# Patient Record
Sex: MALE | Race: WHITE | Employment: OTHER | URBAN - METROPOLITAN AREA
[De-identification: names, ages, dates, MRNs, and addresses within clinical notes are randomized per-mention and may not be internally consistent; named-entity substitution may affect disease eponyms.]

---

## 2017-01-11 ENCOUNTER — TRANSFERRED RECORDS (OUTPATIENT)
Dept: CARDIOLOGY | Facility: CLINIC | Age: 72
End: 2017-01-11

## 2017-05-08 ENCOUNTER — TELEPHONE (OUTPATIENT)
Dept: CARDIOLOGY | Facility: CLINIC | Age: 72
End: 2017-05-08

## 2017-05-10 ENCOUNTER — TELEPHONE (OUTPATIENT)
Dept: CARDIOLOGY | Facility: CLINIC | Age: 72
End: 2017-05-10

## 2017-05-11 ENCOUNTER — HOSPITAL ENCOUNTER (OUTPATIENT)
Dept: CARDIOLOGY | Facility: CLINIC | Age: 72
Discharge: HOME | End: 2017-05-11
Attending: INTERNAL MEDICINE | Admitting: INTERNAL MEDICINE
Payer: MEDICARE

## 2017-05-11 VITALS — SYSTOLIC BLOOD PRESSURE: 140 MMHG | DIASTOLIC BLOOD PRESSURE: 80 MMHG

## 2017-05-11 DIAGNOSIS — I42.9 CARDIOMYOPATHY, UNSPECIFIED (H): ICD-10-CM

## 2017-05-11 DIAGNOSIS — I44.7 LBBB (LEFT BUNDLE BRANCH BLOCK): ICD-10-CM

## 2017-05-11 DIAGNOSIS — N28.9 RENAL INSUFFICIENCY: ICD-10-CM

## 2017-05-11 DIAGNOSIS — E11.9 DIABETES MELLITUS WITHOUT COMPLICATION (H): ICD-10-CM

## 2017-05-11 DIAGNOSIS — E78.5 HYPERLIPIDEMIA LDL GOAL <100: ICD-10-CM

## 2017-05-11 PROCEDURE — A9502 TC99M TETROFOSMIN: HCPCS | Performed by: INTERNAL MEDICINE

## 2017-05-11 PROCEDURE — 93018 CV STRESS TEST I&R ONLY: CPT | Performed by: INTERNAL MEDICINE

## 2017-05-11 PROCEDURE — 78452 HT MUSCLE IMAGE SPECT MULT: CPT | Mod: 26 | Performed by: INTERNAL MEDICINE

## 2017-05-11 PROCEDURE — 78452 HT MUSCLE IMAGE SPECT MULT: CPT

## 2017-05-11 PROCEDURE — 25000128 H RX IP 250 OP 636: Performed by: INTERNAL MEDICINE

## 2017-05-11 PROCEDURE — 93016 CV STRESS TEST SUPVJ ONLY: CPT | Performed by: INTERNAL MEDICINE

## 2017-05-11 PROCEDURE — 34300033 ZZH RX 343: Performed by: INTERNAL MEDICINE

## 2017-05-11 RX ORDER — REGADENOSON 0.08 MG/ML
0.4 INJECTION, SOLUTION INTRAVENOUS ONCE
Status: COMPLETED | OUTPATIENT
Start: 2017-05-11 | End: 2017-05-11

## 2017-05-11 RX ORDER — AMINOPHYLLINE 25 MG/ML
50-100 INJECTION, SOLUTION INTRAVENOUS
Status: DISCONTINUED | OUTPATIENT
Start: 2017-05-11 | End: 2017-05-12 | Stop reason: HOSPADM

## 2017-05-11 RX ORDER — ACYCLOVIR 200 MG/1
0-1 CAPSULE ORAL
Status: DISCONTINUED | OUTPATIENT
Start: 2017-05-11 | End: 2017-05-12 | Stop reason: HOSPADM

## 2017-05-11 RX ORDER — REGADENOSON 0.08 MG/ML
0.4 INJECTION, SOLUTION INTRAVENOUS ONCE
Status: DISCONTINUED | OUTPATIENT
Start: 2017-05-11 | End: 2017-05-12 | Stop reason: HOSPADM

## 2017-05-11 RX ORDER — ALBUTEROL SULFATE 90 UG/1
2 AEROSOL, METERED RESPIRATORY (INHALATION) EVERY 5 MIN PRN
Status: DISCONTINUED | OUTPATIENT
Start: 2017-05-11 | End: 2017-05-12 | Stop reason: HOSPADM

## 2017-05-11 RX ADMIN — TETROFOSMIN 28.5 MCI.: 0.23 INJECTION, POWDER, LYOPHILIZED, FOR SOLUTION INTRAVENOUS at 09:26

## 2017-05-11 RX ADMIN — TETROFOSMIN 9.7 MCI.: 0.23 INJECTION, POWDER, LYOPHILIZED, FOR SOLUTION INTRAVENOUS at 08:23

## 2017-05-11 RX ADMIN — REGADENOSON 0.4 MG: 0.08 INJECTION, SOLUTION INTRAVENOUS at 09:24

## 2017-05-15 ENCOUNTER — OFFICE VISIT (OUTPATIENT)
Dept: CARDIOLOGY | Facility: CLINIC | Age: 72
End: 2017-05-15
Attending: INTERNAL MEDICINE
Payer: COMMERCIAL

## 2017-05-15 VITALS
HEART RATE: 84 BPM | SYSTOLIC BLOOD PRESSURE: 134 MMHG | HEIGHT: 69 IN | WEIGHT: 263 LBS | DIASTOLIC BLOOD PRESSURE: 84 MMHG | BODY MASS INDEX: 38.95 KG/M2

## 2017-05-15 DIAGNOSIS — N28.9 RENAL INSUFFICIENCY: ICD-10-CM

## 2017-05-15 DIAGNOSIS — I44.7 LBBB (LEFT BUNDLE BRANCH BLOCK): ICD-10-CM

## 2017-05-15 DIAGNOSIS — I42.9 CARDIOMYOPATHY, UNSPECIFIED (H): ICD-10-CM

## 2017-05-15 DIAGNOSIS — E78.5 HYPERLIPIDEMIA LDL GOAL <100: ICD-10-CM

## 2017-05-15 DIAGNOSIS — E11.9 DIABETES MELLITUS WITHOUT COMPLICATION (H): ICD-10-CM

## 2017-05-15 PROCEDURE — 99214 OFFICE O/P EST MOD 30 MIN: CPT | Performed by: INTERNAL MEDICINE

## 2017-05-15 RX ORDER — CARVEDILOL 12.5 MG/1
12.5 TABLET ORAL 2 TIMES DAILY WITH MEALS
Qty: 60 TABLET | Refills: 11 | Status: SHIPPED | OUTPATIENT
Start: 2017-05-15 | End: 2019-08-07

## 2017-05-15 NOTE — MR AVS SNAPSHOT
"              After Visit Summary   5/15/2017    Burton Elizabeth    MRN: [id number]           Patient Information     Date Of Birth          1945        Visit Information        Provider Department      5/15/2017 7:45 AM Laci Neumann MD [facility] AT [place]        Today's Diagnoses     Diabetes mellitus without complication (H)        Hyperlipidemia LDL goal <100        Cardiomyopathy, unspecified (H)        LBBB (left bundle branch block)        Renal insufficiency           Follow-ups after your visit        Additional Services     Follow-Up with Cardiologist                 Future tests that were ordered for you today     Open Future Orders        Priority Expected Expires Ordered    Follow-Up with Cardiologist Routine 5/15/2018 9/27/2018 5/15/2017            Who to contact     If you have questions or need follow up information about today's clinic visit or your schedule please contact [facility] directly at [phone].  Normal or non-critical lab and imaging results will be communicated to you by MyChart, letter or phone within 4 business days after the clinic has received the results. If you do not hear from us within 7 days, please contact the clinic through "[employer]hart or phone. If you have a critical or abnormal lab result, we will notify you by phone as soon as possible.  Submit refill requests through "[employer] or call your pharmacy and they will forward the refill request to us. Please allow 3 business days for your refill to be completed.          Additional Information About Your Visit        MyChart Information     "[employer] lets you send messages to your doctor, view your test results, renew your prescriptions, schedule appointments and more. To sign up, go to [email or web address] . Click on \"Log in\" on the left side of the screen, which will take you to the Welcome page. Then click on \"Sign up Now\" on the right " "side of the page.     You will be asked to enter the access code listed below, as well as some personal information. Please follow the directions to create your username and password.     Your access code is: [email or web address]  Expires: 2017  8:26 AM     Your access code will  in 90 days. If you need help or a new code, please call your [place] clinic or [phone].        Care EveryWhere ID     This is your Care EveryWhere ID. This could be used by other organizations to access your [place] medical records  [id number]        Your Vitals Were     Pulse Height BMI (Body Mass Index)             84 1.753 m (5' 9\") 38.84 kg/m2          Blood Pressure from Last 3 Encounters:   05/15/17 134/84   17 140/80   16 170/90    Weight from Last 3 Encounters:   05/15/17 119.3 kg (263 lb)   16 114.8 kg (253 lb)   16 109.8 kg (242 lb)              We Performed the Following     Follow-Up with Cardiologist          Today's Medication Changes          These changes are accurate as of: 5/15/17  8:34 AM.  If you have any questions, ask your nurse or doctor.               Start taking these medicines.        Dose/Directions    carvedilol 12.5 MG tablet   Commonly known as:  COREG   Used for:  Cardiomyopathy, unspecified (H)   Started by:  Laci Neumann MD        Dose:  12.5 mg   Take 1 tablet (12.5 mg) by mouth 2 times daily (with meals)   Quantity:  60 tablet   Refills:  11         Stop taking these medicines if you haven't already. Please contact your care team if you have questions.     metoprolol 50 MG 24 hr tablet   Commonly known as:  TOPROL XL   Stopped by:  Laci Neumann MD                Where to get your medicines      These medications were sent to [id number] - [facility]  [facility]  [address] [facility] [address]     Phone:  [phone]     carvedilol 12.5 MG tablet                Primary Care Provider Office Phone # Fax #    Td M " MD Titus [phone]       [facility] [address]  [facility] [address]        Thank you!     Thank you for choosing [facility] [place]  for your care. Our goal is always to provide you with excellent care. Hearing back from our patients is one way we can continue to improve our services. Please take a few minutes to complete the written survey that you may receive in the mail after your visit with us. Thank you!             Your Updated Medication List - Protect others around you: Learn how to safely use, store and throw away your medicines at www.disposemymeds.org.          This list is accurate as of: 5/15/17  8:34 AM.  Always use your most recent med list.                   Brand Name Dispense Instructions for use    acetaminophen 650 MG 8 hour tablet     100 tablet    Take 650 mg by mouth every 6 hours       AMLODIPINE BESYLATE PO      Take 10 mg by mouth daily       ASPIRIN PO      Take 325 mg by mouth daily       carvedilol 12.5 MG tablet    COREG    60 tablet    Take 1 tablet (12.5 mg) by mouth 2 times daily (with meals)       GLIMEPIRIDE PO      Take 4 mg by mouth 2 times daily       hydrOXYzine 10 MG tablet    ATARAX    60 tablet    Take 1 tablet (10 mg) by mouth every 6 hours as needed for itching or other (spasm/pain)       insulin glargine 100 UNIT/ML injection    LANTUS     Inject 12-14 Units Subcutaneous every evening       multivitamin, therapeutic with minerals Tabs tablet      Take 1 tablet by mouth daily       order for DME     1 each    Equipment being ordered: Walker Wheels () and Walker () Treatment Diagnosis: Impaired gait       senna-docusate 8.6-50 MG per tablet    SENOKOT-S;PERICOLACE    60 tablet    Take 1-2 tablets by mouth 2 times daily Continue while taking narcotic pain medications. Hold for loose stools.       SIMVASTATIN PO      Take 80 mg by mouth At Bedtime

## 2017-05-15 NOTE — PROGRESS NOTES
HISTORY OF PRESENT ILLNESS:  Mr. 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  He is a diabetic, hypertensive, has presumed coronary artery disease based on nuclear stress test, which we have elected to treat medically.  His ejection fraction has improved from 44%-59% on the beta blocker.  He does have a left bundle branch block.  He had a small reversible defect inferiorly.  He has no chest pain, chest pressure, shortness of breath, heart racing or palpitations.  He does have some lower extremity edema.  His nuclear stress test from earlier this month showed again an ejection fraction of 59% with a small inferior and apical defect.  There is no TID.      Fasting lipid profile done by Dr. Qureshi's office in 10/2016 shows an LDL of 64 on simvastatin.  He is having some side effects with the Toprol with some disruption of sleep.  He is requesting a change in medications.      ASSESSMENT AND PLAN:  It is a pleasure seeing Mr. Elizabeth back in clinic doing well from a cardiac standpoint.  I am going to stop his Toprol in place of Coreg 12.5 b.i.d.  If he does not tolerate this, we could consider bisoprolol, and I am less favorable for atenolol.  His cholesterol is at goal.  We talked about his weight gain of 10 pounds over the past year and his need to work on that.  He is having some left knee difficulties and is unable to exercise over the past 6 weeks and plans to get this evaluated as well.  I will plan on seeing him back in 1 year's time or sooner if symptoms warrant.         AURELIA PADILLA MD [facility]             D: 05/15/2017 08:26   T: 05/15/2017 13:25   MT: todd      Name:     TAY ELIZABETH   MRN:      [id number]        Account:      [id number]   :      1945           Service Date: 05/15/2017      Document: [id number]

## 2017-05-15 NOTE — PROGRESS NOTES
HPI and Plan:   See dictation    No orders of the defined types were placed in this encounter.    No orders of the defined types were placed in this encounter.    Medications Discontinued During This Encounter   Medication Reason     oxyCODONE (ROXICODONE) 5 MG immediate release tablet Stopped by Patient         Encounter Diagnoses   Name Primary?     Diabetes mellitus without complication (H)      Hyperlipidemia LDL goal <100      Cardiomyopathy, unspecified (H)      LBBB (left bundle branch block)      Renal insufficiency        CURRENT MEDICATIONS:  Current Outpatient Prescriptions   Medication Sig Dispense Refill     metoprolol (TOPROL XL) 50 MG 24 hr tablet Take 1 tablet (50 mg) by mouth daily 90 tablet 3     order for DME Equipment being ordered: Walker Wheels () and Walker ()  Treatment Diagnosis: Impaired gait 1 each 0     acetaminophen 650 MG 8 hour tablet Take 650 mg by mouth every 6 hours 100 tablet 1     hydrOXYzine (ATARAX) 10 MG tablet Take 1 tablet (10 mg) by mouth every 6 hours as needed for itching or other (spasm/pain) 60 tablet 0     senna-docusate (SENOKOT-S;PERICOLACE) 8.6-50 MG per tablet Take 1-2 tablets by mouth 2 times daily Continue while taking narcotic pain medications. Hold for loose stools. 60 tablet 1     ASPIRIN PO Take 325 mg by mouth daily       multivitamin, therapeutic with minerals (THERA-VIT-M) TABS Take 1 tablet by mouth daily       insulin glargine (LANTUS) 100 UNIT/ML PEN Inject 12-14 Units Subcutaneous every evening       SIMVASTATIN PO Take 80 mg by mouth At Bedtime       AMLODIPINE BESYLATE PO Take 10 mg by mouth daily       GLIMEPIRIDE PO Take 4 mg by mouth 2 times daily         ALLERGIES     Allergies   Allergen Reactions     Penicillins Anaphylaxis       PAST MEDICAL HISTORY:  Past Medical History:   Diagnosis Date     Arthritis of knee~ s/p replacement 3/24/2016     Decreased cardiac ejection fraction~44% 3/24/2016     Diabetes mellitus, type 2 (H) 3/24/2016  "    HTN (hypertension) 3/24/2016     LBBB (left bundle branch block) 3/24/2016     Mixed hyperlipidemia 8/23/2016       PAST SURGICAL HISTORY:  Past Surgical History:   Procedure Laterality Date     ARTHROPLASTY KNEE Right 3/29/2016    Procedure: ARTHROPLASTY KNEE;  Surgeon: Heena Rosen MD;  Location: [facility] OR     EYE SURGERY      cataract removal     ORTHOPEDIC SURGERY      KNEE SCOPES MULTIPLE       FAMILY HISTORY:  Family History   Problem Relation Age of Onset     Coronary Artery Disease No family hx of        SOCIAL HISTORY:  Social History     Social History     Marital status: Single     Spouse name: N/A     Number of children: N/A     Years of education: N/A     Social History Main Topics     Smoking status: Never Smoker     Smokeless tobacco: None     Alcohol use 0.0 oz/week     0 Standard drinks or equivalent per week      Comment: rare      Drug use: No     Sexual activity: Not Asked     Other Topics Concern     None     Social History Narrative       Review of Systems:  Skin:  Negative     Eyes:  Negative    ENT:  Negative    Respiratory:  Negative    Cardiovascular:  Negative for;palpitations;chest pain;lightheadedness    Gastroenterology: Negative    Genitourinary:  Negative    Musculoskeletal:  Positive for    Neurologic:  Negative    Psychiatric:  Positive for depression  Heme/Lymph/Imm:  Negative    Endocrine:  Positive for diabetes    Physical Exam:  Vitals: /84  Pulse 84  Ht 1.753 m (5' 9\")  Wt 119.3 kg (263 lb)  BMI 38.84 kg/m2    Constitutional:  cooperative, alert and oriented, well developed, well nourished, in no acute distress obese      Skin:  warm and dry to the touch, no apparent skin lesions or masses noted        Head:  normocephalic, no masses or lesions        Eyes:  pupils equal and round, conjunctivae and lids unremarkable, sclera white, no xanthalasma, EOMS intact, no nystagmus        ENT:  no pallor or cyanosis, dentition good        Neck:  carotid pulses are full " and equal bilaterally, JVP normal, no carotid bruit, no thyromegaly        Chest:  normal breath sounds, clear to auscultation, normal A-P diameter, normal symmetry, normal respiratory excursion, no use of accessory muscles        Cardiac: regular rhythm, normal S1/S2, no S3 or S4, apical impulse not displaced, no murmurs, gallops or rubs                  Abdomen:  abdomen soft, non-tender, BS normoactive, no mass, no HSM, no bruits        Vascular: pulses full and equal, no bruits auscultated                                      Extremities and Back:  no deformities, clubbing, cyanosis, erythema observed        Neurological:  affect appropriate, oriented to time, person and place          Recent Lab Results:  LIPID RESULTS:  No results found for: CHOL, HDL, LDL, TRIG, CHOLHDLRATIO    LIVER ENZYME RESULTS:  No results found for: AST, ALT    CBC RESULTS:  Lab Results   Component Value Date    WBC 8.8 03/29/2016    RBC 5.13 03/29/2016    HGB 11.9 (L) 03/31/2016    HCT 43.6 03/29/2016    MCV 85 03/29/2016    MCH 28.7 03/29/2016    MCHC 33.7 03/29/2016    RDW 13.2 03/29/2016     03/29/2016       BMP RESULTS:  Lab Results   Component Value Date     04/01/2016    POTASSIUM 5.1 04/01/2016    CHLORIDE 109 04/01/2016    CO2 22 04/01/2016    ANIONGAP 5 04/01/2016     (H) 04/01/2016    BUN 36 (H) 04/01/2016    CR 1.92 (H) 04/01/2016    GFRESTIMATED 35 (L) 04/01/2016    GFRESTBLACK 42 (L) 04/01/2016    GABE 8.3 (L) 04/01/2016        A1C RESULTS:  Lab Results   Component Value Date    A1C 9.3 (H) 03/30/2016       INR RESULTS:  Lab Results   Component Value Date    INR 1.02 03/29/2016    INR 1.11 12/18/2006           CC  Laci Neumann MD   [facility]  [address]  [place] [id number]

## 2017-05-15 NOTE — LETTER
5/15/2017    Td Qureshi MD  [address] [facility] [address]  [facility] [address]    RE: Burton Elizabeth       Dear Colleague,    I had the pleasure of seeing Burton Elizabeth in the [facility].    Mr. 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  He is a diabetic, hypertensive, has presumed coronary artery disease based on nuclear stress test, which we have elected to treat medically.  His ejection fraction has improved from 44%-59% on the beta blocker.  He does have a left bundle branch block.  He had a small reversible defect inferiorly.  He has no chest pain, chest pressure, shortness of breath, heart racing or palpitations.  He does have some lower extremity edema.  His nuclear stress test from earlier this month showed again an ejection fraction of 59% with a small inferior and apical defect.  There is no TID.      Fasting lipid profile done by Dr. Qureshi's office in 10/2016 shows an LDL of 64 on simvastatin.  He is having some side effects with the Toprol with some disruption of sleep.  He is requesting a change in medications.     Outpatient Encounter Prescriptions as of 5/15/2017   Medication Sig Dispense Refill     carvedilol (COREG) 12.5 MG tablet Take 1 tablet (12.5 mg) by mouth 2 times daily (with meals) 60 tablet 11     order for DME Equipment being ordered: Walker Wheels () and Walker ()  Treatment Diagnosis: Impaired gait 1 each 0     acetaminophen 650 MG 8 hour tablet Take 650 mg by mouth every 6 hours 100 tablet 1     hydrOXYzine (ATARAX) 10 MG tablet Take 1 tablet (10 mg) by mouth every 6 hours as needed for itching or other (spasm/pain) 60 tablet 0     senna-docusate (SENOKOT-S;PERICOLACE) 8.6-50 MG per tablet Take 1-2 tablets by mouth 2 times daily Continue while taking narcotic pain medications. Hold for loose stools. 60 tablet 1     ASPIRIN PO Take 325 mg by mouth daily        multivitamin, therapeutic with minerals (THERA-VIT-M) TABS Take 1 tablet by mouth daily       insulin glargine (LANTUS) 100 UNIT/ML PEN Inject 12-14 Units Subcutaneous every evening       SIMVASTATIN PO Take 80 mg by mouth At Bedtime       AMLODIPINE BESYLATE PO Take 10 mg by mouth daily       GLIMEPIRIDE PO Take 4 mg by mouth 2 times daily       [DISCONTINUED] metoprolol (TOPROL XL) 50 MG 24 hr tablet Take 1 tablet (50 mg) by mouth daily 90 tablet 3     [DISCONTINUED] oxyCODONE (ROXICODONE) 5 MG immediate release tablet Take 1-2 tablets (5-10 mg) by mouth every 3 hours as needed for moderate to severe pain 50 tablet 0     No facility-administered encounter medications on file as of 5/15/2017.       ASSESSMENT AND PLAN:  It is a pleasure seeing Mr. Elizabeth back in clinic doing well from a cardiac standpoint.  I am going to stop his Toprol in place of Coreg 12.5 b.i.d.  If he does not tolerate this, we could consider bisoprolol, and I am less favorable for atenolol.  His cholesterol is at goal.  We talked about his weight gain of 10 pounds over the past year and his need to work on that.  He is having some left knee difficulties and is unable to exercise over the past 6 weeks and plans to get this evaluated as well.  I will plan on seeing him back in 1 year's time or sooner if symptoms warrant.     Again, thank you for allowing me to participate in the care of your patient.      Sincerely,    AURELIA PADILLA MD     [facility]

## 2018-06-05 ENCOUNTER — OFFICE VISIT (OUTPATIENT)
Dept: CARDIOLOGY | Facility: CLINIC | Age: 73
End: 2018-06-05
Attending: INTERNAL MEDICINE
Payer: COMMERCIAL

## 2018-06-05 VITALS
DIASTOLIC BLOOD PRESSURE: 90 MMHG | BODY MASS INDEX: 36.43 KG/M2 | WEIGHT: 246 LBS | SYSTOLIC BLOOD PRESSURE: 156 MMHG | HEART RATE: 84 BPM | HEIGHT: 69 IN

## 2018-06-05 DIAGNOSIS — E11.9 DIABETES MELLITUS WITHOUT COMPLICATION (H): ICD-10-CM

## 2018-06-05 DIAGNOSIS — I44.7 LBBB (LEFT BUNDLE BRANCH BLOCK): ICD-10-CM

## 2018-06-05 DIAGNOSIS — E78.5 HYPERLIPIDEMIA LDL GOAL <100: ICD-10-CM

## 2018-06-05 DIAGNOSIS — I42.0 DILATED CARDIOMYOPATHY (H): ICD-10-CM

## 2018-06-05 DIAGNOSIS — N28.9 RENAL INSUFFICIENCY: ICD-10-CM

## 2018-06-05 PROCEDURE — 99214 OFFICE O/P EST MOD 30 MIN: CPT | Performed by: INTERNAL MEDICINE

## 2018-06-05 NOTE — MR AVS SNAPSHOT
After Visit Summary   6/5/2018    Burton Elizabeth    MRN: [id number]           Patient Information     Date Of Birth          1945        Visit Information        Provider Department      6/5/2018 7:45 AM Laci Neumann MD [facility]        Today's Diagnoses     Diabetes mellitus without complication (H)        Hyperlipidemia LDL goal <100        Dilated cardiomyopathy (H)        LBBB (left bundle branch block)        Renal insufficiency           Follow-ups after your visit        Additional Services     Follow-Up with Cardiologist                 Future tests that were ordered for you today     Open Future Orders        Priority Expected Expires Ordered    Lipid Profile Routine 6/5/2019 6/5/2019 6/5/2018    ALT Routine 6/5/2019 6/5/2019 6/5/2018    Follow-Up with Cardiologist Routine 6/5/2019 6/6/2019 6/5/2018    Echocardiogram Routine 6/5/2019 6/6/2019 6/5/2018            Who to contact     If you have questions or need follow up information about today's clinic visit or your schedule please contact [facility] directly at [phone].  Normal or non-critical lab and imaging results will be communicated to you by MyChart, letter or phone within 4 business days after the clinic has received the results. If you do not hear from us within 7 days, please contact the clinic through MyChart or phone. If you have a critical or abnormal lab result, we will notify you by phone as soon as possible.  Submit refill requests through [employer] or call your pharmacy and they will forward the refill request to us. Please allow 3 business days for your refill to be completed.          Additional Information About Your Visit        Care EveryWhere ID     This is your Care EveryWhere ID. This could be used by other organizations to access your [place] medical records  [id number]        Your Vitals Were     Pulse Height BMI  "(Body Mass Index)             84 1.753 m (5' 9\") 36.33 kg/m2          Blood Pressure from Last 3 Encounters:   06/05/18 156/90   05/15/17 134/84   05/11/17 140/80    Weight from Last 3 Encounters:   06/05/18 111.6 kg (246 lb)   05/15/17 119.3 kg (263 lb)   08/24/16 114.8 kg (253 lb)              We Performed the Following     Follow-Up with Cardiologist        Primary Care Provider Office Phone # Fax #    Td Qureshi [phone] [address] [address]        Equal Access to Services     ALMA DELIA SALAZAR : Hadii miri Saha, wayogesh mendoza, qabradlyta kaalmada katherin, roverto hunt . So [facility] [phone].    ATENCIÓN: Si habla español, tiene a martin disposición servicios gratuitos de asistencia lingüística. Llame al [phone].    We comply with applicable federal civil rights laws and [place] laws. We do not discriminate on the basis of race, color, national origin, age, disability, sex, sexual orientation, or gender identity.            Thank you!     Thank you for choosing [facility]  for your care. Our goal is always to provide you with excellent care. Hearing back from our patients is one way we can continue to improve our services. Please take a few minutes to complete the written survey that you may receive in the mail after your visit with us. Thank you!             Your Updated Medication List - Protect others around you: Learn how to safely use, store and throw away your medicines at www.disposemymeds.org.          This list is accurate as of 6/5/18  8:50 AM.  Always use your most recent med list.                   Brand Name Dispense Instructions for use Diagnosis    acetaminophen 650 MG 8 hour tablet     100 tablet    Take 650 mg by mouth every 6 hours    Primary localized osteoarthritis of right knee       AMLODIPINE BESYLATE PO      Take 10 mg by mouth daily        ASPIRIN PO      " Take 325 mg by mouth daily        carvedilol 12.5 MG tablet    COREG    60 tablet    Take 1 tablet (12.5 mg) by mouth 2 times daily (with meals)    Cardiomyopathy, unspecified       GLIMEPIRIDE PO      Take 4 mg by mouth 2 times daily        hydrOXYzine 10 MG tablet    ATARAX    60 tablet    Take 1 tablet (10 mg) by mouth every 6 hours as needed for itching or other (spasm/pain)    Primary localized osteoarthritis of right knee       insulin glargine 100 UNIT/ML injection    LANTUS     Inject 12-14 Units Subcutaneous every evening        multivitamin, therapeutic with minerals Tabs tablet      Take 1 tablet by mouth daily        order for DME     1 each    Equipment being ordered: Walker Wheels () and Walker () Treatment Diagnosis: Impaired gait    Status post total right knee replacement       senna-docusate 8.6-50 MG per tablet    SENOKOT-S;PERICOLACE    60 tablet    Take 1-2 tablets by mouth 2 times daily Continue while taking narcotic pain medications. Hold for loose stools.    Primary localized osteoarthritis of right knee       SIMVASTATIN PO      Take 80 mg by mouth At Bedtime

## 2018-06-05 NOTE — PROGRESS NOTES
Service Date: 2018      HISTORY OF PRESENT ILLNESS:  Mr. Elizabeth is a delightful 73-year-old.  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  He is diabetic, hypertensive.  Has resumed coronary disease based on his nuclear stress test, but is asymptomatic and we have elected to treat medically.  His last ejection fraction in  was 59% with a small area of anterior ischemia.  He continues to be active, walking and traveling frequently.  He has been successful in losing approximately 18 pounds over the last year.  He has no chest pain, chest pressure, shortness of breath, heart racing, palpitations or edema.      He has been compliant with his medications.  States his blood pressure has been controlled.  He does have moderate to severe chronic kidney insufficiency with a GFR of 37.  I have no recent lipid profile.      ASSESSMENT AND PLAN:  It is a delight seeing Mr. Elizabeth back in clinic doing so well clinically.  He appears stable without symptoms of angina, heart failure, cardiac arrhythmias.  His weight is down.  He states his blood pressure has been controlled.  Next year, he is due for an echocardiogram.  He does have a systolic murmur consistent with probable mitral regurgitation.  We will evaluate it at that time as well as a lipid profile.         AURELIA PADILLA MD [facility]             D: 2018   T: 2018   MT: NEIDA      Name:     TAY ELIZABETH   MRN:      [id number]        Account:      [id number]   :      1945           Service Date: 2018      Document: [id number]

## 2018-06-05 NOTE — LETTER
6/5/2018    Td Qureshi MD  [address]  [facility] [address]    RE: Burton Elizabeth       Dear Colleague,    I had the pleasure of seeing Burton Elizabeth in the [facility].    HPI and Plan:   See dictation    Orders Placed This Encounter   Procedures     Follow-Up with Cardiologist     Echocardiogram     No orders of the defined types were placed in this encounter.    There are no discontinued medications.      Encounter Diagnoses   Name Primary?     Diabetes mellitus without complication (H)      Hyperlipidemia LDL goal <100      Dilated cardiomyopathy (H)      LBBB (left bundle branch block)      Renal insufficiency        CURRENT MEDICATIONS:  Current Outpatient Prescriptions   Medication Sig Dispense Refill     acetaminophen 650 MG 8 hour tablet Take 650 mg by mouth every 6 hours 100 tablet 1     AMLODIPINE BESYLATE PO Take 10 mg by mouth daily       ASPIRIN PO Take 325 mg by mouth daily       carvedilol (COREG) 12.5 MG tablet Take 1 tablet (12.5 mg) by mouth 2 times daily (with meals) 60 tablet 11     GLIMEPIRIDE PO Take 4 mg by mouth 2 times daily       hydrOXYzine (ATARAX) 10 MG tablet Take 1 tablet (10 mg) by mouth every 6 hours as needed for itching or other (spasm/pain) 60 tablet 0     insulin glargine (LANTUS) 100 UNIT/ML PEN Inject 12-14 Units Subcutaneous every evening       multivitamin, therapeutic with minerals (THERA-VIT-M) TABS Take 1 tablet by mouth daily       order for DME Equipment being ordered: Walker Wheels () and Walker ()  Treatment Diagnosis: Impaired gait 1 each 0     senna-docusate (SENOKOT-S;PERICOLACE) 8.6-50 MG per tablet Take 1-2 tablets by mouth 2 times daily Continue while taking narcotic pain medications. Hold for loose stools. 60 tablet 1     SIMVASTATIN PO Take 80 mg by mouth At Bedtime         ALLERGIES     Allergies   Allergen Reactions     Penicillins Anaphylaxis       PAST MEDICAL HISTORY:  Past Medical History:  "  Diagnosis Date     Arthritis of knee~ s/p replacement 3/24/2016     Decreased cardiac ejection fraction~44% 3/24/2016     Diabetes mellitus, type 2 (H) 3/24/2016     HTN (hypertension) 3/24/2016     LBBB (left bundle branch block) 3/24/2016     Mixed hyperlipidemia 8/23/2016       PAST SURGICAL HISTORY:  Past Surgical History:   Procedure Laterality Date     ARTHROPLASTY KNEE Right 3/29/2016    Procedure: ARTHROPLASTY KNEE;  Surgeon: Heena Rosen MD;  Location: [facility] OR     EYE SURGERY      cataract removal     ORTHOPEDIC SURGERY      KNEE SCOPES MULTIPLE       FAMILY HISTORY:  Family History   Problem Relation Age of Onset     Coronary Artery Disease No family hx of        SOCIAL HISTORY:  Social History     Social History     Marital status: Single     Spouse name: N/A     Number of children: N/A     Years of education: N/A     Social History Main Topics     Smoking status: Never Smoker     Smokeless tobacco: Never Used     Alcohol use 0.0 oz/week     0 Standard drinks or equivalent per week      Comment: rare      Drug use: No     Sexual activity: Not Asked     Other Topics Concern     None     Social History Narrative       Review of Systems:  Skin:  Negative     Eyes:  Negative    ENT:  Negative    Respiratory:  Negative    Cardiovascular:  Negative for;palpitations;chest pain;lightheadedness    Gastroenterology: Negative    Genitourinary:  Negative    Musculoskeletal:  Positive for    Neurologic:  Negative    Psychiatric:  Positive for depression  Heme/Lymph/Imm:  Negative    Endocrine:  Positive for diabetes    Physical Exam:  Vitals: /90  Pulse 84  Ht 1.753 m (5' 9\")  Wt 111.6 kg (246 lb)  BMI 36.33 kg/m2    Constitutional:  cooperative, alert and oriented, well developed, well nourished, in no acute distress obese      Skin:  warm and dry to the touch, no apparent skin lesions or masses noted          Head:  normocephalic, no masses or lesions        Eyes:  pupils equal and round, " conjunctivae and lids unremarkable, sclera white, no xanthalasma, EOMS intact, no nystagmus        Lymph:      ENT:  no pallor or cyanosis, dentition good        Neck:           Respiratory:  normal breath sounds, clear to auscultation, normal A-P diameter, normal symmetry, normal respiratory excursion, no use of accessory muscles         Cardiac  2-3/6 systolic murmur across the precordium                pulses full and equal, no bruits auscultated                                        GI:  abdomen soft, non-tender, BS normoactive, no mass, no HSM, no bruits        Extremities and Muscular Skeletal:  no deformities, clubbing, cyanosis, erythema observed              Neurological:           Psych:           Recent Lab Results:  LIPID RESULTS:  No results found for: CHOL, HDL, LDL, TRIG, CHOLHDLRATIO    LIVER ENZYME RESULTS:  No results found for: AST, ALT    CBC RESULTS:  Lab Results   Component Value Date    WBC 8.8 03/29/2016    RBC 5.13 03/29/2016    HGB 11.9 (L) 03/31/2016    HCT 43.6 03/29/2016    MCV 85 03/29/2016    MCH 28.7 03/29/2016    MCHC 33.7 03/29/2016    RDW 13.2 03/29/2016     03/29/2016       BMP RESULTS:  Lab Results   Component Value Date     04/01/2016    POTASSIUM 5.1 04/01/2016    CHLORIDE 109 04/01/2016    CO2 22 04/01/2016    ANIONGAP 5 04/01/2016     (H) 04/01/2016    BUN 36 (H) 04/01/2016    CR 1.92 (H) 04/01/2016    GFRESTIMATED 35 (L) 04/01/2016    GFRESTBLACK 42 (L) 04/01/2016    GABE 8.3 (L) 04/01/2016        A1C RESULTS:  Lab Results   Component Value Date    A1C 9.3 (H) 03/30/2016       INR RESULTS:  Lab Results   Component Value Date    INR 1.02 03/29/2016    INR 1.11 12/18/2006           CC  Laci Padilla MD  [address]  [place] [address]                  Thank you for allowing me to participate in the care of your patient.      Sincerely,     LACI PADILLA MD     [facility]    cc:   Laci Padilla,  MD  [address]  [place] [address]

## 2018-06-05 NOTE — LETTER
2018      Td Qureshi MD  [address] [facility] [address]  [facility] [address]      RE: Burton Elizabeth       Dear Colleague,    I had the pleasure of seeing Burton Elizabeth in the [facility].    Service Date: 2018      HISTORY OF PRESENT ILLNESS:  Mr. Elizabeth is a delightful 73-year-old.  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  He is diabetic, hypertensive.  Has resumed coronary disease based on his nuclear stress test, but is asymptomatic and we have elected to treat medically.  His last ejection fraction in  was 59% with a small area of anterior ischemia.  He continues to be active, walking and traveling frequently.  He has been successful in losing approximately 18 pounds over the last year.  He has no chest pain, chest pressure, shortness of breath, heart racing, palpitations or edema.      He has been compliant with his medications.  States his blood pressure has been controlled.  He does have moderate to severe chronic kidney insufficiency with a GFR of 37.  I have no recent lipid profile.      ASSESSMENT AND PLAN:  It is a delight seeing Mr. Elizabeth back in clinic doing so well clinically.  He appears stable without symptoms of angina, heart failure, cardiac arrhythmias.  His weight is down.  He states his blood pressure has been controlled.  Next year, he is due for an echocardiogram.  He does have a systolic murmur consistent with probable mitral regurgitation.  We will evaluate it at that time as well as a lipid profile.         AURELIA PADILLA MD [facility]         D: 2018   T: 2018   MT: NEIDA     Name:     BURTON ELIZABETH   MRN:      [id number]        Account:      [id number]   :      1945           Service Date: 2018      Document: [id number]      Outpatient Encounter Prescriptions as of 2018   Medication Sig Dispense Refill     acetaminophen 650 MG 8 hour tablet Take 650 mg  by mouth every 6 hours 100 tablet 1     AMLODIPINE BESYLATE PO Take 10 mg by mouth daily       ASPIRIN PO Take 325 mg by mouth daily       carvedilol (COREG) 12.5 MG tablet Take 1 tablet (12.5 mg) by mouth 2 times daily (with meals) 60 tablet 11     GLIMEPIRIDE PO Take 4 mg by mouth 2 times daily       hydrOXYzine (ATARAX) 10 MG tablet Take 1 tablet (10 mg) by mouth every 6 hours as needed for itching or other (spasm/pain) 60 tablet 0     insulin glargine (LANTUS) 100 UNIT/ML PEN Inject 12-14 Units Subcutaneous every evening       multivitamin, therapeutic with minerals (THERA-VIT-M) TABS Take 1 tablet by mouth daily       order for DME Equipment being ordered: Walker Wheels () and Walker ()  Treatment Diagnosis: Impaired gait 1 each 0     senna-docusate (SENOKOT-S;PERICOLACE) 8.6-50 MG per tablet Take 1-2 tablets by mouth 2 times daily Continue while taking narcotic pain medications. Hold for loose stools. 60 tablet 1     SIMVASTATIN PO Take 80 mg by mouth At Bedtime       No facility-administered encounter medications on file as of 6/5/2018.      Again, thank you for allowing me to participate in the care of your patient.      Sincerely,    AURELIA PADILLA MD   [facility]

## 2018-06-05 NOTE — PROGRESS NOTES
HPI and Plan:   See dictation    Orders Placed This Encounter   Procedures     Follow-Up with Cardiologist     Echocardiogram     No orders of the defined types were placed in this encounter.    There are no discontinued medications.      Encounter Diagnoses   Name Primary?     Diabetes mellitus without complication (H)      Hyperlipidemia LDL goal <100      Dilated cardiomyopathy (H)      LBBB (left bundle branch block)      Renal insufficiency        CURRENT MEDICATIONS:  Current Outpatient Prescriptions   Medication Sig Dispense Refill     acetaminophen 650 MG 8 hour tablet Take 650 mg by mouth every 6 hours 100 tablet 1     AMLODIPINE BESYLATE PO Take 10 mg by mouth daily       ASPIRIN PO Take 325 mg by mouth daily       carvedilol (COREG) 12.5 MG tablet Take 1 tablet (12.5 mg) by mouth 2 times daily (with meals) 60 tablet 11     GLIMEPIRIDE PO Take 4 mg by mouth 2 times daily       hydrOXYzine (ATARAX) 10 MG tablet Take 1 tablet (10 mg) by mouth every 6 hours as needed for itching or other (spasm/pain) 60 tablet 0     insulin glargine (LANTUS) 100 UNIT/ML PEN Inject 12-14 Units Subcutaneous every evening       multivitamin, therapeutic with minerals (THERA-VIT-M) TABS Take 1 tablet by mouth daily       order for DME Equipment being ordered: Walker Wheels () and Walker ()  Treatment Diagnosis: Impaired gait 1 each 0     senna-docusate (SENOKOT-S;PERICOLACE) 8.6-50 MG per tablet Take 1-2 tablets by mouth 2 times daily Continue while taking narcotic pain medications. Hold for loose stools. 60 tablet 1     SIMVASTATIN PO Take 80 mg by mouth At Bedtime         ALLERGIES     Allergies   Allergen Reactions     Penicillins Anaphylaxis       PAST MEDICAL HISTORY:  Past Medical History:   Diagnosis Date     Arthritis of knee~ s/p replacement 3/24/2016     Decreased cardiac ejection fraction~44% 3/24/2016     Diabetes mellitus, type 2 (H) 3/24/2016     HTN (hypertension) 3/24/2016     LBBB (left bundle branch  "block) 3/24/2016     Mixed hyperlipidemia 8/23/2016       PAST SURGICAL HISTORY:  Past Surgical History:   Procedure Laterality Date     ARTHROPLASTY KNEE Right 3/29/2016    Procedure: ARTHROPLASTY KNEE;  Surgeon: Heena Rosen MD;  Location: [facility] OR     EYE SURGERY      cataract removal     ORTHOPEDIC SURGERY      KNEE SCOPES MULTIPLE       FAMILY HISTORY:  Family History   Problem Relation Age of Onset     Coronary Artery Disease No family hx of        SOCIAL HISTORY:  Social History     Social History     Marital status: Single     Spouse name: N/A     Number of children: N/A     Years of education: N/A     Social History Main Topics     Smoking status: Never Smoker     Smokeless tobacco: Never Used     Alcohol use 0.0 oz/week     0 Standard drinks or equivalent per week      Comment: rare      Drug use: No     Sexual activity: Not Asked     Other Topics Concern     None     Social History Narrative       Review of Systems:  Skin:  Negative     Eyes:  Negative    ENT:  Negative    Respiratory:  Negative    Cardiovascular:  Negative for;palpitations;chest pain;lightheadedness    Gastroenterology: Negative    Genitourinary:  Negative    Musculoskeletal:  Positive for    Neurologic:  Negative    Psychiatric:  Positive for depression  Heme/Lymph/Imm:  Negative    Endocrine:  Positive for diabetes    Physical Exam:  Vitals: /90  Pulse 84  Ht 1.753 m (5' 9\")  Wt 111.6 kg (246 lb)  BMI 36.33 kg/m2    Constitutional:  cooperative, alert and oriented, well developed, well nourished, in no acute distress obese      Skin:  warm and dry to the touch, no apparent skin lesions or masses noted          Head:  normocephalic, no masses or lesions        Eyes:  pupils equal and round, conjunctivae and lids unremarkable, sclera white, no xanthalasma, EOMS intact, no nystagmus        Lymph:      ENT:  no pallor or cyanosis, dentition good        Neck:           Respiratory:  normal breath sounds, clear to auscultation, " normal A-P diameter, normal symmetry, normal respiratory excursion, no use of accessory muscles         Cardiac  2-3/6 systolic murmur across the precordium                pulses full and equal, no bruits auscultated                                        GI:  abdomen soft, non-tender, BS normoactive, no mass, no HSM, no bruits        Extremities and Muscular Skeletal:  no deformities, clubbing, cyanosis, erythema observed              Neurological:           Psych:           Recent Lab Results:  LIPID RESULTS:  No results found for: CHOL, HDL, LDL, TRIG, CHOLHDLRATIO    LIVER ENZYME RESULTS:  No results found for: AST, ALT    CBC RESULTS:  Lab Results   Component Value Date    WBC 8.8 03/29/2016    RBC 5.13 03/29/2016    HGB 11.9 (L) 03/31/2016    HCT 43.6 03/29/2016    MCV 85 03/29/2016    MCH 28.7 03/29/2016    MCHC 33.7 03/29/2016    RDW 13.2 03/29/2016     03/29/2016       BMP RESULTS:  Lab Results   Component Value Date     04/01/2016    POTASSIUM 5.1 04/01/2016    CHLORIDE 109 04/01/2016    CO2 22 04/01/2016    ANIONGAP 5 04/01/2016     (H) 04/01/2016    BUN 36 (H) 04/01/2016    CR 1.92 (H) 04/01/2016    GFRESTIMATED 35 (L) 04/01/2016    GFRESTBLACK 42 (L) 04/01/2016    GABE 8.3 (L) 04/01/2016        A1C RESULTS:  Lab Results   Component Value Date    A1C 9.3 (H) 03/30/2016       INR RESULTS:  Lab Results   Component Value Date    INR 1.02 03/29/2016    INR 1.11 12/18/2006           CC  Laci Neumann MD  [address]  [place] [address]

## 2018-12-05 ENCOUNTER — ALLIED HEALTH/NURSE VISIT (OUTPATIENT)
Dept: EDUCATION SERVICES | Facility: CLINIC | Age: 73
End: 2018-12-05
Payer: COMMERCIAL

## 2018-12-05 DIAGNOSIS — E11.9 DIABETES MELLITUS, TYPE 2 (H): Primary | ICD-10-CM

## 2018-12-05 PROCEDURE — G0108 DIAB MANAGE TRN  PER INDIV: HCPCS

## 2018-12-05 PROCEDURE — 99207 ZZC DROP WITH A PROCEDURE: CPT

## 2018-12-05 NOTE — PATIENT INSTRUCTIONS
Take your humalog prior to eating (5-10 min).          Bring blood glucose meter and logbook with you to all doctor and follow-up appointments.     [facility] for the [facility]:  For Your Diabetes Education and Nutrition Appointments Call:  [phone]   For Diabetes Education or Nutrition Related Questions:   Phone: [phone]  E-mail: [email or web address]  Fax: [phone]   If you need a medication refill please contact your pharmacy. Please allow 3 business days for your refills to be completed.    Instructions for emailing the Diabetes Educators    If you need to communicate a non-urgent message to a Diabetes Educator via email, please send to [email or web address].    Please follow the following email guidelines:    Subject line: Secure: your clinic name (example: Secure: [facility])  In the email please include: First name, middle initial, last name and date of birth.    We will be in touch with you within one (1) business day.

## 2018-12-05 NOTE — PROGRESS NOTES
"Lives in [place] and likes fresh fish and mexican food. Avoids white bread and no longer eats cheerios.     Reports 1:50 for his I:C    Will confirm with PCP about dose. Seeing him on 12/18.     Diabetes Self-Management Education & Support    Diabetes Education Self Management & Training    SUBJECTIVE/OBJECTIVE:  Presents for: Individual review  Accompanied by: Self  Focus of Visit: Healthy Eating  Diabetes type: Type 2  Diabetes management related comments/concerns: Numbers were good on the Jardiance. No longer taking Jardiance because of his kidney function per pt. Has been on meal time insulin for a while and reports he was told to take 1 unit per 50 grams of carb.   Transportation concerns: No  Other concerns:: None  Cultural Influences/Ethnic Background:  American    Diabetes Symptoms & Complications  Symptom course: Stable  Weight trend: down about 15# in the past 18 months.   Wt Readings from Last 5 Encounters:   06/05/18 111.6 kg (246 lb)   05/15/17 119.3 kg (263 lb)   08/24/16 114.8 kg (253 lb)   03/29/16 109.8 kg (242 lb)   03/25/16 112.5 kg (248 lb)        Patient Problem List and Family Medical History reviewed for relevant medical history, current medical status, and diabetes risk factors.    Vitals:  There were no vitals taken for this visit.  Estimated body mass index is 36.33 kg/(m^2) as calculated from the following:    Height as of 6/5/18: 1.753 m (5' 9\").    Weight as of 6/5/18: 111.6 kg (246 lb).   Last 3 BP:   BP Readings from Last 3 Encounters:   06/05/18 156/90   05/15/17 134/84   05/11/17 140/80       History   Smoking Status     Never Smoker   Smokeless Tobacco     Never Used       Labs:  Lab Results   Component Value Date    A1C 9.3 03/30/2016     Lab Results   Component Value Date     04/01/2016     No results found for: LDL  No results found for: HDL]  GFR Estimate   Date Value Ref Range Status   04/01/2016 35 (L) >60 mL/min/1.7m2 Final     Comment:     Non  GFR " Calc     GFR Estimate If Black   Date Value Ref Range Status   04/01/2016 42 (L) >60 mL/min/1.7m2 Final     Comment:      GFR Calc     Lab Results   Component Value Date    CR 1.92 04/01/2016     No results found for: MICROALBUMIN    Healthy Eating  Healthy Eating Assessed Today: Yes  Cultural/Jewish diet restrictions?: No  Patient on a regular basis: Eats 3 meals a day (eats out frequently as he travels for work a lot)  Meal planning: Avoiding sweets  Meals include: Breakfast, Lunch, Dinner, Snacks  Beverages: Water, Tea, Coffee        Being Active  Being Active Assessed Today: Yes  Exercise:: Currently not exercising    Monitoring  Monitoring Assessed Today: Yes  Did patient bring glucose meter to appointment? : No (in [place] with his luggage)  Low Glucose Range (mg/dL): 180-200  High Glucose Range (mg/dL): >200  Overall Range (mg/dL): >200 (210-225)    Taking Medications  Diabetes Medication(s)     Insulin Sig    insulin glargine (LANTUS) 100 UNIT/ML PEN Inject 12-14 Units Subcutaneous every evening    Sulfonylureas Sig    GLIMEPIRIDE PO Take 4 mg by mouth 2 times daily          Taking Medication Assessed Today: Yes  Current Treatments: Insulin Injections  Given by: Patient  Injection/Infusion sites: Abdomen  Problems taking diabetes medications regularly?: No  Diabetes medication side effects?: No  Treatment Compliance: All of the time    Problem Solving  Problem Solving Assessed Today: Yes  Hypoglycemia Frequency: Never      Reducing Risks  Reducing Risks Assessed Today: Yes  Diabetes Risks: Age over 45 years, Sedentary Lifestyle, Hyperlipidemia  CAD Risks: Diabetes Mellitus, Dyslipidemia, Hypertension, Male sex, Obesity, Sedentary lifestyle    Healthy Coping  Healthy Coping Assessed Today: Yes  Emotional response to diabetes: Ready to learn  Stage of change: PREPARATION (Decided to change - considering how)  Patient Activation Measure Survey Score:  No flowsheet data  found.    ASSESSMENT:  Overall, has higher blood sugars per report. Is not carb counting. Thinks that his I:C is 1:50 compared to referral from MD which is 1:15. Will call his clinic to check on this.       Patient's most recent A1c 9.7% (10/25/18)    INTERVENTION:   Diabetes knowledge and skills assessment:     Patient is knowledgeable in diabetes management concepts related to: Healthy Eating, Monitoring, Taking Medication, Problem Solving, Reducing Risks and Healthy Coping    Patient needs further education on the following diabetes management concepts: Healthy Eating, Being Active, Monitoring, Taking Medication and Problem Solving    Based on learning assessment above, most appropriate setting for further diabetes education would be: Individual setting.    Education provided today on:  AADE Self-Care Behaviors:  Diabetes Pathophysiology  Healthy Eating: carbohydrate counting, weight reduction, portion control and label reading. Focused on why carb counting is important in determining insulin dose with meals. Explained that by using a carb ratio it allows for more flexibility with eating. Focused on foods he commonly eats for carb information.   Being Active: relationship to blood glucose  Taking Medication: proper site selection and rotation for injections and when to take medications. He is often taking insulin after he eats and sometimes up to 45 min later. Educated on taking meal time dose before eating (about 10 min ideally).  He has a correction scale at home he reports so explained that he will then add his correction insulin (if BG is high) to his meal time dose for the total injection amount pre-meal.     Opportunities for ongoing education and support in diabetes-self management were discussed.    Pt verbalized understanding of concepts discussed and recommendations provided today.       Education Materials Provided:  Carbohydrate Counting    PLAN:  See Patient Instructions for co-developed,  patient-stated behavior change goals.  Called pt's clinic to check on what I:C his PCP would like for him to follow. Will update pt with a phone call when I hear back  (okay to leave a message).   Take humalog 5-10 min prior to eating meals.   Will work on carb counting.   Recommend to stop Glimepiride since he on both meal time and long acting insulin. Will fax clinic.   AVS printed and provided to patient today. See Follow-Up section for recommended follow-up.    Daly Tobin RD LD CDE    Time Spent: 63 minutes  Encounter Type: Individual

## 2018-12-05 NOTE — MR AVS SNAPSHOT
After Visit Summary   12/5/2018    Burton Elizabeth    MRN: [id number]           Patient Information     Date Of Birth          1945        Visit Information        Provider Department      12/5/2018 8:00 AM [facility] ED RESOURCE [facility]        Today's Diagnoses     Diabetes mellitus, type 2 (H)    -  1      Care Instructions    Take your humalog prior to eating (5-10 min).          Bring blood glucose meter and logbook with you to all doctor and follow-up appointments.     [facility] for the [facility]:  For Your Diabetes Education and Nutrition Appointments Call:  [phone]   For Diabetes Education or Nutrition Related Questions:   Phone: [phone]  E-mail: [email or web address]  Fax: [phone]   If you need a medication refill please contact your pharmacy. Please allow 3 business days for your refills to be completed.    Instructions for emailing the Diabetes Educators    If you need to communicate a non-urgent message to a Diabetes Educator via email, please send to [email or web address].    Please follow the following email guidelines:    Subject line: Secure: your clinic name (example: Secure: Mark)  In the email please include: First name, middle initial, last name and date of birth.    We will be in touch with you within one (1) business day.             Follow-ups after your visit        Who to contact     If you have questions or need follow up information about today's clinic visit or your schedule please contact [facility] directly at [phone].  Normal or non-critical lab and imaging results will be communicated to you by MyChart, letter or phone within 4 business days after the clinic has received the results. If you do not hear from us within 7 days, please contact the clinic through MyChart or phone. If you have a critical or abnormal lab result, we will  notify you by phone as soon as possible.  Submit refill requests through [employer] or call your pharmacy and they will forward the refill request to us. Please allow 3 business days for your refill to be completed.          Additional Information About Your Visit        Care EveryWhere ID     This is your Care EveryWhere ID. This could be used by other organizations to access your [facility] medical records  [id number]         Blood Pressure from Last 3 Encounters:   06/05/18 156/90   05/15/17 134/84   05/11/17 140/80    Weight from Last 3 Encounters:   06/05/18 111.6 kg (246 lb)   05/15/17 119.3 kg (263 lb)   08/24/16 114.8 kg (253 lb)              Today, you had the following     No orders found for display       Primary Care Provider Office Phone # Fax #    Td Qureshi [phone] [address] [facility] [address]  [facility] [address]        Equal Access to Services     ALMA DELIA [facility] : Hadii aad ku hadasho Soomaali, waaxda luqadaha, qaybta kaalmada adeegyada, waxay jhonnyin haychar hunt . So [facility] [phone].    ATENCIÓN: Si habla español, tiene a martin disposición servicios gratuitos de asistencia lingüística. Llame al [phone].    We comply with applicable federal civil rights laws and [place] laws. We do not discriminate on the basis of race, color, national origin, age, disability, sex, sexual orientation, or gender identity.            Thank you!     Thank you for choosing [facility]  for your care. Our goal is always to provide you with excellent care. Hearing back from our patients is one way we can continue to improve our services. Please take a few minutes to complete the written survey that you may receive in the mail after your visit with us. Thank you!             Your Updated Medication List - Protect others around you: Learn how to safely use, store and throw away your medicines at www.disposemymeds.org.          This list is accurate as of 12/5/18   8:56 AM.  Always use your most recent med list.                   Brand Name Dispense Instructions for use Diagnosis    acetaminophen 650 MG 8 hour tablet     100 tablet    Take 650 mg by mouth every 6 hours    Primary localized osteoarthritis of right knee       AMLODIPINE BESYLATE PO      Take 10 mg by mouth daily        ASPIRIN PO      Take 325 mg by mouth daily        carvedilol 12.5 MG tablet    COREG    60 tablet    Take 1 tablet (12.5 mg) by mouth 2 times daily (with meals)    Cardiomyopathy, unspecified (H)       GLIMEPIRIDE PO      Take 4 mg by mouth 2 times daily        hydrOXYzine 10 MG tablet    ATARAX    60 tablet    Take 1 tablet (10 mg) by mouth every 6 hours as needed for itching or other (spasm/pain)    Primary localized osteoarthritis of right knee       insulin glargine 100 UNIT/ML pen    LANTUS PEN     Inject 12-14 Units Subcutaneous every evening        multivitamin w/minerals tablet      Take 1 tablet by mouth daily        order for DME     1 each    Equipment being ordered: Walker Wheels () and Walker () Treatment Diagnosis: Impaired gait    Status post total right knee replacement       senna-docusate 8.6-50 MG tablet    SENOKOT-S/PERICOLACE    60 tablet    Take 1-2 tablets by mouth 2 times daily Continue while taking narcotic pain medications. Hold for loose stools.    Primary localized osteoarthritis of right knee       SIMVASTATIN PO      Take 80 mg by mouth At Bedtime

## 2018-12-07 ENCOUNTER — PATIENT OUTREACH (OUTPATIENT)
Dept: EDUCATION SERVICES | Facility: CLINIC | Age: 73
End: 2018-12-07

## 2018-12-07 NOTE — PROGRESS NOTES
Called pt back. No answer. Left vm clarifying dose to patient.   Encouraged him to call back if he has questions.   Daly Tobin, ORTEGA LD CDE

## 2019-05-10 LAB
ALBUMIN SERPL-MCNC: 4.4 G/DL (ref 3.5–4.8)
ALP SERPL-CCNC: 111 U/L (ref 39–117)
ALT SERPL-CCNC: 21 U/L (ref ?–44)
ANION GAP SERPL CALCULATED.3IONS-SCNC: ABNORMAL MMOL/L
AST SERPL-CCNC: 31 U/L (ref ?–40)
BILIRUB SERPL-MCNC: 0.3 MG/DL (ref 0–1.2)
BUN SERPL-MCNC: 38 MG/DL (ref 8–27)
CALCIUM SERPL-MCNC: 9.8 MG/DL (ref 8.6–10.2)
CHLORIDE SERPLBLD-SCNC: 106 MMOL/L (ref 96–106)
CHOLEST SERPL-MCNC: 120 MG/DL (ref ?–199)
CO2 SERPL-SCNC: 21 MMOL/L (ref 20–29)
CREAT SERPL-MCNC: 2.13 MG/DL (ref 0.76–1.27)
GFR SERPL CREATININE-BSD FRML MDRD: 30 ML/MIN/1.73M2 (ref 59–?)
GLUCOSE SERPL-MCNC: 118 MG/DL (ref 70–99)
HDLC SERPL-MCNC: 35 MG/DL (ref 39–?)
LDLC SERPL CALC-MCNC: 61 MG/DL
NONHDLC SERPL-MCNC: ABNORMAL MG/DL
POTASSIUM SERPL-SCNC: 5.6 MMOL/L (ref 3.5–5.2)
PROT SERPL-MCNC: 6.5 G/DL (ref 6–8.5)
SODIUM SERPL-SCNC: 141 MMOL/L (ref 134–144)
TRIGL SERPL-MCNC: 118 MG/DL (ref ?–149)

## 2019-08-01 ENCOUNTER — TRANSFERRED RECORDS (OUTPATIENT)
Dept: HEALTH INFORMATION MANAGEMENT | Facility: CLINIC | Age: 74
End: 2019-08-01

## 2019-08-01 ENCOUNTER — CARE COORDINATION (OUTPATIENT)
Dept: CARDIOLOGY | Facility: CLINIC | Age: 74
End: 2019-08-01

## 2019-08-01 DIAGNOSIS — I42.0 DILATED CARDIOMYOPATHY (H): ICD-10-CM

## 2019-08-01 DIAGNOSIS — I44.7 LBBB (LEFT BUNDLE BRANCH BLOCK): Primary | ICD-10-CM

## 2019-08-01 LAB
ALBUMIN SERPL-MCNC: 3.9 G/DL (ref 3.5–4.8)
ALP SERPL-CCNC: 110 U/L (ref 39–117)
ALT SERPL-CCNC: 18 U/L (ref ?–44)
ANION GAP SERPL CALCULATED.3IONS-SCNC: ABNORMAL MMOL/L
AST SERPL-CCNC: 22 U/L (ref 0–40)
BILIRUB SERPL-MCNC: 0.4 MG/DL (ref ?–1.2)
BUN SERPL-MCNC: 17 MG/DL (ref 10–24)
CALCIUM SERPL-MCNC: 9.6 MG/DL (ref 8.6–10.2)
CHLORIDE SERPLBLD-SCNC: 103 MMOL/L (ref 96–106)
CO2 SERPL-SCNC: 24 MMOL/L (ref 20–29)
CREAT SERPL-MCNC: 2.12 MG/DL (ref 0.76–1.27)
ERYTHROCYTE [DISTWIDTH] IN BLOOD BY AUTOMATED COUNT: ABNORMAL %
GFR SERPL CREATININE-BSD FRML MDRD: 30 ML/MIN/1.73M2 (ref 59–?)
GLUCOSE SERPL-MCNC: 159 MG/DL (ref 70–99)
HCT VFR BLD AUTO: 30.9 % (ref 36–51.8)
HEMOGLOBIN: 9.6 G/DL (ref 11.5–16)
MCH RBC QN AUTO: ABNORMAL PG
MCHC RBC AUTO-ENTMCNC: ABNORMAL G/DL
MCV RBC AUTO: ABNORMAL FL
PLATELET # BLD AUTO: 320 10^9/L (ref 130–140)
POTASSIUM SERPL-SCNC: 5.5 MMOL/L (ref 3.5–5.2)
PROT SERPL-MCNC: 6.4 G/DL (ref 6–8.5)
RBC # BLD AUTO: 3.88 10^12/L (ref 3.8–5.2)
SODIUM SERPL-SCNC: 137 MMOL/L (ref 134–144)
TSH SERPL-ACNC: 1.72 MCU/ML (ref 0.45–4.5)
WBC # BLD AUTO: 9.8 10^9/L (ref 3.5–10.8)

## 2019-08-01 NOTE — PROGRESS NOTES
Patient was shopping yesterday.  Had severe knee pain (needs a total knee).  Next thing he remembered was paramedics helping him off the floor.  Denies chest pain or SOB prior to episodes.  He was never taken to ER.  He saw his PCP today with no new findings.  Patient was wondering if he should do anything further.  I will review with Dr. Neumann when he is in office tomorrow.  Dr. Neumann has reviewed the above msg.  Due to sudden syncopal episode he would like patient to have coronary angiogram.  Will have patient see ARCHIE next week to do H&P prior to cath.

## 2019-08-07 ENCOUNTER — HOSPITAL ENCOUNTER (OUTPATIENT)
Dept: CARDIOLOGY | Facility: CLINIC | Age: 74
Discharge: HOME | End: 2019-08-07
Attending: NURSE PRACTITIONER | Admitting: NURSE PRACTITIONER
Payer: COMMERCIAL

## 2019-08-07 ENCOUNTER — DOCUMENTATION ONLY (OUTPATIENT)
Dept: CARDIOLOGY | Facility: CLINIC | Age: 74
End: 2019-08-07

## 2019-08-07 ENCOUNTER — OFFICE VISIT (OUTPATIENT)
Dept: CARDIOLOGY | Facility: CLINIC | Age: 74
End: 2019-08-07
Attending: INTERNAL MEDICINE
Payer: COMMERCIAL

## 2019-08-07 VITALS
BODY MASS INDEX: 39.86 KG/M2 | SYSTOLIC BLOOD PRESSURE: 137 MMHG | HEIGHT: 69 IN | HEART RATE: 80 BPM | WEIGHT: 269.1 LBS | DIASTOLIC BLOOD PRESSURE: 83 MMHG

## 2019-08-07 DIAGNOSIS — I25.119 CORONARY ARTERY DISEASE INVOLVING NATIVE CORONARY ARTERY OF NATIVE HEART WITH ANGINA PECTORIS (H): ICD-10-CM

## 2019-08-07 DIAGNOSIS — R06.02 SHORTNESS OF BREATH: ICD-10-CM

## 2019-08-07 DIAGNOSIS — I44.7 LBBB (LEFT BUNDLE BRANCH BLOCK): ICD-10-CM

## 2019-08-07 DIAGNOSIS — E78.5 HYPERLIPIDEMIA LDL GOAL <100: ICD-10-CM

## 2019-08-07 DIAGNOSIS — R55 SYNCOPE, UNSPECIFIED SYNCOPE TYPE: Primary | ICD-10-CM

## 2019-08-07 DIAGNOSIS — I10 ESSENTIAL HYPERTENSION: ICD-10-CM

## 2019-08-07 PROCEDURE — 40000264 ECHOCARDIOGRAM COMPLETE

## 2019-08-07 PROCEDURE — 99214 OFFICE O/P EST MOD 30 MIN: CPT | Mod: 25 | Performed by: NURSE PRACTITIONER

## 2019-08-07 PROCEDURE — 25500064 ZZH RX 255 OP 636: Performed by: NURSE PRACTITIONER

## 2019-08-07 PROCEDURE — 93306 TTE W/DOPPLER COMPLETE: CPT | Mod: 26 | Performed by: INTERNAL MEDICINE

## 2019-08-07 PROCEDURE — 93000 ELECTROCARDIOGRAM COMPLETE: CPT | Mod: 59 | Performed by: NURSE PRACTITIONER

## 2019-08-07 RX ORDER — AMITRIPTYLINE HYDROCHLORIDE 10 MG/1
10 TABLET ORAL AT BEDTIME
Status: ON HOLD | COMMUNITY
End: 2019-08-12

## 2019-08-07 RX ORDER — HYDROCHLOROTHIAZIDE 12.5 MG/1
TABLET ORAL
COMMUNITY
End: 2019-08-07

## 2019-08-07 RX ORDER — POTASSIUM CHLORIDE 1500 MG/1
20 TABLET, EXTENDED RELEASE ORAL
Status: CANCELLED | OUTPATIENT
Start: 2019-08-07

## 2019-08-07 RX ORDER — SODIUM CHLORIDE 9 MG/ML
INJECTION, SOLUTION INTRAVENOUS CONTINUOUS
Status: CANCELLED | OUTPATIENT
Start: 2019-08-07 | End: 2019-08-07

## 2019-08-07 RX ORDER — FUROSEMIDE 20 MG
20 TABLET ORAL DAILY
Qty: 30 TABLET | Refills: 3 | Status: ON HOLD | OUTPATIENT
Start: 2019-08-07 | End: 2019-09-27

## 2019-08-07 RX ORDER — ATORVASTATIN CALCIUM 80 MG/1
40 TABLET, FILM COATED ORAL DAILY
COMMUNITY
End: 2020-04-22

## 2019-08-07 RX ORDER — LOSARTAN POTASSIUM AND HYDROCHLOROTHIAZIDE 25; 100 MG/1; MG/1
1 TABLET ORAL DAILY
COMMUNITY
End: 2019-08-19 | Stop reason: ALTCHOICE

## 2019-08-07 RX ORDER — LIDOCAINE 40 MG/G
CREAM TOPICAL
Status: CANCELLED | OUTPATIENT
Start: 2019-08-07

## 2019-08-07 RX ORDER — AMLODIPINE BESYLATE 10 MG/1
10 TABLET ORAL DAILY
COMMUNITY
End: 2020-04-22

## 2019-08-07 RX ORDER — HYDROCODONE BITARTRATE AND ACETAMINOPHEN 5; 325 MG/1; MG/1
1 TABLET ORAL EVERY 6 HOURS PRN
Status: ON HOLD | COMMUNITY
End: 2019-09-27

## 2019-08-07 RX ADMIN — HUMAN ALBUMIN MICROSPHERES AND PERFLUTREN 4 ML: 10; .22 INJECTION, SOLUTION INTRAVENOUS at 14:45

## 2019-08-07 ASSESSMENT — MIFFLIN-ST. JEOR: SCORE: 1956.01

## 2019-08-07 NOTE — LETTER
8/7/2019    Td Qureshi MD  [address] [facility] [address]  [facility] [address]    RE: Burton Elizabeth       Dear Colleague,    I had the pleasure of seeing Burton Elizabeth in the [facility].    Cardiology Clinic Progress Note  Burton Elizabeth MRN# [id number]   YOB: 1945 Age: 74 year old     Reason For Visit:  H&P for angiogram   Primary Cardiologist:   Dr. Neumann          History of Presenting Illness:    Burton Elizabeth is a pleasant 74 year old patient who carries a past medical history significant for coronary artery disease treated medically, diabetes, hypertension, obesity, and chronic kidney disease.     He presents to the office today after having a sudden syncopal episode while shopping.  He states he was doing some grocery shopping an [employer] and felt sharp severe pain to his left knee. A few seconds later, he found himself on the floor with the  leaning over him.  He did not lose bowel or bladder. Paramedics were called and administered onsite triage with no transport to the ER. He reported no symptoms of chest pain, shortness of breath, lightheadedness, or dizziness. He attributed the episode to acute knee pain which [facility]  A few days later,  he followed up with his PCP who performed an EKG that demonstrated SR w/ known LBBB and no acute changes. Baseline labs were drawn that showed a creatinine 2.12 ( baseline 1.9), BUN 37,GFR 30, sodium 137, potassium 5.5, hemoglobin 9.6, platelets 320 . He carries a history of abnormal stress test (2016) demonstrating a small area of mild ischemia in the anterior/anteroseptal apex/mid ventricle and mild ischemia in the inferior wall. EF 59%. Based on his recent syncopal episode and known abnormal stress test,  he is recommended for an invasive ischemic evaluation.  He comes to the office today for H&P in preparation for angiogram.     He  presents today with complaints of exertional shortness of breath worsening over the last 6 weeks,  23 lb weight gain/ 3 months, orthopnea and severe left knee pain.  He has had no recurrent syncope, denies chest pain, PND, edema, heart racing, lightheadedness or palpitations.     Blood pressure is well controlled at 137/83, managed on amlodipine and hyzaar. Medication list was not initially available as patient [facility].   BMI 39.74    EKG today demonstrates NSR with LBBB ( known).     Activity is primarily sedentary  Admits to poor diet choices   Remains compliant with all medications.                   Assessment and Plan:     1. Syncope - Single episode. EKG today demonstrates NSR with known LBBB. Recommend event monitor for evaluation of arrhythmia, patient would like to hold off on the event monitor until after angiogram and echocardiogram are complete.     2. CAD - Hx of abnormal stress test (2016) demonstrating a small area of mild ischemia in the anterior/anteroseptal apex/mid ventricle and mild ischemia in the inferior wall. EF 59%. Based on worsening symptoms of shortness of breath, weight gain, and syncopal event, recommend coronary angiogram for ischemic evaluation. Risks and benefits of coronary angiogram discussed today including, bleeding, bruising, infection, allergic reaction, kidney damage (including need for dialysis), stroke, heart attack, vascular damage, emergency open heart surgery, up to and including death.  Patient indicates understanding and is agreeable to proceed.  In review of medications, he is noted to be off carvedilol. No documentation as to why this was stopped. Recommend restarting 12.5mg BID carvedilol. Patient carries a hx of elevated creatinine (baseline 1.9), creatinine 2.1 today, recommend 2 hr of pre hydration at 100cc/hr prior to angiogram. Hold metformin and lasix the day of procedure. NPO after midnight the evening prior to procedure. Of note,  patient is hoping to have orthopedic surgery ASAP. Should PCI be needed, may consider a bare-metal stent.     3. Shortness of breath - Worsening over the last 6 weeks, 23 lb weight gain over last 3 months, orthopneic.  Will get echocardiogram for structural evaluation. Start low dose lasix 20mg daily. Will need to monitor creatinine closely due to CKD.     4. Hypertension - well controlled on current medical therapy  5.  Hyperlipidemia - LDL at goal, continue on lipitor.   6.  Diabetes - On Insulin, metformin, and Glimepiride  7.  Left knee pain - [facility], needs [facility] TKR               Thank you for allowing me to participate in this delightful patient's care. Follow up post angiogram.       WALE Quiroga, CNP          Review of Systems:   Review of Systems:  Skin:  Negative     Eyes:  Negative    ENT:  Negative    Respiratory:  Positive for shortness of breath  Cardiovascular:    Positive for;dizziness;lightheadedness;syncope or near-syncope;edema  Gastroenterology: Negative    Genitourinary:  Negative    Musculoskeletal:  Positive for joint pain  Neurologic:  Negative    Psychiatric:  Positive for depression;sleep disturbances  Heme/Lymph/Imm:  Positive for allergies  Endocrine:  Positive for diabetes              Physical Exam:     GEN:  In general, this is a obese male in no acute distress.  HEENT:  Pupils equal, round. Sclerae nonicteric. Clear oropharynx. Mucous membranes moist.  NECK: Supple, no masses appreciated. Trachea midline. Hard to assess JVD   C/V:  Regular rate and rhythm, No murmur, rub or gallop. No S3 or RV heave.   RESP: Respirations are unlabored. No use of accessory muscles. Clear to auscultation bilaterally without wheezing, rales, or rhonchi.  GI: Abdomen soft, nontender, nondistended. No HSM appreciated.   EXTREM: Trace LE edema. No cyanosis or clubbing.  NEURO: Alert and oriented, cooperative.  No obvious focal deficits. Pain, numbness to left leg.   PSYCH:  Normal affect.  SKIN: Warm and dry. No rashes or petechiae appreciated.          Past Medical History:     Past Medical History:   Diagnosis Date     Arthritis of knee~ s/p replacement 3/24/2016     Decreased cardiac ejection fraction~44% 3/24/2016     Diabetes mellitus, type 2 (H) 3/24/2016     HTN (hypertension) 3/24/2016     LBBB (left bundle branch block) 3/24/2016     Mixed hyperlipidemia 8/23/2016              Past Surgical History:     Past Surgical History:   Procedure Laterality Date     ARTHROPLASTY KNEE Right 3/29/2016    Procedure: ARTHROPLASTY KNEE;  Surgeon: Heena Rosen MD;  Location: [facility] OR     EYE SURGERY      cataract removal     ORTHOPEDIC SURGERY      KNEE SCOPES MULTIPLE              Allergies:   Penicillins       Data:   All laboratory data reviewed:    Exam Date Accession # Results    5/10/19     Component Value Flag Ref Range Units Status Collected Lab   Cholesterol 120   199 mg/dL Final 05/10/2019 12:00 AM 1791   Triglycerides 118   149 mg/dL Final 05/10/2019 12:00 AM 1791   HDL Cholesterol 35  Abnormal   39 mg/dL Final 05/10/2019 12:00 AM 1791   LDL Cholesterol Calculated 61    mg/dL Final 05/10/2019 12:00 AM 1791         LAST BMP:  Lab Results   Component Value Date     Exam Date Accession # Results    8/1/19     Component Value Flag Ref Range Units Status Collected Lab   Sodium 137   134 - 144 mmol/L Final 08/01/2019 12:00 AM 1791   Potassium 5.5  Abnormal   3.5 - 5.2 mmol/L Final 08/01/2019 12:00 AM 1791   Chloride 103   96 - 106 mmol/L Final 08/01/2019 12:00 AM 1791   Carbon Dioxide 24   20 - 29 mmol/L Final 08/01/2019 12:00 AM 1791   Anion Gap    mmol/L  08/01/2019 12:00 AM 1791   Glucose 159  Abnormal   70 - 99 mg/dL Final 08/01/2019 12:00 AM 1791   Urea Nitrogen 17   10 - 24 mg/dL Final 08/01/2019 12:00 AM 1791   Creatinine 2.12  Abnormal   0.76 - 1.27 mg/dL Final 08/01/2019 12:00 AM 1791   Calcium 9.6   8.6 - 10.2 mg/dL Final 08/01/2019 12:00 AM 1791    Protein Total 6.4   6.0 - 8.5 g/dL Final 08/01/2019 12:00 AM 1791   Albumin 3.9   3.5 - 4.8 g/dL Final 08/01/2019 12:00 AM 1791   Bilirubin Total 0.4   1.2 mg/dL Final 08/01/2019 12:00 AM 1791   Alkaline Phosphatase 110   39 - 117 U/L Final 08/01/2019 12:00 AM 1791   AST 22   0 - 40 U/L Final 08/01/2019 12:00 AM 1791   ALT 18   44 U/L Final 08/01/2019 12:00 AM 1791   GFR Estimate 30  Abnormal   59 ml/min/1.73m2 Final 08/01/2019 12:00 AM 1791   GFR Estimate If Black 35  Abnormal   59 ml/min/1.73m2 Final 08/01/2019 12:00 AM 1791           LAST CBC:    Exam Date Accession # Results    8/1/19     Component Value Flag Ref Range Units Status Collected Lab   WBC 9.8   3.5 - 10.8 10^9/L Final 08/01/2019 12:00 AM 1791   RBC Count 3.88   3.8 - 5.2 10^12/L Final 08/01/2019 12:00 AM 1791   Hemoglobin 9.6  Abnormal   11.5 - 16.0 g/dL Final 08/01/2019 12:00 AM 1791   Hematocrit 30.9  Abnormal   36 - 51.8 % Final 08/01/2019 12:00 AM 1791   MCV    fl  08/01/2019 12:00 AM 1791   MCH    pg  08/01/2019 12:00 AM 1791   MCHC    g/dL  08/01/2019 12:00 AM 1791   RDW    %  08/01/2019 12:00 AM 1791   Platelet Count 320  Abnormal   130 - 140 10^9/L Final 08/01/2019 12:00 AM 1791       Thank you for allowing me to participate in the care of your patient.    Sincerely,     WALE Quiroga [facility]

## 2019-08-07 NOTE — PROGRESS NOTES
Cardiology Clinic Progress Note  Burton Elizabeth MRN# [id number]   YOB: 1945 Age: 74 year old     Reason For Visit:  H&P for angiogram   Primary Cardiologist:   Dr. Neumann          History of Presenting Illness:    Burton Elizabeth is a pleasant 74 year old patient who carries a past medical history significant for coronary artery disease treated medically, diabetes, hypertension, obesity, and chronic kidney disease.     He presents to the office today after having a sudden syncopal episode while shopping.  He states he was doing some grocery shopping an [employer] and felt sharp severe pain to his left knee. A few seconds later, he found himself on the floor with the  leaning over him.  He did not lose bowel or bladder. Paramedics were called and administered onsite triage with no transport to the ER. He reported no symptoms of chest pain, shortness of breath, lightheadedness, or dizziness. He attributed the episode to acute knee pain which [facility].  A few days later,  he followed up with his PCP who performed an EKG that demonstrated SR w/ known LBBB and no acute changes. Baseline labs were drawn that showed a creatinine 2.12 ( baseline 1.9), BUN 37,GFR 30, sodium 137, potassium 5.5, hemoglobin 9.6, platelets 320 . He carries a history of abnormal stress test (2016) demonstrating a small area of mild ischemia in the anterior/anteroseptal apex/mid ventricle and mild ischemia in the inferior wall. EF 59%. Based on his recent syncopal episode and known abnormal stress test,  he is recommended for an invasive ischemic evaluation.  He comes to the office today for H&P in preparation for angiogram.     He presents today with complaints of exertional shortness of breath worsening over the last 6 weeks,  23 lb weight gain/ 3 months, orthopnea and severe left knee pain.  He has had no recurrent syncope, denies chest pain, PND, edema,  heart racing, lightheadedness or palpitations.     Blood pressure is well controlled at 137/83, managed on amlodipine and hyzaar. Medication list was not initially available as patient [facility].   BMI 39.74    EKG today demonstrates NSR with LBBB ( known).     Activity is primarily sedentary  Admits to poor diet choices   Remains compliant with all medications.                   Assessment and Plan:     1. Syncope - Single episode. EKG today demonstrates NSR with known LBBB. Recommend event monitor for evaluation of arrhythmia, patient would like to hold off on the event monitor until after angiogram and echocardiogram are complete.     2. CAD - Hx of abnormal stress test (2016) demonstrating a small area of mild ischemia in the anterior/anteroseptal apex/mid ventricle and mild ischemia in the inferior wall. EF 59%. Based on worsening symptoms of shortness of breath, weight gain, and syncopal event, recommend coronary angiogram for ischemic evaluation. Risks and benefits of coronary angiogram discussed today including, bleeding, bruising, infection, allergic reaction, kidney damage (including need for dialysis), stroke, heart attack, vascular damage, emergency open heart surgery, up to and including death.  Patient indicates understanding and is agreeable to proceed.  In review of medications, he is noted to be off carvedilol. No documentation as to why this was stopped. Recommend restarting 12.5mg BID carvedilol. Patient carries a hx of elevated creatinine (baseline 1.9), creatinine 2.1 today, recommend 2 hr of pre hydration at 100cc/hr prior to angiogram. Hold metformin and lasix the day of procedure. NPO after midnight the evening prior to procedure. Of note, patient is hoping to have orthopedic surgery ASAP. Should PCI be needed, may consider a bare-metal stent.     3. Shortness of breath - Worsening over the last 6 weeks, 23 lb weight gain over last 3 months, orthopneic.  Will get  echocardiogram for structural evaluation. Start low dose lasix 20mg daily. Will need to monitor creatinine closely due to CKD.     4. Hypertension - well controlled on current medical therapy  5.  Hyperlipidemia - LDL at goal, continue on lipitor.   6.  Diabetes - On Insulin, metformin, and Glimepiride  7.  Left knee pain - [facility], needs [facility] TKR               Thank you for allowing me to participate in this delightful patient's care. Follow up post angiogram.       Francisca Arroyo, APRN, CNP          Review of Systems:   Review of Systems:  Skin:  Negative     Eyes:  Negative    ENT:  Negative    Respiratory:  Positive for shortness of breath  Cardiovascular:    Positive for;dizziness;lightheadedness;syncope or near-syncope;edema  Gastroenterology: Negative    Genitourinary:  Negative    Musculoskeletal:  Positive for joint pain  Neurologic:  Negative    Psychiatric:  Positive for depression;sleep disturbances  Heme/Lymph/Imm:  Positive for allergies  Endocrine:  Positive for diabetes              Physical Exam:     GEN:  In general, this is a obese male in no acute distress.  HEENT:  Pupils equal, round. Sclerae nonicteric. Clear oropharynx. Mucous membranes moist.  NECK: Supple, no masses appreciated. Trachea midline. Hard to assess JVD   C/V:  Regular rate and rhythm, No murmur, rub or gallop. No S3 or RV heave.   RESP: Respirations are unlabored. No use of accessory muscles. Clear to auscultation bilaterally without wheezing, rales, or rhonchi.  GI: Abdomen soft, nontender, nondistended. No HSM appreciated.   EXTREM: Trace LE edema. No cyanosis or clubbing.  NEURO: Alert and oriented, cooperative.  No obvious focal deficits. Pain, numbness to left leg.   PSYCH: Normal affect.  SKIN: Warm and dry. No rashes or petechiae appreciated.          Past Medical History:     Past Medical History:   Diagnosis Date     Arthritis of knee~ s/p replacement 3/24/2016     Decreased cardiac ejection  fraction~44% 3/24/2016     Diabetes mellitus, type 2 (H) 3/24/2016     HTN (hypertension) 3/24/2016     LBBB (left bundle branch block) 3/24/2016     Mixed hyperlipidemia 8/23/2016              Past Surgical History:     Past Surgical History:   Procedure Laterality Date     ARTHROPLASTY KNEE Right 3/29/2016    Procedure: ARTHROPLASTY KNEE;  Surgeon: Heena Rosen MD;  Location: [facility] OR     EYE SURGERY      cataract removal     ORTHOPEDIC SURGERY      KNEE SCOPES MULTIPLE              Allergies:   Penicillins       Data:   All laboratory data reviewed:    Exam Date Accession # Results    5/10/19     Component Value Flag Ref Range Units Status Collected Lab   Cholesterol 120   199 mg/dL Final 05/10/2019 12:00 AM 1791   Triglycerides 118   149 mg/dL Final 05/10/2019 12:00 AM 1791   HDL Cholesterol 35  Abnormal   39 mg/dL Final 05/10/2019 12:00 AM 1791   LDL Cholesterol Calculated 61    mg/dL Final 05/10/2019 12:00 AM 1791         LAST BMP:  Lab Results   Component Value Date     Exam Date Accession # Results    8/1/19     Component Value Flag Ref Range Units Status Collected Lab   Sodium 137   134 - 144 mmol/L Final 08/01/2019 12:00 AM 1791   Potassium 5.5  Abnormal   3.5 - 5.2 mmol/L Final 08/01/2019 12:00 AM 1791   Chloride 103   96 - 106 mmol/L Final 08/01/2019 12:00 AM 1791   Carbon Dioxide 24   20 - 29 mmol/L Final 08/01/2019 12:00 AM 1791   Anion Gap    mmol/L  08/01/2019 12:00 AM 1791   Glucose 159  Abnormal   70 - 99 mg/dL Final 08/01/2019 12:00 AM 1791   Urea Nitrogen 17   10 - 24 mg/dL Final 08/01/2019 12:00 AM 1791   Creatinine 2.12  Abnormal   0.76 - 1.27 mg/dL Final 08/01/2019 12:00 AM 1791   Calcium 9.6   8.6 - 10.2 mg/dL Final 08/01/2019 12:00 AM 1791   Protein Total 6.4   6.0 - 8.5 g/dL Final 08/01/2019 12:00 AM 1791   Albumin 3.9   3.5 - 4.8 g/dL Final 08/01/2019 12:00 AM 1791   Bilirubin Total 0.4   1.2 mg/dL Final 08/01/2019 12:00 AM 1791   Alkaline Phosphatase 110   39 -  117 U/L Final 08/01/2019 12:00 AM 1791   AST 22   0 - 40 U/L Final 08/01/2019 12:00 AM 1791   ALT 18   44 U/L Final 08/01/2019 12:00 AM 1791   GFR Estimate 30  Abnormal   59 ml/min/1.73m2 Final 08/01/2019 12:00 AM 1791   GFR Estimate If Black 35  Abnormal   59 ml/min/1.73m2 Final 08/01/2019 12:00 AM 1791           LAST CBC:    Exam Date Accession # Results    8/1/19     Component Value Flag Ref Range Units Status Collected Lab   WBC 9.8   3.5 - 10.8 10^9/L Final 08/01/2019 12:00 AM 1791   RBC Count 3.88   3.8 - 5.2 10^12/L Final 08/01/2019 12:00 AM 1791   Hemoglobin 9.6  Abnormal   11.5 - 16.0 g/dL Final 08/01/2019 12:00 AM 1791   Hematocrit 30.9  Abnormal   36 - 51.8 % Final 08/01/2019 12:00 AM 1791   MCV    fl  08/01/2019 12:00 AM 1791   MCH    pg  08/01/2019 12:00 AM 1791   MCHC    g/dL  08/01/2019 12:00 AM 1791   RDW    %  08/01/2019 12:00 AM 1791   Platelet Count 320  Abnormal   130 - 140 10^9/L Final 08/01/2019 12:00 AM 1791

## 2019-08-07 NOTE — PROGRESS NOTES
[facility], noticed patient is not taking 12.5mg BID Carvedilol.   Can we confirm why this was discontinued?  Patient should be on beta blocker.

## 2019-08-07 NOTE — PATIENT INSTRUCTIONS
Thanks for coming into [facility] clinic today.    Episode of syncope. Patient wishes to hold off on wearing monitor until post angiogram follow up.   Recommended for angiogram, risks vs benefits reviewed with verbal understanding.  Worsening shortness of breath  Start low dose lasix 20mg daily  Echocardiogram for structural evaluation.   Continue on all other medications   Follow up post procedure    Please call my nurse at [phone] with any questions or concerns.    Scheduling phone number: [phone]  Reminder: Please bring in all current medications, over the counter supplements and vitamin bottles to your next appointment.

## 2019-08-08 DIAGNOSIS — I51.9 LV DYSFUNCTION: Primary | ICD-10-CM

## 2019-08-08 RX ORDER — CARVEDILOL 12.5 MG/1
12.5 TABLET ORAL 2 TIMES DAILY WITH MEALS
Qty: 60 TABLET | Refills: 0 | Status: ON HOLD | OUTPATIENT
Start: 2019-08-08 | End: 2019-09-27

## 2019-08-08 NOTE — TELEPHONE ENCOUNTER
Notified patient with results of echocardiogram  Instructed patient to restart carvedilol 12.5mg BID, will send script to [facility].   Patient would like a script for 3 month supply (hard copy) when he follows up after angiogram.   He verbalizes timing when to report for angiogram on 8/12/19.   All questions answered with verbal understanding.

## 2019-08-12 ENCOUNTER — HOSPITAL ENCOUNTER (OUTPATIENT)
Facility: CLINIC | Age: 74
Discharge: HOME | End: 2019-08-12
Admitting: INTERNAL MEDICINE
Payer: COMMERCIAL

## 2019-08-12 ENCOUNTER — SURGERY (OUTPATIENT)
Age: 74
End: 2019-08-12
Payer: COMMERCIAL

## 2019-08-12 VITALS
TEMPERATURE: 98 F | HEIGHT: 69 IN | RESPIRATION RATE: 16 BRPM | WEIGHT: 269.1 LBS | BODY MASS INDEX: 39.86 KG/M2 | OXYGEN SATURATION: 96 % | HEART RATE: 66 BPM | DIASTOLIC BLOOD PRESSURE: 75 MMHG | SYSTOLIC BLOOD PRESSURE: 123 MMHG

## 2019-08-12 DIAGNOSIS — I44.7 LBBB (LEFT BUNDLE BRANCH BLOCK): ICD-10-CM

## 2019-08-12 PROBLEM — Z98.890 STATUS POST CORONARY ANGIOGRAM: Status: ACTIVE | Noted: 2019-08-12

## 2019-08-12 LAB
ANION GAP SERPL CALCULATED.3IONS-SCNC: 7 MMOL/L (ref 3–14)
BUN SERPL-MCNC: 37 MG/DL (ref 7–30)
CALCIUM SERPL-MCNC: 9.3 MG/DL (ref 8.5–10.1)
CHLORIDE SERPL-SCNC: 108 MMOL/L (ref 94–109)
CO2 SERPL-SCNC: 25 MMOL/L (ref 20–32)
CREAT SERPL-MCNC: 1.76 MG/DL (ref 0.66–1.25)
ERYTHROCYTE [DISTWIDTH] IN BLOOD BY AUTOMATED COUNT: 17.8 % (ref 10–15)
GFR SERPL CREATININE-BSD FRML MDRD: 37 ML/MIN/{1.73_M2}
GLUCOSE BLDC GLUCOMTR-MCNC: 105 MG/DL (ref 70–99)
GLUCOSE SERPL-MCNC: 114 MG/DL (ref 70–99)
HCT VFR BLD AUTO: 36.4 % (ref 40–53)
HGB BLD-MCNC: 10.9 G/DL (ref 13.3–17.7)
MCH RBC QN AUTO: 24.7 PG (ref 26.5–33)
MCHC RBC AUTO-ENTMCNC: 29.9 G/DL (ref 31.5–36.5)
MCV RBC AUTO: 82 FL (ref 78–100)
PLATELET # BLD AUTO: 271 10E9/L (ref 150–450)
POTASSIUM SERPL-SCNC: 4.6 MMOL/L (ref 3.4–5.3)
RBC # BLD AUTO: 4.42 10E12/L (ref 4.4–5.9)
SODIUM SERPL-SCNC: 140 MMOL/L (ref 133–144)
WBC # BLD AUTO: 9.9 10E9/L (ref 4–11)

## 2019-08-12 PROCEDURE — 93454 CORONARY ARTERY ANGIO S&I: CPT | Mod: 26 | Performed by: INTERNAL MEDICINE

## 2019-08-12 PROCEDURE — 25000132 ZZH RX MED GY IP 250 OP 250 PS 637: Performed by: INTERNAL MEDICINE

## 2019-08-12 PROCEDURE — 25000125 ZZHC RX 250: Performed by: INTERNAL MEDICINE

## 2019-08-12 PROCEDURE — 99153 MOD SED SAME PHYS/QHP EA: CPT | Performed by: INTERNAL MEDICINE

## 2019-08-12 PROCEDURE — C1769 GUIDE WIRE: HCPCS | Performed by: INTERNAL MEDICINE

## 2019-08-12 PROCEDURE — 99152 MOD SED SAME PHYS/QHP 5/>YRS: CPT | Performed by: INTERNAL MEDICINE

## 2019-08-12 PROCEDURE — 85027 COMPLETE CBC AUTOMATED: CPT | Performed by: INTERNAL MEDICINE

## 2019-08-12 PROCEDURE — 40000235 ZZH STATISTIC TELEMETRY

## 2019-08-12 PROCEDURE — 40000065 ZZH STATISTIC EKG NON-CHARGEABLE

## 2019-08-12 PROCEDURE — 27210794 ZZH OR GENERAL SUPPLY STERILE: Performed by: INTERNAL MEDICINE

## 2019-08-12 PROCEDURE — 40000852 ZZH STATISTIC HEART CATH LAB OR EP LAB

## 2019-08-12 PROCEDURE — 36415 COLL VENOUS BLD VENIPUNCTURE: CPT

## 2019-08-12 PROCEDURE — 93454 CORONARY ARTERY ANGIO S&I: CPT | Performed by: INTERNAL MEDICINE

## 2019-08-12 PROCEDURE — 80048 BASIC METABOLIC PNL TOTAL CA: CPT | Performed by: INTERNAL MEDICINE

## 2019-08-12 PROCEDURE — 25000128 H RX IP 250 OP 636: Performed by: INTERNAL MEDICINE

## 2019-08-12 PROCEDURE — 25800030 ZZH RX IP 258 OP 636: Performed by: INTERNAL MEDICINE

## 2019-08-12 PROCEDURE — C1894 INTRO/SHEATH, NON-LASER: HCPCS | Performed by: INTERNAL MEDICINE

## 2019-08-12 PROCEDURE — 93005 ELECTROCARDIOGRAM TRACING: CPT

## 2019-08-12 PROCEDURE — 93010 ELECTROCARDIOGRAM REPORT: CPT | Performed by: INTERNAL MEDICINE

## 2019-08-12 PROCEDURE — 36415 COLL VENOUS BLD VENIPUNCTURE: CPT | Performed by: INTERNAL MEDICINE

## 2019-08-12 PROCEDURE — 82962 GLUCOSE BLOOD TEST: CPT

## 2019-08-12 RX ORDER — ARGATROBAN 1 MG/ML
350 INJECTION, SOLUTION INTRAVENOUS
Status: DISCONTINUED | OUTPATIENT
Start: 2019-08-12 | End: 2019-08-12 | Stop reason: HOSPADM

## 2019-08-12 RX ORDER — HYDROCODONE BITARTRATE AND ACETAMINOPHEN 5; 325 MG/1; MG/1
1-2 TABLET ORAL EVERY 4 HOURS PRN
Status: DISCONTINUED | OUTPATIENT
Start: 2019-08-12 | End: 2019-08-12 | Stop reason: HOSPADM

## 2019-08-12 RX ORDER — LIDOCAINE 40 MG/G
CREAM TOPICAL
Status: DISCONTINUED | OUTPATIENT
Start: 2019-08-12 | End: 2019-08-12 | Stop reason: HOSPADM

## 2019-08-12 RX ORDER — EPTIFIBATIDE 2 MG/ML
2 INJECTION, SOLUTION INTRAVENOUS CONTINUOUS PRN
Status: DISCONTINUED | OUTPATIENT
Start: 2019-08-12 | End: 2019-08-12 | Stop reason: HOSPADM

## 2019-08-12 RX ORDER — FENTANYL CITRATE 50 UG/ML
25-50 INJECTION, SOLUTION INTRAMUSCULAR; INTRAVENOUS
Status: DISCONTINUED | OUTPATIENT
Start: 2019-08-12 | End: 2019-08-12 | Stop reason: HOSPADM

## 2019-08-12 RX ORDER — SODIUM CHLORIDE 9 MG/ML
INJECTION, SOLUTION INTRAVENOUS CONTINUOUS
Status: DISCONTINUED | OUTPATIENT
Start: 2019-08-12 | End: 2019-08-12 | Stop reason: HOSPADM

## 2019-08-12 RX ORDER — EPTIFIBATIDE 2 MG/ML
1 INJECTION, SOLUTION INTRAVENOUS CONTINUOUS PRN
Status: DISCONTINUED | OUTPATIENT
Start: 2019-08-12 | End: 2019-08-12 | Stop reason: HOSPADM

## 2019-08-12 RX ORDER — DOPAMINE HYDROCHLORIDE 160 MG/100ML
2-20 INJECTION, SOLUTION INTRAVENOUS CONTINUOUS PRN
Status: DISCONTINUED | OUTPATIENT
Start: 2019-08-12 | End: 2019-08-12 | Stop reason: HOSPADM

## 2019-08-12 RX ORDER — VERAPAMIL HYDROCHLORIDE 2.5 MG/ML
INJECTION, SOLUTION INTRAVENOUS
Status: DISCONTINUED | OUTPATIENT
Start: 2019-08-12 | End: 2019-08-12 | Stop reason: HOSPADM

## 2019-08-12 RX ORDER — SODIUM CHLORIDE 9 MG/ML
INJECTION, SOLUTION INTRAVENOUS CONTINUOUS
Status: ACTIVE | OUTPATIENT
Start: 2019-08-12 | End: 2019-08-12

## 2019-08-12 RX ORDER — ARGATROBAN 1 MG/ML
150 INJECTION, SOLUTION INTRAVENOUS
Status: DISCONTINUED | OUTPATIENT
Start: 2019-08-12 | End: 2019-08-12 | Stop reason: HOSPADM

## 2019-08-12 RX ORDER — NALOXONE HYDROCHLORIDE 0.4 MG/ML
.1-.4 INJECTION, SOLUTION INTRAMUSCULAR; INTRAVENOUS; SUBCUTANEOUS
Status: DISCONTINUED | OUTPATIENT
Start: 2019-08-12 | End: 2019-08-12 | Stop reason: HOSPADM

## 2019-08-12 RX ORDER — FENTANYL CITRATE 50 UG/ML
INJECTION, SOLUTION INTRAMUSCULAR; INTRAVENOUS
Status: DISCONTINUED | OUTPATIENT
Start: 2019-08-12 | End: 2019-08-12 | Stop reason: HOSPADM

## 2019-08-12 RX ORDER — EPTIFIBATIDE 2 MG/ML
180 INJECTION, SOLUTION INTRAVENOUS EVERY 10 MIN PRN
Status: DISCONTINUED | OUTPATIENT
Start: 2019-08-12 | End: 2019-08-12 | Stop reason: HOSPADM

## 2019-08-12 RX ORDER — NITROGLYCERIN 5 MG/ML
VIAL (ML) INTRAVENOUS
Status: DISCONTINUED | OUTPATIENT
Start: 2019-08-12 | End: 2019-08-12 | Stop reason: HOSPADM

## 2019-08-12 RX ORDER — DOBUTAMINE HYDROCHLORIDE 200 MG/100ML
2-20 INJECTION INTRAVENOUS CONTINUOUS PRN
Status: DISCONTINUED | OUTPATIENT
Start: 2019-08-12 | End: 2019-08-12 | Stop reason: HOSPADM

## 2019-08-12 RX ORDER — NOREPINEPHRINE BITARTRATE 0.06 MG/ML
.03-.4 INJECTION, SOLUTION INTRAVENOUS CONTINUOUS PRN
Status: DISCONTINUED | OUTPATIENT
Start: 2019-08-12 | End: 2019-08-12 | Stop reason: HOSPADM

## 2019-08-12 RX ORDER — ACETAMINOPHEN 325 MG/1
650 TABLET ORAL EVERY 4 HOURS PRN
Status: DISCONTINUED | OUTPATIENT
Start: 2019-08-12 | End: 2019-08-12 | Stop reason: HOSPADM

## 2019-08-12 RX ORDER — POTASSIUM CHLORIDE 1500 MG/1
20 TABLET, EXTENDED RELEASE ORAL
Status: DISCONTINUED | OUTPATIENT
Start: 2019-08-12 | End: 2019-08-12 | Stop reason: HOSPADM

## 2019-08-12 RX ORDER — NALOXONE HYDROCHLORIDE 0.4 MG/ML
.2-.4 INJECTION, SOLUTION INTRAMUSCULAR; INTRAVENOUS; SUBCUTANEOUS
Status: DISCONTINUED | OUTPATIENT
Start: 2019-08-12 | End: 2019-08-12 | Stop reason: HOSPADM

## 2019-08-12 RX ORDER — FLUMAZENIL 0.1 MG/ML
0.2 INJECTION, SOLUTION INTRAVENOUS
Status: DISCONTINUED | OUTPATIENT
Start: 2019-08-12 | End: 2019-08-12 | Stop reason: HOSPADM

## 2019-08-12 RX ORDER — HEPARIN SODIUM 1000 [USP'U]/ML
INJECTION, SOLUTION INTRAVENOUS; SUBCUTANEOUS
Status: DISCONTINUED | OUTPATIENT
Start: 2019-08-12 | End: 2019-08-12 | Stop reason: HOSPADM

## 2019-08-12 RX ORDER — NITROGLYCERIN 20 MG/100ML
.07-1.64 INJECTION INTRAVENOUS CONTINUOUS PRN
Status: DISCONTINUED | OUTPATIENT
Start: 2019-08-12 | End: 2019-08-12 | Stop reason: HOSPADM

## 2019-08-12 RX ORDER — IOPAMIDOL 755 MG/ML
INJECTION, SOLUTION INTRAVASCULAR
Status: DISCONTINUED | OUTPATIENT
Start: 2019-08-12 | End: 2019-08-12 | Stop reason: HOSPADM

## 2019-08-12 RX ORDER — ATROPINE SULFATE 0.1 MG/ML
0.5 INJECTION INTRAVENOUS EVERY 5 MIN PRN
Status: DISCONTINUED | OUTPATIENT
Start: 2019-08-12 | End: 2019-08-12 | Stop reason: HOSPADM

## 2019-08-12 RX ADMIN — SODIUM CHLORIDE: 9 INJECTION, SOLUTION INTRAVENOUS at 07:00

## 2019-08-12 RX ADMIN — VERAPAMIL HYDROCHLORIDE 2.5 MG: 2.5 INJECTION, SOLUTION INTRAVENOUS at 10:23

## 2019-08-12 RX ADMIN — NITROGLYCERIN 200 MCG: 5 INJECTION, SOLUTION INTRAVENOUS at 10:22

## 2019-08-12 RX ADMIN — HEPARIN SODIUM 5000 UNITS: 1000 INJECTION, SOLUTION INTRAVENOUS; SUBCUTANEOUS at 10:22

## 2019-08-12 RX ADMIN — MIDAZOLAM 1 MG: 1 INJECTION INTRAMUSCULAR; INTRAVENOUS at 10:14

## 2019-08-12 RX ADMIN — FENTANYL CITRATE 50 MCG: 50 INJECTION, SOLUTION INTRAMUSCULAR; INTRAVENOUS at 10:14

## 2019-08-12 RX ADMIN — IOPAMIDOL 30 ML: 755 INJECTION, SOLUTION INTRAVENOUS at 10:30

## 2019-08-12 RX ADMIN — ASPIRIN 325 MG ORAL TABLET 325 MG: 325 PILL ORAL at 08:46

## 2019-08-12 ASSESSMENT — MIFFLIN-ST. JEOR: SCORE: 1956.01

## 2019-08-12 NOTE — PROGRESS NOTES
[facility] Discharge Nursing Note    Education/questions answered: yes, has AVS  Patient DC location: home, with brother Jose overnight  Accompanied by: Jose  CS discharge time: 1330  Right radial site remains soft with bandaid clean and dry, with arm board on. Very pleasant, cooperative.

## 2019-08-12 NOTE — PROGRESS NOTES
[facility] Post-Procedure Note    Procedure: heart cath  CS arrival time: 1045  Accompanied by: cath lab RN  Concerns/abnormal assessment after procedure: none  Plan: monitor until discharge

## 2019-08-12 NOTE — PROGRESS NOTES
[facility] Admission Nursing Note    Reason for admission: heart cath  CS arrival time:0630  Accompanied by: brother here  Name/phone of DC : Bill/cell [phone]  Medications held: insulin, HCTZ  Consent signed: yes  Abnormal assessment/labs: none  If abnormal, provider notified: n/a  Education/questions answered: yes, plan of care explained  Plan: heart cath

## 2019-08-12 NOTE — PRE-PROCEDURE
GENERAL PRE-PROCEDURE:   Procedure:  Coronary angiogram  Date/Time:  8/12/2019 10:15 AM    Verbal consent obtained?: Yes    Written consent obtained?: Yes    Risks and benefits: Risks, benefits and alternatives were discussed    Consent given by:  Patient  Patient states understanding of procedure being performed: Yes    Patient's understanding of procedure matches consent: Yes    Procedure consent matches procedure scheduled: Yes    Expected level of sedation:  Moderate  Appropriately NPO:  Yes  ASA Class:  Class 2- mild systemic disease, no acute problems, no functional limitations  Mallampati  :  Grade 2- soft palate, base of uvula, tonsillar pillars, and portion of posterior pharyngeal wall visible  Lungs:  Lungs clear with good breath sounds bilaterally  Heart:  Normal heart sounds and rate  History & Physical reviewed:  History and physical reviewed and no updates needed  Statement of review:  I have reviewed the lab findings, diagnostic data, medications, and the plan for sedation

## 2019-08-12 NOTE — DISCHARGE INSTRUCTIONS
Cardiac Angiogram Discharge Instructions - Radial    After you go home:      Have an adult stay with you until tomorrow.    Drink extra fluids for 2 days.    You may resume your normal diet.    No smoking       For 24 hours - due to the sedation you received:    Relax and take it easy.    Do NOT make any important or legal decisions.    Do NOT drive or operate machines at home or at work.    Do NOT drink alcohol.    Care of Wrist Puncture Site:      For the first 24 hrs - check the puncture site every 1-2 hours while awake.    It is normal to have soreness at the puncture site and mild tingling in your hand for up to 3 days.    Remove the bandaid after 24 hours. If there is minor oozing, apply another bandaid and remove it after 12 hours.    You may shower tomorrow.  Do NOT take a bath, or use a hot tub or pool for at least 3 days. Do NOT scrub the site. Do not use lotion or powder near the puncture site.           Activity:        For 2 days:     do not use your hand or arm to support your weight (such as rising from a chair)     do not bend your wrist (such as lifting a garage door).    do not lift more than 5 pounds or exercise your arm (such as tennis, golf or bowling).    Do NOT do any heavy activity such as exercise, lifting, or straining.     Bleeding:      If you start bleeding from the site in your wrist, sit down and press firmly on/above the site for 10 minutes.     Once bleeding stops, keep arm still for 2 hours.     Call [facility] as soon as you can.       Call 911 right away if you have heavy bleeding or bleeding that does not stop.      Medicines:      If you are taking an antiplatelet medication such as Plavix, Brilinta or Effient, do not stop taking it until you talk to your cardiologist.        If you are on Metformin (Glucophage), do not restart it until you have blood tests (within 2 to 3 days after discharge).  After you have your blood drawn, you may restart the Metformin.     Take your  medications, including blood thinners, unless your provider tells you not to.  If you take Coumadin (Warfarin), have your INR checked by your provider in  3-5 days. Call your clinic to schedule this.    If you have stopped any medicines, check with your provider about when to restart them.    Follow Up Appointments:      Follow up with [facility] Nurse Practitioner at [facility] of patient preference in 7-10 days.    Call the clinic if:      You have a large or growing hard lump around the site.    The site is red, swollen, hot or tender.    Blood or fluid is draining from the site.    You have chills or a fever greater than 101 F (38 C).    Your arm feels numb, cool or changes color.    You have hives, a rash or unusual itching.    Any questions or concerns.          [facility] [place]:    [phone] [facility] (7 days a week)

## 2019-08-14 LAB — INTERPRETATION ECG - MUSE: NORMAL

## 2019-08-19 ENCOUNTER — OFFICE VISIT (OUTPATIENT)
Dept: CARDIOLOGY | Facility: CLINIC | Age: 74
End: 2019-08-19
Payer: COMMERCIAL

## 2019-08-19 VITALS
HEART RATE: 66 BPM | SYSTOLIC BLOOD PRESSURE: 120 MMHG | WEIGHT: 266 LBS | BODY MASS INDEX: 39.4 KG/M2 | HEIGHT: 69 IN | DIASTOLIC BLOOD PRESSURE: 70 MMHG

## 2019-08-19 DIAGNOSIS — I25.119 CORONARY ARTERY DISEASE INVOLVING NATIVE CORONARY ARTERY OF NATIVE HEART WITH ANGINA PECTORIS (H): Primary | ICD-10-CM

## 2019-08-19 DIAGNOSIS — I35.0 AORTIC VALVE STENOSIS, ETIOLOGY OF CARDIAC VALVE DISEASE UNSPECIFIED: ICD-10-CM

## 2019-08-19 DIAGNOSIS — I10 ESSENTIAL HYPERTENSION: ICD-10-CM

## 2019-08-19 DIAGNOSIS — E11.00 TYPE 2 DIABETES MELLITUS WITH HYPEROSMOLARITY WITHOUT COMA, WITHOUT LONG-TERM CURRENT USE OF INSULIN (H): ICD-10-CM

## 2019-08-19 DIAGNOSIS — E78.2 MIXED HYPERLIPIDEMIA: ICD-10-CM

## 2019-08-19 DIAGNOSIS — I44.7 LBBB (LEFT BUNDLE BRANCH BLOCK): ICD-10-CM

## 2019-08-19 DIAGNOSIS — Z98.890 STATUS POST CORONARY ANGIOGRAM: ICD-10-CM

## 2019-08-19 PROCEDURE — 99214 OFFICE O/P EST MOD 30 MIN: CPT | Performed by: NURSE PRACTITIONER

## 2019-08-19 RX ORDER — HYDROCHLOROTHIAZIDE 25 MG/1
25 TABLET ORAL DAILY
Status: ON HOLD | COMMUNITY
End: 2019-09-27

## 2019-08-19 RX ORDER — LOSARTAN POTASSIUM 100 MG/1
100 TABLET ORAL DAILY
Status: ON HOLD | COMMUNITY
End: 2019-12-25

## 2019-08-19 ASSESSMENT — MIFFLIN-ST. JEOR: SCORE: 1941.95

## 2019-08-19 NOTE — PATIENT INSTRUCTIONS
1. No medication changes  2. Low sodium diet  3. Follow up with Dr. Neumann in 6 months  4. Please call with any additional questions/concerns [phone]

## 2019-08-19 NOTE — PROGRESS NOTES
Cardiology Clinic Progress Note  Burton Elizabeth MRN# [id number]   YOB: 1945 Age: 74 year old   Primary Cardiologist: Dr. Neumann  Reason for visit: Post angiogram follow up             Assessment and Plan:   Burton Elizabeth is a very pleasant 74 year old male with a history of coronary artery disease, DM, hypertension, obesity and CKD. Patient here today for follow up post coronary angiogram.     1. Coronary artery disease - coronary angiogram 8/12/2019 noting single vessel occlusive coronary artery disease namely  of non-dominant small circumflex vessel, proximal circumflex 99% stenosis, ostial RPDA 55%, post atrio lesion 50%, and mid to distal LAD 50% stenosis. Stable, no signs/symptoms of angina.    - Continue aspirin, carvedilol and atorvastatin     - Noted no revascularization is needed prior to his planned knee surgery.    - Reviewed coronary angiogram results, all questions answered.    - Counseled patient on lifestyle modifications  2. Hypertension - controlled  3. Aortic stenosis - moderate, valve area 1.2cm2, mean AoV pressure gradient 31.4mmHg, peak AoV pressure gradient 53.1mmHg. No prior echocardiogram for review or comparison. Will continue to monitor  4. Obesity - BMI 39.28, counseled patient on weight loss strategies.   5. CKD - baseline 1.8-2.1, creatinine 1.76 8/12/2019  6. Chronic heart failure with preserved ejection fraction - echocardiogram shows slight decrease in LV function, EF 45-50%, grade 1 diastolic dysfunction. No prior echocardiogram for review or comparison, decreased EF noted as an old diagnosis on problem list from 2016, so likely not new. Patient notes significant improvement with dyspnea and lower extremity edema since initiation of furosemide. Patient appears compensated and close to euvolemic on exam.    - NYHA class II, stage C   - Continue furosemide 20mg daily    - Counseled on low sodium diet  7. Diabetes mellitus - no recent HgbA1c for review, noted to  be 9.3 3/2016  8. Chronic LBBB  9. Left knee pain - [facility], notes he is now planning to see his prior [facility] at [facility].       Changes today: none    Follow up plan:     Follow up with Dr. Neumann in 6 months (never scheduled annual follow up in June 2018)        History of Presenting Illness:    Burton Elizabeth is a very pleasant 74 year old male with a history of coronary artery disease, DM, hypertension, obesity and CKD.     Patient was recently seen by WALE Quiroga, on 8/7/19 after patient experienced a sudden syncopal episode while shopping. EMS was called to the scene and no transport to ER. Patient denies LOC but does note he just woke up on the floor. Patient had no accompanying symptoms of chest pain, shortness of breath, lightheadedness, or dizziness. He attributed the episode to acute knee pain which he is [facility]. Patient has a history of abnormal stress test from 2016 which demonstrated a small area of mild ischemia in the anterior/anteroseptal apex/mid ventricle and mild ischemia in the inferior wall. EF 59%. Patient was recommended to have an invasive ischemic evaluation given recent syncopal episode and history of abnormal stress test. Patient also noted to have complaints of worsening exertional dyspnea over past 6 weeks with 23# weight gain in 3 months low dose furosemide was started and echocardiogram ordered.     Coronary angiogram 8/12/2019 noting single vessel occlusive coronary artery disease namely  of non-dominant small circumflex vessel, proximal circumflex 99% stenosis, ostial RPDA 55%, post atrio lesion 50%, and mid to distal LAD 50% stenosis. Noted no revascularization is needed prior to his planned knee surgery.     Echocardiogram showed normal LV size, moderate concentric LVH, EF 45-50%, grade 1 diastolic dysfunction, mild MR and moderate aortic stenosis (valve area 1.2cm2). No prior  echocardiogram for review.     Patient is here today for follow up post coronary angiogram.     Patient reports feeling good. Patient feels breathing has significantly improved since starting furosemide, noting it has resolved. Denies shortness of breath at rest. Was noting exertional dyspnea with activity. Patient noting some slight edema in bilateral lower extremities, but also notes significant improvement since starting furosemide. Patient denies chest pain or chest tightness. Denies dizziness, lightheadedness or other presyncopal symptoms. Denies recurrent syncopal episode. Denies tachycardia or palpitations. Left knee pain is his biggest complaint, [facility]. [facility] is frustrated with the [facility] and not getting a call back, noting he has left multiple voice mails.     Right radial access no bruising, no pain, no drainage, no edema.     No labs today, labs from 8/12/19 show stable kidney function, creatinine 1.76, stable electrolytes, hemoglobin 10.9. Blood pressure 143/75, HR 66 and weight 266# in clinic today. BP rechecked at 120/70.     Appetite good. Eating most meals at home, eating 1 meal a week at home. Adding some salt to foods. Drinking at minimum 120 ounces of fluid daily. Activity limited by knee pain. Will occasionally drink, 1 glass of wine weekly. Denies alcohol use.         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   Social History     Socioeconomic History     Marital status: Single     Spouse name: Not on file     Number of children: Not on file     Years of education: Not on file     Highest education level: Not on file   Occupational History     Not on file   Social Needs     Financial resource strain: Not on file     Food insecurity:     Worry: Not on file     Inability: Not on file     Transportation needs:     Medical: Not on file     Non-medical: Not on file  "  Tobacco Use     Smoking status: Never Smoker     Smokeless tobacco: Never Used   Substance and Sexual Activity     Alcohol use: Yes     Alcohol/week: 0.0 oz     Comment: rare      Drug use: No     Sexual activity: Not on file   Lifestyle     Physical activity:     Days per week: Not on file     Minutes per session: Not on file     Stress: Not on file   Relationships     Social connections:     Talks on phone: Not on file     Gets together: Not on file     Attends [employer] service: Not on file     Active member of club or organization: Not on file     Attends meetings of clubs or organizations: Not on file     Relationship status: Not on file     Intimate partner violence:     Fear of current or ex partner: Not on file     Emotionally abused: Not on file     Physically abused: Not on file     Forced sexual activity: Not on file   Other Topics Concern     Parent/sibling w/ CABG, MI or angioplasty before 65F 55M? Not Asked   Social History Narrative     Not on file            Review of Systems:   Skin:  Negative     Eyes:  Negative    ENT:  Negative    Respiratory:  Negative    Cardiovascular:    Positive for;edema  Gastroenterology: Negative    Genitourinary:  not assessed    Musculoskeletal:  Positive for joint pain  Neurologic:  Negative    Psychiatric:  Positive for depression;sleep disturbances  Heme/Lymph/Imm:  Positive for allergies;weight loss  Endocrine:  Positive for diabetes         Physical Exam:   Vitals: /70   Pulse 66   Ht 1.753 m (5' 9\")   Wt 120.7 kg (266 lb)   BMI 39.28 kg/m     Wt Readings from Last 4 Encounters:   08/19/19 120.7 kg (266 lb)   08/12/19 122.1 kg (269 lb 1.6 oz)   08/07/19 122.1 kg (269 lb 1.6 oz)   06/05/18 111.6 kg (246 lb)     GEN: well nourished, in no acute distress.  HEENT:  Pupils equal, round. Sclerae nonicteric.   NECK: Supple, no masses appreciated. JVP appears normal on exam  C/V:  Regular rate and rhythm, 3/6 systolic murmur.   RESP: Respirations are unlabored. " Clear to auscultation bilaterally without wheezing, rales, or rhonchi.  GI: Abdomen soft, nontender.  EXTREM: +1 left lower extremity edema, trace to +1 right lower extremity edema. Right radial access nontender, no bruising, no drainage, no bruit.   NEURO: Alert and oriented, cooperative.  SKIN: Warm and dry.       Data:   ECHO 8/7/2019  Sinus rhythm was noted with a wide QRS.  The left ventricle is normal in size with moderate concentric left ventricular  hypertrophy (LVH).  Some proximal septal thickening is noted but echo findings are not consistent  with left ventricular outflow obstruction.  Left ventricular systolic function is mildly reduced as the visual ejection  fraction is estimated at 45-50%.  This decrease in the LVEF is due to mild to moderate anteroseptal hypokinesis.  Some septal motion is consistent with conduction abnormality.  Grade I or early diastolic dysfunction is noted and the diastolic Doppler  findings (E/E' ratio and/or other parameters) suggest left ventricular filling  pressures are increased.  There is only mild (1+) mitral regurgitation.  The aortic Sinus(es) of Valsalva are mildly dilated at 4.0 cm (The upper limit  of normal is 3.7cm for aortic root diameter.)  At least moderate valvular aortic stenosis is present with the AoV data noted  just below. The 2-D approximation supports significant AS.  With the findings noted above, the left atrium is severely dilated by volume  criteria at 75 ml/m2. Severe LA dilation is > 48 ml/m2.  The right atrium is moderately dilated also.     There is no comparison study available. The mean AoV pressure gradient is 31.4  mmHg. The peak AoV pressure gradient is 53.1 mmHg. The calculated aortic valve  are is 1.2 cm^2.    Cardiac catheterization 8/12/2019    Prox Cx lesion is 99% stenosed.    Ost RPDA lesion is 55% stenosed.    Post Atrio lesion is 50% stenosed.    Ramus lesion is 30% stenosed.    Mid LAD to Dist LAD lesion is 50% stenosed.        1.   Single-vessel occlusive coronary artery disease namely  of nondominant small circumflex vessel  2.  Remainder of coronaries with moderate nonocclusive disease      LIPID RESULTS:  Lab Results   Component Value Date    CHOL 120 05/10/2019    HDL 35 (A) 05/10/2019    LDL 61 05/10/2019    TRIG 118 05/10/2019     LIVER ENZYME RESULTS:  Lab Results   Component Value Date    AST 22 08/01/2019    ALT 18 08/01/2019     CBC RESULTS:  Lab Results   Component Value Date    WBC 9.9 08/12/2019    RBC 4.42 08/12/2019    HGB 10.9 (L) 08/12/2019    HCT 36.4 (L) 08/12/2019    MCV 82 08/12/2019    MCH 24.7 (L) 08/12/2019    MCHC 29.9 (L) 08/12/2019    RDW 17.8 (H) 08/12/2019     08/12/2019     BMP RESULTS:  Lab Results   Component Value Date     08/12/2019    POTASSIUM 4.6 08/12/2019    CHLORIDE 108 08/12/2019    CO2 25 08/12/2019    ANIONGAP 7 08/12/2019     (H) 08/12/2019    BUN 37 (H) 08/12/2019    CR 1.76 (H) 08/12/2019    GFRESTIMATED 37 (L) 08/12/2019    GFRESTBLACK 43 (L) 08/12/2019    GABE 9.3 08/12/2019      A1C RESULTS:  Lab Results   Component Value Date    A1C 9.3 (H) 03/30/2016     INR RESULTS:  Lab Results   Component Value Date    INR 1.02 03/29/2016    INR 1.11 12/18/2006            Medications     Current Outpatient Medications   Medication Sig Dispense Refill     amLODIPine (NORVASC) 10 MG tablet Take 10 mg by mouth daily       ASPIRIN PO Take 325 mg by mouth daily       atorvastatin (LIPITOR) 40 MG tablet Take 40 mg by mouth daily       carvedilol (COREG) 12.5 MG tablet Take 1 tablet (12.5 mg) by mouth 2 times daily (with meals) 60 tablet 0     furosemide (LASIX) 20 MG tablet Take 1 tablet (20 mg) by mouth daily (Patient taking differently: Take 20 mg by mouth daily Restarted 8/7/2019) 30 tablet 3     GLIMEPIRIDE PO Take 4 mg by mouth 2 times daily       hydrochlorothiazide (HYDRODIURIL) 25 MG tablet Take 25 mg by mouth daily       HYDROcodone-acetaminophen (NORCO) 5-325 MG tablet Take 1  tablet by mouth every 6 hours as needed for severe pain       insulin aspart (NOVOLOG FLEXPEN) 100 UNIT/ML pen As directed       insulin glargine (LANTUS) 100 UNIT/ML PEN Inject 12-14 Units Subcutaneous every evening       losartan (COZAAR) 100 MG tablet Take 100 mg by mouth daily       metFORMIN (GLUCOPHAGE) 500 MG tablet Take 500 mg by mouth 2 times daily       multivitamin, therapeutic with minerals (THERA-VIT-M) TABS Take 1 tablet by mouth daily       blood glucose (NO BRAND SPECIFIED) test strip Use to test blood sugar as directed       hydrOXYzine (ATARAX) 10 MG tablet Take 1 tablet (10 mg) by mouth every 6 hours as needed for itching or other (spasm/pain) (Patient not taking: Reported on 8/7/2019) 60 tablet 0     order for DME Equipment being ordered: Walker Wheels () and Walker ()  Treatment Diagnosis: Impaired gait 1 each 0          Past Medical History     Past Medical History:   Diagnosis Date     Arthritis of knee~ s/p replacement 3/24/2016     Decreased cardiac ejection fraction~44% 3/24/2016     Diabetes mellitus, type 2 (H) 3/24/2016     HTN (hypertension) 3/24/2016     LBBB (left bundle branch block) 3/24/2016     Mixed hyperlipidemia 8/23/2016     Past Surgical History:   Procedure Laterality Date     ARTHROPLASTY KNEE Right 3/29/2016    Procedure: ARTHROPLASTY KNEE;  Surgeon: Heena Rosen MD;  Location:  OR     CV LEFT HEART CATH N/A 8/12/2019    Procedure: Left Heart Cath;  Surgeon: Edgardo Beckham MD;  Location:  HEART CARDIAC CATH LAB     EYE SURGERY      cataract removal     ORTHOPEDIC SURGERY      KNEE SCOPES MULTIPLE     Family History   Problem Relation Age of Onset     Coronary Artery Disease No family hx of             Allergies   Penicillins        [facility]  Pager: [phone]

## 2019-08-19 NOTE — LETTER
8/19/2019    Td Qureshi MD  [address]  [facility] [address]    RE: Burton Elizabeth       Dear Colleague,    I had the pleasure of seeing Burton Elizabeth in the [facility].    Cardiology Clinic Progress Note  Burton Elizabeth MRN# [id number]   YOB: 1945 Age: 74 year old   Primary Cardiologist: Dr. Neumann  Reason for visit: Post angiogram follow up             Assessment and Plan:   Burton Elizabeth is a very pleasant 74 year old male with a history of coronary artery disease, DM, hypertension, obesity and CKD. Patient here today for follow up post coronary angiogram.     1. Coronary artery disease - coronary angiogram 8/12/2019 noting single vessel occlusive coronary artery disease namely  of non-dominant small circumflex vessel, proximal circumflex 99% stenosis, ostial RPDA 55%, post atrio lesion 50%, and mid to distal LAD 50% stenosis. Stable, no signs/symptoms of angina.    - Continue aspirin, carvedilol and atorvastatin     - Noted no revascularization is needed prior to his planned knee surgery.    - Reviewed coronary angiogram results, all questions answered.    - Counseled patient on lifestyle modifications  2. Hypertension - controlled  3. Aortic stenosis - moderate, valve area 1.2cm2, mean AoV pressure gradient 31.4mmHg, peak AoV pressure gradient 53.1mmHg. No prior echocardiogram for review or comparison. Will continue to monitor  4. Obesity - BMI 39.28, counseled patient on weight loss strategies.   5. CKD - baseline 1.8-2.1, creatinine 1.76 8/12/2019  6. Chronic heart failure with preserved ejection fraction - echocardiogram shows slight decrease in LV function, EF 45-50%, grade 1 diastolic dysfunction. No prior echocardiogram for review or comparison, decreased EF noted as an old diagnosis on problem list from 2016, so likely not new. Patient notes significant improvement with dyspnea and lower extremity edema since initiation of  furosemide. Patient appears compensated and close to euvolemic on exam.    - NYHA class II, stage C   - Continue furosemide 20mg daily    - Counseled on low sodium diet  7. Diabetes mellitus - no recent HgbA1c for review, noted to be 9.3 3/2016  8. Chronic LBBB  9. Left knee pain - [facility], notes he is now planning to see his prior orthopedics at [facility].       Changes today: none    Follow up plan:     Follow up with Dr. Neumann in 6 months (never scheduled annual follow up in June 2018)        History of Presenting Illness:    Burton Elizabeth is a very pleasant 74 year old male with a history of coronary artery disease, DM, hypertension, obesity and CKD.     Patient was recently seen by WALE Quiroga, on 8/7/19 after patient experienced a sudden syncopal episode while shopping. EMS was called to the scene and no transport to ER. Patient denies LOC but does note he just woke up on the floor. Patient had no accompanying symptoms of chest pain, shortness of breath, lightheadedness, or dizziness. He attributed the episode to acute knee pain which he is [facility]. Patient has a history of abnormal stress test from 2016 which demonstrated a small area of mild ischemia in the anterior/anteroseptal apex/mid ventricle and mild ischemia in the inferior wall. EF 59%. Patient was recommended to have an invasive ischemic evaluation given recent syncopal episode and history of abnormal stress test. Patient also noted to have complaints of worsening exertional dyspnea over past 6 weeks with 23# weight gain in 3 months low dose furosemide was started and echocardiogram ordered.     Coronary angiogram 8/12/2019 noting single vessel occlusive coronary artery disease namely  of non-dominant small circumflex vessel, proximal circumflex 99% stenosis, ostial RPDA 55%, post atrio lesion 50%, and mid to distal LAD 50% stenosis. Noted no revascularization  is needed prior to his planned knee surgery.     Echocardiogram showed normal LV size, moderate concentric LVH, EF 45-50%, grade 1 diastolic dysfunction, mild MR and moderate aortic stenosis (valve area 1.2cm2). No prior echocardiogram for review.     Patient is here today for follow up post coronary angiogram.     Patient reports feeling good. Patient feels breathing has significantly improved since starting furosemide, noting it has resolved. Denies shortness of breath at rest. Was noting exertional dyspnea with activity. Patient noting some slight edema in bilateral lower extremities, but also notes significant improvement since starting furosemide. Patient denies chest pain or chest tightness. Denies dizziness, lightheadedness or other presyncopal symptoms. Denies recurrent syncopal episode. Denies tachycardia or palpitations. Left knee pain is his biggest complaint, [facility]. [facility] is [facility] with [facility] and not getting a [facility] back, noting he has left multiple voice mails.     Right radial access no bruising, no pain, no drainage, no edema.     No labs today, labs from 8/12/19 show stable kidney function, creatinine 1.76, stable electrolytes, hemoglobin 10.9. Blood pressure 143/75, HR 66 and weight 266# in clinic today. BP rechecked at 120/70.     Appetite good. Eating most meals at home, eating 1 meal a week at home. Adding some salt to foods. Drinking at minimum 120 ounces of fluid daily. Activity limited by knee pain. Will occasionally drink, 1 glass of wine weekly. Denies alcohol use.         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   Social History     Socioeconomic History     Marital status: Single     Spouse name: Not on file     Number of children: Not on file     Years of education: Not on file     Highest education level: Not on file   Occupational History      "Not on file   Social Needs     Financial resource strain: Not on file     Food insecurity:     Worry: Not on file     Inability: Not on file     Transportation needs:     Medical: Not on file     Non-medical: Not on file   Tobacco Use     Smoking status: Never Smoker     Smokeless tobacco: Never Used   Substance and Sexual Activity     Alcohol use: Yes     Alcohol/week: 0.0 oz     Comment: rare      Drug use: No     Sexual activity: Not on file   Lifestyle     Physical activity:     Days per week: Not on file     Minutes per session: Not on file     Stress: Not on file   Relationships     Social connections:     Talks on phone: Not on file     Gets together: Not on file     Attends [employer] service: Not on file     Active member of club or organization: Not on file     Attends meetings of clubs or organizations: Not on file     Relationship status: Not on file     Intimate partner violence:     Fear of current or ex partner: Not on file     Emotionally abused: Not on file     Physically abused: Not on file     Forced sexual activity: Not on file   Other Topics Concern     Parent/sibling w/ CABG, MI or angioplasty before 65F 55M? Not Asked   Social History Narrative     Not on file            Review of Systems:   Skin:  Negative     Eyes:  Negative    ENT:  Negative    Respiratory:  Negative    Cardiovascular:    Positive for;edema  Gastroenterology: Negative    Genitourinary:  not assessed    Musculoskeletal:  Positive for joint pain  Neurologic:  Negative    Psychiatric:  Positive for depression;sleep disturbances  Heme/Lymph/Imm:  Positive for allergies;weight loss  Endocrine:  Positive for diabetes         Physical Exam:   Vitals: /70   Pulse 66   Ht 1.753 m (5' 9\")   Wt 120.7 kg (266 lb)   BMI 39.28 kg/m      Wt Readings from Last 4 Encounters:   08/19/19 120.7 kg (266 lb)   08/12/19 122.1 kg (269 lb 1.6 oz)   08/07/19 122.1 kg (269 lb 1.6 oz)   06/05/18 111.6 kg (246 lb)     GEN: well nourished, in " no acute distress.  HEENT:  Pupils equal, round. Sclerae nonicteric.   NECK: Supple, no masses appreciated. JVP appears normal on exam  C/V:  Regular rate and rhythm, 3/6 systolic murmur.   RESP: Respirations are unlabored. Clear to auscultation bilaterally without wheezing, rales, or rhonchi.  GI: Abdomen soft, nontender.  EXTREM: +1 left lower extremity edema, trace to +1 right lower extremity edema. Right radial access nontender, no bruising, no drainage, no bruit.   NEURO: Alert and oriented, cooperative.  SKIN: Warm and dry.       Data:   ECHO 8/7/2019  Sinus rhythm was noted with a wide QRS.  The left ventricle is normal in size with moderate concentric left ventricular  hypertrophy (LVH).  Some proximal septal thickening is noted but echo findings are not consistent  with left ventricular outflow obstruction.  Left ventricular systolic function is mildly reduced as the visual ejection  fraction is estimated at 45-50%.  This decrease in the LVEF is due to mild to moderate anteroseptal hypokinesis.  Some septal motion is consistent with conduction abnormality.  Grade I or early diastolic dysfunction is noted and the diastolic Doppler  findings (E/E' ratio and/or other parameters) suggest left ventricular filling  pressures are increased.  There is only mild (1+) mitral regurgitation.  The aortic Sinus(es) of Valsalva are mildly dilated at 4.0 cm (The upper limit  of normal is 3.7cm for aortic root diameter.)  At least moderate valvular aortic stenosis is present with the AoV data noted  just below. The 2-D approximation supports significant AS.  With the findings noted above, the left atrium is severely dilated by volume  criteria at 75 ml/m2. Severe LA dilation is > 48 ml/m2.  The right atrium is moderately dilated also.     There is no comparison study available. The mean AoV pressure gradient is 31.4  mmHg. The peak AoV pressure gradient is 53.1 mmHg. The calculated aortic valve  are is 1.2 cm^2.    Cardiac  catheterization 8/12/2019    Prox Cx lesion is 99% stenosed.    Ost RPDA lesion is 55% stenosed.    Post Atrio lesion is 50% stenosed.    Ramus lesion is 30% stenosed.    Mid LAD to Dist LAD lesion is 50% stenosed.        1.  Single-vessel occlusive coronary artery disease namely  of nondominant small circumflex vessel  2.  Remainder of coronaries with moderate nonocclusive disease      LIPID RESULTS:  Lab Results   Component Value Date    CHOL 120 05/10/2019    HDL 35 (A) 05/10/2019    LDL 61 05/10/2019    TRIG 118 05/10/2019     LIVER ENZYME RESULTS:  Lab Results   Component Value Date    AST 22 08/01/2019    ALT 18 08/01/2019     CBC RESULTS:  Lab Results   Component Value Date    WBC 9.9 08/12/2019    RBC 4.42 08/12/2019    HGB 10.9 (L) 08/12/2019    HCT 36.4 (L) 08/12/2019    MCV 82 08/12/2019    MCH 24.7 (L) 08/12/2019    MCHC 29.9 (L) 08/12/2019    RDW 17.8 (H) 08/12/2019     08/12/2019     BMP RESULTS:  Lab Results   Component Value Date     08/12/2019    POTASSIUM 4.6 08/12/2019    CHLORIDE 108 08/12/2019    CO2 25 08/12/2019    ANIONGAP 7 08/12/2019     (H) 08/12/2019    BUN 37 (H) 08/12/2019    CR 1.76 (H) 08/12/2019    GFRESTIMATED 37 (L) 08/12/2019    GFRESTBLACK 43 (L) 08/12/2019    GABE 9.3 08/12/2019      A1C RESULTS:  Lab Results   Component Value Date    A1C 9.3 (H) 03/30/2016     INR RESULTS:  Lab Results   Component Value Date    INR 1.02 03/29/2016    INR 1.11 12/18/2006            Medications     Current Outpatient Medications   Medication Sig Dispense Refill     amLODIPine (NORVASC) 10 MG tablet Take 10 mg by mouth daily       ASPIRIN PO Take 325 mg by mouth daily       atorvastatin (LIPITOR) 40 MG tablet Take 40 mg by mouth daily       carvedilol (COREG) 12.5 MG tablet Take 1 tablet (12.5 mg) by mouth 2 times daily (with meals) 60 tablet 0     furosemide (LASIX) 20 MG tablet Take 1 tablet (20 mg) by mouth daily (Patient taking differently: Take 20 mg by mouth daily  Restarted 8/7/2019) 30 tablet 3     GLIMEPIRIDE PO Take 4 mg by mouth 2 times daily       hydrochlorothiazide (HYDRODIURIL) 25 MG tablet Take 25 mg by mouth daily       HYDROcodone-acetaminophen (NORCO) 5-325 MG tablet Take 1 tablet by mouth every 6 hours as needed for severe pain       insulin aspart (NOVOLOG FLEXPEN) 100 UNIT/ML pen As directed       insulin glargine (LANTUS) 100 UNIT/ML PEN Inject 12-14 Units Subcutaneous every evening       losartan (COZAAR) 100 MG tablet Take 100 mg by mouth daily       metFORMIN (GLUCOPHAGE) 500 MG tablet Take 500 mg by mouth 2 times daily       multivitamin, therapeutic with minerals (THERA-VIT-M) TABS Take 1 tablet by mouth daily       blood glucose (NO BRAND SPECIFIED) test strip Use to test blood sugar as directed       hydrOXYzine (ATARAX) 10 MG tablet Take 1 tablet (10 mg) by mouth every 6 hours as needed for itching or other (spasm/pain) (Patient not taking: Reported on 8/7/2019) 60 tablet 0     order for DME Equipment being ordered: Walker Wheels () and Walker ()  Treatment Diagnosis: Impaired gait 1 each 0          Past Medical History     Past Medical History:   Diagnosis Date     Arthritis of knee~ s/p replacement 3/24/2016     Decreased cardiac ejection fraction~44% 3/24/2016     Diabetes mellitus, type 2 (H) 3/24/2016     HTN (hypertension) 3/24/2016     LBBB (left bundle branch block) 3/24/2016     Mixed hyperlipidemia 8/23/2016     Past Surgical History:   Procedure Laterality Date     ARTHROPLASTY KNEE Right 3/29/2016    Procedure: ARTHROPLASTY KNEE;  Surgeon: Heena Rosen MD;  Location:  OR     CV LEFT HEART CATH N/A 8/12/2019    Procedure: Left Heart Cath;  Surgeon: Edgardo Beckham MD;  Location:  HEART CARDIAC CATH LAB     EYE SURGERY      cataract removal     ORTHOPEDIC SURGERY      KNEE SCOPES MULTIPLE     Family History   Problem Relation Age of Onset     Coronary Artery Disease No family hx of             Allergies    WALE Augustin CNP  [facility] [[email or web address]] Heart Care  Pager: [phone]      Thank you for allowing me to participate in the care of your patient.    Sincerely,     WALE Klein CNP     [facility]

## 2019-09-26 RX ORDER — AMITRIPTYLINE HYDROCHLORIDE 10 MG/1
10-50 TABLET ORAL AT BEDTIME
Status: ON HOLD | COMMUNITY
End: 2019-09-27

## 2019-09-26 RX ORDER — PIMECROLIMUS 10 MG/G
CREAM TOPICAL 2 TIMES DAILY
Status: ON HOLD | COMMUNITY
End: 2019-09-27

## 2019-09-26 RX ORDER — TRIAMCINOLONE ACETONIDE 1 MG/G
OINTMENT TOPICAL 2 TIMES DAILY
Status: ON HOLD | COMMUNITY
End: 2019-09-27

## 2019-09-26 RX ORDER — LOSARTAN POTASSIUM AND HYDROCHLOROTHIAZIDE 25; 100 MG/1; MG/1
1 TABLET ORAL DAILY
Status: ON HOLD | COMMUNITY
End: 2019-09-27

## 2019-09-26 RX ORDER — TRIAMCINOLONE ACETONIDE 5 MG/G
OINTMENT TOPICAL 2 TIMES DAILY
Status: ON HOLD | COMMUNITY
End: 2019-09-27

## 2019-09-27 ENCOUNTER — ANESTHESIA (OUTPATIENT)
Dept: SURGERY | Facility: CLINIC | Age: 74
DRG: 467 | End: 2019-09-27
Payer: COMMERCIAL

## 2019-09-27 ENCOUNTER — HOSPITAL ENCOUNTER (INPATIENT)
Facility: CLINIC | Age: 74
LOS: 1 days | Discharge: HOME | DRG: 467 | End: 2019-09-28
Attending: ORTHOPAEDIC SURGERY | Admitting: ORTHOPAEDIC SURGERY
Payer: COMMERCIAL

## 2019-09-27 ENCOUNTER — ANESTHESIA EVENT (OUTPATIENT)
Dept: SURGERY | Facility: CLINIC | Age: 74
DRG: 467 | End: 2019-09-27
Payer: COMMERCIAL

## 2019-09-27 DIAGNOSIS — Z96.652 STATUS POST REVISION OF TOTAL REPLACEMENT OF LEFT KNEE: Primary | ICD-10-CM

## 2019-09-27 LAB
CREAT SERPL-MCNC: 1.8 MG/DL (ref 0.66–1.25)
GFR SERPL CREATININE-BSD FRML MDRD: 36 ML/MIN/{1.73_M2}
GLUCOSE BLDC GLUCOMTR-MCNC: 156 MG/DL (ref 70–99)
GLUCOSE BLDC GLUCOMTR-MCNC: 162 MG/DL (ref 70–99)
GLUCOSE BLDC GLUCOMTR-MCNC: 190 MG/DL (ref 70–99)
GLUCOSE SERPL-MCNC: 161 MG/DL (ref 70–99)
HBA1C MFR BLD: 6.5 % (ref 0–5.6)
POTASSIUM SERPL-SCNC: 4.6 MMOL/L (ref 3.4–5.3)

## 2019-09-27 PROCEDURE — 25000128 H RX IP 250 OP 636: Performed by: ANESTHESIOLOGY

## 2019-09-27 PROCEDURE — 25000128 H RX IP 250 OP 636: Performed by: NURSE ANESTHETIST, CERTIFIED REGISTERED

## 2019-09-27 PROCEDURE — 0SPD0JZ REMOVAL OF SYNTHETIC SUBSTITUTE FROM LEFT KNEE JOINT, OPEN APPROACH: ICD-10-PCS | Performed by: ORTHOPAEDIC SURGERY

## 2019-09-27 PROCEDURE — 37000008 ZZH ANESTHESIA TECHNICAL FEE, 1ST 30 MIN: Performed by: ORTHOPAEDIC SURGERY

## 2019-09-27 PROCEDURE — 25000132 ZZH RX MED GY IP 250 OP 250 PS 637: Performed by: PHYSICIAN ASSISTANT

## 2019-09-27 PROCEDURE — 36000067 ZZH SURGERY LEVEL 5 1ST 30 MIN: Performed by: ORTHOPAEDIC SURGERY

## 2019-09-27 PROCEDURE — 40000171 ZZH STATISTIC PRE-PROCEDURE ASSESSMENT III: Performed by: ORTHOPAEDIC SURGERY

## 2019-09-27 PROCEDURE — 99222 1ST HOSP IP/OBS MODERATE 55: CPT | Performed by: PHYSICIAN ASSISTANT

## 2019-09-27 PROCEDURE — 83036 HEMOGLOBIN GLYCOSYLATED A1C: CPT | Performed by: ANESTHESIOLOGY

## 2019-09-27 PROCEDURE — 84132 ASSAY OF SERUM POTASSIUM: CPT | Performed by: ANESTHESIOLOGY

## 2019-09-27 PROCEDURE — 93005 ELECTROCARDIOGRAM TRACING: CPT

## 2019-09-27 PROCEDURE — 82565 ASSAY OF CREATININE: CPT | Performed by: ANESTHESIOLOGY

## 2019-09-27 PROCEDURE — 25000131 ZZH RX MED GY IP 250 OP 636 PS 637: Performed by: PHYSICIAN ASSISTANT

## 2019-09-27 PROCEDURE — 25000125 ZZHC RX 250: Performed by: NURSE ANESTHETIST, CERTIFIED REGISTERED

## 2019-09-27 PROCEDURE — 25000128 H RX IP 250 OP 636: Performed by: ORTHOPAEDIC SURGERY

## 2019-09-27 PROCEDURE — 25800030 ZZH RX IP 258 OP 636: Performed by: ORTHOPAEDIC SURGERY

## 2019-09-27 PROCEDURE — 25800030 ZZH RX IP 258 OP 636: Performed by: ANESTHESIOLOGY

## 2019-09-27 PROCEDURE — 25800030 ZZH RX IP 258 OP 636: Performed by: PHYSICIAN ASSISTANT

## 2019-09-27 PROCEDURE — 25000125 ZZHC RX 250: Performed by: ANESTHESIOLOGY

## 2019-09-27 PROCEDURE — 00000146 ZZHCL STATISTIC GLUCOSE BY METER IP

## 2019-09-27 PROCEDURE — 25000125 ZZHC RX 250: Performed by: ORTHOPAEDIC SURGERY

## 2019-09-27 PROCEDURE — 0SRD0J9 REPLACEMENT OF LEFT KNEE JOINT WITH SYNTHETIC SUBSTITUTE, CEMENTED, OPEN APPROACH: ICD-10-PCS | Performed by: ORTHOPAEDIC SURGERY

## 2019-09-27 PROCEDURE — 12000000 ZZH R&B MED SURG/OB

## 2019-09-27 PROCEDURE — 71000013 ZZH RECOVERY PHASE 1 LEVEL 1 EA ADDTL HR: Performed by: ORTHOPAEDIC SURGERY

## 2019-09-27 PROCEDURE — 82947 ASSAY GLUCOSE BLOOD QUANT: CPT | Performed by: ANESTHESIOLOGY

## 2019-09-27 PROCEDURE — 27210794 ZZH OR GENERAL SUPPLY STERILE: Performed by: ORTHOPAEDIC SURGERY

## 2019-09-27 PROCEDURE — 25000125 ZZHC RX 250: Performed by: PHYSICIAN ASSISTANT

## 2019-09-27 PROCEDURE — 99207 ZZC CONSULT E&M CHANGED TO INITIAL LEVEL: CPT | Performed by: PHYSICIAN ASSISTANT

## 2019-09-27 PROCEDURE — 27110028 ZZH OR GENERAL SUPPLY NON-STERILE: Performed by: ORTHOPAEDIC SURGERY

## 2019-09-27 PROCEDURE — C1776 JOINT DEVICE (IMPLANTABLE): HCPCS | Performed by: ORTHOPAEDIC SURGERY

## 2019-09-27 PROCEDURE — 27810169 ZZH OR IMPLANT GENERAL: Performed by: ORTHOPAEDIC SURGERY

## 2019-09-27 PROCEDURE — 93010 ELECTROCARDIOGRAM REPORT: CPT | Performed by: INTERNAL MEDICINE

## 2019-09-27 PROCEDURE — 25000132 ZZH RX MED GY IP 250 OP 250 PS 637: Performed by: NURSE ANESTHETIST, CERTIFIED REGISTERED

## 2019-09-27 PROCEDURE — 36415 COLL VENOUS BLD VENIPUNCTURE: CPT | Performed by: ANESTHESIOLOGY

## 2019-09-27 PROCEDURE — 99207 ZZC MOONLIGHTING INDICATOR: CPT | Performed by: PHYSICIAN ASSISTANT

## 2019-09-27 PROCEDURE — 37000009 ZZH ANESTHESIA TECHNICAL FEE, EACH ADDTL 15 MIN: Performed by: ORTHOPAEDIC SURGERY

## 2019-09-27 PROCEDURE — 25000566 ZZH SEVOFLURANE, EA 15 MIN: Performed by: ORTHOPAEDIC SURGERY

## 2019-09-27 PROCEDURE — 25000128 H RX IP 250 OP 636: Performed by: PHYSICIAN ASSISTANT

## 2019-09-27 PROCEDURE — 0QUC07Z SUPPLEMENT LEFT LOWER FEMUR WITH AUTOLOGOUS TISSUE SUBSTITUTE, OPEN APPROACH: ICD-10-PCS | Performed by: ORTHOPAEDIC SURGERY

## 2019-09-27 PROCEDURE — 25800025 ZZH RX 258: Performed by: ORTHOPAEDIC SURGERY

## 2019-09-27 PROCEDURE — 36000069 ZZH SURGERY LEVEL 5 EA 15 ADDTL MIN: Performed by: ORTHOPAEDIC SURGERY

## 2019-09-27 PROCEDURE — 25000132 ZZH RX MED GY IP 250 OP 250 PS 637: Performed by: ANESTHESIOLOGY

## 2019-09-27 PROCEDURE — 71000012 ZZH RECOVERY PHASE 1 LEVEL 1 FIRST HR: Performed by: ORTHOPAEDIC SURGERY

## 2019-09-27 DEVICE — IMPLANTABLE DEVICE: Type: IMPLANTABLE DEVICE | Site: KNEE | Status: FUNCTIONAL

## 2019-09-27 DEVICE — BONE CEMENT SIMPLEX 1/2 DOSE 6188-1-001: Type: IMPLANTABLE DEVICE | Site: KNEE | Status: FUNCTIONAL

## 2019-09-27 DEVICE — BONE CEMENT SIMPLEX W/TOBRAMYCIN 6197-9-001: Type: IMPLANTABLE DEVICE | Site: KNEE | Status: FUNCTIONAL

## 2019-09-27 RX ORDER — DEXTROSE MONOHYDRATE 25 G/50ML
25-50 INJECTION, SOLUTION INTRAVENOUS
Status: DISCONTINUED | OUTPATIENT
Start: 2019-09-27 | End: 2019-09-28 | Stop reason: HOSPADM

## 2019-09-27 RX ORDER — ONDANSETRON 2 MG/ML
4 INJECTION INTRAMUSCULAR; INTRAVENOUS EVERY 6 HOURS PRN
Status: DISCONTINUED | OUTPATIENT
Start: 2019-09-27 | End: 2019-09-27

## 2019-09-27 RX ORDER — LABETALOL HYDROCHLORIDE 5 MG/ML
10 INJECTION, SOLUTION INTRAVENOUS
Status: DISCONTINUED | OUTPATIENT
Start: 2019-09-27 | End: 2019-09-27 | Stop reason: HOSPADM

## 2019-09-27 RX ORDER — GLIMEPIRIDE 4 MG/1
4 TABLET ORAL 2 TIMES DAILY
Status: DISCONTINUED | OUTPATIENT
Start: 2019-09-27 | End: 2019-09-27

## 2019-09-27 RX ORDER — ONDANSETRON 4 MG/1
4-8 TABLET, ORALLY DISINTEGRATING ORAL EVERY 8 HOURS PRN
Status: DISCONTINUED | OUTPATIENT
Start: 2019-09-27 | End: 2019-09-28 | Stop reason: HOSPADM

## 2019-09-27 RX ORDER — FENTANYL CITRATE 50 UG/ML
100 INJECTION, SOLUTION INTRAMUSCULAR; INTRAVENOUS ONCE
Status: COMPLETED | OUTPATIENT
Start: 2019-09-27 | End: 2019-09-27

## 2019-09-27 RX ORDER — LIDOCAINE HYDROCHLORIDE 20 MG/ML
INJECTION, SOLUTION INFILTRATION; PERINEURAL PRN
Status: DISCONTINUED | OUTPATIENT
Start: 2019-09-27 | End: 2019-09-27

## 2019-09-27 RX ORDER — DEXAMETHASONE SODIUM PHOSPHATE 4 MG/ML
INJECTION, SOLUTION INTRA-ARTICULAR; INTRALESIONAL; INTRAMUSCULAR; INTRAVENOUS; SOFT TISSUE PRN
Status: DISCONTINUED | OUTPATIENT
Start: 2019-09-27 | End: 2019-09-27

## 2019-09-27 RX ORDER — ACETAMINOPHEN 325 MG/1
975 TABLET ORAL EVERY 8 HOURS
Status: DISCONTINUED | OUTPATIENT
Start: 2019-09-27 | End: 2019-09-28 | Stop reason: HOSPADM

## 2019-09-27 RX ORDER — PROCHLORPERAZINE 25 MG
12.5 SUPPOSITORY, RECTAL RECTAL EVERY 12 HOURS PRN
Status: DISCONTINUED | OUTPATIENT
Start: 2019-09-27 | End: 2019-09-28 | Stop reason: HOSPADM

## 2019-09-27 RX ORDER — MAGNESIUM HYDROXIDE 1200 MG/15ML
LIQUID ORAL PRN
Status: DISCONTINUED | OUTPATIENT
Start: 2019-09-27 | End: 2019-09-27 | Stop reason: HOSPADM

## 2019-09-27 RX ORDER — LOSARTAN POTASSIUM 100 MG/1
100 TABLET ORAL DAILY
Status: DISCONTINUED | OUTPATIENT
Start: 2019-09-27 | End: 2019-09-27

## 2019-09-27 RX ORDER — FENTANYL CITRATE 50 UG/ML
25-50 INJECTION, SOLUTION INTRAMUSCULAR; INTRAVENOUS
Status: DISCONTINUED | OUTPATIENT
Start: 2019-09-27 | End: 2019-09-27 | Stop reason: HOSPADM

## 2019-09-27 RX ORDER — ONDANSETRON 4 MG/1
4 TABLET, ORALLY DISINTEGRATING ORAL EVERY 6 HOURS PRN
Status: DISCONTINUED | OUTPATIENT
Start: 2019-09-27 | End: 2019-09-27

## 2019-09-27 RX ORDER — HYDROMORPHONE HYDROCHLORIDE 1 MG/ML
0.2 INJECTION, SOLUTION INTRAMUSCULAR; INTRAVENOUS; SUBCUTANEOUS
Status: DISCONTINUED | OUTPATIENT
Start: 2019-09-27 | End: 2019-09-28 | Stop reason: HOSPADM

## 2019-09-27 RX ORDER — SODIUM CHLORIDE, SODIUM LACTATE, POTASSIUM CHLORIDE, CALCIUM CHLORIDE 600; 310; 30; 20 MG/100ML; MG/100ML; MG/100ML; MG/100ML
INJECTION, SOLUTION INTRAVENOUS CONTINUOUS
Status: DISCONTINUED | OUTPATIENT
Start: 2019-09-27 | End: 2019-09-28 | Stop reason: HOSPADM

## 2019-09-27 RX ORDER — NALOXONE HYDROCHLORIDE 0.4 MG/ML
.1-.4 INJECTION, SOLUTION INTRAMUSCULAR; INTRAVENOUS; SUBCUTANEOUS
Status: DISCONTINUED | OUTPATIENT
Start: 2019-09-27 | End: 2019-09-28 | Stop reason: HOSPADM

## 2019-09-27 RX ORDER — HYDRALAZINE HYDROCHLORIDE 20 MG/ML
2.5-5 INJECTION INTRAMUSCULAR; INTRAVENOUS EVERY 10 MIN PRN
Status: DISCONTINUED | OUTPATIENT
Start: 2019-09-27 | End: 2019-09-27 | Stop reason: HOSPADM

## 2019-09-27 RX ORDER — NEOSTIGMINE METHYLSULFATE 1 MG/ML
VIAL (ML) INJECTION PRN
Status: DISCONTINUED | OUTPATIENT
Start: 2019-09-27 | End: 2019-09-27

## 2019-09-27 RX ORDER — NALOXONE HYDROCHLORIDE 0.4 MG/ML
.1-.4 INJECTION, SOLUTION INTRAMUSCULAR; INTRAVENOUS; SUBCUTANEOUS
Status: DISCONTINUED | OUTPATIENT
Start: 2019-09-27 | End: 2019-09-27

## 2019-09-27 RX ORDER — ONDANSETRON 4 MG/1
4 TABLET, ORALLY DISINTEGRATING ORAL EVERY 30 MIN PRN
Status: DISCONTINUED | OUTPATIENT
Start: 2019-09-27 | End: 2019-09-27 | Stop reason: HOSPADM

## 2019-09-27 RX ORDER — ONDANSETRON 2 MG/ML
4-8 INJECTION INTRAMUSCULAR; INTRAVENOUS EVERY 8 HOURS PRN
Status: DISCONTINUED | OUTPATIENT
Start: 2019-09-27 | End: 2019-09-28 | Stop reason: HOSPADM

## 2019-09-27 RX ORDER — ONDANSETRON 2 MG/ML
4 INJECTION INTRAMUSCULAR; INTRAVENOUS EVERY 30 MIN PRN
Status: DISCONTINUED | OUTPATIENT
Start: 2019-09-27 | End: 2019-09-27 | Stop reason: HOSPADM

## 2019-09-27 RX ORDER — PROCHLORPERAZINE MALEATE 5 MG
10 TABLET ORAL EVERY 6 HOURS PRN
Status: DISCONTINUED | OUTPATIENT
Start: 2019-09-27 | End: 2019-09-28 | Stop reason: HOSPADM

## 2019-09-27 RX ORDER — AMLODIPINE BESYLATE 10 MG/1
10 TABLET ORAL DAILY
Status: DISCONTINUED | OUTPATIENT
Start: 2019-09-27 | End: 2019-09-28 | Stop reason: HOSPADM

## 2019-09-27 RX ORDER — AMOXICILLIN 250 MG
2 CAPSULE ORAL 2 TIMES DAILY
Status: DISCONTINUED | OUTPATIENT
Start: 2019-09-27 | End: 2019-09-28 | Stop reason: HOSPADM

## 2019-09-27 RX ORDER — HYDROXYZINE HYDROCHLORIDE 10 MG/1
10 TABLET, FILM COATED ORAL EVERY 6 HOURS PRN
Status: DISCONTINUED | OUTPATIENT
Start: 2019-09-27 | End: 2019-09-28 | Stop reason: HOSPADM

## 2019-09-27 RX ORDER — DEXAMETHASONE SODIUM PHOSPHATE 4 MG/ML
4 INJECTION, SOLUTION INTRA-ARTICULAR; INTRALESIONAL; INTRAMUSCULAR; INTRAVENOUS; SOFT TISSUE
Status: DISCONTINUED | OUTPATIENT
Start: 2019-09-27 | End: 2019-09-27 | Stop reason: HOSPADM

## 2019-09-27 RX ORDER — CARVEDILOL 25 MG/1
12.5 TABLET ORAL 2 TIMES DAILY WITH MEALS
Status: ON HOLD | COMMUNITY
End: 2020-07-05

## 2019-09-27 RX ORDER — HYDROMORPHONE HYDROCHLORIDE 1 MG/ML
.3-.5 INJECTION, SOLUTION INTRAMUSCULAR; INTRAVENOUS; SUBCUTANEOUS EVERY 5 MIN PRN
Status: DISCONTINUED | OUTPATIENT
Start: 2019-09-27 | End: 2019-09-27 | Stop reason: HOSPADM

## 2019-09-27 RX ORDER — ACETAMINOPHEN 325 MG/1
650 TABLET ORAL EVERY 4 HOURS PRN
Status: DISCONTINUED | OUTPATIENT
Start: 2019-09-30 | End: 2019-09-28 | Stop reason: HOSPADM

## 2019-09-27 RX ORDER — OXYCODONE HYDROCHLORIDE 5 MG/1
5 TABLET ORAL
Status: DISCONTINUED | OUTPATIENT
Start: 2019-09-27 | End: 2019-09-28

## 2019-09-27 RX ORDER — PROPOFOL 10 MG/ML
INJECTION, EMULSION INTRAVENOUS PRN
Status: DISCONTINUED | OUTPATIENT
Start: 2019-09-27 | End: 2019-09-27

## 2019-09-27 RX ORDER — ALBUTEROL SULFATE 90 UG/1
AEROSOL, METERED RESPIRATORY (INHALATION) PRN
Status: DISCONTINUED | OUTPATIENT
Start: 2019-09-27 | End: 2019-09-27

## 2019-09-27 RX ORDER — MELOXICAM 15 MG/1
15 TABLET ORAL DAILY
Status: ON HOLD | COMMUNITY
End: 2019-12-25

## 2019-09-27 RX ORDER — LIDOCAINE 40 MG/G
CREAM TOPICAL
Status: DISCONTINUED | OUTPATIENT
Start: 2019-09-27 | End: 2019-09-28 | Stop reason: HOSPADM

## 2019-09-27 RX ORDER — AMOXICILLIN 250 MG
1 CAPSULE ORAL 2 TIMES DAILY
Status: DISCONTINUED | OUTPATIENT
Start: 2019-09-27 | End: 2019-09-28 | Stop reason: HOSPADM

## 2019-09-27 RX ORDER — ATORVASTATIN CALCIUM 40 MG/1
40 TABLET, FILM COATED ORAL DAILY
Status: DISCONTINUED | OUTPATIENT
Start: 2019-09-27 | End: 2019-09-28 | Stop reason: HOSPADM

## 2019-09-27 RX ORDER — SODIUM CHLORIDE, SODIUM LACTATE, POTASSIUM CHLORIDE, CALCIUM CHLORIDE 600; 310; 30; 20 MG/100ML; MG/100ML; MG/100ML; MG/100ML
INJECTION, SOLUTION INTRAVENOUS CONTINUOUS
Status: DISCONTINUED | OUTPATIENT
Start: 2019-09-27 | End: 2019-09-27 | Stop reason: HOSPADM

## 2019-09-27 RX ORDER — FENTANYL CITRATE 50 UG/ML
50 INJECTION, SOLUTION INTRAMUSCULAR; INTRAVENOUS
Status: COMPLETED | OUTPATIENT
Start: 2019-09-27 | End: 2019-09-27

## 2019-09-27 RX ORDER — CARVEDILOL 12.5 MG/1
12.5 TABLET ORAL 2 TIMES DAILY WITH MEALS
Status: DISCONTINUED | OUTPATIENT
Start: 2019-09-27 | End: 2019-09-28 | Stop reason: HOSPADM

## 2019-09-27 RX ORDER — VECURONIUM BROMIDE 1 MG/ML
INJECTION, POWDER, LYOPHILIZED, FOR SOLUTION INTRAVENOUS PRN
Status: DISCONTINUED | OUTPATIENT
Start: 2019-09-27 | End: 2019-09-27

## 2019-09-27 RX ORDER — KETOROLAC TROMETHAMINE 30 MG/ML
15 INJECTION, SOLUTION INTRAMUSCULAR; INTRAVENOUS ONCE
Status: COMPLETED | OUTPATIENT
Start: 2019-09-27 | End: 2019-09-27

## 2019-09-27 RX ORDER — ONDANSETRON 2 MG/ML
INJECTION INTRAMUSCULAR; INTRAVENOUS PRN
Status: DISCONTINUED | OUTPATIENT
Start: 2019-09-27 | End: 2019-09-27

## 2019-09-27 RX ORDER — ACETAMINOPHEN 325 MG/1
975 TABLET ORAL ONCE
Status: COMPLETED | OUTPATIENT
Start: 2019-09-27 | End: 2019-09-27

## 2019-09-27 RX ORDER — GLYCOPYRROLATE 0.2 MG/ML
INJECTION, SOLUTION INTRAMUSCULAR; INTRAVENOUS PRN
Status: DISCONTINUED | OUTPATIENT
Start: 2019-09-27 | End: 2019-09-27

## 2019-09-27 RX ORDER — NICOTINE POLACRILEX 4 MG
15-30 LOZENGE BUCCAL
Status: DISCONTINUED | OUTPATIENT
Start: 2019-09-27 | End: 2019-09-28 | Stop reason: HOSPADM

## 2019-09-27 RX ADMIN — MIDAZOLAM HYDROCHLORIDE 1 MG: 1 INJECTION, SOLUTION INTRAMUSCULAR; INTRAVENOUS at 09:49

## 2019-09-27 RX ADMIN — LIDOCAINE HYDROCHLORIDE 100 MG: 20 INJECTION, SOLUTION INFILTRATION; PERINEURAL at 10:21

## 2019-09-27 RX ADMIN — SODIUM CHLORIDE, POTASSIUM CHLORIDE, SODIUM LACTATE AND CALCIUM CHLORIDE: 600; 310; 30; 20 INJECTION, SOLUTION INTRAVENOUS at 13:03

## 2019-09-27 RX ADMIN — HYDROMORPHONE HYDROCHLORIDE 0.2 MG: 1 INJECTION, SOLUTION INTRAMUSCULAR; INTRAVENOUS; SUBCUTANEOUS at 19:49

## 2019-09-27 RX ADMIN — HYDROMORPHONE HYDROCHLORIDE 0.5 MG: 1 INJECTION, SOLUTION INTRAMUSCULAR; INTRAVENOUS; SUBCUTANEOUS at 14:19

## 2019-09-27 RX ADMIN — TRANEXAMIC ACID 1 G: 100 INJECTION, SOLUTION INTRAVENOUS at 17:50

## 2019-09-27 RX ADMIN — HYDROMORPHONE HYDROCHLORIDE 0.5 MG: 1 INJECTION, SOLUTION INTRAMUSCULAR; INTRAVENOUS; SUBCUTANEOUS at 15:09

## 2019-09-27 RX ADMIN — ROCURONIUM BROMIDE 50 MG: 10 INJECTION INTRAVENOUS at 10:21

## 2019-09-27 RX ADMIN — FENTANYL CITRATE 50 MCG: 50 INJECTION, SOLUTION INTRAMUSCULAR; INTRAVENOUS at 13:06

## 2019-09-27 RX ADMIN — PROPOFOL 50 MG: 10 INJECTION, EMULSION INTRAVENOUS at 11:40

## 2019-09-27 RX ADMIN — INSULIN GLARGINE 12 UNITS: 100 INJECTION, SOLUTION SUBCUTANEOUS at 22:30

## 2019-09-27 RX ADMIN — ACETAMINOPHEN 975 MG: 325 TABLET, FILM COATED ORAL at 09:58

## 2019-09-27 RX ADMIN — ASPIRIN 325 MG: 325 TABLET, DELAYED RELEASE ORAL at 19:50

## 2019-09-27 RX ADMIN — FENTANYL CITRATE 50 MCG: 50 INJECTION INTRAMUSCULAR; INTRAVENOUS at 14:26

## 2019-09-27 RX ADMIN — FENTANYL CITRATE 100 MCG: 50 INJECTION, SOLUTION INTRAMUSCULAR; INTRAVENOUS at 10:21

## 2019-09-27 RX ADMIN — FENTANYL CITRATE 50 MCG: 50 INJECTION, SOLUTION INTRAMUSCULAR; INTRAVENOUS at 12:59

## 2019-09-27 RX ADMIN — CARVEDILOL 12.5 MG: 12.5 TABLET, FILM COATED ORAL at 19:50

## 2019-09-27 RX ADMIN — DEXAMETHASONE SODIUM PHOSPHATE 4 MG: 4 INJECTION, SOLUTION INTRA-ARTICULAR; INTRALESIONAL; INTRAMUSCULAR; INTRAVENOUS; SOFT TISSUE at 10:32

## 2019-09-27 RX ADMIN — GLYCOPYRROLATE 0.8 MG: 0.2 INJECTION, SOLUTION INTRAMUSCULAR; INTRAVENOUS at 13:09

## 2019-09-27 RX ADMIN — FENTANYL CITRATE 50 MCG: 50 INJECTION INTRAMUSCULAR; INTRAVENOUS at 09:50

## 2019-09-27 RX ADMIN — VECURONIUM BROMIDE 1 MG: 1 INJECTION, POWDER, LYOPHILIZED, FOR SOLUTION INTRAVENOUS at 11:44

## 2019-09-27 RX ADMIN — VANCOMYCIN HYDROCHLORIDE 2 G: 5 INJECTION, POWDER, LYOPHILIZED, FOR SOLUTION INTRAVENOUS at 10:00

## 2019-09-27 RX ADMIN — ALBUTEROL SULFATE 6 PUFF: 90 AEROSOL, METERED RESPIRATORY (INHALATION) at 11:52

## 2019-09-27 RX ADMIN — PROPOFOL 150 MG: 10 INJECTION, EMULSION INTRAVENOUS at 10:21

## 2019-09-27 RX ADMIN — ROPIVACAINE HYDROCHLORIDE 20 ML GIVEN: 5 INJECTION, SOLUTION EPIDURAL; INFILTRATION; PERINEURAL at 09:48

## 2019-09-27 RX ADMIN — ONDANSETRON 4 MG: 2 INJECTION INTRAMUSCULAR; INTRAVENOUS at 18:08

## 2019-09-27 RX ADMIN — LIDOCAINE HYDROCHLORIDE 0.3 ML: 10 INJECTION, SOLUTION EPIDURAL; INFILTRATION; INTRACAUDAL; PERINEURAL at 09:45

## 2019-09-27 RX ADMIN — AMLODIPINE BESYLATE 10 MG: 10 TABLET ORAL at 19:49

## 2019-09-27 RX ADMIN — OXYCODONE HYDROCHLORIDE 5 MG: 5 TABLET ORAL at 23:40

## 2019-09-27 RX ADMIN — VECURONIUM BROMIDE 2 MG: 1 INJECTION, POWDER, LYOPHILIZED, FOR SOLUTION INTRAVENOUS at 11:33

## 2019-09-27 RX ADMIN — HYDROMORPHONE HYDROCHLORIDE 0.5 MG: 1 INJECTION, SOLUTION INTRAMUSCULAR; INTRAVENOUS; SUBCUTANEOUS at 14:45

## 2019-09-27 RX ADMIN — SUGAMMADEX 200 MG: 100 INJECTION, SOLUTION INTRAVENOUS at 13:33

## 2019-09-27 RX ADMIN — NEOSTIGMINE METHYLSULFATE 5 MG: 1 INJECTION, SOLUTION INTRAVENOUS at 13:09

## 2019-09-27 RX ADMIN — FENTANYL CITRATE 50 MCG: 50 INJECTION INTRAMUSCULAR; INTRAVENOUS at 13:55

## 2019-09-27 RX ADMIN — KETOROLAC TROMETHAMINE 15 MG: 30 INJECTION, SOLUTION INTRAMUSCULAR at 14:31

## 2019-09-27 RX ADMIN — VANCOMYCIN HYDROCHLORIDE 1500 MG: 5 INJECTION, POWDER, LYOPHILIZED, FOR SOLUTION INTRAVENOUS at 21:51

## 2019-09-27 RX ADMIN — SODIUM CHLORIDE 1 G: 9 INJECTION, SOLUTION INTRAVENOUS at 10:29

## 2019-09-27 RX ADMIN — SODIUM CHLORIDE 1 G: 9 INJECTION, SOLUTION INTRAVENOUS at 13:19

## 2019-09-27 RX ADMIN — ONDANSETRON 4 MG: 2 INJECTION INTRAMUSCULAR; INTRAVENOUS at 13:02

## 2019-09-27 RX ADMIN — SODIUM CHLORIDE, POTASSIUM CHLORIDE, SODIUM LACTATE AND CALCIUM CHLORIDE: 600; 310; 30; 20 INJECTION, SOLUTION INTRAVENOUS at 09:48

## 2019-09-27 ASSESSMENT — ACTIVITIES OF DAILY LIVING (ADL)
DRESS: 0-->INDEPENDENT
SWALLOWING: 0-->SWALLOWS FOODS/LIQUIDS WITHOUT DIFFICULTY
AMBULATION: 0-->INDEPENDENT
ADLS_ACUITY_SCORE: 10
TOILETING: 0-->INDEPENDENT
RETIRED_COMMUNICATION: 0-->UNDERSTANDS/COMMUNICATES WITHOUT DIFFICULTY
BATHING: 0-->INDEPENDENT
TRANSFERRING: 0-->INDEPENDENT
FALL_HISTORY_WITHIN_LAST_SIX_MONTHS: YES
RETIRED_EATING: 0-->INDEPENDENT
ADLS_ACUITY_SCORE: 10
COGNITION: 0 - NO COGNITION ISSUES REPORTED

## 2019-09-27 ASSESSMENT — ENCOUNTER SYMPTOMS
SEIZURES: 0
DYSRHYTHMIAS: 0

## 2019-09-27 NOTE — ANESTHESIA CARE TRANSFER NOTE
Patient: Burton Elizabeth    Procedure(s):  REVISION LEFT TOTAL KNEE  ARTHROPLASTY    Diagnosis: PAINFUL HARDWARE LEFT KNEE  Diagnosis Additional Information: No value filed.    Anesthesia Type:   General, ETT, Peripheral Nerve Block     Note:  Airway :Face Mask  Patient transferred to:PACU  Handoff Report: Identifed the Patient, Identified the Reponsible Provider, Reviewed the pertinent medical history, Discussed the surgical course, Reviewed Intra-OP anesthesia mangement and issues during anesthesia, Set expectations for post-procedure period and Allowed opportunity for questions and acknowledgement of understanding      Vitals: (Last set prior to Anesthesia Care Transfer)    CRNA VITALS  9/27/2019 1310 - 9/27/2019 1346      9/27/2019             Resp Rate (set):  10                Electronically Signed By: WALE Jackson CRNA  September 27, 2019  1:46 PM

## 2019-09-27 NOTE — PROGRESS NOTES
Admission medication history interview status for the 9/27/2019  admission is complete. See EPIC admission navigator for prior to admission medications     Medication history source reliability:Moderate    Medication history interview source(s):Patient    Medication history resources (including written lists, pill bottles, clinic record):None    Primary pharmacy.[facility]    Additional medication history information not noted on PTA med list :    Med List in progress- need to verify unknown medication with pharmacy    [facility] - some discrepancies with pt interview that I was unable to clear with patient since he had already gone into surgery:  1. Added Meloxicam 15mg daily - last filled 9/1/19, but pt did not report (he did have 1 med that he could not remember - may have been this one?)  2. [facility], patient should be taking Atorvastatin 1/2 x 80mg = 40mg daily  3. Per [facility], patient should be taking Carvedilol 1/2 x 25mg = 12.5mg twice daily --- pt previously received 12.5mg tablets from , I was unable to confirm if he is actually splitting the tablets in half now.    Time spent in this activity: 35 minutes  Prior to Admission medications    Medication Sig Last Dose Taking? Auth Provider   amLODIPine (NORVASC) 10 MG tablet Take 10 mg by mouth daily 9/26/2019 at am Yes Reported, Patient   aspirin (ASA) 325 MG EC tablet Take 325 mg by mouth daily 9/17/2019 Yes Reported, Patient   atorvastatin (LIPITOR) 80 MG tablet Take 40 mg by mouth daily (takes 0.5 x 80mg tablet = 40mg dose) 9/26/2019 at am Yes Reported, Patient   carvedilol (COREG) 25 MG tablet Take 12.5 mg by mouth 2 times daily (with meals) (Takes 0.5 x 25mg tablet = 12.5mg dose) 9/26/2019 at pm Yes Reported, Patient   glimepiride (AMARYL) 4 MG tablet Take 4 mg by mouth 2 times daily  9/26/2019 at pm Yes Reported, Patient   insulin aspart (NOVOLOG FLEXPEN) 100 UNIT/ML pen Inject 3-17 Units Subcutaneous 3 times daily (with meals) As directed   9/26/2019 at 1400 Yes Reported, Patient   insulin glargine (LANTUS) 100 UNIT/ML PEN Inject 12-30 Units Subcutaneous every evening  24 units on 9/24/2019 Yes Reported, Patient   losartan (COZAAR) 100 MG tablet Take 100 mg by mouth daily 9/26/2019 at am Yes Reported, Patient   metFORMIN (GLUCOPHAGE) 500 MG tablet Take 500 mg by mouth 2 times daily 9/26/2019 at pm Yes Reported, Patient   multivitamin, therapeutic with minerals (THERA-VIT-M) TABS Take 1 tablet by mouth daily 9/26/2019 at am Yes Reported, Patient   blood glucose (NO BRAND SPECIFIED) test strip Use to test blood sugar as directed   Reported, Patient   meloxicam (MOBIC) 15 MG tablet Take 15 mg by mouth daily Unknown at Unknown time  Reported, Patient   order for DME Equipment being ordered: Walker Wheels () and Walker ()  Treatment Diagnosis: Impaired gait   Catarino Kearns PA-C

## 2019-09-27 NOTE — ANESTHESIA PROCEDURE NOTES
Peripheral nerve/Neuraxial procedure note : Adductor canal (targeting saphenous nerve)  Pre-Procedure  Performed by Pedro Morgan DO  Location: pre-op      Pre-Anesthestic Checklist: patient identified, IV checked, site marked, risks and benefits discussed, informed consent, monitors and equipment checked, pre-op evaluation, at physician/surgeon's request and post-op pain management    Timeout  Correct Patient: Yes   Correct Procedure: Yes   Correct Site: Yes   Correct Laterality: Yes   Correct Position: Yes   Site Marked: Yes   .   Procedure Documentation    .    Procedure: Adductor canal (targeting saphenous nerve), left.   Patient Position:supine Local skin infiltrated with 1 mL of 1% lidocaine.    Ultrasound used to identify targeted nerve, plexus, or vascular marker and placed a needle adjacent to it., Ultrasound was used to visualize the spread of the anesthetic in close proximity to the above stated nerve. A permanent image is entered into the patient's record.  Patient Prep/Sterile Barriers; chlorhexidine gluconate and isopropyl alcohol.  .  Needle: insulated   Needle Gauge: 21.  Needle Length (millimeters) 100  Insertion Method: Single Shot.        Assessment/Narrative  Paresthesias: No.  .  The placement was negative for: blood aspirated, painful injection and site bleeding.  Bolus given via needle..   Secured via.   Complications: none. Comments:  Bolus via needle, 20 ml of 0.5% ropivacaine with 1:400,000 epinephrine.  Patient tolerated well, was mildly sedated but communicative throughout the procedure.   The surgeon has given a verbal order transferring care of this patient to me for the performance of regional analgesia block for post op pain control. It is requested of me because I am uniquely trained and qualified to perform this block and the surgeon is neither trained nor qualified to perform this procedure.

## 2019-09-27 NOTE — ANESTHESIA PREPROCEDURE EVALUATION
Anesthesia Pre-Procedure Evaluation    Patient: Burton Elizabeth   MRN: [id number] : 1945          Preoperative Diagnosis: PAINFUL HARDWARE LEFT KNEE    Procedure(s):  REVISION LEFT TOTAL KNEE  ARTHROPLASTY (OUT: UNI OFFICE WILL CALL WITH BRAND IN: TOATL DEPUY REVISION SET)    Past Medical History:   Diagnosis Date     Arthritis of knee~ s/p replacement 3/24/2016     Decreased cardiac ejection fraction~44% 3/24/2016     Diabetes mellitus, type 2 (H) 3/24/2016     HTN (hypertension) 3/24/2016     LBBB (left bundle branch block) 3/24/2016     Mixed hyperlipidemia 2016     Past Surgical History:   Procedure Laterality Date     ARTHROPLASTY KNEE Right 3/29/2016    Procedure: ARTHROPLASTY KNEE;  Surgeon: Heena Rosen MD;  Location:  OR     CV LEFT HEART CATH N/A 2019    Procedure: Left Heart Cath;  Surgeon: Edgardo Beckham MD;  Location:  HEART CARDIAC CATH LAB     EYE SURGERY      cataract removal     ORTHOPEDIC SURGERY      KNEE SCOPES MULTIPLE       Anesthesia Evaluation     . Pt has had prior anesthetic. Type: General    No history of anesthetic complications          ROS/MED HX    ENT/Pulmonary:      (-) asthma, sleep apnea and recent URI   Neurologic:      (-) seizures and CVA   Cardiovascular: Comment: LBBB    (+) Dyslipidemia, hypertension----. : . CHF Last EF: 44% . . :. . Previous cardiac testing date:results:Stress Testdate: results:Tomographic Findings  Overall, the study quality is adequate . On the stress images, there  is a mild decrease in uptake in the inferior wall notably at the apex  towards mid ventricle predominantly. There is a decrease in uptake at  the anteroseptal apex and apical segment which is mild. On the rest  images, perfusion appears normal . Gated images demonstrated normal  wall motion and thickening with the exception of mild inferior  hypokinesis and hypokinesis at the anteroseptal mid ventricle towards  apex. The left ventricular ejection fraction  "was calculated to be 59%  post stress. TID was not seen with an index of 1.04.    date: results: date: results:         (-) CAD (no diagnosis, but likely), syncope, arrhythmias, irregular heartbeat/palpitations and valvular problems/murmurs   METS/Exercise Tolerance:     Hematologic:        (-) anemia and History of Transfusion   Musculoskeletal:   (+) arthritis,  -       GI/Hepatic:        (-) GERD and liver disease   Renal/Genitourinary:     (+) chronic renal disease, type: CRI,       Endo:     (+) type II DM Using insulin .   (-) thyroid disease   Psychiatric:         Infectious Disease:         Malignancy:         Other:                          Physical Exam  Normal systems: cardiovascular, pulmonary and dental    Airway   Mallampati: III  TM distance: >3 FB  Neck ROM: full    Dental     Cardiovascular       Pulmonary             Lab Results   Component Value Date    WBC 9.9 08/12/2019    HGB 10.9 (L) 08/12/2019    HCT 36.4 (L) 08/12/2019     08/12/2019     08/12/2019    POTASSIUM 4.6 08/12/2019    CHLORIDE 108 08/12/2019    CO2 25 08/12/2019    BUN 37 (H) 08/12/2019    CR 1.76 (H) 08/12/2019     (H) 08/12/2019    GABE 9.3 08/12/2019    ALBUMIN 3.9 08/01/2019    PROTTOTAL 6.4 08/01/2019    ALT 18 08/01/2019    AST 22 08/01/2019    ALKPHOS 110 08/01/2019    BILITOTAL 0.4 08/01/2019    PTT 28 03/29/2016    INR 1.02 03/29/2016    TSH 1.720 08/01/2019       Preop Vitals  BP Readings from Last 3 Encounters:   08/19/19 120/70   08/12/19 123/75   08/07/19 137/83    Pulse Readings from Last 3 Encounters:   08/19/19 66   08/12/19 66   08/07/19 80      Resp Readings from Last 3 Encounters:   08/12/19 16   04/01/16 16    SpO2 Readings from Last 3 Encounters:   08/12/19 96%   04/01/16 93%      Temp Readings from Last 1 Encounters:   08/12/19 36.7  C (98  F) (Oral)    Ht Readings from Last 1 Encounters:   08/19/19 1.753 m (5' 9\")      Wt Readings from Last 1 Encounters:   08/19/19 120.7 kg (266 lb)    " "Estimated body mass index is 39.28 kg/m  as calculated from the following:    Height as of 8/19/19: 1.753 m (5' 9\").    Weight as of 8/19/19: 120.7 kg (266 lb).       Anesthesia Plan      History & Physical Review  History and physical reviewed and following examination; no interval change.    ASA Status:  3 .    NPO Status:  > 8 hours    Plan for General, ETT and Peripheral Nerve Block with Intravenous and Propofol induction. Maintenance will be Balanced.    PONV prophylaxis:  Ondansetron (or other 5HT-3)  Additional equipment: Videolaryngoscope      Postoperative Care  Postoperative pain management:  IV analgesics and Multi-modal analgesia.      Consents  Anesthetic plan, risks, benefits and alternatives discussed with:  Patient..                 Pedro Morgan,   "
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
## 2019-09-27 NOTE — BRIEF OP NOTE
[facility]    Brief Operative Note    Pre-operative diagnosis: PAINFUL HARDWARE LEFT KNEE  Post-operative diagnosis * No post-op diagnosis entered *  Procedure: Procedure(s):  REVISION LEFT TOTAL KNEE  ARTHROPLASTY  Surgeon: Surgeon(s) and Role:     * Heena Rosen MD - Primary     * Jonathon Hylton PA-C - Assisting  Anesthesia: General   Estimated blood loss: Less than 10 ml  Drains: None  Specimens: * No specimens in log *  Findings:   None.  Complications: None.  Implants:    Implant Name Type Inv. Item Serial No.  Lot No. LRB No. Used   IMP PATELLA JJ ATTUNE DOME 41MM [id number] Total Joint Component/Insert IMP PATELLA JJ ATTUNE DOME 41MM [id number]  [employer] [id number] Left 1   IMP COMP FEM JJ ATTUNE POST STAB LT RUPERTO SZ6 [id number] Total Joint Component/Insert IMP COMP FEM JJ ATTUNE POST STAB LT RUPERTO SZ6 [id number]  [employer] [id number] Left 1   IMP FEM BASEPLATE JJ ATTUNE POST RP SZ 7 [id number] Total Joint Component/Insert IMP FEM BASEPLATE JJ ATTUNE POST RP SZ 7 [id number]  J [id number] Left 1   BONE CEMENT SIMPLEX 1/2 DOSE 6188-1-001 Cement, Bone BONE CEMENT SIMPLEX 1/2 DOSE 6188-1-001  [employer] [id number] Left 1   BONE CEMENT SIMPLEX 1/2 DOSE 6188-1-001 Cement, Bone BONE CEMENT SIMPLEX 1/2 DOSE 6188-1-001  [employer] [id number] Left 1   BONE CEMENT SIMPLEX W/TOBRAMYCIN 6197-9-001 Cement, Bone BONE CEMENT SIMPLEX W/TOBRAMYCIN 6197-9-001  [employer] [id number] Left 1   IMP INSERT JJ ATTUNE PS RP SZ6 16MM [id number] Total Joint Component/Insert IMP INSERT JJ ATTUNE PS RP SZ6 16MM [id number]  J [id number] Left 1   ATTUNE TIBIAL BASEPLATE ROTATING PLATFORM SIZE 7    [employer] [id number] Right 1   ATTUNE FEMORAL POSTERIOR STABILIZED SIZE 6    Depuy [id number] Right 1

## 2019-09-27 NOTE — PLAN OF CARE
PT: Eval scheduled for 4 pm, checked in with RN. Pt not appropriate to initiate PT evaluation, pt feeling very nauseated. Cancel eval this PM

## 2019-09-27 NOTE — OP NOTE
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      PROCEDURE:  Revision of total knee arthroplasty from unicompartmental knee replacement to total knee replacement using the Attune Knee System, posterior stabilized rotating platform size 6 femur, 7 tibia, 41 mm patellar button and 16 mm tibial polyethylene, and bone grafting of the distal femur using autogenous bone graft.      INDICATIONS FOR PROCEDURE:  The patient is a 74-year-old male who is 15 years out from left unicompartmental knee replacement that was done elsewhere.  He has gone on to develop loosening of the component with subsidence of the femoral component.  I saw him in consultation and discussed treatment options with him.  I explained to him the risks, benefits, potential complications of revision of total knee arthroplasty.  This discussion included, but was not specific to, infection, vascular and neurologic complications, DVT, septic and aseptic loosening, arthrofibrosis, fracture and the possible need for further revision surgery.  After this discussion, the patient wanted to proceed with surgery.      ANESTHESIA:  General.      SURGEON:  Heena Rosen DO      ASSISTANT:  Remi Hylton PA-C      DESCRIPTION OF PROCEDURE:  The patient was taken to the operating room and placed on the table in the supine position.  After adequate induction of a general anesthetic, a pneumatic tourniquet was applied to left thigh.  A bump was placed beneath the left hip.  The patient was given 3 grams of Ancef for infection prophylaxis given 1 hour prior to incision.  We then performed a sterile prep and drape of the left knee and left lower extremity.  After sterile prep and drape, the limb was exsanguinated and tourniquet was elevated to 300 mmHg.       I then made a midline incision using his old  previous incision and did a medial parapatellar arthrotomy in a quad-splitting approach.  I then identified the entry point for the intramedullary guide with the femur set at 5 degrees of valgus and made my distal femoral cut.  The femoral component was loose and was removed without difficulty.  I then used the guide to align the femoral component in approximately 3 degrees of external rotation.  This was measured against the transepicondylar axis.  We then went ahead and made our anterior and posterior cuts along with anterior and posterior chamfers.  We then directed our attention to the tibia and I used the extramedullary guide to establish a neutral cut of the tibia based off of the lateral side.  The tibia was then cut and we removed the tibial tray without difficulty.  The cement mantle remained intact.  We then removed the posterior osteophytes using a curved osteotome and a mallet.  We injected the posterior capsule.  We checked our flexion and extension gaps and found them to be equal.  We then finished preparation of the femur by making the box cut and finished preparation of the tibia.  We did bone graft the defect on the medial femoral condyle using bone graft from the cuts on the femur.  We then sized the patella, made our patellar cuts, finished preparation of the patella.  While the cement was being mixed, we began preparing the cement surfaces using pulsatile jet lavage with antibiotic saline.  Once the cement was of appropriate consistency, we cemented first the tibial component, followed by the femoral component.  Once these components were fully seated, excess cement was removed using the Celina elevator.  We then cemented the patella, again removing any excess cement using the Celina elevator.  While the cement was hardening, we soaked the knee in a dilute solution of Betadine for 3 minutes.  We then irrigated again using pulse jet lavage, used approximately 3 liters of antibiotic saline in the  pulsatile jet lavage.  Once the cement had hardened, we took the knee through a range of motion.  Patella tracked ideally with good soft tissue balance in flexion and extension.  We then removed the trial polyethylene, inspected the joint for loose bone cement, removed it when it was found.  We then irrigated again using pulse jet lavage and put in the actual rotating platform tibial polyethylene, reduced the knee, and took it through a range of motion.  Patella tracked ideally.        We then placed the knee in approximately 30 degrees of flexion and closed the arthrotomy using 0 Ethibond sutures.  This was oversewn using 0 Stratafix.  Deep subcutaneous was closed using 0 Vicryl, superficial subcutaneous was closed with 2-0 Vicryl, skin was closed with a running 3-0 Monocryl subcuticular stitch followed by Prineo dressing.  Sterile dressing was applied followed by knee immobilizer.  The patient tolerated the operative procedure.  There were no intraoperative complications.  The patient went to PAR in stable condition.         DILAN GARCIA MD             D: 2019   T: 2019   MT: BLAISE      Name:     TAY PAGE   MRN:      [id number]        Account:        [id number]   :      1945           Procedure Date: 2019      Document: [id number]

## 2019-09-28 ENCOUNTER — APPOINTMENT (OUTPATIENT)
Dept: PHYSICAL THERAPY | Facility: CLINIC | Age: 74
DRG: 467 | End: 2019-09-28
Attending: PHYSICIAN ASSISTANT
Payer: COMMERCIAL

## 2019-09-28 VITALS
TEMPERATURE: 97.5 F | SYSTOLIC BLOOD PRESSURE: 148 MMHG | OXYGEN SATURATION: 95 % | RESPIRATION RATE: 16 BRPM | HEART RATE: 65 BPM | DIASTOLIC BLOOD PRESSURE: 66 MMHG

## 2019-09-28 LAB
ABO + RH BLD: NORMAL
ABO + RH BLD: NORMAL
BLD GP AB SCN SERPL QL: NORMAL
BLOOD BANK CMNT PATIENT-IMP: NORMAL
CREAT SERPL-MCNC: 2.19 MG/DL (ref 0.66–1.25)
GFR SERPL CREATININE-BSD FRML MDRD: 29 ML/MIN/{1.73_M2}
GLUCOSE BLDC GLUCOMTR-MCNC: 132 MG/DL (ref 70–99)
GLUCOSE BLDC GLUCOMTR-MCNC: 137 MG/DL (ref 70–99)
HGB BLD-MCNC: 11.6 G/DL (ref 13.3–17.7)
SPECIMEN EXP DATE BLD: NORMAL

## 2019-09-28 PROCEDURE — 86900 BLOOD TYPING SEROLOGIC ABO: CPT | Performed by: ANESTHESIOLOGY

## 2019-09-28 PROCEDURE — 97530 THERAPEUTIC ACTIVITIES: CPT | Mod: GP

## 2019-09-28 PROCEDURE — 25000132 ZZH RX MED GY IP 250 OP 250 PS 637: Performed by: PHYSICIAN ASSISTANT

## 2019-09-28 PROCEDURE — 97116 GAIT TRAINING THERAPY: CPT | Mod: GP

## 2019-09-28 PROCEDURE — 82565 ASSAY OF CREATININE: CPT | Performed by: PHYSICIAN ASSISTANT

## 2019-09-28 PROCEDURE — 85018 HEMOGLOBIN: CPT | Performed by: PHYSICIAN ASSISTANT

## 2019-09-28 PROCEDURE — 00000146 ZZHCL STATISTIC GLUCOSE BY METER IP

## 2019-09-28 PROCEDURE — 36415 COLL VENOUS BLD VENIPUNCTURE: CPT | Performed by: ANESTHESIOLOGY

## 2019-09-28 PROCEDURE — 86901 BLOOD TYPING SEROLOGIC RH(D): CPT | Performed by: ANESTHESIOLOGY

## 2019-09-28 PROCEDURE — 40000894 ZZH STATISTIC OT IP EVAL DEFER: Performed by: OCCUPATIONAL THERAPIST

## 2019-09-28 PROCEDURE — 97161 PT EVAL LOW COMPLEX 20 MIN: CPT | Mod: GP

## 2019-09-28 PROCEDURE — 86850 RBC ANTIBODY SCREEN: CPT | Performed by: ANESTHESIOLOGY

## 2019-09-28 RX ORDER — TAMSULOSIN HYDROCHLORIDE 0.4 MG/1
0.4 CAPSULE ORAL ONCE
Status: DISCONTINUED | OUTPATIENT
Start: 2019-09-28 | End: 2019-09-28 | Stop reason: HOSPADM

## 2019-09-28 RX ORDER — HYDROCODONE BITARTRATE AND ACETAMINOPHEN 5; 325 MG/1; MG/1
1-2 TABLET ORAL EVERY 4 HOURS PRN
Qty: 30 TABLET | Refills: 0 | Status: SHIPPED | OUTPATIENT
Start: 2019-09-28 | End: 2019-09-28

## 2019-09-28 RX ORDER — OXYCODONE HYDROCHLORIDE 5 MG/1
5-10 TABLET ORAL EVERY 4 HOURS PRN
Qty: 30 TABLET | Refills: 0 | Status: SHIPPED | OUTPATIENT
Start: 2019-09-28 | End: 2019-12-20

## 2019-09-28 RX ORDER — OXYCODONE HYDROCHLORIDE 5 MG/1
5-10 TABLET ORAL EVERY 4 HOURS PRN
Status: DISCONTINUED | OUTPATIENT
Start: 2019-09-28 | End: 2019-09-28 | Stop reason: HOSPADM

## 2019-09-28 RX ORDER — ONDANSETRON 4 MG/1
4-8 TABLET, ORALLY DISINTEGRATING ORAL EVERY 8 HOURS PRN
Qty: 30 TABLET | Refills: 0 | Status: SHIPPED | OUTPATIENT
Start: 2019-09-28 | End: 2019-12-20

## 2019-09-28 RX ORDER — HYDROXYZINE HYDROCHLORIDE 10 MG/1
10 TABLET, FILM COATED ORAL EVERY 6 HOURS PRN
Qty: 30 TABLET | Refills: 0 | Status: SHIPPED | OUTPATIENT
Start: 2019-09-28 | End: 2019-12-20

## 2019-09-28 RX ORDER — HYDROCODONE BITARTRATE AND ACETAMINOPHEN 5; 325 MG/1; MG/1
1-2 TABLET ORAL EVERY 4 HOURS PRN
Status: DISCONTINUED | OUTPATIENT
Start: 2019-09-28 | End: 2019-09-28 | Stop reason: HOSPADM

## 2019-09-28 RX ORDER — AMOXICILLIN 250 MG
1 CAPSULE ORAL 2 TIMES DAILY
Qty: 30 TABLET | Refills: 0 | Status: SHIPPED | OUTPATIENT
Start: 2019-09-28 | End: 2019-12-20

## 2019-09-28 RX ORDER — ASPIRIN 325 MG
325 TABLET, DELAYED RELEASE (ENTERIC COATED) ORAL DAILY
Qty: 30 TABLET | Refills: 0 | Status: ON HOLD | OUTPATIENT
Start: 2019-09-28 | End: 2019-12-25

## 2019-09-28 RX ADMIN — ASPIRIN 325 MG: 325 TABLET, DELAYED RELEASE ORAL at 08:23

## 2019-09-28 RX ADMIN — HYDROCODONE BITARTRATE AND ACETAMINOPHEN 2 TABLET: 5; 325 TABLET ORAL at 08:22

## 2019-09-28 RX ADMIN — ATORVASTATIN CALCIUM 40 MG: 40 TABLET, FILM COATED ORAL at 08:23

## 2019-09-28 RX ADMIN — AMLODIPINE BESYLATE 10 MG: 10 TABLET ORAL at 08:23

## 2019-09-28 RX ADMIN — OXYCODONE HYDROCHLORIDE 5 MG: 5 TABLET ORAL at 04:32

## 2019-09-28 RX ADMIN — OXYCODONE HYDROCHLORIDE 5 MG: 5 TABLET ORAL at 12:47

## 2019-09-28 RX ADMIN — HYDROXYZINE HYDROCHLORIDE 10 MG: 10 TABLET ORAL at 09:45

## 2019-09-28 RX ADMIN — CARVEDILOL 12.5 MG: 12.5 TABLET, FILM COATED ORAL at 08:23

## 2019-09-28 RX ADMIN — ACETAMINOPHEN 975 MG: 325 TABLET, FILM COATED ORAL at 12:47

## 2019-09-28 RX ADMIN — ACETAMINOPHEN 975 MG: 325 TABLET, FILM COATED ORAL at 06:32

## 2019-09-28 ASSESSMENT — ACTIVITIES OF DAILY LIVING (ADL)
ADLS_ACUITY_SCORE: 10
ADLS_ACUITY_SCORE: 10
ADLS_ACUITY_SCORE: 11
ADLS_ACUITY_SCORE: 10
ADLS_ACUITY_SCORE: 12

## 2019-09-28 NOTE — PLAN OF CARE
Pt has been unhappy since start of shift, other wise A&O.  VSS, RA. Requires O2 when sleeping.  CMS intact.  Dressing changed.  Pain managed with oxy and atarax.  Up with 1 and walker.  Foely removed at 7:30, unable to void.  Bladder scaned 143 ml @ 14:15.  discharge home in evening if able to void.

## 2019-09-28 NOTE — PLAN OF CARE
Pt has been very drowsy since arriving from PACU. Is arousable but falls back to sleep right away. VSS except can be nelda and is on 3L O2 sating mid 90's. CMS intact. Was nauseated earlier, zofran IV given. IV dilaudid 0.2 mg x 1 given for pain management. IV fluid infusing. Zuniga is patent. Will cont to monitor.   Addendum: Pt is refusing to dangle or lay on his side.

## 2019-09-28 NOTE — PROVIDER NOTIFICATION
On call PASherita, was notified of patient not able to void. PA gave order for 1x dose of Flomax. Will continue to monitor.

## 2019-09-28 NOTE — DISCHARGE SUMMARY
Orthopedic Discharge Summary   Patient: Burton Elizabeth  Admission Date: 9/27/2019  Discharge Date: 9/28/19  Date of Service: 9/28/2019  Attending Provider: Heena Rosen MD  Admission Diagnosis: PAINFUL HARDWARE LEFT KNEE  S/P revision of total knee  Discharge Diagnosis: failed left unicompartmental knee replacmeent  Procedures Performed: conversion to left total knee replacement  Complications: None apparent   History of Present Illness: see operative report for full HPI  Past Medical History:   Past Medical History:   Diagnosis Date     Arthritis of knee~ s/p replacement 3/24/2016     Decreased cardiac ejection fraction~44% 3/24/2016     Diabetes mellitus, type 2 (H) 3/24/2016     HTN (hypertension) 3/24/2016     LBBB (left bundle branch block) 3/24/2016     Mixed hyperlipidemia 8/23/2016     Patient Active Problem List   Diagnosis     LBBB (left bundle branch block)     Decreased cardiac ejection fraction~44%     HTN (hypertension)     Diabetes mellitus, type 2 (H)     Arthritis of knee~ s/p replacement     Primary localized osteoarthritis of right knee     Advance care planning     Mixed hyperlipidemia     Coronary artery disease involving native coronary artery of native heart     Status post coronary angiogram     S/P revision of total knee     Past Surgical History:   Procedure Laterality Date     ARTHROPLASTY KNEE Right 3/29/2016    Procedure: ARTHROPLASTY KNEE;  Surgeon: Heena Rosen MD;  Location:  OR     CV LEFT HEART CATH N/A 8/12/2019    Procedure: Left Heart Cath;  Surgeon: Edgardo Beckham MD;  Location:  HEART CARDIAC CATH LAB     EYE SURGERY      cataract removal     ORTHOPEDIC SURGERY      KNEE SCOPES MULTIPLE     Social History     Socioeconomic History     Marital status: Single     Spouse name: Not on file     Number of children: Not on file     Years of education: Not on file     Highest education level: Not on file   Occupational History     Not on file   Social Needs      Financial resource strain: Not on file     Food insecurity:     Worry: Not on file     Inability: Not on file     Transportation needs:     Medical: Not on file     Non-medical: Not on file   Tobacco Use     Smoking status: Never Smoker     Smokeless tobacco: Never Used   Substance and Sexual Activity     Alcohol use: Yes     Alcohol/week: 0.0 standard drinks     Comment: rare      Drug use: No     Sexual activity: Not on file   Lifestyle     Physical activity:     Days per week: Not on file     Minutes per session: Not on file     Stress: Not on file   Relationships     Social connections:     Talks on phone: Not on file     Gets together: Not on file     Attends Restoration service: Not on file     Active member of club or organization: Not on file     Attends meetings of clubs or organizations: Not on file     Relationship status: Not on file     Intimate partner violence:     Fear of current or ex partner: Not on file     Emotionally abused: Not on file     Physically abused: Not on file     Forced sexual activity: Not on file   Other Topics Concern     Parent/sibling w/ CABG, MI or angioplasty before 65F 55M? Not Asked   Social History Narrative     Not on file     Medications on admission:   Medications Prior to Admission   Medication Sig Dispense Refill Last Dose     amLODIPine (NORVASC) 10 MG tablet Take 10 mg by mouth daily   9/26/2019 at am     atorvastatin (LIPITOR) 80 MG tablet Take 40 mg by mouth daily (takes 0.5 x 80mg tablet = 40mg dose)   9/26/2019 at am     carvedilol (COREG) 25 MG tablet Take 12.5 mg by mouth 2 times daily (with meals) (Takes 0.5 x 25mg tablet = 12.5mg dose)   9/26/2019 at pm     glimepiride (AMARYL) 4 MG tablet Take 4 mg by mouth 2 times daily    9/26/2019 at pm     insulin aspart (NOVOLOG FLEXPEN) 100 UNIT/ML pen Inject 3-17 Units Subcutaneous 3 times daily (with meals) As directed    9/26/2019 at 1400     insulin glargine (LANTUS) 100 UNIT/ML PEN Inject 12-30 Units  Subcutaneous every evening    24 units on 9/24/2019     losartan (COZAAR) 100 MG tablet Take 100 mg by mouth daily   9/26/2019 at am     metFORMIN (GLUCOPHAGE) 500 MG tablet Take 500 mg by mouth 2 times daily   9/26/2019 at pm     multivitamin, therapeutic with minerals (THERA-VIT-M) TABS Take 1 tablet by mouth daily   9/26/2019 at am     blood glucose (NO BRAND SPECIFIED) test strip Use to test blood sugar as directed   Taking     meloxicam (MOBIC) 15 MG tablet Take 15 mg by mouth daily   Unknown at Unknown time     order for DME Equipment being ordered: Walker Wheels () and Walker ()  Treatment Diagnosis: Impaired gait 1 each 0 Taking     [DISCONTINUED] aspirin (ASA) 325 MG EC tablet Take 325 mg by mouth daily   9/17/2019     Allergies:    Allergies   Allergen Reactions     Penicillins Anaphylaxis       Hospital Course: Patient was admitted to Orthopedics and brought to the OR on where he underwent the above named procedure. Postoperatively he did very well with no apparent complications, and he had an uneventful hospital stay. Vancomycin was given for 24 hours after surgery. He was given aspirin for DVT prophylaxis and his pain was well controlled with oral medications, then transitioned to oral pain medications during his hospital stay. He progressed well with physical therapy and discharge to home was recommended. His chronic medical problems were followed by the medicine team during his hospital stay and there were no significant changes to conditions or treatment plans. No new medical problems presented during his hospital stay.   Consultations Obtained During Hospitalization:  1. Internal medicine for management of comorbid conditions and home medication management.  2. Physical Therapy for gait training, mobilization, range of motion and strengthening exercises  3. Occupational Therapy for activities of daily living.  4. Social Work for disposition planning.  Active Problems:    S/P revision of  total knee    Discharge Disposition: patient improving  Discharge Diet: resume pre op diabetic diet  Discharge Medications:   Current Discharge Medication List      START taking these medications    Details   HYDROcodone-acetaminophen (NORCO) 5-325 MG tablet Take 1-2 tablets by mouth every 4 hours as needed for moderate to severe pain  Qty: 30 tablet, Refills: 0    Associated Diagnoses: Status post revision of total replacement of left knee      hydrOXYzine (ATARAX) 10 MG tablet Take 1 tablet (10 mg) by mouth every 6 hours as needed for itching  Qty: 30 tablet, Refills: 0    Associated Diagnoses: Status post revision of total replacement of left knee      ondansetron (ZOFRAN-ODT) 4 MG ODT tab Take 1-2 tablets (4-8 mg) by mouth every 8 hours as needed for nausea or vomiting  Qty: 30 tablet, Refills: 0    Associated Diagnoses: Status post revision of total replacement of left knee      oxyCODONE (ROXICODONE) 5 MG tablet Take 1-2 tablets (5-10 mg) by mouth every 4 hours as needed for moderate to severe pain  Qty: 30 tablet, Refills: 0    Associated Diagnoses: Status post revision of total replacement of left knee      senna-docusate (SENOKOT-S/PERICOLACE) 8.6-50 MG tablet Take 1 tablet by mouth 2 times daily  Qty: 30 tablet, Refills: 0    Associated Diagnoses: Status post revision of total replacement of left knee         CONTINUE these medications which have CHANGED    Details   aspirin (ASA) 325 MG EC tablet Take 1 tablet (325 mg) by mouth daily  Qty: 30 tablet, Refills: 0    Associated Diagnoses: Status post revision of total replacement of left knee         CONTINUE these medications which have NOT CHANGED    Details   amLODIPine (NORVASC) 10 MG tablet Take 10 mg by mouth daily      atorvastatin (LIPITOR) 80 MG tablet Take 40 mg by mouth daily (takes 0.5 x 80mg tablet = 40mg dose)      carvedilol (COREG) 25 MG tablet Take 12.5 mg by mouth 2 times daily (with meals) (Takes 0.5 x 25mg tablet = 12.5mg dose)       glimepiride (AMARYL) 4 MG tablet Take 4 mg by mouth 2 times daily       insulin aspart (NOVOLOG FLEXPEN) 100 UNIT/ML pen Inject 3-17 Units Subcutaneous 3 times daily (with meals) As directed       insulin glargine (LANTUS) 100 UNIT/ML PEN Inject 12-30 Units Subcutaneous every evening       losartan (COZAAR) 100 MG tablet Take 100 mg by mouth daily      metFORMIN (GLUCOPHAGE) 500 MG tablet Take 500 mg by mouth 2 times daily      multivitamin, therapeutic with minerals (THERA-VIT-M) TABS Take 1 tablet by mouth daily      blood glucose (NO BRAND SPECIFIED) test strip Use to test blood sugar as directed      meloxicam (MOBIC) 15 MG tablet Take 15 mg by mouth daily      order for DME Equipment being ordered: Walker Wheels () and Walker ()  Treatment Diagnosis: Impaired gait  Qty: 1 each, Refills: 0    Associated Diagnoses: Status post total right knee replacement           Code Status:   X-rays needed at the follow up visit:   Jonathon Hylton PA-C  9/28/2019 7:07 AM   LUIS FERNANDO  [place]  [phone]

## 2019-09-28 NOTE — PLAN OF CARE
Discharge Planner PT   Patient plan for discharge: Home  Current status: PT orders received, chart reviewed, eval completed and treatment initiated. Pt is a 74 year old year old male admitted 9/27/19 for L TKA. PMHx signficant for CAD, htn, DM. Pt previously living alone in a 1-story home with no stairs to enter, no stairs within the home. Prior level of mobility including independent with all mobility and ADLs. Patient owns a FWW and SEC, has been using SEC recently due to pain. Patient reports his son and nieces are available and willing to assist him on discharge.     Currently, pt rolling in bed Mare using bed rails and transitioning supine to sit requiring use of bed rails, additional time, and SBA. Requires modA for LE positioning with sit to supine due to knee discomfort after ambulation. Sit<>stand SBA and FWW. Amb 80 ft requiring CGA, FWW, and verbal cues for upright posture. Patient on 2L nc at rest with SpO2 >96%, SpO2 down to 83% on RA with rest, recovery to 90% with cues for PLB, and recovered to 95% within 1 min on 2L O2 via nc. Patient declining use of O2 during ambulation reporting that he is able to tell when his O2 is low. SpO2 82-89% during ambulation and increased to 96% on RA with seated rest. Verbally reviewed handout of LE TKA exercises, patient declining exercises this AM due to fatigue and pain but agreeable to PM session.    Barriers to return to prior living situation: increased assist for mobility, pain  Recommendations for discharge: Home with assist for transfers and ambulation  Rationale for recommendations: Patient is currently limited in mobility by pain and O2 desat. Patient will benefit from continued PT sessions during IP stay to progress independence and will need OP PT upon discharge. Recommend PM PT session this date, home with assist from family for ambulation initially, use of FWW for mobility, and OP PT upon discharge.     Addendum after PM session: Patient reports family is  able to assist with transfers and ambulation upon return home initial 1-2 days. No further IP PT needs identified. Recommend discharge home with family assist and OP PT.     Physical Therapy Discharge Summary    Reason for therapy discharge:    All goals and outcomes met, no further needs identified.    Progress towards therapy goal(s). See goals on Care Plan in [facility] electronic health record for goal details.  Goals met    Therapy recommendation(s):    Continued therapy is recommended.  Rationale/Recommendations:  OP PT for increased L LE strength, increased activity tolerance, return to baseline independence.           Entered by: Ifrah Olsen 09/28/2019 10:29 AM

## 2019-09-28 NOTE — PROVIDER NOTIFICATION
Clarified with Kate (PA) regarding pt's lantus 12U insulin that is scheduled at bedtime since pt hasn't eaten any solid food since arriving from PACU. BG at HS was 162. Kate said it is ok to give 12U lantus insulin.

## 2019-09-28 NOTE — PROGRESS NOTES
09/28/19 0943   Quick Adds   Type of Visit Initial PT Evaluation   Living Environment   Lives With alone   Living Arrangements house   Home Accessibility no concerns   Transportation Anticipated family or friend will provide   Living Environment Comment Patient reports son and nieces will assist daily upon discharge; patient lives alone in a rambler   Self-Care   Usual Activity Tolerance good   Current Activity Tolerance moderate   Regular Exercise No   Equipment Currently Used at Home cane, straight  (owns FWW)   Functional Level Prior   Ambulation 1-->assistive equipment   Transferring 0-->independent   Toileting 0-->independent   Bathing 0-->independent   Communication 0-->understands/communicates without difficulty   Fall history within last six months no   Which of the above functional risks had a recent onset or change? transferring;ambulation   General Information   Onset of Illness/Injury or Date of Surgery - Date 09/27/19   Referring Physician Jonathon Hylton PA-C   Patient/Family Goals Statement Home   Pertinent History of Current Problem (include personal factors and/or comorbidities that impact the POC) Current status: PT orders received, chart reviewed, eval completed and treatment initiated. Pt is a 74 year old year old male admitted 9/27/19 for L TKA. PMHx signficant for CAD, htn, DM. Pt previously living alone in a 1-story home with no stairs to enter, no stairs within the home. Prior level of mobility including independent with all mobility and ADLs. Patient owns a FWW and SEC, has been using SEC recently due to pain. Patient reports his son and nieces are available and willing to assist him on discharge.    Precautions/Limitations fall precautions   Weight-Bearing Status - LLE weight-bearing as tolerated   General Observations Patient on 2L O2 via nc, SpO2 96%   Cognitive Status Examination   Orientation orientation to person, place and time   Level of Consciousness alert   Follows  Commands and Answers Questions 100% of the time   Personal Safety and Judgment intact   Pain Assessment   Patient Currently in Pain No  (no pain at rest, increased R knee pain with mobility ~4/10)   Posture    Posture Comments increase thoracic kyphosis   Range of Motion (ROM)   ROM Comment B LE ROM WFL with exception of L knee flexion limited by edema and wrapping, recent TKA   Strength   Strength Comments R LE strength WNL; L LE strength impaired by pain from recent TKA   Bed Mobility   Bed Mobility Comments Supine to sit with additional time, SBA. Sit to supine with modA for LE positioning.    Transfer Skills   Transfer Comments Sit<>stand CGA-SBA and FWW   Gait   Gait Comments Amb with CGA and FWW, cues for upright posture   Balance   Balance Comments Standing balance good with UE support on FWW, seated balance unimpaired   Modality Interventions   Planned Modality Interventions Cryotherapy   Planned Modality Interventions Comments   (ice PRN for comfort)   General Therapy Interventions   Planned Therapy Interventions gait training;strengthening;transfer training   Intervention Comments TKA exercises   Clinical Impression   Criteria for Skilled Therapeutic Intervention yes, treatment indicated   PT Diagnosis impaired gait   Influenced by the following impairments pain, recent TKA   Functional limitations due to impairments increased level of assist required for mobility, decreased gait speed, impaired balance   Clinical Presentation Stable/Uncomplicated   Clinical Presentation Rationale clinical judgement   Clinical Decision Making (Complexity) Low complexity   Therapy Frequency 2x/day   Predicted Duration of Therapy Intervention (days/wks) 1-2 days   Anticipated Discharge Disposition Home with Assist   Risk & Benefits of therapy have been explained Yes   Patient, Family & other staff in agreement with plan of care Yes   Clinical Impression Comments Patient is pleasant, motivated, experienced with TKAs, and  "requesting home this PM   PT G-Codes   G-code Mobility: Walking and moving around   [facility] AM-PAC TM \"6 Clicks\"   2016, Trustees of [facility], under license to [employer].  All rights reserved.   6 Clicks Short Forms Basic Mobility Inpatient Short Form   [facility] AM-PAC  \"6 Clicks\" V.2 Basic Mobility Inpatient Short Form   1. Turning from your back to your side while in a flat bed without using bedrails? 4 - None   2. Moving from lying on your back to sitting on the side of a flat bed without using bedrails? 3 - A Little   3. Moving to and from a bed to a chair (including a wheelchair)? 3 - A Little   4. Standing up from a chair using your arms (e.g., wheelchair, or bedside chair)? 3 - A Little   5. To walk in hospital room? 3 - A Little   6. Climbing 3-5 steps with a railing? 3 - A Little   Basic Mobility Raw Score (Score out of 24.Lower scores equate to lower levels of function) 19   Total Evaluation Time   Total Evaluation Time (Minutes) 12     "

## 2019-09-28 NOTE — PROGRESS NOTES
Patient don't want to be seen by the IM/Hospitalist service.     Hospitalist service will sign off at this time.

## 2019-09-28 NOTE — PLAN OF CARE
Patient vital signs are at baseline: Yes  Patient able to ambulate as they were prior to admission or with assist devices provided by therapies during their stay:  Yes  Patient MUST void prior to discharge:  No,  Reason:  DTV, states he only voids 2 times a day  Patient able to tolerate oral intake:  Yes  Pain has adequate pain control using Oral analgesics:  Yes

## 2019-09-28 NOTE — PLAN OF CARE
A&O, lethargic. VSS on 3L o2. CMS intact. PRN oxy and scheduled tylenol for pain. Regular diet. Due to dangle, pt refused over night shift. IVF. Efrain in.

## 2019-09-28 NOTE — PLAN OF CARE
Discharge Planner OT   Patient plan for discharge: home with assist from family and OP PT  Current status: Orders received, eval attempted, pt declines OT services citing that he has had multiple TKA and feels he fully understands safety and adaptations at this time. Pt states he has all equipment needed, a walk in shower and continues to decline OT following ed.   Barriers to return to prior living situation: defer to PT  Recommendations for discharge: defer to PT/ surgeon plan   Rationale for recommendations: Pt declines OT services, will sign off, IP needs met with PT. Orders completed.        Entered by: Kennedi Butterfield 09/28/2019 11:19 AM

## 2019-09-29 NOTE — PLAN OF CARE
Pt was A & O x4. Lungs sound clear, bowel sounds active, cms intact except for edema in fausot LE. Up with 1 and walker. Dressing is CDI. Declined the ordered flomax. Voided 75cc and insisted of going home. Discharge instructions and meds were reviewed. Pt only took discharge oxycodone home. Atarax will be returned to dc'd pharmacy. Pt was discharged home.

## 2019-10-02 LAB — INTERPRETATION ECG - MUSE: NORMAL

## 2019-12-13 ENCOUNTER — TRANSFERRED RECORDS (OUTPATIENT)
Dept: HEALTH INFORMATION MANAGEMENT | Facility: CLINIC | Age: 74
End: 2019-12-13

## 2019-12-19 ENCOUNTER — TRANSFERRED RECORDS (OUTPATIENT)
Dept: HEALTH INFORMATION MANAGEMENT | Facility: CLINIC | Age: 74
End: 2019-12-19

## 2019-12-19 LAB
ALT SERPL-CCNC: 72 IU/L (ref 0–44)
AST SERPL-CCNC: 79 IU/L (ref 0–40)
CREAT SERPL-MCNC: 4.02 MG/DL (ref 0.76–1.27)
GFR SERPL CREATININE-BSD FRML MDRD: 14 ML/MIN/1.73
GLUCOSE SERPL-MCNC: 183 MG/DL (ref 65–99)
POTASSIUM SERPL-SCNC: 5.1 MMOL/L (ref 3.5–5.2)

## 2019-12-20 ENCOUNTER — APPOINTMENT (OUTPATIENT)
Dept: ULTRASOUND IMAGING | Facility: CLINIC | Age: 74
DRG: 683 | End: 2019-12-20
Attending: INTERNAL MEDICINE
Payer: COMMERCIAL

## 2019-12-20 ENCOUNTER — HOSPITAL ENCOUNTER (INPATIENT)
Facility: CLINIC | Age: 74
LOS: 5 days | Discharge: HOME | DRG: 683 | End: 2019-12-25
Attending: EMERGENCY MEDICINE | Admitting: INTERNAL MEDICINE
Payer: COMMERCIAL

## 2019-12-20 DIAGNOSIS — N18.9 ACUTE RENAL FAILURE SUPERIMPOSED ON CHRONIC KIDNEY DISEASE, UNSPECIFIED CKD STAGE, UNSPECIFIED ACUTE RENAL FAILURE TYPE: ICD-10-CM

## 2019-12-20 DIAGNOSIS — N17.9 ACUTE RENAL FAILURE SUPERIMPOSED ON CHRONIC KIDNEY DISEASE, UNSPECIFIED CKD STAGE, UNSPECIFIED ACUTE RENAL FAILURE TYPE: ICD-10-CM

## 2019-12-20 DIAGNOSIS — I10 ESSENTIAL HYPERTENSION: Primary | ICD-10-CM

## 2019-12-20 LAB
ANION GAP SERPL CALCULATED.3IONS-SCNC: 8 MMOL/L (ref 3–14)
BASOPHILS # BLD AUTO: 0 10E9/L (ref 0–0.2)
BASOPHILS NFR BLD AUTO: 0.6 %
BUN SERPL-MCNC: 83 MG/DL (ref 7–30)
CALCIUM SERPL-MCNC: 8.6 MG/DL (ref 8.5–10.1)
CHLORIDE SERPL-SCNC: 103 MMOL/L (ref 94–109)
CO2 SERPL-SCNC: 20 MMOL/L (ref 20–32)
CREAT SERPL-MCNC: 4.45 MG/DL (ref 0.66–1.25)
DIFFERENTIAL METHOD BLD: ABNORMAL
EOSINOPHIL # BLD AUTO: 0 10E9/L (ref 0–0.7)
EOSINOPHIL NFR BLD AUTO: 0.4 %
ERYTHROCYTE [DISTWIDTH] IN BLOOD BY AUTOMATED COUNT: 15.9 % (ref 10–15)
GFR SERPL CREATININE-BSD FRML MDRD: 12 ML/MIN/{1.73_M2}
GLUCOSE BLDC GLUCOMTR-MCNC: 144 MG/DL (ref 70–99)
GLUCOSE BLDC GLUCOMTR-MCNC: 159 MG/DL (ref 70–99)
GLUCOSE SERPL-MCNC: 205 MG/DL (ref 70–99)
HCT VFR BLD AUTO: 33.6 % (ref 40–53)
HGB BLD-MCNC: 11 G/DL (ref 13.3–17.7)
IMM GRANULOCYTES # BLD: 0 10E9/L (ref 0–0.4)
IMM GRANULOCYTES NFR BLD: 0.4 %
INR PPP: 2.41 (ref 0.86–1.14)
INTERPRETATION ECG - MUSE: NORMAL
LYMPHOCYTES # BLD AUTO: 1.4 10E9/L (ref 0.8–5.3)
LYMPHOCYTES NFR BLD AUTO: 30.8 %
MCH RBC QN AUTO: 26.8 PG (ref 26.5–33)
MCHC RBC AUTO-ENTMCNC: 32.7 G/DL (ref 31.5–36.5)
MCV RBC AUTO: 82 FL (ref 78–100)
MONOCYTES # BLD AUTO: 0.6 10E9/L (ref 0–1.3)
MONOCYTES NFR BLD AUTO: 13.2 %
NEUTROPHILS # BLD AUTO: 2.6 10E9/L (ref 1.6–8.3)
NEUTROPHILS NFR BLD AUTO: 54.6 %
NRBC # BLD AUTO: 0 10*3/UL
NRBC BLD AUTO-RTO: 0 /100
PLATELET # BLD AUTO: 144 10E9/L (ref 150–450)
POTASSIUM SERPL-SCNC: 4.8 MMOL/L (ref 3.4–5.3)
RBC # BLD AUTO: 4.1 10E12/L (ref 4.4–5.9)
SODIUM SERPL-SCNC: 131 MMOL/L (ref 133–144)
TROPONIN I SERPL-MCNC: <0.015 UG/L (ref 0–0.04)
TSH SERPL DL<=0.005 MIU/L-ACNC: 2.19 MU/L (ref 0.4–4)
WBC # BLD AUTO: 4.7 10E9/L (ref 4–11)

## 2019-12-20 PROCEDURE — 96360 HYDRATION IV INFUSION INIT: CPT

## 2019-12-20 PROCEDURE — 93005 ELECTROCARDIOGRAM TRACING: CPT

## 2019-12-20 PROCEDURE — 25800030 ZZH RX IP 258 OP 636: Performed by: EMERGENCY MEDICINE

## 2019-12-20 PROCEDURE — 12000000 ZZH R&B MED SURG/OB

## 2019-12-20 PROCEDURE — 85025 COMPLETE CBC W/AUTO DIFF WBC: CPT | Performed by: EMERGENCY MEDICINE

## 2019-12-20 PROCEDURE — 25800030 ZZH RX IP 258 OP 636: Performed by: INTERNAL MEDICINE

## 2019-12-20 PROCEDURE — 99285 EMERGENCY DEPT VISIT HI MDM: CPT | Mod: 25

## 2019-12-20 PROCEDURE — 80048 BASIC METABOLIC PNL TOTAL CA: CPT | Performed by: EMERGENCY MEDICINE

## 2019-12-20 PROCEDURE — 99223 1ST HOSP IP/OBS HIGH 75: CPT | Mod: AI | Performed by: INTERNAL MEDICINE

## 2019-12-20 PROCEDURE — 25000132 ZZH RX MED GY IP 250 OP 250 PS 637: Performed by: INTERNAL MEDICINE

## 2019-12-20 PROCEDURE — 76770 US EXAM ABDO BACK WALL COMP: CPT

## 2019-12-20 PROCEDURE — 85610 PROTHROMBIN TIME: CPT | Performed by: EMERGENCY MEDICINE

## 2019-12-20 PROCEDURE — 84443 ASSAY THYROID STIM HORMONE: CPT | Performed by: EMERGENCY MEDICINE

## 2019-12-20 PROCEDURE — 84484 ASSAY OF TROPONIN QUANT: CPT | Performed by: EMERGENCY MEDICINE

## 2019-12-20 PROCEDURE — 00000146 ZZHCL STATISTIC GLUCOSE BY METER IP

## 2019-12-20 RX ORDER — PROCHLORPERAZINE MALEATE 5 MG
5 TABLET ORAL EVERY 6 HOURS PRN
Status: DISCONTINUED | OUTPATIENT
Start: 2019-12-20 | End: 2019-12-25 | Stop reason: HOSPADM

## 2019-12-20 RX ORDER — AMOXICILLIN 250 MG
1 CAPSULE ORAL 2 TIMES DAILY PRN
Status: DISCONTINUED | OUTPATIENT
Start: 2019-12-20 | End: 2019-12-25 | Stop reason: HOSPADM

## 2019-12-20 RX ORDER — CARVEDILOL 6.25 MG/1
12.5 TABLET ORAL 2 TIMES DAILY WITH MEALS
Status: DISCONTINUED | OUTPATIENT
Start: 2019-12-20 | End: 2019-12-25 | Stop reason: HOSPADM

## 2019-12-20 RX ORDER — NICOTINE POLACRILEX 4 MG
15-30 LOZENGE BUCCAL
Status: DISCONTINUED | OUTPATIENT
Start: 2019-12-20 | End: 2019-12-25 | Stop reason: HOSPADM

## 2019-12-20 RX ORDER — PROCHLORPERAZINE 25 MG
12.5 SUPPOSITORY, RECTAL RECTAL EVERY 12 HOURS PRN
Status: DISCONTINUED | OUTPATIENT
Start: 2019-12-20 | End: 2019-12-25 | Stop reason: HOSPADM

## 2019-12-20 RX ORDER — OXYCODONE AND ACETAMINOPHEN 5; 325 MG/1; MG/1
1 TABLET ORAL EVERY 6 HOURS PRN
Status: DISCONTINUED | OUTPATIENT
Start: 2019-12-20 | End: 2019-12-25 | Stop reason: HOSPADM

## 2019-12-20 RX ORDER — ACETAMINOPHEN 325 MG/1
650 TABLET ORAL EVERY 4 HOURS PRN
Status: DISCONTINUED | OUTPATIENT
Start: 2019-12-20 | End: 2019-12-25 | Stop reason: HOSPADM

## 2019-12-20 RX ORDER — SODIUM CHLORIDE 9 MG/ML
INJECTION, SOLUTION INTRAVENOUS CONTINUOUS
Status: DISCONTINUED | OUTPATIENT
Start: 2019-12-20 | End: 2019-12-21

## 2019-12-20 RX ORDER — ONDANSETRON 4 MG/1
4 TABLET, ORALLY DISINTEGRATING ORAL EVERY 6 HOURS PRN
Status: DISCONTINUED | OUTPATIENT
Start: 2019-12-20 | End: 2019-12-25 | Stop reason: HOSPADM

## 2019-12-20 RX ORDER — LIDOCAINE 40 MG/G
CREAM TOPICAL
Status: DISCONTINUED | OUTPATIENT
Start: 2019-12-20 | End: 2019-12-25 | Stop reason: HOSPADM

## 2019-12-20 RX ORDER — TRAZODONE HYDROCHLORIDE 50 MG/1
50 TABLET, FILM COATED ORAL
Status: DISCONTINUED | OUTPATIENT
Start: 2019-12-20 | End: 2019-12-25 | Stop reason: HOSPADM

## 2019-12-20 RX ORDER — ONDANSETRON 2 MG/ML
4 INJECTION INTRAMUSCULAR; INTRAVENOUS EVERY 6 HOURS PRN
Status: DISCONTINUED | OUTPATIENT
Start: 2019-12-20 | End: 2019-12-25 | Stop reason: HOSPADM

## 2019-12-20 RX ORDER — DEXTROSE MONOHYDRATE 25 G/50ML
25-50 INJECTION, SOLUTION INTRAVENOUS
Status: DISCONTINUED | OUTPATIENT
Start: 2019-12-20 | End: 2019-12-25 | Stop reason: HOSPADM

## 2019-12-20 RX ORDER — POLYETHYLENE GLYCOL 3350 17 G/17G
17 POWDER, FOR SOLUTION ORAL DAILY PRN
Status: DISCONTINUED | OUTPATIENT
Start: 2019-12-20 | End: 2019-12-25 | Stop reason: HOSPADM

## 2019-12-20 RX ORDER — HEPARIN SODIUM 5000 [USP'U]/.5ML
5000 INJECTION, SOLUTION INTRAVENOUS; SUBCUTANEOUS EVERY 8 HOURS
Status: DISCONTINUED | OUTPATIENT
Start: 2019-12-20 | End: 2019-12-20

## 2019-12-20 RX ORDER — OXYCODONE AND ACETAMINOPHEN 5; 325 MG/1; MG/1
1 TABLET ORAL EVERY 6 HOURS PRN
COMMUNITY
End: 2020-04-22

## 2019-12-20 RX ORDER — ATORVASTATIN CALCIUM 40 MG/1
40 TABLET, FILM COATED ORAL DAILY
Status: DISCONTINUED | OUTPATIENT
Start: 2019-12-21 | End: 2019-12-25 | Stop reason: HOSPADM

## 2019-12-20 RX ORDER — AMOXICILLIN 250 MG
2 CAPSULE ORAL 2 TIMES DAILY PRN
Status: DISCONTINUED | OUTPATIENT
Start: 2019-12-20 | End: 2019-12-25 | Stop reason: HOSPADM

## 2019-12-20 RX ORDER — NALOXONE HYDROCHLORIDE 0.4 MG/ML
.1-.4 INJECTION, SOLUTION INTRAMUSCULAR; INTRAVENOUS; SUBCUTANEOUS
Status: DISCONTINUED | OUTPATIENT
Start: 2019-12-20 | End: 2019-12-25 | Stop reason: HOSPADM

## 2019-12-20 RX ADMIN — SODIUM CHLORIDE: 9 INJECTION, SOLUTION INTRAVENOUS at 17:55

## 2019-12-20 RX ADMIN — SODIUM CHLORIDE 1000 ML: 9 INJECTION, SOLUTION INTRAVENOUS at 14:33

## 2019-12-20 ASSESSMENT — ACTIVITIES OF DAILY LIVING (ADL): ADLS_ACUITY_SCORE: 14

## 2019-12-20 ASSESSMENT — ENCOUNTER SYMPTOMS
SHORTNESS OF BREATH: 1
FEVER: 0
ABDOMINAL PAIN: 0

## 2019-12-20 ASSESSMENT — MIFFLIN-ST. JEOR: SCORE: 1855.76

## 2019-12-20 NOTE — ED PROVIDER NOTES
History     Chief Complaint:  Abnormal Labs    HPI   Burton Elizabeth is a 74 year old male with a history of type II diabetes, diabetic kidney disease, heart disease, and congestive heart failure who presents with abnormal labs. Patient reports that he had labs drawn yesterday showing his creatinine to be elevated, he thinks to 3.2, but we do not have the records. He states that he was recently restarted on Lovenox 120 mg qd 8 days ago and taken off of coumadin for recurrent DVT s/p left knee surgery 11 weeks ago. He also notes some shortness of breath. No fevers, chest pain, or abdominal pain.     After obtaining records, creatinine yesterday was elevated to 4.02, and he was told by primary care to be seen in the ED for kidney failure. His creatinine is usually around 2.19.    Laboratory; 12/19/2019  CMP: Glucose 183(H), BUN 75(H), Creatinine 4.02(H), GFR estimate 14(L), (L), Chloride 94(L), Carbon dioxide 18(L), Alkphos 295(H), AST 79(H), ALT 72(H) o/w WNL     Allergies:  Penicillins      Medications:    Metformin  Glimepiride   Lasix  Amlodipine   Insulin  Lovenox   Amitriptyline   Hyzaar   Atorvastatin   Jantoven   Carvedilol     Past Medical History:    Acid reflux   Anemia   Arthritis of knee   Congestive heart failure   Coronary artery disease   Decreased cardiac ejection fraction  Deep vein thrombosis   Diabetic kidney disease   Hypertension  Hyperthyroidism   Left bundle branch block  Mixed hyperlipidemia   Type II diabetes mellitus     Past Surgical History:    Arthroplasty knee, right  Multiple left knee, left thumb, and left ankle surgeries   Left foot surgery   Left heart cath  Cataract removal   Appendectomy   Tear in abdomen muscle repaired with mesh     Family History:    Brother: osteoarthritis   Sister: osteoarthritis   Father: alcohol abuse, epilepsy   Mother: hypertension, GIST tumor     Social History:  Smoking Status: Never smoker   Smokeless Tobacco: Never used  Alcohol Use:  "Positive, rarely   Drug Use: Negative   PCP: Td Qureshi  Marital Status: Single      Review of Systems   Constitutional: Negative for fever.   Respiratory: Positive for shortness of breath.    Cardiovascular: Negative for chest pain.   Gastrointestinal: Negative for abdominal pain.   All other systems reviewed and are negative.    Physical Exam     Patient Vitals for the past 24 hrs:   BP Temp Temp src Pulse Resp SpO2 Height Weight   12/20/19 1605 103/53 97.5  F (36.4  C) Oral 66 16 95 % -- --   12/20/19 1530 113/69 -- -- -- -- 97 % -- --   12/20/19 1236 96/57 97.8  F (36.6  C) Oral 63 16 92 % 1.753 m (5' 9\") 112 kg (247 lb)     Physical Exam  General: Alert, appears well-developed and well-nourished. Cooperative.     In mild distress  HEENT:  Head:  Atraumatic  Ears:  External ears are normal  Mouth/Throat:  Oropharynx is without erythema or exudate and mucous membranes are moist.   Eyes:   Conjunctivae normal and EOM are normal. No scleral icterus.  CV:  Normal rate, regular rhythm, normal heart sounds and radial pulses are 2+ and symmetric.    Resp:  Breath sounds are clear bilaterally    Non-labored, no retractions or accessory muscle use  GI:  Obese. Abdomen is soft, no distension, no tenderness. No rebound or guarding.  No CVA tenderness bilaterally  MS:  Normal range of motion. Trace BLE edema.    Normal strength in all 4 extremities.     Back atraumatic.    No midline cervical, thoracic, or lumbar tenderness  Skin:  Warm and dry.  No rash or lesions noted.  Neuro: Alert. Normal strength.  GCS: 15  Psych:  Normal mood and affect.    Emergency Department Course     ECG:  ECG taken at 1335, ECG read at 1340  Sinus rhythm with 1st degree AV block   Left bundle branch block   Abnormal ECG  No significant change compared to EKG dated 9/27/2019.   Rate 63 bpm. SD interval 238 ms. QRS duration 194 ms. QT/QTc 522/534 ms. P-R-T axes 7 1 91.    Laboratory:  Laboratory findings were communicated with the patient " who voiced understanding of the findings.    CBC: WBC 4.7, HGB 11.0(L), (L)  BMP: (L), Glucose 205(H), BUN 83(H), Creatinine 4.45(H), GFR estimate 12(L) o/w WNL  TSH with free T4 reflex: 2.19  Troponin (Collected 1332): <0.015    UA with microscopic Ordered   Sodium random urine Ordered   Fractional excretion of sodium Ordered     Interventions:  1433 NS 1,000 mL IV     Emergency Department Course:    1237 Nursing notes and vitals reviewed.    1306 I performed an exam of the patient as documented above.     1332 IV was inserted and blood was drawn for laboratory testing, results above.    1335 EKG taken as noted above.     1425 Rechecked and updated.      1443 I spoke with Dr. Bullard of the hospitalist service from [facility] regarding patient's presentation, findings, and plan of care.    1530 Findings and plan explained to the patient who consents to admission. Discussed the patient with Dr. Bullard, who will admit the patient to a med/surg bed for further monitoring, evaluation, and treatment.    Impression & Plan      Medical Decision Making:  Patient is a 74-year-old male with a complex past medical history pertinent for diabetes, prior DVT status post knee replacement on chronic anticoagulation with Lovenox, and CKD who presents with concerns for worsening renal function.  Patient's lab work today is suggestive of worsening renal failure with a creatinine of 4.45.  While obtaining his records from his outside family medicine clinic it appears his creatinine typical value with his CKD is around 2.19.  Patient has no evidence of urinary retention.  He oddly has been taking 4 injections of Lovenox twice daily although it appears that this is because he has samples of 30 mg and so would be taking 120 mg twice daily.  Unclear if Lovenox would all be related to nephrotoxicity, on my review of the literature this does not seem like a common reaction to this medication.  Ultimately the patient  will be admitted for further evaluation and treatment of acute on chronic renal failure.  We are in the process of obtaining further records through his primary care clinic and reassuringly patient is otherwise asymptomatic here in the ED today.  Patient is afebrile with normal vital signs.  Spoke with Dr. Bullard of the hospitalist service who agreed to admission.    Diagnosis:    ICD-10-CM    1. Acute renal failure superimposed on chronic kidney disease, unspecified CKD stage, unspecified acute renal failure type (H) N17.9 INR    N18.9 INR     CANCELED: INR     Disposition:   The patient is admitted into the care of Dr. Bullard.    Scribe Disclosure:  Felicity GARCIA, am serving as a scribe at 1:14 PM on 12/20/2019 to document services personally performed by Xu Kat MD based on my observations and the provider's statements to me.       EMERGENCY DEPARTMENT       Xu Kat MD  12/20/19 6560

## 2019-12-20 NOTE — PHARMACY-ADMISSION MEDICATION HISTORY
Pharmacy Medication History  Admission medication history interview status for the 12/20/2019  admission is complete. See EPIC admission navigator for prior to admission medications     Medication history sources: Patient  Medication history source reliability: Good  Adherence assessment: Moderate    Significant changes made to the medication list:  1) Medications added: Lovenox    Additional medication history information:   1) Per patient report he was initiated on Lovenox 120 mg BID (12/12/19) for clot behind the knee. He took this dose until Sunday where he ran out of the prescription. On Tuesday (12/24/19) he was seen by his outside physician and started on Lovenox 30 mg BID. He reported the physician told him it was okay to take both injections in the morning so he has been doing 60 mg every day since 12/24/19.     2) Patient is adamant he is using insulin glargine and insulin aspart as-needed for hyperglycemia. He does not have a standard sliding scale adjustment he uses. He reports he rarely needs to use insulin with last dose sometime this week.     3) Patient instructed to hold aspirin 325 mg for 14 weeks before his knee replacement.     Medication reconciliation completed by provider prior to medication history? No    Time spent in this activity: 30 minutes      Prior to Admission medications    Medication Sig Last Dose Taking? Auth Provider   amLODIPine (NORVASC) 10 MG tablet Take 10 mg by mouth daily 12/20/2019 at AM Yes Reported, Patient   atorvastatin (LIPITOR) 80 MG tablet Take 40 mg by mouth daily (takes 0.5 x 80mg tablet = 40mg dose) 12/20/2019 at AM Yes Reported, Patient   carvedilol (COREG) 25 MG tablet Take 12.5 mg by mouth 2 times daily (with meals) (Takes 0.5 x 25mg tablet = 12.5mg dose) 12/20/2019 at x1 Yes Reported, Patient   enoxaparin ANTICOAGULANT (LOVENOX) 30 MG/0.3ML syringe Inject 30 mg Subcutaneous 2 times daily Patient consolidated dose to 60 mg daily 12/20/2019 at AM Yes Unknown,  Entered By History   glimepiride (AMARYL) 4 MG tablet Take 4 mg by mouth 2 times daily  12/20/2019 at x1 Yes Reported, Patient   insulin aspart (NOVOLOG FLEXPEN) 100 UNIT/ML pen Inject 3-17 Units Subcutaneous 3 times daily (with meals) As directed  Past Week at Unknown time Yes Reported, Patient   insulin glargine (LANTUS) 100 UNIT/ML PEN Inject 12-30 Units Subcutaneous every evening  Past Week at Unknown time Yes Reported, Patient   losartan (COZAAR) 100 MG tablet Take 100 mg by mouth daily 12/20/2019 at x1 Yes Reported, Patient   meloxicam (MOBIC) 15 MG tablet Take 15 mg by mouth daily PRN at PRN Yes Reported, Patient   metFORMIN (GLUCOPHAGE) 500 MG tablet Take 500 mg by mouth 2 times daily 12/20/2019 at x1 Yes Reported, Patient   multivitamin, therapeutic with minerals (THERA-VIT-M) TABS Take 1 tablet by mouth daily 12/20/2019 at AM Yes Reported, Patient   oxyCODONE-acetaminophen (PERCOCET) 5-325 MG tablet Take 1 tablet by mouth every 6 hours as needed for pain PRN at PRN Yes Unknown, Entered By History   aspirin (ASA) 325 MG EC tablet Take 1 tablet (325 mg) by mouth daily More than a month at Unknown time  Jonathon Hylton PA-C   blood glucose (NO BRAND SPECIFIED) test strip Use to test blood sugar as directed   Reported, Patient   order for DME Equipment being ordered: Walker Wheels () and Walker ()  Treatment Diagnosis: Impaired gait   Catarino Kearns PA-C Lesia Tchobaniouk, Pharm.D.

## 2019-12-20 NOTE — ED NOTES
"[facility]  ED Nurse Handoff Report    ED Chief complaint: Abnormal Labs      ED Diagnosis:   Final diagnoses:   Acute renal failure superimposed on chronic kidney disease, unspecified CKD stage, unspecified acute renal failure type (H)       Code Status: Full Code    Allergies:   Allergies   Allergen Reactions     Penicillins Anaphylaxis       Activity level - Baseline/Home:  Independent  Activity Level - Current:   Independent    Patient's Preferred language: english   Needed?: No    Isolation: No  Infection: Not Applicable  Bariatric?: No    Vital Signs:   Vitals:    12/20/19 1236   BP: 96/57   Pulse: 63   Resp: 16   Temp: 97.8  F (36.6  C)   TempSrc: Oral   SpO2: 92%   Weight: 112 kg (247 lb)   Height: 1.753 m (5' 9\")       Cardiac Rhythm: ,        Pain level:      Is this patient confused?: No   Does this patient have a guardian?  No         If yes, is there guardianship documents in the Epic \"Code/ACP\" activity?  N/A         Guardian Notified?  N/A  Robson - Suicide Severity Rating Scale Completed?  Yes  If yes, what color did the patient score?  White    Patient Report: Initial Complaint: abnormal creat  Focused Assessment: 74 year old male with a history of type II diabetes who presents with abnormal labs. Patient reports that he had labs drawn yesterday showing his creatinine to be elevated, he thinks to 3.2, but we do not have the records. He states that he was recently restarted on Lovenox 8 days ago for recurrent DVT s/p left knee surgery 11 weeks ago. Doesn't have any complaints at this time other than the abnormal lab.  A&O  Tests Performed: labs  Abnormal Results:   Results for orders placed or performed during the hospital encounter of 12/20/19   CBC with platelets differential     Status: Abnormal   Result Value Ref Range    WBC 4.7 4.0 - 11.0 10e9/L    RBC Count 4.10 (L) 4.4 - 5.9 10e12/L    Hemoglobin 11.0 (L) 13.3 - 17.7 g/dL    Hematocrit 33.6 (L) 40.0 - 53.0 %    " MCV 82 78 - 100 fl    MCH 26.8 26.5 - 33.0 pg    MCHC 32.7 31.5 - 36.5 g/dL    RDW 15.9 (H) 10.0 - 15.0 %    Platelet Count 144 (L) 150 - 450 10e9/L    Diff Method Automated Method     % Neutrophils 54.6 %    % Lymphocytes 30.8 %    % Monocytes 13.2 %    % Eosinophils 0.4 %    % Basophils 0.6 %    % Immature Granulocytes 0.4 %    Nucleated RBCs 0 0 /100    Absolute Neutrophil 2.6 1.6 - 8.3 10e9/L    Absolute Lymphocytes 1.4 0.8 - 5.3 10e9/L    Absolute Monocytes 0.6 0.0 - 1.3 10e9/L    Absolute Eosinophils 0.0 0.0 - 0.7 10e9/L    Absolute Basophils 0.0 0.0 - 0.2 10e9/L    Abs Immature Granulocytes 0.0 0 - 0.4 10e9/L    Absolute Nucleated RBC 0.0    Basic metabolic panel     Status: Abnormal   Result Value Ref Range    Sodium 131 (L) 133 - 144 mmol/L    Potassium 4.8 3.4 - 5.3 mmol/L    Chloride 103 94 - 109 mmol/L    Carbon Dioxide 20 20 - 32 mmol/L    Anion Gap 8 3 - 14 mmol/L    Glucose 205 (H) 70 - 99 mg/dL    Urea Nitrogen 83 (H) 7 - 30 mg/dL    Creatinine 4.45 (H) 0.66 - 1.25 mg/dL    GFR Estimate 12 (L) >60 mL/min/[1.73_m2]    GFR Estimate If Black 14 (L) >60 mL/min/[1.73_m2]    Calcium 8.6 8.5 - 10.1 mg/dL   TSH with free T4 reflex     Status: None   Result Value Ref Range    TSH 2.19 0.40 - 4.00 mU/L   Troponin I     Status: None   Result Value Ref Range    Troponin I ES <0.015 0.000 - 0.045 ug/L   EKG 12-lead, tracing only     Status: None   Result Value Ref Range    Interpretation ECG Click View Image link to view waveform and result        Treatments provided: bolus    Family Comments: self    OBS brochure/video discussed/provided to patient/family: N/A              Name of person given brochure if not patient: x              Relationship to patient: x    ED Medications:   Medications   0.9% sodium chloride BOLUS (1,000 mLs Intravenous New Bag 12/20/19 8011)       Drips infusing?:  No    For the majority of the shift this patient was Green.   Interventions performed were x.    Severe Sepsis OR Septic  Shock Diagnosis Present: No    To be done/followed up on inpatient unit:  needs a urine sample     ED NURSE PHONE NUMBER: [id number]

## 2019-12-20 NOTE — PROGRESS NOTES
RECEIVING UNIT ED HANDOFF REVIEW    ED Nurse Handoff Report was reviewed by: Isela Lo RN on December 20, 2019 at 3:38 PM

## 2019-12-20 NOTE — ED NOTES
Report received. Patient visualized. Vital signs stable. Patient given bear hugger and lights dimmed per request. Will continue to monitor.

## 2019-12-20 NOTE — H&P
Admitted:     12/20/2019      PRIMARY CARE PHYSICIAN:  Dr. Td Qureshi MD.      CHIEF COMPLAINT:  Abnormal labs with elevated creatinine to 4.       History is obtained from the patient, chart review and in discussion with the emergency room physician.      HISTORY OF PRESENT ILLNESS:  The patient is a 74-year-old male with past medical history of chronic systolic CHF, chronic kidney disease stage 3, with baseline creatinine of 1.92, hypertension, hyperlipidemia, diabetes who is presenting due to elevated creatinine noted to be 4 by his primary care provider.  The patient reports he feels dehydrated.  He reports he may be urinating less in the last 24-48 hours.  He denies any nausea, vomiting, lower abdominal discomfort, dysuria or flank pain.  He denies fever, chills or blood in his urine.  He reports he has been using Celebrex intermittently.  He otherwise denies taking ibuprofen or Advil on a regular basis.      Of note, the patient had a left total knee arthroplasty back in 09/2019.  Following his knee surgery, he noted left leg swelling and was diagnosed with DVT at that time.  He reports he was treated initially with Lovenox, then continued on warfarin.  About a week ago, the patient was seen by his primary care doctor due to recurrent left lower extremity swelling.  At that time, an ultrasound was repeated and it showed nonocclusive deep vein thrombosis in 1 of 2 gastrocnemius veins at the proximal left calf and previously noted popliteal vein thrombosis resolved in the interval.  It seems like his primary care doctor spoke with Dr. Jasso from [facility] and recommended stopping warfarin and starting the patient on Lovenox since dvt occurred while on warfarin.  Also, he is scheduled to see Dr. Dial next week in clinic for further anticoagulation management.  The patient reports he has been injecting Lovenox daily and he is advised to do 60 mg daily based on his creatinine clearance.  The patient otherwise  feels left leg swelling is currently improved.      In the emergency room, the patient was evaluated by Dr. Xu Kat.  His vitals shows blood pressure of 103/53.  He is afebrile and saturating on room air.   Lab notable for sodium of 131, creatinine of 4.45, BUN of 83.  TSH is 2.19.  Troponin less than 0.015.  CBC shows hemoglobin of 11, which appears to be his baseline, and platelets of 144.  INR is 2.41.  Given his elevated creatinine, admission was requested for further management.  UA has not yet been obtained in the emergency room.      PAST MEDICAL HISTORY:   1.  Type 2 diabetes.   2.  Ischemic cardiomyopathy with chronic systolic CHF with EF of 45%-50%.   3.  Hypertension.   4.  Dyslipidemia.   5.  Chronic kidney disease, stage 3, with creatinine baseline ranging from 1.8-2.        PAST SURGICAL HISTORY:   1.  Left total knee arthroplasty.   2.  History of right knee arthroplasty in 2016.   3.  Eye surgery.   4.  Appendectomy.      SOCIAL HISTORY:  The patient denies tobacco use.  He drinks alcohol very rarely.      FAMILY HISTORY:  Reviewed and noncontributory to the current presentation.      ALLERGIES:  PENICILLIN GIVES HIM ANAPHYLAXIS.       MEDICATIONS:   1.  Amlodipine 10 mg daily.   2.  Aspirin 325 mg daily.   3.  Atorvastatin 40 mg daily.   4.  Carvedilol 12.5 mg 2 times daily.   5.  Enoxaparin 30 mg 2 times daily.   6.  Glimepiride 4 mg 2 times daily.   7.  Aspart 3-17 units 3 times daily with meals.   8.  Lantus 12-30 units every evening.   9.  Losartan 100 mg daily.   10.  Meloxicam daily.   11.  Metformin 500 two times daily.   12.  Oxycodone as needed.      REVIEW OF SYSTEMS:  A 10-point review of systems was completed and is negative except as noted in the history of present illness.      VITAL SIGNS:  Temperature 97.5, blood pressure 103/53, pulse 66, respiratory rate 16, satting 95% on room air.      LABORATORY DATA:  As mentioned above.       IMAGING STUDIES:  No imaging at this time.       ASSESSMENT AND PLAN:  The patient is a 74-year-old male with past medical history of hypertension, dyslipidemia, heart failure with chronic congestive heart failure, chronic kidney disease who is presenting due to acute on chronic kidney insufficiency.   1.  Acute kidney injury:  He does have chronic kidney disease, stage 3 at baseline with creatinine ranging from 1.9-2.  He is currently presenting with creatinine of 4.4.  Etiology of his renal failure is not clear.  I see meloxicam in his daily medication and has reported using Celebrex. OPAL  could be prerenal in etiology and also medications including NSAIDs and ARB.  We will obtain a urinalysis and FENa labs.  Obtain renal ultrasound to rule out any obstruction.  We will maintain him on IV fluids at 75 mL an hour overnight given his reduced ejection fraction monitoring for fluid overload.  Strict intake and output.  Will hold off on losartan at this time.  I will consult Nephrology for further assistance for management of acute kidney injury.   2.  Left lower extremity deep venous thrombosis:  It seems like the patient was diagnosed with left lower extremity DVT postop back in end of September.  He was still taking warfarin about a week ago when he went and saw his primary care provider for left leg swelling.  It was noted that ultrasound revealed an occlusive DVT in one of the 2 gastrocnemius veins of the proximal left calf and previously noted popliteal vein thrombosis was resolved.  It was discussed with Dr. Jasso from [facility] by primary care physician and it was advised to discontinue warfarin and just put him on Lovenox.  The patient is currently just taking Lovenox at this time.  He is scheduled to see Dr. Dial at [facility] next week.  Given that he is coming in with acute kidney injury with elevated creatinine, will hold off Lovenox.  His INR currently is 2.4.  I will hold off starting heparin at this time.  He is already planned to see Hematologist next  week for further anticoagulation management, I will consult [facility] for further assistance with his anticoagulation.  We will also check INR in the morning.   3.  Hypertension:  Chronic systolic congestive heart failure with EF of 45%-50%.  His blood pressure is currently soft at this time.  I will put Coreg with holding parameters to hold for systolic blood pressure 110.  Hold losartan in light of acute kidney injury.  Hold off on amlodipine at this time due to low blood pressure   4.  Type 2 diabetes:  He was on Lantus, metformin prior to admission  He said he has not taken his insulin in the last 2-3 days.  His A1c was 6.5% on 2019.  At this time, we will just put him on sliding scale insulin and could always adjust his medication based on his blood sugar levels.  I will also hold off metformin due to his acute kidney injury.   5.  Hyperlipidemia:  Resume prior to admission statin.   6.  Deep venous thrombosis prophylaxis:  His INR is currently therapeutic.  He will need anticoagulation once INR level is below 1.  As above, Hematology is consulted.      CODE STATUS:  Full code, discussed with the patient.      DISPOSITION:  Anticipate 2-3 days hospital stay pending improvement in his renal function.         MALACHI PINA MD             D: 2019   T: 2019   MT:       Name:     TAY PAGE   MRN:      [id number]        Account:      [id number]   :      1945        Admitted:     2019                   Document: [id number]       cc: Td Qureshi MD

## 2019-12-21 LAB
ALBUMIN UR-MCNC: NEGATIVE MG/DL
ANION GAP SERPL CALCULATED.3IONS-SCNC: 7 MMOL/L (ref 3–14)
APPEARANCE UR: CLEAR
BASOPHILS # BLD AUTO: 0 10E9/L (ref 0–0.2)
BASOPHILS NFR BLD AUTO: 0.2 %
BILIRUB UR QL STRIP: NEGATIVE
BUN SERPL-MCNC: 78 MG/DL (ref 7–30)
CALCIUM SERPL-MCNC: 8.6 MG/DL (ref 8.5–10.1)
CHLORIDE SERPL-SCNC: 104 MMOL/L (ref 94–109)
CO2 SERPL-SCNC: 21 MMOL/L (ref 20–32)
COLOR UR AUTO: YELLOW
CREAT SERPL-MCNC: 4.06 MG/DL (ref 0.66–1.25)
CREAT UR-MCNC: 116 MG/DL
DIFFERENTIAL METHOD BLD: ABNORMAL
EOSINOPHIL # BLD AUTO: 0 10E9/L (ref 0–0.7)
EOSINOPHIL NFR BLD AUTO: 0 %
ERYTHROCYTE [DISTWIDTH] IN BLOOD BY AUTOMATED COUNT: 16.1 % (ref 10–15)
FRACT EXCRET NA UR+SERPL-RTO: 1.3 %
GFR SERPL CREATININE-BSD FRML MDRD: 14 ML/MIN/{1.73_M2}
GLUCOSE BLDC GLUCOMTR-MCNC: 114 MG/DL (ref 70–99)
GLUCOSE BLDC GLUCOMTR-MCNC: 147 MG/DL (ref 70–99)
GLUCOSE BLDC GLUCOMTR-MCNC: 186 MG/DL (ref 70–99)
GLUCOSE BLDC GLUCOMTR-MCNC: 226 MG/DL (ref 70–99)
GLUCOSE SERPL-MCNC: 103 MG/DL (ref 70–99)
GLUCOSE UR STRIP-MCNC: NEGATIVE MG/DL
HCT VFR BLD AUTO: 33.2 % (ref 40–53)
HGB BLD-MCNC: 10.9 G/DL (ref 13.3–17.7)
HGB UR QL STRIP: NEGATIVE
IMM GRANULOCYTES # BLD: 0 10E9/L (ref 0–0.4)
IMM GRANULOCYTES NFR BLD: 0.5 %
INR PPP: 1.94 (ref 0.86–1.14)
KETONES UR STRIP-MCNC: NEGATIVE MG/DL
LEUKOCYTE ESTERASE UR QL STRIP: NEGATIVE
LYMPHOCYTES # BLD AUTO: 1.4 10E9/L (ref 0.8–5.3)
LYMPHOCYTES NFR BLD AUTO: 32.6 %
MCH RBC QN AUTO: 27 PG (ref 26.5–33)
MCHC RBC AUTO-ENTMCNC: 32.8 G/DL (ref 31.5–36.5)
MCV RBC AUTO: 82 FL (ref 78–100)
MONOCYTES # BLD AUTO: 0.5 10E9/L (ref 0–1.3)
MONOCYTES NFR BLD AUTO: 10.6 %
MUCOUS THREADS #/AREA URNS LPF: PRESENT /LPF
NEUTROPHILS # BLD AUTO: 2.4 10E9/L (ref 1.6–8.3)
NEUTROPHILS NFR BLD AUTO: 56.1 %
NITRATE UR QL: NEGATIVE
NRBC # BLD AUTO: 0 10*3/UL
NRBC BLD AUTO-RTO: 0 /100
PH UR STRIP: 5 PH (ref 5–7)
PLATELET # BLD AUTO: 147 10E9/L (ref 150–450)
POTASSIUM SERPL-SCNC: 4.4 MMOL/L (ref 3.4–5.3)
RBC # BLD AUTO: 4.04 10E12/L (ref 4.4–5.9)
RBC #/AREA URNS AUTO: 1 /HPF (ref 0–2)
SODIUM SERPL-SCNC: 132 MMOL/L (ref 133–144)
SODIUM UR-SCNC: 48 MMOL/L
SODIUM UR-SCNC: 49 MMOL/L
SOURCE: ABNORMAL
SP GR UR STRIP: 1.01 (ref 1–1.03)
SQUAMOUS #/AREA URNS AUTO: <1 /HPF (ref 0–1)
UROBILINOGEN UR STRIP-MCNC: NORMAL MG/DL (ref 0–2)
WBC # BLD AUTO: 4.3 10E9/L (ref 4–11)
WBC #/AREA URNS AUTO: 0 /HPF (ref 0–5)

## 2019-12-21 PROCEDURE — 00000146 ZZHCL STATISTIC GLUCOSE BY METER IP

## 2019-12-21 PROCEDURE — 82570 ASSAY OF URINE CREATININE: CPT | Performed by: EMERGENCY MEDICINE

## 2019-12-21 PROCEDURE — 36415 COLL VENOUS BLD VENIPUNCTURE: CPT | Performed by: INTERNAL MEDICINE

## 2019-12-21 PROCEDURE — 84300 ASSAY OF URINE SODIUM: CPT | Performed by: EMERGENCY MEDICINE

## 2019-12-21 PROCEDURE — 12000000 ZZH R&B MED SURG/OB

## 2019-12-21 PROCEDURE — 25000132 ZZH RX MED GY IP 250 OP 250 PS 637: Performed by: STUDENT IN AN ORGANIZED HEALTH CARE EDUCATION/TRAINING PROGRAM

## 2019-12-21 PROCEDURE — 25000132 ZZH RX MED GY IP 250 OP 250 PS 637: Performed by: INTERNAL MEDICINE

## 2019-12-21 PROCEDURE — 81001 URINALYSIS AUTO W/SCOPE: CPT | Performed by: EMERGENCY MEDICINE

## 2019-12-21 PROCEDURE — 25000131 ZZH RX MED GY IP 250 OP 636 PS 637: Performed by: INTERNAL MEDICINE

## 2019-12-21 PROCEDURE — 25000128 H RX IP 250 OP 636: Performed by: INTERNAL MEDICINE

## 2019-12-21 PROCEDURE — 25800030 ZZH RX IP 258 OP 636: Performed by: INTERNAL MEDICINE

## 2019-12-21 PROCEDURE — 99232 SBSQ HOSP IP/OBS MODERATE 35: CPT | Performed by: INTERNAL MEDICINE

## 2019-12-21 PROCEDURE — 85025 COMPLETE CBC W/AUTO DIFF WBC: CPT | Performed by: INTERNAL MEDICINE

## 2019-12-21 PROCEDURE — 80048 BASIC METABOLIC PNL TOTAL CA: CPT | Performed by: INTERNAL MEDICINE

## 2019-12-21 PROCEDURE — 85610 PROTHROMBIN TIME: CPT | Performed by: INTERNAL MEDICINE

## 2019-12-21 RX ORDER — HEPARIN SODIUM 10000 [USP'U]/ML
25000 INJECTION, SOLUTION INTRAVENOUS; SUBCUTANEOUS EVERY 12 HOURS
Status: DISCONTINUED | OUTPATIENT
Start: 2019-12-21 | End: 2019-12-24

## 2019-12-21 RX ORDER — SODIUM CHLORIDE 9 MG/ML
INJECTION, SOLUTION INTRAVENOUS CONTINUOUS
Status: DISCONTINUED | OUTPATIENT
Start: 2019-12-21 | End: 2019-12-22

## 2019-12-21 RX ORDER — ASPIRIN 81 MG/1
81 TABLET ORAL DAILY
Status: DISCONTINUED | OUTPATIENT
Start: 2019-12-21 | End: 2019-12-25 | Stop reason: HOSPADM

## 2019-12-21 RX ORDER — HEPARIN SODIUM 10000 [USP'U]/ML
250 INJECTION, SOLUTION INTRAVENOUS; SUBCUTANEOUS EVERY 12 HOURS
Status: DISCONTINUED | OUTPATIENT
Start: 2019-12-21 | End: 2019-12-21

## 2019-12-21 RX ORDER — HEPARIN SODIUM 5000 [USP'U]/.5ML
5000 INJECTION, SOLUTION INTRAVENOUS; SUBCUTANEOUS EVERY 12 HOURS
Status: DISCONTINUED | OUTPATIENT
Start: 2019-12-21 | End: 2019-12-21

## 2019-12-21 RX ADMIN — HEPARIN SODIUM 25000 UNITS: 10000 INJECTION INTRAVENOUS; SUBCUTANEOUS at 21:11

## 2019-12-21 RX ADMIN — SODIUM CHLORIDE: 9 INJECTION, SOLUTION INTRAVENOUS at 18:40

## 2019-12-21 RX ADMIN — INSULIN ASPART 1 UNITS: 100 INJECTION, SOLUTION INTRAVENOUS; SUBCUTANEOUS at 13:01

## 2019-12-21 RX ADMIN — OXYCODONE HYDROCHLORIDE AND ACETAMINOPHEN 1 TABLET: 5; 325 TABLET ORAL at 18:21

## 2019-12-21 RX ADMIN — ATORVASTATIN CALCIUM 40 MG: 40 TABLET, FILM COATED ORAL at 09:12

## 2019-12-21 RX ADMIN — OXYCODONE HYDROCHLORIDE AND ACETAMINOPHEN 1 TABLET: 5; 325 TABLET ORAL at 06:24

## 2019-12-21 RX ADMIN — CARVEDILOL 12.5 MG: 6.25 TABLET, FILM COATED ORAL at 18:17

## 2019-12-21 RX ADMIN — INSULIN ASPART 1 UNITS: 100 INJECTION, SOLUTION INTRAVENOUS; SUBCUTANEOUS at 18:17

## 2019-12-21 RX ADMIN — CARVEDILOL 12.5 MG: 6.25 TABLET, FILM COATED ORAL at 09:12

## 2019-12-21 RX ADMIN — SODIUM CHLORIDE: 9 INJECTION, SOLUTION INTRAVENOUS at 14:38

## 2019-12-21 RX ADMIN — ASPIRIN 81 MG: 81 TABLET, COATED ORAL at 18:17

## 2019-12-21 RX ADMIN — SODIUM CHLORIDE: 9 INJECTION, SOLUTION INTRAVENOUS at 06:14

## 2019-12-21 RX ADMIN — TRAZODONE HYDROCHLORIDE 50 MG: 50 TABLET ORAL at 01:11

## 2019-12-21 RX ADMIN — OXYCODONE HYDROCHLORIDE AND ACETAMINOPHEN 1 TABLET: 5; 325 TABLET ORAL at 12:08

## 2019-12-21 ASSESSMENT — MIFFLIN-ST. JEOR: SCORE: 1899.76

## 2019-12-21 ASSESSMENT — ACTIVITIES OF DAILY LIVING (ADL)
ADLS_ACUITY_SCORE: 15
ADLS_ACUITY_SCORE: 14
ADLS_ACUITY_SCORE: 14
ADLS_ACUITY_SCORE: 15
ADLS_ACUITY_SCORE: 14
ADLS_ACUITY_SCORE: 14

## 2019-12-21 NOTE — PROVIDER NOTIFICATION
MD Notification    Notified Person: MD    Notified Person Name: Dr. Mixon    Notification Date/Time: December 21, 2019 0200    Notification Interaction: TORB    Purpose of Notification: No UOP since 1100 yesterday, crt 4.45; recommendations?    Orders Received: None.    Comments:

## 2019-12-21 NOTE — PLAN OF CARE
A&Ox4. VSS on RA. C/o LLE shooting pain, given Percocet. PIV infusing. UOP pratik, UA sent; results pending. SBA + cane when up. Plan: nephrology to see, monitor crt.

## 2019-12-21 NOTE — PROGRESS NOTES
[facility]    Hospitalist Progress Note      Assessment & Plan   Burton Elizabeth is a 74 year old male who with past medical history of hypertension, dyslipidemia, chronic congestive heart failure, chronic kidney disease who is presenting due to acute on chronic kidney insufficiency.      Acute kidney injury:   Baseline CKD- stage III creatinine ranging from 1.9-2.   Presented with creatinine of 4.4.  suspect multifactorial from pre-renal etiology, NSAIDs use and also ARB.  - UA is bland. Renal US without obstruction.    - hold losartan  - continue with IVF  - Cr 4.4->4.08 this morning  - appreciate nephrology assistance  - f/u BMP in AM  - monitor I/Os    Left lower extremity deep venous thrombosis   Patient was diagnosed with left lower extremity DVT postop back in end of September 2019 after knee surgery.  He was still taking warfarin about a week ago when he went and saw his primary care provider for left leg swelling. Ultrasound revealed an occlusive DVT in one of the 2 gastrocnemius veins of the proximal left calf and previously noted popliteal vein thrombosis was resolved.  It was discussed with Dr. Jasso from [facility] by primary care physician (per note) and it was advised to discontinue warfarin and just put him on Lovenox.  The patient is currently just taking Lovenox at this time. He was scheduled to f/u with Dr. Dial next week, but now admitted to the hospital.   - Lovenox not an option due to renal insufficiency  - [facility] consulted for anticoag management  -- seen by Dr. Cameron and recommendation as follows          -  Heparin  unit(s)/kg BID    - Then check Heparin level (Xa) 4 hours after the 2nd dose of subQ Heparin.  Goal is to keep Heparin level between 0.3-0.7   - No need for further Heparin level monitoring as outpatient   - Would propose 3 months of anticoagulation with subQ Heparin and repeat DVT U/S in 3 months    Hypertension:    Chronic systolic congestive heart  failure with EF of 45%-50%.  Moderate Aortic Stenosis  - no chest pain at this time. Follows by Cardiology, last seen by Brittanie Chiu NP in 8/2019 before orthopedic surgery  - continue with Coreg with hold parameter  - hold Losartan due to OPAL  - hold amlodipine since bp currently controlled.    Type 2 diabetes:    He was on Lantus, metformin prior to admission  He said he has not taken his insulin in the last 2-3 days.  His A1c was 6.5% on 09/27/2019.   - continue with sliding scale insulin for now  - discontinued metformin    Hyperlipidemia:  Resume prior to admission statin.        DVT Prophylaxis: Heparin SQ  Code Status: Full Code    Disposition: Expected discharge in 2 days pending improved renal function    Jenni Bullard MD  Text Page  (7am to 6pm)    Interval History   Urinate 500cc overnight. Seems like he went long time without urinating and early morning, had 500cc UOP. Bladder scan was ~60cc. Denies abdominal pain, flank pain, nausea or vomiting  Overnight, had sharp pain in just in his left toe, but not back or leg pain. Reports had bottle of Clone dropped on his toe 3 days ago. Currently no pain, no swelling. No warmth on the toe and moves able to passive/activlely move it without pain.     -Data reviewed today: I reviewed all new labs and imaging results over the last 24 hours. I personally reviewed the Renal US image(s) showing no hydronephrosis.    Physical Exam   Temp: 97.5  F (36.4  C) Temp src: Oral BP: 124/62 Pulse: 66 Heart Rate: 68 Resp: 18 SpO2: 92 % O2 Device: None (Room air)    Vitals:    12/20/19 1236 12/21/19 0611   Weight: 112 kg (247 lb) 116.4 kg (256 lb 11.2 oz)     Vital Signs with Ranges  Temp:  [97.2  F (36.2  C)-97.8  F (36.6  C)] 97.5  F (36.4  C)  Pulse:  [63-66] 66  Heart Rate:  [65-68] 68  Resp:  [16-18] 18  BP: ()/(53-69) 124/62  SpO2:  [92 %-97 %] 92 %  I/O last 3 completed shifts:  In: 694 [I.V.:694]  Out: 500 [Urine:500]    Constitutional: Alert, awake and no  apparent distress  Respiratory clear to ausculation bilaterally  Cardiovascular: regular rate and rhythm, + systolic murmur, 1+ LE edema bilaterally  GI: soft and non-tender  Skin/Integumen: warm and dry      Medications     sodium chloride 75 mL/hr at 12/21/19 0614       atorvastatin  40 mg Oral Daily     carvedilol  12.5 mg Oral BID w/meals     insulin aspart  1-7 Units Subcutaneous TID AC     insulin aspart  1-5 Units Subcutaneous At Bedtime     sodium chloride (PF)  3 mL Intracatheter Q8H       Data   Recent Labs   Lab 12/21/19  0801 12/20/19  1332   WBC 4.3 4.7   HGB 10.9* 11.0*   MCV 82 82   * 144*   INR 1.94* 2.41*   * 131*   POTASSIUM 4.4 4.8   CHLORIDE 104 103   CO2 21 20   BUN 78* 83*   CR 4.06* 4.45*   ANIONGAP 7 8   GABE 8.6 8.6   * 205*   TROPI  --  <0.015       Recent Results (from the past 24 hour(s))   US Renal Complete    Narrative    ULTRASOUND RENAL COMPLETE   12/20/2019 8:08 PM     HISTORY: Acute kidney injury.    COMPARISON: None.    FINDINGS:   Right Kidney: Measures 13.2 x 5.4 x 6.4 cm, cortical thickness of 1.5  cm. No hydronephrosis.    Left Kidney: Measures 12.6 x 6.4 cm, cortical thickness is 1.3 cm. No  hydronephrosis.    Bladder: Prostate indents the inferior bladder.      Impression    IMPRESSION: No hydronephrosis on either side. Renal measurements as  above. Prostate indents the inferior bladder.    EFRA SALAZAR MD

## 2019-12-21 NOTE — PROVIDER NOTIFICATION
MD Notification    Notified Person: MD    Notified Person Name: Jammie Bullard    Notification Date/Time: 12-21-19 1520    Notification Interaction: Spoke over phone    Purpose of Notification: Pt no voiding since 0600. This is not atypical for pt. States he voids twice a day, large amounts. Current bladder scan shows 467mL. States he does not have urge at this time but will probably have to void by 1700.     Orders Received: Ok to allow patient to void as he is able. Avoid catheterizing at this time    Comments:

## 2019-12-21 NOTE — PLAN OF CARE
A&O. VSS. Up SBA with cane and GB. Mod carb diet, BS checks with meals, no insulin coverage needed. Continent, using urinal at bedside, urine sample needed. No complaints of pain. Continue to monitor.

## 2019-12-21 NOTE — CONSULTS
Hematology Consultation      Burton Elizabeth MRN# [id number]   YOB: 1945 Age: 74 year old   Date of Admission: 12/20/2019     Reason for consult: Recurrent DVT           Assessment and Plan:   The new DVT developed while the patient was reportedly therapeutic on warfarin.  Therefore he was started on Lovenox by Dr. Jsaso.  His baseline Cr was 1.9-2, and it was up to 4.4 on admission.  Acute kidney injury is thought to be due to dehydration.    Talked to the patient about options for anticoagulation.  NOAC not safe in the setting of CKD.  Also unlikely to work for a clot that has developed while therapeutic on warfarin.    Was on Lovenox, which is not ideal for a patient with eGFR<30 at baseline.  Now with his eGFR even lower, I would favor not using Lovenox.    PLAN:  - After a long discussion with patient, we have decided to put him on subQ Heparin.  The dosing is 250 unit(s)/kg BID  - Would check Heparin level (Xa) 4 hours after the 2nd dose of subQ Heparin.  Goal is to keep Heparin level between 0.3-0.7  - No need for further Heparin level monitoring as outpatient  - Would propose 3 months of anticoagulation with subQ Heparin and repeat DVT U/S in 3 months  - Follow up with Dr. Jasso in the outpatient setting    Moshe Cameron M.D.  [facility]  [phone]             Chief Complaint:   Recurrent DVT         History of Present Illness:   This patient is a 74 year old male who follow Dr. Jasso.  He was diagnosed with post-op DVT in 9/2019 for which he was treated with warfarin.  INR has been therapeutic.  Unfortunately developed recurrent leg swelling a week ago and ultrasound showed resolution of the initial popliteal vein DVT but also a new occlusive DVT in one of the two gastrocnemius veins of the proximal left calf that was not there before.  He was instructed by Dr. Jasso to stop warfarin and start Lovenox.  He is currently hospitalized for acute on chronic kidney disease.  Baseline Cr 1.9-2, but Cr was up to 4.4 on admission.  Down to 4 today with IVF.              Past Medical History:     Past Medical History:   Diagnosis Date     Arthritis of knee~ s/p replacement 3/24/2016     Decreased cardiac ejection fraction~44% 3/24/2016     Diabetes mellitus, type 2 (H) 3/24/2016     HTN (hypertension) 3/24/2016     LBBB (left bundle branch block) 3/24/2016     Mixed hyperlipidemia 8/23/2016             Past Surgical History:     Past Surgical History:   Procedure Laterality Date     ARTHROPLASTY KNEE Right 3/29/2016    Procedure: ARTHROPLASTY KNEE;  Surgeon: Heena Rosen MD;  Location: [facility] OR     ARTHROPLASTY REVISION KNEE Left 9/27/2019    Procedure: REVISION LEFT TOTAL KNEE  ARTHROPLASTY;  Surgeon: Heena Rosen MD;  Location:  OR     CV LEFT HEART CATH N/A 8/12/2019    Procedure: Left Heart Cath;  Surgeon: Edgardo Beckham MD;  Location:  HEART CARDIAC CATH LAB     EYE SURGERY      cataract removal     ORTHOPEDIC SURGERY      KNEE SCOPES MULTIPLE               Social History:     Social History     Tobacco Use     Smoking status: Never Smoker     Smokeless tobacco: Never Used   Substance Use Topics     Alcohol use: Yes     Alcohol/week: 0.0 standard drinks     Comment: rare              Family History:     Family History   Problem Relation Age of Onset     Coronary Artery Disease No family hx of                 Medications:     Medications Prior to Admission   Medication Sig Dispense Refill Last Dose     amLODIPine (NORVASC) 10 MG tablet Take 10 mg by mouth daily   12/20/2019 at AM     atorvastatin (LIPITOR) 80 MG tablet Take 40 mg by mouth daily (takes 0.5 x 80mg tablet = 40mg dose)   12/20/2019 at AM     carvedilol (COREG) 25 MG tablet Take 12.5 mg by mouth 2 times daily (with meals) (Takes 0.5 x 25mg tablet = 12.5mg dose)   12/20/2019 at x1     enoxaparin ANTICOAGULANT (LOVENOX) 30 MG/0.3ML syringe Inject 30 mg Subcutaneous 2 times daily Patient consolidated dose  to 60 mg daily   12/20/2019 at AM     glimepiride (AMARYL) 4 MG tablet Take 4 mg by mouth 2 times daily    12/20/2019 at x1     insulin aspart (NOVOLOG FLEXPEN) 100 UNIT/ML pen Inject 3-17 Units Subcutaneous 3 times daily (with meals) As directed    Past Week at Unknown time     insulin glargine (LANTUS) 100 UNIT/ML PEN Inject 12-30 Units Subcutaneous every evening    Past Week at Unknown time     losartan (COZAAR) 100 MG tablet Take 100 mg by mouth daily   12/20/2019 at x1     meloxicam (MOBIC) 15 MG tablet Take 15 mg by mouth daily   PRN at PRN     metFORMIN (GLUCOPHAGE) 500 MG tablet Take 500 mg by mouth 2 times daily   12/20/2019 at x1     multivitamin, therapeutic with minerals (THERA-VIT-M) TABS Take 1 tablet by mouth daily   12/20/2019 at AM     oxyCODONE-acetaminophen (PERCOCET) 5-325 MG tablet Take 1 tablet by mouth every 6 hours as needed for pain   PRN at PRN     aspirin (ASA) 325 MG EC tablet Take 1 tablet (325 mg) by mouth daily 30 tablet 0 More than a month at Unknown time     blood glucose (NO BRAND SPECIFIED) test strip Use to test blood sugar as directed   Taking     order for DME Equipment being ordered: Walker Wheels () and Walker ()  Treatment Diagnosis: Impaired gait 1 each 0 Taking             Review of Systems:   The 10 point Review of Systems is negative other than noted in the HPI            Physical Exam:   Vitals were reviewed  Temp: 97.5  F (36.4  C) Temp src: Oral BP: 124/62 Pulse: 66 Heart Rate: 68 Resp: 18 SpO2: 92 % O2 Device: None (Room air)    General:  Awake alert and oriented  Skin:  No rash or bruising noted  Lungs:  Clear  Abd: S, NT, ND  Ext:  1+ edema bilaterally.  No erythema or tenderness       Data:     Lab Results   Component Value Date    WBC 4.3 12/21/2019    HGB 10.9 (L) 12/21/2019    HCT 33.2 (L) 12/21/2019     (L) 12/21/2019     (L) 12/21/2019    POTASSIUM 4.4 12/21/2019    CHLORIDE 104 12/21/2019    CO2 21 12/21/2019    BUN 78 (H) 12/21/2019     CR 4.06 (H) 12/21/2019     (H) 12/21/2019    TROPI <0.015 12/20/2019    AST 22 08/01/2019    ALT 18 08/01/2019    ALKPHOS 110 08/01/2019    BILITOTAL 0.4 08/01/2019    INR 1.94 (H) 12/21/2019

## 2019-12-21 NOTE — PROVIDER NOTIFICATION
MD Notification    Notified Person: MD    Notified Person Name: Dr. Bullard     Notification Date/Time: 12-21-19 1743    Notification Interaction: Spoke over phone    Purpose of Notification: Pt has ASA ordered but order says no NSAIDs    Orders Received: Ok to give low dose aspirin    Comments:

## 2019-12-21 NOTE — CONSULTS
Consult Date:  12/21/2019      RENAL CONSULTATION      REQUESTING PHYSICIAN:  Jenni Bullard MD      CONSULTANT:  Imer Ba MD      REASON FOR CONSULTATION:  Chronic kidney disease and acute kidney injury.      HISTORY OF PRESENT ILLNESS:  This is a 74-year-old admitted on 12/20/2019 with complaints of decreased urine output, dehydration.  He has a history of chronic kidney disease, stage III.  He denied urinary symptoms.  He had been using nonsteroidal anti-inflammatory drugs including Celebrex and Aleve.  He was taking metformin, losartan and meloxicam on admission.      He notes that prior to admission, he was not feeling well and was not drinking a lot of liquids.  He has a history of chronic kidney disease, stage III, but also hypertension, diabetes, congestive heart failure with ASCVD and DVT on Lovenox.      PAST SURGICAL HISTORY:  Includes total knee arthroplasty x 3, eye surgery and appendectomy.        His treatment in the hospital has included stopping offending medications  including metformin and losartan and nonsteroidal anti-inflammatory drugs and giving him IV fluids.      SOCIAL HISTORY:  He does not smoke.  He has an occasional drink.  He is a [employer] .  [facility]      FAMILY HISTORY:  No one has chronic kidney disease or end-stage renal disease.      REVIEW OF SYSTEMS:  He currently denies shortness of breath, chest pain or abdominal pain.  He has no nausea or vomiting.  He is taking p.o. relatively well at this time.  He is not having any urinary symptoms.      PHYSICAL EXAMINATION:   GENERAL:  He is alert.  He is obese.  He is lying comfortably in bed.  His weight today is 116.4, yesterday it was 112, which I assume is an error.   VITAL SIGNS:  His vital signs today show blood pressure 124/62, respiratory rate 18, he is afebrile.  Heart rate is 68.   SKIN:  Negative.  He is somewhat plethoric, but no rash.   HEENT:  Head shows no  trauma.  The eyes show pupils round and reactive to light.  The mouth shows good dentition.  Posterior pharynx is clear.   NECK:  Supple.   LUNGS:  Show clear breath sounds.   CARDIAC:  Shows a regular rhythm, normal S1, S2.  There is a 3/6 systolic murmur heard across the precordium.   ABDOMEN:  Obese, normal bowel sounds.   EXTREMITIES:  Upper extremities show normal nails, joints and pulses.  The lower extremities showed 2+ edema and diminished pulses.   NEUROLOGIC:  Normal mental status, symmetric cranial nerves, moves all 4 extremities well.      LABORATORY DATA:  Laboratories in 03/2016 the creatinine was 1.81, estimated GFR 37.  In 05/2019 the creatinine was 2.13.  In 09/2019 the creatinine was 1.80 and 2.19.  On admission, his creatinine was 4.45 and today it is 4.06.  His electrolytes show a sodium 132, potassium 4.4, bicarbonate 21.  His fractional excretion of sodium and urine sodium are indeterminate at 1.3 and 48.  His hemoglobin is low at 10.9.  He had an ultrasound done which showed normal size kidneys and no hydronephrosis, but an enlarged prostate.      IMPRESSION:   1.  Chronic kidney disease, stage 3, associated with hypertension, diabetes and obesity.  He has not seen a nephrologist in the past and he probably should continue to follow up with us after discharge.   2.  Acute kidney injury.  This is likely a combination of prerenal azotemia and adverse medication effects including nonsteroidal anti-inflammatory drug and ARB.  He has been on metformin and his bicarbonate is somewhat low, which can happen in the setting of metformin use during decline in kidney function and this has now been stopped.  We will continue giving him normal saline.  We will follow his laboratories closely.   3.  Hypertension.  His blood pressure is low normal.  Right now he is on some Coreg.  This is mostly for his congestive heart failure.   4.  Congestive heart failure.  He also has a cardiac murmur.  I will review his  previous cardiac echos.  I do not think he has been seen by Cardiology during this hospitalization.         ALISON BARRON MD             D: 2019   T: 2019   MT: YESSENIA      Name:     TAY PAGE   MRN:      [id number]        Account:       [id number]   :      1945           Consult Date:  2019      Document: [id number]

## 2019-12-22 LAB
ALBUMIN SERPL-MCNC: 2.8 G/DL (ref 3.4–5)
ALP SERPL-CCNC: 237 U/L (ref 40–150)
ALT SERPL W P-5'-P-CCNC: 67 U/L (ref 0–70)
ANION GAP SERPL CALCULATED.3IONS-SCNC: 5 MMOL/L (ref 3–14)
AST SERPL W P-5'-P-CCNC: 61 U/L (ref 0–45)
BILIRUB SERPL-MCNC: 0.4 MG/DL (ref 0.2–1.3)
BUN SERPL-MCNC: 75 MG/DL (ref 7–30)
CALCIUM SERPL-MCNC: 8.3 MG/DL (ref 8.5–10.1)
CHLORIDE SERPL-SCNC: 105 MMOL/L (ref 94–109)
CO2 SERPL-SCNC: 21 MMOL/L (ref 20–32)
CREAT SERPL-MCNC: 3.76 MG/DL (ref 0.66–1.25)
GFR SERPL CREATININE-BSD FRML MDRD: 15 ML/MIN/{1.73_M2}
GLUCOSE BLDC GLUCOMTR-MCNC: 118 MG/DL (ref 70–99)
GLUCOSE BLDC GLUCOMTR-MCNC: 127 MG/DL (ref 70–99)
GLUCOSE BLDC GLUCOMTR-MCNC: 137 MG/DL (ref 70–99)
GLUCOSE BLDC GLUCOMTR-MCNC: 141 MG/DL (ref 70–99)
GLUCOSE BLDC GLUCOMTR-MCNC: 182 MG/DL (ref 70–99)
GLUCOSE SERPL-MCNC: 195 MG/DL (ref 70–99)
LMWH PPP CHRO-ACNC: 0.49 IU/ML
LMWH PPP CHRO-ACNC: 0.54 IU/ML
MAGNESIUM SERPL-MCNC: 2.1 MG/DL (ref 1.6–2.3)
PHOSPHATE SERPL-MCNC: 4 MG/DL (ref 2.5–4.5)
POTASSIUM SERPL-SCNC: 5.3 MMOL/L (ref 3.4–5.3)
PROT SERPL-MCNC: 6.3 G/DL (ref 6.8–8.8)
SODIUM SERPL-SCNC: 131 MMOL/L (ref 133–144)

## 2019-12-22 PROCEDURE — 80053 COMPREHEN METABOLIC PANEL: CPT | Performed by: INTERNAL MEDICINE

## 2019-12-22 PROCEDURE — 85520 HEPARIN ASSAY: CPT | Performed by: INTERNAL MEDICINE

## 2019-12-22 PROCEDURE — 25000132 ZZH RX MED GY IP 250 OP 250 PS 637: Performed by: INTERNAL MEDICINE

## 2019-12-22 PROCEDURE — 12000000 ZZH R&B MED SURG/OB

## 2019-12-22 PROCEDURE — 36415 COLL VENOUS BLD VENIPUNCTURE: CPT | Performed by: INTERNAL MEDICINE

## 2019-12-22 PROCEDURE — 83735 ASSAY OF MAGNESIUM: CPT | Performed by: INTERNAL MEDICINE

## 2019-12-22 PROCEDURE — 25000128 H RX IP 250 OP 636: Performed by: INTERNAL MEDICINE

## 2019-12-22 PROCEDURE — 25800030 ZZH RX IP 258 OP 636: Performed by: INTERNAL MEDICINE

## 2019-12-22 PROCEDURE — 84100 ASSAY OF PHOSPHORUS: CPT | Performed by: INTERNAL MEDICINE

## 2019-12-22 PROCEDURE — 00000146 ZZHCL STATISTIC GLUCOSE BY METER IP

## 2019-12-22 PROCEDURE — 99232 SBSQ HOSP IP/OBS MODERATE 35: CPT | Performed by: INTERNAL MEDICINE

## 2019-12-22 RX ADMIN — OXYCODONE HYDROCHLORIDE AND ACETAMINOPHEN 1 TABLET: 5; 325 TABLET ORAL at 08:32

## 2019-12-22 RX ADMIN — OXYCODONE HYDROCHLORIDE AND ACETAMINOPHEN 1 TABLET: 5; 325 TABLET ORAL at 20:58

## 2019-12-22 RX ADMIN — SODIUM CHLORIDE: 9 INJECTION, SOLUTION INTRAVENOUS at 14:23

## 2019-12-22 RX ADMIN — OXYCODONE HYDROCHLORIDE AND ACETAMINOPHEN 1 TABLET: 5; 325 TABLET ORAL at 14:23

## 2019-12-22 RX ADMIN — ASPIRIN 81 MG: 81 TABLET, COATED ORAL at 08:32

## 2019-12-22 RX ADMIN — CARVEDILOL 12.5 MG: 6.25 TABLET, FILM COATED ORAL at 17:57

## 2019-12-22 RX ADMIN — HEPARIN SODIUM 25000 UNITS: 10000 INJECTION INTRAVENOUS; SUBCUTANEOUS at 08:41

## 2019-12-22 RX ADMIN — ATORVASTATIN CALCIUM 40 MG: 40 TABLET, FILM COATED ORAL at 08:32

## 2019-12-22 RX ADMIN — CARVEDILOL 12.5 MG: 6.25 TABLET, FILM COATED ORAL at 08:32

## 2019-12-22 RX ADMIN — HEPARIN SODIUM 25000 UNITS: 10000 INJECTION INTRAVENOUS; SUBCUTANEOUS at 20:49

## 2019-12-22 RX ADMIN — SODIUM CHLORIDE: 9 INJECTION, SOLUTION INTRAVENOUS at 04:34

## 2019-12-22 RX ADMIN — OXYCODONE HYDROCHLORIDE AND ACETAMINOPHEN 1 TABLET: 5; 325 TABLET ORAL at 00:24

## 2019-12-22 RX ADMIN — INSULIN ASPART 1 UNITS: 100 INJECTION, SOLUTION INTRAVENOUS; SUBCUTANEOUS at 17:24

## 2019-12-22 ASSESSMENT — ACTIVITIES OF DAILY LIVING (ADL)
ADLS_ACUITY_SCORE: 14

## 2019-12-22 ASSESSMENT — MIFFLIN-ST. JEOR: SCORE: 1931.52

## 2019-12-22 NOTE — PLAN OF CARE
A&O x4. VSS on RA. +2 edema in lower extremities. Lungs clear. BS+. Denies N/V. BGs 127. AUO. Percocet for pain. Up w/1. discharge plan based on AM labs. Pt up 7 Lbs from yesterday

## 2019-12-22 NOTE — PROGRESS NOTES
Oncology/Hematology Follow Up Note:    Assessment and Plan:  The new DVT developed while the patient was reportedly therapeutic on warfarin.  Therefore he was started on Lovenox by Dr. Jasso.  His baseline Cr was 1.9-2, and it was up to 4.4 on admission.  Acute kidney injury is thought to be due to dehydration.     Talked to the patient about options for anticoagulation.  NOAC not safe in the setting of CKD.  Also unlikely to work for a clot that has developed while therapeutic on warfarin.     Was on Lovenox, which is not ideal for a patient with eGFR<30 at baseline.  Now with his eGFR even lower, I would favor not using Lovenox.     PLAN:  - After a long discussion with patient, we have decided to put him on subQ Heparin.  The dosing is 250 unit(s)/kg BID  - Patient's weight is >100 kg.  Underdosing him slightly on purpose given the large dosage (56325 units BID ordered.  250u/kg is 65360 units)  - Will check Heparin level (Xa) 4 hours after the 2nd dose of subQ Heparin (1 PM today).  Goal is to keep Heparin level between 0.3-0.7  - No need for further Heparin level monitoring as outpatient  - Would propose 3 months of anticoagulation with subQ Heparin and repeat DVT U/S in 3 months  - Follow up with Dr. Jasso in the outpatient setting     Moshe Cameron M.D.  [facility]  [phone]      Subjective:    Cr continues to improve with IVF.  It's down to 3.7 today.  Received 1st dose of subQ Heparin last night at 9 PM.  Tolerating so far with no bleeding issues.    Scheduled Medications:  Reviewed active medications    Labs:  CBC RESULTS:   Recent Labs   Lab Test 12/21/19  0801 12/20/19  1332 09/28/19  0710 08/12/19  0704   WBC 4.3 4.7  --  9.9   HGB 10.9* 11.0* 11.6* 10.9*   HCT 33.2* 33.6*  --  36.4*   MCV 82 82  --  82   * 144*  --  271       CMP  Recent Labs   Lab 12/22/19  1009 12/21/19  0801 12/20/19  1332   * 132* 131*   POTASSIUM 5.3 4.4 4.8   CHLORIDE 105 104 103   CO2 21 21 20  "  ANIONGAP 5 7 8   * 103* 205*   BUN 75* 78* 83*   CR 3.76* 4.06* 4.45*   GFRESTIMATED 15* 14* 12*   GFRESTBLACK 17* 16* 14*   GABE 8.3* 8.6 8.6   MAG 2.1  --   --    PHOS 4.0  --   --    PROTTOTAL 6.3*  --   --    ALBUMIN 2.8*  --   --    BILITOTAL 0.4  --   --    ALKPHOS 237*  --   --    AST 61*  --   --    ALT 67  --   --        INR  Recent Labs   Lab 12/21/19  0801 12/20/19  1332   INR 1.94* 2.41*       Objective/Physical Exam:  Blood pressure 133/68, pulse 64, temperature 97  F (36.1  C), temperature source Oral, resp. rate 18, height 1.753 m (5' 9\"), weight 119.6 kg (263 lb 11.2 oz), SpO2 95 %.  General:  Awake alert and oriented  Skin:  No rash or bruising noted  Lungs:  Clear  Abd: S, NT, ND  Ext:  2+ edema bilaterally, symmetrical.  No erythema or tenderness    Moshe Cameron MD  [facility]  12/22/2019 11:13 AM        "

## 2019-12-22 NOTE — PLAN OF CARE
19:30 - 23: 30, A & O times four. Vitals stable, denied any pain at this time. He started on subcutaneous heparin this elis. BGM =226. He has sliding scale insulin per order. Assist of one with care. Continue to monitor.

## 2019-12-22 NOTE — PROGRESS NOTES
[facility]    Hospitalist Progress Note      Assessment & Plan   Burton Elizabeth is a 74 year old male who with past medical history of hypertension, dyslipidemia, chronic congestive heart failure, chronic kidney disease who is presenting due to acute on chronic kidney insufficiency.      Acute kidney injury  Baseline CKD- stage III creatinine ranging from 1.9-2   Presented with creatinine of 4.4.  suspect multifactorial from pre-renal etiology, NSAIDs use and also ARB.  - UA is bland. Renal US without obstruction.    - hold losartan  - continue with IVF, rate increased to 100cc/hr on 12/21 per nephrology  - Cr 4.4->4.08->3.7 this morning  - appreciate nephrology assistance  - f/u BMP in AM  - monitor I/Os    Left lower extremity deep venous thrombosis   Patient was diagnosed with left lower extremity DVT postop back in end of September 2019 after knee surgery.  He was still taking warfarin about a week ago when he went and saw his primary care provider for left leg swelling. Ultrasound revealed an occlusive DVT in one of the 2 gastrocnemius veins of the proximal left calf and previously noted popliteal vein thrombosis was resolved.  It was discussed with Dr. Jasso from [facility] by primary care physician (per note) and it was advised to discontinue warfarin and just put him on Lovenox.  The patient is currently just taking Lovenox at this time. He was scheduled to f/u with Dr. Dial next week, but now admitted to the hospital.   - Lovenox not an option due to renal insufficiency  - [facility] consulted for anticoag management  -- seen by Dr. Cameron and recommendation as follows          -  Heparin  unit(s)/kg BID    - Then check Heparin level (Xa) 4 hours after the 2nd dose of subQ Heparin.  Goal is to keep Heparin level between 0.3-0.7 (Xa level 0.49 at 1pm on 12/22)   - No need for further Heparin level monitoring as outpatient   - Would propose 3 months of anticoagulation with subQ Heparin and  repeat DVT U/S in 3 months    Hypertension  Chronic systolic congestive heart failure with EF of 45%-50%.  Moderate Aortic Stenosis  - no chest pain at this time. Follows by Cardiology, last seen by Brittanie Chiu NP in 8/2019 before orthopedic surgery  - continue with Coreg with hold parameter, BP currently controlled  - hold Losartan due to OPAL  - hold amlodipine since bp currently controlled.    Type 2 diabetes:    He was on Lantus, metformin prior to admission  He said he has not taken his insulin in the last 2-3 days.  His A1c was 6.5% on 09/27/2019.   - continue with sliding scale insulin for now  - discontinued metformin    Hyperlipidemia:  Resume prior to admission statin.        DVT Prophylaxis: Heparin subcutaneous as above.  Code Status: Full Code    Disposition: Expected discharge in 2 days pending improved renal function    Jenni Bullard MD  Text Page  (7am to 6pm)    Interval History   Cr down to 3.76. He is frustrated about sitting in hospital. Explained to him again treatment plan with IVF with gradually improving renal function.   He is afebrile, denies chest pain or shortness of breath    -Data reviewed today: I reviewed all new labs and imaging results over the last 24 hours. I personally reviewed no images or EKG's today.    Physical Exam   Temp: 97.8  F (36.6  C) Temp src: Oral BP: 122/61 Pulse: 63 Heart Rate: 64 Resp: 18 SpO2: 93 % O2 Device: None (Room air)    Vitals:    12/20/19 1236 12/21/19 0611 12/22/19 0551   Weight: 112 kg (247 lb) 116.4 kg (256 lb 11.2 oz) 119.6 kg (263 lb 11.2 oz)     Vital Signs with Ranges  Temp:  [96.9  F (36.1  C)-97.8  F (36.6  C)] 97.8  F (36.6  C)  Pulse:  [63-64] 63  Heart Rate:  [64-69] 64  Resp:  [16-18] 18  BP: (107-133)/(55-68) 122/61  SpO2:  [93 %-95 %] 93 %  I/O last 3 completed shifts:  In: 3209.67 [P.O.:240; I.V.:2969.67]  Out: 1730 [Urine:1730]    Constitutional: Alert, awake and no apparent distress  Respiratory clear to ausculation  bilaterally  Cardiovascular: regular rate and rhythm, + systolic murmur, 1+ LE edema bilaterally  GI: soft and non-tender  Skin/Integumen: warm and dry      Medications     sodium chloride 100 mL/hr at 12/22/19 1423       aspirin  81 mg Oral Daily     atorvastatin  40 mg Oral Daily     carvedilol  12.5 mg Oral BID w/meals     heparin ANTICOAGULANT  25,000 Units Subcutaneous Q12H     insulin aspart  1-7 Units Subcutaneous TID AC     insulin aspart  1-5 Units Subcutaneous At Bedtime     sodium chloride (PF)  3 mL Intracatheter Q8H       Data   Recent Labs   Lab 12/22/19  1009 12/21/19  0801 12/20/19  1332   WBC  --  4.3 4.7   HGB  --  10.9* 11.0*   MCV  --  82 82   PLT  --  147* 144*   INR  --  1.94* 2.41*   * 132* 131*   POTASSIUM 5.3 4.4 4.8   CHLORIDE 105 104 103   CO2 21 21 20   BUN 75* 78* 83*   CR 3.76* 4.06* 4.45*   ANIONGAP 5 7 8   GABE 8.3* 8.6 8.6   * 103* 205*   ALBUMIN 2.8*  --   --    PROTTOTAL 6.3*  --   --    BILITOTAL 0.4  --   --    ALKPHOS 237*  --   --    ALT 67  --   --    AST 61*  --   --    TROPI  --   --  <0.015       No results found for this or any previous visit (from the past 24 hour(s)).

## 2019-12-22 NOTE — PROGRESS NOTES
Assessment and Plan:   OPAL: Creatinine is improving.  He has a history of chronic kidney disease stage III.  He has been using nonsteroidal anti-inflammatory drugs but was also on metformin and losartan.    His chronic kidney disease stage III is associated with hypertension diabetes and obesity.  He has not seen a nephrologist in the past.  His acute kidney injury is likely a complication of prerenal azotemia and adverse medication effects.    He is on normal saline at 100 mL/h.    Cr: 4.45 > 4.06 > 3.76.  Today's potassium is 5.3 and bicarbonate 21, his sodium is 131.    Yesterday his fractional excretion of sodium was 1.3 and urine sodium 48 which are indeterminate.    His exam today shows modest fluid overload so I will stop his fluids.  Gave him a list of analgesics over-the-counter which he should not take including nonsteroidal anti-inflammatory drugs.  We will leave him off of his losartan and metformin for now.  In my opinion if his creatinine continues to decrease he can leave in the morning and he will need follow-up in our clinic in 2 to 4 weeks.  (Dr. Ba or Mariana Granados, [place], [facility], [phone])      Interval History:   Hypertension: Blood pressure well controlled.  He is on Coreg 12.5 mg twice a day.  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                 Review of Systems:   No shortness of breath or chest pain.  He is taking p.o. well.  He is not ambulating much.  He notes swelling in the lower extremities.          Medications:       aspirin  81 mg Oral Daily     atorvastatin  40 mg Oral Daily     carvedilol  12.5 mg Oral BID w/meals     heparin ANTICOAGULANT  25,000 Units Subcutaneous Q12H     insulin aspart  1-7 Units Subcutaneous TID AC     insulin aspart  1-5 Units Subcutaneous At Bedtime     sodium chloride (PF)  3 mL Intracatheter Q8H       sodium chloride 100 mL/hr at 12/22/19 0282     Current active  medications and PTA medications reviewed, see medication list for details.            Physical Exam:   Vitals were reviewed  Patient Vitals for the past 24 hrs:   BP Temp Temp src Pulse Heart Rate Resp SpO2 Weight   19 1524 122/61 97.8  F (36.6  C) Oral 63 -- 18 93 % --   19 1258 122/63 97  F (36.1  C) Oral -- 64 18 95 % --   19 0744 133/68 97  F (36.1  C) Oral -- 66 18 95 % --   19 0551 -- -- -- -- -- -- -- 119.6 kg (263 lb 11.2 oz)   19 0115 -- -- -- -- -- 18 -- --   19 0024 -- -- -- -- -- 18 -- --   19 2330 107/55 97.1  F (36.2  C) Oral -- 67 18 93 % --   19 2100 119/55 96.9  F (36.1  C) Oral 64 64 16 94 % --   19 1821 -- -- -- -- -- 18 -- --   19 1815 113/57 -- -- -- 69 -- -- --       Temp:  [96.9  F (36.1  C)-97.8  F (36.6  C)] 97.8  F (36.6  C)  Pulse:  [63-64] 63  Heart Rate:  [64-69] 64  Resp:  [16-18] 18  BP: (107-133)/(55-68) 122/61  SpO2:  [93 %-95 %] 93 %    Temperatures:  Current - Temp: 97.8  F (36.6  C); Max - Temp  Av.2  F (36.2  C)  Min: 96.9  F (36.1  C)  Max: 97.8  F (36.6  C)  Respiration range: Resp  Av.8  Min: 16  Max: 18  Pulse range: Pulse  Av.5  Min: 63  Max: 64  Blood pressure range: Systolic (24hrs), Av , Min:107 , Max:133   ; Diastolic (24hrs), Av, Min:55, Max:68    Pulse oximetry range: SpO2  Av %  Min: 93 %  Max: 95 %    I/O last 3 completed shifts:  In: 3209.67 [P.O.:240; I.V.:2969.67]  Out: 1730 [Urine:1730]      Intake/Output Summary (Last 24 hours) at 2019 1722  Last data filed at 2019 1423  Gross per 24 hour   Intake 3209.67 ml   Output 1150 ml   Net 2059.67 ml       Alert and responsive  Lungs show bibasilar rales  Cardiac exam mild JVD regular rhythm normal S1-S2 3/6 holosystolic murmur  Lower extremities 1-2+ edema       Wt Readings from Last 4 Encounters:   19 119.6 kg (263 lb 11.2 oz)   19 120.7 kg (266 lb)   19 122.1 kg (269 lb 1.6 oz)   19 122.1 kg  (269 lb 1.6 oz)          Data:          Lab Results   Component Value Date     12/22/2019     12/21/2019     12/20/2019    Lab Results   Component Value Date    CHLORIDE 105 12/22/2019    CHLORIDE 104 12/21/2019    CHLORIDE 103 12/20/2019    Lab Results   Component Value Date    BUN 75 12/22/2019    BUN 78 12/21/2019    BUN 83 12/20/2019      Lab Results   Component Value Date    POTASSIUM 5.3 12/22/2019    POTASSIUM 4.4 12/21/2019    POTASSIUM 4.8 12/20/2019    Lab Results   Component Value Date    CO2 21 12/22/2019    CO2 21 12/21/2019    CO2 20 12/20/2019    Lab Results   Component Value Date    CR 3.76 12/22/2019    CR 4.06 12/21/2019    CR 4.45 12/20/2019        Recent Labs   Lab Test 12/21/19  0801 12/20/19  1332 09/28/19  0710 08/12/19  0704   WBC 4.3 4.7  --  9.9   HGB 10.9* 11.0* 11.6* 10.9*   HCT 33.2* 33.6*  --  36.4*   MCV 82 82  --  82   * 144*  --  271     Recent Labs   Lab Test 12/22/19  1009 08/01/19 05/10/19   AST 61* 22 31   ALT 67 18 21   ALKPHOS 237* 110 111   BILITOTAL 0.4 0.4 0.3       Recent Labs   Lab Test 12/22/19  1009   MAG 2.1     Recent Labs   Lab Test 12/22/19  1009   PHOS 4.0     Recent Labs   Lab Test 12/22/19  1009 12/21/19  0801 12/20/19  1332   GABE 8.3* 8.6 8.6       Lab Results   Component Value Date    GABE 8.3 (L) 12/22/2019     Lab Results   Component Value Date    WBC 4.3 12/21/2019    HGB 10.9 (L) 12/21/2019    HCT 33.2 (L) 12/21/2019    MCV 82 12/21/2019     (L) 12/21/2019     Lab Results   Component Value Date     (L) 12/22/2019    POTASSIUM 5.3 12/22/2019    CHLORIDE 105 12/22/2019    CO2 21 12/22/2019     (H) 12/22/2019     Lab Results   Component Value Date    BUN 75 (H) 12/22/2019    CR 3.76 (H) 12/22/2019     Lab Results   Component Value Date    MAG 2.1 12/22/2019     Lab Results   Component Value Date    PHOS 4.0 12/22/2019       Creatinine   Date Value Ref Range Status   12/22/2019 3.76 (H) 0.66 - 1.25 mg/dL Final    12/21/2019 4.06 (H) 0.66 - 1.25 mg/dL Final   12/20/2019 4.45 (H) 0.66 - 1.25 mg/dL Final   09/28/2019 2.19 (H) 0.66 - 1.25 mg/dL Final   09/27/2019 1.80 (H) 0.66 - 1.25 mg/dL Final   08/12/2019 1.76 (H) 0.66 - 1.25 mg/dL Final       Attestation:  I have reviewed today's vital signs, notes, medications, labs and imaging.     Imer Ba MD

## 2019-12-22 NOTE — PLAN OF CARE
VSS. A&Ox4. Oxycodone x1 for LLE pain. 2+ BLE edema. Abrasion on left heel with mepilex, CDI. Encouraged frequent repositioning in bed. Up with SBA, GB and cane. Voids infrequently but adequate amounts, see MD notification. Plan to start heparin subcutaneous this elis. Plan pending AM labs. Continue to monitor.

## 2019-12-23 LAB
ALBUMIN SERPL-MCNC: 2.9 G/DL (ref 3.4–5)
ANION GAP SERPL CALCULATED.3IONS-SCNC: 2 MMOL/L (ref 3–14)
BUN SERPL-MCNC: 75 MG/DL (ref 7–30)
CALCIUM SERPL-MCNC: 8.9 MG/DL (ref 8.5–10.1)
CHLORIDE SERPL-SCNC: 111 MMOL/L (ref 94–109)
CO2 SERPL-SCNC: 24 MMOL/L (ref 20–32)
CREAT SERPL-MCNC: 3.4 MG/DL (ref 0.66–1.25)
GFR SERPL CREATININE-BSD FRML MDRD: 17 ML/MIN/{1.73_M2}
GLUCOSE BLDC GLUCOMTR-MCNC: 104 MG/DL (ref 70–99)
GLUCOSE BLDC GLUCOMTR-MCNC: 129 MG/DL (ref 70–99)
GLUCOSE BLDC GLUCOMTR-MCNC: 152 MG/DL (ref 70–99)
GLUCOSE BLDC GLUCOMTR-MCNC: 164 MG/DL (ref 70–99)
GLUCOSE BLDC GLUCOMTR-MCNC: 96 MG/DL (ref 70–99)
GLUCOSE SERPL-MCNC: 116 MG/DL (ref 70–99)
PHOSPHATE SERPL-MCNC: 4.2 MG/DL (ref 2.5–4.5)
PLATELET # BLD AUTO: 187 10E9/L (ref 150–450)
POTASSIUM SERPL-SCNC: 5.5 MMOL/L (ref 3.4–5.3)
POTASSIUM SERPL-SCNC: 5.7 MMOL/L (ref 3.4–5.3)
POTASSIUM SERPL-SCNC: 5.8 MMOL/L (ref 3.4–5.3)
SODIUM SERPL-SCNC: 137 MMOL/L (ref 133–144)

## 2019-12-23 PROCEDURE — 36415 COLL VENOUS BLD VENIPUNCTURE: CPT | Performed by: INTERNAL MEDICINE

## 2019-12-23 PROCEDURE — 25000128 H RX IP 250 OP 636: Performed by: INTERNAL MEDICINE

## 2019-12-23 PROCEDURE — 00000146 ZZHCL STATISTIC GLUCOSE BY METER IP

## 2019-12-23 PROCEDURE — 12000000 ZZH R&B MED SURG/OB

## 2019-12-23 PROCEDURE — 84132 ASSAY OF SERUM POTASSIUM: CPT | Performed by: INTERNAL MEDICINE

## 2019-12-23 PROCEDURE — 99232 SBSQ HOSP IP/OBS MODERATE 35: CPT | Performed by: HOSPITALIST

## 2019-12-23 PROCEDURE — 80069 RENAL FUNCTION PANEL: CPT | Performed by: INTERNAL MEDICINE

## 2019-12-23 PROCEDURE — 85049 AUTOMATED PLATELET COUNT: CPT | Performed by: INTERNAL MEDICINE

## 2019-12-23 PROCEDURE — 25000132 ZZH RX MED GY IP 250 OP 250 PS 637: Performed by: INTERNAL MEDICINE

## 2019-12-23 RX ADMIN — HEPARIN SODIUM 25000 UNITS: 10000 INJECTION INTRAVENOUS; SUBCUTANEOUS at 21:58

## 2019-12-23 RX ADMIN — OXYCODONE HYDROCHLORIDE AND ACETAMINOPHEN 1 TABLET: 5; 325 TABLET ORAL at 23:32

## 2019-12-23 RX ADMIN — CARVEDILOL 12.5 MG: 6.25 TABLET, FILM COATED ORAL at 17:49

## 2019-12-23 RX ADMIN — ASPIRIN 81 MG: 81 TABLET, COATED ORAL at 08:36

## 2019-12-23 RX ADMIN — INSULIN ASPART 1 UNITS: 100 INJECTION, SOLUTION INTRAVENOUS; SUBCUTANEOUS at 13:27

## 2019-12-23 RX ADMIN — OXYCODONE HYDROCHLORIDE AND ACETAMINOPHEN 1 TABLET: 5; 325 TABLET ORAL at 04:46

## 2019-12-23 RX ADMIN — ATORVASTATIN CALCIUM 40 MG: 40 TABLET, FILM COATED ORAL at 08:36

## 2019-12-23 RX ADMIN — HEPARIN SODIUM 25000 UNITS: 10000 INJECTION INTRAVENOUS; SUBCUTANEOUS at 08:36

## 2019-12-23 RX ADMIN — CARVEDILOL 12.5 MG: 6.25 TABLET, FILM COATED ORAL at 08:36

## 2019-12-23 ASSESSMENT — ACTIVITIES OF DAILY LIVING (ADL)
COGNITION: 0 - NO COGNITION ISSUES REPORTED
RETIRED_COMMUNICATION: 0-->UNDERSTANDS/COMMUNICATES WITHOUT DIFFICULTY
RETIRED_EATING: 0-->INDEPENDENT
BATHING: 0-->INDEPENDENT
DRESS: 0-->INDEPENDENT
ADLS_ACUITY_SCORE: 13
TOILETING: 0-->INDEPENDENT
ADLS_ACUITY_SCORE: 14
ADLS_ACUITY_SCORE: 14
SWALLOWING: 0-->SWALLOWS FOODS/LIQUIDS WITHOUT DIFFICULTY
FALL_HISTORY_WITHIN_LAST_SIX_MONTHS: NO
ADLS_ACUITY_SCORE: 13
ADLS_ACUITY_SCORE: 14
AMBULATION: 0-->INDEPENDENT
TRANSFERRING: 0-->INDEPENDENT
ADLS_ACUITY_SCORE: 14

## 2019-12-23 NOTE — PROGRESS NOTES
[facility]    Medicine Progress Note - Hospitalist Service       Date of Admission:  12/20/2019  Assessment & Plan   Burton Elizabeth is a 74 year old male who with past medical history of hypertension, dyslipidemia, chronic congestive heart failure, chronic kidney disease who is presenting due to acute on chronic kidney insufficiency.      Acute kidney injury  Baseline CKD- stage III creatinine ranging from 1.9-2   Presented with creatinine of 4.4. Suspect multifactorial from pre-renal etiology, NSAIDs use and also ARB.  - Nephrology was consulted and assisted with management, appreciate their assistance.  - UA was bland. Renal US without obstruction.   - Losartan on hold for in setting of OPAL.  - IV fluids discontinued by nephrology on 12/22/19.  - Cr 4.4->4.08->3.7->3.4 this morning.  - Recheck BMP in AM.    Hyperkalemia  - Possibly related to subcut heparin.  - Started on low potassium diet.   - Recheck this evening and in AM.     Left lower extremity deep venous thrombosis   Patient was diagnosed with left lower extremity DVT postop back in end of September 2019 after knee surgery.  He was still taking warfarin about a week ago when he went and saw his primary care provider for left leg swelling. Ultrasound revealed an occlusive DVT in one of the 2 gastrocnemius veins of the proximal left calf and previously noted popliteal vein thrombosis was resolved.  It was discussed with Dr. Jasso from [facility] by primary care physician (per note) and it was advised to discontinue warfarin and just put him on Lovenox.  The patient was currently just taking Lovenox at the time of admission. He was scheduled to f/u with Dr. Dial next week, but now admitted to the hospital.   - Lovenox not an option due to renal insufficiency.  - [facility] consulted for anticoag management.  - Started on Heparin  unit(s)/kg BID.  - Follow-up with Dr. Dial on 1/6/20 at 4 pm.  - See hem/onc notes for further  details.     Hypertension  Chronic systolic congestive heart failure with EF of 45%-50%  Moderate Aortic Stenosis  - No chest pain at this time. Follows by Cardiology, last seen by Brittanie Chiu NP in 8/2019 before orthopedic surgery.  - Continue with Coreg with hold parameter, BP currently controlled.  - Hold Losartan due to OPAL.  - Hold amlodipine since BP currently controlled, likely restarted upon discharge.     Type 2 diabetes  He was on Lantus, metformin prior to admission  He said he has not taken his insulin in the last 2-3 days.  His A1c was 6.5% on 09/27/2019.   - Continue with sliding scale insulin for now, requiring minimal insulin.  - Metformin discontinued in setting of OPAL.  - Will need close follow-up with PCP.     Hyperlipidemia  - Continue PTA atorvastatin.         Diet: Combination Diet 2 gm K Diet; 3422-5120 Calories: Moderate Consistent CHO (4-6 CHO units/meal)    DVT Prophylaxis: Heparin SQ  Zuniga Catheter: not present  Code Status: Full Code      Disposition Plan   Expected discharge: Tomorrow, recommended to prior living arrangement once renal function improved and potassium improved.  Entered: Imer Azul MD 12/23/2019, 1:27 PM       The patient's care was discussed with the Bedside Nurse, Patient and Nephrology Consultant.    Imer Azul MD  Hospitalist Service  [facility]    ______________________________________________________________________    Interval History   Burton Elizabeth was seen this afternoon. Feels OK. No new complaints or concerns. Frustrated that he is still in the hospital, making plans for discharge tomorrow. Denies fevers, chest pain, shortness of breath, nausea, abdominal pain.    Data reviewed today: I reviewed all medications, new labs and imaging results over the last 24 hours. I personally reviewed no images or EKG's today.    Physical Exam   Vital Signs: Temp: 97.1  F (36.2  C) Temp src: Oral BP: 136/73 Pulse: 70 Heart Rate: 67 Resp:  16 SpO2: 93 % O2 Device: None (Room air)    Weight: 263 lbs 11.2 oz  Constitutional: awake, alert, cooperative, no apparent distress  Respiratory: clear to auscultation bilaterally, no crackles or wheezing  Cardiovascular: regular rate and rhythm, normal S1 and S2, systolic murmur noted, 1+ bilateral edema  GI: normal bowel sounds, soft, non-distended, non-tender  Skin: warm, dry  Neurologic: awake, alert, answers questions appropriately    Data   Recent Labs   Lab 12/23/19  1133 12/23/19  0732 12/22/19  1009 12/21/19  0801 12/20/19  1332   WBC  --   --   --  4.3 4.7   HGB  --   --   --  10.9* 11.0*   MCV  --   --   --  82 82   PLT  --  187  --  147* 144*   INR  --   --   --  1.94* 2.41*   NA  --  137 131* 132* 131*   POTASSIUM 5.7* 5.8* 5.3 4.4 4.8   CHLORIDE  --  111* 105 104 103   CO2  --  24 21 21 20   BUN  --  75* 75* 78* 83*   CR  --  3.40* 3.76* 4.06* 4.45*   ANIONGAP  --  2* 5 7 8   GABE  --  8.9 8.3* 8.6 8.6   GLC  --  116* 195* 103* 205*   ALBUMIN  --  2.9* 2.8*  --   --    PROTTOTAL  --   --  6.3*  --   --    BILITOTAL  --   --  0.4  --   --    ALKPHOS  --   --  237*  --   --    ALT  --   --  67  --   --    AST  --   --  61*  --   --    TROPI  --   --   --   --  <0.015     Medications       aspirin  81 mg Oral Daily     atorvastatin  40 mg Oral Daily     carvedilol  12.5 mg Oral BID w/meals     heparin ANTICOAGULANT  25,000 Units Subcutaneous Q12H     insulin aspart  1-7 Units Subcutaneous TID AC     insulin aspart  1-5 Units Subcutaneous At Bedtime     sodium chloride (PF)  3 mL Intracatheter Q8H

## 2019-12-23 NOTE — PLAN OF CARE
Pt a/ox4. Pt c/o left foot pain-gave percocet x1-effective. LS clear. BS active. Voids via urinal. Up with SBA with IV pole. IV-SL. Mepilex to left heel. Possible discharge pending creatinine level in AM.

## 2019-12-23 NOTE — PLAN OF CARE
A&Ox4. Denies pain. Tolerating mod carb diet. BG checks. Left heel blister covered with mepilex- changed; CDI. Plan: Recheck K at 1900 and again in the morning. Possible discharge home tomorrow morning.

## 2019-12-23 NOTE — PROGRESS NOTES
Progress Note           Assessment and Plan:     - History of previous DVT after surgery for total knee arthroplasty (2016) and took 1 month of anticoagulation per records  - Left total knee arthroplasty 9/26/2019  - New DVT in left lower extremity 10/4- started LMWH and warfarin  - Repeat US 12/13  showed ongoing nonocclusive DVT in gastrocnemius vein, but resolution of prior popliteal DVT. Resumed LMWH.  - Saw Dr. Moctezuma in consultation 12/16/2019 who recommended baseline labs and creatinine was 3.8 mg/dL. LMWH nor DOAC therapy not recommended.  - Hospitalization recommended, but initially refused. He went to ED 12/20.  - After a long discussion with patient, he was started on subQ Heparin.  The dosing is 250 unit(s)/kg BID  - Patient's weight is >100 kg.  Underdosing him slightly on purpose given the large dosage (57709 units BID ordered.  250u/kg is 67473 units)  - Heparin level (Xa) 4 hours after the 2nd dose of subQ Heparin (0.49).  Goal is to keep Heparin level between 0.3-0.7  - No need for further Heparin level monitoring as outpatient  - Would propose 3 months of anticoagulation with subQ Heparin and repeat DVT U/S in 3 months  - Follow up with Dr. Dial 1/6 4 pm (already scheduled)    Thomas ECHEVARRIA, CNP  [facility]  [phone] (office), [phone] (cell)        Interval History:     Magdaleno wants to go home. We talked about risks of anticoagulation. We also discussed outpatient follow up.              Review of Systems:     The 5 point Review of Systems is negative other than noted in the HPI             Medications:   Scheduled Medications    aspirin  81 mg Oral Daily     atorvastatin  40 mg Oral Daily     carvedilol  12.5 mg Oral BID w/meals     heparin ANTICOAGULANT  25,000 Units Subcutaneous Q12H     insulin aspart  1-7 Units Subcutaneous TID AC     insulin aspart  1-5 Units Subcutaneous At Bedtime     sodium chloride (PF)  3 mL Intracatheter Q8H     PRN Medications  acetaminophen,  "glucose **OR** dextrose **OR** glucagon, lidocaine 4%, lidocaine (buffered or not buffered), melatonin, naloxone, ondansetron **OR** ondansetron, oxyCODONE-acetaminophen, polyethylene glycol, prochlorperazine **OR** prochlorperazine **OR** prochlorperazine, senna-docusate **OR** senna-docusate, sodium chloride (PF), traZODone               Physical Exam:   Vitals were reviewed  Blood pressure 136/73, pulse 70, temperature 97.1  F (36.2  C), temperature source Oral, resp. rate 16, height 1.753 m (5' 9\"), weight 119.6 kg (263 lb 11.2 oz), SpO2 93 %.  Wt Readings from Last 4 Encounters:   12/22/19 119.6 kg (263 lb 11.2 oz)   08/19/19 120.7 kg (266 lb)   08/12/19 122.1 kg (269 lb 1.6 oz)   08/07/19 122.1 kg (269 lb 1.6 oz)       I/O last 3 completed shifts:  In: 1838 [I.V.:1838]  Out: 2000 [Urine:2000]      Constitutional: Awake, alert, cooperative, no apparent distress     Lungs: Clear to auscultation bilaterally, no crackles or wheezing   Cardiovascular: Regular rate and rhythm, normal S1 and S2, and no murmur noted   Abdomen: Normal bowel sounds, soft, non-distended, non-tender   Skin: Generalized edema.   Other:               Data:   All laboratory data and imaging studies reviewed.    CMP  Recent Labs   Lab 12/23/19  1133 12/23/19  0732 12/22/19  1009 12/21/19  0801 12/20/19  1332   NA  --  137 131* 132* 131*   POTASSIUM 5.7* 5.8* 5.3 4.4 4.8   CHLORIDE  --  111* 105 104 103   CO2  --  24 21 21 20   ANIONGAP  --  2* 5 7 8   GLC  --  116* 195* 103* 205*   BUN  --  75* 75* 78* 83*   CR  --  3.40* 3.76* 4.06* 4.45*   GFRESTIMATED  --  17* 15* 14* 12*   GFRESTBLACK  --  20* 17* 16* 14*   GABE  --  8.9 8.3* 8.6 8.6   MAG  --   --  2.1  --   --    PHOS  --  4.2 4.0  --   --    PROTTOTAL  --   --  6.3*  --   --    ALBUMIN  --  2.9* 2.8*  --   --    BILITOTAL  --   --  0.4  --   --    ALKPHOS  --   --  237*  --   --    AST  --   --  61*  --   --    ALT  --   --  67  --   --      CBC  Recent Labs   Lab 12/23/19  0732 " 12/21/19  0801 12/20/19  1332   WBC  --  4.3 4.7   RBC  --  4.04* 4.10*   HGB  --  10.9* 11.0*   HCT  --  33.2* 33.6*   MCV  --  82 82   MCH  --  27.0 26.8   MCHC  --  32.8 32.7   RDW  --  16.1* 15.9*    147* 144*     INR  Recent Labs   Lab 12/21/19  0801 12/20/19  1332   INR 1.94* 2.41*

## 2019-12-23 NOTE — PROGRESS NOTES
K is high today and has been trending up x 2d despite improved renal fxn.  He rec'd some heparin, which can suppress kendrick and increase k.    1.  Repeat k.  2.  Change diet to low k for now.

## 2019-12-23 NOTE — PLAN OF CARE
1163-0812:   Pt a/ox4. VSS. C/o LLE pain, percocet given as needed with adequate relief. Up with assist x1 using cane. Blood glucose 104.

## 2019-12-23 NOTE — PLAN OF CARE
A&Ox4. VSS. 8/10 LLE shooting/aching pain. PRN Percocet given; effective. PIV infusing. UOP pratik. SBA +cane when up. Mepilex on LLE blister changed; CDI. Plan: monitor creat if improved possible discharge tomorrow.

## 2019-12-24 LAB
ANION GAP SERPL CALCULATED.3IONS-SCNC: 4 MMOL/L (ref 3–14)
ANION GAP SERPL CALCULATED.3IONS-SCNC: 5 MMOL/L (ref 3–14)
ANION GAP SERPL CALCULATED.3IONS-SCNC: 7 MMOL/L (ref 3–14)
BUN SERPL-MCNC: 55 MG/DL (ref 7–30)
BUN SERPL-MCNC: 58 MG/DL (ref 7–30)
BUN SERPL-MCNC: 63 MG/DL (ref 7–30)
CALCIUM SERPL-MCNC: 9.2 MG/DL (ref 8.5–10.1)
CALCIUM SERPL-MCNC: 9.3 MG/DL (ref 8.5–10.1)
CALCIUM SERPL-MCNC: 9.3 MG/DL (ref 8.5–10.1)
CHLORIDE SERPL-SCNC: 108 MMOL/L (ref 94–109)
CHLORIDE SERPL-SCNC: 108 MMOL/L (ref 94–109)
CHLORIDE SERPL-SCNC: 111 MMOL/L (ref 94–109)
CO2 SERPL-SCNC: 19 MMOL/L (ref 20–32)
CO2 SERPL-SCNC: 20 MMOL/L (ref 20–32)
CO2 SERPL-SCNC: 20 MMOL/L (ref 20–32)
CREAT SERPL-MCNC: 2.51 MG/DL (ref 0.66–1.25)
CREAT SERPL-MCNC: 2.63 MG/DL (ref 0.66–1.25)
CREAT SERPL-MCNC: 2.73 MG/DL (ref 0.66–1.25)
GFR SERPL CREATININE-BSD FRML MDRD: 22 ML/MIN/{1.73_M2}
GFR SERPL CREATININE-BSD FRML MDRD: 23 ML/MIN/{1.73_M2}
GFR SERPL CREATININE-BSD FRML MDRD: 24 ML/MIN/{1.73_M2}
GLUCOSE BLDC GLUCOMTR-MCNC: 118 MG/DL (ref 70–99)
GLUCOSE BLDC GLUCOMTR-MCNC: 168 MG/DL (ref 70–99)
GLUCOSE BLDC GLUCOMTR-MCNC: 191 MG/DL (ref 70–99)
GLUCOSE BLDC GLUCOMTR-MCNC: 214 MG/DL (ref 70–99)
GLUCOSE SERPL-MCNC: 128 MG/DL (ref 70–99)
GLUCOSE SERPL-MCNC: 134 MG/DL (ref 70–99)
GLUCOSE SERPL-MCNC: 209 MG/DL (ref 70–99)
HGB BLD-MCNC: 11.1 G/DL (ref 13.3–17.7)
LMWH PPP CHRO-ACNC: 1.05 IU/ML
POTASSIUM SERPL-SCNC: 5.2 MMOL/L (ref 3.4–5.3)
POTASSIUM SERPL-SCNC: 5.7 MMOL/L (ref 3.4–5.3)
POTASSIUM SERPL-SCNC: 6 MMOL/L (ref 3.4–5.3)
SODIUM SERPL-SCNC: 133 MMOL/L (ref 133–144)
SODIUM SERPL-SCNC: 134 MMOL/L (ref 133–144)
SODIUM SERPL-SCNC: 135 MMOL/L (ref 133–144)

## 2019-12-24 PROCEDURE — 00000146 ZZHCL STATISTIC GLUCOSE BY METER IP

## 2019-12-24 PROCEDURE — 12000000 ZZH R&B MED SURG/OB

## 2019-12-24 PROCEDURE — 36415 COLL VENOUS BLD VENIPUNCTURE: CPT | Performed by: INTERNAL MEDICINE

## 2019-12-24 PROCEDURE — 25000128 H RX IP 250 OP 636: Performed by: INTERNAL MEDICINE

## 2019-12-24 PROCEDURE — 25800030 ZZH RX IP 258 OP 636: Performed by: HOSPITALIST

## 2019-12-24 PROCEDURE — 80048 BASIC METABOLIC PNL TOTAL CA: CPT | Performed by: HOSPITALIST

## 2019-12-24 PROCEDURE — 25000132 ZZH RX MED GY IP 250 OP 250 PS 637: Performed by: INTERNAL MEDICINE

## 2019-12-24 PROCEDURE — 25000125 ZZHC RX 250: Performed by: INTERNAL MEDICINE

## 2019-12-24 PROCEDURE — 25800030 ZZH RX IP 258 OP 636: Performed by: INTERNAL MEDICINE

## 2019-12-24 PROCEDURE — 93010 ELECTROCARDIOGRAM REPORT: CPT | Performed by: INTERNAL MEDICINE

## 2019-12-24 PROCEDURE — 85520 HEPARIN ASSAY: CPT | Performed by: HOSPITALIST

## 2019-12-24 PROCEDURE — 25000128 H RX IP 250 OP 636: Performed by: HOSPITALIST

## 2019-12-24 PROCEDURE — 25000131 ZZH RX MED GY IP 250 OP 636 PS 637: Performed by: HOSPITALIST

## 2019-12-24 PROCEDURE — 36415 COLL VENOUS BLD VENIPUNCTURE: CPT | Performed by: HOSPITALIST

## 2019-12-24 PROCEDURE — 99232 SBSQ HOSP IP/OBS MODERATE 35: CPT | Performed by: HOSPITALIST

## 2019-12-24 PROCEDURE — 25000132 ZZH RX MED GY IP 250 OP 250 PS 637: Performed by: HOSPITALIST

## 2019-12-24 PROCEDURE — 93005 ELECTROCARDIOGRAM TRACING: CPT

## 2019-12-24 PROCEDURE — 80048 BASIC METABOLIC PNL TOTAL CA: CPT | Performed by: INTERNAL MEDICINE

## 2019-12-24 PROCEDURE — 25800025 ZZH RX 258: Performed by: INTERNAL MEDICINE

## 2019-12-24 PROCEDURE — 85018 HEMOGLOBIN: CPT | Performed by: HOSPITALIST

## 2019-12-24 RX ORDER — HEPARIN SODIUM 10000 [USP'U]/100ML
1100 INJECTION, SOLUTION INTRAVENOUS CONTINUOUS
Status: DISCONTINUED | OUTPATIENT
Start: 2019-12-24 | End: 2019-12-25 | Stop reason: ALTCHOICE

## 2019-12-24 RX ORDER — AMLODIPINE BESYLATE 5 MG/1
10 TABLET ORAL DAILY
Status: DISCONTINUED | OUTPATIENT
Start: 2019-12-24 | End: 2019-12-25 | Stop reason: HOSPADM

## 2019-12-24 RX ORDER — SODIUM POLYSTYRENE SULFONATE 15 G/60ML
30 SUSPENSION ORAL; RECTAL ONCE
Status: COMPLETED | OUTPATIENT
Start: 2019-12-24 | End: 2019-12-24

## 2019-12-24 RX ORDER — FUROSEMIDE 10 MG/ML
40 INJECTION INTRAMUSCULAR; INTRAVENOUS ONCE
Status: COMPLETED | OUTPATIENT
Start: 2019-12-24 | End: 2019-12-24

## 2019-12-24 RX ORDER — DEXTROSE MONOHYDRATE 25 G/50ML
50 INJECTION, SOLUTION INTRAVENOUS ONCE
Status: COMPLETED | OUTPATIENT
Start: 2019-12-24 | End: 2019-12-24

## 2019-12-24 RX ORDER — LOPERAMIDE HCL 2 MG
2 CAPSULE ORAL 4 TIMES DAILY PRN
Status: DISCONTINUED | OUTPATIENT
Start: 2019-12-24 | End: 2019-12-25 | Stop reason: HOSPADM

## 2019-12-24 RX ORDER — LOPERAMIDE HCL 1 MG/7.5ML
2 SUSPENSION ORAL 4 TIMES DAILY PRN
Status: DISCONTINUED | OUTPATIENT
Start: 2019-12-24 | End: 2019-12-24

## 2019-12-24 RX ADMIN — SODIUM BICARBONATE: 84 INJECTION, SOLUTION INTRAVENOUS at 12:28

## 2019-12-24 RX ADMIN — CARVEDILOL 12.5 MG: 6.25 TABLET, FILM COATED ORAL at 18:00

## 2019-12-24 RX ADMIN — HEPARIN SODIUM 1600 UNITS/HR: 10000 INJECTION, SOLUTION INTRAVENOUS at 11:17

## 2019-12-24 RX ADMIN — ASPIRIN 81 MG: 81 TABLET, COATED ORAL at 08:59

## 2019-12-24 RX ADMIN — OXYCODONE HYDROCHLORIDE AND ACETAMINOPHEN 1 TABLET: 5; 325 TABLET ORAL at 22:59

## 2019-12-24 RX ADMIN — ATORVASTATIN CALCIUM 40 MG: 40 TABLET, FILM COATED ORAL at 08:59

## 2019-12-24 RX ADMIN — INSULIN ASPART 2 UNITS: 100 INJECTION, SOLUTION INTRAVENOUS; SUBCUTANEOUS at 18:55

## 2019-12-24 RX ADMIN — SODIUM CHLORIDE 5 UNITS: 9 INJECTION, SOLUTION INTRAVENOUS at 10:56

## 2019-12-24 RX ADMIN — SODIUM POLYSTYRENE SULFONATE 30 G: 15 SUSPENSION ORAL; RECTAL at 11:20

## 2019-12-24 RX ADMIN — FUROSEMIDE 40 MG: 10 INJECTION, SOLUTION INTRAVENOUS at 10:53

## 2019-12-24 RX ADMIN — AMLODIPINE BESYLATE 10 MG: 5 TABLET ORAL at 11:20

## 2019-12-24 RX ADMIN — CARVEDILOL 12.5 MG: 6.25 TABLET, FILM COATED ORAL at 08:59

## 2019-12-24 RX ADMIN — CALCIUM GLUCONATE 1 G: 98 INJECTION, SOLUTION INTRAVENOUS at 11:00

## 2019-12-24 RX ADMIN — DEXTROSE MONOHYDRATE 50 ML: 500 INJECTION PARENTERAL at 10:50

## 2019-12-24 RX ADMIN — SODIUM BICARBONATE: 84 INJECTION, SOLUTION INTRAVENOUS at 14:53

## 2019-12-24 RX ADMIN — LOPERAMIDE HYDROCHLORIDE 2 MG: 2 CAPSULE ORAL at 18:00

## 2019-12-24 ASSESSMENT — ACTIVITIES OF DAILY LIVING (ADL)
ADLS_ACUITY_SCORE: 13

## 2019-12-24 NOTE — PROGRESS NOTES
Progress Note          Assessment and Plan:     DVT  - History of previous DVT after surgery for total knee arthroplasty (2016) and took 1 month of anticoagulation per records  - Left total knee arthroplasty 9/26/2019  - New DVT in left lower extremity 10/4- started LMWH and warfarin  - Repeat US 12/13  showed ongoing nonocclusive DVT in gastrocnemius vein, but resolution of prior popliteal DVT. Resumed LMWH.  - Saw Dr. Moctezuma in consultation 12/16/2019 who recommended baseline labs and creatinine was 3.8 mg/dL. LMWH nor DOAC therapy not recommended.  - Hospitalization recommended, but initially refused. He went to ED 12/20.  - After a long discussion with patient, he was started on subQ Heparin.  The dosing is 250 unit(s)/kg BID  - Patient's weight is >100 kg.  Underdosing him slightly on purpose given the large dosage (09664 units BID ordered.  250u/kg is 84179 units)  - Heparin level (Xa) 4 hours after the 2nd dose of subQ Heparin (0.49).  Goal is to keep Heparin level between 0.3-0.7  - Started bleeding overnight from subcutaneous injection site  - Hemoglobin stable, platelets have been stable  - CBC in AM  - Issues with hyperkalemia could be from heparin effect on aldosterone  - Dr. Moctezuma suggested changing to IV heparin earlier today to help with consistency of dosing and discontinuation of injections  - If renal function continue to improve, alternative anticoagulants are available  - Would propose 3 months of anticoagulation and repeat DVT U/S in 3 months  - Follow up with Dr. Dial 1/6 4 pm (already scheduled)    Hyperkalemia  - Suspected to be related to heparin  - Nephrology will treat hyperkalemia  - Continue low potassium diet  - Hopefully renal function improves allowing a change in anticoagulation    OPAL on CKD  - Nephrology consulted  - Renal function improving  - Making urine    Thomas Doshi APRN, CNP  [facility]  [phone] (office), [phone] (cell)        Interval History:  "    Reviewed anticoagulation plan.               Review of Systems:     The 5 point Review of Systems is negative other than noted in the HPI             Medications:   Scheduled Medications    amLODIPine  10 mg Oral Daily     aspirin  81 mg Oral Daily     atorvastatin  40 mg Oral Daily     calcium gluconate  1 g Intravenous Once     carvedilol  12.5 mg Oral BID w/meals     insulin aspart  1-7 Units Subcutaneous TID AC     insulin aspart  1-5 Units Subcutaneous At Bedtime     sodium chloride (PF)  3 mL Intracatheter Q8H     sodium polystyrene  30 g Oral Once     PRN Medications  acetaminophen, glucose **OR** dextrose **OR** glucagon, lidocaine 4%, lidocaine (buffered or not buffered), melatonin, naloxone, ondansetron **OR** ondansetron, oxyCODONE-acetaminophen, polyethylene glycol, prochlorperazine **OR** prochlorperazine **OR** prochlorperazine, senna-docusate **OR** senna-docusate, sodium chloride (PF), traZODone               Physical Exam:   Vitals were reviewed  Blood pressure (!) 155/81, pulse 70, temperature 97.3  F (36.3  C), temperature source Oral, resp. rate 18, height 1.753 m (5' 9\"), weight 119.6 kg (263 lb 11.2 oz), SpO2 93 %.  Wt Readings from Last 4 Encounters:   12/22/19 119.6 kg (263 lb 11.2 oz)   08/19/19 120.7 kg (266 lb)   08/12/19 122.1 kg (269 lb 1.6 oz)   08/07/19 122.1 kg (269 lb 1.6 oz)       I/O last 3 completed shifts:  In: 800 [P.O.:800]  Out: 2300 [Urine:2300]      Constitutional: Awake, alert, cooperative, no apparent distress     Lungs: Clear to auscultation bilaterally, no crackles or wheezing   Cardiovascular: Regular rate and rhythm, normal S1 and S2, and 2/6 murmur    Abdomen: Normal bowel sounds, soft, non-distended, non-tender   Skin: Generalized edema   Other: Bruising on abdomen is significant              Data:   All laboratory data and imaging studies reviewed.    CMP  Recent Labs   Lab 12/24/19  1021 12/24/19  0740 12/23/19 2001 12/23/19  1133 12/23/19  0732 12/22/19  1009 "    135  --   --  137 131*   POTASSIUM 5.7* 6.0* 5.5* 5.7* 5.8* 5.3   CHLORIDE 108 111*  --   --  111* 105   CO2 20 20  --   --  24 21   ANIONGAP 5 4  --   --  2* 5   * 134*  --   --  116* 195*   BUN 55* 63*  --   --  75* 75*   CR 2.51* 2.73*  --   --  3.40* 3.76*   GFRESTIMATED 24* 22*  --   --  17* 15*   GFRESTBLACK 28* 25*  --   --  20* 17*   GABE 9.2 9.3  --   --  8.9 8.3*   MAG  --   --   --   --   --  2.1   PHOS  --   --   --   --  4.2 4.0   PROTTOTAL  --   --   --   --   --  6.3*   ALBUMIN  --   --   --   --  2.9* 2.8*   BILITOTAL  --   --   --   --   --  0.4   ALKPHOS  --   --   --   --   --  237*   AST  --   --   --   --   --  61*   ALT  --   --   --   --   --  67     CBC  Recent Labs   Lab 12/24/19  0740 12/23/19  0732 12/21/19  0801 12/20/19  1332   WBC  --   --  4.3 4.7   RBC  --   --  4.04* 4.10*   HGB 11.1*  --  10.9* 11.0*   HCT  --   --  33.2* 33.6*   MCV  --   --  82 82   MCH  --   --  27.0 26.8   MCHC  --   --  32.8 32.7   RDW  --   --  16.1* 15.9*   PLT  --  187 147* 144*     INR  Recent Labs   Lab 12/21/19  0801 12/20/19  1332   INR 1.94* 2.41*

## 2019-12-24 NOTE — PLAN OF CARE
No acute changes overnight. A&O. VSS. Percocet given for L foot pain x1, effective. Frustrated about care, wants to go home. Last K+ 5.5, recheck again this AM. Good UOP overnight. Bleeding from hold heparin site injections, redressed overnight. Also experienced a nose bleed. Plan for possible discharge today pending K+ levels.

## 2019-12-24 NOTE — PROVIDER NOTIFICATION
MD Notification    Notified Person: MD    Notified Person Name: Dr. Azul    Notification Date/Time: 12/24/19 at 1645    Notification Interaction: paged MD    Purpose of Notification: pt reports 3 loose stools within the last hour. Since his K+ has normalized 5.2, he would like to take something for the diarrhea.     Orders Received: PRN imodium ordered.

## 2019-12-24 NOTE — PROVIDER NOTIFICATION
MD Notification    Notified Person: MD    Notified Person Name: Dr. Azul    Notification Date/Time: 12/24/19 at 0827    Notification Interaction: paged    Purpose of Notification: K+ 6.0    Orders Received: page nephrology    *paged Dr. Ba with nephrology.

## 2019-12-24 NOTE — PLAN OF CARE
2212-3819: A&Ox4. VSS. Denies pain. Tolerating mod carb diet. BG checks. Left heel blister covered with mepilex CDI.  Plan: Recheck K at 1900 and again in the morning. Possible discharge home tomorrow morning.

## 2019-12-24 NOTE — PROVIDER NOTIFICATION
MD Notification    Notified Person: MD    Notified Person Name: Thomas Doshi CNP with oncology     Notification Date/Time: 12/24/19 at 0900    Notification Interaction: paged     Purpose of Notification: pt's K+ is 6.0, is bleeding a lot of hep inj site. Currently holding hep inj dose. Please advice.     Orders Received: Hep inj orders discontinued. Pt placed on hep gtt at 16 ml/hr.

## 2019-12-24 NOTE — PROGRESS NOTES
" Renal Medicine Progress Note            Assessment/Plan:     74 y.o man with     # CKD III/IV    # Acute kidney injury: Improving. Excellent urine output.     # Hyperkalemia: This is likely due to high dose heparin.     # Recurrent DVT: On high dose subcutaneous heparin.     # Acidosis    # HTN and DM.     Plan:   # Oncology to address high dose heparin as this is the likely cause of hyperkalemia.   # Kayexalate 30 grams  # D5W + 125 amps of bicarb x 1 liter  # Lasix 40 mg IV x 1  # Insulin/Dextrose  # calcium gluconate  # 12 leaks ECG  # Recheck potassium level order for 1100    Plan discussed his team. I spent 30 minutes with him and his brother.           Interval History:      Patient is frustrated as he does not want to be in the hospital during the holiday. He has significant bleeding from the subcutaneous heparin. Potassium is higher today, and it is likely from high dose heparin. Kidney function is better. Excellent urine output.           Medications and Allergies:       aspirin  81 mg Oral Daily     atorvastatin  40 mg Oral Daily     carvedilol  12.5 mg Oral BID w/meals     heparin ANTICOAGULANT  25,000 Units Subcutaneous Q12H     insulin aspart  1-7 Units Subcutaneous TID AC     insulin aspart  1-5 Units Subcutaneous At Bedtime     sodium chloride (PF)  3 mL Intracatheter Q8H        Allergies   Allergen Reactions     Penicillins Anaphylaxis            Physical Exam:   Vitals were reviewed  Heart Rate: 68, Blood pressure (!) 155/81, pulse 70, temperature 97.3  F (36.3  C), temperature source Oral, resp. rate 18, height 1.753 m (5' 9\"), weight 119.6 kg (263 lb 11.2 oz), SpO2 93 %.    Wt Readings from Last 3 Encounters:   12/22/19 119.6 kg (263 lb 11.2 oz)   08/19/19 120.7 kg (266 lb)   08/12/19 122.1 kg (269 lb 1.6 oz)       Intake/Output Summary (Last 24 hours) at 12/24/2019 0859  Last data filed at 12/24/2019 0100  Gross per 24 hour   Intake --   Output 1700 ml   Net -1700 ml       GENERAL APPEARANCE: " NAD. Upset.   HEENT:  Eyes/ears/nose/neck grossly normal  RESP: lungs cta b c good efforts, no crackles, rhonchi or wheezes  CV: RRR, nl S1/S2, + murmur  ABDOMEN: obese, soft, NT  EXTREMITIES/SKIN: no rashes/lesions on observed skin; 2+ edema  Neuro: a/o x3. Grossly intact  Psych: upset.          Data:     CBC RESULTS:     Recent Labs   Lab 12/23/19  0732 12/21/19  0801 12/20/19  1332   WBC  --  4.3 4.7   RBC  --  4.04* 4.10*   HGB  --  10.9* 11.0*   HCT  --  33.2* 33.6*    147* 144*       Basic Metabolic Panel:  Recent Labs   Lab 12/24/19  0740 12/23/19 2001 12/23/19  1133 12/23/19  0732 12/22/19  1009 12/21/19  0801 12/20/19  1332     --   --  137 131* 132* 131*   POTASSIUM 6.0* 5.5* 5.7* 5.8* 5.3 4.4 4.8   CHLORIDE 111*  --   --  111* 105 104 103   CO2 20  --   --  24 21 21 20   BUN 63*  --   --  75* 75* 78* 83*   CR 2.73*  --   --  3.40* 3.76* 4.06* 4.45*   *  --   --  116* 195* 103* 205*   GABE 9.3  --   --  8.9 8.3* 8.6 8.6       INR  Recent Labs   Lab 12/21/19  0801 12/20/19  1332   INR 1.94* 2.41*      Attestation:   I have reviewed today's relevant vital signs, notes, medications, labs and imaging.    Driss Hogan MD  [facility] - Nephrology  Office phone :[phone]  Pager: [phone]

## 2019-12-24 NOTE — PROGRESS NOTES
Spiritual Health    SH visited Pt per request. Pt requested pastoral visit from  for [place] day.     SH filed request for pastoral visit from .     SH will remain available as needed.     Payal Reyes  Chaplain Resident

## 2019-12-24 NOTE — PROGRESS NOTES
[facility]    Medicine Progress Note - Hospitalist Service       Date of Admission:  12/20/2019  Assessment & Plan   Burton Elizabeth is a 74 year old male who with past medical history of hypertension, dyslipidemia, chronic congestive heart failure, chronic kidney disease who is presenting due to acute on chronic kidney insufficiency.      Acute kidney injury  Baseline CKD- stage III creatinine ranging from 1.9-2   Presented with creatinine of 4.4. Suspect multifactorial from pre-renal etiology, NSAIDs use and also ARB.  - Nephrology was consulted and assisted with management, appreciate their assistance.  - UA was bland. Renal US without obstruction.   - Losartan on hold for in setting of OPAL.  - Cr 4.4->4.08->3.7->3.4->2.73 this morning.  - Recheck BMP in AM.    Hyperkalemia  - Likely related to subcut heparin.  - Started on low potassium diet.   - Potassium up to 6.0 this morning.  - Management per nephrology, discussed with Dr. Hogan, appreciate his assistance.     Left lower extremity deep venous thrombosis   Patient was diagnosed with left lower extremity DVT postop back in end of September 2019 after knee surgery.  He was still taking warfarin about a week ago when he went and saw his primary care provider for left leg swelling. Ultrasound revealed an occlusive DVT in one of the 2 gastrocnemius veins of the proximal left calf and previously noted popliteal vein thrombosis was resolved.  It was discussed with Dr. Jasso from [facility] by primary care physician (per note) and it was advised to discontinue warfarin and just put him on Lovenox.  The patient was currently just taking Lovenox at the time of admission. He was scheduled to f/u with Dr. Dial next week, but now admitted to the hospital.   - Lovenox not an option currently due to renal insufficiency.  - [facility] consulted for anticoag management.  - Started on Heparin  unit(s)/kg BID, but having bleeding from injection sites, nose  bleed, and hyperkalemia.  - Discussed with hematology today, recommends transitioning to IV heparin for now with possible plan to transition back to lovenox when renal function improves.  - Appreciate hematology assistance.  - Has follow-up appointment with Dr. Dial on 1/6/20 at 4 pm.     Hypertension  Chronic systolic congestive heart failure with EF of 45%-50%  Moderate Aortic Stenosis  - No chest pain at this time. Follows by Cardiology, last seen by Brittanie Chiu NP in 8/2019 before orthopedic surgery.  - Continue with Coreg with hold parameter.  - Restarted PTA amlodipine as BP is trending up.  - Hold Losartan due to OPAL.     Type 2 diabetes  He was on Lantus, metformin prior to admission  He said he has not taken his insulin in the last 2-3 days.  His A1c was 6.5% on 09/27/2019.   - Continue with sliding scale insulin for now, requiring minimal insulin.  - Metformin discontinued in setting of OPAL.  - Will need close follow-up with PCP.     Hyperlipidemia  - Continue PTA atorvastatin.        Diet: Combination Diet 2 gm K Diet; 1809-0839 Calories: Moderate Consistent CHO (4-6 CHO units/meal)    DVT Prophylaxis: IV heparin  Zuniga Catheter: not present  Code Status: Full Code      Disposition Plan   Expected discharge: 1-2 days, recommended to prior living arrangement once improvement in renal function and potassium and development of plan for anticoagulation.  Entered: Imer Azul MD 12/24/2019, 10:07 AM       The patient's care was discussed with the Bedside Nurse, Patient, Patient's Family and Nephrology and Hematology Consultant.    Imer Azul MD  Hospitalist Service  [facility]    ______________________________________________________________________    Interval History   Burton Elizabeth has had bleeding from his heparin injection sites and a nosebleed overnight, required his sheets and gown to be changed multiple times due to the bleeding. Denies fevers, chest pain,  shortness of breath, nausea, and abdominal pain. Brother was in the room today.    Data reviewed today: I reviewed all medications, new labs and imaging results over the last 24 hours. I personally reviewed no images or EKG's today.    Physical Exam   Vital Signs: Temp: 97.3  F (36.3  C) Temp src: Oral BP: (!) 155/81   Heart Rate: 68 Resp: 18 SpO2: 93 % O2 Device: None (Room air)    Weight: 263 lbs 11.2 oz  Constitutional: awake, alert, cooperative, no apparent distress, and appears stated age  Respiratory: clear to auscultation bilaterally, no crackles or wheezing  Cardiovascular: regular rate and rhythm, normal S1 and S2, and no murmur noted  GI: normal bowel sounds, soft, obese, non-distended, non-tender, dressings in place on heparin injection sites which had been bleeding overnight  Skin: warm, dry  Neurologic: awake, alert, oriented to name, place and time    Data   Recent Labs   Lab 12/24/19  0740 12/23/19 2001 12/23/19  1133 12/23/19  0732 12/22/19  1009 12/21/19  0801 12/20/19  1332   WBC  --   --   --   --   --  4.3 4.7   HGB 11.1*  --   --   --   --  10.9* 11.0*   MCV  --   --   --   --   --  82 82   PLT  --   --   --  187  --  147* 144*   INR  --   --   --   --   --  1.94* 2.41*     --   --  137 131* 132* 131*   POTASSIUM 6.0* 5.5* 5.7* 5.8* 5.3 4.4 4.8   CHLORIDE 111*  --   --  111* 105 104 103   CO2 20  --   --  24 21 21 20   BUN 63*  --   --  75* 75* 78* 83*   CR 2.73*  --   --  3.40* 3.76* 4.06* 4.45*   ANIONGAP 4  --   --  2* 5 7 8   GABE 9.3  --   --  8.9 8.3* 8.6 8.6   *  --   --  116* 195* 103* 205*   ALBUMIN  --   --   --  2.9* 2.8*  --   --    PROTTOTAL  --   --   --   --  6.3*  --   --    BILITOTAL  --   --   --   --  0.4  --   --    ALKPHOS  --   --   --   --  237*  --   --    ALT  --   --   --   --  67  --   --    AST  --   --   --   --  61*  --   --    TROPI  --   --   --   --   --   --  <0.015     Medications     IV infusion builder WITH additives         aspirin  81 mg Oral  Daily     atorvastatin  40 mg Oral Daily     calcium gluconate  1 g Intravenous Once     carvedilol  12.5 mg Oral BID w/meals     dextrose  50 mL Intravenous Once     furosemide  40 mg Intravenous Once     insulin (Human Regular) (HumuLIN R/NovoLIN R) for intravenous use (SHORT ACTING)  5 Units Intravenous Once     insulin aspart  1-7 Units Subcutaneous TID AC     insulin aspart  1-5 Units Subcutaneous At Bedtime     sodium chloride (PF)  3 mL Intracatheter Q8H     sodium polystyrene  30 g Oral Once

## 2019-12-25 VITALS
BODY MASS INDEX: 39.06 KG/M2 | TEMPERATURE: 98.2 F | DIASTOLIC BLOOD PRESSURE: 80 MMHG | OXYGEN SATURATION: 96 % | HEIGHT: 69 IN | RESPIRATION RATE: 18 BRPM | SYSTOLIC BLOOD PRESSURE: 134 MMHG | WEIGHT: 263.7 LBS | HEART RATE: 70 BPM

## 2019-12-25 LAB
ANION GAP SERPL CALCULATED.3IONS-SCNC: 6 MMOL/L (ref 3–14)
BASOPHILS # BLD AUTO: 0 10E9/L (ref 0–0.2)
BASOPHILS NFR BLD AUTO: 0 %
BUN SERPL-MCNC: 44 MG/DL (ref 7–30)
CALCIUM SERPL-MCNC: 9.4 MG/DL (ref 8.5–10.1)
CHLORIDE SERPL-SCNC: 106 MMOL/L (ref 94–109)
CO2 SERPL-SCNC: 24 MMOL/L (ref 20–32)
CREAT SERPL-MCNC: 2.12 MG/DL (ref 0.66–1.25)
DIFFERENTIAL METHOD BLD: ABNORMAL
EOSINOPHIL # BLD AUTO: 0 10E9/L (ref 0–0.7)
EOSINOPHIL NFR BLD AUTO: 0 %
ERYTHROCYTE [DISTWIDTH] IN BLOOD BY AUTOMATED COUNT: 16.6 % (ref 10–15)
GFR SERPL CREATININE-BSD FRML MDRD: 30 ML/MIN/{1.73_M2}
GLUCOSE BLDC GLUCOMTR-MCNC: 145 MG/DL (ref 70–99)
GLUCOSE BLDC GLUCOMTR-MCNC: 217 MG/DL (ref 70–99)
GLUCOSE SERPL-MCNC: 151 MG/DL (ref 70–99)
HCT VFR BLD AUTO: 34.7 % (ref 40–53)
HGB BLD-MCNC: 11.2 G/DL (ref 13.3–17.7)
IMM GRANULOCYTES # BLD: 0.1 10E9/L (ref 0–0.4)
IMM GRANULOCYTES NFR BLD: 1 %
INR PPP: 1.41 (ref 0.86–1.14)
LMWH PPP CHRO-ACNC: 0.94 IU/ML
LMWH PPP CHRO-ACNC: 1.26 IU/ML
LYMPHOCYTES # BLD AUTO: 1.7 10E9/L (ref 0.8–5.3)
LYMPHOCYTES NFR BLD AUTO: 28 %
MCH RBC QN AUTO: 27.1 PG (ref 26.5–33)
MCHC RBC AUTO-ENTMCNC: 32.3 G/DL (ref 31.5–36.5)
MCV RBC AUTO: 84 FL (ref 78–100)
MONOCYTES # BLD AUTO: 0.7 10E9/L (ref 0–1.3)
MONOCYTES NFR BLD AUTO: 11.9 %
NEUTROPHILS # BLD AUTO: 3.5 10E9/L (ref 1.6–8.3)
NEUTROPHILS NFR BLD AUTO: 59.1 %
NRBC # BLD AUTO: 0 10*3/UL
NRBC BLD AUTO-RTO: 0 /100
PLATELET # BLD AUTO: 218 10E9/L (ref 150–450)
POTASSIUM SERPL-SCNC: 5 MMOL/L (ref 3.4–5.3)
RBC # BLD AUTO: 4.14 10E12/L (ref 4.4–5.9)
SODIUM SERPL-SCNC: 136 MMOL/L (ref 133–144)
WBC # BLD AUTO: 6 10E9/L (ref 4–11)

## 2019-12-25 PROCEDURE — 25000128 H RX IP 250 OP 636: Performed by: INTERNAL MEDICINE

## 2019-12-25 PROCEDURE — 99239 HOSP IP/OBS DSCHRG MGMT >30: CPT | Performed by: HOSPITALIST

## 2019-12-25 PROCEDURE — 85610 PROTHROMBIN TIME: CPT | Performed by: HOSPITALIST

## 2019-12-25 PROCEDURE — 36415 COLL VENOUS BLD VENIPUNCTURE: CPT | Performed by: HOSPITALIST

## 2019-12-25 PROCEDURE — 80048 BASIC METABOLIC PNL TOTAL CA: CPT | Performed by: HOSPITALIST

## 2019-12-25 PROCEDURE — 85520 HEPARIN ASSAY: CPT | Performed by: HOSPITALIST

## 2019-12-25 PROCEDURE — 85025 COMPLETE CBC W/AUTO DIFF WBC: CPT | Performed by: HOSPITALIST

## 2019-12-25 PROCEDURE — 25000132 ZZH RX MED GY IP 250 OP 250 PS 637: Performed by: HOSPITALIST

## 2019-12-25 PROCEDURE — 85520 HEPARIN ASSAY: CPT | Performed by: INTERNAL MEDICINE

## 2019-12-25 PROCEDURE — 00000146 ZZHCL STATISTIC GLUCOSE BY METER IP

## 2019-12-25 PROCEDURE — 25000132 ZZH RX MED GY IP 250 OP 250 PS 637: Performed by: INTERNAL MEDICINE

## 2019-12-25 PROCEDURE — 36415 COLL VENOUS BLD VENIPUNCTURE: CPT | Performed by: INTERNAL MEDICINE

## 2019-12-25 RX ORDER — WARFARIN SODIUM 5 MG/1
7.5 TABLET ORAL
COMMUNITY
Start: 2019-12-25 | End: 2020-04-22

## 2019-12-25 RX ORDER — MELOXICAM 15 MG/1
15 TABLET ORAL DAILY
COMMUNITY
Start: 2019-12-25 | End: 2020-04-22

## 2019-12-25 RX ORDER — LOSARTAN POTASSIUM 100 MG/1
100 TABLET ORAL DAILY
COMMUNITY
Start: 2019-12-25 | End: 2020-04-22

## 2019-12-25 RX ADMIN — INSULIN ASPART 2 UNITS: 100 INJECTION, SOLUTION INTRAVENOUS; SUBCUTANEOUS at 13:12

## 2019-12-25 RX ADMIN — ATORVASTATIN CALCIUM 40 MG: 40 TABLET, FILM COATED ORAL at 08:22

## 2019-12-25 RX ADMIN — AMLODIPINE BESYLATE 10 MG: 5 TABLET ORAL at 08:22

## 2019-12-25 RX ADMIN — HEPARIN SODIUM 1300 UNITS/HR: 10000 INJECTION, SOLUTION INTRAVENOUS at 06:22

## 2019-12-25 RX ADMIN — ASPIRIN 81 MG: 81 TABLET, COATED ORAL at 08:23

## 2019-12-25 RX ADMIN — INSULIN ASPART 1 UNITS: 100 INJECTION, SOLUTION INTRAVENOUS; SUBCUTANEOUS at 08:22

## 2019-12-25 RX ADMIN — CARVEDILOL 12.5 MG: 6.25 TABLET, FILM COATED ORAL at 08:22

## 2019-12-25 RX ADMIN — ENOXAPARIN SODIUM 120 MG: 120 INJECTION SUBCUTANEOUS at 11:13

## 2019-12-25 ASSESSMENT — ACTIVITIES OF DAILY LIVING (ADL)
ADLS_ACUITY_SCORE: 13

## 2019-12-25 NOTE — PHARMACY-ANTICOAGULATION SERVICE
Clinical Pharmacy - Warfarin Dosing Consult     Pharmacy has been consulted to manage this patient s warfarin therapy.  Indication: DVT/ PE Treatment  Therapy Goal: INR 2.5-3.5  Warfarin Prior to Admission: No(has taken in the past)  Significant drug interactions: asa, lipitor, meloxicam  Recent documented change in oral intake/nutrition: No  Dose Comments: has clotted previously on warfarin    INR   Date Value Ref Range Status   12/21/2019 1.94 (H) 0.86 - 1.14 Final   12/20/2019 2.41 (H) 0.86 - 1.14 Final       Recommend warfarin 7.5 mg today.  Pharmacy will monitor Burton Leosjuanjose daily and order warfarin doses to achieve specified goal.      Please contact pharmacy as soon as possible if the warfarin needs to be held for a procedure or if the warfarin goals change.

## 2019-12-25 NOTE — DISCHARGE SUMMARY
[facility]    Discharge Summary  Hospitalist    Date of Admission:  12/20/2019  Date of Discharge:  12/25/2019  Discharging Provider: Imer Azul MD  Date of Service (when I saw the patient): 12/25/19    Discharge Diagnoses   Acute kidney injury  CKD, stage III creatinine ranging from 1.9-2   Hyperkalemia  Left lower extremity deep venous thrombosis   Hypertension  Chronic systolic congestive heart failure with EF of 45%-50%  Moderate Aortic Stenosis  Type 2 diabetes  Hyperlipidemia    History of Present Illness   Burton Elizabeth is a 74 year old male who with past medical history of hypertension, dyslipidemia, chronic congestive heart failure, chronic kidney disease who is presenting due to acute on chronic kidney insufficiency.     Hospital Course   Burton Elizabeth was admitted on 12/20/2019.  The following problems were addressed during his hospitalization:    Acute kidney injury  CKD, stage III creatinine ranging from 1.9-2   Presented with creatinine of 4.4. Suspect multifactorial from pre-renal etiology, NSAIDs use and also ARB.  - Nephrology was consulted and assisted with management, appreciate their assistance.  - UA was bland. Renal US without obstruction.   - Losartan on hold for in setting of OPAL.  - Cr 4.4->4.08->3.7->3.4->2.73->2.12 on the morning of discharge.  - Continue to hold losartan, meloxicam, and metformin upon discharge for now pending improvement in renal function.  - Follow-up with PCP on Friday or Monday with BMP prior to the appointment.     Hyperkalemia  - Likely related to subcut heparin.  - Started on low potassium diet, continue low potassium diet upon discharge, he was given a handout on low potassium diet.  - Subcut heparin discontinued.  - Potassium peaked at 6.0, down to 5.0 on the morning of discharge.  - Recheck on Friday or Monday at follow-up appointment with PCP.    Left lower extremity deep venous thrombosis   Patient was diagnosed with left lower  extremity DVT postop back in end of September 2019 after knee surgery.  He was still taking warfarin about a week ago when he went and saw his primary care provider for left leg swelling. Ultrasound revealed an occlusive DVT in one of the 2 gastrocnemius veins of the proximal left calf and previously noted popliteal vein thrombosis was resolved.  It was discussed with Dr. Jasso from [facility] by primary care physician (per note) and it was advised to discontinue warfarin and just put him on Lovenox.  The patient was currently just taking Lovenox at the time of admission. He was scheduled to f/u with Dr. Dial next week, but now admitted to the hospital.   - Lovenox initially not an option due to acute renal insufficiency.  - [facility] consulted for anticoag management.  - Started on Heparin  unit(s)/kg BID, but had bleeding from injection sites, nose bleed, and hyperkalemia.  - Hematology saw Burton Elizabeth on the day of discharge. Renal function improved. Transitioned from IV heparin to subcut lovenox and oral warfarin.  - Discharge home on lovenox and warfarin.   - Lovenox 120 mg subcut once daily. He has lovenox at home from previous prescription prior to admission, states they are 30 mg syringes, which is consistent with PTA med list. He will use four syringes daily for now to use up the previous supply. Follow-up with hematology on 12/30/19, further lovenox per hematology at that time depending on his INR>   - Warfarin 7.5 mg daily in the evening. Previously was on 5 mg daily except 7.5 mg one day per week. INR was therapeutic and he developed distal DVT. Hematology recommending higher INR target now (3.0 - 3.5). Will continue warfarin 7.5 mg every evening for now and follow-up with hematology on 12/30/19. Further warfarin dosing per hematology at that time pending INR.  - Also has follow-up appointment with Dr. Dial on 1/6/20 at 4 pm.     Hypertension  Chronic systolic congestive heart failure with EF  of 45%-50%  Moderate Aortic Stenosis  - No chest pain at this time. Followed by Cardiology, last seen by Brittanie Chiu NP in 8/2019 before orthopedic surgery.  - Continue PTA Coreg and amlodipine.  - Hold Losartan for now in setting of OPAL.  - Follow-up with PCP and nephrology as noted below.     Type 2 diabetes  He was on Lantus, metformin, glimepiride, and NovoLog prior to admission.  He said he has not taken his insulin in the last 2-3 days.  His A1c was 6.5% on 09/27/2019.   - Continue with sliding scale insulin for now, requiring minimal insulin.  - Metformin discontinued for now in setting of OPAL.   - Restart PTA glimepiride upon discharge.  - Will hold Lantus and NovoLog at home initially and restart if his blood sugars trend up.  - He checks his blood sugars 3-4 times per day at home and feels comfortable managing his diabetes medications.  - Close follow-up with PCP.     Hyperlipidemia  - Continue PTA atorvastatin.     Imer Azul MD    Significant Results and Procedures   None.    Pending Results   None.    Code Status   Full Code       Primary Care Physician   Td Qureshi    Physical Exam   Temp: 98.2  F (36.8  C) Temp src: Oral BP: 134/80   Heart Rate: 78 Resp: 18 SpO2: 96 % O2 Device: None (Room air)    Vitals:    12/20/19 1236 12/21/19 0611 12/22/19 0551   Weight: 112 kg (247 lb) 116.4 kg (256 lb 11.2 oz) 119.6 kg (263 lb 11.2 oz)     Vital Signs with Ranges  Temp:  [97.1  F (36.2  C)-98.2  F (36.8  C)] 98.2  F (36.8  C)  Heart Rate:  [69-78] 78  Resp:  [16-18] 18  BP: (134-173)/(67-92) 134/80  SpO2:  [96 %-97 %] 96 %  I/O last 3 completed shifts:  In: 1180 [P.O.:1180]  Out: 2300 [Urine:2300]    Constitutional: awake, alert, cooperative, no apparent distress  Respiratory: clear to auscultation bilaterally, no crackles or wheezing  Cardiovascular: regular rate and rhythm, normal S1 and S2, no murmur noted  GI: normal bowel sounds, soft, non-distended, non-tender  Skin: warm, dry  Neurologic:  awake, alert, oriented to name, place and time    Discharge Disposition   Discharged to home  Condition at discharge: Stable    Consultations This Hospital Stay   PHARMACY TO DOSE HEPARIN  NEPHROLOGY IP CONSULT  HEMATOLOGY & ONCOLOGY IP CONSULT  PHARMACY TO DOSE HEPARIN  PHARMACY TO DOSE WARFARIN    Time Spent on this Encounter   I, Imer Azul MD, personally saw the patient today and spent greater than 30 minutes discharging this patient.    Discharge Orders      Follow-up and recommended labs and tests     You are scheduled to see Mariana Granados Nurse Practitioner on January 21st at 3:30 PM at [facility].  [employer] Address & phone number:  [place] [facility]  [address]  [place]  [address]  T:  [phone]    Follow up with Dr. Dial 1/6 4 pm     Reason for your hospital stay    You were in the hospital due to acute renal failure. Your kidneys and potassium have improved significantly, but you will need to follow-up with your primary care doctor and nephrology as directed for further management. You blood thinners were adjusted during your hospital stay. You will need to follow-up with the hematologist on Monday, 12/30/19, to recheck your INR and discuss further management of your blood thinners.     Follow-up and recommended labs and tests     Follow up with primary care provider, Td Qureshi, with a BMP prior to the appointment. If unable to schedule with Dr. Qureshi, you will need to see one of his colleagues. This appointment should be made for 12/27/19, and if unable on 12/30/19 at the latest.  Follow up with [facility] (hematology) on 12/30/19 with an INR prior to the appointment. Their office will contact you on 12/26/19 to schedule the appointment.  You are scheduled to see Nurse Caro Ramirez (Nephrology/Kidney) on January 21st at 3:30 PM at [facility].   [employer] Address & phone number:   [facility] [facility], [address]  [facility], [address], [place]  [address], T:  [phone]     Activity    Your activity upon discharge: activity as tolerated     Full Code     Diet    Follow this diet upon discharge: 2 gram potassium, 2 gram sodium Diet; Moderate Consistent CHO (4-6 CHO units/meal)     Discharge Medications   Current Discharge Medication List      START taking these medications    Details   enoxaparin ANTICOAGULANT (LOVENOX) 120 MG/0.8ML syringe Inject 0.8 mLs (120 mg) Subcutaneous every 24 hours      warfarin ANTICOAGULANT (COUMADIN) 5 MG tablet Take 1.5 tablets (7.5 mg) by mouth daily (with dinner)         CONTINUE these medications which have CHANGED    Details   aspirin (ASA) 81 MG EC tablet Take 1 tablet (81 mg) by mouth daily    Associated Diagnoses: Essential hypertension      losartan (COZAAR) 100 MG tablet Take 1 tablet (100 mg) by mouth daily ---HOLD UNTIL FOLLOW-UP WITH KIDNEY DOCTOR---      meloxicam (MOBIC) 15 MG tablet Take 1 tablet (15 mg) by mouth daily --- DO NOT TAKE UNTIL KIDNEY DOCTOR TELLS YOU IT IS OK TO RESTART ---      metFORMIN (GLUCOPHAGE) 500 MG tablet Take 1 tablet (500 mg) by mouth 2 times daily (with meals) ---HOLD UNTIL FOLLOW-UP WITH PRIMARY CARE PROVIDER---         CONTINUE these medications which have NOT CHANGED    Details   amLODIPine (NORVASC) 10 MG tablet Take 10 mg by mouth daily      atorvastatin (LIPITOR) 80 MG tablet Take 40 mg by mouth daily (takes 0.5 x 80mg tablet = 40mg dose)      carvedilol (COREG) 25 MG tablet Take 12.5 mg by mouth 2 times daily (with meals) (Takes 0.5 x 25mg tablet = 12.5mg dose)      glimepiride (AMARYL) 4 MG tablet Take 4 mg by mouth 2 times daily       insulin aspart (NOVOLOG FLEXPEN) 100 UNIT/ML pen Inject 3-17 Units Subcutaneous 3 times daily (with meals) As directed       insulin glargine (LANTUS) 100 UNIT/ML PEN Inject 12-30 Units Subcutaneous every evening       multivitamin, therapeutic with minerals (THERA-VIT-M) TABS Take 1 tablet by mouth daily       oxyCODONE-acetaminophen (PERCOCET) 5-325 MG tablet Take 1 tablet by mouth every 6 hours as needed for pain      blood glucose (NO BRAND SPECIFIED) test strip Use to test blood sugar as directed      order for DME Equipment being ordered: Walker Wheels () and Walker ()  Treatment Diagnosis: Impaired gait  Qty: 1 each, Refills: 0    Associated Diagnoses: Status post total right knee replacement         STOP taking these medications       enoxaparin ANTICOAGULANT (LOVENOX) 30 MG/0.3ML syringe Comments:   Reason for Stopping:             Allergies   Allergies   Allergen Reactions     Penicillins Anaphylaxis     Data   Most Recent 3 CBC's:  Recent Labs   Lab Test 12/25/19  0741 12/24/19  0740 12/23/19  0732 12/21/19  0801 12/20/19  1332   WBC 6.0  --   --  4.3 4.7   HGB 11.2* 11.1*  --  10.9* 11.0*   MCV 84  --   --  82 82     --  187 147* 144*      Most Recent 3 BMP's:  Recent Labs   Lab Test 12/25/19  0741 12/24/19  1427 12/24/19  1021    134 133   POTASSIUM 5.0 5.2 5.7*   CHLORIDE 106 108 108   CO2 24 19* 20   BUN 44* 58* 55*   CR 2.12* 2.63* 2.51*   ANIONGAP 6 7 5   GABE 9.4 9.3 9.2   * 209* 128*     Most Recent 2 LFT's:  Recent Labs   Lab Test 12/22/19  1009 08/01/19   AST 61* 22   ALT 67 18   ALKPHOS 237* 110   BILITOTAL 0.4 0.4     Most Recent INR's and Anticoagulation Dosing History:  Anticoagulation Dose History     Recent Dosing and Labs Latest Ref Rng & Units 12/16/2006 12/17/2006 12/18/2006 3/29/2016 12/20/2019 12/21/2019 12/25/2019    INR 0.86 - 1.14 1.00 1.08 1.11 1.02 2.41(H) 1.94(H) 1.41(H)        Most Recent 3 Troponin's:  Recent Labs   Lab Test 12/20/19  1332   TROPI <0.015     Most Recent Cholesterol Panel:  Recent Labs   Lab Test 05/10/19   CHOL 120   LDL 61   HDL 35*   TRIG 118     Most Recent 6 Bacteria Isolates From Any Culture (See EPIC Reports for Culture Details):No lab results found.  Most Recent TSH, T4 and A1c Labs:  Recent Labs   Lab Test 12/20/19  1332  09/27/19  0902   TSH 2.19  --    A1C  --  6.5*     Results for orders placed or performed during the hospital encounter of 12/20/19   US Renal Complete    Narrative    ULTRASOUND RENAL COMPLETE   12/20/2019 8:08 PM     HISTORY: Acute kidney injury.    COMPARISON: None.    FINDINGS:   Right Kidney: Measures 13.2 x 5.4 x 6.4 cm, cortical thickness of 1.5  cm. No hydronephrosis.    Left Kidney: Measures 12.6 x 6.4 cm, cortical thickness is 1.3 cm. No  hydronephrosis.    Bladder: Prostate indents the inferior bladder.      Impression    IMPRESSION: No hydronephrosis on either side. Renal measurements as  above. Prostate indents the inferior bladder.    EFRA SALAZAR MD

## 2019-12-25 NOTE — PROGRESS NOTES
Progress Note          Assessment and Plan:     Recurrent provoked DVT    -developed right posterior tibial and peroneal DVT 4/4/2016 following right knee replacement surgery    -developed nonocclusive left popliteal DVT 10/4/2019 following left knee surgery    -on warfarin since 10/4    -venous doppler 12/13/2019 showed nonocclusive DVT in 1 of 2 gastrocnemius veins of the proximal left calf with resolution of the prior left popliteal DVT    -INR 2.6 on 12/10/2019 per records    -now on heparin gtt with improving renal function (eGFR 30ml/min today)    -denies ongoing bleeding complications    -would not favor thrombophilia work up    -given clearly provoked DVT, would recommend 3 months of anticoagulation    -given that he developed distal DVT while on therapeutic doses of warfarin, transitioning him to lovenox would be ideal.  However he is injection averse and does not want to continue daily shots.  As such would suggest transitioning him to warfarin with plan for higher INR goal 3.0-3.5.    -explained to him that he needs bridging with parenteral anticoagulation for at least 5 days until INR remains within therapeutic range (INR 3.0-3.5) for at least 2 days.    -would suggest stopping heparin gtt and transitioning him to lovenox+warfarin today.  Lovenox will be renally dosed (1mg/kg s/c daily)    -will schedule outpatient follow up for INR check in [facility] clinic on 12/30/2019    Hyperkalemia  - likely related to heparin, contribution from renal dysfunction  - resolved    OPAL on CKD  -  Renal function continues to improve   -  Making adequate urine   -  appreciate nephrology input    Full code  Disposition: likely home today on lovenox+warfarin with plan for outpatient follow up in [facility] clinic on 12/30/2019.  Our schedulers will call him tomorrow to schedule this appointment.      Discussed this plan with Burton and his brother.  Answered all his questions.  He was comfortable with the  "plan.    Sage Moctezuma MD        Interval History:     Denied bleeding manifestations.  Apparently has had intermittent nose bleeds since childhood.                 Review of Systems:     The 5 point Review of Systems is negative other than noted in the HPI             Medications:   Scheduled Medications    amLODIPine  10 mg Oral Daily     aspirin  81 mg Oral Daily     atorvastatin  40 mg Oral Daily     carvedilol  12.5 mg Oral BID w/meals     insulin aspart  1-7 Units Subcutaneous TID AC     insulin aspart  1-5 Units Subcutaneous At Bedtime     sodium chloride (PF)  3 mL Intracatheter Q8H     PRN Medications  acetaminophen, glucose **OR** dextrose **OR** glucagon, lidocaine 4%, lidocaine (buffered or not buffered), loperamide, melatonin, naloxone, ondansetron **OR** ondansetron, oxyCODONE-acetaminophen, polyethylene glycol, prochlorperazine **OR** prochlorperazine **OR** prochlorperazine, senna-docusate **OR** senna-docusate, sodium chloride (PF), traZODone               Physical Exam:   Vitals were reviewed  Blood pressure (!) 173/92, pulse 70, temperature 98.2  F (36.8  C), temperature source Oral, resp. rate 18, height 1.753 m (5' 9\"), weight 119.6 kg (263 lb 11.2 oz), SpO2 96 %.  Wt Readings from Last 4 Encounters:   12/22/19 119.6 kg (263 lb 11.2 oz)   08/19/19 120.7 kg (266 lb)   08/12/19 122.1 kg (269 lb 1.6 oz)   08/07/19 122.1 kg (269 lb 1.6 oz)       I/O last 3 completed shifts:  In: 1180 [P.O.:1180]  Out: 2300 [Urine:2300]      Constitutional: Awake, alert, cooperative, no apparent distress     Lungs: Clear to auscultation bilaterally, no crackles or wheezing   Cardiovascular: Regular rate and rhythm, normal S1 and S2, and 2/6 murmur    Abdomen: Normal bowel sounds, soft, non-distended, non-tender   Skin: Bruises related to s/c heparin/lovenox shots   Other:               Data:   All laboratory data and imaging studies reviewed.    CMP  Recent Labs   Lab 12/25/19  0741 12/24/19  1427 12/24/19  1021 " 12/24/19  0740  12/23/19  0732 12/22/19  1009    134 133 135  --  137 131*   POTASSIUM 5.0 5.2 5.7* 6.0*   < > 5.8* 5.3   CHLORIDE 106 108 108 111*  --  111* 105   CO2 24 19* 20 20  --  24 21   ANIONGAP 6 7 5 4  --  2* 5   * 209* 128* 134*  --  116* 195*   BUN 44* 58* 55* 63*  --  75* 75*   CR 2.12* 2.63* 2.51* 2.73*  --  3.40* 3.76*   GFRESTIMATED 30* 23* 24* 22*  --  17* 15*   GFRESTBLACK 35* 27* 28* 25*  --  20* 17*   GABE 9.4 9.3 9.2 9.3  --  8.9 8.3*   MAG  --   --   --   --   --   --  2.1   PHOS  --   --   --   --   --  4.2 4.0   PROTTOTAL  --   --   --   --   --   --  6.3*   ALBUMIN  --   --   --   --   --  2.9* 2.8*   BILITOTAL  --   --   --   --   --   --  0.4   ALKPHOS  --   --   --   --   --   --  237*   AST  --   --   --   --   --   --  61*   ALT  --   --   --   --   --   --  67    < > = values in this interval not displayed.     CBC  Recent Labs   Lab 12/25/19  0741 12/24/19  0740 12/23/19  0732 12/21/19  0801 12/20/19  1332   WBC 6.0  --   --  4.3 4.7   RBC 4.14*  --   --  4.04* 4.10*   HGB 11.2* 11.1*  --  10.9* 11.0*   HCT 34.7*  --   --  33.2* 33.6*   MCV 84  --   --  82 82   MCH 27.1  --   --  27.0 26.8   MCHC 32.3  --   --  32.8 32.7   RDW 16.6*  --   --  16.1* 15.9*     --  187 147* 144*     INR  Recent Labs   Lab 12/21/19  0801 12/20/19  1332   INR 1.94* 2.41*

## 2019-12-25 NOTE — DISCHARGE SUMMARY
Patient discharged at 3:28 PM to home. IV was discontinued. VSS. Denies pain. Tolerating mod carb/low potassium diet. Voiding adequately in BR. BS active, passing flatus, BM this AM. Up independently in room with cane. Belongings returned to patient.  Discharge instructions and medications reviewed with patient.  Patient verbalized understanding and all questions were answered.  Prescriptions given to patient.  At time of discharge, patient condition was stable and left the unit on wheelchair escorted by BRANDY.

## 2019-12-25 NOTE — PLAN OF CARE
A & O x 4, VSS. C/o foot pain, gave prn percocet x 1. Tele: SR w/1st deg AVB and BBB. On hep gtt @ 13 mL/hr w/next hep 10a at 0945. Up ind in room w/cane to BR, good UOP. Had 1 loose stool, declined immodium.  Plan to transition anticoagulant today and possible discharge home.

## 2019-12-25 NOTE — PROGRESS NOTES
" Renal Medicine Progress Note            Assessment/Plan:     74 y.o man with      # CKD IIIB: Kidney function is close to baseline.      # Acute kidney injury: Improved nicely. Excellent urine output.      # Hyperkalemia likely from heparin. Improved.      # Recurrent DVT: Lovenox/coumadin per oncology     # Acidosis: Improved.      # HTN and DM.     # EF 45-50% with mod aortic stenosis.1-2+ edema RLE and 2-3+ edema LLE. Norvasc likely contributing to peripheral edema.      Plan:   #Avoid metformin when if eGFR is < 30 ml/min  # 2 grams Na and K restricted diet  # Continue to hold ACEi/ARB  # Check renal panel with PCP on Friday if unable then Monday  # See our NP/PA in 1-2 weeks for follow-up        Interval History:     He does not have any particular new complaints. No cardiopulmonary complaints. No N/V.   AVSS.           Medications and Allergies:       amLODIPine  10 mg Oral Daily     aspirin  81 mg Oral Daily     atorvastatin  40 mg Oral Daily     carvedilol  12.5 mg Oral BID w/meals     enoxaparin ANTICOAGULANT  1 mg/kg Subcutaneous Q24H     insulin aspart  1-7 Units Subcutaneous TID AC     insulin aspart  1-5 Units Subcutaneous At Bedtime     sodium chloride (PF)  3 mL Intracatheter Q8H        Allergies   Allergen Reactions     Penicillins Anaphylaxis            Physical Exam:   Vitals were reviewed  Heart Rate: 78, Blood pressure 134/80, pulse 70, temperature 98.2  F (36.8  C), temperature source Oral, resp. rate 18, height 1.753 m (5' 9\"), weight 119.6 kg (263 lb 11.2 oz), SpO2 96 %.    Wt Readings from Last 3 Encounters:   12/22/19 119.6 kg (263 lb 11.2 oz)   08/19/19 120.7 kg (266 lb)   08/12/19 122.1 kg (269 lb 1.6 oz)       Intake/Output Summary (Last 24 hours) at 12/25/2019 1021  Last data filed at 12/25/2019 0600  Gross per 24 hour   Intake 1180 ml   Output 1200 ml   Net -20 ml       GENERAL APPEARANCE: NAD.   HEENT:  Eyes/ears/nose/neck grossly normal  RESP: lungs cta b c good efforts, no crackles, " rhonchi or wheezes  CV: RRR, nl S1/S2, + murmur  ABDOMEN: obese, soft, NT  EXTREMITIES/SKIN: no rashes/lesions on observed skin; 1-2+ edema RLE, and 2-3+ edema LLE  Neuro: a/o x3. Grossly intact         Data:     CBC RESULTS:     Recent Labs   Lab 12/25/19  0741 12/24/19  0740 12/23/19  0732 12/21/19  0801 12/20/19  1332   WBC 6.0  --   --  4.3 4.7   RBC 4.14*  --   --  4.04* 4.10*   HGB 11.2* 11.1*  --  10.9* 11.0*   HCT 34.7*  --   --  33.2* 33.6*     --  187 147* 144*       Basic Metabolic Panel:  Recent Labs   Lab 12/25/19  0741 12/24/19  1427 12/24/19  1021 12/24/19  0740 12/23/19 2001 12/23/19  1133 12/23/19  0732 12/22/19  1009    134 133 135  --   --  137 131*   POTASSIUM 5.0 5.2 5.7* 6.0* 5.5* 5.7* 5.8* 5.3   CHLORIDE 106 108 108 111*  --   --  111* 105   CO2 24 19* 20 20  --   --  24 21   BUN 44* 58* 55* 63*  --   --  75* 75*   CR 2.12* 2.63* 2.51* 2.73*  --   --  3.40* 3.76*   * 209* 128* 134*  --   --  116* 195*   GABE 9.4 9.3 9.2 9.3  --   --  8.9 8.3*       INR  Recent Labs   Lab 12/21/19  0801 12/20/19  1332   INR 1.94* 2.41*      Attestation:   I have reviewed today's relevant vital signs, notes, medications, labs and imaging.    Driss Hogan MD  [facility] - Nephrology  Office phone :[phone]  Pager: [phone]

## 2019-12-25 NOTE — PLAN OF CARE
A&O x4.VSS. Denies pain. K+ 6.0, MDs notified. Tele: SR with 1st degree AV block. Hep injection discontinued, started on hep drip, pharmacy following to dose. Hep 10a  At 1740 was 1.05, pharmacy notified, orders to stop hep drip for 60 minute and restart with new rate. Hep drip stopped 1855, plan to restart 1955. Voiding adequately in BR. Pt reports frequent loose stool after taking hyperkalemia intervention meds, MD notified and ordered prn imodium, given x1 with some relief. PIV infusing IVF and hep gtt. Tolerating mod carb/low potassium diet. BG checks, sliding scale insulin given. Up independently in room. Plan to transition pt to alternative anticoagulant tomorrow and discharge home.

## 2019-12-28 LAB — INTERPRETATION ECG - MUSE: NORMAL

## 2020-04-21 ENCOUNTER — TRANSFERRED RECORDS (OUTPATIENT)
Dept: HEALTH INFORMATION MANAGEMENT | Facility: CLINIC | Age: 75
End: 2020-04-21

## 2020-04-22 ENCOUNTER — HOSPITAL ENCOUNTER (INPATIENT)
Facility: CLINIC | Age: 75
LOS: 2 days | Discharge: HOME | DRG: 291 | End: 2020-04-24
Attending: EMERGENCY MEDICINE | Admitting: INTERNAL MEDICINE
Payer: COMMERCIAL

## 2020-04-22 ENCOUNTER — APPOINTMENT (OUTPATIENT)
Dept: ULTRASOUND IMAGING | Facility: CLINIC | Age: 75
DRG: 291 | End: 2020-04-22
Attending: EMERGENCY MEDICINE
Payer: COMMERCIAL

## 2020-04-22 ENCOUNTER — APPOINTMENT (OUTPATIENT)
Dept: NUCLEAR MEDICINE | Facility: CLINIC | Age: 75
DRG: 291 | End: 2020-04-22
Attending: EMERGENCY MEDICINE
Payer: COMMERCIAL

## 2020-04-22 ENCOUNTER — TELEPHONE (OUTPATIENT)
Dept: CARDIOLOGY | Facility: CLINIC | Age: 75
End: 2020-04-22

## 2020-04-22 ENCOUNTER — APPOINTMENT (OUTPATIENT)
Dept: GENERAL RADIOLOGY | Facility: CLINIC | Age: 75
DRG: 291 | End: 2020-04-22
Attending: EMERGENCY MEDICINE
Payer: COMMERCIAL

## 2020-04-22 DIAGNOSIS — R07.9 CHEST PAIN, UNSPECIFIED TYPE: ICD-10-CM

## 2020-04-22 DIAGNOSIS — R79.89 ELEVATED TROPONIN: ICD-10-CM

## 2020-04-22 DIAGNOSIS — E11.00 TYPE 2 DIABETES MELLITUS WITH HYPEROSMOLARITY WITHOUT COMA, WITHOUT LONG-TERM CURRENT USE OF INSULIN (H): ICD-10-CM

## 2020-04-22 DIAGNOSIS — I50.9 ACUTE ON CHRONIC CONGESTIVE HEART FAILURE, UNSPECIFIED HEART FAILURE TYPE (H): ICD-10-CM

## 2020-04-22 DIAGNOSIS — R42 DIZZINESS: ICD-10-CM

## 2020-04-22 DIAGNOSIS — I50.42 CHRONIC COMBINED SYSTOLIC AND DIASTOLIC CONGESTIVE HEART FAILURE (H): Primary | ICD-10-CM

## 2020-04-22 LAB
ALBUMIN SERPL-MCNC: 3.3 G/DL (ref 3.4–5)
ALBUMIN UR-MCNC: 30 MG/DL
ALP SERPL-CCNC: 128 U/L (ref 40–150)
ALT SERPL W P-5'-P-CCNC: 61 U/L (ref 0–70)
ANION GAP SERPL CALCULATED.3IONS-SCNC: 4 MMOL/L (ref 3–14)
APPEARANCE UR: CLEAR
AST SERPL W P-5'-P-CCNC: 61 U/L (ref 0–45)
BASOPHILS # BLD AUTO: 0 10E9/L (ref 0–0.2)
BASOPHILS NFR BLD AUTO: 0.5 %
BILIRUB SERPL-MCNC: 0.3 MG/DL (ref 0.2–1.3)
BILIRUB UR QL STRIP: NEGATIVE
BUN SERPL-MCNC: 56 MG/DL (ref 7–30)
CALCIUM SERPL-MCNC: 8.8 MG/DL (ref 8.5–10.1)
CHLORIDE SERPL-SCNC: 107 MMOL/L (ref 94–109)
CO2 SERPL-SCNC: 27 MMOL/L (ref 20–32)
COLOR UR AUTO: YELLOW
CREAT SERPL-MCNC: 1.93 MG/DL (ref 0.66–1.25)
D DIMER PPP FEU-MCNC: 1.2 UG/ML FEU (ref 0–0.5)
DIFFERENTIAL METHOD BLD: ABNORMAL
EOSINOPHIL # BLD AUTO: 0.2 10E9/L (ref 0–0.7)
EOSINOPHIL NFR BLD AUTO: 3 %
ERYTHROCYTE [DISTWIDTH] IN BLOOD BY AUTOMATED COUNT: 15.8 % (ref 10–15)
GFR SERPL CREATININE-BSD FRML MDRD: 33 ML/MIN/{1.73_M2}
GLUCOSE SERPL-MCNC: 165 MG/DL (ref 70–99)
GLUCOSE UR STRIP-MCNC: NEGATIVE MG/DL
HBA1C MFR BLD: 7 % (ref 0–5.6)
HCT VFR BLD AUTO: 28.4 % (ref 40–53)
HGB BLD-MCNC: 9 G/DL (ref 13.3–17.7)
HGB UR QL STRIP: NEGATIVE
HYALINE CASTS #/AREA URNS LPF: 1 /LPF (ref 0–2)
IMM GRANULOCYTES # BLD: 0.1 10E9/L (ref 0–0.4)
IMM GRANULOCYTES NFR BLD: 0.8 %
INR PPP: 1.12 (ref 0.86–1.14)
INTERPRETATION ECG - MUSE: NORMAL
KETONES UR STRIP-MCNC: NEGATIVE MG/DL
LEUKOCYTE ESTERASE UR QL STRIP: NEGATIVE
LYMPHOCYTES # BLD AUTO: 1.6 10E9/L (ref 0.8–5.3)
LYMPHOCYTES NFR BLD AUTO: 19.9 %
MAGNESIUM SERPL-MCNC: 2 MG/DL (ref 1.6–2.3)
MCH RBC QN AUTO: 28.6 PG (ref 26.5–33)
MCHC RBC AUTO-ENTMCNC: 31.7 G/DL (ref 31.5–36.5)
MCV RBC AUTO: 90 FL (ref 78–100)
MONOCYTES # BLD AUTO: 0.8 10E9/L (ref 0–1.3)
MONOCYTES NFR BLD AUTO: 10 %
NEUTROPHILS # BLD AUTO: 5.1 10E9/L (ref 1.6–8.3)
NEUTROPHILS NFR BLD AUTO: 65.8 %
NITRATE UR QL: NEGATIVE
NRBC # BLD AUTO: 0 10*3/UL
NRBC BLD AUTO-RTO: 0 /100
NT-PROBNP SERPL-MCNC: 1233 PG/ML (ref 0–900)
PH UR STRIP: 5.5 PH (ref 5–7)
PLATELET # BLD AUTO: 224 10E9/L (ref 150–450)
POTASSIUM SERPL-SCNC: 4.6 MMOL/L (ref 3.4–5.3)
PROT SERPL-MCNC: 6.5 G/DL (ref 6.8–8.8)
RBC # BLD AUTO: 3.15 10E12/L (ref 4.4–5.9)
RBC #/AREA URNS AUTO: 0 /HPF (ref 0–2)
SODIUM SERPL-SCNC: 138 MMOL/L (ref 133–144)
SOURCE: ABNORMAL
SP GR UR STRIP: 1.01 (ref 1–1.03)
TROPONIN I SERPL-MCNC: 0.09 UG/L (ref 0–0.04)
TROPONIN I SERPL-MCNC: 0.1 UG/L (ref 0–0.04)
TSH SERPL DL<=0.005 MIU/L-ACNC: 1.08 MU/L (ref 0.4–4)
UROBILINOGEN UR STRIP-MCNC: NORMAL MG/DL (ref 0–2)
WBC # BLD AUTO: 7.8 10E9/L (ref 4–11)
WBC #/AREA URNS AUTO: 1 /HPF (ref 0–5)

## 2020-04-22 PROCEDURE — 71046 X-RAY EXAM CHEST 2 VIEWS: CPT

## 2020-04-22 PROCEDURE — 36415 COLL VENOUS BLD VENIPUNCTURE: CPT | Performed by: INTERNAL MEDICINE

## 2020-04-22 PROCEDURE — 21000001 ZZH R&B HEART CARE

## 2020-04-22 PROCEDURE — 83880 ASSAY OF NATRIURETIC PEPTIDE: CPT | Performed by: EMERGENCY MEDICINE

## 2020-04-22 PROCEDURE — 93970 EXTREMITY STUDY: CPT

## 2020-04-22 PROCEDURE — 93005 ELECTROCARDIOGRAM TRACING: CPT

## 2020-04-22 PROCEDURE — 80053 COMPREHEN METABOLIC PANEL: CPT | Performed by: EMERGENCY MEDICINE

## 2020-04-22 PROCEDURE — 84443 ASSAY THYROID STIM HORMONE: CPT | Performed by: INTERNAL MEDICINE

## 2020-04-22 PROCEDURE — 85610 PROTHROMBIN TIME: CPT | Performed by: EMERGENCY MEDICINE

## 2020-04-22 PROCEDURE — 99207 ZZC CDG-HISTORY COMP: MEETS EXP. PROBLEM FOCUSED-DOWN CODED LACK OF ROS: CPT | Performed by: INTERNAL MEDICINE

## 2020-04-22 PROCEDURE — 25000132 ZZH RX MED GY IP 250 OP 250 PS 637: Performed by: EMERGENCY MEDICINE

## 2020-04-22 PROCEDURE — A9540 TC99M MAA: HCPCS | Performed by: EMERGENCY MEDICINE

## 2020-04-22 PROCEDURE — 96374 THER/PROPH/DIAG INJ IV PUSH: CPT

## 2020-04-22 PROCEDURE — 25000132 ZZH RX MED GY IP 250 OP 250 PS 637: Performed by: INTERNAL MEDICINE

## 2020-04-22 PROCEDURE — 84484 ASSAY OF TROPONIN QUANT: CPT | Performed by: INTERNAL MEDICINE

## 2020-04-22 PROCEDURE — 85379 FIBRIN DEGRADATION QUANT: CPT | Performed by: EMERGENCY MEDICINE

## 2020-04-22 PROCEDURE — 78580 LUNG PERFUSION IMAGING: CPT

## 2020-04-22 PROCEDURE — 85025 COMPLETE CBC W/AUTO DIFF WBC: CPT | Performed by: EMERGENCY MEDICINE

## 2020-04-22 PROCEDURE — 34300033 ZZH RX 343: Performed by: EMERGENCY MEDICINE

## 2020-04-22 PROCEDURE — 00000146 ZZHCL STATISTIC GLUCOSE BY METER IP

## 2020-04-22 PROCEDURE — 99285 EMERGENCY DEPT VISIT HI MDM: CPT | Mod: 25

## 2020-04-22 PROCEDURE — 81001 URINALYSIS AUTO W/SCOPE: CPT | Performed by: EMERGENCY MEDICINE

## 2020-04-22 PROCEDURE — 99221 1ST HOSP IP/OBS SF/LOW 40: CPT | Mod: AI | Performed by: INTERNAL MEDICINE

## 2020-04-22 PROCEDURE — 25000128 H RX IP 250 OP 636: Performed by: EMERGENCY MEDICINE

## 2020-04-22 PROCEDURE — 84484 ASSAY OF TROPONIN QUANT: CPT | Performed by: EMERGENCY MEDICINE

## 2020-04-22 PROCEDURE — 83735 ASSAY OF MAGNESIUM: CPT | Performed by: EMERGENCY MEDICINE

## 2020-04-22 PROCEDURE — 25000131 ZZH RX MED GY IP 250 OP 636 PS 637: Performed by: INTERNAL MEDICINE

## 2020-04-22 PROCEDURE — 83036 HEMOGLOBIN GLYCOSYLATED A1C: CPT | Performed by: INTERNAL MEDICINE

## 2020-04-22 RX ORDER — LIDOCAINE 40 MG/G
CREAM TOPICAL
Status: DISCONTINUED | OUTPATIENT
Start: 2020-04-22 | End: 2020-04-24 | Stop reason: HOSPADM

## 2020-04-22 RX ORDER — ACETAMINOPHEN 650 MG/1
650 SUPPOSITORY RECTAL EVERY 4 HOURS PRN
Status: DISCONTINUED | OUTPATIENT
Start: 2020-04-22 | End: 2020-04-24 | Stop reason: HOSPADM

## 2020-04-22 RX ORDER — DEXTROSE MONOHYDRATE 25 G/50ML
25-50 INJECTION, SOLUTION INTRAVENOUS
Status: DISCONTINUED | OUTPATIENT
Start: 2020-04-22 | End: 2020-04-24 | Stop reason: HOSPADM

## 2020-04-22 RX ORDER — ACETAMINOPHEN 325 MG/1
650 TABLET ORAL EVERY 4 HOURS PRN
Status: DISCONTINUED | OUTPATIENT
Start: 2020-04-22 | End: 2020-04-24 | Stop reason: HOSPADM

## 2020-04-22 RX ORDER — ONDANSETRON 2 MG/ML
4 INJECTION INTRAMUSCULAR; INTRAVENOUS EVERY 6 HOURS PRN
Status: DISCONTINUED | OUTPATIENT
Start: 2020-04-22 | End: 2020-04-24 | Stop reason: HOSPADM

## 2020-04-22 RX ORDER — ALUMINA, MAGNESIA, AND SIMETHICONE 2400; 2400; 240 MG/30ML; MG/30ML; MG/30ML
30 SUSPENSION ORAL EVERY 4 HOURS PRN
Status: DISCONTINUED | OUTPATIENT
Start: 2020-04-22 | End: 2020-04-24 | Stop reason: HOSPADM

## 2020-04-22 RX ORDER — CARVEDILOL 12.5 MG/1
12.5 TABLET ORAL 2 TIMES DAILY WITH MEALS
Status: DISCONTINUED | OUTPATIENT
Start: 2020-04-22 | End: 2020-04-24 | Stop reason: HOSPADM

## 2020-04-22 RX ORDER — ASPIRIN 81 MG/1
324 TABLET, CHEWABLE ORAL ONCE
Status: DISCONTINUED | OUTPATIENT
Start: 2020-04-22 | End: 2020-04-22

## 2020-04-22 RX ORDER — NICOTINE POLACRILEX 4 MG
15-30 LOZENGE BUCCAL
Status: DISCONTINUED | OUTPATIENT
Start: 2020-04-22 | End: 2020-04-24 | Stop reason: HOSPADM

## 2020-04-22 RX ORDER — AMLODIPINE BESYLATE 5 MG/1
5 TABLET ORAL DAILY
Status: ON HOLD | COMMUNITY
End: 2020-07-05

## 2020-04-22 RX ORDER — AMLODIPINE BESYLATE 5 MG/1
5 TABLET ORAL DAILY
Status: DISCONTINUED | OUTPATIENT
Start: 2020-04-22 | End: 2020-04-24 | Stop reason: HOSPADM

## 2020-04-22 RX ORDER — NITROGLYCERIN 0.4 MG/1
0.4 TABLET SUBLINGUAL EVERY 5 MIN PRN
Status: DISCONTINUED | OUTPATIENT
Start: 2020-04-22 | End: 2020-04-24 | Stop reason: HOSPADM

## 2020-04-22 RX ORDER — ASPIRIN 325 MG
325 TABLET ORAL ONCE
Status: COMPLETED | OUTPATIENT
Start: 2020-04-22 | End: 2020-04-22

## 2020-04-22 RX ORDER — ONDANSETRON 4 MG/1
4 TABLET, ORALLY DISINTEGRATING ORAL EVERY 6 HOURS PRN
Status: DISCONTINUED | OUTPATIENT
Start: 2020-04-22 | End: 2020-04-24 | Stop reason: HOSPADM

## 2020-04-22 RX ORDER — FUROSEMIDE 10 MG/ML
40 INJECTION INTRAMUSCULAR; INTRAVENOUS ONCE
Status: COMPLETED | OUTPATIENT
Start: 2020-04-22 | End: 2020-04-22

## 2020-04-22 RX ADMIN — FUROSEMIDE 40 MG: 10 INJECTION, SOLUTION INTRAVENOUS at 13:44

## 2020-04-22 RX ADMIN — CARVEDILOL 12.5 MG: 12.5 TABLET, FILM COATED ORAL at 18:23

## 2020-04-22 RX ADMIN — INSULIN GLARGINE 12 UNITS: 100 INJECTION, SOLUTION SUBCUTANEOUS at 21:41

## 2020-04-22 RX ADMIN — ASPIRIN 325 MG ORAL TABLET 325 MG: 325 PILL ORAL at 15:19

## 2020-04-22 RX ADMIN — KIT FOR THE PREPARATION OF TECHNETIUM TC 99M ALBUMIN AGGREGATED 3.6 MILLICURIE: 2.5 INJECTION, POWDER, FOR SOLUTION INTRAVENOUS at 12:40

## 2020-04-22 ASSESSMENT — ENCOUNTER SYMPTOMS
NAUSEA: 0
FATIGUE: 1
LIGHT-HEADEDNESS: 0
COUGH: 0
ABDOMINAL PAIN: 0
SHORTNESS OF BREATH: 1
WEAKNESS: 1
FEVER: 0
DYSURIA: 0
BACK PAIN: 0
DIZZINESS: 1
VOMITING: 0
SORE THROAT: 0

## 2020-04-22 ASSESSMENT — ACTIVITIES OF DAILY LIVING (ADL): ADLS_ACUITY_SCORE: 13

## 2020-04-22 ASSESSMENT — MIFFLIN-ST. JEOR
SCORE: 1892.5
SCORE: 1864.37

## 2020-04-22 NOTE — TELEPHONE ENCOUNTER
"Pt arrived at the clinic door with worsening chest tightness over the last week. He states the pain comes on at rest and lasts about an hour or two and then will go away. He said \"I am just getting to the point that it's concerning me. And I wasn't sure if I could be seen in the clinic.\" He denies nausea, fever, cough. He did state that he has recently had vision changes. After speaking with patient, I encouraged him to proceed to the ER. He agreed. Pt wheeled to ER by RN staff at door. Sally Langley RN    "

## 2020-04-22 NOTE — H&P
Admitted:     04/22/2020      PRIMARY CARE PHYSICIAN:  Td Qureshi MD.      PRIMARY CARDIOLOGIST:  Laci Neumann MD.      CHIEF COMPLAINT:  Shortness of breath, fatigue, malaise and increased lower extremity edema.      HISTORY OF PRESENT ILLNESS:  Burton Elizabeth is a 75-year-old  gentleman who is very pleasant, who has a history of coronary artery disease, type 2 diabetes, chronic kidney disease with chronic left bundle branch block, hypertension, heart failure and ischemic cardiomyopathy, who presents with dizziness, malaise, lower extremity edema and a brief episode of chest pain.      The patient had an extensive cardiac workup after his orthopedic surgery last summer.  The patient's left heart catheterization from 08/2019 showed 99% stenosis of the circumflex, but this is a small vessel.  He had only moderate disease in other vascular territories and he was being medically managed.  The patient over the last several days has been complaining of fatigue, malaise, increased lower extremity edema and brief fleeting episodes of chest pain.  He has been afebrile.  No sore throat or cough.  No fevers, chills or sweats.  He has been feeling dizzy as well.  The patient denies any sort of abdominal pain or nausea, vomiting, diarrhea, constipation or urinary hesitancy, urgency or frequency.  No significant COVID exposure.  The patient came into [facility] for further assessment.      In the Emergency Department, the patient was seen by Dr. Vicente Reid.  The patient was notably afebrile, slightly hypertensive.  ECG showed a chronic left bundle branch block.  Electrolytes are unremarkable.  BUN of 56, creatinine is 1.93 with a calculated GFR of 33, which is actually below his baseline.  LFTs are also significant for an albumin of 3.3, otherwise fairly unremarkable.  ProBNP is 1200.  Troponin is above reference range at 0.085.  White count 7.8, hemoglobin 9, platelets of 224,000.  Glucose  165.  A 2-view chest x-ray showed low lung volumes, lungs are grossly clear, no pleural effusion or pneumothorax.  He had a VQ scan which showed low probability for PE.  Bilateral lower extremity ultrasound showed no DVT.  Urinalysis is also unremarkable.  The patient was given IV Lasix and is being admitted for likely acute decompensated diastolic congestive heart failure.      PAST MEDICAL HISTORY:   1.  ASCVD.   2.  Ischemic cardiomyopathy with EF of about 44%.   3.  Type 2 diabetes.   4.  Hypertension.   5.  Chronic left bundle branch block.   6.  Hyperlipidemia.   7.  History of DVT.     8.  Chronic kidney disease, stage III.   9.  DJD.      PAST SURGICAL HISTORY:   1.  Knee arthroplasty with revision.     2.  Left heart catheterization.     3.  Cataract removal.      FAMILY HISTORY:  Reviewed and negative.      SOCIAL HISTORY:  No tobacco, occasional alcohol.  Single.  He is full code.      ALLERGIES:  PENICILLIN.      CURRENT MEDICATIONS:   1.  Amlodipine.   2.  Aspirin.     3.  Coreg.     4.  Amaryl.   5.  Lantus.   6.  Metformin.      REVIEW OF SYSTEMS:  Ten points reviewed and as dictated in the history of present illness.      PHYSICAL EXAMINATION:   VITAL SIGNS:  Temperature 99.2, heart rate 87, respirations 18, blood pressure 153/83, sats 97% on room air.   GENERAL:  The patient is a heavyset, very pleasant 75-year-old  gentleman who is pleasant, cooperative, in no distress.   HEENT:  Pupils equal.  Sclerae are anicteric.  Mucous membranes are moist.  Head is atraumatic.  Sclerae are anicteric.   NECK:  JVP is mildly elevated.   PULMONARY:  He has some rhonchi bilaterally.   CARDIOVASCULAR:  S1, S2, regular rate and rhythm.   GASTROINTESTINAL:  Abdomen is obese, soft, nontender, normoactive bowel sounds.   MUSCULOSKELETAL:  Bilateral lower extremity pitting edema.   NEUROLOGIC:  He is able to move all 4 extremities.  Cranial nerves grossly intact.   DERMATOLOGIC:  Skin is warm, dry, well  perfused.   PSYCHIATRIC:  Mood and affect, stable.      LABORATORY DATA:  As dictated in the history of present illness.      ASSESSMENT:  Burton Elizabeth is 75 with known coronary artery disease, ischemic cardiomyopathy with presentation of acute decompensated diastolic congestive heart failure.      PLAN:   1.  Acute decompensated diastolic congestive heart failure.  The [facility]  The patient will undergo IV Lasix.  We will get an echocardiogram and Cardiology consultation.  The patient is not on Lasix or any kind of antidiuretic medications, which may be instituted in the form of torsemide given his chronic kidney disease.  His VQ scan is low probability for PE.  Troponin is mildly elevated.  We will do serial troponins.   2.  Chronic kidney disease, stage III.  We will monitor I's and O's and creatinine with diuresis.   3.  Atherosclerotic cardiovascular disease.  The patient has moderate coronary artery disease and a small left circumflex with 99% stenosis.  The patient will undergo serial troponins and will be seen by Cardiology as well.   4.  Hypertension.  We will continue the patient on Coreg, amlodipine and losartan.   5.  Diabetes.  We are going to give him his sliding scale insulin and hold his metformin.  We will hold his Amaryl as well.   6.  Deep venous thrombosis prophylaxis.  The patient will have compression boots.      CODE STATUS:  Full.         SOCORRO LOMBARDI MD             D: 2020   T: 2020   MT: ELSIE      Name:     BURTON ELIZABETH   MRN:      [id number]        Account:      [id number]   :      1945        Admitted:     2020                   Document: [id number]       cc: Td Neumann MD [facility]

## 2020-04-22 NOTE — PHARMACY-ADMISSION MEDICATION HISTORY
Pharmacy Medication History  Admission medication history interview status for the 4/22/2020  admission is complete. See EPIC admission navigator for prior to admission medications     Medication history sources: Patient  Medication history source reliability: Good  - [facility] but knew [facility] doses. He states that his insulin pens had not changed since December of this past year.     Adherence assessment: Good    Significant changes made to the medication list:  1) Patient no longer takes meloxicam, losartan or lipitor at home.       Medication reconciliation completed by provider prior to medication history? No    Time spent in this activity: 25 minutes      Prior to Admission medications    Medication Sig Last Dose Taking? Auth Provider   amLODIPine (NORVASC) 5 MG tablet Take 5 mg by mouth daily 4/21/2020 at Unknown time Yes Unknown, Entered By History   aspirin (ASA) 325 MG EC tablet Take 325 mg by mouth daily 4/21/2020 at Unknown time Yes Unknown, Entered By History   carvedilol (COREG) 25 MG tablet Take 12.5 mg by mouth 2 times daily (with meals) (Takes 0.5 x 25mg tablet = 12.5mg dose) 4/21/2020 at Unknown time Yes Reported, Patient   glimepiride (AMARYL) 4 MG tablet Take 4 mg by mouth 2 times daily  4/21/2020 at Unknown time Yes Reported, Patient   insulin aspart (NOVOLOG FLEXPEN) 100 UNIT/ML pen Inject 2-6 Units Subcutaneous 3 times daily (with meals) (Patient states he bases his dose off of carb counting and blood glucose but did not give exact regimen). 4/21/2020 at Unknown time Yes Reported, Patient   insulin glargine (LANTUS) 100 UNIT/ML PEN Inject 12-24 Units Subcutaneous every evening (Patient states he bases his dose off of blood glucose and how much Novolog he has used throughout the day but did not give exact regimen). 4/21/2020 at Unknown time Yes Reported, Patient   metFORMIN (GLUCOPHAGE) 1000 MG tablet Take 500 mg by mouth 2 times daily (with meals) 4/21/2020 at Unknown  time Yes Unknown, Entered By History   multivitamin, therapeutic with minerals (THERA-VIT-M) TABS Take 1 tablet by mouth daily 4/21/2020 at Unknown time Yes Reported, Patient   blood glucose (NO BRAND SPECIFIED) test strip Use to test blood sugar as directed   Reported, Patient   order for DME Equipment being ordered: Walker Wheels () and Walker ()  Treatment Diagnosis: Impaired gait   Catarino Kearns PA-C

## 2020-04-22 NOTE — PROGRESS NOTES
RECEIVING UNIT ED HANDOFF REVIEW    ED Nurse Handoff Report was reviewed by: Isabel Vanegas RN on April 22, 2020 at 4:24 PM

## 2020-04-22 NOTE — ED TRIAGE NOTES
Pt had episode of left side chest pain for approx. 1 min. Denies any other symptoms - pt had appointment with Dr. Neumann next week came to clinic this morning they referred him to ER for eval.

## 2020-04-22 NOTE — PLAN OF CARE
A&O x4. Pt denied pain. VSS, on RA. Up w/ SBA. Plan for cards consult tomorrow, NPO at midnight. Continue to monitor.

## 2020-04-22 NOTE — ED PROVIDER NOTES
History     Chief Complaint:  Dizziness      HPI   Burton Elizabeth is a 75 year old male with a history if type II DM, coronary artery disease, chronic congestive heart failure, Chronic Kidney Disease, LBBB, hypertension, and hyperlipidemia with an EF of 44% who presents with dizziness and malaise. His last echocardiogram was in 2019 as below. Importantly, patient used to be on Coumadin s/p knee replacement for previous DVT, but is no longer on this. Patient states that about 5 days ago he began feeling increasingly fatigued and weak. He began taking his temperatures, but remained afebrile, Tmax 97.8. Since then, he has also been experiencing periodic blurred vision, chest discomfort, and dizziness with any postural changes. The room would be mildly spinning at these times, but he did not feel as though he would pass out. Each episode of chest discomfort has lasted 30 seconds to one minute in total. Last night, these symptoms seemed to acutely worsen with increased dizziness and acute onset of mild shortness of breath. Then, this morning, he had a ~1 minute episode of chest pain with standing from a sitting position again. He called his cardiologist's office (Dr. Neumann), who recommended he come to the emergency department. No pleuritic pain. He denies any shortness of breath this morning. He reports his chronic leg swelling is stable and not increased. Burton denies sore throat, cough, worsening leg swelling, abdominal pain, nausea, vomiting, diarrhea, constipation, urinary changes including dysuria, and back pain. He has no known ill contacts or COVID exposure. Of note, patient is supposed to see his cardiologist next week.     Complete Echo Adult. 8/7/2019  Interpretation Summary     Sinus rhythm was noted with a wide QRS.  The left ventricle is normal in size with moderate concentric left ventricular  hypertrophy (LVH).  Some proximal septal thickening is noted but echo findings are not consistent  with left  ventricular outflow obstruction.  Left ventricular systolic function is mildly reduced as the visual ejection  fraction is estimated at 45-50%.  This decrease in the LVEF is due to mild to moderate anteroseptal hypokinesis.  Some septal motion is consistent with conduction abnormality.  Grade I or early diastolic dysfunction is noted and the diastolic Doppler  findings (E/E' ratio and/or other parameters) suggest left ventricular filling  pressures are increased.  There is only mild (1+) mitral regurgitation.  The aortic Sinus(es) of Valsalva are mildly dilated at 4.0 cm (The upper limit  of normal is 3.7cm for aortic root diameter.)  At least moderate valvular aortic stenosis is present with the AoV data noted  just below. The 2-D approximation supports significant AS.  With the findings noted above, the left atrium is severely dilated by volume  criteria at 75 ml/m2. Severe LA dilation is > 48 ml/m2.  The right atrium is moderately dilated also.    Allergies:  Penicillins      Medications:    Amlodipine   Aspirin 81mg   Atorvastatin   Carvedilol   Amaryl   Insulin aspart  Insulin glargine   Losartan   Meloxicam   Metformin     Past Medical History:    Arthritis of knee s/p replacement   Coronary artery disease    Decreased cardiac ejection fracture ~44%  Type II DM   Hypertension   LBBB  Hyperlipidemia    DVT  Chronic congestive heart failure   Chronic Kidney Disease     Past Surgical History:    Arthroplasty knee   Arthroplasty revision knee   Left heart cath   Cataract removal   Orthopedic surgery    Family History:    History reviewed. No pertinent family history.      Social History:  The patient was alone.  Smoking Status: Never  Smokeless Tobacco: Never  Alcohol Use: Yes    Marital Status:  Single      Review of Systems   Constitutional: Positive for fatigue. Negative for fever.   HENT: Negative for sore throat.    Eyes: Positive for visual disturbance.   Respiratory: Positive for shortness of breath.  "Negative for cough.    Cardiovascular: Positive for chest pain. Negative for leg swelling.   Gastrointestinal: Negative for abdominal pain, nausea and vomiting.   Genitourinary: Negative.  Negative for dysuria.   Musculoskeletal: Negative for back pain.   Neurological: Positive for dizziness and weakness. Negative for light-headedness.   All other systems reviewed and are negative.      Physical Exam     Patient Vitals for the past 24 hrs:   BP Temp Temp src Pulse Heart Rate Resp SpO2 Height Weight   04/22/20 0858 (!) 164/87 98.2  F (36.8  C) Oral 83 83 16 98 % 1.753 m (5' 9\") 113.4 kg (250 lb)       Physical Exam  General: Well appearing, nontoxic. Resting comfortably  Head:  Scalp, face, and head appear normal  Eyes:  Pupils are equal, round, and reactive to light    Conjunctivae non-injected and sclerae white  ENT:    The external nose is normal    Pinnae are normal    The oropharynx is normal, mucous membranes moist    Uvula is in the midline  Neck:  Normal range of motion    There is no rigidity noted    Trachea is in the midline  CV:  Regular rate and rhythm     Normal S1/S2, no S3/S4    2/6 systolic murmur loudest at the apex. Radial pulses 2+ bilaterally   Resp:  Lungs are clear and equal bilaterally    There is no tachypnea    No increased work of breathing    No rales, wheezing, or rhonchi  GI:  Abdomen is soft, morbidly obese, no rigidity or guarding    No distension, or mass    No tenderness or rebound tenderness   MS:  Normal muscular tone    Symmetric motor strength    2+ pitting edema below the knees L > R. Extremities are warm and well perfused.  Skin:  No rash or acute skin lesions noted  Neuro: Awake and alert    Speech is normal and fluent    Moves all extremities spontaneously  Psych:  Normal affect.  Appropriate interactions.      Emergency Department Course     ECG:  Indication: Chest pain   Completed at 0926.  Read at 0940.   Sinus rhythm with 1st degree AV block   LBBB   Abnormal ECG   Rate " 77 bpm. SD interval 214. QRS duration 176. QT/QTc 460/520. P-R-T axes -17 13 146.    Imaging:  Radiology findings were communicated with the patient and Admitting MD who voiced understanding of the findings.    NM Lung Scan Perfusion Particulate  Preliminary Result  IMPRESSION: Negative perfusion scan.  Report per radiology      XR Chest 2 Views  Final Result  IMPRESSION: Low lung volumes. Lungs are grossly clear. No pleural  effusion or pneumothorax.  MARY MARTINEZ MD     Lower Extremity Venous Duplex Bilateral  Pending    Laboratory:  Laboratory findings were communicated with the patient and Admitting MD who voiced understanding of the findings.    CBC: HGB 9.0 (L) o/w WNL. (WBC 7.8, )   CMP: Glucose 165 (H), BUN 56 (H), Creatinine 1.93 (H), GFR Estimate 33 (L), Albumin 3.3 (L), Protein Total 6.5 (L), AST 61 (H) o/w WNL      BNP: 1,233 (H)   Troponin (Collected 0917): 0.085 (H)     Magnesium: 2.0   INR: 1.12     D-Dimer: 1.2 (H)    UA: Pending     Interventions:  1344 Lasix 40mg IV    Emergency Department Course:  Past medical records, nursing notes, and vitals reviewed.    0935 I performed an exam of the patient as documented above.     EKG obtained in the ED, see results above.   IV was inserted and blood was drawn for laboratory testing, results above.  The patient provided a urine sample here in the emergency department. This was sent for laboratory testing, findings above.  The patient was sent for a CXR, Lung vent and perfusion (NM) scan while in the emergency department, results above.     1225 I rechecked the patient and discussed the results of his workup thus far.     Findings and plan explained to the Patient who consents to admission. Discussed the patient with Dr. Francois, who will admit the patient for further monitoring, evaluation, and treatment.    I personally reviewed the laboratory and imaging results with the Patient and answered all related questions prior to admission.      Impression & Plan     Medical Decision Making:  Burton Elizabeth is a 75 year old male with a complex past medical history including coronary artery disease, congestive heart failure, chronic kidney disease, DVTs who presents with dizziness, fatigue and intermittent episodes of short-lived chest pain.  On my evaluation he is resting comfortably and is hemodynamically stable.  A broad differential diagnosis is considered including pulmonary embolism, exacerbation of congestive heart failure, acute coronary syndrome, dysrhythmia, pleural effusions, pulmonary edema, pericarditis, myocarditis, infection is considered but felt to be less likely as the patient has not had any other infectious signs or symptoms.  No clinical signs or symptoms to suggest COVID disease work-up in the emergency department was significant for elevated BNP, troponin as well as elevated d-dimer.  Given his chronic kidney disease and renal function VQ scan was obtained and was negative for any perfusion defects to suggest pulmonary embolism.  Troponin is only minimally elevated and he is not having any current chest pain.  I would not heparinize at this time.  Renal function is at baseline.  The remainder of his electrolytes are otherwise unremarkable.  CBC with slightly chronic worsened anemia.  He denies any bleeding.  Chest x-ray was unremarkable.  No evidence for pneumonia, pleural effusions or other acute findings.  Given his increasing leg swelling fatigue patient likely has an exacerbation of his underlying CHF.  IV Lasix was given.  Given the elevated troponin elevated d-dimer and ongoing intermittent chest pain patient be admitted to the hospitalist.  Case was discussed with the hospitalist and the patient was accepted for admission.  He was admitted in stable condition.    Diagnosis:    ICD-10-CM    1. Acute on chronic congestive heart failure, unspecified heart failure type (H)  I50.9    2. Elevated troponin  R79.89    3.  Dizziness  R42    4. Chest pain, unspecified type  R07.9        Disposition:  Admitted to hospitalist service.    Scribe Disclosure:  I, Yolanda Hall, am serving as a scribe at 9:35 AM on 4/22/2020 to document services personally performed by Vicente Reid MD based on my observations and the provider's statements to me.   4/22/2020    EMERGENCY DEPARTMENT       Vicente Reid MD  04/22/20 9155

## 2020-04-22 NOTE — ED NOTES
"[facility] [place]  ED Nurse Handoff Report    ED Chief complaint: Chest Pain      ED Diagnosis:   Final diagnoses:   Acute on chronic congestive heart failure, unspecified heart failure type (H)   Elevated troponin   Dizziness       Code Status: Full Code    Allergies:   Allergies   Allergen Reactions     Penicillins Anaphylaxis       Patient Story: pt with two days of intermittent left sided chest pain pt state last 1-2 sec each time with sob with activiey pt has hx of cad and chf  Focused Assessment:  Lungs with rales bases.  Denies chest pain here     Treatments and/or interventions provided: lasix labs VQ scann as d dimer was elevated as well as troponin  Patient's response to treatments and/or interventions: yet to urinate    To be done/followed up on inpatient unit:  I and Q    Does this patient have any cognitive concerns?:     Activity level - Baseline/Home:  Independent  Activity Level - Current:   Stand with Assist    Patient's Preferred language: English   Needed?: No    Isolation: None  Infection: Not Applicable  Bariatric?: No    Vital Signs:   Vitals:    04/22/20 0858 04/22/20 1050   BP: (!) 164/87 131/84   Pulse: 83    Resp: 16 12   Temp: 98.2  F (36.8  C)    TempSrc: Oral    SpO2: 98% 97%   Weight: 113.4 kg (250 lb)    Height: 1.753 m (5' 9\")        Cardiac Rhythm:     Was the PSS-3 completed:   Yes  What interventions are required if any?               Family Comments: none here pt has been updating them per pt  OBS brochure/video discussed/provided to patient/family: N/A              Name of person given brochure if not patient:               Relationship to patient:     For the majority of the shift this patient's behavior was Green.   Behavioral interventions performed were .    ED NURSE PHONE NUMBER: [phone]       "

## 2020-04-23 ENCOUNTER — APPOINTMENT (OUTPATIENT)
Dept: CARDIOLOGY | Facility: CLINIC | Age: 75
DRG: 291 | End: 2020-04-23
Attending: INTERNAL MEDICINE
Payer: COMMERCIAL

## 2020-04-23 LAB
ANION GAP SERPL CALCULATED.3IONS-SCNC: 9 MMOL/L (ref 3–14)
BUN SERPL-MCNC: 49 MG/DL (ref 7–30)
CALCIUM SERPL-MCNC: 8.8 MG/DL (ref 8.5–10.1)
CHLORIDE SERPL-SCNC: 107 MMOL/L (ref 94–109)
CHOLEST SERPL-MCNC: 188 MG/DL
CO2 SERPL-SCNC: 20 MMOL/L (ref 20–32)
CREAT SERPL-MCNC: 1.95 MG/DL (ref 0.66–1.25)
GFR SERPL CREATININE-BSD FRML MDRD: 33 ML/MIN/{1.73_M2}
GLUCOSE BLDC GLUCOMTR-MCNC: 200 MG/DL (ref 70–99)
GLUCOSE BLDC GLUCOMTR-MCNC: 213 MG/DL (ref 70–99)
GLUCOSE BLDC GLUCOMTR-MCNC: 309 MG/DL (ref 70–99)
GLUCOSE SERPL-MCNC: 204 MG/DL (ref 70–99)
HDLC SERPL-MCNC: 31 MG/DL
LDLC SERPL CALC-MCNC: 102 MG/DL
NONHDLC SERPL-MCNC: 157 MG/DL
POTASSIUM SERPL-SCNC: 4.3 MMOL/L (ref 3.4–5.3)
SODIUM SERPL-SCNC: 136 MMOL/L (ref 133–144)
TRIGL SERPL-MCNC: 277 MG/DL
TROPONIN I SERPL-MCNC: 0.1 UG/L (ref 0–0.04)

## 2020-04-23 PROCEDURE — 99225 ZZC SUBSEQUENT OBSERVATION CARE,LEVEL II: CPT | Performed by: INTERNAL MEDICINE

## 2020-04-23 PROCEDURE — 25000128 H RX IP 250 OP 636: Performed by: NURSE PRACTITIONER

## 2020-04-23 PROCEDURE — 99207 ZZC CDG-CODE CATEGORY CHANGED: CPT | Performed by: INTERNAL MEDICINE

## 2020-04-23 PROCEDURE — 93306 TTE W/DOPPLER COMPLETE: CPT | Mod: 26 | Performed by: INTERNAL MEDICINE

## 2020-04-23 PROCEDURE — 99222 1ST HOSP IP/OBS MODERATE 55: CPT | Performed by: INTERNAL MEDICINE

## 2020-04-23 PROCEDURE — 40000264 ECHOCARDIOGRAM COMPLETE

## 2020-04-23 PROCEDURE — 25500064 ZZH RX 255 OP 636: Performed by: INTERNAL MEDICINE

## 2020-04-23 PROCEDURE — 25000131 ZZH RX MED GY IP 250 OP 636 PS 637: Performed by: INTERNAL MEDICINE

## 2020-04-23 PROCEDURE — 80048 BASIC METABOLIC PNL TOTAL CA: CPT | Performed by: INTERNAL MEDICINE

## 2020-04-23 PROCEDURE — G0378 HOSPITAL OBSERVATION PER HR: HCPCS

## 2020-04-23 PROCEDURE — 21000001 ZZH R&B HEART CARE

## 2020-04-23 PROCEDURE — 80061 LIPID PANEL: CPT | Performed by: INTERNAL MEDICINE

## 2020-04-23 PROCEDURE — 25000132 ZZH RX MED GY IP 250 OP 250 PS 637: Performed by: NURSE PRACTITIONER

## 2020-04-23 PROCEDURE — 25000132 ZZH RX MED GY IP 250 OP 250 PS 637: Performed by: INTERNAL MEDICINE

## 2020-04-23 PROCEDURE — 36415 COLL VENOUS BLD VENIPUNCTURE: CPT | Performed by: INTERNAL MEDICINE

## 2020-04-23 PROCEDURE — 00000146 ZZHCL STATISTIC GLUCOSE BY METER IP

## 2020-04-23 PROCEDURE — 84484 ASSAY OF TROPONIN QUANT: CPT | Performed by: INTERNAL MEDICINE

## 2020-04-23 RX ORDER — FUROSEMIDE 10 MG/ML
40 INJECTION INTRAMUSCULAR; INTRAVENOUS 2 TIMES DAILY
Status: DISCONTINUED | OUTPATIENT
Start: 2020-04-23 | End: 2020-04-24 | Stop reason: HOSPADM

## 2020-04-23 RX ORDER — ATORVASTATIN CALCIUM 40 MG/1
40 TABLET, FILM COATED ORAL EVERY EVENING
Status: DISCONTINUED | OUTPATIENT
Start: 2020-04-23 | End: 2020-04-24 | Stop reason: HOSPADM

## 2020-04-23 RX ADMIN — INSULIN GLARGINE 12 UNITS: 100 INJECTION, SOLUTION SUBCUTANEOUS at 20:37

## 2020-04-23 RX ADMIN — ASPIRIN 325 MG: 325 TABLET, COATED ORAL at 08:44

## 2020-04-23 RX ADMIN — AMLODIPINE BESYLATE 5 MG: 5 TABLET ORAL at 08:44

## 2020-04-23 RX ADMIN — CARVEDILOL 12.5 MG: 12.5 TABLET, FILM COATED ORAL at 18:20

## 2020-04-23 RX ADMIN — ATORVASTATIN CALCIUM 40 MG: 40 TABLET, FILM COATED ORAL at 20:37

## 2020-04-23 RX ADMIN — INSULIN ASPART 2 UNITS: 100 INJECTION, SOLUTION INTRAVENOUS; SUBCUTANEOUS at 18:21

## 2020-04-23 RX ADMIN — INSULIN ASPART 2 UNITS: 100 INJECTION, SOLUTION INTRAVENOUS; SUBCUTANEOUS at 08:44

## 2020-04-23 RX ADMIN — CARVEDILOL 12.5 MG: 12.5 TABLET, FILM COATED ORAL at 08:44

## 2020-04-23 RX ADMIN — FUROSEMIDE 40 MG: 10 INJECTION, SOLUTION INTRAMUSCULAR; INTRAVENOUS at 20:37

## 2020-04-23 RX ADMIN — HUMAN ALBUMIN MICROSPHERES AND PERFLUTREN 9 ML: 10; .22 INJECTION, SOLUTION INTRAVENOUS at 14:45

## 2020-04-23 SDOH — HEALTH STABILITY: MENTAL HEALTH: HOW MANY STANDARD DRINKS CONTAINING ALCOHOL DO YOU HAVE ON A TYPICAL DAY?: 1 OR 2

## 2020-04-23 SDOH — HEALTH STABILITY: MENTAL HEALTH: HOW OFTEN DO YOU HAVE A DRINK CONTAINING ALCOHOL?: 2-3 TIMES A WEEK

## 2020-04-23 SDOH — HEALTH STABILITY: MENTAL HEALTH: HOW MANY DRINKS CONTAINING ALCOHOL DO YOU HAVE ON A TYPICAL DAY WHEN YOU ARE DRINKING?: 1 OR 2

## 2020-04-23 ASSESSMENT — ACTIVITIES OF DAILY LIVING (ADL)
ADLS_ACUITY_SCORE: 13

## 2020-04-23 ASSESSMENT — MIFFLIN-ST. JEOR: SCORE: 1889.77

## 2020-04-23 NOTE — UTILIZATION REVIEW
"  Admission Status; Secondary Review Determination         Under the authority of the Utilization Management Committee, the utilization review process indicated a secondary review on the above patient.  The review outcome is based on review of the medical records, discussions with staff, and applying clinical experience noted on the date of the review.          (x) Observation Status Appropriate - This patient does not meet hospital inpatient criteria and is placed in observation status. If this patient's primary payer is Medicare and was admitted as an inpatient, Condition Code 44 should be used and patient status changed to \"observation\".     RATIONALE FOR DETERMINATION   75-year-old  gentleman who has a history of coronary artery disease, type 2 diabetes, chronic kidney disease with chronic left bundle branch block, hypertension, heart failure and ischemic cardiomyopathy, who presents with dizziness, malaise, lower extremity edema and a brief episode of chest pain. There was concern for acute decompensated diastolic heart failure, IV Lasix once was ordered, no evidence of hypoxia oxygen saturation on admission was 98%, no evidence of acute myocardial infarction or other diagnoses requiring prolonged inpatient stay.  The severity of illness, intensity of service provided, expected LOS and risk for adverse outcome make the care appropriate for further observation; however, doesn't meet criteria for hospital inpatient admission. Dr. Pierre  notified of this determination.    This document was produced using voice recognition software.      The information on this document is developed by the utilization review team in order for the business office to ensure compliance.  This only denotes the appropriateness of proper admission status and does not reflect the quality of care rendered.         The definitions of Inpatient Status and Observation Status used in making the determination above are those " provided in the CMS Coverage Manual, Chapter 1 and Chapter 6, section 70.4.      Sincerely,     JADA NUNEZ MD    System Medical Director  Utilization Management  [facility].

## 2020-04-23 NOTE — PROVIDER NOTIFICATION
To Dr Francois     249. Burton Elizabeth. Troponin up to 0.101 from 0.085. Not on any anticoags at this time. Do you want heparin? No CP at this time, did have some on admit. Isabel ALFRED *[id number]      Reply: Trop increase probably due to CHF. No anticoagulation at this time.

## 2020-04-23 NOTE — PLAN OF CARE
VSS. Room air. Patient still dyspnic with activity. Up with SBA. Patient states he did not sleep well over night. Plan for today ECHO and cardiology consult.       Heart Failure Care Pathway  GOALS TO BE MET BEFORE DISCHARGE:    1. Decrease congestion and/or edema with diuretic therapy to achieve near      optimal volume status.            Dyspnea improved:  No, please explain: LOAIZA            Edema improved:     No, please explain: +2 pedal edema        Net I/O and Weights since admission:          03/24 0700 - 04/23 0659  In: 480 [P.O.:480]  Out: 875 [Urine:875]  Net: -395            Vitals:    04/22/20 0858 04/22/20 1700 04/23/20 0233   Weight: 113.4 kg (250 lb) 116.2 kg (256 lb 3.2 oz) 115.9 kg (255 lb 9.6 oz)       2.  O2 sats > 92% on RA or at prior home O2 therapy level.          Current oxygenation status:       SpO2: 94 %         O2 Device: None (Room air),            Able to wean O2 this shift to keep sats > 92%:  Yes       Does patient use Home O2? No    3.  Tolerates ambulation and mobility near baseline: No, please explain: LOAIZA        How many times did the patient ambulate with nursing staff this shift? 3    Please review the Heart Failure Care Pathway for additional HF goal outcomes.    Isabel Watkins RN RN  4/23/2020

## 2020-04-23 NOTE — PLAN OF CARE
A&O x4. Pt denied pain. VSS-'s, on RA. IND. Tele: SR BBB. BG corrected w/ sliding scale. Echo today. Negative orthostatics. Plan for IV diuresis. No CP. No new complaints. Continue to monitor.

## 2020-04-23 NOTE — CONSULTS
"[facility]    Cardiology Consultation     Primary Cardiologist: Dr. Jose F Neumann    Date of Admission: 4/22/2020  Service date: 04/23/2020    Summary:   Mr. Manrique \"Magdaleno\" ABBIE Elizabeth is a very pleasant 75 year old male with a past medical history of type 2 diabetes mellitus, coronary artery disease, chronic diastolic congestive heart failure, chronic kidney disease, LBBB, hypertension, and hyperlipidemia who was admitted on 4/22/2020 for dizziness, malaise, increasing lower extremity edema, and chest discomfort.    Assessment & Plan   1. Acute on chronic diastolic heart failure with mild left ventricular dysfunction  - EF 45-50% on most recent echo in 08/2019.  - NT pro BNP elevated at 1,233.   - Received 40 mg of IV lasix.  - PTA on carvedilol 12.5 mg BID and amlodipine 5 mg once daily. He has not been on a home diuretic regimen previously.  - Admit wt 256 lb. Net -400 mL out with a single dose of 40 mg IV lasix thus far.  2. Coronary artery disease   - Troponins mildly elevated at 0.085 initially with subsequent checks at 0.101, and 0.097.  - EKG on presentation showing NSR with LBBB and 1st degree AV block, no significant ischemic changes in comparison to prior EKG in 12/2019.  - Coronary angiogram in 08/2019 noting single vessel occlusive disease namely  of non-dominant small circumflex vessel, proximal circumflex 99% stenosis, ostial RPDA 55%, post atrio lesion 50%, and mid to distal LAD 50% stenosis.  - PTA on aspirin and carvedilol  - Reportedly has been free of chest pain since admission.   3. Hypertension  - Blood pressures mildly elevated since admission around 140-150 systolic.  4. Hyperlipidemia  - Previously on atorvastatin 40 mg once daily, but reportedly stopped this several months ago for unclear reasons. He denies any issues with intolerance to statins.  - Lipid panel today unsurprisingly does not look great with an LDL of 102 and triglycerides of 277.    5. Aortic stenosis  - " Moderate, valve area 1.2cm2, mean AoV pressure gradient 31.4mmHg, peak AoV pressure gradient 53.1mmHg on most recent echo in 08/2019.   6. Chronic LBBB  7. Type 2 diabetes mellitus  8. Obesity  9. History of recurrent provoked DVTs  - Developed right posterior tibial and peroneal DVT 4/2016 following right knee replacement surgery.  - Developed nonocclusive left popliteal DVT 10/2019 following left knee surgery.  - PTA on warfarin, but he reportedly has not taken this for about 2 weeks after running out of it.   - BLE US yeserday shows no evidence of acute left or right lower extremity DVT. VQ scan is low probability for PE.    10. DJD   - Status post bilateral knee replacements.  11. Chronic kidney disease, stage 3  - Creatinine of 1.93 upon admission.  - Baseline creatinine of around 1.8-2.1.    Plan:   1. Awaiting an echocardiogram today.  2. Continue with diuresis with IV lasix 40 mg BID.   3. Could consider adding Imdur 30 mg once daily for possible angina with known small vessel CAD, improved BP control, and to decrease LV filling pressures with suspected acute CHF given CKD. However, will await echo given at least moderate aortic stenosis.  4. Will need to continue to monitor renal function closely with diuresis, but could also consider adding ACEi, ARB, or spironolactone to his regimen as able.  5. Continue with daily weights, a low sodium diet, and strict I&Os.  6. Restart atorvastatin 40 mg once daily with a repeat fasting lipid panel in 6-8 weeks.    Thank you for the opportunity to participate in this pleasant patient's care.     Lino Cunningham, WALE, CNP   Text Page  (8am - 5pm, M-F)    Code Status    Full Code    Reason for Consult   Reason for consult: I was asked by Dr. Christian Francois to evaluate this patient for acute congestive heart failure.    Primary Care Physician   Dr. Td Qureshi    Chief Complaint   Lower extremity edema    History is obtained from the patient    History of Present Illness    Mr. Burton Elizabeth is a very nice 75 year old gentleman with a past medical history of type 2 diabetes mellitus, coronary artery disease, chronic diastolic heart failure, chronic kidney disease, LBBB, aortic stenosis, hypertension, and hyperlipidemia who was admitted on 4/22/2020 for dizziness, malaise, increasing lower extremity edema, and chest discomfort.     The patient has previously followed with Dr. Neumann for his cardiology care, and underwent an extensive cardiac work-up prior to his left knee surgery last summer. This included a coronary angiogram in August 2019 showing a 99% stenosis of a small left circumflex artery.  There was mild to moderate disease elsewhere with a 55% ostial RPDA stenosis, 50% post atrio lesion, 30% ramus lesion, and a 50% mid to distal LAD lesion. No revascularization was performed and continued medical management was recommended at that time. Most recent echocardiogram on 8/7/2019 shows mildly reduced left ventricular systolic function with an ejection fraction of 45-50%. Grade 1 diastolic function was noted as well as moderate aortic stenosis with a mean AoV pressure gradient of 31.4 mmHg and a calculated valve area of 1.2 cm .    Mr. Elizabeth states that over the past 2-3 months he has noticed worsening bilateral lower extremity edema. He has not noticed any significant dyspnea on exertion, shortness of breath, orthopnea, or PND. Over the past 2 weeks or so, he also started noticing a mild sensation of pressure over his left chest. He describes this as feeling as though somewhat he is lightly pressing on his chest. He states that this comes and goes without any noticeable pattern and typically lasts around 15 to 30 seconds at most. He feels that this has begin to occur more frequently around every 4-6 hours over the past couple of days.      He also reports feeling a sense of dizziness intermittently over the past couple of weeks, which he describes without prompting as  feeling as though the room is spinning around his head. He feels as though he is off balance when this occurs. He denies any palpitations, presyncope, syncope, or falls. He has actually checked his temperature at home fairly regularly and has not had any fevers, chills, night sweats, abdominal pain, nausea, vomiting, or diarrhea.    He is not on any diuretic medications at home and has not regularly tracked his weights. He does note that his diet has been quite different from his normal over the past couple of weeks due to limiting grocery trips and no eating out with the current pandemic. He does not feel that he is significantly increased his salt intake. Of note, he apparently ran out of his warfarin about 2 weeks ago and has not been taking this since. He also states that he was told by someone to stop taking his atorvastatin several months ago and has not been taking this since.     Work-up since presentation has included an NT proBNP which was elevated at 1,233. A D-Dimer was elevated at 1.2. A VQ scan was subsequently completed and is low probability for PE. Bilateral lower extremity ultrasounds were also completed and showed no acute right or left lower extremity DVT. Chest x-ray was unremarkable without evidence of significant pleural effusion or pneumothorax. An initial troponin was mildly elevated at 0.085 with subsequent checks at 0.01 and 0.097.  An EKG on presentation showed normal sinus rhythm with a left bundle branch block and first-degree AV block. This appeared completely unchanged from prior EKG in December 2019 without significant ST or T wave changes.  Electrolytes are unremarkable and creatinine was near baseline at 1.93 with a GFR of 33 and BUN of 56.    Past Medical History   I have reviewed this patient's medical history and updated it with pertinent information if needed.   Past Medical History:   Diagnosis Date     Arthritis of knee~ s/p replacement 3/24/2016     CAD (coronary artery  disease)      Decreased cardiac ejection fraction~44% 3/24/2016     Diabetes mellitus, type 2 (H) 3/24/2016     History of DVT (deep vein thrombosis)      HTN (hypertension) 3/24/2016     LBBB (left bundle branch block) 3/24/2016     Mixed hyperlipidemia 8/23/2016     Past Surgical History   Past surgical history reviewed with no previous surgeries identified.    Prior to Admission Medications   Prior to Admission Medications   Prescriptions Last Dose Informant Patient Reported? Taking?   amLODIPine (NORVASC) 5 MG tablet 4/21/2020 at Unknown time Self Yes Yes   Sig: Take 5 mg by mouth daily   aspirin (ASA) 325 MG EC tablet 4/21/2020 at Unknown time Self Yes Yes   Sig: Take 325 mg by mouth daily   blood glucose (NO BRAND SPECIFIED) test strip  Self Yes No   Sig: Use to test blood sugar as directed   carvedilol (COREG) 25 MG tablet 4/21/2020 at Unknown time Self Yes Yes   Sig: Take 12.5 mg by mouth 2 times daily (with meals) (Takes 0.5 x 25mg tablet = 12.5mg dose)   glimepiride (AMARYL) 4 MG tablet 4/21/2020 at Unknown time Self Yes Yes   Sig: Take 4 mg by mouth 2 times daily    insulin aspart (NOVOLOG FLEXPEN) 100 UNIT/ML pen 4/21/2020 at Unknown time Self Yes Yes   Sig: Inject 2-6 Units Subcutaneous 3 times daily (with meals) (Patient states he bases his dose off of carb counting and blood glucose but did not give exact regimen).   insulin glargine (LANTUS) 100 UNIT/ML PEN 4/21/2020 at Unknown time Self Yes Yes   Sig: Inject 12-24 Units Subcutaneous every evening (Patient states he bases his dose off of blood glucose and how much Novolog he has used throughout the day but did not give exact regimen).   metFORMIN (GLUCOPHAGE) 1000 MG tablet 4/21/2020 at Unknown time Self Yes Yes   Sig: Take 500 mg by mouth 2 times daily (with meals)   multivitamin, therapeutic with minerals (THERA-VIT-M) TABS 4/21/2020 at Unknown time Self Yes Yes   Sig: Take 1 tablet by mouth daily   order for DME  Self No No   Sig: Equipment being  ordered: Walker Wheels () and Walker ()  Treatment Diagnosis: Impaired gait      Facility-Administered Medications: None     Allergies   Allergies   Allergen Reactions     Penicillins Anaphylaxis     Social History    reports that he is never smoker. He drinks alcohol in moderation, typically no more than a couple of drinks per week. No other drug use.  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 He is typically fairly active and goes to a health club to exercise every other day using a recumbent bike for about 30-45 minutes. He did note that he has been more sedentary lately with his health club closed due to the current pandemic.    Family History   I have reviewed this patient's family history and updated it with pertinent information if needed.   Family History   Problem Relation Age of Onset     Coronary Artery Disease No family hx of      Review of Systems   The 10 point Review of Systems is negative other than noted in the HPI or here.     Physical Exam   Temp: 98.5  F (36.9  C) Temp src: Oral BP: (!) 153/83 Pulse: 73 Heart Rate: 74 Resp: 18 SpO2: 90 % O2 Device: None (Room air)    Vital Signs with Ranges  Temp:  [98.4  F (36.9  C)-98.5  F (36.9  C)] 98.5  F (36.9  C)  Pulse:  [73-87] 73  Heart Rate:  [74-86] 74  Resp:  [16-18] 18  BP: (147-157)/(82-89) 153/83  SpO2:  [90 %-97 %] 90 %  255 lbs 9.6 oz    Constitutional: Pleasant, obese gentleman, appears his stated age, and in no acute distress.  Eyes: Pupils equal, round. Sclerae anicteric.   HEENT: Normocephalic, atraumatic.   Neck: Supple. Carotid pulses full and equal. No carotid bruit. Difficult to assess JVP due to body habitus, but no JVD appreciated..  Respiratory: Breathing non-labored. Lungs diminished to auscultation bilaterally, but with no crackles, wheezes, or rhonchi  noted.  Cardiovascular: Regular rate and rhythm.  Grade 2/6 systolic ejection murmur most prominent at the LUSB. No rub or gallop.  GI: Soft, obese, non-distended, non-tender, bowel sounds present in all four quadrants.  Skin: Warm, dry. No apparent rashes or cyanosis.  Musculoskeletal/Extremities: Moves all extremities well and symmetrically.  2+ pitting lower extremity edema in the feet and ankles bilaterally to the knees.  Neurologic: No gross focal deficits. Alert, and oriented to person, place and time.  Psychiatric: Affect appropriate. Mentation normal.    Data   Results for orders placed or performed during the hospital encounter of 04/22/20 (from the past 24 hour(s))   NM Lung Scan Perfusion Particulate    Narrative    NUCLEAR MEDICINE LUNG SCAN PERFUSION PARTICULATE April 22, 2020 1:03  PM    HISTORY: Pulmonary embolism suspected, intermediate probability,  positive D-dimer.    COMPARISON: None.    TECHNIQUE: Tc-99m MAA injected intravenously for evaluation of  pulmonary perfusion.    DOSE: 3.6 mCi Tc99m MAA IV     FINDINGS: There are no wedge shaped perfusion defects to suggest  pulmonary emboli. No pattern to suggest chronic pulmonary emboli.      Impression    IMPRESSION: Negative perfusion scan.     MARC GRANADOS MD   UA reflex to Microscopic and Culture    Specimen: Midstream Urine   Result Value Ref Range    Color Urine Yellow     Appearance Urine Clear     Glucose Urine Negative NEG^Negative mg/dL    Bilirubin Urine Negative NEG^Negative    Ketones Urine Negative NEG^Negative mg/dL    Specific Gravity Urine 1.011 1.003 - 1.035    Blood Urine Negative NEG^Negative    pH Urine 5.5 5.0 - 7.0 pH    Protein Albumin Urine 30 (A) NEG^Negative mg/dL    Urobilinogen mg/dL Normal 0.0 - 2.0 mg/dL    Nitrite Urine Negative NEG^Negative    Leukocyte Esterase Urine Negative NEG^Negative    Source Midstream Urine     RBC Urine 0 0 - 2 /HPF    WBC Urine 1 0 - 5 /HPF    Hyaline Casts 1 0 - 2 /LPF   US Lower Extremity  Venous Duplex Bilateral    Narrative    US LOWER EXTREMITY VENOUS DUPLEX BILATERAL 4/22/2020 3:50 PM    CLINICAL HISTORY: Bilateral lower extremity swelling. History of deep  venous thrombosis.    TECHNIQUE: Venous Duplex ultrasound of bilateral lower extremities  with and without compression, augmentation and duplex. Color flow and  spectral Doppler with waveform analysis performed.    COMPARISON: None.    FINDINGS: Exam includes the common femoral, femoral, popliteal veins  as well as segmentally visualized deep calf veins and greater  saphenous vein.     RIGHT: No deep vein thrombosis. No superficial thrombophlebitis. No  popliteal cyst. Edema is noted in the subcutaneous tissues of the  right calf.    LEFT: No evidence of acute left lower extremity deep venous  thrombosis. Probable old calcified thrombus is noted in the left  popliteal vein. No superficial thrombophlebitis. No popliteal cyst.  Edema is noted in the subcutaneous tissues of the left calf.      Impression    IMPRESSION:  No evidence of acute right or left lower extremity deep venous  thrombosis. No evidence of superficial thrombophlebitis.    1.  Bilateral lower extremity edema.    Chronic, calcified thrombus left popliteal vein. This would be  consistent with patient's stated history of prior deep venous  thrombosis.    HOME HERNÁNDEZ MD   TSH with free T4 reflex   Result Value Ref Range    TSH 1.08 0.40 - 4.00 mU/L   Troponin I - Now then in 6 hours x 2   Result Value Ref Range    Troponin I ES 0.101 (H) 0.000 - 0.045 ug/L   Hemoglobin A1c   Result Value Ref Range    Hemoglobin A1C 7.0 (H) 0 - 5.6 %   Troponin I - Now then in 6 hours x 2   Result Value Ref Range    Troponin I ES 0.097 (H) 0.000 - 0.045 ug/L   Glucose by meter   Result Value Ref Range    Glucose 213 (H) 70 - 99 mg/dL   Lipid panel   Result Value Ref Range    Cholesterol 188 <200 mg/dL    Triglycerides 277 (H) <150 mg/dL    HDL Cholesterol 31 (L) >39 mg/dL    LDL Cholesterol  Calculated 102 (H) <100 mg/dL    Non HDL Cholesterol 157 (H) <130 mg/dL     This note was completed in part using Dragon voice recognition software. Although reviewed after completion, some word and grammatical errors may occur.

## 2020-04-23 NOTE — PROGRESS NOTES
[facility]    Medicine Progress Note - Hospitalist Service        Date of Admission:  4/22/2020  8:53 AM    Assessment & Plan:   Burton Elizabeth is 75 with known coronary artery disease, ischemic cardiomyopathy with presentation of acute decompensated diastolic congestive heart failure.      Acute exacerbation of diastolic congestive heart failure.    -Patient presents with 2 episodes of dizziness/near syncope, fatigue and worsening lower extremity edema along with brief episode of chest pain.  -Denies cough or dyspnea per se  -Was felt to be a acute exacerbation of heart failure at presentation  -Initially received 1 dose of IV Lasix  -Evaluated by cardiology today, continue IV diuresis, await echocardiogram.  -Monitor intake and output and weight closely.    History of coronary artery disease  Elevated troponin  Benign essential hypertension  -Presented with chest pain  -Troponin peaked at 0.1  -Last cardiac cath was in August 2019, was found to have a  of nondominant small circumflex vessel, was recommended continued medical management.  -Echocardiogram today  -Await cardiology recommendation  -Continue prior to admission aspirin and Coreg    Chronic kidney disease, stage III.    -Baseline creatinine appears to be between 2.1-2.7  -Presented with a creatinine of 1.93, likely at baseline, monitor closely while on IV diuretics.    Insulin-dependent diabetes mellitus type 2  -Hold prior to admission glimepiride and metformin.  -Continue prior to admission Lantus  -Medium intensity sliding scale.    Dizziness/near syncope  -Patient altogether has had 2 episodes of lightheadedness the first 1 was brief however the second 1 Tuesday morning lasted about 3 to 4 minutes  -Denies any focal neurological weakness or tingling or numbness  -Orthostatic vitals this morning was normal  -Await echo, continue to monitor on telemetry      Diet: Low Saturated Fat Na <2400 mg  2 Gram Sodium Diet     DVT  "Prophylaxis: Pneumatic Compression Devices   Zuniga Catheter: not present  Code Status: Full Code     Disposition Plan    Expected discharge: 1-2 days pending echocardiogram and diuretic response.   Entered: Sly Pierre MD 04/23/2020, 1:05 PM        The patient's care was discussed with the Bedside Nurse and Patient.    Sly Pierre MD  Hospitalist Service  [facility]    ______________________________________________________________________    Interval History   Denies significant dyspnea.  No chest pain today.  No lightheadedness or dizziness or focal tingling or numbness or weakness or headache today.    Data reviewed today: I reviewed all medications, new labs and imaging results over the last 24 hours. I personally reviewed no images or EKG's today.    Physical Exam   Vital signs:  Temp: 98.5  F (36.9  C) Temp src: Oral BP: (!) 153/83 Pulse: 73 Heart Rate: 74 Resp: 18 SpO2: 90 % O2 Device: None (Room air)   Height: 175.3 cm (5' 9\") Weight: 115.9 kg (255 lb 9.6 oz)  Estimated body mass index is 37.75 kg/m  as calculated from the following:    Height as of this encounter: 1.753 m (5' 9\").    Weight as of this encounter: 115.9 kg (255 lb 9.6 oz).      Wt Readings from Last 2 Encounters:   04/23/20 115.9 kg (255 lb 9.6 oz)   12/22/19 119.6 kg (263 lb 11.2 oz)       Gen: AAOX3, NAD, comfortable  HEENT: no pallor  Resp: CTA B/L, normal WOB  CVS: RRR, no murmur  Abd/GI: Soft, non-tender. BS- normoactive.    Skin: Warm, dry no rashes  MSK: No joint deformities, no pedal edema  Neuro- CN- intact. No focal deficits.  Spontaneously moving all 4 extremities      Data   Recent Labs   Lab 04/23/20  0549 04/23/20  0031 04/22/20  1812 04/22/20  0917   WBC  --   --   --  7.8   HGB  --   --   --  9.0*   MCV  --   --   --  90   PLT  --   --   --  224   INR  --   --   --  1.12     --   --  138   POTASSIUM 4.3  --   --  4.6   CHLORIDE 107  --   --  107   CO2 20  --   --  27   BUN 49*  --   --  56* "   CR 1.95*  --   --  1.93*   ANIONGAP 9  --   --  4   GABE 8.8  --   --  8.8   *  --   --  165*   ALBUMIN  --   --   --  3.3*   PROTTOTAL  --   --   --  6.5*   BILITOTAL  --   --   --  0.3   ALKPHOS  --   --   --  128   ALT  --   --   --  61   AST  --   --   --  61*   TROPI  --  0.097* 0.101* 0.085*       Recent Results (from the past 24 hour(s))   US Lower Extremity Venous Duplex Bilateral    Narrative    US LOWER EXTREMITY VENOUS DUPLEX BILATERAL 4/22/2020 3:50 PM    CLINICAL HISTORY: Bilateral lower extremity swelling. History of deep  venous thrombosis.    TECHNIQUE: Venous Duplex ultrasound of bilateral lower extremities  with and without compression, augmentation and duplex. Color flow and  spectral Doppler with waveform analysis performed.    COMPARISON: None.    FINDINGS: Exam includes the common femoral, femoral, popliteal veins  as well as segmentally visualized deep calf veins and greater  saphenous vein.     RIGHT: No deep vein thrombosis. No superficial thrombophlebitis. No  popliteal cyst. Edema is noted in the subcutaneous tissues of the  right calf.    LEFT: No evidence of acute left lower extremity deep venous  thrombosis. Probable old calcified thrombus is noted in the left  popliteal vein. No superficial thrombophlebitis. No popliteal cyst.  Edema is noted in the subcutaneous tissues of the left calf.      Impression    IMPRESSION:  No evidence of acute right or left lower extremity deep venous  thrombosis. No evidence of superficial thrombophlebitis.    1.  Bilateral lower extremity edema.    Chronic, calcified thrombus left popliteal vein. This would be  consistent with patient's stated history of prior deep venous  thrombosis.    HOME HERNÁNDEZ MD     Medications     - MEDICATION INSTRUCTIONS -         amLODIPine  5 mg Oral Daily     aspirin  325 mg Oral Daily     atorvastatin  40 mg Oral QPM     carvedilol  12.5 mg Oral BID w/meals     insulin aspart  1-7 Units Subcutaneous TID AC      insulin aspart  1-5 Units Subcutaneous At Bedtime     insulin glargine  12 Units Subcutaneous QPM     sodium chloride (PF)  3 mL Intracatheter Q8H

## 2020-04-24 VITALS
OXYGEN SATURATION: 96 % | WEIGHT: 254.6 LBS | RESPIRATION RATE: 18 BRPM | HEART RATE: 73 BPM | TEMPERATURE: 99 F | SYSTOLIC BLOOD PRESSURE: 138 MMHG | HEIGHT: 69 IN | DIASTOLIC BLOOD PRESSURE: 73 MMHG | BODY MASS INDEX: 37.71 KG/M2

## 2020-04-24 PROBLEM — Z86.718 HISTORY OF DEEP VENOUS THROMBOSIS: Status: ACTIVE | Noted: 2020-04-24

## 2020-04-24 PROBLEM — I50.21 ACUTE SYSTOLIC CONGESTIVE HEART FAILURE (H): Status: ACTIVE | Noted: 2020-04-24

## 2020-04-24 PROBLEM — N17.9 AKI (ACUTE KIDNEY INJURY) (H): Status: RESOLVED | Noted: 2019-12-20 | Resolved: 2020-04-24

## 2020-04-24 PROBLEM — I25.10 CORONARY ARTERY DISEASE INVOLVING NATIVE CORONARY ARTERY OF NATIVE HEART: Status: ACTIVE | Noted: 2019-08-07

## 2020-04-24 LAB
ANION GAP SERPL CALCULATED.3IONS-SCNC: 5 MMOL/L (ref 3–14)
BUN SERPL-MCNC: 51 MG/DL (ref 7–30)
CALCIUM SERPL-MCNC: 8.9 MG/DL (ref 8.5–10.1)
CHLORIDE SERPL-SCNC: 106 MMOL/L (ref 94–109)
CO2 SERPL-SCNC: 25 MMOL/L (ref 20–32)
CREAT SERPL-MCNC: 2.23 MG/DL (ref 0.66–1.25)
GFR SERPL CREATININE-BSD FRML MDRD: 28 ML/MIN/{1.73_M2}
GLUCOSE BLDC GLUCOMTR-MCNC: 167 MG/DL (ref 70–99)
GLUCOSE BLDC GLUCOMTR-MCNC: 192 MG/DL (ref 70–99)
GLUCOSE BLDC GLUCOMTR-MCNC: 215 MG/DL (ref 70–99)
GLUCOSE BLDC GLUCOMTR-MCNC: 218 MG/DL (ref 70–99)
GLUCOSE BLDC GLUCOMTR-MCNC: 228 MG/DL (ref 70–99)
GLUCOSE BLDC GLUCOMTR-MCNC: 234 MG/DL (ref 70–99)
GLUCOSE SERPL-MCNC: 216 MG/DL (ref 70–99)
POTASSIUM SERPL-SCNC: 4.4 MMOL/L (ref 3.4–5.3)
SODIUM SERPL-SCNC: 136 MMOL/L (ref 133–144)

## 2020-04-24 PROCEDURE — 25000128 H RX IP 250 OP 636: Performed by: NURSE PRACTITIONER

## 2020-04-24 PROCEDURE — 80048 BASIC METABOLIC PNL TOTAL CA: CPT | Performed by: INTERNAL MEDICINE

## 2020-04-24 PROCEDURE — 99217 ZZC OBSERVATION CARE DISCHARGE: CPT | Performed by: INTERNAL MEDICINE

## 2020-04-24 PROCEDURE — G0378 HOSPITAL OBSERVATION PER HR: HCPCS

## 2020-04-24 PROCEDURE — 36415 COLL VENOUS BLD VENIPUNCTURE: CPT | Performed by: INTERNAL MEDICINE

## 2020-04-24 PROCEDURE — 25000132 ZZH RX MED GY IP 250 OP 250 PS 637: Performed by: INTERNAL MEDICINE

## 2020-04-24 PROCEDURE — 99207 ZZC CDG-CODE CATEGORY CHANGED: CPT | Performed by: INTERNAL MEDICINE

## 2020-04-24 PROCEDURE — 00000146 ZZHCL STATISTIC GLUCOSE BY METER IP

## 2020-04-24 PROCEDURE — 99232 SBSQ HOSP IP/OBS MODERATE 35: CPT | Performed by: INTERNAL MEDICINE

## 2020-04-24 RX ORDER — TORSEMIDE 20 MG/1
20 TABLET ORAL DAILY
Status: DISCONTINUED | OUTPATIENT
Start: 2020-04-25 | End: 2020-04-24 | Stop reason: HOSPADM

## 2020-04-24 RX ORDER — TORSEMIDE 20 MG/1
20 TABLET ORAL DAILY
Qty: 30 TABLET | Refills: 3 | Status: SHIPPED | OUTPATIENT
Start: 2020-04-25 | End: 2020-04-24

## 2020-04-24 RX ORDER — TORSEMIDE 20 MG/1
20 TABLET ORAL DAILY
Qty: 30 TABLET | Refills: 3 | Status: SHIPPED | OUTPATIENT
Start: 2020-04-25 | End: 2020-04-30 | Stop reason: DRUGHIGH

## 2020-04-24 RX ORDER — ATORVASTATIN CALCIUM 40 MG/1
40 TABLET, FILM COATED ORAL EVERY EVENING
Qty: 30 TABLET | Refills: 3 | Status: ON HOLD | OUTPATIENT
Start: 2020-04-24 | End: 2021-01-01

## 2020-04-24 RX ADMIN — FUROSEMIDE 40 MG: 10 INJECTION, SOLUTION INTRAMUSCULAR; INTRAVENOUS at 08:40

## 2020-04-24 RX ADMIN — AMLODIPINE BESYLATE 5 MG: 5 TABLET ORAL at 08:38

## 2020-04-24 RX ADMIN — ASPIRIN 325 MG: 325 TABLET, COATED ORAL at 08:39

## 2020-04-24 RX ADMIN — INSULIN ASPART 2 UNITS: 100 INJECTION, SOLUTION INTRAVENOUS; SUBCUTANEOUS at 08:54

## 2020-04-24 RX ADMIN — INSULIN ASPART 2 UNITS: 100 INJECTION, SOLUTION INTRAVENOUS; SUBCUTANEOUS at 14:21

## 2020-04-24 RX ADMIN — CARVEDILOL 12.5 MG: 12.5 TABLET, FILM COATED ORAL at 08:39

## 2020-04-24 ASSESSMENT — MIFFLIN-ST. JEOR: SCORE: 1885.24

## 2020-04-24 NOTE — PROGRESS NOTES
"[facility]    Cardiology Progress Note    Primary Cardiologist: Dr. Jose F Neumann    Date of Admission: 4/22/2020  Service date: 04/24/2020    Summary:  Mr. Manrique \"Magdaleno\" ABBIE Elizabeth is a very pleasant 75 year old male with a past medical history of type 2 diabetes mellitus, coronary artery disease, chronic diastolic congestive heart failure, chronic kidney disease, LBBB, hypertension, and hyperlipidemia who was admitted on 4/22/2020 for dizziness, malaise, increasing lower extremity edema, and chest discomfort.    Assessment & Plan    1. Acute on chronic diastolic heart failure with mild left ventricular dysfunction  - EF 45-50% on echo yesterday without significant changes from the prior study in 08/2019.  - NT pro BNP elevated at 1,233.   - PTA on carvedilol 12.5 mg BID and amlodipine 5 mg once daily. He has not been on a home diuretic regimen previously.  - Diuresing with IV lasix 40 mg BID. Admit wt 256 lb. Wt down to 254 lb today. I&Os unreliable, reviewed importance of I&Os for monitoring fluid status.   2. Coronary artery disease   - Troponins mildly elevated at 0.085 initially with subsequent checks at 0.101, and 0.097. Suspect demand-related in the setting of #1.  - EKG on presentation showing NSR with LBBB and 1st degree AV block, no significant ischemic changes in comparison to prior EKG in 12/2019.  - Coronary angiogram in 08/2019 noting single vessel occlusive disease namely  of non-dominant small circumflex vessel, proximal circumflex 99% stenosis, ostial RPDA 55%, post atrio lesion 50%, and mid to distal LAD 50% stenosis.  - PTA on aspirin and carvedilol.  - Reportedly has been free of chest pain since admission.   3. Hypertension  - Blood pressures mildly elevated since admission around 140-150 systolic.  4. Hyperlipidemia  - Previously on atorvastatin 40 mg once daily, but reportedly stopped this several months ago for unclear reasons. He denies any issues with intolerance to " statins.  - Lipid panel this admission showing LDL of 102 and triglycerides of 277. Restarted on atorvastatin 40 mg daily.  5. Moderate aortic stenosis  - Valve area 1.2cm2, mean AoV pressure gradient 33.0 mmHg, peak AoV pressure gradient 53.3 mmHg on echo yesterday. Stable from previous in 08/2019.  6. Chronic LBBB  7. Type 2 diabetes mellitus  8. Obesity  9. History of recurrent provoked DVTs  - Developed right posterior tibial and peroneal DVT 4/2016 following right knee replacement surgery.  - Developed nonocclusive left popliteal DVT 10/2019 following left knee surgery.  - PTA on warfarin, but he reportedly has not taken this for about 2 weeks after running out of it.   - BLE US showing no evidence of acute left or right lower extremity DVT. VQ scan is low probability for PE.    10. DJD   - Status post bilateral knee replacements.  11. Chronic kidney disease, stage 3  - Baseline creatinine of around 1.8-2.1.  -  Creatinine of 1.93 upon admission. Trending up today to 2.23.     Plan:   1. Recommend continuing IV diuresis, but the patient is quite adamant that he would like to be discharged home today despite a long conversation regarding the rationale for this and continued monitoring of his electrolytes and renal function as an inpatient.   2. Will transition to oral diuretics per the patient's request with torsemide 20 mg once daily. He will need close follow up as scheduled with Dr. Neumann on 4/30/20 with a repeat BMP beforehand. I recommended he get a home scale and monitor his weights at home daily.    Interval History   Patient is quite upset today and adamant that he would like to be discharged as soon as possible. He is resting comfortably and denies any symptoms of chest pain, dizziness, lightheadedness, or shortness of breath.    Telemetry:   NSR with LBBB and 1st degree AV block    Thank you for the opportunity to participate in this pleasant patient's care.     Lino Cunningham, APRN, CNP   Text Page   (8am - 5pm, M-F)    Patient Active Problem List   Diagnosis     LBBB (left bundle branch block)     Decreased cardiac ejection fraction~44%     HTN (hypertension)     Diabetes mellitus, type 2 (H)     Arthritis of knee~ s/p replacement     Primary localized osteoarthritis of right knee     Advance care planning     Mixed hyperlipidemia     Coronary artery disease involving native coronary artery of native heart     Status post coronary angiogram     S/P revision of total knee     OPAL (acute kidney injury) (H)     CHF (congestive heart failure) (H)     Physical Exam   Temp: 98.5  F (36.9  C) Temp src: Oral BP: (!) 115/100   Heart Rate: 81 Resp: 18 SpO2: 96 % O2 Device: None (Room air)    Vitals:    04/22/20 1700 04/23/20 0233 04/24/20 0424   Weight: 116.2 kg (256 lb 3.2 oz) 115.9 kg (255 lb 9.6 oz) 115.5 kg (254 lb 9.6 oz)     Vital Signs with Ranges  Temp:  [98.3  F (36.8  C)-98.5  F (36.9  C)] 98.5  F (36.9  C)  Heart Rate:  [73-81] 81  Resp:  [16-18] 18  BP: (115-139)/() 115/100  SpO2:  [90 %-97 %] 96 %  No intake/output data recorded.    Constitutional: Appears his stated age, well nourished, and in no acute distress.  Eyes: Pupils equal, round. Sclerae anicteric.   HEENT: Normocephalic, atraumatic.   Neck: Supple. Unable to assess JVP due to body habitus.  Respiratory: Breathing non-labored. Lungs diminished with scant crackles in the bases.   Cardiovascular: Regular rate and rhythm. Grade 2/6 systolic ejection murmur most prominent at the LUSB. No rub or gallop.  Skin: Warm, dry.   Musculoskeletal/Extremities: Moves all extremities well and symmetrically. 2+ pitting lower extremity edema in the feet and ankles bilaterally to the knees.  Neurologic: No gross focal deficits. Alert and oriented to person, place and time.  Psychiatric: Affect appropriate. Mentation normal. He is quite upset this morning.    Medications     - MEDICATION INSTRUCTIONS -         amLODIPine  5 mg Oral Daily     aspirin  325 mg  Oral Daily     atorvastatin  40 mg Oral QPM     carvedilol  12.5 mg Oral BID w/meals     furosemide  40 mg Intravenous BID     insulin aspart  1-7 Units Subcutaneous TID AC     insulin aspart  1-5 Units Subcutaneous At Bedtime     insulin glargine  12 Units Subcutaneous QPM     sodium chloride (PF)  3 mL Intracatheter Q8H     Data   Results for orders placed or performed during the hospital encounter of 20 (from the past 24 hour(s))   Glucose by meter   Result Value Ref Range    Glucose 167 (H) 70 - 99 mg/dL   Echocardiogram Complete    Narrative    [id number]  [id number]  [id number]  [id number]^MARIA C^SOCORRO^IVÁN           [facility]  Echocardiography Laboratory  [address] [place] [id number]        Name: TAY PAGE  MRN: [id number]  : 1945  Study Date: 2020 01:58 PM  Age: 75 yrs  Gender: Male  Patient Location: [facility]  Reason For Study: CHF  Ordering Physician: SOCORRO LOMBARDI  Referring Physician: SOCORRO LOMBARDI  Performed By: LEWIS Mesa     BSA: 2.3 m2  Height: 69 in  Weight: 255 lb  HR: 72  BP: 153/83 mmHg  _____________________________________________________________________________  __        Procedure  Complete Portable Echo Adult. Optison (NDC #1063-3870) given intravenously.  _____________________________________________________________________________  __        Interpretation Summary     Moderate valvular aortic stenosis.  The visual ejection fraction is estimated at 45-50%.  Left ventricular systolic function is borderline reduced.  The study was technically difficult.  _____________________________________________________________________________  __        Left Ventricle  The left ventricle is mildly dilated. There is moderate concentric left  ventricular hypertrophy. Diastolic Doppler findings (E/E' ratio and/or other  parameters) suggest left ventricular filling pressures are increased. Grade I  or early diastolic dysfunction. The visual ejection  fraction is estimated at  45-50%. Left ventricular systolic function is borderline reduced. Septal  motion is consistent with conduction abnormality.     Right Ventricle  The right ventricle is normal in size and function. TAPSE normal (2.1cm).     Atria  Normal left atrial size. Right atrial size is normal. Lipomatous hypertrophy  of the interatrial septum is noted. There is no color Doppler evidence of an  atrial shunt.     Mitral Valve  There is trace mitral regurgitation.        Tricuspid Valve  The tricuspid valve is not well visualized. There is trace tricuspid  regurgitation. Right ventricular systolic pressure could not be approximated  due to inadequate tricuspid regurgitation.     Aortic Valve  The aortic valve is not well visualized. Thickened aortic valve leaflets. No  aortic regurgitation is present. The calculated aortic valve are is 1.2 cm^  2.  The peak AoV pressure gradient is 55.3 mmHg. The mean AoV pressure gradient is  33.0 mmHg. Moderate valvular aortic stenosis.     Pulmonic Valve  The pulmonic valve is not well visualized. Normal pulmonic valve velocity.     Vessels  The aortic root is normal size. Normal size ascending aorta. Non dilated IVC.     Pericardium  There is no pericardial effusion.        Rhythm  Sinus rhythm was noted.  _____________________________________________________________________________  __  MMode/2D Measurements & Calculations  IVSd: 1.6 cm     LVIDd: 6.0 cm  LVIDs: 4.8 cm  LVPWd: 1.4 cm  FS: 20.6 %  LV mass(C)d: 434.4 grams  LV mass(C)dI: 189.7 grams/m2  Ao root diam: 3.6 cm  LA dimension: 5.2 cm  asc Aorta Diam: 3.3 cm  LA/Ao: 1.5  LVOT diam: 2.3 cm  LVOT area: 4.3 cm2     EF(MOD-bp): 53.8 %  LA Volume (BP): 116.0 ml  RWT: 0.47        Doppler Measurements & Calculations  MV E max lola: 60.6 cm/sec  MV A max lola: 116.3 cm/sec  MV E/A: 0.52  MV dec time: 0.14 sec  Ao V2 max: 371.9 cm/sec  Ao max P.3 mmHg  Ao V2 mean: 273.6 cm/sec  Ao mean P.0 mmHg  Ao V2 VTI:  86.9 cm  SID(I,D): 1.2 cm2  SID(V,D): 1.3 cm2  LV V1 max P.5 mmHg  LV V1 max: 116.9 cm/sec  LV V1 VTI: 25.2 cm  SV(LVOT): 107.9 ml  SI(LVOT): 47.1 ml/m2     PA acc time: 0.11 sec  AV Lefty Ratio (DI): 0.31  SID Index (cm2/m2): 0.54  E/E' avg: 15.0  Lateral E/e': 12.1  Medial E/e': 18.0           _____________________________________________________________________________  __           Report approved by: Dominique Caballero 2020 03:10 PM      Glucose by meter   Result Value Ref Range    Glucose 200 (H) 70 - 99 mg/dL   Glucose by meter   Result Value Ref Range    Glucose 309 (H) 70 - 99 mg/dL   Basic metabolic panel   Result Value Ref Range    Sodium 136 133 - 144 mmol/L    Potassium 4.4 3.4 - 5.3 mmol/L    Chloride 106 94 - 109 mmol/L    Carbon Dioxide 25 20 - 32 mmol/L    Anion Gap 5 3 - 14 mmol/L    Glucose 216 (H) 70 - 99 mg/dL    Urea Nitrogen 51 (H) 7 - 30 mg/dL    Creatinine 2.23 (H) 0.66 - 1.25 mg/dL    GFR Estimate 28 (L) >60 mL/min/[1.73_m2]    GFR Estimate If Black 32 (L) >60 mL/min/[1.73_m2]    Calcium 8.9 8.5 - 10.1 mg/dL   Glucose by meter   Result Value Ref Range    Glucose 215 (H) 70 - 99 mg/dL     This note was completed in part using Dragon voice recognition software. Although reviewed after completion, some word and grammatical errors may occur.

## 2020-04-24 NOTE — PLAN OF CARE
A&O x 4. Patient denies pain. VSS, on RA. Up independently. Tele: SR w/ BBB. 2+ edema in lower extremities. Plan for continued diuresis. Continue to Monitor.

## 2020-04-24 NOTE — PROGRESS NOTES
A/O, VSS, O2sats 96% on RA, mild LOAIZA. Tele SR c BBB. 2+ edema BLE, trace BUE. IV lasix given x1. Decent output but patient often forgets to measure/record Pt adamant he wishes to discharge today. All discharge instructions, witting prescriptions and torsemide prescription, and personal belongings given to pt. All questions answered. Pt d/c to home via family.

## 2020-04-24 NOTE — DISCHARGE SUMMARY
[facility]    Discharge Summary  Hospitalist    Date of Admission:  4/22/2020  Date of Discharge:  4/24/2020  Discharging Provider: Catarino Mohr MD    Discharge Diagnoses   Principal Problem:    Acute systolic congestive heart failure (H)  Active Problems:    HTN (hypertension)    Diabetes mellitus, type 2 (H)    Coronary artery disease involving native coronary artery of native heart    CHF (congestive heart failure) (H)    History of DVT 12/13/19       History of Present Illness    75-year-old  gentleman with hx of coronary artery disease, type 2 diabetes, chronic kidney disease with chronic left bundle branch block, hypertension, heart failure and ischemic cardiomyopathy, who presents with dizziness, malaise, lower extremity edema and a brief episode of chest pain.      The patient had an extensive cardiac workup after his orthopedic surgery last summer.  The patient's left heart catheterization from 08/2019 showed 99% stenosis of the circumflex, but this is a small vessel.  He had only moderate disease in other vascular territories and he was being medically managed.  The patient over the last several days has been complaining of fatigue, malaise, increased lower extremity edema and brief fleeting episodes of chest pain.  He has been afebrile.  No sore throat or cough.  No fevers, chills or sweats.  He has been feeling dizzy as well.  The patient denies any sort of abdominal pain or nausea, vomiting, diarrhea, constipation or urinary hesitancy, urgency or frequency.  No significant COVID exposure.  The patient came into [facility] for further assessment.      In the Emergency Department, ECG showed a chronic left bundle branch block.  Electrolytes are unremarkable.  BUN of 56, creatinine is 1.93 with a calculated GFR of 33, which is actually below his baseline.  LFTs are also significant for an albumin of 3.3, otherwise fairly unremarkable.  ProBNP is 1200.   Troponin is above reference range at 0.085.  White count 7.8, hemoglobin 9, platelets of 224,000.  Glucose 165.  A 2-view chest x-ray showed low lung volumes, lungs are grossly clear, no pleural effusion or pneumothorax.  He had a VQ scan which showed low probability for PE.  Bilateral lower extremity ultrasound showed no DVT.  Urinalysis is also unremarkable.  The patient was given IV Lasix and is being admitted for likely acute decompensated diastolic congestive heart failure.     Hospital Course    Admitted to cardiac telemetry.  Serial trop showed:  Lab Results   Component Value Date    TROPI 0.097 (H) 04/23/2020    TROPI 0.101 (H) 04/22/2020    TROPI 0.085 (H) 04/22/2020    TROPI <0.015 12/20/2019     Cardiac echo showed:  Moderate valvular aortic stenosis.  The visual ejection fraction is estimated at 45-50%.  Left ventricular systolic function is borderline reduced.    Seen by Dr. Lomeli, Cardiology, given IV lasix, at which point his breathing was stable and he elected to try oral diuretic.  He will discharge on Torsemide 20 mg daily, he will get a scale and weight himself daily, and bring these readings with him when he sees Dr. Rowland as scheduled in 1 week, and will check BMP and review his weight at that time.      His creatinine is 2.23 at discharge, and his Hgb A1C is 7.0 so will discontinue his Metformin 1000 mg bid and monitor his blood sugars.  Also he will resume Lipitor 40 mg daily -- he was previous on it but stopped it 6 months ago, states he was told he doesn't need it.  He was given prescriptions and [facility]     Catarino Mohr MD, MD  Pager: [phone]  Cell Phone:  [phone]       Significant Results and Procedures   As above    Pending Results   These results will be followed up by Dr. Mohr  Unresulted Labs Ordered in the Past 30 Days of this Admission     No orders found from 3/23/2020 to 4/23/2020.          Code Status   Full Code        Primary Care Physician   Td Qureshi    Physical Exam   Temp: 99  F (37.2  C) Temp src: Oral BP: 138/73   Heart Rate: 84 Resp: 18 SpO2: 96 % O2 Device: None (Room air)    Vitals:    04/22/20 1700 04/23/20 0233 04/24/20 0424   Weight: 116.2 kg (256 lb 3.2 oz) 115.9 kg (255 lb 9.6 oz) 115.5 kg (254 lb 9.6 oz)     Vital Signs with Ranges  Temp:  [98.3  F (36.8  C)-99.4  F (37.4  C)] 99  F (37.2  C)  Heart Rate:  [73-84] 84  Resp:  [16-18] 18  BP: (115-138)/() 138/73  SpO2:  [96 %-97 %] 96 %  I/O last 3 completed shifts:  In: -   Out: 370 [Urine:370]    Exam on discharge:   Lungs clear  CV with reg S1S2, no murmur heard  Extrem with 2+ ankle edema bilaterally    Discharge Disposition   Discharged to home  Condition at discharge: Stable    Consultations This Hospital Stay   CARDIOLOGY IP CONSULT  SMOKING CESSATION PROGRAM IP CONSULT    Time Spent on this Encounter   I spent a total of 25 minutes discharging this patient.     Discharge Orders      Basic metabolic panel     Reason for your hospital stay    Heart failure with ankle swelling.     Follow-up and recommended labs and tests     Follow up with Dr. Neumann in cardiology clinic as scheduled in 1 week, check BMP then.  Check weight daily and bring info to clinic appointment.     Activity    Your activity upon discharge: activity as tolerated     Discharge Instructions    Call Dr. Cherry if any medical questions at Cell Phone [phone].     Monitor and record    weight every day     Full Code     Diet    Follow this diet upon discharge: Orders Placed This Encounter      3 gm sodium (no added salt)     Discharge Medications   Current Discharge Medication List      START taking these medications    Details   atorvastatin (LIPITOR) 40 MG tablet Take 1 tablet (40 mg) by mouth every evening  Qty: 30 tablet, Refills: 3    Associated Diagnoses: Type 2 diabetes mellitus with hyperosmolarity without coma, without long-term current use of insulin (H)       torsemide (DEMADEX) 20 MG tablet Take 1 tablet (20 mg) by mouth daily  Qty: 30 tablet, Refills: 3    Associated Diagnoses: Acute on chronic congestive heart failure, unspecified heart failure type (H)         CONTINUE these medications which have NOT CHANGED    Details   amLODIPine (NORVASC) 5 MG tablet Take 5 mg by mouth daily      aspirin (ASA) 325 MG EC tablet Take 325 mg by mouth daily      carvedilol (COREG) 25 MG tablet Take 12.5 mg by mouth 2 times daily (with meals) (Takes 0.5 x 25mg tablet = 12.5mg dose)      glimepiride (AMARYL) 4 MG tablet Take 4 mg by mouth 2 times daily       insulin aspart (NOVOLOG FLEXPEN) 100 UNIT/ML pen Inject 2-6 Units Subcutaneous 3 times daily (with meals) (Patient states he bases his dose off of carb counting and blood glucose but did not give exact regimen).      insulin glargine (LANTUS) 100 UNIT/ML PEN Inject 12-24 Units Subcutaneous every evening (Patient states he bases his dose off of blood glucose and how much Novolog he has used throughout the day but did not give exact regimen).      multivitamin, therapeutic with minerals (THERA-VIT-M) TABS Take 1 tablet by mouth daily      blood glucose (NO BRAND SPECIFIED) test strip Use to test blood sugar as directed      order for DME Equipment being ordered: Walker Wheels () and Walker ()  Treatment Diagnosis: Impaired gait  Qty: 1 each, Refills: 0    Associated Diagnoses: Status post total right knee replacement         STOP taking these medications       metFORMIN (GLUCOPHAGE) 1000 MG tablet Comments:   Reason for Stopping:             Allergies   Allergies   Allergen Reactions     Penicillins Anaphylaxis     Data   Most Recent 3 CBC's:  Recent Labs   Lab Test 04/22/20  0917 12/25/19  0741 12/24/19  0740 12/23/19  0732 12/21/19  0801   WBC 7.8 6.0  --   --  4.3   HGB 9.0* 11.2* 11.1*  --  10.9*   MCV 90 84  --   --  82    218  --  187 147*      Most Recent 3 BMP's:  Recent Labs   Lab Test 04/24/20  0625  04/23/20  0549 04/22/20  0917    136 138   POTASSIUM 4.4 4.3 4.6   CHLORIDE 106 107 107   CO2 25 20 27   BUN 51* 49* 56*   CR 2.23* 1.95* 1.93*   ANIONGAP 5 9 4   GABE 8.9 8.8 8.8   * 204* 165*     Most Recent 2 LFT's:  Recent Labs   Lab Test 04/22/20  0917 12/22/19  1009   AST 61* 61*   ALT 61 67   ALKPHOS 128 237*   BILITOTAL 0.3 0.4     Most Recent INR's and Anticoagulation Dosing History:  Anticoagulation Dose History     Recent Dosing and Labs Latest Ref Rng & Units 12/17/2006 12/18/2006 3/29/2016 12/20/2019 12/21/2019 12/25/2019 4/22/2020    INR 0.86 - 1.14 1.08 1.11 1.02 2.41(H) 1.94(H) 1.41(H) 1.12        Most Recent 3 Troponin's:  Recent Labs   Lab Test 04/23/20  0031 04/22/20  1812 04/22/20  0917   TROPI 0.097* 0.101* 0.085*     Most Recent Cholesterol Panel:  Recent Labs   Lab Test 04/23/20  0549   CHOL 188   *   HDL 31*   TRIG 277*     Most Recent 6 Bacteria Isolates From Any Culture (See EPIC Reports for Culture Details):No lab results found.  Most Recent TSH, T4 and A1c Labs:  Recent Labs   Lab Test 04/22/20 1812   TSH 1.08   A1C 7.0*

## 2020-04-28 ENCOUNTER — TELEPHONE (OUTPATIENT)
Dept: LAB | Facility: CLINIC | Age: 75
End: 2020-04-28

## 2020-04-28 ENCOUNTER — TELEPHONE (OUTPATIENT)
Dept: CARDIOLOGY | Facility: CLINIC | Age: 75
End: 2020-04-28

## 2020-04-28 ENCOUNTER — DOCUMENTATION ONLY (OUTPATIENT)
Dept: CARDIOLOGY | Facility: CLINIC | Age: 75
End: 2020-04-28

## 2020-04-28 NOTE — TELEPHONE ENCOUNTER
"Patient was evaluated by cardiology while inpatient for malaise, dizziness, increased BLE edema, chest pain, mildly elevated tropoinins without EKG changes, acute on chronic HF in presence of known moderated aortic stenosis. Echo shows EF 45% without changes from prior echo 8/2019. IV Lasix diuresis and transitioned to Torsemide po prior to discharge. Also restarted on Lipitor. Called patient to discuss any post hospital d/c questions, review discharge medication, and confirm f/u appts. Pt's responses were very abrupt..\"I said fine.\" RN confirmed with patient that he has an apt scheduled with Dr. Neumann on 4/30/20 at our [place] location, with labs to be drawn day prior. SABI Shoemaker RN.      "

## 2020-04-28 NOTE — TELEPHONE ENCOUNTER
Wellness Screening Tool    Symptom Screening:    Do you have one of the following new symptoms:      Fever or reported chills?No    A new cough (started within the past 14 days)?  No    Shortness of breath (started within the past 14 days)?No    Nausea, vomiting or diarrhea?No    Within the past 3 weeks, have you been exposed to someone with a known positive illness below?      COVID - 19 (known or suspected) no    Chicken pox?   no    Measles?no    Pertussis?  No          Patient notified of visitor restriction: Yes   Patient informed to wear a mask: YES     Patient's appointment status: Patient will be seen in clinic as scheduled on 04/29/20

## 2020-04-29 ENCOUNTER — TELEPHONE (OUTPATIENT)
Dept: CARDIOLOGY | Facility: CLINIC | Age: 75
End: 2020-04-29

## 2020-04-29 DIAGNOSIS — I50.42 CHRONIC COMBINED SYSTOLIC AND DIASTOLIC CONGESTIVE HEART FAILURE (H): ICD-10-CM

## 2020-04-29 LAB
ANION GAP SERPL CALCULATED.3IONS-SCNC: 11.7 MMOL/L (ref 6–17)
BUN SERPL-MCNC: 40 MG/DL (ref 7–30)
CALCIUM SERPL-MCNC: 9.4 MG/DL (ref 8.5–10.5)
CHLORIDE SERPL-SCNC: 104 MMOL/L (ref 98–107)
CO2 SERPL-SCNC: 26 MMOL/L (ref 23–29)
CREAT SERPL-MCNC: 2.1 MG/DL (ref 0.7–1.3)
GFR SERPL CREATININE-BSD FRML MDRD: 31 ML/MIN/{1.73_M2}
GLUCOSE SERPL-MCNC: 219 MG/DL (ref 70–105)
POTASSIUM SERPL-SCNC: 4.7 MMOL/L (ref 3.5–5.1)
SODIUM SERPL-SCNC: 137 MMOL/L (ref 136–145)

## 2020-04-29 PROCEDURE — 36415 COLL VENOUS BLD VENIPUNCTURE: CPT | Performed by: NURSE PRACTITIONER

## 2020-04-29 PROCEDURE — 80048 BASIC METABOLIC PNL TOTAL CA: CPT | Performed by: NURSE PRACTITIONER

## 2020-04-29 NOTE — TELEPHONE ENCOUNTER
Wellness Screening Tool    Symptom Screening:    Do you have one of the following new symptoms:      Fever or reported chills?   No    A new cough (started within the past 14 days)? r No    Shortness of breath (started within the past 14 days)?  No    Nausea, vomiting or diarrhea?  No    Within the past 3 weeks, have you been exposed to someone with a known positive illness below?      COVID - 19 (known or suspected)  no    Chicken pox?  no    Measles? r no    Pertussis?  No          Patient notified of visitor restriction: Yes  Patient informed to wear a mask:YES     Patient's appointment status: Patient will be seen in clinic as scheduled on 4/30/2020

## 2020-04-30 ENCOUNTER — OFFICE VISIT (OUTPATIENT)
Dept: CARDIOLOGY | Facility: CLINIC | Age: 75
End: 2020-04-30
Payer: COMMERCIAL

## 2020-04-30 VITALS
HEART RATE: 70 BPM | SYSTOLIC BLOOD PRESSURE: 136 MMHG | BODY MASS INDEX: 37.76 KG/M2 | DIASTOLIC BLOOD PRESSURE: 74 MMHG | WEIGHT: 255.7 LBS

## 2020-04-30 DIAGNOSIS — I50.9 ACUTE ON CHRONIC CONGESTIVE HEART FAILURE, UNSPECIFIED HEART FAILURE TYPE (H): Primary | ICD-10-CM

## 2020-04-30 DIAGNOSIS — I50.42 CHRONIC COMBINED SYSTOLIC AND DIASTOLIC CONGESTIVE HEART FAILURE (H): ICD-10-CM

## 2020-04-30 DIAGNOSIS — R79.89 ELEVATED TROPONIN: ICD-10-CM

## 2020-04-30 DIAGNOSIS — I44.7 LBBB (LEFT BUNDLE BRANCH BLOCK): ICD-10-CM

## 2020-04-30 DIAGNOSIS — I44.0 FIRST DEGREE ATRIOVENTRICULAR BLOCK: ICD-10-CM

## 2020-04-30 DIAGNOSIS — I25.119 CORONARY ARTERY DISEASE INVOLVING NATIVE CORONARY ARTERY OF NATIVE HEART WITH ANGINA PECTORIS (H): ICD-10-CM

## 2020-04-30 PROCEDURE — 99214 OFFICE O/P EST MOD 30 MIN: CPT | Performed by: INTERNAL MEDICINE

## 2020-04-30 RX ORDER — TORSEMIDE 20 MG/1
20 TABLET ORAL DAILY
Status: ON HOLD | COMMUNITY
End: 2020-07-15

## 2020-04-30 NOTE — LETTER
4/30/2020    Td Qureshi MD  [address]  [facility] [address]    RE: Burton Elizabeth       Dear Colleague,    I had the pleasure of seeing Burton Elizabeth in the [facility].    HPI and Plan:     Mr. Elizabeth is a pleasant 75-year-old recently admitted to the hospital for episodes of chest discomfort dizziness lightheadedness.  He got a fairly extensive work-up which we reviewed including lower extremity ultrasound showed only an old DVT.  He had a chest x-ray which was negative.  His laboratory work showed a small troponin bump which was felt secondary to fluid retention and diastolic heart failure.  An echocardiogram was done which showed stable moderate aortic valve stenosis with ejection fraction of 40 to 45%.    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  He describes infrequent bouts of chest discomfort.  He is lost approximately 8 pounds.  He has been compliant with his medications including diuretics and has had some fluid loss and improvement in his lower extremity edema.  He feels much better now.  He was frustrated that the last hospitalization with lack of communication.    This point time I do not feel the need to change any medical therapy.  His blood pressure heart rate and exam are stable.  We will continue him on diuretics.  I would recommend a follow-up in 4 months time with an EKG.  He will require yearly echocardiogram for his moderate aortic valve stenosis.  Stress testing coronary angiography has been done within the last year and I do not feel the need to repeat.  The circumflex artery is too small for  intervention.  Total 25 minutes time was spent.    Orders Placed This Encounter   Procedures     Follow-Up with Cardiologist     EKG 12-lead complete w/read - Clinics     Orders Placed This Encounter   Medications     torsemide (DEMADEX) 20 MG tablet     Sig: Take 20 mg by mouth daily      Medications Discontinued During  This Encounter   Medication Reason     torsemide (DEMADEX) 20 MG tablet Dose adjustment         Encounter Diagnoses   Name Primary?     Acute on chronic congestive heart failure, unspecified heart failure type (H) Yes     Elevated troponin      Chronic combined systolic and diastolic congestive heart failure (H)      LBBB (left bundle branch block)      Coronary artery disease involving native coronary artery of native heart with angina pectoris (H)      First degree atrioventricular block        CURRENT MEDICATIONS:  Current Outpatient Medications   Medication Sig Dispense Refill     amLODIPine (NORVASC) 5 MG tablet Take 5 mg by mouth daily       aspirin (ASA) 325 MG EC tablet Take 325 mg by mouth daily       atorvastatin (LIPITOR) 40 MG tablet Take 1 tablet (40 mg) by mouth every evening 30 tablet 3     blood glucose (NO BRAND SPECIFIED) test strip Use to test blood sugar as directed       carvedilol (COREG) 25 MG tablet Take 12.5 mg by mouth 2 times daily (with meals) (Takes 0.5 x 25mg tablet = 12.5mg dose)       glimepiride (AMARYL) 4 MG tablet Take 4 mg by mouth 2 times daily        insulin aspart (NOVOLOG FLEXPEN) 100 UNIT/ML pen Inject 2-6 Units Subcutaneous 3 times daily (with meals) (Patient states he bases his dose off of carb counting and blood glucose but did not give exact regimen).       insulin glargine (LANTUS) 100 UNIT/ML PEN Inject 12-24 Units Subcutaneous every evening (Patient states he bases his dose off of blood glucose and how much Novolog he has used throughout the day but did not give exact regimen).       multivitamin, therapeutic with minerals (THERA-VIT-M) TABS Take 1 tablet by mouth daily       order for DME Equipment being ordered: [employer] Wheels () and Walker ()  Treatment Diagnosis: Impaired gait 1 each 0     torsemide (DEMADEX) 20 MG tablet Take 20 mg by mouth daily          ALLERGIES     Allergies   Allergen Reactions     Penicillins Anaphylaxis       PAST MEDICAL  HISTORY:  Past Medical History:   Diagnosis Date     Arthritis of knee~ s/p replacement 3/24/2016     CAD (coronary artery disease)      Decreased cardiac ejection fraction~44% 3/24/2016     Diabetes mellitus, type 2 (H) 3/24/2016     History of DVT (deep vein thrombosis)      HTN (hypertension) 3/24/2016     LBBB (left bundle branch block) 3/24/2016     Mixed hyperlipidemia 8/23/2016       PAST SURGICAL HISTORY:  Past Surgical History:   Procedure Laterality Date     ARTHROPLASTY KNEE Right 3/29/2016    Procedure: ARTHROPLASTY KNEE;  Surgeon: Heena Rosen MD;  Location:  OR     ARTHROPLASTY REVISION KNEE Left 9/27/2019    Procedure: REVISION LEFT TOTAL KNEE  ARTHROPLASTY;  Surgeon: Heena Rosen MD;  Location:  OR     CV LEFT HEART CATH N/A 8/12/2019    Procedure: Left Heart Cath;  Surgeon: Edgardo Beckham MD;  Location:  HEART CARDIAC CATH LAB     EYE SURGERY      cataract removal     ORTHOPEDIC SURGERY      KNEE SCOPES MULTIPLE       FAMILY HISTORY:  Family History   Problem Relation Age of Onset     Coronary Artery Disease No family hx of        SOCIAL HISTORY:  Social History     Socioeconomic History     Marital status: Single     Spouse name: None     Number of children: None     Years of education: None     Highest education level: None   Occupational History     None   Social Needs     Financial resource strain: None     Food insecurity     Worry: None     Inability: None     Transportation needs     Medical: None     Non-medical: None   Tobacco Use     Smoking status: Never Smoker     Smokeless tobacco: Never Used   Substance and Sexual Activity     Alcohol use: Yes     Alcohol/week: 2.0 standard drinks     Types: 2 Standard drinks or equivalent per week     Frequency: 2-3 times a week     Drinks per session: 1 or 2     Comment: occasional, social     Drug use: No     Sexual activity: None   Lifestyle     Physical activity     Days per week: None     Minutes per session: None      Stress: None   Relationships     Social connections     Talks on phone: None     Gets together: None     Attends [employer] service: None     Active member of club or organization: None     Attends meetings of clubs or organizations: None     Relationship status: None     Intimate partner violence     Fear of current or ex partner: None     Emotionally abused: None     Physically abused: None     Forced sexual activity: None   Other Topics Concern     Parent/sibling w/ CABG, MI or angioplasty before 65F 55M? Not Asked   Social History Narrative     None       Review of Systems:  Skin:  Negative     Eyes:  Negative    ENT:  Negative    Respiratory:  Negative shortness of breath  Cardiovascular:  Negative for;palpitations;chest pain;lightheadedness Positive for;edema;dizziness  Gastroenterology: Negative    Genitourinary:  not assessed    Musculoskeletal:  Positive for joint pain  Neurologic:  Negative    Psychiatric:  Positive for depression;sleep disturbances  Heme/Lymph/Imm:  Positive for allergies;weight loss  Endocrine:  Positive for diabetes    Physical Exam:  Vitals: /74   Pulse 70   Wt 116 kg (255 lb 11.2 oz)   BMI 37.76 kg/m      Constitutional:  cooperative, alert and oriented, well developed, well nourished, in no acute distress obese      Skin:  warm and dry to the touch, no apparent skin lesions or masses noted          Head:  normocephalic, no masses or lesions        Eyes:  pupils equal and round, conjunctivae and lids unremarkable, sclera white, no xanthalasma, EOMS intact, no nystagmus        Lymph:      ENT:  no pallor or cyanosis, dentition good        Neck:           Respiratory:  normal breath sounds, clear to auscultation, normal A-P diameter, normal symmetry, normal respiratory excursion, no use of accessory muscles         Cardiac: regular rhythm, normal S1/S2, no S3 or S4, apical impulse not displaced, no murmurs, gallops or rubs                pulses full and equal, no bruits  auscultated                                        GI:  abdomen soft, non-tender, BS normoactive, no mass, no HSM, no bruits        Extremities and Muscular Skeletal:  no deformities, clubbing, cyanosis, erythema observed              Neurological:           Psych:         Recent Lab Results:  LIPID RESULTS:  Lab Results   Component Value Date    CHOL 188 04/23/2020    HDL 31 (L) 04/23/2020     (H) 04/23/2020    TRIG 277 (H) 04/23/2020       LIVER ENZYME RESULTS:  Lab Results   Component Value Date    AST 61 (H) 04/22/2020    ALT 61 04/22/2020       CBC RESULTS:  Lab Results   Component Value Date    WBC 7.8 04/22/2020    RBC 3.15 (L) 04/22/2020    HGB 9.0 (L) 04/22/2020    HCT 28.4 (L) 04/22/2020    MCV 90 04/22/2020    MCH 28.6 04/22/2020    MCHC 31.7 04/22/2020    RDW 15.8 (H) 04/22/2020     04/22/2020       BMP RESULTS:  Lab Results   Component Value Date     04/29/2020    POTASSIUM 4.7 04/29/2020    CHLORIDE 104 04/29/2020    CO2 26 04/29/2020    ANIONGAP 11.7 04/29/2020     (H) 04/29/2020    BUN 40 (H) 04/29/2020    CR 2.10 (H) 04/29/2020    GFRESTIMATED 31 (L) 04/29/2020    GFRESTBLACK 38 (L) 04/29/2020    GABE 9.4 04/29/2020        A1C RESULTS:  Lab Results   Component Value Date    A1C 7.0 (H) 04/22/2020       INR RESULTS:  Lab Results   Component Value Date    INR 1.12 04/22/2020    INR 1.41 (H) 12/25/2019     Thank you for allowing me to participate in the care of your patient.    Sincerely,     AURELIA PADILLA MD     [facility]    cc:   Td Qureshi MD  [id number]  [place] [id number]

## 2020-04-30 NOTE — PROGRESS NOTES
HPI and Plan:     Mr. Elizabeth is a pleasant 75-year-old recently admitted to the hospital for episodes of chest discomfort dizziness lightheadedness.  He got a fairly extensive work-up which we reviewed including lower extremity ultrasound showed only an old DVT.  He had a chest x-ray which was negative.  His laboratory work showed a small troponin bump which was felt secondary to fluid retention and diastolic heart failure.  An echocardiogram was done which showed stable moderate aortic valve stenosis with ejection fraction of 40 to 45%.    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  He describes infrequent bouts of chest discomfort.  He is lost approximately 8 pounds.  He has been compliant with his medications including diuretics and has had some fluid loss and improvement in his lower extremity edema.  He feels much better now.  He was frustrated that the last hospitalization with lack of communication.    This point time I do not feel the need to change any medical therapy.  His blood pressure heart rate and exam are stable.  We will continue him on diuretics.  I would recommend a follow-up in 4 months time with an EKG.  He will require yearly echocardiogram for his moderate aortic valve stenosis.  Stress testing coronary angiography has been done within the last year and I do not feel the need to repeat.  The circumflex artery is too small for  intervention.  Total 25 minutes time was spent.    Orders Placed This Encounter   Procedures     Follow-Up with Cardiologist     EKG 12-lead complete w/read - Clinics     Orders Placed This Encounter   Medications     torsemide (DEMADEX) 20 MG tablet     Sig: Take 20 mg by mouth daily      Medications Discontinued During This Encounter   Medication Reason     torsemide (DEMADEX) 20 MG tablet Dose adjustment         Encounter Diagnoses   Name Primary?     Acute on chronic congestive heart failure, unspecified heart failure type (H) Yes      Elevated troponin      Chronic combined systolic and diastolic congestive heart failure (H)      LBBB (left bundle branch block)      Coronary artery disease involving native coronary artery of native heart with angina pectoris (H)      First degree atrioventricular block        CURRENT MEDICATIONS:  Current Outpatient Medications   Medication Sig Dispense Refill     amLODIPine (NORVASC) 5 MG tablet Take 5 mg by mouth daily       aspirin (ASA) 325 MG EC tablet Take 325 mg by mouth daily       atorvastatin (LIPITOR) 40 MG tablet Take 1 tablet (40 mg) by mouth every evening 30 tablet 3     blood glucose (NO BRAND SPECIFIED) test strip Use to test blood sugar as directed       carvedilol (COREG) 25 MG tablet Take 12.5 mg by mouth 2 times daily (with meals) (Takes 0.5 x 25mg tablet = 12.5mg dose)       glimepiride (AMARYL) 4 MG tablet Take 4 mg by mouth 2 times daily        insulin aspart (NOVOLOG FLEXPEN) 100 UNIT/ML pen Inject 2-6 Units Subcutaneous 3 times daily (with meals) (Patient states he bases his dose off of carb counting and blood glucose but did not give exact regimen).       insulin glargine (LANTUS) 100 UNIT/ML PEN Inject 12-24 Units Subcutaneous every evening (Patient states he bases his dose off of blood glucose and how much Novolog he has used throughout the day but did not give exact regimen).       multivitamin, therapeutic with minerals (THERA-VIT-M) TABS Take 1 tablet by mouth daily       order for DME Equipment being ordered: [employer] () and Walker ()  Treatment Diagnosis: Impaired gait 1 each 0     torsemide (DEMADEX) 20 MG tablet Take 20 mg by mouth daily          ALLERGIES     Allergies   Allergen Reactions     Penicillins Anaphylaxis       PAST MEDICAL HISTORY:  Past Medical History:   Diagnosis Date     Arthritis of knee~ s/p replacement 3/24/2016     CAD (coronary artery disease)      Decreased cardiac ejection fraction~44% 3/24/2016     Diabetes mellitus, type 2 (H) 3/24/2016      History of DVT (deep vein thrombosis)      HTN (hypertension) 3/24/2016     LBBB (left bundle branch block) 3/24/2016     Mixed hyperlipidemia 8/23/2016       PAST SURGICAL HISTORY:  Past Surgical History:   Procedure Laterality Date     ARTHROPLASTY KNEE Right 3/29/2016    Procedure: ARTHROPLASTY KNEE;  Surgeon: Heena Rosen MD;  Location:  OR     ARTHROPLASTY REVISION KNEE Left 9/27/2019    Procedure: REVISION LEFT TOTAL KNEE  ARTHROPLASTY;  Surgeon: Heena Rosen MD;  Location:  OR     CV LEFT HEART CATH N/A 8/12/2019    Procedure: Left Heart Cath;  Surgeon: Edgardo Beckham MD;  Location:  HEART CARDIAC CATH LAB     EYE SURGERY      cataract removal     ORTHOPEDIC SURGERY      KNEE SCOPES MULTIPLE       FAMILY HISTORY:  Family History   Problem Relation Age of Onset     Coronary Artery Disease No family hx of        SOCIAL HISTORY:  Social History     Socioeconomic History     Marital status: Single     Spouse name: None     Number of children: None     Years of education: None     Highest education level: None   Occupational History     None   Social Needs     Financial resource strain: None     Food insecurity     Worry: None     Inability: None     Transportation needs     Medical: None     Non-medical: None   Tobacco Use     Smoking status: Never Smoker     Smokeless tobacco: Never Used   Substance and Sexual Activity     Alcohol use: Yes     Alcohol/week: 2.0 standard drinks     Types: 2 Standard drinks or equivalent per week     Frequency: 2-3 times a week     Drinks per session: 1 or 2     Comment: occasional, social     Drug use: No     Sexual activity: None   Lifestyle     Physical activity     Days per week: None     Minutes per session: None     Stress: None   Relationships     Social connections     Talks on phone: None     Gets together: None     Attends [employer] service: None     Active member of club or organization: None     Attends meetings of clubs or organizations:  None     Relationship status: None     Intimate partner violence     Fear of current or ex partner: None     Emotionally abused: None     Physically abused: None     Forced sexual activity: None   Other Topics Concern     Parent/sibling w/ CABG, MI or angioplasty before 65F 55M? Not Asked   Social History Narrative     None       Review of Systems:  Skin:  Negative     Eyes:  Negative    ENT:  Negative    Respiratory:  Negative shortness of breath  Cardiovascular:  Negative for;palpitations;chest pain;lightheadedness Positive for;edema;dizziness  Gastroenterology: Negative    Genitourinary:  not assessed    Musculoskeletal:  Positive for joint pain  Neurologic:  Negative    Psychiatric:  Positive for depression;sleep disturbances  Heme/Lymph/Imm:  Positive for allergies;weight loss  Endocrine:  Positive for diabetes    Physical Exam:  Vitals: /74   Pulse 70   Wt 116 kg (255 lb 11.2 oz)   BMI 37.76 kg/m      Constitutional:  cooperative, alert and oriented, well developed, well nourished, in no acute distress obese      Skin:  warm and dry to the touch, no apparent skin lesions or masses noted          Head:  normocephalic, no masses or lesions        Eyes:  pupils equal and round, conjunctivae and lids unremarkable, sclera white, no xanthalasma, EOMS intact, no nystagmus        Lymph:      ENT:  no pallor or cyanosis, dentition good        Neck:           Respiratory:  normal breath sounds, clear to auscultation, normal A-P diameter, normal symmetry, normal respiratory excursion, no use of accessory muscles         Cardiac: regular rhythm, normal S1/S2, no S3 or S4, apical impulse not displaced, no murmurs, gallops or rubs                pulses full and equal, no bruits auscultated                                        GI:  abdomen soft, non-tender, BS normoactive, no mass, no HSM, no bruits        Extremities and Muscular Skeletal:  no deformities, clubbing, cyanosis, erythema observed               Neurological:           Psych:         Recent Lab Results:  LIPID RESULTS:  Lab Results   Component Value Date    CHOL 188 04/23/2020    HDL 31 (L) 04/23/2020     (H) 04/23/2020    TRIG 277 (H) 04/23/2020       LIVER ENZYME RESULTS:  Lab Results   Component Value Date    AST 61 (H) 04/22/2020    ALT 61 04/22/2020       CBC RESULTS:  Lab Results   Component Value Date    WBC 7.8 04/22/2020    RBC 3.15 (L) 04/22/2020    HGB 9.0 (L) 04/22/2020    HCT 28.4 (L) 04/22/2020    MCV 90 04/22/2020    MCH 28.6 04/22/2020    MCHC 31.7 04/22/2020    RDW 15.8 (H) 04/22/2020     04/22/2020       BMP RESULTS:  Lab Results   Component Value Date     04/29/2020    POTASSIUM 4.7 04/29/2020    CHLORIDE 104 04/29/2020    CO2 26 04/29/2020    ANIONGAP 11.7 04/29/2020     (H) 04/29/2020    BUN 40 (H) 04/29/2020    CR 2.10 (H) 04/29/2020    GFRESTIMATED 31 (L) 04/29/2020    GFRESTBLACK 38 (L) 04/29/2020    GABE 9.4 04/29/2020        A1C RESULTS:  Lab Results   Component Value Date    A1C 7.0 (H) 04/22/2020       INR RESULTS:  Lab Results   Component Value Date    INR 1.12 04/22/2020    INR 1.41 (H) 12/25/2019           CC  Td Qureshi MD  [id number]  [place] [id number]

## 2020-04-30 NOTE — LETTER
4/30/2020    Td Qureshi MD  [address]  [facility] [address]    RE: Burton Elizabeth       Dear Colleague,    I had the pleasure of seeing Burton Elizabeth in the [facility].    HPI and Plan:     Mr. Elizabeth is a pleasant 75-year-old recently admitted to the hospital for episodes of chest discomfort dizziness lightheadedness.  He got a fairly extensive work-up which we reviewed including lower extremity ultrasound showed only an old DVT.  He had a chest x-ray which was negative.  His laboratory work showed a small troponin bump which was felt secondary to fluid retention and diastolic heart failure.  An echocardiogram was done which showed stable moderate aortic valve stenosis with ejection fraction of 40 to 45%.    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  He describes infrequent bouts of chest discomfort.  He is lost approximately 8 pounds.  He has been compliant with his medications including diuretics and has had some fluid loss and improvement in his lower extremity edema.  He feels much better now.  He was frustrated that the last hospitalization with lack of communication.    This point time I do not feel the need to change any medical therapy.  His blood pressure heart rate and exam are stable.  We will continue him on diuretics.  I would recommend a follow-up in 4 months time with an EKG.  He will require yearly echocardiogram for his moderate aortic valve stenosis.  Stress testing coronary angiography has been done within the last year and I do not feel the need to repeat.  The circumflex artery is too small for  intervention.  Total 25 minutes time was spent.    Orders Placed This Encounter   Procedures     Follow-Up with Cardiologist     EKG 12-lead complete w/read - Clinics     Orders Placed This Encounter   Medications     torsemide (DEMADEX) 20 MG tablet     Sig: Take 20 mg by mouth daily      Medications Discontinued During  This Encounter   Medication Reason     torsemide (DEMADEX) 20 MG tablet Dose adjustment         Encounter Diagnoses   Name Primary?     Acute on chronic congestive heart failure, unspecified heart failure type (H) Yes     Elevated troponin      Chronic combined systolic and diastolic congestive heart failure (H)      LBBB (left bundle branch block)      Coronary artery disease involving native coronary artery of native heart with angina pectoris (H)      First degree atrioventricular block        CURRENT MEDICATIONS:  Current Outpatient Medications   Medication Sig Dispense Refill     amLODIPine (NORVASC) 5 MG tablet Take 5 mg by mouth daily       aspirin (ASA) 325 MG EC tablet Take 325 mg by mouth daily       atorvastatin (LIPITOR) 40 MG tablet Take 1 tablet (40 mg) by mouth every evening 30 tablet 3     blood glucose (NO BRAND SPECIFIED) test strip Use to test blood sugar as directed       carvedilol (COREG) 25 MG tablet Take 12.5 mg by mouth 2 times daily (with meals) (Takes 0.5 x 25mg tablet = 12.5mg dose)       glimepiride (AMARYL) 4 MG tablet Take 4 mg by mouth 2 times daily        insulin aspart (NOVOLOG FLEXPEN) 100 UNIT/ML pen Inject 2-6 Units Subcutaneous 3 times daily (with meals) (Patient states he bases his dose off of carb counting and blood glucose but did not give exact regimen).       insulin glargine (LANTUS) 100 UNIT/ML PEN Inject 12-24 Units Subcutaneous every evening (Patient states he bases his dose off of blood glucose and how much Novolog he has used throughout the day but did not give exact regimen).       multivitamin, therapeutic with minerals (THERA-VIT-M) TABS Take 1 tablet by mouth daily       order for DME Equipment being ordered: Walker Wheels () and Walker ()  Treatment Diagnosis: Impaired gait 1 each 0     torsemide (DEMADEX) 20 MG tablet Take 20 mg by mouth daily          ALLERGIES     Allergies   Allergen Reactions     Penicillins Anaphylaxis       PAST MEDICAL  HISTORY:  Past Medical History:   Diagnosis Date     Arthritis of knee~ s/p replacement 3/24/2016     CAD (coronary artery disease)      Decreased cardiac ejection fraction~44% 3/24/2016     Diabetes mellitus, type 2 (H) 3/24/2016     History of DVT (deep vein thrombosis)      HTN (hypertension) 3/24/2016     LBBB (left bundle branch block) 3/24/2016     Mixed hyperlipidemia 8/23/2016       PAST SURGICAL HISTORY:  Past Surgical History:   Procedure Laterality Date     ARTHROPLASTY KNEE Right 3/29/2016    Procedure: ARTHROPLASTY KNEE;  Surgeon: Heena Rosen MD;  Location:  OR     ARTHROPLASTY REVISION KNEE Left 9/27/2019    Procedure: REVISION LEFT TOTAL KNEE  ARTHROPLASTY;  Surgeon: Heena Rosen MD;  Location:  OR     CV LEFT HEART CATH N/A 8/12/2019    Procedure: Left Heart Cath;  Surgeon: Edgardo Beckham MD;  Location:  HEART CARDIAC CATH LAB     EYE SURGERY      cataract removal     ORTHOPEDIC SURGERY      KNEE SCOPES MULTIPLE       FAMILY HISTORY:  Family History   Problem Relation Age of Onset     Coronary Artery Disease No family hx of        SOCIAL HISTORY:  Social History     Socioeconomic History     Marital status: Single     Spouse name: None     Number of children: None     Years of education: None     Highest education level: None   Occupational History     None   Social Needs     Financial resource strain: None     Food insecurity     Worry: None     Inability: None     Transportation needs     Medical: None     Non-medical: None   Tobacco Use     Smoking status: Never Smoker     Smokeless tobacco: Never Used   Substance and Sexual Activity     Alcohol use: Yes     Alcohol/week: 2.0 standard drinks     Types: 2 Standard drinks or equivalent per week     Frequency: 2-3 times a week     Drinks per session: 1 or 2     Comment: occasional, social     Drug use: No     Sexual activity: None   Lifestyle     Physical activity     Days per week: None     Minutes per session: None      Stress: None   Relationships     Social connections     Talks on phone: None     Gets together: None     Attends [employer] service: None     Active member of club or organization: None     Attends meetings of clubs or organizations: None     Relationship status: None     Intimate partner violence     Fear of current or ex partner: None     Emotionally abused: None     Physically abused: None     Forced sexual activity: None   Other Topics Concern     Parent/sibling w/ CABG, MI or angioplasty before 65F 55M? Not Asked   Social History Narrative     None       Review of Systems:  Skin:  Negative     Eyes:  Negative    ENT:  Negative    Respiratory:  Negative shortness of breath  Cardiovascular:  Negative for;palpitations;chest pain;lightheadedness Positive for;edema;dizziness  Gastroenterology: Negative    Genitourinary:  not assessed    Musculoskeletal:  Positive for joint pain  Neurologic:  Negative    Psychiatric:  Positive for depression;sleep disturbances  Heme/Lymph/Imm:  Positive for allergies;weight loss  Endocrine:  Positive for diabetes    Physical Exam:  Vitals: /74   Pulse 70   Wt 116 kg (255 lb 11.2 oz)   BMI 37.76 kg/m      Constitutional:  cooperative, alert and oriented, well developed, well nourished, in no acute distress obese      Skin:  warm and dry to the touch, no apparent skin lesions or masses noted          Head:  normocephalic, no masses or lesions        Eyes:  pupils equal and round, conjunctivae and lids unremarkable, sclera white, no xanthalasma, EOMS intact, no nystagmus        Lymph:      ENT:  no pallor or cyanosis, dentition good        Neck:           Respiratory:  normal breath sounds, clear to auscultation, normal A-P diameter, normal symmetry, normal respiratory excursion, no use of accessory muscles         Cardiac: regular rhythm, normal S1/S2, no S3 or S4, apical impulse not displaced, no murmurs, gallops or rubs                pulses full and equal, no bruits  auscultated                                        GI:  abdomen soft, non-tender, BS normoactive, no mass, no HSM, no bruits        Extremities and Muscular Skeletal:  no deformities, clubbing, cyanosis, erythema observed              Neurological:           Psych:         Recent Lab Results:  LIPID RESULTS:  Lab Results   Component Value Date    CHOL 188 04/23/2020    HDL 31 (L) 04/23/2020     (H) 04/23/2020    TRIG 277 (H) 04/23/2020       LIVER ENZYME RESULTS:  Lab Results   Component Value Date    AST 61 (H) 04/22/2020    ALT 61 04/22/2020       CBC RESULTS:  Lab Results   Component Value Date    WBC 7.8 04/22/2020    RBC 3.15 (L) 04/22/2020    HGB 9.0 (L) 04/22/2020    HCT 28.4 (L) 04/22/2020    MCV 90 04/22/2020    MCH 28.6 04/22/2020    MCHC 31.7 04/22/2020    RDW 15.8 (H) 04/22/2020     04/22/2020       BMP RESULTS:  Lab Results   Component Value Date     04/29/2020    POTASSIUM 4.7 04/29/2020    CHLORIDE 104 04/29/2020    CO2 26 04/29/2020    ANIONGAP 11.7 04/29/2020     (H) 04/29/2020    BUN 40 (H) 04/29/2020    CR 2.10 (H) 04/29/2020    GFRESTIMATED 31 (L) 04/29/2020    GFRESTBLACK 38 (L) 04/29/2020    GABE 9.4 04/29/2020        A1C RESULTS:  Lab Results   Component Value Date    A1C 7.0 (H) 04/22/2020       INR RESULTS:  Lab Results   Component Value Date    INR 1.12 04/22/2020    INR 1.41 (H) 12/25/2019           CC  Td Qureshi MD  [address]  [place] [address]                  Thank you for allowing me to participate in the care of your patient.      Sincerely,     AURELIA PADILLA MD     [facility]    cc:   Td Qureshi MD  [address]  [place] [address]

## 2020-06-30 ENCOUNTER — HOSPITAL ENCOUNTER (OUTPATIENT)
Facility: CLINIC | Age: 75
End: 2020-06-30
Attending: HOSPITALIST | Admitting: HOSPITALIST
Payer: COMMERCIAL

## 2020-06-30 ENCOUNTER — HOSPITAL ENCOUNTER (INPATIENT)
Facility: CLINIC | Age: 75
LOS: 5 days | Discharge: NURSING, REHAB OR LONG-TERM CARE | DRG: 064 | End: 2020-07-06
Attending: HOSPITALIST | Admitting: HOSPITALIST
Payer: COMMERCIAL

## 2020-06-30 ENCOUNTER — TRANSFERRED RECORDS (OUTPATIENT)
Dept: HEALTH INFORMATION MANAGEMENT | Facility: CLINIC | Age: 75
End: 2020-06-30

## 2020-06-30 ENCOUNTER — MEDICAL CORRESPONDENCE (OUTPATIENT)
Dept: HEALTH INFORMATION MANAGEMENT | Facility: CLINIC | Age: 75
End: 2020-06-30

## 2020-06-30 DIAGNOSIS — I63.30 CEREBROVASCULAR ACCIDENT (CVA) DUE TO THROMBOSIS OF CEREBRAL ARTERY (H): Primary | ICD-10-CM

## 2020-06-30 DIAGNOSIS — I25.119 CORONARY ARTERY DISEASE INVOLVING NATIVE CORONARY ARTERY OF NATIVE HEART WITH ANGINA PECTORIS (H): ICD-10-CM

## 2020-06-30 DIAGNOSIS — I50.21 ACUTE SYSTOLIC CONGESTIVE HEART FAILURE (H): ICD-10-CM

## 2020-06-30 DIAGNOSIS — E11.00 TYPE 2 DIABETES MELLITUS WITH HYPEROSMOLARITY WITHOUT COMA, WITHOUT LONG-TERM CURRENT USE OF INSULIN (H): ICD-10-CM

## 2020-06-30 DIAGNOSIS — I47.29 NON-SUSTAINED VENTRICULAR TACHYCARDIA (H): ICD-10-CM

## 2020-06-30 PROBLEM — R42 VERTIGO: Status: ACTIVE | Noted: 2020-06-30

## 2020-06-30 LAB
ALT SERPL-CCNC: 29 U/L
AST SERPL-CCNC: 50 IU/L (ref 15–46)
CREAT SERPL-MCNC: 2 MG/DL (ref 0.66–1.25)
GFR SERPL CREATININE-BSD FRML MDRD: 33 ML/MIN/1.73M2
GLUCOSE BLDC GLUCOMTR-MCNC: 303 MG/DL (ref 70–99)
GLUCOSE SERPL-MCNC: 247 MG/DL (ref 74–106)
INR PPP: 1.1 (ref 1.2–1.5)
POTASSIUM SERPL-SCNC: 4.4 MMOL/L (ref 3.5–5.1)

## 2020-06-30 PROCEDURE — U0003 INFECTIOUS AGENT DETECTION BY NUCLEIC ACID (DNA OR RNA); SEVERE ACUTE RESPIRATORY SYNDROME CORONAVIRUS 2 (SARS-COV-2) (CORONAVIRUS DISEASE [COVID-19]), AMPLIFIED PROBE TECHNIQUE, MAKING USE OF HIGH THROUGHPUT TECHNOLOGIES AS DESCRIBED BY CMS-2020-01-R: HCPCS | Performed by: PHYSICIAN ASSISTANT

## 2020-06-30 PROCEDURE — 00000146 ZZHCL STATISTIC GLUCOSE BY METER IP

## 2020-06-30 PROCEDURE — 96374 THER/PROPH/DIAG INJ IV PUSH: CPT

## 2020-06-30 PROCEDURE — 25000132 ZZH RX MED GY IP 250 OP 250 PS 637: Performed by: PHYSICIAN ASSISTANT

## 2020-06-30 PROCEDURE — G0378 HOSPITAL OBSERVATION PER HR: HCPCS

## 2020-06-30 PROCEDURE — 25000128 H RX IP 250 OP 636: Performed by: PHYSICIAN ASSISTANT

## 2020-06-30 PROCEDURE — 96372 THER/PROPH/DIAG INJ SC/IM: CPT

## 2020-06-30 PROCEDURE — 25000131 ZZH RX MED GY IP 250 OP 636 PS 637: Performed by: PHYSICIAN ASSISTANT

## 2020-06-30 PROCEDURE — 99220 ZZC INITIAL OBSERVATION CARE,LEVL III: CPT | Performed by: PHYSICIAN ASSISTANT

## 2020-06-30 RX ORDER — NICOTINE POLACRILEX 4 MG
15-30 LOZENGE BUCCAL
Status: DISCONTINUED | OUTPATIENT
Start: 2020-06-30 | End: 2020-07-06 | Stop reason: HOSPADM

## 2020-06-30 RX ORDER — ONDANSETRON 4 MG/1
4 TABLET, ORALLY DISINTEGRATING ORAL EVERY 6 HOURS PRN
Status: DISCONTINUED | OUTPATIENT
Start: 2020-06-30 | End: 2020-07-06 | Stop reason: HOSPADM

## 2020-06-30 RX ORDER — CARVEDILOL 12.5 MG/1
12.5 TABLET ORAL 2 TIMES DAILY WITH MEALS
Status: DISCONTINUED | OUTPATIENT
Start: 2020-06-30 | End: 2020-07-04

## 2020-06-30 RX ORDER — AMLODIPINE BESYLATE 5 MG/1
5 TABLET ORAL DAILY
Status: DISCONTINUED | OUTPATIENT
Start: 2020-07-01 | End: 2020-07-05

## 2020-06-30 RX ORDER — NALOXONE HYDROCHLORIDE 0.4 MG/ML
.1-.4 INJECTION, SOLUTION INTRAMUSCULAR; INTRAVENOUS; SUBCUTANEOUS
Status: DISCONTINUED | OUTPATIENT
Start: 2020-06-30 | End: 2020-07-06 | Stop reason: HOSPADM

## 2020-06-30 RX ORDER — ACETAMINOPHEN 325 MG/1
650 TABLET ORAL EVERY 4 HOURS PRN
Status: DISCONTINUED | OUTPATIENT
Start: 2020-06-30 | End: 2020-07-04

## 2020-06-30 RX ORDER — POLYETHYLENE GLYCOL 3350 17 G/17G
17 POWDER, FOR SOLUTION ORAL DAILY PRN
Status: DISCONTINUED | OUTPATIENT
Start: 2020-06-30 | End: 2020-07-06 | Stop reason: HOSPADM

## 2020-06-30 RX ORDER — FUROSEMIDE 10 MG/ML
40 INJECTION INTRAMUSCULAR; INTRAVENOUS 2 TIMES DAILY
Status: COMPLETED | OUTPATIENT
Start: 2020-06-30 | End: 2020-07-01

## 2020-06-30 RX ORDER — DEXTROSE MONOHYDRATE 25 G/50ML
25-50 INJECTION, SOLUTION INTRAVENOUS
Status: DISCONTINUED | OUTPATIENT
Start: 2020-06-30 | End: 2020-07-06 | Stop reason: HOSPADM

## 2020-06-30 RX ORDER — AMOXICILLIN 250 MG
2 CAPSULE ORAL 2 TIMES DAILY PRN
Status: DISCONTINUED | OUTPATIENT
Start: 2020-06-30 | End: 2020-07-06 | Stop reason: HOSPADM

## 2020-06-30 RX ORDER — ATORVASTATIN CALCIUM 40 MG/1
40 TABLET, FILM COATED ORAL EVERY EVENING
Status: DISCONTINUED | OUTPATIENT
Start: 2020-06-30 | End: 2020-07-06 | Stop reason: HOSPADM

## 2020-06-30 RX ORDER — AMOXICILLIN 250 MG
1 CAPSULE ORAL 2 TIMES DAILY PRN
Status: DISCONTINUED | OUTPATIENT
Start: 2020-06-30 | End: 2020-07-06 | Stop reason: HOSPADM

## 2020-06-30 RX ORDER — ONDANSETRON 2 MG/ML
4 INJECTION INTRAMUSCULAR; INTRAVENOUS EVERY 6 HOURS PRN
Status: DISCONTINUED | OUTPATIENT
Start: 2020-06-30 | End: 2020-07-06 | Stop reason: HOSPADM

## 2020-06-30 RX ORDER — LIDOCAINE 40 MG/G
CREAM TOPICAL
Status: DISCONTINUED | OUTPATIENT
Start: 2020-06-30 | End: 2020-07-02

## 2020-06-30 RX ORDER — GLIMEPIRIDE 4 MG/1
4 TABLET ORAL 2 TIMES DAILY WITH MEALS
Status: DISCONTINUED | OUTPATIENT
Start: 2020-06-30 | End: 2020-07-06 | Stop reason: HOSPADM

## 2020-06-30 RX ADMIN — GLIMEPIRIDE 4 MG: 4 TABLET ORAL at 22:37

## 2020-06-30 RX ADMIN — INSULIN GLARGINE 20 UNITS: 100 INJECTION, SOLUTION SUBCUTANEOUS at 22:36

## 2020-06-30 RX ADMIN — FUROSEMIDE 40 MG: 10 INJECTION, SOLUTION INTRAMUSCULAR; INTRAVENOUS at 19:51

## 2020-06-30 RX ADMIN — CARVEDILOL 12.5 MG: 12.5 TABLET, FILM COATED ORAL at 22:37

## 2020-06-30 RX ADMIN — ATORVASTATIN CALCIUM 40 MG: 40 TABLET, FILM COATED ORAL at 22:37

## 2020-06-30 ASSESSMENT — COLUMBIA-SUICIDE SEVERITY RATING SCALE - C-SSRS
2. HAVE YOU ACTUALLY HAD ANY THOUGHTS OF KILLING YOURSELF IN THE PAST MONTH?: NO
1. IN THE PAST MONTH, HAVE YOU WISHED YOU WERE DEAD OR WISHED YOU COULD GO TO SLEEP AND NOT WAKE UP?: NO

## 2020-06-30 ASSESSMENT — MIFFLIN-ST. JEOR: SCORE: 1897.94

## 2020-06-30 NOTE — LETTER
Key Recommendations:   Patient has a complex medical history. He was admitted 6/30 for vertigo, dizziness and nystagmus. 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  Patient has ongoing diplopia, which improved with wearing an eye patch on the left.    Prior to MRI, care coordinator met with patient for HF education and discharge planning.  We reviewed How to manage HF education. We discussed getting a life line device, patient offered resources. Discussed and encouraged daily weights. Patient has a scale and states he tries to follow a low salt diet. Patient reports having all DM [facility] in [facility] independently. He does not receive any home services. Patient manages his own medications and reports taking them as prescribed. He has support from his brother, Bill, and nieces.    Patient is followed by cardiology, Dr. Laci Neumann, [facility] [place]. States his PCP is Dr. Td Qureshi, with [facility].     A handoff  sent to his PCP Care Coordination for ongoing support with disease management with multiple provider involvement.      Patient discharged to [facility].     Jay Jay Multani RN      AVS/Discharge Summary is the source of truth; this is a helpful guide for improved communication of patient story

## 2020-06-30 NOTE — LETTER
Transition Communication Hand-off for Care Transitions to Next Level of Care Provider    Name: Burton Elizabeth  : 1945  MRN #: [id number]  Primary Care Provider: Td Qureshi  Primary Care MD Name: Dr. Td Qureshi  Primary Clinic: [address] [place] [address]  [facility] [address]  Primary Care Clinic Name: [facility]  Reason for Hospitalization:  dizziness, double vision & renal insufficiency  Vertigo  Acute respiratory failure with hypoxia (H)  Admit Date/Time: 2020  6:28 PM  Discharge Date: 2020  Payor Source: Payor: [facility] / Plan: [facility] MEDICARE ADVANTAGE / Product Type: Medicare /     Readmission Assessment Measure (JEAN-PIERRE) Risk Score/category: high risk moderate JEAN-PIERRE          Reason for Communication Hand-off Referral: Multiple providers/specialties     Discharge Needs Assessment:  Needs      Most Recent Value   Anticipated Changes Related to Illness  none   Equipment Currently Used at Home  cane, straight, walker, standard   Interventions provided  HF education   # of Referrals Placed by CTS  Communication hand-offs to next level of Care Providers          Follow-up plan:    Future Appointments   Date Time Provider Department Center   2020 10:00 AM [facility]   2020 11:00 AM Alison Jose OT [facility]   2020  3:45 PM [facility]   2020  9:45 AM Laci Neumann MD [facility]       Any outstanding tests or procedures:        Radiology & Cardiology Orders     Future Labs/Procedures Complete By Expires    Cardiac Event Monitor Adult Pediatric  2020 (Approximate) 2020        Referrals     Future Labs/Procedures    Occupational Therapy Adult Consult     Comments:    Evaluate and treat as clinically indicated.    Reason:  Acte stroke    Physical Therapy Adult Consult     Comments:    Evaluate and treat as clinically indicated.    Reason:  Acute stroke            Key Recommendations:  Key  Recommendations:   Patient has a complex medical history including CAD, HF, DM, HTN and recent stroke. He was admitted 6/30 for vertigo, dizziness and nystagmus. 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  Patient has ongoing diplopia, which improved with wearing an eye patch on the left.    Prior to MRI, care coordinator met with patient for HF education and discharge planning.  We reviewed How to manage HF education. We discussed getting a life line device, patient offered resources. Discussed and encouraged daily weights. Patient has a scale and states he tries to follow a low salt diet. Patient reports having all DM supplies at [facility]. Patient lives in [facility] independently. He does not receive any home services. Patient manages his own medications and reports taking them as prescribed. He has support from his brother, Bill, and nieces.    Patient is followed by cardiology, Dr. Laci Neumann, [facility] [place]. States his PCP is Dr. Td Qureshi, with [facility].     A handoff  sent to his PCP Care Coordination as patient can benefit from CC follow up for  support with disease management, overall health management and senior resources.      Patient discharged to [facility].       Jay Jay Multani RN    AVS/Discharge Summary is the source of truth; this is a helpful guide for improved communication of patient story

## 2020-06-30 NOTE — H&P
[facility] Outpatient / Observation Unit  History and Physical Exam     Burton Elizabeth MRN# [id number]   YOB: 1945 Age: 75 year old      Date of Admission:  6/30/2020    Primary care provider: Td Qureshi   Burton Elizabeth is a 75 year old male with a PMH significant for CAD, combined CHF, DM, HTN, HLP, CKD, chronic LBBB and hx of DVT now off anticoagulation, who is being directly admitted by request of Dr. Loya at [facility] for further work up and management of Vertigo, to include brain MRI.   Work up in the UR was fairly unremarkable. Cr stable at 2.0, electrolytes normal. Glucose elevated at 247. Troponin was undetectable and CBC was unremarkable. LE US was negative for DVT and CT of the head was negative for acute process. EKG SR with LBBB. Patient will be registered to Observation for further work-up and evaluation.     1. Dizziness - pt denies room spinning dizziness, unlikely vertigo. Notes feeling weak, unsteady, dizzy and blurred vision. Started Friday after eating a [employer] burrito, poor PO intake over the weekend due to N/V, did miss some doses of home meds. Seen by PCP via virtual visit, dx with vertigo, started on meclizine without improvement. Has has increase in LE edema and 12 lbs weigh gain over the weekend, denies SOB. Similar presentation in April at [facility], treated for decompensated diastolic CHF. Pt felt sx may be due to dehydration given poor PO intake over the weekend, felt mildly improved with small fluid bolus at UR but given weight gain and edema, will treat for volume overload with IV lasix 40 mg BID, daily weights and strict I&Os. If no significant improvement in AM, consider repeat echo. Has known moderate aortic stenosis, EF 45-50% 4/2020. If balance and vision not improved with diuresis, reconsider brain MRI in AM  2. CAD - denies chest pain or SOB. Had an extensive cardiac workup after his orthopedic surgery in the summer of  2019.  The patient's left heart catheterization from 08/2019 showed 99% stenosis of the circumflex, but this is a small vessel.  He had only moderate disease in other vascular territories and he was being medically managed. Troponin was undetectable at UR. Will monitor on tele, repeat EKG and troponin if he develops chest pain.   3. DM - managed with glimepiride, Lantus and mealtime insulin. Resume. Pt uses a sliding scale of sorts for Lantus, 16-25 units. Will start with 20 units. Resume glimepiride.   4. HTN - resume home amlodipine and Coreg, will also be diuresing per above.  5. CKD - Cr 2.0 which appears to be his baseline. Monitor while diuresing   6. HLP - resume statin.   7. Covid-19 test due to hospital admission and current pandemic         Plan     1. Registered to Observation  2. Telemetry monitoring  3. 40 mg IV lasix BID x2 doses then reassess  4. Daily weights, strict I&Os  5. Cont supportive cares, antiemetics PRN  6. Consider MRI of brain  7. Glucose checks QID and HS                Chief Complaint:   Dizziness          History of Present Illness:   Burton Elizabeth is a 75 year old male with a PMH significant for CAD, combined CHF, DM, HTN, HLP, CKD, chronic LBBB and hx of DVT now off anticoagulation, who is being directly admitted by request of Dr. Loya at [facility] for further work up and management of Vertigo, to include brain MRI.  The patient states his symptoms started on Friday after eating a [employer] burrito.  He states he developed nausea and vomiting after that and has had poor p.o. intake since that time.  He is also notes that he has missed some doses of his home meds over the weekend.  He notes he has become dizzy, weak, fatigued and has noted some blurred vision.  He denies fever, chills, chest pain, palpitations, shortness of breath, diarrhea or dysuria.  He has noted increased lower extremity edema as well as a 12 pound weight gain since Friday.  He states he had a  virtual visit with his primary care provider yesterday where he was diagnosed with vertigo and started on meclizine.  He states his symptoms have not improved on meclizine.  He presented to the emergency room today for further evaluation.  He underwent a ultrasound of his lower extremity that was negative for DVT and a CT scan of his head that was negative for an acute process.  Direct admission was requested for further work-up of vertigo to include a brain MRI.  The here the patient denies room spinning type dizziness, states that he is just unsteady on his feet and may be lightheaded.  He felt his symptoms might be due to dehydration given his poor p.o. intake over the weekend.  He states he received a small fluid bolus at the urgency room and did feel somewhat improved after that.  He notes blurred vision while sitting in the room, stating if he looks at the sign ahead he notes 2 lines.  He denies vision loss, facial droop, speech difficulties, or one-sided weakness.        Past Medical History:     Past Medical History:   Diagnosis Date     Arthritis of knee~ s/p replacement 3/24/2016     CAD (coronary artery disease)      Decreased cardiac ejection fraction~44% 3/24/2016     Diabetes mellitus, type 2 (H) 3/24/2016     History of DVT (deep vein thrombosis)      HTN (hypertension) 3/24/2016     LBBB (left bundle branch block) 3/24/2016     Mixed hyperlipidemia 8/23/2016               Past Surgical History:     Past Surgical History:   Procedure Laterality Date     ARTHROPLASTY KNEE Right 3/29/2016    Procedure: ARTHROPLASTY KNEE;  Surgeon: Heena Rosen MD;  Location:  OR     ARTHROPLASTY REVISION KNEE Left 9/27/2019    Procedure: REVISION LEFT TOTAL KNEE  ARTHROPLASTY;  Surgeon: Heena Rosen MD;  Location:  OR     CV LEFT HEART CATH N/A 8/12/2019    Procedure: Left Heart Cath;  Surgeon: Edgardo Beckham MD;  Location:  HEART CARDIAC CATH LAB     EYE SURGERY      cataract removal      ORTHOPEDIC SURGERY      KNEE SCOPES MULTIPLE               Social History:     Social History     Socioeconomic History     Marital status: Single     Spouse name: Not on file     Number of children: Not on file     Years of education: Not on file     Highest education level: Not on file   Occupational History     Not on file   Social Needs     Financial resource strain: Not on file     Food insecurity     Worry: Not on file     Inability: Not on file     Transportation needs     Medical: Not on file     Non-medical: Not on file   Tobacco Use     Smoking status: Never Smoker     Smokeless tobacco: Never Used   Substance and Sexual Activity     Alcohol use: Yes     Alcohol/week: 2.0 standard drinks     Types: 2 Standard drinks or equivalent per week     Frequency: 2-3 times a week     Drinks per session: 1 or 2     Comment: occasional, social     Drug use: No     Sexual activity: Not on file   Lifestyle     Physical activity     Days per week: Not on file     Minutes per session: Not on file     Stress: Not on file   Relationships     Social connections     Talks on phone: Not on file     Gets together: Not on file     Attends Jewish service: Not on file     Active member of club or organization: Not on file     Attends meetings of clubs or organizations: Not on file     Relationship status: Not on file     Intimate partner violence     Fear of current or ex partner: Not on file     Emotionally abused: Not on file     Physically abused: Not on file     Forced sexual activity: Not on file   Other Topics Concern     Parent/sibling w/ CABG, MI or angioplasty before 65F 55M? Not Asked   Social History Narrative     Not on file               Family History:     Family History   Problem Relation Age of Onset     Coronary Artery Disease No family hx of               Allergies:      Allergies   Allergen Reactions     Penicillins Anaphylaxis               Medications:     Prior to Admission medications    Medication Sig  Last Dose Taking? Auth Provider   amLODIPine (NORVASC) 5 MG tablet Take 5 mg by mouth daily   Unknown, Entered By History   aspirin (ASA) 325 MG EC tablet Take 325 mg by mouth daily   Unknown, Entered By History   atorvastatin (LIPITOR) 40 MG tablet Take 1 tablet (40 mg) by mouth every evening   Catarino Cherry MD   blood glucose (NO BRAND SPECIFIED) test strip Use to test blood sugar as directed   Reported, Patient   carvedilol (COREG) 25 MG tablet Take 12.5 mg by mouth 2 times daily (with meals) (Takes 0.5 x 25mg tablet = 12.5mg dose)   Reported, Patient   glimepiride (AMARYL) 4 MG tablet Take 4 mg by mouth 2 times daily    Reported, Patient   insulin aspart (NOVOLOG FLEXPEN) 100 UNIT/ML pen Inject 2-6 Units Subcutaneous 3 times daily (with meals) (Patient states he bases his dose off of carb counting and blood glucose but did not give exact regimen).   Reported, Patient   insulin glargine (LANTUS) 100 UNIT/ML PEN Inject 12-24 Units Subcutaneous every evening (Patient states he bases his dose off of blood glucose and how much Novolog he has used throughout the day but did not give exact regimen).   Reported, Patient   multivitamin, therapeutic with minerals (THERA-VIT-M) TABS Take 1 tablet by mouth daily   Reported, Patient   order for DME Equipment being ordered: Walker Wheels () and Walker ()  Treatment Diagnosis: Impaired gait   Catarino Kearns PA-C   torsemide (DEMADEX) 20 MG tablet Take 20 mg by mouth daily    Reported, Patient              Review of Systems:   A Comprehensive greater than 10 system review of systems was carried out.  Pertinent positives and negatives are noted above.  Otherwise negative for contributory information.     CV: NEGATIVE for chest pain, SOB, or palpitations  C: NEGATIVE for fever, chills  E/M: NEGATIVE for ear, mouth and throat problems  R: NEGATIVE for significant cough or SOB    NEURO ROS: negative except for dizziness and blurred vision            Physical Exam:   Blood pressure 136/74, pulse 75, temperature 97.7  F (36.5  C), temperature source Oral, resp. rate 18, SpO2 90 %.    GENERAL:  Comfortable.  PSYCH: pleasant, oriented, No acute distress.  HEENT:  Atraumatic, normocephalic. Normal conjunctiva, normal hearing, and oropharynx is normal.  NECK:  Supple, no neck vein distention  HEART:  Normal S1, S2 with no murmur, no pericardial rub, gallops or S3 or S4.  LUNGS:  Rhonchi noted on expiration bilaterally, otherwise clear to auscultation, normal Respiratory effort. No wheezing, rales or ronchi.  GI:  Soft, normal bowel sounds. Non-tender, non distended.   EXTREMITIES:  2+ bilateral LE edema up to the knees, +2 pulses bilateral and equal.  SKIN:  Dry to touch, No rash, wound or ulcerations.  NEUROLOGIC:  CN 2-12 intact, BL 5/5 symmetric upper and lower extremity strength, sensation is intact with no focal deficits.             Data:     EKG, labs and imaging reports from [facility] were personally reviewed.     Svetlana Murphy PA-C

## 2020-07-01 ENCOUNTER — APPOINTMENT (OUTPATIENT)
Dept: MRI IMAGING | Facility: CLINIC | Age: 75
DRG: 064 | End: 2020-07-01
Attending: PHYSICIAN ASSISTANT
Payer: COMMERCIAL

## 2020-07-01 ENCOUNTER — APPOINTMENT (OUTPATIENT)
Dept: GENERAL RADIOLOGY | Facility: CLINIC | Age: 75
DRG: 064 | End: 2020-07-01
Attending: PHYSICIAN ASSISTANT
Payer: COMMERCIAL

## 2020-07-01 PROBLEM — J96.01 ACUTE RESPIRATORY FAILURE WITH HYPOXIA (H): Status: ACTIVE | Noted: 2020-07-01

## 2020-07-01 LAB
ANION GAP SERPL CALCULATED.3IONS-SCNC: 4 MMOL/L (ref 3–14)
BASOPHILS # BLD AUTO: 0.1 10E9/L (ref 0–0.2)
BASOPHILS NFR BLD AUTO: 0.8 %
BUN SERPL-MCNC: 43 MG/DL (ref 7–30)
CALCIUM SERPL-MCNC: 8.7 MG/DL (ref 8.5–10.1)
CHLORIDE SERPL-SCNC: 104 MMOL/L (ref 94–109)
CO2 SERPL-SCNC: 30 MMOL/L (ref 20–32)
CREAT SERPL-MCNC: 2.11 MG/DL (ref 0.66–1.25)
DIFFERENTIAL METHOD BLD: ABNORMAL
EOSINOPHIL # BLD AUTO: 0.3 10E9/L (ref 0–0.7)
EOSINOPHIL NFR BLD AUTO: 3.3 %
ERYTHROCYTE [DISTWIDTH] IN BLOOD BY AUTOMATED COUNT: 16.7 % (ref 10–15)
GFR SERPL CREATININE-BSD FRML MDRD: 30 ML/MIN/{1.73_M2}
GLUCOSE BLDC GLUCOMTR-MCNC: 175 MG/DL (ref 70–99)
GLUCOSE BLDC GLUCOMTR-MCNC: 209 MG/DL (ref 70–99)
GLUCOSE BLDC GLUCOMTR-MCNC: 261 MG/DL (ref 70–99)
GLUCOSE BLDC GLUCOMTR-MCNC: 274 MG/DL (ref 70–99)
GLUCOSE SERPL-MCNC: 236 MG/DL (ref 70–99)
HCT VFR BLD AUTO: 43.5 % (ref 40–53)
HGB BLD-MCNC: 12.5 G/DL (ref 13.3–17.7)
IMM GRANULOCYTES # BLD: 0.1 10E9/L (ref 0–0.4)
IMM GRANULOCYTES NFR BLD: 0.6 %
LYMPHOCYTES # BLD AUTO: 1.5 10E9/L (ref 0.8–5.3)
LYMPHOCYTES NFR BLD AUTO: 18.4 %
MCH RBC QN AUTO: 26.4 PG (ref 26.5–33)
MCHC RBC AUTO-ENTMCNC: 28.7 G/DL (ref 31.5–36.5)
MCV RBC AUTO: 92 FL (ref 78–100)
MONOCYTES # BLD AUTO: 0.8 10E9/L (ref 0–1.3)
MONOCYTES NFR BLD AUTO: 10.6 %
NEUTROPHILS # BLD AUTO: 5.3 10E9/L (ref 1.6–8.3)
NEUTROPHILS NFR BLD AUTO: 66.3 %
NRBC # BLD AUTO: 0 10*3/UL
NRBC BLD AUTO-RTO: 0 /100
NT-PROBNP SERPL-MCNC: 856 PG/ML (ref 0–900)
PLATELET # BLD AUTO: 188 10E9/L (ref 150–450)
POTASSIUM SERPL-SCNC: 4.6 MMOL/L (ref 3.4–5.3)
RBC # BLD AUTO: 4.74 10E12/L (ref 4.4–5.9)
SARS-COV-2 RNA SPEC QL NAA+PROBE: NOT DETECTED
SODIUM SERPL-SCNC: 138 MMOL/L (ref 133–144)
SPECIMEN SOURCE: NORMAL
WBC # BLD AUTO: 8 10E9/L (ref 4–11)

## 2020-07-01 PROCEDURE — 96372 THER/PROPH/DIAG INJ SC/IM: CPT

## 2020-07-01 PROCEDURE — 12000011 ZZH R&B MS OVERFLOW

## 2020-07-01 PROCEDURE — 71046 X-RAY EXAM CHEST 2 VIEWS: CPT

## 2020-07-01 PROCEDURE — 25000131 ZZH RX MED GY IP 250 OP 636 PS 637: Performed by: PHYSICIAN ASSISTANT

## 2020-07-01 PROCEDURE — 96376 TX/PRO/DX INJ SAME DRUG ADON: CPT

## 2020-07-01 PROCEDURE — 25000132 ZZH RX MED GY IP 250 OP 250 PS 637: Performed by: PHYSICIAN ASSISTANT

## 2020-07-01 PROCEDURE — 83880 ASSAY OF NATRIURETIC PEPTIDE: CPT | Performed by: PHYSICIAN ASSISTANT

## 2020-07-01 PROCEDURE — 25000128 H RX IP 250 OP 636: Performed by: PHYSICIAN ASSISTANT

## 2020-07-01 PROCEDURE — G0378 HOSPITAL OBSERVATION PER HR: HCPCS

## 2020-07-01 PROCEDURE — 99233 SBSQ HOSP IP/OBS HIGH 50: CPT | Performed by: PHYSICIAN ASSISTANT

## 2020-07-01 PROCEDURE — 80048 BASIC METABOLIC PNL TOTAL CA: CPT | Performed by: PHYSICIAN ASSISTANT

## 2020-07-01 PROCEDURE — 36415 COLL VENOUS BLD VENIPUNCTURE: CPT | Performed by: PHYSICIAN ASSISTANT

## 2020-07-01 PROCEDURE — 85025 COMPLETE CBC W/AUTO DIFF WBC: CPT | Performed by: PHYSICIAN ASSISTANT

## 2020-07-01 PROCEDURE — 00000146 ZZHCL STATISTIC GLUCOSE BY METER IP

## 2020-07-01 PROCEDURE — 70551 MRI BRAIN STEM W/O DYE: CPT

## 2020-07-01 RX ADMIN — INSULIN ASPART 6 UNITS: 100 INJECTION, SOLUTION INTRAVENOUS; SUBCUTANEOUS at 12:51

## 2020-07-01 RX ADMIN — INSULIN ASPART 2 UNITS: 100 INJECTION, SOLUTION INTRAVENOUS; SUBCUTANEOUS at 18:35

## 2020-07-01 RX ADMIN — ATORVASTATIN CALCIUM 40 MG: 40 TABLET, FILM COATED ORAL at 20:16

## 2020-07-01 RX ADMIN — AMLODIPINE BESYLATE 5 MG: 5 TABLET ORAL at 08:25

## 2020-07-01 RX ADMIN — FUROSEMIDE 40 MG: 10 INJECTION, SOLUTION INTRAMUSCULAR; INTRAVENOUS at 06:34

## 2020-07-01 RX ADMIN — ASPIRIN 325 MG: 325 TABLET, COATED ORAL at 08:25

## 2020-07-01 RX ADMIN — CARVEDILOL 12.5 MG: 12.5 TABLET, FILM COATED ORAL at 08:25

## 2020-07-01 RX ADMIN — INSULIN GLARGINE 22 UNITS: 100 INJECTION, SOLUTION SUBCUTANEOUS at 20:17

## 2020-07-01 RX ADMIN — GLIMEPIRIDE 4 MG: 4 TABLET ORAL at 08:25

## 2020-07-01 RX ADMIN — INSULIN ASPART 3 UNITS: 100 INJECTION, SOLUTION INTRAVENOUS; SUBCUTANEOUS at 12:50

## 2020-07-01 RX ADMIN — CARVEDILOL 12.5 MG: 12.5 TABLET, FILM COATED ORAL at 20:16

## 2020-07-01 RX ADMIN — GLIMEPIRIDE 4 MG: 4 TABLET ORAL at 18:38

## 2020-07-01 ASSESSMENT — ACTIVITIES OF DAILY LIVING (ADL)
SWALLOWING: 0-->SWALLOWS FOODS/LIQUIDS WITHOUT DIFFICULTY
AMBULATION: 0-->INDEPENDENT
BATHING: 0-->INDEPENDENT
TOILETING: 0-->INDEPENDENT
FALL_HISTORY_WITHIN_LAST_SIX_MONTHS: NO
COGNITION: 0 - NO COGNITION ISSUES REPORTED
RETIRED_EATING: 0-->INDEPENDENT
TRANSFERRING: 0-->INDEPENDENT
RETIRED_COMMUNICATION: 0-->UNDERSTANDS/COMMUNICATES WITHOUT DIFFICULTY
DRESS: 0-->INDEPENDENT

## 2020-07-01 ASSESSMENT — MIFFLIN-ST. JEOR: SCORE: 1896.12

## 2020-07-01 NOTE — PLAN OF CARE
PRIMARY DIAGNOSIS: CONGESTIVE HEART FAILURE  OUTPATIENT/OBSERVATION GOALS TO BE MET BEFORE DISCHARGE:  Dyspnea improved and O2 sats >88% at RA or at prior home O2 therapy level: No        SpO2: 95 %, O2 Device: Nasal cannula  Vitals:    06/30/20 1854   Weight: 116.8 kg (257 lb 6.4 oz)        ECHO and other diagnostic testing complete (if applicable): Yes- maybe echo this AM.    Return to near baseline physical activity: Yes    Discharge Planner Nurse   Safe discharge environment identified: Yes  Barriers to discharge: Yes    Voided 600 ml concetrated urine, 2L NC placed for sats dipped into the 80s overnight.          Entered by: Sanju Bull 07/01/2020 4:33 AM     Please review provider order for any additional goals.   Nurse to notify provider when observation goals have been met and patient is ready for discharge.

## 2020-07-01 NOTE — PLAN OF CARE
"BP (!) 147/79 (BP Location: Right arm)   Pulse 76   Temp 99.1  F (37.3  C) (Oral)   Resp 18   Ht 1.753 m (5' 9\")   Wt 116.6 kg (257 lb)   SpO2 92%   BMI 37.95 kg/m      A&Ox4. Continues remote tele. Weaned down from 2L O2 to 1L O2 via nasal canula. Some dyspnea w/ exertion. Denies nausea. Denies pain. Patient states he is still having double vision and impaired depth perception, but this has slightly improved this AM since admission per patient. Patient states he is still feeling weak. Up w/ Ax1-2, gait belt and walker. BS active x4, tolerating regular diet, passing flatus. Blood glucose checks. Voiding in adequate amt's. Patient is on strict I&O's. Daily weights. BLE 2+ edema present. MRI and CT of chest in process. Neuro checks Q4H. SL. Will continue to monitor.  "

## 2020-07-01 NOTE — UTILIZATION REVIEW
Admission Status; Secondary Review Determination    Under the authority of the Utilization Management Committee, the utilization review process indicated a secondary review on the above patient. The review outcome is based on review of the medical records, discussions with staff, and applying clinical experience noted on the date of the review.    (x) Inpatient Status Appropriate - This patient's medical care is consistent with medical management for inpatient care and reasonable inpatient medical practice.    RATIONALE FOR DETERMINATION: 75-year-old male with history of coronary disease, combined heart failure, diabetes, hypertension who presents with acute severely symptomatic vertigo complicated by acute respiratory failure with hypoxemia secondary to heart failure.  Patient will need oxygen, careful diuresis and fluid management especially with recent nausea and vomiting related to neurological symptoms.  Severity of illness and expected duration care appropriate for inpatient care.     At the time of admission with the information available to the attending physician more than 2 nights Hospital complex care was anticipated, based on patient risk of adverse outcome if treated as outpatient and complex care required. Inpatient admission is appropriate based on the Medicare guidelines.    This document was produced using voice recognition software    The information on this document is developed by the utilization review team in order for the business office to ensure compliance. This only denotes the appropriateness of proper admission status and does not reflect the quality of care rendered.    The definitions of Inpatient Status and Observation Status used in making the determination above are those provided in the CMS Coverage Manual, Chapter 1 and Chapter 6, section 70.4.    Sincerely,    Jeff Cannon MD  Utilization Review  Physician Advisor  [facility].

## 2020-07-01 NOTE — PLAN OF CARE
PRIMARY DIAGNOSIS: CONGESTIVE HEART FAILURE  OUTPATIENT/OBSERVATION GOALS TO BE MET BEFORE DISCHARGE:  Dyspnea improved and O2 sats >88% at RA or at prior home O2 therapy level: No        SpO2: 91 %, O2 Device: Nasal cannula  Vitals:    06/30/20 1854   Weight: 116.8 kg (257 lb 6.4 oz)        ECHO and other diagnostic testing complete (if applicable): No    Return to near baseline physical activity: No    Discharge Planner Nurse   Safe discharge environment identified: Yes  Barriers to discharge: Yes       Entered by: Sanju Bull 07/01/2020 12:40 AM     Please review provider order for any additional goals.   Nurse to notify provider when observation goals have been met and patient is ready for discharge.

## 2020-07-01 NOTE — PLAN OF CARE
"PRIMARY DIAGNOSIS: CONGESTIVE HEART FAILURE  OUTPATIENT/OBSERVATION GOALS TO BE MET BEFORE DISCHARGE:  1. Dyspnea improved and O2 sats >88% at RA or at prior home O2 therapy level: Requiring 2L of O2        SpO2: 94 %, O2 Device: Nasal cannula  Vitals:    06/30/20 1854 07/01/20 0623   Weight: 116.8 kg (257 lb 6.4 oz) 116.6 kg (257 lb)        2. ECHO and other diagnostic testing complete (if applicable): Not ordered.     3. Return to near baseline physical activity: No    Discharge Planner Nurse   Safe discharge environment identified: Yes  Barriers to discharge: Yes       Entered by: Julia Neri 07/01/2020 8:00 AM    BP (!) 146/83 (BP Location: Right arm)   Pulse 75   Temp 98.1  F (36.7  C) (Oral)   Resp 20   Ht 1.753 m (5' 9\")   Wt 116.6 kg (257 lb)   SpO2 94%   BMI 37.95 kg/m      A&Ox4. Still requiring 2L of O2 via nasal canula. Dyspnea w/ exertion. Denies nausea. Denies pain. Patient states he is still having double vision, but it has slightly improved this AM. Patient states he is still feeling weak. Up w/ Ax2, gait belt and walker. BS active x4, tolerating regular diet, passing flatus. Blood glucose checks. Voiding in adequate amt's. Patient is on strict I&O's. Daily weights. BLE 2+ edema present. SL. Will continue to monitor.     Please review provider order for any additional goals.   Nurse to notify provider when observation goals have been met and patient is ready for discharge.  "

## 2020-07-01 NOTE — DISCHARGE INSTRUCTIONS
Your hospital follow up appointment has been schedule for you with Dr. Malagon at [facility] for 7/10/20 at 12:30 pm. Please bring your hospital discharge instructions, a photo ID, insurance information and any new medications with you to your appointment. Please call the clinic at [phone] if you need to reschedule.

## 2020-07-01 NOTE — CONSULTS
Care Transition Initial Assessment - RN        Met with: Patient.  DATA   Active Problems:    Vertigo    Acute respiratory failure with hypoxia (H)       Cognitive Status: awake, alert and oriented.  Primary Care Clinic Name: [facility]  Primary Care MD Name: Dr. Td Qureshi  Contact information and PCP information verified: Yes  Lives With: alone      Quality of Family Relationships: involved, supportive  Description of Support System: Supportive, Involved   Who is your support system?: Sibling(s)       Insurance concerns: No Insurance issues identified  ASSESSMENT  Patient currently receives the following services:  None. [facility]        Identified issues/concerns regarding health management: Patient admitted for vertigo complicated by acute resp failure and hypoxemia secondary to Heart Failure. Patient has x2 inpt in the last 6 months related to HF. Met with patient for HF education and discharge planning. Patient reports his symptoms, SOB, dizziness and blurred vision, started quickly. He does not have home O2. We reviewed How to manage HF education. We discussed getting a life line device, patient offered resources. Patient has a scale at home and reports able to weigh self daily. He tries to follow a low salt diet. Patient reports having all DM supplies at home. Patient lives in [place] independently. He does not receive any home services. Patient manages his own medications and reports taking them as prescribed. He has support from his brother, Bill, and nieces. Patient declines homecare RN or needing any additional support at this time.     Patient is followed by cardiology, Dr. Laci Neumann, [facility]. States his PCP is Dr. Td Qureshi, with [facility]. A handoff will be sent to his PCP Care Coordination for ongoing support with disease management.      PLAN  Financial costs for the patient include none  .  Patient given options and choices for discharge yes .  Patient/family is agreeable to the plan?  Yes: home  Patient anticipates discharging to home .        Patient anticipates needs for home equipment: No  Transportation/person available to transport on day of discharge  is family.   Plan/Disposition: Home   Appointments:   Offered assistance with appointment scheduling. Patient declined, stating he prefers to schedule his own.       Patient has an upcoming appointment scheduled with Cardiology, Dr. Nuemann, on August 20th.   Care  (CTS) will continue to follow as needed.    Jay Jay Multani RN BSN PHN CCM   Inpatient Care Coordination   [facility]   [phone]

## 2020-07-01 NOTE — PLAN OF CARE
"PRIMARY DIAGNOSIS: CONGESTIVE HEART FAILURE/VISION CHANGES    OUTPATIENT/OBSERVATION GOALS TO BE MET BEFORE DISCHARGE:  1. Dyspnea improved and O2 sats >88% at RA or at prior home O2 therapy level: Requiring 2L of O2        SpO2: 94 %, O2 Device: Nasal cannula       Vitals:     06/30/20 1854 07/01/20 0623   Weight: 116.8 kg (257 lb 6.4 oz) 116.6 kg (257 lb)         2. ECHO and other diagnostic testing complete (if applicable): Not ordered.      3. Return to near baseline physical activity: No     Discharge Planner Nurse   Safe discharge environment identified: Yes  Barriers to discharge: Yes       Entered by: Julia Neri 07/01/2020 12:00 PM     /84 (BP Location: Right arm)   Pulse 68   Temp 98.7  F (37.1  C) (Oral)   Resp 16   Ht 1.753 m (5' 9\")   Wt 116.6 kg (257 lb)   SpO2 95%   BMI 37.95 kg/m         A&Ox4. Weaned down from 2L O2 to 1L O2 via nasal canula. Some dyspnea w/ exertion. Denies nausea. Denies pain. Patient states he is still having double vision, but this has slightly improved this AM since admission. Patient states he is still feeling weak. Up w/ Ax2, gait belt and walker. BS active x4, tolerating regular diet, passing flatus. Blood glucose checks. Voiding in adequate amt's. Patient is on strict I&O's. Daily weights. BLE 2+ edema present. SL. Will continue to monitor.      Please review provider order for any additional goals.   Nurse to notify provider when observation goals have been met and patient is ready for discharge.  "

## 2020-07-01 NOTE — PROGRESS NOTES
ROOM # 227    Living Situation (if not independent, order SW consult): home alone  Facility name:  : [facility]: [phone]    Activity level at baseline: walker  Activity level on admit: assist x1/2 with walker and belt      Patient registered to observation; given Patient Bill of Rights; given the opportunity to ask questions about observation status and their plan of care.  Patient has been oriented to the observation room, bathroom and call light is in place.    Discussed discharge goals and expectations with patient/family.

## 2020-07-01 NOTE — PLAN OF CARE
PRIMARY DIAGNOSIS: CONGESTIVE HEART FAILURE  OUTPATIENT/OBSERVATION GOALS TO BE MET BEFORE DISCHARGE:  Dyspnea improved and O2 sats >88% at RA or at prior home O2 therapy level: Yes        SpO2: 90 %, O2 Device: None (Room air)  Vitals:    06/30/20 1854   Weight: 116.8 kg (257 lb 6.4 oz)        ECHO and other diagnostic testing complete (if applicable): No    Return to near baseline physical activity: No    Discharge Planner Nurse   Safe discharge environment identified: Yes  Barriers to discharge: Yes       Entered by: Debbie Delarosa 06/30/2020 9:43 PM     Please review provider order for any additional goals.   Nurse to notify provider when observation goals have been met and patient is ready for discharge.    Patient alert and oriented  Peripheral IV saline locked- placed in urgent care  VSS, on room air  Up assist x1/2 with walker and gait belt  BLE edema +2/+1  Lungs clear in upper lobes, fine crackles in lower lobes  Scars to legs  Tolerating regular diet  Received IV lasix, continuing daily weights and strict I/Os  Continue with plan of care

## 2020-07-01 NOTE — PHARMACY-ADMISSION MEDICATION HISTORY
Admission medication history interview status for this patient is complete. See [facility] admission navigator for allergy information, prior to admission medications and immunization status.     Medication history interview done via telephone during Covid-19 pandemic, indicate source(s): Patient  Medication history resources (including written lists, pill bottles, clinic record):None    Changes made to PTA medication list:  Added: none  Deleted: none  Changed: lantus     Actions taken by pharmacist (provider contacted, etc):None     Additional medication history information:     *Patient's Lipitor has been on hold for 5 months. He asked me to keep this on the list still. He hasn't taken the torsemide in the last week because he was dizzy and didn't want to fall while trying to get to the bathroom.     Patient doesn't appear to have a set regimen for managing his diabetes. I am unsure of what prandial coverage he has. He said maybe 1 unit for 50 grams of carbs, but states that for a slice of bread he would do 1 unit.     He also administers his long acting insulin depending on how his BG has been and what he thinks it will be.     Medication reconciliation/reorder completed by provider prior to medication history?  N   (Y/N)     For patients on insulin therapy: Y  (Y/N)  Sliding scale Novolog: No  Do you have a baseline novolog pre-meal dose:   Yes, unknown method  Do you eat three meals a day:  generally  How many times do you check your blood glucose per day:  Before each meal, sometimes at bedtime  How many episodes of hypoglycemia do you have per week: infrequent   Do you have a Continuous glucose monitor (CGM) : N        Prior to Admission medications    Medication Sig Last Dose Taking? Auth Provider   amLODIPine (NORVASC) 5 MG tablet Take 5 mg by mouth daily 6/30/2020 at Unknown time Yes Unknown, Entered By History   aspirin (ASA) 325 MG EC tablet Take 325 mg by mouth daily 6/30/2020 at Unknown time Yes Unknown,  Entered By History   carvedilol (COREG) 25 MG tablet Take 12.5 mg by mouth 2 times daily (with meals) (Takes 0.5 x 25mg tablet = 12.5mg dose) 6/30/2020 at Unknown time Yes Reported, Patient   glimepiride (AMARYL) 4 MG tablet Take 4 mg by mouth 2 times daily  6/30/2020 at Unknown time Yes Reported, Patient   insulin aspart (NOVOLOG FLEXPEN) 100 UNIT/ML pen Inject 2-6 Units Subcutaneous 3 times daily (with meals) (Patient states he bases his dose off of carb counting and blood glucose but did not give exact regimen). Past Week at Unknown time Yes Reported, Patient   multivitamin, therapeutic with minerals (THERA-VIT-M) TABS Take 1 tablet by mouth daily Past Week at Unknown time Yes Reported, Patient   torsemide (DEMADEX) 20 MG tablet Take 20 mg by mouth daily  Past Week at Unknown time Yes Reported, Patient   atorvastatin (LIPITOR) 40 MG tablet Take 1 tablet (40 mg) by mouth every evening More than a month at Unknown time  Catarino Cherry MD   blood glucose (NO BRAND SPECIFIED) test strip Use to test blood sugar as directed   Reported, Patient   insulin glargine (LANTUS) 100 UNIT/ML PEN Inject  16-25  Units Subcutaneous every evening (Patient states he bases his dose off of blood glucose and how much Novolog he has used throughout the day but did not give exact regimen). 6/26/2020 at Unknown time  Reported, Patient   order for DME Equipment being ordered: Walker Wheels () and Walker ()  Treatment Diagnosis: Impaired gait   Catarino Kearns PA-C

## 2020-07-01 NOTE — PROGRESS NOTES
[facility]    Hospitalist Progress Note  Name: Burton Elizabeth    MRN: [id number]  Provider:  Gayathri Valenzuela PA-C  Date of Service: 07/01/2020    Assessment & Plan   Summary of Stay: Burton Elizabeth is a 75 year old male with a PMH significant for CAD, combined CHF, DM, HTN, HLP, CKD, chronic LBBB and hx of DVT now off anticoagulation who was a direct admission for further workup of vertigo.     #?Acute decompensated systolic and diastolic heart failure  #Acute respiratory failure with hypoxia: weight gain of 12 pounds over the last 4 days with missing a couple doses of his Torsemide due to vomiting and poor PO intake. He notes increased LE edema. No associated shortness of breath. Previous echocardiogram on 4/22/20 showed EF of 45-50% with moderate aortic stenosis. Patient reports feeling dehydrated but appears hypervolemic on exam with LE edema and mild crackles in lung bases with new hypoxia today requiring 1-2 liters supplemental oxygen to maintain oxygen saturations in the mid 90s. Baseline weight around 245 pounds and 257 on admission with minimal change in weight today.  - PRN supplemental oxygen, wean as tolerated  - Add on pro BNP level  - Obtain chest x-ray to assess for volume overload or signs of pneumonia due to recent vomiting, possible aspiration  - Received IV Lasix 40 mg this morning, hold off on additional Lasix for now   - Daily weights  - Strict I&Os.   - Monitor on telemetry    #Dizziness  #Double vision: presented with unsteady gait, dizziness, and blurred/double vision since 6/26 with associated nausea and vomiting that has resolved since 6/29. Initially diagnosed with vertigo by PCP via virtual visit where he was prescribed meclizine and likely an antiemetics since he reports it being a medication that resolved his nausea. Still having unsteady gait, impaired depth perception, and double vision today. No prior h/o CVA. Takes  mg daily. Unlikely correlated to above.   -  Neuro checks  - Monitor on telemetry   - Obtain MRI of brain to assess for CVA    #CAD: no current chest pain or shortness of breath. H/o cardiac workup prior to knee replacement in 08/2019 where patient underwent left heart cath showing 99% stenosis of the proximal circumflex with only moderate disease of other vascular territories. He has been medically managed for this. Troponin undetectable at [facility] room prior to admission.  - Monitor on telemetry      #DM2: most recent HgbA1c of 7.0 in 04/2020.   - continue PTA Glimepiride   - meal time insulin with 1 unit per 15 grams carbohydrate  - received Lantus 20 units last night, uses sliding scale of 16-25 units at home, increase to 22 units this evening  - medium sliding scale insulin with meals     #HTN: stable, continue PTA Amlodipine and Carvedilol    #CKD: baseline creatinine around 2.0 with creatinine of 2.11  - continue to follow BMP    #HLP: continue PTA Atorvastatin     #Asymptomatic COVID: testing completed on 6/29 is in process     DVT Prophylaxis: Ambulate every shift  Code Status: Full Code  Disposition: Expected discharge unclear, expect an additional 2-3 days pending furhter workup and improvement in symptoms, discussed with UR and will admit to inpatient      Interval History   Patient reports feeling unsteady when attempting to get up.  He states that he is not really feeling dizzy today but rather as of his depth perception is off, which he noticed when he had a hard time grabbing his breakfast sandwich this morning to eat.  He reports resolution of his nausea and vomiting.  Denies any chest pain, shortness of breath, lightheadedness, fever, chills, or recent cough.  The patient states that he usually does check his weight on a daily basis at his baseline weight is around 245 pounds.  He does feel that he has had increased lower extremity swelling that is still present today.    -Data reviewed today: I reviewed all new labs and imaging reports over  the last 24 hours.    Physical Exam   Temp: 98.7  F (37.1  C) Temp src: Oral BP: 132/84 Pulse: 68   Resp: 16 SpO2: 95 % O2 Device: Nasal cannula Oxygen Delivery: 1 LPM  Vitals:    06/30/20 1854 07/01/20 0623   Weight: 116.8 kg (257 lb 6.4 oz) 116.6 kg (257 lb)     Vital Signs with Ranges  Temp:  [95.9  F (35.5  C)-98.7  F (37.1  C)] 98.7  F (37.1  C)  Pulse:  [68-75] 68  Resp:  [16-20] 16  BP: (132-154)/(74-85) 132/84  SpO2:  [90 %-96 %] 95 %  I/O last 3 completed shifts:  In: -   Out: 500 [Urine:500]    GEN:  Alert, oriented x 3, appears comfortable, NAD.  HEENT:  Normocephalic/atraumatic, no scleral icterus, no nasal discharge, EOMIs, mouth moist.  CV:  Regular rate and rhythm, no murmur or JVD.  S1 + S2 noted, no S3 or S4.  LUNGS:  Clear to auscultation bilaterally with mild crackles in bilateral lung bases.  Symmetric chest rise on inhalation noted.  ABD:  Active bowel sounds, soft, non-tender/non-distended.  No rebound/guarding/rigidity.  EXT:  2+ bilateral LE edema.  No cyanosis.  No acute joint synovitis noted.  SKIN:  Dry to touch, no exanthems noted in the visualized areas.  Neuro: CN II-XII grossly intact, dysmetria present bilaterally with finger to nose, sensation and strength grossly intact, able to perform heel to shin bilaterally     Medications       amLODIPine  5 mg Oral Daily     aspirin  325 mg Oral Daily     atorvastatin  40 mg Oral QPM     carvedilol  12.5 mg Oral BID w/meals     glimepiride  4 mg Oral BID w/meals     insulin aspart  1-7 Units Subcutaneous TID AC     insulin aspart  1-5 Units Subcutaneous At Bedtime     insulin glargine  22 Units Subcutaneous QPM     sodium chloride (PF)  3 mL Intracatheter Q8H     Data   Results for orders placed or performed during the hospital encounter of 06/30/20   Glucose by meter     Status: Abnormal   Result Value Ref Range    Glucose 303 (H) 70 - 99 mg/dL   Basic metabolic panel     Status: Abnormal   Result Value Ref Range    Sodium 138 133 - 144  mmol/L    Potassium 4.6 3.4 - 5.3 mmol/L    Chloride 104 94 - 109 mmol/L    Carbon Dioxide 30 20 - 32 mmol/L    Anion Gap 4 3 - 14 mmol/L    Glucose 236 (H) 70 - 99 mg/dL    Urea Nitrogen 43 (H) 7 - 30 mg/dL    Creatinine 2.11 (H) 0.66 - 1.25 mg/dL    GFR Estimate 30 (L) >60 mL/min/[1.73_m2]    GFR Estimate If Black 35 (L) >60 mL/min/[1.73_m2]    Calcium 8.7 8.5 - 10.1 mg/dL   CBC with platelets differential     Status: Abnormal   Result Value Ref Range    WBC 8.0 4.0 - 11.0 10e9/L    RBC Count 4.74 4.4 - 5.9 10e12/L    Hemoglobin 12.5 (L) 13.3 - 17.7 g/dL    Hematocrit 43.5 40.0 - 53.0 %    MCV 92 78 - 100 fl    MCH 26.4 (L) 26.5 - 33.0 pg    MCHC 28.7 (L) 31.5 - 36.5 g/dL    RDW 16.7 (H) 10.0 - 15.0 %    Platelet Count 188 150 - 450 10e9/L    Diff Method Automated Method     % Neutrophils 66.3 %    % Lymphocytes 18.4 %    % Monocytes 10.6 %    % Eosinophils 3.3 %    % Basophils 0.8 %    % Immature Granulocytes 0.6 %    Nucleated RBCs 0 0 /100    Absolute Neutrophil 5.3 1.6 - 8.3 10e9/L    Absolute Lymphocytes 1.5 0.8 - 5.3 10e9/L    Absolute Monocytes 0.8 0.0 - 1.3 10e9/L    Absolute Eosinophils 0.3 0.0 - 0.7 10e9/L    Absolute Basophils 0.1 0.0 - 0.2 10e9/L    Abs Immature Granulocytes 0.1 0 - 0.4 10e9/L    Absolute Nucleated RBC 0.0    Glucose by meter     Status: Abnormal   Result Value Ref Range    Glucose 274 (H) 70 - 99 mg/dL   Glucose by meter     Status: Abnormal   Result Value Ref Range    Glucose 261 (H) 70 - 99 mg/dL     Gayathri Valenzuela PA-C

## 2020-07-02 ENCOUNTER — APPOINTMENT (OUTPATIENT)
Dept: PHYSICAL THERAPY | Facility: CLINIC | Age: 75
DRG: 064 | End: 2020-07-02
Attending: HOSPITALIST
Payer: COMMERCIAL

## 2020-07-02 ENCOUNTER — APPOINTMENT (OUTPATIENT)
Dept: CARDIOLOGY | Facility: CLINIC | Age: 75
DRG: 064 | End: 2020-07-02
Attending: PHYSICIAN ASSISTANT
Payer: COMMERCIAL

## 2020-07-02 LAB
ANION GAP SERPL CALCULATED.3IONS-SCNC: 2 MMOL/L (ref 3–14)
BUN SERPL-MCNC: 47 MG/DL (ref 7–30)
CALCIUM SERPL-MCNC: 8.9 MG/DL (ref 8.5–10.1)
CHLORIDE SERPL-SCNC: 104 MMOL/L (ref 94–109)
CO2 SERPL-SCNC: 31 MMOL/L (ref 20–32)
CREAT SERPL-MCNC: 2.12 MG/DL (ref 0.66–1.25)
GFR SERPL CREATININE-BSD FRML MDRD: 30 ML/MIN/{1.73_M2}
GLUCOSE BLDC GLUCOMTR-MCNC: 157 MG/DL (ref 70–99)
GLUCOSE BLDC GLUCOMTR-MCNC: 166 MG/DL (ref 70–99)
GLUCOSE BLDC GLUCOMTR-MCNC: 170 MG/DL (ref 70–99)
GLUCOSE BLDC GLUCOMTR-MCNC: 190 MG/DL (ref 70–99)
GLUCOSE SERPL-MCNC: 144 MG/DL (ref 70–99)
POTASSIUM SERPL-SCNC: 4.6 MMOL/L (ref 3.4–5.3)
SODIUM SERPL-SCNC: 137 MMOL/L (ref 133–144)

## 2020-07-02 PROCEDURE — 25500064 ZZH RX 255 OP 636: Performed by: HOSPITALIST

## 2020-07-02 PROCEDURE — 00000146 ZZHCL STATISTIC GLUCOSE BY METER IP

## 2020-07-02 PROCEDURE — 93325 DOPPLER ECHO COLOR FLOW MAPG: CPT | Mod: 26 | Performed by: INTERNAL MEDICINE

## 2020-07-02 PROCEDURE — 25800025 ZZH RX 258: Performed by: HOSPITALIST

## 2020-07-02 PROCEDURE — 93308 TTE F-UP OR LMTD: CPT | Mod: 26 | Performed by: INTERNAL MEDICINE

## 2020-07-02 PROCEDURE — 36415 COLL VENOUS BLD VENIPUNCTURE: CPT | Performed by: PHYSICIAN ASSISTANT

## 2020-07-02 PROCEDURE — G0426 INPT/ED TELECONSULT50: HCPCS | Mod: G0 | Performed by: PHYSICIAN ASSISTANT

## 2020-07-02 PROCEDURE — 97161 PT EVAL LOW COMPLEX 20 MIN: CPT | Mod: GP

## 2020-07-02 PROCEDURE — 80048 BASIC METABOLIC PNL TOTAL CA: CPT | Performed by: PHYSICIAN ASSISTANT

## 2020-07-02 PROCEDURE — 93321 DOPPLER ECHO F-UP/LMTD STD: CPT | Mod: 26 | Performed by: INTERNAL MEDICINE

## 2020-07-02 PROCEDURE — 99233 SBSQ HOSP IP/OBS HIGH 50: CPT | Performed by: HOSPITALIST

## 2020-07-02 PROCEDURE — 12000011 ZZH R&B MS OVERFLOW

## 2020-07-02 PROCEDURE — 25000132 ZZH RX MED GY IP 250 OP 250 PS 637: Performed by: PHYSICIAN ASSISTANT

## 2020-07-02 PROCEDURE — 97116 GAIT TRAINING THERAPY: CPT | Mod: GP

## 2020-07-02 PROCEDURE — 25000131 ZZH RX MED GY IP 250 OP 636 PS 637: Performed by: PHYSICIAN ASSISTANT

## 2020-07-02 PROCEDURE — 25000128 H RX IP 250 OP 636: Performed by: HOSPITALIST

## 2020-07-02 PROCEDURE — 93321 DOPPLER ECHO F-UP/LMTD STD: CPT

## 2020-07-02 PROCEDURE — 97530 THERAPEUTIC ACTIVITIES: CPT | Mod: GP

## 2020-07-02 RX ORDER — ASPIRIN 81 MG/1
81 TABLET ORAL DAILY
Status: DISCONTINUED | OUTPATIENT
Start: 2020-07-03 | End: 2020-07-06 | Stop reason: HOSPADM

## 2020-07-02 RX ORDER — ACYCLOVIR 200 MG/1
30 CAPSULE ORAL ONCE
Status: COMPLETED | OUTPATIENT
Start: 2020-07-02 | End: 2020-07-02

## 2020-07-02 RX ORDER — CLOPIDOGREL BISULFATE 75 MG/1
300 TABLET ORAL ONCE
Status: COMPLETED | OUTPATIENT
Start: 2020-07-02 | End: 2020-07-02

## 2020-07-02 RX ORDER — CLOPIDOGREL BISULFATE 75 MG/1
75 TABLET ORAL DAILY
Status: DISCONTINUED | OUTPATIENT
Start: 2020-07-03 | End: 2020-07-06 | Stop reason: HOSPADM

## 2020-07-02 RX ADMIN — CARVEDILOL 12.5 MG: 12.5 TABLET, FILM COATED ORAL at 08:30

## 2020-07-02 RX ADMIN — GLIMEPIRIDE 4 MG: 4 TABLET ORAL at 08:30

## 2020-07-02 RX ADMIN — ASPIRIN 325 MG: 325 TABLET, COATED ORAL at 08:30

## 2020-07-02 RX ADMIN — INSULIN ASPART 2 UNITS: 100 INJECTION, SOLUTION INTRAVENOUS; SUBCUTANEOUS at 14:58

## 2020-07-02 RX ADMIN — ENOXAPARIN SODIUM 40 MG: 40 INJECTION SUBCUTANEOUS at 16:14

## 2020-07-02 RX ADMIN — AMLODIPINE BESYLATE 5 MG: 5 TABLET ORAL at 08:30

## 2020-07-02 RX ADMIN — CARVEDILOL 12.5 MG: 12.5 TABLET, FILM COATED ORAL at 18:08

## 2020-07-02 RX ADMIN — GLIMEPIRIDE 4 MG: 4 TABLET ORAL at 18:08

## 2020-07-02 RX ADMIN — INSULIN ASPART 2 UNITS: 100 INJECTION, SOLUTION INTRAVENOUS; SUBCUTANEOUS at 14:57

## 2020-07-02 RX ADMIN — INSULIN ASPART 1 UNITS: 100 INJECTION, SOLUTION INTRAVENOUS; SUBCUTANEOUS at 08:35

## 2020-07-02 RX ADMIN — INSULIN ASPART 1 UNITS: 100 INJECTION, SOLUTION INTRAVENOUS; SUBCUTANEOUS at 18:11

## 2020-07-02 RX ADMIN — CLOPIDOGREL BISULFATE 300 MG: 75 TABLET ORAL at 13:29

## 2020-07-02 RX ADMIN — SODIUM CHLORIDE 30 ML: 9 INJECTION, SOLUTION INTRAMUSCULAR; INTRAVENOUS; SUBCUTANEOUS at 09:54

## 2020-07-02 RX ADMIN — ATORVASTATIN CALCIUM 40 MG: 40 TABLET, FILM COATED ORAL at 20:08

## 2020-07-02 RX ADMIN — HUMAN ALBUMIN MICROSPHERES AND PERFLUTREN 2 ML: 10; .22 INJECTION, SOLUTION INTRAVENOUS at 09:53

## 2020-07-02 RX ADMIN — ACETAMINOPHEN 650 MG: 325 TABLET, FILM COATED ORAL at 16:25

## 2020-07-02 RX ADMIN — INSULIN GLARGINE 22 UNITS: 100 INJECTION, SOLUTION SUBCUTANEOUS at 20:15

## 2020-07-02 NOTE — CONSULTS
SW/RN CC following for discharge planning. See RN CC 7/1 for full assessment. PT evaluated pt today and recommended TCU. SW paged ARU admissions. Tj from ARU clinically reviewed and feel as though pt is appropriate for ARU. Tj spoke with pt via phone and he is agreeable to ARU. They anticipate a bed available this weekend.     Pt has BCBS Medicare Advantage which will require prior authorization. Prior authorization initiated by [facility] today. Will need SLP or OT eval in addition to PT in order to obtain auth. MING will continue to follow.     Amanda Ruano, AARON   Inpatient Care Coordination  [facility]   [phone]

## 2020-07-02 NOTE — PROGRESS NOTES
No acute events overnight.  Pt neuro checks are intact, A&O. Pt expressing double vision still but improved. No nausea, denied having pain. Good PO intake. Strict I&Os. Regular diet, SL. Neuro consult today.

## 2020-07-02 NOTE — PLAN OF CARE
PT: Orders received, evaluation completed and treatment initiated. 75 year old male admitted and found to have infarct of L brainstem stroke. Pt reports living alone in a house. There are 2 stairs (no rail) to enter; full flight (one rail) to the basement with laundry. IND with mobility at baseline, able to walk community distances. Pt works still for a [employer].     Discharge Planner PT   Patient plan for discharge: Not sure.   Current status: Alert, oriented, tangential with thoughts. Reports double vision and inability to read well. Sit>supine with SBA, HOB elevated. Sit>stand with CGA, 3 reps performed, repeated cues needed for safe hand placement/transition. Sit>supine with modA for LEs. Min A needed to position in bed. Patient ambulates 15' with FWW and CGA. Pt demonstrates mod UE support on FWW, decreased gait speed and foot clearance bilat. PT cues for improved posture and decreased UE support. Pt reports bracing due to balance feeling off. Fatigue with this distance. Pt able to perform static standing task with eyes open, no UE support and moderate postural sway, sway increases with eyes closed - Aleta needed for upright safety.     Based on his current status with functional transfers and balance pt will need assist with LB dressing, toileting, and standing self cares. Pt also currently demonstrating vision deficits impacting reading and coordination with using his phone. These are known areas where pt will need assessment and treatment from occupational therapy.   Barriers to return to prior living situation: Impaired balance, lives alone, stairs, fall risk   Recommendations for discharge: ARU  Rationale for recommendations: Pt significantly below baseline. Recommend intensive therapies at ARU. Pt would be able to tolerate 3 hours/day.       Entered by: Samuel Murdock 07/02/2020 2:39 PM

## 2020-07-02 NOTE — PROGRESS NOTES
[facility]    Medicine Progress Note - Hospitalist Service       Date of Admission:  6/30/2020  Assessment & Plan   Burton Elizabeth is a 75 year old male with a PMH significant for CAD (positive stress testing, medically managed), combined CHF (EF 50-55%), DM, HTN, HLP, CKD, chronic LBBB and hx of DVT now off anticoagulation who was a direct admission for further workup of vertigo.     Dizziness/diplopia due to acute CVA    - MRI yesterday showed recent ischemic infarct at the left lateral aspect of the brainstem  at the pontomedullary junction    - started on Plavix (300mg x a then 75 mg daily x 21 days), ASA 81 mg x 21 days, then 325mg    - ECHO with bubble did not show shunt    - will need to discharge wit hevent monitor    - MRA (brain/neck) is pending (MRI is backed-up)    - will need PT eval as patient is still unstable with ambulation: will likely need TCU vs ARU    - social work informed    CAD    - has a history or positive stress test and angio in 2018 which showed small vessel disease, no intervention    - cont ASA, PTA coreg    CHF     - currently euvolemic    - home torsemide held    - can resume on discharge    HTN    - area of infarct appears sub-acute/patient has had symptoms since 6/26: will cont PTA meds    - cont PTA coreg, amlodipine    HLP    - already on lipitor    CKD    - at his baseline    DM    - on PTA lantus, glimepiride    - BS stable    Son in room and updated, questions answered.       Diet: Regular Diet Adult    DVT Prophylaxis: Enoxaparin (Lovenox) SQ  Zuniga Catheter: not present  Code Status: Full Code           Disposition Plan   Expected discharge: Tomorrow, recommended to unclear, possibly TCU vs ARU once work-up complete/seen by PT.  Entered: Gibson Urias MD 07/02/2020, 12:28 PM       The patient's care was discussed with the Bedside Nurse, Patient, Patient's Family and Neurology.    Gibson Urias MD  Hospitalist Service  [facility]  [facility]    ______________________________________________________________________    Interval History   Patient in bed. Son in room. Still complaints of double vision. No chest pain, sob, abdo pain, n/v/d. Eating. Has been up to the bathroom, but feels pretty unsteady.    Data reviewed today: I reviewed all medications, new labs and imaging results over the last 24 hours. I personally reviewed the brain MRI image(s) showing recent CVA.    Physical Exam   Vital Signs: Temp: 99.6  F (37.6  C) Temp src: Oral BP: (!) 156/82 Pulse: 79   Resp: 20 SpO2: 91 % O2 Device: Nasal cannula Oxygen Delivery: 1 LPM  Weight: 257 lbs 0 oz  Constitutional: awake, alert, cooperative, no apparent distress, and appears stated age  Eyes: Lids and lashes normal, pupils equal, round and reactive to light, extra ocular muscles intact, sclera clear, conjunctiva normal  ENT: Normocephalic, without obvious abnormality, atraumatic, sinuses nontender on palpation, external ears without lesions, oral pharynx with moist mucous membranes, tonsils without erythema or exudates, gums normal and good dentition.  Respiratory: no wheeze. Crackles in base that partially clear with cough  Cardiovascular: Normal apical impulse, regular rate and rhythm, normal S1 and S2, no S3 or S4, and no murmur noted  GI: No scars, normal bowel sounds, soft, non-distended, non-tender, no masses palpated, no hepatosplenomegally  Skin: no bruising or bleeding    Data   Recent Labs   Lab 07/02/20  0610 07/01/20  0548   WBC  --  8.0   HGB  --  12.5*   MCV  --  92   PLT  --  188    138   POTASSIUM 4.6 4.6   CHLORIDE 104 104   CO2 31 30   BUN 47* 43*   CR 2.12* 2.11*   ANIONGAP 2* 4   GABE 8.9 8.7   * 236*     Recent Results (from the past 24 hour(s))   MR Brain w/o Contrast    Narrative    MRI OF THE BRAIN WITHOUT CONTRAST 7/1/2020 5:27 PM     COMPARISON: None.    HISTORY:  Focal neuro deficit, > 6 hrs, stroke suspected. Dizziness,  double vision, unsteady gait.      TECHNIQUE:   Brain: Axial diffusion-weighted with ADC map, T2-weighted with fat  saturation, T1-weighted and turboFLAIR and sagittal T1-weighted images  of the brain were obtained without intravenous contrast.     FINDINGS: There is a recent small ischemic infarct at the left lateral  aspect of the brainstem at the pontomedullary junction. No other  recent ischemic infarcts noted.    There is moderate diffuse cerebral volume loss. There are numerous  scattered focal and minimal patchy periventricular areas of abnormal  T2 signal hyperintensity in the cerebral white matter bilaterally that  are consistent with sequela of chronic small vessel ischemic disease.  The ventricles and basal cisterns are within normal limits in  configuration given the degree of cerebral volume loss.  There is no  midline shift.  There are no extra-axial fluid collections.  There is  no evidence for acute intracranial hemorrhage.    There is no sinusitis or mastoiditis.      Impression    IMPRESSION:    1. Recent ischemic infarct at the left lateral aspect of the brainstem  at the pontomedullary junction. No other recent infarcts.  2. Diffuse cerebral volume loss and cerebral white matter changes  consistent with chronic small vessel ischemic disease.    KAYDEN MCGEE MD   XR Chest 2 Views    Narrative    EXAM: XR CHEST 2 VW  LOCATION: [facility]  DATE/TIME: 7/1/2020 5:42 PM    INDICATION: Hypoxia, recent vomiting, possible heart failure exacerbation.  COMPARISON: 04/22/2020      Impression    IMPRESSION: Low lung volumes. Cardiomegaly. Pulmonary vascularity normal. Blunting of the left costophrenic angle may represent some fluid or thickened pleura. Minimal atelectasis in the bases. Upper lung fields are clear.   Echocardiogram Limited    Narrative    [id number]  [id number]  [id number]  [id number]^TIGRE^BISHOP           [facility]  Echocardiography Laboratory  [address] [place] [address]        Name:  TAY PAGE  MRN: [id number]  : 1945  Study Date: 2020 09:18 AM  Age: 75 yrs  Gender: Male  Patient Location: [facility]  Reason For Study: CVA  Ordering Physician: BISHOP LANDEROS  Referring Physician: Td Qureshi  Performed By: Pina Vidal RDCS     BSA: 2.3 m2  Height: 69 in  Weight: 257 lb  HR: 84  BP: 152/76 mmHg  _____________________________________________________________________________  __        Procedure  Limited Portable Bubble Echo Adult. Optison (NDC #4652-6354) given  intravenously.  _____________________________________________________________________________  __        Interpretation Summary     Left ventricular systolic function is low normal.  The visual ejection fraction is estimated at 50-55%.  Mid and distal anteroseptal hypokinesis.  Moderate valvular aortic stenosis.  The peak AoV pressure gradient is 56mmHg.  Detailed SID calculations wre not performed. AS unchanged compared to prior  study from . The study was technically limited.  _____________________________________________________________________________  __        Left Ventricle  The left ventricle is normal in size. A sigmoid septum is present. Left  ventricular systolic function is low normal. The visual ejection fraction is  estimated at 50-55%. Mid and distal anteroseptal hypokinesis.     Right Ventricle  The right ventricle is normal size. The right ventricular systolic function is  normal.     Atria  There is no color Doppler evidence of an atrial shunt. A contrast injection  (Bubble Study) was performed that was negative for flow across the interatrial  septum. A Valsalva maneuver was performed.     Mitral Valve  There is mild mitral annular calcification. The mitral valve leaflets are  mildly thickened. There is trace mitral regurgitation.        Aortic Valve  The aortic valve is trileaflet. Moderate valvular aortic stenosis. The peak  AoV pressure gradient is 56mmHg.     Pulmonic Valve  The pulmonic valve  is not well visualized.     Vessels  The aortic root is normal size.     Pericardium  There is no pericardial effusion.     Rhythm  Sinus rhythm was noted.     _____________________________________________________________________________  __        Doppler Measurements & Calculations  Ao V2 max: 375.6 cm/sec  Ao max P.0 mmHg              _____________________________________________________________________________  __           Report approved by: Dominique Paul 2020 10:47 AM

## 2020-07-02 NOTE — CONSULTS
"  [facility]    Stroke Consult Note    Reason for Consult:  \"Focal neuro deficit, > 6 hours. Stoke suspected\"     Chief Complaint: No chief complaint on file.       HPI  Burton Elizabeth is a 75 year old male with a past medical history of CAD, CHF, diabetes type 2, hypertension, hyperlipidemia, history of DVT (no longer anticoagulated) was a direct admit by the request of Dr. Loya at [facility] for further work-up and management of vertigo. Patient reports symptoms starting last Friday after eating. He reports developing nausea and vomiting. Patient states that since Friday he has become dizzy, weak, fatigued, and has noted some blurred vision.  Patient endorses increased lower extremity edema as well as 12 pounds weight gain since Friday. Patient reports having a virtual visit with his primary care provider where he was diagnosed with vertigo and started on meclizine. Patient states symptoms did not improve after taking the meclizine.  Patient was sent to ER for further evaluation. Ultrasound the lower extremities was negative for DVT. Today during our exam patient reported continued blurred vision. Patient states he feels unsteady and weak in his lower extremities stating he is unable to ambulate independently. Patient also reports change in depth perception, noting that when he attempted to eat his breakfast today he was a little short  trying to grab some food off of his plate. Patient denies any focal weakness, numbness, or tingling in any of his extemities.       Stroke Evaluation summarized:  MRI/Head CT: Recent ischemic infarct at the left lateral aspect of the brainstem  at the pontomedullary junction. No other recent infarcts.  Intracranial Vascular Imaging: MRA ordered, awaiting results   Cervical Carotid and Vertebral Artery Vascular Imaging: MRA ordered, awaiting results  Echocardiogram: EF 50-55%, negative bubble study   EKG/Telemetry: Sinus rhythm with chronic left " "LBBB  LDL: 102  A1c: 7.0  Troponin: <0.019       Impression  Ischemic Stroke due to small-vessel occlusion  vs less likely cardioembolic     Recommendations  - Load with Plavix 300 mg today, then DAPT with ASA 81 mg + Plavix 75 mg for 21 days, then  mg alone indefinitely   - LDL (102) with goal LDL between 40-70 ; pt reports restarting Lipitor 40 mg recently. Advised to continue taking medication daily. Recheck LDL in outpatient setting.   - A1c (7.0) within goal <7.0 , advised to continue DM management with PCP to keep A1c within goal range    - Goal BP <130/85 with tighter control associated with decreased overall CV risk, if tolerated  - Due to location of the infarct, unlikely due to cardioembolic etiology but cannot rule out for certain, so will discharge patient with 30 day cardiac event monitor to evaluate for atrial fibrillation.  - PLC   - Therapies as needed   - MRA ordered, will follow results.         Patient Follow-up    - in the next 1-2 week(s) with PCP  - in 6 weeks at the [facility]  [phone]          Thank you for this consult. will follow up on MRA results, Please contact us with any addtional questions.       Karyn Torres PA-C  Neurology   7/2/2020 at 1:05 PM  To page me or covering stroke neurology team member, click here: AMCOM  Choose \"On Call\" tab at top, then search dropdown box for \"Neurology Adult\" & press Enter, look for Neuro ICU/Stroke  _____________________________________________________    Past Medical History   Past Medical History:   Diagnosis Date     Arthritis of knee~ s/p replacement 3/24/2016     CAD (coronary artery disease)      Decreased cardiac ejection fraction~44% 3/24/2016     Diabetes mellitus, type 2 (H) 3/24/2016     History of DVT (deep vein thrombosis)      HTN (hypertension) 3/24/2016     LBBB (left bundle branch block) 3/24/2016     Mixed hyperlipidemia 8/23/2016     Past Surgical History   Past Surgical History:   Procedure " Laterality Date     ARTHROPLASTY KNEE Right 3/29/2016    Procedure: ARTHROPLASTY KNEE;  Surgeon: Heena Rosen MD;  Location: [facility] OR     ARTHROPLASTY REVISION KNEE Left 9/27/2019    Procedure: REVISION LEFT TOTAL KNEE  ARTHROPLASTY;  Surgeon: Heena Rosen MD;  Location: [facility] OR     CV LEFT HEART CATH N/A 8/12/2019    Procedure: Left Heart Cath;  Surgeon: Edgardo Beckham MD;  Location:  HEART CARDIAC CATH LAB     EYE SURGERY      cataract removal     ORTHOPEDIC SURGERY      KNEE SCOPES MULTIPLE     Medications   Home Meds  Prior to Admission medications    Medication Sig Start Date End Date Taking? Authorizing Provider   amLODIPine (NORVASC) 5 MG tablet Take 5 mg by mouth daily   Yes Unknown, Entered By History   aspirin (ASA) 325 MG EC tablet Take 325 mg by mouth daily   Yes Unknown, Entered By History   carvedilol (COREG) 25 MG tablet Take 12.5 mg by mouth 2 times daily (with meals) (Takes 0.5 x 25mg tablet = 12.5mg dose)   Yes Reported, Patient   glimepiride (AMARYL) 4 MG tablet Take 4 mg by mouth 2 times daily    Yes Reported, Patient   insulin aspart (NOVOLOG FLEXPEN) 100 UNIT/ML pen Inject 2-6 Units Subcutaneous 3 times daily (with meals) (Patient states he bases his dose off of carb counting and blood glucose but did not give exact regimen).   Yes Reported, Patient   multivitamin, therapeutic with minerals (THERA-VIT-M) TABS Take 1 tablet by mouth daily   Yes Reported, Patient   torsemide (DEMADEX) 20 MG tablet Take 20 mg by mouth daily    Yes Reported, Patient   atorvastatin (LIPITOR) 40 MG tablet Take 1 tablet (40 mg) by mouth every evening 4/24/20   Catarino Cherry MD   blood glucose (NO BRAND SPECIFIED) test strip Use to test blood sugar as directed    Reported, Patient   insulin glargine (LANTUS) 100 UNIT/ML PEN Inject 16-25 Units Subcutaneous every evening (Patient states he bases his dose off of blood glucose and how much Novolog he has used throughout the day but did not give  exact regimen).    Reported, Patient   order for DME Equipment being ordered: Walker Wheels () and Walker ()  Treatment Diagnosis: Impaired gait 4/1/16   Catarino Kearns PA-C       Scheduled Meds    amLODIPine  5 mg Oral Daily     aspirin  325 mg Oral Daily     atorvastatin  40 mg Oral QPM     carvedilol  12.5 mg Oral BID w/meals     glimepiride  4 mg Oral BID w/meals     insulin aspart  1-7 Units Subcutaneous TID AC     insulin aspart  1-5 Units Subcutaneous At Bedtime     insulin aspart   Subcutaneous QAM AC     insulin aspart   Subcutaneous Daily with lunch     insulin aspart   Subcutaneous Daily with supper     insulin glargine  22 Units Subcutaneous QPM     sodium chloride (PF)  3 mL Intracatheter Q8H       Infusion Meds      PRN Meds  acetaminophen, glucose **OR** dextrose **OR** glucagon, lidocaine 4%, lidocaine (buffered or not buffered), melatonin, naloxone, ondansetron **OR** ondansetron, polyethylene glycol, senna-docusate **OR** senna-docusate, sodium chloride (PF)    Allergies   Allergies   Allergen Reactions     Penicillins Anaphylaxis     Family History   Family History   Problem Relation Age of Onset     Coronary Artery Disease No family hx of      Social History   Social History     Tobacco Use     Smoking status: Never Smoker     Smokeless tobacco: Never Used   Substance Use Topics     Alcohol use: Yes     Alcohol/week: 2.0 standard drinks     Types: 2 Standard drinks or equivalent per week     Frequency: 2-3 times a week     Drinks per session: 1 or 2     Comment: occasional, social     Drug use: No       Review of Systems   The 10 point Review of Systems is negative other than noted in the HPI or here.        PHYSICAL EXAMINATION   Temp:  [98  F (36.7  C)-99.1  F (37.3  C)] 99.1  F (37.3  C)  Pulse:  [68-77] 72  Resp:  [16-18] 16  BP: (132-160)/(79-85) 160/85  SpO2:  [92 %-96 %] 92 %    Neurologic  Mental Status:  alert, oriented x 3, follows commands, speech clear and fluent,  naming and repetition normal  Cranial Nerves:  visual fields intact (tested by nurse), EOMI with normal smooth pursuit, facial movements symmetric, hearing not formally tested but intact to conversation, no dysarthria, tongue protrusion midline.   Motor:  no abnormal movements, able to move all limbs antigravity spontaneously with no signs of hemiparesis observed, no pronator drift  Reflexes:  unable to test (telestroke)  Sensory:  light touch sensation intact and symmetric throughout upper and lower extremities (assessed by nurse), no extinction on double simultaneous stimulation (assessed by nurse)  Coordination:  normal finger-to-nose bilaterally without dysmetria, rapid alternating movements symmetric  Station/Gait:  unable to test due to telestroke    Dysphagia Screen  Per Nursing    Stroke Scales    NIHSS  Interval     Interval Comments     1a. Level of Consciousness 0-->Alert, keenly responsive   1b. LOC Questions 0-->Answers both questions correctly   1c. LOC Commands 0-->Performs both tasks correctly   2.   Best Gaze 0-->Normal   3.   Visual 0-->No visual loss   4.   Facial Palsy 0-->Normal symmetrical movements   5a. Motor Arm, Left 0-->No drift, limb holds 90 (or 45) degrees for full 10 secs   5b. Motor Arm, Right 0-->No drift, limb holds 90 (or 45) degrees for full 10 secs   6a. Motor Leg, Left 0-->No drift, leg holds 30 degree position for full 5 secs   6b. Motor Leg, right 0-->No drift, leg holds 30 degree position for full 5 secs   7.   Limb Ataxia 0-->Absent   8.   Sensory 0-->Normal, no sensory loss   9.   Best Language 0-->No aphasia, normal   10. Dysarthria 0-->Normal   11. Extinction and Inattention  0-->No abnormality   Total 0 (07/02/20 1210)       Imaging  I personally reviewed all imaging; relevant findings per HPI.    Labs Data   CBC  Recent Labs   Lab 07/01/20  0548   WBC 8.0   RBC 4.74   HGB 12.5*   HCT 43.5        Basic Metabolic Panel   Recent Labs   Lab 07/02/20  0610  07/01/20  0548    138   POTASSIUM 4.6 4.6   CHLORIDE 104 104   CO2 31 30   BUN 47* 43*   CR 2.12* 2.11*   * 236*   GABE 8.9 8.7     Liver Panel  No results for input(s): PROTTOTAL, ALBUMIN, BILITOTAL, ALKPHOS, AST, ALT, BILIDIRECT in the last 168 hours.  INR    Recent Labs   Lab Test 04/22/20  0917 12/25/19  0741 12/21/19  0801   INR 1.12 1.41* 1.94*      Lipid Profile    Recent Labs   Lab Test 04/23/20  0549 05/10/19   CHOL 188 120   HDL 31* 35*   * 61   TRIG 277* 118     A1C    Recent Labs   Lab Test 04/22/20  1812 09/27/19  0902 03/30/16  0618   A1C 7.0* 6.5* 9.3*     Troponin I  No results for input(s): TROPI in the last 168 hours.       Stroke Code / Stroke Consult Data Data Telestroke Service Details  (for non-emergent stroke consult with tele)  Video start time 07/02/20   1200   Video end time 07/02/20   1257   Type of service telemedicine diagnostic assessment of acute neurological changes   Reason telemedicine is appropriate patient requires assessment with a specialist for diagnosis and treatment of neurological symptoms   Mode of transmission secure interactive audio and video communication per [employer]   Originating site (patient location) [facility]    Distant site (provider location) Provider remote site

## 2020-07-02 NOTE — PROGRESS NOTES
07/02/20 1400   Quick Adds   Type of Visit Initial PT Evaluation   Living Environment   Lives With alone   Living Arrangements house   Home Accessibility stairs to enter home;stairs within home   Transportation Anticipated car, drives self   Living Environment Comment Pt reports living alone in a house. There are 2 stairs (no rail) to enter; full flight (one rail) to the basement with laundry.    Self-Care   Usual Activity Tolerance good   Current Activity Tolerance moderate   Equipment Currently Used at Home cane, straight;walker, standard  (PUW)   Activity/Exercise/Self-Care Comment Pt reports having a cane or a walker at home.    Functional Level Prior   Ambulation 0-->independent   Transferring 0-->independent   Fall history within last six months yes   Number of times patient has fallen within last six months 1  (Just prior to admission. )   Prior Functional Level Comment Pt reports independence with mobility at baseline.    General Information   Onset of Illness/Injury or Date of Surgery - Date 06/30/20   Referring Physician Adonay MANN   Patient/Family Goals Statement return home; to get stronger   Pertinent History of Current Problem (include personal factors and/or comorbidities that impact the POC) 75 year old male admitted and found to have infarct of L brainstem stroke.    Precautions/Limitations fall precautions   Cognitive Status Examination   Orientation orientation to person, place and time   Level of Consciousness alert   Follows Commands and Answers Questions able to follow single-step instructions   Cognitive Comment Tangential with thoughts.    Pain Assessment   Patient Currently in Pain No   Range of Motion (ROM)   ROM Comment UE/LE AROM WFLs - no deficits noted with functional mobility   Strength   Strength Comments Bilat LE strength WFLs - pt globally weak.    Bed Mobility   Bed Mobility Comments Supine>sit with SBA   Transfer Skills   Transfer Comments Sit>stand with CGA   Gait   Gait  "Comments Ambulates with FWW and CGA   Balance   Balance Comments Pt able to perform static standing task with eyes open, no UE support and moderate postural sway, sway increases with eyes closed - Aleta needed for upright safety.    Coordination   Coordination other (see comments)   Coordination Comments Impaired due to vision deficits   General Therapy Interventions   Planned Therapy Interventions balance training;gait training;strengthening;transfer training;home program guidelines;progressive activity/exercise   Clinical Impression   Criteria for Skilled Therapeutic Intervention yes, treatment indicated   PT Diagnosis Impaired gait   Influenced by the following impairments Impaired balance, decreased activity tolerance, impaired vision, strength deficits   Functional limitations due to impairments Decreased IND with bed mobility, transfers, ambulation and stairs   Clinical Presentation Stable/Uncomplicated   Clinical Presentation Rationale Stable.    Clinical Decision Making (Complexity) Low complexity   Therapy Frequency Daily   Predicted Duration of Therapy Intervention (days/wks) 3 days   Anticipated Discharge Disposition Acute Rehabilitation Facility   Risk & Benefits of therapy have been explained Yes   Patient, Family & other staff in agreement with plan of care Yes   [facility] [employer] TM \"6 Clicks\"   2016, Trustees of [facility], under license to [employer].  All rights reserved.   6 Clicks Short Forms Basic Mobility Inpatient Short Form   [facility] AM-PAC  \"6 Clicks\" V.2 Basic Mobility Inpatient Short Form   1. Turning from your back to your side while in a flat bed without using bedrails? 4 - None   2. Moving from lying on your back to sitting on the side of a flat bed without using bedrails? 3 - A Little   3. Moving to and from a bed to a chair (including a wheelchair)? 3 - A Little   4. Standing up from a chair using your arms (e.g., wheelchair, or bedside chair)? 3 - A Little "   5. To walk in hospital room? 3 - A Little   6. Climbing 3-5 steps with a railing? 2 - A Lot   Basic Mobility Raw Score (Score out of 24.Lower scores equate to lower levels of function) 18   Total Evaluation Time   Total Evaluation Time (Minutes) 10

## 2020-07-02 NOTE — PROGRESS NOTES
Cross Cover:  MRI resulted showing recent small ischemic infarct, could be cause of his unsteady gait, vision changes, impaired depth perception.  Result discussed with patient who feels that he is having continued improvement in presenting symptoms. He is on daily ASA which will be continued and is already on telemetry. We will consult stroke neurology, continue neuro checks and telemetry.

## 2020-07-02 NOTE — PLAN OF CARE
"BP (!) 170/84 (BP Location: Right arm)   Pulse 83   Temp 100  F (37.8  C) (Oral)   Resp 19   Ht 1.753 m (5' 9\")   Wt 116.6 kg (257 lb)   SpO2 90%   BMI 37.95 kg/m      Neuro: MRI showed small infarct. Neuros intact. Continues to voice issues with double vision and imbalance.   Cardiac: Tele on.  SR HR 82.   Lungs: WNL. Encouraged to use IS.   GI: WNL   : WNL   Pain: Managed with PO tylenol.   IV: SL   Meds: Tolerating PO meds.   Labs/tests: Awaiting MRA. BS checks.   Diet: Mod carb.   Activity: Up Assist of 1.   Misc: SW following Telestroke following.     Plan: Awaiting ARU bed. Possible Sunday.      Will continue to monitor and provide cares.    "

## 2020-07-03 ENCOUNTER — APPOINTMENT (OUTPATIENT)
Dept: OCCUPATIONAL THERAPY | Facility: CLINIC | Age: 75
DRG: 064 | End: 2020-07-03
Attending: HOSPITALIST
Payer: COMMERCIAL

## 2020-07-03 ENCOUNTER — APPOINTMENT (OUTPATIENT)
Dept: MRI IMAGING | Facility: CLINIC | Age: 75
DRG: 064 | End: 2020-07-03
Attending: PHYSICIAN ASSISTANT
Payer: COMMERCIAL

## 2020-07-03 LAB
ANION GAP SERPL CALCULATED.3IONS-SCNC: 3 MMOL/L (ref 3–14)
BASOPHILS # BLD AUTO: 0 10E9/L (ref 0–0.2)
BASOPHILS NFR BLD AUTO: 0.4 %
BUN SERPL-MCNC: 47 MG/DL (ref 7–30)
CALCIUM SERPL-MCNC: 8.9 MG/DL (ref 8.5–10.1)
CHLORIDE SERPL-SCNC: 104 MMOL/L (ref 94–109)
CO2 SERPL-SCNC: 30 MMOL/L (ref 20–32)
CREAT SERPL-MCNC: 1.98 MG/DL (ref 0.66–1.25)
DIFFERENTIAL METHOD BLD: ABNORMAL
EOSINOPHIL # BLD AUTO: 0.2 10E9/L (ref 0–0.7)
EOSINOPHIL NFR BLD AUTO: 1.8 %
ERYTHROCYTE [DISTWIDTH] IN BLOOD BY AUTOMATED COUNT: 16.7 % (ref 10–15)
GFR SERPL CREATININE-BSD FRML MDRD: 32 ML/MIN/{1.73_M2}
GLUCOSE BLDC GLUCOMTR-MCNC: 118 MG/DL (ref 70–99)
GLUCOSE BLDC GLUCOMTR-MCNC: 170 MG/DL (ref 70–99)
GLUCOSE BLDC GLUCOMTR-MCNC: 211 MG/DL (ref 70–99)
GLUCOSE BLDC GLUCOMTR-MCNC: 229 MG/DL (ref 70–99)
GLUCOSE SERPL-MCNC: 140 MG/DL (ref 70–99)
HCT VFR BLD AUTO: 43.9 % (ref 40–53)
HGB BLD-MCNC: 12.8 G/DL (ref 13.3–17.7)
IMM GRANULOCYTES # BLD: 0 10E9/L (ref 0–0.4)
IMM GRANULOCYTES NFR BLD: 0.4 %
LYMPHOCYTES # BLD AUTO: 1.2 10E9/L (ref 0.8–5.3)
LYMPHOCYTES NFR BLD AUTO: 11.8 %
MAGNESIUM SERPL-MCNC: 2.1 MG/DL (ref 1.6–2.3)
MCH RBC QN AUTO: 26.4 PG (ref 26.5–33)
MCHC RBC AUTO-ENTMCNC: 29.2 G/DL (ref 31.5–36.5)
MCV RBC AUTO: 91 FL (ref 78–100)
MONOCYTES # BLD AUTO: 1.2 10E9/L (ref 0–1.3)
MONOCYTES NFR BLD AUTO: 12.3 %
NEUTROPHILS # BLD AUTO: 7.3 10E9/L (ref 1.6–8.3)
NEUTROPHILS NFR BLD AUTO: 73.3 %
NRBC # BLD AUTO: 0 10*3/UL
NRBC BLD AUTO-RTO: 0 /100
PLATELET # BLD AUTO: 168 10E9/L (ref 150–450)
PLATELET # BLD AUTO: 178 10E9/L (ref 150–450)
POTASSIUM SERPL-SCNC: 4.3 MMOL/L (ref 3.4–5.3)
RBC # BLD AUTO: 4.84 10E12/L (ref 4.4–5.9)
SODIUM SERPL-SCNC: 137 MMOL/L (ref 133–144)
WBC # BLD AUTO: 9.9 10E9/L (ref 4–11)

## 2020-07-03 PROCEDURE — 99222 1ST HOSP IP/OBS MODERATE 55: CPT | Performed by: INTERNAL MEDICINE

## 2020-07-03 PROCEDURE — 97166 OT EVAL MOD COMPLEX 45 MIN: CPT | Mod: GO

## 2020-07-03 PROCEDURE — 36415 COLL VENOUS BLD VENIPUNCTURE: CPT | Performed by: INTERNAL MEDICINE

## 2020-07-03 PROCEDURE — 12000011 ZZH R&B MS OVERFLOW

## 2020-07-03 PROCEDURE — 25000132 ZZH RX MED GY IP 250 OP 250 PS 637: Performed by: PHYSICIAN ASSISTANT

## 2020-07-03 PROCEDURE — 97535 SELF CARE MNGMENT TRAINING: CPT | Mod: GO

## 2020-07-03 PROCEDURE — 25000131 ZZH RX MED GY IP 250 OP 636 PS 637: Performed by: PHYSICIAN ASSISTANT

## 2020-07-03 PROCEDURE — 70547 MR ANGIOGRAPHY NECK W/O DYE: CPT

## 2020-07-03 PROCEDURE — 85049 AUTOMATED PLATELET COUNT: CPT | Performed by: INTERNAL MEDICINE

## 2020-07-03 PROCEDURE — 80048 BASIC METABOLIC PNL TOTAL CA: CPT | Performed by: INTERNAL MEDICINE

## 2020-07-03 PROCEDURE — 85025 COMPLETE CBC W/AUTO DIFF WBC: CPT | Performed by: INTERNAL MEDICINE

## 2020-07-03 PROCEDURE — G0408 INPT/TELE FOLLOW UP 35: HCPCS | Mod: G0 | Performed by: PHYSICIAN ASSISTANT

## 2020-07-03 PROCEDURE — 70544 MR ANGIOGRAPHY HEAD W/O DYE: CPT

## 2020-07-03 PROCEDURE — 00000146 ZZHCL STATISTIC GLUCOSE BY METER IP

## 2020-07-03 PROCEDURE — 25000128 H RX IP 250 OP 636: Performed by: INTERNAL MEDICINE

## 2020-07-03 PROCEDURE — 83735 ASSAY OF MAGNESIUM: CPT | Performed by: INTERNAL MEDICINE

## 2020-07-03 PROCEDURE — 99233 SBSQ HOSP IP/OBS HIGH 50: CPT | Performed by: INTERNAL MEDICINE

## 2020-07-03 RX ORDER — FUROSEMIDE 10 MG/ML
60 INJECTION INTRAMUSCULAR; INTRAVENOUS EVERY 8 HOURS
Status: COMPLETED | OUTPATIENT
Start: 2020-07-03 | End: 2020-07-04

## 2020-07-03 RX ORDER — HEPARIN SODIUM 5000 [USP'U]/.5ML
5000 INJECTION, SOLUTION INTRAVENOUS; SUBCUTANEOUS EVERY 12 HOURS
Status: DISCONTINUED | OUTPATIENT
Start: 2020-07-03 | End: 2020-07-06 | Stop reason: HOSPADM

## 2020-07-03 RX ADMIN — AMLODIPINE BESYLATE 5 MG: 5 TABLET ORAL at 08:22

## 2020-07-03 RX ADMIN — FUROSEMIDE 60 MG: 10 INJECTION, SOLUTION INTRAMUSCULAR; INTRAVENOUS at 16:33

## 2020-07-03 RX ADMIN — CARVEDILOL 12.5 MG: 12.5 TABLET, FILM COATED ORAL at 08:22

## 2020-07-03 RX ADMIN — INSULIN ASPART 2 UNITS: 100 INJECTION, SOLUTION INTRAVENOUS; SUBCUTANEOUS at 12:28

## 2020-07-03 RX ADMIN — GLIMEPIRIDE 4 MG: 4 TABLET ORAL at 08:22

## 2020-07-03 RX ADMIN — GLIMEPIRIDE 4 MG: 4 TABLET ORAL at 17:32

## 2020-07-03 RX ADMIN — HEPARIN SODIUM 5000 UNITS: 10000 INJECTION, SOLUTION INTRAVENOUS; SUBCUTANEOUS at 14:45

## 2020-07-03 RX ADMIN — ATORVASTATIN CALCIUM 40 MG: 40 TABLET, FILM COATED ORAL at 21:00

## 2020-07-03 RX ADMIN — CLOPIDOGREL BISULFATE 75 MG: 75 TABLET ORAL at 08:22

## 2020-07-03 RX ADMIN — INSULIN ASPART 1 UNITS: 100 INJECTION, SOLUTION INTRAVENOUS; SUBCUTANEOUS at 17:29

## 2020-07-03 RX ADMIN — CARVEDILOL 12.5 MG: 12.5 TABLET, FILM COATED ORAL at 17:31

## 2020-07-03 RX ADMIN — ASPIRIN 81 MG: 81 TABLET, COATED ORAL at 08:22

## 2020-07-03 RX ADMIN — INSULIN GLARGINE 22 UNITS: 100 INJECTION, SOLUTION SUBCUTANEOUS at 21:00

## 2020-07-03 RX ADMIN — FUROSEMIDE 60 MG: 10 INJECTION, SOLUTION INTRAMUSCULAR; INTRAVENOUS at 09:54

## 2020-07-03 ASSESSMENT — MIFFLIN-ST. JEOR: SCORE: 1914.27

## 2020-07-03 ASSESSMENT — ACTIVITIES OF DAILY LIVING (ADL): PREVIOUS_RESPONSIBILITIES: MEAL PREP;HOUSEKEEPING;LAUNDRY;SHOPPING;MEDICATION MANAGEMENT;FINANCES;DRIVING

## 2020-07-03 NOTE — INTERIM SUMMARY
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 Imaging revealed recent ischemic infarct at the left lateral aspect of the brainstem at the pontomedullary junction. The patient's stay has been complicated by tachycardia with HR up to 150 BPM, nonsustained V tach. Cardiology was consulted due to 50 beat run of WCT. Patient was asleep at the time. Cardiology rec monitoring of telemetry for another 24 hours and discharging him with a 30-day event monitor as originally planned (plan for [facility]). If he has recurrent episodes of wide-complex tachycardia, then electrophysiology consultation would be recommended. Also, increased Coreg dose to 15.75 mg p.o. twice daily both for hypertension and NSVT. The patient also noted to have acute respiratory failure with hypoxia, need for supplemental O2, and requiring diuresis with IV Lasix due to increased volumes and weight gain. Cardiology stated that patient did not appear significantly fluid overloaded so agreed with switching back to home dose of torsemide. Patient requires an intensive inpatient rehab program to address the following acute impairments: impaired activity tolerance, impaired balance, impaired coordination, generalized weakness, decreased depth perception and visual impairment, and impaired judgement and safety awareness.     Current Active Medical Management Needs/Risks  for Clinical Complications  The patient requires the high level of rehabilitation physician supervision that accompanies the provision of intensive rehabilitation therapy.  The patient needs the services of the rehabilitation physician to assess the patient medically and functionally and to modify the course of treatment as needed to maximize the patient's capacity to benefit from the rehabilitation process.  Atherosclerosis /CAD, Body Mass Index of 38, Congestive Heart Failure (combined) and Diabetes Mellitus Type II. The patient requires physician involvement at least 3 days per week to: manage HTN -- currently on Norvasc, Coreg (increased to 5.75 mg p.o. twice daily both for hypertension and NSVT); manage anticoagulation therapy -- currently on aspirin, Plavix, Lovenox, heparin; manage CHF and volume overload (moderate-severe edema in hands and feet bilaterally, pitting, and arms and legs edematous as well)-- currently on oral Lasix, needs I&O, daily weights, and cardiac monitor; manage DM II-- currently on sliding scale insulin and scheduled insulin; manage pain with adjustment of medications as needed to promote optimal participation in therapies-- PRN Tylenol.     Past Medical/Surgical History   Surgery in the past 100 days: No   Additional relevant past medical history: Arthritis of knee~ s/p replacement 3/24/2016, CAD (coronary artery disease), decreased cardiac ejection fraction~44% 3/24/2016, Diabetes mellitus, type 2, History of DVT, HTN, LBBB (left bundle branch block), Mixed hyperlipidemia    Level of Functioning prior to Admission:  Home Environment  Lives with: alone  Living arrangements: rambler style home with lower level   Home accessibility: stairs to enter home, stairs within home to basement  Stairs to enter home:    Stairs to negotiate within home:    Stair railings at home:    Living environment comments: Pt is right hand dominant.   Equipment currently used at home: cane, straight, walker,  standard  Activity/exercise/self-care comment: Pt reports having a cane or a walker at home but doesn't use at baseline.     Ambulation: 0-->independent  Transferrin-->independent  Toiletin-->independent  Bathin-->independent  Dressin-->independent   Eatin-->independent  Communication: 0-->understands/communicates without difficulty  Swallowin-->swallows foods/liquids without difficulty  Cognition: 0 - no cognition issues reported  Prior Functional Level Comment: Pt reports independence with mobility at baseline.   Additional Comments: Patient normally I with IADLs including laundry and driving.     Level of Function: GG Scale (Section GG Functional Ability and Goals; CMS's MATHUR Version 3.0 Manual effective 10.1.2019):  PT Current Function Goals for Rehab   Bed Rolling 4 Supervision or touching assitance 6 Independent   Supine to Sit 3 Partial/moderate assistance 6 Independent   Sit to Stand 4 Supervision or touching assitance 6 Independent   Transfer 4 Supervision or touching assitance 6 Independent   Ambulation 4 Supervision or touching assitance 6 Independent   Stairs Not completed 6 Independent     OT Current Function Goals for Rehab   Feeding 5 Setup or clean-up assistance 6 Independent   Grooming 3 Partial/moderate assistance 6 Independent   Bathing Not completed 6 Independent   Upper Body Dressing Not completed 6 Independent   Lower Body Dressing 3 Partial/moderate assistance 6 Independent   Toileting 3 Partial/moderate assistance 6 Independent   Toilet Transfer 3 Partial/moderate assistance 6 Independent   Tub/Shower Transfer Not completed 5 Setup or clean-up assistance   Cognition Not Assessed Independent     SLP Current Function Goals for Rehab   Swallow Not Impaired Not applicable   Communication Not Impaired Not applicable       Current Diet:  Regular diet, Thin liquids, Low sodium and Cardiac    Summary Statement:  Patient is participating in daily PT and OT and is making gains.  Currently, pt is able to demo LE dressing (don/doff shorts) with moderate assist, extra time, and >3 verbal cues for tehnique. Pt reported fatigue at the end of session, and reported frustation with decreased depth perception and visual impairment so will need ongoing visual rehab and education on use of eye patch/patching glasses. Patient ambulates 15' with FWW and CGA. Pt demonstrates mod UE support on FWW, decreased gait speed and foot clearance bilat. PT cues for improved posture and decreased UE support.     Expected Therapies and Services required during Inpatient Rehab admission  Intensity of Therapy: Patient requires intensive therapies not available in a lesser level of care. Patient is motivated, making gains, and can tolerate 3 hours of therapy a day.  Physical Therapy: 90 minutes per day, at least 5 days a week for 9 days  Occupational Therapy: 90 minutes per day, at least 5 days a week for 9 days    Rehabilitation Nursing Needs: Patient requires 24 hour Rehab Nursing to manage vitals, medication education, carryover of new rehab techniques, care coordination, skin integrity, blood sugar management, diabetes education, assess neurologic status, pain management, stroke education, provide safe environment for patient at falls risk, monitor nutritional intake and edema management.    Precautions/restrictions/special needs:   Precautions: fall precautions   Restrictions: None   Special Needs: None    Expected Level of Improvement: mod I with mobility, transfers, and ADLs; pt will need assist for IADLs.   Expected Length of time to achieve: 9 days    Anticipated Discharge Needs:  Anticipated Discharge Destination: Home  Anticipated Discharge Support: Family member and Friends  24/7 support available : Unknown  Identified caregiver(s):  Patient's brother Bill  Anticipated Discharge Needs: Home with homecare    Identified challenges/barriers:  Patient lives alone    Liaison signature:    Physician statement of  review and agreement:  I have reviewed and am in agreement of the need for IRF stay to address above functional and medical needs. In addition to above statements address, Patient requires intensive active and ongoing therapeutic intervention and multiple therapies; Patient requires medical supervision; Expected to actively participate in the intensive rehab program; Sufficiently stable to actively participate; Expectation for measurable improvement in functional capacity or adaption to impairments.    MD signature/date/time:

## 2020-07-03 NOTE — PLAN OF CARE
"[id number]    Inpatient Progress Note:    BP (!) 155/82 (BP Location: Left arm)   Pulse 77   Temp 98.2  F (36.8  C) (Oral)   Resp 20   Ht 1.753 m (5' 9\")   Wt 116.6 kg (257 lb)   SpO2 93%   BMI 37.95 kg/m         Orientation: AOx4  Neuro: double vision, all other neuros intact  Pain status: Denies pain  Activity: Assist of 1 w/ gaitbelt and walker  Peripheral edema: Bilateral ankles and feet  Resp: O2 sats 88% RA, 1L O2 per NC > sats 93%  Cardiac: on tele, had a 50 second run of V tach. Pt asymptomatic, VSS. X cover ordered stat electrolytes and cardio consult  GI: WNL  : WNL  LDA: Peripheral   Infusions: SL  Diet: Reg  Consults: OT, PT, SW, Cardio, Neuro  Discharge Plan: TBD    Will continue to monitor and provide cares.     Marybeth Velazquez RN        "

## 2020-07-03 NOTE — PROGRESS NOTES
07/03/20 0956   Quick Adds   Type of Visit Initial Occupational Therapy Evaluation   Living Environment   Lives With alone   Living Arrangements house  (rambler style home with lower level )   Home Accessibility stairs to enter home;stairs within home   Transportation Anticipated   (Pt typically drives )   Living Environment Comment Pt is right hand dominant.    Self-Care   Usual Activity Tolerance good   Current Activity Tolerance moderate   Equipment Currently Used at Home cane, straight;walker, standard   Functional Level   Ambulation 0-->independent   Transferring 0-->independent   Toileting 0-->independent   Bathing 0-->independent   Dressing 0-->independent   Fall history within last six months yes   Number of times patient has fallen within last six months 1   General Information   Onset of Illness/Injury or Date of Surgery - Date 06/28/20   Referring Physician Sara Hinson RN    Patient/Family Goals Statement Rehab    Additional Occupational Profile Info/Pertinent History of Current Problem Per chart: Burton Elizabeth is a 75 year old male with a PMH significant for CAD (positive stress testing, medically managed), combined CHF (EF 50-55%), DM, HTN, HLP, CKD, chronic LBBB and hx of DVT now off anticoagulation who was a direct admission for further workup of vertigo. Dizziness/diplopia due to acute CVA. See chart for details.    Cognitive Status Examination   Orientation orientation to person, place and time   Level of Consciousness alert   Visual Perception   Visual Perception   (Pt wears glasses for reading)   Visual Perception Comments Pt reported double vision. Pt wearing an eye patch which was recommended by MD. Pt reported plans to keep trialing the eye patch.    Range of Motion (ROM)   ROM Quick Adds Shoulder, Left;Shoulder, Right;Elbow/Forearm, Left;Elbow/Forearm, Right   Left Shoulder Flexion ROM   (Degrees) - Left Shoulder Flexion AROM 180   Left Shoulder ABduction ROM   (Degrees) - Left  Shoulder ABduction AROM, 180   Right Shoulder Flexion ROM   (Degrees) - Right Shoulder Flexion AROM 180   Right Shoulder ABduction ROM   (Degrees) - Right Shoulder ABduction AROM, 180   Strength   Manual Muscle Testing Quick Adds MMT: Shoulder;MMT: Elbow/Forewarm   MMT: Shoulder   Left Flexion (5/5) normal, left   Left ABduction (5/5) normal, left   Right Flexion (4/5) good, right   Right ABduction (4/5) good, right   Transfer Skills   Transfer Transfer Safety Analysis Bed/Chair;Transfer Skill: Stand to Sit;Transfer Safety Analysis Sit/Stand   Transfer Skill: Sit to Stand   Level of [place]: Sit/Stand minimum assist (75% patients effort)   Physical Assist/Nonphysical Assist: Sit/Stand set-up required;verbal cues;1 person assist   Toilet Transfer   Toilet Transfer Toilet Transfer Skill;Toilet Transfer Safety Analysis   Transfer Skill: Toilet Transfer   Level of [place]: Toilet minimum assist (75% patients effort)   Physical Assist/Nonphysical Assist: Toilet set-up required;verbal cues;1 person assist   Lower Body Dressing   Level of [place]: Dress Lower Body moderate assist (50% patients effort)   Physical Assist/Nonphysical Assist: Dress Lower Body set-up required;verbal cues;1 person assist   Instrumental Activities of Daily Living (IADL)   Previous Responsibilities meal prep;housekeeping;laundry;shopping;medication management;finances;driving   IADL Comments Hired assist for yardwork    General Therapy Interventions   Planned Therapy Interventions ADL retraining;IADL retraining;cognition;strengthening;transfer training;visual perception   Clinical Impression   Criteria for Skilled Therapeutic Interventions Met yes, treatment indicated   OT Diagnosis Decreased independence for I/ADLs   Influenced by the following impairments Decreased independence for I/ADLs   Assessment of Occupational Performance 1-3 Performance Deficits   Identified Performance Deficits Decreased independence for I/ADLs (dressing,  "bathing, toileting, home management tasks); vision  impacting I/ADLs   Clinical Decision Making (Complexity) Moderate complexity   Therapy Frequency Daily   Predicted Duration of Therapy Intervention (days/wks) 4 days   Anticipated Discharge Disposition Acute Rehabilitation Facility   Risks and Benefits of Treatment have been explained. Yes   Patient, Family & other staff in agreement with plan of care Yes   [facility] TM \"6 Clicks\"   2016, Trustees of [facility], under license to [employer].  All rights reserved.   6 Clicks Short Forms Daily Activity Inpatient Short Form   [facility]  \"6 Clicks\" Daily Activity Inpatient Short Form   1. Putting on and taking off regular lower body clothing? 2 - A Lot   2. Bathing (including washing, rinsing, drying)? 2 - A Lot   3. Toileting, which includes using toilet, bedpan or urinal? 2 - A Lot   4. Putting on and taking off regular upper body clothing? 3 - A Little   5. Taking care of personal grooming such as brushing teeth? 3 - A Little   6. Eating meals? 4 - None   Daily Activity Raw Score (Score out of 24.Lower scores equate to lower levels of function) 16   Total Evaluation Time   Total Evaluation Time (Minutes) 7     "

## 2020-07-03 NOTE — PROGRESS NOTES
[facility]    Stroke Progress Note    Interval Events  Burton Elizabeth is a 75 year old male with a past medical history of CAD, CHF, diabetes type 2, hypertension, hyperlipidemia, history of DVT (no longer anticoagulated) was a direct admit by the request of Dr. Loya at [facility] room for further work-up and management of vertigo. Patient reports symptoms starting last Friday after eating. He reports developing nausea and vomiting. Patient states that since Friday he has become dizzy, weak, fatigued, and has noted some blurred vision.  Patient endorses increased lower extremity edema as well as 12 pounds weight gain since Friday. Patient reports having a virtual visit with his primary care provider where he was diagnosed with vertigo and started on meclizine. Patient states symptoms did not improve after taking the meclizine.  Patient was sent to ER for further evaluation. Ultrasound the lower extremities was negative for DVT.     Overnight patient was reported to have a 50 second run of wide complex tachycardia. During this episode patient was asymptomatic during this episode with a stable BP. Patient reports some improvement with blurred vision, but states the symptoms have not dramatically improved. Patient is currently wearing a patch on his left eye. He states that the vision in his right eye is still a bit more hazy than in his left eye. Patient reports history of vision issues in his right eye.          Stroke Evaluation summarized:  MRI/Head CT: Recent ischemic infarct at the left lateral aspect of the brainstem  at the pontomedullary junction. No other recent infarcts.  Intracranial Vascular Imaging: Normal MRA of head   Cervical Carotid and Vertebral Artery Vascular Imaging: Normal MRA of neck  Echocardiogram: EF 50-55%, negative bubble study   EKG/Telemetry: Sinus rhythm with chronic left LBBB  LDL: 102  A1c: 7.0  Troponin: <0.019      Impression  Ischemic Stroke due to  "small-vessel occlusion vs less likely cardioembolic     Plan  - Loaded with Plavix 300 mg yesterday, begin DAPT with ASA 81 mg + Plavix 75 mg for 21 days, then  mg alone indefinitely   - LDL (102) with goal LDL between 40-70 ; Continue Lipitor 40 mg. Recheck LDL in outpatient setting.   - A1c (7.0) within goal <7.0. Continue DM management with PCP to keep A1c within goal range.  - Goal BP <130/85 with tighter control associated with decreased overall CV risk, if tolerated  - Discharge with 30 day cardiac event monitor for evaluation of atrial fibrillation   -  F/U in the next 1-2 week(s) with PCP  - F/U in 6 weeks at the [facility] telephone number is [phone]    - F/U in outpatient setting with Opthalmology for further visual evaluation of the right eye     Thank you for this consult. No further stroke evaluation is recommended, so we will sign off. Please contact us with any additional questions.    Karyn Torres PA-C   Neurology  To page me or covering stroke neurology team member, click here: AMCOM   Choose \"On Call\" tab at top, then search dropdown box for \"Neurology Adult\", select location, press Enter, then look for stroke/neuro ICU/telestroke.  ______________________________________________________    Medications   Home Meds  Prior to Admission medications    Medication Sig Start Date End Date Taking? Authorizing Provider   amLODIPine (NORVASC) 5 MG tablet Take 5 mg by mouth daily   Yes Unknown, Entered By History   aspirin (ASA) 325 MG EC tablet Take 325 mg by mouth daily   Yes Unknown, Entered By History   carvedilol (COREG) 25 MG tablet Take 12.5 mg by mouth 2 times daily (with meals) (Takes 0.5 x 25mg tablet = 12.5mg dose)   Yes Reported, Patient   glimepiride (AMARYL) 4 MG tablet Take 4 mg by mouth 2 times daily    Yes Reported, Patient   insulin aspart (NOVOLOG FLEXPEN) 100 UNIT/ML pen Inject 2-6 Units Subcutaneous 3 times daily (with meals) (Patient states he bases " his dose off of carb counting and blood glucose but did not give exact regimen).   Yes Reported, Patient   multivitamin, therapeutic with minerals (THERA-VIT-M) TABS Take 1 tablet by mouth daily   Yes Reported, Patient   torsemide (DEMADEX) 20 MG tablet Take 20 mg by mouth daily    Yes Reported, Patient   atorvastatin (LIPITOR) 40 MG tablet Take 1 tablet (40 mg) by mouth every evening 4/24/20   Catarino Cherry MD   blood glucose (NO BRAND SPECIFIED) test strip Use to test blood sugar as directed    Reported, Patient   insulin glargine (LANTUS) 100 UNIT/ML PEN Inject 16-25 Units Subcutaneous every evening (Patient states he bases his dose off of blood glucose and how much Novolog he has used throughout the day but did not give exact regimen).    Reported, Patient   order for DME Equipment being ordered: Walker Wheels () and Walker ()  Treatment Diagnosis: Impaired gait 4/1/16   Ctaarino Kearns PA-C       Scheduled Meds    amLODIPine  5 mg Oral Daily     aspirin  81 mg Oral Daily     atorvastatin  40 mg Oral QPM     carvedilol  12.5 mg Oral BID w/meals     clopidogrel  75 mg Oral Daily     enoxaparin ANTICOAGULANT  40 mg Subcutaneous Q24H     furosemide  60 mg Intravenous Q8H     glimepiride  4 mg Oral BID w/meals     insulin aspart  1-7 Units Subcutaneous TID AC     insulin aspart  1-5 Units Subcutaneous At Bedtime     insulin aspart   Subcutaneous QAM AC     insulin aspart   Subcutaneous Daily with lunch     insulin aspart   Subcutaneous Daily with supper     insulin glargine  22 Units Subcutaneous QPM       Infusion Meds      PRN Meds  acetaminophen, glucose **OR** dextrose **OR** glucagon, melatonin, naloxone, ondansetron **OR** ondansetron, polyethylene glycol, senna-docusate **OR** senna-docusate        Review of Systems   The 10 point Review of Systems is negative other than noted in the HPI or here.     PHYSICAL EXAMINATION  Temp:  [95.8  F (35.4  C)-100.1  F (37.8  C)] 98.4  F (36.9   C)  Pulse:  [72-83] 80  Resp:  [18-20] 20  BP: (136-172)/(72-90) 136/73  SpO2:  [88 %-94 %] 92 %       EENT: Slight Nystagmus was noted on physical exam, unable to fully appreciate due technical difficulties with tele-stroke cart. Nurse was able to better appreciate.   - The rest of the neuro exam was deferred       Imaging  I personally reviewed all imaging; relevant findings per HPI.     Lab Results Data   CBC  Recent Labs   Lab 07/03/20  0433 07/01/20  0548   WBC 9.9 8.0   RBC 4.84 4.74   HGB 12.8* 12.5*   HCT 43.9 43.5    188     Basic Metabolic Panel    Recent Labs   Lab 07/03/20  0433 07/02/20  0610 07/01/20  0548    137 138   POTASSIUM 4.3 4.6 4.6   CHLORIDE 104 104 104   CO2 30 31 30   BUN 47* 47* 43*   CR 1.98* 2.12* 2.11*   * 144* 236*   GABE 8.9 8.9 8.7     Liver Panel  No results for input(s): PROTTOTAL, ALBUMIN, BILITOTAL, ALKPHOS, AST, ALT, BILIDIRECT in the last 168 hours.  INR    Recent Labs   Lab Test 04/22/20  0917 12/25/19  0741 12/21/19  0801   INR 1.12 1.41* 1.94*      Lipid Profile    Recent Labs   Lab Test 04/23/20  0549 05/10/19   CHOL 188 120   HDL 31* 35*   * 61   TRIG 277* 118     A1C    Recent Labs   Lab Test 04/22/20  1812 09/27/19  0902 03/30/16  0618   A1C 7.0* 6.5* 9.3*     Troponin I  No results for input(s): TROPI in the last 168 hours.       Telestroke Service Details  (for non-emergent stroke consult with tele)  Video start time 07/02/20   1200   Video end time 07/02/20   1257   Type of service telemedicine diagnostic assessment of acute neurological changes   Reason telemedicine is appropriate patient requires assessment with a specialist for diagnosis and treatment of neurological symptoms   Mode of transmission secure interactive audio and video communication per Ron   Originating site (patient location) [facility]    Distant site (provider location) Provider Home Office   Telemedicine used today to minimize in-person interactions and  PPE use due to COVID-19.

## 2020-07-03 NOTE — PROGRESS NOTES
Cross Cover    Notified about pt having 50 second run of wide complex tachycardia. Tele was personally reviewed and discussed with tele-tech. Difficult to tell on tele if this was PSVT with aberrancy (pt has baseline LBBB) vs VT. The HR is around 150 in the beginning and then slows down to 110-120 range.     Pt was asymptomatic during this episode, stable BP, which argues against VT of this long duration. Pt has been in sinus rhythm otherwise. Has hx of CAD ( of LCx, mod non-occlusive disease, angio in 2019, did not require intervention), normal EF on recent ECHO.    -- Obtain STAT BMP, Mg  -- Monitor for any recurrence of symptoms, continue tele  -- Cardiology consultation  -- On Coreg     UPDATE:  K and Mg are normal

## 2020-07-03 NOTE — PROGRESS NOTES
"PRIMARY DIAGNOSIS: CVA   OUTPATIENT/OBSERVATION GOALS TO BE MET BEFORE DISCHARGE:  1. Orthostatic performed: No    2. Diagnostic testing complete & at baseline neurologic testing: Yes    3. Cleared by consultants (if involved): No    4. Interpretation of cardiac rhythm per telemetry tech: SR, 1st degree block and BBB. HR 82    5. Tolerating adequate PO diet and medications: Yes    6. Return to near baseline physical activity or neurologic status: No    Discharge Planner Nurse   Safe discharge environment identified: Acute rehab (referrals sent)  Barriers to discharge: Yes       Entered by: Brittanie Campos 07/03/2020 1:13 PM     Please review provider order for any additional goals.   Nurse to notify provider when observation goals have been met and patient is ready for discharge.    Patient is alert and oriented x4. BP slightly elevated. Patient was on 1 liter of O2 oxymask this morning. Tried to wean off, but O2 sats dropped down to 86%. Placed O2 back on and sats stayed around 88-89% O2 NC 1 liter. Patient was then bumped up to 2 liters O2 NC with sats 94%. Patient states he is not SOB, but expiratory wheezing heard throughout lobes. Updated Dr. Manrique and patient started on Lasix.  Patient denies any pain, urgency, and frequency when voiding. LBM today. SL. Patient still complaining of double vision and light sensitivity. When doing neuro checks nystagmus present in (R) eye when patient would look (R). Also noted that the right pupil was slightly higher than (L). Updated neurology on findings. Pitting edema +3 noted in feet and ankles and pitting edema +1-+2 throughout body.  BS this morning was 118 and not correction given, but 4 units given for carbs. Patient is a SBA with walker and gait belt. Plan: PT/OT evaluate, Wean O2 and start Lasix, Tele. Acute rehab when stable.     BP (!) 163/89 (BP Location: Right arm)   Pulse 74   Temp 97.1  F (36.2  C) (Oral)   Resp 16   Ht 1.753 m (5' 9\")   Wt 118.4 kg (261 lb)   " SpO2 93%   BMI 38.54 kg/m

## 2020-07-03 NOTE — CONSULTS
Cardiology Consultation      Burton Elizabeth MRN# [id number]   YOB: 1945 Age: 75 year old   Date of Admission: 6/30/2020     Reason for consult: Wide complex tachycardia           Assessment and Plan:     76 YO M with PMH significant for CAD ( of small Cx with moderate disease elsewhere on cor angio 08/2019, moderate aortic stenosis, mild cardiomyopathy, DM, HT, DVTs admitted to [facility] with recurrent dizziness/diplopia and found to have recent ischemic CVA. Cardiology consulted for 50 beat run of WCT this AM. Patient was asleep at the time.       1. Wide complex tachycardia, possibly NSVT  2. Recent ischemic CVA  3. CAD with mild cardiomyopathy as described above  4. Moderate aortic stenosis  5. DM  6. HTN    PLAN  -I reviewed his telemetry from this morning with Dr Sanchez from cardiology.   This certainly could represent nonsustained ventricular tachycardia.    - From a diagnostic and therapeutic standpoint, however, he was sleeping at the time and there is no prior history of palpitations or syncope.  In addition, he is on appropriate medical therapy for his coronary artery disease including beta-blocker therapy and has low normal left ventricular systolic function on his most recent echocardiogram.  Therefore defibrillator implantation would not be indicated in any case.  -At this point in time I would recommend monitoring of telemetry for another 24 hours and discharging him with a 30-day event monitor as originally planned.  If he has recurrent episodes of wide-complex tachycardia, then electrophysiology consultation would be recommended.             Chief Complaint:   No chief complaint on file.           History of Present Illness:   This patient is a 75 year old male who is followed by Dr. Neumann in our cardiology clinic.  He has a history of coronary artery disease and is status post coronary angiography in August 2019 as part of a preoperative evaluation given his underlying left bundle  "branch block and an abnormal perfusion study.  Coronary angiography at the time demonstrated single-vessel occlusive coronary artery disease, specifically a chronic total occlusion of a nondominant circumflex vessel.  He was noted to have moderate LAD and PDA disease.  Left ventricular systolic function has been mildly reduced to low normal on serial echocardiograms.  He also is known to have moderate aortic stenosis.    He was recently admitted to [facility] in April of this year with chest discomfort and mildly elevated troponins in association with dizziness and lightheadedness.  He was also felt to have some element of heart failure which improved with diuresis.  He saw Dr. Neumann subsequently on 2020 at which point he was doing well overall from a cardiovascular standpoint.    He was admitted to [facility] on 2020 with vertigo that was ultimately found to be due to an ischemic stroke.  Although this is thought to be due to small vessel occlusion, a cardioembolic etiology cannot be ruled out and therefore event monitoring was recommended.    Cardiology were consulted this morning due to a 50 beat run of a wide-complex tachycardia on telemetry at approximately 4 AM.  He was sleeping at the time.  No further or prior episodes of wide-complex tachycardia were noted on telemetry.  He denies any history of palpitations, syncope or presyncope.           Physical Exam:   Vitals were reviewed  Blood pressure 136/73, pulse 80, temperature 98.4  F (36.9  C), temperature source Oral, resp. rate 20, height 1.753 m (5' 9\"), weight 118.4 kg (261 lb), SpO2 92 %.  Temperatures:  Current - Temp: 98.4  F (36.9  C); Max - Temp  Av.6  F (37  C)  Min: 95.8  F (35.4  C)  Max: 100.1  F (37.8  C)  Respiration range: Resp  Av.5  Min: 18  Max: 20  Pulse range: Pulse  Av  Min: 72  Max: 83  Blood pressure range: Systolic (24hrs), Av , Min:136 , Max:172   ; Diastolic (24hrs), Av, Min:72, " Max:90    Pulse oximetry range: SpO2  Av.3 %  Min: 88 %  Max: 94 %    Intake/Output Summary (Last 24 hours) at 7/3/2020 1105  Last data filed at 7/3/2020 0822  Gross per 24 hour   Intake 240 ml   Output 1300 ml   Net -1060 ml     Constitutional:   awake, alert, cooperative, no apparent distress, and appears stated age     Eyes:   Lids and lashes normal, pupils equal, round and reactive to light, extra ocular muscles intact, sclera clear, conjunctiva normal     Neck:   supple, symmetrical, trachea midline, no JVD     Back:   symmetric     Lungs:   No increased work of breathing, good air exchange, clear to auscultation bilaterally, no crackles or wheezing       Cardiovascular:   Normal apical impulse, regular rate and rhythm, normal S1 and S2, no S3 or S4, 3/6 EMILY     Abdomen:   non-tender     Musculoskeletal:   motor strength is 5 out of 5 all extremities bilaterally     Neurologic:   Grossly nonfocal     Skin:   no bruising or bleeding     Additional findings:   Edema 1+                 Past Medical History:   I have reviewed this patient's past medical history  Past Medical History:   Diagnosis Date     Arthritis of knee~ s/p replacement 3/24/2016     CAD (coronary artery disease)      Decreased cardiac ejection fraction~44% 3/24/2016     Diabetes mellitus, type 2 (H) 3/24/2016     History of DVT (deep vein thrombosis)      HTN (hypertension) 3/24/2016     LBBB (left bundle branch block) 3/24/2016     Mixed hyperlipidemia 2016             Past Surgical History:   I have reviewed this patient's past surgical history  Past Surgical History:   Procedure Laterality Date     ARTHROPLASTY KNEE Right 3/29/2016    Procedure: ARTHROPLASTY KNEE;  Surgeon: Heena Rosen MD;  Location: [facility] OR     ARTHROPLASTY REVISION KNEE Left 2019    Procedure: REVISION LEFT TOTAL KNEE  ARTHROPLASTY;  Surgeon: Heena Rosen MD;  Location: [facility] OR     CV LEFT HEART CATH N/A 2019    Procedure: Left Heart Cath;   Surgeon: Edgardo Beckham MD;  Location:  HEART CARDIAC CATH LAB     EYE SURGERY      cataract removal     ORTHOPEDIC SURGERY      KNEE SCOPES MULTIPLE               Social History:   I have reviewed this patient's social history  Social History     Tobacco Use     Smoking status: Never Smoker     Smokeless tobacco: Never Used   Substance Use Topics     Alcohol use: Yes     Alcohol/week: 2.0 standard drinks     Types: 2 Standard drinks or equivalent per week     Frequency: 2-3 times a week     Drinks per session: 1 or 2     Comment: occasional, social             Family History:   I have reviewed this patient's family history  Family History   Problem Relation Age of Onset     Coronary Artery Disease No family hx of              Allergies:     Allergies   Allergen Reactions     Penicillins Anaphylaxis             Medications:   I have reviewed this patient's current medications  Medications Prior to Admission   Medication Sig Dispense Refill Last Dose     amLODIPine (NORVASC) 5 MG tablet Take 5 mg by mouth daily   6/30/2020 at Unknown time     aspirin (ASA) 325 MG EC tablet Take 325 mg by mouth daily   6/30/2020 at Unknown time     carvedilol (COREG) 25 MG tablet Take 12.5 mg by mouth 2 times daily (with meals) (Takes 0.5 x 25mg tablet = 12.5mg dose)   6/30/2020 at Unknown time     glimepiride (AMARYL) 4 MG tablet Take 4 mg by mouth 2 times daily    6/30/2020 at Unknown time     insulin aspart (NOVOLOG FLEXPEN) 100 UNIT/ML pen Inject 2-6 Units Subcutaneous 3 times daily (with meals) (Patient states he bases his dose off of carb counting and blood glucose but did not give exact regimen).   Past Week at Unknown time     multivitamin, therapeutic with minerals (THERA-VIT-M) TABS Take 1 tablet by mouth daily   Past Week at Unknown time     torsemide (DEMADEX) 20 MG tablet Take 20 mg by mouth daily    Past Week at Unknown time     atorvastatin (LIPITOR) 40 MG tablet Take 1 tablet (40 mg) by mouth every evening  30 tablet 3 More than a month at Unknown time     blood glucose (NO BRAND SPECIFIED) test strip Use to test blood sugar as directed        insulin glargine (LANTUS) 100 UNIT/ML PEN Inject 16-25 Units Subcutaneous every evening (Patient states he bases his dose off of blood glucose and how much Novolog he has used throughout the day but did not give exact regimen).   6/26/2020 at Unknown time     order for DME Equipment being ordered: Walker Wheels () and Walker ()  Treatment Diagnosis: Impaired gait 1 each 0      Current Facility-Administered Medications Ordered in Epic   Medication Dose Route Frequency Last Rate Last Dose     acetaminophen (TYLENOL) tablet 650 mg  650 mg Oral Q4H PRN   650 mg at 07/02/20 1625     amLODIPine (NORVASC) tablet 5 mg  5 mg Oral Daily   5 mg at 07/03/20 0822     aspirin EC tablet 81 mg  81 mg Oral Daily   81 mg at 07/03/20 0822     atorvastatin (LIPITOR) tablet 40 mg  40 mg Oral QPM   40 mg at 07/02/20 2008     carvedilol (COREG) tablet 12.5 mg  12.5 mg Oral BID w/meals   12.5 mg at 07/03/20 0822     clopidogrel (PLAVIX) tablet 75 mg  75 mg Oral Daily   75 mg at 07/03/20 0822     glucose gel 15-30 g  15-30 g Oral Q15 Min PRN        Or     dextrose 50 % injection 25-50 mL  25-50 mL Intravenous Q15 Min PRN        Or     glucagon injection 1 mg  1 mg Subcutaneous Q15 Min PRN         enoxaparin ANTICOAGULANT (LOVENOX) injection 40 mg  40 mg Subcutaneous Q24H   40 mg at 07/02/20 1614     furosemide (LASIX) injection 60 mg  60 mg Intravenous Q8H   60 mg at 07/03/20 0954     glimepiride (AMARYL) tablet 4 mg  4 mg Oral BID w/meals   4 mg at 07/03/20 0822     insulin aspart (NovoLOG) injection (RAPID ACTING)  1-7 Units Subcutaneous TID AC   1 Units at 07/02/20 1811     insulin aspart (NovoLOG) injection (RAPID ACTING)  1-5 Units Subcutaneous At Bedtime         insulin aspart (NovoLOG) injection (RAPID ACTING)   Subcutaneous QAM AC   4 Units at 07/03/20 0825     insulin aspart (NovoLOG)  injection (RAPID ACTING)   Subcutaneous Daily with lunch   2 Units at 07/02/20 1457     insulin aspart (NovoLOG) injection (RAPID ACTING)   Subcutaneous Daily with supper   1 Units at 07/02/20 1811     insulin glargine (LANTUS) injection 22 Units  22 Units Subcutaneous QPM   22 Units at 07/02/20 2015     melatonin tablet 1 mg  1 mg Oral At Bedtime PRN         naloxone (NARCAN) injection 0.1-0.4 mg  0.1-0.4 mg Intravenous Q2 Min PRN         ondansetron (ZOFRAN-ODT) ODT tab 4 mg  4 mg Oral Q6H PRN        Or     ondansetron (ZOFRAN) injection 4 mg  4 mg Intravenous Q6H PRN         polyethylene glycol (MIRALAX) Packet 17 g  17 g Oral Daily PRN         senna-docusate (SENOKOT-S/PERICOLACE) 8.6-50 MG per tablet 1 tablet  1 tablet Oral BID PRN        Or     senna-docusate (SENOKOT-S/PERICOLACE) 8.6-50 MG per tablet 2 tablet  2 tablet Oral BID PRN         No current [facility]-ordered outpatient medications on file.             Review of Systems:   The 10 point Review of Systems is negative other than noted in the HPI            Data:   All laboratory data reviewed  Results for orders placed or performed during the hospital encounter of 06/30/20 (from the past 24 hour(s))   Glucose by meter   Result Value Ref Range    Glucose 190 (H) 70 - 99 mg/dL   Social Work IP Consult    Narrative    Amanda Ruano BSW     7/2/2020  3:37 PM  SW/RN CC following for discharge planning. See RN CC 7/1 for full   assessment. PT evaluated pt today and recommended TCU. SW paged   ARU admissions. Tj from ARU clinically reviewed and feel as   though pt is appropriate for ARU. Tj spoke with pt via phone   and he is agreeable to ARU. They anticipate a bed available this   weekend.     Pt has BCBS Medicare Advantage which will require prior   authorization. Prior authorization initiated by [facility] today. Will   need SLP or OT eval in addition to PT in order to obtain auth. SW   will continue to follow.     AARON Mir   Inpatient Care  [facility]   [phone]     Glucose by meter   Result Value Ref Range    Glucose 166 (H) 70 - 99 mg/dL   Glucose by meter   Result Value Ref Range    Glucose 170 (H) 70 - 99 mg/dL   Glucose by meter   Result Value Ref Range    Glucose 157 (H) 70 - 99 mg/dL   Basic metabolic panel   Result Value Ref Range    Sodium 137 133 - 144 mmol/L    Potassium 4.3 3.4 - 5.3 mmol/L    Chloride 104 94 - 109 mmol/L    Carbon Dioxide 30 20 - 32 mmol/L    Anion Gap 3 3 - 14 mmol/L    Glucose 140 (H) 70 - 99 mg/dL    Urea Nitrogen 47 (H) 7 - 30 mg/dL    Creatinine 1.98 (H) 0.66 - 1.25 mg/dL    GFR Estimate 32 (L) >60 mL/min/[1.73_m2]    GFR Estimate If Black 37 (L) >60 mL/min/[1.73_m2]    Calcium 8.9 8.5 - 10.1 mg/dL   Magnesium   Result Value Ref Range    Magnesium 2.1 1.6 - 2.3 mg/dL   CBC with platelets differential   Result Value Ref Range    WBC 9.9 4.0 - 11.0 10e9/L    RBC Count 4.84 4.4 - 5.9 10e12/L    Hemoglobin 12.8 (L) 13.3 - 17.7 g/dL    Hematocrit 43.9 40.0 - 53.0 %    MCV 91 78 - 100 fl    MCH 26.4 (L) 26.5 - 33.0 pg    MCHC 29.2 (L) 31.5 - 36.5 g/dL    RDW 16.7 (H) 10.0 - 15.0 %    Platelet Count 178 150 - 450 10e9/L    Diff Method Automated Method     % Neutrophils 73.3 %    % Lymphocytes 11.8 %    % Monocytes 12.3 %    % Eosinophils 1.8 %    % Basophils 0.4 %    % Immature Granulocytes 0.4 %    Nucleated RBCs 0 0 /100    Absolute Neutrophil 7.3 1.6 - 8.3 10e9/L    Absolute Lymphocytes 1.2 0.8 - 5.3 10e9/L    Absolute Monocytes 1.2 0.0 - 1.3 10e9/L    Absolute Eosinophils 0.2 0.0 - 0.7 10e9/L    Absolute Basophils 0.0 0.0 - 0.2 10e9/L    Abs Immature Granulocytes 0.0 0 - 0.4 10e9/L    Absolute Nucleated RBC 0.0    Glucose by meter   Result Value Ref Range    Glucose 118 (H) 70 - 99 mg/dL   MRA Brain ([place]) wo Contrast    Narrative    MR ANGIOGRAM OF THE HEAD WITHOUT CONTRAST   7/3/2020 9:45 AM     COMPARISON: None    HISTORY: Stroke, follow up.    TECHNIQUE:  3D  time-of-flight MR angiogram of the head without  contrast. MIP reconstruction of all MR angiographic data was  performed.    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 The A2  segment of left anterior cerebral artery is supplied by the patent  anterior communicating artery. The bilateral distal internal carotid,  basilar, azygos anterior cerebral, bilateral middle cerebral and  bilateral posterior cerebral arteries are patent and unremarkable with  no evidence for cerebral artery stenosis or aneurysm.       Impression    IMPRESSION:  Normal MR angiogram of the head.      KAYDEN MCGEE MD   MRA Neck (Carotids) wo Contrast    Narrative    MRA NECK WITHOUT CONTRAST  7/3/2020 9:45 AM     COMPARISON: None.    HISTORY: Stroke, follow up.     TECHNIQUE: 2D time-of-flight MR angiogram of the neck without  contrast.  MIP reconstruction of all MR angiographic data was  performed. Estimates of carotid stenoses are made relative to the  distal internal carotid artery diameters except as noted.    FINDINGS:    Carotids: The common carotid arteries bilaterally are widely patent.  The cervical internal carotid arteries bilaterally are patent without  stenosis.    Vertebrals: The vertebral arteries bilaterally are patent without  stenosis and demonstrate antegrade flow.    Aortic arch: The arteries as they arise from the aortic arch are  normally arranged with no evidence for stenosis.      Impression    IMPRESSION:    Normal MR angiogram of the neck.     KAYDEN MCGEE MD

## 2020-07-03 NOTE — CONSULTS
07/03/20 1341 Vivien Garcia, RN     Stroke Education Note     The following information has been reviewed with the patient:     1. Warning signs of stroke     2. Calling 911 if having warning signs of stroke     3. All modifiable risk factors: hypertension, CAD, atrial fib, diabetes, hypercholesterolemia, smoking, substance abuse, diet, physical inactivity, obesity, sleep apnea.     4. Patient's risk factors for stroke which include: HTN, HLD,      5. Follow-up plan for after discharge     6. Discharge medications which include: ASA, HLD, HTN     In addition, the PLC Stroke Class Handout has been given to the patient and family.     Learner's response to risk factors / lifestyle modification education: Desire to change Ability to change Reasons to change Committment to change Activation. Phone class with pt and his brother. (RH having trouble with ipad, per RN)      Vivien Garcia RN, RN      No education note entered.     WDL

## 2020-07-03 NOTE — PROVIDER NOTIFICATION
4:10 AM  Provider notified: X cover  Reason: Pt had a 50 second run of V tach  Order: Stat bmp, mg ordered. Cardio consult placed by Dr. Snell

## 2020-07-03 NOTE — PROGRESS NOTES
[facility]    Hospitalist Progress Note    Assessment & Plan   Burton Elizabeth is a 75 year old male who was admitted on 6/30/2020.   Patient with multiple medical problems was admitted for vertigo, dizziness and nystagmus.  Cerebrovascular accident    --- MRI of the brain indicated recent ischemic infarct at the pontomedullary junction, patient has ongoing diplopia, he has improved with the eye patch on the left.  ---MRI of the anterior and posterior circulation of the brain obtained, no abnormality identified steroids here.  ---PT/OT to evaluate patient, plan for acute rehab transfer noted.  Social work involved.  --- Neurology consult placed, continue aspirin, will place on 81 mg aspirin with 75 mg of Plavix.  Continue statin.,  For now back on his PTA blood pressure medications.  Acute decompensated systolic and diastolic heart failure  Acute respiratory failure with hypoxia  ---Has 12 pounds of weight gain over last 4 days, his torsemide was on hold, his echocardiogram done on 4/22/2020 showed EF of 45 to 50% age with moderate aortic stenosis.  ---Wean oxygen down as possible, started on IV Lasix 40 mg 3 times daily for 3-4 doses, please reassess and continue with Lasix as needed.  ---Monitor daily weights, strict intake and output charting, continue on telemetry  Nonsustained V. tach  ---50 beats noted, monitor electrolytes mostly potassium and magnesium and replace as needed.  ---Appreciate cardiology input, plan is to monitor on telemetry, if no more V. tach events noted can discharge home on the event monitor, if any further V. tach noted will need electrophysiology consult prior to discharge.  CAD:   ---no current chest pain or shortness of breath. H/o cardiac workup prior to knee replacement in 08/2019 where patient underwent left heart cath showing 99% stenosis of the proximal circumflex with only moderate disease of other vascular territories. He has been medically managed for this.  Troponin undetectable at [facility] prior to admission.  DM2: most recent HgbA1c of 7.0 in 04/2020.   - continue PTA Glimepiride   - meal time insulin with 1 unit per 15 grams carbohydrate  -Continue 22 units of Lantus every evening  - medium sliding scale insulin with meals, blood sugars have been stable.     HTN: stable, continue PTA Amlodipine and Carvedilol     CKD: baseline creatinine around 2.0 with creatinine of 2.11  - continue to follow BMP, stable     HLP: continue PTA Atorvastatin      Asymptomatic COVID: Negative  DVT Prophylaxis: We will place on heparin  Code Status: Full Code     Disposition: Expected discharge possibly Sunday to acute rehab versus transfer to [facility] in case if he develops any further nonsustained V. tach episodes.    Gayla Manrique MD  Text Page   (7am to 6pm)    Interval History   Overnight events noted, he had an episode of nonsustained V. tach, he was seen by cardiology today morning, he denies any new symptoms, he continues to have significant dizziness/vertigo, he also has diplopia which is improved with eye patch.  He denies any nausea, vomiting or diarrhea.    -Data reviewed today: I reviewed all new labs and imaging results over the last 24 hours. I personally reviewed Labs and imaging as below.    Physical Exam   Temp: 98.4  F (36.9  C) Temp src: Oral BP: 136/73 Pulse: 80   Resp: 20 SpO2: 92 % O2 Device: Nasal cannula Oxygen Delivery: 2 LPM  Vitals:    06/30/20 1854 07/01/20 0623 07/03/20 0612   Weight: 116.8 kg (257 lb 6.4 oz) 116.6 kg (257 lb) 118.4 kg (261 lb)     Vital Signs with Ranges  Temp:  [95.8  F (35.4  C)-100.1  F (37.8  C)] 98.4  F (36.9  C)  Pulse:  [72-83] 80  Resp:  [18-20] 20  BP: (136-172)/(72-90) 136/73  SpO2:  [88 %-94 %] 92 %  I/O last 3 completed shifts:  In: 200 [P.O.:200]  Out: 1300 [Urine:1300]    Constitutional: Awake, alert, cooperative, no apparent distress, morbidly obese  Respiratory: Clear to auscultation bilaterally, no crackles or  wheezing  Cardiovascular: Regular rate and rhythm, normal S1 and S2, and no murmur noted  GI: Normal bowel sounds, soft, non-distended, non-tender  Skin/Integumen: No rashes, no cyanosis, 2+ pitting edema bilateral lower extremities   Neuro : moving all 4 extremities, is noted to have gait instability, and diplopia.    Medications       amLODIPine  5 mg Oral Daily     aspirin  81 mg Oral Daily     atorvastatin  40 mg Oral QPM     carvedilol  12.5 mg Oral BID w/meals     clopidogrel  75 mg Oral Daily     enoxaparin ANTICOAGULANT  40 mg Subcutaneous Q24H     furosemide  60 mg Intravenous Q8H     glimepiride  4 mg Oral BID w/meals     insulin aspart  1-7 Units Subcutaneous TID AC     insulin aspart  1-5 Units Subcutaneous At Bedtime     insulin aspart   Subcutaneous QAM AC     insulin aspart   Subcutaneous Daily with lunch     insulin aspart   Subcutaneous Daily with supper     insulin glargine  22 Units Subcutaneous QPM       Data   Recent Labs   Lab 07/03/20  0433 07/02/20  0610 07/01/20  0548   WBC 9.9  --  8.0   HGB 12.8*  --  12.5*   MCV 91  --  92     --  188    137 138   POTASSIUM 4.3 4.6 4.6   CHLORIDE 104 104 104   CO2 30 31 30   BUN 47* 47* 43*   CR 1.98* 2.12* 2.11*   ANIONGAP 3 2* 4   GABE 8.9 8.9 8.7   * 144* 236*     Recent Labs   Lab 07/03/20  0747 07/03/20  0433 07/02/20  2111 07/02/20  1703 07/02/20  1627 07/02/20  1354 07/02/20  0610  07/01/20  0548   GLC  --  140*  --   --   --   --  144*  --  236*   *  --  157* 170* 166* 190*  --    < >  --     < > = values in this interval not displayed.       Imaging:   Recent Results (from the past 24 hour(s))   MRA Brain ([place]) wo Contrast    Narrative    MR ANGIOGRAM OF THE HEAD WITHOUT CONTRAST   7/3/2020 9:45 AM     COMPARISON: None    HISTORY: Stroke, follow up.    TECHNIQUE:  3D time-of-flight MR angiogram of the head without  contrast. MIP reconstruction of all MR angiographic data was  performed.    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 The A2  segment of left anterior cerebral artery is supplied by the patent  anterior communicating artery. The bilateral distal internal carotid,  basilar, azygos anterior cerebral, bilateral middle cerebral and  bilateral posterior cerebral arteries are patent and unremarkable with  no evidence for cerebral artery stenosis or aneurysm.       Impression    IMPRESSION:  Normal MR angiogram of the head.      KAYDEN MCGEE MD   MRA Neck (Carotids) wo Contrast    Narrative    MRA NECK WITHOUT CONTRAST  7/3/2020 9:45 AM     COMPARISON: None.    HISTORY: Stroke, follow up.     TECHNIQUE: 2D time-of-flight MR angiogram of the neck without  contrast.  MIP reconstruction of all MR angiographic data was  performed. Estimates of carotid stenoses are made relative to the  distal internal carotid artery diameters except as noted.    FINDINGS:    Carotids: The common carotid arteries bilaterally are widely patent.  The cervical internal carotid arteries bilaterally are patent without  stenosis.    Vertebrals: The vertebral arteries bilaterally are patent without  stenosis and demonstrate antegrade flow.    Aortic arch: The arteries as they arise from the aortic arch are  normally arranged with no evidence for stenosis.      Impression    IMPRESSION:    Normal MR angiogram of the neck.     KAYDEN MCGEE MD

## 2020-07-03 NOTE — PROGRESS NOTES
D: SW continuing to follow for discharge planning.    I/A: Clinically accepted at ARU. Bed available Sunday. PT and OT have both evaluated pt.     MING spoke with [employer] rep for [employer] to provide updated clinical information to insurance authorization request, [phone], case #[id number].    P: ARU Sunday if cardiac workup resolved. Per pt, brother will transport. MING provided ARU directions for drop off/admission at ARU. SW will continue to follow.     1500: [facility] authorization approved. # [id number]  7/3-7/7. Faxed to [facility] (f) [phone].     Amanda Ruano, AARON   Inpatient Care Coordination  [facility]   [phone]

## 2020-07-03 NOTE — PLAN OF CARE
OT- Evaluation and treatment initiated. Pt lives independently in a rambler style home. Prior pt independent in all ADLs and most IADLs.   Discharge Planner OT   Patient plan for discharge: rehab   Current status: While seated/standing pt completed LE dressing with moderate assist, set up, and extra time. Pt unsteady during dressing task.  During toileting tasks, pt completed clothing management with moderate assist. Pt completed 1 hygiene task with minimum-moderate assist while standing at the sink, pt demonstrated difficulty dynamic standing balance.   Barriers to return to prior living situation: lives alone, current level of A for I/ADLs, vision impairment   Recommendations for discharge: ARU  Rationale for recommendations: Pt is below baseline in I/ADLs, and will benefit from skilled therapy to address safety and independence in I/ADLs. Anticipate pt will tolerate 3 hours of therapy, daily.        Entered by: Grant Carreon 07/03/2020 11:00 AM

## 2020-07-04 ENCOUNTER — APPOINTMENT (OUTPATIENT)
Dept: PHYSICAL THERAPY | Facility: CLINIC | Age: 75
DRG: 064 | End: 2020-07-04
Attending: HOSPITALIST
Payer: COMMERCIAL

## 2020-07-04 ENCOUNTER — APPOINTMENT (OUTPATIENT)
Dept: OCCUPATIONAL THERAPY | Facility: CLINIC | Age: 75
DRG: 064 | End: 2020-07-04
Attending: HOSPITALIST
Payer: COMMERCIAL

## 2020-07-04 LAB
ANION GAP SERPL CALCULATED.3IONS-SCNC: 3 MMOL/L (ref 3–14)
BUN SERPL-MCNC: 50 MG/DL (ref 7–30)
CALCIUM SERPL-MCNC: 9 MG/DL (ref 8.5–10.1)
CHLORIDE SERPL-SCNC: 101 MMOL/L (ref 94–109)
CO2 SERPL-SCNC: 31 MMOL/L (ref 20–32)
CREAT SERPL-MCNC: 2.04 MG/DL (ref 0.66–1.25)
GFR SERPL CREATININE-BSD FRML MDRD: 31 ML/MIN/{1.73_M2}
GLUCOSE BLDC GLUCOMTR-MCNC: 166 MG/DL (ref 70–99)
GLUCOSE BLDC GLUCOMTR-MCNC: 185 MG/DL (ref 70–99)
GLUCOSE BLDC GLUCOMTR-MCNC: 206 MG/DL (ref 70–99)
GLUCOSE BLDC GLUCOMTR-MCNC: 263 MG/DL (ref 70–99)
GLUCOSE BLDC GLUCOMTR-MCNC: 292 MG/DL (ref 70–99)
GLUCOSE SERPL-MCNC: 176 MG/DL (ref 70–99)
POTASSIUM SERPL-SCNC: 4.1 MMOL/L (ref 3.4–5.3)
SODIUM SERPL-SCNC: 135 MMOL/L (ref 133–144)

## 2020-07-04 PROCEDURE — 97535 SELF CARE MNGMENT TRAINING: CPT | Mod: GO

## 2020-07-04 PROCEDURE — 25000132 ZZH RX MED GY IP 250 OP 250 PS 637: Performed by: INTERNAL MEDICINE

## 2020-07-04 PROCEDURE — 12000011 ZZH R&B MS OVERFLOW

## 2020-07-04 PROCEDURE — 00000146 ZZHCL STATISTIC GLUCOSE BY METER IP

## 2020-07-04 PROCEDURE — 97530 THERAPEUTIC ACTIVITIES: CPT | Mod: GP | Performed by: PHYSICAL THERAPY ASSISTANT

## 2020-07-04 PROCEDURE — 25000131 ZZH RX MED GY IP 250 OP 636 PS 637: Performed by: PHYSICIAN ASSISTANT

## 2020-07-04 PROCEDURE — 99232 SBSQ HOSP IP/OBS MODERATE 35: CPT | Performed by: INTERNAL MEDICINE

## 2020-07-04 PROCEDURE — 80048 BASIC METABOLIC PNL TOTAL CA: CPT | Performed by: INTERNAL MEDICINE

## 2020-07-04 PROCEDURE — 36415 COLL VENOUS BLD VENIPUNCTURE: CPT | Performed by: INTERNAL MEDICINE

## 2020-07-04 PROCEDURE — 25000132 ZZH RX MED GY IP 250 OP 250 PS 637: Performed by: PHYSICIAN ASSISTANT

## 2020-07-04 PROCEDURE — 25000128 H RX IP 250 OP 636: Performed by: INTERNAL MEDICINE

## 2020-07-04 RX ORDER — TORSEMIDE 20 MG/1
20 TABLET ORAL DAILY
Status: DISCONTINUED | OUTPATIENT
Start: 2020-07-04 | End: 2020-07-06 | Stop reason: HOSPADM

## 2020-07-04 RX ORDER — ACETAMINOPHEN 325 MG/1
650 TABLET ORAL EVERY 4 HOURS PRN
Status: DISCONTINUED | OUTPATIENT
Start: 2020-07-04 | End: 2020-07-06 | Stop reason: HOSPADM

## 2020-07-04 RX ADMIN — CLOPIDOGREL BISULFATE 75 MG: 75 TABLET ORAL at 08:12

## 2020-07-04 RX ADMIN — ASPIRIN 81 MG: 81 TABLET, COATED ORAL at 08:12

## 2020-07-04 RX ADMIN — ACETAMINOPHEN 650 MG: 325 TABLET, FILM COATED ORAL at 12:26

## 2020-07-04 RX ADMIN — HEPARIN SODIUM 5000 UNITS: 10000 INJECTION, SOLUTION INTRAVENOUS; SUBCUTANEOUS at 00:53

## 2020-07-04 RX ADMIN — ACETAMINOPHEN 650 MG: 325 TABLET, FILM COATED ORAL at 08:12

## 2020-07-04 RX ADMIN — INSULIN ASPART 5 UNITS: 100 INJECTION, SOLUTION INTRAVENOUS; SUBCUTANEOUS at 13:29

## 2020-07-04 RX ADMIN — CARVEDILOL 15.62 MG: 12.5 TABLET, FILM COATED ORAL at 18:00

## 2020-07-04 RX ADMIN — FUROSEMIDE 60 MG: 10 INJECTION, SOLUTION INTRAMUSCULAR; INTRAVENOUS at 00:53

## 2020-07-04 RX ADMIN — CARVEDILOL 12.5 MG: 12.5 TABLET, FILM COATED ORAL at 08:12

## 2020-07-04 RX ADMIN — ATORVASTATIN CALCIUM 40 MG: 40 TABLET, FILM COATED ORAL at 20:44

## 2020-07-04 RX ADMIN — FUROSEMIDE 60 MG: 10 INJECTION, SOLUTION INTRAMUSCULAR; INTRAVENOUS at 08:40

## 2020-07-04 RX ADMIN — INSULIN ASPART 1 UNITS: 100 INJECTION, SOLUTION INTRAVENOUS; SUBCUTANEOUS at 08:38

## 2020-07-04 RX ADMIN — INSULIN GLARGINE 22 UNITS: 100 INJECTION, SOLUTION SUBCUTANEOUS at 20:44

## 2020-07-04 RX ADMIN — TORSEMIDE 20 MG: 20 TABLET ORAL at 13:34

## 2020-07-04 RX ADMIN — INSULIN ASPART 2 UNITS: 100 INJECTION, SOLUTION INTRAVENOUS; SUBCUTANEOUS at 13:30

## 2020-07-04 RX ADMIN — INSULIN ASPART 3 UNITS: 100 INJECTION, SOLUTION INTRAVENOUS; SUBCUTANEOUS at 18:26

## 2020-07-04 RX ADMIN — GLIMEPIRIDE 4 MG: 4 TABLET ORAL at 08:13

## 2020-07-04 RX ADMIN — AMLODIPINE BESYLATE 5 MG: 5 TABLET ORAL at 08:12

## 2020-07-04 RX ADMIN — HEPARIN SODIUM 5000 UNITS: 10000 INJECTION, SOLUTION INTRAVENOUS; SUBCUTANEOUS at 12:26

## 2020-07-04 RX ADMIN — GLIMEPIRIDE 4 MG: 4 TABLET ORAL at 18:00

## 2020-07-04 ASSESSMENT — MIFFLIN-ST. JEOR: SCORE: 1889.32

## 2020-07-04 NOTE — PROGRESS NOTES
[facility]    Hospitalist Progress Note    Assessment & Plan   Burton Elizabeth is a 75 year old male who was admitted on 6/30/2020.   Patient with multiple medical problems was admitted for vertigo, dizziness and nystagmus.  Cerebrovascular accident    --- MRI of the brain indicated recent ischemic infarct at the pontomedullary junction, patient has ongoing diplopia, he has improved with the eye patch on the left.  ---MRI of the anterior and posterior circulation of the brain obtained, no abnormality identified steroids here.  ---PT/OT to evaluate patient, plan for acute rehab transfer noted.  Social work involved.  --- Neurology consult placed, continue aspirin, will place on 81 mg aspirin with 75 mg of Plavix.  Continue statin.,  Coreg dose increased to 15.625 mg p.o. twice daily to help with the blood pressure    Acute decompensated systolic and diastolic heart failure  Acute respiratory failure with hypoxia  ---Has 12 pounds of weight gain over last 4 days, his torsemide was on hold, his echocardiogram done on 4/22/2020 showed EF of 45 to 50% age with moderate aortic stenosis.  ---Wean oxygen down as possible, started on IV Lasix 40 mg 3 times daily for 3-4 doses  Appears euvolemic today so will restart his torsemide 20 mg p.o. daily as per cardiology's recommendation  ---Nonsustained V. tach  ---50 beats noted, monitor electrolytes mostly potassium and magnesium and replace as needed.  ---Appreciate cardiology input, plan is to monitor on telemetry, if no more V. tach events noted can discharge home on the event monitor, if any further V. tach noted will need electrophysiology consult prior to discharge.  No further episodes of V. tach noted.  Needs 30-day event monitor on discharge    CAD:   ---no current chest pain or shortness of breath. H/o cardiac workup prior to knee replacement in 08/2019 where patient underwent left heart cath showing 99% stenosis of the proximal circumflex with only  moderate disease of other vascular territories. He has been medically managed for this. Troponin undetectable at [facility] room prior to admission  .  DM2: most recent HgbA1c of 7.0 in 04/2020.   - continue PTA Glimepiride   - meal time insulin with 1 unit per 15 grams carbohydrate  -Continue 22 units of Lantus every evening  - medium sliding scale insulin with meals, blood sugars have been stable.     HTN: stable, continue PTA Amlodipine and Carvedilol.  Increase carvedilol dose to 15.65 mg today     CKD: baseline creatinine around 2.0 with creatinine of 2.11  - continue to follow BMP, stable     HLP: continue PTA Atorvastatin      Asymptomatic COVID: Negative  DVT Prophylaxis: We will place on heparin  Code Status: Full Code     Disposition: Expected discharge possibly Sunday to acute rehab versus transfer to [facility] in case if he develops any further nonsustained V. tach episodes.    Christiane Adler MD      Interval History   Dizziness is improving.  Still feels unsteady on his feet.  No further episodes of V. tach noted.  Diuresed well with IV Lasix.     -Data reviewed today: I reviewed all new labs and imaging results over the last 24 hours. I personally reviewed Labs and imaging as below.    Physical Exam   Temp: 99.2  F (37.3  C) Temp src: Oral BP: (!) 169/91 Pulse: 72   Resp: 16 SpO2: 95 % O2 Device: None (Room air) Oxygen Delivery: 2 LPM  Vitals:    07/01/20 0623 07/03/20 0612 07/04/20 0720   Weight: 116.6 kg (257 lb) 118.4 kg (261 lb) (P) 115.9 kg (255 lb 8 oz)     Vital Signs with Ranges  Temp:  [97.1  F (36.2  C)-99.5  F (37.5  C)] 99.2  F (37.3  C)  Pulse:  [70-76] 72  Resp:  [16-18] 16  BP: (149-169)/(82-91) 169/91  SpO2:  [93 %-95 %] 95 %  I/O last 3 completed shifts:  In: 1900 [P.O.:1900]  Out: 2800 [Urine:2800]    Constitutional: Awake, alert, cooperative, no apparent distress, morbidly obese  Respiratory: Clear to auscultation bilaterally, no crackles or wheezing  Cardiovascular: Regular rate and  rhythm, normal S1 and S2, and no murmur noted  GI: Normal bowel sounds, soft, non-distended, non-tender  Skin/Integumen: No rashes, no cyanosis, 2+ pitting edema bilateral lower extremities   Neuro : moving all 4 extremities, is noted to have gait instability.    Medications       amLODIPine  5 mg Oral Daily     aspirin  81 mg Oral Daily     atorvastatin  40 mg Oral QPM     carvedilol  15.625 mg Oral BID w/meals     clopidogrel  75 mg Oral Daily     glimepiride  4 mg Oral BID w/meals     heparin ANTICOAGULANT  5,000 Units Subcutaneous Q12H     insulin aspart  1-7 Units Subcutaneous TID AC     insulin aspart  1-5 Units Subcutaneous At Bedtime     insulin aspart   Subcutaneous QAM AC     insulin aspart   Subcutaneous Daily with lunch     insulin aspart   Subcutaneous Daily with supper     insulin glargine  22 Units Subcutaneous QPM     torsemide  20 mg Oral Daily       Data   Recent Labs   Lab 07/04/20  0523 07/03/20  1243 07/03/20  0433 07/02/20  0610 07/01/20  0548   WBC  --   --  9.9  --  8.0   HGB  --   --  12.8*  --  12.5*   MCV  --   --  91  --  92   PLT  --  168 178  --  188     --  137 137 138   POTASSIUM 4.1  --  4.3 4.6 4.6   CHLORIDE 101  --  104 104 104   CO2 31  --  30 31 30   BUN 50*  --  47* 47* 43*   CR 2.04*  --  1.98* 2.12* 2.11*   ANIONGAP 3  --  3 2* 4   GABE 9.0  --  8.9 8.9 8.7   *  --  140* 144* 236*     Recent Labs   Lab 07/04/20  0751 07/04/20  0523 07/04/20  0136 07/03/20  2148 07/03/20  1648 07/03/20  1226  07/03/20  0433  07/02/20  0610  07/01/20  0548   GLC  --  176*  --   --   --   --   --  140*  --  144*  --  236*   *  --  185* 211* 170* 229*   < >  --    < >  --    < >  --     < > = values in this interval not displayed.       Imaging:   No results found for this or any previous visit (from the past 24 hour(s)).

## 2020-07-04 NOTE — PLAN OF CARE
"BP (!) 154/82 (BP Location: Right arm)   Pulse 70   Temp 98.6  F (37  C) (Oral)   Resp 16   Ht 1.753 m (5' 9\")   Wt 118.4 kg (261 lb)   SpO2 94%   BMI 38.54 kg/m      Admit Date: 06/30/2020    Admitting Diagnosis: Vertigo    Pertinent History: ARF, LBBB, HTN, DM2, CAD, CHF    Isolation Precautions: No active isolations    Activity: assistance, stand-by    Pertinent Labs: Creatinine 1.98 (baseline around 2.0)    Assessment: Alert and oriented x 4. Denies pain. Pitting edema to BUE and BLE. Double vision improving. Telemetry monitoring. Running SR with LBBB; HR in 70's.     LDA's: Peripheral    Diet: Regular Diet Adult - strict I&O    Living Situation: Home with wife    Consults: PT, OT, Social Work or Care Coordination, Cardiology and Neurology, and Telestroke    Discharge Disposition: Acute Rehab possibly Sunday 7/5        "

## 2020-07-04 NOTE — PLAN OF CARE
"Vitals:BP (!) 152/82   Pulse 73   Temp 99  F (37.2  C) (Oral)   Resp 16   Ht 1.753 m (5' 9\")   Wt (P) 115.9 kg (255 lb 8 oz)   SpO2 92%   BMI (P) 37.73 kg/m    Labs:Creat 2.04, K 4.1  Cardiac:Tele - SR with 1st degree AVB and BBB, HR 70s per tele tech, Torsemide resumed, Weight improved from 261 lb to 255.2 lb   Resp:WDL, weaned off O2, lungs diminshed  Neuro:Double vision improving per pt.  GI:WDL, LBM 7/4  :WDL  Skin:WDL   Activity:Up SBA with walker  Diet:Low fat, low sodium   Pain:Tylenol for left rib pain   Plan:Acute rehab hopefully tomorrow    "

## 2020-07-04 NOTE — PLAN OF CARE
"Discharge Planner PT   Patient plan for discharge: ARC  Current status: greater than 30 minutes spent with pt in encouragement and listing for mobility . Feels PT and OT are too close together and wants to be at \" his best\" when therapy comes. Education on need to move even when tired  as why to challenge his body and spur on recovery.  Barriers to return to prior living situation:Impaired balance, lives alone, stairs, fall risk   Recommendations for discharge: ARU per plan established by the PT.   Rationale for recommendations: Pt significantly below baseline. Recommend intensive therapies at ARU. Pt would be able to tolerate 3 hours/day.       Entered by: Meg Hinton 07/04/2020 11:42 AM       "

## 2020-07-04 NOTE — PLAN OF CARE
"Vitals:BP (!) 157/82 (BP Location: Right arm)   Pulse 75   Temp 99.5  F (37.5  C) (Oral)   Resp 18   Ht 1.753 m (5' 9\")   Wt 118.4 kg (261 lb)   SpO2 94%   BMI 38.54 kg/m    Labs:K 4.3, Mag 2.1, creat 1.98  Cardiac:Tele - SR with 1st degree AVB and BBB, HR 70s per tele tech, Generalized pitting edema, 60mg IV lasix at 1630  Resp:2L Oxygen Nasal Canula, dropped to 86% on RA this morning  Neuro:Double vision improving per pt.   GI:WDL  :Pt. Has not voided since lasix at 1630, IV checked, blood return noted  Skin:Legs Jesús  Activity:SBA with walker  Diet:Regular  Plan:Acute rehab on Sunday, lasix 60mg IV every 8 hours, neuro checks    "

## 2020-07-04 NOTE — PROGRESS NOTES
[facility] Cardiology Progress Note       Assessment and Plan:   1. Wide complex tachycardia, possibly NSVT, patient asleep during this episode with no symptoms.  Continue beta-blockers.  2. Recent ischemic CVA: Left brainstem infarct, still symptomatic.  3. CAD with mild cardiomyopathy as described above  4. Moderate aortic stenosis  5. DM  6. HTN: Suboptimal control.    We will slightly increase Coreg dose to 15.75 mg p.o. twice daily both for hypertension and NSVT.    Patient does not appear significantly fluid overloaded.  Agree with switching back to home dose of torsemide.            Interval History:                     Medications:     Current Facility-Administered Medications   Medication     acetaminophen (TYLENOL) tablet 650 mg     amLODIPine (NORVASC) tablet 5 mg     aspirin EC tablet 81 mg     atorvastatin (LIPITOR) tablet 40 mg     carvedilol (COREG) tablet 12.5 mg     clopidogrel (PLAVIX) tablet 75 mg     glucose gel 15-30 g    Or     dextrose 50 % injection 25-50 mL    Or     glucagon injection 1 mg     furosemide (LASIX) injection 60 mg     glimepiride (AMARYL) tablet 4 mg     heparin ANTICOAGULANT injection 5,000 Units     insulin aspart (NovoLOG) injection (RAPID ACTING)     insulin aspart (NovoLOG) injection (RAPID ACTING)     insulin aspart (NovoLOG) injection (RAPID ACTING)     insulin aspart (NovoLOG) injection (RAPID ACTING)     insulin aspart (NovoLOG) injection (RAPID ACTING)     insulin glargine (LANTUS) injection 22 Units     melatonin tablet 1 mg     naloxone (NARCAN) injection 0.1-0.4 mg     ondansetron (ZOFRAN-ODT) ODT tab 4 mg    Or     ondansetron (ZOFRAN) injection 4 mg     polyethylene glycol (MIRALAX) Packet 17 g     senna-docusate (SENOKOT-S/PERICOLACE) 8.6-50 MG per tablet 1 tablet    Or     senna-docusate (SENOKOT-S/PERICOLACE) 8.6-50 MG per tablet 2 tablet             Physical Exam:   Blood pressure (!) 169/91, pulse 72, temperature 99.2  F (37.3  C), temperature source  "Oral, resp. rate 16, height 1.753 m (5' 9\"), weight (P) 115.9 kg (255 lb 8 oz), SpO2 95 %.  Wt Readings from Last 4 Encounters:   20 (P) 115.9 kg (255 lb 8 oz)   20 116 kg (255 lb 11.2 oz)   20 115.5 kg (254 lb 9.6 oz)   19 119.6 kg (263 lb 11.2 oz)         Vital Sign Ranges  Temperature Temp  Av.3  F (36.8  C)  Min: 97.1  F (36.2  C)  Max: 99.5  F (37.5  C)   Blood pressure Systolic (24hrs), Av , Min:149 , Max:169        Diastolic (24hrs), Av, Min:82, Max:91      Pulse Pulse  Av.7  Min: 70  Max: 76   Respirations Resp  Av.8  Min: 16  Max: 18   Pulse oximetry SpO2  Av.8 %  Min: 93 %  Max: 95 %         Intake/Output Summary (Last 24 hours) at 2020 0823  Last data filed at 2020 0657  Gross per 24 hour   Intake 1660 ml   Output 2500 ml   Net -840 ml          Cardiovascular:   Normal apical impulse, regular rate and rhythm, normal S1 and S2, 2-3/6 systolic murmur noted   Chest clear.  No peripheral oedema         Data:     Labs:  Lab Results   Component Value Date     2020    Lab Results   Component Value Date    CHLORIDE 101 2020    Lab Results   Component Value Date    BUN 50 2020      Lab Results   Component Value Date    POTASSIUM 4.1 2020    Lab Results   Component Value Date    CO2 31 2020    Lab Results   Component Value Date    CR 2.04 2020        Lab Results   Component Value Date    WBC 9.9 2020    HGB 12.8 (L) 2020    HCT 43.9 2020    MCV 91 2020     2020     Lab Results   Component Value Date    TROPI 0.097 (H) 2020           Attestation:  I have reviewed today's vital signs, notes, medications, labs and imaging.         DR COLT MAYS MD 2020  8:23 AM     "

## 2020-07-04 NOTE — PLAN OF CARE
Discharge Planner OT   Patient plan for discharge: ARU  Current status: While seated/standing, Pt completed LE dressing with moderate assist, extra time, and >3 verbal cues for technique. Pt reported fatigue at the end of session.   Barriers to return to prior living situation: lives alone, current level of A for I/ADLs, vision impairment   Recommendations for discharge: ARU  Rationale for recommendations: Pt is below baseline in I/ADLs, and will benefit from skilled therapy to address safety and independence in I/ADLs. Anticipate pt will tolerate 3 hours of therapy, daily.        Entered by: Grant Carreon 07/04/2020 11:18 AM

## 2020-07-05 LAB
ANION GAP SERPL CALCULATED.3IONS-SCNC: 6 MMOL/L (ref 3–14)
BUN SERPL-MCNC: 56 MG/DL (ref 7–30)
CALCIUM SERPL-MCNC: 9.2 MG/DL (ref 8.5–10.1)
CHLORIDE SERPL-SCNC: 100 MMOL/L (ref 94–109)
CO2 SERPL-SCNC: 29 MMOL/L (ref 20–32)
CREAT SERPL-MCNC: 2.11 MG/DL (ref 0.66–1.25)
ERYTHROCYTE [DISTWIDTH] IN BLOOD BY AUTOMATED COUNT: 16.5 % (ref 10–15)
GFR SERPL CREATININE-BSD FRML MDRD: 30 ML/MIN/{1.73_M2}
GLUCOSE BLDC GLUCOMTR-MCNC: 162 MG/DL (ref 70–99)
GLUCOSE BLDC GLUCOMTR-MCNC: 171 MG/DL (ref 70–99)
GLUCOSE BLDC GLUCOMTR-MCNC: 185 MG/DL (ref 70–99)
GLUCOSE BLDC GLUCOMTR-MCNC: 192 MG/DL (ref 70–99)
GLUCOSE SERPL-MCNC: 184 MG/DL (ref 70–99)
HCT VFR BLD AUTO: 45.2 % (ref 40–53)
HGB BLD-MCNC: 13.3 G/DL (ref 13.3–17.7)
MCH RBC QN AUTO: 26.1 PG (ref 26.5–33)
MCHC RBC AUTO-ENTMCNC: 29.4 G/DL (ref 31.5–36.5)
MCV RBC AUTO: 89 FL (ref 78–100)
PLATELET # BLD AUTO: 201 10E9/L (ref 150–450)
POTASSIUM SERPL-SCNC: 3.9 MMOL/L (ref 3.4–5.3)
RBC # BLD AUTO: 5.09 10E12/L (ref 4.4–5.9)
SODIUM SERPL-SCNC: 135 MMOL/L (ref 133–144)
WBC # BLD AUTO: 9.9 10E9/L (ref 4–11)

## 2020-07-05 PROCEDURE — 99207 ZZC APP CREDIT; MD BILLING SHARED VISIT: CPT | Performed by: INTERNAL MEDICINE

## 2020-07-05 PROCEDURE — 25000132 ZZH RX MED GY IP 250 OP 250 PS 637: Performed by: PHYSICIAN ASSISTANT

## 2020-07-05 PROCEDURE — 85027 COMPLETE CBC AUTOMATED: CPT | Performed by: INTERNAL MEDICINE

## 2020-07-05 PROCEDURE — 25000131 ZZH RX MED GY IP 250 OP 636 PS 637: Performed by: PHYSICIAN ASSISTANT

## 2020-07-05 PROCEDURE — 25000132 ZZH RX MED GY IP 250 OP 250 PS 637: Performed by: INTERNAL MEDICINE

## 2020-07-05 PROCEDURE — 00000146 ZZHCL STATISTIC GLUCOSE BY METER IP

## 2020-07-05 PROCEDURE — 80048 BASIC METABOLIC PNL TOTAL CA: CPT | Performed by: INTERNAL MEDICINE

## 2020-07-05 PROCEDURE — 99239 HOSP IP/OBS DSCHRG MGMT >30: CPT | Performed by: HOSPITALIST

## 2020-07-05 PROCEDURE — 12000011 ZZH R&B MS OVERFLOW

## 2020-07-05 PROCEDURE — 25000128 H RX IP 250 OP 636: Performed by: INTERNAL MEDICINE

## 2020-07-05 PROCEDURE — 99232 SBSQ HOSP IP/OBS MODERATE 35: CPT | Performed by: INTERNAL MEDICINE

## 2020-07-05 PROCEDURE — 36415 COLL VENOUS BLD VENIPUNCTURE: CPT | Performed by: INTERNAL MEDICINE

## 2020-07-05 RX ORDER — CLOPIDOGREL BISULFATE 75 MG/1
75 TABLET ORAL DAILY
Qty: 21 TABLET | Refills: 0 | DISCHARGE
Start: 2020-07-06 | End: 2020-07-06

## 2020-07-05 RX ORDER — CARVEDILOL 3.12 MG/1
15.75 TABLET ORAL 2 TIMES DAILY WITH MEALS
Qty: 120 TABLET | Refills: 0 | DISCHARGE
Start: 2020-07-05 | End: 2020-07-06

## 2020-07-05 RX ORDER — AMLODIPINE BESYLATE 2.5 MG/1
7.5 TABLET ORAL DAILY
Qty: 30 TABLET | Refills: 0 | DISCHARGE
Start: 2020-07-06 | End: 2020-07-06

## 2020-07-05 RX ORDER — AMLODIPINE BESYLATE 2.5 MG/1
2.5 TABLET ORAL ONCE
Status: COMPLETED | OUTPATIENT
Start: 2020-07-05 | End: 2020-07-05

## 2020-07-05 RX ADMIN — INSULIN ASPART 1 UNITS: 100 INJECTION, SOLUTION INTRAVENOUS; SUBCUTANEOUS at 17:08

## 2020-07-05 RX ADMIN — CARVEDILOL 15.62 MG: 12.5 TABLET, FILM COATED ORAL at 18:00

## 2020-07-05 RX ADMIN — AMLODIPINE BESYLATE 5 MG: 5 TABLET ORAL at 07:53

## 2020-07-05 RX ADMIN — GLIMEPIRIDE 4 MG: 4 TABLET ORAL at 16:44

## 2020-07-05 RX ADMIN — CARVEDILOL 15.62 MG: 12.5 TABLET, FILM COATED ORAL at 07:53

## 2020-07-05 RX ADMIN — ATORVASTATIN CALCIUM 40 MG: 40 TABLET, FILM COATED ORAL at 20:38

## 2020-07-05 RX ADMIN — GLIMEPIRIDE 4 MG: 4 TABLET ORAL at 07:53

## 2020-07-05 RX ADMIN — HEPARIN SODIUM 5000 UNITS: 10000 INJECTION, SOLUTION INTRAVENOUS; SUBCUTANEOUS at 01:12

## 2020-07-05 RX ADMIN — ACETAMINOPHEN 650 MG: 325 TABLET, FILM COATED ORAL at 07:59

## 2020-07-05 RX ADMIN — AMLODIPINE BESYLATE 2.5 MG: 2.5 TABLET ORAL at 11:43

## 2020-07-05 RX ADMIN — CLOPIDOGREL BISULFATE 75 MG: 75 TABLET ORAL at 07:53

## 2020-07-05 RX ADMIN — INSULIN ASPART 1 UNITS: 100 INJECTION, SOLUTION INTRAVENOUS; SUBCUTANEOUS at 07:57

## 2020-07-05 RX ADMIN — INSULIN GLARGINE 22 UNITS: 100 INJECTION, SOLUTION SUBCUTANEOUS at 20:37

## 2020-07-05 RX ADMIN — ASPIRIN 81 MG: 81 TABLET, COATED ORAL at 07:54

## 2020-07-05 RX ADMIN — TORSEMIDE 20 MG: 20 TABLET ORAL at 14:24

## 2020-07-05 ASSESSMENT — MIFFLIN-ST. JEOR: SCORE: 1885.69

## 2020-07-05 NOTE — PLAN OF CARE
Discharge canceled by hospitalist due to need for cardiac event monitor per Cardiology note.  Patient needs cardiac event monitor placed prior to discharge to acute rehab facility.

## 2020-07-05 NOTE — DISCHARGE SUMMARY
[facility]  Discharge Summary        Burton Elizabeth MRN# [id number]   YOB: 1945 Age: 75 year old     Date of Admission:  6/30/2020  Date of Discharge:  7/6/2020  Admitting Physician:  Gibson Urias MD  Discharge Physician: Christiane Adler MD  Discharging Service: Hospitalist     Primary Provider: Td Qureshi  Primary Care Physician Phone Number: [phone]         Discharge Diagnoses/Problem Oriented Hospital Course (Providers):    Burton Elizabeth was admitted on 6/30/2020 by Gibson Urias MD and I would refer you to their history and physical.  The following problems were addressed during his hospitalization:  Assessment & Plan     Burton Elizabeth is a 75 year old male who was admitted on 6/30/2020.   Patient with multiple medical problems was admitted for vertigo, dizziness and nystagmus.  Cerebrovascular accident    --- MRI of the brain showed recent ischemic infarct at the left lateral aspect of the brainstem   at the pontomedullary junction,   patient has ongoing diplopia, he has improved with the eye patch on the left.  ---MRA of the anterior and posterior circulation of the brain obtained, no abnormality identified steroids here.MRA neck was negative   ---PT/OT to evaluate patient, plan for acute rehab transfer noted.  Social work involved.  --- Neurology consulted  Loaded  with Plavix 300 mg today, then DAPT with ASA 81 mg + Plavix 75 mg for 21 days, then  mg alone indefinitely   - LDL (102) with goal LDL between 40-70 ; pt reports restarting Lipitor 40 mg recently. Advised to continue taking medication daily. Recheck LDL in outpatient setting.   - A1c (7.0) within goal <7.0 , advised to continue DM management with PCP to keep A1c within goal range    - Goal BP <130/85 with tighter control associated with decreased overall CV risk, if tolerated  Coreg dose increased to 15.625 mg p.o. twice daily to help with the blood pressure  Increased Norvasc from 5  mg to 7.5 mg today for better blood pressure control  Echo with bubble study done EF of 50 to 55% with a negative bubble study, moderate aortic valve stenosis seen. Mid and distal anteroseptal hypokinesis.     Acute decompensated systolic and diastolic heart failure  Acute respiratory failure with hypoxia  ---Has 12 pounds of weight gain over last 4 days, his torsemide was on hold, his echocardiogram done on 4/22/2020 showed EF of 45 to 50% age with moderate aortic stenosis.  ---Wean oxygen down as possible, started on IV Lasix 40 mg 3 times daily for 3-4 doses   restarted his torsemide 20 mg p.o. daily yesterday as per cardiology's recommendation    ---Nonsustained V. tach  ---50 beats noted, monitor electrolytes mostly potassium and magnesium and replace as needed.  ---Appreciate cardiology input, plan is to monitor on telemetry, if no more V. tach events noted can discharge home on the event monitor, if any further V. tach noted will need electrophysiology consult prior to discharge.  No further episodes of V. tach noted.  Needs 30-day Event monitor  on discharge     CAD:   ---no current chest pain or shortness of breath. H/o cardiac workup prior to knee replacement in 08/2019 where patient underwent left heart cath showing 99% stenosis of the proximal circumflex with only moderate disease of other vascular territories. He has been medically managed for this. Troponin undetectable at [facility] room prior to admission  .  DM2: most recent HgbA1c of 7.0 in 04/2020.   - continue PTA Glimepiride   - meal time insulin with 1 unit per 15 grams carbohydrate  -Continue 25 units of Lantus every evening  - medium sliding scale insulin with meals, blood sugars have been stable.     HTN: .  Increased carvedilol dose to 15.65 mg today  Increase Norvasc from 5 mg to 7.5 mg p.o. daily       CKD: baseline creatinine around 2.0 with creatinine of 2.11  - continue to follow BMP, stable     HLP: continue PTA Atorvastatin       Asymptomatic COVID: Negative  DVT Prophylaxis: subcutaneous heparin   Code Status: Full Code      Disposition:  Discharge to acute rehab unit today    Christiane Adler MD   Page [phone]    Addendum: Patient was to discharge to ARU yesterday. He was ready to leave and it was realized his event monitor could not be placed on the weekend. The ARU was going to be unable to place this, so patient remained in the hospital overnight to have it placed today. His night was uneventful. Patient is frustrated with situation, but otherwise is fine without complaints. I did make some medication changes. I increased his amlodipine to 10mg daily (instead of taking 2.5mg x 3 for 7.5mg) and I changed his carvedilol to 12.5 mg BID (instead of taking 3.125mg x 5 tabs). Brother in room. Denies chest pain, sob, abdo pain.         Code Status:      Full Code         Important Results:      See below         Pending Results:        Unresulted Labs Ordered in the Past 30 Days of this Admission     No orders found from 5/31/2020 to 7/1/2020.               Discharge Instructions and Follow-Up:      Follow-up Appointments     Follow Up and recommended labs and tests      Follow-up with neurology in 4 to 6 weeks for acute stroke  Follow-up with cardiology in 4 weeks for nonsustained V. tach with Zio   patch results                  Discharge Disposition:      Discharged to acute rehab unit          Discharge Medications:        Current Discharge Medication List      START taking these medications    Details   clopidogrel (PLAVIX) 75 MG tablet Take 1 tablet (75 mg) by mouth daily  Qty: 21 tablet, Refills: 0    Comments: Take baby aspirin and Plavix for 21 days and then stop Plavix and increase aspirin dosage to regular aspirin 325 mg p.o. daily  Associated Diagnoses: Cerebrovascular accident (CVA) due to thrombosis of cerebral artery (H)         CONTINUE these medications which have CHANGED    Details   amLODIPine (NORVASC) 2.5 MG tablet  Take 3 tablets (7.5 mg) by mouth daily  Qty: 30 tablet, Refills: 0    Associated Diagnoses: Coronary artery disease involving native coronary artery of native heart with angina pectoris (H)      aspirin (ASA) 81 MG EC tablet Take 1 tablet (81 mg) by mouth daily  Qty: 21 tablet    Comments: Take baby aspirin and Plavix for 21 days then stop Plavix and increase his aspirin dosage to regular 325 mg p.o. daily  Associated Diagnoses: Cerebrovascular accident (CVA) due to thrombosis of cerebral artery (H)      carvedilol (COREG) 3.125 MG tablet Take 5 tablets (15.75 mg) by mouth 2 times daily (with meals)  Qty: 120 tablet, Refills: 0    Associated Diagnoses: Acute systolic congestive heart failure (H)      insulin glargine (LANTUS PEN) 100 UNIT/ML pen Inject 25 Units Subcutaneous every evening (Patient states he bases his dose off of blood glucose and how much Novolog he has used throughout the day but did not give exact regimen).    Comments: If Lantus is not covered by insurance, may substitute Basaglar at same dose and frequency.    Associated Diagnoses: Type 2 diabetes mellitus with hyperosmolarity without coma, without long-term current use of insulin (H)         CONTINUE these medications which have NOT CHANGED    Details   glimepiride (AMARYL) 4 MG tablet Take 4 mg by mouth 2 times daily       insulin aspart (NOVOLOG FLEXPEN) 100 UNIT/ML pen Inject 2-6 Units Subcutaneous 3 times daily (with meals) (Patient states he bases his dose off of carb counting and blood glucose but did not give exact regimen).      multivitamin, therapeutic with minerals (THERA-VIT-M) TABS Take 1 tablet by mouth daily      torsemide (DEMADEX) 20 MG tablet Take 20 mg by mouth daily       atorvastatin (LIPITOR) 40 MG tablet Take 1 tablet (40 mg) by mouth every evening  Qty: 30 tablet, Refills: 3    Associated Diagnoses: Type 2 diabetes mellitus with hyperosmolarity without coma, without long-term current use of insulin (H)      blood glucose  "(NO BRAND SPECIFIED) test strip Use to test blood sugar as directed      order for DME Equipment being ordered: Walker Wheels () and Walker ()  Treatment Diagnosis: Impaired gait  Qty: 1 each, Refills: 0    Associated Diagnoses: Status post total right knee replacement                  Allergies:         Allergies   Allergen Reactions     Penicillins Anaphylaxis            Consultations This Hospital Stay:      Consultation during this admission received from cardiology and neurology.  PT OT, speech pathology consultations requested         Condition and Physical Exam on Discharge:      Discharge condition: Stable   Discharge vitals: Blood pressure (!) 169/92, pulse 76, temperature 98.8  F (37.1  C), temperature source Oral, resp. rate 20, height 1.753 m (5' 9\"), weight 115.5 kg (254 lb 11.2 oz), SpO2 92 %.     Constitutional:  Alert awake, not in any acute distress   Lungs:  Clear to auscultation, no rales rhonchi or wheezing   Cardiovascular:  Normal rate rhythm, 2+ murmur heard on auscultation   Abdomen:  Soft, nontender, nondistended, bowel sounds positive   Skin:  Warm and dry   Other:            Discharge Orders for Skilled Facility (from Discharge Orders):        After Care Instructions     Activity - Up with nursing assistance      Advance Diet as Tolerated      Follow this diet upon discharge: Orders Placed This Encounter      Low Saturated Fat Na <2400 mg and moderate carb controlled diet         Fall precautions      General info for SNF      Length of Stay Estimate: Short Term Care: Estimated # of Days <30  Condition at Discharge: Stable  Level of care:skilled   Rehabilitation Potential: Good  Admission H&P remains valid and up-to-date: Yes  Recent Chemotherapy: N/A  Use Nursing Home Standing Orders: Yes             Future tests that were ordered for you     Bello Almazan Holter 24 hour Adult Pediatric               Rehab orders for Skilled Facility (from Discharge Orders):      Referrals     " Future Labs/Procedures    Occupational Therapy Adult Consult     Comments:    Evaluate and treat as clinically indicated.    Reason:  Acte stroke    Physical Therapy Adult Consult     Comments:    Evaluate and treat as clinically indicated.    Reason:  Acute stroke             Discharge Time:      Greater than 30 minutes.        Image Results From This Hospital Stay (For Non-EPIC Providers):        Results for orders placed or performed during the hospital encounter of 06/30/20   XR Chest 2 Views    Narrative    EXAM: XR CHEST 2 VW  LOCATION: [facility]  DATE/TIME: 7/1/2020 5:42 PM    INDICATION: Hypoxia, recent vomiting, possible heart failure exacerbation.  COMPARISON: 04/22/2020      Impression    IMPRESSION: Low lung volumes. Cardiomegaly. Pulmonary vascularity normal. Blunting of the left costophrenic angle may represent some fluid or thickened pleura. Minimal atelectasis in the bases. Upper lung fields are clear.   MR Brain w/o Contrast    Narrative    MRI OF THE BRAIN WITHOUT CONTRAST 7/1/2020 5:27 PM     COMPARISON: None.    HISTORY:  Focal neuro deficit, > 6 hrs, stroke suspected. Dizziness,  double vision, unsteady gait.     TECHNIQUE:   Brain: Axial diffusion-weighted with ADC map, T2-weighted with fat  saturation, T1-weighted and turboFLAIR and sagittal T1-weighted images  of the brain were obtained without intravenous contrast.     FINDINGS: There is a recent small ischemic infarct at the left lateral  aspect of the brainstem at the pontomedullary junction. No other  recent ischemic infarcts noted.    There is moderate diffuse cerebral volume loss. There are numerous  scattered focal and minimal patchy periventricular areas of abnormal  T2 signal hyperintensity in the cerebral white matter bilaterally that  are consistent with sequela of chronic small vessel ischemic disease.  The ventricles and basal cisterns are within normal limits in  configuration given the degree of cerebral volume  loss.  There is no  midline shift.  There are no extra-axial fluid collections.  There is  no evidence for acute intracranial hemorrhage.    There is no sinusitis or mastoiditis.      Impression    IMPRESSION:    1. Recent ischemic infarct at the left lateral aspect of the brainstem  at the pontomedullary junction. No other recent infarcts.  2. Diffuse cerebral volume loss and cerebral white matter changes  consistent with chronic small vessel ischemic disease.    KAYDEN MCGEE MD   MRA Brain ([facility]) wo Contrast    Narrative    MR ANGIOGRAM OF THE HEAD WITHOUT CONTRAST   7/3/2020 9:45 AM     COMPARISON: None    HISTORY: Stroke, follow up.    TECHNIQUE:  3D time-of-flight MR angiogram of the head without  contrast. MIP reconstruction of all MR angiographic data was  performed.    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 The A2  segment of left anterior cerebral artery is supplied by the patent  anterior communicating artery. The bilateral distal internal carotid,  basilar, azygos anterior cerebral, bilateral middle cerebral and  bilateral posterior cerebral arteries are patent and unremarkable with  no evidence for cerebral artery stenosis or aneurysm.       Impression    IMPRESSION:  Normal MR angiogram of the head.      KAYDEN MCGEE MD   MRA Neck (Carotids) wo Contrast    Narrative    MRA NECK WITHOUT CONTRAST  7/3/2020 9:45 AM     COMPARISON: None.    HISTORY: Stroke, follow up.     TECHNIQUE: 2D time-of-flight MR angiogram of the neck without  contrast.  MIP reconstruction of all MR angiographic data was  performed. Estimates of carotid stenoses are made relative to the  distal internal carotid artery diameters except as noted.    FINDINGS:    Carotids: The common carotid arteries bilaterally are widely patent.  The cervical internal carotid arteries bilaterally are patent without  stenosis.    Vertebrals: The  vertebral arteries bilaterally are patent without  stenosis and demonstrate antegrade flow.    Aortic arch: The arteries as they arise from the aortic arch are  normally arranged with no evidence for stenosis.      Impression    IMPRESSION:    Normal MR angiogram of the neck.     KAYDEN MCGEE MD   Echocardiogram Limited    Narrative    [id number]  [id number]  [id number]  [id number]^TIGRE^BISHOP           [facility]  Echocardiography Laboratory  [address] [place] [address]        Name: TAY PAGE  MRN: [id number]  : 1945  Study Date: 2020 09:18 AM  Age: 75 yrs  Gender: Male  Patient Location[facility]  Reason For Study: CVA  Ordering Physician: BISHOP LANDEROS  Referring Physician: Td Qureshi  Performed By: Pina Vidal RDCS     BSA: 2.3 m2  Height: 69 in  Weight: 257 lb  HR: 84  BP: 152/76 mmHg  _____________________________________________________________________________  __        Procedure  Limited Portable Bubble Echo Adult. Optison (NDC #7085-1444) given  intravenously.  _____________________________________________________________________________  __        Interpretation Summary     Left ventricular systolic function is low normal.  The visual ejection fraction is estimated at 50-55%.  Mid and distal anteroseptal hypokinesis.  Moderate valvular aortic stenosis.  The peak AoV pressure gradient is 56mmHg.  Detailed SID calculations wre not performed. AS unchanged compared to prior  study from . The study was technically limited.  _____________________________________________________________________________  __        Left Ventricle  The left ventricle is normal in size. A sigmoid septum is present. Left  ventricular systolic function is low normal. The visual ejection fraction is  estimated at 50-55%. Mid and distal anteroseptal hypokinesis.     Right Ventricle  The right ventricle is normal size. The right ventricular systolic function is  normal.     Atria  There  is no color Doppler evidence of an atrial shunt. A contrast injection  (Bubble Study) was performed that was negative for flow across the interatrial  septum. A Valsalva maneuver was performed.     Mitral Valve  There is mild mitral annular calcification. The mitral valve leaflets are  mildly thickened. There is trace mitral regurgitation.        Aortic Valve  The aortic valve is trileaflet. Moderate valvular aortic stenosis. The peak  AoV pressure gradient is 56mmHg.     Pulmonic Valve  The pulmonic valve is not well visualized.     Vessels  The aortic root is normal size.     Pericardium  There is no pericardial effusion.     Rhythm  Sinus rhythm was noted.     _____________________________________________________________________________  __        Doppler Measurements & Calculations  Ao V2 max: 375.6 cm/sec  Ao max P.0 mmHg              _____________________________________________________________________________  __           Report approved by: Dominique Paul 2020 10:47 AM                Most Recent Lab Results In EPIC (For Non-EPIC Providers):    Most Recent 3 CBC's:  Recent Labs   Lab Test 20  0555 20  1243 20  0433 20  0548   WBC 9.9  --  9.9 8.0   HGB 13.3  --  12.8* 12.5*   MCV 89  --  91 92    168 178 188      Most Recent 3 BMP's:  Recent Labs   Lab Test 20  0555 20  0523 20  0433    135 137   POTASSIUM 3.9 4.1 4.3   CHLORIDE 100 101 104   CO2 29 31 30   BUN 56* 50* 47*   CR 2.11* 2.04* 1.98*   ANIONGAP 6 3 3   GABE 9.2 9.0 8.9   * 176* 140*     Most Recent 3 Troponin's:  Recent Labs   Lab Test 20  0031 20  1812 20  0917   TROPI 0.097* 0.101* 0.085*     Most Recent 3 INR's:  Recent Labs   Lab Test 20  0917 19  0741 19  0801   INR 1.12 1.41* 1.94*     Most Recent 2 LFT's:  Recent Labs   Lab Test 20  0917 19  1009   AST 61* 61*   ALT 61 67   ALKPHOS 128 237*   BILITOTAL 0.3 0.4     Most  Recent Cholesterol Panel:  Recent Labs   Lab Test 04/23/20  0549   CHOL 188   *   HDL 31*   TRIG 277*     Most Recent 6 Bacteria Isolates From Any Culture (See EPIC Reports for Culture Details):No lab results found.  Most Recent TSH, T4 and HgbA1c:  Recent Labs   Lab Test 04/22/20  1812   TSH 1.08   A1C 7.0*

## 2020-07-05 NOTE — PLAN OF CARE
Patient very upset regarding change in discharge plan.  He is refusing to put his telemetry monitor back on at this time.

## 2020-07-05 NOTE — PLAN OF CARE
OT: Attempted to see pt for OT POC.  Pt in process of discharging but may be staying due to needed a cardiac monitor on at discharge.  Upset about this change and how it might affect discharge to ARU.  Will hold session at this time.

## 2020-07-05 NOTE — PLAN OF CARE
PT- not discharging to ARC today. On recheck refusing RN cares and meals. Will attempt as able per POC.

## 2020-07-05 NOTE — PLAN OF CARE
Care from [id number]    Temp: 99.5  F (37.5  C) Temp src: Oral BP: 133/72 Pulse: 73   Resp: 16 SpO2: 90 % O2 Device: None (Room air)      Patient is alert and oriented x 4. Vital signs stable. Low grade fever all day ranging between 99.2-99.7. Provider updated and ordered CBC for tomorrow 7/5. Tylenol not given at this time for low grade fever due to decreased kidney function and patient is not uncomfortable. Will continue to monitor temp. SBA with walker. Now on RA and O2 is at 90%. Lung sounds diminished bilaterally. No cough. Last BM today. Tolerating low fat/low sodium diet. Edema to BLE and BUE but is improving. On telemetry monitoring. Per tele tech, SR with 1st degree AV block and LBBB; HR in 70's. Plan is to discharge to acute rehab tomorrow 7/5.    Gayathri Rico RN on 7/4/2020 at 10:45 PM

## 2020-07-05 NOTE — PLAN OF CARE
PT- planned discharge per MD to [facility] this date. Per conversation with Rn ok to no see as leaving soon. Per chart review goals were not met. Recommend to Pt for discharge based on the above information.

## 2020-07-05 NOTE — PROGRESS NOTES
Spoke with [facility] who reported they will able to take patient. Writer asked if they will be able to do cardiac event monitor but they noted they only able to do Zio patch or Holter. ARU able to accept him anytime after 1300.   Updated charge nurse.

## 2020-07-05 NOTE — PLAN OF CARE
"Vitals:BP (!) 142/75   Pulse 74   Temp 99.5  F (37.5  C) (Oral)   Resp 16   Ht 1.753 m (5' 9\")   Wt 115.5 kg (254 lb 11.2 oz)   SpO2 90%   BMI 37.61 kg/m    Labs:Glucose 171  Cardiac:Refusing telemetry, torsemide resumed, pt. Continues to have edema, weight improved from 115.89 to 115.5  Resp:WDL 92-93% on RA, IS encouraged  Neuro:Double vision persists, patch for right eye, no new neurological symptoms  GI:WDL  :WDL  Skin:Scattered bruises  Activity: SBA with walker  Diet:WDL  Pain:WDL  Plan:Discharge to acute rehab tomorrow, not accepted today as event monitor cannot be placed at the rehab facility     "

## 2020-07-05 NOTE — PROGRESS NOTES
"[facility] Cardiology Progress Note       Assessment and Plan:   1. Wide complex tachycardia, possibly NSVT.  2. Recent ischemic CVA: Left brainstem infarct, still symptomatic with dizziness.  3. CAD with mild cardiomyopathy as described above  4. Moderate aortic stenosis  5. DM  6. HTN: Suboptimal control.  Further managed by hospitalist.    No further episodes of NSVT.  Will sign off.         Interval History:   Resting comfortably, no chest pain/shortness of breath/palpitations.                  Medications:     Current Facility-Administered Medications   Medication     acetaminophen (TYLENOL) tablet 650 mg     amLODIPine (NORVASC) tablet 2.5 mg     [START ON 7/6/2020] amLODIPine (NORVASC) tablet 7.5 mg     aspirin EC tablet 81 mg     atorvastatin (LIPITOR) tablet 40 mg     carvedilol (COREG) tablet 15.625 mg     clopidogrel (PLAVIX) tablet 75 mg     glucose gel 15-30 g    Or     dextrose 50 % injection 25-50 mL    Or     glucagon injection 1 mg     glimepiride (AMARYL) tablet 4 mg     heparin ANTICOAGULANT injection 5,000 Units     insulin aspart (NovoLOG) injection (RAPID ACTING)     insulin aspart (NovoLOG) injection (RAPID ACTING)     insulin aspart (NovoLOG) injection (RAPID ACTING)     insulin aspart (NovoLOG) injection (RAPID ACTING)     insulin aspart (NovoLOG) injection (RAPID ACTING)     insulin glargine (LANTUS) injection 22 Units     melatonin tablet 1 mg     naloxone (NARCAN) injection 0.1-0.4 mg     ondansetron (ZOFRAN-ODT) ODT tab 4 mg    Or     ondansetron (ZOFRAN) injection 4 mg     polyethylene glycol (MIRALAX) Packet 17 g     senna-docusate (SENOKOT-S/PERICOLACE) 8.6-50 MG per tablet 1 tablet    Or     senna-docusate (SENOKOT-S/PERICOLACE) 8.6-50 MG per tablet 2 tablet     torsemide (DEMADEX) tablet 20 mg             Physical Exam:   Blood pressure (!) 169/92, pulse 76, temperature 98.8  F (37.1  C), temperature source Oral, resp. rate 20, height 1.753 m (5' 9\"), weight 115.5 kg (254 lb " 11.2 oz), SpO2 92 %.  Wt Readings from Last 4 Encounters:   20 115.5 kg (254 lb 11.2 oz)   20 116 kg (255 lb 11.2 oz)   20 115.5 kg (254 lb 9.6 oz)   19 119.6 kg (263 lb 11.2 oz)         Vital Sign Ranges  Temperature Temp  Av.3  F (36.8  C)  Min: 97.1  F (36.2  C)  Max: 99.5  F (37.5  C)   Blood pressure Systolic (24hrs), Av , Min:149 , Max:169        Diastolic (24hrs), Av, Min:82, Max:91      Pulse Pulse  Av.7  Min: 70  Max: 76   Respirations Resp  Av.8  Min: 16  Max: 18   Pulse oximetry SpO2  Av.8 %  Min: 93 %  Max: 95 %         Intake/Output Summary (Last 24 hours) at 2020 0823  Last data filed at 2020 0657  Gross per 24 hour   Intake 1660 ml   Output 2500 ml   Net -840 ml          Cardiovascular:   Normal apical impulse, regular rate and rhythm, normal S1 and S2, 2-3/6 systolic murmur noted   Chest clear.  No peripheral oedema         Data:     Labs:  Lab Results   Component Value Date     2020    Lab Results   Component Value Date    CHLORIDE 101 2020    Lab Results   Component Value Date    BUN 50 2020      Lab Results   Component Value Date    POTASSIUM 4.1 2020    Lab Results   Component Value Date    CO2 31 2020    Lab Results   Component Value Date    CR 2.04 2020        Lab Results   Component Value Date    WBC 9.9 2020    HGB 13.3 2020    HCT 45.2 2020    MCV 89 2020     2020     Lab Results   Component Value Date    TROPI 0.097 (H) 2020           Attestation:  I have reviewed today's vital signs, notes, medications, labs and imaging.         DR COLT MAYS MD 2020  8:23 AM

## 2020-07-05 NOTE — PROGRESS NOTES
[facility]    Hospitalist Progress Note    Date of Service (when I saw the patient): 07/05/2020    Assessment & Plan   Burton Elizabeth is a 75 year old male who was admitted on 6/30/2020.  Burton Elizabeth was admitted on 6/30/2020 by Gibson Urias MD and I would refer you to their history and physical.  The following problems were addressed during his hospitalization:  Assessment & Plan     Burton Elizabeth is a 75 year old male who was admitted on 6/30/2020.   Patient with multiple medical problems was admitted for vertigo, dizziness and nystagmus.  Cerebrovascular accident    --- MRI of the brain showed recent ischemic infarct at the left lateral aspect of the brainstem   at the pontomedullary junction,   patient has ongoing diplopia, he has improved with the eye patch on the left.  ---MRA of the anterior and posterior circulation of the brain obtained, no abnormality identified .MRA neck was negative   ---PT/OT to evaluate patient, plan for acute rehab transfer noted.  Social work involved.  --- Neurology consulted  Loaded  with Plavix 300 mg today, then DAPT with ASA 81 mg + Plavix 75 mg for 21 days, then  mg alone indefinitely   - LDL (102) with goal LDL between 40-70 ; pt reports restarting Lipitor 40 mg recently. Advised to continue taking medication daily. Recheck LDL in outpatient setting.   - A1c (7.0) within goal <7.0 , advised to continue DM management with PCP to keep A1c within goal range    - Goal BP <130/85 with tighter control associated with decreased overall CV risk, if tolerated  Coreg dose increased to 15.625 mg p.o. twice daily to help with the blood pressure  Increased Norvasc from 5 mg to 7.5 mg today for better blood pressure control  Echo with bubble study done EF of 50 to 55% with a negative bubble study, moderate aortic valve stenosis seen. Mid and distal anteroseptal hypokinesis.     Acute decompensated systolic and diastolic heart failure  Acute respiratory  failure with hypoxia  ---Has 12 pounds of weight gain over last 4 days, his torsemide was on hold, his echocardiogram done on 4/22/2020 showed EF of 45 to 50% age with moderate aortic stenosis.  ---Wean oxygen down as possible, started on IV Lasix 40 mg 3 times daily for 3-4 doses   restarted his torsemide 20 mg p.o. daily yesterday as per cardiology's recommendation     ---Nonsustained V. tach  ---50 beats noted, monitor electrolytes mostly potassium and magnesium and replace as needed.  ---Appreciate cardiology input, plan is to monitor on telemetry, if no more V. tach events noted can discharge home on the event monitor, if any further V. tach noted will need electrophysiology consult prior to discharge.  No further episodes of V. tach noted.  Needs 30-day Event monitor  on discharge     CAD:   ---no current chest pain or shortness of breath. H/o cardiac workup prior to knee replacement in 08/2019 where patient underwent left heart cath showing 99% stenosis of the proximal circumflex with only moderate disease of other vascular territories. He has been medically managed for this. Troponin undetectable at [facility] room prior to admission  .  DM2: most recent HgbA1c of 7.0 in 04/2020.   - continue PTA Glimepiride   - meal time insulin with 1 unit per 15 grams carbohydrate  -Continue 25 units of Lantus every evening  - medium sliding scale insulin with meals, blood sugars have been stable.     HTN: .  Increased carvedilol dose to 15.65 mg today  Increase Norvasc from 5 mg to 7.5 mg p.o. daily        CKD: baseline creatinine around 2.0 with creatinine of 2.11  - continue to follow BMP, stable     HLP: continue PTA Atorvastatin      Asymptomatic COVID: Negative  DVT Prophylaxis: subcutaneous heparin   Code Status: Full Code      Disposition:  Discharge to acute rehab unit after event monitor is hooked up         Christiane Adler MD   Page [phone]             Interval History   Still c/o double vision and unsteady on  his feet while walking.  No dizziness today.  No chest pain or shortness of breath.  No further episodes of V. Tach.  Unable to go to acute rehab today as patient needs to be hooked up with the main monitor prior to discharge    -Data reviewed today: I reviewed all new labs and imaging results over the last 24 hours. I personally reviewed no images or EKG's today.    Physical Exam   Temp: 98.8  F (37.1  C) Temp src: Oral BP: 136/68 Pulse: 76   Resp: 20 SpO2: 92 % O2 Device: None (Room air)    Vitals:    07/03/20 0612 07/04/20 0720 07/05/20 0707   Weight: 118.4 kg (261 lb) (P) 115.9 kg (255 lb 8 oz) 115.5 kg (254 lb 11.2 oz)     Vital Signs with Ranges  Temp:  [98.8  F (37.1  C)-99.7  F (37.6  C)] 98.8  F (37.1  C)  Pulse:  [73-76] 76  Resp:  [16-20] 20  BP: (133-169)/(68-92) 136/68  SpO2:  [90 %-92 %] 92 %  I/O last 3 completed shifts:  In: 720 [P.O.:720]  Out: 1450 [Urine:1450]    Constitutional: Awake, alert, cooperative, no apparent distress  Respiratory: Clear to auscultation bilaterally, no crackles or wheezing  Cardiovascular: Regular rate and rhythm, normal S1 and S2, and 2+ murmur noted  GI: Normal bowel sounds, soft, non-distended, non-tender, obese   Skin/Integumen: No rashes, no cyanosis, 1+ edema  Other:     Medications       [START ON 7/6/2020] amLODIPine  7.5 mg Oral Daily     aspirin  81 mg Oral Daily     atorvastatin  40 mg Oral QPM     carvedilol  15.625 mg Oral BID w/meals     clopidogrel  75 mg Oral Daily     glimepiride  4 mg Oral BID w/meals     heparin ANTICOAGULANT  5,000 Units Subcutaneous Q12H     insulin aspart  1-7 Units Subcutaneous TID AC     insulin aspart  1-5 Units Subcutaneous At Bedtime     insulin aspart   Subcutaneous QAM AC     insulin aspart   Subcutaneous Daily with lunch     insulin aspart   Subcutaneous Daily with supper     insulin glargine  22 Units Subcutaneous QPM     torsemide  20 mg Oral Daily       Data   Recent Labs   Lab 07/05/20  0555 07/04/20  0523 07/03/20  1246  07/03/20  0433  07/01/20  0548   WBC 9.9  --   --  9.9  --  8.0   HGB 13.3  --   --  12.8*  --  12.5*   MCV 89  --   --  91  --  92     --  168 178  --  188    135  --  137   < > 138   POTASSIUM 3.9 4.1  --  4.3   < > 4.6   CHLORIDE 100 101  --  104   < > 104   CO2 29 31  --  30   < > 30   BUN 56* 50*  --  47*   < > 43*   CR 2.11* 2.04*  --  1.98*   < > 2.11*   ANIONGAP 6 3  --  3   < > 4   GABE 9.2 9.0  --  8.9   < > 8.7   * 176*  --  140*   < > 236*    < > = values in this interval not displayed.       No results found for this or any previous visit (from the past 24 hour(s)).

## 2020-07-05 NOTE — PLAN OF CARE
"[id number]    Inpatient Progress Note:    BP (!) 149/80 (BP Location: Left arm)   Pulse 73   Temp 98.9  F (37.2  C) (Oral)   Resp 20   Ht 1.753 m (5' 9\")   Wt (P) 115.9 kg (255 lb 8 oz)   SpO2 92%   BMI (P) 37.73 kg/m         Orientation: WNL  Neuro: No changes, patient neuros WNL except for vision.   Pain status: Denies, states he has some side rib pain when transferring.   Activity: SBA ww  Peripheral edema: Patient has moderate-severe edema in hands and feet bilaterally, pitting,  arms and legs edematous as well.   Resp: diminished LS  Cardiac: SR 70s per tele tech  GI: WNL  :  WNL  LDA: PIV SL  Pertinent Labs: creat 2.11, GFR low  Consults: SW and PT  Discharge Plan: Rehab in this AM.     Will continue to monitor and provide cares.     Qi Lake RN        "

## 2020-07-05 NOTE — PROGRESS NOTES
Pt continues to refuse to put on telemetry monitor. Pt reports it's because of the cardiac monitor that he was not able to discharge today. Pt. Understands cardiac monitoring is to watch for V-tach rhythm.

## 2020-07-06 ENCOUNTER — APPOINTMENT (OUTPATIENT)
Dept: CARDIOLOGY | Facility: CLINIC | Age: 75
DRG: 064 | End: 2020-07-06
Attending: HOSPITALIST
Payer: COMMERCIAL

## 2020-07-06 ENCOUNTER — HOSPITAL ENCOUNTER (INPATIENT)
Facility: CLINIC | Age: 75
LOS: 10 days | Discharge: HOME | DRG: 057 | End: 2020-07-16
Attending: PHYSICAL MEDICINE & REHABILITATION | Admitting: PHYSICAL MEDICINE & REHABILITATION
Payer: COMMERCIAL

## 2020-07-06 VITALS
OXYGEN SATURATION: 92 % | TEMPERATURE: 99 F | DIASTOLIC BLOOD PRESSURE: 79 MMHG | HEIGHT: 69 IN | BODY MASS INDEX: 37.92 KG/M2 | HEART RATE: 72 BPM | SYSTOLIC BLOOD PRESSURE: 159 MMHG | WEIGHT: 256 LBS | RESPIRATION RATE: 18 BRPM

## 2020-07-06 DIAGNOSIS — I63.30 CEREBROVASCULAR ACCIDENT (CVA) DUE TO THROMBOSIS OF CEREBRAL ARTERY (H): ICD-10-CM

## 2020-07-06 DIAGNOSIS — I50.42 CHRONIC COMBINED SYSTOLIC AND DIASTOLIC CONGESTIVE HEART FAILURE (H): ICD-10-CM

## 2020-07-06 DIAGNOSIS — M17.10 ARTHRITIS OF KNEE: ICD-10-CM

## 2020-07-06 DIAGNOSIS — I63.9 ACUTE ISCHEMIC STROKE (H): Primary | ICD-10-CM

## 2020-07-06 LAB
GLUCOSE BLDC GLUCOMTR-MCNC: 307 MG/DL (ref 70–99)
GLUCOSE BLDC GLUCOMTR-MCNC: 322 MG/DL (ref 70–99)

## 2020-07-06 PROCEDURE — 25000132 ZZH RX MED GY IP 250 OP 250 PS 637: Performed by: INTERNAL MEDICINE

## 2020-07-06 PROCEDURE — 25000132 ZZH RX MED GY IP 250 OP 250 PS 637: Performed by: PHYSICIAN ASSISTANT

## 2020-07-06 PROCEDURE — 25000128 H RX IP 250 OP 636: Performed by: INTERNAL MEDICINE

## 2020-07-06 PROCEDURE — 12800006 ZZH R&B REHAB

## 2020-07-06 PROCEDURE — 00000146 ZZHCL STATISTIC GLUCOSE BY METER IP

## 2020-07-06 PROCEDURE — 93270 REMOTE 30 DAY ECG REV/REPORT: CPT

## 2020-07-06 PROCEDURE — 97167 OT EVAL HIGH COMPLEX 60 MIN: CPT | Mod: GO | Performed by: OCCUPATIONAL THERAPIST

## 2020-07-06 PROCEDURE — 25000131 ZZH RX MED GY IP 250 OP 636 PS 637: Performed by: PHYSICIAN ASSISTANT

## 2020-07-06 PROCEDURE — 85049 AUTOMATED PLATELET COUNT: CPT | Performed by: INTERNAL MEDICINE

## 2020-07-06 PROCEDURE — 97535 SELF CARE MNGMENT TRAINING: CPT | Mod: GO | Performed by: OCCUPATIONAL THERAPIST

## 2020-07-06 RX ORDER — AMLODIPINE BESYLATE 10 MG/1
10 TABLET ORAL DAILY
Status: DISCONTINUED | OUTPATIENT
Start: 2020-07-07 | End: 2020-07-06

## 2020-07-06 RX ORDER — DEXTROSE MONOHYDRATE 25 G/50ML
25-50 INJECTION, SOLUTION INTRAVENOUS
Status: DISCONTINUED | OUTPATIENT
Start: 2020-07-06 | End: 2020-07-07

## 2020-07-06 RX ORDER — CLOPIDOGREL BISULFATE 75 MG/1
75 TABLET ORAL DAILY
Qty: 18 TABLET | Refills: 0 | Status: ON HOLD | DISCHARGE
Start: 2020-07-06 | End: 2020-07-15

## 2020-07-06 RX ORDER — NICOTINE POLACRILEX 4 MG
15-30 LOZENGE BUCCAL
Status: DISCONTINUED | OUTPATIENT
Start: 2020-07-06 | End: 2020-07-07

## 2020-07-06 RX ORDER — ACETAMINOPHEN 325 MG/1
650 TABLET ORAL EVERY 6 HOURS PRN
Status: DISCONTINUED | OUTPATIENT
Start: 2020-07-06 | End: 2020-07-16 | Stop reason: HOSPADM

## 2020-07-06 RX ORDER — AMLODIPINE BESYLATE 5 MG/1
5 TABLET ORAL DAILY
Status: DISCONTINUED | OUTPATIENT
Start: 2020-07-07 | End: 2020-07-16 | Stop reason: HOSPADM

## 2020-07-06 RX ORDER — CARVEDILOL 25 MG/1
25 TABLET ORAL 2 TIMES DAILY WITH MEALS
Status: DISCONTINUED | OUTPATIENT
Start: 2020-07-06 | End: 2020-07-16 | Stop reason: HOSPADM

## 2020-07-06 RX ORDER — MULTIPLE VITAMINS W/ MINERALS TAB 9MG-400MCG
1 TAB ORAL DAILY
Status: DISCONTINUED | OUTPATIENT
Start: 2020-07-07 | End: 2020-07-16 | Stop reason: HOSPADM

## 2020-07-06 RX ORDER — POLYETHYLENE GLYCOL 3350 17 G/17G
17 POWDER, FOR SOLUTION ORAL DAILY PRN
Status: DISCONTINUED | OUTPATIENT
Start: 2020-07-06 | End: 2020-07-16 | Stop reason: HOSPADM

## 2020-07-06 RX ORDER — CARVEDILOL 12.5 MG/1
12.5 TABLET ORAL 2 TIMES DAILY WITH MEALS
Status: ON HOLD | DISCHARGE
Start: 2020-07-06 | End: 2020-07-15

## 2020-07-06 RX ORDER — ATORVASTATIN CALCIUM 40 MG/1
40 TABLET, FILM COATED ORAL EVERY EVENING
Status: DISCONTINUED | OUTPATIENT
Start: 2020-07-06 | End: 2020-07-16 | Stop reason: HOSPADM

## 2020-07-06 RX ORDER — TORSEMIDE 20 MG/1
20 TABLET ORAL DAILY
Status: DISCONTINUED | OUTPATIENT
Start: 2020-07-06 | End: 2020-07-16 | Stop reason: HOSPADM

## 2020-07-06 RX ORDER — AMLODIPINE BESYLATE 10 MG/1
10 TABLET ORAL DAILY
Status: ON HOLD | DISCHARGE
Start: 2020-07-06 | End: 2021-01-01

## 2020-07-06 RX ORDER — GLIMEPIRIDE 4 MG/1
4 TABLET ORAL 2 TIMES DAILY
Status: DISCONTINUED | OUTPATIENT
Start: 2020-07-06 | End: 2020-07-15

## 2020-07-06 RX ORDER — DEXTROSE MONOHYDRATE 25 G/50ML
25-50 INJECTION, SOLUTION INTRAVENOUS
Status: DISCONTINUED | OUTPATIENT
Start: 2020-07-06 | End: 2020-07-16 | Stop reason: HOSPADM

## 2020-07-06 RX ORDER — ASPIRIN 325 MG
325 TABLET, DELAYED RELEASE (ENTERIC COATED) ORAL DAILY
Status: ON HOLD | DISCHARGE
Start: 2020-07-25 | End: 2020-07-15

## 2020-07-06 RX ORDER — CARVEDILOL 12.5 MG/1
12.5 TABLET ORAL 2 TIMES DAILY WITH MEALS
Status: DISCONTINUED | OUTPATIENT
Start: 2020-07-06 | End: 2020-07-06

## 2020-07-06 RX ORDER — NICOTINE POLACRILEX 4 MG
15-30 LOZENGE BUCCAL
Status: DISCONTINUED | OUTPATIENT
Start: 2020-07-06 | End: 2020-07-16 | Stop reason: HOSPADM

## 2020-07-06 RX ORDER — CLOPIDOGREL BISULFATE 75 MG/1
75 TABLET ORAL DAILY
Status: DISCONTINUED | OUTPATIENT
Start: 2020-07-07 | End: 2020-07-16 | Stop reason: HOSPADM

## 2020-07-06 RX ORDER — ASPIRIN 81 MG/1
81 TABLET ORAL DAILY
Status: DISCONTINUED | OUTPATIENT
Start: 2020-07-07 | End: 2020-07-16 | Stop reason: HOSPADM

## 2020-07-06 RX ADMIN — CLOPIDOGREL BISULFATE 75 MG: 75 TABLET ORAL at 09:04

## 2020-07-06 RX ADMIN — AMLODIPINE BESYLATE 7.5 MG: 5 TABLET ORAL at 09:02

## 2020-07-06 RX ADMIN — GLIMEPIRIDE 4 MG: 4 TABLET ORAL at 17:58

## 2020-07-06 RX ADMIN — INSULIN GLARGINE 25 UNITS: 100 INJECTION, SOLUTION SUBCUTANEOUS at 21:14

## 2020-07-06 RX ADMIN — ACETAMINOPHEN 650 MG: 325 TABLET, FILM COATED ORAL at 21:13

## 2020-07-06 RX ADMIN — TORSEMIDE 20 MG: 20 TABLET ORAL at 14:59

## 2020-07-06 RX ADMIN — INSULIN ASPART 4 UNITS: 100 INJECTION, SOLUTION INTRAVENOUS; SUBCUTANEOUS at 18:10

## 2020-07-06 RX ADMIN — CARVEDILOL 15.62 MG: 12.5 TABLET, FILM COATED ORAL at 09:04

## 2020-07-06 RX ADMIN — ATORVASTATIN CALCIUM 40 MG: 40 TABLET, FILM COATED ORAL at 21:14

## 2020-07-06 RX ADMIN — ASPIRIN 81 MG: 81 TABLET, COATED ORAL at 09:04

## 2020-07-06 RX ADMIN — CARVEDILOL 25 MG: 25 TABLET, FILM COATED ORAL at 18:01

## 2020-07-06 RX ADMIN — GLIMEPIRIDE 4 MG: 4 TABLET ORAL at 09:04

## 2020-07-06 ASSESSMENT — ACTIVITIES OF DAILY LIVING (ADL)
RETIRED_COMMUNICATION: 0-->UNDERSTANDS/COMMUNICATES WITHOUT DIFFICULTY
DRESS: 1-->ASSISTIVE EQUIPMENT
AMBULATION: 2-->ASSISTIVE PERSON
TRANSFERRING: 2-->ASSISTIVE PERSON
BATHING: 1-->ASSISTIVE EQUIPMENT
PREVIOUS_RESPONSIBILITIES: MEAL PREP;HOUSEKEEPING;LAUNDRY;SHOPPING;MEDICATION MANAGEMENT;FINANCES;DRIVING;WORK
SWALLOWING: 0-->SWALLOWS FOODS/LIQUIDS WITHOUT DIFFICULTY
TOILETING: 2-->ASSISTIVE PERSON
RETIRED_EATING: 0-->INDEPENDENT
COGNITION: 0 - NO COGNITION ISSUES REPORTED
FALL_HISTORY_WITHIN_LAST_SIX_MONTHS: YES
NUMBER_OF_TIMES_PATIENT_HAS_FALLEN_WITHIN_LAST_SIX_MONTHS: 1

## 2020-07-06 ASSESSMENT — MIFFLIN-ST. JEOR
SCORE: 1915.62
SCORE: 1891.59

## 2020-07-06 NOTE — H&P
"    [facility]   Acute Rehabilitation Unit  Admission History and Physical    CHIEF COMPLAINT   \" I had a stroke, have double vision and bad balance\"    HISTORY OF PRESENT ILLNESS  Mr Elizabeth is a 74 y/o man with a PMH including CAD, CHF, CKD, DMII, HTN, HLD, DVT (not on anticoagulation) who presented for vertigo and nystagmus, ultimately 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      Symptoms developed 6/26 with nausea/vomiting and subsequently dizziness, weakness, fatigued, and vision difficulties. He saw his PCP via virtual visit, was diagnosed with vertigo and startde on Meclizine. He subsequently presented to the ED on 6/30/2020. MRI revealed recent pontomedullary junction ischemic infarct. MRI of the anterior and posterior circulation without abnormality noted. Neurology was consulted. Plavix was added to aspirin and his Coreg and amlodipine was increased. He was already on a statin.      His course was complicated by acute decompensated heart failure. He was diuresed with IV Lasix and his O2 was weaned. He was then restarted on his home torsemide. He was also noted to have 50 beats of wide complex tachycardia.  He was seen by cardiology and medications titrated, no further events, a cardiac event monitor was placed prior to discharge.      During the acute hospitalization, patient was seen and evaluated by PT, OT, and PM&R consult service. All specialties collectively recommended that the patient would benefit from ongoing therapies in the acute inpatient rehabilitation setting.       In review of the therapy notes patient noted to have impaired balance, vision, activity tolerance. He is currently requiring mod assist for lower body dressing, with fatigue. He is ambulating 15 feet with cga. Sit to stand with cga, sit to supine with mod assist.  Goals for mod I with basic mobility.     On admission to rehab reports that he has double vision and impaired " "balance which are new since stroke, has had \"episodes of vision changes in past but this one is longest lasting\". Denies n/v/d, reports feeling unsteady but denies dizziness and vertigo. Denies sob, chest pain, headaches, fevers, and n/v. Reports sleeping ok, appetite is ok but feels quite restricted with low sodium, low fat, low cho, will try to liberalize and monitor weights, electrolytes, edema, bg.       PAST MEDICAL HISTORY   Reviewed and updated in Epic.  Past Medical History:   Diagnosis Date     Arthritis of knee~ s/p replacement 3/24/2016     CAD (coronary artery disease)      Decreased cardiac ejection fraction~44% 3/24/2016     Diabetes mellitus, type 2 (H) 3/24/2016     History of DVT (deep vein thrombosis)      HTN (hypertension) 3/24/2016     LBBB (left bundle branch block) 3/24/2016     Mixed hyperlipidemia 8/23/2016       SURGICAL HISTORY  Reviewed and updated in Epic.  Past Surgical History:   Procedure Laterality Date     ARTHROPLASTY KNEE Right 3/29/2016    Procedure: ARTHROPLASTY KNEE;  Surgeon: Heena Rosen MD;  Location:  OR     ARTHROPLASTY REVISION KNEE Left 9/27/2019    Procedure: REVISION LEFT TOTAL KNEE  ARTHROPLASTY;  Surgeon: Heena Rosen MD;  Location:  OR     CV LEFT HEART CATH N/A 8/12/2019    Procedure: Left Heart Cath;  Surgeon: Edgardo Beckham MD;  Location:  HEART CARDIAC CATH LAB     EYE SURGERY      cataract removal     ORTHOPEDIC SURGERY      KNEE SCOPES MULTIPLE     SOCIAL HISTORY  Reviewed and updated in Epic.  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  Tobacco use: none  Alcohol use: very rare  Illicit drug use: none  Social History     Socioeconomic History     Marital status: Single     Spouse name: Not on file     Number of children: Not on file     Years of education: Not on file     Highest education " level: Not on file   Occupational History     Not on file   Social Needs     Financial resource strain: Not on file     Food insecurity     Worry: Not on file     Inability: Not on file     Transportation needs     Medical: Not on file     Non-medical: Not on file   Tobacco Use     Smoking status: Never Smoker     Smokeless tobacco: Never Used   Substance and Sexual Activity     Alcohol use: Yes     Alcohol/week: 2.0 standard drinks     Types: 2 Standard drinks or equivalent per week     Frequency: 2-3 times a week     Drinks per session: 1 or 2     Comment: occasional, social     Drug use: No     Sexual activity: Not on file   Lifestyle     Physical activity     Days per week: Not on file     Minutes per session: Not on file     Stress: Not on file   Relationships     Social connections     Talks on phone: Not on file     Gets together: Not on file     Attends Faith service: Not on file     Active member of club or organization: Not on file     Attends meetings of clubs or organizations: Not on file     Relationship status: Not on file     Intimate partner violence     Fear of current or ex partner: Not on file     Emotionally abused: Not on file     Physically abused: Not on file     Forced sexual activity: Not on file   Other Topics Concern     Parent/sibling w/ CABG, MI or angioplasty before 65F 55M? Not Asked   Social History Narrative     Not on file       FAMILY HISTORY  Reviewed and updated in Epic.  Family History   Problem Relation Age of Onset     Coronary Artery Disease No family hx of        PRIOR FUNCTIONAL HISTORY   Pt was independent with all ADLs/IADLs, transfers, mobility and gait.      MEDICATIONS  Scheduled meds  Medications Prior to Admission   Medication Sig Dispense Refill Last Dose     amLODIPine (NORVASC) 10 MG tablet Take 1 tablet (10 mg) by mouth daily   7/6/2020 at 0902     aspirin (ASA) 81 MG EC tablet Take 1 tablet (81 mg) by mouth daily for 18 days 18 tablet  7/6/2020 at 0904      "atorvastatin (LIPITOR) 40 MG tablet Take 1 tablet (40 mg) by mouth every evening 30 tablet 3 7/5/2020 at 2038     carvedilol (COREG) 12.5 MG tablet Take 1 tablet (12.5 mg) by mouth 2 times daily (with meals)   7/6/2020 at 0904     clopidogrel (PLAVIX) 75 MG tablet Take 1 tablet (75 mg) by mouth daily for 18 days 18 tablet 0 7/6/2020 at 0904     glimepiride (AMARYL) 4 MG tablet Take 4 mg by mouth 2 times daily    7/6/2020 at 0904     insulin glargine (LANTUS PEN) 100 UNIT/ML pen Inject 25 Units Subcutaneous every evening (Patient states he bases his dose off of blood glucose and how much Novolog he has used throughout the day but did not give exact regimen).   7/5/2020 at 2037     multivitamin, therapeutic with minerals (THERA-VIT-M) TABS Take 1 tablet by mouth daily    at Unknown time     [START ON 7/25/2020] aspirin (ASA) 325 MG EC tablet Take 1 tablet (325 mg) by mouth daily   Unknown at Unknown time     blood glucose (NO BRAND SPECIFIED) test strip Use to test blood sugar as directed        insulin aspart (NOVOLOG FLEXPEN) 100 UNIT/ML pen Inject 2-6 Units Subcutaneous 3 times daily (with meals) (Patient states he bases his dose off of carb counting and blood glucose but did not give exact regimen).   7/5/2020 at 1708     order for DME Equipment being ordered: Walker Wheels () and Walker ()  Treatment Diagnosis: Impaired gait 1 each 0      torsemide (DEMADEX) 20 MG tablet Take 20 mg by mouth daily    7/5/2020 at 1424       ALLERGIES     Allergies   Allergen Reactions     Penicillins Anaphylaxis         REVIEW OF SYSTEMS  A 10 point ROS was performed and negative unless otherwise noted in HPI.     PHYSICAL EXAM  VITAL SIGNS:  /52 (BP Location: Right arm)   Pulse 70   Temp 96.4  F (35.8  C) (Oral)   Resp 16   SpO2 96%   BMI:  Estimated body mass index is 37.8 kg/m  as calculated from the following:    Height as of 6/30/20: 1.753 m (5' 9\").    Weight as of an earlier encounter on 7/6/20: 116.1 kg " (256 lb).     General: awake alert nad  HEENT: mmm, eomi r  Pulmonary: non labored clear on room air  Cardiovascular: rrr  Abdominal: soft obese non tender  Extremities: warm, significant edema L>R in all 4 extremities no tenderness in calves   MSK/neuro:   Mental Status:  alert and oriented x3    Cranial Nerves: grossly normal (horizontal nystagmus) reported double vision   Sensory: Normal to light touch in bilateral upper and lower extremities    Strength: 4/5 in all muscle groups of the upper and lower extremities    Tone per modified Jean Scale: none noted   Abnormal movements: None   Coordination: No dysmetria on finger to nose b/l    Speech: clear coherent   Cognition: linear logical   Gait: not tested  Skin: left upper extremity (forearm ecchymosis)      LABS  Last Comprehensive Metabolic Panel:  Sodium   Date Value Ref Range Status   07/05/2020 135 133 - 144 mmol/L Final     Potassium   Date Value Ref Range Status   07/05/2020 3.9 3.4 - 5.3 mmol/L Final     Chloride   Date Value Ref Range Status   07/05/2020 100 94 - 109 mmol/L Final     Carbon Dioxide   Date Value Ref Range Status   07/05/2020 29 20 - 32 mmol/L Final     Anion Gap   Date Value Ref Range Status   07/05/2020 6 3 - 14 mmol/L Final     Glucose   Date Value Ref Range Status   07/05/2020 184 (H) 70 - 99 mg/dL Final     Urea Nitrogen   Date Value Ref Range Status   07/05/2020 56 (H) 7 - 30 mg/dL Final     Creatinine   Date Value Ref Range Status   07/05/2020 2.11 (H) 0.66 - 1.25 mg/dL Final     GFR Estimate   Date Value Ref Range Status   07/05/2020 30 (L) >60 mL/min/[1.73_m2] Final     Comment:     Non  GFR Calc  Starting 12/18/2018, serum creatinine based estimated GFR (eGFR) will be   calculated using the Chronic Kidney Disease Epidemiology Collaboration   (CKD-EPI) equation.       Calcium   Date Value Ref Range Status   07/05/2020 9.2 8.5 - 10.1 mg/dL Final     Lab Results   Component Value Date    WBC 9.9 07/05/2020      Lab Results   Component Value Date    RBC 5.09 07/05/2020     Lab Results   Component Value Date    HGB 13.3 07/05/2020     Lab Results   Component Value Date    HCT 45.2 07/05/2020     Lab Results   Component Value Date    MCV 89 07/05/2020     Lab Results   Component Value Date    MCH 26.1 07/05/2020     Lab Results   Component Value Date    MCHC 29.4 07/05/2020     Lab Results   Component Value Date    RDW 16.5 07/05/2020     Lab Results   Component Value Date     07/05/2020       IMPRESSION/PLAN:  Mr Elizabeth is a 74 y/o man with a PMH including CAD, CHF, CKD, DMII, HTN, HLD, DVT (not on anticoagulation) who presented for vertigo and nystagmus, ultimately 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 Hospital stay complicated by acute on chronic  heart failure, hypoxic respiratory failure and non sustained V-Tach. He was admitted to acute rehab unit 7/6 for ongoing rehabilitation and medical management.       Admission to acute inpatient rehab ischemic stroke left lateral brainstem  Impairment group code: 01.4      1. PT, OT 90 minutes of each on a daily basis, in addition to rehab nursing and close management of physiatrist.      2. Impairment of ADL's: noted to have impaired strength, balance, vision, and activity tolerance will benefit from ongoing OT with goal for MOD I with basic adls.    3. Impairment of mobility:   noted to have impaired strength, balance, vision, and activity tolerance will benefit from ongoing PT with goal for MOD I with basic mobility.       4. Medical Conditions  Cerebrovascular accident    --- MRI of the brain showed recent ischemic infarct at the left lateral aspect of the brainstem  at the pontomedullary junction. Seen and evaluated by neurology, stroke felt most likely d/t small vessel disease.   .Echo with bubble study done EF of 50 to 55% with a negative bubble study, moderate aortic valve stenosis seen. Mid and distal anteroseptal hypokinesis. Hgb A1C 7.0%.  . MRA brain/neck Neg  --- continue PT/OT   -eye patch    -DAPT with ASA 81 mg + Plavix 75 mg for 21 days, then  mg alone indefinitely   - LDL (102) with goal LDL between 40-70 ; pt reports restarting Lipitor 40 mg recently.    - DM as below  - Goal BP <130/85- management as below.   -continue stroke education.   -has 30 day cardiac event monitor in place  -f/u neurology, neuro opthalmology.     Acute decompensated systolic and diastolic heart failure  Acute respiratory failure with hypoxia  ---Has 12 pounds of weight gain over last 4 days, his torsemide was on hold, his echocardiogram done on 4/22/2020 showed EF of 45 to 50% age with moderate aortic stenosis.  ---Wean oxygen down as possible, started on IV Lasix 40 mg 3 times daily for 3-4 doses   restarted his torsemide 20 mg p.o. daily yesterday as per cardiology's recommendation  -consult lymphedema     Nonsustained V. tach  ---50 beats noted, monitor electrolytes mostly potassium and magnesium and replace as needed.  ---Appreciate cardiology input, plan is to monitor on telemetry, if no more V. tach events noted can discharge home on the event monitor, if any further V. tach noted will need electrophysiology consult prior to discharge.  No further episodes of V. tach noted.  Needs 30-day Event monitor  on discharge     .  DM2: most recent HgbA1c of 7.0 in 04/2020.   - continue PTA Glimepiride   - meal time insulin with 1 unit per 15 grams carbohydrate  -Continue 25 units of Lantus every evening  - medium sliding scale insulin with meals, blood sugars have been stable.     HTN: norvasc 5 mg daily, coreg 25 mg bid, torsemide 20 daily - doses titrated given bp and significant edema.   -monitor and titrate as indicated.        CKD: baseline creatinine around 2.0 with creatinine of 2.11 7/5.  - continue to follow BMP     CAD  HLP: continue PTA Atorvastatin     5. Adjustment to disability:  Monitor mood  6. FEN: low na-   7. Bowel: continent  8. Bladder:  continent  9. DVT Prophylaxis: ambulation- has been declining heparin  10. GI Prophylaxis: diet  11. Code: full confirmed on admission  12. Disposition: home  13. ELOS:  ~7 days.  14. Rehab prognosis:  good  15. Follow up Appointments on Discharge: pcp, cardiology, neurology.       Seen and discussed with Dr. Tatum, PM&R staff physician     Henny Le PA-C  Rehab Service  Pager [phone]

## 2020-07-06 NOTE — PROGRESS NOTES
"Pt refused to have his VS and Blood sugar checked, already had his breakfast.Pt said his is \"leaving in half hour\"RN  notified  "

## 2020-07-06 NOTE — PLAN OF CARE
Physical Therapy Discharge Summary     Reason for therapy discharge:    Discharged to acute rehab unit.     Progress towards therapy goal(s). See goals on Care Plan in [facility] electronic health record for goal details.  Goals partially met.  Barriers to achieving goals:  pt needing A with all mobility     Therapy recommendation(s):    Continued therapy is recommended.  Rationale/Recommendations:  Pt is below baseline with functional mobility and strength and would benefit from continued PT to progress skills.

## 2020-07-06 NOTE — PROGRESS NOTES
Discharge Planner   Discharge Plans in progress: ARU today  Barriers to discharge plan: None anticipated.   Follow up plan: Brother to transport after event monitor placed. Spoke with Henny from ARU to confirm admission today. Ins auth obtained. SW will remain available as needed.        Entered by: Amanda Ruano 07/06/2020 9:19 AM       AARON Mir   Inpatient Care Coordination  [facility]   [phone]

## 2020-07-06 NOTE — PLAN OF CARE
"BP (!) 159/79   Pulse 72   Temp 99  F (37.2  C) (Oral)   Resp 18   Ht 1.753 m (5' 9\")   Wt 116.1 kg (256 lb)   SpO2 92%   BMI 37.80 kg/m      Neuro: Ex. Still has blurry/double vision.   Cardiac: Refusing tele. BP and HR WNL. Needs Cardiac Event Monitor Placed.   Lungs: WNL   GI: WNL   : WNL   Pain: WNL   IV: No IV.   Meds: Tolerating PO meds.   Labs/tests: MRI--Acute Stroke   Diet: Mod Carb   Activity: Up Assist of 1 with gait belt and walker     Plan: Discharge to ARU today.     Will continue to monitor and provide cares.     "

## 2020-07-06 NOTE — PROGRESS NOTES
OT eval initiated and tx initiated. Will complete evaluation tomorrow with [facility] and  strengthening assessments.    Pt requires min A with W/C>EOB SPT with FWW. Pt requires assistance with BLE's with EOB>supine. Pt requires min A with STS with FWW.  Provided bedside commode for toileting. Recommending Ax1 with EOB<>BSC SPT with FWW.    Pt presenting with nystagmus with visual tracking. Pt presenting with limited attention span and requires frequent redirection.

## 2020-07-06 NOTE — PHARMACY - DISCHARGE MEDICATION RECONCILIATION
Discharge medication review for this patient is complete.   Patient was not counseled or given any education materials as discharged to LTC, TCU facility, Memory Care Facility, etc.  See EPIC for allergy information, prior to admission medications and immunization status.   Pharmacist assisted with medication reconciliation of discharge medications with PTA medications.    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 If we are uncertain of the size of tablets/capsules you have at home, strength may be listed as something that might have changed.      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 Make sure you understand how and when to take each.  Used for:  Cerebrovascular accident (CVA) due to thrombosis of cerebral artery (H)      Dose:  325 mg  Start taking on:  July 25, 2020  Take 1 tablet (325 mg) by mouth  daily  Quantity:     Refills:  0     carvedilol 12.5 MG tablet  Commonly known as:  COREG  This may have changed:      medication strength    additional instructions  Used for:  Acute systolic congestive heart failure (H)      Dose:  12.5 mg  Take 1 tablet (12.5 mg) by mouth 2 times daily (with meals)  Refills:  0     insulin glargine 100 UNIT/ML pen  Commonly known as:  LANTUS PEN  This may have changed:  how much to take      Dose:  25 Units  Inject 25 Units Subcutaneous every evening (Patient states he bases his dose off of blood glucose and how much Novolog he has used throughout the day but did not give exact regimen).  Refills:  0         * This list has 2 medication(s) that are the same as other medications prescribed for you. Read the directions carefully, and ask your doctor or other care provider to review them with you.            CONTINUE these medicines which have NOT CHANGED      Dose / Directions   atorvastatin 40 MG tablet  Commonly known as:  LIPITOR      Dose:  40 mg  Take 1 tablet (40 mg) by mouth every evening  Quantity:  30 tablet  Refills:  3     blood glucose test strip  Commonly known as:  NO BRAND SPECIFIED      Use to test blood sugar as directed  Refills:  0     glimepiride 4 MG tablet  Commonly known as:  AMARYL      Dose:  4 mg  Take 4 mg by mouth 2 times daily  Refills:  0     multivitamin w/minerals tablet      Dose:  1 tablet  Take 1 tablet by mouth daily  Refills:  0     NovoLOG FLEXPEN 100 UNIT/ML pen  Generic drug:  insulin aspart      Dose:  2-6 Units  Inject 2-6 Units Subcutaneous 3 times daily (with meals) (Patient states he bases his dose off of carb counting and blood glucose but did not give exact regimen).  Refills:  0     order for DME  Used for:  Status post total right knee replacement      Equipment being ordered: Walker Wheels () and Walker ()  Treatment Diagnosis: Impaired gait  Quantity:  1 each  Refills:  0     torsemide 20 MG tablet  Commonly known as:   DEMADEX      Dose:  20 mg  Take 20 mg by mouth daily  Refills:  0

## 2020-07-06 NOTE — H&P
Post Admission Physician Evaluation:    I have compared Burton Elizabeth's condition on admission to acute rehabilitation to that outlined in the preadmission screen. History and physical exam performed by ASAD Rodriguez, and myself.  No significant differences are identified and the patient remains appropriate for an inpatient rehabilitation facility level of care to manage medical issues and address functional impairments due to L lateral brainstem ischemic CVA.    Comorbid medical conditions being managed: Fluid overload, acute decompensated CHF, NSVT, HTN, CAD, CKD, HLD, DM II    Prior functional level:   Independent with ambulation, mobility, all ADLs, and all IADLs    Present function:   PT:  Alert, oriented, tangential with thoughts. Reports double vision and inability to read well. Sit>supine with SBA, HOB elevated. Sit>stand with CGA, 3 reps performed, repeated cues needed for safe hand placement/transition. Sit>supine with modA for LEs. Min A needed to position in bed. Patient ambulates 15' with FWW and CGA. Pt demonstrates mod UE support on FWW, decreased gait speed and foot clearance bilat. PT cues for improved posture and decreased UE support. Pt reports bracing due to balance feeling off. Fatigue with this distance. Pt able to perform static standing task with eyes open, no UE support and moderate postural sway, sway increases with eyes closed - Aleta needed for upright safety.     OT:  While seated/standing, Pt completed LE dressing with moderate assist, extra time, and >3 verbal cues for technique. Pt reported fatigue at the end of session.    Anticipated rehabilitation course:   Anticipate will discharge home at a modified independent level for ambulation, mobility, and ADLs.     Will benefit from intensive rehabilitation includin minutes each of PT and OT  Rehabilitation nursing  Close management by physiatry    Prognosis: Good    Estimated length of stay: 9 days    Michael Tatum  MD  Department of Rehabilitation Medicine  Pager: [phone]

## 2020-07-06 NOTE — PLAN OF CARE
Patient's After Visit Summary was reviewed with patient and/or brother.   Patient verbalized understanding of After Visit Summary, recommended follow up and was given an opportunity to ask questions.   Discharge medications sent home with patient/family: No   Discharged with brother          OBSERVATION patient END time: 10:11 AM

## 2020-07-06 NOTE — PLAN OF CARE
Patient admitted to the ARU from [facility] at about 1130. Alert and oriented x 3. No paresis noted, has double vision per hand off. Ao1 with walker and gait belt to transfer from w/c to bed. Skin check and room orientation completed. Patient came with a heart monitor in his pocket with 2 leads attach to his right upper and left lower chest completed. Patient orders lunch with no issues. Will continue POC

## 2020-07-06 NOTE — PLAN OF CARE
"[id number]    Inpatient Progress Note:    BP (!) 151/86 (BP Location: Right arm)   Pulse 72   Temp 99  F (37.2  C) (Oral)   Resp 18   Ht 1.753 m (5' 9\")   Wt 116.1 kg (256 lb)   SpO2 92%   BMI 37.80 kg/m         Orientation: WNL  Neuro: WNL, ex. Double vision- not new  Pain status: Denies  Activity: SBAx1  Peripheral edema: Moderate edema in upper and lower extremities bilaterally.   Resp: WNL  Cardiac: WNL, refusing tele, refused heparin   GI: WNL  :  WNL  Pertinent Labs: BGMs stable  Diet: Low sat. Fat, low sodium  Consults: PT, OT, SW.  Cardiopulm to see today to place heart event monitor.  Discharge Plan: discharge to acute rehab facility after monitor placed.    Will continue to monitor and provide cares.     Qi Lake RN        "

## 2020-07-06 NOTE — CONSULTS
20 1400   Living Arrangements   Lives With alone   Living Arrangements house   Able to Return to Prior Arrangements yes   Living Arrangement Comments Rambler style home with lower level/basement    Home Safety   Patient Feels Safe Living in Home? yes   Discharge Planning   Patient/Family Anticipates Transition to home;home with help/services   Disposition Comments [facility] anticipated    Concerns to be Addressed home safety;discharge planning   Concerns Comments Lives alone    Plan   Plan Home    Patient/Family in Agreement with Plan yes   Plan Comments [facility] anticipated    Discharge Needs Assessment   Transportation Anticipated family or friend will provide       Social Work: Initial Assessment with Discharge Plan    Patient Name: Burton Elizabeth  : 1945  Age: 75 year old  MRN: [id number]  Completed assessment with: Chart review and pt assessment over the phone   Admitted to ARU: TODAY 2020    Presenting Information   Date of SW assessment: 2020  Health Care Directive: Copy in Chart  Primary Health Care Agent: Patient/self   Secondary Health Care Agent: Randy Glenn, Relationship: brother, Phone(s): [phone]  Living Situation: Alone in a rambler-style home with lower level. WILLY and STI to access the basement.   Previous Functional Status: Patient normally I with IADLs including laundry and driving. Managed own medications and finances. Enjoys golfing, recently joined a gym and has a bike in his basement.   DME available: Cane and walker at home, was not using PTA.   Patient and family understanding of hospitalization: Appropriate   Cultural/Language/Spiritual Considerations: 74 y/o male, single, English-speaking, huey unknown and not discussed.       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     Provider Information   Primary Care Physician:dT Qureshi   [address], [place] [address]  : None reported     Mental Health/Chemical Dependency:   Diagnosis: None reported   Alcohol/Tobacco/Narcotis: None reported   Support/Services in Place: None reported   Services Needed/Recommended: Supportive services available by consult   Sexuality/Intimacy: Not discussed     Support System  Marital Status: Single   Family support: Brother and nieces   Other support available: Friends/extended family     Community Resources  Current in home services: None reported   Previous services: None reported. Went to OP a few years ago after TKA.     Financial/Employment/Education  Employment Status: Retired   Income Source: Social security income   Education: Not discussed   Financial Concerns:  None reported   Insurance: [facility] Medicare Advantage       Discharge Plan   Patient and family discharge goal: Home   Provided Education on discharge plan: YES  Patient agreeable to discharge plan:  YES  Provided education and attained signature for Medicare IM and IRF Patient Rights and Privacy Information provided to patient : YES- IRF given in welcome packet and IMM discussed over the phone   Provided patient with [facility] Resources: N/A   Barriers to discharge: Pt lives alone, no 24/7 supervision available     Discharge Recommendations   Disposition: See above   Transportation Needs: Family assist   Name of Transportation Company and Phone: N/A     Additional comments   ELOS 9 days. Home with [facility] and assistance/support from brother Jose. Pt lives alone.     Please invite to Care Conference:  Check with pt, not anticipated     JOSHUA Cam, [facility] Acute Rehab Unit   Phone: [phone]  I   Pager: [phone]

## 2020-07-06 NOTE — PLAN OF CARE
"OT - pt declining activity, stating \"I'm getting this heart thing on and then I'm leaving.  Nothing else\".        Occupational Therapy Discharge Summary    Reason for therapy discharge:    Discharged to acute rehabilitation facility.    Progress towards therapy goal(s). See goals on Care Plan in [facility] electronic health record for goal details.  Goals not met.  Barriers to achieving goals:   discharge from facility.    Therapy recommendation(s):    Continued therapy is recommended.  Rationale/Recommendations:  maximize safety and independence with ADLs.      "

## 2020-07-07 ENCOUNTER — APPOINTMENT (OUTPATIENT)
Dept: OCCUPATIONAL THERAPY | Facility: CLINIC | Age: 75
End: 2020-07-07
Attending: PHYSICIAN ASSISTANT
Payer: COMMERCIAL

## 2020-07-07 ENCOUNTER — APPOINTMENT (OUTPATIENT)
Dept: PHYSICAL THERAPY | Facility: CLINIC | Age: 75
End: 2020-07-07
Payer: COMMERCIAL

## 2020-07-07 ENCOUNTER — APPOINTMENT (OUTPATIENT)
Dept: OCCUPATIONAL THERAPY | Facility: CLINIC | Age: 75
End: 2020-07-07
Payer: COMMERCIAL

## 2020-07-07 LAB
ANION GAP SERPL CALCULATED.3IONS-SCNC: 8 MMOL/L (ref 3–14)
BASOPHILS # BLD AUTO: 0 10E9/L (ref 0–0.2)
BASOPHILS NFR BLD AUTO: 0.3 %
BUN SERPL-MCNC: 66 MG/DL (ref 7–30)
CALCIUM SERPL-MCNC: 9.1 MG/DL (ref 8.5–10.1)
CHLORIDE SERPL-SCNC: 99 MMOL/L (ref 94–109)
CO2 SERPL-SCNC: 29 MMOL/L (ref 20–32)
CREAT SERPL-MCNC: 2.24 MG/DL (ref 0.66–1.25)
DIFFERENTIAL METHOD BLD: ABNORMAL
EOSINOPHIL # BLD AUTO: 0.3 10E9/L (ref 0–0.7)
EOSINOPHIL NFR BLD AUTO: 2.8 %
ERYTHROCYTE [DISTWIDTH] IN BLOOD BY AUTOMATED COUNT: 16.5 % (ref 10–15)
GFR SERPL CREATININE-BSD FRML MDRD: 28 ML/MIN/{1.73_M2}
GLUCOSE BLDC GLUCOMTR-MCNC: 180 MG/DL (ref 70–99)
GLUCOSE BLDC GLUCOMTR-MCNC: 211 MG/DL (ref 70–99)
GLUCOSE BLDC GLUCOMTR-MCNC: 215 MG/DL (ref 70–99)
GLUCOSE BLDC GLUCOMTR-MCNC: 239 MG/DL (ref 70–99)
GLUCOSE SERPL-MCNC: 195 MG/DL (ref 70–99)
HCT VFR BLD AUTO: 42.8 % (ref 40–53)
HGB BLD-MCNC: 13 G/DL (ref 13.3–17.7)
IMM GRANULOCYTES # BLD: 0 10E9/L (ref 0–0.4)
IMM GRANULOCYTES NFR BLD: 0.4 %
LYMPHOCYTES # BLD AUTO: 1.4 10E9/L (ref 0.8–5.3)
LYMPHOCYTES NFR BLD AUTO: 14.8 %
MCH RBC QN AUTO: 26.6 PG (ref 26.5–33)
MCHC RBC AUTO-ENTMCNC: 30.4 G/DL (ref 31.5–36.5)
MCV RBC AUTO: 88 FL (ref 78–100)
MONOCYTES # BLD AUTO: 1.2 10E9/L (ref 0–1.3)
MONOCYTES NFR BLD AUTO: 12.7 %
NEUTROPHILS # BLD AUTO: 6.5 10E9/L (ref 1.6–8.3)
NEUTROPHILS NFR BLD AUTO: 69 %
NRBC # BLD AUTO: 0 10*3/UL
NRBC BLD AUTO-RTO: 0 /100
PLATELET # BLD AUTO: 207 10E9/L (ref 150–450)
POTASSIUM SERPL-SCNC: 4.3 MMOL/L (ref 3.4–5.3)
RBC # BLD AUTO: 4.89 10E12/L (ref 4.4–5.9)
SODIUM SERPL-SCNC: 136 MMOL/L (ref 133–144)
WBC # BLD AUTO: 9.4 10E9/L (ref 4–11)

## 2020-07-07 PROCEDURE — 97116 GAIT TRAINING THERAPY: CPT | Mod: GP

## 2020-07-07 PROCEDURE — 85025 COMPLETE CBC W/AUTO DIFF WBC: CPT | Performed by: PHYSICIAN ASSISTANT

## 2020-07-07 PROCEDURE — 97112 NEUROMUSCULAR REEDUCATION: CPT | Mod: GP

## 2020-07-07 PROCEDURE — 12800006 ZZH R&B REHAB

## 2020-07-07 PROCEDURE — 36415 COLL VENOUS BLD VENIPUNCTURE: CPT | Performed by: PHYSICIAN ASSISTANT

## 2020-07-07 PROCEDURE — 97165 OT EVAL LOW COMPLEX 30 MIN: CPT | Mod: GO

## 2020-07-07 PROCEDURE — 00000146 ZZHCL STATISTIC GLUCOSE BY METER IP

## 2020-07-07 PROCEDURE — 97530 THERAPEUTIC ACTIVITIES: CPT | Mod: GP

## 2020-07-07 PROCEDURE — 97140 MANUAL THERAPY 1/> REGIONS: CPT | Mod: GP

## 2020-07-07 PROCEDURE — 97163 PT EVAL HIGH COMPLEX 45 MIN: CPT | Mod: GP

## 2020-07-07 PROCEDURE — 97535 SELF CARE MNGMENT TRAINING: CPT | Mod: GO

## 2020-07-07 PROCEDURE — 80048 BASIC METABOLIC PNL TOTAL CA: CPT | Performed by: PHYSICIAN ASSISTANT

## 2020-07-07 PROCEDURE — 97530 THERAPEUTIC ACTIVITIES: CPT | Mod: GO

## 2020-07-07 PROCEDURE — 25000132 ZZH RX MED GY IP 250 OP 250 PS 637: Performed by: PHYSICIAN ASSISTANT

## 2020-07-07 RX ADMIN — GLIMEPIRIDE 4 MG: 4 TABLET ORAL at 17:59

## 2020-07-07 RX ADMIN — INSULIN ASPART 2 UNITS: 100 INJECTION, SOLUTION INTRAVENOUS; SUBCUTANEOUS at 12:28

## 2020-07-07 RX ADMIN — MULTIPLE VITAMINS W/ MINERALS TAB 1 TABLET: TAB at 08:24

## 2020-07-07 RX ADMIN — CARVEDILOL 25 MG: 25 TABLET, FILM COATED ORAL at 08:24

## 2020-07-07 RX ADMIN — CLOPIDOGREL BISULFATE 75 MG: 75 TABLET, FILM COATED ORAL at 08:24

## 2020-07-07 RX ADMIN — GLIMEPIRIDE 4 MG: 4 TABLET ORAL at 08:24

## 2020-07-07 RX ADMIN — INSULIN ASPART 2 UNITS: 100 INJECTION, SOLUTION INTRAVENOUS; SUBCUTANEOUS at 18:02

## 2020-07-07 RX ADMIN — AMLODIPINE BESYLATE 5 MG: 5 TABLET ORAL at 08:24

## 2020-07-07 RX ADMIN — ASPIRIN 81 MG: 81 TABLET ORAL at 08:24

## 2020-07-07 RX ADMIN — ACETAMINOPHEN 650 MG: 325 TABLET, FILM COATED ORAL at 18:22

## 2020-07-07 RX ADMIN — INSULIN ASPART 2 UNITS: 100 INJECTION, SOLUTION INTRAVENOUS; SUBCUTANEOUS at 08:26

## 2020-07-07 RX ADMIN — CARVEDILOL 25 MG: 25 TABLET, FILM COATED ORAL at 17:59

## 2020-07-07 RX ADMIN — ATORVASTATIN CALCIUM 40 MG: 40 TABLET, FILM COATED ORAL at 20:37

## 2020-07-07 RX ADMIN — TORSEMIDE 20 MG: 20 TABLET ORAL at 13:48

## 2020-07-07 ASSESSMENT — MIFFLIN-ST. JEOR: SCORE: 1910.63

## 2020-07-07 NOTE — PLAN OF CARE
FOCUS/GOAL  Bowel management, Bladder management, Pain management and Mobility    ASSESSMENT, INTERVENTIONS AND CONTINUING PLAN FOR GOAL:   Alert and oriented X 4, calling appropriately to verbalize needs. VSS except for BP. Evening time BP med given, rechecked BP, it's slightly elevated. Complained of right rib cage pain, PRN Tylenol given with relief. Continent of bladder using the toilet. LBM 07/05 per patient report. . BG high 322 before dinner and 307 at HS. Sliding scale and carb coverage insulin given per order. Patient on carb count. Had good appetite ate 100% of his dinner. 3 gram NA diet, thin liquids, pills whole. Refused bed bath, oral care saying that he is tired. Heart monitor on. Pneumatic compressing device on. Bilateral LE edema. Edema on both hands more on the left hand. Left foot toes appear purplish in color when done with OT assessment. MD came in to assess patient and is aware. Alarms on for safety purposes. Wheel chair based, assist of 1 with gait belt and walker for transfer.  Call light within reach. No other health concerns at this time. Continue to monitor.    Overnight:     Patient requested for Pneumatic compressing device to be turned off at 0200. . Daily weight. Report no pain overnight.

## 2020-07-07 NOTE — PLAN OF CARE
Magdaleno will need 12-14 days in order to return home with intermittent check-in support and HH PT.    Mobility Status: amb in room with FWW CGA.    Primary Barriers: central vestibular signs with diplopia, balance deficits, assist for transfers and gait, WILLY    DME Needs: goals to be mod I with FWW at discharge for stability during amb.

## 2020-07-07 NOTE — PROGRESS NOTES
"  [facility]   Acute Rehabilitation Unit  Daily progress note    interval history  Burton Elizabeth was seen and examined at bedside. Reports he can not sleep, too bright, too many interruptions so he is very tired but reports will not tolerated sleeping med makes him groggy and will wake with any noise or opening of door and any light- reports black out shades at home.  He denies n/v/d, sob, headaches, notes stable double vision and unsteadiness. Describes nose bleed this am reports due to heparin he received at hospital and refuses to take \"any injectable anticoagulation\".  Reports poor appetite and dislike for hospital food is agreeable to take supplements, met with dietician and started on boost. Reports baseline weight at 245 pounds, with ongoing edema, poor hydration due to mobility/ dependence on others to help to bathroom.  Notes left foot redness \"my shoes were too small\" no fever, non tender, slightly warm to touch will chasity and monitor for now.       Functionally, undergoing therapy evals today.        medications    amLODIPine  5 mg Oral Daily     [START ON 7/25/2020] aspirin  325 mg Oral Daily     aspirin  81 mg Oral Daily     atorvastatin  40 mg Oral QPM     carvedilol  25 mg Oral BID w/meals     clopidogrel  75 mg Oral Daily     glimepiride  4 mg Oral BID     insulin aspart  1-7 Units Subcutaneous TID AC     insulin aspart  1-5 Units Subcutaneous At Bedtime     insulin aspart   Subcutaneous TID w/meals     insulin glargine  30 Units Subcutaneous QPM     multivitamin w/minerals  1 tablet Oral Daily     torsemide  20 mg Oral Daily        acetaminophen, glucose **OR** dextrose **OR** glucagon, polyethylene glycol     physical exam  /80 (BP Location: Right arm)   Pulse 73   Temp 98.3  F (36.8  C) (Oral)   Resp 16   Ht 1.798 m (5' 10.8\")   Wt 115.2 kg (253 lb 14.4 oz)   SpO2 95%   BMI 35.61 kg/m    Gen: alert fatigued  HEENT: mucus membranes dry  Cardio: " rrr  Pulm: non labored clear diminshed  Abd: soft distended non tender  Ext: significant pitting edema, left foot red slightly warm non tender  Neuro/MSK: alert follows commands up with walker    labs  Lab Results   Component Value Date    WBC 9.4 07/07/2020     Lab Results   Component Value Date    RBC 4.89 07/07/2020     Lab Results   Component Value Date    HGB 13.0 07/07/2020     Lab Results   Component Value Date    HCT 42.8 07/07/2020     Lab Results   Component Value Date    MCV 88 07/07/2020     Lab Results   Component Value Date    MCH 26.6 07/07/2020     Lab Results   Component Value Date    MCHC 30.4 07/07/2020     Lab Results   Component Value Date    RDW 16.5 07/07/2020     Lab Results   Component Value Date     07/07/2020     Last Comprehensive Metabolic Panel:  Sodium   Date Value Ref Range Status   07/07/2020 136 133 - 144 mmol/L Final     Potassium   Date Value Ref Range Status   07/07/2020 4.3 3.4 - 5.3 mmol/L Final     Chloride   Date Value Ref Range Status   07/07/2020 99 94 - 109 mmol/L Final     Carbon Dioxide   Date Value Ref Range Status   07/07/2020 29 20 - 32 mmol/L Final     Anion Gap   Date Value Ref Range Status   07/07/2020 8 3 - 14 mmol/L Final     Glucose   Date Value Ref Range Status   07/07/2020 195 (H) 70 - 99 mg/dL Final     Urea Nitrogen   Date Value Ref Range Status   07/07/2020 66 (H) 7 - 30 mg/dL Final     Creatinine   Date Value Ref Range Status   07/07/2020 2.24 (H) 0.66 - 1.25 mg/dL Final     GFR Estimate   Date Value Ref Range Status   07/07/2020 28 (L) >60 mL/min/[1.73_m2] Final     Comment:     Non  GFR Calc  Starting 12/18/2018, serum creatinine based estimated GFR (eGFR) will be   calculated using the Chronic Kidney Disease Epidemiology Collaboration   (CKD-EPI) equation.       Calcium   Date Value Ref Range Status   07/07/2020 9.1 8.5 - 10.1 mg/dL Final         Rehabilitation - continue comprehensive acute inpatient rehabilitation program with  multidisciplinary approach including therapies, rehab nursing, and physiatry following. See interval history for updates.      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 Hospital stay complicated by acute on chronic  heart failure, hypoxic respiratory failure and non sustained V-Tach. He was admitted to acute rehab unit 7/6 for ongoing rehabilitation and medical management.     Cerebrovascular accident    --- MRI of the brain showed recent ischemic infarct at the left lateral aspect of the brainstem  at the pontomedullary junction. Seen and evaluated by neurology, stroke felt most likely d/t small vessel disease.   .Echo with bubble study done EF of 50 to 55% with a negative bubble study, moderate aortic valve stenosis seen. Mid and distal anteroseptal hypokinesis. Hgb A1C 7.0%. . MRA brain/neck Neg  --- continue PT/OT   -eye patch    -DAPT with ASA 81 mg + Plavix 75 mg for 21 days, then  mg alone indefinitely   - LDL (102) with goal LDL between 40-70 ; pt reports restarting Lipitor 40 mg recently.    - DM as below  - Goal BP <130/85- management as below.   -continue stroke education.   -has 30 day cardiac event monitor in place  -f/u neurology, neuro opthalmology.      Acute decompensated systolic and diastolic heart failure  Acute respiratory failure with hypoxia   his torsemide was on hold, his echocardiogram done on 4/22/2020 showed EF of 45 to 50% age with moderate aortic stenosis. recievedIV Lasix 40 mg 3 times daily for 3 doses   restarted his torsemide 20 mg p.o. daily yesterday as per cardiology's recommendation  -consult lymphedema  -continue torsemide 20 mg daily  -daily weights  -oxygen prn     Nonsustained V. tach  ---50 beats noted, monitor electrolytes mostly potassium and magnesium and replace  as needed.  ---Appreciate cardiology input, plan is to monitor on telemetry, if no more V. tach events noted can discharge home on the event monitor, if any further V. tach noted will need electrophysiology consult prior to discharge.  No further episodes of V. tach noted.  Needs 30-day Event monitor  on discharge     .  DM2: most recent HgbA1c of 7.0 in 04/2020.   - continue PTA Glimepiride   - meal time insulin with 1 unit per 15 grams carbohydrate  -increase lantus to 30 units daily  - medium sliding scale insulin with meals     HTN: norvasc 5 mg daily, coreg 25 mg bid, torsemide 20 daily - doses titrated given bp and significant edema.   -monitor and titrate as indicated.        CKD: baseline creatinine around 2.0 with creatinine of 2.11 7/5.-->2.24 7/7  - continue to follow BMP     CAD  HLP: continue PTA Atorvastatin        Left foot redness- in context of ill fitting foot wear and edema, red with edema some warmth non tender, no fevers,   -chasity area of redness  -if continues to swell/becomes tender/etc start tx for cellulitis.         1. Adjustment to disability:  Frustrated- consider psych consult  2. FEN: low salt, will try supplementts  3. Bowel: continent-  4. Bladder: ontinue to check pvrs.  5. DVT Prophylaxis: refuses subcutaneous heparin-   6. GI Prophylaxis: diet  7. Code: full  8. Disposition: home   9. ELOS:10-14 days.   10. Follow up Appointments on Discharge: pcp, neurology, cardiology.         discussed with Dr. Tatum  PM&R Staff Physician    Henny Le PA-C  Physical Medicine & Rehabilitation   Pager: [phone]

## 2020-07-07 NOTE — PHARMACY-MEDICATION REGIMEN REVIEW
Pharmacy Medication Regimen Review  Burton Elizabeth is a 75 year old male who is currently in the Acute Rehab Unit.    Assessment: Upon review of the medications and patient chart the following irregularities were found:  Significant Drug Interactions: ASA + Clopidogrel -- Increased risk of bleeding.   Other Recommendations: Hyperglycemia -- BG has ranged from 150-300. Lantus increased from 22 units to 25 units on 7/6.    Plan:   1. Continue to monitor for s/s of bleeding while on ASA + Clopidogrel.   2. Continue to adjust insulin regimen based on BG levels.     Attending provider will be sent this note for review.  If there are any emergent issues noted above, pharmacist will contact provider directly by phone.      Pharmacy will periodically review the resident's medication regimen for any PRN medications not administered in > 72 hours and discontinue them. The pharmacist will discuss gradual dose reductions of psychopharmacologic medications with interdisciplinary team on a regular basis.    Please contact pharmacy if the above does not answer specific medication questions/concerns.    Background:  A pharmacist has reviewed all medications and pertinent medical history today.  Medications were reviewed for appropriate use and any irregularities found are listed with recommendations.      Current Facility-Administered Medications:      acetaminophen (TYLENOL) tablet 650 mg, 650 mg, Oral, Q6H PRN, Henny Le PA, 650 mg at 07/06/20 2113     amLODIPine (NORVASC) tablet 5 mg, 5 mg, Oral, Daily, Henny Le PA, 5 mg at 07/07/20 0824     [START ON 7/25/2020] aspirin (ASA) EC tablet 325 mg, 325 mg, Oral, Daily, Henny Le PA     aspirin EC tablet 81 mg, 81 mg, Oral, Daily, Henny Le PA, 81 mg at 07/07/20 0824     atorvastatin (LIPITOR) tablet 40 mg, 40 mg, Oral, QPM, Henny Le PA, 40 mg at 07/06/20 2114     carvedilol (COREG) tablet 25 mg, 25 mg, Oral, BID w/meals, Mimi  ASAD Mccrary, 25 mg at 07/07/20 0824     clopidogrel (PLAVIX) tablet 75 mg, 75 mg, Oral, Daily, Henny Le PA, 75 mg at 07/07/20 0824     glucose gel 15-30 g, 15-30 g, Oral, Q15 Min PRN **OR** dextrose 50 % injection 25-50 mL, 25-50 mL, Intravenous, Q15 Min PRN **OR** glucagon injection 1 mg, 1 mg, Subcutaneous, Q15 Min PRN, Obdulia Tatum MD     glucose gel 15-30 g, 15-30 g, Oral, Q15 Min PRN **OR** dextrose 50 % injection 25-50 mL, 25-50 mL, Intravenous, Q15 Min PRN **OR** glucagon injection 1 mg, 1 mg, Subcutaneous, Q15 Min PRN, Henny Le PA     glimepiride (AMARYL) tablet 4 mg, 4 mg, Oral, BID, Henny Le PA, 4 mg at 07/07/20 0824     insulin aspart (NovoLOG) injection (RAPID ACTING), 1-7 Units, Subcutaneous, TID AC, Henny Le PA, 2 Units at 07/07/20 0826     insulin aspart (NovoLOG) injection (RAPID ACTING), 1-5 Units, Subcutaneous, At Bedtime, Henny Le PA, 3 Units at 07/06/20 2114     insulin aspart (NovoLOG) injection (RAPID ACTING), , Subcutaneous, TID w/meals, Henny Le PA, 2 Units at 07/07/20 0825     insulin glargine (LANTUS PEN) injection 25 Units, 25 Units, Subcutaneous, QPM, Henny Le PA, 25 Units at 07/06/20 2114     multivitamin w/minerals (THERA-VIT-M) tablet 1 tablet, 1 tablet, Oral, Daily, Henny Le PA, 1 tablet at 07/07/20 0824     polyethylene glycol (MIRALAX) Packet 17 g, 17 g, Oral, Daily PRN, Henny Le PA     torsemide (DEMADEX) tablet 20 mg, 20 mg, Oral, Daily, Henny Le PA, 20 mg at 07/06/20 1453  No current outpatient prescriptions on file.   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

## 2020-07-07 NOTE — PLAN OF CARE
RN: External heart monitor in place, no adverse signs or sx. Denies pain. Wearing eye patch over right and left eye alternating every hour to help alleviate double vision.   New order for increased glargine to 30 units from 25 units with evening dose.

## 2020-07-07 NOTE — PLAN OF CARE
Discharge Planner OT   Patient plan for discharge: home  Current status: OT/edema assessment completed.  Pt CGA with FWW to toilet, min A to manage clothing over hips in standing.  Unsteady at first but improves through sessions.  Pt with chronic edema at baseline, reports never been well managed since his TKA.  Pt with firm, 4+ pitting in L LE from ankle to knee crease and firm 3+ pitting in R LE from ankle to knee crease.  Firm, non-pitting edema in B feet, L foot red and mildly warm to touch.  Pt also with soft, non-pitting edema in B hands, more pronounced on L.  Skin dry and flaking but intact, lotion provided.  Pt open to edema management during ARU stay.  GCB ordered, will apply later today or tomorrow as schedule allows pending MD/PA assessment of L foot.   Barriers to return to prior living situation: medical, balance, vision/nystagmus, lives alone, deconditioning, edema  Recommendations for discharge: ELOS 10-14 days with goal to discharge home with OP therapies as needed         Entered by: Alison Jose 07/07/2020 11:53 AM

## 2020-07-07 NOTE — PROGRESS NOTES
07/06/20 1517   Quick Adds   Type of Visit Initial Occupational Therapy Evaluation   Living Environment   Lives With alone   Living Arrangements house   Home Accessibility stairs to enter home;stairs within home   Number of Stairs, Main Entrance 2   Stair Railings, Main Entrance railing on right side (ascending)   Number of Stairs, Within Home, Primary other (see comments)  (12)   Stair Railings, Within Home, Primary railing on right side (ascending)   Transportation Anticipated family or friend will provide   Living Environment Comment OT: Pt lives alone in rambler style home. Pt has 2 stairs to enter the home through the garage. Pt has tub/shower combo. Pt has standard height toilet in guest bath. Pt has raised toilet seat in master bath. Pt has queen sized bed and pt gets out on the left side. Pt's laundry is located in the basement.    Self-Care   Usual Activity Tolerance good   Current Activity Tolerance fair   Regular Exercise Yes   Activity/Exercise Type biking;strength training   Exercise Amount/Frequency 3-5 times/wk   Equipment Currently Used at Home cane, straight;crutches;dressing device;walker, rolling  (shoe horn)   Activity/Exercise/Self-Care Comment OT: Pt recently joined [employer], has a bike in the basement.    Functional Level   Ambulation 0-->independent   Transferring 0-->independent   Toileting 0-->independent   Bathing 0-->independent   Dressing 0-->independent   Eating 0-->independent   Communication 0-->understands/communicates without difficulty   Swallowing 0-->swallows foods/liquids without difficulty   Cognition 0 - no cognition issues reported   Fall history within last six months yes   Number of times patient has fallen within last six months 1   Which of the above functional risks had a recent onset or change? ambulation;transferring;toileting;bathing;dressing;cognition;fall history   Prior Functional Level Comment OT: Pt was IND with ADLs/IADLs and functional mobility.   "  General Information   Onset of Illness/Injury or Date of Surgery - Date 06/30/20   Referring Physician Dr. 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 Symptoms developed 6/26 with nausea/vomiting and subsequently dizziness, weakness, fatigued, and vision difficulties. He saw his PCP via virtual visit, was diagnosed with vertigo and startde on Meclizine. He subsequently presented to the ED on 6/30/2020. MRI revealed recent pontomedullary junction ischemic infarct.\"   Precautions/Limitations fall precautions   Heart Disease Risk Factors Diabetes;High blood pressure;Lack of physical activity;Overweight;Stress;Medical history;Gender;Age   Edema Examination/Assessment   Skin Condition Pitting;Dryness;Temperature;Hemosiderin deposits;Lipodermatosclerosis   Skin Condition Comments Pt with redness over dorsal L foot and toes.  Skin dry, some bruising on dorsal L foot.  Bruise over L wrist   Scar Yes  (bilateral knees from TKA)   Ulcerations No   Stemmer Sign Negative   Skin Integrity Dry, intact   Pitting Assessment 4+ pitting to L LE ankle to calf.  3+ pitting ankle to knee in R LE.  Firm, non-pitting over B feet.  Soft, non-pitting in L hand.     Edema Assessment Comments Pt reports chronic B LE edema since TKA.  Pt has tried xiomara stockings and not found them to be helpful.  Pt with new CHF this hospitalization, wearing cardiac monitor.     Cognitive Status Examination   Orientation orientation to person, place and time   Level of Consciousness alert   Follows Commands (Cognition) WNL   Memory intact   Attention Distractible during evaluation;Sustained attention impaired   Cognitive " Comment Pt needing redirection but following commands and conversation   Visual Perception   Visual Perception Comments OT: nystagmus noted with visual tracking. peripheral vision intact.  Double vision, improving.  Pt has eye patch   Sensory Examination   Sensory Quick Adds No deficits were identified   Sensory Comments OT: light touch intact   Pain Assessment   Patient Currently in Pain No   Integumentary/Edema   Integumentary/Edema Comments OT: pitting edema in BLE's with left greater than right. RLE: 3+ and LLE: 4+. 1+ edema in right hand and 2+ edema in left hand   Posture   Posture forward head position   Range of Motion (ROM)   ROM Comment OT: BUE WFL all planes   Strength   Strength Comments OT: 5/5 with sh. flex/ext,sh. abd/add, elb. flex/ext, wrist flex/ext and 4+/5 with  with RUE and LUE   Hand Strength   Left hand  (pounds) 50 pounds   Right hand  (pounds) 52.5 pounds   Coordination   Left hand, nine hole peg test (seconds) 44.87   Right hand, nine hole peg test (seconds) 31.06   Functional Limitations Decreased speed   Coordination Comments Pt thinks that L hand has always been less coordinated.  Will monitor.  Somewhat limited by double vision   ARC Assessment Only   Acute Rehab Functional Assessment See IP Rehab Daily Documentation Flowsheet for Functional Mobility/ADL Assessment   Instrumental Activities of Daily Living (IADL)   Previous Responsibilities meal prep;housekeeping;laundry;shopping;medication management;finances;driving;work   IADL Comments OT: Pt has services for lawncare.    Activities of Daily Living Analysis   Impairments Contributing to Impaired Activities of Daily Living balance impaired;cognition impaired;coordination impaired;fear and anxiety;motor control impaired;postural control impaired;ROM decreased;strength decreased   General Therapy Interventions   Planned Therapy Interventions ADL retraining;IADL retraining;balance training;bed mobility training;cognition;fine  motor coordination training;groups;motor coordination training;neuromuscular re-education;strengthening;transfer training;visual perception;home program guidelines;progressive activity/exercise;risk factor education;other (see comments)   Clinical Impression   Criteria for Skilled Therapeutic Interventions Met yes, treatment indicated   OT Diagnosis OT: decreased safety and IND with ADLs and IADLs   Influenced by the following impairments OT: impaired balance, impaired standing tolerance, impaired vision   Assessment of Occupational Performance 5 or more Performance Deficits   Identified Performance Deficits OT: Performance deficits include: dressing, toileting, G/H tasks, bathing, and all IADLs   Clinical Decision Making (Complexity) High complexity   Therapy Frequency Daily   Anticipated Equipment Needs at Discharge bathing equipment;dressing equipment;toileting equipment   Anticipated Discharge Disposition Home;Home with Assist;Home with Home Therapy;Home with Outpatient Therapy   Risks and Benefits of Treatment have been explained. Yes   Patient, Family & other staff in agreement with plan of care Yes   Clinical Impression Comments OT: Pt would benefit from skilled OT services d/t recent decline in safety and IND with ADLs/IADLs and functional mobility.    Total Evaluation Time   Total Evaluation Time (Minutes) 30

## 2020-07-07 NOTE — PROGRESS NOTES
"   07/07/20 1554   Values Beliefs and Spiritual Care   C: Community: In support of your spiritual health, is there someone we may contact for you? (identify all that apply)   (shs/7-7)   Visit Information   Visit Made By Staff    Type of Visit Initial;Staff consultation/triage   Visited Patient  (Tele visit)   Interventions   Plan of Care Review   With patient/family/proxy;With interdisciplinary team  (Left message for nursing director re how to help Magdaleno feel cared for on unit.)   Basic Spiritual Interventions    introduction/orientation to Spiritual Health Services;Assessment of spiritual needs/resources;Reflective conversation;Life Review   Advanced Assessments/Interventions   Presenting Concerns/Issues Spiritual/Christianity/emotional support;Grief and adjustment issues;Self-concept disturbance;Challenged coping;Stress/self-care;Other  (Magdaleno concerned about being informed about his care.)   SPIRITUAL HEALTH SERVICES: Tele-Encounter  Patient Location (Hospital, [facility], Unit): [facility], , 5R  Spoke with (patient, family relationship): Patient      Referral Source: Hospital  request.        DATA:  Magdaleno said that he'd told \"them\" that he was Pentecostal and he wanted to see a . (He was not listed as Pentecostal, but I corrected that before our visit, as it was in the notes from [facility].) Initially he said that he was dismayed to be called by a woman, but he generously shared his narrative.  Magdaleno said that he was aggravated by how little information about his medical condition and the plan for him had been shared with him, initially at [facility], and then at [facility]. (Although he did see a  at [facility].) \"I was a [employer]  for four years, and I'm not a doctor, but I know what's going on, and no one is telling me anything.\" Magdaleno agreed with my assessment that it's irritating to have so little control over what's happening in his life right now.    Magdaleno attends [employer] in " [place].  He said that the  there knows what's happening with him.  Magdaleno named that he used to get up early in [place] to go mass, while his God daughter and his friend, who both worked in the entertainment industry in [place], went to bed when  he was getting up. Magdaleno came out to [place] to take care of his mother when she was ill (in [place]). 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    He said that his family is very close and he named that he was upset that his brother couldn't come to see him at the hospital.    Magdaleno said that he is more spiritual than Roman Catholic. \"People can do what they want, just don't tell me about it.\"  When asked how his spirituality supports him right now, he said that he was really tired.       PLAN:  Let nursing director know that Magdaleno would appreciate getting specific medical information updates regularly.  Will call Magdaleno on Friday on ARU.    Chaplain JOSE SANTA    ______________________________    Type of service:  Telephone Visit     has received verbal consent for a TelephoneVisit from the patient? Yes    Distance Provider Location: designated [place] office or home office (secure setting)    Mode of Communication: telephone (via [employer] phone or [employer] tele-call-number ([phone]))      "

## 2020-07-07 NOTE — PROGRESS NOTES
07/07/20 1000   Quick Adds   Type of Visit Initial PT Evaluation   Living Environment   Lives With alone   Living Arrangements house   Home Accessibility stairs to enter home   Number of Stairs, Main Entrance 2   Stair Railings, Main Entrance railing on right side (ascending)   Number of Stairs, Within Home, Primary other (see comments)  (12 stairs)   Stair Railings, Within Home, Primary railing on right side (ascending)   Transportation Anticipated family or friend will provide   Living Environment Comment PT: 2 WILLY. Tub shower, standard height toilet. Raised toilet in master bath. Bed - barbour bed, exits on L. Laundry in basement.   Self-Care   Usual Activity Tolerance good   Current Activity Tolerance fair   Regular Exercise Yes   Activity/Exercise Type biking;strength training   Exercise Amount/Frequency 3-5 times/wk   Equipment Currently Used at Home cane, straight;crutches;dressing device;walker, rolling   Activity/Exercise/Self-Care Comment PT: bike in basement, recently joined [employer].   Functional Level Prior   Ambulation 0-->independent   Transferring 0-->independent   Toileting 0-->independent   Bathing 0-->independent   Communication 0-->understands/communicates without difficulty   Swallowing 0-->swallows foods/liquids without difficulty   Cognition 0 - no cognition issues reported   Fall history within last six months yes   Number of times patient has fallen within last six months 1   Which of the above functional risks had a recent onset or change? ambulation;transferring;toileting;bathing;dressing;fall history   Prior Functional Level Comment PT: independent with mobility PTA.    General Information   Onset of Illness/Injury or Date of Surgery - Date 06/26/20  (date of CVA)   Referring Physician Rc   Patient/Family Goals Statement To return home safely ASAP   Pertinent History of Current Problem (include personal factors and/or comorbidities that impact the POC) CMH: L pontomedullary CVA  with vertigo and nystagmus, CHD, CHF, HTN, HLD, DVT, CKD. PMH: TKA 3/24/16   Heart Disease Risk Factors High blood pressure;Dislipidemia;Medical history;Gender;Age   General Info Comments PT: vestibular - pt reporting diplopia, managed fairly well with eye patch.   Cognitive Status Examination   Orientation orientation to person, place and time   Level of Consciousness alert   Follows Commands and Answers Questions 100% of the time   Personal Safety and Judgment intact   Memory intact   Pain Assessment   Patient Currently in Pain No   Integumentary/Edema   Integumentary/Edema other (describe)   Integumentary/Edema Comments PT: B knee scars from TKAs   Range of Motion (ROM)   ROM Comment PT: grossly, pt appears to have less ROM in BLE d/t inflexibility and BLE edema   Strength   Manual Muscle Testing Quick Adds MMT: Shoulder;MMT: Elbow/Forearm;MMT: Wrist;MMT: Hand (General);MMT: Hip;MMT: Knee;MMT: Ankle   MMT: Shoulder   Shoulder Flexion - Left Side (5/5) normal,left   Shoulder ABduction - Left Side (5/5) normal,left   Shoulder Flexion - Right Side (5/5) normal,right   Shoulder ABduction - Right Side (5/5) normal,right   MMT: Elbow/Forearm, Rehab Eval   Elbow Flexion - Left Side (5/5) normal,left   Elbow Extension - Left Side (5/5) normal,left   Elbow Flexion - Right Side (5/5) normal,right   Elbow Extension - Right Side (5/5) normal,right   MMT: Hand   Hand Muscle Testing Results : 4+/5 B   MMT: Hip, Rehab Eval   Hip Flexion - Left Side (4/5) good, left   Hip ABduction - Left Side (5/5) normal,left   Hip ADduction - Left Side (5/5) normal,left   Hip Flexion - Right Side (4/5) good, right   Hip ABduction - Right Side (5/5) normal,right   Hip ADduction - Right Side (5/5) normal,right   MMT: Knee, Rehab Eval   Knee Flexion - Left Side (4/5) good, left   Knee Extension - Left Side (5/5) normal,left   Knee Flexion - Right Side (4/5) good, right   Knee Extension - Right Side (5/5) normal,right   MMT: Ankle, Rehab Eval    Ankle Dorsiflexion - Left Side 5/5) normal,left   Ankle Plantarflexion - Left Side 5/5) normal,left   Ankle Dorsiflexion - Right Side 5/5) normal,left   Ankle Plantarflexion - Right Side 5/5) normal,left   ARC Assessment Only   Acute Rehab Functional Assessment See IP Rehab Daily Documentation Flowsheet for Functional Mobility/ADL Assessment   Balance   Balance other (describe)   Sitting Balance: Static fair balance  (seated EOB, BUE on bed, no significant sway)   Sitting Balance: Dynamic fair balance  (fair pelvic/trunk dissociation)   Sit-to-Stand Balance fair balance  (requires BUE support to stand)   Standing Balance: Static poor balance  (requires BUE support)   Standing Balance: Dynamic poor balance  (requires BUE support to maintain balance during gait)   Systems Impairment Contributing to Balance Disturbance visual;vestibular;neuromuscular   Identified Impairments Contributing to Balance Disturbance impaired coordination;impaired motor control;impaired postural control;decreased ROM;decreased strength   Balance Quick Add Sitting balance: Static;Sitting balance: dynamic;Sit to stand balance;Standing balance: static;Standing balance: dynamic;Systems impairment contributing to balance disturbance;Identified impairments contributing to balance disturbance   Sensory Examination   Sensory Perception other (describe)   Sensory Perception Quick Adds Light touch;Proprioception   Sensation Light Touch   LUE w/i normal limits   LLE w/i normal limits   RUE w/i normal limits   RLE w/i normal limits   Head/Neck w/i normal limits   Trunk w/i normal limits   Proprioception    LUE w/i normal limits   LLE w/i normal limits   RUE w/i normal limits   RLE w/i normal limits   Coordination   Coordination other (see comments)   Muscle Tone   Muscle Tone no deficits were identified   Modality Interventions   Planned Modality Interventions Microcurrent Electrical Stimulation   Planned Modality Interventions Comments PT: FES vs NMES  for coordination   General Therapy Interventions   Planned Therapy Interventions balance training;bed mobility training;gait training;manual therapy;neuromuscular re-education;strengthening;stretching;transfer training;risk factor education;home program guidelines;progressive activity/exercise   Clinical Impression   Criteria for Skilled Therapeutic Intervention yes, treatment indicated   PT Diagnosis Mild L hemiparesis, coordination, balance, and postural control deficits 2/2 CVA   Influenced by the following impairments Strength, endurance, balance, coordination, vertigo from vestibular deficits    Functional limitations due to impairments Bed mob, transfers, gait, stairs   Clinical Presentation Evolving/Changing   Clinical Presentation Rationale PT: new CVA with vertigo, CAD/CHF, CKD, B TKAs, fairly good home setup with WILLY then main level living, motivated, pt lives alone.   Clinical Decision Making (Complexity) High complexity   Therapy Frequency Daily  (90 min daily)   Predicted Duration of Therapy Intervention (days/wks) 12-14 days   Anticipated Equipment Needs at Discharge front wheeled walker   Anticipated Discharge Disposition Home with Assist;Home with Home Therapy   Risk & Benefits of therapy have been explained Yes   Patient, Family & other staff in agreement with plan of care Yes   Clinical Impression Comments PT: see care plan note   Total Evaluation Time   Total Evaluation Time (Minutes) 25

## 2020-07-07 NOTE — PROGRESS NOTES
"CLINICAL NUTRITION SERVICES - ASSESSMENT NOTE     Nutrition Prescription    RECOMMENDATIONS FOR MDs/PROVIDERS TO ORDER:  FYI, pt reports a usual \"dry\" body weight of around 245 lbs prior to stroke     Malnutrition Status:    Unable to determine due to no NFPA    Recommendations already ordered by Registered Dietitian (RD):  Vanilla or chocolate Boost Glucose Control TID at meals per pt request     Future/Additional Recommendations:  Monitor meal and supplement intakes  Monitor weight trends      REASON FOR ASSESSMENT  Burton lEizabeth is a/an 75 year old male assessed by the dietitian for Provider Order - Protein, calorie intake- poor appetite discuss supplements    NUTRITION/MEDICAL HISTORY  Per chart review: Pt admits to ARU for rehabilitation in the setting of ischemic infarct. Past medical history noted for CAD, HF, CKD, DMII, HTN, HLD, DVT.    Patient visit done by phone today in light of reduced in-person exposure during COVID outbreak. Pt reports good appetite for meals, but reports due to preferences and therapy schedule, hasn't taken much of meals and would like to have supplement offered at meals times, reviewed options/recommendations, pt agreeing to Boost Glucose control. Pt admits to not drinking much fluids at present due to inconsistency with medication and needing assistance to the bathroom, RD gave encouragement to take fluids between meals as tolerating noting kidney lab values with pt being on diuretic. Pt reports a usual dry body weight prior to stroke of around 245 lbs with pt having noted continued edema.     CURRENT NUTRITION ORDERS  Diet: 3 g Sodium  Intake/Tolerance: 100% per flow sheets, good appetite per nursing notes     LABS  Results for BURTON ELIZABETH (MRN [id number]) as of 7/7/2020 14:32   7/3/2020 04:33 7/4/2020 05:23 7/5/2020 05:55 7/7/2020 05:35   Sodium 137 135 135 136   Potassium 4.3 4.1 3.9 4.3   Urea Nitrogen 47 (H) 50 (H) 56 (H) 66 (H)   Creatinine 1.98 (H) 2.04 (H) 2.11 " "(H) 2.24 (H)   GFR Estimate 32 (L) 31 (L) 30 (L) 28 (L)   Calcium 8.9 9.0 9.2 9.1   Magnesium 2.1        MEDICATIONS  Amaryl, Medium insulin resistance correction scale dosing QID, Aspart insulin to gm CHO dosing TID with all meals, Lantus, Lipitor, Demadex, MVI    ANTHROPOMETRICS  Height: 179.8 cm (5' 10.8\")  Most Recent Weight: 115.2 kg (253 lb 14.4 oz)    IBW: 78.2 kg  BMI: Obesity Grade II BMI 35-39.9  Weight History:   Wt Readings from Last 10 Encounters:   07/07/20 115.2 kg (253 lb 14.4 oz)   07/06/20 116.1 kg (256 lb)   04/30/20 116 kg (255 lb 11.2 oz)   04/24/20 115.5 kg (254 lb 9.6 oz)   12/22/19 119.6 kg (263 lb 11.2 oz)   08/19/19 120.7 kg (266 lb)   08/12/19 122.1 kg (269 lb 1.6 oz)   08/07/19 122.1 kg (269 lb 1.6 oz)     Weight assessment: Weight appears fairly stable from almost 3 months ago, non-significant 3.8% weight loss in >6 months, non-significant 5.9% weight loss in almost 1 year.     Dosing Weight: 87.3 kg - adjusted BW     ASSESSED NUTRITION NEEDS  Estimated Energy Needs: 2621-3306 kcals/day (20 - 25 kcals/kg)  Justification: Obese  Estimated Protein Needs: 70 grams protein/day (0.8 grams of pro/kg)  Justification: Maintenance   Estimated Fluid Needs: 2180 ml/day or less (25 mL/kg or less)   Justification: Maintenance vs CKD and HF     MALNUTRITION  % Intake: Decreased intake does not meet criteria  % Weight Loss: Weight loss does not meet criteria  Subcutaneous Fat Loss: Unable to assess  Muscle Loss: Unable to assess  Fluid Accumulation/Edema: Mild per flow sheets   Malnutrition Diagnosis: Unable to determine due to no NFPA     NUTRITION DIAGNOSIS  Predicted inadequate nutrient intake related to pt food preferences    INTERVENTIONS  Implementation  Nutrition Education: RD reviewed importance of adequate nutrition/fluid intakes   Medical food supplement therapy     Goals  Patient to consume % of nutritionally adequate meal trays TID, or the equivalent with supplements/snacks.   "   Monitoring/Evaluation  Progress toward goals will be monitored and evaluated per protocol.    Earline Garza RD, LD  [facility] RD Pager: [phone]

## 2020-07-08 ENCOUNTER — APPOINTMENT (OUTPATIENT)
Dept: PHYSICAL THERAPY | Facility: CLINIC | Age: 75
End: 2020-07-08
Payer: COMMERCIAL

## 2020-07-08 ENCOUNTER — APPOINTMENT (OUTPATIENT)
Dept: OCCUPATIONAL THERAPY | Facility: CLINIC | Age: 75
End: 2020-07-08
Payer: COMMERCIAL

## 2020-07-08 LAB
GLUCOSE BLDC GLUCOMTR-MCNC: 178 MG/DL (ref 70–99)
GLUCOSE BLDC GLUCOMTR-MCNC: 235 MG/DL (ref 70–99)
GLUCOSE BLDC GLUCOMTR-MCNC: 273 MG/DL (ref 70–99)

## 2020-07-08 PROCEDURE — 25000132 ZZH RX MED GY IP 250 OP 250 PS 637: Performed by: PHYSICIAN ASSISTANT

## 2020-07-08 PROCEDURE — 97112 NEUROMUSCULAR REEDUCATION: CPT | Mod: GP

## 2020-07-08 PROCEDURE — 97110 THERAPEUTIC EXERCISES: CPT | Mod: GO

## 2020-07-08 PROCEDURE — 12800006 ZZH R&B REHAB

## 2020-07-08 PROCEDURE — 97110 THERAPEUTIC EXERCISES: CPT | Mod: GP

## 2020-07-08 PROCEDURE — 97530 THERAPEUTIC ACTIVITIES: CPT | Mod: GO

## 2020-07-08 PROCEDURE — 00000146 ZZHCL STATISTIC GLUCOSE BY METER IP

## 2020-07-08 PROCEDURE — 97535 SELF CARE MNGMENT TRAINING: CPT | Mod: GO

## 2020-07-08 RX ADMIN — CLOPIDOGREL BISULFATE 75 MG: 75 TABLET, FILM COATED ORAL at 08:45

## 2020-07-08 RX ADMIN — GLIMEPIRIDE 4 MG: 4 TABLET ORAL at 19:04

## 2020-07-08 RX ADMIN — CARVEDILOL 25 MG: 25 TABLET, FILM COATED ORAL at 17:11

## 2020-07-08 RX ADMIN — GLIMEPIRIDE 4 MG: 4 TABLET ORAL at 08:45

## 2020-07-08 RX ADMIN — ASPIRIN 81 MG: 81 TABLET ORAL at 08:45

## 2020-07-08 RX ADMIN — ATORVASTATIN CALCIUM 40 MG: 40 TABLET, FILM COATED ORAL at 21:28

## 2020-07-08 RX ADMIN — INSULIN ASPART 1 UNITS: 100 INJECTION, SOLUTION INTRAVENOUS; SUBCUTANEOUS at 13:43

## 2020-07-08 RX ADMIN — AMLODIPINE BESYLATE 5 MG: 5 TABLET ORAL at 08:45

## 2020-07-08 RX ADMIN — TORSEMIDE 20 MG: 20 TABLET ORAL at 15:01

## 2020-07-08 RX ADMIN — MULTIPLE VITAMINS W/ MINERALS TAB 1 TABLET: TAB at 08:45

## 2020-07-08 RX ADMIN — INSULIN ASPART 1 UNITS: 100 INJECTION, SOLUTION INTRAVENOUS; SUBCUTANEOUS at 19:05

## 2020-07-08 RX ADMIN — ACETAMINOPHEN 650 MG: 325 TABLET, FILM COATED ORAL at 06:24

## 2020-07-08 RX ADMIN — CARVEDILOL 25 MG: 25 TABLET, FILM COATED ORAL at 08:45

## 2020-07-08 ASSESSMENT — MIFFLIN-ST. JEOR: SCORE: 1921.07

## 2020-07-08 NOTE — PLAN OF CARE
FOCUS/GOAL  Bowel management, Bladder management, Skin integrity and Cognition/Memory/Judgment/Problem solving    ASSESSMENT, INTERVENTIONS AND CONTINUING PLAN FOR GOAL:  Pt is alert and oriented x4, denies fever, chills, CP, SOB, N/V, abdominal pain, or new weakness/numbness/tingling, continent of bowel and bladder, transferring with assist of 1 and ww, lymph wraps on bilateral LE.  sleeping well throughout the night, no tubes, lines or drains, no further care concerns at this time continue with POC.

## 2020-07-08 NOTE — PLAN OF CARE
Lymphedema - noting increased erythema in distal LLE and mid shank. 4+ pitting edema in BLE. Discussed treatment with OT, plan to use BLE lymph wraps to help with LLE healing and manage BLE lymphedema.    Plan - cleansed BLE with Microklenz antimicrobial spray and a towel, then donned BLE TG soft stockings for skin protection, followed by 8 cm wide Dora wrap on foot and 10 cm wide Dora wrap on calf. Wrapped in figure-8 pattern for both.    Discussed reasons pt could remove BLE lymph wraps such as severe itchiness, pain, or other reasons for intolerance. He verbalized understanding and said he would notify RN staff if needed.    RN staff, please keep lymph wraps on over the course of tonight and don't use PCD's. Therapy will reassess skin tomorrow and re-wrap if needed. Hoping to have pt wear BLE lymph wraps 23 hrs/day if tolerated.

## 2020-07-08 NOTE — PROGRESS NOTES
"  [facility]   Acute Rehabilitation Unit  Daily progress note    interval history  Burton Elizabeth was seen sitting up edge of bed got a few hours of sleep last night which is an improvement per his report.  Denies n/v/d, sob, headaches, visual changes stable/slightly better. All extremities with edema, though left foot bruised and red suspect inflammation/injury given lack of tenderness limited warmth will not treat for cellulitis marked and will monitor. Denies new complaints or concerns today.  Anticipating ~ 2 week los.       medications    amLODIPine  5 mg Oral Daily     [START ON 7/25/2020] aspirin  325 mg Oral Daily     aspirin  81 mg Oral Daily     atorvastatin  40 mg Oral QPM     carvedilol  25 mg Oral BID w/meals     clopidogrel  75 mg Oral Daily     glimepiride  4 mg Oral BID     insulin aspart  1-7 Units Subcutaneous TID AC     insulin aspart  1-5 Units Subcutaneous At Bedtime     insulin aspart   Subcutaneous TID w/meals     insulin glargine  30 Units Subcutaneous QPM     multivitamin w/minerals  1 tablet Oral Daily     torsemide  20 mg Oral Daily        acetaminophen, glucose **OR** dextrose **OR** glucagon, polyethylene glycol     physical exam  /70 (BP Location: Left arm)   Pulse 76   Temp 96.9  F (36.1  C) (Axillary)   Resp 16   Ht 1.798 m (5' 10.8\")   Wt 116.2 kg (256 lb 3.2 oz)   SpO2 95%   BMI 35.93 kg/m    Gen: alert fatigued  Cardio: rrr  Pulm: non labored clear diminshed  Abd: soft distended non tender  Ext: significant pitting edema in all extremities LE >UE, left foot red/purple with ecchymosis at toes  slightly warm non tender        Neuro/MSK: alert follows commands up with walker    labs  Lab Results   Component Value Date    WBC 9.4 07/07/2020     Lab Results   Component Value Date    RBC 4.89 07/07/2020     Lab Results   Component Value Date    HGB 13.0 07/07/2020     Lab Results   Component Value Date    HCT 42.8 07/07/2020     Lab " Results   Component Value Date    MCV 88 07/07/2020     Lab Results   Component Value Date    MCH 26.6 07/07/2020     Lab Results   Component Value Date    MCHC 30.4 07/07/2020     Lab Results   Component Value Date    RDW 16.5 07/07/2020     Lab Results   Component Value Date     07/07/2020     Last Comprehensive Metabolic Panel:  Sodium   Date Value Ref Range Status   07/07/2020 136 133 - 144 mmol/L Final     Potassium   Date Value Ref Range Status   07/07/2020 4.3 3.4 - 5.3 mmol/L Final     Chloride   Date Value Ref Range Status   07/07/2020 99 94 - 109 mmol/L Final     Carbon Dioxide   Date Value Ref Range Status   07/07/2020 29 20 - 32 mmol/L Final     Anion Gap   Date Value Ref Range Status   07/07/2020 8 3 - 14 mmol/L Final     Glucose   Date Value Ref Range Status   07/07/2020 195 (H) 70 - 99 mg/dL Final     Urea Nitrogen   Date Value Ref Range Status   07/07/2020 66 (H) 7 - 30 mg/dL Final     Creatinine   Date Value Ref Range Status   07/07/2020 2.24 (H) 0.66 - 1.25 mg/dL Final     GFR Estimate   Date Value Ref Range Status   07/07/2020 28 (L) >60 mL/min/[1.73_m2] Final     Comment:     Non  GFR Calc  Starting 12/18/2018, serum creatinine based estimated GFR (eGFR) will be   calculated using the Chronic Kidney Disease Epidemiology Collaboration   (CKD-EPI) equation.       Calcium   Date Value Ref Range Status   07/07/2020 9.1 8.5 - 10.1 mg/dL Final         Rehabilitation - continue comprehensive acute inpatient rehabilitation program with multidisciplinary approach including therapies, rehab nursing, and physiatry following. See interval history for updates.      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 Hospital stay complicated by acute on chronic  heart failure, hypoxic respiratory  failure and non sustained V-Tach. He was admitted to acute rehab unit 7/6 for ongoing rehabilitation and medical management.     Cerebrovascular accident    --- MRI of the brain showed recent ischemic infarct at the left lateral aspect of the brainstem  at the pontomedullary junction. Seen and evaluated by neurology, stroke felt most likely d/t small vessel disease.   .Echo with bubble study done EF of 50 to 55% with a negative bubble study, moderate aortic valve stenosis seen. Mid and distal anteroseptal hypokinesis. Hgb A1C 7.0%. . MRA brain/neck Neg  --- continue PT/OT   -eye patch    -DAPT with ASA 81 mg + Plavix 75 mg for 21 days, then  mg alone indefinitely   - LDL (102) with goal LDL between 40-70 ; pt reports restarting Lipitor 40 mg recently.    - DM as below  - Goal BP <130/85- management as below.   -continue stroke education.   -has 30 day cardiac event monitor in place  -f/u neurology, neuro opthalmology.      Acute decompensated systolic and diastolic heart failure  Acute respiratory failure with hypoxia   his torsemide was on hold, his echocardiogram done on 4/22/2020 showed EF of 45 to 50% age with moderate aortic stenosis. recievedIV Lasix 40 mg 3 times daily for 3 doses   restarted his torsemide 20 mg p.o. daily yesterday as per cardiology's recommendation  -consult lymphedema  -continue torsemide 20 mg daily  -daily weights  -oxygen prn     Nonsustained V. tach  ---50 beats noted, monitor electrolytes mostly potassium and magnesium and replace as needed.  ---Appreciate cardiology input, plan is to monitor on telemetry, if no more V. tach events noted can discharge home on the event monitor, if any further V. tach noted will need electrophysiology consult prior to discharge.  No further episodes of V. tach noted.  Needs 30-day Event monitor  on discharge     .  DM2: most recent HgbA1c of 7.0 in 04/2020.   - continue PTA Glimepiride   - meal time insulin with 1 unit per 15 grams  carbohydrate  - lantus increased 7/7- due to ongoing hypgerglycemia ( to 30 units daily) monitor  - medium sliding scale insulin with meals  -not consistently allowing bg checks.      HTN: norvasc 5 mg daily, coreg 25 mg bid, torsemide 20 daily - doses titrated given bp and significant edema.   -monitor and titrate as indicated.        CKD: baseline creatinine around 2.0 with creatinine of 2.11 7/5.-->2.24 7/7  - continue to follow BMP     CAD  HLP: continue PTA Atorvastatin        Left foot redness- in context of ill fitting foot wear with ecchymosis at toes and edema, red with some warmth non tender, no fevers,   -marked area of redness  -if continues to swell/becomes tender/etc start tx for cellulitis.for now limit compression        1. Adjustment to disability:  Frustrated- consider psych consult  2. FEN: low salt, will try supplementts  3. Bowel: continent-  4. Bladder: continue to check pvrs. (2 under 200 ml)  5. DVT Prophylaxis: refuses subcutaneous heparin-   6. GI Prophylaxis: diet  7. Code: full  8. Disposition: home   9. ELOS:~14 days.   10. Follow up Appointments on Discharge: pcp, neurology, cardiology.         discussed with Dr. Tatum  PM&R Staff Physician    Henny Le PA-C  Physical Medicine & Rehabilitation   Pager: [phone]

## 2020-07-08 NOTE — PLAN OF CARE
Pt VSS today. Denied pain. Pt is alert, but forgetful-example, does not call for BG checks. Missed BG check this AM d/t early meal and staff unaware that tray in room. BG at lunchtime 235. Pt is now going to have meal delivered to nursing station to ensure BG checks done. Fair PO intake. Pt has L foot/ankle redness, area traced by provider. Will monitor for expansion. Lymphedema wrap left off per PA instruction. RLE rewrapped. Toes L foot also bruised. Pt is wearing eye patch.  A1 with walker. Continent of b/b. Working well with therapies, see their notes. Today after shower with OT, pt refused to have heart monitor replaced. RN sent monitor and all contents in mail parcel and delivered to charge RN. Call light in reach. Will continue with POC for pt.         Patient's most recent vital signs are:     Vital signs:  BP: 144/80  Temp: 96.9  HR: 76  RR: 16  SpO2: 95 %     Patient does not have new respiratory symptoms.  Patient does not have new sore throat.  Patient does not have a fever greater than 99.5.

## 2020-07-08 NOTE — PLAN OF CARE
VS: Stable except /84, scheduled carvedilol given.   O2: RA.   Output: Continent, 2nd PVR needed.   Last BM: 7/6, continent.   Activity: Ax1 with gait belt and walker.   Skin: Bruises, redness left foot.   Pain: Aching pain in right rib cage managed with tylenol. Bilateral leg spasms managed with cold packs.   CMS: Pt denies numbness or tingling, edema.   Dressing: BLE lymph wraps 23 hrs/day.   Diet: 3 gm Na, thin, carb count, pills whole,  at 1753,  at 2114.    LDA: No lines. Cardiac monitor, 1 lead missing, pt refused.   Equipment: Cardiac monitor, eye patch, gait belt, walker, bed alarm, call light within reach.   Plan: Continue to monitor.   Additional Info:

## 2020-07-08 NOTE — PLAN OF CARE
Discharge Planner OT   Patient plan for discharge: home  Current status: Full shower and ADL completed.  Pt CGA for transfers and standing for clothing management.  Pt needing A for washing feet and back as well as LE dressing.  Barriers to return to prior living situation: medical, activity tolerance, balance  Recommendations for discharge: home with OP therapies  Rationale for recommendations: Pt progressing well toward OT goals, may need A with some IADL at discharge.       Entered by: Alison Jose 07/08/2020 4:44 PM     Edema: Removed wraps this am.  Noted increased redness at dorsal L foot and ankle.  Plan to HOLD compression to L LE, area outlined with marker per PA.  Therapy will follow up with R LE compression 7/9. L hand edema significantly reduced.  Encouraged continued conservative management for now, including elevation and muscle pump.  Pt in agreement.  Noted large bruise on anterior forearm/elbow, will monitor.

## 2020-07-08 NOTE — PLAN OF CARE
PT: Holding L lymph wrap due to ecchymosis. Mobility appears to be progressing well, SBA with FWW. Reports dizziness and diplopia improving, but still present.

## 2020-07-09 ENCOUNTER — APPOINTMENT (OUTPATIENT)
Dept: OCCUPATIONAL THERAPY | Facility: CLINIC | Age: 75
End: 2020-07-09
Payer: COMMERCIAL

## 2020-07-09 ENCOUNTER — APPOINTMENT (OUTPATIENT)
Dept: PHYSICAL THERAPY | Facility: CLINIC | Age: 75
End: 2020-07-09
Payer: COMMERCIAL

## 2020-07-09 LAB
GLUCOSE BLDC GLUCOMTR-MCNC: 136 MG/DL (ref 70–99)
GLUCOSE BLDC GLUCOMTR-MCNC: 156 MG/DL (ref 70–99)
GLUCOSE BLDC GLUCOMTR-MCNC: 170 MG/DL (ref 70–99)
GLUCOSE BLDC GLUCOMTR-MCNC: 189 MG/DL (ref 70–99)
GLUCOSE BLDC GLUCOMTR-MCNC: 256 MG/DL (ref 70–99)

## 2020-07-09 PROCEDURE — 40000187 ZZH STATISTIC PATIENT MED CONFERENCE < 30 MIN: Performed by: OCCUPATIONAL THERAPIST

## 2020-07-09 PROCEDURE — 12800006 ZZH R&B REHAB

## 2020-07-09 PROCEDURE — 97112 NEUROMUSCULAR REEDUCATION: CPT | Mod: GP | Performed by: PHYSICAL THERAPIST

## 2020-07-09 PROCEDURE — 25000132 ZZH RX MED GY IP 250 OP 250 PS 637: Performed by: PHYSICIAN ASSISTANT

## 2020-07-09 PROCEDURE — 97535 SELF CARE MNGMENT TRAINING: CPT | Mod: GO | Performed by: OCCUPATIONAL THERAPIST

## 2020-07-09 PROCEDURE — 97110 THERAPEUTIC EXERCISES: CPT | Mod: GP | Performed by: PHYSICAL THERAPIST

## 2020-07-09 PROCEDURE — 40000183 ZZH STATISTIC PT MED CONFERENCE < 30 MIN

## 2020-07-09 PROCEDURE — 97116 GAIT TRAINING THERAPY: CPT | Mod: GP | Performed by: PHYSICAL THERAPIST

## 2020-07-09 PROCEDURE — 97533 SENSORY INTEGRATION: CPT | Mod: GO | Performed by: OCCUPATIONAL THERAPIST

## 2020-07-09 PROCEDURE — 00000146 ZZHCL STATISTIC GLUCOSE BY METER IP

## 2020-07-09 PROCEDURE — 97116 GAIT TRAINING THERAPY: CPT | Mod: GP

## 2020-07-09 RX ADMIN — CARVEDILOL 25 MG: 25 TABLET, FILM COATED ORAL at 18:04

## 2020-07-09 RX ADMIN — ASPIRIN 81 MG: 81 TABLET ORAL at 07:55

## 2020-07-09 RX ADMIN — MULTIPLE VITAMINS W/ MINERALS TAB 1 TABLET: TAB at 07:56

## 2020-07-09 RX ADMIN — ATORVASTATIN CALCIUM 40 MG: 40 TABLET, FILM COATED ORAL at 21:29

## 2020-07-09 RX ADMIN — ACETAMINOPHEN 650 MG: 325 TABLET, FILM COATED ORAL at 19:42

## 2020-07-09 RX ADMIN — GLIMEPIRIDE 4 MG: 4 TABLET ORAL at 18:04

## 2020-07-09 RX ADMIN — CLOPIDOGREL BISULFATE 75 MG: 75 TABLET, FILM COATED ORAL at 07:56

## 2020-07-09 RX ADMIN — CARVEDILOL 25 MG: 25 TABLET, FILM COATED ORAL at 07:56

## 2020-07-09 RX ADMIN — INSULIN ASPART 1 UNITS: 100 INJECTION, SOLUTION INTRAVENOUS; SUBCUTANEOUS at 14:52

## 2020-07-09 RX ADMIN — INSULIN ASPART 1 UNITS: 100 INJECTION, SOLUTION INTRAVENOUS; SUBCUTANEOUS at 18:04

## 2020-07-09 RX ADMIN — AMLODIPINE BESYLATE 5 MG: 5 TABLET ORAL at 07:56

## 2020-07-09 RX ADMIN — GLIMEPIRIDE 4 MG: 4 TABLET ORAL at 07:55

## 2020-07-09 RX ADMIN — TORSEMIDE 20 MG: 20 TABLET ORAL at 14:48

## 2020-07-09 ASSESSMENT — MIFFLIN-ST. JEOR: SCORE: 1900.2

## 2020-07-09 NOTE — PROGRESS NOTES
OT:completed toilet trf, chair trf, LB dressing and hand washing. Completed with CGA/min A, FWW. Donning shoes mod A. Pt completed visual perceptual exerc w/emphasis on double vision. Able to bring double vision of target into single vision when given time. Cont to benefit from skilled OT to address ADL, trf, visual perceptual skill sfor daily tasks.     Inocente Sen, OTRL/CBIS

## 2020-07-09 NOTE — PLAN OF CARE
Discharge Planner OT   Patient plan for discharge: home  Current status:Pt. S/I with dressing. CGA with item retrieval, no LOB with FWW.  Pt. frustrated with lack of sleep, multiple interruptions throughout night/day.  Barriers to return to prior living situation: medical, activity tolerance, balance  Recommendations for discharge: home with OP therapies  Rationale for recommendations: Pt progressing well toward OT goals, may need A with some IADL at discharge.

## 2020-07-09 NOTE — PROGRESS NOTES
"  [facility]   Acute Rehabilitation Unit  Daily progress note    interval history  Pt seen in team rounds.  No acute events overnight but continues to be upset at interruptions during the night.  2 am glucose check has been discontinued, and staff should not be taking vitals overnight unless clinically indicated.  However I discussed with him that staff do need to at least periodically check to ensure respirations are present which he felt was reasonable.  Denies fevers, chills, SOB, or chest pain.  Functionally he is CGA for mobility and SBA for transfers, but continues to be limited by visual deficits which he says are improving..  Ambulated 250 ft with a weighted cart yesterday.  For full functional updates, see team rounds note from today.  Given progress, will move up tentative discharge date to 7/16/20.       medications    amLODIPine  5 mg Oral Daily     [START ON 7/25/2020] aspirin  325 mg Oral Daily     aspirin  81 mg Oral Daily     atorvastatin  40 mg Oral QPM     carvedilol  25 mg Oral BID w/meals     clopidogrel  75 mg Oral Daily     glimepiride  4 mg Oral BID     insulin aspart  1-7 Units Subcutaneous TID AC     insulin aspart  1-5 Units Subcutaneous At Bedtime     insulin aspart   Subcutaneous TID w/meals     insulin glargine  30 Units Subcutaneous QPM     multivitamin w/minerals  1 tablet Oral Daily     torsemide  20 mg Oral Daily        acetaminophen, glucose **OR** dextrose **OR** glucagon, polyethylene glycol     physical exam  /76 (BP Location: Left arm)   Pulse 70   Temp 98.7  F (37.1  C) (Oral)   Resp 20   Ht 1.798 m (5' 10.8\")   Wt 114.1 kg (251 lb 9.6 oz)   SpO2 92%   BMI 35.29 kg/m    Gen: alert   Cardio: rrr  Pulm: non labored clear diminshed  Abd: soft distended non tender  Ext: significant pitting edema in all extremities LE >UE, left foot red/purple with ecchymosis at toes  slightly warm non tender.  Demarcated area is stable from " yesterday  Neuro/MSK: answers appropriately, follows commands    labs  CBC RESULTS:   Recent Labs   Lab Test 07/07/20  0535   WBC 9.4   RBC 4.89   HGB 13.0*   HCT 42.8   MCV 88   MCH 26.6   MCHC 30.4*   RDW 16.5*          Last Comprehensive Metabolic Panel:  Sodium   Date Value Ref Range Status   07/07/2020 136 133 - 144 mmol/L Final     Potassium   Date Value Ref Range Status   07/07/2020 4.3 3.4 - 5.3 mmol/L Final     Chloride   Date Value Ref Range Status   07/07/2020 99 94 - 109 mmol/L Final     Carbon Dioxide   Date Value Ref Range Status   07/07/2020 29 20 - 32 mmol/L Final     Anion Gap   Date Value Ref Range Status   07/07/2020 8 3 - 14 mmol/L Final     Glucose   Date Value Ref Range Status   07/07/2020 195 (H) 70 - 99 mg/dL Final     Urea Nitrogen   Date Value Ref Range Status   07/07/2020 66 (H) 7 - 30 mg/dL Final     Creatinine   Date Value Ref Range Status   07/07/2020 2.24 (H) 0.66 - 1.25 mg/dL Final     GFR Estimate   Date Value Ref Range Status   07/07/2020 28 (L) >60 mL/min/[1.73_m2] Final     Comment:     Non  GFR Calc  Starting 12/18/2018, serum creatinine based estimated GFR (eGFR) will be   calculated using the Chronic Kidney Disease Epidemiology Collaboration   (CKD-EPI) equation.       Calcium   Date Value Ref Range Status   07/07/2020 9.1 8.5 - 10.1 mg/dL Final         Rehabilitation - continue comprehensive acute inpatient rehabilitation program with multidisciplinary approach including therapies, rehab nursing, and physiatry following. See interval history for updates.      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 Hospital stay complicated by acute on chronic  heart failure, hypoxic respiratory failure and non sustained V-Tach. He was admitted to acute rehab unit 7/6 for  ongoing rehabilitation and medical management.     Cerebrovascular accident    --- MRI of the brain showed recent ischemic infarct at the left lateral aspect of the brainstem  at the pontomedullary junction. Seen and evaluated by neurology, stroke felt most likely d/t small vessel disease.   .Echo with bubble study done EF of 50 to 55% with a negative bubble study, moderate aortic valve stenosis seen. Mid and distal anteroseptal hypokinesis. Hgb A1C 7.0%. . MRA brain/neck Neg  --- continue PT/OT   -eye patch    -DAPT with ASA 81 mg + Plavix 75 mg for 21 days, then  mg alone indefinitely   - LDL (102) with goal LDL between 40-70 ; pt reports restarting Lipitor 40 mg recently.    - DM as below  - Goal BP <130/85- management as below.   -continue stroke education.   -has 30 day cardiac event monitor in place  -f/u neurology, neuro opthalmology.      Acute decompensated systolic and diastolic heart failure  Acute respiratory failure with hypoxia   his torsemide was on hold, his echocardiogram done on 4/22/2020 showed EF of 45 to 50% age with moderate aortic stenosis. recievedIV Lasix 40 mg 3 times daily for 3 doses   restarted his torsemide 20 mg p.o. daily per cardiology's recommendation  -consult lymphedema  -continue torsemide 20 mg daily  -daily weights  -oxygen prn     Nonsustained V. tach  ---50 beats noted, monitor electrolytes mostly potassium and magnesium and replace as needed.  ---Appreciate cardiology input, plan is to monitor on telemetry, if no more V. tach events noted can discharge home on the event monitor, if any further V. tach noted will need electrophysiology consult prior to discharge.  -Ziopatch applied prior to admission to rehab     .  DM2: most recent HgbA1c of 7.0 in 04/2020.   - continue PTA Glimepiride   - meal time insulin with 1 unit per 15 grams carbohydrate  - lantus increased 7/7- due to ongoing hypgerglycemia ( to 30 units daily) monitor  - medium sliding scale insulin  with meals  -not consistently allowing bg checks.      HTN: norvasc 5 mg daily, coreg 25 mg bid, torsemide 20 daily - doses titrated given bp and significant edema.   -monitor and titrate as indicated.        CKD: baseline creatinine around 2.0 with creatinine of 2.11 7/5.-->2.24 7/7  - continue to follow BMP     CAD  HLP: continue PTA Atorvastatin        Left foot redness- in context of ill fitting foot wear with ecchymosis at toes and edema, red with some warmth non tender, no fevers,   -marked area of redness  -if continues to swell/becomes tender/etc start tx for cellulitis.for now limit compression        1. Adjustment to disability:  Frustrated- consider psych consult  2. FEN: low salt, will try supplementts  3. Bowel: continent-  4. Bladder: continue to check pvrs. (2 under 200 ml)  5. DVT Prophylaxis: refuses subcutaneous heparin-   6. GI Prophylaxis: diet  7. Code: full  8. Disposition: home   9. ELOS: Tentative discharge date 7/16/20  10. Follow up Appointments on Discharge: pcp, neurology, cardiology.       Michael Tatum MD  Department of Rehabilitation Medicine  Pager: [phone]    Time Spent on this Encounter   I, Michael Tatum, spent a total of 35 minutes face-to-face or managing the care of Burton Elizabeth. Over 50% of my time on the unit was spent counseling the patient and coordinating care. See note for details.

## 2020-07-09 NOTE — PLAN OF CARE
"Blood pressure 130/67, pulse 66, temperature 98.6  F (37  C), temperature source Oral, resp. rate 20, height 1.798 m (5' 10.8\"), weight 114.1 kg (251 lb 9.6 oz), SpO2 91 %.  Patient is A&Ox 3, denies pain , SOB, dizziness or CP.LS clear, BS active. LBM 7/9 as per patient. A patch on left eye. Transfers with assist of one with a gait belt stand pivot. Patient is mendiola, upset for not sleeping, was interrupted last noc.MD aware, discontinued 2 am BG. Edema in bilat LE. Left LE reddened and marked. Rt LE wrapped with ace wrap. Pt had breakfast, ate lunch late, gave 1 unit for coverage but not for carb's, slip was missing. BG's 136 and 170..Will continue to monitor.  "

## 2020-07-09 NOTE — PLAN OF CARE
PT: Pt with good participation with PT.  Pt is safer with amb with fww due to history of knee issues, vision/balance deficits at this time.  Cont toward goals.

## 2020-07-09 NOTE — PLAN OF CARE
FOCUS/GOAL  Medication management and Medical management    ASSESSMENT, INTERVENTIONS AND CONTINUING PLAN FOR GOAL:  A&O, A1 w/ walker. Makes needs known, alarms on.  Pt denied pain during evening.  Continent of B/B, LBM 7/6.   and 273, sliding scale coverage given.  Lymph wraps on RLE.

## 2020-07-09 NOTE — PLAN OF CARE
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    Active Medical Co-morbidities/Prognosis: Fluid overload, acute decompensated CHF, NSVT, HTN, CAD, CKD, HLD, DM II    Safety: Uses call light    Pain: Denies    Medications, Skin, Tubes/Lines: No lines    Swallowing/Nutrition: No issues    Bowel/Bladder: Continent    Psychosocial: Single, lives alone in a rambler-style home with lower level. WILLY and STI to access the basement. Patient normally I with IADLs including laundry and driving. Managed own medications and finances. Enjoys golfing, recently joined a gym and has a bike in his basement. Retired. Brother and nieces in the area. No financial concerns.     ADLs/IADLs: Pt progressing with ADLs and functional mobility. Pt requiring CGA transfers and mobility with fww during ADLs. Pt requiring Min A UB dressing, Max A LB dressing, Mod A toileting, and supervision standing grooming. Performance limited by dizziness, visual impairment, BLE edema, and impaired balance. Goals to progress to Mod IND ADLs and simple IADLs, anticipate pt will require assist for heavier IADL. Anticipate 10-12 days to address goals in POC. Recommend OP OT follow up, visual specialty. DME/AE TBD pending progress.     Mobility: Pt progressing well with mobility. Working with RN team to manage BLE lymphedema (L>RLE). Amb 200+ ft with weighted cart SBA, navigating 12x6\" steps B rails CGA. Originally planning for discharge 7/20, thinking pt will likely discharge sooner than that. Recommend HH PT initially for home safety eval. Pt owns SEC and FWW.    Cognition/Language: Intermittent distraction but able to be redirected, not a barrier to discharge.    Community Re-Entry: amb limited community distances with FWW " SBA    Transportation: requires assist from family/friends    Plan of Care and goals reviewed and updated.    Discharge Plan/Recommendations    Fall Precautions: continue    Overall plan for the patient:   Is making progress, but continues to have visual deficits.  Currently CGA for mobility, SBA for transfers, ambulated 250 ft with a weighted card.  Continue to progress towards ambulation and ADLs with a walker only.  Given progress, will move up tentative discharge date to 7/16/20 with outpatient PT and vision OT.      Utilization Review and Continued Stay Justification    Medical Necessity Criteria:    For any criteria that is not met, please document reason and plan for discharge, transfer, or modification of plan of care to address.    Requires intensive rehabilitation program to treat functional deficits?: Yes    Requires 3x per week or greater involvement of rehabilitation physician to oversee rehabilitation program?: Yes    Requires rehabilitation nursing interventions?: Yes    Patient is making functional progress?: Yes    There is a potential for additional functional progress? Yes    Patient is participating in therapy 3 hours per day a minimum of 5 days per week or 15 hours per week in 7 day period?:Yes    Has discharge needs that require coordinated discharge planning approach?:Yes      Barriers/Concerns related to meeting medical necessity criteria:  None    Team Plan to Address Concern:  N/A      Final Physician Sign off    Statement of Approval: I have reviewed and agree with the recommendations and documentation in this care conference note.       Patient Goals  Social Work Goals:Confirm discharge recommendations with therapy, coordinate safe discharge plan and remain available to support and assist as needed.     OT Frequency: daily  OT goal: hygiene/grooming: modified independent  OT goal: upper body dressing: Modified independent, including set-up/clothing retrieval  OT goal: lower body dressing:  Modified independent, including set-up/clothing retrieval  OT goal: upper body bathing: Modified independent  OT goal: lower body bathing: Modified independent  OT goal: toilet transfer/toileting: Independent  OT goal: meal preparation: Modified independent, with moderately complex multi-step meal preparation  OT goal: home management: Modified independent, with moderate demand household tasks  OT goal 1: Pt will complete tub transfer mod I or better by discharge    PT Frequency: 90 min daily  PT goal: bed mobility: Independent, Supine to/from sit, Rolling  PT goal: transfers: Modified independent, Sit to/from stand, Bed to/from chair, Assistive device(FWW)  PT goal: gait: Modified independent, Greater than 200 feet, Rolling walker  PT goal: stairs: Modified independent, Rail on right, 2 stairs  PT goal: perform aerobic activity with stable cardiovascular response: intermittent activity, 15 minutes, NuStep  PT goal 1: Car transfer, FWW, SBA  PT Goal 2: HEP - independent with handout for BLE strengthening standing, standing static balance and VOR/saccades  PT Goal 3: Complete Goode

## 2020-07-09 NOTE — PLAN OF CARE
FOCUS/GOAL  Psychosocial needs and Safety management    ASSESSMENT, INTERVENTIONS AND CONTINUING PLAN FOR GOAL:    Pt very light sleeper. Does not like when staff comes to check in on him even when very quit. Pt was not happy when writer came in during shift change to check on him. When pt woke up he complained of not being able to sleep, hearing bed alarms and noise in fong (door was shut during night). Charge nurse notified and states he will talk to nurse supervisor about getting patient care order to not have any staff go into pt's room overnight unless pt calls (per pt request). Pt did not allow writer to do assessment or vitals. Said he just wants to get ready and eat. Pt calmed down once breakfast arrived. Eye patch on. Lymph wrap on.

## 2020-07-10 ENCOUNTER — APPOINTMENT (OUTPATIENT)
Dept: PHYSICAL THERAPY | Facility: CLINIC | Age: 75
End: 2020-07-10
Payer: COMMERCIAL

## 2020-07-10 ENCOUNTER — APPOINTMENT (OUTPATIENT)
Dept: OCCUPATIONAL THERAPY | Facility: CLINIC | Age: 75
End: 2020-07-10
Payer: COMMERCIAL

## 2020-07-10 LAB
GLUCOSE BLDC GLUCOMTR-MCNC: 171 MG/DL (ref 70–99)
GLUCOSE BLDC GLUCOMTR-MCNC: 182 MG/DL (ref 70–99)
GLUCOSE BLDC GLUCOMTR-MCNC: 196 MG/DL (ref 70–99)
GLUCOSE BLDC GLUCOMTR-MCNC: 83 MG/DL (ref 70–99)

## 2020-07-10 PROCEDURE — 25000132 ZZH RX MED GY IP 250 OP 250 PS 637: Performed by: PHYSICIAN ASSISTANT

## 2020-07-10 PROCEDURE — 97110 THERAPEUTIC EXERCISES: CPT | Mod: GO | Performed by: OCCUPATIONAL THERAPIST

## 2020-07-10 PROCEDURE — 12800006 ZZH R&B REHAB

## 2020-07-10 PROCEDURE — 00000146 ZZHCL STATISTIC GLUCOSE BY METER IP

## 2020-07-10 PROCEDURE — 97116 GAIT TRAINING THERAPY: CPT | Mod: GP

## 2020-07-10 PROCEDURE — 97535 SELF CARE MNGMENT TRAINING: CPT | Mod: GO

## 2020-07-10 PROCEDURE — 97110 THERAPEUTIC EXERCISES: CPT | Mod: GP | Performed by: PHYSICAL THERAPIST

## 2020-07-10 PROCEDURE — 97112 NEUROMUSCULAR REEDUCATION: CPT | Mod: GP | Performed by: PHYSICAL THERAPIST

## 2020-07-10 PROCEDURE — 97530 THERAPEUTIC ACTIVITIES: CPT | Mod: GO | Performed by: OCCUPATIONAL THERAPIST

## 2020-07-10 PROCEDURE — 97112 NEUROMUSCULAR REEDUCATION: CPT | Mod: GP

## 2020-07-10 RX ADMIN — ACETAMINOPHEN 650 MG: 325 TABLET, FILM COATED ORAL at 03:47

## 2020-07-10 RX ADMIN — TORSEMIDE 20 MG: 20 TABLET ORAL at 15:18

## 2020-07-10 RX ADMIN — GLIMEPIRIDE 4 MG: 4 TABLET ORAL at 07:56

## 2020-07-10 RX ADMIN — CLOPIDOGREL BISULFATE 75 MG: 75 TABLET, FILM COATED ORAL at 07:56

## 2020-07-10 RX ADMIN — CARVEDILOL 25 MG: 25 TABLET, FILM COATED ORAL at 07:57

## 2020-07-10 RX ADMIN — ACETAMINOPHEN 650 MG: 325 TABLET, FILM COATED ORAL at 18:35

## 2020-07-10 RX ADMIN — INSULIN ASPART 2 UNITS: 100 INJECTION, SOLUTION INTRAVENOUS; SUBCUTANEOUS at 12:35

## 2020-07-10 RX ADMIN — GLIMEPIRIDE 4 MG: 4 TABLET ORAL at 18:34

## 2020-07-10 RX ADMIN — ACETAMINOPHEN 650 MG: 325 TABLET, FILM COATED ORAL at 09:50

## 2020-07-10 RX ADMIN — ATORVASTATIN CALCIUM 40 MG: 40 TABLET, FILM COATED ORAL at 20:38

## 2020-07-10 RX ADMIN — ASPIRIN 81 MG: 81 TABLET ORAL at 07:56

## 2020-07-10 RX ADMIN — AMLODIPINE BESYLATE 5 MG: 5 TABLET ORAL at 07:57

## 2020-07-10 RX ADMIN — MULTIPLE VITAMINS W/ MINERALS TAB 1 TABLET: TAB at 07:57

## 2020-07-10 RX ADMIN — CARVEDILOL 25 MG: 25 TABLET, FILM COATED ORAL at 18:35

## 2020-07-10 RX ADMIN — INSULIN ASPART 1 UNITS: 100 INJECTION, SOLUTION INTRAVENOUS; SUBCUTANEOUS at 20:34

## 2020-07-10 NOTE — PLAN OF CARE
[id number]    FOCUS/GOAL  Medication management, Pain management, and Medical management    ASSESSMENT, INTERVENTIONS AND CONTINUING PLAN FOR GOAL:  A&O, CGA w/ walker. Makes needs known, alarms on.  Pt reported pain in mid back, PRN tylenol given with good results.  Continent of B/B, LBM 7/9.   and 256 during evening, sliding scale coverage given.  Continue with POC.

## 2020-07-10 NOTE — PLAN OF CARE
Discharge Planner OT   Patient plan for discharge: Home  Current status: Pt engaged in standing activity to build endurance and strength with ADL and IADL.  Pt tolerating activity well during session  Barriers to return to prior living situation: Decreased endurance/strength  Recommendations for discharge: Home with OP OT  Rationale for recommendations: Pt will benefit from continued skilled OT services to build activity tolerance and independence with ADL and IADL       Entered by: Jj Ugarte 07/10/2020 2:39 PM

## 2020-07-10 NOTE — PLAN OF CARE
Pt uses call light appropriately. Needing CGA, gait belt and walker for mobility. Continent of bowel and bladder, used toilet. Had large bm x1.   Prn tylenol given x1 per request for back pain. VSS  BG 83 before breakfast,  before lunch. Insulin given per sliding scale and carb coverage.   Pt resting between therapies.     3pm-11pm: pt used call light to use bathroom appropriately. Continent of urine. C/o righ ribcage pain, tylenol given per request x1, pt reports no relief.  before dinner and  at HS. Pt declined to remove shoes or lymphedema wrap when I attempted to assess skin.    Bed alarm on.

## 2020-07-11 ENCOUNTER — APPOINTMENT (OUTPATIENT)
Dept: OCCUPATIONAL THERAPY | Facility: CLINIC | Age: 75
End: 2020-07-11
Payer: COMMERCIAL

## 2020-07-11 ENCOUNTER — APPOINTMENT (OUTPATIENT)
Dept: PHYSICAL THERAPY | Facility: CLINIC | Age: 75
End: 2020-07-11
Payer: COMMERCIAL

## 2020-07-11 LAB
GLUCOSE BLDC GLUCOMTR-MCNC: 156 MG/DL (ref 70–99)
GLUCOSE BLDC GLUCOMTR-MCNC: 175 MG/DL (ref 70–99)
GLUCOSE BLDC GLUCOMTR-MCNC: 190 MG/DL (ref 70–99)
GLUCOSE BLDC GLUCOMTR-MCNC: 82 MG/DL (ref 70–99)

## 2020-07-11 PROCEDURE — 97140 MANUAL THERAPY 1/> REGIONS: CPT | Mod: GP | Performed by: PHYSICAL THERAPIST

## 2020-07-11 PROCEDURE — 97530 THERAPEUTIC ACTIVITIES: CPT | Mod: GO

## 2020-07-11 PROCEDURE — 00000146 ZZHCL STATISTIC GLUCOSE BY METER IP

## 2020-07-11 PROCEDURE — 25000132 ZZH RX MED GY IP 250 OP 250 PS 637: Performed by: PHYSICIAN ASSISTANT

## 2020-07-11 PROCEDURE — 97112 NEUROMUSCULAR REEDUCATION: CPT | Mod: GP | Performed by: PHYSICAL THERAPIST

## 2020-07-11 PROCEDURE — 12800006 ZZH R&B REHAB

## 2020-07-11 PROCEDURE — 97535 SELF CARE MNGMENT TRAINING: CPT | Mod: GO

## 2020-07-11 RX ADMIN — ACETAMINOPHEN 650 MG: 325 TABLET, FILM COATED ORAL at 03:37

## 2020-07-11 RX ADMIN — GLIMEPIRIDE 4 MG: 4 TABLET ORAL at 08:36

## 2020-07-11 RX ADMIN — CARVEDILOL 25 MG: 25 TABLET, FILM COATED ORAL at 18:55

## 2020-07-11 RX ADMIN — ASPIRIN 81 MG: 81 TABLET ORAL at 08:37

## 2020-07-11 RX ADMIN — MULTIPLE VITAMINS W/ MINERALS TAB 1 TABLET: TAB at 08:36

## 2020-07-11 RX ADMIN — ATORVASTATIN CALCIUM 40 MG: 40 TABLET, FILM COATED ORAL at 21:31

## 2020-07-11 RX ADMIN — TORSEMIDE 20 MG: 20 TABLET ORAL at 15:49

## 2020-07-11 RX ADMIN — CLOPIDOGREL BISULFATE 75 MG: 75 TABLET, FILM COATED ORAL at 08:36

## 2020-07-11 RX ADMIN — INSULIN ASPART 2 UNITS: 100 INJECTION, SOLUTION INTRAVENOUS; SUBCUTANEOUS at 18:53

## 2020-07-11 RX ADMIN — AMLODIPINE BESYLATE 5 MG: 5 TABLET ORAL at 08:37

## 2020-07-11 RX ADMIN — CARVEDILOL 25 MG: 25 TABLET, FILM COATED ORAL at 08:36

## 2020-07-11 RX ADMIN — GLIMEPIRIDE 4 MG: 4 TABLET ORAL at 18:55

## 2020-07-11 NOTE — PLAN OF CARE
OT: Pt. completed tub transfer using extended tub bench with FWW requiring CGA. Problem solving for home, may need a vertical grab bar and a shower chair due to glass shower door over tub. Pt. ambulated from room to therapy gym. Pt. completed standing tolerance task for 12 minutes using FWW with CGA. Pt. noted no vision deficits while completing the memory game task. Pt. noted side rib pain. Pt. completed meal preparation task of making toast using FWW requring CGA. Therapist provided verbal cues for using counter to transport items.

## 2020-07-11 NOTE — PLAN OF CARE
"PT: Patient initially refusing AM PT session due to headache, which he relates to poor sleep. \"I got less than a half hour of sleep last night.\" Patient agreeable to PT when discussing possible options to reduce HA. With supine SOR x15 min, reports significant improvement in HA sx from 7/10 to 4/10. 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  "

## 2020-07-11 NOTE — PROGRESS NOTES
"  [facility]   Acute Rehabilitation Unit  Daily progress note    interval history  No acute events overnight and Mr. Elizabeth was able to sleep well which he is very happy about.  Today has no new concerns or complaints.  He reports that when he woke up this morning, both legs were normal color, however when he was up and walking the redness in the left leg returned.  Denies chest pain, shortness of breath, fevers, or chills.  Functionally pt is participating well with therapies and progressing well.  Currently is safer with FWW due to vision and balance deficits, and history of knee problems.      medications    amLODIPine  5 mg Oral Daily     [START ON 7/25/2020] aspirin  325 mg Oral Daily     aspirin  81 mg Oral Daily     atorvastatin  40 mg Oral QPM     carvedilol  25 mg Oral BID w/meals     clopidogrel  75 mg Oral Daily     glimepiride  4 mg Oral BID     insulin aspart  1-7 Units Subcutaneous TID AC     insulin aspart  1-5 Units Subcutaneous At Bedtime     insulin aspart   Subcutaneous TID w/meals     insulin glargine  30 Units Subcutaneous QPM     multivitamin w/minerals  1 tablet Oral Daily     torsemide  20 mg Oral Daily        acetaminophen, glucose **OR** dextrose **OR** glucagon, polyethylene glycol     physical exam  /72 (BP Location: Left arm)   Pulse 69   Temp 98.5  F (36.9  C) (Oral)   Resp 16   Ht 1.798 m (5' 10.8\")   Wt 114.1 kg (251 lb 9.6 oz)   SpO2 95%   BMI 35.29 kg/m    Gen: alert, NAD, participating in OT  Cardio: RRR, S1+S2, no m/r/g  Pulm: non labored, CTA b/l  Abd: soft distended non tender  Ext: significant pitting edema in all extremities LE >UE, left foot red/purple with ecchymosis at toes.  Not warm to palpation.  Demarcated area is stable from yesterday  Neuro/MSK: answers appropriately, follows commands    labs  CBC RESULTS:   Recent Labs   Lab Test 07/07/20  0535   WBC 9.4   RBC 4.89   HGB 13.0*   HCT 42.8   MCV 88   MCH 26.6   MCHC " 30.4*   RDW 16.5*          Last Comprehensive Metabolic Panel:  Sodium   Date Value Ref Range Status   07/07/2020 136 133 - 144 mmol/L Final     Potassium   Date Value Ref Range Status   07/07/2020 4.3 3.4 - 5.3 mmol/L Final     Chloride   Date Value Ref Range Status   07/07/2020 99 94 - 109 mmol/L Final     Carbon Dioxide   Date Value Ref Range Status   07/07/2020 29 20 - 32 mmol/L Final     Anion Gap   Date Value Ref Range Status   07/07/2020 8 3 - 14 mmol/L Final     Glucose   Date Value Ref Range Status   07/07/2020 195 (H) 70 - 99 mg/dL Final     Urea Nitrogen   Date Value Ref Range Status   07/07/2020 66 (H) 7 - 30 mg/dL Final     Creatinine   Date Value Ref Range Status   07/07/2020 2.24 (H) 0.66 - 1.25 mg/dL Final     GFR Estimate   Date Value Ref Range Status   07/07/2020 28 (L) >60 mL/min/[1.73_m2] Final     Comment:     Non  GFR Calc  Starting 12/18/2018, serum creatinine based estimated GFR (eGFR) will be   calculated using the Chronic Kidney Disease Epidemiology Collaboration   (CKD-EPI) equation.       Calcium   Date Value Ref Range Status   07/07/2020 9.1 8.5 - 10.1 mg/dL Final         Rehabilitation - continue comprehensive acute inpatient rehabilitation program with multidisciplinary approach including therapies, rehab nursing, and physiatry following. See interval history for updates.      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 Hospital stay complicated by acute on chronic  heart failure, hypoxic respiratory failure and non sustained V-Tach. He was admitted to acute rehab unit 7/6 for ongoing rehabilitation and medical management.     Cerebrovascular accident    --- MRI of the brain showed recent ischemic infarct at the left lateral aspect of the brainstem  at the pontomedullary  junction. Seen and evaluated by neurology, stroke felt most likely d/t small vessel disease.   .Echo with bubble study done EF of 50 to 55% with a negative bubble study, moderate aortic valve stenosis seen. Mid and distal anteroseptal hypokinesis. Hgb A1C 7.0%. . MRA brain/neck Neg  --- continue PT/OT   -eye patch    -DAPT with ASA 81 mg + Plavix 75 mg for 21 days, then  mg alone indefinitely   - LDL (102) with goal LDL between 40-70 ; pt reports restarting Lipitor 40 mg recently.    - DM as below  - Goal BP <130/85- management as below.   -continue stroke education.   -has 30 day cardiac event monitor in place  -f/u neurology, neuro opthalmology.      Acute decompensated systolic and diastolic heart failure  Acute respiratory failure with hypoxia   his torsemide was on hold, his echocardiogram done on 4/22/2020 showed EF of 45 to 50% age with moderate aortic stenosis. recievedIV Lasix 40 mg 3 times daily for 3 doses   restarted his torsemide 20 mg p.o. daily per cardiology's recommendation  -consult lymphedema  -continue torsemide 20 mg daily  -daily weights  -oxygen prn     Nonsustained V. tach  ---50 beats noted, monitor electrolytes mostly potassium and magnesium and replace as needed.  ---Appreciate cardiology input, plan is to monitor on telemetry, if no more V. tach events noted can discharge home on the event monitor, if any further V. tach noted will need electrophysiology consult prior to discharge.  -Ziopatch applied prior to admission to rehab     .  DM2: most recent HgbA1c of 7.0 in 04/2020.   - continue PTA Glimepiride   - meal time insulin with 1 unit per 15 grams carbohydrate  - lantus increased 7/7- due to ongoing hypgerglycemia ( to 30 units daily) monitor  - medium sliding scale insulin with meals  -not consistently allowing bg checks.      HTN: norvasc 5 mg daily, coreg 25 mg bid, torsemide 20 daily - doses titrated given bp and significant edema.   -monitor and titrate as  indicated.        CKD: baseline creatinine around 2.0 with creatinine of 2.11 7/5.-->2.24 7/7  - continue to follow BMP     CAD  HLP: continue PTA Atorvastatin        Left foot redness- in context of ill fitting foot wear with ecchymosis at toes and edema, red with some warmth non tender, no fevers,   -marked area of redness  -if continues to swell/becomes tender/etc start tx for cellulitis.for now limit compression    -Pt reports color was normal when waking up, and became red again when upright and walking.  Likely more related to stasis.      1. Adjustment to disability:  Frustrated- consider psych consult  2. FEN: low salt, will try supplementts  3. Bowel: continent-  4. Bladder: Continent  5. DVT Prophylaxis: refuses subcutaneous heparin-   6. GI Prophylaxis: diet  7. Code: full  8. Disposition: home   9. ELOS: Tentative discharge date 7/16/20  10. Follow up Appointments on Discharge: pcp, neurology, cardiology.       Michael Tatum MD  Department of Rehabilitation Medicine  Pager: [phone]    Time Spent on this Encounter   I, Michael Tatum, spent a total of 25 minutes face-to-face or managing the care of Burton Elizabeth. Over 50% of my time on the unit was spent counseling the patient and coordinating care. See note for details.

## 2020-07-11 NOTE — PLAN OF CARE
"RN: Pt AA&O x3, able to make needs known and uses call light appropriately. Pt c/o not sleeping and has HA, wearing sunglasses and wants door closed to minimize noise; pt watching tv. PT helped MCCARTHY in afternoon. BG 82 and 156; pt not available for afternoon Novolog. Con't POC.     /71 (BP Location: Left arm)   Pulse 69   Temp 96.7  F (35.9  C) (Oral)   Resp 18   Ht 1.798 m (5' 10.8\")   Wt 114.1 kg (251 lb 9.6 oz)   SpO2 96%   BMI 35.29 kg/m      "

## 2020-07-11 NOTE — PLAN OF CARE
Alert and oriented X 4. Able to male needs known. Call light in reach. C/O headache. Received Tylenol with minimal relief. Up to BR with assist of 1, gait belt and walker. Continent of bowel and bladder. Last BM on 7/10,  Requests not to be woke up during night. States he did not sleep well. Continue with plan of care.

## 2020-07-12 ENCOUNTER — APPOINTMENT (OUTPATIENT)
Dept: PHYSICAL THERAPY | Facility: CLINIC | Age: 75
End: 2020-07-12
Payer: COMMERCIAL

## 2020-07-12 ENCOUNTER — APPOINTMENT (OUTPATIENT)
Dept: OCCUPATIONAL THERAPY | Facility: CLINIC | Age: 75
End: 2020-07-12
Payer: COMMERCIAL

## 2020-07-12 LAB
GLUCOSE BLDC GLUCOMTR-MCNC: 214 MG/DL (ref 70–99)
GLUCOSE BLDC GLUCOMTR-MCNC: 218 MG/DL (ref 70–99)
GLUCOSE BLDC GLUCOMTR-MCNC: 79 MG/DL (ref 70–99)
GLUCOSE BLDC GLUCOMTR-MCNC: 83 MG/DL (ref 70–99)

## 2020-07-12 PROCEDURE — 97535 SELF CARE MNGMENT TRAINING: CPT | Mod: GO

## 2020-07-12 PROCEDURE — 25000132 ZZH RX MED GY IP 250 OP 250 PS 637: Performed by: PHYSICIAN ASSISTANT

## 2020-07-12 PROCEDURE — 97110 THERAPEUTIC EXERCISES: CPT | Mod: GP | Performed by: PHYSICAL THERAPIST

## 2020-07-12 PROCEDURE — 97530 THERAPEUTIC ACTIVITIES: CPT | Mod: GO

## 2020-07-12 PROCEDURE — 97112 NEUROMUSCULAR REEDUCATION: CPT | Mod: GP | Performed by: PHYSICAL THERAPIST

## 2020-07-12 PROCEDURE — 00000146 ZZHCL STATISTIC GLUCOSE BY METER IP

## 2020-07-12 PROCEDURE — 97110 THERAPEUTIC EXERCISES: CPT | Mod: GO

## 2020-07-12 PROCEDURE — 12800006 ZZH R&B REHAB

## 2020-07-12 PROCEDURE — 97530 THERAPEUTIC ACTIVITIES: CPT | Mod: GP | Performed by: PHYSICAL THERAPIST

## 2020-07-12 RX ADMIN — ASPIRIN 81 MG: 81 TABLET ORAL at 08:44

## 2020-07-12 RX ADMIN — ATORVASTATIN CALCIUM 40 MG: 40 TABLET, FILM COATED ORAL at 18:16

## 2020-07-12 RX ADMIN — TORSEMIDE 20 MG: 20 TABLET ORAL at 12:32

## 2020-07-12 RX ADMIN — CARVEDILOL 25 MG: 25 TABLET, FILM COATED ORAL at 08:44

## 2020-07-12 RX ADMIN — CARVEDILOL 25 MG: 25 TABLET, FILM COATED ORAL at 17:14

## 2020-07-12 RX ADMIN — GLIMEPIRIDE 4 MG: 4 TABLET ORAL at 17:14

## 2020-07-12 RX ADMIN — GLIMEPIRIDE 4 MG: 4 TABLET ORAL at 08:44

## 2020-07-12 RX ADMIN — MULTIPLE VITAMINS W/ MINERALS TAB 1 TABLET: TAB at 08:43

## 2020-07-12 RX ADMIN — AMLODIPINE BESYLATE 5 MG: 5 TABLET ORAL at 08:43

## 2020-07-12 RX ADMIN — CLOPIDOGREL BISULFATE 75 MG: 75 TABLET, FILM COATED ORAL at 08:44

## 2020-07-12 RX ADMIN — INSULIN ASPART 2 UNITS: 100 INJECTION, SOLUTION INTRAVENOUS; SUBCUTANEOUS at 08:42

## 2020-07-12 ASSESSMENT — MIFFLIN-ST. JEOR: SCORE: 1887.95

## 2020-07-12 NOTE — PLAN OF CARE
Appears to be sleeping well. A&O x4, able to make needs known. Denies pain. Up to bathroom x1, continent of bladder. CGA w/ walker for transfers. Using call light appropriately. Continue w/ plan of care.

## 2020-07-12 NOTE — PROGRESS NOTES
"  [facility]   Acute Rehabilitation Unit  Daily progress note    interval history  No acute events overnight.  This morning Mr. Elizabeth was having some complaints regarding call light times, and feels he would be safe to move to the bathroom on his own.  Discussed with him that we are working towards progressing to mod I in the room with therapies, and will place order when felt it is safe.  Denies chest pain, shortness of breath, or fevers.  L leg still red, non-painful.  Discussed with him again that I suspect this is related to stasis and not infection.  Encouraged leg elevation as tolerated.  Functionally he was working on simple meal prep with OT today using FWW and needing SBA.        medications    amLODIPine  5 mg Oral Daily     [START ON 7/25/2020] aspirin  325 mg Oral Daily     aspirin  81 mg Oral Daily     atorvastatin  40 mg Oral QPM     carvedilol  25 mg Oral BID w/meals     clopidogrel  75 mg Oral Daily     glimepiride  4 mg Oral BID     insulin aspart  1-7 Units Subcutaneous TID AC     insulin aspart  1-5 Units Subcutaneous At Bedtime     insulin aspart   Subcutaneous TID w/meals     insulin glargine  25 Units Subcutaneous QPM     multivitamin w/minerals  1 tablet Oral Daily     torsemide  20 mg Oral Daily        acetaminophen, glucose **OR** dextrose **OR** glucagon, polyethylene glycol     physical exam  /72 (BP Location: Left arm)   Pulse 71   Temp 96.7  F (35.9  C) (Oral)   Resp 20   Ht 1.798 m (5' 10.8\")   Wt 112.9 kg (248 lb 14.4 oz)   SpO2 94%   BMI 34.91 kg/m    Gen: alert, NAD, participating in OT  Cardio: RRR, S1+S2, no m/r/g  Pulm: non labored, CTA b/l  Abd: soft distended non tender  Ext: significant pitting edema in all extremities LE >UE, left foot red/purple with ecchymosis at toes.  Not warm to palpation.  Demarcated area is stable   Neuro/MSK: answers appropriately, follows commands    labs  CBC RESULTS:   Recent Labs   Lab Test " 07/07/20  0535   WBC 9.4   RBC 4.89   HGB 13.0*   HCT 42.8   MCV 88   MCH 26.6   MCHC 30.4*   RDW 16.5*          Last Comprehensive Metabolic Panel:  Sodium   Date Value Ref Range Status   07/07/2020 136 133 - 144 mmol/L Final     Potassium   Date Value Ref Range Status   07/07/2020 4.3 3.4 - 5.3 mmol/L Final     Chloride   Date Value Ref Range Status   07/07/2020 99 94 - 109 mmol/L Final     Carbon Dioxide   Date Value Ref Range Status   07/07/2020 29 20 - 32 mmol/L Final     Anion Gap   Date Value Ref Range Status   07/07/2020 8 3 - 14 mmol/L Final     Glucose   Date Value Ref Range Status   07/07/2020 195 (H) 70 - 99 mg/dL Final     Urea Nitrogen   Date Value Ref Range Status   07/07/2020 66 (H) 7 - 30 mg/dL Final     Creatinine   Date Value Ref Range Status   07/07/2020 2.24 (H) 0.66 - 1.25 mg/dL Final     GFR Estimate   Date Value Ref Range Status   07/07/2020 28 (L) >60 mL/min/[1.73_m2] Final     Comment:     Non  GFR Calc  Starting 12/18/2018, serum creatinine based estimated GFR (eGFR) will be   calculated using the Chronic Kidney Disease Epidemiology Collaboration   (CKD-EPI) equation.       Calcium   Date Value Ref Range Status   07/07/2020 9.1 8.5 - 10.1 mg/dL Final         Rehabilitation - continue comprehensive acute inpatient rehabilitation program with multidisciplinary approach including therapies, rehab nursing, and physiatry following. See interval history for updates.      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 Hospital stay complicated by acute on chronic  heart failure, hypoxic respiratory failure and non sustained V-Tach. He was admitted to acute rehab unit 7/6 for ongoing rehabilitation and medical management.     Cerebrovascular accident    --- MRI of the brain showed  recent ischemic infarct at the left lateral aspect of the brainstem  at the pontomedullary junction. Seen and evaluated by neurology, stroke felt most likely d/t small vessel disease.   .Echo with bubble study done EF of 50 to 55% with a negative bubble study, moderate aortic valve stenosis seen. Mid and distal anteroseptal hypokinesis. Hgb A1C 7.0%. . MRA brain/neck Neg  --- continue PT/OT   -eye patch    -DAPT with ASA 81 mg + Plavix 75 mg for 21 days (Started 7/3), then  mg alone indefinitely   - LDL (102) with goal LDL between 40-70 ; pt reports restarting Lipitor 40 mg recently.    - DM as below  - Goal BP <130/85- management as below.   -continue stroke education.   -has 30 day cardiac event monitor in place  -f/u neurology, neuro opthalmology.      Acute decompensated systolic and diastolic heart failure  Acute respiratory failure with hypoxia   his torsemide was on hold, his echocardiogram done on 4/22/2020 showed EF of 45 to 50% age with moderate aortic stenosis. recievedIV Lasix 40 mg 3 times daily for 3 doses   restarted his torsemide 20 mg p.o. daily per cardiology's recommendation  -consult lymphedema  -continue torsemide 20 mg daily  -daily weights  -oxygen prn     Nonsustained V. tach  ---50 beats noted, monitor electrolytes mostly potassium and magnesium and replace as needed.  ---Appreciate cardiology input, plan is to monitor on telemetry, if no more V. tach events noted can discharge home on the event monitor, if any further V. tach noted will need electrophysiology consult prior to discharge.  -Ziopatch applied prior to admission to rehab     .  DM2: most recent HgbA1c of 7.0 in 04/2020.   - continue PTA Glimepiride   - meal time insulin with 1 unit per 15 grams carbohydrate  - lantus decreased 7/11- due to lower glucose levels in the morning ( to 25 units daily) monitor  - medium sliding scale insulin with meals  -not consistently allowing bg checks.      HTN: norvasc 5 mg daily,  coreg 25 mg bid, torsemide 20 daily - doses titrated given bp and significant edema.   -monitor and titrate as indicated.        CKD: baseline creatinine around 2.0 with creatinine of 2.11 7/5.-->2.24 7/7  - continue to follow BMP.  Recheck tomorrow     CAD  HLP: continue PTA Atorvastatin        Left foot redness- in context of ill fitting foot wear with ecchymosis at toes and edema, red with some warmth non tender, no fevers,   -marked area of redness  -if continues to swell/becomes tender/etc start tx for cellulitis.for now limit compression    -Pt reports color was normal when waking up, and became red again when upright and walking.  I suspect this is more related to stasis.      1. Adjustment to disability:  Frustrated- consider psych consult  2. FEN: low salt, will try supplementts  3. Bowel: continent-  4. Bladder: Continent  5. DVT Prophylaxis: refuses subcutaneous heparin-   6. GI Prophylaxis: diet  7. Code: full  8. Disposition: home   9. ELOS: Tentative discharge date 7/16/20  10. Follow up Appointments on Discharge: pcp, neurology, cardiology.       Michael Tatum MD  Department of Rehabilitation Medicine  Pager: [phone]    Time Spent on this Encounter   I, Michael Tatum, spent a total of 25 minutes face-to-face or managing the care of Burton Elizabeth. Over 50% of my time on the unit was spent counseling the patient and coordinating care. See note for details.

## 2020-07-12 NOTE — PLAN OF CARE
RN: Declines lymph wrap application this morning. Denies pain. Wears eye patch alternating left and right eye to alleviate double vision sx, continue to monitor.     LLE swelling and redness, new order to elevate LLE while sitting in chair and as much as possible, continue to monitor.     New order for Mod I in room during day, see complete order.

## 2020-07-12 NOTE — PLAN OF CARE
OT: Completed meal preparation using stove top to make grilled cheese using FWW requring SBA. Therapist provided verbal cues for energy conservation by sitting when completing the task when needed or when having double vision. Therapist provided verbal cues for safety to keep pan handle to the side and verbal cues to flip the grilled cheese due to burning. Pt. Ambulated from chair to toilet using FWW while wearing eye patch, transferred to toilet, and managed clothing requiring Mod I.

## 2020-07-12 NOTE — PLAN OF CARE
"Staff limited entering patient's room unless it's necessary per POC but pt was irritable and angry at staff. When I checked pt if he was done eating to give insulin with carb coverage and also when I brought HS medication and Lantus at 2130, pt was angry, saying \"I can't sleep. You could give me medication two hours ago. I take lantus and all medications at once at home.\"  I changed HS medication time to 7pm to limit interruption on pm and night.    "

## 2020-07-12 NOTE — PLAN OF CARE
PT: Pt weaning well from eye patch with limited complaints. Discussed with OT opinion to transition to mod I in room during the day, supervision at night- orders requested from MD. Otherwise, demonstrating good stability with SBA/distant supervision for all mobility and appears on-track for discharge this week.

## 2020-07-13 ENCOUNTER — APPOINTMENT (OUTPATIENT)
Dept: OCCUPATIONAL THERAPY | Facility: CLINIC | Age: 75
End: 2020-07-13
Payer: COMMERCIAL

## 2020-07-13 ENCOUNTER — APPOINTMENT (OUTPATIENT)
Dept: PHYSICAL THERAPY | Facility: CLINIC | Age: 75
End: 2020-07-13
Payer: COMMERCIAL

## 2020-07-13 LAB
ANION GAP SERPL CALCULATED.3IONS-SCNC: 10 MMOL/L (ref 3–14)
BUN SERPL-MCNC: 53 MG/DL (ref 7–30)
CALCIUM SERPL-MCNC: 9.2 MG/DL (ref 8.5–10.1)
CHLORIDE SERPL-SCNC: 101 MMOL/L (ref 94–109)
CO2 SERPL-SCNC: 27 MMOL/L (ref 20–32)
CREAT SERPL-MCNC: 1.85 MG/DL (ref 0.66–1.25)
ERYTHROCYTE [DISTWIDTH] IN BLOOD BY AUTOMATED COUNT: 16 % (ref 10–15)
GFR SERPL CREATININE-BSD FRML MDRD: 35 ML/MIN/{1.73_M2}
GLUCOSE BLDC GLUCOMTR-MCNC: 109 MG/DL (ref 70–99)
GLUCOSE BLDC GLUCOMTR-MCNC: 179 MG/DL (ref 70–99)
GLUCOSE BLDC GLUCOMTR-MCNC: 214 MG/DL (ref 70–99)
GLUCOSE BLDC GLUCOMTR-MCNC: 68 MG/DL (ref 70–99)
GLUCOSE SERPL-MCNC: 67 MG/DL (ref 70–99)
HCT VFR BLD AUTO: 43.8 % (ref 40–53)
HGB BLD-MCNC: 13.2 G/DL (ref 13.3–17.7)
MCH RBC QN AUTO: 25.9 PG (ref 26.5–33)
MCHC RBC AUTO-ENTMCNC: 30.1 G/DL (ref 31.5–36.5)
MCV RBC AUTO: 86 FL (ref 78–100)
PLATELET # BLD AUTO: 362 10E9/L (ref 150–450)
POTASSIUM SERPL-SCNC: 3.9 MMOL/L (ref 3.4–5.3)
RBC # BLD AUTO: 5.1 10E12/L (ref 4.4–5.9)
SODIUM SERPL-SCNC: 138 MMOL/L (ref 133–144)
WBC # BLD AUTO: 10.7 10E9/L (ref 4–11)

## 2020-07-13 PROCEDURE — 12800006 ZZH R&B REHAB

## 2020-07-13 PROCEDURE — 97112 NEUROMUSCULAR REEDUCATION: CPT | Mod: GP

## 2020-07-13 PROCEDURE — 00000146 ZZHCL STATISTIC GLUCOSE BY METER IP

## 2020-07-13 PROCEDURE — 36415 COLL VENOUS BLD VENIPUNCTURE: CPT | Performed by: PHYSICAL MEDICINE & REHABILITATION

## 2020-07-13 PROCEDURE — 97140 MANUAL THERAPY 1/> REGIONS: CPT | Mod: GP

## 2020-07-13 PROCEDURE — 97116 GAIT TRAINING THERAPY: CPT | Mod: GP

## 2020-07-13 PROCEDURE — 85027 COMPLETE CBC AUTOMATED: CPT | Performed by: PHYSICAL MEDICINE & REHABILITATION

## 2020-07-13 PROCEDURE — 97110 THERAPEUTIC EXERCISES: CPT | Mod: GP

## 2020-07-13 PROCEDURE — 97535 SELF CARE MNGMENT TRAINING: CPT | Mod: GO

## 2020-07-13 PROCEDURE — 25000132 ZZH RX MED GY IP 250 OP 250 PS 637: Performed by: PHYSICIAN ASSISTANT

## 2020-07-13 PROCEDURE — 80048 BASIC METABOLIC PNL TOTAL CA: CPT | Performed by: PHYSICAL MEDICINE & REHABILITATION

## 2020-07-13 RX ADMIN — MULTIPLE VITAMINS W/ MINERALS TAB 1 TABLET: TAB at 08:25

## 2020-07-13 RX ADMIN — GLIMEPIRIDE 4 MG: 4 TABLET ORAL at 08:25

## 2020-07-13 RX ADMIN — CLOPIDOGREL BISULFATE 75 MG: 75 TABLET, FILM COATED ORAL at 08:25

## 2020-07-13 RX ADMIN — CARVEDILOL 25 MG: 25 TABLET, FILM COATED ORAL at 19:01

## 2020-07-13 RX ADMIN — INSULIN ASPART 1 UNITS: 100 INJECTION, SOLUTION INTRAVENOUS; SUBCUTANEOUS at 13:28

## 2020-07-13 RX ADMIN — ASPIRIN 81 MG: 81 TABLET ORAL at 08:25

## 2020-07-13 RX ADMIN — CARVEDILOL 25 MG: 25 TABLET, FILM COATED ORAL at 08:25

## 2020-07-13 RX ADMIN — GLIMEPIRIDE 4 MG: 4 TABLET ORAL at 18:55

## 2020-07-13 RX ADMIN — TORSEMIDE 20 MG: 20 TABLET ORAL at 13:25

## 2020-07-13 RX ADMIN — AMLODIPINE BESYLATE 5 MG: 5 TABLET ORAL at 08:25

## 2020-07-13 RX ADMIN — ATORVASTATIN CALCIUM 40 MG: 40 TABLET, FILM COATED ORAL at 18:56

## 2020-07-13 ASSESSMENT — MIFFLIN-ST. JEOR: SCORE: 1883.87

## 2020-07-13 NOTE — PROGRESS NOTES
Pt sleeping and did not wake.  Chart reviewed glucose low this morning at 63.  Improved with breakfast.  Will decrease evening Lantus to 20 units.      Michael Tatum MD  Department of Rehabilitation Medicine  Pager: [phone]

## 2020-07-13 NOTE — PLAN OF CARE
FOCUS/GOAL  Bowel management, Bladder management, Medication management, Mobility, Skin integrity and Cognition/Memory/Judgment/Problem solving    ASSESSMENT, INTERVENTIONS AND CONTINUING PLAN FOR GOAL:  Pt is alert and oriented x4. VSS, sat 96%. Mod I in room. Eye patch in place, alternating from left/right eye. BG 68 at 0728 and ate breakfast. Denies SOB, chest pain, headache, nausea, and discomfort. BG increased to 214; sliding scale insulin administered.  for lunch. Pt upset this am re breakfast being cold for the past three days and refused to converse with writer. Declined assessment or help in cares. Staff had turned on bed alarm this afternoon of which really upset pt. Limited rounds per pt request. Call light within reach and able to make needs known.     Temp: 96.7  F (35.9  C) Temp src: Oral BP: (!) 142/79 Pulse: 74 Heart Rate: 66 Resp: 18 SpO2: 96 % O2 Device: None (Room air)

## 2020-07-13 NOTE — PLAN OF CARE
Pt participated in static balance activities, LE strengthening, and dynamic gait activities to improve functional strength and endurance for return to home. Dynamic gait activities completed for improved core control and extraoccular coordination. Gaze stabilization exercises initiated to improve pt s double vision upon return to home. Plan to continue with gaze stabilization/convergence exercises, balance activities, and LE strengthening to continue progressing toward DC home.

## 2020-07-13 NOTE — PLAN OF CARE
Appears to be sleeping well. A&O x4, able to make needs known. Denies pain. Up to bathroom x1, continent of bladder. Mod I during the day, SBA at night. Using call light appropriately. Continue w/ plan of care.

## 2020-07-13 NOTE — PLAN OF CARE
FOCUS/GOAL  Mobility, Skin integrity, and Cognition/Memory/Judgment/Problem solving    ASSESSMENT, INTERVENTIONS AND CONTINUING PLAN FOR GOAL:  Pt is alert and oriented x4, VSS, denies pain, SOB, numbness/tingling. Pleasant interactions with staff. Had visit with brother today. RLE is very red, but is blanchable. Elevating leg as much as possible while sitting in chair. Pt is now Mod -I during day. Refused shower. BG 79 214

## 2020-07-14 ENCOUNTER — APPOINTMENT (OUTPATIENT)
Dept: OCCUPATIONAL THERAPY | Facility: CLINIC | Age: 75
End: 2020-07-14
Payer: COMMERCIAL

## 2020-07-14 ENCOUNTER — APPOINTMENT (OUTPATIENT)
Dept: PHYSICAL THERAPY | Facility: CLINIC | Age: 75
End: 2020-07-14
Payer: COMMERCIAL

## 2020-07-14 LAB
GLUCOSE BLDC GLUCOMTR-MCNC: 114 MG/DL (ref 70–99)
GLUCOSE BLDC GLUCOMTR-MCNC: 149 MG/DL (ref 70–99)
GLUCOSE BLDC GLUCOMTR-MCNC: 189 MG/DL (ref 70–99)
GLUCOSE BLDC GLUCOMTR-MCNC: 92 MG/DL (ref 70–99)

## 2020-07-14 PROCEDURE — 12800006 ZZH R&B REHAB

## 2020-07-14 PROCEDURE — 97535 SELF CARE MNGMENT TRAINING: CPT | Mod: GO

## 2020-07-14 PROCEDURE — 40000183 ZZH STATISTIC PT MED CONFERENCE < 30 MIN

## 2020-07-14 PROCEDURE — 97110 THERAPEUTIC EXERCISES: CPT | Mod: GP

## 2020-07-14 PROCEDURE — 25000132 ZZH RX MED GY IP 250 OP 250 PS 637: Performed by: PHYSICIAN ASSISTANT

## 2020-07-14 PROCEDURE — 00000146 ZZHCL STATISTIC GLUCOSE BY METER IP

## 2020-07-14 PROCEDURE — 97112 NEUROMUSCULAR REEDUCATION: CPT | Mod: GP

## 2020-07-14 PROCEDURE — 97112 NEUROMUSCULAR REEDUCATION: CPT | Mod: GO

## 2020-07-14 PROCEDURE — 40000187 ZZH STATISTIC PATIENT MED CONFERENCE < 30 MIN

## 2020-07-14 RX ADMIN — TORSEMIDE 20 MG: 20 TABLET ORAL at 12:33

## 2020-07-14 RX ADMIN — GLIMEPIRIDE 4 MG: 4 TABLET ORAL at 18:41

## 2020-07-14 RX ADMIN — CLOPIDOGREL BISULFATE 75 MG: 75 TABLET, FILM COATED ORAL at 08:12

## 2020-07-14 RX ADMIN — ATORVASTATIN CALCIUM 40 MG: 40 TABLET, FILM COATED ORAL at 18:41

## 2020-07-14 RX ADMIN — ASPIRIN 81 MG: 81 TABLET ORAL at 08:11

## 2020-07-14 RX ADMIN — MULTIPLE VITAMINS W/ MINERALS TAB 1 TABLET: TAB at 08:12

## 2020-07-14 RX ADMIN — INSULIN ASPART 1 UNITS: 100 INJECTION, SOLUTION INTRAVENOUS; SUBCUTANEOUS at 13:07

## 2020-07-14 RX ADMIN — ACETAMINOPHEN 650 MG: 325 TABLET, FILM COATED ORAL at 12:40

## 2020-07-14 RX ADMIN — ACETAMINOPHEN 650 MG: 325 TABLET, FILM COATED ORAL at 21:46

## 2020-07-14 RX ADMIN — GLIMEPIRIDE 4 MG: 4 TABLET ORAL at 08:11

## 2020-07-14 RX ADMIN — AMLODIPINE BESYLATE 5 MG: 5 TABLET ORAL at 08:12

## 2020-07-14 RX ADMIN — CARVEDILOL 25 MG: 25 TABLET, FILM COATED ORAL at 18:36

## 2020-07-14 RX ADMIN — CARVEDILOL 25 MG: 25 TABLET, FILM COATED ORAL at 08:12

## 2020-07-14 NOTE — PLAN OF CARE
"FOCUS/GOAL  Bowel management, Bladder management, Nutrition/Feeding/Swallowing precautions, and Medical management    ASSESSMENT, INTERVENTIONS AND CONTINUING PLAN FOR GOAL:  Alert & oriented, uses call light appropriately, care cluster as pt keeps c/o of frequent nursing interruptions. MOD I in room. Continent of bladder and bowel, reported BM this AM. Blood glucose 109 before supper, requested to get blood glucose check 45 minutes after insulin given. Pt stated that he did not want it to be bothered after 1930, risks and benefits explained but pt stated that he did not want to get his blood glucose check at . \"I have been managing my diabetes for over 70 years and I know what I am doing\" Lymphedema in place to left L/E. Right L/E remains swollen, no increased redness beyond previous marking. Pt declined getting complete skin check. \" I just want to be left alone.\" Denies pain or discomfort.     "

## 2020-07-14 NOTE — PROGRESS NOTES
"  [facility]   Acute Rehabilitation Unit  Daily progress note    interval history  No acute events overnight.  Seen during team rounds. Reports improvements in mobility and vision, is now mod I during day assessing for mod I during night hours. He has ongoing redness and swelling of LLE given stability with lack of systemic symptoms favor diagnosis of venous stasis dermatitis will try light compression and monitor. No other physical complaints. Plan to discharge home with home health care for home safety eval given impaired vision, balance, and hesitancy to utilize some of recommended safety equipment such as shower tejas. He is also reluctant to continue with home health care, \"but will face that when it comes\". See rounds note by Dr. Tatum for further details.       medications    - Medication Assessment Program - Rehab Services   Does not apply See Admin Instructions     amLODIPine  5 mg Oral Daily     [START ON 7/25/2020] aspirin  325 mg Oral Daily     aspirin  81 mg Oral Daily     atorvastatin  40 mg Oral QPM     carvedilol  25 mg Oral BID w/meals     clopidogrel  75 mg Oral Daily     glimepiride  4 mg Oral BID     insulin aspart  1-7 Units Subcutaneous TID AC     insulin aspart  1-5 Units Subcutaneous At Bedtime     insulin aspart   Subcutaneous TID w/meals     insulin glargine  20 Units Subcutaneous QPM     multivitamin w/minerals  1 tablet Oral Daily     torsemide  20 mg Oral Daily        acetaminophen, glucose **OR** dextrose **OR** glucagon, polyethylene glycol     physical exam  /67 (BP Location: Right arm)   Pulse 74   Temp 98.1  F (36.7  C) (Oral)   Resp 15   Ht 1.798 m (5' 10.8\")   Wt 112.5 kg (248 lb)   SpO2 95%   BMI 34.78 kg/m     General: alert NAD  Pulm: non labored on room air  Ext: significant pitting edema in all extremities LE >UE, left foot red/purple with ecchymosis at toes.  Not warm to palpation.  Demarcated area is stable   Neuro/MSK: " answers appropriately, follows commands    labs  Lab Results   Component Value Date    WBC 10.7 07/13/2020     Lab Results   Component Value Date    RBC 5.10 07/13/2020     Lab Results   Component Value Date    HGB 13.2 07/13/2020     Lab Results   Component Value Date    HCT 43.8 07/13/2020     Lab Results   Component Value Date    MCV 86 07/13/2020     Lab Results   Component Value Date    MCH 25.9 07/13/2020     Lab Results   Component Value Date    MCHC 30.1 07/13/2020     Lab Results   Component Value Date    RDW 16.0 07/13/2020     Lab Results   Component Value Date     07/13/2020         Last Comprehensive Metabolic Panel:  Sodium   Date Value Ref Range Status   07/13/2020 138 133 - 144 mmol/L Final     Potassium   Date Value Ref Range Status   07/13/2020 3.9 3.4 - 5.3 mmol/L Final     Chloride   Date Value Ref Range Status   07/13/2020 101 94 - 109 mmol/L Final     Carbon Dioxide   Date Value Ref Range Status   07/13/2020 27 20 - 32 mmol/L Final     Anion Gap   Date Value Ref Range Status   07/13/2020 10 3 - 14 mmol/L Final     Glucose   Date Value Ref Range Status   07/13/2020 67 (L) 70 - 99 mg/dL Final     Urea Nitrogen   Date Value Ref Range Status   07/13/2020 53 (H) 7 - 30 mg/dL Final     Creatinine   Date Value Ref Range Status   07/13/2020 1.85 (H) 0.66 - 1.25 mg/dL Final     GFR Estimate   Date Value Ref Range Status   07/13/2020 35 (L) >60 mL/min/[1.73_m2] Final     Comment:     Non  GFR Calc  Starting 12/18/2018, serum creatinine based estimated GFR (eGFR) will be   calculated using the Chronic Kidney Disease Epidemiology Collaboration   (CKD-EPI) equation.       Calcium   Date Value Ref Range Status   07/13/2020 9.2 8.5 - 10.1 mg/dL Final         Rehabilitation - continue comprehensive acute inpatient rehabilitation program with multidisciplinary approach including therapies, rehab nursing, and physiatry following. See interval history for updates.      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 Hospital stay complicated by acute on chronic  heart failure, hypoxic respiratory failure and non sustained V-Tach. He was admitted to acute rehab unit 7/6 for ongoing rehabilitation and medical management.     Cerebrovascular accident    --- MRI of the brain showed recent ischemic infarct at the left lateral aspect of the brainstem  at the pontomedullary junction. Seen and evaluated by neurology, stroke felt most likely d/t small vessel disease.   .Echo with bubble study done EF of 50 to 55% with a negative bubble study, moderate aortic valve stenosis seen. Mid and distal anteroseptal hypokinesis. Hgb A1C 7.0%. . MRA brain/neck Neg  --- continue PT/OT   -eye patch    -DAPT with ASA 81 mg + Plavix 75 mg for 21 days (Started 7/3), then  mg alone indefinitely   - LDL (102) with goal LDL between 40-70 ; pt reports restarting Lipitor 40 mg recently.    - DM as below  - Goal BP <130/85- management as below.   -continue stroke education.   -has 30 day cardiac event monitor in place  -f/u neurology, neuro opthalmology.      Acute decompensated systolic and diastolic heart failure  Acute respiratory failure with hypoxia   his torsemide was on hold, his echocardiogram done on 4/22/2020 showed EF of 45 to 50% age with moderate aortic stenosis. recievedIV Lasix 40 mg 3 times daily for 3 doses   restarted his torsemide 20 mg p.o. daily per cardiology's recommendation  -consult lymphedema  -continue torsemide 20 mg daily  -daily weights- slow down trend   -oxygen prn     Nonsustained V. tach  ---50 beats noted, monitor electrolytes mostly potassium and magnesium and replace as needed.  ---Appreciate cardiology input, plan is to monitor on telemetry, if no more V. tach events noted can discharge  home on the event monitor, if any further V. tach noted will need electrophysiology consult prior to discharge.  -Ziopatch applied prior to admission to rehab     .  DM2: most recent HgbA1c of 7.0 in 04/2020.   - continue PTA Glimepiride   - meal time insulin with 1 unit per 15 grams carbohydrate  - lantus decreased 7/13 to 20 units q pm.   - medium sliding scale insulin with meals  -not consistently allowing bg checks.      HTN: norvasc 5 mg daily, coreg 25 mg bid, torsemide 20 daily - doses titrated given bp and significant edema.   -monitor and titrate as indicated.        CKD: baseline creatinine around 2.0 with creatinine of 2.11 7/5.-->2.24 7/7-->1.85 7/13.   - continue to follow BMP.      CAD  HLP: continue PTA Atorvastatin        Left foot redness  Suspected Venous Stasis Dermatitis- in context of ill fitting foot wear with ecchymosis at toes and edema, red with some warmth non tender, no fevers,   -marked area of redness remains stable/consistent    -try compression continue edema management as above.       1. Adjustment to disability:  Frustrated- consider psych consult  2. FEN: low salt, will try supplementts  3. Bowel: continent-  4. Bladder: Continent  5. DVT Prophylaxis: refuses subcutaneous heparin-   6. GI Prophylaxis: diet  7. Code: full  8. Disposition: home   9. ELOS: Tentative discharge date 7/16/20  10. Follow up Appointments on Discharge: pcp, neurology, cardiology.     Henny Le PA-C  PM&R    Seen and discussed with Dr. Tatum.

## 2020-07-14 NOTE — PROGRESS NOTES
"Plan to discharge home on Thursday 07/16. [facility] services recommended. Discussed with pt. [facility] RN, PT and OT set up and in AVS. Family will transport home. Discussed IMM with pt, pt expressed \"I know my rights like the back of my hand\". Verbal consent obtained. Pt denied additional needs or concerns.     Alice Morales, JOSHUA, [facility] Acute Rehab Unit   Phone: [phone]  I   Pager: [phone]    "

## 2020-07-14 NOTE — PLAN OF CARE
FOCUS/GOAL  Medical management    ASSESSMENT, INTERVENTIONS AND CONTINUING PLAN FOR GOAL:  Pt is ANDREIA in room. No complain of pain. No sob noted.  Seen sleeping most of the time tonight. Pt sit up on his bed side chair when up early, did not interact w/ staff.

## 2020-07-14 NOTE — PLAN OF CARE
Acute stroke with generalized weakness. VSS. A&Ox4. Able to make needs known. MOD I in room. R Leg has lymph wraps in place for edema. Pt continent of B&B. Pt is diabetic, on carb coverage and sliding scale. Pt has been very frustrated about sleep disturbances at night, and about getting cold trays due to them being left at the desk for his BG checks. Pt verbalized his frustration about these things frequently. Pt was placed on MAP program today. Program was explained to Pt and he was instructed to call for his insulin at mealtimes and his medications this evening. Medication sheet was given and explained. Continue with POC.

## 2020-07-14 NOTE — CARE CONFERENCE
Acute Rehab Care Conference/Team Rounds      Type: Team Rounds    Present: Dr. 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    Active Medical Co-morbidities/Prognosis: Fluid overload, acute decompensated CHF, NSVT, HTN, CAD, CKD, HLD, DM II    Safety: no alarms needed. Mod I in room.    Pain: PRN tylenol for lower back pain.    Medications, Skin, Tubes/Lines: pt starting on MAP today. No skin issues. No lines in place.    Swallowing/Nutrition:    Bowel/Bladder: pt is continent of bowel and bladder.    Psychosocial: Single, lives alone in a rambler-style home with lower level. WILLY and STI to access the basement. Patient normally I with IADLs including laundry and driving. Managed own medications and finances. Enjoys golfing, recently joined a gym and has a bike in his basement. Retired. Brother and nieces in the area. No financial concerns.     ADLs/IADLs: Mod I with FWW with ADLs, SBA with ADLs at night. Still limited by diploplia and decreased balance. Will use FWW at home and needs to perform meal prep i'ly. Brother, sister-in-law, and niece plan to assist with laundry and heavy housekeeping. Needs shower chair at home, pt is refusing to purchase shower chair until he goes home and tries standing shower first. Recommend HH OT to focus on shower safety, return to IADLs, and vision    Mobility: Progressing well with mobility, now mod I in room with FWW during the day. Developing HEP for balance and visual tracking/convergence. Need to progress independence with FWW to be mod I day and night. Pt owns necessary DME for home. Recommend HH PT.    Cognition/Language:    Community Re-Entry: supervision amb with FWW    Transportation: requires assist from friends/family.    Decision maker: self    Plan of Care and goals reviewed and  updated.    Discharge Plan/Recommendations    Fall Precautions: continue    Overall plan for the patient:   Continues to make progress but particular with cares and resistant to some recommendations and suggestions.  Functionally is mod I in the day and supervision at night, but anticipate will be made mod I 24 hours today.  On track for discharge 7/16/20 to home with home health PT, OT, and RN, however pt has expressed resistance to home therapies.       Utilization Review and Continued Stay Justification    Medical Necessity Criteria:    For any criteria that is not met, please document reason and plan for discharge, transfer, or modification of plan of care to address.    Requires intensive rehabilitation program to treat functional deficits?: Yes    Requires 3x per week or greater involvement of rehabilitation physician to oversee rehabilitation program?: Yes    Requires rehabilitation nursing interventions?: Yes    Patient is making functional progress?: Yes    There is a potential for additional functional progress? Yes    Patient is participating in therapy 3 hours per day a minimum of 5 days per week or 15 hours per week in 7 day period?:Yes    Has discharge needs that require coordinated discharge planning approach?:Yes      Barriers/Concerns related to meeting medical necessity criteria:  None    Team Plan to Address Concern:  N/A      Final Physician Sign off    Statement of Approval: I have reviewed and agree with the recommendations and documentation in this care conference note.       Patient Goals  Social Work Goals:Home with OP therapy and assist from family PRN. No immediate SW needs at this time.        OT Frequency: daily  OT goal: hygiene/grooming: modified independent  OT goal: upper body dressing: Modified independent, including set-up/clothing retrieval  OT goal: lower body dressing: Modified independent, including set-up/clothing retrieval  OT goal: upper body bathing: Modified  independent  OT goal: lower body bathing: Modified independent  OT goal: toilet transfer/toileting: Independent  OT goal: meal preparation: Modified independent, with moderately complex multi-step meal preparation  OT goal: home management: Modified independent, with moderate demand household tasks  OT goal 1: Pt will complete tub transfer mod I or better by discharge     PT Frequency: 90 min daily  PT goal: bed mobility: Independent, Supine to/from sit, Rolling (met)  PT goal: transfers: Modified independent, Sit to/from stand, Bed to/from chair, Assistive device(FWW) (met)  PT goal: gait: Modified independent, Greater than 200 feet, Rolling walker  PT goal: stairs: Modified independent, Rail on right, 2 stairs  PT goal: perform aerobic activity with stable cardiovascular response: intermittent activity, 15 minutes, NuStep  PT goal 1: Car transfer, FWW, SBA  PT Goal 2: HEP - independent with handout for BLE strengthening standing, standing static balance and VOR/saccades  PT Goal 3: Complete Goode                                           Goal: Medical Management: Patient will be knowledgeable about his diabetes management as evidenced by advocating BG checks before meals by 7/15/20.    - pt currently feeling very frustrated about BG checks as it delays his meal trays arrival. Education given.     Patient/Family Goal: Medication Management: Patient will be knowledgeable about his meds as evidenced by participating and passing MAP by 7/15/20.  - program has just been started, no information yet to share.      Goal: Safety Management: Patient will demonstrate safety awareness as evidenced by using call light appropriately by 7/15/20.  - pt is using call light appropriately and when needed.

## 2020-07-15 ENCOUNTER — APPOINTMENT (OUTPATIENT)
Dept: PHYSICAL THERAPY | Facility: CLINIC | Age: 75
End: 2020-07-15
Payer: COMMERCIAL

## 2020-07-15 ENCOUNTER — APPOINTMENT (OUTPATIENT)
Dept: OCCUPATIONAL THERAPY | Facility: CLINIC | Age: 75
End: 2020-07-15
Payer: COMMERCIAL

## 2020-07-15 LAB
GLUCOSE BLDC GLUCOMTR-MCNC: 170 MG/DL (ref 70–99)
GLUCOSE BLDC GLUCOMTR-MCNC: 182 MG/DL (ref 70–99)
GLUCOSE BLDC GLUCOMTR-MCNC: 211 MG/DL (ref 70–99)
GLUCOSE BLDC GLUCOMTR-MCNC: 236 MG/DL (ref 70–99)
GLUCOSE BLDC GLUCOMTR-MCNC: 57 MG/DL (ref 70–99)

## 2020-07-15 PROCEDURE — 00000146 ZZHCL STATISTIC GLUCOSE BY METER IP

## 2020-07-15 PROCEDURE — 97535 SELF CARE MNGMENT TRAINING: CPT | Mod: GO

## 2020-07-15 PROCEDURE — 97112 NEUROMUSCULAR REEDUCATION: CPT | Mod: GP

## 2020-07-15 PROCEDURE — 97110 THERAPEUTIC EXERCISES: CPT | Mod: GP

## 2020-07-15 PROCEDURE — 97112 NEUROMUSCULAR REEDUCATION: CPT | Mod: GO

## 2020-07-15 PROCEDURE — 97530 THERAPEUTIC ACTIVITIES: CPT | Mod: GO

## 2020-07-15 PROCEDURE — 97530 THERAPEUTIC ACTIVITIES: CPT | Mod: GP

## 2020-07-15 PROCEDURE — 12800006 ZZH R&B REHAB

## 2020-07-15 PROCEDURE — 25000132 ZZH RX MED GY IP 250 OP 250 PS 637: Performed by: PHYSICIAN ASSISTANT

## 2020-07-15 PROCEDURE — 97110 THERAPEUTIC EXERCISES: CPT | Mod: GO

## 2020-07-15 RX ORDER — TORSEMIDE 20 MG/1
20 TABLET ORAL DAILY
Qty: 30 TABLET | Refills: 0 | Status: SHIPPED | OUTPATIENT
Start: 2020-07-15 | End: 2020-09-22

## 2020-07-15 RX ORDER — GLIMEPIRIDE 2 MG/1
2 TABLET ORAL EVERY EVENING
Status: DISCONTINUED | OUTPATIENT
Start: 2020-07-15 | End: 2020-07-16 | Stop reason: HOSPADM

## 2020-07-15 RX ORDER — GLIMEPIRIDE 4 MG/1
4 TABLET ORAL
Status: DISCONTINUED | OUTPATIENT
Start: 2020-07-16 | End: 2020-07-16 | Stop reason: HOSPADM

## 2020-07-15 RX ORDER — ACETAMINOPHEN 325 MG/1
650 TABLET ORAL EVERY 6 HOURS PRN
Status: ON HOLD | COMMUNITY
Start: 2020-07-15 | End: 2021-01-01

## 2020-07-15 RX ORDER — CARVEDILOL 25 MG/1
25 TABLET ORAL 2 TIMES DAILY WITH MEALS
Qty: 60 TABLET | Refills: 0 | Status: ON HOLD | OUTPATIENT
Start: 2020-07-15 | End: 2021-01-01

## 2020-07-15 RX ORDER — CLOPIDOGREL BISULFATE 75 MG/1
75 TABLET ORAL DAILY
Qty: 7 TABLET | Refills: 0 | Status: SHIPPED | OUTPATIENT
Start: 2020-07-17 | End: 2021-01-01

## 2020-07-15 RX ORDER — ASPIRIN 325 MG
325 TABLET, DELAYED RELEASE (ENTERIC COATED) ORAL DAILY
Status: ON HOLD | COMMUNITY
Start: 2020-07-25 | End: 2021-01-01

## 2020-07-15 RX ADMIN — INSULIN ASPART 1 UNITS: 100 INJECTION, SOLUTION INTRAVENOUS; SUBCUTANEOUS at 13:01

## 2020-07-15 RX ADMIN — CLOPIDOGREL BISULFATE 75 MG: 75 TABLET, FILM COATED ORAL at 08:16

## 2020-07-15 RX ADMIN — AMLODIPINE BESYLATE 5 MG: 5 TABLET ORAL at 08:16

## 2020-07-15 RX ADMIN — CARVEDILOL 25 MG: 25 TABLET, FILM COATED ORAL at 20:01

## 2020-07-15 RX ADMIN — GLIMEPIRIDE 2 MG: 2 TABLET ORAL at 20:03

## 2020-07-15 RX ADMIN — ASPIRIN 81 MG: 81 TABLET ORAL at 08:16

## 2020-07-15 RX ADMIN — CARVEDILOL 25 MG: 25 TABLET, FILM COATED ORAL at 08:16

## 2020-07-15 RX ADMIN — ATORVASTATIN CALCIUM 40 MG: 40 TABLET, FILM COATED ORAL at 20:02

## 2020-07-15 RX ADMIN — INSULIN ASPART 1 UNITS: 100 INJECTION, SOLUTION INTRAVENOUS; SUBCUTANEOUS at 17:34

## 2020-07-15 RX ADMIN — GLIMEPIRIDE 4 MG: 4 TABLET ORAL at 08:16

## 2020-07-15 RX ADMIN — TORSEMIDE 20 MG: 20 TABLET ORAL at 08:16

## 2020-07-15 RX ADMIN — MULTIPLE VITAMINS W/ MINERALS TAB 1 TABLET: TAB at 08:16

## 2020-07-15 ASSESSMENT — MIFFLIN-ST. JEOR: SCORE: 1889.31

## 2020-07-15 NOTE — PLAN OF CARE
"AM session: Pt was scheduled to shower with OT to assess level of functional and to determine progress made while at [facility]. Pt adamantly declined to shower with OT, \"I know how to shower myself. I've been doing it myself for almost 75 years. You're not going to show me anything new. I am safe and I know enough medical stuff.\" Pt also stated he has showered nursing staff. OT educated and thoroughly explained to pt that this is part of the requirements while at [facility] to we can fully assess how much progress has been made. Also, while showering with nursing is needed, they do not assess level of function. Pt still declined and went into great detail about his medical issues. Able to redirect pt and agreed to ambulate to rehab clinic for visual scanning task.     PM session: Addressed the topic of showering with OT prior to discharge once again. Pt reluctant to shower as he feels it is not necessary and \"it won't change the outcome of anything.\" OT offered to have pt wash up in the bathroom instead, pt agreeable to this suggestion. Amb to to bathroom with FWW with close supervision. While standing at sink, pt had LOB but able to self-correct with CGA. Completed UB wash and UB dressing with mod indep (set-up needed). Pt stated he would do better at home because he knows where everything is located. When discussed LB dressing, pt stated he has no issues and is IND, including donning/doffing compression stockings IND. Amb in hallway with FWW with close supervision. Completed walk-in shower transfer with FWW close supervision. Per pt, has already purchased shower chair for use at home.   "

## 2020-07-15 NOTE — PLAN OF CARE
On MAP, patient didn't call for am medications but picked out correct medications and verbalized that he has been taking all medications for a long time and already knew well.   BG 57 at 0655, pt declined to drink juice when night shift RN offered, wanting to eat breakfast. Pt ate 100% of meal and  at 0804. MD decreased Lantus and Amaryl dose.    Pt called to check BG before lunch. .   Mod I with walker for mobility in room. LBM today per pt.   Pt declined to assess skin, states it's okay.   Plan to discharge home tomorrow.

## 2020-07-15 NOTE — PROGRESS NOTES
"  [facility]   Acute Rehabilitation Unit  Daily progress note    interval history  Seen sitting up in chair, frustrated with blood glucose check, reports blood glucose lows are from limited eating \"I won't even to go into the many reasons this is happening here\", reports he will resume his home regimen and modify \" as I feel like I have done my whole life\", upon discharge.  Reviewed other medications for discharge, notes coreg was increased and torsemide was decreased per patient report 20 mg daily, reportedly taking 40 mg daily prior to admission, recommended these doses given,  ongoing down trend in edema, stable bp.    Patient stated has prior doses at home and refills at changed doses would need to be sent to [facility].    Discussed recommendations for home safety evaluation and home PT/OT for discharge, patient feels strongly that he does not need home therapy, or outpatient therapy, feels he will be diligent with home exercise program provided by therapist and that he will consult therapist if needed, reports given intact cognition, family in close proximity, and sister who is a  he will start therapy only if a problem arises.  We discussed progress and ongoing deficits and therapist recommendations again he declined therapy.     Plan to discharge home tomorrow.       medications    - Medication Assessment Program - Rehab Services   Does not apply See Admin Instructions     amLODIPine  5 mg Oral Daily     [START ON 7/25/2020] aspirin  325 mg Oral Daily     aspirin  81 mg Oral Daily     atorvastatin  40 mg Oral QPM     carvedilol  25 mg Oral BID w/meals     clopidogrel  75 mg Oral Daily     glimepiride  2 mg Oral QPM     [START ON 7/16/2020] glimepiride  4 mg Oral Daily with breakfast     insulin aspart  1-7 Units Subcutaneous TID AC     insulin aspart  1-5 Units Subcutaneous At Bedtime     insulin aspart   Subcutaneous TID w/meals     insulin " "glargine  15 Units Subcutaneous QPM     multivitamin w/minerals  1 tablet Oral Daily     torsemide  20 mg Oral Daily        acetaminophen, glucose **OR** dextrose **OR** glucagon, polyethylene glycol     physical exam  /59 (BP Location: Left arm)   Pulse 72   Temp 98.3  F (36.8  C) (Oral)   Resp 20   Ht 1.798 m (5' 10.8\")   Wt 113 kg (249 lb 3.2 oz)   SpO2 96%   BMI 34.95 kg/m     General: alert NAD  Pulm: non labored on room air  Ext: significant pitting edema in all extremities LE >UE,   Neuro/MSK: answers appropriately, follows commands    labs  Lab Results   Component Value Date    WBC 10.7 07/13/2020     Lab Results   Component Value Date    RBC 5.10 07/13/2020     Lab Results   Component Value Date    HGB 13.2 07/13/2020     Lab Results   Component Value Date    HCT 43.8 07/13/2020     Lab Results   Component Value Date    MCV 86 07/13/2020     Lab Results   Component Value Date    MCH 25.9 07/13/2020     Lab Results   Component Value Date    MCHC 30.1 07/13/2020     Lab Results   Component Value Date    RDW 16.0 07/13/2020     Lab Results   Component Value Date     07/13/2020         Last Comprehensive Metabolic Panel:  Sodium   Date Value Ref Range Status   07/13/2020 138 133 - 144 mmol/L Final     Potassium   Date Value Ref Range Status   07/13/2020 3.9 3.4 - 5.3 mmol/L Final     Chloride   Date Value Ref Range Status   07/13/2020 101 94 - 109 mmol/L Final     Carbon Dioxide   Date Value Ref Range Status   07/13/2020 27 20 - 32 mmol/L Final     Anion Gap   Date Value Ref Range Status   07/13/2020 10 3 - 14 mmol/L Final     Glucose   Date Value Ref Range Status   07/13/2020 67 (L) 70 - 99 mg/dL Final     Urea Nitrogen   Date Value Ref Range Status   07/13/2020 53 (H) 7 - 30 mg/dL Final     Creatinine   Date Value Ref Range Status   07/13/2020 1.85 (H) 0.66 - 1.25 mg/dL Final     GFR Estimate   Date Value Ref Range Status   07/13/2020 35 (L) >60 mL/min/[1.73_m2] Final     Comment:     Non "  GFR Calc  Starting 12/18/2018, serum creatinine based estimated GFR (eGFR) will be   calculated using the Chronic Kidney Disease Epidemiology Collaboration   (CKD-EPI) equation.       Calcium   Date Value Ref Range Status   07/13/2020 9.2 8.5 - 10.1 mg/dL Final         Rehabilitation - continue comprehensive acute inpatient rehabilitation program with multidisciplinary approach including therapies, rehab nursing, and physiatry following. See interval history for updates.      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 Hospital stay complicated by acute on chronic  heart failure, hypoxic respiratory failure and non sustained V-Tach. He was admitted to acute rehab unit 7/6 for ongoing rehabilitation and medical management.     Cerebrovascular accident    --- MRI of the brain showed recent ischemic infarct at the left lateral aspect of the brainstem  at the pontomedullary junction. Seen and evaluated by neurology, stroke felt most likely d/t small vessel disease.   .Echo with bubble study done EF of 50 to 55% with a negative bubble study, moderate aortic valve stenosis seen. Mid and distal anteroseptal hypokinesis. Hgb A1C 7.0%. . MRA brain/neck Neg  --- continue PT/OT   -eye patch    -DAPT with ASA 81 mg + Plavix 75 mg for 21 days (Started 7/3), then  mg alone indefinitely   - LDL (102) with goal LDL between 40-70 ; pt reports restarting Lipitor 40 mg recently.    - DM as below  - Goal BP <130/85- management as below.   -continue stroke education.   -has 30 day cardiac event monitor in place  -f/u neurology, neuro opthalmology.      Acute decompensated systolic and diastolic heart failure  Acute respiratory failure with hypoxia   his torsemide was on hold, his echocardiogram done on 4/22/2020  showed EF of 45 to 50% age with moderate aortic stenosis. recievedIV Lasix 40 mg 3 times daily for 3 doses   restarted his torsemide 20 mg p.o. daily per cardiology's recommendation (seen during inpatient hospi  -consult lymphedema  -continue torsemide 20 mg daily  -daily weights- slow down trend      Nonsustained V. tach  ---50 beats noted, monitor electrolytes mostly potassium and magnesium and replace as needed.  ---Appreciate cardiology input, plan is to monitor on telemetry, if no more V. tach events noted can discharge home on the event monitor, if any further V. tach noted will need electrophysiology consult prior to discharge.  -Ziopatch applied prior to admission to rehab     .  DM2: most recent HgbA1c of 7.0 in 04/2020.   - continue PTA Glimepiride   - meal time insulin with 1 unit per 15 grams carbohydrate  - lantus decreased 15 units q pm.reports frustration with food, nursing, etc many reasons for ongoing hypoglycemia.    - medium sliding scale insulin with meals  -not consistently allowing bg checks.      HTN: norvasc 5 mg daily, coreg 25 mg bid, torsemide 20 daily - doses titrated given bp and significant edema.   -BP stable, edema slowly improved, weight overall down 5 lbs from aru admission.         CKD: baseline creatinine around 2.0 with creatinine of 2.11 7/5.-->2.24 7/7-->1.85 7/13.     CAD  HLP: continue PTA Atorvastatin        Left foot redness  Suspected Venous Stasis Dermatitis- in context of ill fitting foot wear with ecchymosis at toes and edema, red with some warmth non tender, no fevers,   -marked area of redness remains stable/consistent    -try compression continue edema management as above.       1. Adjustment to disability:  Frustrated- consider psych consult  2. FEN: low salt, will try supplementts  3. Bowel: continent-  4. Bladder: Continent  5. DVT Prophylaxis: refuses subcutaneous heparin-   6. GI Prophylaxis: diet  7. Code: full  8. Disposition: home   9. ELOS: Tentative  discharge date 7/16/20  10. Follow up Appointments on Discharge: pcp, neurology, cardiology.     Henny Le PA-C  PM&R    discussed with Dr. Tatum.     I spent a total of 40 minutes face-to-face or managing the care of Burton Elizabeth. Over 50% of my time on the unit was spent counseling the patient and coordinating care. See note for details.

## 2020-07-15 NOTE — PLAN OF CARE
Alert and oriented X 4. Able to make needs known Offers no C/O pain/discomfort so far this shift. Mod I in room. Continent of bowel and bladder MAP set up. Requests not to be woke up during night, will call. Appears to be sleeping during rounds. Continue with plan of care.

## 2020-07-15 NOTE — PLAN OF CARE
FOCUS/GOAL  Bowel management, Bladder management, Pain management, Discharge planning, Mobility, Skin integrity, and Safety management    ASSESSMENT, INTERVENTIONS AND CONTINUING PLAN FOR GOAL:  A/O, VSS on RA.  Up MOD I in room w/ walker.  Continent of bowel and bladder. Last BM 7/14.  Started on MAP program today, tolerating well so far.  Bg's were 113, covered dinner w/ carb coverage.  HS bg was 189, no insulin coverage. BLE edema. RN rewrapped RLE lymphedema wrap.  Redness noted to LLE.  No c/o numbness or tingling.  C/o back pain, prn tylenol give @ 2145.  Planning to discharge home 7/16 cont w/ POC.

## 2020-07-15 NOTE — PROGRESS NOTES
Despite education, encouragement and explanation of services, insurance coverage and frequency pt is declining HHC and OP. [facility] notified to cancel the referral. Information for [facility] is added to AVS in the case that pt changes his mind. MD and PA aware. No further SW needs at this time.     JOSHUA Cam, [facility] Acute Rehab Unit   Phone: [phone]  I   Pager: [phone]

## 2020-07-16 VITALS
SYSTOLIC BLOOD PRESSURE: 146 MMHG | HEIGHT: 71 IN | WEIGHT: 251.2 LBS | BODY MASS INDEX: 35.17 KG/M2 | RESPIRATION RATE: 20 BRPM | HEART RATE: 72 BPM | OXYGEN SATURATION: 92 % | DIASTOLIC BLOOD PRESSURE: 78 MMHG | TEMPERATURE: 97.2 F

## 2020-07-16 PROCEDURE — 25000132 ZZH RX MED GY IP 250 OP 250 PS 637: Performed by: PHYSICIAN ASSISTANT

## 2020-07-16 RX ADMIN — CARVEDILOL 25 MG: 25 TABLET, FILM COATED ORAL at 07:48

## 2020-07-16 RX ADMIN — MULTIPLE VITAMINS W/ MINERALS TAB 1 TABLET: TAB at 07:48

## 2020-07-16 RX ADMIN — GLIMEPIRIDE 4 MG: 4 TABLET ORAL at 07:48

## 2020-07-16 RX ADMIN — TORSEMIDE 20 MG: 20 TABLET ORAL at 07:48

## 2020-07-16 RX ADMIN — ASPIRIN 81 MG: 81 TABLET ORAL at 07:48

## 2020-07-16 RX ADMIN — AMLODIPINE BESYLATE 5 MG: 5 TABLET ORAL at 07:48

## 2020-07-16 RX ADMIN — CLOPIDOGREL BISULFATE 75 MG: 75 TABLET, FILM COATED ORAL at 07:48

## 2020-07-16 ASSESSMENT — MIFFLIN-ST. JEOR: SCORE: 1898.39

## 2020-07-16 NOTE — PLAN OF CARE
Patient is alert and oriented. Denies pain. Pt declined to check BG and to eat breakfast, stated food is cold. When offered to reorder breakfast or warm up the food, pt declined. Mod I with walker for mobility. When provided AVS, pt declined to review with this writer. Encouraged pt to review AVS and ask questions if needed. Given one discharge medication. Pt discharged home with his brother via W/C at 0940.

## 2020-07-16 NOTE — DISCHARGE INSTRUCTIONS
Follow up Appointments    -- Primary care (Dr. Murrell)  The clinic will reach out to you to schedule this appointment. If you have not heard from anyone in a few days, please call [phone].    Address           [facility]                          [address]                          [place] [address]  Phone              [phone]    -- Cardiology  You are scheduled to see Dr. Laci Neumann on Thursday, August 20th at 9:45 AM.    Address  [facility]                          [address]                          [place] [address]  Phone   [phone]    -- Neurology  They will follow up with you to schedule your appointment. If you do not hear from them in a few days, please call [phone].                         Address  [facility] [address]     [address]     [place] [address]  Phone              [phone]    --------------------------    Home Health Care:   [facility] Ph: [phone]- we recommend ongoing therapy upon discharge this has not been ordered per your preference please follow up with primary care provider,  Dr. Murrell may order therapy if deemed appropriate at that time.     --------------------------    To reduce the risk of subsequent stroke there are several important factors including optimal management of anticoagulants, blood pressure, cholesterol, diabetes and smoking abstinence.    Anticoagulation:  You are on Aspirin 81 mg and Clopidogrel through 7/24 then start asa 325 mg daily    Blood Pressure:  Keeping your blood pressures less than 130/80 has been shown to reduce risk of recurrent stroke. Recording your blood pressure and heart rate once daily in a log book can help you and your providers make decisions on optimal management. You are encouraged to bring your log book with  "you to your primary physician and/or cardiology doctor visits.    You are currently on Carvedilol (25 mg bid), Amlodipine (5 mg daily), and Torsemide (20 mg daily) to help control your blood pressure. Several lifestyle modifications have been associated with blood pressure reduction and are an important part of a comprehensive plan. These include: weight loss (if over-weight); a diet low in salt and cholesterol and rich in fruits and vegetables; regular aerobic physical activity and limited alcohol consumption.    Diabetes:  Optimal management of diabetes not only reduces risk of another stroke, it can help with healing process from your recent stroke. Blood glucose levels measure how well you're controlling your diabetes. An additional test \"hemoglobin A1C\" reflects how you have been overall with glucose control in the prior few months. This should be less than 7 though even lower below 6 is considered normal. Your recent Hgb A1C was   Lab Results   Component Value Date    A1C 7.0 04/22/2020    A1C 6.5 09/27/2019    A1C 9.3 03/30/2016    A1C 6.8 12/16/2006      This should be followed up with your primary provider and/or endocrinologist.    Your present plan is to check blood glucose consistently Before Meals and at Bedtime. These should be recorded along with date/time and any relevant notes such as food consumed, insulin/medication taken etc. This helps you and your providers make decisions on optimal management. You're encouraged to bring you log book with to your primary and/or endocrinology doctor visits.  Your current medications include Insulin Glargine (Lantus). Specific doses and frequencies listed elsewhere.     Diet:  Diet and exercise are very important for diabetes regardless of being on medication or not. Be aware of and moderate your carbohydrate intake as instructed by doctor, dietitian or nurse.  Regular physical activity may be more difficult since your stroke though doing what you can on a daily " "basis is helpful.     Cholesterol:  Traditional target levels for LDL cholesterol or \"bad cholesterol\" is less than 130 however once you have had a stroke, your target LDL level is now less than 70. Additional recommendations such as increasing your HDL or \"good\" cholesterol and lowering your triglyceride level can also be important.    Your most recent lipid panel was   Lab Results   Component Value Date    CHOL 188 04/23/2020     Lab Results   Component Value Date    HDL 31 04/23/2020     Lab Results   Component Value Date     04/23/2020     Lab Results   Component Value Date    TRIG 277 04/23/2020     This should be followed up in 2-3 months with your primary provider.    Smoking:  Finally one of the most important modifiable risk factors is to not smoke. This includes cigarettes, pipes, cigars, chewing tobacco and second hand smoke. Continue to remain tobacco free.       "

## 2020-07-16 NOTE — PLAN OF CARE
FOCUS/GOAL  Bowel management, Bladder management, Pain management, Discharge planning, Mobility, Skin integrity, and Safety management    ASSESSMENT, INTERVENTIONS AND CONTINUING PLAN FOR GOAL:  A/O, VSS on RA.  Up MOD I in room w/ walker.  Bgs were 170, covered w/ carb coverage and sliding scale.  HS bg was 211, no insulin given per pt request.  BLE edema noted still. LLE leg still reddened.  RLE lymphedema wrap rewrapped after an hour break.  Continent of bowel and bladder using toilet in room.  Last BM today.  No c/o pain, no prn meds given.  Planning to discharge home tomorrow by 10am.  Cont w/ POC.      [id number]  Assessment unchanged.  No c/o pain, no prn meds given.  Planning to discharge home today by 10am.  Cont w/ POC.

## 2020-07-16 NOTE — DISCHARGE SUMMARY
[facility]   Acute Rehabilitation Unit  Discharge summary     Date of Admission: 7/6/2020  Date of Discharge: 7/16/20  Disposition: home  Primary Care Physician: Td Qureshi  Attending physician: Obdulia Tatum MD  Other significant physician provider(s): Henny KAMARA      discharge diagnosis  Acute ischemic stroke- left lateral aspect of the brainstem  at the pontomedullary junction  Acute decompensated systolic and diastolic heart failure  DM type 2  HTN  Venous stasis dermatitis  CKD    brief summary  Mr Elizabeth is a 76 y/o man with a PMH including CAD, CHF, CKD, DMII, HTN, HLD, DVT (not on anticoagulation) who presented for vertigo and nystagmus, ultimately 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 Hospital stay complicated by acute on chronic  heart failure, hypoxic respiratory failure and non sustained V-Tach. He was admitted to acute rehab unit 7/6 for ongoing rehabilitation and medical management.     He participated in rehab and did progress to modified independent in room prior to discharge. He expressed frustration with nursing cares, , blood glucose monitoring, medication doses, and therapy recommendations. On day of discharge patient  frustrated with delivery of cold food reported hunger, offered food/drink from kitchen which he declined stated he would eat when he got home, he also declined blood glucose check, permitted vital sign check which were stable.     He reported medications were not at prior to admission doses, discussed dose changes states he will take medications as he sees fit. Encouraged follow up therapy recommended home care to establish home safety, mobility in home, he declined stating he would complete home exercise program and for a variety of reasons did not believe he needed additional PT/OT.  He was provided repeat rationale for all recommendations, Mr. Elizabeth acknowledged and continued to  decline recommendations.     rehabilitation course  ADLs/IADLs: Mod I with FWW with ADLs, SBA with ADLs at night. Still limited by diploplia and decreased balance. Will use FWW at home and needs to perform meal prep i'ly. Brother, sister-in-law, and niece plan to assist with laundry and heavy housekeeping. Needs shower chair at home, pt is refusing to purchase shower chair until he goes home and tries standing shower first. Recommend HH OT to focus on shower safety, return to IADLs, and vision     Mobility: Progressing well with mobility, now mod I in room with FWW during the day. Developing HEP for balance and visual tracking/convergence. Need to progress independence with FWW to be mod I day and night. Pt owns necessary DME for home. Recommend HH PT.     patient declined home health care services. He was provided contact number for services.    mEDICAL COURSE  Cerebrovascular accident    --- MRI of the brain showed recent ischemic infarct at the left lateral aspect of the brainstem  at the pontomedullary junction. Seen and evaluated by neurology, stroke felt most likely d/t small vessel disease.   .Echo with bubble study done EF of 50 to 55% with a negative bubble study, moderate aortic valve stenosis seen. Mid and distal anteroseptal hypokinesis. Hgb A1C 7.0%. . MRA brain/neck Neg  --- continue PT/OT recommend home care- which patient declined   -eye patch   -DAPT with ASA 81 mg + Plavix 75 mg for 21 days (Started 7/3), then  mg alone indefinitely   - LDL (102) with goal LDL between 40-70 ; pt reports restarting Lipitor 40 mg recently.    - DM as below  - Goal BP <130/85- management as below.   -recieved stroke education.   -has 30 day cardiac event monitor in place   -f/u neurology, neuro ophthalmology referral placed (patient states will follow up if vision does not continue to improve)       Acute decompensated systolic and diastolic heart failure  Acute respiratory failure with hypoxia    torsemide was initially held, his echocardiogram done on 4/22/2020 showed EF of 45 to 50% age with moderate aortic stenosis. Received IV Lasix 40 mg 3 times daily for 3 doses   restarted his torsemide 20 mg p.o. daily per cardiology's recommendation   -continue torsemide 20 mg daily  -continue to trend weights  -follow up with Dr. Neumann as scheduled.      Nonsustained V. tach  ---50 beats noted, monitor electrolytes mostly potassium and magnesium and replace as needed.  ---Appreciate cardiology input, plan is to monitor on telemetry, if no more V. tach events noted can discharge home on the event monitor, if any further V. tach noted will need electrophysiology consult prior to discharge.  -Ziopatch applied prior to admission to rehab     .  DM2: most recent HgbA1c of 7.0 in 04/2020.   - continue PTA Glimepiride   - meal time insulin with 1 unit per 15 grams carbohydrate  - medium sliding scale insulin with meals  -not consistently allowing bg checks, not consistently eating, reports plan to resume home regimen upon discharge.      HTN: norvasc 5 mg daily, coreg 25 mg bid, torsemide 20 daily - doses titrated given bp and significant edema.   -BP stable, edema slowly improved, weight overall down 5 lbs from aru admission. recommended this dose regimen upon discharge with follow up pcp/cardiology.       CKD: baseline creatinine around 2.0 with creatinine of 2.11 7/5.-->2.24 7/7-->1.85 7/13.      CAD  HLP: continue PTA Atorvastatin         Left foot redness  Suspected Venous Stasis Dermatitis- in context of ill fitting foot wear with ecchymosis at toes and edema, red with some warmth non tender, no fevers,       dISCHARGE MEDICATIONS  Current Discharge Medication List      START taking these medications    Details   acetaminophen (TYLENOL) 325 MG tablet Take 2 tablets (650 mg) by mouth every 6 hours as needed for mild pain or fever (> 101 F)  Qty:      Associated Diagnoses: Arthritis of knee         CONTINUE these  medications which have CHANGED    Details   !! aspirin (ASA) 325 MG EC tablet Take 1 tablet (325 mg) by mouth daily Start 7/25/20.  Qty:      Associated Diagnoses: Cerebrovascular accident (CVA) due to thrombosis of cerebral artery (H)      !! aspirin (ASA) 81 MG EC tablet Take 1 tablet (81 mg) by mouth daily Take 81 mg daily 7/17-7/24 then start asa 325 mg by mouth daily 7/25.  Qty: 18 tablet    Associated Diagnoses: Cerebrovascular accident (CVA) due to thrombosis of cerebral artery (H)      carvedilol (COREG) 25 MG tablet Take 1 tablet (25 mg) by mouth 2 times daily (with meals)  Qty: 60 tablet, Refills: 0    Associated Diagnoses: Acute ischemic stroke (H)      clopidogrel (PLAVIX) 75 MG tablet Take 1 tablet (75 mg) by mouth daily for 7 days 7/17-7/24.  Qty: 7 tablet, Refills: 0    Associated Diagnoses: Cerebrovascular accident (CVA) due to thrombosis of cerebral artery (H)      insulin glargine (LANTUS PEN) 100 UNIT/ML pen Inject 15 Units Subcutaneous every evening  Qty:      Comments: If Lantus is not covered by insurance, may substitute Basaglar at same dose and frequency.        torsemide (DEMADEX) 20 MG tablet Take 1 tablet (20 mg) by mouth daily  Qty: 30 tablet, Refills: 0    Associated Diagnoses: Chronic combined systolic and diastolic congestive heart failure (H)       !! - Potential duplicate medications found. Please discuss with provider.      CONTINUE these medications which have NOT CHANGED    Details   amLODIPine (NORVASC) 10 MG tablet Take 1 tablet (10 mg) by mouth daily    Associated Diagnoses: Coronary artery disease involving native coronary artery of native heart with angina pectoris (H)      atorvastatin (LIPITOR) 40 MG tablet Take 1 tablet (40 mg) by mouth every evening  Qty: 30 tablet, Refills: 3    Associated Diagnoses: Type 2 diabetes mellitus with hyperosmolarity without coma, without long-term current use of insulin (H)      glimepiride (AMARYL) 4 MG tablet Take 4 mg by mouth 2 times  daily       multivitamin, therapeutic with minerals (THERA-VIT-M) TABS Take 1 tablet by mouth daily      blood glucose (NO BRAND SPECIFIED) test strip Use to test blood sugar as directed      insulin aspart (NOVOLOG FLEXPEN) 100 UNIT/ML pen Inject 2-6 Units Subcutaneous 3 times daily (with meals) (Patient states he bases his dose off of carb counting and blood glucose but did not give exact regimen).      order for DME Equipment being ordered: Walker Wheels () and Walker ()  Treatment Diagnosis: Impaired gait  Qty: 1 each, Refills: 0    Associated Diagnoses: Status post total right knee replacement               DISCHARGE INSTRUCTIONS AND FOLLOW UP  Discharge Procedure Orders   OPHTHALMOLOGY ADULT REFERRAL   Referral Priority: Routine Referral Type: Vision Services   Number of Visits Requested: 1     Reason for your hospital stay   Order Comments: Admitted for rehabilitation following a stroke.     Adult [facility] Follow-up and recommended labs and tests   Order Comments: Follow up with primary care as scheduled. Follow up with stroke neurology within 6 weeks. Follow up with neuro opthalmology if vision changes do not improve as discussed (referral placed).  Follow up with Dr. Neumann as scheduled.      Appointments on [place] and/or [facility] (with [facility] or [facility] provider or service). Call [phone] if you haven't heard regarding these appointments within 7 days of discharge.     Activity   Order Comments: Your activity upon discharge: activity as tolerated, assist with transportation given visual deficits do not recommend driving.     Order Specific Question Answer Comments   Is discharge order? Yes      Monitor and record   Order Comments: blood glucose 4 times a day, before meals and at bedtime  Blood pressure daily.  Daily weight- notify Dr. Neumann if weight change >5 pounds in one week.     When to contact your care team   Order Comments: Call your primary doctor if you have any of the  following: Call Dr. Murrell if BP consistently > 140s. Or Blood glucose < 70s or >200s.     Diet   Order Comments: Follow this diet upon discharge: recommend a low sodium (3G or less) diet.     Order Specific Question Answer Comments   Is discharge order? Yes           physical examination    Most recent Vital Signs:   Vitals:    07/15/20 2001 07/16/20 0700 07/16/20 0729 07/16/20 0749   BP: 136/76  133/73 (!) 146/78   BP Location:    Left arm   Pulse: 76   72   Resp:    20   Temp:    97.2  F (36.2  C)   TempSrc:    Oral   SpO2:    92%   Weight:  113.9 kg (251 lb 3.2 oz)     Height:       General: awake alert frustrated NAD  Resp: non labored on room air  MSK/Neuro: moves all extremities against gravity.       40 minutes spent in discharge, including >50% in counseling and coordination of care, medication review and plan of care recommended on follow up.     Patient was evaluated on day of discharge by attending physician, Obdulia Tatum MD, who agrees with plan of care.    It was our pleasure to care for Burton Elizabeth during this hospitalization. Please do not hesitate to contact me should there be questions regarding the hospital course or discharge plan.          Henny eL PA-C  Physical Medicine and Rehabilitation   [phone]

## 2020-07-20 ENCOUNTER — APPOINTMENT (OUTPATIENT)
Dept: CARE COORDINATION | Facility: CLINIC | Age: 75
End: 2020-07-20
Payer: COMMERCIAL

## 2020-07-20 ENCOUNTER — CARE COORDINATION (OUTPATIENT)
Dept: CARE COORDINATION | Facility: CLINIC | Age: 75
End: 2020-07-20

## 2020-07-20 ENCOUNTER — PATIENT OUTREACH (OUTPATIENT)
Dept: CARE COORDINATION | Facility: CLINIC | Age: 75
End: 2020-07-20

## 2020-07-20 DIAGNOSIS — I63.9 STROKE (H): Primary | ICD-10-CM

## 2020-08-20 ENCOUNTER — OFFICE VISIT (OUTPATIENT)
Dept: CARDIOLOGY | Facility: CLINIC | Age: 75
End: 2020-08-20
Attending: INTERNAL MEDICINE
Payer: COMMERCIAL

## 2020-08-20 VITALS
SYSTOLIC BLOOD PRESSURE: 159 MMHG | DIASTOLIC BLOOD PRESSURE: 86 MMHG | OXYGEN SATURATION: 95 % | HEART RATE: 73 BPM | WEIGHT: 265.6 LBS | BODY MASS INDEX: 37.25 KG/M2

## 2020-08-20 DIAGNOSIS — I44.7 LBBB (LEFT BUNDLE BRANCH BLOCK): ICD-10-CM

## 2020-08-20 DIAGNOSIS — I44.0 FIRST DEGREE ATRIOVENTRICULAR BLOCK: ICD-10-CM

## 2020-08-20 DIAGNOSIS — I50.42 CHRONIC COMBINED SYSTOLIC AND DIASTOLIC CONGESTIVE HEART FAILURE (H): ICD-10-CM

## 2020-08-20 DIAGNOSIS — I50.9 ACUTE ON CHRONIC CONGESTIVE HEART FAILURE, UNSPECIFIED HEART FAILURE TYPE (H): ICD-10-CM

## 2020-08-20 DIAGNOSIS — R79.89 ELEVATED TROPONIN: ICD-10-CM

## 2020-08-20 DIAGNOSIS — I25.119 CORONARY ARTERY DISEASE INVOLVING NATIVE CORONARY ARTERY OF NATIVE HEART WITH ANGINA PECTORIS (H): ICD-10-CM

## 2020-08-20 PROCEDURE — 99214 OFFICE O/P EST MOD 30 MIN: CPT | Performed by: INTERNAL MEDICINE

## 2020-08-20 RX ORDER — TORSEMIDE 20 MG/1
20 TABLET ORAL 2 TIMES DAILY
Qty: 30 TABLET | Refills: 0 | Status: CANCELLED | OUTPATIENT
Start: 2020-08-20

## 2020-08-20 NOTE — LETTER
8/20/2020    Td Qureshi MD  [address]  [facility] [address]    RE: Burton Elizaebth       Dear Colleague,    I had the pleasure of seeing Burton Elizabeth in the [facility].    HPI and Plan:     Mr. Elizabeth is a pleasant 75-year-old with history of known coronary disease, history of , and congestive heart failure, bundle branch block, and aortic stenosis.  He is referred here for routine follow-up.  Unfortunately he recently had a stroke and is recovering from that but still has issues with balance.  He denies any chest pain chest pressure.  He has had some gradual weight gain and progressive edema.    Review of his echocardiogram shows no change in his moderate aortic stenosis.  His ejection fraction is actually slightly improved at 50 to 55%.    Assessment: Patient appears to be having some evidence of fluid overload.  I am increasing his Demadex to twice daily.  He should have follow-up with his primary physician at the [facility] who will write for his prescription.  Otherwise we will see him back on a every 3 to 6-month basis or as clinical needs dictate.  Echocardiogram repeat in 1 year for aortic stenosis.    Orders Placed This Encounter   Procedures     Follow-Up with Cardiac Advanced Practice Provider     No orders of the defined types were placed in this encounter.    There are no discontinued medications.      Encounter Diagnoses   Name Primary?     Acute on chronic congestive heart failure, unspecified heart failure type (H)      Elevated troponin      Chronic combined systolic and diastolic congestive heart failure (H)      LBBB (left bundle branch block)      Coronary artery disease involving native coronary artery of native heart with angina pectoris (H)      First degree atrioventricular block        CURRENT MEDICATIONS:  Current Outpatient Medications   Medication Sig Dispense Refill     acetaminophen (TYLENOL) 325 MG tablet Take 2 tablets  (650 mg) by mouth every 6 hours as needed for mild pain or fever (> 101 F)       amLODIPine (NORVASC) 10 MG tablet Take 1 tablet (10 mg) by mouth daily       aspirin (ASA) 325 MG EC tablet Take 1 tablet (325 mg) by mouth daily Start 7/25/20.       atorvastatin (LIPITOR) 40 MG tablet Take 1 tablet (40 mg) by mouth every evening 30 tablet 3     blood glucose (NO BRAND SPECIFIED) test strip Use to test blood sugar as directed       carvedilol (COREG) 25 MG tablet Take 1 tablet (25 mg) by mouth 2 times daily (with meals) 60 tablet 0     glimepiride (AMARYL) 4 MG tablet Take 4 mg by mouth 2 times daily        insulin aspart (NOVOLOG FLEXPEN) 100 UNIT/ML pen Inject 2-6 Units Subcutaneous 3 times daily (with meals) (Patient states he bases his dose off of carb counting and blood glucose but did not give exact regimen).       insulin glargine (LANTUS PEN) 100 UNIT/ML pen Inject 15 Units Subcutaneous every evening       multivitamin, therapeutic with minerals (THERA-VIT-M) TABS Take 1 tablet by mouth daily       torsemide (DEMADEX) 20 MG tablet Take 1 tablet (20 mg) by mouth daily 30 tablet 0     aspirin (ASA) 81 MG EC tablet Take 1 tablet (81 mg) by mouth daily Take 81 mg daily 7/17-7/24 then start asa 325 mg by mouth daily 7/25. (Patient not taking: Reported on 8/20/2020) 18 tablet      clopidogrel (PLAVIX) 75 MG tablet Take 1 tablet (75 mg) by mouth daily for 7 days 7/17-7/24. 7 tablet 0     order for DME Equipment being ordered: Walker Wheels () and Walker ()  Treatment Diagnosis: Impaired gait (Patient not taking: Reported on 8/20/2020) 1 each 0       ALLERGIES     Allergies   Allergen Reactions     Penicillins Anaphylaxis       PAST MEDICAL HISTORY:  Past Medical History:   Diagnosis Date     Arthritis of knee~ s/p replacement 3/24/2016     CAD (coronary artery disease)      Decreased cardiac ejection fraction~44% 3/24/2016     Diabetes mellitus, type 2 (H) 3/24/2016     History of DVT (deep vein thrombosis)       HTN (hypertension) 3/24/2016     LBBB (left bundle branch block) 3/24/2016     Mixed hyperlipidemia 8/23/2016       PAST SURGICAL HISTORY:  Past Surgical History:   Procedure Laterality Date     ARTHROPLASTY KNEE Right 3/29/2016    Procedure: ARTHROPLASTY KNEE;  Surgeon: Heena Rosen MD;  Location:  OR     ARTHROPLASTY REVISION KNEE Left 9/27/2019    Procedure: REVISION LEFT TOTAL KNEE  ARTHROPLASTY;  Surgeon: Heena Rosen MD;  Location:  OR     CV LEFT HEART CATH N/A 8/12/2019    Procedure: Left Heart Cath;  Surgeon: Edgardo Beckham MD;  Location:  HEART CARDIAC CATH LAB     EYE SURGERY      cataract removal     ORTHOPEDIC SURGERY      KNEE SCOPES MULTIPLE       FAMILY HISTORY:  Family History   Problem Relation Age of Onset     Coronary Artery Disease No family hx of        SOCIAL HISTORY:  Social History     Socioeconomic History     Marital status: Single     Spouse name: None     Number of children: None     Years of education: None     Highest education level: None   Occupational History     None   Social Needs     Financial resource strain: None     Food insecurity     Worry: None     Inability: None     Transportation needs     Medical: None     Non-medical: None   Tobacco Use     Smoking status: Never Smoker     Smokeless tobacco: Never Used   Substance and Sexual Activity     Alcohol use: Yes     Alcohol/week: 2.0 standard drinks     Types: 2 Standard drinks or equivalent per week     Frequency: 2-3 times a week     Drinks per session: 1 or 2     Comment: occasional, social     Drug use: No     Sexual activity: None   Lifestyle     Physical activity     Days per week: None     Minutes per session: None     Stress: None   Relationships     Social connections     Talks on phone: None     Gets together: None     Attends Anabaptist service: None     Active member of club or organization: None     Attends meetings of clubs or organizations: None     Relationship status: None      Intimate partner violence     Fear of current or ex partner: None     Emotionally abused: None     Physically abused: None     Forced sexual activity: None   Other Topics Concern     Parent/sibling w/ CABG, MI or angioplasty before 65F 55M? Not Asked   Social History Narrative     None       Review of Systems:  Skin:  Negative     Eyes:  Negative    ENT:  Negative    Respiratory:  Positive for dyspnea on exertion  Cardiovascular:    Positive for;edema;fatigue  Gastroenterology: Negative    Genitourinary:  not assessed    Musculoskeletal:  Positive for joint pain  Neurologic:  Negative    Psychiatric:  Positive for depression;sleep disturbances  Heme/Lymph/Imm:  Positive for allergies;weight loss  Endocrine:  Positive for diabetes    Physical Exam:  Vitals: BP (!) 159/86   Pulse 73   Wt 120.5 kg (265 lb 9.6 oz)   SpO2 95%   BMI 37.25 kg/m      Constitutional:  cooperative, alert and oriented, well developed, well nourished, in no acute distress obese      Skin:  warm and dry to the touch, no apparent skin lesions or masses noted          Head:  normocephalic, no masses or lesions        Eyes:  pupils equal and round, conjunctivae and lids unremarkable, sclera white, no xanthalasma, EOMS intact, no nystagmus        Lymph:      ENT:  no pallor or cyanosis, dentition good        Neck:           Respiratory:  normal breath sounds, clear to auscultation, normal A-P diameter, normal symmetry, normal respiratory excursion, no use of accessory muscles         Cardiac: regular rhythm, normal S1/S2, no S3 or S4, apical impulse not displaced, no murmurs, gallops or rubs                pulses full and equal, no bruits auscultated                                        GI:  abdomen soft, non-tender, BS normoactive, no mass, no HSM, no bruits        Extremities and Muscular Skeletal:  no deformities, clubbing, cyanosis, erythema observed              Neurological:           Psych:          EXT:  3+ Bilateral pitting  edema    Recent Lab Results:  LIPID RESULTS:  Lab Results   Component Value Date    CHOL 188 04/23/2020    HDL 31 (L) 04/23/2020     (H) 04/23/2020    TRIG 277 (H) 04/23/2020       LIVER ENZYME RESULTS:  Lab Results   Component Value Date    AST 50 (A) 06/30/2020    ALT 29 06/30/2020       CBC RESULTS:  Lab Results   Component Value Date    WBC 10.7 07/13/2020    RBC 5.10 07/13/2020    HGB 13.2 (L) 07/13/2020    HCT 43.8 07/13/2020    MCV 86 07/13/2020    MCH 25.9 (L) 07/13/2020    MCHC 30.1 (L) 07/13/2020    RDW 16.0 (H) 07/13/2020     07/13/2020       BMP RESULTS:  Lab Results   Component Value Date     07/13/2020    POTASSIUM 3.9 07/13/2020    CHLORIDE 101 07/13/2020    CO2 27 07/13/2020    ANIONGAP 10 07/13/2020    GLC 67 (L) 07/13/2020    BUN 53 (H) 07/13/2020    CR 1.85 (H) 07/13/2020    GFRESTIMATED 35 (L) 07/13/2020    GFRESTBLACK 41 (L) 07/13/2020    GABE 9.2 07/13/2020        A1C RESULTS:  Lab Results   Component Value Date    A1C 7.0 (H) 04/22/2020       INR RESULTS:  Lab Results   Component Value Date    INR 1.1 (A) 06/30/2020    INR 1.12 04/22/2020       Thank you for allowing me to participate in the care of your patient.    Sincerely,     AURELIA PADILLA MD     [facility]

## 2020-08-20 NOTE — PROGRESS NOTES
HPI and Plan:     Mr. Elizabeth is a pleasant 75-year-old with history of known coronary disease, history of , and congestive heart failure, bundle branch block, and aortic stenosis.  He is referred here for routine follow-up.  Unfortunately he recently had a stroke and is recovering from that but still has issues with balance.  He denies any chest pain chest pressure.  He has had some gradual weight gain and progressive edema.    Review of his echocardiogram shows no change in his moderate aortic stenosis.  His ejection fraction is actually slightly improved at 50 to 55%.    Assessment: Patient appears to be having some evidence of fluid overload.  I am increasing his Demadex to twice daily.  He should have follow-up with his primary physician at the [facility] who will write for his prescription.  Otherwise we will see him back on a every 3 to 6-month basis or as clinical needs dictate.  Echocardiogram repeat in 1 year for aortic stenosis.    Orders Placed This Encounter   Procedures     Follow-Up with Cardiac Advanced Practice Provider     No orders of the defined types were placed in this encounter.    There are no discontinued medications.      Encounter Diagnoses   Name Primary?     Acute on chronic congestive heart failure, unspecified heart failure type (H)      Elevated troponin      Chronic combined systolic and diastolic congestive heart failure (H)      LBBB (left bundle branch block)      Coronary artery disease involving native coronary artery of native heart with angina pectoris (H)      First degree atrioventricular block        CURRENT MEDICATIONS:  Current Outpatient Medications   Medication Sig Dispense Refill     acetaminophen (TYLENOL) 325 MG tablet Take 2 tablets (650 mg) by mouth every 6 hours as needed for mild pain or fever (> 101 F)       amLODIPine (NORVASC) 10 MG tablet Take 1 tablet (10 mg) by mouth daily       aspirin (ASA) 325 MG EC tablet Take 1 tablet (325 mg) by mouth daily  Start 7/25/20.       atorvastatin (LIPITOR) 40 MG tablet Take 1 tablet (40 mg) by mouth every evening 30 tablet 3     blood glucose (NO BRAND SPECIFIED) test strip Use to test blood sugar as directed       carvedilol (COREG) 25 MG tablet Take 1 tablet (25 mg) by mouth 2 times daily (with meals) 60 tablet 0     glimepiride (AMARYL) 4 MG tablet Take 4 mg by mouth 2 times daily        insulin aspart (NOVOLOG FLEXPEN) 100 UNIT/ML pen Inject 2-6 Units Subcutaneous 3 times daily (with meals) (Patient states he bases his dose off of carb counting and blood glucose but did not give exact regimen).       insulin glargine (LANTUS PEN) 100 UNIT/ML pen Inject 15 Units Subcutaneous every evening       multivitamin, therapeutic with minerals (THERA-VIT-M) TABS Take 1 tablet by mouth daily       torsemide (DEMADEX) 20 MG tablet Take 1 tablet (20 mg) by mouth daily 30 tablet 0     aspirin (ASA) 81 MG EC tablet Take 1 tablet (81 mg) by mouth daily Take 81 mg daily 7/17-7/24 then start asa 325 mg by mouth daily 7/25. (Patient not taking: Reported on 8/20/2020) 18 tablet      clopidogrel (PLAVIX) 75 MG tablet Take 1 tablet (75 mg) by mouth daily for 7 days 7/17-7/24. 7 tablet 0     order for DME Equipment being ordered: Walker Wheels () and Walker ()  Treatment Diagnosis: Impaired gait (Patient not taking: Reported on 8/20/2020) 1 each 0       ALLERGIES     Allergies   Allergen Reactions     Penicillins Anaphylaxis       PAST MEDICAL HISTORY:  Past Medical History:   Diagnosis Date     Arthritis of knee~ s/p replacement 3/24/2016     CAD (coronary artery disease)      Decreased cardiac ejection fraction~44% 3/24/2016     Diabetes mellitus, type 2 (H) 3/24/2016     History of DVT (deep vein thrombosis)      HTN (hypertension) 3/24/2016     LBBB (left bundle branch block) 3/24/2016     Mixed hyperlipidemia 8/23/2016       PAST SURGICAL HISTORY:  Past Surgical History:   Procedure Laterality Date     ARTHROPLASTY KNEE Right  3/29/2016    Procedure: ARTHROPLASTY KNEE;  Surgeon: Heena Rosen MD;  Location:  OR     ARTHROPLASTY REVISION KNEE Left 9/27/2019    Procedure: REVISION LEFT TOTAL KNEE  ARTHROPLASTY;  Surgeon: Heena Rosen MD;  Location:  OR     CV LEFT HEART CATH N/A 8/12/2019    Procedure: Left Heart Cath;  Surgeon: Edgardo Beckham MD;  Location:  HEART CARDIAC CATH LAB     EYE SURGERY      cataract removal     ORTHOPEDIC SURGERY      KNEE SCOPES MULTIPLE       FAMILY HISTORY:  Family History   Problem Relation Age of Onset     Coronary Artery Disease No family hx of        SOCIAL HISTORY:  Social History     Socioeconomic History     Marital status: Single     Spouse name: None     Number of children: None     Years of education: None     Highest education level: None   Occupational History     None   Social Needs     Financial resource strain: None     Food insecurity     Worry: None     Inability: None     Transportation needs     Medical: None     Non-medical: None   Tobacco Use     Smoking status: Never Smoker     Smokeless tobacco: Never Used   Substance and Sexual Activity     Alcohol use: Yes     Alcohol/week: 2.0 standard drinks     Types: 2 Standard drinks or equivalent per week     Frequency: 2-3 times a week     Drinks per session: 1 or 2     Comment: occasional, social     Drug use: No     Sexual activity: None   Lifestyle     Physical activity     Days per week: None     Minutes per session: None     Stress: None   Relationships     Social connections     Talks on phone: None     Gets together: None     Attends Alevism service: None     Active member of club or organization: None     Attends meetings of clubs or organizations: None     Relationship status: None     Intimate partner violence     Fear of current or ex partner: None     Emotionally abused: None     Physically abused: None     Forced sexual activity: None   Other Topics Concern     Parent/sibling w/ CABG, MI or angioplasty  before 65F 55M? Not Asked   Social History Narrative     None       Review of Systems:  Skin:  Negative     Eyes:  Negative    ENT:  Negative    Respiratory:  Positive for dyspnea on exertion  Cardiovascular:    Positive for;edema;fatigue  Gastroenterology: Negative    Genitourinary:  not assessed    Musculoskeletal:  Positive for joint pain  Neurologic:  Negative    Psychiatric:  Positive for depression;sleep disturbances  Heme/Lymph/Imm:  Positive for allergies;weight loss  Endocrine:  Positive for diabetes    Physical Exam:  Vitals: BP (!) 159/86   Pulse 73   Wt 120.5 kg (265 lb 9.6 oz)   SpO2 95%   BMI 37.25 kg/m      Constitutional:  cooperative, alert and oriented, well developed, well nourished, in no acute distress obese      Skin:  warm and dry to the touch, no apparent skin lesions or masses noted          Head:  normocephalic, no masses or lesions        Eyes:  pupils equal and round, conjunctivae and lids unremarkable, sclera white, no xanthalasma, EOMS intact, no nystagmus        Lymph:      ENT:  no pallor or cyanosis, dentition good        Neck:           Respiratory:  normal breath sounds, clear to auscultation, normal A-P diameter, normal symmetry, normal respiratory excursion, no use of accessory muscles         Cardiac: regular rhythm, normal S1/S2, no S3 or S4, apical impulse not displaced, no murmurs, gallops or rubs                pulses full and equal, no bruits auscultated                                        GI:  abdomen soft, non-tender, BS normoactive, no mass, no HSM, no bruits        Extremities and Muscular Skeletal:  no deformities, clubbing, cyanosis, erythema observed              Neurological:           Psych:          EXT:  3+ Bilateral pitting edema    Recent Lab Results:  LIPID RESULTS:  Lab Results   Component Value Date    CHOL 188 04/23/2020    HDL 31 (L) 04/23/2020     (H) 04/23/2020    TRIG 277 (H) 04/23/2020       LIVER ENZYME RESULTS:  Lab Results   Component  Value Date    AST 50 (A) 06/30/2020    ALT 29 06/30/2020       CBC RESULTS:  Lab Results   Component Value Date    WBC 10.7 07/13/2020    RBC 5.10 07/13/2020    HGB 13.2 (L) 07/13/2020    HCT 43.8 07/13/2020    MCV 86 07/13/2020    MCH 25.9 (L) 07/13/2020    MCHC 30.1 (L) 07/13/2020    RDW 16.0 (H) 07/13/2020     07/13/2020       BMP RESULTS:  Lab Results   Component Value Date     07/13/2020    POTASSIUM 3.9 07/13/2020    CHLORIDE 101 07/13/2020    CO2 27 07/13/2020    ANIONGAP 10 07/13/2020    GLC 67 (L) 07/13/2020    BUN 53 (H) 07/13/2020    CR 1.85 (H) 07/13/2020    GFRESTIMATED 35 (L) 07/13/2020    GFRESTBLACK 41 (L) 07/13/2020    GABE 9.2 07/13/2020        A1C RESULTS:  Lab Results   Component Value Date    A1C 7.0 (H) 04/22/2020       INR RESULTS:  Lab Results   Component Value Date    INR 1.1 (A) 06/30/2020    INR 1.12 04/22/2020           CC  Laci Neumann MD  [address]  [place] [id number]

## 2020-08-20 NOTE — LETTER
8/20/2020    Td Qureshi MD  [address]  [facility] [address]    RE: Burton Elizabeth       Dear Colleague,    I had the pleasure of seeing Burton Elizabeth in the [facility].    HPI and Plan:     Mr. Elizabeth is a pleasant 75-year-old with history of known coronary disease, history of , and congestive heart failure, bundle branch block, and aortic stenosis.  He is referred here for routine follow-up.  Unfortunately he recently had a stroke and is recovering from that but still has issues with balance.  He denies any chest pain chest pressure.  He has had some gradual weight gain and progressive edema.    Review of his echocardiogram shows no change in his moderate aortic stenosis.  His ejection fraction is actually slightly improved at 50 to 55%.    Assessment: Patient appears to be having some evidence of fluid overload.  I am increasing his Demadex to twice daily.  He should have follow-up with his primary physician at the [facility] who will write for his prescription.  Otherwise we will see him back on a every 3 to 6-month basis or as clinical needs dictate.  Echocardiogram repeat in 1 year for aortic stenosis.    Orders Placed This Encounter   Procedures     Follow-Up with Cardiac Advanced Practice Provider     No orders of the defined types were placed in this encounter.    There are no discontinued medications.      Encounter Diagnoses   Name Primary?     Acute on chronic congestive heart failure, unspecified heart failure type (H)      Elevated troponin      Chronic combined systolic and diastolic congestive heart failure (H)      LBBB (left bundle branch block)      Coronary artery disease involving native coronary artery of native heart with angina pectoris (H)      First degree atrioventricular block        CURRENT MEDICATIONS:  Current Outpatient Medications   Medication Sig Dispense Refill     acetaminophen (TYLENOL) 325 MG tablet Take 2 tablets  (650 mg) by mouth every 6 hours as needed for mild pain or fever (> 101 F)       amLODIPine (NORVASC) 10 MG tablet Take 1 tablet (10 mg) by mouth daily       aspirin (ASA) 325 MG EC tablet Take 1 tablet (325 mg) by mouth daily Start 7/25/20.       atorvastatin (LIPITOR) 40 MG tablet Take 1 tablet (40 mg) by mouth every evening 30 tablet 3     blood glucose (NO BRAND SPECIFIED) test strip Use to test blood sugar as directed       carvedilol (COREG) 25 MG tablet Take 1 tablet (25 mg) by mouth 2 times daily (with meals) 60 tablet 0     glimepiride (AMARYL) 4 MG tablet Take 4 mg by mouth 2 times daily        insulin aspart (NOVOLOG FLEXPEN) 100 UNIT/ML pen Inject 2-6 Units Subcutaneous 3 times daily (with meals) (Patient states he bases his dose off of carb counting and blood glucose but did not give exact regimen).       insulin glargine (LANTUS PEN) 100 UNIT/ML pen Inject 15 Units Subcutaneous every evening       multivitamin, therapeutic with minerals (THERA-VIT-M) TABS Take 1 tablet by mouth daily       torsemide (DEMADEX) 20 MG tablet Take 1 tablet (20 mg) by mouth daily 30 tablet 0     aspirin (ASA) 81 MG EC tablet Take 1 tablet (81 mg) by mouth daily Take 81 mg daily 7/17-7/24 then start asa 325 mg by mouth daily 7/25. (Patient not taking: Reported on 8/20/2020) 18 tablet      clopidogrel (PLAVIX) 75 MG tablet Take 1 tablet (75 mg) by mouth daily for 7 days 7/17-7/24. 7 tablet 0     order for DME Equipment being ordered: Walker Wheels () and Walker ()  Treatment Diagnosis: Impaired gait (Patient not taking: Reported on 8/20/2020) 1 each 0       ALLERGIES     Allergies   Allergen Reactions     Penicillins Anaphylaxis       PAST MEDICAL HISTORY:  Past Medical History:   Diagnosis Date     Arthritis of knee~ s/p replacement 3/24/2016     CAD (coronary artery disease)      Decreased cardiac ejection fraction~44% 3/24/2016     Diabetes mellitus, type 2 (H) 3/24/2016     History of DVT (deep vein thrombosis)       HTN (hypertension) 3/24/2016     LBBB (left bundle branch block) 3/24/2016     Mixed hyperlipidemia 8/23/2016       PAST SURGICAL HISTORY:  Past Surgical History:   Procedure Laterality Date     ARTHROPLASTY KNEE Right 3/29/2016    Procedure: ARTHROPLASTY KNEE;  Surgeon: Heena Rosen MD;  Location:  OR     ARTHROPLASTY REVISION KNEE Left 9/27/2019    Procedure: REVISION LEFT TOTAL KNEE  ARTHROPLASTY;  Surgeon: Heena Rosen MD;  Location:  OR     CV LEFT HEART CATH N/A 8/12/2019    Procedure: Left Heart Cath;  Surgeon: Edgardo Beckham MD;  Location:  HEART CARDIAC CATH LAB     EYE SURGERY      cataract removal     ORTHOPEDIC SURGERY      KNEE SCOPES MULTIPLE       FAMILY HISTORY:  Family History   Problem Relation Age of Onset     Coronary Artery Disease No family hx of        SOCIAL HISTORY:  Social History     Socioeconomic History     Marital status: Single     Spouse name: None     Number of children: None     Years of education: None     Highest education level: None   Occupational History     None   Social Needs     Financial resource strain: None     Food insecurity     Worry: None     Inability: None     Transportation needs     Medical: None     Non-medical: None   Tobacco Use     Smoking status: Never Smoker     Smokeless tobacco: Never Used   Substance and Sexual Activity     Alcohol use: Yes     Alcohol/week: 2.0 standard drinks     Types: 2 Standard drinks or equivalent per week     Frequency: 2-3 times a week     Drinks per session: 1 or 2     Comment: occasional, social     Drug use: No     Sexual activity: None   Lifestyle     Physical activity     Days per week: None     Minutes per session: None     Stress: None   Relationships     Social connections     Talks on phone: None     Gets together: None     Attends Zoroastrianism service: None     Active member of club or organization: None     Attends meetings of clubs or organizations: None     Relationship status: None      Intimate partner violence     Fear of current or ex partner: None     Emotionally abused: None     Physically abused: None     Forced sexual activity: None   Other Topics Concern     Parent/sibling w/ CABG, MI or angioplasty before 65F 55M? Not Asked   Social History Narrative     None       Review of Systems:  Skin:  Negative     Eyes:  Negative    ENT:  Negative    Respiratory:  Positive for dyspnea on exertion  Cardiovascular:    Positive for;edema;fatigue  Gastroenterology: Negative    Genitourinary:  not assessed    Musculoskeletal:  Positive for joint pain  Neurologic:  Negative    Psychiatric:  Positive for depression;sleep disturbances  Heme/Lymph/Imm:  Positive for allergies;weight loss  Endocrine:  Positive for diabetes    Physical Exam:  Vitals: BP (!) 159/86   Pulse 73   Wt 120.5 kg (265 lb 9.6 oz)   SpO2 95%   BMI 37.25 kg/m      Constitutional:  cooperative, alert and oriented, well developed, well nourished, in no acute distress obese      Skin:  warm and dry to the touch, no apparent skin lesions or masses noted          Head:  normocephalic, no masses or lesions        Eyes:  pupils equal and round, conjunctivae and lids unremarkable, sclera white, no xanthalasma, EOMS intact, no nystagmus        Lymph:      ENT:  no pallor or cyanosis, dentition good        Neck:           Respiratory:  normal breath sounds, clear to auscultation, normal A-P diameter, normal symmetry, normal respiratory excursion, no use of accessory muscles         Cardiac: regular rhythm, normal S1/S2, no S3 or S4, apical impulse not displaced, no murmurs, gallops or rubs                pulses full and equal, no bruits auscultated                                        GI:  abdomen soft, non-tender, BS normoactive, no mass, no HSM, no bruits        Extremities and Muscular Skeletal:  no deformities, clubbing, cyanosis, erythema observed              Neurological:           Psych:          EXT:  3+ Bilateral pitting  edema    Recent Lab Results:  LIPID RESULTS:  Lab Results   Component Value Date    CHOL 188 04/23/2020    HDL 31 (L) 04/23/2020     (H) 04/23/2020    TRIG 277 (H) 04/23/2020       LIVER ENZYME RESULTS:  Lab Results   Component Value Date    AST 50 (A) 06/30/2020    ALT 29 06/30/2020       CBC RESULTS:  Lab Results   Component Value Date    WBC 10.7 07/13/2020    RBC 5.10 07/13/2020    HGB 13.2 (L) 07/13/2020    HCT 43.8 07/13/2020    MCV 86 07/13/2020    MCH 25.9 (L) 07/13/2020    MCHC 30.1 (L) 07/13/2020    RDW 16.0 (H) 07/13/2020     07/13/2020       BMP RESULTS:  Lab Results   Component Value Date     07/13/2020    POTASSIUM 3.9 07/13/2020    CHLORIDE 101 07/13/2020    CO2 27 07/13/2020    ANIONGAP 10 07/13/2020    GLC 67 (L) 07/13/2020    BUN 53 (H) 07/13/2020    CR 1.85 (H) 07/13/2020    GFRESTIMATED 35 (L) 07/13/2020    GFRESTBLACK 41 (L) 07/13/2020    GABE 9.2 07/13/2020        A1C RESULTS:  Lab Results   Component Value Date    A1C 7.0 (H) 04/22/2020       INR RESULTS:  Lab Results   Component Value Date    INR 1.1 (A) 06/30/2020    INR 1.12 04/22/2020           CC  Laci Padilla MD  [address]  [place] [id number]                  Thank you for allowing me to participate in the care of your patient.      Sincerely,     LACI PADILLA MD     [facility]    cc:   Laci Padilla MD  [address]  [place] [id number]

## 2020-08-26 ENCOUNTER — TRANSFERRED RECORDS (OUTPATIENT)
Dept: HEALTH INFORMATION MANAGEMENT | Facility: CLINIC | Age: 75
End: 2020-08-26

## 2020-09-01 ENCOUNTER — OFFICE VISIT (OUTPATIENT)
Dept: OPHTHALMOLOGY | Facility: CLINIC | Age: 75
End: 2020-09-01
Attending: PHYSICIAN ASSISTANT
Payer: COMMERCIAL

## 2020-09-01 DIAGNOSIS — H53.2 DIPLOPIA: Primary | ICD-10-CM

## 2020-09-01 DIAGNOSIS — H53.10 SUBJECTIVE VISUAL DISTURBANCE: Primary | ICD-10-CM

## 2020-09-01 DIAGNOSIS — E11.3391 MODERATE NONPROLIFERATIVE DIABETIC RETINOPATHY OF RIGHT EYE WITHOUT MACULAR EDEMA ASSOCIATED WITH TYPE 2 DIABETES MELLITUS (H): ICD-10-CM

## 2020-09-01 DIAGNOSIS — H53.10 SUBJECTIVE VISUAL DISTURBANCE: ICD-10-CM

## 2020-09-01 PROCEDURE — G0463 HOSPITAL OUTPT CLINIC VISIT: HCPCS | Mod: ZF | Performed by: TECHNICIAN/TECHNOLOGIST

## 2020-09-01 ASSESSMENT — CUP TO DISC RATIO
OS_RATIO: 0.4
OD_RATIO: 0.4

## 2020-09-01 ASSESSMENT — VISUAL ACUITY
OD_SC: 20/20
METHOD: SNELLEN - LINEAR
OS_SC: 20/25

## 2020-09-01 ASSESSMENT — CONF VISUAL FIELD
OD_NORMAL: 1
METHOD: COUNTING FINGERS
OS_NORMAL: 1

## 2020-09-01 ASSESSMENT — EXTERNAL EXAM - LEFT EYE: OS_EXAM: NORMAL

## 2020-09-01 ASSESSMENT — EXTERNAL EXAM - RIGHT EYE: OD_EXAM: NORMAL

## 2020-09-01 ASSESSMENT — TONOMETRY
OS_IOP_MMHG: 12
OD_IOP_MMHG: 13
IOP_METHOD: ICARE

## 2020-09-01 ASSESSMENT — SLIT LAMP EXAM - LIDS
COMMENTS: NORMAL
COMMENTS: NORMAL

## 2020-09-01 NOTE — LETTER
2020         RE:  :  MRN: Burton Elizabeth  1945  [id number]     Dear Dr. Le,    Thank you for asking me to see your very pleasant patient, Burton Elizabeth, in neuro-ophthalmic consultation.  I would like to thank you for sending your records and I have summarized them in the history of present illness.  My assessment and plan are below.  For further details, please see my attached clinic note.      Assessment & Plan     Burton Elizabeth is a 75 year old male with the following diagnoses:   1. Subjective visual disturbance         Burton Elizabeth is a 75 year old White male with DM2, HTN, HFpEF, CAD who presents to neuro-ophthalmology clinic today for consultation from ASAD Rodriguez for visual disturbance in setting of stroke 2020.    On 20 patient was admitted for vertigo, dizziness,and diplopia, found to have ischemic infarct nleft lateral brainstem at pontomedullary junction. MRA head and neck normal. Echo with bubble study done EF of 50 to 55% with negative bubble study, moderate aortic valve stenosis.  Was initially having binocular diagonal diplopia after stroke, however believes it has almost completely resolved. Is currently having issues with balance.      POH: Fuchs corneal dystrophy, CE-IOL  (8 years ago)  PMH: DM2 (last A1C 7.2 ), HTN, CKD, HFpEF  FH: No glaucoma or AMD  Meds:  Current Outpatient Medications   Medication     acetaminophen (TYLENOL) 325 MG tablet     amLODIPine (NORVASC) 10 MG tablet     aspirin (ASA) 325 MG EC tablet     aspirin (ASA) 81 MG EC tablet     atorvastatin (LIPITOR) 40 MG tablet     blood glucose (NO BRAND SPECIFIED) test strip     carvedilol (COREG) 25 MG tablet     clopidogrel (PLAVIX) 75 MG tablet     glimepiride (AMARYL) 4 MG tablet     insulin aspart (NOVOLOG FLEXPEN) 100 UNIT/ML pen     insulin glargine (LANTUS PEN) 100 UNIT/ML pen     multivitamin, therapeutic with minerals (THERA-VIT-M) TABS     order for DME     torsemide  (DEMADEX) 20 MG tablet     No current facility-administered medications for this visit.        Visual acuity is 20/20, 20/25. Intraocular pressure is 13/13. Pupils brisk, no APD. Color plates full. Confrontational visual fields full. Motility full. Slit lamp exam unremarkable. Dilated fundus exam with moderate to severe non proliferative diabetic retinopathy.    It is my impression that Burton Elizabeth has suffered a stroke, however he has minimal residual deficits on neuro-ophthalmologic exam.  Although he initially reported diplopia, today, he does not report diplopia, however he has a small esophoria in left gaze. Of note, he has moderate non-proliferative diabetic retinopathy in both eyes. I would recommend follow-up with current ophthalmologist at [facility] for diabetic eye exam in 4-6 months.  He expresses understanding, and will make this appointment himself.        Again, thank you for allowing me to participate in the care of your patient.      Sincerely,    Junito Wellington MD  Professor  Ophthalmology Residency   Director of Neuro-Ophthalmology  [place] - [facility]ie Endow Chair  Departments of Ophthalmology, Neurology, and Neurosurgery  [facility] [address] [place] [address]  [address]  T - [phone]  F - [phone]  [email or web address] [email or web address]      CC: ASAD Villarreal  [address]  [facility] [address]  VIA In Basket     Xu Karimi MD  [facility]  [address] [place] [id number]  VIA Facsimile: [phone]

## 2020-09-01 NOTE — NURSING NOTE
Chief Complaint(s) and History of Present Illness(es)     New Patient     In both eyes (Consult for vision changes after stroke).  Associated symptoms include Negative for double vision.              Comments     Reports stroke on 7/2020. Reports double vision initially - resolved. Blurry vision has come back.   No glasses history of s/p cataract surgery 6 years ago.    BRYANNA Nixon 9/1/2020 8:30 AM

## 2020-09-01 NOTE — PROGRESS NOTES
Assessment & Plan     Burton Elizabeth is a 75 year old male with the following diagnoses:   1. Diplopia    2. Subjective visual disturbance    3. 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 MRA head and neck normal. Echo with bubble study done EF of 50 to 55% with negative bubble study, moderate aortic valve stenosis.  Was initially having binocular diagonal diplopia after stroke, however believes it has almost completely resolved. Is currently having issues with balance.      POH: Fuchs corneal dystrophy, CE-IOL  (8 years ago)  PMH: DM2 (last A1C 7.2 8/20), HTN, CKD, HFpEF  FH: No glaucoma or AMD  Meds:  Current Outpatient Medications   Medication     acetaminophen (TYLENOL) 325 MG tablet     amLODIPine (NORVASC) 10 MG tablet     aspirin (ASA) 325 MG EC tablet     aspirin (ASA) 81 MG EC tablet     atorvastatin (LIPITOR) 40 MG tablet     blood glucose (NO BRAND SPECIFIED) test strip     carvedilol (COREG) 25 MG tablet     clopidogrel (PLAVIX) 75 MG tablet     glimepiride (AMARYL) 4 MG tablet     insulin aspart (NOVOLOG FLEXPEN) 100 UNIT/ML pen     insulin glargine (LANTUS PEN) 100 UNIT/ML pen     multivitamin, therapeutic with minerals (THERA-VIT-M) TABS     order for DME     torsemide (DEMADEX) 20 MG tablet     No current facility-administered medications for this visit.        Visual acuity is 20/20, 20/25. Intraocular pressure is 13/13. Pupils brisk, no APD. Color plates full. Confrontational visual fields full. Motility full. Slit lamp exam  unremarkable. Dilated fundus exam with moderate to severe non proliferative diabetic retinopathy.    It is my impression that Burton Elizabeth has suffered a stroke, however he has minimal residual deficits on neuro-ophthalmologic exam.  Although he initially reported diplopia, today, he does not report diplopia, however he has a small esophoria in left gaze. Of note, he has moderate non-proliferative diabetic retinopathy in both eyes. I would recommend follow-up with current ophthalmologist at [facility] for diabetic eye exam in 4-6 months.  He expresses understanding, and will make this appointment himself.         Attending Physician Attestation:  Complete documentation of historical and exam elements from today's encounter can be found in the full encounter summary report (not reduplicated in this progress note).  I personally obtained the chief complaint(s) and history of present illness.  I confirmed and edited as necessary the review of systems, past medical/surgical history, family history, social history, and examination findings as documented by others; and I examined the patient myself.  I personally reviewed the relevant tests, images, and reports as documented above.  I formulated and edited as necessary the assessment and plan and discussed the findings and management plan with the patient and family. - Junito Cespedes MD  Ophthalmology PGY-3  [facility]  Pager: [phone]

## 2020-09-01 NOTE — Clinical Note
9/1/2020       RE: Burton Elizabeth  [address] [place] [address]     Dear Colleague,    Thank you for referring your patient, Burton Elizabeth, to the EYE CLINIC at [facility]. Please see a copy of my visit note below.           Assessment & Plan     Burton Elizabeth is a 75 year old male with the following diagnoses:   1. Subjective visual disturbance         Burton Elizabeth is a 75 year old White male with DM2, HTN, HFpEF, CAD who presents to neuro-ophthalmology clinic today for consultation from ASAD Rodriguez for visual disturbance in setting of stroke 7/2020.    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 MRA head and neck normal. Echo with bubble study done EF of 50 to 55% with negative bubble study, moderate aortic valve stenosis.  Was initially having binocular diagonal diplopia after stroke, however believes it has almost completely resolved. Is currently having issues with balance.      POH: Fuchs corneal dystrophy, CE-IOL  (8 years ago)  PMH: DM2 (last A1C 7.2 8/20), HTN, CKD, HFpEF  FH: No glaucoma or AMD  Meds:  Current Outpatient Medications   Medication     acetaminophen (TYLENOL) 325 MG tablet     amLODIPine (NORVASC) 10 MG tablet     aspirin (ASA) 325 MG EC tablet     aspirin (ASA) 81 MG EC tablet     atorvastatin (LIPITOR) 40 MG tablet     blood glucose (NO BRAND SPECIFIED) test strip     carvedilol (COREG) 25 MG tablet     clopidogrel (PLAVIX) 75 MG tablet     glimepiride (AMARYL) 4 MG tablet     insulin aspart (NOVOLOG FLEXPEN) 100 UNIT/ML pen     insulin glargine (LANTUS PEN) 100 UNIT/ML pen     multivitamin, therapeutic with minerals (THERA-VIT-M) TABS     order for DME     torsemide (DEMADEX) 20 MG tablet     No current facility-administered medications for this visit.        Visual acuity is 20/20, 20/25. Intraocular pressure  is 13/13. Pupils brisk, no APD. Color plates full. Confrontational visual fields full. Motility full. Slit lamp exam unremarkable. Dilated fundus exam with moderate to severe non proliferative diabetic retinopathy.    It is my impression that Burton Elizabeth has suffered a stroke, however he has minimal residual deficits on neuro-ophthalmologic exam.  Although he initially reported diplopia, today, he does not report diplopia, however he has a small esophoria in left gaze. Of note, he has moderate non-proliferative diabetic retinopathy in both eyes. I would recommend follow-up with current ophthalmologist at [facility] for diabetic eye exam in 4-6 months.  He expresses understanding, and will make this appointment himself.         Attending Physician Attestation:  Complete documentation of historical and exam elements from today's encounter can be found in the full encounter summary report (not reduplicated in this progress note).  I personally obtained the chief complaint(s) and history of present illness.  I confirmed and edited as necessary the review of systems, past medical/surgical history, family history, social history, and examination findings as documented by others; and I examined the patient myself.  I personally reviewed the relevant tests, images, and reports as documented above.  I formulated and edited as necessary the assessment and plan and discussed the findings and management plan with the patient and family. - Junito Cespedes MD  Ophthalmology PGY-3  [facility]  Pager: [phone]             Assessment & Plan     Burton Elizabeth is a 75 year old male with the following diagnoses:   1. Diplopia    2. Subjective visual disturbance    3. 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 MRA head and neck normal. Echo with bubble study done EF of 50 to 55% with negative bubble study, moderate aortic valve stenosis.  Was initially having binocular diagonal diplopia after stroke, however believes it has almost completely resolved. Is currently having issues with balance.      POH: Fuchs corneal dystrophy, CE-IOL  (8 years ago)  PMH: DM2 (last A1C 7.2 8/20), HTN, CKD, HFpEF  FH: No glaucoma or AMD  Meds:  Current Outpatient Medications   Medication     acetaminophen (TYLENOL) 325 MG tablet     amLODIPine (NORVASC) 10 MG tablet     aspirin (ASA) 325 MG EC tablet     aspirin (ASA) 81 MG EC tablet     atorvastatin (LIPITOR) 40 MG tablet     blood glucose (NO BRAND SPECIFIED) test strip     carvedilol (COREG) 25 MG tablet     clopidogrel (PLAVIX) 75 MG tablet     glimepiride (AMARYL) 4 MG tablet     insulin aspart (NOVOLOG FLEXPEN) 100 UNIT/ML pen     insulin glargine (LANTUS PEN) 100 UNIT/ML pen     multivitamin, therapeutic with minerals (THERA-VIT-M) TABS     order for DME     torsemide (DEMADEX) 20 MG tablet     No current facility-administered medications for this visit.        Visual acuity is 20/20, 20/25. Intraocular pressure is 13/13. Pupils brisk, no APD. Color plates full. Confrontational visual fields full. Motility full. Slit lamp exam unremarkable. Dilated fundus exam with moderate to severe non proliferative diabetic retinopathy.    It is my impression that Burton Elizabeth has suffered a stroke, however he has minimal residual deficits on neuro-ophthalmologic exam.  Although he initially reported diplopia, today, he does not report diplopia, however he has a small esophoria in left gaze. Of note, he has moderate  non-proliferative diabetic retinopathy in both eyes. I would recommend follow-up with current ophthalmologist at [facility] for diabetic eye exam in 4-6 months.  He expresses understanding, and will make this appointment himself.         Attending Physician Attestation:  Complete documentation of historical and exam elements from today's encounter can be found in the full encounter summary report (not reduplicated in this progress note).  I personally obtained the chief complaint(s) and history of present illness.  I confirmed and edited as necessary the review of systems, past medical/surgical history, family history, social history, and examination findings as documented by others; and I examined the patient myself.  I personally reviewed the relevant tests, images, and reports as documented above.  I formulated and edited as necessary the assessment and plan and discussed the findings and management plan with the patient and family. - Junito Cespedes MD  Ophthalmology PGY-3  [facility]  Pager: [phone]      Again, thank you for allowing me to participate in the care of your patient.      Sincerely,    Junito Wellington MD

## 2020-09-11 ENCOUNTER — TELEPHONE (OUTPATIENT)
Dept: CARDIOLOGY | Facility: CLINIC | Age: 75
End: 2020-09-11

## 2020-09-11 DIAGNOSIS — I50.42 CHRONIC COMBINED SYSTOLIC AND DIASTOLIC CONGESTIVE HEART FAILURE (H): Primary | ICD-10-CM

## 2020-09-11 NOTE — TELEPHONE ENCOUNTER
Pt called to report that he has been having swelling and would like to update Garrett.   Pt last saw Brittanie on 8/19/19 and last saw Dr. Neumann on 8/20/20.      Left detailed voicemail for pt to call back and discuss symptoms and medications.

## 2020-09-11 NOTE — TELEPHONE ENCOUNTER
Spoke with pt and he reports that he takes torsemide 40mg daily.   Pt reports that Dr. Neumann increased it to BID at his appt on 8/20/20.   Medication list was just not updated and pt has been taking the increased dose since then.    Pt had a limited echo on 7/2/20 and also an echo on 4/23/20.  Pt reports that he has been having this swelling issues since last year when he had his knee replacement surgery.   Pt reports no issues with SOB, chest pain, dizziness or lightheadedness.  Pt was set up to have an appt with Brittanie on 9/22/20.    Coronary angiogram 8/12/2019 noting single vessel occlusive coronary artery disease namely  of non-dominant small circumflex vessel, proximal circumflex 99% stenosis, ostial RPDA 55%, post atrio lesion 50%, and mid to distal LAD 50% stenosis.    Pt was admitted 7/6/2020-7/16/20 for acute ischemic stroke.   -left lateral aspect of the brainstem at the pontomedullary junction    Will route update to Brittanie.

## 2020-09-11 NOTE — TELEPHONE ENCOUNTER
Reviewed phone encounter. Dr. Neumann noted volume overload at visit 8/20/20. Recommended increase in diuretic therapy and [facility].     Unclear if swelling is worse or if he is calling to note no improvement.     RECOMMENDATIONS  1. Check BMP  2. Limit sodium, limit fluids  3. Has follow up with me 9/22/20      [facility]  Pager: [phone]

## 2020-09-14 NOTE — TELEPHONE ENCOUNTER
Left detailed voicemail for pt regarding Brittanie's recommendations.   Scheduled pt to have BMP done on 9/22 at 2:10pm.  Awaiting call back from pt to clarify.   Left team 1's call back number.

## 2020-09-22 ENCOUNTER — OFFICE VISIT (OUTPATIENT)
Dept: CARDIOLOGY | Facility: CLINIC | Age: 75
End: 2020-09-22
Attending: INTERNAL MEDICINE
Payer: COMMERCIAL

## 2020-09-22 VITALS
BODY MASS INDEX: 39.1 KG/M2 | HEIGHT: 69 IN | DIASTOLIC BLOOD PRESSURE: 89 MMHG | OXYGEN SATURATION: 95 % | WEIGHT: 264 LBS | HEART RATE: 80 BPM | SYSTOLIC BLOOD PRESSURE: 152 MMHG

## 2020-09-22 DIAGNOSIS — I35.0 AORTIC VALVE STENOSIS, ETIOLOGY OF CARDIAC VALVE DISEASE UNSPECIFIED: ICD-10-CM

## 2020-09-22 DIAGNOSIS — I44.7 LBBB (LEFT BUNDLE BRANCH BLOCK): ICD-10-CM

## 2020-09-22 DIAGNOSIS — I10 UNCONTROLLED HYPERTENSION: ICD-10-CM

## 2020-09-22 DIAGNOSIS — I50.32 CHRONIC DIASTOLIC HEART FAILURE (H): Primary | ICD-10-CM

## 2020-09-22 DIAGNOSIS — I25.119 CORONARY ARTERY DISEASE INVOLVING NATIVE CORONARY ARTERY OF NATIVE HEART WITH ANGINA PECTORIS (H): ICD-10-CM

## 2020-09-22 PROCEDURE — 99214 OFFICE O/P EST MOD 30 MIN: CPT | Performed by: NURSE PRACTITIONER

## 2020-09-22 RX ORDER — TORSEMIDE 20 MG/1
TABLET ORAL
Qty: 90 TABLET | Refills: 1 | Status: ON HOLD | OUTPATIENT
Start: 2020-09-22 | End: 2021-01-01

## 2020-09-22 ASSESSMENT — MIFFLIN-ST. JEOR: SCORE: 1927.88

## 2020-09-22 NOTE — LETTER
9/22/2020    [facility]  [address] [facility] [address]    RE: Burton Elizabeth       Dear Colleague,    I had the pleasure of seeing Burton Elizabeth in the [facility].    Cardiology Clinic Progress Note  Burton Elizabeth MRN# [id number]   YOB: 1945 Age: 75 year old   Primary Cardiologist: Dr. Neumann Reason for visit: Cardiology follow up             Assessment and Plan:   Burton Elizabeth is a very pleasant 75 year old male with a history of coronary artery disease, moderate aortic stenosis, DM, hypertension, obesity, CVA and CKD.     Patient here today for cardiology follow up related to lower extremity edema. Patient continues to appear volume up which was noted during visit with Dr. Neumann. No improvement with increased diuretics. Due to financial reasons patient notes he is unable to regularly follow up. For this I was hesitant to adjust medications significantly today for safety reasons due to needed monitoring. [facility] It would be extremely beneficial for patient to connect with his PCP about his volume status and blood pressure control. As patient could see him regularly and have labs monitored given no concerns about financial burden.      1. Coronary artery disease - coronary angiogram 8/12/2019 noting single vessel occlusive coronary artery disease namely  of non-dominant small circumflex vessel, proximal circumflex 99% stenosis, ostial RPDA 55%, post atrio lesion 50%, and mid to distal LAD 50% stenosis. Stable, no signs/symptoms of angina.               - Continue aspirin, carvedilol and atorvastatin                - Counseled patient on lifestyle modifications  2. Hypertension - uncontrolled, given patient unwilling to regularly follow up due to financial restraints recommend [facility] to [facility] manage.   3. Aortic stenosis - moderate, peak AoV pressure  gradient is 56mmHg. Will continue to monitor  4. Obesity - BMI 38.99, counseled patient on weight loss strategies.   5. CKD - baseline 1.8-2.1, creatinine 1.8 8/26/20   6. Chronic heart failure with preserved ejection fraction - echocardiogram 7/2020 showed LVEF 50-55%. Patient appears volume up on exam, this was also noted at visit with Dr. Neumann on 8/20/20. He has noted no improvement in symptoms with increased torsemide. He is unwilling to regularly follow up due to financial constraints. [facility] and recommend following up with PCP to help monitor and adjust diuretics. Unfortunately cant significantly adjust diuretics without a monitoring/follow up plan. [facility] which was after previous increase in torsemide, kidney function and electrolytes were stable. Plan to increase torsemide today and will have my nurse [facility] to [facility] to help [facility] follow up. Patient would likely benefit from some IV diuresis and/or metolazone if able to monitor closely. He declined this today given unwillingness to follow up closely.               - NYHA class II-III, stage C              - INCREASE torsemide to 40mg qam and 20mg qpm              - Counseled on low sodium diet, daily weights   7. Diabetes mellitus  8. Chronic LBBB  9. CVA       Changes today: INCREASE to torsemide 40mg qam and 20mg qpm    Follow up plan:     Due to financial reasons patient notes he is unable to regularly follow up. For this I was hesitant to adjust medications significantly today for safety reasons due to needed monitoring. [facility] It would be extremely beneficial for patient to connect with his PCP about his volume status and blood pressure control. As patient could see him regularly and have labs monitored given no concerns about financial burden.     RN call next week to check on weight/symptoms after increase in torsemide.     Labs at PCP office  "([facility]) in 7-10 days    Follow up with me in 1 month, patient unwilling to coordinate anything sooner.           History of Presenting Illness:    Burton Elizabeth is a very pleasant 75 year old male with a history of coronary artery disease, moderate aortic stenosis, DM, hypertension, obesity, CVA and CKD.     Patient follows with Dr. Neumann. Patient has a history of abnormal stress test from 2016 which demonstrated a small area of mild ischemia in the anterior/anteroseptal apex/mid ventricle and mild ischemia in the inferior wall. EF 59%. Patient was recommended to have an invasive ischemic evaluation given recent syncopal episode and history of abnormal stress test.      Coronary angiogram 8/12/2019 noting single vessel occlusive coronary artery disease namely  of non-dominant small circumflex vessel, proximal circumflex 99% stenosis, ostial RPDA 55%, post atrio lesion 50%, and mid to distal LAD 50% stenosis. Noted no revascularization is needed prior to his planned knee surgery.      Echocardiogram 7/2020 showed LVEF 50-55%, RV size/function normal, moderate valvular aortic stenosis - no change when compared to prior study.     He was hospitalized in April for chest pain, he was diuresed and symptoms improved. Then unfortunately hospitalized in July for ischemic stroke.     Patient was most recently seen by Dr. Neumann 8/20/20 at that time he was noted to be volume up and torsemide was increased to 20mg BID. Patient called last week with complaints of lower extremity edema. I advised for patient to limit sodium/fluids and have follow up with BMP given I haven't seen patient in over 1 year and was volume up at time of visit in August with Dr. Neumann.     Patient is here today for cardiology follow up.     Patient reports feeling good. Patient is very fixated on his residual symptoms from his stroke. Notes that balance after stroke \"sucks\" and hasn't got better. Vision has recovered. It is hard to focus him " "on what his cardiac concerns are today.     Monitoring weights daily a home. Weight today at home 259#. Weight today at clinic 264#. Has been on increased torsemide since 8/20/20. No improvement in swelling has just continued to get worse. Notes weight started increasing after stroke. States his weight used to be around 245#. Has tried to limit salt. Denies abdominal distention/bloating. Denies shortness of breath at rest. Some exertional dyspnea with 1 flight of stairs. Notes minimal dyspnea when walking into clinic, but takes it very slow. Able to complete ADLs independently. Sleeping \"horrible\". Unable to stay a sleep. Denies orthopnea or PND. States he was evaluated by sleep medicine in the past and doesn't have sleep apnea. Patient denies chest pain or chest tightness. Denies dizziness, lightheadedness or other presyncopal symptoms. Denies tachycardia or palpitations.     Labs from 8/26 showed lipid panel total cholesterol 110, HDL 32 and LDL 54. Kidney function stable, creatinine 1.8. Blood pressure 152/89 and HR 80 in clinic today.    Appetite good. Eating meals at home. Minimal packaged/processed foods. Not adding additional salt to foods. Typically > 1 gallon of water daily. Exercise routine of upper body strength training with machine. Has exercise bike but unable to ride due to pain with swelling. Denies alcohol use. Denies tobacco.         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   Social History     Socioeconomic History     Marital status: Single     Spouse name: Not on file     Number of children: Not on file     Years of education: Not on file     Highest education level: Not on file   Occupational History     Not on file   Social Needs     Financial resource strain: Not on file     Food insecurity     Worry: Not on file     Inability: Not on " "file     Transportation needs     Medical: Not on file     Non-medical: Not on file   Tobacco Use     Smoking status: Never Smoker     Smokeless tobacco: Never Used   Substance and Sexual Activity     Alcohol use: Yes     Alcohol/week: 2.0 standard drinks     Types: 2 Standard drinks or equivalent per week     Frequency: 2-3 times a week     Drinks per session: 1 or 2     Comment: occasional, social     Drug use: No     Sexual activity: Not on file   Lifestyle     Physical activity     Days per week: Not on file     Minutes per session: Not on file     Stress: Not on file   Relationships     Social connections     Talks on phone: Not on file     Gets together: Not on file     Attends Gnosticist service: Not on file     Active member of club or organization: Not on file     Attends meetings of clubs or organizations: Not on file     Relationship status: Not on file     Intimate partner violence     Fear of current or ex partner: Not on file     Emotionally abused: Not on file     Physically abused: Not on file     Forced sexual activity: Not on file   Other Topics Concern     Parent/sibling w/ CABG, MI or angioplasty before 65F 55M? Not Asked   Social History Narrative     Not on file            Review of Systems:   Skin:  Negative     Eyes:  Negative    ENT:  Negative    Respiratory:  Positive for dyspnea on exertion  Cardiovascular:    Positive for;edema;fatigue  Gastroenterology: Negative    Genitourinary:  not assessed    Musculoskeletal:  Positive for joint pain  Neurologic:  Positive for incoordination;stroke  Psychiatric:  Positive for depression;sleep disturbances  Heme/Lymph/Imm:  Positive for allergies;weight loss  Endocrine:  Positive for diabetes         Physical Exam:   Vitals: BP (!) 152/89   Pulse 80   Ht 1.753 m (5' 9\")   Wt 119.7 kg (264 lb)   SpO2 95%   BMI 38.99 kg/m     Wt Readings from Last 4 Encounters:   09/22/20 119.7 kg (264 lb)   08/20/20 120.5 kg (265 lb 9.6 oz)   07/16/20 113.9 kg (251 " lb 3.2 oz)   07/06/20 116.1 kg (256 lb)     GEN: well nourished, in no acute distress.  HEENT:  Pupils equal, round. Sclerae nonicteric.   NECK: Supple, no masses appreciated. JVD elevated, but body habitus makes assessment difficult  C/V:  Regular rate and rhythm, no murmur, rub or gallop.   RESP: Respirations are unlabored. Clear to auscultation bilaterally without wheezing, rales, or rhonchi.  GI: Abdomen soft, obese  EXTREM: Bilateral +2-3 LE edema.  NEURO: Alert and oriented, cooperative.  SKIN: Warm and dry.        Data:   ECHO 7/2/20  Left ventricular systolic function is low normal.  The visual ejection fraction is estimated at 50-55%.  Mid and distal anteroseptal hypokinesis.  Moderate valvular aortic stenosis.  The peak AoV pressure gradient is 56mmHg.  Detailed SID calculations wre not performed. AS unchanged compared to prior  study from 4/20. The study was technically limited.    Cardiac catheterization 8/12/2019    Prox Cx lesion is 99% stenosed.    Ost RPDA lesion is 55% stenosed.    Post Atrio lesion is 50% stenosed.    Ramus lesion is 30% stenosed.    Mid LAD to Dist LAD lesion is 50% stenosed.        1.  Single-vessel occlusive coronary artery disease namely  of nondominant small circumflex vessel  2.  Remainder of coronaries with moderate nonocclusive disease    LIPID RESULTS:  Lab Results   Component Value Date    CHOL 188 04/23/2020    HDL 31 (L) 04/23/2020     (H) 04/23/2020    TRIG 277 (H) 04/23/2020     LIVER ENZYME RESULTS:  Lab Results   Component Value Date    AST 50 (A) 06/30/2020    ALT 29 06/30/2020     CBC RESULTS:  Lab Results   Component Value Date    WBC 10.7 07/13/2020    RBC 5.10 07/13/2020    HGB 13.2 (L) 07/13/2020    HCT 43.8 07/13/2020    MCV 86 07/13/2020    MCH 25.9 (L) 07/13/2020    MCHC 30.1 (L) 07/13/2020    RDW 16.0 (H) 07/13/2020     07/13/2020     BMP RESULTS:  Lab Results   Component Value Date     07/13/2020    POTASSIUM 3.9 07/13/2020     CHLORIDE 101 07/13/2020    CO2 27 07/13/2020    ANIONGAP 10 07/13/2020    GLC 67 (L) 07/13/2020    BUN 53 (H) 07/13/2020    CR 1.85 (H) 07/13/2020    GFRESTIMATED 35 (L) 07/13/2020    GFRESTBLACK 41 (L) 07/13/2020    GABE 9.2 07/13/2020      A1C RESULTS:  Lab Results   Component Value Date    A1C 7.0 (H) 04/22/2020     INR RESULTS:  Lab Results   Component Value Date    INR 1.1 (A) 06/30/2020    INR 1.12 04/22/2020            Medications     Current Outpatient Medications   Medication Sig Dispense Refill     amLODIPine (NORVASC) 10 MG tablet Take 1 tablet (10 mg) by mouth daily       aspirin (ASA) 325 MG EC tablet Take 1 tablet (325 mg) by mouth daily Start 7/25/20.       atorvastatin (LIPITOR) 40 MG tablet Take 1 tablet (40 mg) by mouth every evening 30 tablet 3     carvedilol (COREG) 25 MG tablet Take 1 tablet (25 mg) by mouth 2 times daily (with meals) 60 tablet 0     glimepiride (AMARYL) 4 MG tablet Take 4 mg by mouth 2 times daily        insulin aspart (NOVOLOG FLEXPEN) 100 UNIT/ML pen Inject 2-6 Units Subcutaneous 3 times daily (with meals) (Patient states he bases his dose off of carb counting and blood glucose but did not give exact regimen).       insulin glargine (LANTUS PEN) 100 UNIT/ML pen Inject 15 Units Subcutaneous every evening       multivitamin, therapeutic with minerals (THERA-VIT-M) TABS Take 1 tablet by mouth daily       torsemide (DEMADEX) 20 MG tablet Take 1 tablet (20 mg) by mouth daily 30 tablet 0     acetaminophen (TYLENOL) 325 MG tablet Take 2 tablets (650 mg) by mouth every 6 hours as needed for mild pain or fever (> 101 F)       blood glucose (NO BRAND SPECIFIED) test strip Use to test blood sugar as directed       clopidogrel (PLAVIX) 75 MG tablet Take 1 tablet (75 mg) by mouth daily for 7 days 7/17-7/24. 7 tablet 0     order for DME Equipment being ordered: Walker Wheels () and Walker ()  Treatment Diagnosis: Impaired gait (Patient not taking: Reported on 8/20/2020) 1 each 0           Past Medical History     Past Medical History:   Diagnosis Date     Arthritis of knee~ s/p replacement 3/24/2016     CAD (coronary artery disease)      Decreased cardiac ejection fraction~44% 3/24/2016     Diabetes mellitus, type 2 (H) 3/24/2016     History of DVT (deep vein thrombosis)      HTN (hypertension) 3/24/2016     LBBB (left bundle branch block) 3/24/2016     Mixed hyperlipidemia 8/23/2016     Past Surgical History:   Procedure Laterality Date     ARTHROPLASTY KNEE Right 3/29/2016    Procedure: ARTHROPLASTY KNEE;  Surgeon: Heena Rosen MD;  Location:  OR     ARTHROPLASTY REVISION KNEE Left 9/27/2019    Procedure: REVISION LEFT TOTAL KNEE  ARTHROPLASTY;  Surgeon: Heena Rosen MD;  Location:  OR     CV LEFT HEART CATH N/A 8/12/2019    Procedure: Left Heart Cath;  Surgeon: Edgardo Beckham MD;  Location:  HEART CARDIAC CATH LAB     EYE SURGERY      cataract removal     ORTHOPEDIC SURGERY      KNEE SCOPES MULTIPLE     Family History   Problem Relation Age of Onset     Coronary Artery Disease No family hx of             Allergies   Penicillins        WALE Fowler CNP  [facility]  Pager: [phone]        Thank you for allowing me to participate in the care of your patient.    Sincerely,     WALE Fowler CNP     [facility]

## 2020-09-22 NOTE — PROGRESS NOTES
Cardiology Clinic Progress Note  Burton Elizabeth MRN# [id number]   YOB: 1945 Age: 75 year old   Primary Cardiologist: Dr. Neumann Reason for visit: Cardiology follow up             Assessment and Plan:   Burton Elizabeth is a very pleasant 75 year old male with a history of coronary artery disease, moderate aortic stenosis, DM, hypertension, obesity, CVA and CKD.     Patient here today for cardiology follow up related to lower extremity edema. Patient continues to appear volume up which was noted during visit with Dr. Neumann. No improvement with increased diuretics. Due to financial reasons patient notes he is unable to regularly follow up. For this I was hesitant to adjust medications significantly today for safety reasons due to needed monitoring. [facility] It would be extremely beneficial for patient to connect with his PCP about his volume status and blood pressure control. As patient could see him regularly and have labs monitored given no concerns about financial burden.      1. Coronary artery disease - coronary angiogram 8/12/2019 noting single vessel occlusive coronary artery disease namely  of non-dominant small circumflex vessel, proximal circumflex 99% stenosis, ostial RPDA 55%, post atrio lesion 50%, and mid to distal LAD 50% stenosis. Stable, no signs/symptoms of angina.               - Continue aspirin, carvedilol and atorvastatin                - Counseled patient on lifestyle modifications  2. Hypertension - uncontrolled, given patient unwilling to regularly follow up due to financial restraints recommend [facility] to help manage.   3. Aortic stenosis - moderate, peak AoV pressure gradient is 56mmHg. Will continue to monitor  4. Obesity - BMI 38.99, counseled patient on weight loss strategies.   5. CKD - baseline 1.8-2.1, creatinine 1.8 8/26/20   6. Chronic heart failure with preserved ejection fraction -  echocardiogram 7/2020 showed LVEF 50-55%. Patient appears volume up on exam, this was also noted at visit with Dr. Neumann on 8/20/20. He has noted no improvement in symptoms with increased torsemide. He is unwilling to regularly follow up due to financial constraints. [facility] and recommend following up with PCP to help monitor and adjust diuretics. Unfortunately cant significantly adjust diuretics without a monitoring/follow up plan. [facility] which was after previous increase in torsemide, kidney function and electrolytes were stable. Plan to increase torsemide today and will have my nurse [facility] to [facility] to help [facility] follow up. Patient would likely benefit from some IV diuresis and/or metolazone if able to monitor closely. He declined this today given unwillingness to follow up closely.               - NYHA class II-III, stage C              - INCREASE torsemide to 40mg qam and 20mg qpm              - Counseled on low sodium diet, daily weights   7. Diabetes mellitus  8. Chronic LBBB  9. CVA       Changes today: INCREASE to torsemide 40mg qam and 20mg qpm    Follow up plan:     Due to financial reasons patient notes he is unable to regularly follow up. For this I was hesitant to adjust medications significantly today for safety reasons due to needed monitoring. [facility] It would be extremely beneficial for patient to connect with his PCP about his volume status and blood pressure control. As patient could see him regularly and have labs monitored given no concerns about financial burden.     RN call next week to check on weight/symptoms after increase in torsemide.     [facility] at [facility]) in 7-10 days    Follow up with me in 1 month, patient unwilling to coordinate anything sooner.           History of Presenting Illness:    Burton Elizabeth is a very pleasant 75 year old male with a history of  "coronary artery disease, moderate aortic stenosis, DM, hypertension, obesity, CVA and CKD.     Patient follows with Dr. Neumann. Patient has a history of abnormal stress test from 2016 which demonstrated a small area of mild ischemia in the anterior/anteroseptal apex/mid ventricle and mild ischemia in the inferior wall. EF 59%. Patient was recommended to have an invasive ischemic evaluation given recent syncopal episode and history of abnormal stress test.      Coronary angiogram 8/12/2019 noting single vessel occlusive coronary artery disease namely  of non-dominant small circumflex vessel, proximal circumflex 99% stenosis, ostial RPDA 55%, post atrio lesion 50%, and mid to distal LAD 50% stenosis. Noted no revascularization is needed prior to his planned knee surgery.      Echocardiogram 7/2020 showed LVEF 50-55%, RV size/function normal, moderate valvular aortic stenosis - no change when compared to prior study.     He was hospitalized in April for chest pain, he was diuresed and symptoms improved. Then unfortunately hospitalized in July for ischemic stroke.     Patient was most recently seen by Dr. Neumann 8/20/20 at that time he was noted to be volume up and torsemide was increased to 20mg BID. Patient called last week with complaints of lower extremity edema. I advised for patient to limit sodium/fluids and have follow up with BMP given I haven't seen patient in over 1 year and was volume up at time of visit in August with Dr. Neumann.     Patient is here today for cardiology follow up.     Patient reports feeling good. Patient is very fixated on his residual symptoms from his stroke. Notes that balance after stroke \"sucks\" and hasn't got better. Vision has recovered. It is hard to focus him on what his cardiac concerns are today.     Monitoring weights daily a home. Weight today at home 259#. Weight today at clinic 264#. Has been on increased torsemide since 8/20/20. No improvement in swelling has just " "continued to get worse. Notes weight started increasing after stroke. States his weight used to be around 245#. Has tried to limit salt. Denies abdominal distention/bloating. Denies shortness of breath at rest. Some exertional dyspnea with 1 flight of stairs. Notes minimal dyspnea when walking into clinic, but takes it very slow. Able to complete ADLs independently. Sleeping \"horrible\". Unable to stay a sleep. Denies orthopnea or PND. States he was evaluated by sleep medicine in the past and doesn't have sleep apnea. Patient denies chest pain or chest tightness. Denies dizziness, lightheadedness or other presyncopal symptoms. Denies tachycardia or palpitations.     Labs from 8/26 showed lipid panel total cholesterol 110, HDL 32 and LDL 54. Kidney function stable, creatinine 1.8. Blood pressure 152/89 and HR 80 in clinic today.    Appetite good. Eating meals at home. Minimal packaged/processed foods. Not adding additional salt to foods. Typically > 1 gallon of water daily. Exercise routine of upper body strength training with machine. Has exercise bike but unable to ride due to pain with swelling. Denies alcohol use. Denies tobacco.         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   Social History     Socioeconomic History     Marital status: Single     Spouse name: Not on file     Number of children: Not on file     Years of education: Not on file     Highest education level: Not on file   Occupational History     Not on file   Social Needs     Financial resource strain: Not on file     Food insecurity     Worry: Not on file     Inability: Not on file     Transportation needs     Medical: Not on file     Non-medical: Not on file   Tobacco Use     Smoking status: Never Smoker     Smokeless tobacco: Never Used   Substance and Sexual Activity     Alcohol use: Yes " "    Alcohol/week: 2.0 standard drinks     Types: 2 Standard drinks or equivalent per week     Frequency: 2-3 times a week     Drinks per session: 1 or 2     Comment: occasional, social     Drug use: No     Sexual activity: Not on file   Lifestyle     Physical activity     Days per week: Not on file     Minutes per session: Not on file     Stress: Not on file   Relationships     Social connections     Talks on phone: Not on file     Gets together: Not on file     Attends [employer] service: Not on file     Active member of club or organization: Not on file     Attends meetings of clubs or organizations: Not on file     Relationship status: Not on file     Intimate partner violence     Fear of current or ex partner: Not on file     Emotionally abused: Not on file     Physically abused: Not on file     Forced sexual activity: Not on file   Other Topics Concern     Parent/sibling w/ CABG, MI or angioplasty before 65F 55M? Not Asked   Social History Narrative     Not on file            Review of Systems:   Skin:  Negative     Eyes:  Negative    ENT:  Negative    Respiratory:  Positive for dyspnea on exertion  Cardiovascular:    Positive for;edema;fatigue  Gastroenterology: Negative    Genitourinary:  not assessed    Musculoskeletal:  Positive for joint pain  Neurologic:  Positive for incoordination;stroke  Psychiatric:  Positive for depression;sleep disturbances  Heme/Lymph/Imm:  Positive for allergies;weight loss  Endocrine:  Positive for diabetes         Physical Exam:   Vitals: BP (!) 152/89   Pulse 80   Ht 1.753 m (5' 9\")   Wt 119.7 kg (264 lb)   SpO2 95%   BMI 38.99 kg/m     Wt Readings from Last 4 Encounters:   09/22/20 119.7 kg (264 lb)   08/20/20 120.5 kg (265 lb 9.6 oz)   07/16/20 113.9 kg (251 lb 3.2 oz)   07/06/20 116.1 kg (256 lb)     GEN: well nourished, in no acute distress.  HEENT:  Pupils equal, round. Sclerae nonicteric.   NECK: Supple, no masses appreciated. JVD elevated, but body habitus makes " assessment difficult  C/V:  Regular rate and rhythm, no murmur, rub or gallop.   RESP: Respirations are unlabored. Clear to auscultation bilaterally without wheezing, rales, or rhonchi.  GI: Abdomen soft, obese  EXTREM: Bilateral +2-3 LE edema.  NEURO: Alert and oriented, cooperative.  SKIN: Warm and dry.        Data:   ECHO 7/2/20  Left ventricular systolic function is low normal.  The visual ejection fraction is estimated at 50-55%.  Mid and distal anteroseptal hypokinesis.  Moderate valvular aortic stenosis.  The peak AoV pressure gradient is 56mmHg.  Detailed SID calculations wre not performed. AS unchanged compared to prior  study from 4/20. The study was technically limited.    Cardiac catheterization 8/12/2019    Prox Cx lesion is 99% stenosed.    Ost RPDA lesion is 55% stenosed.    Post Atrio lesion is 50% stenosed.    Ramus lesion is 30% stenosed.    Mid LAD to Dist LAD lesion is 50% stenosed.        1.  Single-vessel occlusive coronary artery disease namely  of nondominant small circumflex vessel  2.  Remainder of coronaries with moderate nonocclusive disease    LIPID RESULTS:  Lab Results   Component Value Date    CHOL 188 04/23/2020    HDL 31 (L) 04/23/2020     (H) 04/23/2020    TRIG 277 (H) 04/23/2020     LIVER ENZYME RESULTS:  Lab Results   Component Value Date    AST 50 (A) 06/30/2020    ALT 29 06/30/2020     CBC RESULTS:  Lab Results   Component Value Date    WBC 10.7 07/13/2020    RBC 5.10 07/13/2020    HGB 13.2 (L) 07/13/2020    HCT 43.8 07/13/2020    MCV 86 07/13/2020    MCH 25.9 (L) 07/13/2020    MCHC 30.1 (L) 07/13/2020    RDW 16.0 (H) 07/13/2020     07/13/2020     BMP RESULTS:  Lab Results   Component Value Date     07/13/2020    POTASSIUM 3.9 07/13/2020    CHLORIDE 101 07/13/2020    CO2 27 07/13/2020    ANIONGAP 10 07/13/2020    GLC 67 (L) 07/13/2020    BUN 53 (H) 07/13/2020    CR 1.85 (H) 07/13/2020    GFRESTIMATED 35 (L) 07/13/2020    GFRESTBLACK 41 (L) 07/13/2020     GABE 9.2 07/13/2020      A1C RESULTS:  Lab Results   Component Value Date    A1C 7.0 (H) 04/22/2020     INR RESULTS:  Lab Results   Component Value Date    INR 1.1 (A) 06/30/2020    INR 1.12 04/22/2020            Medications     Current Outpatient Medications   Medication Sig Dispense Refill     amLODIPine (NORVASC) 10 MG tablet Take 1 tablet (10 mg) by mouth daily       aspirin (ASA) 325 MG EC tablet Take 1 tablet (325 mg) by mouth daily Start 7/25/20.       atorvastatin (LIPITOR) 40 MG tablet Take 1 tablet (40 mg) by mouth every evening 30 tablet 3     carvedilol (COREG) 25 MG tablet Take 1 tablet (25 mg) by mouth 2 times daily (with meals) 60 tablet 0     glimepiride (AMARYL) 4 MG tablet Take 4 mg by mouth 2 times daily        insulin aspart (NOVOLOG FLEXPEN) 100 UNIT/ML pen Inject 2-6 Units Subcutaneous 3 times daily (with meals) (Patient states he bases his dose off of carb counting and blood glucose but did not give exact regimen).       insulin glargine (LANTUS PEN) 100 UNIT/ML pen Inject 15 Units Subcutaneous every evening       multivitamin, therapeutic with minerals (THERA-VIT-M) TABS Take 1 tablet by mouth daily       torsemide (DEMADEX) 20 MG tablet Take 1 tablet (20 mg) by mouth daily 30 tablet 0     acetaminophen (TYLENOL) 325 MG tablet Take 2 tablets (650 mg) by mouth every 6 hours as needed for mild pain or fever (> 101 F)       blood glucose (NO BRAND SPECIFIED) test strip Use to test blood sugar as directed       clopidogrel (PLAVIX) 75 MG tablet Take 1 tablet (75 mg) by mouth daily for 7 days 7/17-7/24. 7 tablet 0     order for DME Equipment being ordered: Walker Wheels () and Walker ()  Treatment Diagnosis: Impaired gait (Patient not taking: Reported on 8/20/2020) 1 each 0          Past Medical History     Past Medical History:   Diagnosis Date     Arthritis of knee~ s/p replacement 3/24/2016     CAD (coronary artery disease)      Decreased cardiac ejection fraction~44% 3/24/2016      Diabetes mellitus, type 2 (H) 3/24/2016     History of DVT (deep vein thrombosis)      HTN (hypertension) 3/24/2016     LBBB (left bundle branch block) 3/24/2016     Mixed hyperlipidemia 8/23/2016     Past Surgical History:   Procedure Laterality Date     ARTHROPLASTY KNEE Right 3/29/2016    Procedure: ARTHROPLASTY KNEE;  Surgeon: Heena Rosen MD;  Location:  OR     ARTHROPLASTY REVISION KNEE Left 9/27/2019    Procedure: REVISION LEFT TOTAL KNEE  ARTHROPLASTY;  Surgeon: Heena Rosen MD;  Location:  OR     CV LEFT HEART CATH N/A 8/12/2019    Procedure: Left Heart Cath;  Surgeon: Edgardo Beckham MD;  Location:  HEART CARDIAC CATH LAB     EYE SURGERY      cataract removal     ORTHOPEDIC SURGERY      KNEE SCOPES MULTIPLE     Family History   Problem Relation Age of Onset     Coronary Artery Disease No family hx of             Allergies   Penicillins        [facility]  Pager: [phone]

## 2020-09-22 NOTE — PATIENT INSTRUCTIONS
1. INCREASE torsemide to 40mg (2 tablets) in the morning and 20mg (1 tablet) in the afternoon.  [facility]. [facility] will have my RN fax orders   3. Follow up with Brittanie in 1 month  4. Please call with any questions/concerns [phone]

## 2020-09-23 ENCOUNTER — TELEPHONE (OUTPATIENT)
Dept: CARDIOLOGY | Facility: CLINIC | Age: 75
End: 2020-09-23

## 2020-09-23 NOTE — TELEPHONE ENCOUNTER
Received VM from pt requesting a call back to discuss a question regarding his appointment with THEODORA Gu yesterday. Called back to pt, no answer. Left VM requesting call back to Team 1.

## 2020-10-02 ENCOUNTER — TELEPHONE (OUTPATIENT)
Dept: CARDIOLOGY | Facility: CLINIC | Age: 75
End: 2020-10-02

## 2020-10-02 NOTE — TELEPHONE ENCOUNTER
----- Message from Nova Hagen RN sent at 10/2/2020  3:05 PM CDT -----  Regarding: Fax?  Bill, if you have time this afternoon would you mind faxing something for me? Brittanie's note from 9/22/20 needs to be faxed to the [facility], attn: Dr. Murrell at [phone]. I called and left a message for the PCP so they know to look for it.     Thank you so much!     Nova  ----- Message -----  From: Brittanie Quiroz APRN CNP  Sent: 10/2/2020   1:48 PM CDT  To: [facility] Team 1  Subject: [facility] to [facility]                              Hi,  Can you please fax my note and [facility] to [facility] noting patient needs follow up for HTN and volume overload. Patient refuses to follow up closely with [facility] cardiology due to financial restraints so I am unable to safely titrate medications.    Thanks,  Brittanie

## 2020-10-02 NOTE — TELEPHONE ENCOUNTER
Received message from THEODORA Gu requesting Team 1 reach out [facility] and fax last note. Called to the [facility] and left message for pt's care team that our office will be sending Brittanie's last note and Brittanie is [facility]. Will request in clinic RN fax note to [phone], attn: Dr. Murrell.      Brittanie Quiroz, APRN CNP  [facility] Heart Team 1             Hi,     Can you please fax my note and [facility] noting patient needs follow up for HTN and volume overload. Patient refuses to follow up closely with  cardiology due to financial restraints so I am unable to safely titrate medications.     Thanks,   Brittanie

## 2020-10-28 ENCOUNTER — TRANSFERRED RECORDS (OUTPATIENT)
Dept: HEALTH INFORMATION MANAGEMENT | Facility: CLINIC | Age: 75
End: 2020-10-28

## 2020-10-28 ENCOUNTER — MEDICAL CORRESPONDENCE (OUTPATIENT)
Dept: HEALTH INFORMATION MANAGEMENT | Facility: CLINIC | Age: 75
End: 2020-10-28

## 2020-12-02 ENCOUNTER — TRANSFERRED RECORDS (OUTPATIENT)
Dept: HEALTH INFORMATION MANAGEMENT | Facility: CLINIC | Age: 75
End: 2020-12-02

## 2020-12-03 ENCOUNTER — TRANSFERRED RECORDS (OUTPATIENT)
Dept: HEALTH INFORMATION MANAGEMENT | Facility: CLINIC | Age: 75
End: 2020-12-03

## 2020-12-07 ENCOUNTER — OFFICE VISIT (OUTPATIENT)
Dept: CARDIOLOGY | Facility: CLINIC | Age: 75
End: 2020-12-07
Payer: COMMERCIAL

## 2020-12-07 VITALS
DIASTOLIC BLOOD PRESSURE: 80 MMHG | HEART RATE: 80 BPM | SYSTOLIC BLOOD PRESSURE: 137 MMHG | HEIGHT: 69 IN | WEIGHT: 255 LBS | BODY MASS INDEX: 37.77 KG/M2

## 2020-12-07 DIAGNOSIS — E66.812 CLASS 2 SEVERE OBESITY DUE TO EXCESS CALORIES WITH SERIOUS COMORBIDITY AND BODY MASS INDEX (BMI) OF 37.0 TO 37.9 IN ADULT (H): ICD-10-CM

## 2020-12-07 DIAGNOSIS — N18.30 STAGE 3 CHRONIC KIDNEY DISEASE, UNSPECIFIED WHETHER STAGE 3A OR 3B CKD (H): ICD-10-CM

## 2020-12-07 DIAGNOSIS — I25.119 CORONARY ARTERY DISEASE INVOLVING NATIVE CORONARY ARTERY OF NATIVE HEART WITH ANGINA PECTORIS (H): Primary | ICD-10-CM

## 2020-12-07 DIAGNOSIS — I50.20 SYSTOLIC CONGESTIVE HEART FAILURE, UNSPECIFIED HF CHRONICITY (H): ICD-10-CM

## 2020-12-07 DIAGNOSIS — E66.01 CLASS 2 SEVERE OBESITY DUE TO EXCESS CALORIES WITH SERIOUS COMORBIDITY AND BODY MASS INDEX (BMI) OF 37.0 TO 37.9 IN ADULT (H): ICD-10-CM

## 2020-12-07 DIAGNOSIS — E11.00 TYPE 2 DIABETES MELLITUS WITH HYPEROSMOLARITY WITHOUT COMA, WITHOUT LONG-TERM CURRENT USE OF INSULIN (H): ICD-10-CM

## 2020-12-07 DIAGNOSIS — I10 ESSENTIAL HYPERTENSION: ICD-10-CM

## 2020-12-07 DIAGNOSIS — R07.9 CHEST PAIN, UNSPECIFIED TYPE: ICD-10-CM

## 2020-12-07 PROCEDURE — 99214 OFFICE O/P EST MOD 30 MIN: CPT | Performed by: NURSE PRACTITIONER

## 2020-12-07 RX ORDER — ISOSORBIDE MONONITRATE 30 MG/1
30 TABLET, EXTENDED RELEASE ORAL DAILY
Qty: 90 TABLET | Refills: 1 | Status: SHIPPED | OUTPATIENT
Start: 2020-12-07 | End: 2020-12-08

## 2020-12-07 ASSESSMENT — MIFFLIN-ST. JEOR: SCORE: 1887.05

## 2020-12-07 NOTE — PROGRESS NOTES
Cardiology Clinic Progress Note  Burton Elizabeth MRN# [id number]   YOB: 1945 Age: 75 year old   Primary Cardiologist: Dr. Neumann  Reason for visit: Cardiology follow up            Assessment and Plan:   Burton Elizabeth is a very pleasant 75 year old male with a history of coronary artery disease, moderate aortic stenosis, DM, hypertension, obesity, CVA and CKD.      Patient here today for cardiology follow up with multiple complaints today including persisting lower extremity edema, chest tightness and cough. Patient continues to appear volume up, but lower extremity edema does appear less than last visit and weight is down 9# on clinic scale since September. He has been resistant to close follow up at [facility] due to financial reasons. This has made managing his volume status/diuretics challenging. [facility] including establishing care with their heart failure clinic which would alleviate the financial stress and allow for close monitoring of kidneys and clinic visits but he is not interested. He reported [facility], will work on getting those results prior to adjusting diuretics as he told me today his creatinine was elevated. Recommended compression stockings, which he would [facility] the [facility]. Related to his chest pain will start isosorbide mononitrate, will fax RX and [facility] note to [facility]. Also recommending nuclear stress test for further evaluation, he has known coronary artery disease, looking for changes or any high risk findings. Patient willing to have this completed at [facility].       1. Coronary artery disease - coronary angiogram 8/12/2019 noting single vessel occlusive coronary artery disease namely  of non-dominant small circumflex vessel, proximal circumflex 99% stenosis, ostial RPDA 55%, post atrio lesion 50%, and mid to distal LAD 50% stenosis. Complaints of on/off chest pain for the  past week.    - START isosorbide mononitrate    - Nuclear stress test for further evaluation.               - Continue aspirin, carvedilol and atorvastatin                - Counseled patient on lifestyle modifications  2. Hypertension - controlled, continue current medications  3. Aortic stenosis - moderate, peak AoV pressure gradient is 56mmHg per echo 7/2020. Will continue to monitor  4. Obesity - BMI 37.66, counseled patient on weight loss strategies.   5. CKD - baseline 1.8-2.1, creatinine 19 10/2020  6. Chronic heart failure with preserved ejection fraction - echocardiogram 7/2020 showed LVEF 50-55%. Patient appears volume up on exam, lower extremity edema does appear less than when I last saw him in September. He reports [facility], will work on getting those results and then make recommendations about diuretics. Also recommend compression stockings which latoya wishes to get through the [facility]              - NYHA class II-III, stage C              - Continue torsemide to 40mg qam and 20mg qpm              - Counseled on low sodium diet, daily weights and 2L fluid restriction  7. Diabetes mellitus - uncontrolled, HgbA1c 9.5 10/2020  8. Chronic LBBB  9. CVA       Changes today: START isosorbide mononitrate 30mg daily    Follow up plan:     Follow up with me in 1 month    Cardiology nurse to [facility] out to [facility] to get [facility] - will make diuretic recommendations after reviewing.     Nuclear stress test     Compression stockings - would [facility], will have  cardiology nurses fax [facility] to the [facility].         History of Presenting Illness:    Burton Elizabeth is a very pleasant 75 year old male with a history of coronary artery disease, moderate aortic stenosis, DM, hypertension, obesity, CVA and CKD.      Patient follows with Dr. Neumann. Patient has a history of abnormal stress test from 2016 which demonstrated a small area of mild ischemia in the  anterior/anteroseptal apex/mid ventricle and mild ischemia in the inferior wall. EF 59%. Patient was recommended to have an invasive ischemic evaluation given recent syncopal episode and history of abnormal stress test.      Coronary angiogram 8/12/2019 noting single vessel occlusive coronary artery disease namely  of non-dominant small circumflex vessel, proximal circumflex 99% stenosis, ostial RPDA 55%, post atrio lesion 50%, and mid to distal LAD 50% stenosis. Noted no revascularization is needed prior to his planned knee surgery.      Echocardiogram 7/2020 showed LVEF 50-55%, RV size/function normal, moderate valvular aortic stenosis - no change when compared to prior study.      He was hospitalized in April for chest pain, he was diuresed and symptoms improved. Then unfortunately hospitalized in July for ischemic stroke.      Patient was seen by Dr. Neumann 8/20/20 at that time he was noted to be volume up and torsemide was increased to 20mg BID. Patient called in September with complaints of lower extremity edema, at which time I recommended BMP and clinic follow up. At that time he was volume up on exam, noting no improvement with increase in diuretics. He was advised to increase his torsemide to 40mg qam and 20mg qpm. Due to financial reasons patient notes he is unable to regularly follow up. For this reason I was hesitant to adjust medications significantly for safety reasons due to needed monitoring. [facility] I recommended he connect with his PCP about his volume status and blood pressure control. As patient could be seen regularly at the [facility] and have labs monitored given no concerns about financial burden.    He was last [facility] by [facility] in [facility] it appears at which time labs were drawn and it doesn't appear that any medications were changed.     Patient is here today for cardiology follow up.     Patient reports feeling okay, he has multiple concerns  "which he has written down. Monitoring weights daily a home, states today 255# which he states has been decreasing since diet modifications (decreased calories/monitoring sodium). Clinic weight today 255# which is down from 264# since last visit in September. Has been taking torsemide 40mg qam and 20mg qpm. Still with complaints of lower extremity edema, states no improvement with higher dosage of torsemide. Feels his balance is affected by the swelling. Also notes it is causing pain at night which is keeping him awake. Reports that he had labs done last week at his [facility], [place] reports he was told his creatinine had increased. He is still resistant about getting labs drawn here at clinic due to financial reasons and resistant to close follow up.     He has complaints of chest tightness, reporting pain in abdomen up through left upper chest into back. \"starts later in the day\" \"more pronounced at night\" He reports this pain started on 11/30/20. States occurs approximately 3-4 x a week, lasting 4-5 mins. Occurs at rest. Does not worsen with activity. Resolves on its own. Denies any accompanying symptoms. No chest pain currently.     Complaints of cough, which started 3-4 days ago. Denies fever. Denies loss of taste. Denies any contact with known COVID + individual. Non-producctive. States \"not part of this pandemic\". Last COVID test 3 months ago. Denies dizziness, lightheadedness or other presyncopal symptoms. Denies tachycardia or palpitations. Taking medications daily.     Labs from October showed overall stable kidney function, creatinine 1.9. Electrolytes stable. HgbA1c 9.5. [facility], results not available today for review. Blood pressure 137/80 and HR 80 in clinic today.    Appetite good. Eating meals at home. Minimal packaged/processed foods. Not adding additional salt to foods. Had been drinking > 1 gallon of water daily, has decreased to 45-55 ounces daily. No set exercise " routine with COVID restrictions and gyms being closed. Denies alcohol use. Denies tobacco.         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   Social History     Socioeconomic History     Marital status: Single     Spouse name: Not on file     Number of children: Not on file     Years of education: Not on file     Highest education level: Not on file   Occupational History     Not on file   Social Needs     Financial resource strain: Not on file     Food insecurity     Worry: Not on file     Inability: Not on file     Transportation needs     Medical: Not on file     Non-medical: Not on file   Tobacco Use     Smoking status: Never Smoker     Smokeless tobacco: Never Used   Substance and Sexual Activity     Alcohol use: Yes     Alcohol/week: 2.0 standard drinks     Types: 2 Standard drinks or equivalent per week     Frequency: 2-3 times a week     Drinks per session: 1 or 2     Comment: occasional, social     Drug use: No     Sexual activity: Not on file   Lifestyle     Physical activity     Days per week: Not on file     Minutes per session: Not on file     Stress: Not on file   Relationships     Social connections     Talks on phone: Not on file     Gets together: Not on file     Attends [employer]ism service: Not on file     Active member of club or organization: Not on file     Attends meetings of clubs or organizations: Not on file     Relationship status: Not on file     Intimate partner violence     Fear of current or ex partner: Not on file     Emotionally abused: Not on file     Physically abused: Not on file     Forced sexual activity: Not on file   Other Topics Concern     Parent/sibling w/ CABG, MI or angioplasty before 65F 55M? Not Asked   Social History Narrative     Not on file            Review of Systems:   Skin:  Negative     Eyes:  Negative   "  ENT:  Negative    Respiratory:  Positive for dyspnea on exertion  Cardiovascular:    Positive for;edema;fatigue  Gastroenterology: Negative    Genitourinary:  not assessed    Musculoskeletal:  Positive for joint pain  Neurologic:  Positive for incoordination;stroke  Psychiatric:  Positive for depression;sleep disturbances  Heme/Lymph/Imm:  Positive for allergies;weight loss  Endocrine:  Positive for diabetes         Physical Exam:   Vitals: /80   Pulse 80   Ht 1.753 m (5' 9\")   Wt 115.7 kg (255 lb)   BMI 37.66 kg/m     Wt Readings from Last 4 Encounters:   12/07/20 115.7 kg (255 lb)   09/22/20 119.7 kg (264 lb)   08/20/20 120.5 kg (265 lb 9.6 oz)   07/16/20 113.9 kg (251 lb 3.2 oz)     GEN: well nourished, in no acute distress.  HEENT:  Pupils equal, round. Sclerae nonicteric.   NECK: Supple, no masses appreciated. Body habitus makes assessment of JVD challenging.   C/V:  Regular rate and rhythm, no murmur, rub or gallop.    RESP: Respirations are unlabored. Clear to auscultation bilaterally without wheezing, rales, or rhonchi.  GI: Abdomen soft, nontender.  EXTREM: +2 LE edema.  NEURO: Alert and oriented, cooperative.  SKIN: Warm and dry.        Data:   ECHO 7/2/20  Left ventricular systolic function is low normal.  The visual ejection fraction is estimated at 50-55%.  Mid and distal anteroseptal hypokinesis.  Moderate valvular aortic stenosis.  The peak AoV pressure gradient is 56mmHg.  Detailed SID calculations wre not performed. AS unchanged compared to prior  study from 4/20. The study was technically limited.     Cardiac catheterization 8/12/2019    Prox Cx lesion is 99% stenosed.    Ost RPDA lesion is 55% stenosed.    Post Atrio lesion is 50% stenosed.    Ramus lesion is 30% stenosed.    Mid LAD to Dist LAD lesion is 50% stenosed.        1.  Single-vessel occlusive coronary artery disease namely  of nondominant small circumflex vessel  2.  Remainder of coronaries with moderate nonocclusive " disease    LIPID RESULTS:  Lab Results   Component Value Date    CHOL 188 04/23/2020    HDL 31 (L) 04/23/2020     (H) 04/23/2020    TRIG 277 (H) 04/23/2020     LIVER ENZYME RESULTS:  Lab Results   Component Value Date    AST 50 (A) 06/30/2020    ALT 29 06/30/2020     CBC RESULTS:  Lab Results   Component Value Date    WBC 10.7 07/13/2020    RBC 5.10 07/13/2020    HGB 13.2 (L) 07/13/2020    HCT 43.8 07/13/2020    MCV 86 07/13/2020    MCH 25.9 (L) 07/13/2020    MCHC 30.1 (L) 07/13/2020    RDW 16.0 (H) 07/13/2020     07/13/2020     BMP RESULTS:  Lab Results   Component Value Date     07/13/2020    POTASSIUM 3.9 07/13/2020    CHLORIDE 101 07/13/2020    CO2 27 07/13/2020    ANIONGAP 10 07/13/2020    GLC 67 (L) 07/13/2020    BUN 53 (H) 07/13/2020    CR 1.85 (H) 07/13/2020    GFRESTIMATED 35 (L) 07/13/2020    GFRESTBLACK 41 (L) 07/13/2020    GABE 9.2 07/13/2020      A1C RESULTS:  Lab Results   Component Value Date    A1C 7.0 (H) 04/22/2020     INR RESULTS:  Lab Results   Component Value Date    INR 1.1 (A) 06/30/2020    INR 1.12 04/22/2020            Medications     Current Outpatient Medications   Medication Sig Dispense Refill     acetaminophen (TYLENOL) 325 MG tablet Take 2 tablets (650 mg) by mouth every 6 hours as needed for mild pain or fever (> 101 F)       amLODIPine (NORVASC) 10 MG tablet Take 1 tablet (10 mg) by mouth daily       aspirin (ASA) 325 MG EC tablet Take 1 tablet (325 mg) by mouth daily Start 7/25/20.       atorvastatin (LIPITOR) 40 MG tablet Take 1 tablet (40 mg) by mouth every evening 30 tablet 3     blood glucose (NO BRAND SPECIFIED) test strip Use to test blood sugar as directed       carvedilol (COREG) 25 MG tablet Take 1 tablet (25 mg) by mouth 2 times daily (with meals) 60 tablet 0     glimepiride (AMARYL) 4 MG tablet Take 4 mg by mouth 2 times daily        insulin aspart (NOVOLOG FLEXPEN) 100 UNIT/ML pen Inject 2-6 Units Subcutaneous 3 times daily (with meals) (Patient states  he bases his dose off of carb counting and blood glucose but did not give exact regimen).       insulin glargine (LANTUS PEN) 100 UNIT/ML pen Inject 15 Units Subcutaneous every evening       multivitamin, therapeutic with minerals (THERA-VIT-M) TABS Take 1 tablet by mouth daily       order for DME Equipment being ordered: Walker Wheels () and Walker ()  Treatment Diagnosis: Impaired gait 1 each 0     torsemide (DEMADEX) 20 MG tablet Increase torsemide 40mg qam and 20mg qpm. 90 tablet 1     clopidogrel (PLAVIX) 75 MG tablet Take 1 tablet (75 mg) by mouth daily for 7 days 7/17-7/24. 7 tablet 0          Past Medical History     Past Medical History:   Diagnosis Date     Arthritis of knee~ s/p replacement 3/24/2016     CAD (coronary artery disease)      Decreased cardiac ejection fraction~44% 3/24/2016     Diabetes mellitus, type 2 (H) 3/24/2016     History of DVT (deep vein thrombosis)      HTN (hypertension) 3/24/2016     LBBB (left bundle branch block) 3/24/2016     Mixed hyperlipidemia 8/23/2016     Past Surgical History:   Procedure Laterality Date     ARTHROPLASTY KNEE Right 3/29/2016    Procedure: ARTHROPLASTY KNEE;  Surgeon: Heena Rosen MD;  Location:  OR     ARTHROPLASTY REVISION KNEE Left 9/27/2019    Procedure: REVISION LEFT TOTAL KNEE  ARTHROPLASTY;  Surgeon: Heena Rosen MD;  Location:  OR     CV LEFT HEART CATH N/A 8/12/2019    Procedure: Left Heart Cath;  Surgeon: Edgardo Beckham MD;  Location:  HEART CARDIAC CATH LAB     EYE SURGERY      cataract removal     ORTHOPEDIC SURGERY      KNEE SCOPES MULTIPLE     Family History   Problem Relation Age of Onset     Coronary Artery Disease No family hx of             Allergies   Penicillins        Brittanie Quiroz, [facility]  Pager: [phone]

## 2020-12-07 NOTE — LETTER
12/7/2020    [facility]  [facility] [address]    RE: Burton Elizabeth       Dear Colleague,    I had the pleasure of seeing Burton Elizabeth in the [facility].    Cardiology Clinic Progress Note  Burton Elizabeth MRN# [id number]   YOB: 1945 Age: 75 year old   Primary Cardiologist: Dr. Neumann  Reason for visit: Cardiology follow up            Assessment and Plan:   Burton Elizabeth is a very pleasant 75 year old male with a history of coronary artery disease, moderate aortic stenosis, DM, hypertension, obesity, CVA and CKD.      Patient here today for cardiology follow up with multiple complaints today including persisting lower extremity edema, chest tightness and cough. Patient continues to appear volume up, but lower extremity edema does appear less than last visit and weight is down 9# on clinic scale since September. He has been resistant to close follow up at [facility] due to financial reasons. This has made managing his volume status/diuretics challenging. [facility] including establishing care with their heart failure clinic which would alleviate the financial stress and allow for close monitoring of kidneys and clinic visits but he is not interested. He reported [facility], will work on getting those results prior to adjusting diuretics as he told me today his creatinine was elevated. Recommended compression stockings, which he would [facility]. Related to his chest pain will start isosorbide mononitrate, will fax RX and [facility] note to [facility]. Also recommending nuclear stress test for further evaluation, he has known coronary artery disease, looking for changes or any high risk findings. Patient willing to have this completed at [facility].       1. Coronary artery disease - coronary angiogram 8/12/2019 noting single vessel  occlusive coronary artery disease namely  of non-dominant small circumflex vessel, proximal circumflex 99% stenosis, ostial RPDA 55%, post atrio lesion 50%, and mid to distal LAD 50% stenosis. Complaints of on/off chest pain for the past week.    - START isosorbide mononitrate    - Nuclear stress test for further evaluation.               - Continue aspirin, carvedilol and atorvastatin                - Counseled patient on lifestyle modifications  2. Hypertension - controlled, continue current medications  3. Aortic stenosis - moderate, peak AoV pressure gradient is 56mmHg per echo 7/2020. Will continue to monitor  4. Obesity - BMI 37.66, counseled patient on weight loss strategies.   5. CKD - baseline 1.8-2.1, creatinine 19 10/2020  6. Chronic heart failure with preserved ejection fraction - echocardiogram 7/2020 showed LVEF 50-55%. Patient appears volume up on exam, lower extremity edema does appear less than when I last saw him in September. He reports [facility], will work on getting those results and then make recommendations about diuretics. Also recommend compression stockings which latoya wishes to get through the [facility]              - NYHA class II-III, stage C              - Continue torsemide to 40mg qam and 20mg qpm              - Counseled on low sodium diet, daily weights and 2L fluid restriction  7. Diabetes mellitus - uncontrolled, HgbA1c 9.5 10/2020  8. Chronic LBBB  9. CVA       Changes today: START isosorbide mononitrate 30mg daily    Follow up plan:     Follow up with me in 1 month    Cardiology nurse to [facility] to [facility] to get [facility] last [facility] - will make diuretic recommendations after reviewing.     Nuclear stress test     Compression stockings - would [facility], will have  cardiology nurses fax [facility] to the [facility].         History of Presenting Illness:    Burton Elizabeth is a very pleasant 75 year old male with a history of coronary artery  disease, moderate aortic stenosis, DM, hypertension, obesity, CVA and CKD.      Patient follows with Dr. Neumann. Patient has a history of abnormal stress test from 2016 which demonstrated a small area of mild ischemia in the anterior/anteroseptal apex/mid ventricle and mild ischemia in the inferior wall. EF 59%. Patient was recommended to have an invasive ischemic evaluation given recent syncopal episode and history of abnormal stress test.      Coronary angiogram 8/12/2019 noting single vessel occlusive coronary artery disease namely  of non-dominant small circumflex vessel, proximal circumflex 99% stenosis, ostial RPDA 55%, post atrio lesion 50%, and mid to distal LAD 50% stenosis. Noted no revascularization is needed prior to his planned knee surgery.      Echocardiogram 7/2020 showed LVEF 50-55%, RV size/function normal, moderate valvular aortic stenosis - no change when compared to prior study.      He was hospitalized in April for chest pain, he was diuresed and symptoms improved. Then unfortunately hospitalized in July for ischemic stroke.      Patient was seen by Dr. Neumann 8/20/20 at that time he was noted to be volume up and torsemide was increased to 20mg BID. Patient called in September with complaints of lower extremity edema, at which time I recommended BMP and clinic follow up. At that time he was volume up on exam, noting no improvement with increase in diuretics. He was advised to increase his torsemide to 40mg qam and 20mg qpm. Due to financial reasons patient notes he is unable to regularly follow up. For this reason I was hesitant to adjust medications significantly for safety reasons due to needed monitoring. [facility] I recommended he connect with his PCP about his volume status and blood pressure control. As patient could be seen regularly at the [facility] and have labs monitored given no concerns about financial burden.    He was last [facility] by [facility] it appears at which time labs were drawn and it doesn't appear that any medications were changed.     Patient is here today for cardiology follow up.     Patient reports feeling okay, he has multiple concerns which he has written down. Monitoring weights daily a home, states today 255# which he states has been decreasing since diet modifications (decreased calories/monitoring sodium). Clinic weight today 255# which is down from 264# since last visit in September. Has been taking torsemide 40mg qam and 20mg qpm. Still with complaints of lower extremity edema, states no improvement with higher dosage of torsemide. Feels his balance is affected by the swelling. Also notes it is causing pain at night which is keeping him awake. Reports that he [facility], Burton reports he was told his creatinine had increased. He is still resistant about getting labs drawn here at clinic due to financial reasons and resistant to close follow up.     He has complaints of chest tightness, reporting pain in abdomen up through left upper chest into back. \"starts later in the day\" \"more pronounced at night\" He reports this pain started on 11/30/20. States occurs approximately 3-4 x a week, lasting 4-5 mins. Occurs at rest. Does not worsen with activity. Resolves on its own. Denies any accompanying symptoms. No chest pain currently.     Complaints of cough, which started 3-4 days ago. Denies fever. Denies loss of taste. Denies any contact with known COVID + individual. Non-producctive. States \"not part of this pandemic\". Last COVID test 3 months ago. Denies dizziness, lightheadedness or other presyncopal symptoms. Denies tachycardia or palpitations. Taking medications daily.     Labs from October showed overall stable kidney function, creatinine 1.9. Electrolytes stable. HgbA1c 9.5. [facility], results not available today for review. Blood pressure 137/80 and HR 80 in " clinic today.    Appetite good. Eating meals at home. Minimal packaged/processed foods. Not adding additional salt to foods. Had been drinking > 1 gallon of water daily, has decreased to 45-55 ounces daily. No set exercise routine with COVID restrictions and gyms being closed. Denies alcohol use. Denies tobacco.         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   Social History     Socioeconomic History     Marital status: Single     Spouse name: Not on file     Number of children: Not on file     Years of education: Not on file     Highest education level: Not on file   Occupational History     Not on file   Social Needs     Financial resource strain: Not on file     Food insecurity     Worry: Not on file     Inability: Not on file     Transportation needs     Medical: Not on file     Non-medical: Not on file   Tobacco Use     Smoking status: Never Smoker     Smokeless tobacco: Never Used   Substance and Sexual Activity     Alcohol use: Yes     Alcohol/week: 2.0 standard drinks     Types: 2 Standard drinks or equivalent per week     Frequency: 2-3 times a week     Drinks per session: 1 or 2     Comment: occasional, social     Drug use: No     Sexual activity: Not on file   Lifestyle     Physical activity     Days per week: Not on file     Minutes per session: Not on file     Stress: Not on file   Relationships     Social connections     Talks on phone: Not on file     Gets together: Not on file     Attends [employer] service: Not on file     Active member of club or organization: Not on file     Attends meetings of clubs or organizations: Not on file     Relationship status: Not on file     Intimate partner violence     Fear of current or ex partner: Not on file     Emotionally abused: Not on file     Physically abused: Not on file     Forced sexual activity:  "Not on file   Other Topics Concern     Parent/sibling w/ CABG, MI or angioplasty before 65F 55M? Not Asked   Social History Narrative     Not on file            Review of Systems:   Skin:  Negative     Eyes:  Negative    ENT:  Negative    Respiratory:  Positive for dyspnea on exertion  Cardiovascular:    Positive for;edema;fatigue  Gastroenterology: Negative    Genitourinary:  not assessed    Musculoskeletal:  Positive for joint pain  Neurologic:  Positive for incoordination;stroke  Psychiatric:  Positive for depression;sleep disturbances  Heme/Lymph/Imm:  Positive for allergies;weight loss  Endocrine:  Positive for diabetes         Physical Exam:   Vitals: /80   Pulse 80   Ht 1.753 m (5' 9\")   Wt 115.7 kg (255 lb)   BMI 37.66 kg/m     Wt Readings from Last 4 Encounters:   12/07/20 115.7 kg (255 lb)   09/22/20 119.7 kg (264 lb)   08/20/20 120.5 kg (265 lb 9.6 oz)   07/16/20 113.9 kg (251 lb 3.2 oz)     GEN: well nourished, in no acute distress.  HEENT:  Pupils equal, round. Sclerae nonicteric.   NECK: Supple, no masses appreciated. Body habitus makes assessment of JVD challenging.   C/V:  Regular rate and rhythm, no murmur, rub or gallop.    RESP: Respirations are unlabored. Clear to auscultation bilaterally without wheezing, rales, or rhonchi.  GI: Abdomen soft, nontender.  EXTREM: +2 LE edema.  NEURO: Alert and oriented, cooperative.  SKIN: Warm and dry.        Data:   ECHO 7/2/20  Left ventricular systolic function is low normal.  The visual ejection fraction is estimated at 50-55%.  Mid and distal anteroseptal hypokinesis.  Moderate valvular aortic stenosis.  The peak AoV pressure gradient is 56mmHg.  Detailed SID calculations wre not performed. AS unchanged compared to prior  study from 4/20. The study was technically limited.     Cardiac catheterization 8/12/2019    Prox Cx lesion is 99% stenosed.    Ost RPDA lesion is 55% stenosed.    Post Atrio lesion is 50% stenosed.    Ramus lesion is 30% " stenosed.    Mid LAD to Dist LAD lesion is 50% stenosed.        1.  Single-vessel occlusive coronary artery disease namely  of nondominant small circumflex vessel  2.  Remainder of coronaries with moderate nonocclusive disease    LIPID RESULTS:  Lab Results   Component Value Date    CHOL 188 04/23/2020    HDL 31 (L) 04/23/2020     (H) 04/23/2020    TRIG 277 (H) 04/23/2020     LIVER ENZYME RESULTS:  Lab Results   Component Value Date    AST 50 (A) 06/30/2020    ALT 29 06/30/2020     CBC RESULTS:  Lab Results   Component Value Date    WBC 10.7 07/13/2020    RBC 5.10 07/13/2020    HGB 13.2 (L) 07/13/2020    HCT 43.8 07/13/2020    MCV 86 07/13/2020    MCH 25.9 (L) 07/13/2020    MCHC 30.1 (L) 07/13/2020    RDW 16.0 (H) 07/13/2020     07/13/2020     BMP RESULTS:  Lab Results   Component Value Date     07/13/2020    POTASSIUM 3.9 07/13/2020    CHLORIDE 101 07/13/2020    CO2 27 07/13/2020    ANIONGAP 10 07/13/2020    GLC 67 (L) 07/13/2020    BUN 53 (H) 07/13/2020    CR 1.85 (H) 07/13/2020    GFRESTIMATED 35 (L) 07/13/2020    GFRESTBLACK 41 (L) 07/13/2020    GABE 9.2 07/13/2020      A1C RESULTS:  Lab Results   Component Value Date    A1C 7.0 (H) 04/22/2020     INR RESULTS:  Lab Results   Component Value Date    INR 1.1 (A) 06/30/2020    INR 1.12 04/22/2020            Medications     Current Outpatient Medications   Medication Sig Dispense Refill     acetaminophen (TYLENOL) 325 MG tablet Take 2 tablets (650 mg) by mouth every 6 hours as needed for mild pain or fever (> 101 F)       amLODIPine (NORVASC) 10 MG tablet Take 1 tablet (10 mg) by mouth daily       aspirin (ASA) 325 MG EC tablet Take 1 tablet (325 mg) by mouth daily Start 7/25/20.       atorvastatin (LIPITOR) 40 MG tablet Take 1 tablet (40 mg) by mouth every evening 30 tablet 3     blood glucose (NO BRAND SPECIFIED) test strip Use to test blood sugar as directed       carvedilol (COREG) 25 MG tablet Take 1 tablet (25 mg) by mouth 2 times daily  (with meals) 60 tablet 0     glimepiride (AMARYL) 4 MG tablet Take 4 mg by mouth 2 times daily        insulin aspart (NOVOLOG FLEXPEN) 100 UNIT/ML pen Inject 2-6 Units Subcutaneous 3 times daily (with meals) (Patient states he bases his dose off of carb counting and blood glucose but did not give exact regimen).       insulin glargine (LANTUS PEN) 100 UNIT/ML pen Inject 15 Units Subcutaneous every evening       multivitamin, therapeutic with minerals (THERA-VIT-M) TABS Take 1 tablet by mouth daily       order for DME Equipment being ordered: Walker Wheels () and Walker ()  Treatment Diagnosis: Impaired gait 1 each 0     torsemide (DEMADEX) 20 MG tablet Increase torsemide 40mg qam and 20mg qpm. 90 tablet 1     clopidogrel (PLAVIX) 75 MG tablet Take 1 tablet (75 mg) by mouth daily for 7 days 7/17-7/24. 7 tablet 0          Past Medical History     Past Medical History:   Diagnosis Date     Arthritis of knee~ s/p replacement 3/24/2016     CAD (coronary artery disease)      Decreased cardiac ejection fraction~44% 3/24/2016     Diabetes mellitus, type 2 (H) 3/24/2016     History of DVT (deep vein thrombosis)      HTN (hypertension) 3/24/2016     LBBB (left bundle branch block) 3/24/2016     Mixed hyperlipidemia 8/23/2016     Past Surgical History:   Procedure Laterality Date     ARTHROPLASTY KNEE Right 3/29/2016    Procedure: ARTHROPLASTY KNEE;  Surgeon: Heena Rosen MD;  Location:  OR     ARTHROPLASTY REVISION KNEE Left 9/27/2019    Procedure: REVISION LEFT TOTAL KNEE  ARTHROPLASTY;  Surgeon: Heena Rosen MD;  Location:  OR     CV LEFT HEART CATH N/A 8/12/2019    Procedure: Left Heart Cath;  Surgeon: Edgardo Beckham MD;  Location:  HEART CARDIAC CATH LAB     EYE SURGERY      cataract removal     ORTHOPEDIC SURGERY      KNEE SCOPES MULTIPLE     Family History   Problem Relation Age of Onset     Coronary Artery Disease No family hx of             Allergies   Penicillins        Brittanie CABRAL  Uherka, APRN CNP  [facility]  Pager: [phone]        Thank you for allowing me to participate in the care of your patient.    Sincerely,     WALE Fowler CNP     [facility]

## 2020-12-07 NOTE — PATIENT INSTRUCTIONS
1. START isosorbide mononitrate 30mg (1 tablet) daily  2. Schedule nuclear stress test  3. Follow up with Brittanie in 1 month  4. Cardiology nurses here will [facility] to [facility] to [facility] from last week, will adjust diuretics based on the results.   5. Please call with any questions or concerns [phone]

## 2020-12-08 DIAGNOSIS — I25.119 CORONARY ARTERY DISEASE INVOLVING NATIVE CORONARY ARTERY OF NATIVE HEART WITH ANGINA PECTORIS (H): ICD-10-CM

## 2020-12-08 RX ORDER — ISOSORBIDE MONONITRATE 30 MG/1
30 TABLET, EXTENDED RELEASE ORAL DAILY
Qty: 90 TABLET | Refills: 1 | Status: SHIPPED | OUTPATIENT
Start: 2020-12-08 | End: 2020-12-14

## 2020-12-14 ENCOUNTER — TELEPHONE (OUTPATIENT)
Dept: CARDIOLOGY | Facility: CLINIC | Age: 75
End: 2020-12-14

## 2020-12-14 DIAGNOSIS — I25.119 CORONARY ARTERY DISEASE INVOLVING NATIVE CORONARY ARTERY OF NATIVE HEART WITH ANGINA PECTORIS (H): ICD-10-CM

## 2020-12-14 RX ORDER — ISOSORBIDE MONONITRATE 30 MG/1
30 TABLET, EXTENDED RELEASE ORAL DAILY
Qty: 90 TABLET | Refills: 1 | Status: SHIPPED | OUTPATIENT
Start: 2020-12-14 | End: 2021-01-01

## 2020-12-14 NOTE — TELEPHONE ENCOUNTER
Received VM from pt stating he did not receive the medication Imdur 30 mg daily ordered by THEODORA Gu on 12/7/20 [facility]. Will request in clinic RN fax Brittanie's last note and [facility] to [facility] to to [phone], attn: Dr. Murrell. Requested pt let us know if any further issues obtaining medicaiton. Also noted that pt's labs from the [facility] were scanned on 12/10/20, last BMP was on 10/28/20. Will update THEODORA Gu.

## 2021-01-01 ENCOUNTER — TELEPHONE (OUTPATIENT)
Dept: CARDIOLOGY | Facility: CLINIC | Age: 76
End: 2021-01-01

## 2021-01-01 ENCOUNTER — APPOINTMENT (OUTPATIENT)
Dept: SPEECH THERAPY | Facility: CLINIC | Age: 76
DRG: 266 | End: 2021-01-01
Attending: INTERNAL MEDICINE
Payer: COMMERCIAL

## 2021-01-01 ENCOUNTER — APPOINTMENT (OUTPATIENT)
Dept: GENERAL RADIOLOGY | Facility: CLINIC | Age: 76
DRG: 266 | End: 2021-01-01
Attending: INTERNAL MEDICINE
Payer: COMMERCIAL

## 2021-01-01 ENCOUNTER — APPOINTMENT (OUTPATIENT)
Dept: GENERAL RADIOLOGY | Facility: CLINIC | Age: 76
DRG: 266 | End: 2021-01-01
Attending: ANESTHESIOLOGY
Payer: COMMERCIAL

## 2021-01-01 ENCOUNTER — TRANSFERRED RECORDS (OUTPATIENT)
Dept: HEALTH INFORMATION MANAGEMENT | Facility: CLINIC | Age: 76
End: 2021-01-01

## 2021-01-01 ENCOUNTER — EXTERNAL ORDER RESULTS (OUTPATIENT)
Dept: CARDIOLOGY | Facility: CLINIC | Age: 76
End: 2021-01-01

## 2021-01-01 ENCOUNTER — HOSPITAL ENCOUNTER (OUTPATIENT)
Dept: CARDIOLOGY | Facility: CLINIC | Age: 76
Discharge: HOME | End: 2021-10-01
Attending: INTERNAL MEDICINE | Admitting: INTERNAL MEDICINE
Payer: COMMERCIAL

## 2021-01-01 ENCOUNTER — APPOINTMENT (OUTPATIENT)
Dept: CT IMAGING | Facility: CLINIC | Age: 76
DRG: 377 | End: 2021-01-01
Attending: EMERGENCY MEDICINE
Payer: COMMERCIAL

## 2021-01-01 ENCOUNTER — APPOINTMENT (OUTPATIENT)
Dept: ULTRASOUND IMAGING | Facility: CLINIC | Age: 76
DRG: 266 | End: 2021-01-01
Attending: HOSPITALIST
Payer: COMMERCIAL

## 2021-01-01 ENCOUNTER — APPOINTMENT (OUTPATIENT)
Dept: INTERVENTIONAL RADIOLOGY/VASCULAR | Facility: CLINIC | Age: 76
DRG: 266 | End: 2021-01-01
Attending: INTERNAL MEDICINE
Payer: COMMERCIAL

## 2021-01-01 ENCOUNTER — DOCUMENTATION ONLY (OUTPATIENT)
Dept: CARDIOLOGY | Facility: CLINIC | Age: 76
End: 2021-01-01

## 2021-01-01 ENCOUNTER — HOSPITAL ENCOUNTER (OUTPATIENT)
Facility: CLINIC | Age: 76
Discharge: HOME | End: 2021-01-28
Attending: INTERNAL MEDICINE | Admitting: INTERNAL MEDICINE
Payer: COMMERCIAL

## 2021-01-01 ENCOUNTER — TELEPHONE (OUTPATIENT)
Dept: FAMILY MEDICINE | Facility: CLINIC | Age: 76
End: 2021-01-01
Payer: COMMERCIAL

## 2021-01-01 ENCOUNTER — APPOINTMENT (OUTPATIENT)
Dept: GENERAL RADIOLOGY | Facility: CLINIC | Age: 76
DRG: 266 | End: 2021-01-01
Attending: NURSE PRACTITIONER
Payer: COMMERCIAL

## 2021-01-01 ENCOUNTER — APPOINTMENT (OUTPATIENT)
Dept: PHYSICAL THERAPY | Facility: CLINIC | Age: 76
End: 2021-01-01
Attending: ORTHOPAEDIC SURGERY
Payer: COMMERCIAL

## 2021-01-01 ENCOUNTER — APPOINTMENT (OUTPATIENT)
Dept: OCCUPATIONAL THERAPY | Facility: CLINIC | Age: 76
DRG: 266 | End: 2021-01-01
Attending: INTERNAL MEDICINE
Payer: COMMERCIAL

## 2021-01-01 ENCOUNTER — HOSPITAL ENCOUNTER (OUTPATIENT)
Dept: CARDIOLOGY | Facility: CLINIC | Age: 76
Discharge: HOME | End: 2021-08-16
Attending: INTERNAL MEDICINE | Admitting: INTERNAL MEDICINE
Payer: COMMERCIAL

## 2021-01-01 ENCOUNTER — APPOINTMENT (OUTPATIENT)
Dept: CARDIOLOGY | Facility: CLINIC | Age: 76
DRG: 266 | End: 2021-01-01
Attending: INTERNAL MEDICINE
Payer: COMMERCIAL

## 2021-01-01 ENCOUNTER — OFFICE VISIT (OUTPATIENT)
Dept: CARDIOLOGY | Facility: CLINIC | Age: 76
End: 2021-01-01
Attending: NURSE PRACTITIONER
Payer: COMMERCIAL

## 2021-01-01 ENCOUNTER — LAB (OUTPATIENT)
Dept: URGENT CARE | Facility: URGENT CARE | Age: 76
End: 2021-01-01
Payer: COMMERCIAL

## 2021-01-01 ENCOUNTER — APPOINTMENT (OUTPATIENT)
Dept: GENERAL RADIOLOGY | Facility: CLINIC | Age: 76
DRG: 266 | End: 2021-01-01
Attending: EMERGENCY MEDICINE
Payer: COMMERCIAL

## 2021-01-01 ENCOUNTER — APPOINTMENT (OUTPATIENT)
Dept: PHYSICAL THERAPY | Facility: CLINIC | Age: 76
DRG: 266 | End: 2021-01-01
Attending: INTERNAL MEDICINE
Payer: COMMERCIAL

## 2021-01-01 ENCOUNTER — HOSPITAL ENCOUNTER (OUTPATIENT)
Facility: CLINIC | Age: 76
Discharge: HOME | End: 2021-01-20
Attending: INTERNAL MEDICINE | Admitting: INTERNAL MEDICINE
Payer: COMMERCIAL

## 2021-01-01 ENCOUNTER — HOSPITAL ENCOUNTER (OUTPATIENT)
Facility: CLINIC | Age: 76
Discharge: HOME | End: 2021-08-26
Attending: ORTHOPAEDIC SURGERY | Admitting: ORTHOPAEDIC SURGERY
Payer: COMMERCIAL

## 2021-01-01 ENCOUNTER — APPOINTMENT (OUTPATIENT)
Dept: CARDIOLOGY | Facility: CLINIC | Age: 76
DRG: 266 | End: 2021-01-01
Attending: STUDENT IN AN ORGANIZED HEALTH CARE EDUCATION/TRAINING PROGRAM
Payer: COMMERCIAL

## 2021-01-01 ENCOUNTER — HOSPITAL ENCOUNTER (OUTPATIENT)
Dept: CARDIOLOGY | Facility: CLINIC | Age: 76
Setting detail: NUCLEAR MEDICINE
End: 2021-01-07
Attending: NURSE PRACTITIONER
Payer: COMMERCIAL

## 2021-01-01 ENCOUNTER — APPOINTMENT (OUTPATIENT)
Dept: GENERAL RADIOLOGY | Facility: CLINIC | Age: 76
End: 2021-01-01
Attending: PHYSICIAN ASSISTANT
Payer: COMMERCIAL

## 2021-01-01 ENCOUNTER — APPOINTMENT (OUTPATIENT)
Dept: CARDIOLOGY | Facility: CLINIC | Age: 76
DRG: 377 | End: 2021-01-01
Attending: INTERNAL MEDICINE
Payer: COMMERCIAL

## 2021-01-01 ENCOUNTER — LAB (OUTPATIENT)
Dept: LAB | Facility: CLINIC | Age: 76
End: 2021-01-01
Attending: INTERNAL MEDICINE
Payer: COMMERCIAL

## 2021-01-01 ENCOUNTER — APPOINTMENT (OUTPATIENT)
Dept: PHYSICAL THERAPY | Facility: CLINIC | Age: 76
DRG: 377 | End: 2021-01-01
Payer: COMMERCIAL

## 2021-01-01 ENCOUNTER — ANESTHESIA (OUTPATIENT)
Dept: INTENSIVE CARE | Facility: CLINIC | Age: 76
DRG: 266 | End: 2021-01-01
Payer: COMMERCIAL

## 2021-01-01 ENCOUNTER — HOSPITAL ENCOUNTER (OUTPATIENT)
Dept: INFUSION THERAPY | Facility: CLINIC | Age: 76
End: 2021-01-20
Attending: INTERNAL MEDICINE | Admitting: INTERNAL MEDICINE
Payer: COMMERCIAL

## 2021-01-01 ENCOUNTER — APPOINTMENT (OUTPATIENT)
Dept: ULTRASOUND IMAGING | Facility: CLINIC | Age: 76
DRG: 266 | End: 2021-01-01
Attending: INTERNAL MEDICINE
Payer: COMMERCIAL

## 2021-01-01 ENCOUNTER — ANESTHESIA (OUTPATIENT)
Dept: CARDIOLOGY | Facility: CLINIC | Age: 76
DRG: 266 | End: 2021-01-01
Payer: COMMERCIAL

## 2021-01-01 ENCOUNTER — OFFICE VISIT (OUTPATIENT)
Dept: CARDIOLOGY | Facility: CLINIC | Age: 76
End: 2021-01-01
Payer: COMMERCIAL

## 2021-01-01 ENCOUNTER — HOSPITAL ENCOUNTER (OUTPATIENT)
Dept: CARDIOLOGY | Facility: CLINIC | Age: 76
End: 2021-01-07
Attending: NURSE PRACTITIONER
Payer: COMMERCIAL

## 2021-01-01 ENCOUNTER — PATIENT OUTREACH (OUTPATIENT)
Dept: CARE COORDINATION | Facility: CLINIC | Age: 76
End: 2021-01-01

## 2021-01-01 ENCOUNTER — CARE COORDINATION (OUTPATIENT)
Dept: CARDIOLOGY | Facility: CLINIC | Age: 76
End: 2021-01-01

## 2021-01-01 ENCOUNTER — ANESTHESIA EVENT (OUTPATIENT)
Dept: INTENSIVE CARE | Facility: CLINIC | Age: 76
DRG: 266 | End: 2021-01-01
Payer: COMMERCIAL

## 2021-01-01 ENCOUNTER — APPOINTMENT (OUTPATIENT)
Dept: PHYSICAL THERAPY | Facility: CLINIC | Age: 76
DRG: 377 | End: 2021-01-01
Attending: INTERNAL MEDICINE
Payer: COMMERCIAL

## 2021-01-01 ENCOUNTER — APPOINTMENT (OUTPATIENT)
Dept: GENERAL RADIOLOGY | Facility: CLINIC | Age: 76
DRG: 266 | End: 2021-01-01
Attending: STUDENT IN AN ORGANIZED HEALTH CARE EDUCATION/TRAINING PROGRAM
Payer: COMMERCIAL

## 2021-01-01 ENCOUNTER — APPOINTMENT (OUTPATIENT)
Dept: ULTRASOUND IMAGING | Facility: CLINIC | Age: 76
DRG: 266 | End: 2021-01-01
Attending: STUDENT IN AN ORGANIZED HEALTH CARE EDUCATION/TRAINING PROGRAM
Payer: COMMERCIAL

## 2021-01-01 ENCOUNTER — APPOINTMENT (OUTPATIENT)
Dept: GENERAL RADIOLOGY | Facility: CLINIC | Age: 76
DRG: 266 | End: 2021-01-01
Attending: HOSPITALIST
Payer: COMMERCIAL

## 2021-01-01 ENCOUNTER — LAB (OUTPATIENT)
Dept: URGENT CARE | Facility: URGENT CARE | Age: 76
End: 2021-01-01
Attending: ORTHOPAEDIC SURGERY
Payer: COMMERCIAL

## 2021-01-01 ENCOUNTER — HOSPITAL ENCOUNTER (OUTPATIENT)
Dept: INFUSION THERAPY | Facility: CLINIC | Age: 76
End: 2021-01-28
Admitting: INTERNAL MEDICINE
Payer: COMMERCIAL

## 2021-01-01 ENCOUNTER — APPOINTMENT (OUTPATIENT)
Dept: CARDIOLOGY | Facility: CLINIC | Age: 76
DRG: 266 | End: 2021-01-01
Attending: HOSPITALIST
Payer: COMMERCIAL

## 2021-01-01 ENCOUNTER — MEDICAL CORRESPONDENCE (OUTPATIENT)
Dept: HEALTH INFORMATION MANAGEMENT | Facility: CLINIC | Age: 76
End: 2021-01-01

## 2021-01-01 ENCOUNTER — HOSPITAL ENCOUNTER (INPATIENT)
Facility: CLINIC | Age: 76
LOS: 5 days | Discharge: HOME HEALTH | DRG: 377 | End: 2021-07-11
Attending: EMERGENCY MEDICINE | Admitting: INTERNAL MEDICINE
Payer: COMMERCIAL

## 2021-01-01 ENCOUNTER — ANESTHESIA EVENT (OUTPATIENT)
Dept: GASTROENTEROLOGY | Facility: CLINIC | Age: 76
DRG: 377 | End: 2021-01-01
Payer: COMMERCIAL

## 2021-01-01 ENCOUNTER — APPOINTMENT (OUTPATIENT)
Dept: ULTRASOUND IMAGING | Facility: CLINIC | Age: 76
DRG: 266 | End: 2021-01-01
Attending: NURSE PRACTITIONER
Payer: COMMERCIAL

## 2021-01-01 ENCOUNTER — HOSPITAL ENCOUNTER (OUTPATIENT)
Dept: CARDIOLOGY | Facility: CLINIC | Age: 76
DRG: 266 | End: 2021-10-26
Attending: INTERNAL MEDICINE
Payer: COMMERCIAL

## 2021-01-01 ENCOUNTER — APPOINTMENT (OUTPATIENT)
Dept: CT IMAGING | Facility: CLINIC | Age: 76
DRG: 266 | End: 2021-01-01
Attending: INTERNAL MEDICINE
Payer: COMMERCIAL

## 2021-01-01 ENCOUNTER — HOSPITAL ENCOUNTER (OUTPATIENT)
Dept: CARDIOLOGY | Facility: CLINIC | Age: 76
Discharge: HOME | End: 2021-01-21
Attending: NURSE PRACTITIONER | Admitting: NURSE PRACTITIONER
Payer: COMMERCIAL

## 2021-01-01 ENCOUNTER — APPOINTMENT (OUTPATIENT)
Dept: OCCUPATIONAL THERAPY | Facility: CLINIC | Age: 76
DRG: 377 | End: 2021-01-01
Payer: COMMERCIAL

## 2021-01-01 ENCOUNTER — ANESTHESIA (OUTPATIENT)
Dept: SURGERY | Facility: CLINIC | Age: 76
End: 2021-01-01
Payer: COMMERCIAL

## 2021-01-01 ENCOUNTER — HOSPITAL ENCOUNTER (OUTPATIENT)
Facility: CLINIC | Age: 76
Discharge: HOME | End: 2021-10-11
Attending: INTERNAL MEDICINE | Admitting: INTERNAL MEDICINE
Payer: COMMERCIAL

## 2021-01-01 ENCOUNTER — HOSPITAL ENCOUNTER (INPATIENT)
Facility: CLINIC | Age: 76
LOS: 66 days | DRG: 266 | End: 2021-12-31
Attending: INTERNAL MEDICINE | Admitting: INTERNAL MEDICINE
Payer: COMMERCIAL

## 2021-01-01 ENCOUNTER — ANESTHESIA EVENT (OUTPATIENT)
Dept: CARDIOLOGY | Facility: CLINIC | Age: 76
DRG: 266 | End: 2021-01-01
Payer: COMMERCIAL

## 2021-01-01 ENCOUNTER — ANESTHESIA EVENT (OUTPATIENT)
Dept: SURGERY | Facility: CLINIC | Age: 76
End: 2021-01-01
Payer: COMMERCIAL

## 2021-01-01 ENCOUNTER — ANESTHESIA (OUTPATIENT)
Dept: GASTROENTEROLOGY | Facility: CLINIC | Age: 76
DRG: 377 | End: 2021-01-01
Payer: COMMERCIAL

## 2021-01-01 ENCOUNTER — APPOINTMENT (OUTPATIENT)
Dept: OCCUPATIONAL THERAPY | Facility: CLINIC | Age: 76
DRG: 377 | End: 2021-01-01
Attending: INTERNAL MEDICINE
Payer: COMMERCIAL

## 2021-01-01 VITALS
DIASTOLIC BLOOD PRESSURE: 65 MMHG | OXYGEN SATURATION: 98 % | WEIGHT: 266.76 LBS | SYSTOLIC BLOOD PRESSURE: 137 MMHG | BODY MASS INDEX: 39.51 KG/M2 | TEMPERATURE: 98.6 F | HEART RATE: 53 BPM | HEIGHT: 69 IN | RESPIRATION RATE: 24 BRPM

## 2021-01-01 VITALS
OXYGEN SATURATION: 92 % | HEART RATE: 67 BPM | WEIGHT: 241.3 LBS | HEIGHT: 69 IN | TEMPERATURE: 97.9 F | DIASTOLIC BLOOD PRESSURE: 73 MMHG | SYSTOLIC BLOOD PRESSURE: 131 MMHG | BODY MASS INDEX: 35.74 KG/M2 | RESPIRATION RATE: 18 BRPM

## 2021-01-01 VITALS
BODY MASS INDEX: 38.3 KG/M2 | SYSTOLIC BLOOD PRESSURE: 130 MMHG | HEIGHT: 69 IN | WEIGHT: 258.6 LBS | DIASTOLIC BLOOD PRESSURE: 78 MMHG | HEART RATE: 64 BPM | OXYGEN SATURATION: 92 %

## 2021-01-01 VITALS
WEIGHT: 258.38 LBS | BODY MASS INDEX: 36.99 KG/M2 | DIASTOLIC BLOOD PRESSURE: 74 MMHG | SYSTOLIC BLOOD PRESSURE: 128 MMHG | HEART RATE: 76 BPM | HEIGHT: 70 IN | TEMPERATURE: 97.4 F | OXYGEN SATURATION: 96 % | RESPIRATION RATE: 18 BRPM

## 2021-01-01 VITALS
WEIGHT: 262.4 LBS | TEMPERATURE: 96.6 F | RESPIRATION RATE: 16 BRPM | SYSTOLIC BLOOD PRESSURE: 138 MMHG | HEART RATE: 72 BPM | HEIGHT: 69 IN | BODY MASS INDEX: 38.86 KG/M2 | DIASTOLIC BLOOD PRESSURE: 71 MMHG | OXYGEN SATURATION: 96 %

## 2021-01-01 VITALS
SYSTOLIC BLOOD PRESSURE: 131 MMHG | RESPIRATION RATE: 16 BRPM | HEIGHT: 69 IN | WEIGHT: 243.1 LBS | OXYGEN SATURATION: 94 % | HEART RATE: 75 BPM | TEMPERATURE: 97.8 F | BODY MASS INDEX: 36.01 KG/M2 | DIASTOLIC BLOOD PRESSURE: 81 MMHG

## 2021-01-01 VITALS
BODY MASS INDEX: 36.88 KG/M2 | HEART RATE: 71 BPM | HEIGHT: 69 IN | SYSTOLIC BLOOD PRESSURE: 132 MMHG | DIASTOLIC BLOOD PRESSURE: 76 MMHG | WEIGHT: 249 LBS

## 2021-01-01 VITALS
WEIGHT: 253 LBS | HEART RATE: 70 BPM | SYSTOLIC BLOOD PRESSURE: 132 MMHG | HEIGHT: 69 IN | DIASTOLIC BLOOD PRESSURE: 71 MMHG | BODY MASS INDEX: 37.47 KG/M2

## 2021-01-01 VITALS — HEART RATE: 70 BPM | DIASTOLIC BLOOD PRESSURE: 91 MMHG | SYSTOLIC BLOOD PRESSURE: 148 MMHG | OXYGEN SATURATION: 97 %

## 2021-01-01 VITALS
WEIGHT: 252.9 LBS | SYSTOLIC BLOOD PRESSURE: 143 MMHG | DIASTOLIC BLOOD PRESSURE: 74 MMHG | TEMPERATURE: 96.9 F | OXYGEN SATURATION: 96 % | BODY MASS INDEX: 37.35 KG/M2 | RESPIRATION RATE: 16 BRPM | HEART RATE: 70 BPM

## 2021-01-01 VITALS
WEIGHT: 247 LBS | HEIGHT: 69 IN | DIASTOLIC BLOOD PRESSURE: 80 MMHG | BODY MASS INDEX: 36.58 KG/M2 | SYSTOLIC BLOOD PRESSURE: 154 MMHG | HEART RATE: 67 BPM

## 2021-01-01 VITALS
SYSTOLIC BLOOD PRESSURE: 123 MMHG | BODY MASS INDEX: 35.25 KG/M2 | HEART RATE: 83 BPM | WEIGHT: 238 LBS | DIASTOLIC BLOOD PRESSURE: 71 MMHG | HEIGHT: 69 IN | OXYGEN SATURATION: 96 %

## 2021-01-01 VITALS
HEIGHT: 69 IN | SYSTOLIC BLOOD PRESSURE: 120 MMHG | WEIGHT: 236 LBS | HEART RATE: 88 BPM | DIASTOLIC BLOOD PRESSURE: 73 MMHG | BODY MASS INDEX: 34.96 KG/M2

## 2021-01-01 VITALS
HEIGHT: 69 IN | SYSTOLIC BLOOD PRESSURE: 110 MMHG | WEIGHT: 253 LBS | HEART RATE: 66 BPM | BODY MASS INDEX: 37.47 KG/M2 | DIASTOLIC BLOOD PRESSURE: 72 MMHG

## 2021-01-01 DIAGNOSIS — I50.21 ACUTE SYSTOLIC CONGESTIVE HEART FAILURE (H): ICD-10-CM

## 2021-01-01 DIAGNOSIS — K92.2 UPPER GI BLEED: ICD-10-CM

## 2021-01-01 DIAGNOSIS — I35.0 AORTIC STENOSIS: ICD-10-CM

## 2021-01-01 DIAGNOSIS — E66.01 MORBID OBESITY (H): ICD-10-CM

## 2021-01-01 DIAGNOSIS — I06.0 RHEUMATIC AORTIC STENOSIS: ICD-10-CM

## 2021-01-01 DIAGNOSIS — I35.0 AORTIC STENOSIS: Primary | ICD-10-CM

## 2021-01-01 DIAGNOSIS — Z11.59 ENCOUNTER FOR SCREENING FOR OTHER VIRAL DISEASES: ICD-10-CM

## 2021-01-01 DIAGNOSIS — I50.32 CHRONIC DIASTOLIC HEART FAILURE (H): Primary | ICD-10-CM

## 2021-01-01 DIAGNOSIS — R55 SYNCOPE, UNSPECIFIED SYNCOPE TYPE: ICD-10-CM

## 2021-01-01 DIAGNOSIS — I25.10 CORONARY ARTERY DISEASE INVOLVING NATIVE CORONARY ARTERY OF NATIVE HEART WITHOUT ANGINA PECTORIS: ICD-10-CM

## 2021-01-01 DIAGNOSIS — I35.0 AORTIC VALVE STENOSIS, ETIOLOGY OF CARDIAC VALVE DISEASE UNSPECIFIED: ICD-10-CM

## 2021-01-01 DIAGNOSIS — E78.2 MIXED HYPERLIPIDEMIA: ICD-10-CM

## 2021-01-01 DIAGNOSIS — N18.32 STAGE 3B CHRONIC KIDNEY DISEASE (H): ICD-10-CM

## 2021-01-01 DIAGNOSIS — I44.7 LBBB (LEFT BUNDLE BRANCH BLOCK): ICD-10-CM

## 2021-01-01 DIAGNOSIS — I10 ESSENTIAL HYPERTENSION: ICD-10-CM

## 2021-01-01 DIAGNOSIS — I25.119 CORONARY ARTERY DISEASE INVOLVING NATIVE CORONARY ARTERY OF NATIVE HEART WITH ANGINA PECTORIS (H): ICD-10-CM

## 2021-01-01 DIAGNOSIS — I50.32 CHRONIC DIASTOLIC HEART FAILURE (H): ICD-10-CM

## 2021-01-01 DIAGNOSIS — I35.0 SEVERE AORTIC STENOSIS: Primary | ICD-10-CM

## 2021-01-01 DIAGNOSIS — I48.92 ATRIAL FLUTTER, UNSPECIFIED TYPE (H): ICD-10-CM

## 2021-01-01 DIAGNOSIS — I48.92 ATRIAL FLUTTER (H): Primary | ICD-10-CM

## 2021-01-01 DIAGNOSIS — I50.22 CHRONIC SYSTOLIC HEART FAILURE (H): Primary | ICD-10-CM

## 2021-01-01 DIAGNOSIS — Z71.89 OTHER SPECIFIED COUNSELING: ICD-10-CM

## 2021-01-01 DIAGNOSIS — E11.00 TYPE 2 DIABETES MELLITUS WITH HYPEROSMOLARITY WITHOUT COMA, WITHOUT LONG-TERM CURRENT USE OF INSULIN (H): ICD-10-CM

## 2021-01-01 DIAGNOSIS — I35.0 AORTIC VALVE STENOSIS, ETIOLOGY OF CARDIAC VALVE DISEASE UNSPECIFIED: Primary | ICD-10-CM

## 2021-01-01 DIAGNOSIS — N17.9 ACUTE RENAL FAILURE, UNSPECIFIED ACUTE RENAL FAILURE TYPE (H): ICD-10-CM

## 2021-01-01 DIAGNOSIS — I63.30 CEREBROVASCULAR ACCIDENT (CVA) DUE TO THROMBOSIS OF CEREBRAL ARTERY (H): ICD-10-CM

## 2021-01-01 DIAGNOSIS — Z96.651 STATUS POST REVISION OF TOTAL REPLACEMENT OF RIGHT KNEE: Primary | ICD-10-CM

## 2021-01-01 DIAGNOSIS — R79.89 ELEVATED TROPONIN: ICD-10-CM

## 2021-01-01 DIAGNOSIS — I63.9 CEREBROVASCULAR ACCIDENT (H): Primary | ICD-10-CM

## 2021-01-01 DIAGNOSIS — R07.9 CHEST PAIN, UNSPECIFIED TYPE: ICD-10-CM

## 2021-01-01 DIAGNOSIS — I50.21 ACUTE SYSTOLIC CONGESTIVE HEART FAILURE (H): Primary | ICD-10-CM

## 2021-01-01 DIAGNOSIS — N18.31 STAGE 3A CHRONIC KIDNEY DISEASE (H): ICD-10-CM

## 2021-01-01 DIAGNOSIS — K92.2 UPPER GI BLEED: Primary | ICD-10-CM

## 2021-01-01 DIAGNOSIS — E11.9 DIABETES MELLITUS (H): ICD-10-CM

## 2021-01-01 DIAGNOSIS — R53.81 PHYSICAL DECONDITIONING: ICD-10-CM

## 2021-01-01 DIAGNOSIS — E88.09 HYPOALBUMINEMIA: ICD-10-CM

## 2021-01-01 DIAGNOSIS — E11.9 DIABETES MELLITUS (H): Primary | ICD-10-CM

## 2021-01-01 DIAGNOSIS — I63.9 ACUTE ISCHEMIC STROKE (H): ICD-10-CM

## 2021-01-01 DIAGNOSIS — I44.7 LBBB (LEFT BUNDLE BRANCH BLOCK): Primary | ICD-10-CM

## 2021-01-01 DIAGNOSIS — K29.01 GASTROINTESTINAL HEMORRHAGE ASSOCIATED WITH ACUTE GASTRITIS: ICD-10-CM

## 2021-01-01 DIAGNOSIS — K25.0 ACUTE GASTRIC ULCER WITH HEMORRHAGE: ICD-10-CM

## 2021-01-01 DIAGNOSIS — D62 ANEMIA DUE TO BLOOD LOSS, ACUTE: ICD-10-CM

## 2021-01-01 DIAGNOSIS — I21.4 NSTEMI (NON-ST ELEVATED MYOCARDIAL INFARCTION) (H): Primary | ICD-10-CM

## 2021-01-01 DIAGNOSIS — I50.22 CHRONIC SYSTOLIC HEART FAILURE (H): ICD-10-CM

## 2021-01-01 LAB
ABO + RH BLD: NORMAL
ABO + RH BLD: NORMAL
ABO/RH(D): NORMAL
ACINETOBACTER SPECIES: NOT DETECTED
ACT BLD: 114 SECONDS (ref 74–150)
ACT BLD: 211 SECONDS (ref 74–150)
ACT BLD: 332 SECONDS (ref 74–150)
ALBUMIN SERPL-MCNC: 1.7 G/DL (ref 3.4–5)
ALBUMIN SERPL-MCNC: 1.8 G/DL (ref 3.4–5)
ALBUMIN SERPL-MCNC: 1.9 G/DL (ref 3.4–5)
ALBUMIN SERPL-MCNC: 2 G/DL (ref 3.4–5)
ALBUMIN SERPL-MCNC: 2.1 G/DL (ref 3.4–5)
ALBUMIN SERPL-MCNC: 2.2 G/DL (ref 3.4–5)
ALBUMIN SERPL-MCNC: 2.2 G/DL (ref 3.4–5)
ALBUMIN SERPL-MCNC: 2.3 G/DL (ref 3.4–5)
ALBUMIN SERPL-MCNC: 2.4 G/DL (ref 3.4–5)
ALBUMIN SERPL-MCNC: 2.5 G/DL (ref 3.4–5)
ALBUMIN SERPL-MCNC: 2.6 G/DL (ref 3.4–5)
ALBUMIN SERPL-MCNC: 2.7 G/DL (ref 3.4–5)
ALBUMIN SERPL-MCNC: 2.8 G/DL (ref 3.4–5)
ALBUMIN SERPL-MCNC: 3.3 G/DL (ref 3.5–5.2)
ALBUMIN SERPL-MCNC: 3.5 G/DL (ref 3.5–5.2)
ALBUMIN UR-MCNC: 100 MG/DL
ALBUMIN UR-MCNC: 30 MG/DL
ALBUMIN UR-MCNC: 70 MG/DL
ALBUMIN UR-MCNC: NEGATIVE MG/DL
ALP SERPL-CCNC: 118 U/L (ref 40–150)
ALP SERPL-CCNC: 126 U/L (ref 40–150)
ALP SERPL-CCNC: 137 U/L (ref 40–150)
ALP SERPL-CCNC: 140 U/L (ref 40–150)
ALP SERPL-CCNC: 147 U/L (ref 40–150)
ALP SERPL-CCNC: 148 U/L (ref 40–150)
ALP SERPL-CCNC: 152 U/L (ref 40–150)
ALP SERPL-CCNC: 154 U/L (ref 40–150)
ALP SERPL-CCNC: 155 U/L (ref 40–150)
ALP SERPL-CCNC: 159 U/L (ref 40–150)
ALP SERPL-CCNC: 175 U/L (ref 40–150)
ALP SERPL-CCNC: 178 U/L (ref 40–150)
ALP SERPL-CCNC: 179 U/L (ref 40–150)
ALP SERPL-CCNC: 183 U/L (ref 40–150)
ALP SERPL-CCNC: 190 U/L (ref 40–150)
ALP SERPL-CCNC: 201 U/L (ref 40–150)
ALP SERPL-CCNC: 220 U/L (ref 40–150)
ALP SERPL-CCNC: 236 U/L (ref 40–150)
ALP SERPL-CCNC: 83 U/L (ref 40–150)
ALT SERPL W P-5'-P-CCNC: 22 U/L (ref 0–70)
ALT SERPL W P-5'-P-CCNC: 24 U/L (ref 0–70)
ALT SERPL W P-5'-P-CCNC: 31 U/L (ref 0–70)
ALT SERPL W P-5'-P-CCNC: 32 U/L (ref 0–70)
ALT SERPL W P-5'-P-CCNC: 33 U/L (ref 0–70)
ALT SERPL W P-5'-P-CCNC: 34 U/L (ref 0–70)
ALT SERPL W P-5'-P-CCNC: 36 U/L (ref 0–70)
ALT SERPL W P-5'-P-CCNC: 36 U/L (ref 0–70)
ALT SERPL W P-5'-P-CCNC: 38 U/L (ref 0–70)
ALT SERPL W P-5'-P-CCNC: 40 U/L (ref 0–70)
ALT SERPL W P-5'-P-CCNC: 44 U/L (ref 0–70)
ALT SERPL W P-5'-P-CCNC: 48 U/L (ref 0–70)
ALT SERPL W P-5'-P-CCNC: 57 U/L (ref 0–70)
ALT SERPL W P-5'-P-CCNC: 57 U/L (ref 0–70)
ALT SERPL W P-5'-P-CCNC: 64 U/L (ref 0–70)
ALT SERPL W P-5'-P-CCNC: 65 U/L (ref 0–70)
ALT SERPL W P-5'-P-CCNC: 65 U/L (ref 0–70)
ALT SERPL W P-5'-P-CCNC: 68 U/L (ref 0–70)
ALT SERPL W P-5'-P-CCNC: 83 U/L (ref 0–70)
ALT SERPL-CCNC: 23 U/L (ref 0–55)
ALT SERPL-CCNC: 25 U/L
ANION GAP SERPL CALCULATED.3IONS-SCNC: 1 MMOL/L (ref 3–14)
ANION GAP SERPL CALCULATED.3IONS-SCNC: 10 MMOL/L (ref 3–14)
ANION GAP SERPL CALCULATED.3IONS-SCNC: 10 MMOL/L (ref 5–15)
ANION GAP SERPL CALCULATED.3IONS-SCNC: 12 MMOL/L (ref 3–14)
ANION GAP SERPL CALCULATED.3IONS-SCNC: 2 MMOL/L (ref 3–14)
ANION GAP SERPL CALCULATED.3IONS-SCNC: 3 MMOL/L (ref 3–14)
ANION GAP SERPL CALCULATED.3IONS-SCNC: 4 MMOL/L (ref 3–14)
ANION GAP SERPL CALCULATED.3IONS-SCNC: 5 MMOL/L (ref 3–14)
ANION GAP SERPL CALCULATED.3IONS-SCNC: 6 MMOL/L (ref 3–14)
ANION GAP SERPL CALCULATED.3IONS-SCNC: 7 MMOL/L (ref 3–14)
ANION GAP SERPL CALCULATED.3IONS-SCNC: 8 MMOL/L (ref 3–14)
ANION GAP SERPL CALCULATED.3IONS-SCNC: 8 MMOL/L (ref 5–15)
ANION GAP SERPL CALCULATED.3IONS-SCNC: 8 MMOL/L (ref 5–15)
ANION GAP SERPL CALCULATED.3IONS-SCNC: 9 MMOL/L (ref 3–14)
ANION GAP SERPL CALCULATED.3IONS-SCNC: <1 MMOL/L (ref 3–14)
ANTIBODY SCREEN: NEGATIVE
APPEARANCE FLD: ABNORMAL
APPEARANCE UR: ABNORMAL
APPEARANCE UR: CLEAR
APTT PPP: 36 SECONDS (ref 22–38)
AST SERPL W P-5'-P-CCNC: 105 U/L (ref 0–45)
AST SERPL W P-5'-P-CCNC: 107 U/L (ref 0–45)
AST SERPL W P-5'-P-CCNC: 27 U/L (ref 0–45)
AST SERPL W P-5'-P-CCNC: 28 U/L (ref 0–45)
AST SERPL W P-5'-P-CCNC: 35 U/L (ref 0–45)
AST SERPL W P-5'-P-CCNC: 37 U/L (ref 0–45)
AST SERPL W P-5'-P-CCNC: 40 U/L (ref 0–45)
AST SERPL W P-5'-P-CCNC: 42 U/L (ref 0–45)
AST SERPL W P-5'-P-CCNC: 49 U/L (ref 0–45)
AST SERPL W P-5'-P-CCNC: 50 U/L (ref 0–45)
AST SERPL W P-5'-P-CCNC: 51 U/L (ref 0–45)
AST SERPL W P-5'-P-CCNC: 56 U/L (ref 0–45)
AST SERPL W P-5'-P-CCNC: 60 U/L (ref 0–45)
AST SERPL W P-5'-P-CCNC: 66 U/L (ref 0–45)
AST SERPL W P-5'-P-CCNC: 67 U/L (ref 0–45)
AST SERPL W P-5'-P-CCNC: 73 U/L (ref 0–45)
AST SERPL W P-5'-P-CCNC: 75 U/L (ref 0–45)
AST SERPL W P-5'-P-CCNC: 75 U/L (ref 0–45)
AST SERPL W P-5'-P-CCNC: 88 U/L (ref 0–45)
AST SERPL-CCNC: 34 U/L (ref 0–34)
AST SERPL-CCNC: 38 U/L (ref 5–34)
ATRIAL RATE - MUSE: 227 BPM
ATRIAL RATE - MUSE: 288 BPM
ATRIAL RATE - MUSE: 326 BPM
ATRIAL RATE - MUSE: 340 BPM
ATRIAL RATE - MUSE: 441 BPM
ATRIAL RATE - MUSE: 49 BPM
ATRIAL RATE - MUSE: 65 BPM
ATRIAL RATE - MUSE: 67 BPM
ATRIAL RATE - MUSE: 75 BPM
ATRIAL RATE - MUSE: 82 BPM
ATRIAL RATE - MUSE: 86 BPM
ATRIAL RATE - MUSE: 87 BPM
ATRIAL RATE - MUSE: 92 BPM
ATRIAL RATE - MUSE: 92 BPM
ATRIAL RATE - MUSE: 97 BPM
BACTERIA #/AREA URNS HPF: ABNORMAL /HPF
BACTERIA ASPIRATE CULT: ABNORMAL
BACTERIA ASPIRATE CULT: ABNORMAL
BACTERIA BLD CULT: ABNORMAL
BACTERIA BLD CULT: ABNORMAL
BACTERIA BLD CULT: NO GROWTH
BACTERIA SPEC CULT: ABNORMAL
BACTERIA SPEC CULT: NO GROWTH
BACTERIA SPEC CULT: NO GROWTH
BACTERIA UR CULT: NO GROWTH
BACTERIA UR CULT: NORMAL
BASE DEFICIT BLDV-SCNC: 3.4 MMOL/L
BASE EXCESS BLDA CALC-SCNC: 0.1 MMOL/L (ref -9–1.8)
BASE EXCESS BLDA CALC-SCNC: 0.5 MMOL/L (ref -9–1.8)
BASE EXCESS BLDA CALC-SCNC: 10.2 MMOL/L (ref -9–1.8)
BASE EXCESS BLDA CALC-SCNC: 10.6 MMOL/L (ref -9–1.8)
BASE EXCESS BLDA CALC-SCNC: 11.7 MMOL/L (ref -9–1.8)
BASE EXCESS BLDA CALC-SCNC: 13.2 MMOL/L (ref -9–1.8)
BASE EXCESS BLDA CALC-SCNC: 15 MMOL/L (ref -9–1.8)
BASE EXCESS BLDA CALC-SCNC: 15.8 MMOL/L (ref -9–1.8)
BASE EXCESS BLDA CALC-SCNC: 4 MMOL/L (ref -9–1.8)
BASE EXCESS BLDA CALC-SCNC: 4.1 MMOL/L (ref -9–1.8)
BASE EXCESS BLDA CALC-SCNC: 4.6 MMOL/L (ref -9–1.8)
BASE EXCESS BLDA CALC-SCNC: 4.7 MMOL/L (ref -9–1.8)
BASE EXCESS BLDA CALC-SCNC: 4.8 MMOL/L (ref -9–1.8)
BASE EXCESS BLDA CALC-SCNC: 4.9 MMOL/L (ref -9–1.8)
BASE EXCESS BLDA CALC-SCNC: 5 MMOL/L (ref -9–1.8)
BASE EXCESS BLDA CALC-SCNC: 5.2 MMOL/L (ref -9–1.8)
BASE EXCESS BLDA CALC-SCNC: 5.7 MMOL/L (ref -9–1.8)
BASE EXCESS BLDA CALC-SCNC: 6.5 MMOL/L (ref -9–1.8)
BASE EXCESS BLDA CALC-SCNC: 6.5 MMOL/L (ref -9–1.8)
BASE EXCESS BLDA CALC-SCNC: 6.6 MMOL/L (ref -9–1.8)
BASE EXCESS BLDA CALC-SCNC: 7.5 MMOL/L (ref -9–1.8)
BASE EXCESS BLDA CALC-SCNC: 7.6 MMOL/L (ref -9–1.8)
BASE EXCESS BLDA CALC-SCNC: 7.7 MMOL/L (ref -9–1.8)
BASE EXCESS BLDA CALC-SCNC: 7.8 MMOL/L (ref -9–1.8)
BASE EXCESS BLDA CALC-SCNC: 8.1 MMOL/L (ref -9–1.8)
BASE EXCESS BLDA CALC-SCNC: 8.5 MMOL/L (ref -9–1.8)
BASE EXCESS BLDA CALC-SCNC: 8.7 MMOL/L (ref -9–1.8)
BASE EXCESS BLDA CALC-SCNC: 8.9 MMOL/L (ref -9–1.8)
BASE EXCESS BLDA CALC-SCNC: 9.1 MMOL/L (ref -9–1.8)
BASE EXCESS BLDA CALC-SCNC: 9.2 MMOL/L (ref -9–1.8)
BASE EXCESS BLDA CALC-SCNC: 9.3 MMOL/L (ref -9–1.8)
BASE EXCESS BLDA CALC-SCNC: 9.4 MMOL/L (ref -9–1.8)
BASE EXCESS BLDA CALC-SCNC: 9.5 MMOL/L (ref -9–1.8)
BASE EXCESS BLDA CALC-SCNC: 9.6 MMOL/L (ref -9–1.8)
BASE EXCESS BLDA CALC-SCNC: 9.6 MMOL/L (ref -9–1.8)
BASE EXCESS BLDV CALC-SCNC: -0.9 MMOL/L (ref -7.7–1.9)
BASE EXCESS BLDV CALC-SCNC: 1.1 MMOL/L (ref -7.7–1.9)
BASE EXCESS BLDV CALC-SCNC: 12.4 MMOL/L (ref -7.7–1.9)
BASE EXCESS BLDV CALC-SCNC: 13.6 MMOL/L (ref -7.7–1.9)
BASE EXCESS BLDV CALC-SCNC: 3.6 MMOL/L (ref -7.7–1.9)
BASE EXCESS BLDV CALC-SCNC: 5.9 MMOL/L (ref -7.7–1.9)
BASE EXCESS BLDV CALC-SCNC: 8.9 MMOL/L (ref -7.7–1.9)
BASE EXCESS BLDV CALC-SCNC: 9.3 MMOL/L (ref -7.7–1.9)
BASO STIPL BLD QL SMEAR: PRESENT
BASOPHILS # BLD AUTO: 0 10E3/UL (ref 0–0.2)
BASOPHILS # BLD AUTO: 0 10E9/L (ref 0–0.2)
BASOPHILS # BLD AUTO: 0.1 10E3/UL (ref 0–0.2)
BASOPHILS # BLD MANUAL: 0.1 10E3/UL (ref 0–0.2)
BASOPHILS NFR BLD AUTO: 0 %
BASOPHILS NFR BLD AUTO: 0.2 %
BASOPHILS NFR BLD AUTO: 0.4 %
BASOPHILS NFR BLD AUTO: 1 %
BASOPHILS NFR BLD MANUAL: 1 %
BILIRUB DIRECT SERPL-MCNC: 0.2 MG/DL (ref 0–0.2)
BILIRUB SERPL-MCNC: 0.3 MG/DL (ref 0.2–1.3)
BILIRUB SERPL-MCNC: 0.4 MG/DL (ref 0.2–1.2)
BILIRUB SERPL-MCNC: 0.4 MG/DL (ref 0.2–1.3)
BILIRUB SERPL-MCNC: 0.4 MG/DL (ref 0.2–1.3)
BILIRUB SERPL-MCNC: 0.5 MG/DL (ref 0.2–1.3)
BILIRUB SERPL-MCNC: 0.6 MG/DL (ref 0.2–1.2)
BILIRUB SERPL-MCNC: 0.6 MG/DL (ref 0.2–1.3)
BILIRUB SERPL-MCNC: 0.7 MG/DL (ref 0.2–1.3)
BILIRUB SERPL-MCNC: 0.8 MG/DL (ref 0.2–1.3)
BILIRUB UR QL STRIP: NEGATIVE
BLD GP AB SCN SERPL QL: NORMAL
BLD PROD TYP BPU: NORMAL
BLD UNIT ID BPU: 0
BLD UNIT ID BPU: 0
BLOOD BANK CMNT PATIENT-IMP: NORMAL
BLOOD COMPONENT TYPE: NORMAL
BLOOD PRODUCT CODE: NORMAL
BLOOD PRODUCT CODE: NORMAL
BNP SERPL-MCNC: 127 PG/ML
BNP SERPL-MCNC: 235 PG/ML
BPU ID: NORMAL
BPU ID: NORMAL
BUN SERPL-MCNC: 101 MG/DL (ref 7–30)
BUN SERPL-MCNC: 103 MG/DL (ref 7–30)
BUN SERPL-MCNC: 104 MG/DL (ref 7–30)
BUN SERPL-MCNC: 107 MG/DL (ref 7–30)
BUN SERPL-MCNC: 111 MG/DL (ref 7–30)
BUN SERPL-MCNC: 112 MG/DL (ref 7–30)
BUN SERPL-MCNC: 113 MG/DL (ref 7–30)
BUN SERPL-MCNC: 113 MG/DL (ref 7–30)
BUN SERPL-MCNC: 115 MG/DL (ref 7–30)
BUN SERPL-MCNC: 115 MG/DL (ref 7–30)
BUN SERPL-MCNC: 116 MG/DL (ref 7–30)
BUN SERPL-MCNC: 118 MG/DL (ref 7–30)
BUN SERPL-MCNC: 120 MG/DL (ref 7–30)
BUN SERPL-MCNC: 121 MG/DL (ref 7–30)
BUN SERPL-MCNC: 121 MG/DL (ref 7–30)
BUN SERPL-MCNC: 122 MG/DL (ref 7–30)
BUN SERPL-MCNC: 133 MG/DL (ref 7–30)
BUN SERPL-MCNC: 134 MG/DL (ref 7–30)
BUN SERPL-MCNC: 136 MG/DL (ref 7–30)
BUN SERPL-MCNC: 142 MG/DL (ref 7–30)
BUN SERPL-MCNC: 146 MG/DL (ref 7–30)
BUN SERPL-MCNC: 157 MG/DL (ref 7–30)
BUN SERPL-MCNC: 159 MG/DL (ref 7–30)
BUN SERPL-MCNC: 161 MG/DL (ref 7–30)
BUN SERPL-MCNC: 164 MG/DL (ref 7–30)
BUN SERPL-MCNC: 169 MG/DL (ref 7–30)
BUN SERPL-MCNC: 170 MG/DL (ref 7–30)
BUN SERPL-MCNC: 174 MG/DL (ref 7–30)
BUN SERPL-MCNC: 191 MG/DL (ref 7–30)
BUN SERPL-MCNC: 194 MG/DL (ref 7–30)
BUN SERPL-MCNC: 201 MG/DL (ref 7–30)
BUN SERPL-MCNC: 27 MG/DL (ref 7–30)
BUN SERPL-MCNC: 34 MG/DL (ref 7–30)
BUN SERPL-MCNC: 34 MG/DL (ref 7–30)
BUN SERPL-MCNC: 34 MG/DL (ref 8–26)
BUN SERPL-MCNC: 35 MG/DL (ref 7–30)
BUN SERPL-MCNC: 36 MG/DL (ref 7–30)
BUN SERPL-MCNC: 36 MG/DL (ref 8–26)
BUN SERPL-MCNC: 39 MG/DL (ref 7–30)
BUN SERPL-MCNC: 40 MG/DL (ref 7–30)
BUN SERPL-MCNC: 41 MG/DL (ref 7–30)
BUN SERPL-MCNC: 41 MG/DL (ref 7–30)
BUN SERPL-MCNC: 43 MG/DL (ref 7–30)
BUN SERPL-MCNC: 43 MG/DL (ref 7–30)
BUN SERPL-MCNC: 45 MG/DL (ref 7–30)
BUN SERPL-MCNC: 48 MG/DL (ref 7–30)
BUN SERPL-MCNC: 49 MG/DL (ref 7–30)
BUN SERPL-MCNC: 50 MG/DL (ref 7–30)
BUN SERPL-MCNC: 51 MG/DL (ref 7–30)
BUN SERPL-MCNC: 52 MG/DL (ref 7–30)
BUN SERPL-MCNC: 52 MG/DL (ref 7–30)
BUN SERPL-MCNC: 52 MG/DL (ref 8–26)
BUN SERPL-MCNC: 54 MG/DL (ref 7–30)
BUN SERPL-MCNC: 55 MG/DL (ref 7–30)
BUN SERPL-MCNC: 59 MG/DL (ref 7–30)
BUN SERPL-MCNC: 64 MG/DL (ref 7–30)
BUN SERPL-MCNC: 65 MG/DL (ref 7–30)
BUN SERPL-MCNC: 71 MG/DL (ref 7–30)
BUN SERPL-MCNC: 73 MG/DL (ref 7–30)
BUN SERPL-MCNC: 77 MG/DL (ref 7–30)
BUN SERPL-MCNC: 80 MG/DL (ref 7–30)
BUN SERPL-MCNC: 80 MG/DL (ref 7–30)
BUN SERPL-MCNC: 85 MG/DL (ref 7–30)
BUN SERPL-MCNC: 86 MG/DL (ref 7–30)
BUN SERPL-MCNC: 92 MG/DL (ref 7–30)
BUN SERPL-MCNC: 95 MG/DL (ref 7–30)
BUN SERPL-MCNC: 95 MG/DL (ref 7–30)
BUN SERPL-MCNC: 96 MG/DL (ref 7–30)
BUN SERPL-MCNC: 98 MG/DL (ref 7–30)
BUN SERPL-MCNC: 98 MG/DL (ref 7–30)
BUN SERPL-MCNC: 99 MG/DL (ref 7–30)
CA-I BLD-MCNC: 4.9 MG/DL (ref 4.4–5.2)
CALCIUM SERPL-MCNC: 10.3 MG/DL (ref 8.5–10.1)
CALCIUM SERPL-MCNC: 8 MG/DL (ref 8.5–10.1)
CALCIUM SERPL-MCNC: 8.1 MG/DL (ref 8.5–10.1)
CALCIUM SERPL-MCNC: 8.2 MG/DL (ref 8.5–10.1)
CALCIUM SERPL-MCNC: 8.2 MG/DL (ref 8.5–10.1)
CALCIUM SERPL-MCNC: 8.3 MG/DL (ref 8.5–10.1)
CALCIUM SERPL-MCNC: 8.4 MG/DL (ref 8.5–10.1)
CALCIUM SERPL-MCNC: 8.5 MG/DL (ref 8.5–10.1)
CALCIUM SERPL-MCNC: 8.6 MG/DL (ref 8.5–10.1)
CALCIUM SERPL-MCNC: 8.6 MG/DL (ref 8.5–10.1)
CALCIUM SERPL-MCNC: 8.7 MG/DL (ref 8.5–10.1)
CALCIUM SERPL-MCNC: 8.8 MG/DL (ref 8.5–10.1)
CALCIUM SERPL-MCNC: 8.9 MG/DL (ref 8.4–10.2)
CALCIUM SERPL-MCNC: 8.9 MG/DL (ref 8.5–10.1)
CALCIUM SERPL-MCNC: 9 MG/DL (ref 8.4–10.2)
CALCIUM SERPL-MCNC: 9 MG/DL (ref 8.5–10.1)
CALCIUM SERPL-MCNC: 9.1 MG/DL (ref 8.5–10.1)
CALCIUM SERPL-MCNC: 9.2 MG/DL (ref 8.4–10.2)
CALCIUM SERPL-MCNC: 9.2 MG/DL (ref 8.5–10.1)
CALCIUM SERPL-MCNC: 9.3 MG/DL (ref 8.5–10.1)
CALCIUM SERPL-MCNC: 9.4 MG/DL (ref 8.5–10.1)
CALCIUM SERPL-MCNC: 9.5 MG/DL (ref 8.5–10.1)
CALCIUM SERPL-MCNC: 9.6 MG/DL (ref 8.5–10.1)
CALCIUM SERPL-MCNC: 9.7 MG/DL (ref 8.5–10.1)
CALCIUM SERPL-MCNC: 9.7 MG/DL (ref 8.5–10.1)
CALCIUM SERPL-MCNC: 9.8 MG/DL (ref 8.5–10.1)
CALCIUM SERPL-MCNC: 9.8 MG/DL (ref 8.5–10.1)
CALCIUM SERPL-MCNC: 9.9 MG/DL (ref 8.5–10.1)
CHLORIDE BLD-SCNC: 100 MMOL/L (ref 94–109)
CHLORIDE BLD-SCNC: 101 MMOL/L (ref 94–109)
CHLORIDE BLD-SCNC: 102 MMOL/L (ref 94–109)
CHLORIDE BLD-SCNC: 102 MMOL/L (ref 94–109)
CHLORIDE BLD-SCNC: 103 MMOL/L (ref 94–109)
CHLORIDE BLD-SCNC: 104 MMOL/L (ref 94–109)
CHLORIDE BLD-SCNC: 105 MMOL/L (ref 94–109)
CHLORIDE BLD-SCNC: 106 MMOL/L (ref 94–109)
CHLORIDE BLD-SCNC: 107 MMOL/L (ref 94–109)
CHLORIDE BLD-SCNC: 108 MMOL/L (ref 94–109)
CHLORIDE BLD-SCNC: 109 MMOL/L (ref 94–109)
CHLORIDE BLD-SCNC: 113 MMOL/L (ref 94–109)
CHLORIDE BLD-SCNC: 94 MMOL/L (ref 94–109)
CHLORIDE BLD-SCNC: 95 MMOL/L (ref 94–109)
CHLORIDE BLD-SCNC: 95 MMOL/L (ref 94–109)
CHLORIDE BLD-SCNC: 96 MMOL/L (ref 94–109)
CHLORIDE BLD-SCNC: 97 MMOL/L (ref 94–109)
CHLORIDE BLD-SCNC: 98 MMOL/L (ref 94–109)
CHLORIDE BLD-SCNC: 99 MMOL/L (ref 94–109)
CHLORIDE SERPL-SCNC: 101 MMOL/L (ref 94–109)
CHLORIDE SERPL-SCNC: 109 MMOL/L (ref 94–109)
CHLORIDE SERPL-SCNC: 112 MMOL/L (ref 94–109)
CHLORIDE SERPL-SCNC: 112 MMOL/L (ref 94–109)
CHLORIDE SERPL-SCNC: 113 MMOL/L (ref 94–109)
CHLORIDE SERPL-SCNC: 113 MMOL/L (ref 94–109)
CHLORIDE SERPL-SCNC: 118 MMOL/L (ref 94–109)
CHLORIDE SERPL-SCNC: 118 MMOL/L (ref 94–109)
CHLORIDE SERPLBLD-SCNC: 101 MMOL/L (ref 98–107)
CHLORIDE SERPLBLD-SCNC: 102 MMOL/L (ref 98–107)
CHLORIDE SERPLBLD-SCNC: 108 MMOL/L (ref 98–107)
CHOLEST SERPL-MCNC: 97 MG/DL
CITROBACTER SPECIES: NOT DETECTED
CK SERPL-CCNC: 18 U/L (ref 30–300)
CK SERPL-CCNC: 212 U/L (ref 30–300)
CK SERPL-CCNC: 315 U/L (ref 30–300)
CO2 SERPL-SCNC: 22 MMOL/L (ref 20–32)
CO2 SERPL-SCNC: 22 MMOL/L (ref 20–32)
CO2 SERPL-SCNC: 23 MMOL/L (ref 20–32)
CO2 SERPL-SCNC: 23 MMOL/L (ref 20–32)
CO2 SERPL-SCNC: 23 MMOL/L (ref 22–29)
CO2 SERPL-SCNC: 24 MMOL/L (ref 20–32)
CO2 SERPL-SCNC: 24 MMOL/L (ref 20–32)
CO2 SERPL-SCNC: 25 MMOL/L (ref 20–32)
CO2 SERPL-SCNC: 26 MMOL/L (ref 20–32)
CO2 SERPL-SCNC: 27 MMOL/L (ref 20–32)
CO2 SERPL-SCNC: 27 MMOL/L (ref 20–32)
CO2 SERPL-SCNC: 27 MMOL/L (ref 22–29)
CO2 SERPL-SCNC: 28 MMOL/L (ref 20–32)
CO2 SERPL-SCNC: 29 MMOL/L (ref 20–32)
CO2 SERPL-SCNC: 29 MMOL/L (ref 22–29)
CO2 SERPL-SCNC: 30 MMOL/L (ref 20–32)
CO2 SERPL-SCNC: 31 MMOL/L (ref 20–32)
CO2 SERPL-SCNC: 32 MMOL/L (ref 20–32)
CO2 SERPL-SCNC: 33 MMOL/L (ref 20–32)
CO2 SERPL-SCNC: 33 MMOL/L (ref 20–32)
CO2 SERPL-SCNC: 34 MMOL/L (ref 20–32)
CO2 SERPL-SCNC: 35 MMOL/L (ref 20–32)
CO2 SERPL-SCNC: 36 MMOL/L (ref 20–32)
CO2 SERPL-SCNC: 37 MMOL/L (ref 20–32)
CO2 SERPL-SCNC: 39 MMOL/L (ref 20–32)
CO2 SERPL-SCNC: 41 MMOL/L (ref 20–32)
CO2 SERPL-SCNC: 43 MMOL/L (ref 20–32)
CODING SYSTEM: NORMAL
COLOR FLD: ABNORMAL
COLOR UR AUTO: ABNORMAL
COLOR UR AUTO: YELLOW
COPATH REPORT: NORMAL
CREAT BLD-MCNC: 1.8 MG/DL (ref 0.7–1.3)
CREAT SERPL-MCNC: 1.54 MG/DL (ref 0.66–1.25)
CREAT SERPL-MCNC: 1.61 MG/DL (ref 0.66–1.25)
CREAT SERPL-MCNC: 1.63 MG/DL (ref 0.66–1.25)
CREAT SERPL-MCNC: 1.66 MG/DL (ref 0.66–1.25)
CREAT SERPL-MCNC: 1.66 MG/DL (ref 0.66–1.25)
CREAT SERPL-MCNC: 1.67 MG/DL (ref 0.66–1.25)
CREAT SERPL-MCNC: 1.68 MG/DL (ref 0.66–1.25)
CREAT SERPL-MCNC: 1.69 MG/DL (ref 0.66–1.25)
CREAT SERPL-MCNC: 1.72 MG/DL (ref 0.66–1.25)
CREAT SERPL-MCNC: 1.73 MG/DL (ref 0.66–1.25)
CREAT SERPL-MCNC: 1.74 MG/DL (ref 0.66–1.25)
CREAT SERPL-MCNC: 1.74 MG/DL (ref 0.66–1.25)
CREAT SERPL-MCNC: 1.78 MG/DL (ref 0.66–1.25)
CREAT SERPL-MCNC: 1.82 MG/DL (ref 0.66–1.25)
CREAT SERPL-MCNC: 1.86 MG/DL (ref 0.66–1.25)
CREAT SERPL-MCNC: 1.87 MG/DL (ref 0.66–1.25)
CREAT SERPL-MCNC: 1.89 MG/DL (ref 0.66–1.25)
CREAT SERPL-MCNC: 1.9 MG/DL (ref 0.7–1.2)
CREAT SERPL-MCNC: 1.93 MG/DL (ref 0.66–1.25)
CREAT SERPL-MCNC: 1.94 MG/DL (ref 0.66–1.25)
CREAT SERPL-MCNC: 1.94 MG/DL (ref 0.66–1.25)
CREAT SERPL-MCNC: 1.95 MG/DL (ref 0.66–1.25)
CREAT SERPL-MCNC: 1.97 MG/DL (ref 0.66–1.25)
CREAT SERPL-MCNC: 1.97 MG/DL (ref 0.66–1.25)
CREAT SERPL-MCNC: 1.99 MG/DL (ref 0.66–1.25)
CREAT SERPL-MCNC: 2 MG/DL (ref 0.66–1.25)
CREAT SERPL-MCNC: 2 MG/DL (ref 0.7–1.2)
CREAT SERPL-MCNC: 2.02 MG/DL (ref 0.66–1.25)
CREAT SERPL-MCNC: 2.03 MG/DL (ref 0.66–1.25)
CREAT SERPL-MCNC: 2.05 MG/DL (ref 0.66–1.25)
CREAT SERPL-MCNC: 2.07 MG/DL (ref 0.66–1.25)
CREAT SERPL-MCNC: 2.1 MG/DL (ref 0.66–1.25)
CREAT SERPL-MCNC: 2.1 MG/DL (ref 0.7–1.2)
CREAT SERPL-MCNC: 2.11 MG/DL (ref 0.66–1.25)
CREAT SERPL-MCNC: 2.14 MG/DL (ref 0.66–1.25)
CREAT SERPL-MCNC: 2.17 MG/DL (ref 0.66–1.25)
CREAT SERPL-MCNC: 2.17 MG/DL (ref 0.66–1.25)
CREAT SERPL-MCNC: 2.18 MG/DL (ref 0.66–1.25)
CREAT SERPL-MCNC: 2.19 MG/DL (ref 0.66–1.25)
CREAT SERPL-MCNC: 2.2 MG/DL (ref 0.66–1.25)
CREAT SERPL-MCNC: 2.2 MG/DL (ref 0.66–1.25)
CREAT SERPL-MCNC: 2.21 MG/DL (ref 0.66–1.25)
CREAT SERPL-MCNC: 2.23 MG/DL (ref 0.66–1.25)
CREAT SERPL-MCNC: 2.25 MG/DL (ref 0.66–1.25)
CREAT SERPL-MCNC: 2.27 MG/DL (ref 0.66–1.25)
CREAT SERPL-MCNC: 2.27 MG/DL (ref 0.66–1.25)
CREAT SERPL-MCNC: 2.28 MG/DL (ref 0.66–1.25)
CREAT SERPL-MCNC: 2.29 MG/DL (ref 0.66–1.25)
CREAT SERPL-MCNC: 2.32 MG/DL (ref 0.66–1.25)
CREAT SERPL-MCNC: 2.33 MG/DL (ref 0.66–1.25)
CREAT SERPL-MCNC: 2.35 MG/DL (ref 0.66–1.25)
CREAT SERPL-MCNC: 2.35 MG/DL (ref 0.66–1.25)
CREAT SERPL-MCNC: 2.36 MG/DL (ref 0.66–1.25)
CREAT SERPL-MCNC: 2.38 MG/DL (ref 0.66–1.25)
CREAT SERPL-MCNC: 2.39 MG/DL (ref 0.66–1.25)
CREAT SERPL-MCNC: 2.43 MG/DL (ref 0.66–1.25)
CREAT SERPL-MCNC: 2.44 MG/DL (ref 0.66–1.25)
CREAT SERPL-MCNC: 2.44 MG/DL (ref 0.66–1.25)
CREAT SERPL-MCNC: 2.48 MG/DL (ref 0.66–1.25)
CREAT SERPL-MCNC: 2.5 MG/DL (ref 0.66–1.25)
CREAT SERPL-MCNC: 2.51 MG/DL (ref 0.66–1.25)
CREAT SERPL-MCNC: 2.58 MG/DL (ref 0.66–1.25)
CREAT SERPL-MCNC: 2.67 MG/DL (ref 0.66–1.25)
CREAT SERPL-MCNC: 2.67 MG/DL (ref 0.66–1.25)
CREAT SERPL-MCNC: 2.68 MG/DL (ref 0.66–1.25)
CREAT SERPL-MCNC: 2.7 MG/DL (ref 0.66–1.25)
CREAT SERPL-MCNC: 2.72 MG/DL (ref 0.66–1.25)
CREAT SERPL-MCNC: 2.73 MG/DL (ref 0.66–1.25)
CREAT SERPL-MCNC: 2.75 MG/DL (ref 0.66–1.25)
CREAT SERPL-MCNC: 2.9 MG/DL (ref 0.66–1.25)
CREAT SERPL-MCNC: 2.94 MG/DL (ref 0.66–1.25)
CREAT SERPL-MCNC: 3.36 MG/DL (ref 0.66–1.25)
CREAT SERPL-MCNC: 3.74 MG/DL (ref 0.66–1.25)
CREAT SERPL-MCNC: 4.38 MG/DL (ref 0.66–1.25)
CREAT UR-MCNC: 153 MG/DL
CREAT UR-MCNC: 68 MG/DL
CREAT UR-MCNC: 69 MG/DL
CROSSMATCH: NORMAL
CRP SERPL-MCNC: 14.8 MG/L (ref 0–8)
CRP SERPL-MCNC: 17.3 MG/L (ref 0–8)
CRP SERPL-MCNC: 19.7 MG/L (ref 0–8)
CRP SERPL-MCNC: 26.8 MG/L (ref 0–8)
CRP SERPL-MCNC: 39.8 MG/L (ref 0–8)
CRP SERPL-MCNC: 6.7 MG/L (ref 0–8)
CRP SERPL-MCNC: 7.4 MG/L (ref 0–8)
CRP SERPL-MCNC: 8.6 MG/L (ref 0–8)
CRP SERPL-MCNC: <2.9 MG/L (ref 0–8)
CTX-M: NOT DETECTED
CV STRESS MAX HR HE: 71
D DIMER PPP FEU-MCNC: 2.06 UG/ML FEU (ref 0–0.5)
D DIMER PPP FEU-MCNC: 2.28 UG/ML FEU (ref 0–0.5)
D DIMER PPP FEU-MCNC: 2.32 UG/ML FEU (ref 0–0.5)
D DIMER PPP FEU-MCNC: 2.58 UG/ML FEU (ref 0–0.5)
D DIMER PPP FEU-MCNC: 2.83 UG/ML FEU (ref 0–0.5)
D DIMER PPP FEU-MCNC: 3.22 UG/ML FEU (ref 0–0.5)
DEPRECATED CALCIDIOL+CALCIFEROL SERPL-MC: 24 UG/L (ref 20–75)
DIASTOLIC BLOOD PRESSURE - MUSE: NORMAL MMHG
DIFFERENTIAL METHOD BLD: ABNORMAL
ELLIPTOCYTES BLD QL SMEAR: SLIGHT
ENTEROBACTER SPECIES: NOT DETECTED
EOSINOPHIL # BLD AUTO: 0 10E3/UL (ref 0–0.7)
EOSINOPHIL # BLD AUTO: 0 10E9/L (ref 0–0.7)
EOSINOPHIL # BLD AUTO: 0.1 10E3/UL (ref 0–0.7)
EOSINOPHIL # BLD AUTO: 0.2 10E3/UL (ref 0–0.7)
EOSINOPHIL # BLD AUTO: 0.4 10E3/UL (ref 0–0.7)
EOSINOPHIL # BLD AUTO: 0.4 10E9/L (ref 0–0.7)
EOSINOPHIL # BLD AUTO: 0.4 10E9/L (ref 0–0.7)
EOSINOPHIL # BLD AUTO: 0.5 10E3/UL (ref 0–0.7)
EOSINOPHIL # BLD AUTO: 0.5 10E9/L (ref 0–0.7)
EOSINOPHIL # BLD AUTO: 0.7 10E3/UL (ref 0–0.7)
EOSINOPHIL # BLD MANUAL: 0.2 10E3/UL (ref 0–0.7)
EOSINOPHIL NFR BLD AUTO: 0 %
EOSINOPHIL NFR BLD AUTO: 1 %
EOSINOPHIL NFR BLD AUTO: 2 %
EOSINOPHIL NFR BLD AUTO: 3 %
EOSINOPHIL NFR BLD AUTO: 4.8 %
EOSINOPHIL NFR BLD AUTO: 5 %
EOSINOPHIL NFR BLD AUTO: 5.4 %
EOSINOPHIL NFR BLD AUTO: 5.7 %
EOSINOPHIL NFR BLD AUTO: 6 %
EOSINOPHIL NFR BLD AUTO: 7 %
EOSINOPHIL NFR BLD MANUAL: 3 %
ERYTHROCYTE [DISTWIDTH] IN BLOOD BY AUTOMATED COUNT: 14.3 % (ref 11.5–14.5)
ERYTHROCYTE [DISTWIDTH] IN BLOOD BY AUTOMATED COUNT: 15.1 % (ref 10–15)
ERYTHROCYTE [DISTWIDTH] IN BLOOD BY AUTOMATED COUNT: 15.3 % (ref 10–15)
ERYTHROCYTE [DISTWIDTH] IN BLOOD BY AUTOMATED COUNT: 15.3 % (ref 10–15)
ERYTHROCYTE [DISTWIDTH] IN BLOOD BY AUTOMATED COUNT: 15.5 % (ref 10–15)
ERYTHROCYTE [DISTWIDTH] IN BLOOD BY AUTOMATED COUNT: 15.6 % (ref 10–15)
ERYTHROCYTE [DISTWIDTH] IN BLOOD BY AUTOMATED COUNT: 15.6 % (ref 10–15)
ERYTHROCYTE [DISTWIDTH] IN BLOOD BY AUTOMATED COUNT: 15.8 % (ref 10–15)
ERYTHROCYTE [DISTWIDTH] IN BLOOD BY AUTOMATED COUNT: 15.9 % (ref 10–15)
ERYTHROCYTE [DISTWIDTH] IN BLOOD BY AUTOMATED COUNT: 15.9 % (ref 10–15)
ERYTHROCYTE [DISTWIDTH] IN BLOOD BY AUTOMATED COUNT: 16 % (ref 10–15)
ERYTHROCYTE [DISTWIDTH] IN BLOOD BY AUTOMATED COUNT: 16.2 % (ref 10–15)
ERYTHROCYTE [DISTWIDTH] IN BLOOD BY AUTOMATED COUNT: 16.2 % (ref 10–15)
ERYTHROCYTE [DISTWIDTH] IN BLOOD BY AUTOMATED COUNT: 16.3 % (ref 10–15)
ERYTHROCYTE [DISTWIDTH] IN BLOOD BY AUTOMATED COUNT: 16.3 % (ref 10–15)
ERYTHROCYTE [DISTWIDTH] IN BLOOD BY AUTOMATED COUNT: 16.4 % (ref 10–15)
ERYTHROCYTE [DISTWIDTH] IN BLOOD BY AUTOMATED COUNT: 16.4 % (ref 10–15)
ERYTHROCYTE [DISTWIDTH] IN BLOOD BY AUTOMATED COUNT: 16.6 % (ref 10–15)
ERYTHROCYTE [DISTWIDTH] IN BLOOD BY AUTOMATED COUNT: 16.7 % (ref 10–15)
ERYTHROCYTE [DISTWIDTH] IN BLOOD BY AUTOMATED COUNT: 17 % (ref 10–15)
ERYTHROCYTE [DISTWIDTH] IN BLOOD BY AUTOMATED COUNT: 17.2 % (ref 10–15)
ERYTHROCYTE [DISTWIDTH] IN BLOOD BY AUTOMATED COUNT: 17.2 % (ref 10–15)
ERYTHROCYTE [DISTWIDTH] IN BLOOD BY AUTOMATED COUNT: 17.7 % (ref 10–15)
ERYTHROCYTE [DISTWIDTH] IN BLOOD BY AUTOMATED COUNT: 17.8 % (ref 10–15)
ERYTHROCYTE [DISTWIDTH] IN BLOOD BY AUTOMATED COUNT: 18.6 % (ref 10–15)
ERYTHROCYTE [DISTWIDTH] IN BLOOD BY AUTOMATED COUNT: 18.6 % (ref 10–15)
ERYTHROCYTE [DISTWIDTH] IN BLOOD BY AUTOMATED COUNT: 18.8 % (ref 10–15)
ERYTHROCYTE [DISTWIDTH] IN BLOOD BY AUTOMATED COUNT: 19 % (ref 10–15)
ERYTHROCYTE [DISTWIDTH] IN BLOOD BY AUTOMATED COUNT: 19.8 % (ref 10–15)
ERYTHROCYTE [DISTWIDTH] IN BLOOD BY AUTOMATED COUNT: 19.9 % (ref 10–15)
ERYTHROCYTE [DISTWIDTH] IN BLOOD BY AUTOMATED COUNT: 20.1 % (ref 10–15)
ERYTHROCYTE [DISTWIDTH] IN BLOOD BY AUTOMATED COUNT: 20.2 % (ref 10–15)
ERYTHROCYTE [DISTWIDTH] IN BLOOD BY AUTOMATED COUNT: 20.2 % (ref 10–15)
ERYTHROCYTE [DISTWIDTH] IN BLOOD BY AUTOMATED COUNT: 20.5 % (ref 10–15)
ERYTHROCYTE [DISTWIDTH] IN BLOOD BY AUTOMATED COUNT: 20.6 % (ref 10–15)
ERYTHROCYTE [DISTWIDTH] IN BLOOD BY AUTOMATED COUNT: 20.6 % (ref 10–15)
ERYTHROCYTE [DISTWIDTH] IN BLOOD BY AUTOMATED COUNT: 20.7 % (ref 10–15)
ERYTHROCYTE [DISTWIDTH] IN BLOOD BY AUTOMATED COUNT: 21.6 % (ref 10–15)
ERYTHROCYTE [DISTWIDTH] IN BLOOD BY AUTOMATED COUNT: 21.7 % (ref 10–15)
ERYTHROCYTE [DISTWIDTH] IN BLOOD BY AUTOMATED COUNT: 21.9 % (ref 10–15)
ERYTHROCYTE [DISTWIDTH] IN BLOOD BY AUTOMATED COUNT: 21.9 % (ref 10–15)
ERYTHROCYTE [DISTWIDTH] IN BLOOD BY AUTOMATED COUNT: 22.1 % (ref 10–15)
ERYTHROCYTE [DISTWIDTH] IN BLOOD BY AUTOMATED COUNT: 22.2 % (ref 10–15)
ERYTHROCYTE [DISTWIDTH] IN BLOOD BY AUTOMATED COUNT: 22.4 % (ref 10–15)
ERYTHROCYTE [DISTWIDTH] IN BLOOD BY AUTOMATED COUNT: 22.5 % (ref 10–15)
ERYTHROCYTE [DISTWIDTH] IN BLOOD BY AUTOMATED COUNT: 22.6 % (ref 10–15)
ERYTHROCYTE [DISTWIDTH] IN BLOOD BY AUTOMATED COUNT: 22.7 % (ref 10–15)
ERYTHROCYTE [DISTWIDTH] IN BLOOD BY AUTOMATED COUNT: 22.8 % (ref 10–15)
ERYTHROCYTE [DISTWIDTH] IN BLOOD BY AUTOMATED COUNT: 22.9 % (ref 10–15)
ERYTHROCYTE [DISTWIDTH] IN BLOOD BY AUTOMATED COUNT: 23.2 % (ref 10–15)
ERYTHROCYTE [DISTWIDTH] IN BLOOD BY AUTOMATED COUNT: 23.4 % (ref 10–15)
ERYTHROCYTE [DISTWIDTH] IN BLOOD BY AUTOMATED COUNT: 23.9 % (ref 10–15)
ERYTHROCYTE [SEDIMENTATION RATE] IN BLOOD BY WESTERGREN METHOD: 11 MM/HR (ref 0–20)
ERYTHROCYTE [SEDIMENTATION RATE] IN BLOOD: 15 MM/HR (ref 5–15)
ESCHERICHIA COLI: NOT DETECTED
FASTING STATUS PATIENT QL REPORTED: YES
FERRITIN SERPL-MCNC: 73.9 NG/ML (ref 21.8–274.7)
FLEXIBLE SIGMOIDOSCOPY: NORMAL
FRACT EXCRET NA UR+SERPL-RTO: 0.2 %
FRACT EXCRET NA UR+SERPL-RTO: 0.5 %
FRACT EXCRET NA UR+SERPL-RTO: 0.7 %
GFR SERPL CREATININE-BSD FRML MDRD: 12 ML/MIN/1.73M2
GFR SERPL CREATININE-BSD FRML MDRD: 15 ML/MIN/{1.73_M2}
GFR SERPL CREATININE-BSD FRML MDRD: 17 ML/MIN/1.73M2
GFR SERPL CREATININE-BSD FRML MDRD: 20 ML/MIN/1.73M2
GFR SERPL CREATININE-BSD FRML MDRD: 20 ML/MIN/1.73M2
GFR SERPL CREATININE-BSD FRML MDRD: 22 ML/MIN/1.73M2
GFR SERPL CREATININE-BSD FRML MDRD: 22 ML/MIN/{1.73_M2}
GFR SERPL CREATININE-BSD FRML MDRD: 23 ML/MIN/1.73M2
GFR SERPL CREATININE-BSD FRML MDRD: 24 ML/MIN/1.73M2
GFR SERPL CREATININE-BSD FRML MDRD: 25 ML/MIN/1.73M2
GFR SERPL CREATININE-BSD FRML MDRD: 26 ML/MIN/1.73M2
GFR SERPL CREATININE-BSD FRML MDRD: 26 ML/MIN/1.73M2
GFR SERPL CREATININE-BSD FRML MDRD: 26 ML/MIN/{1.73_M2}
GFR SERPL CREATININE-BSD FRML MDRD: 26 ML/MIN/{1.73_M2}
GFR SERPL CREATININE-BSD FRML MDRD: 27 ML/MIN/1.73M2
GFR SERPL CREATININE-BSD FRML MDRD: 27 ML/MIN/{1.73_M2}
GFR SERPL CREATININE-BSD FRML MDRD: 28 ML/MIN/1.73M2
GFR SERPL CREATININE-BSD FRML MDRD: 29 ML/MIN/1.73M2
GFR SERPL CREATININE-BSD FRML MDRD: 29 ML/MIN/1.73M2
GFR SERPL CREATININE-BSD FRML MDRD: 29 ML/MIN/{1.73_M2}
GFR SERPL CREATININE-BSD FRML MDRD: 30 ML/MIN/1.73M2
GFR SERPL CREATININE-BSD FRML MDRD: 31 ML/MIN/1.73M2
GFR SERPL CREATININE-BSD FRML MDRD: 31 ML/MIN/{1.73_M2}
GFR SERPL CREATININE-BSD FRML MDRD: 32 ML/MIN/1.73M2
GFR SERPL CREATININE-BSD FRML MDRD: 32 ML/MIN/{1.73_M2}
GFR SERPL CREATININE-BSD FRML MDRD: 33 ML/MIN/1.73M2
GFR SERPL CREATININE-BSD FRML MDRD: 34 ML/MIN/1.73M2
GFR SERPL CREATININE-BSD FRML MDRD: 34 ML/MIN/1.73M2
GFR SERPL CREATININE-BSD FRML MDRD: 34 ML/MIN/{1.73_M2}
GFR SERPL CREATININE-BSD FRML MDRD: 35 ML/MIN/1.73M2
GFR SERPL CREATININE-BSD FRML MDRD: 35 ML/MIN/1.73M2
GFR SERPL CREATININE-BSD FRML MDRD: 35 ML/MIN/{1.73_M2}
GFR SERPL CREATININE-BSD FRML MDRD: 36 ML/MIN/1.73M2
GFR SERPL CREATININE-BSD FRML MDRD: 37 ML/MIN/1.73M2
GFR SERPL CREATININE-BSD FRML MDRD: 38 ML/MIN/1.73M2
GFR SERPL CREATININE-BSD FRML MDRD: 38 ML/MIN/{1.73_M2}
GFR SERPL CREATININE-BSD FRML MDRD: 38 ML/MIN/{1.73_M2}
GFR SERPL CREATININE-BSD FRML MDRD: 39 ML/MIN/1.73M2
GFR SERPL CREATININE-BSD FRML MDRD: 40 ML/MIN/1.73M2
GFR SERPL CREATININE-BSD FRML MDRD: 40 ML/MIN/1.73M2
GFR SERPL CREATININE-BSD FRML MDRD: 41 ML/MIN/1.73M2
GFR SERPL CREATININE-BSD FRML MDRD: 41 ML/MIN/1.73M2
GFR SERPL CREATININE-BSD FRML MDRD: 43 ML/MIN/1.73M2
GLUCOSE BLD-MCNC: 106 MG/DL (ref 70–99)
GLUCOSE BLD-MCNC: 110 MG/DL (ref 70–99)
GLUCOSE BLD-MCNC: 117 MG/DL (ref 70–99)
GLUCOSE BLD-MCNC: 117 MG/DL (ref 70–99)
GLUCOSE BLD-MCNC: 124 MG/DL (ref 70–99)
GLUCOSE BLD-MCNC: 127 MG/DL (ref 70–99)
GLUCOSE BLD-MCNC: 132 MG/DL (ref 70–99)
GLUCOSE BLD-MCNC: 134 MG/DL (ref 70–99)
GLUCOSE BLD-MCNC: 135 MG/DL (ref 70–99)
GLUCOSE BLD-MCNC: 135 MG/DL (ref 70–99)
GLUCOSE BLD-MCNC: 142 MG/DL (ref 70–99)
GLUCOSE BLD-MCNC: 146 MG/DL (ref 70–99)
GLUCOSE BLD-MCNC: 147 MG/DL (ref 70–99)
GLUCOSE BLD-MCNC: 148 MG/DL (ref 70–99)
GLUCOSE BLD-MCNC: 148 MG/DL (ref 70–99)
GLUCOSE BLD-MCNC: 164 MG/DL (ref 70–99)
GLUCOSE BLD-MCNC: 165 MG/DL (ref 70–99)
GLUCOSE BLD-MCNC: 170 MG/DL (ref 70–99)
GLUCOSE BLD-MCNC: 173 MG/DL (ref 70–99)
GLUCOSE BLD-MCNC: 176 MG/DL (ref 70–99)
GLUCOSE BLD-MCNC: 177 MG/DL (ref 70–99)
GLUCOSE BLD-MCNC: 180 MG/DL (ref 70–99)
GLUCOSE BLD-MCNC: 181 MG/DL (ref 70–99)
GLUCOSE BLD-MCNC: 182 MG/DL (ref 70–99)
GLUCOSE BLD-MCNC: 187 MG/DL (ref 70–99)
GLUCOSE BLD-MCNC: 190 MG/DL (ref 70–99)
GLUCOSE BLD-MCNC: 195 MG/DL (ref 70–99)
GLUCOSE BLD-MCNC: 197 MG/DL (ref 70–99)
GLUCOSE BLD-MCNC: 201 MG/DL (ref 70–99)
GLUCOSE BLD-MCNC: 201 MG/DL (ref 70–99)
GLUCOSE BLD-MCNC: 208 MG/DL (ref 70–99)
GLUCOSE BLD-MCNC: 211 MG/DL (ref 70–99)
GLUCOSE BLD-MCNC: 218 MG/DL (ref 70–99)
GLUCOSE BLD-MCNC: 220 MG/DL (ref 70–99)
GLUCOSE BLD-MCNC: 222 MG/DL (ref 70–99)
GLUCOSE BLD-MCNC: 224 MG/DL (ref 70–99)
GLUCOSE BLD-MCNC: 225 MG/DL (ref 70–99)
GLUCOSE BLD-MCNC: 225 MG/DL (ref 70–99)
GLUCOSE BLD-MCNC: 226 MG/DL (ref 70–99)
GLUCOSE BLD-MCNC: 230 MG/DL (ref 70–99)
GLUCOSE BLD-MCNC: 237 MG/DL (ref 70–99)
GLUCOSE BLD-MCNC: 237 MG/DL (ref 70–99)
GLUCOSE BLD-MCNC: 250 MG/DL (ref 70–99)
GLUCOSE BLD-MCNC: 258 MG/DL (ref 70–99)
GLUCOSE BLD-MCNC: 267 MG/DL (ref 70–99)
GLUCOSE BLD-MCNC: 270 MG/DL (ref 70–99)
GLUCOSE BLD-MCNC: 273 MG/DL (ref 70–99)
GLUCOSE BLD-MCNC: 280 MG/DL (ref 70–99)
GLUCOSE BLD-MCNC: 281 MG/DL (ref 70–99)
GLUCOSE BLD-MCNC: 289 MG/DL (ref 70–99)
GLUCOSE BLD-MCNC: 296 MG/DL (ref 70–99)
GLUCOSE BLD-MCNC: 299 MG/DL (ref 70–99)
GLUCOSE BLD-MCNC: 300 MG/DL (ref 70–99)
GLUCOSE BLD-MCNC: 306 MG/DL (ref 70–99)
GLUCOSE BLD-MCNC: 312 MG/DL (ref 70–99)
GLUCOSE BLD-MCNC: 319 MG/DL (ref 70–99)
GLUCOSE BLD-MCNC: 407 MG/DL (ref 70–99)
GLUCOSE BLD-MCNC: 414 MG/DL (ref 70–99)
GLUCOSE BLD-MCNC: 43 MG/DL (ref 70–99)
GLUCOSE BLD-MCNC: 76 MG/DL (ref 70–99)
GLUCOSE BLD-MCNC: 78 MG/DL (ref 70–99)
GLUCOSE BLD-MCNC: 78 MG/DL (ref 70–99)
GLUCOSE BLD-MCNC: 79 MG/DL (ref 70–99)
GLUCOSE BLD-MCNC: 98 MG/DL (ref 70–99)
GLUCOSE BLD-MCNC: 98 MG/DL (ref 70–99)
GLUCOSE BLDC GLUCOMTR-MCNC: 100 MG/DL (ref 70–99)
GLUCOSE BLDC GLUCOMTR-MCNC: 100 MG/DL (ref 70–99)
GLUCOSE BLDC GLUCOMTR-MCNC: 102 MG/DL (ref 70–99)
GLUCOSE BLDC GLUCOMTR-MCNC: 103 MG/DL (ref 70–99)
GLUCOSE BLDC GLUCOMTR-MCNC: 104 MG/DL (ref 70–99)
GLUCOSE BLDC GLUCOMTR-MCNC: 105 MG/DL (ref 70–99)
GLUCOSE BLDC GLUCOMTR-MCNC: 107 MG/DL (ref 70–99)
GLUCOSE BLDC GLUCOMTR-MCNC: 108 MG/DL (ref 70–99)
GLUCOSE BLDC GLUCOMTR-MCNC: 110 MG/DL (ref 70–99)
GLUCOSE BLDC GLUCOMTR-MCNC: 111 MG/DL (ref 70–99)
GLUCOSE BLDC GLUCOMTR-MCNC: 112 MG/DL (ref 70–99)
GLUCOSE BLDC GLUCOMTR-MCNC: 113 MG/DL (ref 70–99)
GLUCOSE BLDC GLUCOMTR-MCNC: 113 MG/DL (ref 70–99)
GLUCOSE BLDC GLUCOMTR-MCNC: 114 MG/DL (ref 70–99)
GLUCOSE BLDC GLUCOMTR-MCNC: 115 MG/DL (ref 70–99)
GLUCOSE BLDC GLUCOMTR-MCNC: 116 MG/DL (ref 70–99)
GLUCOSE BLDC GLUCOMTR-MCNC: 118 MG/DL (ref 70–99)
GLUCOSE BLDC GLUCOMTR-MCNC: 119 MG/DL (ref 70–99)
GLUCOSE BLDC GLUCOMTR-MCNC: 120 MG/DL (ref 70–99)
GLUCOSE BLDC GLUCOMTR-MCNC: 123 MG/DL (ref 70–99)
GLUCOSE BLDC GLUCOMTR-MCNC: 124 MG/DL (ref 70–99)
GLUCOSE BLDC GLUCOMTR-MCNC: 125 MG/DL (ref 70–99)
GLUCOSE BLDC GLUCOMTR-MCNC: 127 MG/DL (ref 70–99)
GLUCOSE BLDC GLUCOMTR-MCNC: 128 MG/DL (ref 70–99)
GLUCOSE BLDC GLUCOMTR-MCNC: 129 MG/DL (ref 70–99)
GLUCOSE BLDC GLUCOMTR-MCNC: 130 MG/DL (ref 70–99)
GLUCOSE BLDC GLUCOMTR-MCNC: 132 MG/DL (ref 70–99)
GLUCOSE BLDC GLUCOMTR-MCNC: 134 MG/DL (ref 70–99)
GLUCOSE BLDC GLUCOMTR-MCNC: 134 MG/DL (ref 70–99)
GLUCOSE BLDC GLUCOMTR-MCNC: 135 MG/DL (ref 70–99)
GLUCOSE BLDC GLUCOMTR-MCNC: 135 MG/DL (ref 70–99)
GLUCOSE BLDC GLUCOMTR-MCNC: 136 MG/DL (ref 70–99)
GLUCOSE BLDC GLUCOMTR-MCNC: 137 MG/DL (ref 70–99)
GLUCOSE BLDC GLUCOMTR-MCNC: 138 MG/DL (ref 70–99)
GLUCOSE BLDC GLUCOMTR-MCNC: 138 MG/DL (ref 70–99)
GLUCOSE BLDC GLUCOMTR-MCNC: 139 MG/DL (ref 70–99)
GLUCOSE BLDC GLUCOMTR-MCNC: 139 MG/DL (ref 70–99)
GLUCOSE BLDC GLUCOMTR-MCNC: 140 MG/DL (ref 70–99)
GLUCOSE BLDC GLUCOMTR-MCNC: 142 MG/DL (ref 70–99)
GLUCOSE BLDC GLUCOMTR-MCNC: 144 MG/DL (ref 70–99)
GLUCOSE BLDC GLUCOMTR-MCNC: 145 MG/DL (ref 70–99)
GLUCOSE BLDC GLUCOMTR-MCNC: 146 MG/DL (ref 70–99)
GLUCOSE BLDC GLUCOMTR-MCNC: 147 MG/DL (ref 70–99)
GLUCOSE BLDC GLUCOMTR-MCNC: 147 MG/DL (ref 70–99)
GLUCOSE BLDC GLUCOMTR-MCNC: 148 MG/DL (ref 70–99)
GLUCOSE BLDC GLUCOMTR-MCNC: 149 MG/DL (ref 70–99)
GLUCOSE BLDC GLUCOMTR-MCNC: 150 MG/DL (ref 70–99)
GLUCOSE BLDC GLUCOMTR-MCNC: 151 MG/DL (ref 70–99)
GLUCOSE BLDC GLUCOMTR-MCNC: 152 MG/DL (ref 70–99)
GLUCOSE BLDC GLUCOMTR-MCNC: 153 MG/DL (ref 70–99)
GLUCOSE BLDC GLUCOMTR-MCNC: 153 MG/DL (ref 70–99)
GLUCOSE BLDC GLUCOMTR-MCNC: 154 MG/DL (ref 70–99)
GLUCOSE BLDC GLUCOMTR-MCNC: 155 MG/DL (ref 70–99)
GLUCOSE BLDC GLUCOMTR-MCNC: 156 MG/DL (ref 70–99)
GLUCOSE BLDC GLUCOMTR-MCNC: 156 MG/DL (ref 70–99)
GLUCOSE BLDC GLUCOMTR-MCNC: 157 MG/DL (ref 70–99)
GLUCOSE BLDC GLUCOMTR-MCNC: 158 MG/DL (ref 70–99)
GLUCOSE BLDC GLUCOMTR-MCNC: 159 MG/DL (ref 70–99)
GLUCOSE BLDC GLUCOMTR-MCNC: 159 MG/DL (ref 70–99)
GLUCOSE BLDC GLUCOMTR-MCNC: 160 MG/DL (ref 70–99)
GLUCOSE BLDC GLUCOMTR-MCNC: 161 MG/DL (ref 70–99)
GLUCOSE BLDC GLUCOMTR-MCNC: 162 MG/DL (ref 70–99)
GLUCOSE BLDC GLUCOMTR-MCNC: 162 MG/DL (ref 70–99)
GLUCOSE BLDC GLUCOMTR-MCNC: 163 MG/DL (ref 70–99)
GLUCOSE BLDC GLUCOMTR-MCNC: 165 MG/DL (ref 70–99)
GLUCOSE BLDC GLUCOMTR-MCNC: 166 MG/DL (ref 70–99)
GLUCOSE BLDC GLUCOMTR-MCNC: 167 MG/DL (ref 70–99)
GLUCOSE BLDC GLUCOMTR-MCNC: 168 MG/DL (ref 70–99)
GLUCOSE BLDC GLUCOMTR-MCNC: 169 MG/DL (ref 70–99)
GLUCOSE BLDC GLUCOMTR-MCNC: 171 MG/DL (ref 70–99)
GLUCOSE BLDC GLUCOMTR-MCNC: 172 MG/DL (ref 70–99)
GLUCOSE BLDC GLUCOMTR-MCNC: 173 MG/DL (ref 70–99)
GLUCOSE BLDC GLUCOMTR-MCNC: 174 MG/DL (ref 70–99)
GLUCOSE BLDC GLUCOMTR-MCNC: 175 MG/DL (ref 70–99)
GLUCOSE BLDC GLUCOMTR-MCNC: 176 MG/DL (ref 70–99)
GLUCOSE BLDC GLUCOMTR-MCNC: 177 MG/DL (ref 70–99)
GLUCOSE BLDC GLUCOMTR-MCNC: 178 MG/DL (ref 70–99)
GLUCOSE BLDC GLUCOMTR-MCNC: 180 MG/DL (ref 70–99)
GLUCOSE BLDC GLUCOMTR-MCNC: 180 MG/DL (ref 70–99)
GLUCOSE BLDC GLUCOMTR-MCNC: 181 MG/DL (ref 70–99)
GLUCOSE BLDC GLUCOMTR-MCNC: 185 MG/DL (ref 70–99)
GLUCOSE BLDC GLUCOMTR-MCNC: 187 MG/DL (ref 70–99)
GLUCOSE BLDC GLUCOMTR-MCNC: 187 MG/DL (ref 70–99)
GLUCOSE BLDC GLUCOMTR-MCNC: 188 MG/DL (ref 70–99)
GLUCOSE BLDC GLUCOMTR-MCNC: 189 MG/DL (ref 70–99)
GLUCOSE BLDC GLUCOMTR-MCNC: 189 MG/DL (ref 70–99)
GLUCOSE BLDC GLUCOMTR-MCNC: 190 MG/DL (ref 70–99)
GLUCOSE BLDC GLUCOMTR-MCNC: 191 MG/DL (ref 70–99)
GLUCOSE BLDC GLUCOMTR-MCNC: 191 MG/DL (ref 70–99)
GLUCOSE BLDC GLUCOMTR-MCNC: 192 MG/DL (ref 70–99)
GLUCOSE BLDC GLUCOMTR-MCNC: 193 MG/DL (ref 70–99)
GLUCOSE BLDC GLUCOMTR-MCNC: 195 MG/DL (ref 70–99)
GLUCOSE BLDC GLUCOMTR-MCNC: 196 MG/DL (ref 70–99)
GLUCOSE BLDC GLUCOMTR-MCNC: 196 MG/DL (ref 70–99)
GLUCOSE BLDC GLUCOMTR-MCNC: 197 MG/DL (ref 70–99)
GLUCOSE BLDC GLUCOMTR-MCNC: 198 MG/DL (ref 70–99)
GLUCOSE BLDC GLUCOMTR-MCNC: 198 MG/DL (ref 70–99)
GLUCOSE BLDC GLUCOMTR-MCNC: 200 MG/DL (ref 70–99)
GLUCOSE BLDC GLUCOMTR-MCNC: 201 MG/DL (ref 70–99)
GLUCOSE BLDC GLUCOMTR-MCNC: 202 MG/DL (ref 70–99)
GLUCOSE BLDC GLUCOMTR-MCNC: 203 MG/DL (ref 70–99)
GLUCOSE BLDC GLUCOMTR-MCNC: 203 MG/DL (ref 70–99)
GLUCOSE BLDC GLUCOMTR-MCNC: 204 MG/DL (ref 70–99)
GLUCOSE BLDC GLUCOMTR-MCNC: 205 MG/DL (ref 70–99)
GLUCOSE BLDC GLUCOMTR-MCNC: 205 MG/DL (ref 70–99)
GLUCOSE BLDC GLUCOMTR-MCNC: 206 MG/DL (ref 70–99)
GLUCOSE BLDC GLUCOMTR-MCNC: 206 MG/DL (ref 70–99)
GLUCOSE BLDC GLUCOMTR-MCNC: 208 MG/DL (ref 70–99)
GLUCOSE BLDC GLUCOMTR-MCNC: 209 MG/DL (ref 70–99)
GLUCOSE BLDC GLUCOMTR-MCNC: 21 MG/DL (ref 70–99)
GLUCOSE BLDC GLUCOMTR-MCNC: 210 MG/DL (ref 70–99)
GLUCOSE BLDC GLUCOMTR-MCNC: 211 MG/DL (ref 70–99)
GLUCOSE BLDC GLUCOMTR-MCNC: 212 MG/DL (ref 70–99)
GLUCOSE BLDC GLUCOMTR-MCNC: 213 MG/DL (ref 70–99)
GLUCOSE BLDC GLUCOMTR-MCNC: 214 MG/DL (ref 70–99)
GLUCOSE BLDC GLUCOMTR-MCNC: 215 MG/DL (ref 70–99)
GLUCOSE BLDC GLUCOMTR-MCNC: 215 MG/DL (ref 70–99)
GLUCOSE BLDC GLUCOMTR-MCNC: 217 MG/DL (ref 70–99)
GLUCOSE BLDC GLUCOMTR-MCNC: 219 MG/DL (ref 70–99)
GLUCOSE BLDC GLUCOMTR-MCNC: 220 MG/DL (ref 70–99)
GLUCOSE BLDC GLUCOMTR-MCNC: 221 MG/DL (ref 70–99)
GLUCOSE BLDC GLUCOMTR-MCNC: 222 MG/DL (ref 70–99)
GLUCOSE BLDC GLUCOMTR-MCNC: 223 MG/DL (ref 70–99)
GLUCOSE BLDC GLUCOMTR-MCNC: 224 MG/DL (ref 70–99)
GLUCOSE BLDC GLUCOMTR-MCNC: 224 MG/DL (ref 70–99)
GLUCOSE BLDC GLUCOMTR-MCNC: 225 MG/DL (ref 70–99)
GLUCOSE BLDC GLUCOMTR-MCNC: 226 MG/DL (ref 70–99)
GLUCOSE BLDC GLUCOMTR-MCNC: 227 MG/DL (ref 70–99)
GLUCOSE BLDC GLUCOMTR-MCNC: 227 MG/DL (ref 70–99)
GLUCOSE BLDC GLUCOMTR-MCNC: 228 MG/DL (ref 70–99)
GLUCOSE BLDC GLUCOMTR-MCNC: 228 MG/DL (ref 70–99)
GLUCOSE BLDC GLUCOMTR-MCNC: 229 MG/DL (ref 70–99)
GLUCOSE BLDC GLUCOMTR-MCNC: 230 MG/DL (ref 70–99)
GLUCOSE BLDC GLUCOMTR-MCNC: 231 MG/DL (ref 70–99)
GLUCOSE BLDC GLUCOMTR-MCNC: 232 MG/DL (ref 70–99)
GLUCOSE BLDC GLUCOMTR-MCNC: 233 MG/DL (ref 70–99)
GLUCOSE BLDC GLUCOMTR-MCNC: 234 MG/DL (ref 70–99)
GLUCOSE BLDC GLUCOMTR-MCNC: 235 MG/DL (ref 70–99)
GLUCOSE BLDC GLUCOMTR-MCNC: 236 MG/DL (ref 70–99)
GLUCOSE BLDC GLUCOMTR-MCNC: 237 MG/DL (ref 70–99)
GLUCOSE BLDC GLUCOMTR-MCNC: 238 MG/DL (ref 70–99)
GLUCOSE BLDC GLUCOMTR-MCNC: 240 MG/DL (ref 70–99)
GLUCOSE BLDC GLUCOMTR-MCNC: 241 MG/DL (ref 70–99)
GLUCOSE BLDC GLUCOMTR-MCNC: 241 MG/DL (ref 70–99)
GLUCOSE BLDC GLUCOMTR-MCNC: 242 MG/DL (ref 70–99)
GLUCOSE BLDC GLUCOMTR-MCNC: 243 MG/DL (ref 70–99)
GLUCOSE BLDC GLUCOMTR-MCNC: 244 MG/DL (ref 70–99)
GLUCOSE BLDC GLUCOMTR-MCNC: 244 MG/DL (ref 70–99)
GLUCOSE BLDC GLUCOMTR-MCNC: 245 MG/DL (ref 70–99)
GLUCOSE BLDC GLUCOMTR-MCNC: 247 MG/DL (ref 70–99)
GLUCOSE BLDC GLUCOMTR-MCNC: 248 MG/DL (ref 70–99)
GLUCOSE BLDC GLUCOMTR-MCNC: 248 MG/DL (ref 70–99)
GLUCOSE BLDC GLUCOMTR-MCNC: 249 MG/DL (ref 70–99)
GLUCOSE BLDC GLUCOMTR-MCNC: 249 MG/DL (ref 70–99)
GLUCOSE BLDC GLUCOMTR-MCNC: 250 MG/DL (ref 70–99)
GLUCOSE BLDC GLUCOMTR-MCNC: 252 MG/DL (ref 70–99)
GLUCOSE BLDC GLUCOMTR-MCNC: 252 MG/DL (ref 70–99)
GLUCOSE BLDC GLUCOMTR-MCNC: 253 MG/DL (ref 70–99)
GLUCOSE BLDC GLUCOMTR-MCNC: 253 MG/DL (ref 70–99)
GLUCOSE BLDC GLUCOMTR-MCNC: 254 MG/DL (ref 70–99)
GLUCOSE BLDC GLUCOMTR-MCNC: 254 MG/DL (ref 70–99)
GLUCOSE BLDC GLUCOMTR-MCNC: 256 MG/DL (ref 70–99)
GLUCOSE BLDC GLUCOMTR-MCNC: 256 MG/DL (ref 70–99)
GLUCOSE BLDC GLUCOMTR-MCNC: 257 MG/DL (ref 70–99)
GLUCOSE BLDC GLUCOMTR-MCNC: 258 MG/DL (ref 70–99)
GLUCOSE BLDC GLUCOMTR-MCNC: 259 MG/DL (ref 70–99)
GLUCOSE BLDC GLUCOMTR-MCNC: 259 MG/DL (ref 70–99)
GLUCOSE BLDC GLUCOMTR-MCNC: 260 MG/DL (ref 70–99)
GLUCOSE BLDC GLUCOMTR-MCNC: 261 MG/DL (ref 70–99)
GLUCOSE BLDC GLUCOMTR-MCNC: 263 MG/DL (ref 70–99)
GLUCOSE BLDC GLUCOMTR-MCNC: 264 MG/DL (ref 70–99)
GLUCOSE BLDC GLUCOMTR-MCNC: 264 MG/DL (ref 70–99)
GLUCOSE BLDC GLUCOMTR-MCNC: 265 MG/DL (ref 70–99)
GLUCOSE BLDC GLUCOMTR-MCNC: 265 MG/DL (ref 70–99)
GLUCOSE BLDC GLUCOMTR-MCNC: 267 MG/DL (ref 70–99)
GLUCOSE BLDC GLUCOMTR-MCNC: 268 MG/DL (ref 70–99)
GLUCOSE BLDC GLUCOMTR-MCNC: 268 MG/DL (ref 70–99)
GLUCOSE BLDC GLUCOMTR-MCNC: 269 MG/DL (ref 70–99)
GLUCOSE BLDC GLUCOMTR-MCNC: 269 MG/DL (ref 70–99)
GLUCOSE BLDC GLUCOMTR-MCNC: 270 MG/DL (ref 70–99)
GLUCOSE BLDC GLUCOMTR-MCNC: 272 MG/DL (ref 70–99)
GLUCOSE BLDC GLUCOMTR-MCNC: 274 MG/DL (ref 70–99)
GLUCOSE BLDC GLUCOMTR-MCNC: 274 MG/DL (ref 70–99)
GLUCOSE BLDC GLUCOMTR-MCNC: 275 MG/DL (ref 70–99)
GLUCOSE BLDC GLUCOMTR-MCNC: 276 MG/DL (ref 70–99)
GLUCOSE BLDC GLUCOMTR-MCNC: 278 MG/DL (ref 70–99)
GLUCOSE BLDC GLUCOMTR-MCNC: 280 MG/DL (ref 70–99)
GLUCOSE BLDC GLUCOMTR-MCNC: 281 MG/DL (ref 70–99)
GLUCOSE BLDC GLUCOMTR-MCNC: 282 MG/DL (ref 70–99)
GLUCOSE BLDC GLUCOMTR-MCNC: 282 MG/DL (ref 70–99)
GLUCOSE BLDC GLUCOMTR-MCNC: 283 MG/DL (ref 70–99)
GLUCOSE BLDC GLUCOMTR-MCNC: 283 MG/DL (ref 70–99)
GLUCOSE BLDC GLUCOMTR-MCNC: 284 MG/DL (ref 70–99)
GLUCOSE BLDC GLUCOMTR-MCNC: 284 MG/DL (ref 70–99)
GLUCOSE BLDC GLUCOMTR-MCNC: 285 MG/DL (ref 70–99)
GLUCOSE BLDC GLUCOMTR-MCNC: 285 MG/DL (ref 70–99)
GLUCOSE BLDC GLUCOMTR-MCNC: 286 MG/DL (ref 70–99)
GLUCOSE BLDC GLUCOMTR-MCNC: 287 MG/DL (ref 70–99)
GLUCOSE BLDC GLUCOMTR-MCNC: 287 MG/DL (ref 70–99)
GLUCOSE BLDC GLUCOMTR-MCNC: 288 MG/DL (ref 70–99)
GLUCOSE BLDC GLUCOMTR-MCNC: 289 MG/DL (ref 70–99)
GLUCOSE BLDC GLUCOMTR-MCNC: 291 MG/DL (ref 70–99)
GLUCOSE BLDC GLUCOMTR-MCNC: 291 MG/DL (ref 70–99)
GLUCOSE BLDC GLUCOMTR-MCNC: 292 MG/DL (ref 70–99)
GLUCOSE BLDC GLUCOMTR-MCNC: 295 MG/DL (ref 70–99)
GLUCOSE BLDC GLUCOMTR-MCNC: 296 MG/DL (ref 70–99)
GLUCOSE BLDC GLUCOMTR-MCNC: 297 MG/DL (ref 70–99)
GLUCOSE BLDC GLUCOMTR-MCNC: 297 MG/DL (ref 70–99)
GLUCOSE BLDC GLUCOMTR-MCNC: 299 MG/DL (ref 70–99)
GLUCOSE BLDC GLUCOMTR-MCNC: 299 MG/DL (ref 70–99)
GLUCOSE BLDC GLUCOMTR-MCNC: 302 MG/DL (ref 70–99)
GLUCOSE BLDC GLUCOMTR-MCNC: 306 MG/DL (ref 70–99)
GLUCOSE BLDC GLUCOMTR-MCNC: 308 MG/DL (ref 70–99)
GLUCOSE BLDC GLUCOMTR-MCNC: 309 MG/DL (ref 70–99)
GLUCOSE BLDC GLUCOMTR-MCNC: 314 MG/DL (ref 70–99)
GLUCOSE BLDC GLUCOMTR-MCNC: 323 MG/DL (ref 70–99)
GLUCOSE BLDC GLUCOMTR-MCNC: 324 MG/DL (ref 70–99)
GLUCOSE BLDC GLUCOMTR-MCNC: 325 MG/DL (ref 70–99)
GLUCOSE BLDC GLUCOMTR-MCNC: 325 MG/DL (ref 70–99)
GLUCOSE BLDC GLUCOMTR-MCNC: 327 MG/DL (ref 70–99)
GLUCOSE BLDC GLUCOMTR-MCNC: 333 MG/DL (ref 70–99)
GLUCOSE BLDC GLUCOMTR-MCNC: 334 MG/DL (ref 70–99)
GLUCOSE BLDC GLUCOMTR-MCNC: 337 MG/DL (ref 70–99)
GLUCOSE BLDC GLUCOMTR-MCNC: 340 MG/DL (ref 70–99)
GLUCOSE BLDC GLUCOMTR-MCNC: 351 MG/DL (ref 70–99)
GLUCOSE BLDC GLUCOMTR-MCNC: 356 MG/DL (ref 70–99)
GLUCOSE BLDC GLUCOMTR-MCNC: 361 MG/DL (ref 70–99)
GLUCOSE BLDC GLUCOMTR-MCNC: 366 MG/DL (ref 70–99)
GLUCOSE BLDC GLUCOMTR-MCNC: 372 MG/DL (ref 70–99)
GLUCOSE BLDC GLUCOMTR-MCNC: 372 MG/DL (ref 70–99)
GLUCOSE BLDC GLUCOMTR-MCNC: 376 MG/DL (ref 70–99)
GLUCOSE BLDC GLUCOMTR-MCNC: 379 MG/DL (ref 70–99)
GLUCOSE BLDC GLUCOMTR-MCNC: 388 MG/DL (ref 70–99)
GLUCOSE BLDC GLUCOMTR-MCNC: 397 MG/DL (ref 70–99)
GLUCOSE BLDC GLUCOMTR-MCNC: 405 MG/DL (ref 70–99)
GLUCOSE BLDC GLUCOMTR-MCNC: 408 MG/DL (ref 70–99)
GLUCOSE BLDC GLUCOMTR-MCNC: 43 MG/DL (ref 70–99)
GLUCOSE BLDC GLUCOMTR-MCNC: 443 MG/DL (ref 70–99)
GLUCOSE BLDC GLUCOMTR-MCNC: 45 MG/DL (ref 70–99)
GLUCOSE BLDC GLUCOMTR-MCNC: 456 MG/DL (ref 70–99)
GLUCOSE BLDC GLUCOMTR-MCNC: 459 MG/DL (ref 70–99)
GLUCOSE BLDC GLUCOMTR-MCNC: 460 MG/DL (ref 70–99)
GLUCOSE BLDC GLUCOMTR-MCNC: 470 MG/DL (ref 70–99)
GLUCOSE BLDC GLUCOMTR-MCNC: 53 MG/DL (ref 70–99)
GLUCOSE BLDC GLUCOMTR-MCNC: 54 MG/DL (ref 70–99)
GLUCOSE BLDC GLUCOMTR-MCNC: 549 MG/DL (ref 70–99)
GLUCOSE BLDC GLUCOMTR-MCNC: 563 MG/DL (ref 70–99)
GLUCOSE BLDC GLUCOMTR-MCNC: 593 MG/DL (ref 70–99)
GLUCOSE BLDC GLUCOMTR-MCNC: 64 MG/DL (ref 70–99)
GLUCOSE BLDC GLUCOMTR-MCNC: 66 MG/DL (ref 70–99)
GLUCOSE BLDC GLUCOMTR-MCNC: 67 MG/DL (ref 70–99)
GLUCOSE BLDC GLUCOMTR-MCNC: 69 MG/DL (ref 70–99)
GLUCOSE BLDC GLUCOMTR-MCNC: 69 MG/DL (ref 70–99)
GLUCOSE BLDC GLUCOMTR-MCNC: 73 MG/DL (ref 70–99)
GLUCOSE BLDC GLUCOMTR-MCNC: 83 MG/DL (ref 70–99)
GLUCOSE BLDC GLUCOMTR-MCNC: 85 MG/DL (ref 70–99)
GLUCOSE BLDC GLUCOMTR-MCNC: 85 MG/DL (ref 70–99)
GLUCOSE BLDC GLUCOMTR-MCNC: 86 MG/DL (ref 70–99)
GLUCOSE BLDC GLUCOMTR-MCNC: 89 MG/DL (ref 70–99)
GLUCOSE BLDC GLUCOMTR-MCNC: 89 MG/DL (ref 70–99)
GLUCOSE BLDC GLUCOMTR-MCNC: 91 MG/DL (ref 70–99)
GLUCOSE BLDC GLUCOMTR-MCNC: 91 MG/DL (ref 70–99)
GLUCOSE BLDC GLUCOMTR-MCNC: 92 MG/DL (ref 70–99)
GLUCOSE BLDC GLUCOMTR-MCNC: 92 MG/DL (ref 70–99)
GLUCOSE BLDC GLUCOMTR-MCNC: 93 MG/DL (ref 70–99)
GLUCOSE BLDC GLUCOMTR-MCNC: 94 MG/DL (ref 70–99)
GLUCOSE BLDC GLUCOMTR-MCNC: 94 MG/DL (ref 70–99)
GLUCOSE BLDC GLUCOMTR-MCNC: 95 MG/DL (ref 70–99)
GLUCOSE BLDC GLUCOMTR-MCNC: 95 MG/DL (ref 70–99)
GLUCOSE BLDC GLUCOMTR-MCNC: 97 MG/DL (ref 70–99)
GLUCOSE BLDC GLUCOMTR-MCNC: >600 MG/DL (ref 70–99)
GLUCOSE FLD-MCNC: 167 MG/DL
GLUCOSE SERPL-MCNC: 101 MG/DL (ref 70–105)
GLUCOSE SERPL-MCNC: 158 MG/DL (ref 70–99)
GLUCOSE SERPL-MCNC: 194 MG/DL (ref 70–99)
GLUCOSE SERPL-MCNC: 212 MG/DL (ref 70–99)
GLUCOSE SERPL-MCNC: 225 MG/DL (ref 70–99)
GLUCOSE SERPL-MCNC: 237 MG/DL (ref 70–99)
GLUCOSE SERPL-MCNC: 237 MG/DL (ref 70–99)
GLUCOSE SERPL-MCNC: 246 MG/DL (ref 70–99)
GLUCOSE SERPL-MCNC: 253 MG/DL (ref 70–99)
GLUCOSE SERPL-MCNC: 256 MG/DL (ref 70–99)
GLUCOSE SERPL-MCNC: 385 MG/DL (ref 70–99)
GLUCOSE UR STRIP-MCNC: 30 MG/DL
GLUCOSE UR STRIP-MCNC: 50 MG/DL
GLUCOSE UR STRIP-MCNC: 70 MG/DL
GLUCOSE UR STRIP-MCNC: NEGATIVE MG/DL
HBA1C MFR BLD: 6.3 % (ref 0–5.6)
HBA1C MFR BLD: 7.6 % (ref 0–5.6)
HBA1C MFR BLD: 7.8 % (ref 0–5.6)
HBA1C MFR BLD: 7.9 % (ref 4–6)
HBV SURFACE AB SERPL IA-ACNC: 0.62 M[IU]/ML
HBV SURFACE AB SERPL IA-ACNC: 0.72 M[IU]/ML
HBV SURFACE AG SERPL QL IA: NONREACTIVE
HBV SURFACE AG SERPL QL IA: NONREACTIVE
HCO3 BLD-SCNC: 28 MMOL/L (ref 21–28)
HCO3 BLD-SCNC: 28 MMOL/L (ref 21–28)
HCO3 BLD-SCNC: 29 MMOL/L (ref 21–28)
HCO3 BLD-SCNC: 30 MMOL/L (ref 21–28)
HCO3 BLD-SCNC: 31 MMOL/L (ref 21–28)
HCO3 BLD-SCNC: 32 MMOL/L (ref 21–28)
HCO3 BLD-SCNC: 32 MMOL/L (ref 21–28)
HCO3 BLD-SCNC: 33 MMOL/L (ref 21–28)
HCO3 BLD-SCNC: 34 MMOL/L (ref 21–28)
HCO3 BLD-SCNC: 35 MMOL/L (ref 21–28)
HCO3 BLD-SCNC: 36 MMOL/L (ref 21–28)
HCO3 BLD-SCNC: 41 MMOL/L (ref 21–28)
HCO3 BLD-SCNC: 42 MMOL/L (ref 21–28)
HCO3 BLDV-SCNC: 22 MMOL/L (ref 21–28)
HCO3 BLDV-SCNC: 26 MMOL/L (ref 21–28)
HCO3 BLDV-SCNC: 29 MMOL/L (ref 21–28)
HCO3 BLDV-SCNC: 31 MMOL/L (ref 21–28)
HCO3 BLDV-SCNC: 32 MMOL/L (ref 21–28)
HCO3 BLDV-SCNC: 34 MMOL/L (ref 21–28)
HCO3 BLDV-SCNC: 35 MMOL/L (ref 21–28)
HCO3 BLDV-SCNC: 40 MMOL/L (ref 21–28)
HCO3 BLDV-SCNC: 40 MMOL/L (ref 21–28)
HCT VFR BLD AUTO: 24.3 % (ref 40–53)
HCT VFR BLD AUTO: 24.4 % (ref 40–53)
HCT VFR BLD AUTO: 24.5 % (ref 40–53)
HCT VFR BLD AUTO: 24.8 % (ref 40–53)
HCT VFR BLD AUTO: 25.8 % (ref 40–53)
HCT VFR BLD AUTO: 25.9 % (ref 40–53)
HCT VFR BLD AUTO: 27.1 % (ref 40–53)
HCT VFR BLD AUTO: 27.3 % (ref 40–53)
HCT VFR BLD AUTO: 27.5 % (ref 40–53)
HCT VFR BLD AUTO: 27.7 % (ref 40–53)
HCT VFR BLD AUTO: 28.2 % (ref 40–53)
HCT VFR BLD AUTO: 28.3 % (ref 41–54)
HCT VFR BLD AUTO: 28.4 % (ref 40–53)
HCT VFR BLD AUTO: 28.6 % (ref 40–53)
HCT VFR BLD AUTO: 28.8 % (ref 40–53)
HCT VFR BLD AUTO: 28.9 % (ref 40–53)
HCT VFR BLD AUTO: 29.2 % (ref 40–53)
HCT VFR BLD AUTO: 29.4 % (ref 40–53)
HCT VFR BLD AUTO: 29.5 % (ref 40–53)
HCT VFR BLD AUTO: 29.6 % (ref 40–53)
HCT VFR BLD AUTO: 29.7 % (ref 40–53)
HCT VFR BLD AUTO: 29.9 % (ref 40–53)
HCT VFR BLD AUTO: 30.2 % (ref 40–53)
HCT VFR BLD AUTO: 30.4 % (ref 40–53)
HCT VFR BLD AUTO: 30.4 % (ref 40–53)
HCT VFR BLD AUTO: 30.7 % (ref 40–53)
HCT VFR BLD AUTO: 30.9 % (ref 40–53)
HCT VFR BLD AUTO: 31 % (ref 40–53)
HCT VFR BLD AUTO: 31.2 % (ref 40–53)
HCT VFR BLD AUTO: 31.4 % (ref 40–53)
HCT VFR BLD AUTO: 31.9 % (ref 40–53)
HCT VFR BLD AUTO: 32.3 % (ref 40–53)
HCT VFR BLD AUTO: 32.7 % (ref 40–53)
HCT VFR BLD AUTO: 32.7 % (ref 40–53)
HCT VFR BLD AUTO: 32.8 % (ref 40–53)
HCT VFR BLD AUTO: 33.2 % (ref 40–53)
HCT VFR BLD AUTO: 33.2 % (ref 40–53)
HCT VFR BLD AUTO: 33.3 % (ref 40–53)
HCT VFR BLD AUTO: 34 % (ref 40–53)
HCT VFR BLD AUTO: 34.1 % (ref 40–53)
HCT VFR BLD AUTO: 34.4 % (ref 40–53)
HCT VFR BLD AUTO: 34.4 % (ref 40–53)
HCT VFR BLD AUTO: 34.5 % (ref 40–53)
HCT VFR BLD AUTO: 34.5 % (ref 40–53)
HCT VFR BLD AUTO: 34.8 % (ref 40–53)
HCT VFR BLD AUTO: 35 % (ref 40–53)
HCT VFR BLD AUTO: 35.1 % (ref 40–53)
HCT VFR BLD AUTO: 35.2 % (ref 40–53)
HCT VFR BLD AUTO: 35.3 % (ref 40–53)
HCT VFR BLD AUTO: 35.4 % (ref 40–53)
HCT VFR BLD AUTO: 35.6 % (ref 40–53)
HCT VFR BLD AUTO: 35.7 % (ref 40–53)
HCT VFR BLD AUTO: 36 % (ref 40–53)
HCT VFR BLD AUTO: 36 % (ref 40–53)
HCT VFR BLD AUTO: 36.2 % (ref 40–53)
HCT VFR BLD AUTO: 36.3 % (ref 40–53)
HCT VFR BLD AUTO: 36.4 % (ref 40–53)
HCT VFR BLD AUTO: 37.1 % (ref 40–53)
HCT VFR BLD AUTO: 37.1 % (ref 41–54)
HCT VFR BLD AUTO: 39.6 % (ref 40–53)
HCT VFR BLD AUTO: 41.2 % (ref 40–53)
HCT VFR BLD AUTO: 41.2 % (ref 40–53)
HDLC SERPL-MCNC: 31 MG/DL
HEMOGLOBIN: 10.9 G/DL (ref 13.5–17.9)
HEMOGLOBIN: 8.2 G/DL (ref 13.3–17.7)
HGB BLD-MCNC: 10 G/DL (ref 13.3–17.7)
HGB BLD-MCNC: 10 G/DL (ref 13.3–17.7)
HGB BLD-MCNC: 10.1 G/DL (ref 13.3–17.7)
HGB BLD-MCNC: 10.2 G/DL (ref 13.3–17.7)
HGB BLD-MCNC: 10.2 G/DL (ref 13.3–17.7)
HGB BLD-MCNC: 10.4 G/DL (ref 13.3–17.7)
HGB BLD-MCNC: 10.4 G/DL (ref 13.3–17.7)
HGB BLD-MCNC: 10.5 G/DL (ref 13.3–17.7)
HGB BLD-MCNC: 10.6 G/DL (ref 13.3–17.7)
HGB BLD-MCNC: 10.7 G/DL (ref 13.3–17.7)
HGB BLD-MCNC: 10.8 G/DL (ref 13.3–17.7)
HGB BLD-MCNC: 10.9 G/DL (ref 13.3–17.7)
HGB BLD-MCNC: 10.9 G/DL (ref 13.3–17.7)
HGB BLD-MCNC: 11.4 G/DL (ref 13.3–17.7)
HGB BLD-MCNC: 11.5 G/DL (ref 13.3–17.7)
HGB BLD-MCNC: 11.7 G/DL (ref 13.3–17.7)
HGB BLD-MCNC: 12.3 G/DL (ref 13.3–17.7)
HGB BLD-MCNC: 12.3 G/DL (ref 13.3–17.7)
HGB BLD-MCNC: 12.5 G/DL (ref 13.3–17.7)
HGB BLD-MCNC: 6.4 G/DL (ref 13.3–17.7)
HGB BLD-MCNC: 6.9 G/DL (ref 13.3–17.7)
HGB BLD-MCNC: 7.1 G/DL (ref 13.3–17.7)
HGB BLD-MCNC: 7.4 G/DL (ref 13.3–17.7)
HGB BLD-MCNC: 7.4 G/DL (ref 13.3–17.7)
HGB BLD-MCNC: 7.6 G/DL (ref 13.3–17.7)
HGB BLD-MCNC: 7.7 G/DL (ref 13.3–17.7)
HGB BLD-MCNC: 7.8 G/DL (ref 13.3–17.7)
HGB BLD-MCNC: 7.9 G/DL (ref 13.3–17.7)
HGB BLD-MCNC: 7.9 G/DL (ref 13.3–17.7)
HGB BLD-MCNC: 8.2 G/DL (ref 13.3–17.7)
HGB BLD-MCNC: 8.3 G/DL (ref 13.3–17.7)
HGB BLD-MCNC: 8.4 G/DL (ref 13.3–17.7)
HGB BLD-MCNC: 8.5 G/DL (ref 13.3–17.7)
HGB BLD-MCNC: 8.6 G/DL (ref 13.3–17.7)
HGB BLD-MCNC: 8.7 G/DL (ref 13.3–17.7)
HGB BLD-MCNC: 8.7 G/DL (ref 13.3–17.7)
HGB BLD-MCNC: 8.8 G/DL (ref 13.3–17.7)
HGB BLD-MCNC: 8.8 G/DL (ref 13.3–17.7)
HGB BLD-MCNC: 9 G/DL (ref 13.3–17.7)
HGB BLD-MCNC: 9 G/DL (ref 13.3–17.7)
HGB BLD-MCNC: 9.1 G/DL (ref 13.3–17.7)
HGB BLD-MCNC: 9.2 G/DL (ref 13.3–17.7)
HGB BLD-MCNC: 9.3 G/DL (ref 13.3–17.7)
HGB BLD-MCNC: 9.4 G/DL (ref 13.3–17.7)
HGB BLD-MCNC: 9.5 G/DL (ref 13.3–17.7)
HGB BLD-MCNC: 9.6 G/DL (ref 13.3–17.7)
HGB BLD-MCNC: 9.7 G/DL (ref 13.3–17.7)
HGB BLD-MCNC: 9.8 G/DL (ref 13.3–17.7)
HGB BLD-MCNC: 9.9 G/DL (ref 13.3–17.7)
HGB UR QL STRIP: ABNORMAL
HGB UR QL STRIP: NEGATIVE
HYALINE CASTS #/AREA URNS LPF: 30 /LPF (ref 0–2)
HYALINE CASTS: 2 /LPF
HYALINE CASTS: 22 /LPF
IMM GRANULOCYTES # BLD: 0 10E3/UL
IMM GRANULOCYTES # BLD: 0 10E9/L (ref 0–0.4)
IMM GRANULOCYTES # BLD: 0.1 10E3/UL
IMM GRANULOCYTES # BLD: 0.1 10E9/L (ref 0–0.4)
IMM GRANULOCYTES # BLD: 0.1 10E9/L (ref 0–0.4)
IMM GRANULOCYTES # BLD: 0.2 10E3/UL
IMM GRANULOCYTES # BLD: 0.2 10E9/L (ref 0–0.4)
IMM GRANULOCYTES NFR BLD: 0 %
IMM GRANULOCYTES NFR BLD: 0.4 %
IMM GRANULOCYTES NFR BLD: 0.6 %
IMM GRANULOCYTES NFR BLD: 0.7 %
IMM GRANULOCYTES NFR BLD: 1 %
IMM GRANULOCYTES NFR BLD: 1.2 %
IMM GRANULOCYTES NFR BLD: 2 %
IMP: NOT DETECTED
INR PPP: 1.14 (ref 0.85–1.15)
INR PPP: 1.26 (ref 0.85–1.15)
INR PPP: 1.3 (ref 0.85–1.15)
INR PPP: 1.32 (ref 0.86–1.14)
INR PPP: 1.34 (ref 0.85–1.15)
INR PPP: 1.51 (ref 0.85–1.15)
INTERPRETATION ECG - MUSE: NORMAL
IRON BINDING CAP: 390 UG/DL (ref 250–425)
IRON SATURATION INDEX: 4 % (ref 20–50)
IRON: 15 UG/DL (ref 65–175)
ISSUE DATE AND TIME: NORMAL
ISSUE DATE AND TIME: NORMAL
KETONES UR STRIP-MCNC: 5 MG/DL
KETONES UR STRIP-MCNC: NEGATIVE MG/DL
KLEBSIELLA OXYTOCA: NOT DETECTED
KLEBSIELLA PNEUMONIAE: NOT DETECTED
KPC: NOT DETECTED
LABORATORY COMMENT REPORT: NORMAL
LACTATE BLD-SCNC: 2.2 MMOL/L (ref 0.7–2)
LACTATE BLD-SCNC: 2.4 MMOL/L (ref 0.7–2)
LACTATE BLD-SCNC: 2.8 MMOL/L (ref 0.7–2)
LACTATE BLD-SCNC: 3.7 MMOL/L (ref 0.7–2)
LACTATE SERPL-SCNC: 0.6 MMOL/L (ref 0.7–2)
LACTATE SERPL-SCNC: 0.9 MMOL/L (ref 0.7–2)
LACTATE SERPL-SCNC: 1 MMOL/L (ref 0.7–2)
LACTATE SERPL-SCNC: 1.3 MMOL/L (ref 0.7–2)
LACTATE SERPL-SCNC: 1.4 MMOL/L (ref 0.7–2)
LACTATE SERPL-SCNC: 1.4 MMOL/L (ref 0.7–2)
LDH FLD L TO P-CCNC: 80 U/L
LDH SERPL L TO P-CCNC: 184 U/L (ref 85–227)
LDLC SERPL CALC-MCNC: 46 MG/DL
LEUKOCYTE ESTERASE UR QL STRIP: ABNORMAL
LEUKOCYTE ESTERASE UR QL STRIP: NEGATIVE
LVEF ECHO: NORMAL
LYMPHOCYTES # BLD AUTO: 0.4 10E3/UL (ref 0.8–5.3)
LYMPHOCYTES # BLD AUTO: 0.5 10E3/UL (ref 0.8–5.3)
LYMPHOCYTES # BLD AUTO: 0.6 10E3/UL (ref 0.8–5.3)
LYMPHOCYTES # BLD AUTO: 0.7 10E3/UL (ref 0.8–5.3)
LYMPHOCYTES # BLD AUTO: 0.8 10E3/UL (ref 0.8–5.3)
LYMPHOCYTES # BLD AUTO: 0.9 10E9/L (ref 0.8–5.3)
LYMPHOCYTES # BLD AUTO: 1 10E9/L (ref 0.8–5.3)
LYMPHOCYTES # BLD AUTO: 1.4 10E9/L (ref 0.8–5.3)
LYMPHOCYTES # BLD AUTO: 1.5 10E9/L (ref 0.8–5.3)
LYMPHOCYTES # BLD MANUAL: 0.6 10E3/UL (ref 0.8–5.3)
LYMPHOCYTES NFR BLD AUTO: 10 %
LYMPHOCYTES NFR BLD AUTO: 10 %
LYMPHOCYTES NFR BLD AUTO: 11 %
LYMPHOCYTES NFR BLD AUTO: 11.7 %
LYMPHOCYTES NFR BLD AUTO: 12 %
LYMPHOCYTES NFR BLD AUTO: 13.2 %
LYMPHOCYTES NFR BLD AUTO: 17.7 %
LYMPHOCYTES NFR BLD AUTO: 19 %
LYMPHOCYTES NFR BLD AUTO: 4 %
LYMPHOCYTES NFR BLD AUTO: 4 %
LYMPHOCYTES NFR BLD AUTO: 5 %
LYMPHOCYTES NFR BLD AUTO: 6 %
LYMPHOCYTES NFR BLD AUTO: 7 %
LYMPHOCYTES NFR BLD AUTO: 7 %
LYMPHOCYTES NFR BLD AUTO: 7.6 %
LYMPHOCYTES NFR BLD AUTO: 8 %
LYMPHOCYTES NFR BLD AUTO: 8 %
LYMPHOCYTES NFR BLD AUTO: 9 %
LYMPHOCYTES NFR BLD AUTO: 9 %
LYMPHOCYTES NFR BLD MANUAL: 11 %
LYMPHOCYTES NFR FLD MANUAL: 72 %
MAGNESIUM SERPL-MCNC: 1.5 MG/DL (ref 1.6–2.3)
MAGNESIUM SERPL-MCNC: 1.6 MG/DL (ref 1.6–2.3)
MAGNESIUM SERPL-MCNC: 1.6 MG/DL (ref 1.6–2.6)
MAGNESIUM SERPL-MCNC: 1.7 MG/DL (ref 1.6–2.3)
MAGNESIUM SERPL-MCNC: 1.8 MG/DL (ref 1.6–2.3)
MAGNESIUM SERPL-MCNC: 1.8 MG/DL (ref 1.6–2.6)
MAGNESIUM SERPL-MCNC: 1.9 MG/DL (ref 1.6–2.3)
MAGNESIUM SERPL-MCNC: 2 MG/DL (ref 1.6–2.3)
MAGNESIUM SERPL-MCNC: 2.1 MG/DL (ref 1.6–2.3)
MAGNESIUM SERPL-MCNC: 2.2 MG/DL (ref 1.6–2.3)
MAGNESIUM SERPL-MCNC: 2.3 MG/DL (ref 1.6–2.3)
MAGNESIUM SERPL-MCNC: 2.3 MG/DL (ref 1.6–2.3)
MAGNESIUM SERPL-MCNC: 2.4 MG/DL (ref 1.6–2.3)
MAGNESIUM SERPL-MCNC: 2.4 MG/DL (ref 1.6–2.3)
MAGNESIUM SERPL-MCNC: 2.5 MG/DL (ref 1.6–2.3)
MAGNESIUM SERPL-MCNC: 2.6 MG/DL (ref 1.6–2.3)
MCH RBC QN AUTO: 23.8 PG (ref 27–33)
MCH RBC QN AUTO: 24.5 PG (ref 26.5–33)
MCH RBC QN AUTO: 24.9 PG (ref 26.5–33)
MCH RBC QN AUTO: 25 PG (ref 26.5–33)
MCH RBC QN AUTO: 25.1 PG (ref 26.5–33)
MCH RBC QN AUTO: 25.1 PG (ref 26.5–33)
MCH RBC QN AUTO: 25.2 PG (ref 26.5–33)
MCH RBC QN AUTO: 25.3 PG (ref 26.5–33)
MCH RBC QN AUTO: 25.4 PG (ref 26.5–33)
MCH RBC QN AUTO: 25.5 PG (ref 26.5–33)
MCH RBC QN AUTO: 25.6 PG (ref 26.5–33)
MCH RBC QN AUTO: 25.7 PG (ref 26.5–33)
MCH RBC QN AUTO: 25.8 PG (ref 26.5–33)
MCH RBC QN AUTO: 25.9 PG (ref 26.5–33)
MCH RBC QN AUTO: 25.9 PG (ref 26.5–33)
MCH RBC QN AUTO: 26.1 PG (ref 26.5–33)
MCH RBC QN AUTO: 26.4 PG (ref 26.5–33)
MCH RBC QN AUTO: 26.5 PG (ref 26.5–33)
MCH RBC QN AUTO: 26.6 PG (ref 26.5–33)
MCH RBC QN AUTO: 26.6 PG (ref 26.5–33)
MCH RBC QN AUTO: 26.7 PG (ref 26.5–33)
MCH RBC QN AUTO: 26.8 PG (ref 26.5–33)
MCH RBC QN AUTO: 26.9 PG (ref 26.5–33)
MCH RBC QN AUTO: 26.9 PG (ref 26.5–33)
MCH RBC QN AUTO: 27 PG (ref 26.5–33)
MCH RBC QN AUTO: 27 PG (ref 26.5–33)
MCH RBC QN AUTO: 27.1 PG (ref 26.5–33)
MCH RBC QN AUTO: 27.2 PG (ref 27–33)
MCH RBC QN AUTO: 27.3 PG (ref 26.5–33)
MCH RBC QN AUTO: 27.3 PG (ref 26.5–33)
MCH RBC QN AUTO: 27.4 PG (ref 26.5–33)
MCH RBC QN AUTO: 27.4 PG (ref 26.5–33)
MCH RBC QN AUTO: 27.5 PG (ref 26.5–33)
MCH RBC QN AUTO: 27.6 PG (ref 26.5–33)
MCH RBC QN AUTO: 27.6 PG (ref 26.5–33)
MCH RBC QN AUTO: 28 PG (ref 26.5–33)
MCH RBC QN AUTO: 28.1 PG (ref 26.5–33)
MCHC RBC AUTO-ENTMCNC: 27.5 G/DL (ref 31.5–36.5)
MCHC RBC AUTO-ENTMCNC: 27.7 G/DL (ref 31.5–36.5)
MCHC RBC AUTO-ENTMCNC: 28.3 G/DL (ref 31.5–36.5)
MCHC RBC AUTO-ENTMCNC: 28.4 G/DL (ref 31.5–36.5)
MCHC RBC AUTO-ENTMCNC: 28.5 G/DL (ref 31.5–36.5)
MCHC RBC AUTO-ENTMCNC: 28.5 G/DL (ref 31.5–36.5)
MCHC RBC AUTO-ENTMCNC: 28.6 G/DL (ref 31.5–36.5)
MCHC RBC AUTO-ENTMCNC: 28.7 G/DL (ref 31.5–36.5)
MCHC RBC AUTO-ENTMCNC: 28.9 G/DL (ref 31.5–36.5)
MCHC RBC AUTO-ENTMCNC: 29 G/DL (ref 31.5–36.5)
MCHC RBC AUTO-ENTMCNC: 29 G/DL (ref 31.5–36.5)
MCHC RBC AUTO-ENTMCNC: 29 G/DL (ref 32–37.5)
MCHC RBC AUTO-ENTMCNC: 29.1 G/DL (ref 31.5–36.5)
MCHC RBC AUTO-ENTMCNC: 29.2 G/DL (ref 31.5–36.5)
MCHC RBC AUTO-ENTMCNC: 29.3 G/DL (ref 31.5–36.5)
MCHC RBC AUTO-ENTMCNC: 29.4 G/DL (ref 31.5–36.5)
MCHC RBC AUTO-ENTMCNC: 29.4 G/DL (ref 31.5–36.5)
MCHC RBC AUTO-ENTMCNC: 29.4 G/DL (ref 32–37.5)
MCHC RBC AUTO-ENTMCNC: 29.5 G/DL (ref 31.5–36.5)
MCHC RBC AUTO-ENTMCNC: 29.5 G/DL (ref 31.5–36.5)
MCHC RBC AUTO-ENTMCNC: 29.6 G/DL (ref 31.5–36.5)
MCHC RBC AUTO-ENTMCNC: 29.6 G/DL (ref 31.5–36.5)
MCHC RBC AUTO-ENTMCNC: 29.7 G/DL (ref 31.5–36.5)
MCHC RBC AUTO-ENTMCNC: 29.7 G/DL (ref 31.5–36.5)
MCHC RBC AUTO-ENTMCNC: 29.9 G/DL (ref 31.5–36.5)
MCHC RBC AUTO-ENTMCNC: 30.1 G/DL (ref 31.5–36.5)
MCHC RBC AUTO-ENTMCNC: 30.2 G/DL (ref 31.5–36.5)
MCHC RBC AUTO-ENTMCNC: 30.3 G/DL (ref 31.5–36.5)
MCHC RBC AUTO-ENTMCNC: 30.4 G/DL (ref 31.5–36.5)
MCHC RBC AUTO-ENTMCNC: 30.4 G/DL (ref 31.5–36.5)
MCHC RBC AUTO-ENTMCNC: 30.5 G/DL (ref 31.5–36.5)
MCHC RBC AUTO-ENTMCNC: 30.6 G/DL (ref 31.5–36.5)
MCHC RBC AUTO-ENTMCNC: 30.6 G/DL (ref 31.5–36.5)
MCHC RBC AUTO-ENTMCNC: 30.7 G/DL (ref 31.5–36.5)
MCHC RBC AUTO-ENTMCNC: 30.7 G/DL (ref 31.5–36.5)
MCHC RBC AUTO-ENTMCNC: 30.8 G/DL (ref 31.5–36.5)
MCHC RBC AUTO-ENTMCNC: 30.8 G/DL (ref 31.5–36.5)
MCHC RBC AUTO-ENTMCNC: 30.9 G/DL (ref 31.5–36.5)
MCHC RBC AUTO-ENTMCNC: 30.9 G/DL (ref 31.5–36.5)
MCHC RBC AUTO-ENTMCNC: 31 G/DL (ref 31.5–36.5)
MCHC RBC AUTO-ENTMCNC: 31 G/DL (ref 31.5–36.5)
MCHC RBC AUTO-ENTMCNC: 31.1 G/DL (ref 31.5–36.5)
MCHC RBC AUTO-ENTMCNC: 31.1 G/DL (ref 31.5–36.5)
MCHC RBC AUTO-ENTMCNC: 31.3 G/DL (ref 31.5–36.5)
MCHC RBC AUTO-ENTMCNC: 31.3 G/DL (ref 31.5–36.5)
MCHC RBC AUTO-ENTMCNC: 31.4 G/DL (ref 31.5–36.5)
MCHC RBC AUTO-ENTMCNC: 31.5 G/DL (ref 31.5–36.5)
MCHC RBC AUTO-ENTMCNC: 31.7 G/DL (ref 31.5–36.5)
MCHC RBC AUTO-ENTMCNC: 32.3 G/DL (ref 31.5–36.5)
MCV RBC AUTO: 100 FL (ref 78–100)
MCV RBC AUTO: 80 FL (ref 78–100)
MCV RBC AUTO: 80 FL (ref 78–100)
MCV RBC AUTO: 81 FL (ref 78–100)
MCV RBC AUTO: 82 FL (ref 78–100)
MCV RBC AUTO: 82.3 FL (ref 80–100)
MCV RBC AUTO: 83 FL (ref 78–100)
MCV RBC AUTO: 83 FL (ref 78–100)
MCV RBC AUTO: 84 FL (ref 78–100)
MCV RBC AUTO: 85 FL (ref 78–100)
MCV RBC AUTO: 86 FL (ref 78–100)
MCV RBC AUTO: 87 FL (ref 78–100)
MCV RBC AUTO: 88 FL (ref 78–100)
MCV RBC AUTO: 89 FL (ref 78–100)
MCV RBC AUTO: 90 FL (ref 78–100)
MCV RBC AUTO: 90 FL (ref 78–100)
MCV RBC AUTO: 91 FL (ref 78–100)
MCV RBC AUTO: 92 FL (ref 78–100)
MCV RBC AUTO: 92 FL (ref 78–100)
MCV RBC AUTO: 92.5 FL (ref 80–100)
MCV RBC AUTO: 94 FL (ref 78–100)
MCV RBC AUTO: 95 FL (ref 78–100)
MCV RBC AUTO: 96 FL (ref 78–100)
MCV RBC AUTO: 97 FL (ref 78–100)
MCV RBC AUTO: 98 FL (ref 78–100)
MCV RBC AUTO: 99 FL (ref 78–100)
MONOCYTES # BLD AUTO: 0.3 10E3/UL (ref 0–1.3)
MONOCYTES # BLD AUTO: 0.5 10E3/UL (ref 0–1.3)
MONOCYTES # BLD AUTO: 0.6 10E3/UL (ref 0–1.3)
MONOCYTES # BLD AUTO: 0.6 10E3/UL (ref 0–1.3)
MONOCYTES # BLD AUTO: 0.7 10E3/UL (ref 0–1.3)
MONOCYTES # BLD AUTO: 0.7 10E9/L (ref 0–1.3)
MONOCYTES # BLD AUTO: 0.8 10E3/UL (ref 0–1.3)
MONOCYTES # BLD AUTO: 0.8 10E9/L (ref 0–1.3)
MONOCYTES # BLD AUTO: 0.9 10E9/L (ref 0–1.3)
MONOCYTES # BLD AUTO: 1 10E3/UL (ref 0–1.3)
MONOCYTES # BLD AUTO: 1 10E3/UL (ref 0–1.3)
MONOCYTES # BLD AUTO: 1.1 10E3/UL (ref 0–1.3)
MONOCYTES # BLD AUTO: 1.1 10E3/UL (ref 0–1.3)
MONOCYTES # BLD AUTO: 1.4 10E9/L (ref 0–1.3)
MONOCYTES # BLD MANUAL: 0.6 10E3/UL (ref 0–1.3)
MONOCYTES NFR BLD AUTO: 10 %
MONOCYTES NFR BLD AUTO: 10.1 %
MONOCYTES NFR BLD AUTO: 10.6 %
MONOCYTES NFR BLD AUTO: 13 %
MONOCYTES NFR BLD AUTO: 14 %
MONOCYTES NFR BLD AUTO: 15 %
MONOCYTES NFR BLD AUTO: 17 %
MONOCYTES NFR BLD AUTO: 19 %
MONOCYTES NFR BLD AUTO: 19 %
MONOCYTES NFR BLD AUTO: 4 %
MONOCYTES NFR BLD AUTO: 6 %
MONOCYTES NFR BLD AUTO: 7 %
MONOCYTES NFR BLD AUTO: 7.6 %
MONOCYTES NFR BLD AUTO: 8 %
MONOCYTES NFR BLD AUTO: 9 %
MONOCYTES NFR BLD AUTO: 9.7 %
MONOCYTES NFR BLD MANUAL: 12 %
MONOS+MACROS NFR FLD MANUAL: 20 %
MRSA DNA SPEC QL NAA+PROBE: POSITIVE
MUCOUS THREADS #/AREA URNS LPF: PRESENT /LPF
MYELOCYTES # BLD MANUAL: 0.1 10E3/UL
MYELOCYTES NFR BLD MANUAL: 1 %
NDM: NOT DETECTED
NEUTROPHILS # BLD AUTO: 15.5 10E9/L (ref 1.6–8.3)
NEUTROPHILS # BLD AUTO: 15.9 10E3/UL (ref 1.6–8.3)
NEUTROPHILS # BLD AUTO: 2.5 10E3/UL (ref 1.6–8.3)
NEUTROPHILS # BLD AUTO: 3.6 10E3/UL (ref 1.6–8.3)
NEUTROPHILS # BLD AUTO: 3.6 10E3/UL (ref 1.6–8.3)
NEUTROPHILS # BLD AUTO: 3.8 10E3/UL (ref 1.6–8.3)
NEUTROPHILS # BLD AUTO: 3.8 10E3/UL (ref 1.6–8.3)
NEUTROPHILS # BLD AUTO: 3.9 10E3/UL (ref 1.6–8.3)
NEUTROPHILS # BLD AUTO: 4 10E3/UL (ref 1.6–8.3)
NEUTROPHILS # BLD AUTO: 4.5 10E3/UL (ref 1.6–8.3)
NEUTROPHILS # BLD AUTO: 5 10E9/L (ref 1.6–8.3)
NEUTROPHILS # BLD AUTO: 5.4 10E3/UL (ref 1.6–8.3)
NEUTROPHILS # BLD AUTO: 5.4 10E9/L (ref 1.6–8.3)
NEUTROPHILS # BLD AUTO: 5.5 10E3/UL (ref 1.6–8.3)
NEUTROPHILS # BLD AUTO: 5.7 10E3/UL (ref 1.6–8.3)
NEUTROPHILS # BLD AUTO: 6.1 10E9/L (ref 1.6–8.3)
NEUTROPHILS # BLD AUTO: 6.6 10E3/UL (ref 1.6–8.3)
NEUTROPHILS # BLD AUTO: 7.3 10E3/UL (ref 1.6–8.3)
NEUTROPHILS # BLD AUTO: 7.3 10E3/UL (ref 1.6–8.3)
NEUTROPHILS # BLD AUTO: 7.5 10E3/UL (ref 1.6–8.3)
NEUTROPHILS # BLD AUTO: 7.8 10E3/UL (ref 1.6–8.3)
NEUTROPHILS # BLD AUTO: 9.9 10E3/UL (ref 1.6–8.3)
NEUTROPHILS # BLD MANUAL: 3.7 10E3/UL (ref 1.6–8.3)
NEUTROPHILS NFR BLD AUTO: 61 %
NEUTROPHILS NFR BLD AUTO: 65.9 %
NEUTROPHILS NFR BLD AUTO: 67 %
NEUTROPHILS NFR BLD AUTO: 67 %
NEUTROPHILS NFR BLD AUTO: 70 %
NEUTROPHILS NFR BLD AUTO: 70.5 %
NEUTROPHILS NFR BLD AUTO: 71 %
NEUTROPHILS NFR BLD AUTO: 71.8 %
NEUTROPHILS NFR BLD AUTO: 74 %
NEUTROPHILS NFR BLD AUTO: 76 %
NEUTROPHILS NFR BLD AUTO: 77 %
NEUTROPHILS NFR BLD AUTO: 79 %
NEUTROPHILS NFR BLD AUTO: 79 %
NEUTROPHILS NFR BLD AUTO: 81 %
NEUTROPHILS NFR BLD AUTO: 83 %
NEUTROPHILS NFR BLD AUTO: 83.4 %
NEUTROPHILS NFR BLD AUTO: 84 %
NEUTROPHILS NFR BLD AUTO: 84 %
NEUTROPHILS NFR BLD AUTO: 85 %
NEUTROPHILS NFR BLD AUTO: 87 %
NEUTROPHILS NFR BLD AUTO: 88 %
NEUTROPHILS NFR BLD AUTO: 89 %
NEUTROPHILS NFR BLD MANUAL: 72 %
NEUTS BAND NFR FLD MANUAL: 4 %
NITRATE UR QL: NEGATIVE
NONHDLC SERPL-MCNC: 66 MG/DL
NRBC # BLD AUTO: 0 10*3/UL
NRBC # BLD AUTO: 0 10E3/UL
NRBC BLD AUTO-RTO: 0 /100
NT-PROBNP SERPL-MCNC: 7819 PG/ML (ref 0–450)
NT-PROBNP SERPL-MCNC: 8209 PG/ML (ref 0–450)
NT-PROBNP SERPL-MCNC: 8411 PG/ML (ref 0–450)
NT-PROBNP SERPL-MCNC: ABNORMAL PG/ML (ref 0–450)
NT-PROBNP SERPL-MCNC: ABNORMAL PG/ML (ref 0–450)
NUC STRESS EJECTION FRACTION: 64 %
NUM BPU REQUESTED: 2
O2/TOTAL GAS SETTING VFR VENT: 1 %
O2/TOTAL GAS SETTING VFR VENT: 1 %
O2/TOTAL GAS SETTING VFR VENT: 100 %
O2/TOTAL GAS SETTING VFR VENT: 100 %
O2/TOTAL GAS SETTING VFR VENT: 28 %
O2/TOTAL GAS SETTING VFR VENT: 3 %
O2/TOTAL GAS SETTING VFR VENT: 3 %
O2/TOTAL GAS SETTING VFR VENT: 30 %
O2/TOTAL GAS SETTING VFR VENT: 30 %
O2/TOTAL GAS SETTING VFR VENT: 35 %
O2/TOTAL GAS SETTING VFR VENT: 40 %
O2/TOTAL GAS SETTING VFR VENT: 45 %
O2/TOTAL GAS SETTING VFR VENT: 50 %
O2/TOTAL GAS SETTING VFR VENT: 60 %
O2/TOTAL GAS SETTING VFR VENT: 80 %
O2/TOTAL GAS SETTING VFR VENT: 90 %
O2/TOTAL GAS SETTING VFR VENT: 90 %
O2/TOTAL GAS SETTING VFR VENT: ABNORMAL %
OSMOLALITY UR: 347 MMOL/KG (ref 100–1200)
OTHER CELLS FLD MANUAL: 4 %
OXA (DETECTED/NOT DETECTED): NOT DETECTED
OXYHGB MFR BLD: 89 % (ref 92–100)
OXYHGB MFR BLD: 93 % (ref 92–100)
OXYHGB MFR BLD: 93 % (ref 92–100)
OXYHGB MFR BLD: 94 % (ref 92–100)
OXYHGB MFR BLD: 95 % (ref 92–100)
OXYHGB MFR BLD: 95 % (ref 92–100)
OXYHGB MFR BLD: 97 % (ref 92–100)
OXYHGB MFR BLD: 98 % (ref 92–100)
OXYHGB MFR BLD: 99 % (ref 92–100)
OXYHGB MFR BLDV: 28 %
OXYHGB MFR BLDV: 71 % (ref 70–75)
OXYHGB MFR BLDV: 84 % (ref 70–75)
P AXIS - MUSE: 14 DEGREES
P AXIS - MUSE: NORMAL DEGREES
PATH REPORT.COMMENTS IMP SPEC: NORMAL
PATH REPORT.FINAL DX SPEC: NORMAL
PATH REPORT.FINAL DX SPEC: NORMAL
PATH REPORT.GROSS SPEC: NORMAL
PATH REPORT.GROSS SPEC: NORMAL
PATH REPORT.MICROSCOPIC SPEC OTHER STN: NORMAL
PATH REPORT.MICROSCOPIC SPEC OTHER STN: NORMAL
PATH REPORT.RELEVANT HX SPEC: NORMAL
PATH REPORT.RELEVANT HX SPEC: NORMAL
PCO2 BLD: 39 MM HG (ref 35–45)
PCO2 BLD: 40 MM HG (ref 35–45)
PCO2 BLD: 41 MM HG (ref 35–45)
PCO2 BLD: 42 MM HG (ref 35–45)
PCO2 BLD: 43 MM HG (ref 35–45)
PCO2 BLD: 43 MM HG (ref 35–45)
PCO2 BLD: 44 MM HG (ref 35–45)
PCO2 BLD: 44 MM HG (ref 35–45)
PCO2 BLD: 45 MM HG (ref 35–45)
PCO2 BLD: 45 MM HG (ref 35–45)
PCO2 BLD: 46 MM HG (ref 35–45)
PCO2 BLD: 47 MM HG (ref 35–45)
PCO2 BLD: 48 MM HG (ref 35–45)
PCO2 BLD: 49 MM HG (ref 35–45)
PCO2 BLD: 50 MM HG (ref 35–45)
PCO2 BLD: 50 MM HG (ref 35–45)
PCO2 BLD: 51 MM HG (ref 35–45)
PCO2 BLD: 51 MM HG (ref 35–45)
PCO2 BLD: 52 MM HG (ref 35–45)
PCO2 BLD: 52 MM HG (ref 35–45)
PCO2 BLD: 53 MM HG (ref 35–45)
PCO2 BLD: 53 MM HG (ref 35–45)
PCO2 BLD: 54 MM HG (ref 35–45)
PCO2 BLD: 56 MM HG (ref 35–45)
PCO2 BLD: 58 MM HG (ref 35–45)
PCO2 BLD: 60 MM HG (ref 35–45)
PCO2 BLD: 60 MM HG (ref 35–45)
PCO2 BLD: 63 MM HG (ref 35–45)
PCO2 BLD: 66 MM HG (ref 35–45)
PCO2 BLD: 70 MM HG (ref 35–45)
PCO2 BLD: 75 MM HG (ref 35–45)
PCO2 BLD: 83 MM HG (ref 35–45)
PCO2 BLDV: 40 MM HG (ref 40–50)
PCO2 BLDV: 45 MM HG (ref 40–50)
PCO2 BLDV: 52 MM HG (ref 40–50)
PCO2 BLDV: 55 MM HG (ref 40–50)
PCO2 BLDV: 55 MM HG (ref 40–50)
PCO2 BLDV: 59 MM HG (ref 40–50)
PCO2 BLDV: 59 MM HG (ref 40–50)
PCO2 BLDV: 60 MM HG (ref 40–50)
PCO2 BLDV: 63 MM HG (ref 40–50)
PH BLD: 7.28 [PH] (ref 7.35–7.45)
PH BLD: 7.3 [PH] (ref 7.35–7.45)
PH BLD: 7.3 [PH] (ref 7.35–7.45)
PH BLD: 7.34 [PH] (ref 7.35–7.45)
PH BLD: 7.35 [PH] (ref 7.35–7.45)
PH BLD: 7.35 [PH] (ref 7.35–7.45)
PH BLD: 7.36 [PH] (ref 7.35–7.45)
PH BLD: 7.36 [PH] (ref 7.35–7.45)
PH BLD: 7.39 [PH] (ref 7.35–7.45)
PH BLD: 7.39 [PH] (ref 7.35–7.45)
PH BLD: 7.4 [PH] (ref 7.35–7.45)
PH BLD: 7.41 [PH] (ref 7.35–7.45)
PH BLD: 7.43 [PH] (ref 7.35–7.45)
PH BLD: 7.44 [PH] (ref 7.35–7.45)
PH BLD: 7.45 [PH] (ref 7.35–7.45)
PH BLD: 7.46 [PH] (ref 7.35–7.45)
PH BLD: 7.46 [PH] (ref 7.35–7.45)
PH BLD: 7.48 [PH] (ref 7.35–7.45)
PH BLD: 7.49 [PH] (ref 7.35–7.45)
PH BLD: 7.5 [PH] (ref 7.35–7.45)
PH BLD: 7.51 [PH] (ref 7.35–7.45)
PH BLD: 7.52 [PH] (ref 7.35–7.45)
PH BLD: 7.52 [PH] (ref 7.35–7.45)
PH BLD: 7.53 [PH] (ref 7.35–7.45)
PH BLD: 7.53 [PH] (ref 7.35–7.45)
PH BLDV: 7.29 [PH] (ref 7.32–7.43)
PH BLDV: 7.29 [PH] (ref 7.32–7.43)
PH BLDV: 7.32 [PH] (ref 7.32–7.43)
PH BLDV: 7.35 PH (ref 7.32–7.43)
PH BLDV: 7.38 [PH] (ref 7.32–7.43)
PH BLDV: 7.42 [PH] (ref 7.32–7.43)
PH BLDV: 7.43 [PH] (ref 7.32–7.43)
PH BLDV: 7.43 [PH] (ref 7.32–7.43)
PH BLDV: 7.49 [PH] (ref 7.32–7.43)
PH UR STRIP: 5 PH (ref 5–7)
PH UR STRIP: 5 [PH] (ref 5–7)
PH UR STRIP: 6 [PH] (ref 5–7)
PH UR STRIP: 7 [PH] (ref 5–7)
PHOSPHATE SERPL-MCNC: 1.8 MG/DL (ref 2.5–4.5)
PHOSPHATE SERPL-MCNC: 2.2 MG/DL (ref 2.5–4.5)
PHOSPHATE SERPL-MCNC: 2.2 MG/DL (ref 2.5–4.5)
PHOSPHATE SERPL-MCNC: 2.5 MG/DL (ref 2.5–4.5)
PHOSPHATE SERPL-MCNC: 2.7 MG/DL (ref 2.5–4.5)
PHOSPHATE SERPL-MCNC: 2.8 MG/DL (ref 2.5–4.5)
PHOSPHATE SERPL-MCNC: 3.1 MG/DL (ref 2.5–4.5)
PHOSPHATE SERPL-MCNC: 3.2 MG/DL (ref 2.5–4.5)
PHOSPHATE SERPL-MCNC: 3.3 MG/DL (ref 2.5–4.5)
PHOSPHATE SERPL-MCNC: 3.3 MG/DL (ref 2.5–4.5)
PHOSPHATE SERPL-MCNC: 3.4 MG/DL (ref 2.5–4.5)
PHOSPHATE SERPL-MCNC: 3.5 MG/DL (ref 2.5–4.5)
PHOSPHATE SERPL-MCNC: 3.6 MG/DL (ref 2.5–4.5)
PHOSPHATE SERPL-MCNC: 3.7 MG/DL (ref 2.5–4.5)
PHOSPHATE SERPL-MCNC: 3.8 MG/DL (ref 2.5–4.5)
PHOSPHATE SERPL-MCNC: 3.9 MG/DL (ref 2.5–4.5)
PHOSPHATE SERPL-MCNC: 4 MG/DL (ref 2.5–4.5)
PHOSPHATE SERPL-MCNC: 4.1 MG/DL (ref 2.5–4.5)
PHOSPHATE SERPL-MCNC: 4.2 MG/DL (ref 2.5–4.5)
PHOSPHATE SERPL-MCNC: 4.3 MG/DL (ref 2.5–4.5)
PHOSPHATE SERPL-MCNC: 4.4 MG/DL (ref 2.5–4.5)
PHOSPHATE SERPL-MCNC: 4.4 MG/DL (ref 2.5–4.5)
PHOSPHATE SERPL-MCNC: 4.5 MG/DL (ref 2.5–4.5)
PHOSPHATE SERPL-MCNC: 4.6 MG/DL (ref 2.5–4.5)
PHOSPHATE SERPL-MCNC: 4.8 MG/DL (ref 2.5–4.5)
PHOSPHATE SERPL-MCNC: 4.8 MG/DL (ref 2.5–4.5)
PHOSPHATE SERPL-MCNC: 4.9 MG/DL (ref 2.5–4.5)
PHOSPHATE SERPL-MCNC: 4.9 MG/DL (ref 2.5–4.5)
PHOSPHATE SERPL-MCNC: 5.5 MG/DL (ref 2.5–4.5)
PHOSPHATE SERPL-MCNC: 5.8 MG/DL (ref 2.5–4.5)
PHOTO IMAGE: NORMAL
PLAT MORPH BLD: ABNORMAL
PLAT MORPH BLD: ABNORMAL
PLATELET # BLD AUTO: 100 10E3/UL (ref 150–450)
PLATELET # BLD AUTO: 101 10E3/UL (ref 150–450)
PLATELET # BLD AUTO: 101 10E3/UL (ref 150–450)
PLATELET # BLD AUTO: 103 10E3/UL (ref 150–450)
PLATELET # BLD AUTO: 109 10E3/UL (ref 150–450)
PLATELET # BLD AUTO: 110 10E3/UL (ref 150–450)
PLATELET # BLD AUTO: 111 10E3/UL (ref 150–450)
PLATELET # BLD AUTO: 112 10E3/UL (ref 150–450)
PLATELET # BLD AUTO: 114 10E3/UL (ref 150–450)
PLATELET # BLD AUTO: 115 10E3/UL (ref 150–450)
PLATELET # BLD AUTO: 117 10E3/UL (ref 150–450)
PLATELET # BLD AUTO: 119 10E3/UL (ref 150–450)
PLATELET # BLD AUTO: 121 10E3/UL (ref 150–450)
PLATELET # BLD AUTO: 123 10E3/UL (ref 150–450)
PLATELET # BLD AUTO: 126 10E3/UL (ref 150–450)
PLATELET # BLD AUTO: 127 10E3/UL (ref 150–450)
PLATELET # BLD AUTO: 133 10E3/UL (ref 150–450)
PLATELET # BLD AUTO: 142 10E3/UL (ref 150–450)
PLATELET # BLD AUTO: 145 10E3/UL (ref 150–450)
PLATELET # BLD AUTO: 150 10E3/UL (ref 150–450)
PLATELET # BLD AUTO: 154 10E3/UL (ref 150–450)
PLATELET # BLD AUTO: 157 10E3/UL (ref 150–450)
PLATELET # BLD AUTO: 157 10E9/L (ref 150–450)
PLATELET # BLD AUTO: 160 10E3/UL (ref 150–450)
PLATELET # BLD AUTO: 160 10E9/L (ref 150–450)
PLATELET # BLD AUTO: 163 10E3/UL (ref 150–450)
PLATELET # BLD AUTO: 171 10E9/L (ref 150–450)
PLATELET # BLD AUTO: 174 10E3/UL (ref 150–450)
PLATELET # BLD AUTO: 175 10E3/UL (ref 150–450)
PLATELET # BLD AUTO: 179 10E3/UL (ref 150–450)
PLATELET # BLD AUTO: 179 10E3/UL (ref 150–450)
PLATELET # BLD AUTO: 186 10E3/UL (ref 150–450)
PLATELET # BLD AUTO: 187 10E3/UL (ref 150–450)
PLATELET # BLD AUTO: 191 10E9/L (ref 150–450)
PLATELET # BLD AUTO: 192 10E3/UL (ref 150–450)
PLATELET # BLD AUTO: 193 10E3/UL (ref 150–450)
PLATELET # BLD AUTO: 193 10E3/UL (ref 150–450)
PLATELET # BLD AUTO: 212 10E3/UL (ref 150–450)
PLATELET # BLD AUTO: 214 10E3/UL (ref 150–450)
PLATELET # BLD AUTO: 219 10E9/L (ref 150–450)
PLATELET # BLD AUTO: 222 10E3/UL (ref 150–450)
PLATELET # BLD AUTO: 236 10^9/L (ref 150–400)
PLATELET # BLD AUTO: 264 10E9/L (ref 150–450)
PLATELET # BLD AUTO: 322 10^9/L (ref 150–400)
PLATELET # BLD AUTO: 54 10E3/UL (ref 150–450)
PLATELET # BLD AUTO: 56 10E3/UL (ref 150–450)
PLATELET # BLD AUTO: 57 10E3/UL (ref 150–450)
PLATELET # BLD AUTO: 62 10E3/UL (ref 150–450)
PLATELET # BLD AUTO: 62 10E3/UL (ref 150–450)
PLATELET # BLD AUTO: 63 10E3/UL (ref 150–450)
PLATELET # BLD AUTO: 66 10E3/UL (ref 150–450)
PLATELET # BLD AUTO: 68 10E3/UL (ref 150–450)
PLATELET # BLD AUTO: 72 10E3/UL (ref 150–450)
PLATELET # BLD AUTO: 74 10E3/UL (ref 150–450)
PLATELET # BLD AUTO: 86 10E3/UL (ref 150–450)
PLATELET # BLD AUTO: 88 10E3/UL (ref 150–450)
PLATELET # BLD AUTO: 91 10E3/UL (ref 150–450)
PLATELET # BLD AUTO: 91 10E3/UL (ref 150–450)
PLATELET # BLD AUTO: 94 10E3/UL (ref 150–450)
PLATELET # BLD AUTO: 97 10E3/UL (ref 150–450)
PLATELET # BLD AUTO: 98 10E3/UL (ref 150–450)
PLATELET # BLD AUTO: 99 10E3/UL (ref 150–450)
PMV BLD: 12.4 FL (ref 7.4–10.4)
PO2 BLD: 106 MM HG (ref 80–105)
PO2 BLD: 107 MM HG (ref 80–105)
PO2 BLD: 110 MM HG (ref 80–105)
PO2 BLD: 126 MM HG (ref 80–105)
PO2 BLD: 128 MM HG (ref 80–105)
PO2 BLD: 134 MM HG (ref 80–105)
PO2 BLD: 163 MM HG (ref 80–105)
PO2 BLD: 173 MM HG (ref 80–105)
PO2 BLD: 214 MM HG (ref 80–105)
PO2 BLD: 241 MM HG (ref 80–105)
PO2 BLD: 40 MM HG (ref 80–105)
PO2 BLD: 41 MM HG (ref 80–105)
PO2 BLD: 51 MM HG (ref 80–105)
PO2 BLD: 66 MM HG (ref 80–105)
PO2 BLD: 67 MM HG (ref 80–105)
PO2 BLD: 70 MM HG (ref 80–105)
PO2 BLD: 71 MM HG (ref 80–105)
PO2 BLD: 72 MM HG (ref 80–105)
PO2 BLD: 73 MM HG (ref 80–105)
PO2 BLD: 74 MM HG (ref 80–105)
PO2 BLD: 75 MM HG (ref 80–105)
PO2 BLD: 78 MM HG (ref 80–105)
PO2 BLD: 79 MM HG (ref 80–105)
PO2 BLD: 81 MM HG (ref 80–105)
PO2 BLD: 82 MM HG (ref 80–105)
PO2 BLD: 83 MM HG (ref 80–105)
PO2 BLD: 85 MM HG (ref 80–105)
PO2 BLD: 87 MM HG (ref 80–105)
PO2 BLD: 93 MM HG (ref 80–105)
PO2 BLD: 94 MM HG (ref 80–105)
PO2 BLD: 96 MM HG (ref 80–105)
PO2 BLD: 97 MM HG (ref 80–105)
PO2 BLDV: 125 MM HG (ref 25–47)
PO2 BLDV: 22 MM HG (ref 25–47)
PO2 BLDV: 39 MM HG (ref 25–47)
PO2 BLDV: 47 MM HG (ref 25–47)
PO2 BLDV: 52 MM HG (ref 25–47)
PO2 BLDV: 55 MM HG (ref 25–47)
PO2 BLDV: 55 MM HG (ref 25–47)
PO2 BLDV: 56 MM HG (ref 25–47)
PO2 BLDV: 78 MM HG (ref 25–47)
POLYCHROMASIA BLD QL SMEAR: SLIGHT
POTASSIUM BLD-SCNC: 3.2 MMOL/L (ref 3.4–5.3)
POTASSIUM BLD-SCNC: 3.3 MMOL/L (ref 3.4–5.3)
POTASSIUM BLD-SCNC: 3.4 MMOL/L (ref 3.4–5.3)
POTASSIUM BLD-SCNC: 3.5 MMOL/L (ref 3.4–5.3)
POTASSIUM BLD-SCNC: 3.6 MMOL/L (ref 3.4–5.3)
POTASSIUM BLD-SCNC: 3.7 MMOL/L (ref 3.4–5.3)
POTASSIUM BLD-SCNC: 3.8 MMOL/L (ref 3.4–5.3)
POTASSIUM BLD-SCNC: 3.9 MMOL/L (ref 3.4–5.3)
POTASSIUM BLD-SCNC: 4 MMOL/L (ref 3.4–5.3)
POTASSIUM BLD-SCNC: 4.1 MMOL/L (ref 3.4–5.3)
POTASSIUM BLD-SCNC: 4.2 MMOL/L (ref 3.4–5.3)
POTASSIUM BLD-SCNC: 4.3 MMOL/L (ref 3.4–5.3)
POTASSIUM BLD-SCNC: 4.4 MMOL/L (ref 3.4–5.3)
POTASSIUM BLD-SCNC: 4.6 MMOL/L (ref 3.4–5.3)
POTASSIUM BLD-SCNC: 4.7 MMOL/L (ref 3.4–5.3)
POTASSIUM BLD-SCNC: 4.8 MMOL/L (ref 3.4–5.3)
POTASSIUM BLD-SCNC: 5.1 MMOL/L (ref 3.4–5.3)
POTASSIUM SERPL-SCNC: 4.2 MMOL/L (ref 3.4–5.3)
POTASSIUM SERPL-SCNC: 4.3 MMOL/L (ref 3.4–5.3)
POTASSIUM SERPL-SCNC: 4.3 MMOL/L (ref 3.5–5.1)
POTASSIUM SERPL-SCNC: 4.4 MMOL/L (ref 3.5–5.1)
POTASSIUM SERPL-SCNC: 4.5 MMOL/L (ref 3.4–5.3)
POTASSIUM SERPL-SCNC: 4.6 MMOL/L (ref 3.4–5.3)
POTASSIUM SERPL-SCNC: 4.7 MMOL/L (ref 3.4–5.3)
POTASSIUM SERPL-SCNC: 4.9 MMOL/L (ref 3.4–5.3)
POTASSIUM SERPL-SCNC: 5 MMOL/L (ref 3.4–5.3)
POTASSIUM SERPL-SCNC: 5 MMOL/L (ref 3.4–5.3)
POTASSIUM SERPL-SCNC: 5.1 MMOL/L (ref 3.5–5)
POTASSIUM SERPL-SCNC: 5.2 MMOL/L (ref 3.4–5.3)
PR INTERVAL - MUSE: 214 MS
PR INTERVAL - MUSE: NORMAL MS
PROCALCITONIN SERPL-MCNC: 0.19 NG/ML
PROCALCITONIN SERPL-MCNC: 0.61 NG/ML
PROT FLD-MCNC: 1.6 G/DL
PROT SERPL-MCNC: 4.4 G/DL (ref 6.8–8.8)
PROT SERPL-MCNC: 4.7 G/DL (ref 6.8–8.8)
PROT SERPL-MCNC: 4.8 G/DL (ref 6.8–8.8)
PROT SERPL-MCNC: 5 G/DL (ref 6.8–8.8)
PROT SERPL-MCNC: 5 G/DL (ref 6.8–8.8)
PROT SERPL-MCNC: 5.1 G/DL (ref 6.8–8.8)
PROT SERPL-MCNC: 5.2 G/DL (ref 6.8–8.8)
PROT SERPL-MCNC: 5.3 G/DL (ref 6.8–8.8)
PROT SERPL-MCNC: 5.3 G/DL (ref 6.8–8.8)
PROT SERPL-MCNC: 5.4 G/DL (ref 6.8–8.8)
PROT SERPL-MCNC: 5.4 G/DL (ref 6.8–8.8)
PROT SERPL-MCNC: 6 G/DL (ref 6.8–8.8)
PROT SERPL-MCNC: 6 G/DL (ref 6.8–8.8)
PROT SERPL-MCNC: 6 G/DL (ref 6–8.3)
PROT SERPL-MCNC: 6.3 G/DL (ref 6.8–8.8)
PROT SERPL-MCNC: 6.3 G/DL (ref 6.8–8.8)
PROT SERPL-MCNC: 6.3 G/DL (ref 6–8.3)
PROT SERPL-MCNC: 6.4 G/DL (ref 6.8–8.8)
PROT SERPL-MCNC: 6.4 G/DL (ref 6.8–8.8)
PROT SERPL-MCNC: 6.5 G/DL (ref 6.8–8.8)
PROT SERPL-MCNC: 6.5 G/DL (ref 6.8–8.8)
PROT SERPL-MCNC: 6.6 G/DL (ref 6.8–8.8)
PROTEUS SPECIES: DETECTED
PSA SERPL-MCNC: 0.45 NG/ML
PSEUDOMONAS AERUGINOSA: NOT DETECTED
PTH-INTACT SERPL-MCNC: 112 PG/ML
QRS DURATION - MUSE: 162 MS
QRS DURATION - MUSE: 166 MS
QRS DURATION - MUSE: 166 MS
QRS DURATION - MUSE: 168 MS
QRS DURATION - MUSE: 168 MS
QRS DURATION - MUSE: 170 MS
QRS DURATION - MUSE: 170 MS
QRS DURATION - MUSE: 172 MS
QRS DURATION - MUSE: 174 MS
QRS DURATION - MUSE: 174 MS
QRS DURATION - MUSE: 182 MS
QRS DURATION - MUSE: 184 MS
QT - MUSE: 422 MS
QT - MUSE: 428 MS
QT - MUSE: 428 MS
QT - MUSE: 432 MS
QT - MUSE: 432 MS
QT - MUSE: 434 MS
QT - MUSE: 448 MS
QT - MUSE: 462 MS
QT - MUSE: 466 MS
QT - MUSE: 472 MS
QT - MUSE: 488 MS
QT - MUSE: 504 MS
QT - MUSE: 522 MS
QT - MUSE: 528 MS
QT - MUSE: 530 MS
QTC - MUSE: 476 MS
QTC - MUSE: 476 MS
QTC - MUSE: 484 MS
QTC - MUSE: 488 MS
QTC - MUSE: 493 MS
QTC - MUSE: 497 MS
QTC - MUSE: 497 MS
QTC - MUSE: 498 MS
QTC - MUSE: 502 MS
QTC - MUSE: 504 MS
QTC - MUSE: 514 MS
QTC - MUSE: 522 MS
QTC - MUSE: 525 MS
R AXIS - MUSE: -25 DEGREES
R AXIS - MUSE: -39 DEGREES
R AXIS - MUSE: -53 DEGREES
R AXIS - MUSE: 1 DEGREES
R AXIS - MUSE: 106 DEGREES
R AXIS - MUSE: 137 DEGREES
R AXIS - MUSE: 253 DEGREES
R AXIS - MUSE: 33 DEGREES
R AXIS - MUSE: 40 DEGREES
R AXIS - MUSE: 51 DEGREES
R AXIS - MUSE: 58 DEGREES
R AXIS - MUSE: 60 DEGREES
R AXIS - MUSE: 63 DEGREES
R AXIS - MUSE: 9 DEGREES
R AXIS - MUSE: 92 DEGREES
RATE PRESSURE PRODUCT: 8378
RBC # BLD AUTO: 2.46 10E6/UL (ref 4.4–5.9)
RBC # BLD AUTO: 2.53 10E6/UL (ref 4.4–5.9)
RBC # BLD AUTO: 2.54 10E6/UL (ref 4.4–5.9)
RBC # BLD AUTO: 2.77 10E12/L (ref 4.4–5.9)
RBC # BLD AUTO: 2.82 10E12/L (ref 4.4–5.9)
RBC # BLD AUTO: 2.96 10E6/UL (ref 4.4–5.9)
RBC # BLD AUTO: 3.04 10E6/UL (ref 4.4–5.9)
RBC # BLD AUTO: 3.05 10E12/L (ref 4.4–5.9)
RBC # BLD AUTO: 3.11 10E12/L (ref 4.4–5.9)
RBC # BLD AUTO: 3.14 10E12/L (ref 4.4–5.9)
RBC # BLD AUTO: 3.17 10E6/UL (ref 4.4–5.9)
RBC # BLD AUTO: 3.18 10E6/UL (ref 4.4–5.9)
RBC # BLD AUTO: 3.21 10E6/UL (ref 4.4–5.9)
RBC # BLD AUTO: 3.22 10E6/UL (ref 4.4–5.9)
RBC # BLD AUTO: 3.25 10E6/UL (ref 4.4–5.9)
RBC # BLD AUTO: 3.26 10E6/UL (ref 4.4–5.9)
RBC # BLD AUTO: 3.28 10E6/UL (ref 4.4–5.9)
RBC # BLD AUTO: 3.3 10E6/UL (ref 4.4–5.9)
RBC # BLD AUTO: 3.31 10E6/UL (ref 4.4–5.9)
RBC # BLD AUTO: 3.32 10E6/UL (ref 4.4–5.9)
RBC # BLD AUTO: 3.32 10E6/UL (ref 4.4–5.9)
RBC # BLD AUTO: 3.34 10E12/L (ref 4.4–5.9)
RBC # BLD AUTO: 3.38 10E6/UL (ref 4.4–5.9)
RBC # BLD AUTO: 3.38 10E6/UL (ref 4.4–5.9)
RBC # BLD AUTO: 3.39 10E6/UL (ref 4.4–5.9)
RBC # BLD AUTO: 3.41 10E6/UL (ref 4.4–5.9)
RBC # BLD AUTO: 3.45 10E6/UL (ref 4.4–5.9)
RBC # BLD AUTO: 3.46 10E6/UL (ref 4.4–5.9)
RBC # BLD AUTO: 3.48 10E6/UL (ref 4.4–5.9)
RBC # BLD AUTO: 3.51 10E6/UL (ref 4.4–5.9)
RBC # BLD AUTO: 3.52 10E6/UL (ref 4.4–5.9)
RBC # BLD AUTO: 3.55 10E6/UL (ref 4.4–5.9)
RBC # BLD AUTO: 3.55 10E6/UL (ref 4.4–5.9)
RBC # BLD AUTO: 3.58 10E6/UL (ref 4.4–5.9)
RBC # BLD AUTO: 3.61 10E6/UL (ref 4.4–5.9)
RBC # BLD AUTO: 3.62 10E6/UL (ref 4.4–5.9)
RBC # BLD AUTO: 3.67 10E6/UL (ref 4.4–5.9)
RBC # BLD AUTO: 3.73 10E6/UL (ref 4.4–5.9)
RBC # BLD AUTO: 3.74 10E6/UL (ref 4.4–5.9)
RBC # BLD AUTO: 3.75 10E6/UL (ref 4.4–5.9)
RBC # BLD AUTO: 3.76 10E6/UL (ref 4.4–5.9)
RBC # BLD AUTO: 3.86 10E6/UL (ref 4.4–5.9)
RBC # BLD AUTO: 3.97 10E6/UL (ref 4.4–5.9)
RBC # BLD AUTO: 4 10E6/UL (ref 4.4–5.9)
RBC # BLD AUTO: 4.01 10^12/L (ref 4.6–6.2)
RBC # BLD AUTO: 4.03 10E6/UL (ref 4.4–5.9)
RBC # BLD AUTO: 4.04 10E6/UL (ref 4.4–5.9)
RBC # BLD AUTO: 4.12 10E6/UL (ref 4.4–5.9)
RBC # BLD AUTO: 4.13 10E6/UL (ref 4.4–5.9)
RBC # BLD AUTO: 4.14 10E6/UL (ref 4.4–5.9)
RBC # BLD AUTO: 4.15 10E6/UL (ref 4.4–5.9)
RBC # BLD AUTO: 4.18 10E6/UL (ref 4.4–5.9)
RBC # BLD AUTO: 4.21 10E6/UL (ref 4.4–5.9)
RBC # BLD AUTO: 4.26 10E6/UL (ref 4.4–5.9)
RBC # BLD AUTO: 4.27 10E6/UL (ref 4.4–5.9)
RBC # BLD AUTO: 4.3 10E6/UL (ref 4.4–5.9)
RBC # BLD AUTO: 4.3 10E6/UL (ref 4.4–5.9)
RBC # BLD AUTO: 4.37 10E6/UL (ref 4.4–5.9)
RBC # BLD AUTO: 4.38 10E6/UL (ref 4.4–5.9)
RBC # BLD AUTO: 4.54 10E6/UL (ref 4.4–5.9)
RBC # BLD AUTO: 4.58 10E6/UL (ref 4.4–5.9)
RBC # BLD AUTO: 4.78 10E6/UL (ref 4.4–5.9)
RBC # BLD AUTO: 4.88 10E6/UL (ref 4.4–5.9)
RBC #/AREA URNS AUTO: 2 /HPF (ref 0–2)
RBC MORPH BLD: ABNORMAL
RBC MORPH BLD: ABNORMAL
RBC URINE: 122 /HPF
RBC URINE: >182 /HPF
RBC URINE: >182 /HPF
SA TARGET DNA: POSITIVE
SARS-COV-2 RNA RESP QL NAA+PROBE: NEGATIVE
SARS-COV-2 RNA RESP QL NAA+PROBE: POSITIVE
SODIUM SERPL-SCNC: 131 MMOL/L (ref 133–144)
SODIUM SERPL-SCNC: 132 MMOL/L (ref 133–144)
SODIUM SERPL-SCNC: 133 MMOL/L (ref 133–144)
SODIUM SERPL-SCNC: 134 MMOL/L (ref 133–144)
SODIUM SERPL-SCNC: 135 MMOL/L (ref 133–144)
SODIUM SERPL-SCNC: 136 MMOL/L (ref 133–144)
SODIUM SERPL-SCNC: 136 MMOL/L (ref 135–145)
SODIUM SERPL-SCNC: 137 MMOL/L (ref 133–144)
SODIUM SERPL-SCNC: 138 MMOL/L (ref 133–144)
SODIUM SERPL-SCNC: 139 MMOL/L (ref 133–144)
SODIUM SERPL-SCNC: 139 MMOL/L (ref 136–145)
SODIUM SERPL-SCNC: 140 MMOL/L (ref 133–144)
SODIUM SERPL-SCNC: 141 MMOL/L (ref 133–144)
SODIUM SERPL-SCNC: 141 MMOL/L (ref 137–144)
SODIUM SERPL-SCNC: 142 MMOL/L (ref 133–144)
SODIUM SERPL-SCNC: 143 MMOL/L (ref 133–144)
SODIUM SERPL-SCNC: 144 MMOL/L (ref 133–144)
SODIUM SERPL-SCNC: 146 MMOL/L (ref 133–144)
SODIUM SERPL-SCNC: 147 MMOL/L (ref 133–144)
SODIUM SERPL-SCNC: 147 MMOL/L (ref 133–144)
SODIUM SERPL-SCNC: 148 MMOL/L (ref 133–144)
SODIUM SERPL-SCNC: 148 MMOL/L (ref 133–144)
SODIUM UR-SCNC: 12 MMOL/L
SODIUM UR-SCNC: 22 MMOL/L
SODIUM UR-SCNC: 29 MMOL/L
SOURCE: ABNORMAL
SP GR UR STRIP: 1.01 (ref 1–1.03)
SP GR UR STRIP: 1.01 (ref 1–1.03)
SP GR UR STRIP: 1.02 (ref 1–1.03)
SP GR UR STRIP: 1.03 (ref 1–1.03)
SPECIMEN EXP DATE BLD: NORMAL
SPECIMEN EXPIRATION DATE: NORMAL
SPECIMEN SOURCE: NORMAL
SQUAMOUS #/AREA URNS AUTO: 1 /HPF (ref 0–1)
STRESS ECHO BASELINE DIASTOLIC HE: 78
STRESS ECHO BASELINE HR: 65
STRESS ECHO BASELINE SYSTOLIC BP: 130
STRESS ECHO CALCULATED PERCENT HR: 49 %
STRESS ECHO LAST STRESS DIASTOLIC BP: 72
STRESS ECHO LAST STRESS SYSTOLIC BP: 118
STRESS ECHO TARGET HR: 146
SYSTOLIC BLOOD PRESSURE - MUSE: NORMAL MMHG
T AXIS - MUSE: 0 DEGREES
T AXIS - MUSE: 105 DEGREES
T AXIS - MUSE: 116 DEGREES
T AXIS - MUSE: 122 DEGREES
T AXIS - MUSE: 127 DEGREES
T AXIS - MUSE: 129 DEGREES
T AXIS - MUSE: 129 DEGREES
T AXIS - MUSE: 137 DEGREES
T AXIS - MUSE: 138 DEGREES
T AXIS - MUSE: 143 DEGREES
T AXIS - MUSE: 145 DEGREES
T AXIS - MUSE: 149 DEGREES
T AXIS - MUSE: 168 DEGREES
T AXIS - MUSE: 260 DEGREES
T AXIS - MUSE: 90 DEGREES
TRANSFUSION STATUS PATIENT QL: NORMAL
TRIGL SERPL-MCNC: 102 MG/DL
TRIGL SERPL-MCNC: 238 MG/DL
TROPONIN I SERPL-MCNC: 3.52 UG/L (ref 0–0.04)
TROPONIN I SERPL-MCNC: 4.42 UG/L (ref 0–0.04)
TROPONIN I SERPL-MCNC: 7.05 UG/L (ref 0–0.04)
TROPONIN I SERPL-MCNC: 7.9 UG/L (ref 0–0.04)
TSH SERPL-ACNC: 2.21 MCU/ML (ref 0.35–4.94)
UNIT ABO/RH: NORMAL
UNIT NUMBER: NORMAL
UNIT STATUS: NORMAL
UNIT TYPE ISBT: 6200
UPPER GI ENDOSCOPY: NORMAL
UPPER GI ENDOSCOPY: NORMAL
UROBILINOGEN UR STRIP-MCNC: 0 MG/DL (ref 0–2)
UROBILINOGEN UR STRIP-MCNC: NORMAL MG/DL
VANCOMYCIN SERPL-MCNC: 15.8 MG/L
VANCOMYCIN SERPL-MCNC: 17 MG/L
VANCOMYCIN SERPL-MCNC: 17.1 MG/L
VANCOMYCIN SERPL-MCNC: 17.3 MG/L
VANCOMYCIN SERPL-MCNC: 19.6 MG/L
VANCOMYCIN SERPL-MCNC: 19.8 MG/L
VANCOMYCIN SERPL-MCNC: 20.2 MG/L
VANCOMYCIN SERPL-MCNC: 20.8 MG/L
VANCOMYCIN SERPL-MCNC: 21.4 MG/L
VANCOMYCIN SERPL-MCNC: 23.5 MG/L
VANCOMYCIN SERPL-MCNC: 24.5 MG/L
VANCOMYCIN SERPL-MCNC: 25.4 MG/L
VANCOMYCIN SERPL-MCNC: 25.4 MG/L
VENTRICULAR RATE- MUSE: 49 BPM
VENTRICULAR RATE- MUSE: 53 BPM
VENTRICULAR RATE- MUSE: 59 BPM
VENTRICULAR RATE- MUSE: 61 BPM
VENTRICULAR RATE- MUSE: 63 BPM
VENTRICULAR RATE- MUSE: 67 BPM
VENTRICULAR RATE- MUSE: 69 BPM
VENTRICULAR RATE- MUSE: 70 BPM
VENTRICULAR RATE- MUSE: 73 BPM
VENTRICULAR RATE- MUSE: 77 BPM
VENTRICULAR RATE- MUSE: 77 BPM
VENTRICULAR RATE- MUSE: 81 BPM
VENTRICULAR RATE- MUSE: 81 BPM
VENTRICULAR RATE- MUSE: 85 BPM
VENTRICULAR RATE- MUSE: 85 BPM
VIM: NOT DETECTED
VLDL CHOLESTEROL: 20
WBC # BLD AUTO: 10 10E3/UL (ref 4–11)
WBC # BLD AUTO: 11.1 10E3/UL (ref 4–11)
WBC # BLD AUTO: 11.5 10E3/UL (ref 4–11)
WBC # BLD AUTO: 12.1 10E3/UL (ref 4–11)
WBC # BLD AUTO: 12.2 10E3/UL (ref 4–11)
WBC # BLD AUTO: 12.7 10E3/UL (ref 4–11)
WBC # BLD AUTO: 12.8 10E3/UL (ref 4–11)
WBC # BLD AUTO: 13 10E9/L (ref 4–11)
WBC # BLD AUTO: 13.4 10E3/UL (ref 4–11)
WBC # BLD AUTO: 16.5 10E3/UL (ref 4–11)
WBC # BLD AUTO: 18 10E3/UL (ref 4–11)
WBC # BLD AUTO: 18.5 10E3/UL (ref 4–11)
WBC # BLD AUTO: 18.6 10E9/L (ref 4–11)
WBC # BLD AUTO: 21.3 10E3/UL (ref 4–11)
WBC # BLD AUTO: 3 10E3/UL (ref 4–11)
WBC # BLD AUTO: 3.3 10E3/UL (ref 4–11)
WBC # BLD AUTO: 3.4 10E3/UL (ref 4–11)
WBC # BLD AUTO: 3.4 10E3/UL (ref 4–11)
WBC # BLD AUTO: 3.7 10E3/UL (ref 4–11)
WBC # BLD AUTO: 3.8 10E3/UL (ref 4–11)
WBC # BLD AUTO: 4 10E3/UL (ref 4–11)
WBC # BLD AUTO: 4.2 10E3/UL (ref 4–11)
WBC # BLD AUTO: 4.3 10E3/UL (ref 4–11)
WBC # BLD AUTO: 4.5 10E3/UL (ref 4–11)
WBC # BLD AUTO: 5.2 10E3/UL (ref 4–11)
WBC # BLD AUTO: 5.2 10E3/UL (ref 4–11)
WBC # BLD AUTO: 5.3 10E3/UL (ref 4–11)
WBC # BLD AUTO: 5.5 10E3/UL (ref 4–11)
WBC # BLD AUTO: 5.5 10E3/UL (ref 4–11)
WBC # BLD AUTO: 5.6 10E3/UL (ref 4–11)
WBC # BLD AUTO: 5.7 10E3/UL (ref 4–11)
WBC # BLD AUTO: 5.9 10E3/UL (ref 4–11)
WBC # BLD AUTO: 5.9 10E3/UL (ref 4–11)
WBC # BLD AUTO: 6.3 10E3/UL (ref 4–11)
WBC # BLD AUTO: 6.3 10E3/UL (ref 4–11)
WBC # BLD AUTO: 6.5 10E3/UL (ref 4–11)
WBC # BLD AUTO: 6.5 10E3/UL (ref 4–11)
WBC # BLD AUTO: 6.8 10E3/UL (ref 4–11)
WBC # BLD AUTO: 6.9 10E3/UL (ref 4–11)
WBC # BLD AUTO: 7.1 10E9/L (ref 4–11)
WBC # BLD AUTO: 7.2 10E3/UL (ref 4–11)
WBC # BLD AUTO: 7.24 10^9/L (ref 4–11)
WBC # BLD AUTO: 7.4 10E3/UL (ref 4–11)
WBC # BLD AUTO: 7.6 10E3/UL (ref 4–11)
WBC # BLD AUTO: 8 10E3/UL (ref 4–11)
WBC # BLD AUTO: 8.2 10E3/UL (ref 4–11)
WBC # BLD AUTO: 8.2 10E3/UL (ref 4–11)
WBC # BLD AUTO: 8.2 10E9/L (ref 4–11)
WBC # BLD AUTO: 8.3 10E3/UL (ref 4–11)
WBC # BLD AUTO: 8.4 10E3/UL (ref 4–11)
WBC # BLD AUTO: 8.5 10E9/L (ref 4–11)
WBC # BLD AUTO: 8.6 10E3/UL (ref 4–11)
WBC # BLD AUTO: 8.6 10E3/UL (ref 4–11)
WBC # BLD AUTO: 8.7 10E3/UL (ref 4–11)
WBC # BLD AUTO: 8.9 10E3/UL (ref 4–11)
WBC # BLD AUTO: 9 10E9/L (ref 4–11)
WBC # BLD AUTO: 9.6 10E3/UL (ref 4–11)
WBC # BLD AUTO: 9.8 10E3/UL (ref 4–11)
WBC # FLD AUTO: 462 /UL
WBC #/AREA URNS AUTO: 1 /HPF (ref 0–5)
WBC CLUMPS #/AREA URNS HPF: PRESENT /HPF
WBC CLUMPS #/AREA URNS HPF: PRESENT /HPF
WBC URINE: 178 /HPF
WBC URINE: 82 /HPF
WBC URINE: >182 /HPF

## 2021-01-01 PROCEDURE — 250N000013 HC RX MED GY IP 250 OP 250 PS 637: Performed by: SURGERY

## 2021-01-01 PROCEDURE — 999N000009 HC STATISTIC AIRWAY CARE

## 2021-01-01 PROCEDURE — 120N000001 HC R&B MED SURG/OB

## 2021-01-01 PROCEDURE — 250N000013 HC RX MED GY IP 250 OP 250 PS 637: Performed by: STUDENT IN AN ORGANIZED HEALTH CARE EDUCATION/TRAINING PROGRAM

## 2021-01-01 PROCEDURE — 250N000012 HC RX MED GY IP 250 OP 636 PS 637: Performed by: HOSPITALIST

## 2021-01-01 PROCEDURE — 250N000011 HC RX IP 250 OP 636: Performed by: PHYSICIAN ASSISTANT

## 2021-01-01 PROCEDURE — 999N000054 HC STATISTIC EKG NON-CHARGEABLE

## 2021-01-01 PROCEDURE — 82805 BLOOD GASES W/O2 SATURATION: CPT | Performed by: INTERNAL MEDICINE

## 2021-01-01 PROCEDURE — 999N000208 ECHOCARDIOGRAM LIMITED

## 2021-01-01 PROCEDURE — 250N000011 HC RX IP 250 OP 636: Performed by: HOSPITALIST

## 2021-01-01 PROCEDURE — 80053 COMPREHEN METABOLIC PANEL: CPT | Performed by: STUDENT IN AN ORGANIZED HEALTH CARE EDUCATION/TRAINING PROGRAM

## 2021-01-01 PROCEDURE — 250N000013 HC RX MED GY IP 250 OP 250 PS 637: Performed by: INTERNAL MEDICINE

## 2021-01-01 PROCEDURE — 71045 X-RAY EXAM CHEST 1 VIEW: CPT

## 2021-01-01 PROCEDURE — 84300 ASSAY OF URINE SODIUM: CPT | Performed by: INTERNAL MEDICINE

## 2021-01-01 PROCEDURE — 88305 TISSUE EXAM BY PATHOLOGIST: CPT | Mod: 26

## 2021-01-01 PROCEDURE — 99232 SBSQ HOSP IP/OBS MODERATE 35: CPT | Performed by: INTERNAL MEDICINE

## 2021-01-01 PROCEDURE — 84132 ASSAY OF SERUM POTASSIUM: CPT | Performed by: INTERNAL MEDICINE

## 2021-01-01 PROCEDURE — 99233 SBSQ HOSP IP/OBS HIGH 50: CPT | Performed by: INTERNAL MEDICINE

## 2021-01-01 PROCEDURE — 271N000001 HC OR GENERAL SUPPLY NON-STERILE: Performed by: ORTHOPAEDIC SURGERY

## 2021-01-01 PROCEDURE — 84100 ASSAY OF PHOSPHORUS: CPT | Performed by: STUDENT IN AN ORGANIZED HEALTH CARE EDUCATION/TRAINING PROGRAM

## 2021-01-01 PROCEDURE — 250N000013 HC RX MED GY IP 250 OP 250 PS 637: Performed by: PHYSICIAN ASSISTANT

## 2021-01-01 PROCEDURE — 250N000012 HC RX MED GY IP 250 OP 636 PS 637: Performed by: INTERNAL MEDICINE

## 2021-01-01 PROCEDURE — 250N000011 HC RX IP 250 OP 636: Performed by: STUDENT IN AN ORGANIZED HEALTH CARE EDUCATION/TRAINING PROGRAM

## 2021-01-01 PROCEDURE — 250N000011 HC RX IP 250 OP 636: Performed by: INTERNAL MEDICINE

## 2021-01-01 PROCEDURE — 80048 BASIC METABOLIC PNL TOTAL CA: CPT | Performed by: SURGERY

## 2021-01-01 PROCEDURE — 36415 COLL VENOUS BLD VENIPUNCTURE: CPT | Performed by: STUDENT IN AN ORGANIZED HEALTH CARE EDUCATION/TRAINING PROGRAM

## 2021-01-01 PROCEDURE — 250N000011 HC RX IP 250 OP 636: Performed by: RADIOLOGY

## 2021-01-01 PROCEDURE — 85014 HEMATOCRIT: CPT | Performed by: STUDENT IN AN ORGANIZED HEALTH CARE EDUCATION/TRAINING PROGRAM

## 2021-01-01 PROCEDURE — 85025 COMPLETE CBC W/AUTO DIFF WBC: CPT | Performed by: INTERNAL MEDICINE

## 2021-01-01 PROCEDURE — 83735 ASSAY OF MAGNESIUM: CPT | Performed by: STUDENT IN AN ORGANIZED HEALTH CARE EDUCATION/TRAINING PROGRAM

## 2021-01-01 PROCEDURE — 02HV33Z INSERTION OF INFUSION DEVICE INTO SUPERIOR VENA CAVA, PERCUTANEOUS APPROACH: ICD-10-PCS | Performed by: RADIOLOGY

## 2021-01-01 PROCEDURE — 999N001017 HC STATISTIC GLUCOSE BY METER IP

## 2021-01-01 PROCEDURE — 999N000010 HC STATISTIC ANES STAT CODE-CRNA PER MINUTE: Performed by: INTERNAL MEDICINE

## 2021-01-01 PROCEDURE — 210N000001 HC R&B IMCU HEART CARE

## 2021-01-01 PROCEDURE — 94003 VENT MGMT INPAT SUBQ DAY: CPT

## 2021-01-01 PROCEDURE — 999N000190 HC STATISTIC VAT ROUNDS

## 2021-01-01 PROCEDURE — 80053 COMPREHEN METABOLIC PANEL: CPT | Performed by: HOSPITALIST

## 2021-01-01 PROCEDURE — 93454 CORONARY ARTERY ANGIO S&I: CPT | Mod: 26 | Performed by: INTERNAL MEDICINE

## 2021-01-01 PROCEDURE — 0JH63XZ INSERTION OF TUNNELED VASCULAR ACCESS DEVICE INTO CHEST SUBCUTANEOUS TISSUE AND FASCIA, PERCUTANEOUS APPROACH: ICD-10-PCS | Performed by: RADIOLOGY

## 2021-01-01 PROCEDURE — 999N000157 HC STATISTIC RCP TIME EA 10 MIN

## 2021-01-01 PROCEDURE — 99291 CRITICAL CARE FIRST HOUR: CPT | Performed by: INTERNAL MEDICINE

## 2021-01-01 PROCEDURE — 83605 ASSAY OF LACTIC ACID: CPT | Performed by: HOSPITALIST

## 2021-01-01 PROCEDURE — 78452 HT MUSCLE IMAGE SPECT MULT: CPT

## 2021-01-01 PROCEDURE — 80202 ASSAY OF VANCOMYCIN: CPT | Performed by: INTERNAL MEDICINE

## 2021-01-01 PROCEDURE — 120N000013 HC R&B IMCU

## 2021-01-01 PROCEDURE — 80069 RENAL FUNCTION PANEL: CPT | Performed by: INTERNAL MEDICINE

## 2021-01-01 PROCEDURE — 99214 OFFICE O/P EST MOD 30 MIN: CPT | Performed by: INTERNAL MEDICINE

## 2021-01-01 PROCEDURE — 272N000308 ZZ HC DEVICE, LOADING (TAVR): Performed by: INTERNAL MEDICINE

## 2021-01-01 PROCEDURE — 36415 COLL VENOUS BLD VENIPUNCTURE: CPT | Performed by: HOSPITALIST

## 2021-01-01 PROCEDURE — 250N000011 HC RX IP 250 OP 636: Performed by: NURSE ANESTHETIST, CERTIFIED REGISTERED

## 2021-01-01 PROCEDURE — 99292 CRITICAL CARE ADDL 30 MIN: CPT | Performed by: INTERNAL MEDICINE

## 2021-01-01 PROCEDURE — 90935 HEMODIALYSIS ONE EVALUATION: CPT | Performed by: INTERNAL MEDICINE

## 2021-01-01 PROCEDURE — 82565 ASSAY OF CREATININE: CPT | Performed by: ANESTHESIOLOGY

## 2021-01-01 PROCEDURE — 999N000065 XR ABDOMEN PORT 1 VIEWS

## 2021-01-01 PROCEDURE — 36415 COLL VENOUS BLD VENIPUNCTURE: CPT | Performed by: INTERNAL MEDICINE

## 2021-01-01 PROCEDURE — 250N000013 HC RX MED GY IP 250 OP 250 PS 637: Performed by: HOSPITALIST

## 2021-01-01 PROCEDURE — 0W9B3ZZ DRAINAGE OF LEFT PLEURAL CAVITY, PERCUTANEOUS APPROACH: ICD-10-PCS | Performed by: RADIOLOGY

## 2021-01-01 PROCEDURE — 999N000065 XR CHEST PORT 1 VIEW

## 2021-01-01 PROCEDURE — 99233 SBSQ HOSP IP/OBS HIGH 50: CPT | Performed by: HOSPITALIST

## 2021-01-01 PROCEDURE — 85018 HEMOGLOBIN: CPT | Performed by: PHYSICIAN ASSISTANT

## 2021-01-01 PROCEDURE — 33361 REPLACE AORTIC VALVE PERQ: CPT | Mod: Q0 | Performed by: INTERNAL MEDICINE

## 2021-01-01 PROCEDURE — 999N000127 HC STATISTIC PERIPHERAL IV START W US GUIDANCE

## 2021-01-01 PROCEDURE — 97110 THERAPEUTIC EXERCISES: CPT | Mod: GP

## 2021-01-01 PROCEDURE — 99215 OFFICE O/P EST HI 40 MIN: CPT | Performed by: NURSE PRACTITIONER

## 2021-01-01 PROCEDURE — 82803 BLOOD GASES ANY COMBINATION: CPT | Performed by: SURGERY

## 2021-01-01 PROCEDURE — 86850 RBC ANTIBODY SCREEN: CPT | Performed by: HOSPITALIST

## 2021-01-01 PROCEDURE — 99291 CRITICAL CARE FIRST HOUR: CPT | Mod: GC | Performed by: ANESTHESIOLOGY

## 2021-01-01 PROCEDURE — 99152 MOD SED SAME PHYS/QHP 5/>YRS: CPT | Mod: GC | Performed by: INTERNAL MEDICINE

## 2021-01-01 PROCEDURE — 200N000001 HC R&B ICU

## 2021-01-01 PROCEDURE — P9047 ALBUMIN (HUMAN), 25%, 50ML: HCPCS | Performed by: INTERNAL MEDICINE

## 2021-01-01 PROCEDURE — 85730 THROMBOPLASTIN TIME PARTIAL: CPT | Performed by: INTERNAL MEDICINE

## 2021-01-01 PROCEDURE — 92526 ORAL FUNCTION THERAPY: CPT | Mod: GN

## 2021-01-01 PROCEDURE — 85027 COMPLETE CBC AUTOMATED: CPT | Performed by: SURGERY

## 2021-01-01 PROCEDURE — 272N000587 ZZ HC TUBE GASTRO CR4

## 2021-01-01 PROCEDURE — 80048 BASIC METABOLIC PNL TOTAL CA: CPT | Performed by: HOSPITALIST

## 2021-01-01 PROCEDURE — 85027 COMPLETE CBC AUTOMATED: CPT | Performed by: STUDENT IN AN ORGANIZED HEALTH CARE EDUCATION/TRAINING PROGRAM

## 2021-01-01 PROCEDURE — 86900 BLOOD TYPING SEROLOGIC ABO: CPT

## 2021-01-01 PROCEDURE — 258N000003 HC RX IP 258 OP 636: Performed by: ANESTHESIOLOGY

## 2021-01-01 PROCEDURE — 87340 HEPATITIS B SURFACE AG IA: CPT | Performed by: INTERNAL MEDICINE

## 2021-01-01 PROCEDURE — 80069 RENAL FUNCTION PANEL: CPT | Performed by: STUDENT IN AN ORGANIZED HEALTH CARE EDUCATION/TRAINING PROGRAM

## 2021-01-01 PROCEDURE — 82803 BLOOD GASES ANY COMBINATION: CPT | Performed by: EMERGENCY MEDICINE

## 2021-01-01 PROCEDURE — 94660 CPAP INITIATION&MGMT: CPT

## 2021-01-01 PROCEDURE — 258N000001 HC RX 258: Performed by: SURGERY

## 2021-01-01 PROCEDURE — 85018 HEMOGLOBIN: CPT | Performed by: INTERNAL MEDICINE

## 2021-01-01 PROCEDURE — 250N000009 HC RX 250: Performed by: STUDENT IN AN ORGANIZED HEALTH CARE EDUCATION/TRAINING PROGRAM

## 2021-01-01 PROCEDURE — 80048 BASIC METABOLIC PNL TOTAL CA: CPT | Performed by: INTERNAL MEDICINE

## 2021-01-01 PROCEDURE — 82374 ASSAY BLOOD CARBON DIOXIDE: CPT | Performed by: SURGERY

## 2021-01-01 PROCEDURE — 99207 PR APP CREDIT; MD BILLING SHARED VISIT: CPT | Performed by: PHYSICIAN ASSISTANT

## 2021-01-01 PROCEDURE — 258N000001 HC RX 258: Performed by: STUDENT IN AN ORGANIZED HEALTH CARE EDUCATION/TRAINING PROGRAM

## 2021-01-01 PROCEDURE — 85014 HEMATOCRIT: CPT | Performed by: SURGERY

## 2021-01-01 PROCEDURE — 83605 ASSAY OF LACTIC ACID: CPT | Performed by: EMERGENCY MEDICINE

## 2021-01-01 PROCEDURE — 250N000012 HC RX MED GY IP 250 OP 636 PS 637: Performed by: PHYSICIAN ASSISTANT

## 2021-01-01 PROCEDURE — 87077 CULTURE AEROBIC IDENTIFY: CPT | Performed by: INTERNAL MEDICINE

## 2021-01-01 PROCEDURE — 82550 ASSAY OF CK (CPK): CPT | Performed by: HOSPITALIST

## 2021-01-01 PROCEDURE — 258N000001 HC RX 258: Performed by: INTERNAL MEDICINE

## 2021-01-01 PROCEDURE — 90937 HEMODIALYSIS REPEATED EVAL: CPT

## 2021-01-01 PROCEDURE — 999N000040 HC STATISTIC CONSULT NO CHARGE VASC ACCESS

## 2021-01-01 PROCEDURE — C9113 INJ PANTOPRAZOLE SODIUM, VIA: HCPCS | Performed by: INTERNAL MEDICINE

## 2021-01-01 PROCEDURE — 5A1D70Z PERFORMANCE OF URINARY FILTRATION, INTERMITTENT, LESS THAN 6 HOURS PER DAY: ICD-10-PCS | Performed by: INTERNAL MEDICINE

## 2021-01-01 PROCEDURE — 86901 BLOOD TYPING SEROLOGIC RH(D): CPT | Performed by: EMERGENCY MEDICINE

## 2021-01-01 PROCEDURE — 258N000003 HC RX IP 258 OP 636: Performed by: ORTHOPAEDIC SURGERY

## 2021-01-01 PROCEDURE — 999N000087 HC STATISTIC IV OP-OVERFLOW

## 2021-01-01 PROCEDURE — 88112 CYTOPATH CELL ENHANCE TECH: CPT | Mod: 26

## 2021-01-01 PROCEDURE — 258N000001 HC RX 258: Performed by: ORTHOPAEDIC SURGERY

## 2021-01-01 PROCEDURE — 250N000013 HC RX MED GY IP 250 OP 250 PS 637

## 2021-01-01 PROCEDURE — 250N000009 HC RX 250: Performed by: INTERNAL MEDICINE

## 2021-01-01 PROCEDURE — 97530 THERAPEUTIC ACTIVITIES: CPT | Mod: GP

## 2021-01-01 PROCEDURE — 99233 SBSQ HOSP IP/OBS HIGH 50: CPT | Performed by: STUDENT IN AN ORGANIZED HEALTH CARE EDUCATION/TRAINING PROGRAM

## 2021-01-01 PROCEDURE — 99239 HOSP IP/OBS DSCHRG MGMT >30: CPT | Performed by: INTERNAL MEDICINE

## 2021-01-01 PROCEDURE — 74230 X-RAY XM SWLNG FUNCJ C+: CPT

## 2021-01-01 PROCEDURE — 255N000002 HC RX 255 OP 636: Performed by: INTERNAL MEDICINE

## 2021-01-01 PROCEDURE — 36592 COLLECT BLOOD FROM PICC: CPT | Performed by: NURSE PRACTITIONER

## 2021-01-01 PROCEDURE — 82040 ASSAY OF SERUM ALBUMIN: CPT | Performed by: INTERNAL MEDICINE

## 2021-01-01 PROCEDURE — C1769 GUIDE WIRE: HCPCS | Performed by: INTERNAL MEDICINE

## 2021-01-01 PROCEDURE — 999N000128 HC STATISTIC PERIPHERAL IV START W/O US GUIDANCE

## 2021-01-01 PROCEDURE — 99152 MOD SED SAME PHYS/QHP 5/>YRS: CPT

## 2021-01-01 PROCEDURE — 36592 COLLECT BLOOD FROM PICC: CPT | Performed by: SURGERY

## 2021-01-01 PROCEDURE — U0005 INFEC AGEN DETEC AMPLI PROBE: HCPCS

## 2021-01-01 PROCEDURE — 87205 SMEAR GRAM STAIN: CPT | Performed by: HOSPITALIST

## 2021-01-01 PROCEDURE — 99223 1ST HOSP IP/OBS HIGH 75: CPT | Performed by: INTERNAL MEDICINE

## 2021-01-01 PROCEDURE — 83970 ASSAY OF PARATHORMONE: CPT | Performed by: INTERNAL MEDICINE

## 2021-01-01 PROCEDURE — 258N000003 HC RX IP 258 OP 636: Performed by: INTERNAL MEDICINE

## 2021-01-01 PROCEDURE — 85379 FIBRIN DEGRADATION QUANT: CPT | Performed by: STUDENT IN AN ORGANIZED HEALTH CARE EDUCATION/TRAINING PROGRAM

## 2021-01-01 PROCEDURE — 96374 THER/PROPH/DIAG INJ IV PUSH: CPT

## 2021-01-01 PROCEDURE — 93308 TTE F-UP OR LMTD: CPT | Mod: 26 | Performed by: INTERNAL MEDICINE

## 2021-01-01 PROCEDURE — U0003 INFECTIOUS AGENT DETECTION BY NUCLEIC ACID (DNA OR RNA); SEVERE ACUTE RESPIRATORY SYNDROME CORONAVIRUS 2 (SARS-COV-2) (CORONAVIRUS DISEASE [COVID-19]), AMPLIFIED PROBE TECHNIQUE, MAKING USE OF HIGH THROUGHPUT TECHNOLOGIES AS DESCRIBED BY CMS-2020-01-R: HCPCS | Performed by: INTERNAL MEDICINE

## 2021-01-01 PROCEDURE — 85014 HEMATOCRIT: CPT | Performed by: INTERNAL MEDICINE

## 2021-01-01 PROCEDURE — 99215 OFFICE O/P EST HI 40 MIN: CPT | Performed by: INTERNAL MEDICINE

## 2021-01-01 PROCEDURE — P9016 RBC LEUKOCYTES REDUCED: HCPCS | Performed by: INTERNAL MEDICINE

## 2021-01-01 PROCEDURE — 33361 REPLACE AORTIC VALVE PERQ: CPT | Mod: 62 | Performed by: SURGERY

## 2021-01-01 PROCEDURE — 82565 ASSAY OF CREATININE: CPT

## 2021-01-01 PROCEDURE — 99214 OFFICE O/P EST MOD 30 MIN: CPT | Performed by: NURSE PRACTITIONER

## 2021-01-01 PROCEDURE — 97535 SELF CARE MNGMENT TRAINING: CPT | Mod: GO | Performed by: OCCUPATIONAL THERAPIST

## 2021-01-01 PROCEDURE — 99221 1ST HOSP IP/OBS SF/LOW 40: CPT | Performed by: PHYSICIAN ASSISTANT

## 2021-01-01 PROCEDURE — 71046 X-RAY EXAM CHEST 2 VIEWS: CPT

## 2021-01-01 PROCEDURE — 99291 CRITICAL CARE FIRST HOUR: CPT | Mod: 25

## 2021-01-01 PROCEDURE — 250N000011 HC RX IP 250 OP 636: Performed by: NURSE PRACTITIONER

## 2021-01-01 PROCEDURE — 0DB68ZX EXCISION OF STOMACH, VIA NATURAL OR ARTIFICIAL OPENING ENDOSCOPIC, DIAGNOSTIC: ICD-10-PCS | Performed by: INTERNAL MEDICINE

## 2021-01-01 PROCEDURE — 82803 BLOOD GASES ANY COMBINATION: CPT | Performed by: ANESTHESIOLOGY

## 2021-01-01 PROCEDURE — 84295 ASSAY OF SERUM SODIUM: CPT | Performed by: NURSE PRACTITIONER

## 2021-01-01 PROCEDURE — 97535 SELF CARE MNGMENT TRAINING: CPT | Mod: GO

## 2021-01-01 PROCEDURE — 97530 THERAPEUTIC ACTIVITIES: CPT | Mod: GP | Performed by: PHYSICAL THERAPIST

## 2021-01-01 PROCEDURE — 97110 THERAPEUTIC EXERCISES: CPT | Mod: GP | Performed by: PHYSICAL THERAPIST

## 2021-01-01 PROCEDURE — 31500 INSERT EMERGENCY AIRWAY: CPT | Performed by: INTERNAL MEDICINE

## 2021-01-01 PROCEDURE — 86706 HEP B SURFACE ANTIBODY: CPT | Performed by: INTERNAL MEDICINE

## 2021-01-01 PROCEDURE — 250N000009 HC RX 250: Performed by: NURSE ANESTHETIST, CERTIFIED REGISTERED

## 2021-01-01 PROCEDURE — 36600 WITHDRAWAL OF ARTERIAL BLOOD: CPT

## 2021-01-01 PROCEDURE — C1776 JOINT DEVICE (IMPLANTABLE): HCPCS | Performed by: ORTHOPAEDIC SURGERY

## 2021-01-01 PROCEDURE — 93005 ELECTROCARDIOGRAM TRACING: CPT

## 2021-01-01 PROCEDURE — 999N000208 ECHOCARDIOGRAM COMPLETE

## 2021-01-01 PROCEDURE — 85610 PROTHROMBIN TIME: CPT

## 2021-01-01 PROCEDURE — 85027 COMPLETE CBC AUTOMATED: CPT | Performed by: ANESTHESIOLOGY

## 2021-01-01 PROCEDURE — 999N000141 HC STATISTIC PRE-PROCEDURE NURSING ASSESSMENT: Performed by: ORTHOPAEDIC SURGERY

## 2021-01-01 PROCEDURE — 84478 ASSAY OF TRIGLYCERIDES: CPT | Performed by: ANESTHESIOLOGY

## 2021-01-01 PROCEDURE — 76770 US EXAM ABDO BACK WALL COMP: CPT

## 2021-01-01 PROCEDURE — 85025 COMPLETE CBC W/AUTO DIFF WBC: CPT | Performed by: HOSPITALIST

## 2021-01-01 PROCEDURE — 85018 HEMOGLOBIN: CPT | Performed by: SURGERY

## 2021-01-01 PROCEDURE — 82040 ASSAY OF SERUM ALBUMIN: CPT

## 2021-01-01 PROCEDURE — 5A1955Z RESPIRATORY VENTILATION, GREATER THAN 96 CONSECUTIVE HOURS: ICD-10-PCS | Performed by: INTERNAL MEDICINE

## 2021-01-01 PROCEDURE — 87040 BLOOD CULTURE FOR BACTERIA: CPT | Performed by: HOSPITALIST

## 2021-01-01 PROCEDURE — 96376 TX/PRO/DX INJ SAME DRUG ADON: CPT

## 2021-01-01 PROCEDURE — 272N000001 HC OR GENERAL SUPPLY STERILE: Performed by: INTERNAL MEDICINE

## 2021-01-01 PROCEDURE — 634N000001 HC RX 634: Performed by: INTERNAL MEDICINE

## 2021-01-01 PROCEDURE — 97164 PT RE-EVAL EST PLAN CARE: CPT | Mod: GP

## 2021-01-01 PROCEDURE — 93306 TTE W/DOPPLER COMPLETE: CPT | Performed by: INTERNAL MEDICINE

## 2021-01-01 PROCEDURE — U0003 INFECTIOUS AGENT DETECTION BY NUCLEIC ACID (DNA OR RNA); SEVERE ACUTE RESPIRATORY SYNDROME CORONAVIRUS 2 (SARS-COV-2) (CORONAVIRUS DISEASE [COVID-19]), AMPLIFIED PROBE TECHNIQUE, MAKING USE OF HIGH THROUGHPUT TECHNOLOGIES AS DESCRIBED BY CMS-2020-01-R: HCPCS

## 2021-01-01 PROCEDURE — 97530 THERAPEUTIC ACTIVITIES: CPT | Mod: GO | Performed by: OCCUPATIONAL THERAPIST

## 2021-01-01 PROCEDURE — 80053 COMPREHEN METABOLIC PANEL: CPT | Performed by: INTERNAL MEDICINE

## 2021-01-01 PROCEDURE — 250N000009 HC RX 250: Performed by: HOSPITALIST

## 2021-01-01 PROCEDURE — 86901 BLOOD TYPING SEROLOGIC RH(D): CPT | Performed by: HOSPITALIST

## 2021-01-01 PROCEDURE — 83880 ASSAY OF NATRIURETIC PEPTIDE: CPT | Performed by: INTERNAL MEDICINE

## 2021-01-01 PROCEDURE — 74018 RADEX ABDOMEN 1 VIEW: CPT

## 2021-01-01 PROCEDURE — 97530 THERAPEUTIC ACTIVITIES: CPT | Mod: GO

## 2021-01-01 PROCEDURE — 88305 TISSUE EXAM BY PATHOLOGIST: CPT | Mod: 26 | Performed by: PATHOLOGY

## 2021-01-01 PROCEDURE — 250N000009 HC RX 250: Performed by: SURGERY

## 2021-01-01 PROCEDURE — 96365 THER/PROPH/DIAG IV INF INIT: CPT

## 2021-01-01 PROCEDURE — 82805 BLOOD GASES W/O2 SATURATION: CPT | Performed by: NURSE PRACTITIONER

## 2021-01-01 PROCEDURE — 85027 COMPLETE CBC AUTOMATED: CPT | Performed by: INTERNAL MEDICINE

## 2021-01-01 PROCEDURE — 99291 CRITICAL CARE FIRST HOUR: CPT | Mod: 24 | Performed by: INTERNAL MEDICINE

## 2021-01-01 PROCEDURE — 84520 ASSAY OF UREA NITROGEN: CPT | Performed by: INTERNAL MEDICINE

## 2021-01-01 PROCEDURE — 85610 PROTHROMBIN TIME: CPT | Performed by: PHYSICIAN ASSISTANT

## 2021-01-01 PROCEDURE — 82803 BLOOD GASES ANY COMBINATION: CPT | Performed by: INTERNAL MEDICINE

## 2021-01-01 PROCEDURE — 93325 DOPPLER ECHO COLOR FLOW MAPG: CPT

## 2021-01-01 PROCEDURE — 83880 ASSAY OF NATRIURETIC PEPTIDE: CPT | Performed by: HOSPITALIST

## 2021-01-01 PROCEDURE — 93321 DOPPLER ECHO F-UP/LMTD STD: CPT | Mod: 26 | Performed by: INTERNAL MEDICINE

## 2021-01-01 PROCEDURE — 99207 PR NO BILLABLE SERVICE THIS VISIT: CPT | Performed by: INTERNAL MEDICINE

## 2021-01-01 PROCEDURE — 87641 MR-STAPH DNA AMP PROBE: CPT | Performed by: INTERNAL MEDICINE

## 2021-01-01 PROCEDURE — 87086 URINE CULTURE/COLONY COUNT: CPT | Performed by: PHYSICIAN ASSISTANT

## 2021-01-01 PROCEDURE — 250N000011 HC RX IP 250 OP 636: Performed by: SURGERY

## 2021-01-01 PROCEDURE — 88305 TISSUE EXAM BY PATHOLOGIST: CPT | Mod: TC | Performed by: INTERNAL MEDICINE

## 2021-01-01 PROCEDURE — 82310 ASSAY OF CALCIUM: CPT | Performed by: HOSPITALIST

## 2021-01-01 PROCEDURE — 258N000003 HC RX IP 258 OP 636: Performed by: STUDENT IN AN ORGANIZED HEALTH CARE EDUCATION/TRAINING PROGRAM

## 2021-01-01 PROCEDURE — 87086 URINE CULTURE/COLONY COUNT: CPT

## 2021-01-01 PROCEDURE — 82248 BILIRUBIN DIRECT: CPT | Performed by: HOSPITALIST

## 2021-01-01 PROCEDURE — 93971 EXTREMITY STUDY: CPT | Mod: LT

## 2021-01-01 PROCEDURE — 99153 MOD SED SAME PHYS/QHP EA: CPT | Performed by: INTERNAL MEDICINE

## 2021-01-01 PROCEDURE — 83605 ASSAY OF LACTIC ACID: CPT | Performed by: INTERNAL MEDICINE

## 2021-01-01 PROCEDURE — 82565 ASSAY OF CREATININE: CPT | Performed by: INTERNAL MEDICINE

## 2021-01-01 PROCEDURE — 99207 PR MOONLIGHTING INDICATOR: CPT | Performed by: INTERNAL MEDICINE

## 2021-01-01 PROCEDURE — 250N000012 HC RX MED GY IP 250 OP 636 PS 637: Performed by: SURGERY

## 2021-01-01 PROCEDURE — 370N000017 HC ANESTHESIA TECHNICAL FEE, PER MIN: Performed by: INTERNAL MEDICINE

## 2021-01-01 PROCEDURE — 272N000595 HC VALVE (TAVR): Performed by: INTERNAL MEDICINE

## 2021-01-01 PROCEDURE — 86900 BLOOD TYPING SEROLOGIC ABO: CPT | Performed by: EMERGENCY MEDICINE

## 2021-01-01 PROCEDURE — 84132 ASSAY OF SERUM POTASSIUM: CPT | Performed by: ANESTHESIOLOGY

## 2021-01-01 PROCEDURE — 51701 INSERT BLADDER CATHETER: CPT

## 2021-01-01 PROCEDURE — 85610 PROTHROMBIN TIME: CPT | Performed by: INTERNAL MEDICINE

## 2021-01-01 PROCEDURE — 360N000078 HC SURGERY LEVEL 5, PER MIN: Performed by: ORTHOPAEDIC SURGERY

## 2021-01-01 PROCEDURE — 02PY33Z REMOVAL OF INFUSION DEVICE FROM GREAT VESSEL, PERCUTANEOUS APPROACH: ICD-10-PCS | Performed by: PHYSICIAN ASSISTANT

## 2021-01-01 PROCEDURE — 86140 C-REACTIVE PROTEIN: CPT | Performed by: STUDENT IN AN ORGANIZED HEALTH CARE EDUCATION/TRAINING PROGRAM

## 2021-01-01 PROCEDURE — C8924 2D TTE W OR W/O FOL W/CON,FU: HCPCS

## 2021-01-01 PROCEDURE — 84484 ASSAY OF TROPONIN QUANT: CPT | Performed by: INTERNAL MEDICINE

## 2021-01-01 PROCEDURE — 999N000184 HC STATISTIC TELEMETRY

## 2021-01-01 PROCEDURE — 86140 C-REACTIVE PROTEIN: CPT | Performed by: HOSPITALIST

## 2021-01-01 PROCEDURE — 250N000012 HC RX MED GY IP 250 OP 636 PS 637: Performed by: STUDENT IN AN ORGANIZED HEALTH CARE EDUCATION/TRAINING PROGRAM

## 2021-01-01 PROCEDURE — 272N000196 HC ACCESSORY CR5

## 2021-01-01 PROCEDURE — 97116 GAIT TRAINING THERAPY: CPT | Mod: GP | Performed by: PHYSICAL THERAPIST

## 2021-01-01 PROCEDURE — 85027 COMPLETE CBC AUTOMATED: CPT | Performed by: HOSPITALIST

## 2021-01-01 PROCEDURE — 84100 ASSAY OF PHOSPHORUS: CPT | Performed by: INTERNAL MEDICINE

## 2021-01-01 PROCEDURE — 85025 COMPLETE CBC W/AUTO DIFF WBC: CPT | Performed by: EMERGENCY MEDICINE

## 2021-01-01 PROCEDURE — 83615 LACTATE (LD) (LDH) ENZYME: CPT | Performed by: HOSPITALIST

## 2021-01-01 PROCEDURE — C9803 HOPD COVID-19 SPEC COLLECT: HCPCS

## 2021-01-01 PROCEDURE — 84132 ASSAY OF SERUM POTASSIUM: CPT | Performed by: NURSE PRACTITIONER

## 2021-01-01 PROCEDURE — 96375 TX/PRO/DX INJ NEW DRUG ADDON: CPT

## 2021-01-01 PROCEDURE — 87635 SARS-COV-2 COVID-19 AMP PRB: CPT | Performed by: INTERNAL MEDICINE

## 2021-01-01 PROCEDURE — 92611 MOTION FLUOROSCOPY/SWALLOW: CPT | Mod: GN | Performed by: SPEECH-LANGUAGE PATHOLOGIST

## 2021-01-01 PROCEDURE — 250N000009 HC RX 250

## 2021-01-01 PROCEDURE — 94645 CONT INHLJ TX EACH ADDL HOUR: CPT

## 2021-01-01 PROCEDURE — 278N000051 HC OR IMPLANT GENERAL: Performed by: ORTHOPAEDIC SURGERY

## 2021-01-01 PROCEDURE — 82565 ASSAY OF CREATININE: CPT | Performed by: NURSE PRACTITIONER

## 2021-01-01 PROCEDURE — X2A5312 CEREBRAL EMBOLIC FILTRATION, DUAL FILTER IN INNOMINATE ARTERY AND LEFT COMMON CAROTID ARTERY, PERCUTANEOUS APPROACH, NEW TECHNOLOGY GROUP 2: ICD-10-PCS | Performed by: INTERNAL MEDICINE

## 2021-01-01 PROCEDURE — 43239 EGD BIOPSY SINGLE/MULTIPLE: CPT | Performed by: INTERNAL MEDICINE

## 2021-01-01 PROCEDURE — 250N000011 HC RX IP 250 OP 636: Performed by: EMERGENCY MEDICINE

## 2021-01-01 PROCEDURE — 97110 THERAPEUTIC EXERCISES: CPT | Mod: GO | Performed by: OCCUPATIONAL THERAPIST

## 2021-01-01 PROCEDURE — 88112 CYTOPATH CELL ENHANCE TECH: CPT | Mod: TC | Performed by: HOSPITALIST

## 2021-01-01 PROCEDURE — 97161 PT EVAL LOW COMPLEX 20 MIN: CPT | Mod: GP

## 2021-01-01 PROCEDURE — 99223 1ST HOSP IP/OBS HIGH 75: CPT | Mod: AI | Performed by: INTERNAL MEDICINE

## 2021-01-01 PROCEDURE — A9502 TC99M TETROFOSMIN: HCPCS | Performed by: NURSE PRACTITIONER

## 2021-01-01 PROCEDURE — 36620 INSERTION CATHETER ARTERY: CPT | Performed by: INTERNAL MEDICINE

## 2021-01-01 PROCEDURE — 86901 BLOOD TYPING SEROLOGIC RH(D): CPT | Performed by: SURGERY

## 2021-01-01 PROCEDURE — 258N000003 HC RX IP 258 OP 636: Performed by: HOSPITALIST

## 2021-01-01 PROCEDURE — 36592 COLLECT BLOOD FROM PICC: CPT | Performed by: INTERNAL MEDICINE

## 2021-01-01 PROCEDURE — 250N000011 HC RX IP 250 OP 636: Performed by: ORTHOPAEDIC SURGERY

## 2021-01-01 PROCEDURE — 02HV33Z INSERTION OF INFUSION DEVICE INTO SUPERIOR VENA CAVA, PERCUTANEOUS APPROACH: ICD-10-PCS | Performed by: INTERNAL MEDICINE

## 2021-01-01 PROCEDURE — 99232 SBSQ HOSP IP/OBS MODERATE 35: CPT | Performed by: HOSPITALIST

## 2021-01-01 PROCEDURE — 36415 COLL VENOUS BLD VENIPUNCTURE: CPT | Performed by: ANESTHESIOLOGY

## 2021-01-01 PROCEDURE — 36569 INSJ PICC 5 YR+ W/O IMAGING: CPT

## 2021-01-01 PROCEDURE — 85041 AUTOMATED RBC COUNT: CPT | Performed by: INTERNAL MEDICINE

## 2021-01-01 PROCEDURE — 84484 ASSAY OF TROPONIN QUANT: CPT | Performed by: EMERGENCY MEDICINE

## 2021-01-01 PROCEDURE — C1894 INTRO/SHEATH, NON-LASER: HCPCS | Performed by: INTERNAL MEDICINE

## 2021-01-01 PROCEDURE — XW033H5 INTRODUCTION OF TOCILIZUMAB INTO PERIPHERAL VEIN, PERCUTANEOUS APPROACH, NEW TECHNOLOGY GROUP 5: ICD-10-PCS | Performed by: INTERNAL MEDICINE

## 2021-01-01 PROCEDURE — G0463 HOSPITAL OUTPT CLINIC VISIT: HCPCS

## 2021-01-01 PROCEDURE — 87635 SARS-COV-2 COVID-19 AMP PRB: CPT | Performed by: EMERGENCY MEDICINE

## 2021-01-01 PROCEDURE — 99232 SBSQ HOSP IP/OBS MODERATE 35: CPT | Mod: 25 | Performed by: INTERNAL MEDICINE

## 2021-01-01 PROCEDURE — 258N000002 HC RX IP 258 OP 250: Performed by: INTERNAL MEDICINE

## 2021-01-01 PROCEDURE — 83036 HEMOGLOBIN GLYCOSYLATED A1C: CPT | Performed by: INTERNAL MEDICINE

## 2021-01-01 PROCEDURE — 250N000009 HC RX 250: Performed by: NURSE PRACTITIONER

## 2021-01-01 PROCEDURE — 258N000003 HC RX IP 258 OP 636: Performed by: NURSE ANESTHETIST, CERTIFIED REGISTERED

## 2021-01-01 PROCEDURE — 92526 ORAL FUNCTION THERAPY: CPT | Mod: GN | Performed by: SPEECH-LANGUAGE PATHOLOGIST

## 2021-01-01 PROCEDURE — C9113 INJ PANTOPRAZOLE SODIUM, VIA: HCPCS | Performed by: EMERGENCY MEDICINE

## 2021-01-01 PROCEDURE — 999N000111 HC STATISTIC OT IP EVAL DEFER

## 2021-01-01 PROCEDURE — 87040 BLOOD CULTURE FOR BACTERIA: CPT | Performed by: EMERGENCY MEDICINE

## 2021-01-01 PROCEDURE — C8929 TTE W OR WO FOL WCON,DOPPLER: HCPCS

## 2021-01-01 PROCEDURE — 96366 THER/PROPH/DIAG IV INF ADDON: CPT

## 2021-01-01 PROCEDURE — 5A09357 ASSISTANCE WITH RESPIRATORY VENTILATION, LESS THAN 24 CONSECUTIVE HOURS, CONTINUOUS POSITIVE AIRWAY PRESSURE: ICD-10-PCS | Performed by: PHYSICIAN ASSISTANT

## 2021-01-01 PROCEDURE — 99207 PR CDG-CODE CATEGORY CHANGED: CPT | Performed by: PHYSICIAN ASSISTANT

## 2021-01-01 PROCEDURE — 82803 BLOOD GASES ANY COMBINATION: CPT | Performed by: HOSPITALIST

## 2021-01-01 PROCEDURE — 83735 ASSAY OF MAGNESIUM: CPT | Performed by: INTERNAL MEDICINE

## 2021-01-01 PROCEDURE — 36415 COLL VENOUS BLD VENIPUNCTURE: CPT | Performed by: NURSE PRACTITIONER

## 2021-01-01 PROCEDURE — 99417 PROLNG OP E/M EACH 15 MIN: CPT | Performed by: PHYSICIAN ASSISTANT

## 2021-01-01 PROCEDURE — 99214 OFFICE O/P EST MOD 30 MIN: CPT | Performed by: PHYSICIAN ASSISTANT

## 2021-01-01 PROCEDURE — 85652 RBC SED RATE AUTOMATED: CPT | Performed by: INTERNAL MEDICINE

## 2021-01-01 PROCEDURE — 74174 CTA ABD&PLVS W/CONTRAST: CPT | Mod: 26 | Performed by: INTERNAL MEDICINE

## 2021-01-01 PROCEDURE — 76604 US EXAM CHEST: CPT

## 2021-01-01 PROCEDURE — 82306 VITAMIN D 25 HYDROXY: CPT | Performed by: INTERNAL MEDICINE

## 2021-01-01 PROCEDURE — 82803 BLOOD GASES ANY COMBINATION: CPT | Performed by: NURSE PRACTITIONER

## 2021-01-01 PROCEDURE — 93325 DOPPLER ECHO COLOR FLOW MAPG: CPT | Mod: 26 | Performed by: INTERNAL MEDICINE

## 2021-01-01 PROCEDURE — 99356 PR PROLONGED SERV,INPATIENT,1ST HR: CPT | Performed by: HOSPITALIST

## 2021-01-01 PROCEDURE — 36415 COLL VENOUS BLD VENIPUNCTURE: CPT | Performed by: PHYSICIAN ASSISTANT

## 2021-01-01 PROCEDURE — 82947 ASSAY GLUCOSE BLOOD QUANT: CPT | Performed by: SURGERY

## 2021-01-01 PROCEDURE — 93018 CV STRESS TEST I&R ONLY: CPT | Performed by: INTERNAL MEDICINE

## 2021-01-01 PROCEDURE — 82947 ASSAY GLUCOSE BLOOD QUANT: CPT | Performed by: ANESTHESIOLOGY

## 2021-01-01 PROCEDURE — 81001 URINALYSIS AUTO W/SCOPE: CPT | Performed by: EMERGENCY MEDICINE

## 2021-01-01 PROCEDURE — 84145 PROCALCITONIN (PCT): CPT | Performed by: NURSE PRACTITIONER

## 2021-01-01 PROCEDURE — 87149 DNA/RNA DIRECT PROBE: CPT | Performed by: INTERNAL MEDICINE

## 2021-01-01 PROCEDURE — P9016 RBC LEUKOCYTES REDUCED: HCPCS | Performed by: EMERGENCY MEDICINE

## 2021-01-01 PROCEDURE — 97161 PT EVAL LOW COMPLEX 20 MIN: CPT | Mod: GP | Performed by: PHYSICAL THERAPIST

## 2021-01-01 PROCEDURE — 99291 CRITICAL CARE FIRST HOUR: CPT | Mod: GC | Performed by: STUDENT IN AN ORGANIZED HEALTH CARE EDUCATION/TRAINING PROGRAM

## 2021-01-01 PROCEDURE — 82805 BLOOD GASES W/O2 SATURATION: CPT | Performed by: SURGERY

## 2021-01-01 PROCEDURE — XW033E5 INTRODUCTION OF REMDESIVIR ANTI-INFECTIVE INTO PERIPHERAL VEIN, PERCUTANEOUS APPROACH, NEW TECHNOLOGY GROUP 5: ICD-10-PCS | Performed by: INTERNAL MEDICINE

## 2021-01-01 PROCEDURE — 3E043XZ INTRODUCTION OF VASOPRESSOR INTO CENTRAL VEIN, PERCUTANEOUS APPROACH: ICD-10-PCS | Performed by: INTERNAL MEDICINE

## 2021-01-01 PROCEDURE — 97116 GAIT TRAINING THERAPY: CPT | Mod: GP

## 2021-01-01 PROCEDURE — 84520 ASSAY OF UREA NITROGEN: CPT | Performed by: NURSE PRACTITIONER

## 2021-01-01 PROCEDURE — 93016 CV STRESS TEST SUPVJ ONLY: CPT | Performed by: INTERNAL MEDICINE

## 2021-01-01 PROCEDURE — 82310 ASSAY OF CALCIUM: CPT | Performed by: SURGERY

## 2021-01-01 PROCEDURE — 272N000268 ZZ HC CATHETER, DELIVERY (TAVR): Performed by: INTERNAL MEDICINE

## 2021-01-01 PROCEDURE — 0JPT3XZ REMOVAL OF TUNNELED VASCULAR ACCESS DEVICE FROM TRUNK SUBCUTANEOUS TISSUE AND FASCIA, PERCUTANEOUS APPROACH: ICD-10-PCS | Performed by: PHYSICIAN ASSISTANT

## 2021-01-01 PROCEDURE — 99222 1ST HOSP IP/OBS MODERATE 55: CPT | Performed by: INTERNAL MEDICINE

## 2021-01-01 PROCEDURE — 272N000452 HC KIT SHRLOCK 5FR POWER PICC TRIPLE LUMEN

## 2021-01-01 PROCEDURE — 82550 ASSAY OF CK (CPK): CPT | Performed by: ANESTHESIOLOGY

## 2021-01-01 PROCEDURE — 83880 ASSAY OF NATRIURETIC PEPTIDE: CPT

## 2021-01-01 PROCEDURE — 258N000003 HC RX IP 258 OP 636: Performed by: NURSE PRACTITIONER

## 2021-01-01 PROCEDURE — 99231 SBSQ HOSP IP/OBS SF/LOW 25: CPT | Performed by: INTERNAL MEDICINE

## 2021-01-01 PROCEDURE — 99232 SBSQ HOSP IP/OBS MODERATE 35: CPT | Performed by: REGISTERED NURSE

## 2021-01-01 PROCEDURE — 99291 CRITICAL CARE FIRST HOUR: CPT | Performed by: HOSPITALIST

## 2021-01-01 PROCEDURE — C1760 CLOSURE DEV, VASC: HCPCS | Performed by: INTERNAL MEDICINE

## 2021-01-01 PROCEDURE — 343N000001 HC RX 343: Performed by: NURSE PRACTITIONER

## 2021-01-01 PROCEDURE — 272N000192 HC ACCESSORY CR2

## 2021-01-01 PROCEDURE — 99215 OFFICE O/P EST HI 40 MIN: CPT | Performed by: PHYSICIAN ASSISTANT

## 2021-01-01 PROCEDURE — 272N000187 HC ACCESSORY CR11

## 2021-01-01 PROCEDURE — 83605 ASSAY OF LACTIC ACID: CPT | Performed by: NURSE PRACTITIONER

## 2021-01-01 PROCEDURE — 87040 BLOOD CULTURE FOR BACTERIA: CPT | Performed by: INTERNAL MEDICINE

## 2021-01-01 PROCEDURE — 250N000011 HC RX IP 250 OP 636

## 2021-01-01 PROCEDURE — 82805 BLOOD GASES W/O2 SATURATION: CPT | Performed by: ANESTHESIOLOGY

## 2021-01-01 PROCEDURE — 250N000009 HC RX 250: Performed by: PHYSICIAN ASSISTANT

## 2021-01-01 PROCEDURE — 258N000003 HC RX IP 258 OP 636: Performed by: PHYSICIAN ASSISTANT

## 2021-01-01 PROCEDURE — C1725 CATH, TRANSLUMIN NON-LASER: HCPCS | Performed by: INTERNAL MEDICINE

## 2021-01-01 PROCEDURE — 87070 CULTURE OTHR SPECIMN AEROBIC: CPT | Performed by: HOSPITALIST

## 2021-01-01 PROCEDURE — 999N000154 HC STATISTIC RADIOLOGY XRAY, US, CT, MAR, NM

## 2021-01-01 PROCEDURE — 82945 GLUCOSE OTHER FLUID: CPT | Performed by: HOSPITALIST

## 2021-01-01 PROCEDURE — 272N000706 US THORACENTESIS

## 2021-01-01 PROCEDURE — 86923 COMPATIBILITY TEST ELECTRIC: CPT | Performed by: HOSPITALIST

## 2021-01-01 PROCEDURE — 84145 PROCALCITONIN (PCT): CPT | Performed by: HOSPITALIST

## 2021-01-01 PROCEDURE — 99291 CRITICAL CARE FIRST HOUR: CPT | Mod: 25 | Performed by: INTERNAL MEDICINE

## 2021-01-01 PROCEDURE — 85347 COAGULATION TIME ACTIVATED: CPT

## 2021-01-01 PROCEDURE — 92610 EVALUATE SWALLOWING FUNCTION: CPT | Mod: GN

## 2021-01-01 PROCEDURE — 86140 C-REACTIVE PROTEIN: CPT | Performed by: INTERNAL MEDICINE

## 2021-01-01 PROCEDURE — 85610 PROTHROMBIN TIME: CPT | Performed by: EMERGENCY MEDICINE

## 2021-01-01 PROCEDURE — 99291 CRITICAL CARE FIRST HOUR: CPT | Mod: 24 | Performed by: ANESTHESIOLOGY

## 2021-01-01 PROCEDURE — C1730 CATH, EP, 19 OR FEW ELECT: HCPCS | Performed by: INTERNAL MEDICINE

## 2021-01-01 PROCEDURE — 36589 REMOVAL TUNNELED CV CATH: CPT

## 2021-01-01 PROCEDURE — 96360 HYDRATION IV INFUSION INIT: CPT | Mod: 59

## 2021-01-01 PROCEDURE — C1750 CATH, HEMODIALYSIS,LONG-TERM: HCPCS

## 2021-01-01 PROCEDURE — 70450 CT HEAD/BRAIN W/O DYE: CPT

## 2021-01-01 PROCEDURE — 81001 URINALYSIS AUTO W/SCOPE: CPT

## 2021-01-01 PROCEDURE — 97166 OT EVAL MOD COMPLEX 45 MIN: CPT | Mod: GO

## 2021-01-01 PROCEDURE — 76705 ECHO EXAM OF ABDOMEN: CPT

## 2021-01-01 PROCEDURE — 258N000001 HC RX 258: Performed by: NURSE PRACTITIONER

## 2021-01-01 PROCEDURE — 272N000602 HC WOUND GLUE CR1

## 2021-01-01 PROCEDURE — 36556 INSERT NON-TUNNEL CV CATH: CPT | Performed by: INTERNAL MEDICINE

## 2021-01-01 PROCEDURE — 96361 HYDRATE IV INFUSION ADD-ON: CPT

## 2021-01-01 PROCEDURE — 85014 HEMATOCRIT: CPT

## 2021-01-01 PROCEDURE — 81001 URINALYSIS AUTO W/SCOPE: CPT | Performed by: PHYSICIAN ASSISTANT

## 2021-01-01 PROCEDURE — 33361 REPLACE AORTIC VALVE PERQ: CPT | Mod: 62 | Performed by: INTERNAL MEDICINE

## 2021-01-01 PROCEDURE — 250N000025 HC SEVOFLURANE, PER MIN: Performed by: ORTHOPAEDIC SURGERY

## 2021-01-01 PROCEDURE — 84295 ASSAY OF SERUM SODIUM: CPT | Performed by: INTERNAL MEDICINE

## 2021-01-01 PROCEDURE — 99223 1ST HOSP IP/OBS HIGH 75: CPT | Mod: AI | Performed by: PHYSICIAN ASSISTANT

## 2021-01-01 PROCEDURE — 370N000003 HC ANESTHESIA WARD SERVICE

## 2021-01-01 PROCEDURE — 83735 ASSAY OF MAGNESIUM: CPT | Performed by: NURSE PRACTITIONER

## 2021-01-01 PROCEDURE — 97166 OT EVAL MOD COMPLEX 45 MIN: CPT | Mod: GO | Performed by: OCCUPATIONAL THERAPIST

## 2021-01-01 PROCEDURE — 80053 COMPREHEN METABOLIC PANEL: CPT | Performed by: EMERGENCY MEDICINE

## 2021-01-01 PROCEDURE — 86923 COMPATIBILITY TEST ELECTRIC: CPT | Performed by: EMERGENCY MEDICINE

## 2021-01-01 PROCEDURE — 999N000065 XR CHEST PORT 1 VIEW: Mod: 77

## 2021-01-01 PROCEDURE — 36415 COLL VENOUS BLD VENIPUNCTURE: CPT

## 2021-01-01 PROCEDURE — 83036 HEMOGLOBIN GLYCOSYLATED A1C: CPT | Performed by: PHYSICIAN ASSISTANT

## 2021-01-01 PROCEDURE — 82550 ASSAY OF CK (CPK): CPT | Performed by: EMERGENCY MEDICINE

## 2021-01-01 PROCEDURE — 82962 GLUCOSE BLOOD TEST: CPT

## 2021-01-01 PROCEDURE — 80048 BASIC METABOLIC PNL TOTAL CA: CPT | Performed by: STUDENT IN AN ORGANIZED HEALTH CARE EDUCATION/TRAINING PROGRAM

## 2021-01-01 PROCEDURE — 999N000071 HC STATISTIC HEART CATH LAB OR EP LAB

## 2021-01-01 PROCEDURE — 99238 HOSP IP/OBS DSCHRG MGMT 30/<: CPT | Performed by: NURSE PRACTITIONER

## 2021-01-01 PROCEDURE — 99232 SBSQ HOSP IP/OBS MODERATE 35: CPT | Performed by: STUDENT IN AN ORGANIZED HEALTH CARE EDUCATION/TRAINING PROGRAM

## 2021-01-01 PROCEDURE — 210N000002 HC R&B HEART CARE

## 2021-01-01 PROCEDURE — 0DB38ZX EXCISION OF LOWER ESOPHAGUS, VIA NATURAL OR ARTIFICIAL OPENING ENDOSCOPIC, DIAGNOSTIC: ICD-10-PCS | Performed by: INTERNAL MEDICINE

## 2021-01-01 PROCEDURE — 96365 THER/PROPH/DIAG IV INF INIT: CPT | Mod: 59

## 2021-01-01 PROCEDURE — 93308 TTE F-UP OR LMTD: CPT

## 2021-01-01 PROCEDURE — 94002 VENT MGMT INPAT INIT DAY: CPT

## 2021-01-01 PROCEDURE — 80048 BASIC METABOLIC PNL TOTAL CA: CPT | Performed by: PHYSICIAN ASSISTANT

## 2021-01-01 PROCEDURE — 86923 COMPATIBILITY TEST ELECTRIC: CPT | Performed by: INTERNAL MEDICINE

## 2021-01-01 PROCEDURE — 84157 ASSAY OF PROTEIN OTHER: CPT | Performed by: HOSPITALIST

## 2021-01-01 PROCEDURE — P9047 ALBUMIN (HUMAN), 25%, 50ML: HCPCS | Performed by: HOSPITALIST

## 2021-01-01 PROCEDURE — 370N000017 HC ANESTHESIA TECHNICAL FEE, PER MIN: Performed by: ORTHOPAEDIC SURGERY

## 2021-01-01 PROCEDURE — 99223 1ST HOSP IP/OBS HIGH 75: CPT | Performed by: REGISTERED NURSE

## 2021-01-01 PROCEDURE — 85018 HEMOGLOBIN: CPT | Performed by: ANESTHESIOLOGY

## 2021-01-01 PROCEDURE — 999N000111 HC STATISTIC OT IP EVAL DEFER: Performed by: OCCUPATIONAL THERAPIST

## 2021-01-01 PROCEDURE — 999N000065 XR ABDOMEN PORT 1 VIEWS: Mod: 77

## 2021-01-01 PROCEDURE — 93306 TTE W/DOPPLER COMPLETE: CPT | Mod: 26 | Performed by: INTERNAL MEDICINE

## 2021-01-01 PROCEDURE — 82374 ASSAY BLOOD CARBON DIOXIDE: CPT | Performed by: STUDENT IN AN ORGANIZED HEALTH CARE EDUCATION/TRAINING PROGRAM

## 2021-01-01 PROCEDURE — 82330 ASSAY OF CALCIUM: CPT | Performed by: INTERNAL MEDICINE

## 2021-01-01 PROCEDURE — 272N000001 HC OR GENERAL SUPPLY STERILE: Performed by: ORTHOPAEDIC SURGERY

## 2021-01-01 PROCEDURE — 89051 BODY FLUID CELL COUNT: CPT | Performed by: HOSPITALIST

## 2021-01-01 PROCEDURE — 94640 AIRWAY INHALATION TREATMENT: CPT

## 2021-01-01 PROCEDURE — 78452 HT MUSCLE IMAGE SPECT MULT: CPT | Mod: 26 | Performed by: INTERNAL MEDICINE

## 2021-01-01 PROCEDURE — 86850 RBC ANTIBODY SCREEN: CPT | Performed by: EMERGENCY MEDICINE

## 2021-01-01 PROCEDURE — 93454 CORONARY ARTERY ANGIO S&I: CPT | Performed by: INTERNAL MEDICINE

## 2021-01-01 PROCEDURE — 81001 URINALYSIS AUTO W/SCOPE: CPT | Performed by: INTERNAL MEDICINE

## 2021-01-01 PROCEDURE — 85018 HEMOGLOBIN: CPT | Performed by: HOSPITALIST

## 2021-01-01 PROCEDURE — C1769 GUIDE WIRE: HCPCS

## 2021-01-01 PROCEDURE — 71275 CT ANGIOGRAPHY CHEST: CPT

## 2021-01-01 PROCEDURE — 250N000009 HC RX 250: Performed by: ORTHOPAEDIC SURGERY

## 2021-01-01 PROCEDURE — 99207 PR NO CHARGE LOS: CPT

## 2021-01-01 PROCEDURE — 82040 ASSAY OF SERUM ALBUMIN: CPT | Performed by: HOSPITALIST

## 2021-01-01 PROCEDURE — 99231 SBSQ HOSP IP/OBS SF/LOW 25: CPT | Performed by: HOSPITALIST

## 2021-01-01 PROCEDURE — 82805 BLOOD GASES W/O2 SATURATION: CPT | Performed by: EMERGENCY MEDICINE

## 2021-01-01 PROCEDURE — 45330 DIAGNOSTIC SIGMOIDOSCOPY: CPT | Performed by: INTERNAL MEDICINE

## 2021-01-01 PROCEDURE — 82040 ASSAY OF SERUM ALBUMIN: CPT | Performed by: STUDENT IN AN ORGANIZED HEALTH CARE EDUCATION/TRAINING PROGRAM

## 2021-01-01 PROCEDURE — 258N000003 HC RX IP 258 OP 636: Performed by: EMERGENCY MEDICINE

## 2021-01-01 PROCEDURE — 83935 ASSAY OF URINE OSMOLALITY: CPT | Performed by: INTERNAL MEDICINE

## 2021-01-01 PROCEDURE — 71275 CT ANGIOGRAPHY CHEST: CPT | Mod: 26 | Performed by: INTERNAL MEDICINE

## 2021-01-01 PROCEDURE — 0DJD8ZZ INSPECTION OF LOWER INTESTINAL TRACT, VIA NATURAL OR ARTIFICIAL OPENING ENDOSCOPIC: ICD-10-PCS | Performed by: INTERNAL MEDICINE

## 2021-01-01 PROCEDURE — 97110 THERAPEUTIC EXERCISES: CPT | Mod: GO

## 2021-01-01 PROCEDURE — 710N000010 HC RECOVERY PHASE 1, LEVEL 2, PER MIN: Performed by: ORTHOPAEDIC SURGERY

## 2021-01-01 PROCEDURE — 99152 MOD SED SAME PHYS/QHP 5/>YRS: CPT | Performed by: INTERNAL MEDICINE

## 2021-01-01 PROCEDURE — 85048 AUTOMATED LEUKOCYTE COUNT: CPT | Performed by: HOSPITALIST

## 2021-01-01 PROCEDURE — 36430 TRANSFUSION BLD/BLD COMPNT: CPT

## 2021-01-01 PROCEDURE — 02RF38Z REPLACEMENT OF AORTIC VALVE WITH ZOOPLASTIC TISSUE, PERCUTANEOUS APPROACH: ICD-10-PCS | Performed by: INTERNAL MEDICINE

## 2021-01-01 PROCEDURE — 85014 HEMATOCRIT: CPT | Performed by: HOSPITALIST

## 2021-01-01 PROCEDURE — 97535 SELF CARE MNGMENT TRAINING: CPT | Mod: GO | Performed by: REHABILITATION PRACTITIONER

## 2021-01-01 PROCEDURE — 0DH63UZ INSERTION OF FEEDING DEVICE INTO STOMACH, PERCUTANEOUS APPROACH: ICD-10-PCS | Performed by: RADIOLOGY

## 2021-01-01 PROCEDURE — 255N000002 HC RX 255 OP 636: Performed by: NURSE PRACTITIONER

## 2021-01-01 PROCEDURE — 82805 BLOOD GASES W/O2 SATURATION: CPT | Performed by: HOSPITALIST

## 2021-01-01 DEVICE — TOBRA FULL DOSE ANTIBIOTIC BONE CEMENT, 10 PACK CATALOG NUMBER IS 6197-9-010
Type: IMPLANTABLE DEVICE | Site: KNEE | Status: FUNCTIONAL
Brand: SIMPLEX

## 2021-01-01 DEVICE — ATTUNE KNEE SYSTEM REVISION CRS FEMORAL CEMENTED RIGHT SIZE 6
Type: IMPLANTABLE DEVICE | Site: KNEE | Status: FUNCTIONAL
Brand: ATTUNE

## 2021-01-01 DEVICE — ATTUNE KNEE SYSTEM REVISION TIBIAL SLEEVE POROCOAT PARTIALLY COATED 37MM
Type: IMPLANTABLE DEVICE | Site: KNEE | Status: FUNCTIONAL
Brand: ATTUNE

## 2021-01-01 DEVICE — ATTUNE KNEE SYSTEM REVISION ROTATING PLATFORM TIBIAL BASE CEMENTED SIZE 5
Type: IMPLANTABLE DEVICE | Site: KNEE | Status: FUNCTIONAL
Brand: ATTUNE

## 2021-01-01 DEVICE — ATTUNE KNEE SYSTEM REVISION PRESSFIT STEM 16X60MM
Type: IMPLANTABLE DEVICE | Site: KNEE | Status: FUNCTIONAL
Brand: ATTUNE

## 2021-01-01 DEVICE — ATTUNE KNEE SYSTEM REVISION PRESSFIT STEM 12X110MM
Type: IMPLANTABLE DEVICE | Site: KNEE | Status: FUNCTIONAL
Brand: ATTUNE

## 2021-01-01 DEVICE — ATTUNE KNEE SYSTEM REVISION POSTERIOR FEMORAL AUGMENT 4MM CEMENTED SIZE 6
Type: IMPLANTABLE DEVICE | Site: KNEE | Status: FUNCTIONAL
Brand: ATTUNE

## 2021-01-01 DEVICE — ATTUNE KNEE SYSTEM REVISION CRS ROTATING PLATFORM INSERT AOX SIZE 6 12MM
Type: IMPLANTABLE DEVICE | Site: KNEE | Status: FUNCTIONAL
Brand: ATTUNE

## 2021-01-01 RX ORDER — HYDRALAZINE HYDROCHLORIDE 20 MG/ML
2.5-5 INJECTION INTRAMUSCULAR; INTRAVENOUS EVERY 10 MIN PRN
Status: DISCONTINUED | OUTPATIENT
Start: 2021-01-01 | End: 2021-01-01 | Stop reason: HOSPADM

## 2021-01-01 RX ORDER — NITROGLYCERIN 0.4 MG/1
0.4 TABLET SUBLINGUAL EVERY 5 MIN PRN
Status: DISCONTINUED | OUTPATIENT
Start: 2021-01-01 | End: 2021-01-01

## 2021-01-01 RX ORDER — VANCOMYCIN HYDROCHLORIDE 1 G/200ML
1000 INJECTION, SOLUTION INTRAVENOUS EVERY 24 HOURS
Status: DISCONTINUED | OUTPATIENT
Start: 2021-01-01 | End: 2021-01-01

## 2021-01-01 RX ORDER — LIDOCAINE 40 MG/G
CREAM TOPICAL
Status: DISCONTINUED | OUTPATIENT
Start: 2021-01-01 | End: 2021-01-01 | Stop reason: HOSPADM

## 2021-01-01 RX ORDER — OXYCODONE HYDROCHLORIDE 5 MG/1
5 TABLET ORAL EVERY 4 HOURS PRN
Status: DISCONTINUED | OUTPATIENT
Start: 2021-01-01 | End: 2021-01-01 | Stop reason: HOSPADM

## 2021-01-01 RX ORDER — PANTOPRAZOLE SODIUM 40 MG/1
40 TABLET, DELAYED RELEASE ORAL 2 TIMES DAILY
Status: DISCONTINUED | OUTPATIENT
Start: 2021-01-01 | End: 2021-01-01 | Stop reason: ALTCHOICE

## 2021-01-01 RX ORDER — BUMETANIDE 0.25 MG/ML
2 INJECTION INTRAMUSCULAR; INTRAVENOUS ONCE
Status: COMPLETED | OUTPATIENT
Start: 2021-01-01 | End: 2021-01-01

## 2021-01-01 RX ORDER — CLONIDINE HYDROCHLORIDE 0.1 MG/1
0.1 TABLET ORAL EVERY 4 HOURS PRN
Status: DISCONTINUED | OUTPATIENT
Start: 2021-01-01 | End: 2021-01-01

## 2021-01-01 RX ORDER — NICOTINE POLACRILEX 4 MG
15-30 LOZENGE BUCCAL
Status: DISCONTINUED | OUTPATIENT
Start: 2021-01-01 | End: 2021-01-01

## 2021-01-01 RX ORDER — CEFTRIAXONE 2 G/1
2 INJECTION, POWDER, FOR SOLUTION INTRAMUSCULAR; INTRAVENOUS EVERY 24 HOURS
Status: COMPLETED | OUTPATIENT
Start: 2021-01-01 | End: 2021-01-01

## 2021-01-01 RX ORDER — PROPOFOL 10 MG/ML
INJECTION, EMULSION INTRAVENOUS CONTINUOUS PRN
Status: DISCONTINUED | OUTPATIENT
Start: 2021-01-01 | End: 2021-01-01

## 2021-01-01 RX ORDER — DEXTROSE MONOHYDRATE 25 G/50ML
25-50 INJECTION, SOLUTION INTRAVENOUS
Status: DISCONTINUED | OUTPATIENT
Start: 2021-01-01 | End: 2021-01-01

## 2021-01-01 RX ORDER — NALOXONE HYDROCHLORIDE 0.4 MG/ML
0.4 INJECTION, SOLUTION INTRAMUSCULAR; INTRAVENOUS; SUBCUTANEOUS
Status: DISCONTINUED | OUTPATIENT
Start: 2021-01-01 | End: 2021-01-01 | Stop reason: HOSPADM

## 2021-01-01 RX ORDER — BUMETANIDE 0.25 MG/ML
2 INJECTION INTRAMUSCULAR; INTRAVENOUS EVERY 8 HOURS
Status: DISCONTINUED | OUTPATIENT
Start: 2021-01-01 | End: 2021-01-01

## 2021-01-01 RX ORDER — DEXAMETHASONE 4 MG/1
12 TABLET ORAL DAILY
Status: DISCONTINUED | OUTPATIENT
Start: 2021-01-01 | End: 2021-01-01

## 2021-01-01 RX ORDER — FERROUS SULFATE 325(65) MG
325 TABLET ORAL DAILY
COMMUNITY
End: 2021-01-01

## 2021-01-01 RX ORDER — LIDOCAINE 40 MG/G
CREAM TOPICAL
Status: CANCELLED | OUTPATIENT
Start: 2021-01-01

## 2021-01-01 RX ORDER — SODIUM CHLORIDE, SODIUM LACTATE, POTASSIUM CHLORIDE, CALCIUM CHLORIDE 600; 310; 30; 20 MG/100ML; MG/100ML; MG/100ML; MG/100ML
INJECTION, SOLUTION INTRAVENOUS CONTINUOUS
Status: DISCONTINUED | OUTPATIENT
Start: 2021-01-01 | End: 2021-01-01 | Stop reason: HOSPADM

## 2021-01-01 RX ORDER — DEXTROSE MONOHYDRATE 25 G/50ML
25-50 INJECTION, SOLUTION INTRAVENOUS
Status: DISCONTINUED | OUTPATIENT
Start: 2021-01-01 | End: 2021-01-01 | Stop reason: HOSPADM

## 2021-01-01 RX ORDER — ONDANSETRON 4 MG/1
4 TABLET, ORALLY DISINTEGRATING ORAL EVERY 8 HOURS PRN
Qty: 30 TABLET | Refills: 0 | Status: SHIPPED | OUTPATIENT
Start: 2021-01-01 | End: 2021-01-01

## 2021-01-01 RX ORDER — AMINOPHYLLINE 25 MG/ML
50-100 INJECTION, SOLUTION INTRAVENOUS
Status: DISCONTINUED | OUTPATIENT
Start: 2021-01-01 | End: 2021-01-01 | Stop reason: HOSPADM

## 2021-01-01 RX ORDER — ALBUMIN (HUMAN) 12.5 G/50ML
12.5 SOLUTION INTRAVENOUS EVERY 8 HOURS
Status: DISCONTINUED | OUTPATIENT
Start: 2021-01-01 | End: 2021-01-01

## 2021-01-01 RX ORDER — ALBUMIN (HUMAN) 12.5 G/50ML
12.5 SOLUTION INTRAVENOUS EVERY 12 HOURS
Status: DISCONTINUED | OUTPATIENT
Start: 2021-01-01 | End: 2021-01-01

## 2021-01-01 RX ORDER — BISACODYL 10 MG
10 SUPPOSITORY, RECTAL RECTAL DAILY PRN
Status: DISCONTINUED | OUTPATIENT
Start: 2021-01-01 | End: 2021-01-01 | Stop reason: HOSPADM

## 2021-01-01 RX ORDER — HEPARIN SODIUM 1000 [USP'U]/ML
500 INJECTION, SOLUTION INTRAVENOUS; SUBCUTANEOUS CONTINUOUS
Status: DISCONTINUED | OUTPATIENT
Start: 2021-01-01 | End: 2021-01-01

## 2021-01-01 RX ORDER — PANTOPRAZOLE SODIUM 40 MG/1
40 TABLET, DELAYED RELEASE ORAL
Status: DISCONTINUED | OUTPATIENT
Start: 2021-01-01 | End: 2021-01-01 | Stop reason: HOSPADM

## 2021-01-01 RX ORDER — DIPHENHYDRAMINE HCL 25 MG
25 CAPSULE ORAL
Status: DISCONTINUED | OUTPATIENT
Start: 2021-01-01 | End: 2021-01-01 | Stop reason: HOSPADM

## 2021-01-01 RX ORDER — NITROGLYCERIN 0.4 MG/1
TABLET SUBLINGUAL
Qty: 30 TABLET | Refills: 0 | Status: SHIPPED | OUTPATIENT
Start: 2021-01-01 | End: 2021-01-01

## 2021-01-01 RX ORDER — FLUMAZENIL 0.1 MG/ML
0.2 INJECTION, SOLUTION INTRAVENOUS
Status: CANCELLED | OUTPATIENT
Start: 2021-01-01 | End: 2021-01-01

## 2021-01-01 RX ORDER — NALOXONE HYDROCHLORIDE 0.4 MG/ML
0.2 INJECTION, SOLUTION INTRAMUSCULAR; INTRAVENOUS; SUBCUTANEOUS
Status: DISCONTINUED | OUTPATIENT
Start: 2021-01-01 | End: 2021-01-01 | Stop reason: HOSPADM

## 2021-01-01 RX ORDER — ACETAMINOPHEN 325 MG/1
650 TABLET ORAL EVERY 4 HOURS PRN
Status: DISCONTINUED | OUTPATIENT
Start: 2021-01-01 | End: 2021-01-01 | Stop reason: HOSPADM

## 2021-01-01 RX ORDER — PROTAMINE SULFATE 10 MG/ML
INJECTION, SOLUTION INTRAVENOUS PRN
Status: DISCONTINUED | OUTPATIENT
Start: 2021-01-01 | End: 2021-01-01

## 2021-01-01 RX ORDER — AMLODIPINE BESYLATE 5 MG/1
5 TABLET ORAL DAILY
Status: DISCONTINUED | OUTPATIENT
Start: 2021-01-01 | End: 2021-01-01

## 2021-01-01 RX ORDER — NITROGLYCERIN 0.4 MG/1
0.4 TABLET SUBLINGUAL EVERY 5 MIN PRN
Status: DISCONTINUED | OUTPATIENT
Start: 2021-01-01 | End: 2021-01-01 | Stop reason: HOSPADM

## 2021-01-01 RX ORDER — TORSEMIDE 20 MG/1
20 TABLET ORAL 2 TIMES DAILY
Status: DISCONTINUED | OUTPATIENT
Start: 2021-01-01 | End: 2021-01-01

## 2021-01-01 RX ORDER — SODIUM CHLORIDE 9 MG/ML
INJECTION, SOLUTION INTRAVENOUS CONTINUOUS
Status: DISCONTINUED | OUTPATIENT
Start: 2021-01-01 | End: 2021-01-01 | Stop reason: HOSPADM

## 2021-01-01 RX ORDER — POTASSIUM CHLORIDE 29.8 MG/ML
20 INJECTION INTRAVENOUS ONCE
Status: COMPLETED | OUTPATIENT
Start: 2021-01-01 | End: 2021-01-01

## 2021-01-01 RX ORDER — HYDRALAZINE HYDROCHLORIDE 25 MG/1
25 TABLET, FILM COATED ORAL 3 TIMES DAILY PRN
Status: DISCONTINUED | OUTPATIENT
Start: 2021-01-01 | End: 2021-01-01

## 2021-01-01 RX ORDER — ALBUMIN (HUMAN) 12.5 G/50ML
50 SOLUTION INTRAVENOUS
Status: DISCONTINUED | OUTPATIENT
Start: 2021-01-01 | End: 2021-01-01

## 2021-01-01 RX ORDER — ONDANSETRON 2 MG/ML
4 INJECTION INTRAMUSCULAR; INTRAVENOUS EVERY 6 HOURS PRN
Status: DISCONTINUED | OUTPATIENT
Start: 2021-01-01 | End: 2021-01-01 | Stop reason: HOSPADM

## 2021-01-01 RX ORDER — ASPIRIN 325 MG
325 TABLET ORAL ONCE
Status: CANCELLED | OUTPATIENT
Start: 2021-01-01 | End: 2021-01-01

## 2021-01-01 RX ORDER — DOXERCALCIFEROL 4 UG/2ML
4 INJECTION INTRAVENOUS
Status: COMPLETED | OUTPATIENT
Start: 2021-01-01 | End: 2021-01-01

## 2021-01-01 RX ORDER — ATORVASTATIN CALCIUM 40 MG/1
80 TABLET, FILM COATED ORAL DAILY
Status: DISCONTINUED | OUTPATIENT
Start: 2021-01-01 | End: 2021-01-01

## 2021-01-01 RX ORDER — POLYETHYLENE GLYCOL 3350 17 G/17G
17 POWDER, FOR SOLUTION ORAL DAILY
Status: DISCONTINUED | OUTPATIENT
Start: 2021-01-01 | End: 2021-01-01 | Stop reason: HOSPADM

## 2021-01-01 RX ORDER — CARVEDILOL 3.12 MG/1
3.12 TABLET ORAL 2 TIMES DAILY
COMMUNITY
Start: 2021-01-01

## 2021-01-01 RX ORDER — ACYCLOVIR 200 MG/1
0-1 CAPSULE ORAL
Status: DISCONTINUED | OUTPATIENT
Start: 2021-01-01 | End: 2021-01-01 | Stop reason: HOSPADM

## 2021-01-01 RX ORDER — NALOXONE HYDROCHLORIDE 0.4 MG/ML
0.2 INJECTION, SOLUTION INTRAMUSCULAR; INTRAVENOUS; SUBCUTANEOUS
Status: DISCONTINUED | OUTPATIENT
Start: 2021-01-01 | End: 2021-01-01

## 2021-01-01 RX ORDER — BUMETANIDE 0.25 MG/ML
2 INJECTION INTRAMUSCULAR; INTRAVENOUS EVERY 12 HOURS
Status: DISCONTINUED | OUTPATIENT
Start: 2021-01-01 | End: 2021-01-01

## 2021-01-01 RX ORDER — CARVEDILOL 12.5 MG/1
12.5 TABLET ORAL 2 TIMES DAILY WITH MEALS
Status: DISCONTINUED | OUTPATIENT
Start: 2021-01-01 | End: 2021-01-01 | Stop reason: HOSPADM

## 2021-01-01 RX ORDER — BUMETANIDE 0.25 MG/ML
1 INJECTION INTRAMUSCULAR; INTRAVENOUS
Status: DISCONTINUED | OUTPATIENT
Start: 2021-01-01 | End: 2021-01-01

## 2021-01-01 RX ORDER — NALOXONE HYDROCHLORIDE 0.4 MG/ML
0.4 INJECTION, SOLUTION INTRAMUSCULAR; INTRAVENOUS; SUBCUTANEOUS
Status: DISCONTINUED | OUTPATIENT
Start: 2021-01-01 | End: 2021-01-01

## 2021-01-01 RX ORDER — HYDRALAZINE HYDROCHLORIDE 50 MG/1
50 TABLET, FILM COATED ORAL EVERY 8 HOURS SCHEDULED
Status: DISCONTINUED | OUTPATIENT
Start: 2021-01-01 | End: 2021-01-01

## 2021-01-01 RX ORDER — SODIUM CHLORIDE 9 MG/ML
INJECTION, SOLUTION INTRAVENOUS CONTINUOUS
Status: CANCELLED | OUTPATIENT
Start: 2021-01-01

## 2021-01-01 RX ORDER — TORSEMIDE 20 MG/1
40 TABLET ORAL 2 TIMES DAILY
COMMUNITY
End: 2021-01-01

## 2021-01-01 RX ORDER — DEXTROSE MONOHYDRATE 25 G/50ML
25-50 INJECTION, SOLUTION INTRAVENOUS
Status: DISCONTINUED | OUTPATIENT
Start: 2021-01-01 | End: 2021-01-01 | Stop reason: ALTCHOICE

## 2021-01-01 RX ORDER — ACETAMINOPHEN 500 MG
1000 TABLET ORAL EVERY 6 HOURS PRN
Status: DISCONTINUED | OUTPATIENT
Start: 2021-01-01 | End: 2021-01-01 | Stop reason: HOSPADM

## 2021-01-01 RX ORDER — LABETALOL HYDROCHLORIDE 5 MG/ML
10 INJECTION, SOLUTION INTRAVENOUS
Status: DISCONTINUED | OUTPATIENT
Start: 2021-01-01 | End: 2021-01-01 | Stop reason: HOSPADM

## 2021-01-01 RX ORDER — DOXAZOSIN 1 MG/1
1 TABLET ORAL DAILY
Status: DISCONTINUED | OUTPATIENT
Start: 2021-01-01 | End: 2021-01-01

## 2021-01-01 RX ORDER — LIDOCAINE 40 MG/G
CREAM TOPICAL
Status: DISCONTINUED | OUTPATIENT
Start: 2021-01-01 | End: 2021-01-01

## 2021-01-01 RX ORDER — BUMETANIDE 1 MG/1
2 TABLET ORAL ONCE
Status: COMPLETED | OUTPATIENT
Start: 2021-01-01 | End: 2021-01-01

## 2021-01-01 RX ORDER — SODIUM CHLORIDE, SODIUM LACTATE, POTASSIUM CHLORIDE, CALCIUM CHLORIDE 600; 310; 30; 20 MG/100ML; MG/100ML; MG/100ML; MG/100ML
INJECTION, SOLUTION INTRAVENOUS CONTINUOUS
Status: DISCONTINUED | OUTPATIENT
Start: 2021-01-01 | End: 2021-01-01

## 2021-01-01 RX ORDER — ATROPINE SULFATE 0.1 MG/ML
0.5 INJECTION INTRAVENOUS
Status: DISCONTINUED | OUTPATIENT
Start: 2021-01-01 | End: 2021-01-01 | Stop reason: HOSPADM

## 2021-01-01 RX ORDER — HYDROMORPHONE HYDROCHLORIDE 1 MG/ML
.3-.5 INJECTION, SOLUTION INTRAMUSCULAR; INTRAVENOUS; SUBCUTANEOUS
Status: DISCONTINUED | OUTPATIENT
Start: 2021-01-01 | End: 2021-01-01 | Stop reason: HOSPADM

## 2021-01-01 RX ORDER — HYDROXYZINE HYDROCHLORIDE 10 MG/1
10 TABLET, FILM COATED ORAL EVERY 6 HOURS PRN
Qty: 30 TABLET | Refills: 0 | Status: SHIPPED | OUTPATIENT
Start: 2021-01-01 | End: 2021-01-01

## 2021-01-01 RX ORDER — B COMPLEX C NO.10/FOLIC ACID 900MCG/5ML
5 LIQUID (ML) ORAL DAILY
Status: DISCONTINUED | OUTPATIENT
Start: 2021-01-01 | End: 2021-01-01

## 2021-01-01 RX ORDER — FENTANYL CITRATE 50 UG/ML
25 INJECTION, SOLUTION INTRAMUSCULAR; INTRAVENOUS
Status: DISCONTINUED | OUTPATIENT
Start: 2021-01-01 | End: 2021-01-01 | Stop reason: HOSPADM

## 2021-01-01 RX ORDER — FUROSEMIDE 10 MG/ML
60 INJECTION INTRAMUSCULAR; INTRAVENOUS ONCE
Status: COMPLETED | OUTPATIENT
Start: 2021-01-01 | End: 2021-01-01

## 2021-01-01 RX ORDER — TORSEMIDE 20 MG/1
20 TABLET ORAL 2 TIMES DAILY
Start: 2021-01-01

## 2021-01-01 RX ORDER — ONDANSETRON 2 MG/ML
4 INJECTION INTRAMUSCULAR; INTRAVENOUS EVERY 30 MIN PRN
Status: DISCONTINUED | OUTPATIENT
Start: 2021-01-01 | End: 2021-01-01 | Stop reason: HOSPADM

## 2021-01-01 RX ORDER — ALBUMIN (HUMAN) 12.5 G/50ML
25 SOLUTION INTRAVENOUS ONCE
Status: COMPLETED | OUTPATIENT
Start: 2021-01-01 | End: 2021-01-01

## 2021-01-01 RX ORDER — DEXAMETHASONE SODIUM PHOSPHATE 4 MG/ML
4 INJECTION, SOLUTION INTRA-ARTICULAR; INTRALESIONAL; INTRAMUSCULAR; INTRAVENOUS; SOFT TISSUE ONCE
Status: COMPLETED | OUTPATIENT
Start: 2021-01-01 | End: 2021-01-01

## 2021-01-01 RX ORDER — HYDROMORPHONE HCL IN WATER/PF 6 MG/30 ML
0.2 PATIENT CONTROLLED ANALGESIA SYRINGE INTRAVENOUS
Status: DISCONTINUED | OUTPATIENT
Start: 2021-01-01 | End: 2021-01-01

## 2021-01-01 RX ORDER — PROPOFOL 10 MG/ML
5-75 INJECTION, EMULSION INTRAVENOUS CONTINUOUS
Status: DISCONTINUED | OUTPATIENT
Start: 2021-01-01 | End: 2021-01-01

## 2021-01-01 RX ORDER — VANCOMYCIN HYDROCHLORIDE 1 G/200ML
1000 INJECTION, SOLUTION INTRAVENOUS ONCE
Status: COMPLETED | OUTPATIENT
Start: 2021-01-01 | End: 2021-01-01

## 2021-01-01 RX ORDER — BUMETANIDE 0.25 MG/ML
2 INJECTION INTRAMUSCULAR; INTRAVENOUS
Status: DISCONTINUED | OUTPATIENT
Start: 2021-01-01 | End: 2021-01-01

## 2021-01-01 RX ORDER — DEXTROSE MONOHYDRATE 100 MG/ML
INJECTION, SOLUTION INTRAVENOUS CONTINUOUS PRN
Status: DISCONTINUED | OUTPATIENT
Start: 2021-01-01 | End: 2021-01-01

## 2021-01-01 RX ORDER — ATORVASTATIN CALCIUM 80 MG/1
80 TABLET, FILM COATED ORAL DAILY
COMMUNITY
Start: 2020-12-14

## 2021-01-01 RX ORDER — GLIMEPIRIDE 2 MG/1
4 TABLET ORAL
Status: DISCONTINUED | OUTPATIENT
Start: 2021-01-01 | End: 2021-01-01

## 2021-01-01 RX ORDER — LABETALOL HYDROCHLORIDE 5 MG/ML
10 INJECTION, SOLUTION INTRAVENOUS
Status: DISCONTINUED | OUTPATIENT
Start: 2021-01-01 | End: 2021-01-01

## 2021-01-01 RX ORDER — DEXTROSE MONOHYDRATE 100 MG/ML
INJECTION, SOLUTION INTRAVENOUS CONTINUOUS
Status: DISCONTINUED | OUTPATIENT
Start: 2021-01-01 | End: 2021-01-01

## 2021-01-01 RX ORDER — NITROGLYCERIN 10 MG/100ML
INJECTION INTRAVENOUS PRN
Status: DISCONTINUED | OUTPATIENT
Start: 2021-01-01 | End: 2021-01-01

## 2021-01-01 RX ORDER — CARVEDILOL 12.5 MG/1
12.5 TABLET ORAL 2 TIMES DAILY WITH MEALS
COMMUNITY
End: 2021-01-01

## 2021-01-01 RX ORDER — CARVEDILOL 3.12 MG/1
3.12 TABLET ORAL 2 TIMES DAILY WITH MEALS
Status: DISCONTINUED | OUTPATIENT
Start: 2021-01-01 | End: 2021-01-01

## 2021-01-01 RX ORDER — POTASSIUM CHLORIDE 1500 MG/1
20 TABLET, EXTENDED RELEASE ORAL
Status: COMPLETED | OUTPATIENT
Start: 2021-01-01 | End: 2021-01-01

## 2021-01-01 RX ORDER — ATORVASTATIN CALCIUM 80 MG/1
40 TABLET, FILM COATED ORAL DAILY
COMMUNITY
End: 2021-01-01

## 2021-01-01 RX ORDER — REGADENOSON 0.08 MG/ML
0.4 INJECTION, SOLUTION INTRAVENOUS ONCE
Status: COMPLETED | OUTPATIENT
Start: 2021-01-01 | End: 2021-01-01

## 2021-01-01 RX ORDER — ONDANSETRON 2 MG/ML
INJECTION INTRAMUSCULAR; INTRAVENOUS PRN
Status: DISCONTINUED | OUTPATIENT
Start: 2021-01-01 | End: 2021-01-01

## 2021-01-01 RX ORDER — HYDRALAZINE HYDROCHLORIDE 20 MG/ML
20 INJECTION INTRAMUSCULAR; INTRAVENOUS EVERY 8 HOURS
Status: DISCONTINUED | OUTPATIENT
Start: 2021-01-01 | End: 2021-01-01

## 2021-01-01 RX ORDER — ACETAZOLAMIDE 500 MG/5ML
500 INJECTION, POWDER, LYOPHILIZED, FOR SOLUTION INTRAVENOUS ONCE
Status: COMPLETED | OUTPATIENT
Start: 2021-01-01 | End: 2021-01-01

## 2021-01-01 RX ORDER — OMEPRAZOLE 40 MG/1
40 CAPSULE, DELAYED RELEASE ORAL 2 TIMES DAILY
Status: ON HOLD | COMMUNITY
Start: 2021-01-01 | End: 2021-01-01

## 2021-01-01 RX ORDER — BARIUM SULFATE 400 MG/ML
SUSPENSION ORAL ONCE
Status: DISCONTINUED | OUTPATIENT
Start: 2021-01-01 | End: 2021-01-01 | Stop reason: CLARIF

## 2021-01-01 RX ORDER — CHLOROTHIAZIDE SODIUM 500 MG/1
500 INJECTION INTRAVENOUS ONCE
Status: COMPLETED | OUTPATIENT
Start: 2021-01-01 | End: 2021-01-01

## 2021-01-01 RX ORDER — AMOXICILLIN 250 MG
1 CAPSULE ORAL 2 TIMES DAILY PRN
Status: DISCONTINUED | OUTPATIENT
Start: 2021-01-01 | End: 2021-01-01

## 2021-01-01 RX ORDER — BUMETANIDE 0.25 MG/ML
4 INJECTION INTRAMUSCULAR; INTRAVENOUS ONCE
Status: COMPLETED | OUTPATIENT
Start: 2021-01-01 | End: 2021-01-01

## 2021-01-01 RX ORDER — SODIUM CHLORIDE 450 MG/100ML
INJECTION, SOLUTION INTRAVENOUS CONTINUOUS
Status: DISCONTINUED | OUTPATIENT
Start: 2021-01-01 | End: 2021-01-01

## 2021-01-01 RX ORDER — TRAZODONE HYDROCHLORIDE 50 MG/1
50 TABLET, FILM COATED ORAL
Status: DISCONTINUED | OUTPATIENT
Start: 2021-01-01 | End: 2021-01-01

## 2021-01-01 RX ORDER — FUROSEMIDE 10 MG/ML
40 INJECTION INTRAMUSCULAR; INTRAVENOUS DAILY
Status: DISCONTINUED | OUTPATIENT
Start: 2021-01-01 | End: 2021-01-01

## 2021-01-01 RX ORDER — CHLORHEXIDINE GLUCONATE ORAL RINSE 1.2 MG/ML
15 SOLUTION DENTAL EVERY 12 HOURS
Status: DISCONTINUED | OUTPATIENT
Start: 2021-01-01 | End: 2021-01-01

## 2021-01-01 RX ORDER — ACETAMINOPHEN 650 MG/1
650 SUPPOSITORY RECTAL EVERY 6 HOURS PRN
Status: DISCONTINUED | OUTPATIENT
Start: 2021-01-01 | End: 2021-01-01 | Stop reason: HOSPADM

## 2021-01-01 RX ORDER — TAMSULOSIN HYDROCHLORIDE 0.4 MG/1
0.4 CAPSULE ORAL DAILY
Status: DISCONTINUED | OUTPATIENT
Start: 2021-01-01 | End: 2021-01-01

## 2021-01-01 RX ORDER — GLIMEPIRIDE 2 MG/1
4 TABLET ORAL
COMMUNITY
End: 2021-01-01

## 2021-01-01 RX ORDER — GLIMEPIRIDE 2 MG/1
2 TABLET ORAL 2 TIMES DAILY
Qty: 1 TABLET | Refills: 0 | COMMUNITY
Start: 2021-01-01

## 2021-01-01 RX ORDER — HYDROCODONE BITARTRATE AND ACETAMINOPHEN 5; 325 MG/1; MG/1
1-2 TABLET ORAL EVERY 4 HOURS PRN
Status: DISCONTINUED | OUTPATIENT
Start: 2021-01-01 | End: 2021-01-01

## 2021-01-01 RX ORDER — AMOXICILLIN 250 MG
1 CAPSULE ORAL 2 TIMES DAILY
Status: DISCONTINUED | OUTPATIENT
Start: 2021-01-01 | End: 2021-01-01 | Stop reason: HOSPADM

## 2021-01-01 RX ORDER — CARVEDILOL 3.12 MG/1
3.12 TABLET ORAL 2 TIMES DAILY
Status: DISCONTINUED | OUTPATIENT
Start: 2021-01-01 | End: 2021-01-01

## 2021-01-01 RX ORDER — NALOXONE HYDROCHLORIDE 0.4 MG/ML
0.2 INJECTION, SOLUTION INTRAMUSCULAR; INTRAVENOUS; SUBCUTANEOUS
Status: DISCONTINUED | OUTPATIENT
Start: 2021-01-01 | End: 2021-01-01 | Stop reason: CLARIF

## 2021-01-01 RX ORDER — PROCHLORPERAZINE MALEATE 5 MG
5 TABLET ORAL EVERY 6 HOURS PRN
Status: DISCONTINUED | OUTPATIENT
Start: 2021-01-01 | End: 2021-01-01 | Stop reason: HOSPADM

## 2021-01-01 RX ORDER — AMOXICILLIN 250 MG
1 CAPSULE ORAL 2 TIMES DAILY PRN
Status: DISCONTINUED | OUTPATIENT
Start: 2021-01-01 | End: 2021-01-01 | Stop reason: HOSPADM

## 2021-01-01 RX ORDER — CLINDAMYCIN PHOSPHATE 900 MG/50ML
900 INJECTION, SOLUTION INTRAVENOUS
Status: COMPLETED | OUTPATIENT
Start: 2021-01-01 | End: 2021-01-01

## 2021-01-01 RX ORDER — DEXAMETHASONE SODIUM PHOSPHATE 4 MG/ML
INJECTION, SOLUTION INTRA-ARTICULAR; INTRALESIONAL; INTRAMUSCULAR; INTRAVENOUS; SOFT TISSUE PRN
Status: DISCONTINUED | OUTPATIENT
Start: 2021-01-01 | End: 2021-01-01

## 2021-01-01 RX ORDER — PROPOFOL 10 MG/ML
INJECTION, EMULSION INTRAVENOUS
Status: DISCONTINUED | OUTPATIENT
Start: 2021-01-01 | End: 2021-01-01

## 2021-01-01 RX ORDER — ASPIRIN 81 MG/1
81 TABLET ORAL DAILY
Status: DISCONTINUED | OUTPATIENT
Start: 2021-01-01 | End: 2021-01-01 | Stop reason: HOSPADM

## 2021-01-01 RX ORDER — CARBOXYMETHYLCELLULOSE SODIUM 5 MG/ML
1 SOLUTION/ DROPS OPHTHALMIC
Status: DISCONTINUED | OUTPATIENT
Start: 2021-01-01 | End: 2021-01-01

## 2021-01-01 RX ORDER — MEROPENEM 1 G/1
1 INJECTION, POWDER, FOR SOLUTION INTRAVENOUS EVERY 12 HOURS
Status: DISCONTINUED | OUTPATIENT
Start: 2021-01-01 | End: 2021-01-01

## 2021-01-01 RX ORDER — AMLODIPINE BESYLATE 5 MG/1
5 TABLET ORAL DAILY
Status: DISCONTINUED | OUTPATIENT
Start: 2021-01-01 | End: 2021-01-01 | Stop reason: HOSPADM

## 2021-01-01 RX ORDER — TORSEMIDE 20 MG/1
20 TABLET ORAL DAILY
Status: DISCONTINUED | OUTPATIENT
Start: 2021-01-01 | End: 2021-01-01 | Stop reason: HOSPADM

## 2021-01-01 RX ORDER — ASPIRIN 81 MG/1
243 TABLET, CHEWABLE ORAL ONCE
Status: CANCELLED | OUTPATIENT
Start: 2021-01-01

## 2021-01-01 RX ORDER — ALBUTEROL SULFATE 90 UG/1
2 AEROSOL, METERED RESPIRATORY (INHALATION) EVERY 5 MIN PRN
Status: DISCONTINUED | OUTPATIENT
Start: 2021-01-01 | End: 2021-01-01 | Stop reason: HOSPADM

## 2021-01-01 RX ORDER — DOXAZOSIN 1 MG/1
1 TABLET ORAL AT BEDTIME
Status: DISCONTINUED | OUTPATIENT
Start: 2021-01-01 | End: 2021-01-01

## 2021-01-01 RX ORDER — GINSENG 100 MG
CAPSULE ORAL 2 TIMES DAILY
Status: DISCONTINUED | OUTPATIENT
Start: 2021-01-01 | End: 2021-01-01 | Stop reason: HOSPADM

## 2021-01-01 RX ORDER — BUMETANIDE 0.25 MG/ML
1 INJECTION INTRAMUSCULAR; INTRAVENOUS EVERY 12 HOURS
Status: DISCONTINUED | OUTPATIENT
Start: 2021-01-01 | End: 2021-01-01

## 2021-01-01 RX ORDER — FUROSEMIDE 10 MG/ML
40 INJECTION INTRAMUSCULAR; INTRAVENOUS ONCE
Status: COMPLETED | OUTPATIENT
Start: 2021-01-01 | End: 2021-01-01

## 2021-01-01 RX ORDER — VERAPAMIL HYDROCHLORIDE 2.5 MG/ML
INJECTION, SOLUTION INTRAVENOUS
Status: DISCONTINUED | OUTPATIENT
Start: 2021-01-01 | End: 2021-01-01 | Stop reason: HOSPADM

## 2021-01-01 RX ORDER — AMOXICILLIN 250 MG
1-2 CAPSULE ORAL 2 TIMES DAILY
Qty: 30 TABLET | Refills: 0 | Status: SHIPPED | OUTPATIENT
Start: 2021-01-01 | End: 2021-01-01

## 2021-01-01 RX ORDER — AMINO AC/PROTEIN HYDR/WHEY PRO 10G-100/30
1 LIQUID (ML) ORAL 3 TIMES DAILY
Status: DISCONTINUED | OUTPATIENT
Start: 2021-01-01 | End: 2021-01-01 | Stop reason: CLARIF

## 2021-01-01 RX ORDER — ISOSORBIDE MONONITRATE 30 MG/1
30 TABLET, EXTENDED RELEASE ORAL DAILY
Status: DISCONTINUED | OUTPATIENT
Start: 2021-01-01 | End: 2021-01-01 | Stop reason: HOSPADM

## 2021-01-01 RX ORDER — HYDROCODONE BITARTRATE AND ACETAMINOPHEN 5; 325 MG/1; MG/1
1-2 TABLET ORAL 2 TIMES DAILY PRN
Status: DISCONTINUED | OUTPATIENT
Start: 2021-01-01 | End: 2021-01-01

## 2021-01-01 RX ORDER — FENTANYL CITRATE 50 UG/ML
INJECTION, SOLUTION INTRAMUSCULAR; INTRAVENOUS PRN
Status: DISCONTINUED | OUTPATIENT
Start: 2021-01-01 | End: 2021-01-01

## 2021-01-01 RX ORDER — ISOSORBIDE MONONITRATE 30 MG/1
30 TABLET, EXTENDED RELEASE ORAL DAILY
COMMUNITY
Start: 2020-12-14 | End: 2021-01-01

## 2021-01-01 RX ORDER — FLUMAZENIL 0.1 MG/ML
0.2 INJECTION, SOLUTION INTRAVENOUS
Status: DISCONTINUED | OUTPATIENT
Start: 2021-01-01 | End: 2021-01-01

## 2021-01-01 RX ORDER — CARVEDILOL 3.12 MG/1
3.12 TABLET ORAL ONCE
Status: COMPLETED | OUTPATIENT
Start: 2021-01-01 | End: 2021-01-01

## 2021-01-01 RX ORDER — HYDRALAZINE HYDROCHLORIDE 20 MG/ML
10-20 INJECTION INTRAMUSCULAR; INTRAVENOUS EVERY 4 HOURS PRN
Status: DISCONTINUED | OUTPATIENT
Start: 2021-01-01 | End: 2021-01-01

## 2021-01-01 RX ORDER — FUROSEMIDE 40 MG
40 TABLET ORAL ONCE
Status: COMPLETED | OUTPATIENT
Start: 2021-01-01 | End: 2021-01-01

## 2021-01-01 RX ORDER — TORSEMIDE 20 MG/1
TABLET ORAL
Qty: 120 TABLET | Refills: 1 | Status: ON HOLD | OUTPATIENT
Start: 2021-01-01 | End: 2021-01-01

## 2021-01-01 RX ORDER — ATORVASTATIN CALCIUM 40 MG/1
40 TABLET, FILM COATED ORAL EVERY EVENING
Status: DISCONTINUED | OUTPATIENT
Start: 2021-01-01 | End: 2021-01-01 | Stop reason: HOSPADM

## 2021-01-01 RX ORDER — QUETIAPINE FUMARATE 25 MG/1
25 TABLET, FILM COATED ORAL 2 TIMES DAILY PRN
Status: DISCONTINUED | OUTPATIENT
Start: 2021-01-01 | End: 2021-01-01

## 2021-01-01 RX ORDER — HYDRALAZINE HYDROCHLORIDE 25 MG/1
50 TABLET, FILM COATED ORAL EVERY 8 HOURS SCHEDULED
Status: DISCONTINUED | OUTPATIENT
Start: 2021-01-01 | End: 2021-01-01

## 2021-01-01 RX ORDER — HYDROCODONE BITARTRATE AND ACETAMINOPHEN 5; 325 MG/1; MG/1
1-2 TABLET ORAL 2 TIMES DAILY PRN
COMMUNITY
Start: 2021-01-01

## 2021-01-01 RX ORDER — CARVEDILOL 6.25 MG/1
12.5 TABLET ORAL 2 TIMES DAILY
Status: DISCONTINUED | OUTPATIENT
Start: 2021-01-01 | End: 2021-01-01 | Stop reason: DRUGHIGH

## 2021-01-01 RX ORDER — MEROPENEM 500 MG/1
500 INJECTION, POWDER, FOR SOLUTION INTRAVENOUS EVERY 8 HOURS
Status: DISCONTINUED | OUTPATIENT
Start: 2021-01-01 | End: 2021-01-01

## 2021-01-01 RX ORDER — PROPOFOL 10 MG/ML
INJECTION, EMULSION INTRAVENOUS PRN
Status: DISCONTINUED | OUTPATIENT
Start: 2021-01-01 | End: 2021-01-01

## 2021-01-01 RX ORDER — LABETALOL HYDROCHLORIDE 5 MG/ML
10-20 INJECTION, SOLUTION INTRAVENOUS EVERY 4 HOURS PRN
Status: DISCONTINUED | OUTPATIENT
Start: 2021-01-01 | End: 2021-01-01

## 2021-01-01 RX ORDER — LORAZEPAM 2 MG/ML
1 INJECTION INTRAMUSCULAR
Status: DISCONTINUED | OUTPATIENT
Start: 2021-01-01 | End: 2021-01-01 | Stop reason: HOSPADM

## 2021-01-01 RX ORDER — NALOXONE HYDROCHLORIDE 0.4 MG/ML
0.1 INJECTION, SOLUTION INTRAMUSCULAR; INTRAVENOUS; SUBCUTANEOUS
Status: DISCONTINUED | OUTPATIENT
Start: 2021-01-01 | End: 2021-01-01 | Stop reason: HOSPADM

## 2021-01-01 RX ORDER — CARVEDILOL 6.25 MG/1
6.25 TABLET ORAL 2 TIMES DAILY WITH MEALS
Status: DISCONTINUED | OUTPATIENT
Start: 2021-01-01 | End: 2021-01-01

## 2021-01-01 RX ORDER — AMINO AC/PROTEIN HYDR/WHEY PRO 10G-100/30
2 LIQUID (ML) ORAL EVERY 12 HOURS
Status: DISCONTINUED | OUTPATIENT
Start: 2021-01-01 | End: 2021-01-01 | Stop reason: CLARIF

## 2021-01-01 RX ORDER — DEXAMETHASONE SODIUM PHOSPHATE 4 MG/ML
10 INJECTION, SOLUTION INTRA-ARTICULAR; INTRALESIONAL; INTRAMUSCULAR; INTRAVENOUS; SOFT TISSUE EVERY 24 HOURS
Status: COMPLETED | OUTPATIENT
Start: 2021-01-01 | End: 2021-01-01

## 2021-01-01 RX ORDER — NOREPINEPHRINE BITARTRATE 0.02 MG/ML
.01-.6 INJECTION, SOLUTION INTRAVENOUS CONTINUOUS
Status: DISCONTINUED | OUTPATIENT
Start: 2021-01-01 | End: 2021-01-01

## 2021-01-01 RX ORDER — OXYCODONE HYDROCHLORIDE 5 MG/1
5 TABLET ORAL ONCE
Status: DISCONTINUED | OUTPATIENT
Start: 2021-01-01 | End: 2021-01-01

## 2021-01-01 RX ORDER — HALOPERIDOL 2 MG/ML
1 SOLUTION ORAL
Status: DISCONTINUED | OUTPATIENT
Start: 2021-01-01 | End: 2021-01-01 | Stop reason: HOSPADM

## 2021-01-01 RX ORDER — ACETAMINOPHEN 325 MG/1
325-650 TABLET ORAL EVERY 4 HOURS PRN
Status: DISCONTINUED | OUTPATIENT
Start: 2021-01-01 | End: 2021-01-01

## 2021-01-01 RX ORDER — NOREPINEPHRINE BITARTRATE 0.02 MG/ML
INJECTION, SOLUTION INTRAVENOUS
Status: DISCONTINUED
Start: 2021-01-01 | End: 2021-01-01 | Stop reason: WASHOUT

## 2021-01-01 RX ORDER — HEPARIN SODIUM 1000 [USP'U]/ML
4000 INJECTION, SOLUTION INTRAVENOUS; SUBCUTANEOUS ONCE
Status: COMPLETED | OUTPATIENT
Start: 2021-01-01 | End: 2021-01-01

## 2021-01-01 RX ORDER — FENTANYL CITRATE 50 UG/ML
INJECTION, SOLUTION INTRAMUSCULAR; INTRAVENOUS
Status: DISCONTINUED | OUTPATIENT
Start: 2021-01-01 | End: 2021-01-01 | Stop reason: HOSPADM

## 2021-01-01 RX ORDER — BUMETANIDE 0.25 MG/ML
2 INJECTION INTRAMUSCULAR; INTRAVENOUS ONCE
Status: DISCONTINUED | OUTPATIENT
Start: 2021-01-01 | End: 2021-01-01

## 2021-01-01 RX ORDER — NALOXONE HYDROCHLORIDE 0.4 MG/ML
0.4 INJECTION, SOLUTION INTRAMUSCULAR; INTRAVENOUS; SUBCUTANEOUS
Status: DISCONTINUED | OUTPATIENT
Start: 2021-01-01 | End: 2021-01-01 | Stop reason: CLARIF

## 2021-01-01 RX ORDER — ONDANSETRON 4 MG/1
4 TABLET, ORALLY DISINTEGRATING ORAL EVERY 6 HOURS PRN
Status: DISCONTINUED | OUTPATIENT
Start: 2021-01-01 | End: 2021-01-01 | Stop reason: HOSPADM

## 2021-01-01 RX ORDER — ACETAMINOPHEN 325 MG/1
650 TABLET ORAL EVERY 4 HOURS PRN
Status: DISCONTINUED | OUTPATIENT
Start: 2021-01-01 | End: 2021-01-01

## 2021-01-01 RX ORDER — BARIUM SULFATE 400 MG/ML
SUSPENSION ORAL ONCE
Status: COMPLETED | OUTPATIENT
Start: 2021-01-01 | End: 2021-01-01

## 2021-01-01 RX ORDER — POLYETHYLENE GLYCOL 3350 17 G/17G
17 POWDER, FOR SOLUTION ORAL DAILY PRN
Status: DISCONTINUED | OUTPATIENT
Start: 2021-01-01 | End: 2021-01-01

## 2021-01-01 RX ORDER — GLIMEPIRIDE 2 MG/1
4 TABLET ORAL 2 TIMES DAILY
COMMUNITY
Start: 2021-01-01 | End: 2021-01-01

## 2021-01-01 RX ORDER — CARVEDILOL 12.5 MG/1
25 TABLET ORAL EVERY MORNING
Status: DISCONTINUED | OUTPATIENT
Start: 2021-01-01 | End: 2021-01-01

## 2021-01-01 RX ORDER — OXYCODONE HCL 20 MG/ML
5-10 CONCENTRATE, ORAL ORAL
Status: DISCONTINUED | OUTPATIENT
Start: 2021-01-01 | End: 2021-01-01 | Stop reason: HOSPADM

## 2021-01-01 RX ORDER — METHYLPREDNISOLONE SODIUM SUCCINATE 125 MG/2ML
125 INJECTION, POWDER, LYOPHILIZED, FOR SOLUTION INTRAMUSCULAR; INTRAVENOUS
Status: DISCONTINUED | OUTPATIENT
Start: 2021-01-01 | End: 2021-01-01 | Stop reason: HOSPADM

## 2021-01-01 RX ORDER — GUAIFENESIN 600 MG/1
15 TABLET, EXTENDED RELEASE ORAL DAILY
Status: DISCONTINUED | OUTPATIENT
Start: 2021-01-01 | End: 2021-01-01

## 2021-01-01 RX ORDER — FERROUS SULFATE 325(65) MG
325 TABLET ORAL DAILY
Status: DISCONTINUED | OUTPATIENT
Start: 2021-01-01 | End: 2021-01-01 | Stop reason: HOSPADM

## 2021-01-01 RX ORDER — NEOSTIGMINE METHYLSULFATE 1 MG/ML
VIAL (ML) INJECTION PRN
Status: DISCONTINUED | OUTPATIENT
Start: 2021-01-01 | End: 2021-01-01

## 2021-01-01 RX ORDER — FUROSEMIDE 40 MG
40 TABLET ORAL DAILY
Status: DISCONTINUED | OUTPATIENT
Start: 2021-01-01 | End: 2021-01-01

## 2021-01-01 RX ORDER — MULTIPLE VITAMINS W/ MINERALS TAB 9MG-400MCG
1 TAB ORAL DAILY
COMMUNITY

## 2021-01-01 RX ORDER — POTASSIUM CHLORIDE 1500 MG/1
40 TABLET, EXTENDED RELEASE ORAL ONCE
Status: DISCONTINUED | OUTPATIENT
Start: 2021-01-01 | End: 2021-01-01 | Stop reason: HOSPADM

## 2021-01-01 RX ORDER — INSULIN GLARGINE 100 [IU]/ML
20 INJECTION, SOLUTION SUBCUTANEOUS
COMMUNITY
End: 2021-01-01

## 2021-01-01 RX ORDER — NICOTINE POLACRILEX 4 MG
15-30 LOZENGE BUCCAL
Status: DISCONTINUED | OUTPATIENT
Start: 2021-01-01 | End: 2021-01-01 | Stop reason: HOSPADM

## 2021-01-01 RX ORDER — MULTIPLE VITAMINS W/ MINERALS TAB 9MG-400MCG
1 TAB ORAL DAILY
Status: DISCONTINUED | OUTPATIENT
Start: 2021-01-01 | End: 2021-01-01 | Stop reason: HOSPADM

## 2021-01-01 RX ORDER — LEVOFLOXACIN 5 MG/ML
500 INJECTION, SOLUTION INTRAVENOUS ONCE
Status: COMPLETED | OUTPATIENT
Start: 2021-01-01 | End: 2021-01-01

## 2021-01-01 RX ORDER — FENTANYL CITRATE 50 UG/ML
100 INJECTION, SOLUTION INTRAMUSCULAR; INTRAVENOUS ONCE
Status: COMPLETED | OUTPATIENT
Start: 2021-01-01 | End: 2021-01-01

## 2021-01-01 RX ORDER — CLOPIDOGREL BISULFATE 75 MG/1
75 TABLET ORAL DAILY
Qty: 30 TABLET | Refills: 0 | Status: SHIPPED | OUTPATIENT
Start: 2021-01-01 | End: 2021-01-01

## 2021-01-01 RX ORDER — GLYCOPYRROLATE 0.2 MG/ML
INJECTION, SOLUTION INTRAMUSCULAR; INTRAVENOUS PRN
Status: DISCONTINUED | OUTPATIENT
Start: 2021-01-01 | End: 2021-01-01

## 2021-01-01 RX ORDER — TRANEXAMIC ACID 650 MG/1
1950 TABLET ORAL ONCE
Status: COMPLETED | OUTPATIENT
Start: 2021-01-01 | End: 2021-01-01

## 2021-01-01 RX ORDER — EPINEPHRINE IN SOD CHLOR,ISO 1 MG/10 ML
SYRINGE (ML) INTRAVENOUS
Status: DISCONTINUED
Start: 2021-01-01 | End: 2021-01-01 | Stop reason: HOSPADM

## 2021-01-01 RX ORDER — LIDOCAINE HYDROCHLORIDE 10 MG/ML
10 INJECTION, SOLUTION EPIDURAL; INFILTRATION; INTRACAUDAL; PERINEURAL ONCE
Status: COMPLETED | OUTPATIENT
Start: 2021-01-01 | End: 2021-01-01

## 2021-01-01 RX ORDER — ATROPINE SULFATE 10 MG/ML
2 SOLUTION/ DROPS OPHTHALMIC EVERY 4 HOURS PRN
Status: DISCONTINUED | OUTPATIENT
Start: 2021-01-01 | End: 2021-01-01 | Stop reason: HOSPADM

## 2021-01-01 RX ORDER — ALBUMIN (HUMAN) 12.5 G/50ML
SOLUTION INTRAVENOUS
Status: DISCONTINUED
Start: 2021-01-01 | End: 2021-01-01 | Stop reason: HOSPADM

## 2021-01-01 RX ORDER — OXYCODONE HYDROCHLORIDE 5 MG/1
10 TABLET ORAL EVERY 4 HOURS PRN
Status: DISCONTINUED | OUTPATIENT
Start: 2021-01-01 | End: 2021-01-01 | Stop reason: HOSPADM

## 2021-01-01 RX ORDER — NICOTINE POLACRILEX 4 MG
15-30 LOZENGE BUCCAL
Status: DISCONTINUED | OUTPATIENT
Start: 2021-01-01 | End: 2021-01-01 | Stop reason: ALTCHOICE

## 2021-01-01 RX ORDER — ACETAMINOPHEN 325 MG/1
325-650 TABLET ORAL EVERY 4 HOURS PRN
Status: DISCONTINUED | OUTPATIENT
Start: 2021-01-01 | End: 2021-01-01 | Stop reason: HOSPADM

## 2021-01-01 RX ORDER — SODIUM CHLORIDE 9 MG/ML
INJECTION, SOLUTION INTRAVENOUS CONTINUOUS
Status: DISCONTINUED | OUTPATIENT
Start: 2021-01-01 | End: 2021-01-01

## 2021-01-01 RX ORDER — HYDRALAZINE HYDROCHLORIDE 20 MG/ML
10 INJECTION INTRAMUSCULAR; INTRAVENOUS
Status: DISCONTINUED | OUTPATIENT
Start: 2021-01-01 | End: 2021-01-01

## 2021-01-01 RX ORDER — QUETIAPINE FUMARATE 25 MG/1
25 TABLET, FILM COATED ORAL 2 TIMES DAILY PRN
Status: DISCONTINUED | OUTPATIENT
Start: 2021-01-01 | End: 2021-01-01 | Stop reason: HOSPADM

## 2021-01-01 RX ORDER — ACETAMINOPHEN 325 MG/1
325-650 TABLET ORAL EVERY 4 HOURS PRN
COMMUNITY
Start: 2021-01-01

## 2021-01-01 RX ORDER — LEVOFLOXACIN 5 MG/ML
500 INJECTION, SOLUTION INTRAVENOUS
Status: DISCONTINUED | OUTPATIENT
Start: 2021-01-01 | End: 2021-01-01

## 2021-01-01 RX ORDER — FENTANYL CITRATE 50 UG/ML
25 INJECTION, SOLUTION INTRAMUSCULAR; INTRAVENOUS EVERY 5 MIN PRN
Status: DISCONTINUED | OUTPATIENT
Start: 2021-01-01 | End: 2021-01-01 | Stop reason: HOSPADM

## 2021-01-01 RX ORDER — PANTOPRAZOLE SODIUM 40 MG/1
40 TABLET, DELAYED RELEASE ORAL
Qty: 60 TABLET | Refills: 0 | Status: SHIPPED | OUTPATIENT
Start: 2021-01-01 | End: 2021-01-01

## 2021-01-01 RX ORDER — MAGNESIUM SULFATE HEPTAHYDRATE 40 MG/ML
2 INJECTION, SOLUTION INTRAVENOUS ONCE
Status: COMPLETED | OUTPATIENT
Start: 2021-01-01 | End: 2021-01-01

## 2021-01-01 RX ORDER — FUROSEMIDE 10 MG/ML
80 INJECTION INTRAMUSCULAR; INTRAVENOUS ONCE
Status: COMPLETED | OUTPATIENT
Start: 2021-01-01 | End: 2021-01-01

## 2021-01-01 RX ORDER — HYDROXYZINE HYDROCHLORIDE 10 MG/1
10 TABLET, FILM COATED ORAL EVERY 6 HOURS PRN
Status: DISCONTINUED | OUTPATIENT
Start: 2021-01-01 | End: 2021-01-01 | Stop reason: HOSPADM

## 2021-01-01 RX ORDER — OXYBUTYNIN CHLORIDE 5 MG/1
5 TABLET ORAL 2 TIMES DAILY PRN
Status: DISCONTINUED | OUTPATIENT
Start: 2021-01-01 | End: 2021-01-01

## 2021-01-01 RX ORDER — FENTANYL CITRATE 0.05 MG/ML
50-100 INJECTION, SOLUTION INTRAMUSCULAR; INTRAVENOUS ONCE
Status: DISCONTINUED | OUTPATIENT
Start: 2021-01-01 | End: 2021-01-01 | Stop reason: HOSPADM

## 2021-01-01 RX ORDER — IOPAMIDOL 755 MG/ML
INJECTION, SOLUTION INTRAVASCULAR
Status: DISCONTINUED | OUTPATIENT
Start: 2021-01-01 | End: 2021-01-01 | Stop reason: HOSPADM

## 2021-01-01 RX ORDER — FENTANYL CITRATE 50 UG/ML
25-50 INJECTION, SOLUTION INTRAMUSCULAR; INTRAVENOUS EVERY 5 MIN PRN
Status: DISCONTINUED | OUTPATIENT
Start: 2021-01-01 | End: 2021-01-01

## 2021-01-01 RX ORDER — CLINDAMYCIN PHOSPHATE 900 MG/50ML
900 INJECTION, SOLUTION INTRAVENOUS
Status: CANCELLED | OUTPATIENT
Start: 2021-01-01

## 2021-01-01 RX ORDER — TORSEMIDE 20 MG/1
20 TABLET ORAL DAILY
COMMUNITY
Start: 2021-01-01 | End: 2021-01-01

## 2021-01-01 RX ORDER — POTASSIUM CHLORIDE 1.5 G/1.58G
40 POWDER, FOR SOLUTION ORAL ONCE
Status: COMPLETED | OUTPATIENT
Start: 2021-01-01 | End: 2021-01-01

## 2021-01-01 RX ORDER — ASPIRIN 81 MG/1
243 TABLET, CHEWABLE ORAL ONCE
Status: COMPLETED | OUTPATIENT
Start: 2021-01-01 | End: 2021-01-01

## 2021-01-01 RX ORDER — POLYETHYLENE GLYCOL 3350 17 G/17G
17 POWDER, FOR SOLUTION ORAL DAILY PRN
Status: DISCONTINUED | OUTPATIENT
Start: 2021-01-01 | End: 2021-01-01 | Stop reason: HOSPADM

## 2021-01-01 RX ORDER — CAFFEINE CITRATE 20 MG/ML
60 SOLUTION INTRAVENOUS
Status: DISCONTINUED | OUTPATIENT
Start: 2021-01-01 | End: 2021-01-01 | Stop reason: HOSPADM

## 2021-01-01 RX ORDER — HEPARIN SODIUM 1000 [USP'U]/ML
INJECTION, SOLUTION INTRAVENOUS; SUBCUTANEOUS PRN
Status: DISCONTINUED | OUTPATIENT
Start: 2021-01-01 | End: 2021-01-01

## 2021-01-01 RX ORDER — ASPIRIN 81 MG/1
81 TABLET ORAL DAILY
Status: CANCELLED | OUTPATIENT
Start: 2021-01-01

## 2021-01-01 RX ORDER — BUPIVACAINE HYDROCHLORIDE AND EPINEPHRINE 2.5; 5 MG/ML; UG/ML
INJECTION, SOLUTION INFILTRATION; PERINEURAL
Status: DISCONTINUED | OUTPATIENT
Start: 2021-01-01 | End: 2021-01-01

## 2021-01-01 RX ORDER — FUROSEMIDE 10 MG/ML
60 INJECTION INTRAMUSCULAR; INTRAVENOUS EVERY 8 HOURS
Status: DISCONTINUED | OUTPATIENT
Start: 2021-01-01 | End: 2021-01-01

## 2021-01-01 RX ORDER — HYDROMORPHONE HYDROCHLORIDE 1 MG/ML
0.5 INJECTION, SOLUTION INTRAMUSCULAR; INTRAVENOUS; SUBCUTANEOUS
Status: DISCONTINUED | OUTPATIENT
Start: 2021-01-01 | End: 2021-01-01 | Stop reason: HOSPADM

## 2021-01-01 RX ORDER — CLOPIDOGREL BISULFATE 75 MG/1
75 TABLET ORAL DAILY
Status: DISCONTINUED | OUTPATIENT
Start: 2021-01-01 | End: 2021-01-01 | Stop reason: HOSPADM

## 2021-01-01 RX ORDER — IOPAMIDOL 755 MG/ML
200 INJECTION, SOLUTION INTRAVASCULAR ONCE
Status: COMPLETED | OUTPATIENT
Start: 2021-01-01 | End: 2021-01-01

## 2021-01-01 RX ORDER — LORAZEPAM 1 MG/1
1 TABLET ORAL
Status: DISCONTINUED | OUTPATIENT
Start: 2021-01-01 | End: 2021-01-01 | Stop reason: HOSPADM

## 2021-01-01 RX ORDER — HYDROMORPHONE HCL IN WATER/PF 6 MG/30 ML
0.2 PATIENT CONTROLLED ANALGESIA SYRINGE INTRAVENOUS
Status: DISCONTINUED | OUTPATIENT
Start: 2021-01-01 | End: 2021-01-01 | Stop reason: HOSPADM

## 2021-01-01 RX ORDER — CARVEDILOL 25 MG/1
12.5 TABLET ORAL 2 TIMES DAILY WITH MEALS
Qty: 60 TABLET | Refills: 0 | Status: ON HOLD
Start: 2021-01-01 | End: 2021-01-01

## 2021-01-01 RX ORDER — POTASSIUM CHLORIDE 1500 MG/1
20 TABLET, EXTENDED RELEASE ORAL
Status: CANCELLED | OUTPATIENT
Start: 2021-01-01

## 2021-01-01 RX ORDER — POLYETHYLENE GLYCOL 3350 17 G/17G
17 POWDER, FOR SOLUTION ORAL
COMMUNITY
Start: 2021-01-01 | End: 2021-01-01

## 2021-01-01 RX ORDER — CHLOROTHIAZIDE SODIUM 500 MG/1
250 INJECTION INTRAVENOUS
Status: DISCONTINUED | OUTPATIENT
Start: 2021-01-01 | End: 2021-01-01

## 2021-01-01 RX ORDER — HYDROMORPHONE HCL IN WATER/PF 6 MG/30 ML
0.4 PATIENT CONTROLLED ANALGESIA SYRINGE INTRAVENOUS EVERY 5 MIN PRN
Status: DISCONTINUED | OUTPATIENT
Start: 2021-01-01 | End: 2021-01-01 | Stop reason: HOSPADM

## 2021-01-01 RX ORDER — ATORVASTATIN CALCIUM 40 MG/1
40 TABLET, FILM COATED ORAL DAILY
Status: DISCONTINUED | OUTPATIENT
Start: 2021-01-01 | End: 2021-01-01 | Stop reason: HOSPADM

## 2021-01-01 RX ORDER — SODIUM CHLORIDE 9 MG/ML
INJECTION, SOLUTION INTRAVENOUS CONTINUOUS
Status: DISCONTINUED | OUTPATIENT
Start: 2021-01-01 | End: 2021-01-01 | Stop reason: CLARIF

## 2021-01-01 RX ORDER — BUMETANIDE 1 MG/1
2 TABLET ORAL
Status: DISCONTINUED | OUTPATIENT
Start: 2021-01-01 | End: 2021-01-01

## 2021-01-01 RX ORDER — CLINDAMYCIN PHOSPHATE 900 MG/50ML
900 INJECTION, SOLUTION INTRAVENOUS EVERY 8 HOURS
Status: COMPLETED | OUTPATIENT
Start: 2021-01-01 | End: 2021-01-01

## 2021-01-01 RX ORDER — HYDROCODONE BITARTRATE AND ACETAMINOPHEN 5; 325 MG/1; MG/1
1-2 TABLET ORAL PRN
COMMUNITY
Start: 2021-01-01 | End: 2021-01-01

## 2021-01-01 RX ORDER — AMLODIPINE BESYLATE 10 MG/1
5 TABLET ORAL DAILY
Start: 2021-01-01 | End: 2021-01-01

## 2021-01-01 RX ORDER — CARVEDILOL 6.25 MG/1
6.25 TABLET ORAL 2 TIMES DAILY
Status: DISCONTINUED | OUTPATIENT
Start: 2021-01-01 | End: 2021-01-01

## 2021-01-01 RX ORDER — BUMETANIDE 0.25 MG/ML
1 INJECTION INTRAMUSCULAR; INTRAVENOUS ONCE
Status: COMPLETED | OUTPATIENT
Start: 2021-01-01 | End: 2021-01-01

## 2021-01-01 RX ORDER — HYDROCODONE BITARTRATE AND ACETAMINOPHEN 5; 325 MG/1; MG/1
1 TABLET ORAL ONCE
Status: COMPLETED | OUTPATIENT
Start: 2021-01-01 | End: 2021-01-01

## 2021-01-01 RX ORDER — ONDANSETRON 4 MG/1
4 TABLET, ORALLY DISINTEGRATING ORAL EVERY 30 MIN PRN
Status: DISCONTINUED | OUTPATIENT
Start: 2021-01-01 | End: 2021-01-01 | Stop reason: HOSPADM

## 2021-01-01 RX ORDER — CARBOXYMETHYLCELLULOSE SODIUM 5 MG/ML
1 SOLUTION/ DROPS OPHTHALMIC
Status: DISCONTINUED | OUTPATIENT
Start: 2021-01-01 | End: 2021-01-01 | Stop reason: HOSPADM

## 2021-01-01 RX ORDER — ACETAMINOPHEN 325 MG/1
975 TABLET ORAL EVERY 8 HOURS
Status: DISCONTINUED | OUTPATIENT
Start: 2021-01-01 | End: 2021-01-01 | Stop reason: HOSPADM

## 2021-01-01 RX ORDER — HYDROMORPHONE HYDROCHLORIDE 1 MG/ML
.3-.5 INJECTION, SOLUTION INTRAMUSCULAR; INTRAVENOUS; SUBCUTANEOUS
Status: DISCONTINUED | OUTPATIENT
Start: 2021-01-01 | End: 2021-01-01

## 2021-01-01 RX ORDER — SODIUM CHLORIDE, SODIUM LACTATE, POTASSIUM CHLORIDE, CALCIUM CHLORIDE 600; 310; 30; 20 MG/100ML; MG/100ML; MG/100ML; MG/100ML
INJECTION, SOLUTION INTRAVENOUS CONTINUOUS PRN
Status: DISCONTINUED | OUTPATIENT
Start: 2021-01-01 | End: 2021-01-01

## 2021-01-01 RX ORDER — LIDOCAINE HYDROCHLORIDE 20 MG/ML
INJECTION, SOLUTION INFILTRATION; PERINEURAL PRN
Status: DISCONTINUED | OUTPATIENT
Start: 2021-01-01 | End: 2021-01-01

## 2021-01-01 RX ORDER — POTASSIUM CHLORIDE 1500 MG/1
40 TABLET, EXTENDED RELEASE ORAL ONCE
Status: COMPLETED | OUTPATIENT
Start: 2021-01-01 | End: 2021-01-01

## 2021-01-01 RX ORDER — ASPIRIN 325 MG
325 TABLET ORAL ONCE
Status: COMPLETED | OUTPATIENT
Start: 2021-01-01 | End: 2021-01-01

## 2021-01-01 RX ORDER — BISACODYL 10 MG
10 SUPPOSITORY, RECTAL RECTAL
Status: DISCONTINUED | OUTPATIENT
Start: 2022-01-01 | End: 2021-01-01 | Stop reason: HOSPADM

## 2021-01-01 RX ORDER — DIPHENHYDRAMINE HYDROCHLORIDE 50 MG/ML
25-50 INJECTION INTRAMUSCULAR; INTRAVENOUS
Status: DISCONTINUED | OUTPATIENT
Start: 2021-01-01 | End: 2021-01-01 | Stop reason: HOSPADM

## 2021-01-01 RX ORDER — ONDANSETRON 2 MG/ML
4 INJECTION INTRAMUSCULAR; INTRAVENOUS
Status: DISCONTINUED | OUTPATIENT
Start: 2021-01-01 | End: 2021-01-01 | Stop reason: HOSPADM

## 2021-01-01 RX ORDER — FLUMAZENIL 0.1 MG/ML
0.2 INJECTION, SOLUTION INTRAVENOUS
Status: DISCONTINUED | OUTPATIENT
Start: 2021-01-01 | End: 2021-01-01 | Stop reason: HOSPADM

## 2021-01-01 RX ORDER — ATORVASTATIN CALCIUM 80 MG/1
80 TABLET, FILM COATED ORAL DAILY
Status: DISCONTINUED | OUTPATIENT
Start: 2021-01-01 | End: 2021-01-01

## 2021-01-01 RX ORDER — LEVALBUTEROL 1.25 MG/.5ML
1.25 SOLUTION, CONCENTRATE RESPIRATORY (INHALATION) EVERY 4 HOURS PRN
Status: DISCONTINUED | OUTPATIENT
Start: 2021-01-01 | End: 2021-01-01 | Stop reason: HOSPADM

## 2021-01-01 RX ORDER — HYDROCODONE BITARTRATE AND ACETAMINOPHEN 5; 325 MG/1; MG/1
1-2 TABLET ORAL EVERY 4 HOURS PRN
Qty: 30 TABLET | Refills: 0 | Status: SHIPPED | OUTPATIENT
Start: 2021-01-01 | End: 2021-01-01

## 2021-01-01 RX ORDER — HYDROMORPHONE HYDROCHLORIDE 1 MG/ML
1-2 SOLUTION ORAL
Status: DISCONTINUED | OUTPATIENT
Start: 2021-01-01 | End: 2021-01-01

## 2021-01-01 RX ORDER — CLOPIDOGREL 300 MG/1
300 TABLET, FILM COATED ORAL ONCE
Status: COMPLETED | OUTPATIENT
Start: 2021-01-01 | End: 2021-01-01

## 2021-01-01 RX ORDER — METOLAZONE 2.5 MG/1
TABLET ORAL
Qty: 5 TABLET | Refills: 0 | Status: SHIPPED | OUTPATIENT
Start: 2021-01-01 | End: 2021-01-01

## 2021-01-01 RX ADMIN — Medication 5 ML: at 07:54

## 2021-01-01 RX ADMIN — PANTOPRAZOLE SODIUM 40 MG: 40 TABLET, DELAYED RELEASE ORAL at 09:08

## 2021-01-01 RX ADMIN — LIDOCAINE HYDROCHLORIDE 100 MG: 20 INJECTION, SOLUTION INFILTRATION; PERINEURAL at 10:34

## 2021-01-01 RX ADMIN — DEXTROSE MONOHYDRATE 1000 ML: 100 INJECTION, SOLUTION INTRAVENOUS at 06:22

## 2021-01-01 RX ADMIN — INSULIN ASPART 5 UNITS: 100 INJECTION, SOLUTION INTRAVENOUS; SUBCUTANEOUS at 00:44

## 2021-01-01 RX ADMIN — SODIUM CHLORIDE: 9 INJECTION, SOLUTION INTRAVENOUS at 09:34

## 2021-01-01 RX ADMIN — PROPOFOL 15 MCG/KG/MIN: 10 INJECTION, EMULSION INTRAVENOUS at 08:13

## 2021-01-01 RX ADMIN — SODIUM CHLORIDE 50 ML: 9 INJECTION, SOLUTION INTRAVENOUS at 17:03

## 2021-01-01 RX ADMIN — Medication 5 ML: at 09:17

## 2021-01-01 RX ADMIN — SODIUM CHLORIDE 8 MG/HR: 9 INJECTION, SOLUTION INTRAVENOUS at 22:35

## 2021-01-01 RX ADMIN — HYDROMORPHONE HYDROCHLORIDE 0.2 MG: 0.2 INJECTION, SOLUTION INTRAMUSCULAR; INTRAVENOUS; SUBCUTANEOUS at 11:30

## 2021-01-01 RX ADMIN — FERROUS SULFATE TAB 325 MG (65 MG ELEMENTAL FE) 325 MG: 325 (65 FE) TAB at 09:17

## 2021-01-01 RX ADMIN — CARVEDILOL 3.12 MG: 3.12 TABLET, FILM COATED ORAL at 08:32

## 2021-01-01 RX ADMIN — ATORVASTATIN CALCIUM 80 MG: 80 TABLET, FILM COATED ORAL at 08:54

## 2021-01-01 RX ADMIN — Medication 5 ML: at 08:55

## 2021-01-01 RX ADMIN — PHENYLEPHRINE HYDROCHLORIDE 100 MCG: 10 INJECTION INTRAVENOUS at 10:49

## 2021-01-01 RX ADMIN — APIXABAN 5 MG: 5 TABLET, FILM COATED ORAL at 20:23

## 2021-01-01 RX ADMIN — EPOPROSTENOL 20 NG/KG/MIN: 1.5 INJECTION, POWDER, LYOPHILIZED, FOR SOLUTION INTRAVENOUS at 18:23

## 2021-01-01 RX ADMIN — OMEPRAZOLE 20 MG: 20 CAPSULE, DELAYED RELEASE ORAL at 23:37

## 2021-01-01 RX ADMIN — ALBUMIN HUMAN 12.5 G: 0.25 SOLUTION INTRAVENOUS at 16:37

## 2021-01-01 RX ADMIN — ATORVASTATIN CALCIUM 40 MG: 40 TABLET, FILM COATED ORAL at 21:58

## 2021-01-01 RX ADMIN — CHLOROTHIAZIDE SODIUM 250 MG: 500 INJECTION, POWDER, LYOPHILIZED, FOR SOLUTION INTRAVENOUS at 17:31

## 2021-01-01 RX ADMIN — APIXABAN 5 MG: 5 TABLET, FILM COATED ORAL at 21:53

## 2021-01-01 RX ADMIN — INSULIN ASPART 3 UNITS: 100 INJECTION, SOLUTION INTRAVENOUS; SUBCUTANEOUS at 04:21

## 2021-01-01 RX ADMIN — INSULIN ASPART 5 UNITS: 100 INJECTION, SOLUTION INTRAVENOUS; SUBCUTANEOUS at 17:49

## 2021-01-01 RX ADMIN — INSULIN ASPART 3 UNITS: 100 INJECTION, SOLUTION INTRAVENOUS; SUBCUTANEOUS at 19:44

## 2021-01-01 RX ADMIN — INSULIN ASPART 3 UNITS: 100 INJECTION, SOLUTION INTRAVENOUS; SUBCUTANEOUS at 00:02

## 2021-01-01 RX ADMIN — CARVEDILOL 6.25 MG: 6.25 TABLET, FILM COATED ORAL at 09:53

## 2021-01-01 RX ADMIN — Medication 1 DROP: at 08:48

## 2021-01-01 RX ADMIN — ALBUMIN HUMAN 12.5 G: 0.25 SOLUTION INTRAVENOUS at 08:02

## 2021-01-01 RX ADMIN — INSULIN ASPART 5 UNITS: 100 INJECTION, SOLUTION INTRAVENOUS; SUBCUTANEOUS at 21:28

## 2021-01-01 RX ADMIN — HYDRALAZINE HYDROCHLORIDE 50 MG: 50 TABLET, FILM COATED ORAL at 15:12

## 2021-01-01 RX ADMIN — DEXAMETHASONE 12 MG: 4 TABLET ORAL at 08:36

## 2021-01-01 RX ADMIN — CARVEDILOL 3.12 MG: 3.12 TABLET, FILM COATED ORAL at 18:08

## 2021-01-01 RX ADMIN — PROPOFOL 40 MCG/KG/MIN: 10 INJECTION, EMULSION INTRAVENOUS at 04:13

## 2021-01-01 RX ADMIN — INSULIN ASPART 2 UNITS: 100 INJECTION, SOLUTION INTRAVENOUS; SUBCUTANEOUS at 13:53

## 2021-01-01 RX ADMIN — APIXABAN 5 MG: 5 TABLET, FILM COATED ORAL at 20:06

## 2021-01-01 RX ADMIN — INSULIN ASPART 6 UNITS: 100 INJECTION, SOLUTION INTRAVENOUS; SUBCUTANEOUS at 20:43

## 2021-01-01 RX ADMIN — HEPARIN SODIUM 1300 UNITS: 1000 INJECTION INTRAVENOUS; SUBCUTANEOUS at 15:32

## 2021-01-01 RX ADMIN — HEPARIN SODIUM 17000 UNITS: 1000 INJECTION INTRAVENOUS; SUBCUTANEOUS at 12:00

## 2021-01-01 RX ADMIN — CARVEDILOL 12.5 MG: 6.25 TABLET, FILM COATED ORAL at 08:02

## 2021-01-01 RX ADMIN — PROPOFOL 30 MCG/KG/MIN: 10 INJECTION, EMULSION INTRAVENOUS at 17:11

## 2021-01-01 RX ADMIN — MINERAL OIL AND PETROLATUM: 150; 830 OINTMENT OPHTHALMIC at 07:59

## 2021-01-01 RX ADMIN — HUMAN ALBUMIN MICROSPHERES AND PERFLUTREN 9 ML: 10; .22 INJECTION, SOLUTION INTRAVENOUS at 14:52

## 2021-01-01 RX ADMIN — ATORVASTATIN CALCIUM 40 MG: 40 TABLET, FILM COATED ORAL at 20:50

## 2021-01-01 RX ADMIN — Medication 50 MCG: at 09:16

## 2021-01-01 RX ADMIN — APIXABAN 5 MG: 5 TABLET, FILM COATED ORAL at 09:05

## 2021-01-01 RX ADMIN — INSULIN ASPART 1 UNITS: 100 INJECTION, SOLUTION INTRAVENOUS; SUBCUTANEOUS at 19:53

## 2021-01-01 RX ADMIN — INSULIN ASPART 2 UNITS: 100 INJECTION, SOLUTION INTRAVENOUS; SUBCUTANEOUS at 04:36

## 2021-01-01 RX ADMIN — HYDROMORPHONE HYDROCHLORIDE 0.2 MG: 0.2 INJECTION, SOLUTION INTRAMUSCULAR; INTRAVENOUS; SUBCUTANEOUS at 12:48

## 2021-01-01 RX ADMIN — DEXAMETHASONE 6 MG: 2 TABLET ORAL at 15:11

## 2021-01-01 RX ADMIN — GUAIFENESIN 10 ML: 200 SOLUTION ORAL at 09:00

## 2021-01-01 RX ADMIN — EPOPROSTENOL 20 NG/KG/MIN: 1.5 INJECTION, POWDER, LYOPHILIZED, FOR SOLUTION INTRAVENOUS at 01:10

## 2021-01-01 RX ADMIN — INSULIN ASPART 3 UNITS: 100 INJECTION, SOLUTION INTRAVENOUS; SUBCUTANEOUS at 18:38

## 2021-01-01 RX ADMIN — CHLOROTHIAZIDE SODIUM 250 MG: 500 INJECTION, POWDER, LYOPHILIZED, FOR SOLUTION INTRAVENOUS at 11:11

## 2021-01-01 RX ADMIN — PHENYLEPHRINE HYDROCHLORIDE 100 MCG: 10 INJECTION INTRAVENOUS at 10:44

## 2021-01-01 RX ADMIN — Medication 40 MG: at 19:26

## 2021-01-01 RX ADMIN — INSULIN ASPART 2 UNITS: 100 INJECTION, SOLUTION INTRAVENOUS; SUBCUTANEOUS at 20:55

## 2021-01-01 RX ADMIN — OMEPRAZOLE 20 MG: 20 CAPSULE, DELAYED RELEASE ORAL at 08:39

## 2021-01-01 RX ADMIN — OMEPRAZOLE 20 MG: 20 CAPSULE, DELAYED RELEASE ORAL at 20:35

## 2021-01-01 RX ADMIN — AMLODIPINE BESYLATE 5 MG: 5 TABLET ORAL at 10:39

## 2021-01-01 RX ADMIN — HYDRALAZINE HYDROCHLORIDE 50 MG: 50 TABLET, FILM COATED ORAL at 21:12

## 2021-01-01 RX ADMIN — CARVEDILOL 12.5 MG: 12.5 TABLET, FILM COATED ORAL at 17:04

## 2021-01-01 RX ADMIN — HYDRALAZINE HYDROCHLORIDE 10 MG: 20 INJECTION INTRAMUSCULAR; INTRAVENOUS at 06:50

## 2021-01-01 RX ADMIN — ALBUMIN HUMAN 12.5 G: 0.25 SOLUTION INTRAVENOUS at 16:40

## 2021-01-01 RX ADMIN — INSULIN ASPART 6 UNITS: 100 INJECTION, SOLUTION INTRAVENOUS; SUBCUTANEOUS at 06:15

## 2021-01-01 RX ADMIN — CHLOROTHIAZIDE SODIUM 250 MG: 500 INJECTION, POWDER, LYOPHILIZED, FOR SOLUTION INTRAVENOUS at 16:11

## 2021-01-01 RX ADMIN — ATORVASTATIN CALCIUM 80 MG: 80 TABLET, FILM COATED ORAL at 09:51

## 2021-01-01 RX ADMIN — FUROSEMIDE 40 MG: 40 TABLET ORAL at 07:52

## 2021-01-01 RX ADMIN — ATORVASTATIN CALCIUM 40 MG: 40 TABLET, FILM COATED ORAL at 21:35

## 2021-01-01 RX ADMIN — INSULIN ASPART 4 UNITS: 100 INJECTION, SOLUTION INTRAVENOUS; SUBCUTANEOUS at 12:44

## 2021-01-01 RX ADMIN — GUAIFENESIN 10 ML: 200 SOLUTION ORAL at 07:50

## 2021-01-01 RX ADMIN — INSULIN ASPART 3 UNITS: 100 INJECTION, SOLUTION INTRAVENOUS; SUBCUTANEOUS at 12:52

## 2021-01-01 RX ADMIN — Medication 40 MG: at 08:53

## 2021-01-01 RX ADMIN — REMDESIVIR 100 MG: 100 INJECTION, POWDER, LYOPHILIZED, FOR SOLUTION INTRAVENOUS at 16:52

## 2021-01-01 RX ADMIN — CARVEDILOL 12.5 MG: 6.25 TABLET, FILM COATED ORAL at 20:23

## 2021-01-01 RX ADMIN — BACITRACIN: 500 OINTMENT TOPICAL at 20:25

## 2021-01-01 RX ADMIN — CHLOROTHIAZIDE SODIUM 250 MG: 500 INJECTION, POWDER, LYOPHILIZED, FOR SOLUTION INTRAVENOUS at 09:13

## 2021-01-01 RX ADMIN — HYDRALAZINE HYDROCHLORIDE 50 MG: 50 TABLET, FILM COATED ORAL at 16:35

## 2021-01-01 RX ADMIN — FUROSEMIDE 40 MG: 40 TABLET ORAL at 08:28

## 2021-01-01 RX ADMIN — CARVEDILOL 12.5 MG: 6.25 TABLET, FILM COATED ORAL at 23:00

## 2021-01-01 RX ADMIN — Medication 5 ML: at 08:44

## 2021-01-01 RX ADMIN — INSULIN ASPART 4 UNITS: 100 INJECTION, SOLUTION INTRAVENOUS; SUBCUTANEOUS at 08:34

## 2021-01-01 RX ADMIN — Medication 1 PACKET: at 22:22

## 2021-01-01 RX ADMIN — INSULIN ASPART 6 UNITS: 100 INJECTION, SOLUTION INTRAVENOUS; SUBCUTANEOUS at 19:12

## 2021-01-01 RX ADMIN — INSULIN ASPART 4 UNITS: 100 INJECTION, SOLUTION INTRAVENOUS; SUBCUTANEOUS at 16:56

## 2021-01-01 RX ADMIN — INSULIN ASPART 2 UNITS: 100 INJECTION, SOLUTION INTRAVENOUS; SUBCUTANEOUS at 12:30

## 2021-01-01 RX ADMIN — INSULIN ASPART 2 UNITS: 100 INJECTION, SOLUTION INTRAVENOUS; SUBCUTANEOUS at 13:46

## 2021-01-01 RX ADMIN — SODIUM CHLORIDE, POTASSIUM CHLORIDE, SODIUM LACTATE AND CALCIUM CHLORIDE: 600; 310; 30; 20 INJECTION, SOLUTION INTRAVENOUS at 06:39

## 2021-01-01 RX ADMIN — Medication 40 MG: at 19:28

## 2021-01-01 RX ADMIN — VANCOMYCIN HYDROCHLORIDE 1000 MG: 1 INJECTION, SOLUTION INTRAVENOUS at 01:00

## 2021-01-01 RX ADMIN — DEXTROSE MONOHYDRATE 50 ML: 500 INJECTION PARENTERAL at 08:03

## 2021-01-01 RX ADMIN — Medication 40 MG: at 09:53

## 2021-01-01 RX ADMIN — ATORVASTATIN CALCIUM 80 MG: 80 TABLET, FILM COATED ORAL at 08:39

## 2021-01-01 RX ADMIN — Medication 40 MG: at 09:02

## 2021-01-01 RX ADMIN — HYDRALAZINE HYDROCHLORIDE 50 MG: 50 TABLET, FILM COATED ORAL at 00:45

## 2021-01-01 RX ADMIN — Medication 1 DROP: at 14:14

## 2021-01-01 RX ADMIN — LEVOFLOXACIN 500 MG: 5 INJECTION, SOLUTION INTRAVENOUS at 14:35

## 2021-01-01 RX ADMIN — LIDOCAINE HYDROCHLORIDE 100 MG: 20 INJECTION, SOLUTION INFILTRATION; PERINEURAL at 07:52

## 2021-01-01 RX ADMIN — SODIUM CHLORIDE 250 ML: 9 INJECTION, SOLUTION INTRAVENOUS at 18:30

## 2021-01-01 RX ADMIN — HYDROMORPHONE HYDROCHLORIDE 0.5 MG: 1 INJECTION, SOLUTION INTRAMUSCULAR; INTRAVENOUS; SUBCUTANEOUS at 07:47

## 2021-01-01 RX ADMIN — CEFTRIAXONE SODIUM 2 G: 2 INJECTION, POWDER, FOR SOLUTION INTRAMUSCULAR; INTRAVENOUS at 20:30

## 2021-01-01 RX ADMIN — PANTOPRAZOLE SODIUM 40 MG: 40 TABLET, DELAYED RELEASE ORAL at 21:52

## 2021-01-01 RX ADMIN — SODIUM CHLORIDE 300 ML: 9 INJECTION, SOLUTION INTRAVENOUS at 16:50

## 2021-01-01 RX ADMIN — HYDRALAZINE HYDROCHLORIDE 50 MG: 50 TABLET, FILM COATED ORAL at 21:25

## 2021-01-01 RX ADMIN — Medication 5 ML: at 08:04

## 2021-01-01 RX ADMIN — Medication 40 MG: at 08:59

## 2021-01-01 RX ADMIN — OMEPRAZOLE 20 MG: 20 CAPSULE, DELAYED RELEASE ORAL at 21:27

## 2021-01-01 RX ADMIN — PROPOFOL 25 MCG/KG/MIN: 10 INJECTION, EMULSION INTRAVENOUS at 12:09

## 2021-01-01 RX ADMIN — PROPOFOL 50 MCG/KG/MIN: 10 INJECTION, EMULSION INTRAVENOUS at 12:58

## 2021-01-01 RX ADMIN — INSULIN ASPART 7 UNITS: 100 INJECTION, SOLUTION INTRAVENOUS; SUBCUTANEOUS at 02:04

## 2021-01-01 RX ADMIN — CARVEDILOL 12.5 MG: 6.25 TABLET, FILM COATED ORAL at 20:32

## 2021-01-01 RX ADMIN — BACITRACIN: 500 OINTMENT TOPICAL at 08:58

## 2021-01-01 RX ADMIN — INSULIN ASPART 8 UNITS: 100 INJECTION, SOLUTION INTRAVENOUS; SUBCUTANEOUS at 18:13

## 2021-01-01 RX ADMIN — INSULIN GLARGINE 30 UNITS: 100 INJECTION, SOLUTION SUBCUTANEOUS at 21:02

## 2021-01-01 RX ADMIN — INSULIN ASPART 3 UNITS: 100 INJECTION, SOLUTION INTRAVENOUS; SUBCUTANEOUS at 23:57

## 2021-01-01 RX ADMIN — Medication 5 ML: at 08:59

## 2021-01-01 RX ADMIN — APIXABAN 5 MG: 5 TABLET, FILM COATED ORAL at 21:09

## 2021-01-01 RX ADMIN — PROPOFOL 30 MCG/KG/MIN: 10 INJECTION, EMULSION INTRAVENOUS at 16:43

## 2021-01-01 RX ADMIN — INSULIN GLARGINE 30 UNITS: 100 INJECTION, SOLUTION SUBCUTANEOUS at 10:14

## 2021-01-01 RX ADMIN — HYDROMORPHONE HYDROCHLORIDE 0.5 MG: 1 INJECTION, SOLUTION INTRAMUSCULAR; INTRAVENOUS; SUBCUTANEOUS at 11:10

## 2021-01-01 RX ADMIN — QUETIAPINE FUMARATE 25 MG: 25 TABLET ORAL at 03:27

## 2021-01-01 RX ADMIN — BUMETANIDE 1 MG: 0.25 INJECTION INTRAMUSCULAR; INTRAVENOUS at 08:54

## 2021-01-01 RX ADMIN — CHLORHEXIDINE GLUCONATE 15 ML: 1.2 RINSE ORAL at 19:26

## 2021-01-01 RX ADMIN — ACETAMINOPHEN 650 MG: 325 TABLET, FILM COATED ORAL at 16:41

## 2021-01-01 RX ADMIN — INSULIN ASPART 1 UNITS: 100 INJECTION, SOLUTION INTRAVENOUS; SUBCUTANEOUS at 22:03

## 2021-01-01 RX ADMIN — MICONAZOLE NITRATE: 2 POWDER TOPICAL at 20:22

## 2021-01-01 RX ADMIN — ATORVASTATIN CALCIUM 80 MG: 80 TABLET, FILM COATED ORAL at 08:44

## 2021-01-01 RX ADMIN — CHLORHEXIDINE GLUCONATE 15 ML: 1.2 RINSE ORAL at 19:35

## 2021-01-01 RX ADMIN — HEPARIN SODIUM 3000 UNITS: 1000 INJECTION INTRAVENOUS; SUBCUTANEOUS at 12:32

## 2021-01-01 RX ADMIN — DOXAZOSIN 1 MG: 1 TABLET ORAL at 08:33

## 2021-01-01 RX ADMIN — CARVEDILOL 6.25 MG: 6.25 TABLET, FILM COATED ORAL at 07:51

## 2021-01-01 RX ADMIN — Medication 40 MG: at 08:38

## 2021-01-01 RX ADMIN — BUMETANIDE 0.5 MG/HR: 0.25 INJECTION INTRAMUSCULAR; INTRAVENOUS at 10:58

## 2021-01-01 RX ADMIN — Medication 5 ML: at 08:14

## 2021-01-01 RX ADMIN — INSULIN ASPART 2 UNITS: 100 INJECTION, SOLUTION INTRAVENOUS; SUBCUTANEOUS at 11:42

## 2021-01-01 RX ADMIN — APIXABAN 5 MG: 5 TABLET, FILM COATED ORAL at 08:32

## 2021-01-01 RX ADMIN — PROPOFOL 60 MCG/KG/MIN: 10 INJECTION, EMULSION INTRAVENOUS at 07:57

## 2021-01-01 RX ADMIN — CHLORHEXIDINE GLUCONATE 15 ML: 1.2 RINSE ORAL at 08:41

## 2021-01-01 RX ADMIN — HYDRALAZINE HYDROCHLORIDE 50 MG: 50 TABLET, FILM COATED ORAL at 15:11

## 2021-01-01 RX ADMIN — PROPOFOL 35 MCG/KG/MIN: 10 INJECTION, EMULSION INTRAVENOUS at 07:05

## 2021-01-01 RX ADMIN — OMEPRAZOLE 20 MG: 20 CAPSULE, DELAYED RELEASE ORAL at 08:29

## 2021-01-01 RX ADMIN — ACETAMINOPHEN 650 MG: 325 TABLET, FILM COATED ORAL at 02:10

## 2021-01-01 RX ADMIN — FUROSEMIDE 40 MG: 10 INJECTION, SOLUTION INTRAVENOUS at 11:29

## 2021-01-01 RX ADMIN — INSULIN ASPART 5 UNITS: 100 INJECTION, SOLUTION INTRAVENOUS; SUBCUTANEOUS at 19:48

## 2021-01-01 RX ADMIN — CLONIDINE HYDROCHLORIDE 0.1 MG: 0.1 TABLET ORAL at 06:22

## 2021-01-01 RX ADMIN — Medication 1 PACKET: at 21:53

## 2021-01-01 RX ADMIN — APIXABAN 5 MG: 5 TABLET, FILM COATED ORAL at 20:17

## 2021-01-01 RX ADMIN — BACITRACIN: 500 OINTMENT TOPICAL at 09:54

## 2021-01-01 RX ADMIN — HYDRALAZINE HYDROCHLORIDE 50 MG: 50 TABLET, FILM COATED ORAL at 16:37

## 2021-01-01 RX ADMIN — Medication 40 MG: at 08:33

## 2021-01-01 RX ADMIN — TRAZODONE HYDROCHLORIDE 50 MG: 50 TABLET ORAL at 21:48

## 2021-01-01 RX ADMIN — MICONAZOLE NITRATE: 2 POWDER TOPICAL at 08:18

## 2021-01-01 RX ADMIN — APIXABAN 5 MG: 5 TABLET, FILM COATED ORAL at 08:34

## 2021-01-01 RX ADMIN — INSULIN ASPART 3 UNITS: 100 INJECTION, SOLUTION INTRAVENOUS; SUBCUTANEOUS at 12:47

## 2021-01-01 RX ADMIN — Medication 5 ML: at 08:33

## 2021-01-01 RX ADMIN — HYDRALAZINE HYDROCHLORIDE 50 MG: 50 TABLET, FILM COATED ORAL at 09:53

## 2021-01-01 RX ADMIN — AMLODIPINE BESYLATE 5 MG: 5 TABLET ORAL at 16:24

## 2021-01-01 RX ADMIN — INSULIN ASPART 4 UNITS: 100 INJECTION, SOLUTION INTRAVENOUS; SUBCUTANEOUS at 12:47

## 2021-01-01 RX ADMIN — PROPOFOL 60 MCG/KG/MIN: 10 INJECTION, EMULSION INTRAVENOUS at 11:17

## 2021-01-01 RX ADMIN — TAMSULOSIN HYDROCHLORIDE 0.4 MG: 0.4 CAPSULE ORAL at 09:05

## 2021-01-01 RX ADMIN — OMEPRAZOLE 20 MG: 20 CAPSULE, DELAYED RELEASE ORAL at 08:34

## 2021-01-01 RX ADMIN — CARVEDILOL 12.5 MG: 6.25 TABLET, FILM COATED ORAL at 22:27

## 2021-01-01 RX ADMIN — APIXABAN 5 MG: 5 TABLET, FILM COATED ORAL at 21:20

## 2021-01-01 RX ADMIN — BUMETANIDE 0.5 MG/HR: 0.25 INJECTION INTRAMUSCULAR; INTRAVENOUS at 05:28

## 2021-01-01 RX ADMIN — ATORVASTATIN CALCIUM 80 MG: 80 TABLET, FILM COATED ORAL at 09:16

## 2021-01-01 RX ADMIN — DEXAMETHASONE SODIUM PHOSPHATE 4 MG: 4 INJECTION, SOLUTION INTRA-ARTICULAR; INTRALESIONAL; INTRAMUSCULAR; INTRAVENOUS; SOFT TISSUE at 08:20

## 2021-01-01 RX ADMIN — INSULIN GLARGINE 25 UNITS: 100 INJECTION, SOLUTION SUBCUTANEOUS at 08:41

## 2021-01-01 RX ADMIN — MULTIVIT AND MINERALS-FERROUS GLUCONATE 9 MG IRON/15 ML ORAL LIQUID 15 ML: at 08:02

## 2021-01-01 RX ADMIN — Medication 2 PACKET: at 21:20

## 2021-01-01 RX ADMIN — HYDRALAZINE HYDROCHLORIDE 50 MG: 25 TABLET, FILM COATED ORAL at 23:34

## 2021-01-01 RX ADMIN — Medication 40 MG: at 14:06

## 2021-01-01 RX ADMIN — MICONAZOLE NITRATE: 2 POWDER TOPICAL at 09:53

## 2021-01-01 RX ADMIN — MICONAZOLE NITRATE: 2 POWDER TOPICAL at 21:49

## 2021-01-01 RX ADMIN — INSULIN GLARGINE 30 UNITS: 100 INJECTION, SOLUTION SUBCUTANEOUS at 21:55

## 2021-01-01 RX ADMIN — DOXAZOSIN 1 MG: 1 TABLET ORAL at 08:16

## 2021-01-01 RX ADMIN — HYDRALAZINE HYDROCHLORIDE 50 MG: 50 TABLET, FILM COATED ORAL at 00:13

## 2021-01-01 RX ADMIN — HYDROMORPHONE HYDROCHLORIDE 0.5 MG: 1 INJECTION, SOLUTION INTRAMUSCULAR; INTRAVENOUS; SUBCUTANEOUS at 15:56

## 2021-01-01 RX ADMIN — INSULIN ASPART 4 UNITS: 100 INJECTION, SOLUTION INTRAVENOUS; SUBCUTANEOUS at 16:25

## 2021-01-01 RX ADMIN — CARVEDILOL 6.25 MG: 6.25 TABLET, FILM COATED ORAL at 19:53

## 2021-01-01 RX ADMIN — VECURONIUM BROMIDE 0.7 MCG/KG/MIN: 1 INJECTION, POWDER, LYOPHILIZED, FOR SOLUTION INTRAVENOUS at 20:21

## 2021-01-01 RX ADMIN — ACETAMINOPHEN 650 MG: 325 TABLET, FILM COATED ORAL at 22:11

## 2021-01-01 RX ADMIN — PROPOFOL 5 MCG/KG/MIN: 10 INJECTION, EMULSION INTRAVENOUS at 10:16

## 2021-01-01 RX ADMIN — CARVEDILOL 6.25 MG: 6.25 TABLET, FILM COATED ORAL at 08:29

## 2021-01-01 RX ADMIN — INSULIN ASPART 1 UNITS: 100 INJECTION, SOLUTION INTRAVENOUS; SUBCUTANEOUS at 14:04

## 2021-01-01 RX ADMIN — PROPOFOL 30 MCG/KG/MIN: 10 INJECTION, EMULSION INTRAVENOUS at 02:11

## 2021-01-01 RX ADMIN — ACETAMINOPHEN 650 MG: 325 TABLET, FILM COATED ORAL at 08:16

## 2021-01-01 RX ADMIN — OMEPRAZOLE 20 MG: 20 CAPSULE, DELAYED RELEASE ORAL at 20:44

## 2021-01-01 RX ADMIN — INSULIN ASPART 10 UNITS: 100 INJECTION, SOLUTION INTRAVENOUS; SUBCUTANEOUS at 17:50

## 2021-01-01 RX ADMIN — MEROPENEM 1 G: 1 INJECTION, POWDER, FOR SOLUTION INTRAVENOUS at 15:46

## 2021-01-01 RX ADMIN — APIXABAN 5 MG: 5 TABLET, FILM COATED ORAL at 20:41

## 2021-01-01 RX ADMIN — INSULIN ASPART 1 UNITS: 100 INJECTION, SOLUTION INTRAVENOUS; SUBCUTANEOUS at 23:54

## 2021-01-01 RX ADMIN — APIXABAN 5 MG: 5 TABLET, FILM COATED ORAL at 22:41

## 2021-01-01 RX ADMIN — Medication 5 ML: at 08:58

## 2021-01-01 RX ADMIN — INSULIN ASPART 4 UNITS: 100 INJECTION, SOLUTION INTRAVENOUS; SUBCUTANEOUS at 07:58

## 2021-01-01 RX ADMIN — DEXTROSE MONOHYDRATE 25 ML: 500 INJECTION PARENTERAL at 05:50

## 2021-01-01 RX ADMIN — SODIUM CHLORIDE 8 MG/HR: 9 INJECTION, SOLUTION INTRAVENOUS at 13:02

## 2021-01-01 RX ADMIN — SODIUM CHLORIDE 300 ML: 9 INJECTION, SOLUTION INTRAVENOUS at 15:28

## 2021-01-01 RX ADMIN — INSULIN GLARGINE 10 UNITS: 100 INJECTION, SOLUTION SUBCUTANEOUS at 22:02

## 2021-01-01 RX ADMIN — HYDRALAZINE HYDROCHLORIDE 10 MG: 20 INJECTION INTRAMUSCULAR; INTRAVENOUS at 17:51

## 2021-01-01 RX ADMIN — Medication 2 PACKET: at 08:13

## 2021-01-01 RX ADMIN — GUAIFENESIN 10 ML: 200 SOLUTION ORAL at 20:29

## 2021-01-01 RX ADMIN — OXYCODONE HYDROCHLORIDE 5 MG: 5 TABLET ORAL at 20:14

## 2021-01-01 RX ADMIN — PANTOPRAZOLE SODIUM 40 MG: 40 INJECTION, POWDER, FOR SOLUTION INTRAVENOUS at 17:03

## 2021-01-01 RX ADMIN — INSULIN ASPART 2 UNITS: 100 INJECTION, SOLUTION INTRAVENOUS; SUBCUTANEOUS at 04:02

## 2021-01-01 RX ADMIN — PROPOFOL 10 MCG/KG/MIN: 10 INJECTION, EMULSION INTRAVENOUS at 11:12

## 2021-01-01 RX ADMIN — PHENYLEPHRINE HYDROCHLORIDE 50 MCG: 10 INJECTION INTRAVENOUS at 10:38

## 2021-01-01 RX ADMIN — INSULIN ASPART 2 UNITS: 100 INJECTION, SOLUTION INTRAVENOUS; SUBCUTANEOUS at 04:59

## 2021-01-01 RX ADMIN — FUROSEMIDE 40 MG: 40 TABLET ORAL at 09:05

## 2021-01-01 RX ADMIN — CARVEDILOL 12.5 MG: 6.25 TABLET, FILM COATED ORAL at 21:53

## 2021-01-01 RX ADMIN — Medication 1 PACKET: at 09:17

## 2021-01-01 RX ADMIN — HYDROCODONE BITARTRATE AND ACETAMINOPHEN 1 TABLET: 5; 325 TABLET ORAL at 07:32

## 2021-01-01 RX ADMIN — INSULIN ASPART 2 UNITS: 100 INJECTION, SOLUTION INTRAVENOUS; SUBCUTANEOUS at 12:34

## 2021-01-01 RX ADMIN — CARVEDILOL 3.12 MG: 3.12 TABLET, FILM COATED ORAL at 20:18

## 2021-01-01 RX ADMIN — PROPOFOL 15 MCG/KG/MIN: 10 INJECTION, EMULSION INTRAVENOUS at 11:45

## 2021-01-01 RX ADMIN — HYDRALAZINE HYDROCHLORIDE 50 MG: 50 TABLET, FILM COATED ORAL at 16:03

## 2021-01-01 RX ADMIN — INSULIN ASPART 4 UNITS: 100 INJECTION, SOLUTION INTRAVENOUS; SUBCUTANEOUS at 04:32

## 2021-01-01 RX ADMIN — INSULIN GLARGINE 15 UNITS: 100 INJECTION, SOLUTION SUBCUTANEOUS at 17:37

## 2021-01-01 RX ADMIN — MICONAZOLE NITRATE: 2 POWDER TOPICAL at 08:54

## 2021-01-01 RX ADMIN — EPOPROSTENOL 20 NG/KG/MIN: 1.5 INJECTION, POWDER, LYOPHILIZED, FOR SOLUTION INTRAVENOUS at 00:09

## 2021-01-01 RX ADMIN — ATORVASTATIN CALCIUM 80 MG: 80 TABLET, FILM COATED ORAL at 08:01

## 2021-01-01 RX ADMIN — HYDRALAZINE HYDROCHLORIDE 50 MG: 50 TABLET, FILM COATED ORAL at 14:30

## 2021-01-01 RX ADMIN — HYDRALAZINE HYDROCHLORIDE 50 MG: 50 TABLET, FILM COATED ORAL at 20:18

## 2021-01-01 RX ADMIN — PROPOFOL 15 MCG/KG/MIN: 10 INJECTION, EMULSION INTRAVENOUS at 18:25

## 2021-01-01 RX ADMIN — ATORVASTATIN CALCIUM 80 MG: 80 TABLET, FILM COATED ORAL at 08:14

## 2021-01-01 RX ADMIN — HYDRALAZINE HYDROCHLORIDE 50 MG: 50 TABLET, FILM COATED ORAL at 08:29

## 2021-01-01 RX ADMIN — APIXABAN 5 MG: 5 TABLET, FILM COATED ORAL at 08:16

## 2021-01-01 RX ADMIN — FERROUS SULFATE TAB 325 MG (65 MG ELEMENTAL FE) 325 MG: 325 (65 FE) TAB at 09:08

## 2021-01-01 RX ADMIN — OMEPRAZOLE 20 MG: 20 CAPSULE, DELAYED RELEASE ORAL at 08:02

## 2021-01-01 RX ADMIN — APIXABAN 5 MG: 5 TABLET, FILM COATED ORAL at 21:27

## 2021-01-01 RX ADMIN — FENTANYL CITRATE 25 MCG: 50 INJECTION, SOLUTION INTRAMUSCULAR; INTRAVENOUS at 10:32

## 2021-01-01 RX ADMIN — OMEPRAZOLE 20 MG: 20 CAPSULE, DELAYED RELEASE ORAL at 11:40

## 2021-01-01 RX ADMIN — Medication 40 MG: at 08:22

## 2021-01-01 RX ADMIN — FUROSEMIDE 40 MG: 40 TABLET ORAL at 08:16

## 2021-01-01 RX ADMIN — ALBUMIN HUMAN 50 ML: 0.25 SOLUTION INTRAVENOUS at 13:00

## 2021-01-01 RX ADMIN — AMLODIPINE BESYLATE 5 MG: 5 TABLET ORAL at 09:05

## 2021-01-01 RX ADMIN — INSULIN ASPART 2 UNITS: 100 INJECTION, SOLUTION INTRAVENOUS; SUBCUTANEOUS at 08:33

## 2021-01-01 RX ADMIN — ISOSORBIDE MONONITRATE 30 MG: 30 TABLET, EXTENDED RELEASE ORAL at 09:09

## 2021-01-01 RX ADMIN — HYDRALAZINE HYDROCHLORIDE 50 MG: 50 TABLET, FILM COATED ORAL at 16:50

## 2021-01-01 RX ADMIN — INSULIN ASPART 5 UNITS: 100 INJECTION, SOLUTION INTRAVENOUS; SUBCUTANEOUS at 21:42

## 2021-01-01 RX ADMIN — ATORVASTATIN CALCIUM 80 MG: 80 TABLET, FILM COATED ORAL at 09:04

## 2021-01-01 RX ADMIN — Medication 40 MG: at 20:40

## 2021-01-01 RX ADMIN — HYDRALAZINE HYDROCHLORIDE 50 MG: 50 TABLET, FILM COATED ORAL at 15:58

## 2021-01-01 RX ADMIN — HYDRALAZINE HYDROCHLORIDE 50 MG: 50 TABLET, FILM COATED ORAL at 00:15

## 2021-01-01 RX ADMIN — HYDRALAZINE HYDROCHLORIDE 50 MG: 50 TABLET, FILM COATED ORAL at 06:37

## 2021-01-01 RX ADMIN — MICONAZOLE NITRATE: 2 POWDER TOPICAL at 22:14

## 2021-01-01 RX ADMIN — GLYCOPYRROLATE 1 MG: 0.2 INJECTION, SOLUTION INTRAMUSCULAR; INTRAVENOUS at 10:44

## 2021-01-01 RX ADMIN — PROPOFOL 15 MCG/KG/MIN: 10 INJECTION, EMULSION INTRAVENOUS at 23:41

## 2021-01-01 RX ADMIN — CLOPIDOGREL BISULFATE 75 MG: 75 TABLET ORAL at 16:09

## 2021-01-01 RX ADMIN — HYDROCODONE BITARTRATE AND ACETAMINOPHEN 1 TABLET: 5; 325 TABLET ORAL at 16:24

## 2021-01-01 RX ADMIN — APIXABAN 5 MG: 5 TABLET, FILM COATED ORAL at 20:42

## 2021-01-01 RX ADMIN — CARVEDILOL 12.5 MG: 6.25 TABLET, FILM COATED ORAL at 20:12

## 2021-01-01 RX ADMIN — ACETAMINOPHEN 650 MG: 325 TABLET, FILM COATED ORAL at 20:37

## 2021-01-01 RX ADMIN — HYDRALAZINE HYDROCHLORIDE 50 MG: 50 TABLET, FILM COATED ORAL at 08:53

## 2021-01-01 RX ADMIN — PROPOFOL 40 MCG/KG/MIN: 10 INJECTION, EMULSION INTRAVENOUS at 21:18

## 2021-01-01 RX ADMIN — HYDRALAZINE HYDROCHLORIDE 50 MG: 50 TABLET, FILM COATED ORAL at 05:40

## 2021-01-01 RX ADMIN — PROPOFOL 30 MCG/KG/MIN: 10 INJECTION, EMULSION INTRAVENOUS at 13:35

## 2021-01-01 RX ADMIN — PROPOFOL 30 MCG/KG/MIN: 10 INJECTION, EMULSION INTRAVENOUS at 23:16

## 2021-01-01 RX ADMIN — ISOSORBIDE MONONITRATE 30 MG: 30 TABLET, EXTENDED RELEASE ORAL at 08:09

## 2021-01-01 RX ADMIN — ALBUMIN HUMAN 12.5 G: 0.25 SOLUTION INTRAVENOUS at 16:35

## 2021-01-01 RX ADMIN — CARVEDILOL 6.25 MG: 6.25 TABLET, FILM COATED ORAL at 18:35

## 2021-01-01 RX ADMIN — CHLORHEXIDINE GLUCONATE 15 ML: 1.2 RINSE ORAL at 08:08

## 2021-01-01 RX ADMIN — PANTOPRAZOLE SODIUM 40 MG: 40 INJECTION, POWDER, FOR SOLUTION INTRAVENOUS at 08:23

## 2021-01-01 RX ADMIN — INSULIN ASPART 5 UNITS: 100 INJECTION, SOLUTION INTRAVENOUS; SUBCUTANEOUS at 22:03

## 2021-01-01 RX ADMIN — INSULIN ASPART 3 UNITS: 100 INJECTION, SOLUTION INTRAVENOUS; SUBCUTANEOUS at 11:56

## 2021-01-01 RX ADMIN — ACETAMINOPHEN 650 MG: 325 TABLET, FILM COATED ORAL at 09:11

## 2021-01-01 RX ADMIN — INSULIN ASPART 4 UNITS: 100 INJECTION, SOLUTION INTRAVENOUS; SUBCUTANEOUS at 09:05

## 2021-01-01 RX ADMIN — APIXABAN 5 MG: 5 TABLET, FILM COATED ORAL at 08:39

## 2021-01-01 RX ADMIN — CARVEDILOL 12.5 MG: 6.25 TABLET, FILM COATED ORAL at 09:22

## 2021-01-01 RX ADMIN — CARVEDILOL 3.12 MG: 3.12 TABLET, FILM COATED ORAL at 14:39

## 2021-01-01 RX ADMIN — Medication 40 MG: at 19:55

## 2021-01-01 RX ADMIN — Medication 0.03 MCG/KG/MIN: at 08:45

## 2021-01-01 RX ADMIN — SODIUM CHLORIDE 1000 ML: 9 INJECTION, SOLUTION INTRAVENOUS at 12:38

## 2021-01-01 RX ADMIN — ATORVASTATIN CALCIUM 80 MG: 80 TABLET, FILM COATED ORAL at 08:32

## 2021-01-01 RX ADMIN — MICONAZOLE NITRATE: 2 POWDER TOPICAL at 08:34

## 2021-01-01 RX ADMIN — ACETAMINOPHEN 650 MG: 325 TABLET, FILM COATED ORAL at 03:09

## 2021-01-01 RX ADMIN — APIXABAN 5 MG: 5 TABLET, FILM COATED ORAL at 20:30

## 2021-01-01 RX ADMIN — POTASSIUM CHLORIDE 20 MEQ: 29.8 INJECTION, SOLUTION INTRAVENOUS at 11:13

## 2021-01-01 RX ADMIN — HYDRALAZINE HYDROCHLORIDE 10 MG: 20 INJECTION INTRAMUSCULAR; INTRAVENOUS at 06:04

## 2021-01-01 RX ADMIN — APIXABAN 5 MG: 5 TABLET, FILM COATED ORAL at 22:09

## 2021-01-01 RX ADMIN — BUMETANIDE 2 MG: 0.25 INJECTION INTRAMUSCULAR; INTRAVENOUS at 13:22

## 2021-01-01 RX ADMIN — HYDROMORPHONE HYDROCHLORIDE 0.5 MG: 1 INJECTION, SOLUTION INTRAMUSCULAR; INTRAVENOUS; SUBCUTANEOUS at 17:41

## 2021-01-01 RX ADMIN — Medication 40 MG: at 20:04

## 2021-01-01 RX ADMIN — INSULIN ASPART 4 UNITS: 100 INJECTION, SOLUTION INTRAVENOUS; SUBCUTANEOUS at 01:13

## 2021-01-01 RX ADMIN — BUMETANIDE 2 MG: 1 TABLET ORAL at 16:55

## 2021-01-01 RX ADMIN — Medication 40 MG: at 08:15

## 2021-01-01 RX ADMIN — HYDRALAZINE HYDROCHLORIDE 50 MG: 50 TABLET, FILM COATED ORAL at 14:43

## 2021-01-01 RX ADMIN — INSULIN GLARGINE 30 UNITS: 100 INJECTION, SOLUTION SUBCUTANEOUS at 22:37

## 2021-01-01 RX ADMIN — DICLOFENAC SODIUM 2 G: 10 GEL TOPICAL at 20:19

## 2021-01-01 RX ADMIN — INSULIN ASPART 5 UNITS: 100 INJECTION, SOLUTION INTRAVENOUS; SUBCUTANEOUS at 15:59

## 2021-01-01 RX ADMIN — OMEPRAZOLE 20 MG: 20 CAPSULE, DELAYED RELEASE ORAL at 08:22

## 2021-01-01 RX ADMIN — HYDRALAZINE HYDROCHLORIDE 50 MG: 50 TABLET, FILM COATED ORAL at 00:24

## 2021-01-01 RX ADMIN — MICONAZOLE NITRATE: 2 POWDER TOPICAL at 08:36

## 2021-01-01 RX ADMIN — INSULIN ASPART 3 UNITS: 100 INJECTION, SOLUTION INTRAVENOUS; SUBCUTANEOUS at 00:31

## 2021-01-01 RX ADMIN — INSULIN ASPART 3 UNITS: 100 INJECTION, SOLUTION INTRAVENOUS; SUBCUTANEOUS at 18:34

## 2021-01-01 RX ADMIN — OXYCODONE HYDROCHLORIDE 5 MG: 5 TABLET ORAL at 16:09

## 2021-01-01 RX ADMIN — PANTOPRAZOLE SODIUM 40 MG: 40 INJECTION, POWDER, FOR SOLUTION INTRAVENOUS at 21:59

## 2021-01-01 RX ADMIN — PROPOFOL 30 MCG/KG/MIN: 10 INJECTION, EMULSION INTRAVENOUS at 17:42

## 2021-01-01 RX ADMIN — NITROGLYCERIN 20 MCG: 10 INJECTION INTRAVENOUS at 12:44

## 2021-01-01 RX ADMIN — EPOPROSTENOL 20 NG/KG/MIN: 1.5 INJECTION, POWDER, LYOPHILIZED, FOR SOLUTION INTRAVENOUS at 04:21

## 2021-01-01 RX ADMIN — CLOPIDOGREL BISULFATE 75 MG: 75 TABLET ORAL at 08:09

## 2021-01-01 RX ADMIN — CHLORHEXIDINE GLUCONATE 15 ML: 1.2 RINSE ORAL at 11:27

## 2021-01-01 RX ADMIN — BUMETANIDE 2 MG: 1 TABLET ORAL at 17:03

## 2021-01-01 RX ADMIN — MEROPENEM 1 G: 1 INJECTION, POWDER, FOR SOLUTION INTRAVENOUS at 06:10

## 2021-01-01 RX ADMIN — APIXABAN 5 MG: 5 TABLET, FILM COATED ORAL at 09:36

## 2021-01-01 RX ADMIN — VANCOMYCIN HYDROCHLORIDE 1250 MG: 5 INJECTION, POWDER, LYOPHILIZED, FOR SOLUTION INTRAVENOUS at 13:39

## 2021-01-01 RX ADMIN — CARVEDILOL 12.5 MG: 6.25 TABLET, FILM COATED ORAL at 20:35

## 2021-01-01 RX ADMIN — Medication 0.02 MCG/KG/MIN: at 23:02

## 2021-01-01 RX ADMIN — SODIUM CHLORIDE 16 UNITS/HR: 9 INJECTION, SOLUTION INTRAVENOUS at 23:30

## 2021-01-01 RX ADMIN — HYDRALAZINE HYDROCHLORIDE 50 MG: 50 TABLET, FILM COATED ORAL at 15:02

## 2021-01-01 RX ADMIN — PROPOFOL 60 MCG/KG/MIN: 10 INJECTION, EMULSION INTRAVENOUS at 01:15

## 2021-01-01 RX ADMIN — INSULIN ASPART 4 UNITS: 100 INJECTION, SOLUTION INTRAVENOUS; SUBCUTANEOUS at 20:43

## 2021-01-01 RX ADMIN — APIXABAN 5 MG: 5 TABLET, FILM COATED ORAL at 21:48

## 2021-01-01 RX ADMIN — HYDRALAZINE HYDROCHLORIDE 50 MG: 50 TABLET, FILM COATED ORAL at 17:24

## 2021-01-01 RX ADMIN — CLINDAMYCIN PHOSPHATE 900 MG: 900 INJECTION, SOLUTION INTRAVENOUS at 13:05

## 2021-01-01 RX ADMIN — IOPAMIDOL 52 ML: 755 INJECTION, SOLUTION INTRAVENOUS at 14:41

## 2021-01-01 RX ADMIN — POTASSIUM CHLORIDE 40 MEQ: 1500 TABLET, EXTENDED RELEASE ORAL at 15:18

## 2021-01-01 RX ADMIN — Medication 5 ML: at 08:53

## 2021-01-01 RX ADMIN — ACETAMINOPHEN 650 MG: 325 TABLET, FILM COATED ORAL at 08:39

## 2021-01-01 RX ADMIN — MICONAZOLE NITRATE: 2 POWDER TOPICAL at 10:29

## 2021-01-01 RX ADMIN — DEXTROSE MONOHYDRATE 50 ML: 500 INJECTION PARENTERAL at 04:42

## 2021-01-01 RX ADMIN — VANCOMYCIN HYDROCHLORIDE 1000 MG: 1 INJECTION, SOLUTION INTRAVENOUS at 21:16

## 2021-01-01 RX ADMIN — CARVEDILOL 3.12 MG: 3.12 TABLET, FILM COATED ORAL at 20:30

## 2021-01-01 RX ADMIN — CARVEDILOL 12.5 MG: 12.5 TABLET, FILM COATED ORAL at 08:09

## 2021-01-01 RX ADMIN — CARVEDILOL 6.25 MG: 6.25 TABLET, FILM COATED ORAL at 18:49

## 2021-01-01 RX ADMIN — TAMSULOSIN HYDROCHLORIDE 0.4 MG: 0.4 CAPSULE ORAL at 08:32

## 2021-01-01 RX ADMIN — DEXAMETHASONE 6 MG: 2 TABLET ORAL at 08:38

## 2021-01-01 RX ADMIN — Medication 40 MG: at 08:41

## 2021-01-01 RX ADMIN — CARVEDILOL 6.25 MG: 6.25 TABLET, FILM COATED ORAL at 09:03

## 2021-01-01 RX ADMIN — AMLODIPINE BESYLATE 5 MG: 5 TABLET ORAL at 09:09

## 2021-01-01 RX ADMIN — ATORVASTATIN CALCIUM 80 MG: 80 TABLET, FILM COATED ORAL at 08:16

## 2021-01-01 RX ADMIN — CARVEDILOL 3.12 MG: 3.12 TABLET, FILM COATED ORAL at 08:29

## 2021-01-01 RX ADMIN — INSULIN ASPART 5 UNITS: 100 INJECTION, SOLUTION INTRAVENOUS; SUBCUTANEOUS at 00:30

## 2021-01-01 RX ADMIN — INSULIN GLARGINE 30 UNITS: 100 INJECTION, SOLUTION SUBCUTANEOUS at 21:54

## 2021-01-01 RX ADMIN — OXYCODONE HYDROCHLORIDE 5 MG: 5 TABLET ORAL at 13:14

## 2021-01-01 RX ADMIN — HYDROXYZINE HYDROCHLORIDE 10 MG: 10 TABLET, FILM COATED ORAL at 16:09

## 2021-01-01 RX ADMIN — PANTOPRAZOLE SODIUM 40 MG: 40 TABLET, DELAYED RELEASE ORAL at 15:48

## 2021-01-01 RX ADMIN — FUROSEMIDE 40 MG: 10 INJECTION, SOLUTION INTRAVENOUS at 16:58

## 2021-01-01 RX ADMIN — ATORVASTATIN CALCIUM 80 MG: 80 TABLET, FILM COATED ORAL at 08:13

## 2021-01-01 RX ADMIN — CHLOROTHIAZIDE SODIUM 250 MG: 500 INJECTION, POWDER, LYOPHILIZED, FOR SOLUTION INTRAVENOUS at 11:06

## 2021-01-01 RX ADMIN — MEROPENEM 500 MG: 500 INJECTION, POWDER, FOR SOLUTION INTRAVENOUS at 08:09

## 2021-01-01 RX ADMIN — TAMSULOSIN HYDROCHLORIDE 0.4 MG: 0.4 CAPSULE ORAL at 08:38

## 2021-01-01 RX ADMIN — MICONAZOLE NITRATE: 2 POWDER TOPICAL at 20:10

## 2021-01-01 RX ADMIN — QUETIAPINE FUMARATE 25 MG: 25 TABLET ORAL at 20:12

## 2021-01-01 RX ADMIN — BUMETANIDE 2 MG: 0.25 INJECTION INTRAMUSCULAR; INTRAVENOUS at 15:06

## 2021-01-01 RX ADMIN — APIXABAN 5 MG: 5 TABLET, FILM COATED ORAL at 08:53

## 2021-01-01 RX ADMIN — ACETAMINOPHEN 650 MG: 325 TABLET, FILM COATED ORAL at 14:17

## 2021-01-01 RX ADMIN — INSULIN ASPART 10 UNITS: 100 INJECTION, SOLUTION INTRAVENOUS; SUBCUTANEOUS at 00:10

## 2021-01-01 RX ADMIN — DEXTROSE MONOHYDRATE 50 ML: 500 INJECTION PARENTERAL at 08:49

## 2021-01-01 RX ADMIN — AMLODIPINE BESYLATE 5 MG: 5 TABLET ORAL at 08:29

## 2021-01-01 RX ADMIN — SENNOSIDES AND DOCUSATE SODIUM 1 TABLET: 50; 8.6 TABLET ORAL at 15:45

## 2021-01-01 RX ADMIN — INSULIN GLARGINE 25 UNITS: 100 INJECTION, SOLUTION SUBCUTANEOUS at 09:33

## 2021-01-01 RX ADMIN — PHENYLEPHRINE HYDROCHLORIDE 50 MCG: 10 INJECTION INTRAVENOUS at 08:07

## 2021-01-01 RX ADMIN — HYDRALAZINE HYDROCHLORIDE 50 MG: 50 TABLET, FILM COATED ORAL at 21:20

## 2021-01-01 RX ADMIN — CLINDAMYCIN PHOSPHATE 900 MG: 900 INJECTION, SOLUTION INTRAVENOUS at 19:29

## 2021-01-01 RX ADMIN — TRAZODONE HYDROCHLORIDE 50 MG: 50 TABLET ORAL at 01:03

## 2021-01-01 RX ADMIN — INSULIN ASPART 1 UNITS: 100 INJECTION, SOLUTION INTRAVENOUS; SUBCUTANEOUS at 16:18

## 2021-01-01 RX ADMIN — INSULIN ASPART 6 UNITS: 100 INJECTION, SOLUTION INTRAVENOUS; SUBCUTANEOUS at 14:28

## 2021-01-01 RX ADMIN — PROPOFOL 20 MCG/KG/MIN: 10 INJECTION, EMULSION INTRAVENOUS at 06:21

## 2021-01-01 RX ADMIN — Medication 40 MG: at 21:10

## 2021-01-01 RX ADMIN — SODIUM CHLORIDE: 9 INJECTION, SOLUTION INTRAVENOUS at 17:56

## 2021-01-01 RX ADMIN — MICONAZOLE NITRATE: 2 POWDER TOPICAL at 08:38

## 2021-01-01 RX ADMIN — INSULIN ASPART 3 UNITS: 100 INJECTION, SOLUTION INTRAVENOUS; SUBCUTANEOUS at 16:37

## 2021-01-01 RX ADMIN — ATORVASTATIN CALCIUM 40 MG: 40 TABLET, FILM COATED ORAL at 09:08

## 2021-01-01 RX ADMIN — GUAIFENESIN 10 ML: 200 SOLUTION ORAL at 20:10

## 2021-01-01 RX ADMIN — CARVEDILOL 12.5 MG: 6.25 TABLET, FILM COATED ORAL at 08:00

## 2021-01-01 RX ADMIN — APIXABAN 5 MG: 5 TABLET, FILM COATED ORAL at 22:27

## 2021-01-01 RX ADMIN — HYDROMORPHONE HYDROCHLORIDE 0.5 MG: 1 INJECTION, SOLUTION INTRAMUSCULAR; INTRAVENOUS; SUBCUTANEOUS at 23:59

## 2021-01-01 RX ADMIN — PROPOFOL 50 MCG/KG/MIN: 10 INJECTION, EMULSION INTRAVENOUS at 15:27

## 2021-01-01 RX ADMIN — CARVEDILOL 12.5 MG: 12.5 TABLET, FILM COATED ORAL at 08:23

## 2021-01-01 RX ADMIN — MINERAL OIL AND PETROLATUM: 150; 830 OINTMENT OPHTHALMIC at 01:00

## 2021-01-01 RX ADMIN — Medication 1 PACKET: at 08:51

## 2021-01-01 RX ADMIN — MICONAZOLE NITRATE: 2 POWDER TOPICAL at 21:03

## 2021-01-01 RX ADMIN — APIXABAN 5 MG: 5 TABLET, FILM COATED ORAL at 08:29

## 2021-01-01 RX ADMIN — ACETAMINOPHEN 650 MG: 325 TABLET, FILM COATED ORAL at 20:13

## 2021-01-01 RX ADMIN — CARVEDILOL 3.12 MG: 3.12 TABLET, FILM COATED ORAL at 08:39

## 2021-01-01 RX ADMIN — CARVEDILOL 12.5 MG: 6.25 TABLET, FILM COATED ORAL at 09:42

## 2021-01-01 RX ADMIN — HYDRALAZINE HYDROCHLORIDE 10 MG: 20 INJECTION INTRAMUSCULAR; INTRAVENOUS at 00:29

## 2021-01-01 RX ADMIN — GUAIFENESIN 10 ML: 200 SOLUTION ORAL at 08:53

## 2021-01-01 RX ADMIN — APIXABAN 5 MG: 5 TABLET, FILM COATED ORAL at 09:42

## 2021-01-01 RX ADMIN — PROPOFOL 40 MCG/KG/MIN: 10 INJECTION, EMULSION INTRAVENOUS at 03:55

## 2021-01-01 RX ADMIN — INSULIN GLARGINE 10 UNITS: 100 INJECTION, SOLUTION SUBCUTANEOUS at 21:53

## 2021-01-01 RX ADMIN — APIXABAN 5 MG: 5 TABLET, FILM COATED ORAL at 01:21

## 2021-01-01 RX ADMIN — INSULIN ASPART 1 UNITS: 100 INJECTION, SOLUTION INTRAVENOUS; SUBCUTANEOUS at 09:09

## 2021-01-01 RX ADMIN — PROPOFOL 20 MCG/KG/MIN: 10 INJECTION, EMULSION INTRAVENOUS at 23:21

## 2021-01-01 RX ADMIN — DOXAZOSIN 1 MG: 1 TABLET ORAL at 21:55

## 2021-01-01 RX ADMIN — HYDRALAZINE HYDROCHLORIDE 50 MG: 50 TABLET, FILM COATED ORAL at 08:14

## 2021-01-01 RX ADMIN — PROPOFOL 20 MCG/KG/MIN: 10 INJECTION, EMULSION INTRAVENOUS at 04:36

## 2021-01-01 RX ADMIN — INSULIN ASPART 5 UNITS: 100 INJECTION, SOLUTION INTRAVENOUS; SUBCUTANEOUS at 01:50

## 2021-01-01 RX ADMIN — PANTOPRAZOLE SODIUM 40 MG: 40 TABLET, DELAYED RELEASE ORAL at 08:09

## 2021-01-01 RX ADMIN — FENTANYL CITRATE 50 MCG: 50 INJECTION, SOLUTION INTRAMUSCULAR; INTRAVENOUS at 12:39

## 2021-01-01 RX ADMIN — INSULIN ASPART 6 UNITS: 100 INJECTION, SOLUTION INTRAVENOUS; SUBCUTANEOUS at 20:20

## 2021-01-01 RX ADMIN — MICONAZOLE NITRATE: 2 POWDER TOPICAL at 19:08

## 2021-01-01 RX ADMIN — EPOETIN ALFA-EPBX 4000 UNITS: 4000 INJECTION, SOLUTION INTRAVENOUS; SUBCUTANEOUS at 08:56

## 2021-01-01 RX ADMIN — Medication 40 MG: at 20:09

## 2021-01-01 RX ADMIN — APIXABAN 5 MG: 5 TABLET, FILM COATED ORAL at 08:38

## 2021-01-01 RX ADMIN — MICONAZOLE NITRATE: 2 POWDER TOPICAL at 20:07

## 2021-01-01 RX ADMIN — Medication 0.07 MCG/KG/MIN: at 04:19

## 2021-01-01 RX ADMIN — HYDRALAZINE HYDROCHLORIDE 50 MG: 25 TABLET, FILM COATED ORAL at 05:50

## 2021-01-01 RX ADMIN — HYDRALAZINE HYDROCHLORIDE 50 MG: 50 TABLET, FILM COATED ORAL at 00:10

## 2021-01-01 RX ADMIN — INSULIN GLARGINE 10 UNITS: 100 INJECTION, SOLUTION SUBCUTANEOUS at 09:34

## 2021-01-01 RX ADMIN — PROPOFOL 60 MCG/KG/MIN: 10 INJECTION, EMULSION INTRAVENOUS at 08:46

## 2021-01-01 RX ADMIN — APIXABAN 5 MG: 5 TABLET, FILM COATED ORAL at 20:37

## 2021-01-01 RX ADMIN — TRAZODONE HYDROCHLORIDE 50 MG: 50 TABLET ORAL at 21:55

## 2021-01-01 RX ADMIN — BACITRACIN: 500 OINTMENT TOPICAL at 19:33

## 2021-01-01 RX ADMIN — MEROPENEM 1 G: 1 INJECTION, POWDER, FOR SOLUTION INTRAVENOUS at 15:39

## 2021-01-01 RX ADMIN — CHLORHEXIDINE GLUCONATE 15 ML: 1.2 RINSE ORAL at 07:53

## 2021-01-01 RX ADMIN — HYDRALAZINE HYDROCHLORIDE 50 MG: 50 TABLET, FILM COATED ORAL at 16:25

## 2021-01-01 RX ADMIN — GUAIFENESIN 10 ML: 200 SOLUTION ORAL at 21:28

## 2021-01-01 RX ADMIN — SODIUM CHLORIDE: 9 INJECTION, SOLUTION INTRAVENOUS at 12:59

## 2021-01-01 RX ADMIN — INSULIN ASPART 1 UNITS: 100 INJECTION, SOLUTION INTRAVENOUS; SUBCUTANEOUS at 21:28

## 2021-01-01 RX ADMIN — APIXABAN 5 MG: 5 TABLET, FILM COATED ORAL at 08:04

## 2021-01-01 RX ADMIN — INSULIN ASPART 5 UNITS: 100 INJECTION, SOLUTION INTRAVENOUS; SUBCUTANEOUS at 20:42

## 2021-01-01 RX ADMIN — HYDROMORPHONE HYDROCHLORIDE 0.5 MG: 1 INJECTION, SOLUTION INTRAMUSCULAR; INTRAVENOUS; SUBCUTANEOUS at 23:54

## 2021-01-01 RX ADMIN — BACITRACIN: 500 OINTMENT TOPICAL at 09:22

## 2021-01-01 RX ADMIN — HYDRALAZINE HYDROCHLORIDE 50 MG: 25 TABLET, FILM COATED ORAL at 20:32

## 2021-01-01 RX ADMIN — INSULIN ASPART 1 UNITS: 100 INJECTION, SOLUTION INTRAVENOUS; SUBCUTANEOUS at 12:04

## 2021-01-01 RX ADMIN — LIDOCAINE HYDROCHLORIDE 13 ML: 10 INJECTION, SOLUTION INFILTRATION; PERINEURAL at 12:40

## 2021-01-01 RX ADMIN — MEROPENEM 500 MG: 500 INJECTION, POWDER, FOR SOLUTION INTRAVENOUS at 07:53

## 2021-01-01 RX ADMIN — GUAIFENESIN 10 ML: 200 SOLUTION ORAL at 08:31

## 2021-01-01 RX ADMIN — DEXAMETHASONE SODIUM PHOSPHATE 4 MG: 4 INJECTION, SOLUTION INTRAMUSCULAR; INTRAVENOUS at 19:07

## 2021-01-01 RX ADMIN — CHLORHEXIDINE GLUCONATE 15 ML: 1.2 RINSE ORAL at 08:34

## 2021-01-01 RX ADMIN — APIXABAN 5 MG: 5 TABLET, FILM COATED ORAL at 20:33

## 2021-01-01 RX ADMIN — LABETALOL HYDROCHLORIDE 10 MG: 5 INJECTION, SOLUTION INTRAVENOUS at 00:27

## 2021-01-01 RX ADMIN — MULTIVIT AND MINERALS-FERROUS GLUCONATE 9 MG IRON/15 ML ORAL LIQUID 15 ML: at 08:44

## 2021-01-01 RX ADMIN — ONDANSETRON 4 MG: 2 INJECTION INTRAMUSCULAR; INTRAVENOUS at 16:36

## 2021-01-01 RX ADMIN — MICONAZOLE NITRATE: 2 POWDER TOPICAL at 21:10

## 2021-01-01 RX ADMIN — EPOETIN ALFA-EPBX 4000 UNITS: 4000 INJECTION, SOLUTION INTRAVENOUS; SUBCUTANEOUS at 12:29

## 2021-01-01 RX ADMIN — INSULIN ASPART 1 UNITS: 100 INJECTION, SOLUTION INTRAVENOUS; SUBCUTANEOUS at 03:47

## 2021-01-01 RX ADMIN — SODIUM CHLORIDE: 9 INJECTION, SOLUTION INTRAVENOUS at 18:10

## 2021-01-01 RX ADMIN — Medication 0.04 MCG/KG/MIN: at 02:25

## 2021-01-01 RX ADMIN — TAMSULOSIN HYDROCHLORIDE 0.4 MG: 0.4 CAPSULE ORAL at 09:42

## 2021-01-01 RX ADMIN — HYDROMORPHONE HYDROCHLORIDE 0.5 MG: 1 INJECTION, SOLUTION INTRAMUSCULAR; INTRAVENOUS; SUBCUTANEOUS at 21:07

## 2021-01-01 RX ADMIN — ASPIRIN 81 MG: 81 TABLET, COATED ORAL at 08:58

## 2021-01-01 RX ADMIN — INSULIN ASPART 2 UNITS: 100 INJECTION, SOLUTION INTRAVENOUS; SUBCUTANEOUS at 08:51

## 2021-01-01 RX ADMIN — SODIUM CHLORIDE: 9 INJECTION, SOLUTION INTRAVENOUS at 10:58

## 2021-01-01 RX ADMIN — INSULIN ASPART 9 UNITS: 100 INJECTION, SOLUTION INTRAVENOUS; SUBCUTANEOUS at 08:24

## 2021-01-01 RX ADMIN — ATORVASTATIN CALCIUM 80 MG: 80 TABLET, FILM COATED ORAL at 08:27

## 2021-01-01 RX ADMIN — HUMAN ALBUMIN MICROSPHERES AND PERFLUTREN 9 ML: 10; .22 INJECTION, SOLUTION INTRAVENOUS at 11:21

## 2021-01-01 RX ADMIN — ACETAMINOPHEN 650 MG: 325 TABLET, FILM COATED ORAL at 07:55

## 2021-01-01 RX ADMIN — MICONAZOLE NITRATE: 2 POWDER TOPICAL at 21:31

## 2021-01-01 RX ADMIN — Medication 50 MCG: at 05:02

## 2021-01-01 RX ADMIN — LIDOCAINE HYDROCHLORIDE 10 ML: 10 INJECTION, SOLUTION EPIDURAL; INFILTRATION; INTRACAUDAL; PERINEURAL at 12:14

## 2021-01-01 RX ADMIN — INSULIN GLARGINE 25 UNITS: 100 INJECTION, SOLUTION SUBCUTANEOUS at 21:40

## 2021-01-01 RX ADMIN — INSULIN ASPART 6 UNITS: 100 INJECTION, SOLUTION INTRAVENOUS; SUBCUTANEOUS at 04:34

## 2021-01-01 RX ADMIN — GUAIFENESIN 10 ML: 200 SOLUTION ORAL at 21:24

## 2021-01-01 RX ADMIN — PANTOPRAZOLE SODIUM 80 MG: 40 INJECTION, POWDER, FOR SOLUTION INTRAVENOUS at 12:37

## 2021-01-01 RX ADMIN — INSULIN ASPART 1 UNITS: 100 INJECTION, SOLUTION INTRAVENOUS; SUBCUTANEOUS at 11:33

## 2021-01-01 RX ADMIN — CARVEDILOL 12.5 MG: 6.25 TABLET, FILM COATED ORAL at 08:37

## 2021-01-01 RX ADMIN — ATORVASTATIN CALCIUM 80 MG: 80 TABLET, FILM COATED ORAL at 11:14

## 2021-01-01 RX ADMIN — MEROPENEM 1 G: 1 INJECTION, POWDER, FOR SOLUTION INTRAVENOUS at 03:50

## 2021-01-01 RX ADMIN — DEXTROSE MONOHYDRATE 25 ML: 500 INJECTION PARENTERAL at 13:04

## 2021-01-01 RX ADMIN — INSULIN GLARGINE 30 UNITS: 100 INJECTION, SOLUTION SUBCUTANEOUS at 08:24

## 2021-01-01 RX ADMIN — SODIUM CHLORIDE 300 ML: 9 INJECTION, SOLUTION INTRAVENOUS at 14:16

## 2021-01-01 RX ADMIN — HYDRALAZINE HYDROCHLORIDE 50 MG: 50 TABLET, FILM COATED ORAL at 08:28

## 2021-01-01 RX ADMIN — APIXABAN 5 MG: 5 TABLET, FILM COATED ORAL at 20:10

## 2021-01-01 RX ADMIN — Medication 1 PACKET: at 21:55

## 2021-01-01 RX ADMIN — HYDROCODONE BITARTRATE AND ACETAMINOPHEN 1 TABLET: 5; 325 TABLET ORAL at 11:17

## 2021-01-01 RX ADMIN — APIXABAN 5 MG: 5 TABLET, FILM COATED ORAL at 08:15

## 2021-01-01 RX ADMIN — VANCOMYCIN HYDROCHLORIDE 2000 MG: 5 INJECTION, POWDER, LYOPHILIZED, FOR SOLUTION INTRAVENOUS at 02:17

## 2021-01-01 RX ADMIN — CHLORHEXIDINE GLUCONATE 15 ML: 1.2 RINSE ORAL at 08:30

## 2021-01-01 RX ADMIN — MEROPENEM 1 G: 1 INJECTION, POWDER, FOR SOLUTION INTRAVENOUS at 15:40

## 2021-01-01 RX ADMIN — DOXAZOSIN 1 MG: 1 TABLET ORAL at 22:49

## 2021-01-01 RX ADMIN — BUMETANIDE 2 MG: 1 TABLET ORAL at 08:28

## 2021-01-01 RX ADMIN — Medication 1 PACKET: at 16:38

## 2021-01-01 RX ADMIN — GUAIFENESIN 10 ML: 200 SOLUTION ORAL at 09:03

## 2021-01-01 RX ADMIN — INSULIN GLARGINE 20 UNITS: 100 INJECTION, SOLUTION SUBCUTANEOUS at 02:54

## 2021-01-01 RX ADMIN — CARVEDILOL 6.25 MG: 6.25 TABLET, FILM COATED ORAL at 08:32

## 2021-01-01 RX ADMIN — Medication 40 MG: at 09:50

## 2021-01-01 RX ADMIN — Medication 0.14 MCG/KG/MIN: at 14:49

## 2021-01-01 RX ADMIN — INSULIN ASPART 7 UNITS: 100 INJECTION, SOLUTION INTRAVENOUS; SUBCUTANEOUS at 00:32

## 2021-01-01 RX ADMIN — INSULIN ASPART 6 UNITS: 100 INJECTION, SOLUTION INTRAVENOUS; SUBCUTANEOUS at 08:08

## 2021-01-01 RX ADMIN — DOXAZOSIN 1 MG: 1 TABLET ORAL at 15:33

## 2021-01-01 RX ADMIN — HYDRALAZINE HYDROCHLORIDE 10 MG: 20 INJECTION INTRAMUSCULAR; INTRAVENOUS at 01:11

## 2021-01-01 RX ADMIN — CLOPIDOGREL BISULFATE 75 MG: 75 TABLET ORAL at 09:15

## 2021-01-01 RX ADMIN — ACETAMINOPHEN 650 MG: 325 TABLET, FILM COATED ORAL at 15:06

## 2021-01-01 RX ADMIN — ACETAMINOPHEN 650 MG: 325 TABLET, FILM COATED ORAL at 14:14

## 2021-01-01 RX ADMIN — MICONAZOLE NITRATE: 2 POWDER TOPICAL at 09:05

## 2021-01-01 RX ADMIN — INSULIN ASPART 4 UNITS: 100 INJECTION, SOLUTION INTRAVENOUS; SUBCUTANEOUS at 14:56

## 2021-01-01 RX ADMIN — TRAZODONE HYDROCHLORIDE 50 MG: 50 TABLET ORAL at 21:52

## 2021-01-01 RX ADMIN — BARIUM SULFATE 40 ML: 400 SUSPENSION ORAL at 10:24

## 2021-01-01 RX ADMIN — VANCOMYCIN HYDROCHLORIDE 1000 MG: 1 INJECTION, SOLUTION INTRAVENOUS at 15:59

## 2021-01-01 RX ADMIN — Medication 8 MCG: at 10:51

## 2021-01-01 RX ADMIN — DEXAMETHASONE SODIUM PHOSPHATE 10 MG: 4 INJECTION, SOLUTION INTRAMUSCULAR; INTRAVENOUS at 05:27

## 2021-01-01 RX ADMIN — INSULIN ASPART 4 UNITS: 100 INJECTION, SOLUTION INTRAVENOUS; SUBCUTANEOUS at 19:37

## 2021-01-01 RX ADMIN — INSULIN ASPART 5 UNITS: 100 INJECTION, SOLUTION INTRAVENOUS; SUBCUTANEOUS at 00:11

## 2021-01-01 RX ADMIN — CLINDAMYCIN PHOSPHATE 900 MG: 900 INJECTION, SOLUTION INTRAVENOUS at 02:57

## 2021-01-01 RX ADMIN — BUMETANIDE 2 MG: 0.25 INJECTION INTRAMUSCULAR; INTRAVENOUS at 12:33

## 2021-01-01 RX ADMIN — INSULIN ASPART 2 UNITS: 100 INJECTION, SOLUTION INTRAVENOUS; SUBCUTANEOUS at 03:24

## 2021-01-01 RX ADMIN — PROPOFOL 15 MCG/KG/MIN: 10 INJECTION, EMULSION INTRAVENOUS at 05:57

## 2021-01-01 RX ADMIN — MICONAZOLE NITRATE: 2 POWDER TOPICAL at 08:56

## 2021-01-01 RX ADMIN — INSULIN ASPART 4 UNITS: 100 INJECTION, SOLUTION INTRAVENOUS; SUBCUTANEOUS at 20:09

## 2021-01-01 RX ADMIN — CHLORHEXIDINE GLUCONATE 15 ML: 1.2 RINSE ORAL at 20:13

## 2021-01-01 RX ADMIN — BUMETANIDE 2 MG: 0.25 INJECTION INTRAMUSCULAR; INTRAVENOUS at 21:11

## 2021-01-01 RX ADMIN — HYDRALAZINE HYDROCHLORIDE 50 MG: 50 TABLET, FILM COATED ORAL at 08:23

## 2021-01-01 RX ADMIN — PROPOFOL 60 MCG/KG/MIN: 10 INJECTION, EMULSION INTRAVENOUS at 20:20

## 2021-01-01 RX ADMIN — GUAIFENESIN 10 ML: 200 SOLUTION ORAL at 03:51

## 2021-01-01 RX ADMIN — INSULIN ASPART 5 UNITS: 100 INJECTION, SOLUTION INTRAVENOUS; SUBCUTANEOUS at 11:29

## 2021-01-01 RX ADMIN — CARVEDILOL 6.25 MG: 6.25 TABLET, FILM COATED ORAL at 08:28

## 2021-01-01 RX ADMIN — SODIUM CHLORIDE, POTASSIUM CHLORIDE, SODIUM LACTATE AND CALCIUM CHLORIDE: 600; 310; 30; 20 INJECTION, SOLUTION INTRAVENOUS at 11:34

## 2021-01-01 RX ADMIN — Medication 40 MG: at 19:42

## 2021-01-01 RX ADMIN — INSULIN ASPART 7 UNITS: 100 INJECTION, SOLUTION INTRAVENOUS; SUBCUTANEOUS at 16:31

## 2021-01-01 RX ADMIN — INSULIN ASPART 1 UNITS: 100 INJECTION, SOLUTION INTRAVENOUS; SUBCUTANEOUS at 12:25

## 2021-01-01 RX ADMIN — SODIUM CHLORIDE: 9 INJECTION, SOLUTION INTRAVENOUS at 02:48

## 2021-01-01 RX ADMIN — CARVEDILOL 12.5 MG: 12.5 TABLET, FILM COATED ORAL at 09:15

## 2021-01-01 RX ADMIN — INSULIN ASPART 5 UNITS: 100 INJECTION, SOLUTION INTRAVENOUS; SUBCUTANEOUS at 11:32

## 2021-01-01 RX ADMIN — INSULIN ASPART 1 UNITS: 100 INJECTION, SOLUTION INTRAVENOUS; SUBCUTANEOUS at 16:07

## 2021-01-01 RX ADMIN — GUAIFENESIN 10 ML: 200 SOLUTION ORAL at 08:14

## 2021-01-01 RX ADMIN — QUETIAPINE FUMARATE 25 MG: 25 TABLET ORAL at 10:57

## 2021-01-01 RX ADMIN — INSULIN ASPART 3 UNITS: 100 INJECTION, SOLUTION INTRAVENOUS; SUBCUTANEOUS at 16:55

## 2021-01-01 RX ADMIN — HYDRALAZINE HYDROCHLORIDE 50 MG: 50 TABLET, FILM COATED ORAL at 00:19

## 2021-01-01 RX ADMIN — HYDRALAZINE HYDROCHLORIDE 50 MG: 25 TABLET, FILM COATED ORAL at 00:32

## 2021-01-01 RX ADMIN — CARVEDILOL 6.25 MG: 6.25 TABLET, FILM COATED ORAL at 17:50

## 2021-01-01 RX ADMIN — QUETIAPINE FUMARATE 25 MG: 25 TABLET ORAL at 23:59

## 2021-01-01 RX ADMIN — BUMETANIDE 2 MG: 0.25 INJECTION INTRAMUSCULAR; INTRAVENOUS at 04:48

## 2021-01-01 RX ADMIN — CARVEDILOL 6.25 MG: 6.25 TABLET, FILM COATED ORAL at 20:37

## 2021-01-01 RX ADMIN — APIXABAN 5 MG: 5 TABLET, FILM COATED ORAL at 21:18

## 2021-01-01 RX ADMIN — HYDRALAZINE HYDROCHLORIDE 50 MG: 50 TABLET, FILM COATED ORAL at 22:11

## 2021-01-01 RX ADMIN — CARVEDILOL 6.25 MG: 6.25 TABLET, FILM COATED ORAL at 08:55

## 2021-01-01 RX ADMIN — PROPOFOL 20 MCG/KG/MIN: 10 INJECTION, EMULSION INTRAVENOUS at 14:46

## 2021-01-01 RX ADMIN — LEVALBUTEROL HYDROCHLORIDE 1.25 MG: 1.25 SOLUTION, CONCENTRATE RESPIRATORY (INHALATION) at 05:46

## 2021-01-01 RX ADMIN — CHLORHEXIDINE GLUCONATE 15 ML: 1.2 RINSE ORAL at 19:45

## 2021-01-01 RX ADMIN — SODIUM CHLORIDE 50 ML: 900 INJECTION INTRAVENOUS at 16:38

## 2021-01-01 RX ADMIN — GUAIFENESIN 10 ML: 200 SOLUTION ORAL at 20:30

## 2021-01-01 RX ADMIN — Medication 25 MCG/HR: at 13:10

## 2021-01-01 RX ADMIN — BARIUM SULFATE 30 ML: 400 SUSPENSION ORAL at 10:10

## 2021-01-01 RX ADMIN — OMEPRAZOLE 20 MG: 20 CAPSULE, DELAYED RELEASE ORAL at 09:22

## 2021-01-01 RX ADMIN — LABETALOL HYDROCHLORIDE 10 MG: 5 INJECTION, SOLUTION INTRAVENOUS at 05:16

## 2021-01-01 RX ADMIN — INSULIN ASPART 1 UNITS: 100 INJECTION, SOLUTION INTRAVENOUS; SUBCUTANEOUS at 08:37

## 2021-01-01 RX ADMIN — FENTANYL CITRATE 25 MCG: 50 INJECTION, SOLUTION INTRAMUSCULAR; INTRAVENOUS at 10:36

## 2021-01-01 RX ADMIN — CARVEDILOL 12.5 MG: 6.25 TABLET, FILM COATED ORAL at 10:21

## 2021-01-01 RX ADMIN — CARVEDILOL 12.5 MG: 12.5 TABLET, FILM COATED ORAL at 10:38

## 2021-01-01 RX ADMIN — AMLODIPINE BESYLATE 5 MG: 5 TABLET ORAL at 11:14

## 2021-01-01 RX ADMIN — CLONIDINE HYDROCHLORIDE 0.1 MG: 0.1 TABLET ORAL at 04:21

## 2021-01-01 RX ADMIN — ATORVASTATIN CALCIUM 80 MG: 80 TABLET, FILM COATED ORAL at 11:26

## 2021-01-01 RX ADMIN — AMLODIPINE BESYLATE 5 MG: 5 TABLET ORAL at 08:57

## 2021-01-01 RX ADMIN — ATORVASTATIN CALCIUM 80 MG: 80 TABLET, FILM COATED ORAL at 08:37

## 2021-01-01 RX ADMIN — CHLORHEXIDINE GLUCONATE 15 ML: 1.2 RINSE ORAL at 09:09

## 2021-01-01 RX ADMIN — ATORVASTATIN CALCIUM 80 MG: 80 TABLET, FILM COATED ORAL at 08:28

## 2021-01-01 RX ADMIN — INSULIN ASPART 2 UNITS: 100 INJECTION, SOLUTION INTRAVENOUS; SUBCUTANEOUS at 15:35

## 2021-01-01 RX ADMIN — APIXABAN 5 MG: 5 TABLET, FILM COATED ORAL at 08:41

## 2021-01-01 RX ADMIN — INSULIN ASPART 1 UNITS: 100 INJECTION, SOLUTION INTRAVENOUS; SUBCUTANEOUS at 04:43

## 2021-01-01 RX ADMIN — CHLORHEXIDINE GLUCONATE 15 ML: 1.2 RINSE ORAL at 21:04

## 2021-01-01 RX ADMIN — APIXABAN 5 MG: 5 TABLET, FILM COATED ORAL at 20:43

## 2021-01-01 RX ADMIN — Medication 40 MG: at 21:07

## 2021-01-01 RX ADMIN — INSULIN GLARGINE 30 UNITS: 100 INJECTION, SOLUTION SUBCUTANEOUS at 22:08

## 2021-01-01 RX ADMIN — Medication 40 MG: at 09:29

## 2021-01-01 RX ADMIN — PANTOPRAZOLE SODIUM 40 MG: 40 TABLET, DELAYED RELEASE ORAL at 20:17

## 2021-01-01 RX ADMIN — LABETALOL HYDROCHLORIDE 10 MG: 5 INJECTION, SOLUTION INTRAVENOUS at 18:30

## 2021-01-01 RX ADMIN — Medication 40 MG: at 08:04

## 2021-01-01 RX ADMIN — BUMETANIDE 2 MG: 1 TABLET ORAL at 16:42

## 2021-01-01 RX ADMIN — EPOPROSTENOL 20 NG/KG/MIN: 1.5 INJECTION, POWDER, LYOPHILIZED, FOR SOLUTION INTRAVENOUS at 12:56

## 2021-01-01 RX ADMIN — CLONIDINE HYDROCHLORIDE 0.1 MG: 0.1 TABLET ORAL at 16:44

## 2021-01-01 RX ADMIN — APIXABAN 5 MG: 5 TABLET, FILM COATED ORAL at 08:22

## 2021-01-01 RX ADMIN — APIXABAN 5 MG: 5 TABLET, FILM COATED ORAL at 08:28

## 2021-01-01 RX ADMIN — HYDRALAZINE HYDROCHLORIDE 10 MG: 20 INJECTION INTRAMUSCULAR; INTRAVENOUS at 12:36

## 2021-01-01 RX ADMIN — GUAIFENESIN 10 ML: 200 SOLUTION ORAL at 09:05

## 2021-01-01 RX ADMIN — DEXTROSE MONOHYDRATE 25 ML: 500 INJECTION PARENTERAL at 05:31

## 2021-01-01 RX ADMIN — CARVEDILOL 6.25 MG: 6.25 TABLET, FILM COATED ORAL at 18:36

## 2021-01-01 RX ADMIN — OXYBUTYNIN CHLORIDE 5 MG: 5 TABLET ORAL at 01:01

## 2021-01-01 RX ADMIN — ALBUMIN HUMAN 12.5 G: 0.25 SOLUTION INTRAVENOUS at 04:30

## 2021-01-01 RX ADMIN — Medication 40 MG: at 08:35

## 2021-01-01 RX ADMIN — CARVEDILOL 3.12 MG: 3.12 TABLET, FILM COATED ORAL at 09:36

## 2021-01-01 RX ADMIN — BUMETANIDE 2 MG: 0.25 INJECTION INTRAMUSCULAR; INTRAVENOUS at 16:54

## 2021-01-01 RX ADMIN — OMEPRAZOLE 20 MG: 20 CAPSULE, DELAYED RELEASE ORAL at 11:14

## 2021-01-01 RX ADMIN — SENNOSIDES AND DOCUSATE SODIUM 1 TABLET: 8.6; 5 TABLET ORAL at 09:12

## 2021-01-01 RX ADMIN — Medication 40 MG: at 21:31

## 2021-01-01 RX ADMIN — INSULIN ASPART 3 UNITS: 100 INJECTION, SOLUTION INTRAVENOUS; SUBCUTANEOUS at 19:35

## 2021-01-01 RX ADMIN — APIXABAN 5 MG: 5 TABLET, FILM COATED ORAL at 20:13

## 2021-01-01 RX ADMIN — MICONAZOLE NITRATE: 2 POWDER TOPICAL at 09:01

## 2021-01-01 RX ADMIN — BACITRACIN: 500 OINTMENT TOPICAL at 07:47

## 2021-01-01 RX ADMIN — MULTIVIT AND MINERALS-FERROUS GLUCONATE 9 MG IRON/15 ML ORAL LIQUID 15 ML: at 08:15

## 2021-01-01 RX ADMIN — IRON SUCROSE 100 MG: 20 INJECTION, SOLUTION INTRAVENOUS at 09:05

## 2021-01-01 RX ADMIN — ATORVASTATIN CALCIUM 80 MG: 80 TABLET, FILM COATED ORAL at 09:21

## 2021-01-01 RX ADMIN — PROPOFOL 60 MCG/KG/MIN: 10 INJECTION, EMULSION INTRAVENOUS at 06:08

## 2021-01-01 RX ADMIN — INSULIN ASPART 3 UNITS: 100 INJECTION, SOLUTION INTRAVENOUS; SUBCUTANEOUS at 23:53

## 2021-01-01 RX ADMIN — ALBUMIN HUMAN 12.5 G: 0.25 SOLUTION INTRAVENOUS at 10:38

## 2021-01-01 RX ADMIN — INSULIN ASPART 1 UNITS: 100 INJECTION, SOLUTION INTRAVENOUS; SUBCUTANEOUS at 00:22

## 2021-01-01 RX ADMIN — SODIUM CHLORIDE 300 ML: 9 INJECTION, SOLUTION INTRAVENOUS at 08:55

## 2021-01-01 RX ADMIN — SODIUM CHLORIDE 8 MG/HR: 9 INJECTION, SOLUTION INTRAVENOUS at 09:25

## 2021-01-01 RX ADMIN — OMEPRAZOLE 20 MG: 20 CAPSULE, DELAYED RELEASE ORAL at 21:12

## 2021-01-01 RX ADMIN — CLONIDINE HYDROCHLORIDE 0.1 MG: 0.1 TABLET ORAL at 16:55

## 2021-01-01 RX ADMIN — BUMETANIDE 1 MG: 0.25 INJECTION INTRAMUSCULAR; INTRAVENOUS at 10:29

## 2021-01-01 RX ADMIN — SODIUM CHLORIDE 500 ML: 9 INJECTION, SOLUTION INTRAVENOUS at 15:45

## 2021-01-01 RX ADMIN — ATORVASTATIN CALCIUM 80 MG: 80 TABLET, FILM COATED ORAL at 08:29

## 2021-01-01 RX ADMIN — CARVEDILOL 3.12 MG: 3.12 TABLET, FILM COATED ORAL at 09:10

## 2021-01-01 RX ADMIN — DEXAMETHASONE SODIUM PHOSPHATE 10 MG: 4 INJECTION, SOLUTION INTRAMUSCULAR; INTRAVENOUS at 06:16

## 2021-01-01 RX ADMIN — INSULIN ASPART 1 UNITS: 100 INJECTION, SOLUTION INTRAVENOUS; SUBCUTANEOUS at 01:17

## 2021-01-01 RX ADMIN — INSULIN ASPART 2 UNITS: 100 INJECTION, SOLUTION INTRAVENOUS; SUBCUTANEOUS at 08:31

## 2021-01-01 RX ADMIN — INSULIN ASPART 4 UNITS: 100 INJECTION, SOLUTION INTRAVENOUS; SUBCUTANEOUS at 20:17

## 2021-01-01 RX ADMIN — HYDROMORPHONE HYDROCHLORIDE 0.5 MG: 1 INJECTION, SOLUTION INTRAMUSCULAR; INTRAVENOUS; SUBCUTANEOUS at 13:22

## 2021-01-01 RX ADMIN — BACITRACIN: 500 OINTMENT TOPICAL at 20:43

## 2021-01-01 RX ADMIN — QUETIAPINE FUMARATE 25 MG: 25 TABLET ORAL at 22:15

## 2021-01-01 RX ADMIN — PROPOFOL 40 MCG/KG/MIN: 10 INJECTION, EMULSION INTRAVENOUS at 06:19

## 2021-01-01 RX ADMIN — GUAIFENESIN 10 ML: 200 SOLUTION ORAL at 21:53

## 2021-01-01 RX ADMIN — INSULIN ASPART 5 UNITS: 100 INJECTION, SOLUTION INTRAVENOUS; SUBCUTANEOUS at 15:53

## 2021-01-01 RX ADMIN — GUAIFENESIN 10 ML: 200 SOLUTION ORAL at 20:40

## 2021-01-01 RX ADMIN — Medication 25 MCG/HR: at 15:50

## 2021-01-01 RX ADMIN — PROPOFOL 35 MCG/KG/MIN: 10 INJECTION, EMULSION INTRAVENOUS at 14:13

## 2021-01-01 RX ADMIN — Medication 0.05 MCG/KG/MIN: at 08:40

## 2021-01-01 RX ADMIN — INSULIN ASPART 3 UNITS: 100 INJECTION, SOLUTION INTRAVENOUS; SUBCUTANEOUS at 20:26

## 2021-01-01 RX ADMIN — Medication 40 MG: at 09:22

## 2021-01-01 RX ADMIN — OMEPRAZOLE 20 MG: 20 CAPSULE, DELAYED RELEASE ORAL at 09:04

## 2021-01-01 RX ADMIN — INSULIN ASPART 2 UNITS: 100 INJECTION, SOLUTION INTRAVENOUS; SUBCUTANEOUS at 23:45

## 2021-01-01 RX ADMIN — BUMETANIDE 2 MG: 1 TABLET ORAL at 11:10

## 2021-01-01 RX ADMIN — INSULIN ASPART 1 UNITS: 100 INJECTION, SOLUTION INTRAVENOUS; SUBCUTANEOUS at 08:41

## 2021-01-01 RX ADMIN — DEXAMETHASONE 6 MG: 2 TABLET ORAL at 08:39

## 2021-01-01 RX ADMIN — CHLORHEXIDINE GLUCONATE 15 ML: 1.2 RINSE ORAL at 08:00

## 2021-01-01 RX ADMIN — CHLORHEXIDINE GLUCONATE 15 ML: 1.2 RINSE ORAL at 20:30

## 2021-01-01 RX ADMIN — HYDRALAZINE HYDROCHLORIDE 10 MG: 20 INJECTION INTRAMUSCULAR; INTRAVENOUS at 06:03

## 2021-01-01 RX ADMIN — INSULIN ASPART 2 UNITS: 100 INJECTION, SOLUTION INTRAVENOUS; SUBCUTANEOUS at 16:35

## 2021-01-01 RX ADMIN — HYDROCODONE BITARTRATE AND ACETAMINOPHEN 1 TABLET: 5; 325 TABLET ORAL at 20:44

## 2021-01-01 RX ADMIN — INSULIN ASPART 5 UNITS: 100 INJECTION, SOLUTION INTRAVENOUS; SUBCUTANEOUS at 08:45

## 2021-01-01 RX ADMIN — INSULIN ASPART 3 UNITS: 100 INJECTION, SOLUTION INTRAVENOUS; SUBCUTANEOUS at 07:53

## 2021-01-01 RX ADMIN — INSULIN ASPART 3 UNITS: 100 INJECTION, SOLUTION INTRAVENOUS; SUBCUTANEOUS at 13:21

## 2021-01-01 RX ADMIN — INSULIN ASPART 1 UNITS: 100 INJECTION, SOLUTION INTRAVENOUS; SUBCUTANEOUS at 08:59

## 2021-01-01 RX ADMIN — FENTANYL CITRATE 50 MCG: 50 INJECTION, SOLUTION INTRAMUSCULAR; INTRAVENOUS at 09:23

## 2021-01-01 RX ADMIN — INSULIN ASPART 5 UNITS: 100 INJECTION, SOLUTION INTRAVENOUS; SUBCUTANEOUS at 20:13

## 2021-01-01 RX ADMIN — LORAZEPAM 1 MG: 2 INJECTION INTRAMUSCULAR; INTRAVENOUS at 17:41

## 2021-01-01 RX ADMIN — PROPOFOL 50 MCG/KG/MIN: 10 INJECTION, EMULSION INTRAVENOUS at 03:31

## 2021-01-01 RX ADMIN — ACETAMINOPHEN 650 MG: 325 TABLET, FILM COATED ORAL at 09:05

## 2021-01-01 RX ADMIN — INSULIN GLARGINE 10 UNITS: 100 INJECTION, SOLUTION SUBCUTANEOUS at 00:13

## 2021-01-01 RX ADMIN — INSULIN GLARGINE 25 UNITS: 100 INJECTION, SOLUTION SUBCUTANEOUS at 22:07

## 2021-01-01 RX ADMIN — DOXAZOSIN 1 MG: 1 TABLET ORAL at 22:22

## 2021-01-01 RX ADMIN — HYDROCODONE BITARTRATE AND ACETAMINOPHEN 1 TABLET: 5; 325 TABLET ORAL at 16:29

## 2021-01-01 RX ADMIN — Medication 40 MG: at 20:46

## 2021-01-01 RX ADMIN — HYDRALAZINE HYDROCHLORIDE 50 MG: 25 TABLET, FILM COATED ORAL at 15:44

## 2021-01-01 RX ADMIN — APIXABAN 5 MG: 5 TABLET, FILM COATED ORAL at 08:00

## 2021-01-01 RX ADMIN — MIDAZOLAM HYDROCHLORIDE 1 MG: 1 INJECTION, SOLUTION INTRAMUSCULAR; INTRAVENOUS at 12:39

## 2021-01-01 RX ADMIN — Medication 40 MG: at 20:30

## 2021-01-01 RX ADMIN — PROPOFOL 10 MCG/KG/MIN: 10 INJECTION, EMULSION INTRAVENOUS at 11:45

## 2021-01-01 RX ADMIN — CARVEDILOL 6.25 MG: 6.25 TABLET, FILM COATED ORAL at 08:53

## 2021-01-01 RX ADMIN — HYDROMORPHONE HYDROCHLORIDE 0.5 MG: 1 INJECTION, SOLUTION INTRAMUSCULAR; INTRAVENOUS; SUBCUTANEOUS at 02:04

## 2021-01-01 RX ADMIN — INSULIN ASPART 3 UNITS: 100 INJECTION, SOLUTION INTRAVENOUS; SUBCUTANEOUS at 08:27

## 2021-01-01 RX ADMIN — Medication 40 MG: at 11:27

## 2021-01-01 RX ADMIN — HYDROCODONE BITARTRATE AND ACETAMINOPHEN 1 TABLET: 5; 325 TABLET ORAL at 19:37

## 2021-01-01 RX ADMIN — HYDRALAZINE HYDROCHLORIDE 10 MG: 20 INJECTION INTRAMUSCULAR; INTRAVENOUS at 06:21

## 2021-01-01 RX ADMIN — INSULIN ASPART 2 UNITS: 100 INJECTION, SOLUTION INTRAVENOUS; SUBCUTANEOUS at 17:31

## 2021-01-01 RX ADMIN — CARVEDILOL 3.12 MG: 3.12 TABLET, FILM COATED ORAL at 20:23

## 2021-01-01 RX ADMIN — TRAZODONE HYDROCHLORIDE 50 MG: 50 TABLET ORAL at 22:11

## 2021-01-01 RX ADMIN — HYDRALAZINE HYDROCHLORIDE 50 MG: 50 TABLET, FILM COATED ORAL at 08:03

## 2021-01-01 RX ADMIN — INSULIN ASPART 4 UNITS: 100 INJECTION, SOLUTION INTRAVENOUS; SUBCUTANEOUS at 02:42

## 2021-01-01 RX ADMIN — BUMETANIDE 2 MG: 0.25 INJECTION INTRAMUSCULAR; INTRAVENOUS at 09:41

## 2021-01-01 RX ADMIN — SODIUM CHLORIDE 300 ML: 9 INJECTION, SOLUTION INTRAVENOUS at 12:14

## 2021-01-01 RX ADMIN — DOXERCALCIFEROL 4 MCG: 4 INJECTION, SOLUTION INTRAVENOUS at 12:29

## 2021-01-01 RX ADMIN — TAMSULOSIN HYDROCHLORIDE 0.4 MG: 0.4 CAPSULE ORAL at 11:40

## 2021-01-01 RX ADMIN — ALBUMIN HUMAN 12.5 G: 0.25 SOLUTION INTRAVENOUS at 05:05

## 2021-01-01 RX ADMIN — INSULIN ASPART 1 UNITS: 100 INJECTION, SOLUTION INTRAVENOUS; SUBCUTANEOUS at 00:05

## 2021-01-01 RX ADMIN — GUAIFENESIN 10 ML: 200 SOLUTION ORAL at 08:26

## 2021-01-01 RX ADMIN — INSULIN ASPART 1 UNITS: 100 INJECTION, SOLUTION INTRAVENOUS; SUBCUTANEOUS at 07:57

## 2021-01-01 RX ADMIN — Medication 40 MG: at 20:25

## 2021-01-01 RX ADMIN — INSULIN GLARGINE 15 UNITS: 100 INJECTION, SOLUTION SUBCUTANEOUS at 16:51

## 2021-01-01 RX ADMIN — INSULIN ASPART 7 UNITS: 100 INJECTION, SOLUTION INTRAVENOUS; SUBCUTANEOUS at 08:21

## 2021-01-01 RX ADMIN — BACITRACIN: 500 OINTMENT TOPICAL at 20:40

## 2021-01-01 RX ADMIN — AMLODIPINE BESYLATE 5 MG: 5 TABLET ORAL at 08:09

## 2021-01-01 RX ADMIN — INSULIN ASPART 3 UNITS: 100 INJECTION, SOLUTION INTRAVENOUS; SUBCUTANEOUS at 21:52

## 2021-01-01 RX ADMIN — PROPOFOL 100 MG: 10 INJECTION, EMULSION INTRAVENOUS at 07:52

## 2021-01-01 RX ADMIN — INSULIN ASPART 3 UNITS: 100 INJECTION, SOLUTION INTRAVENOUS; SUBCUTANEOUS at 19:50

## 2021-01-01 RX ADMIN — EPOPROSTENOL 20 NG/KG/MIN: 1.5 INJECTION, POWDER, LYOPHILIZED, FOR SOLUTION INTRAVENOUS at 21:11

## 2021-01-01 RX ADMIN — TORSEMIDE 20 MG: 20 TABLET ORAL at 14:46

## 2021-01-01 RX ADMIN — INSULIN ASPART 2 UNITS: 100 INJECTION, SOLUTION INTRAVENOUS; SUBCUTANEOUS at 17:51

## 2021-01-01 RX ADMIN — GUAIFENESIN 10 ML: 200 SOLUTION ORAL at 20:25

## 2021-01-01 RX ADMIN — PROPOFOL 15 MCG/KG/MIN: 10 INJECTION, EMULSION INTRAVENOUS at 04:47

## 2021-01-01 RX ADMIN — ATORVASTATIN CALCIUM 80 MG: 80 TABLET, FILM COATED ORAL at 09:53

## 2021-01-01 RX ADMIN — Medication 40 MG: at 20:10

## 2021-01-01 RX ADMIN — CARVEDILOL 6.25 MG: 6.25 TABLET, FILM COATED ORAL at 19:27

## 2021-01-01 RX ADMIN — INSULIN ASPART 6 UNITS: 100 INJECTION, SOLUTION INTRAVENOUS; SUBCUTANEOUS at 11:05

## 2021-01-01 RX ADMIN — MICONAZOLE NITRATE: 2 POWDER TOPICAL at 21:37

## 2021-01-01 RX ADMIN — INSULIN ASPART 2 UNITS: 100 INJECTION, SOLUTION INTRAVENOUS; SUBCUTANEOUS at 19:52

## 2021-01-01 RX ADMIN — APIXABAN 5 MG: 5 TABLET, FILM COATED ORAL at 20:12

## 2021-01-01 RX ADMIN — HYDRALAZINE HYDROCHLORIDE 50 MG: 50 TABLET, FILM COATED ORAL at 00:44

## 2021-01-01 RX ADMIN — Medication 5 ML: at 09:29

## 2021-01-01 RX ADMIN — GUAIFENESIN 10 ML: 200 SOLUTION ORAL at 20:22

## 2021-01-01 RX ADMIN — TETROFOSMIN 28.7 MCI.: 1.38 INJECTION, POWDER, LYOPHILIZED, FOR SOLUTION INTRAVENOUS at 09:54

## 2021-01-01 RX ADMIN — ALBUMIN HUMAN 50 ML: 0.25 SOLUTION INTRAVENOUS at 15:29

## 2021-01-01 RX ADMIN — ACETAMINOPHEN 650 MG: 325 TABLET, FILM COATED ORAL at 20:16

## 2021-01-01 RX ADMIN — HYDRALAZINE HYDROCHLORIDE 50 MG: 50 TABLET, FILM COATED ORAL at 00:22

## 2021-01-01 RX ADMIN — HEPARIN SODIUM 4000 UNITS: 1000 INJECTION INTRAVENOUS; SUBCUTANEOUS at 13:55

## 2021-01-01 RX ADMIN — FENTANYL CITRATE 50 MCG: 50 INJECTION, SOLUTION INTRAMUSCULAR; INTRAVENOUS at 12:14

## 2021-01-01 RX ADMIN — INSULIN ASPART 4 UNITS: 100 INJECTION, SOLUTION INTRAVENOUS; SUBCUTANEOUS at 08:44

## 2021-01-01 RX ADMIN — Medication 2 PACKET: at 08:34

## 2021-01-01 RX ADMIN — APIXABAN 5 MG: 5 TABLET, FILM COATED ORAL at 09:09

## 2021-01-01 RX ADMIN — EPOETIN ALFA-EPBX 4000 UNITS: 4000 INJECTION, SOLUTION INTRAVENOUS; SUBCUTANEOUS at 16:51

## 2021-01-01 RX ADMIN — GUAIFENESIN 10 ML: 200 SOLUTION ORAL at 20:41

## 2021-01-01 RX ADMIN — APIXABAN 5 MG: 5 TABLET, FILM COATED ORAL at 20:59

## 2021-01-01 RX ADMIN — TAMSULOSIN HYDROCHLORIDE 0.4 MG: 0.4 CAPSULE ORAL at 08:27

## 2021-01-01 RX ADMIN — PANTOPRAZOLE SODIUM 40 MG: 40 TABLET, DELAYED RELEASE ORAL at 16:08

## 2021-01-01 RX ADMIN — CARVEDILOL 12.5 MG: 12.5 TABLET, FILM COATED ORAL at 09:09

## 2021-01-01 RX ADMIN — MULTIVIT AND MINERALS-FERROUS GLUCONATE 9 MG IRON/15 ML ORAL LIQUID 15 ML: at 08:01

## 2021-01-01 RX ADMIN — Medication 40 MG: at 08:58

## 2021-01-01 RX ADMIN — GUAIFENESIN 10 ML: 200 SOLUTION ORAL at 20:42

## 2021-01-01 RX ADMIN — TAMSULOSIN HYDROCHLORIDE 0.4 MG: 0.4 CAPSULE ORAL at 11:14

## 2021-01-01 RX ADMIN — PROPOFOL 60 MCG/KG/MIN: 10 INJECTION, EMULSION INTRAVENOUS at 10:25

## 2021-01-01 RX ADMIN — PROPOFOL 30 MCG/KG/MIN: 10 INJECTION, EMULSION INTRAVENOUS at 07:58

## 2021-01-01 RX ADMIN — HYDRALAZINE HYDROCHLORIDE 50 MG: 50 TABLET, FILM COATED ORAL at 08:54

## 2021-01-01 RX ADMIN — INSULIN ASPART 6 UNITS: 100 INJECTION, SOLUTION INTRAVENOUS; SUBCUTANEOUS at 00:32

## 2021-01-01 RX ADMIN — Medication 40 MG: at 08:48

## 2021-01-01 RX ADMIN — MEROPENEM 1 G: 1 INJECTION, POWDER, FOR SOLUTION INTRAVENOUS at 04:11

## 2021-01-01 RX ADMIN — APIXABAN 5 MG: 5 TABLET, FILM COATED ORAL at 20:18

## 2021-01-01 RX ADMIN — CARVEDILOL 6.25 MG: 6.25 TABLET, FILM COATED ORAL at 09:51

## 2021-01-01 RX ADMIN — APIXABAN 5 MG: 5 TABLET, FILM COATED ORAL at 09:03

## 2021-01-01 RX ADMIN — CHLORHEXIDINE GLUCONATE 15 ML: 1.2 RINSE ORAL at 08:48

## 2021-01-01 RX ADMIN — CARVEDILOL 6.25 MG: 6.25 TABLET, FILM COATED ORAL at 17:35

## 2021-01-01 RX ADMIN — FUROSEMIDE 40 MG: 40 TABLET ORAL at 08:53

## 2021-01-01 RX ADMIN — PROPOFOL 30 MCG/KG/MIN: 10 INJECTION, EMULSION INTRAVENOUS at 02:50

## 2021-01-01 RX ADMIN — Medication 40 MG: at 19:07

## 2021-01-01 RX ADMIN — Medication 40 MG: at 08:14

## 2021-01-01 RX ADMIN — DEXAMETHASONE 6 MG: 2 TABLET ORAL at 11:39

## 2021-01-01 RX ADMIN — INSULIN ASPART 4 UNITS: 100 INJECTION, SOLUTION INTRAVENOUS; SUBCUTANEOUS at 17:29

## 2021-01-01 RX ADMIN — DEXTROSE MONOHYDRATE 25 ML: 500 INJECTION PARENTERAL at 08:16

## 2021-01-01 RX ADMIN — CARVEDILOL 12.5 MG: 6.25 TABLET, FILM COATED ORAL at 08:34

## 2021-01-01 RX ADMIN — HYDRALAZINE HYDROCHLORIDE 50 MG: 50 TABLET, FILM COATED ORAL at 21:36

## 2021-01-01 RX ADMIN — SENNOSIDES AND DOCUSATE SODIUM 1 TABLET: 8.6; 5 TABLET ORAL at 08:29

## 2021-01-01 RX ADMIN — PROPOFOL 10 MCG/KG/MIN: 10 INJECTION, EMULSION INTRAVENOUS at 00:01

## 2021-01-01 RX ADMIN — SODIUM CHLORIDE 50 ML: 900 INJECTION INTRAVENOUS at 19:23

## 2021-01-01 RX ADMIN — CARVEDILOL 3.12 MG: 3.12 TABLET, FILM COATED ORAL at 11:14

## 2021-01-01 RX ADMIN — MICONAZOLE NITRATE: 2 POWDER TOPICAL at 07:52

## 2021-01-01 RX ADMIN — POTASSIUM CHLORIDE 40 MEQ: 1.5 POWDER, FOR SOLUTION ORAL at 12:32

## 2021-01-01 RX ADMIN — Medication 40 MG: at 11:37

## 2021-01-01 RX ADMIN — TAMSULOSIN HYDROCHLORIDE 0.4 MG: 0.4 CAPSULE ORAL at 09:21

## 2021-01-01 RX ADMIN — MICONAZOLE NITRATE: 2 POWDER TOPICAL at 19:55

## 2021-01-01 RX ADMIN — INSULIN ASPART 1 UNITS: 100 INJECTION, SOLUTION INTRAVENOUS; SUBCUTANEOUS at 09:53

## 2021-01-01 RX ADMIN — ATORVASTATIN CALCIUM 80 MG: 80 TABLET, FILM COATED ORAL at 08:58

## 2021-01-01 RX ADMIN — MULTIVIT AND MINERALS-FERROUS GLUCONATE 9 MG IRON/15 ML ORAL LIQUID 15 ML: at 08:08

## 2021-01-01 RX ADMIN — Medication 40 MG: at 08:32

## 2021-01-01 RX ADMIN — Medication 0.1 MCG/KG/MIN: at 16:55

## 2021-01-01 RX ADMIN — Medication 5 ML: at 08:32

## 2021-01-01 RX ADMIN — Medication 40 MG: at 06:16

## 2021-01-01 RX ADMIN — CARVEDILOL 12.5 MG: 6.25 TABLET, FILM COATED ORAL at 20:17

## 2021-01-01 RX ADMIN — INSULIN ASPART 6 UNITS: 100 INJECTION, SOLUTION INTRAVENOUS; SUBCUTANEOUS at 12:19

## 2021-01-01 RX ADMIN — INSULIN ASPART 6 UNITS: 100 INJECTION, SOLUTION INTRAVENOUS; SUBCUTANEOUS at 17:07

## 2021-01-01 RX ADMIN — INSULIN ASPART 3 UNITS: 100 INJECTION, SOLUTION INTRAVENOUS; SUBCUTANEOUS at 04:47

## 2021-01-01 RX ADMIN — HYDROMORPHONE HYDROCHLORIDE 0.5 MG: 1 INJECTION, SOLUTION INTRAMUSCULAR; INTRAVENOUS; SUBCUTANEOUS at 18:59

## 2021-01-01 RX ADMIN — CHLOROTHIAZIDE SODIUM 500 MG: 500 INJECTION, POWDER, LYOPHILIZED, FOR SOLUTION INTRAVENOUS at 10:05

## 2021-01-01 RX ADMIN — FUROSEMIDE 80 MG: 10 INJECTION, SOLUTION INTRAVENOUS at 11:00

## 2021-01-01 RX ADMIN — APIXABAN 5 MG: 5 TABLET, FILM COATED ORAL at 08:37

## 2021-01-01 RX ADMIN — PROPOFOL 20 MCG/KG/MIN: 10 INJECTION, EMULSION INTRAVENOUS at 12:48

## 2021-01-01 RX ADMIN — INSULIN ASPART 10 UNITS: 100 INJECTION, SOLUTION INTRAVENOUS; SUBCUTANEOUS at 03:54

## 2021-01-01 RX ADMIN — SENNOSIDES AND DOCUSATE SODIUM 1 TABLET: 50; 8.6 TABLET ORAL at 08:15

## 2021-01-01 RX ADMIN — FENTANYL CITRATE 25 MCG: 50 INJECTION, SOLUTION INTRAMUSCULAR; INTRAVENOUS at 13:46

## 2021-01-01 RX ADMIN — ATORVASTATIN CALCIUM 80 MG: 80 TABLET, FILM COATED ORAL at 08:34

## 2021-01-01 RX ADMIN — ATORVASTATIN CALCIUM 80 MG: 80 TABLET, FILM COATED ORAL at 09:10

## 2021-01-01 RX ADMIN — PROTAMINE SULFATE 120 MG: 10 INJECTION, SOLUTION INTRAVENOUS at 12:37

## 2021-01-01 RX ADMIN — HYDRALAZINE HYDROCHLORIDE 50 MG: 50 TABLET, FILM COATED ORAL at 08:16

## 2021-01-01 RX ADMIN — OMEPRAZOLE 20 MG: 20 CAPSULE, DELAYED RELEASE ORAL at 21:36

## 2021-01-01 RX ADMIN — APIXABAN 5 MG: 5 TABLET, FILM COATED ORAL at 08:01

## 2021-01-01 RX ADMIN — TRAZODONE HYDROCHLORIDE 50 MG: 50 TABLET ORAL at 03:09

## 2021-01-01 RX ADMIN — Medication 5 ML: at 08:48

## 2021-01-01 RX ADMIN — ATORVASTATIN CALCIUM 80 MG: 80 TABLET, FILM COATED ORAL at 11:43

## 2021-01-01 RX ADMIN — BUMETANIDE 0.5 MG/HR: 0.25 INJECTION INTRAMUSCULAR; INTRAVENOUS at 13:38

## 2021-01-01 RX ADMIN — PROPOFOL 60 MCG/KG/MIN: 10 INJECTION, EMULSION INTRAVENOUS at 22:15

## 2021-01-01 RX ADMIN — PANTOPRAZOLE SODIUM 40 MG: 40 INJECTION, POWDER, FOR SOLUTION INTRAVENOUS at 10:39

## 2021-01-01 RX ADMIN — PROPOFOL 30 MCG/KG/MIN: 10 INJECTION, EMULSION INTRAVENOUS at 08:34

## 2021-01-01 RX ADMIN — ALBUMIN HUMAN 12.5 G: 0.25 SOLUTION INTRAVENOUS at 01:22

## 2021-01-01 RX ADMIN — APIXABAN 5 MG: 5 TABLET, FILM COATED ORAL at 20:40

## 2021-01-01 RX ADMIN — Medication 40 MG: at 21:25

## 2021-01-01 RX ADMIN — MICONAZOLE NITRATE: 2 POWDER TOPICAL at 09:21

## 2021-01-01 RX ADMIN — AMLODIPINE BESYLATE 5 MG: 5 TABLET ORAL at 16:08

## 2021-01-01 RX ADMIN — REMDESIVIR 200 MG: 100 INJECTION, POWDER, LYOPHILIZED, FOR SOLUTION INTRAVENOUS at 14:45

## 2021-01-01 RX ADMIN — DEXMEDETOMIDINE HYDROCHLORIDE 20 MCG: 100 INJECTION, SOLUTION INTRAVENOUS at 10:53

## 2021-01-01 RX ADMIN — CARVEDILOL 6.25 MG: 6.25 TABLET, FILM COATED ORAL at 19:55

## 2021-01-01 RX ADMIN — MEROPENEM 500 MG: 500 INJECTION, POWDER, FOR SOLUTION INTRAVENOUS at 00:24

## 2021-01-01 RX ADMIN — PROPOFOL 35 MCG/KG/MIN: 10 INJECTION, EMULSION INTRAVENOUS at 10:08

## 2021-01-01 RX ADMIN — Medication 40 MG: at 07:50

## 2021-01-01 RX ADMIN — BUMETANIDE 4 MG: 0.25 INJECTION INTRAMUSCULAR; INTRAVENOUS at 09:10

## 2021-01-01 RX ADMIN — PROPOFOL 60 MCG/KG/MIN: 10 INJECTION, EMULSION INTRAVENOUS at 13:46

## 2021-01-01 RX ADMIN — HYDROXYZINE HYDROCHLORIDE 10 MG: 10 TABLET, FILM COATED ORAL at 04:51

## 2021-01-01 RX ADMIN — DOXAZOSIN 1 MG: 1 TABLET ORAL at 08:28

## 2021-01-01 RX ADMIN — INSULIN ASPART 1 UNITS: 100 INJECTION, SOLUTION INTRAVENOUS; SUBCUTANEOUS at 08:39

## 2021-01-01 RX ADMIN — OMEPRAZOLE 20 MG: 20 CAPSULE, DELAYED RELEASE ORAL at 20:12

## 2021-01-01 RX ADMIN — CARVEDILOL 3.12 MG: 3.12 TABLET, FILM COATED ORAL at 09:05

## 2021-01-01 RX ADMIN — HYDRALAZINE HYDROCHLORIDE 10 MG: 20 INJECTION INTRAMUSCULAR; INTRAVENOUS at 23:56

## 2021-01-01 RX ADMIN — HYDRALAZINE HYDROCHLORIDE 50 MG: 50 TABLET, FILM COATED ORAL at 08:38

## 2021-01-01 RX ADMIN — CLOPIDOGREL BISULFATE 300 MG: 300 TABLET, FILM COATED ORAL at 12:34

## 2021-01-01 RX ADMIN — ACETAMINOPHEN 975 MG: 325 TABLET, FILM COATED ORAL at 16:08

## 2021-01-01 RX ADMIN — GUAIFENESIN 10 ML: 200 SOLUTION ORAL at 20:19

## 2021-01-01 RX ADMIN — Medication 5 ML: at 09:22

## 2021-01-01 RX ADMIN — MULTIVIT AND MINERALS-FERROUS GLUCONATE 9 MG IRON/15 ML ORAL LIQUID 15 ML: at 16:56

## 2021-01-01 RX ADMIN — BACITRACIN: 500 OINTMENT TOPICAL at 20:19

## 2021-01-01 RX ADMIN — HYDRALAZINE HYDROCHLORIDE 10 MG: 20 INJECTION INTRAMUSCULAR; INTRAVENOUS at 01:07

## 2021-01-01 RX ADMIN — Medication 40 MG: at 19:48

## 2021-01-01 RX ADMIN — ROCURONIUM BROMIDE 50 MG: 10 INJECTION INTRAVENOUS at 07:52

## 2021-01-01 RX ADMIN — CARVEDILOL 6.25 MG: 6.25 TABLET, FILM COATED ORAL at 18:40

## 2021-01-01 RX ADMIN — Medication 40 MG: at 21:30

## 2021-01-01 RX ADMIN — Medication 40 MG: at 20:33

## 2021-01-01 RX ADMIN — INSULIN ASPART 3 UNITS: 100 INJECTION, SOLUTION INTRAVENOUS; SUBCUTANEOUS at 00:26

## 2021-01-01 RX ADMIN — PROPOFOL 20 MCG/KG/MIN: 10 INJECTION, EMULSION INTRAVENOUS at 00:16

## 2021-01-01 RX ADMIN — APIXABAN 5 MG: 5 TABLET, FILM COATED ORAL at 20:35

## 2021-01-01 RX ADMIN — DEXTROSE MONOHYDRATE: 100 INJECTION, SOLUTION INTRAVENOUS at 14:34

## 2021-01-01 RX ADMIN — INSULIN ASPART 2 UNITS: 100 INJECTION, SOLUTION INTRAVENOUS; SUBCUTANEOUS at 05:25

## 2021-01-01 RX ADMIN — CEFTRIAXONE SODIUM 2 G: 2 INJECTION, POWDER, FOR SOLUTION INTRAMUSCULAR; INTRAVENOUS at 18:23

## 2021-01-01 RX ADMIN — CARVEDILOL 12.5 MG: 6.25 TABLET, FILM COATED ORAL at 21:27

## 2021-01-01 RX ADMIN — HYDRALAZINE HYDROCHLORIDE 20 MG: 20 INJECTION INTRAMUSCULAR; INTRAVENOUS at 19:06

## 2021-01-01 RX ADMIN — ACETAMINOPHEN 650 MG: 325 TABLET, FILM COATED ORAL at 20:26

## 2021-01-01 RX ADMIN — INSULIN ASPART 1 UNITS: 100 INJECTION, SOLUTION INTRAVENOUS; SUBCUTANEOUS at 08:10

## 2021-01-01 RX ADMIN — APIXABAN 5 MG: 5 TABLET, FILM COATED ORAL at 08:13

## 2021-01-01 RX ADMIN — INSULIN ASPART 3 UNITS: 100 INJECTION, SOLUTION INTRAVENOUS; SUBCUTANEOUS at 09:13

## 2021-01-01 RX ADMIN — PHENYLEPHRINE HYDROCHLORIDE 100 MCG: 10 INJECTION INTRAVENOUS at 10:46

## 2021-01-01 RX ADMIN — APIXABAN 5 MG: 5 TABLET, FILM COATED ORAL at 08:55

## 2021-01-01 RX ADMIN — VECURONIUM BROMIDE 1.2 MCG/KG/MIN: 1 INJECTION, POWDER, LYOPHILIZED, FOR SOLUTION INTRAVENOUS at 06:34

## 2021-01-01 RX ADMIN — ALBUMIN HUMAN 12.5 G: 0.25 SOLUTION INTRAVENOUS at 09:42

## 2021-01-01 RX ADMIN — ACETAMINOPHEN 650 MG: 325 TABLET, FILM COATED ORAL at 18:06

## 2021-01-01 RX ADMIN — BUMETANIDE 0.5 MG/HR: 0.25 INJECTION INTRAMUSCULAR; INTRAVENOUS at 02:09

## 2021-01-01 RX ADMIN — MICONAZOLE NITRATE: 2 POWDER TOPICAL at 19:17

## 2021-01-01 RX ADMIN — MICONAZOLE NITRATE: 2 POWDER TOPICAL at 08:59

## 2021-01-01 RX ADMIN — BACITRACIN: 500 OINTMENT TOPICAL at 21:49

## 2021-01-01 RX ADMIN — INSULIN ASPART 7 UNITS: 100 INJECTION, SOLUTION INTRAVENOUS; SUBCUTANEOUS at 15:40

## 2021-01-01 RX ADMIN — ALBUMIN HUMAN 50 ML: 0.25 SOLUTION INTRAVENOUS at 16:52

## 2021-01-01 RX ADMIN — MULTIVIT AND MINERALS-FERROUS GLUCONATE 9 MG IRON/15 ML ORAL LIQUID 15 ML: at 08:31

## 2021-01-01 RX ADMIN — DOXAZOSIN 1 MG: 1 TABLET ORAL at 09:03

## 2021-01-01 RX ADMIN — INSULIN ASPART 1 UNITS: 100 INJECTION, SOLUTION INTRAVENOUS; SUBCUTANEOUS at 00:03

## 2021-01-01 RX ADMIN — FUROSEMIDE 40 MG: 40 TABLET ORAL at 09:03

## 2021-01-01 RX ADMIN — MULTIPLE VITAMINS W/ MINERALS TAB 1 TABLET: TAB at 16:08

## 2021-01-01 RX ADMIN — HYDRALAZINE HYDROCHLORIDE 10 MG: 20 INJECTION INTRAMUSCULAR; INTRAVENOUS at 17:06

## 2021-01-01 RX ADMIN — Medication 5 ML: at 09:01

## 2021-01-01 RX ADMIN — HYDRALAZINE HYDROCHLORIDE 50 MG: 50 TABLET, FILM COATED ORAL at 08:32

## 2021-01-01 RX ADMIN — HYDRALAZINE HYDROCHLORIDE 50 MG: 50 TABLET, FILM COATED ORAL at 17:03

## 2021-01-01 RX ADMIN — AMLODIPINE BESYLATE 7.5 MG: 5 TABLET ORAL at 09:42

## 2021-01-01 RX ADMIN — GUAIFENESIN 10 ML: 200 SOLUTION ORAL at 21:07

## 2021-01-01 RX ADMIN — HYDRALAZINE HYDROCHLORIDE 50 MG: 50 TABLET, FILM COATED ORAL at 01:06

## 2021-01-01 RX ADMIN — Medication 40 MG: at 20:05

## 2021-01-01 RX ADMIN — SODIUM CHLORIDE 100 ML: 9 INJECTION, SOLUTION INTRAVENOUS at 14:41

## 2021-01-01 RX ADMIN — NITROGLYCERIN 20 MCG: 10 INJECTION INTRAVENOUS at 12:35

## 2021-01-01 RX ADMIN — HYDRALAZINE HYDROCHLORIDE 50 MG: 50 TABLET, FILM COATED ORAL at 07:51

## 2021-01-01 RX ADMIN — DEXAMETHASONE 12 MG: 4 TABLET ORAL at 08:02

## 2021-01-01 RX ADMIN — NOREPINEPHRINE BITARTRATE 4 MG/250 ML (16 MCG/ML) IN 0.9 % NACL IV 0.05 MCG/KG/MIN: at 08:40

## 2021-01-01 RX ADMIN — ISOSORBIDE MONONITRATE 30 MG: 30 TABLET, EXTENDED RELEASE ORAL at 09:15

## 2021-01-01 RX ADMIN — Medication 100 MCG/HR: at 23:14

## 2021-01-01 RX ADMIN — APIXABAN 5 MG: 5 TABLET, FILM COATED ORAL at 21:13

## 2021-01-01 RX ADMIN — FUROSEMIDE 40 MG: 40 TABLET ORAL at 18:34

## 2021-01-01 RX ADMIN — BACITRACIN: 500 OINTMENT TOPICAL at 08:38

## 2021-01-01 RX ADMIN — INSULIN ASPART 1 UNITS: 100 INJECTION, SOLUTION INTRAVENOUS; SUBCUTANEOUS at 08:23

## 2021-01-01 RX ADMIN — OMEPRAZOLE 20 MG: 20 CAPSULE, DELAYED RELEASE ORAL at 20:37

## 2021-01-01 RX ADMIN — NITROGLYCERIN 20 MCG: 10 INJECTION INTRAVENOUS at 12:27

## 2021-01-01 RX ADMIN — EPOPROSTENOL 20 NG/KG/MIN: 1.5 INJECTION, POWDER, LYOPHILIZED, FOR SOLUTION INTRAVENOUS at 23:16

## 2021-01-01 RX ADMIN — MICONAZOLE NITRATE: 2 POWDER TOPICAL at 08:15

## 2021-01-01 RX ADMIN — ATORVASTATIN CALCIUM 80 MG: 80 TABLET, FILM COATED ORAL at 09:42

## 2021-01-01 RX ADMIN — CARVEDILOL 6.25 MG: 6.25 TABLET, FILM COATED ORAL at 17:24

## 2021-01-01 RX ADMIN — Medication 40 MG: at 20:22

## 2021-01-01 RX ADMIN — CHLORHEXIDINE GLUCONATE 15 ML: 1.2 RINSE ORAL at 08:13

## 2021-01-01 RX ADMIN — INSULIN ASPART 7 UNITS: 100 INJECTION, SOLUTION INTRAVENOUS; SUBCUTANEOUS at 06:52

## 2021-01-01 RX ADMIN — Medication 40 MG: at 09:15

## 2021-01-01 RX ADMIN — INSULIN GLARGINE 10 UNITS: 100 INJECTION, SOLUTION SUBCUTANEOUS at 18:23

## 2021-01-01 RX ADMIN — POTASSIUM CHLORIDE 40 MEQ: 1.5 POWDER, FOR SOLUTION ORAL at 13:14

## 2021-01-01 RX ADMIN — APIXABAN 5 MG: 5 TABLET, FILM COATED ORAL at 08:57

## 2021-01-01 RX ADMIN — HUMAN ALBUMIN MICROSPHERES AND PERFLUTREN 9 ML: 10; .22 INJECTION, SOLUTION INTRAVENOUS at 09:00

## 2021-01-01 RX ADMIN — MICONAZOLE NITRATE: 2 POWDER TOPICAL at 19:33

## 2021-01-01 RX ADMIN — REGADENOSON 0.4 MG: 0.08 INJECTION, SOLUTION INTRAVENOUS at 09:43

## 2021-01-01 RX ADMIN — NEOSTIGMINE METHYLSULFATE 5 MG: 1 INJECTION, SOLUTION INTRAVENOUS at 10:44

## 2021-01-01 RX ADMIN — HYDRALAZINE HYDROCHLORIDE 50 MG: 50 TABLET, FILM COATED ORAL at 17:37

## 2021-01-01 RX ADMIN — CARVEDILOL 3.12 MG: 3.12 TABLET, FILM COATED ORAL at 08:14

## 2021-01-01 RX ADMIN — HYDRALAZINE HYDROCHLORIDE 50 MG: 50 TABLET, FILM COATED ORAL at 14:55

## 2021-01-01 RX ADMIN — PROPOFOL 40 MCG/KG/MIN: 10 INJECTION, EMULSION INTRAVENOUS at 01:06

## 2021-01-01 RX ADMIN — QUETIAPINE FUMARATE 25 MG: 25 TABLET ORAL at 01:22

## 2021-01-01 RX ADMIN — SODIUM CHLORIDE 500 ML: 9 INJECTION, SOLUTION INTRAVENOUS at 11:43

## 2021-01-01 RX ADMIN — OMEPRAZOLE 20 MG: 20 CAPSULE, DELAYED RELEASE ORAL at 20:09

## 2021-01-01 RX ADMIN — BUMETANIDE 2 MG: 1 TABLET ORAL at 15:12

## 2021-01-01 RX ADMIN — CARVEDILOL 6.25 MG: 6.25 TABLET, FILM COATED ORAL at 19:12

## 2021-01-01 RX ADMIN — HYDRALAZINE HYDROCHLORIDE 20 MG: 20 INJECTION INTRAMUSCULAR; INTRAVENOUS at 12:08

## 2021-01-01 RX ADMIN — BACITRACIN: 500 OINTMENT TOPICAL at 08:54

## 2021-01-01 RX ADMIN — GUAIFENESIN 10 ML: 200 SOLUTION ORAL at 08:37

## 2021-01-01 RX ADMIN — BACITRACIN: 500 OINTMENT TOPICAL at 09:00

## 2021-01-01 RX ADMIN — INSULIN GLARGINE 30 UNITS: 100 INJECTION, SOLUTION SUBCUTANEOUS at 23:11

## 2021-01-01 RX ADMIN — CARVEDILOL 3.12 MG: 3.12 TABLET, FILM COATED ORAL at 08:27

## 2021-01-01 RX ADMIN — APIXABAN 5 MG: 5 TABLET, FILM COATED ORAL at 08:14

## 2021-01-01 RX ADMIN — LABETALOL HYDROCHLORIDE 10 MG: 5 INJECTION, SOLUTION INTRAVENOUS at 03:14

## 2021-01-01 RX ADMIN — ATORVASTATIN CALCIUM 80 MG: 80 TABLET, FILM COATED ORAL at 08:59

## 2021-01-01 RX ADMIN — OMEPRAZOLE 20 MG: 20 CAPSULE, DELAYED RELEASE ORAL at 22:27

## 2021-01-01 RX ADMIN — ALBUMIN HUMAN 12.5 G: 0.25 SOLUTION INTRAVENOUS at 06:00

## 2021-01-01 RX ADMIN — INSULIN ASPART 7 UNITS: 100 INJECTION, SOLUTION INTRAVENOUS; SUBCUTANEOUS at 11:40

## 2021-01-01 RX ADMIN — Medication 40 MG: at 09:11

## 2021-01-01 RX ADMIN — TAMSULOSIN HYDROCHLORIDE 0.4 MG: 0.4 CAPSULE ORAL at 08:39

## 2021-01-01 RX ADMIN — BUMETANIDE 2 MG: 1 TABLET ORAL at 08:37

## 2021-01-01 RX ADMIN — ATORVASTATIN CALCIUM 80 MG: 80 TABLET, FILM COATED ORAL at 07:51

## 2021-01-01 RX ADMIN — CHLORHEXIDINE GLUCONATE 15 ML: 1.2 RINSE ORAL at 19:51

## 2021-01-01 RX ADMIN — CARVEDILOL 12.5 MG: 6.25 TABLET, FILM COATED ORAL at 08:39

## 2021-01-01 RX ADMIN — CHLOROTHIAZIDE SODIUM 250 MG: 500 INJECTION, POWDER, LYOPHILIZED, FOR SOLUTION INTRAVENOUS at 09:47

## 2021-01-01 RX ADMIN — CHLORHEXIDINE GLUCONATE 15 ML: 1.2 RINSE ORAL at 19:40

## 2021-01-01 RX ADMIN — ISOSORBIDE MONONITRATE 30 MG: 30 TABLET, EXTENDED RELEASE ORAL at 10:37

## 2021-01-01 RX ADMIN — Medication 40 MG: at 20:17

## 2021-01-01 RX ADMIN — APIXABAN 5 MG: 5 TABLET, FILM COATED ORAL at 21:37

## 2021-01-01 RX ADMIN — INSULIN ASPART 4 UNITS: 100 INJECTION, SOLUTION INTRAVENOUS; SUBCUTANEOUS at 04:06

## 2021-01-01 RX ADMIN — HYDRALAZINE HYDROCHLORIDE 50 MG: 50 TABLET, FILM COATED ORAL at 01:05

## 2021-01-01 RX ADMIN — INSULIN ASPART 10 UNITS: 100 INJECTION, SOLUTION INTRAVENOUS; SUBCUTANEOUS at 16:56

## 2021-01-01 RX ADMIN — FUROSEMIDE 40 MG: 40 TABLET ORAL at 08:32

## 2021-01-01 RX ADMIN — DOXAZOSIN 1 MG: 1 TABLET ORAL at 22:13

## 2021-01-01 RX ADMIN — INSULIN ASPART 4 UNITS: 100 INJECTION, SOLUTION INTRAVENOUS; SUBCUTANEOUS at 01:39

## 2021-01-01 RX ADMIN — MICONAZOLE NITRATE: 2 POWDER TOPICAL at 20:25

## 2021-01-01 RX ADMIN — HYDRALAZINE HYDROCHLORIDE 50 MG: 50 TABLET, FILM COATED ORAL at 16:27

## 2021-01-01 RX ADMIN — HYDRALAZINE HYDROCHLORIDE 50 MG: 50 TABLET, FILM COATED ORAL at 23:49

## 2021-01-01 RX ADMIN — INSULIN ASPART 3 UNITS: 100 INJECTION, SOLUTION INTRAVENOUS; SUBCUTANEOUS at 04:03

## 2021-01-01 RX ADMIN — POTASSIUM CHLORIDE 20 MEQ: 29.8 INJECTION, SOLUTION INTRAVENOUS at 14:24

## 2021-01-01 RX ADMIN — CARVEDILOL 3.12 MG: 3.12 TABLET, FILM COATED ORAL at 09:52

## 2021-01-01 RX ADMIN — Medication 40 MG: at 09:04

## 2021-01-01 RX ADMIN — PROPOFOL 25 MCG/KG/MIN: 10 INJECTION, EMULSION INTRAVENOUS at 23:10

## 2021-01-01 RX ADMIN — HYDRALAZINE HYDROCHLORIDE 10 MG: 20 INJECTION INTRAMUSCULAR; INTRAVENOUS at 20:40

## 2021-01-01 RX ADMIN — INSULIN ASPART 4 UNITS: 100 INJECTION, SOLUTION INTRAVENOUS; SUBCUTANEOUS at 22:29

## 2021-01-01 RX ADMIN — Medication 5 ML: at 08:41

## 2021-01-01 RX ADMIN — QUETIAPINE FUMARATE 25 MG: 25 TABLET ORAL at 11:19

## 2021-01-01 RX ADMIN — OMEPRAZOLE 20 MG: 20 CAPSULE, DELAYED RELEASE ORAL at 08:14

## 2021-01-01 RX ADMIN — BUMETANIDE 2 MG: 1 TABLET ORAL at 18:13

## 2021-01-01 RX ADMIN — FERROUS SULFATE TAB 325 MG (65 MG ELEMENTAL FE) 325 MG: 325 (65 FE) TAB at 08:09

## 2021-01-01 RX ADMIN — CEFTRIAXONE SODIUM 2 G: 2 INJECTION, POWDER, FOR SOLUTION INTRAMUSCULAR; INTRAVENOUS at 17:58

## 2021-01-01 RX ADMIN — SODIUM CHLORIDE 300 ML: 9 INJECTION, SOLUTION INTRAVENOUS at 12:30

## 2021-01-01 RX ADMIN — Medication 5 ML: at 08:26

## 2021-01-01 RX ADMIN — CARVEDILOL 6.25 MG: 6.25 TABLET, FILM COATED ORAL at 19:07

## 2021-01-01 RX ADMIN — HYDRALAZINE HYDROCHLORIDE 50 MG: 50 TABLET, FILM COATED ORAL at 13:05

## 2021-01-01 RX ADMIN — Medication 5 ML: at 10:56

## 2021-01-01 RX ADMIN — APIXABAN 5 MG: 5 TABLET, FILM COATED ORAL at 11:40

## 2021-01-01 RX ADMIN — INSULIN ASPART 5 UNITS: 100 INJECTION, SOLUTION INTRAVENOUS; SUBCUTANEOUS at 08:52

## 2021-01-01 RX ADMIN — ACETAMINOPHEN 650 MG: 325 TABLET, FILM COATED ORAL at 10:41

## 2021-01-01 RX ADMIN — HYDROCODONE BITARTRATE AND ACETAMINOPHEN 1 TABLET: 5; 325 TABLET ORAL at 13:45

## 2021-01-01 RX ADMIN — INSULIN ASPART 6 UNITS: 100 INJECTION, SOLUTION INTRAVENOUS; SUBCUTANEOUS at 04:22

## 2021-01-01 RX ADMIN — BUMETANIDE 2 MG: 0.25 INJECTION INTRAMUSCULAR; INTRAVENOUS at 10:59

## 2021-01-01 RX ADMIN — Medication 40 MG: at 20:07

## 2021-01-01 RX ADMIN — CHLORHEXIDINE GLUCONATE 15 ML: 1.2 RINSE ORAL at 08:55

## 2021-01-01 RX ADMIN — ACETAMINOPHEN 650 MG: 325 TABLET, FILM COATED ORAL at 18:49

## 2021-01-01 RX ADMIN — Medication 40 MG: at 08:00

## 2021-01-01 RX ADMIN — CARVEDILOL 3.12 MG: 3.12 TABLET, FILM COATED ORAL at 18:26

## 2021-01-01 RX ADMIN — TRAZODONE HYDROCHLORIDE 50 MG: 50 TABLET ORAL at 21:28

## 2021-01-01 RX ADMIN — BUMETANIDE 2 MG: 1 TABLET ORAL at 08:02

## 2021-01-01 RX ADMIN — INSULIN ASPART 1 UNITS: 100 INJECTION, SOLUTION INTRAVENOUS; SUBCUTANEOUS at 11:41

## 2021-01-01 RX ADMIN — APIXABAN 5 MG: 5 TABLET, FILM COATED ORAL at 20:00

## 2021-01-01 RX ADMIN — OMEPRAZOLE 20 MG: 20 CAPSULE, DELAYED RELEASE ORAL at 08:37

## 2021-01-01 RX ADMIN — MICONAZOLE NITRATE: 2 POWDER TOPICAL at 12:41

## 2021-01-01 RX ADMIN — INSULIN ASPART 4 UNITS: 100 INJECTION, SOLUTION INTRAVENOUS; SUBCUTANEOUS at 17:02

## 2021-01-01 RX ADMIN — QUETIAPINE FUMARATE 25 MG: 25 TABLET ORAL at 19:08

## 2021-01-01 RX ADMIN — Medication 5 ML: at 08:08

## 2021-01-01 RX ADMIN — BUMETANIDE 2 MG: 0.25 INJECTION INTRAMUSCULAR; INTRAVENOUS at 13:49

## 2021-01-01 RX ADMIN — INSULIN ASPART 6 UNITS: 100 INJECTION, SOLUTION INTRAVENOUS; SUBCUTANEOUS at 06:16

## 2021-01-01 RX ADMIN — Medication 5 ML: at 09:53

## 2021-01-01 RX ADMIN — QUETIAPINE FUMARATE 25 MG: 25 TABLET ORAL at 21:20

## 2021-01-01 RX ADMIN — FENTANYL CITRATE 25 MCG: 50 INJECTION, SOLUTION INTRAMUSCULAR; INTRAVENOUS at 11:24

## 2021-01-01 RX ADMIN — PROPOFOL 10 MCG/KG/MIN: 10 INJECTION, EMULSION INTRAVENOUS at 16:03

## 2021-01-01 RX ADMIN — OMEPRAZOLE 20 MG: 20 CAPSULE, DELAYED RELEASE ORAL at 20:18

## 2021-01-01 RX ADMIN — DOXAZOSIN 1 MG: 1 TABLET ORAL at 21:19

## 2021-01-01 RX ADMIN — MAGNESIUM SULFATE HEPTAHYDRATE 2 G: 40 INJECTION, SOLUTION INTRAVENOUS at 21:11

## 2021-01-01 RX ADMIN — CARVEDILOL 6.25 MG: 6.25 TABLET, FILM COATED ORAL at 08:57

## 2021-01-01 RX ADMIN — INSULIN ASPART 1 UNITS: 100 INJECTION, SOLUTION INTRAVENOUS; SUBCUTANEOUS at 04:22

## 2021-01-01 RX ADMIN — SODIUM CHLORIDE: 4.5 INJECTION, SOLUTION INTRAVENOUS at 17:11

## 2021-01-01 RX ADMIN — CLONIDINE HYDROCHLORIDE 0.1 MG: 0.1 TABLET ORAL at 00:13

## 2021-01-01 RX ADMIN — Medication 100 MCG/HR: at 20:21

## 2021-01-01 RX ADMIN — MULTIPLE VITAMINS W/ MINERALS TAB 1 TABLET: TAB at 09:08

## 2021-01-01 RX ADMIN — MICONAZOLE NITRATE: 2 POWDER TOPICAL at 20:40

## 2021-01-01 RX ADMIN — Medication 40 MG: at 20:45

## 2021-01-01 RX ADMIN — LABETALOL HYDROCHLORIDE 10 MG: 5 INJECTION, SOLUTION INTRAVENOUS at 02:29

## 2021-01-01 RX ADMIN — MICONAZOLE NITRATE: 2 POWDER TOPICAL at 08:52

## 2021-01-01 RX ADMIN — TAMSULOSIN HYDROCHLORIDE 0.4 MG: 0.4 CAPSULE ORAL at 16:24

## 2021-01-01 RX ADMIN — AMLODIPINE BESYLATE 5 MG: 5 TABLET ORAL at 08:32

## 2021-01-01 RX ADMIN — INSULIN ASPART 3 UNITS: 100 INJECTION, SOLUTION INTRAVENOUS; SUBCUTANEOUS at 16:02

## 2021-01-01 RX ADMIN — INSULIN ASPART 8 UNITS: 100 INJECTION, SOLUTION INTRAVENOUS; SUBCUTANEOUS at 19:52

## 2021-01-01 RX ADMIN — VECURONIUM BROMIDE 0.8 MCG/KG/MIN: 1 INJECTION, POWDER, LYOPHILIZED, FOR SOLUTION INTRAVENOUS at 23:59

## 2021-01-01 RX ADMIN — DOXAZOSIN 1 MG: 1 TABLET ORAL at 08:31

## 2021-01-01 RX ADMIN — INSULIN ASPART 8 UNITS: 100 INJECTION, SOLUTION INTRAVENOUS; SUBCUTANEOUS at 22:27

## 2021-01-01 RX ADMIN — FENTANYL CITRATE 50 MCG: 50 INJECTION, SOLUTION INTRAMUSCULAR; INTRAVENOUS at 08:20

## 2021-01-01 RX ADMIN — BUMETANIDE 2 MG: 1 TABLET ORAL at 16:28

## 2021-01-01 RX ADMIN — SENNOSIDES AND DOCUSATE SODIUM 1 TABLET: 8.6; 5 TABLET ORAL at 20:14

## 2021-01-01 RX ADMIN — HYDRALAZINE HYDROCHLORIDE 50 MG: 50 TABLET, FILM COATED ORAL at 15:13

## 2021-01-01 RX ADMIN — INSULIN ASPART 3 UNITS: 100 INJECTION, SOLUTION INTRAVENOUS; SUBCUTANEOUS at 20:31

## 2021-01-01 RX ADMIN — INSULIN ASPART 5 UNITS: 100 INJECTION, SOLUTION INTRAVENOUS; SUBCUTANEOUS at 13:00

## 2021-01-01 RX ADMIN — AMLODIPINE BESYLATE 5 MG: 5 TABLET ORAL at 17:03

## 2021-01-01 RX ADMIN — INSULIN ASPART 5 UNITS: 100 INJECTION, SOLUTION INTRAVENOUS; SUBCUTANEOUS at 00:39

## 2021-01-01 RX ADMIN — ONDANSETRON 4 MG: 2 INJECTION INTRAMUSCULAR; INTRAVENOUS at 19:33

## 2021-01-01 RX ADMIN — INSULIN ASPART 7 UNITS: 100 INJECTION, SOLUTION INTRAVENOUS; SUBCUTANEOUS at 14:10

## 2021-01-01 RX ADMIN — APIXABAN 5 MG: 5 TABLET, FILM COATED ORAL at 08:59

## 2021-01-01 RX ADMIN — ACETAMINOPHEN 650 MG: 325 TABLET, FILM COATED ORAL at 16:55

## 2021-01-01 RX ADMIN — APIXABAN 5 MG: 5 TABLET, FILM COATED ORAL at 11:14

## 2021-01-01 RX ADMIN — MIDAZOLAM HYDROCHLORIDE 0.5 MG: 1 INJECTION, SOLUTION INTRAMUSCULAR; INTRAVENOUS at 13:46

## 2021-01-01 RX ADMIN — HYDRALAZINE HYDROCHLORIDE 50 MG: 50 TABLET, FILM COATED ORAL at 23:53

## 2021-01-01 RX ADMIN — ACETAMINOPHEN 650 MG: 325 TABLET, FILM COATED ORAL at 08:48

## 2021-01-01 RX ADMIN — SODIUM CHLORIDE: 9 INJECTION, SOLUTION INTRAVENOUS at 08:25

## 2021-01-01 RX ADMIN — INSULIN ASPART 3 UNITS: 100 INJECTION, SOLUTION INTRAVENOUS; SUBCUTANEOUS at 00:25

## 2021-01-01 RX ADMIN — MICONAZOLE NITRATE: 2 POWDER TOPICAL at 20:32

## 2021-01-01 RX ADMIN — PANTOPRAZOLE SODIUM 40 MG: 40 TABLET, DELAYED RELEASE ORAL at 08:39

## 2021-01-01 RX ADMIN — HYDRALAZINE HYDROCHLORIDE 50 MG: 50 TABLET, FILM COATED ORAL at 15:29

## 2021-01-01 RX ADMIN — CARVEDILOL 3.12 MG: 3.12 TABLET, FILM COATED ORAL at 17:05

## 2021-01-01 RX ADMIN — BUMETANIDE 1 MG: 0.25 INJECTION INTRAMUSCULAR; INTRAVENOUS at 10:22

## 2021-01-01 RX ADMIN — MIDAZOLAM 1 MG: 1 INJECTION INTRAMUSCULAR; INTRAVENOUS at 10:54

## 2021-01-01 RX ADMIN — APIXABAN 5 MG: 5 TABLET, FILM COATED ORAL at 21:31

## 2021-01-01 RX ADMIN — PROPOFOL 100 MCG/KG/MIN: 10 INJECTION, EMULSION INTRAVENOUS at 10:34

## 2021-01-01 RX ADMIN — INSULIN ASPART 2 UNITS: 100 INJECTION, SOLUTION INTRAVENOUS; SUBCUTANEOUS at 17:40

## 2021-01-01 RX ADMIN — OMEPRAZOLE 20 MG: 20 CAPSULE, DELAYED RELEASE ORAL at 08:01

## 2021-01-01 RX ADMIN — INSULIN ASPART 5 UNITS: 100 INJECTION, SOLUTION INTRAVENOUS; SUBCUTANEOUS at 21:17

## 2021-01-01 RX ADMIN — Medication 40 MG: at 21:08

## 2021-01-01 RX ADMIN — FUROSEMIDE 40 MG: 10 INJECTION, SOLUTION INTRAVENOUS at 11:45

## 2021-01-01 RX ADMIN — INSULIN ASPART 4 UNITS: 100 INJECTION, SOLUTION INTRAVENOUS; SUBCUTANEOUS at 09:03

## 2021-01-01 RX ADMIN — HEPARIN SODIUM 1300 UNITS: 1000 INJECTION INTRAVENOUS; SUBCUTANEOUS at 18:28

## 2021-01-01 RX ADMIN — Medication 5 ML: at 09:02

## 2021-01-01 RX ADMIN — DOXAZOSIN 1 MG: 1 TABLET ORAL at 09:06

## 2021-01-01 RX ADMIN — INSULIN GLARGINE 30 UNITS: 100 INJECTION, SOLUTION SUBCUTANEOUS at 21:29

## 2021-01-01 RX ADMIN — BUMETANIDE 2 MG: 0.25 INJECTION INTRAMUSCULAR; INTRAVENOUS at 20:41

## 2021-01-01 RX ADMIN — Medication 75 MCG/HR: at 04:13

## 2021-01-01 RX ADMIN — PROPOFOL 20 MCG/KG/MIN: 10 INJECTION, EMULSION INTRAVENOUS at 16:58

## 2021-01-01 RX ADMIN — INSULIN GLARGINE 15 UNITS: 100 INJECTION, SOLUTION SUBCUTANEOUS at 22:45

## 2021-01-01 RX ADMIN — INSULIN ASPART 5 UNITS: 100 INJECTION, SOLUTION INTRAVENOUS; SUBCUTANEOUS at 06:08

## 2021-01-01 RX ADMIN — INSULIN ASPART 4 UNITS: 100 INJECTION, SOLUTION INTRAVENOUS; SUBCUTANEOUS at 18:48

## 2021-01-01 RX ADMIN — EPOPROSTENOL 20 NG/KG/MIN: 1.5 INJECTION, POWDER, LYOPHILIZED, FOR SOLUTION INTRAVENOUS at 15:35

## 2021-01-01 RX ADMIN — APIXABAN 5 MG: 5 TABLET, FILM COATED ORAL at 19:53

## 2021-01-01 RX ADMIN — CARVEDILOL 12.5 MG: 6.25 TABLET, FILM COATED ORAL at 08:22

## 2021-01-01 RX ADMIN — INSULIN ASPART 1 UNITS: 100 INJECTION, SOLUTION INTRAVENOUS; SUBCUTANEOUS at 17:09

## 2021-01-01 RX ADMIN — HYDRALAZINE HYDROCHLORIDE 20 MG: 20 INJECTION INTRAMUSCULAR; INTRAVENOUS at 07:58

## 2021-01-01 RX ADMIN — GUAIFENESIN 10 ML: 200 SOLUTION ORAL at 09:01

## 2021-01-01 RX ADMIN — CHLORHEXIDINE GLUCONATE 15 ML: 1.2 RINSE ORAL at 20:20

## 2021-01-01 RX ADMIN — Medication 40 MG: at 20:47

## 2021-01-01 RX ADMIN — ATORVASTATIN CALCIUM 80 MG: 80 TABLET, FILM COATED ORAL at 09:22

## 2021-01-01 RX ADMIN — SODIUM CHLORIDE 250 ML: 9 INJECTION, SOLUTION INTRAVENOUS at 12:30

## 2021-01-01 RX ADMIN — HYDROMORPHONE HYDROCHLORIDE 0.5 MG: 1 INJECTION, SOLUTION INTRAMUSCULAR; INTRAVENOUS; SUBCUTANEOUS at 21:49

## 2021-01-01 RX ADMIN — ATORVASTATIN CALCIUM 80 MG: 80 TABLET, FILM COATED ORAL at 08:55

## 2021-01-01 RX ADMIN — INSULIN GLARGINE 30 UNITS: 100 INJECTION, SOLUTION SUBCUTANEOUS at 22:12

## 2021-01-01 RX ADMIN — HYDRALAZINE HYDROCHLORIDE 50 MG: 50 TABLET, FILM COATED ORAL at 08:59

## 2021-01-01 RX ADMIN — HYDRALAZINE HYDROCHLORIDE 50 MG: 50 TABLET, FILM COATED ORAL at 23:00

## 2021-01-01 RX ADMIN — MICONAZOLE NITRATE: 2 POWDER TOPICAL at 21:08

## 2021-01-01 RX ADMIN — Medication 40 MG: at 20:34

## 2021-01-01 RX ADMIN — LIDOCAINE HYDROCHLORIDE 1 ML: 10 INJECTION, SOLUTION INFILTRATION; PERINEURAL at 12:20

## 2021-01-01 RX ADMIN — APIXABAN 5 MG: 5 TABLET, FILM COATED ORAL at 08:08

## 2021-01-01 RX ADMIN — TAMSULOSIN HYDROCHLORIDE 0.4 MG: 0.4 CAPSULE ORAL at 08:36

## 2021-01-01 RX ADMIN — PROPOFOL 15 MCG/KG/MIN: 10 INJECTION, EMULSION INTRAVENOUS at 16:24

## 2021-01-01 RX ADMIN — CHLORHEXIDINE GLUCONATE 15 ML: 1.2 RINSE ORAL at 07:58

## 2021-01-01 RX ADMIN — ISOSORBIDE MONONITRATE 30 MG: 30 TABLET, EXTENDED RELEASE ORAL at 08:23

## 2021-01-01 RX ADMIN — APIXABAN 5 MG: 5 TABLET, FILM COATED ORAL at 20:25

## 2021-01-01 RX ADMIN — HUMAN ALBUMIN MICROSPHERES AND PERFLUTREN 9 ML: 10; .22 INJECTION, SOLUTION INTRAVENOUS at 14:41

## 2021-01-01 RX ADMIN — FUROSEMIDE 40 MG: 40 TABLET ORAL at 08:31

## 2021-01-01 RX ADMIN — GUAIFENESIN 10 ML: 200 SOLUTION ORAL at 09:53

## 2021-01-01 RX ADMIN — DEXAMETHASONE 12 MG: 4 TABLET ORAL at 08:34

## 2021-01-01 RX ADMIN — PHENYLEPHRINE HYDROCHLORIDE 200 MCG: 10 INJECTION INTRAVENOUS at 12:20

## 2021-01-01 RX ADMIN — INSULIN ASPART 2 UNITS: 100 INJECTION, SOLUTION INTRAVENOUS; SUBCUTANEOUS at 00:00

## 2021-01-01 RX ADMIN — DOXAZOSIN 1 MG: 1 TABLET ORAL at 09:51

## 2021-01-01 RX ADMIN — Medication 40 MG: at 19:35

## 2021-01-01 RX ADMIN — PROPOFOL 30 MCG/KG/MIN: 10 INJECTION, EMULSION INTRAVENOUS at 02:29

## 2021-01-01 RX ADMIN — DEXAMETHASONE 12 MG: 4 TABLET ORAL at 09:22

## 2021-01-01 RX ADMIN — OMEPRAZOLE 20 MG: 20 CAPSULE, DELAYED RELEASE ORAL at 09:42

## 2021-01-01 RX ADMIN — HYDRALAZINE HYDROCHLORIDE 50 MG: 50 TABLET, FILM COATED ORAL at 00:54

## 2021-01-01 RX ADMIN — FENTANYL CITRATE 100 MCG: 50 INJECTION, SOLUTION INTRAMUSCULAR; INTRAVENOUS at 07:52

## 2021-01-01 RX ADMIN — CARVEDILOL 3.12 MG: 3.12 TABLET, FILM COATED ORAL at 08:38

## 2021-01-01 RX ADMIN — CHLORHEXIDINE GLUCONATE 15 ML: 1.2 RINSE ORAL at 21:24

## 2021-01-01 RX ADMIN — CARVEDILOL 6.25 MG: 6.25 TABLET, FILM COATED ORAL at 08:59

## 2021-01-01 RX ADMIN — INSULIN ASPART 6 UNITS: 100 INJECTION, SOLUTION INTRAVENOUS; SUBCUTANEOUS at 12:17

## 2021-01-01 RX ADMIN — BACITRACIN: 500 OINTMENT TOPICAL at 08:16

## 2021-01-01 RX ADMIN — CARVEDILOL 3.12 MG: 3.12 TABLET, FILM COATED ORAL at 17:38

## 2021-01-01 RX ADMIN — INSULIN ASPART 2 UNITS: 100 INJECTION, SOLUTION INTRAVENOUS; SUBCUTANEOUS at 21:14

## 2021-01-01 RX ADMIN — HYDRALAZINE HYDROCHLORIDE 50 MG: 50 TABLET, FILM COATED ORAL at 00:34

## 2021-01-01 RX ADMIN — CHLORHEXIDINE GLUCONATE 15 ML: 1.2 RINSE ORAL at 19:42

## 2021-01-01 RX ADMIN — GUAIFENESIN 10 ML: 200 SOLUTION ORAL at 20:56

## 2021-01-01 RX ADMIN — HYDRALAZINE HYDROCHLORIDE 50 MG: 50 TABLET, FILM COATED ORAL at 00:14

## 2021-01-01 RX ADMIN — Medication 40 MG: at 21:18

## 2021-01-01 RX ADMIN — HYDRALAZINE HYDROCHLORIDE 10 MG: 20 INJECTION INTRAMUSCULAR; INTRAVENOUS at 17:46

## 2021-01-01 RX ADMIN — BUMETANIDE 2 MG: 0.25 INJECTION INTRAMUSCULAR; INTRAVENOUS at 13:31

## 2021-01-01 RX ADMIN — CARVEDILOL 3.12 MG: 3.12 TABLET, FILM COATED ORAL at 20:09

## 2021-01-01 RX ADMIN — CARVEDILOL 6.25 MG: 6.25 TABLET, FILM COATED ORAL at 17:17

## 2021-01-01 RX ADMIN — PHENYLEPHRINE HYDROCHLORIDE 0.5 MCG/KG/MIN: 10 INJECTION INTRAVENOUS at 08:08

## 2021-01-01 RX ADMIN — BUMETANIDE 2 MG: 0.25 INJECTION INTRAMUSCULAR; INTRAVENOUS at 14:05

## 2021-01-01 RX ADMIN — HYDROMORPHONE HYDROCHLORIDE 0.5 MG: 1 INJECTION, SOLUTION INTRAMUSCULAR; INTRAVENOUS; SUBCUTANEOUS at 05:07

## 2021-01-01 RX ADMIN — INSULIN ASPART 6 UNITS: 100 INJECTION, SOLUTION INTRAVENOUS; SUBCUTANEOUS at 14:33

## 2021-01-01 RX ADMIN — HYDRALAZINE HYDROCHLORIDE 50 MG: 50 TABLET, FILM COATED ORAL at 16:30

## 2021-01-01 RX ADMIN — APIXABAN 5 MG: 5 TABLET, FILM COATED ORAL at 09:22

## 2021-01-01 RX ADMIN — ALBUMIN HUMAN 12.5 G: 0.25 SOLUTION INTRAVENOUS at 17:06

## 2021-01-01 RX ADMIN — HYDRALAZINE HYDROCHLORIDE 10 MG: 20 INJECTION INTRAMUSCULAR; INTRAVENOUS at 11:04

## 2021-01-01 RX ADMIN — FENTANYL CITRATE 25 MCG: 50 INJECTION, SOLUTION INTRAMUSCULAR; INTRAVENOUS at 11:55

## 2021-01-01 RX ADMIN — INSULIN ASPART 4 UNITS: 100 INJECTION, SOLUTION INTRAVENOUS; SUBCUTANEOUS at 17:03

## 2021-01-01 RX ADMIN — Medication 40 MG: at 20:13

## 2021-01-01 RX ADMIN — INSULIN GLARGINE 18 UNITS: 100 INJECTION, SOLUTION SUBCUTANEOUS at 22:24

## 2021-01-01 RX ADMIN — CARVEDILOL 6.25 MG: 6.25 TABLET, FILM COATED ORAL at 17:37

## 2021-01-01 RX ADMIN — INSULIN ASPART 6 UNITS: 100 INJECTION, SOLUTION INTRAVENOUS; SUBCUTANEOUS at 21:10

## 2021-01-01 RX ADMIN — INSULIN ASPART 3 UNITS: 100 INJECTION, SOLUTION INTRAVENOUS; SUBCUTANEOUS at 17:05

## 2021-01-01 RX ADMIN — HYDRALAZINE HYDROCHLORIDE 50 MG: 50 TABLET, FILM COATED ORAL at 08:31

## 2021-01-01 RX ADMIN — PROPOFOL 10 MCG/KG/MIN: 10 INJECTION, EMULSION INTRAVENOUS at 22:33

## 2021-01-01 RX ADMIN — CARVEDILOL 12.5 MG: 6.25 TABLET, FILM COATED ORAL at 09:10

## 2021-01-01 RX ADMIN — HYDRALAZINE HYDROCHLORIDE 10 MG: 20 INJECTION INTRAMUSCULAR; INTRAVENOUS at 10:39

## 2021-01-01 RX ADMIN — ATORVASTATIN CALCIUM 80 MG: 80 TABLET, FILM COATED ORAL at 08:31

## 2021-01-01 RX ADMIN — PROPOFOL 20 MCG/KG/MIN: 10 INJECTION, EMULSION INTRAVENOUS at 11:59

## 2021-01-01 RX ADMIN — FUROSEMIDE 60 MG: 10 INJECTION, SOLUTION INTRAVENOUS at 16:43

## 2021-01-01 RX ADMIN — Medication 40 MG: at 08:08

## 2021-01-01 RX ADMIN — CARVEDILOL 3.12 MG: 3.12 TABLET, FILM COATED ORAL at 17:04

## 2021-01-01 RX ADMIN — MICONAZOLE NITRATE: 2 POWDER TOPICAL at 08:27

## 2021-01-01 RX ADMIN — OMEPRAZOLE 20 MG: 20 CAPSULE, DELAYED RELEASE ORAL at 08:32

## 2021-01-01 RX ADMIN — Medication: at 14:17

## 2021-01-01 RX ADMIN — INSULIN ASPART 5 UNITS: 100 INJECTION, SOLUTION INTRAVENOUS; SUBCUTANEOUS at 04:12

## 2021-01-01 RX ADMIN — INSULIN GLARGINE 30 UNITS: 100 INJECTION, SOLUTION SUBCUTANEOUS at 21:33

## 2021-01-01 RX ADMIN — APIXABAN 5 MG: 5 TABLET, FILM COATED ORAL at 20:09

## 2021-01-01 RX ADMIN — HYDRALAZINE HYDROCHLORIDE 20 MG: 20 INJECTION INTRAMUSCULAR; INTRAVENOUS at 14:25

## 2021-01-01 RX ADMIN — INSULIN ASPART 5 UNITS: 100 INJECTION, SOLUTION INTRAVENOUS; SUBCUTANEOUS at 08:54

## 2021-01-01 RX ADMIN — APIXABAN 5 MG: 5 TABLET, FILM COATED ORAL at 22:11

## 2021-01-01 RX ADMIN — PHENYLEPHRINE HYDROCHLORIDE 100 MCG: 10 INJECTION INTRAVENOUS at 07:52

## 2021-01-01 RX ADMIN — MICONAZOLE NITRATE: 2 POWDER TOPICAL at 20:33

## 2021-01-01 RX ADMIN — INSULIN ASPART 4 UNITS: 100 INJECTION, SOLUTION INTRAVENOUS; SUBCUTANEOUS at 12:37

## 2021-01-01 RX ADMIN — INSULIN GLARGINE 20 UNITS: 100 INJECTION, SOLUTION SUBCUTANEOUS at 21:38

## 2021-01-01 RX ADMIN — Medication 5 ML: at 14:06

## 2021-01-01 RX ADMIN — DICLOFENAC SODIUM 2 G: 10 GEL TOPICAL at 08:47

## 2021-01-01 RX ADMIN — TORSEMIDE 20 MG: 20 TABLET ORAL at 08:09

## 2021-01-01 RX ADMIN — APIXABAN 5 MG: 5 TABLET, FILM COATED ORAL at 09:12

## 2021-01-01 RX ADMIN — HYDRALAZINE HYDROCHLORIDE 50 MG: 50 TABLET, FILM COATED ORAL at 16:59

## 2021-01-01 RX ADMIN — MIDAZOLAM HYDROCHLORIDE 2 MG: 1 INJECTION, SOLUTION INTRAMUSCULAR; INTRAVENOUS at 03:24

## 2021-01-01 RX ADMIN — HEPARIN SODIUM 1300 UNITS: 1000 INJECTION INTRAVENOUS; SUBCUTANEOUS at 18:29

## 2021-01-01 RX ADMIN — ACETAMINOPHEN 650 MG: 325 TABLET, FILM COATED ORAL at 17:06

## 2021-01-01 RX ADMIN — MICONAZOLE NITRATE: 2 POWDER TOPICAL at 09:16

## 2021-01-01 RX ADMIN — Medication 0.08 MCG/KG/MIN: at 18:13

## 2021-01-01 RX ADMIN — INSULIN ASPART 1 UNITS: 100 INJECTION, SOLUTION INTRAVENOUS; SUBCUTANEOUS at 15:54

## 2021-01-01 RX ADMIN — HYDRALAZINE HYDROCHLORIDE 50 MG: 50 TABLET, FILM COATED ORAL at 05:37

## 2021-01-01 RX ADMIN — AMLODIPINE BESYLATE 5 MG: 5 TABLET ORAL at 08:23

## 2021-01-01 RX ADMIN — QUETIAPINE FUMARATE 25 MG: 25 TABLET ORAL at 04:01

## 2021-01-01 RX ADMIN — INSULIN ASPART 5 UNITS: 100 INJECTION, SOLUTION INTRAVENOUS; SUBCUTANEOUS at 08:29

## 2021-01-01 RX ADMIN — CARVEDILOL 3.12 MG: 3.12 TABLET, FILM COATED ORAL at 08:13

## 2021-01-01 RX ADMIN — CLOPIDOGREL BISULFATE 75 MG: 75 TABLET ORAL at 08:23

## 2021-01-01 RX ADMIN — HYDRALAZINE HYDROCHLORIDE 50 MG: 50 TABLET, FILM COATED ORAL at 08:08

## 2021-01-01 RX ADMIN — TAMSULOSIN HYDROCHLORIDE 0.4 MG: 0.4 CAPSULE ORAL at 08:14

## 2021-01-01 RX ADMIN — MICONAZOLE NITRATE: 2 POWDER TOPICAL at 08:31

## 2021-01-01 RX ADMIN — ONDANSETRON 4 MG: 2 INJECTION INTRAMUSCULAR; INTRAVENOUS at 10:28

## 2021-01-01 RX ADMIN — Medication 0.03 MCG/KG/MIN: at 15:33

## 2021-01-01 RX ADMIN — INSULIN ASPART 2 UNITS: 100 INJECTION, SOLUTION INTRAVENOUS; SUBCUTANEOUS at 18:23

## 2021-01-01 RX ADMIN — MICONAZOLE NITRATE: 2 POWDER TOPICAL at 20:44

## 2021-01-01 RX ADMIN — HEPARIN SODIUM 3000 UNITS: 1000 INJECTION INTRAVENOUS; SUBCUTANEOUS at 08:59

## 2021-01-01 RX ADMIN — INSULIN GLARGINE 30 UNITS: 100 INJECTION, SOLUTION SUBCUTANEOUS at 08:39

## 2021-01-01 RX ADMIN — FENTANYL CITRATE 100 MCG: 50 INJECTION, SOLUTION INTRAMUSCULAR; INTRAVENOUS at 14:30

## 2021-01-01 RX ADMIN — INSULIN GLARGINE 10 UNITS: 100 INJECTION, SOLUTION SUBCUTANEOUS at 13:00

## 2021-01-01 RX ADMIN — HYDRALAZINE HYDROCHLORIDE 50 MG: 50 TABLET, FILM COATED ORAL at 22:08

## 2021-01-01 RX ADMIN — CHLORHEXIDINE GLUCONATE 15 ML: 1.2 RINSE ORAL at 08:44

## 2021-01-01 RX ADMIN — OMEPRAZOLE 20 MG: 20 CAPSULE, DELAYED RELEASE ORAL at 20:23

## 2021-01-01 RX ADMIN — CHLOROTHIAZIDE SODIUM 250 MG: 500 INJECTION, POWDER, LYOPHILIZED, FOR SOLUTION INTRAVENOUS at 16:17

## 2021-01-01 RX ADMIN — TAMSULOSIN HYDROCHLORIDE 0.4 MG: 0.4 CAPSULE ORAL at 08:29

## 2021-01-01 RX ADMIN — CARVEDILOL 12.5 MG: 12.5 TABLET, FILM COATED ORAL at 17:28

## 2021-01-01 RX ADMIN — CLOPIDOGREL BISULFATE 75 MG: 75 TABLET ORAL at 09:08

## 2021-01-01 RX ADMIN — Medication 0.03 MCG/KG/MIN: at 00:14

## 2021-01-01 RX ADMIN — APIXABAN 5 MG: 5 TABLET, FILM COATED ORAL at 10:22

## 2021-01-01 RX ADMIN — HYDRALAZINE HYDROCHLORIDE 20 MG: 20 INJECTION INTRAMUSCULAR; INTRAVENOUS at 04:43

## 2021-01-01 RX ADMIN — QUETIAPINE FUMARATE 25 MG: 25 TABLET ORAL at 20:23

## 2021-01-01 RX ADMIN — CEFTRIAXONE SODIUM 2 G: 2 INJECTION, POWDER, FOR SOLUTION INTRAMUSCULAR; INTRAVENOUS at 19:51

## 2021-01-01 RX ADMIN — TAMSULOSIN HYDROCHLORIDE 0.4 MG: 0.4 CAPSULE ORAL at 08:34

## 2021-01-01 RX ADMIN — GUAIFENESIN 10 ML: 200 SOLUTION ORAL at 21:19

## 2021-01-01 RX ADMIN — INSULIN ASPART 4 UNITS: 100 INJECTION, SOLUTION INTRAVENOUS; SUBCUTANEOUS at 21:20

## 2021-01-01 RX ADMIN — CARVEDILOL 12.5 MG: 6.25 TABLET, FILM COATED ORAL at 11:40

## 2021-01-01 RX ADMIN — BUMETANIDE 2 MG: 0.25 INJECTION INTRAMUSCULAR; INTRAVENOUS at 00:39

## 2021-01-01 RX ADMIN — INSULIN ASPART 5 UNITS: 100 INJECTION, SOLUTION INTRAVENOUS; SUBCUTANEOUS at 05:32

## 2021-01-01 RX ADMIN — MICONAZOLE NITRATE: 2 POWDER TOPICAL at 20:20

## 2021-01-01 RX ADMIN — LIDOCAINE HYDROCHLORIDE 10 ML: 10 INJECTION, SOLUTION INFILTRATION; PERINEURAL at 13:54

## 2021-01-01 RX ADMIN — BUMETANIDE 2 MG: 0.25 INJECTION INTRAMUSCULAR; INTRAVENOUS at 19:54

## 2021-01-01 RX ADMIN — INSULIN ASPART 6 UNITS: 100 INJECTION, SOLUTION INTRAVENOUS; SUBCUTANEOUS at 02:53

## 2021-01-01 RX ADMIN — CARVEDILOL 3.12 MG: 3.12 TABLET, FILM COATED ORAL at 23:37

## 2021-01-01 RX ADMIN — Medication 50 MCG/HR: at 13:39

## 2021-01-01 RX ADMIN — VANCOMYCIN HYDROCHLORIDE 1000 MG: 1 INJECTION, SOLUTION INTRAVENOUS at 20:32

## 2021-01-01 RX ADMIN — INSULIN ASPART 2 UNITS: 100 INJECTION, SOLUTION INTRAVENOUS; SUBCUTANEOUS at 16:41

## 2021-01-01 RX ADMIN — HYDRALAZINE HYDROCHLORIDE 50 MG: 25 TABLET, FILM COATED ORAL at 00:27

## 2021-01-01 RX ADMIN — BUMETANIDE 1 MG/HR: 0.25 INJECTION INTRAMUSCULAR; INTRAVENOUS at 10:37

## 2021-01-01 RX ADMIN — Medication 40 MG: at 20:00

## 2021-01-01 RX ADMIN — HUMAN ALBUMIN MICROSPHERES AND PERFLUTREN 9 ML: 10; .22 INJECTION, SOLUTION INTRAVENOUS at 08:50

## 2021-01-01 RX ADMIN — PROPOFOL 15 MCG/KG/MIN: 10 INJECTION, EMULSION INTRAVENOUS at 20:34

## 2021-01-01 RX ADMIN — SODIUM CHLORIDE 50 ML: 900 INJECTION INTRAVENOUS at 18:00

## 2021-01-01 RX ADMIN — INSULIN ASPART 2 UNITS: 100 INJECTION, SOLUTION INTRAVENOUS; SUBCUTANEOUS at 13:14

## 2021-01-01 RX ADMIN — INSULIN GLARGINE 20 UNITS: 100 INJECTION, SOLUTION SUBCUTANEOUS at 21:23

## 2021-01-01 RX ADMIN — PROPOFOL 60 MCG/KG/MIN: 10 INJECTION, EMULSION INTRAVENOUS at 04:02

## 2021-01-01 RX ADMIN — APIXABAN 5 MG: 5 TABLET, FILM COATED ORAL at 12:07

## 2021-01-01 RX ADMIN — GUAIFENESIN 10 ML: 200 SOLUTION ORAL at 08:15

## 2021-01-01 RX ADMIN — ONDANSETRON 4 MG: 2 INJECTION INTRAMUSCULAR; INTRAVENOUS at 00:35

## 2021-01-01 RX ADMIN — DEXTROSE MONOHYDRATE 25 ML: 500 INJECTION PARENTERAL at 04:16

## 2021-01-01 RX ADMIN — HYDRALAZINE HYDROCHLORIDE 50 MG: 25 TABLET, FILM COATED ORAL at 16:32

## 2021-01-01 RX ADMIN — HYDRALAZINE HYDROCHLORIDE 50 MG: 50 TABLET, FILM COATED ORAL at 15:30

## 2021-01-01 RX ADMIN — PROPOFOL 50 MCG/KG/MIN: 10 INJECTION, EMULSION INTRAVENOUS at 18:12

## 2021-01-01 RX ADMIN — TAMSULOSIN HYDROCHLORIDE 0.4 MG: 0.4 CAPSULE ORAL at 08:21

## 2021-01-01 RX ADMIN — HYDRALAZINE HYDROCHLORIDE 50 MG: 50 TABLET, FILM COATED ORAL at 17:02

## 2021-01-01 RX ADMIN — HYDRALAZINE HYDROCHLORIDE 50 MG: 50 TABLET, FILM COATED ORAL at 22:27

## 2021-01-01 RX ADMIN — DEXTROSE MONOHYDRATE 50 ML: 500 INJECTION PARENTERAL at 23:26

## 2021-01-01 RX ADMIN — Medication 40 MG: at 16:56

## 2021-01-01 RX ADMIN — TAMSULOSIN HYDROCHLORIDE 0.4 MG: 0.4 CAPSULE ORAL at 09:10

## 2021-01-01 RX ADMIN — BACITRACIN: 500 OINTMENT TOPICAL at 21:03

## 2021-01-01 RX ADMIN — INSULIN ASPART 4 UNITS: 100 INJECTION, SOLUTION INTRAVENOUS; SUBCUTANEOUS at 16:18

## 2021-01-01 RX ADMIN — ATORVASTATIN CALCIUM 80 MG: 80 TABLET, FILM COATED ORAL at 08:04

## 2021-01-01 RX ADMIN — APIXABAN 5 MG: 5 TABLET, FILM COATED ORAL at 07:32

## 2021-01-01 RX ADMIN — OMEPRAZOLE 20 MG: 20 CAPSULE, DELAYED RELEASE ORAL at 09:53

## 2021-01-01 RX ADMIN — MICONAZOLE NITRATE: 2 POWDER TOPICAL at 20:04

## 2021-01-01 RX ADMIN — CARVEDILOL 6.25 MG: 6.25 TABLET, FILM COATED ORAL at 08:07

## 2021-01-01 RX ADMIN — APIXABAN 5 MG: 5 TABLET, FILM COATED ORAL at 20:44

## 2021-01-01 RX ADMIN — INSULIN ASPART 1 UNITS: 100 INJECTION, SOLUTION INTRAVENOUS; SUBCUTANEOUS at 08:57

## 2021-01-01 RX ADMIN — INSULIN ASPART 6 UNITS: 100 INJECTION, SOLUTION INTRAVENOUS; SUBCUTANEOUS at 15:44

## 2021-01-01 RX ADMIN — Medication 40 MG: at 07:54

## 2021-01-01 RX ADMIN — BUMETANIDE 2 MG: 1 TABLET ORAL at 08:22

## 2021-01-01 RX ADMIN — HYDRALAZINE HYDROCHLORIDE 50 MG: 50 TABLET, FILM COATED ORAL at 09:03

## 2021-01-01 RX ADMIN — HYDRALAZINE HYDROCHLORIDE 50 MG: 50 TABLET, FILM COATED ORAL at 14:07

## 2021-01-01 RX ADMIN — HYDROCODONE BITARTRATE AND ACETAMINOPHEN 1 TABLET: 5; 325 TABLET ORAL at 02:28

## 2021-01-01 RX ADMIN — CARVEDILOL 12.5 MG: 12.5 TABLET, FILM COATED ORAL at 17:50

## 2021-01-01 RX ADMIN — HYDRALAZINE HYDROCHLORIDE 50 MG: 50 TABLET, FILM COATED ORAL at 15:33

## 2021-01-01 RX ADMIN — APIXABAN 5 MG: 5 TABLET, FILM COATED ORAL at 21:36

## 2021-01-01 RX ADMIN — SODIUM CHLORIDE: 9 INJECTION, SOLUTION INTRAVENOUS at 13:45

## 2021-01-01 RX ADMIN — GUAIFENESIN 10 ML: 200 SOLUTION ORAL at 07:55

## 2021-01-01 RX ADMIN — ATORVASTATIN CALCIUM 80 MG: 80 TABLET, FILM COATED ORAL at 09:03

## 2021-01-01 RX ADMIN — DOXAZOSIN 1 MG: 1 TABLET ORAL at 22:34

## 2021-01-01 RX ADMIN — TOCILIZUMAB 800 MG: 20 INJECTION, SOLUTION, CONCENTRATE INTRAVENOUS at 01:00

## 2021-01-01 RX ADMIN — ALBUMIN HUMAN 12.5 G: 0.25 SOLUTION INTRAVENOUS at 16:21

## 2021-01-01 RX ADMIN — FUROSEMIDE 40 MG: 10 INJECTION, SOLUTION INTRAVENOUS at 09:14

## 2021-01-01 RX ADMIN — REMDESIVIR 100 MG: 100 INJECTION, POWDER, LYOPHILIZED, FOR SOLUTION INTRAVENOUS at 17:59

## 2021-01-01 RX ADMIN — HYDROMORPHONE HYDROCHLORIDE 0.4 MG: 0.2 INJECTION, SOLUTION INTRAMUSCULAR; INTRAVENOUS; SUBCUTANEOUS at 11:40

## 2021-01-01 RX ADMIN — INSULIN ASPART 5 UNITS: 100 INJECTION, SOLUTION INTRAVENOUS; SUBCUTANEOUS at 00:56

## 2021-01-01 RX ADMIN — APIXABAN 5 MG: 5 TABLET, FILM COATED ORAL at 21:19

## 2021-01-01 RX ADMIN — VANCOMYCIN HYDROCHLORIDE 1000 MG: 1 INJECTION, SOLUTION INTRAVENOUS at 12:24

## 2021-01-01 RX ADMIN — APIXABAN 5 MG: 5 TABLET, FILM COATED ORAL at 09:04

## 2021-01-01 RX ADMIN — CARVEDILOL 3.12 MG: 3.12 TABLET, FILM COATED ORAL at 17:25

## 2021-01-01 RX ADMIN — INSULIN ASPART 2 UNITS: 100 INJECTION, SOLUTION INTRAVENOUS; SUBCUTANEOUS at 13:35

## 2021-01-01 RX ADMIN — HYDRALAZINE HYDROCHLORIDE 50 MG: 50 TABLET, FILM COATED ORAL at 01:24

## 2021-01-01 RX ADMIN — HEPARIN SODIUM 500 UNITS/HR: 1000 INJECTION INTRAVENOUS; SUBCUTANEOUS at 15:30

## 2021-01-01 RX ADMIN — SENNOSIDES AND DOCUSATE SODIUM 1 TABLET: 8.6; 5 TABLET ORAL at 20:18

## 2021-01-01 RX ADMIN — CARVEDILOL 6.25 MG: 6.25 TABLET, FILM COATED ORAL at 08:16

## 2021-01-01 RX ADMIN — Medication 0.07 MCG/KG/MIN: at 20:23

## 2021-01-01 RX ADMIN — INSULIN ASPART 7 UNITS: 100 INJECTION, SOLUTION INTRAVENOUS; SUBCUTANEOUS at 23:32

## 2021-01-01 RX ADMIN — HYDRALAZINE HYDROCHLORIDE 50 MG: 50 TABLET, FILM COATED ORAL at 05:41

## 2021-01-01 RX ADMIN — Medication 40 MG: at 08:26

## 2021-01-01 RX ADMIN — SODIUM CHLORIDE 11.5 UNITS/HR: 9 INJECTION, SOLUTION INTRAVENOUS at 01:27

## 2021-01-01 RX ADMIN — INSULIN ASPART 4 UNITS: 100 INJECTION, SOLUTION INTRAVENOUS; SUBCUTANEOUS at 00:09

## 2021-01-01 RX ADMIN — INSULIN ASPART 4 UNITS: 100 INJECTION, SOLUTION INTRAVENOUS; SUBCUTANEOUS at 12:39

## 2021-01-01 RX ADMIN — ATORVASTATIN CALCIUM 80 MG: 80 TABLET, FILM COATED ORAL at 18:34

## 2021-01-01 RX ADMIN — HYDRALAZINE HYDROCHLORIDE 50 MG: 50 TABLET, FILM COATED ORAL at 09:34

## 2021-01-01 RX ADMIN — TAMSULOSIN HYDROCHLORIDE 0.4 MG: 0.4 CAPSULE ORAL at 09:34

## 2021-01-01 RX ADMIN — Medication 40 MG: at 19:51

## 2021-01-01 RX ADMIN — INSULIN ASPART 6 UNITS: 100 INJECTION, SOLUTION INTRAVENOUS; SUBCUTANEOUS at 15:22

## 2021-01-01 RX ADMIN — VANCOMYCIN HYDROCHLORIDE 1750 MG: 1 INJECTION, POWDER, LYOPHILIZED, FOR SOLUTION INTRAVENOUS at 11:15

## 2021-01-01 RX ADMIN — SODIUM CHLORIDE: 9 INJECTION, SOLUTION INTRAVENOUS at 18:23

## 2021-01-01 RX ADMIN — Medication 40 MG: at 08:01

## 2021-01-01 RX ADMIN — HYDRALAZINE HYDROCHLORIDE 50 MG: 25 TABLET, FILM COATED ORAL at 13:31

## 2021-01-01 RX ADMIN — Medication 1 PACKET: at 16:55

## 2021-01-01 RX ADMIN — HYDRALAZINE HYDROCHLORIDE 50 MG: 50 TABLET, FILM COATED ORAL at 17:21

## 2021-01-01 RX ADMIN — HYDRALAZINE HYDROCHLORIDE 50 MG: 50 TABLET, FILM COATED ORAL at 00:39

## 2021-01-01 RX ADMIN — ATORVASTATIN CALCIUM 80 MG: 80 TABLET, FILM COATED ORAL at 08:07

## 2021-01-01 RX ADMIN — INSULIN GLARGINE 9 UNITS: 100 INJECTION, SOLUTION SUBCUTANEOUS at 21:47

## 2021-01-01 RX ADMIN — GUAIFENESIN 10 ML: 200 SOLUTION ORAL at 21:31

## 2021-01-01 RX ADMIN — HYDRALAZINE HYDROCHLORIDE 50 MG: 50 TABLET, FILM COATED ORAL at 08:27

## 2021-01-01 RX ADMIN — QUETIAPINE FUMARATE 25 MG: 25 TABLET ORAL at 23:57

## 2021-01-01 RX ADMIN — INSULIN GLARGINE 15 UNITS: 100 INJECTION, SOLUTION SUBCUTANEOUS at 18:49

## 2021-01-01 RX ADMIN — INSULIN ASPART 2 UNITS: 100 INJECTION, SOLUTION INTRAVENOUS; SUBCUTANEOUS at 11:43

## 2021-01-01 RX ADMIN — TAMSULOSIN HYDROCHLORIDE 0.4 MG: 0.4 CAPSULE ORAL at 08:02

## 2021-01-01 RX ADMIN — CHLORHEXIDINE GLUCONATE 15 ML: 1.2 RINSE ORAL at 19:57

## 2021-01-01 RX ADMIN — INSULIN GLARGINE 20 UNITS: 100 INJECTION, SOLUTION SUBCUTANEOUS at 23:33

## 2021-01-01 RX ADMIN — MICONAZOLE NITRATE: 2 POWDER TOPICAL at 21:13

## 2021-01-01 RX ADMIN — HUMAN ALBUMIN MICROSPHERES AND PERFLUTREN 9 ML: 10; .22 INJECTION, SOLUTION INTRAVENOUS at 08:42

## 2021-01-01 RX ADMIN — APIXABAN 5 MG: 5 TABLET, FILM COATED ORAL at 22:25

## 2021-01-01 RX ADMIN — FUROSEMIDE 60 MG: 10 INJECTION, SOLUTION INTRAVENOUS at 05:21

## 2021-01-01 RX ADMIN — BUMETANIDE 2 MG: 1 TABLET ORAL at 08:34

## 2021-01-01 RX ADMIN — DEXMEDETOMIDINE HYDROCHLORIDE 8 MCG: 100 INJECTION, SOLUTION INTRAVENOUS at 10:36

## 2021-01-01 RX ADMIN — INSULIN GLARGINE 23 UNITS: 100 INJECTION, SOLUTION SUBCUTANEOUS at 22:31

## 2021-01-01 RX ADMIN — BUMETANIDE 0.5 MG/HR: 0.25 INJECTION INTRAMUSCULAR; INTRAVENOUS at 16:40

## 2021-01-01 RX ADMIN — CARVEDILOL 6.25 MG: 6.25 TABLET, FILM COATED ORAL at 17:39

## 2021-01-01 RX ADMIN — EPOETIN ALFA-EPBX 4000 UNITS: 4000 INJECTION, SOLUTION INTRAVENOUS; SUBCUTANEOUS at 16:44

## 2021-01-01 RX ADMIN — INSULIN ASPART 3 UNITS: 100 INJECTION, SOLUTION INTRAVENOUS; SUBCUTANEOUS at 08:59

## 2021-01-01 RX ADMIN — TOPICAL ANESTHETIC 1 SPRAY: 200 SPRAY DENTAL; PERIODONTAL at 10:29

## 2021-01-01 RX ADMIN — DEXTROSE MONOHYDRATE 50 ML: 500 INJECTION PARENTERAL at 11:40

## 2021-01-01 RX ADMIN — Medication 5 ML: at 15:38

## 2021-01-01 RX ADMIN — ISOSORBIDE MONONITRATE 30 MG: 30 TABLET, EXTENDED RELEASE ORAL at 17:03

## 2021-01-01 RX ADMIN — ALBUMIN HUMAN 12.5 G: 0.25 SOLUTION INTRAVENOUS at 04:15

## 2021-01-01 RX ADMIN — INSULIN ASPART 1 UNITS: 100 INJECTION, SOLUTION INTRAVENOUS; SUBCUTANEOUS at 20:27

## 2021-01-01 RX ADMIN — TRANEXAMIC ACID 1950 MG: 650 TABLET ORAL at 06:30

## 2021-01-01 RX ADMIN — Medication 40 MG: at 20:29

## 2021-01-01 RX ADMIN — ACETAMINOPHEN 650 MG: 325 TABLET, FILM COATED ORAL at 16:42

## 2021-01-01 RX ADMIN — SODIUM CHLORIDE 5.5 UNITS/HR: 9 INJECTION, SOLUTION INTRAVENOUS at 19:24

## 2021-01-01 RX ADMIN — HYDROMORPHONE HYDROCHLORIDE 0.5 MG: 1 INJECTION, SOLUTION INTRAMUSCULAR; INTRAVENOUS; SUBCUTANEOUS at 23:08

## 2021-01-01 RX ADMIN — CARVEDILOL 12.5 MG: 6.25 TABLET, FILM COATED ORAL at 22:11

## 2021-01-01 RX ADMIN — INSULIN ASPART 7 UNITS: 100 INJECTION, SOLUTION INTRAVENOUS; SUBCUTANEOUS at 18:23

## 2021-01-01 RX ADMIN — Medication 1 DROP: at 09:10

## 2021-01-01 RX ADMIN — INSULIN ASPART 9 UNITS: 100 INJECTION, SOLUTION INTRAVENOUS; SUBCUTANEOUS at 12:01

## 2021-01-01 RX ADMIN — ALBUMIN HUMAN 12.5 G: 0.25 SOLUTION INTRAVENOUS at 04:37

## 2021-01-01 RX ADMIN — OMEPRAZOLE 20 MG: 20 CAPSULE, DELAYED RELEASE ORAL at 22:11

## 2021-01-01 RX ADMIN — INSULIN GLARGINE 25 UNITS: 100 INJECTION, SOLUTION SUBCUTANEOUS at 09:00

## 2021-01-01 RX ADMIN — DOXAZOSIN 1 MG: 1 TABLET ORAL at 08:53

## 2021-01-01 RX ADMIN — ATORVASTATIN CALCIUM 80 MG: 80 TABLET, FILM COATED ORAL at 09:34

## 2021-01-01 RX ADMIN — INSULIN ASPART 5 UNITS: 100 INJECTION, SOLUTION INTRAVENOUS; SUBCUTANEOUS at 20:12

## 2021-01-01 RX ADMIN — HYDROCODONE BITARTRATE AND ACETAMINOPHEN 1 TABLET: 5; 325 TABLET ORAL at 10:15

## 2021-01-01 RX ADMIN — CEFTRIAXONE SODIUM 2 G: 2 INJECTION, POWDER, FOR SOLUTION INTRAMUSCULAR; INTRAVENOUS at 17:52

## 2021-01-01 RX ADMIN — MINERAL OIL AND PETROLATUM: 150; 830 OINTMENT OPHTHALMIC at 16:37

## 2021-01-01 RX ADMIN — BUMETANIDE 1 MG: 0.25 INJECTION INTRAMUSCULAR; INTRAVENOUS at 17:38

## 2021-01-01 RX ADMIN — INSULIN ASPART 7 UNITS: 100 INJECTION, SOLUTION INTRAVENOUS; SUBCUTANEOUS at 14:07

## 2021-01-01 RX ADMIN — INSULIN GLARGINE 15 UNITS: 100 INJECTION, SOLUTION SUBCUTANEOUS at 16:17

## 2021-01-01 RX ADMIN — INSULIN ASPART 2 UNITS: 100 INJECTION, SOLUTION INTRAVENOUS; SUBCUTANEOUS at 09:00

## 2021-01-01 RX ADMIN — INSULIN ASPART 3 UNITS: 100 INJECTION, SOLUTION INTRAVENOUS; SUBCUTANEOUS at 04:01

## 2021-01-01 RX ADMIN — MEROPENEM 500 MG: 500 INJECTION, POWDER, FOR SOLUTION INTRAVENOUS at 23:27

## 2021-01-01 RX ADMIN — HYDRALAZINE HYDROCHLORIDE 10 MG: 20 INJECTION INTRAMUSCULAR; INTRAVENOUS at 04:57

## 2021-01-01 RX ADMIN — CARVEDILOL 12.5 MG: 12.5 TABLET, FILM COATED ORAL at 17:57

## 2021-01-01 RX ADMIN — INSULIN ASPART 1 UNITS: 100 INJECTION, SOLUTION INTRAVENOUS; SUBCUTANEOUS at 04:38

## 2021-01-01 RX ADMIN — EPOPROSTENOL 20 NG/KG/MIN: 1.5 INJECTION, POWDER, LYOPHILIZED, FOR SOLUTION INTRAVENOUS at 07:54

## 2021-01-01 RX ADMIN — INSULIN GLARGINE 35 UNITS: 100 INJECTION, SOLUTION SUBCUTANEOUS at 08:45

## 2021-01-01 RX ADMIN — SODIUM CHLORIDE 250 ML: 9 INJECTION, SOLUTION INTRAVENOUS at 14:17

## 2021-01-01 RX ADMIN — TRAZODONE HYDROCHLORIDE 50 MG: 50 TABLET ORAL at 00:39

## 2021-01-01 RX ADMIN — HYDRALAZINE HYDROCHLORIDE 50 MG: 50 TABLET, FILM COATED ORAL at 09:21

## 2021-01-01 RX ADMIN — Medication: at 18:31

## 2021-01-01 RX ADMIN — HYDRALAZINE HYDROCHLORIDE 10 MG: 20 INJECTION INTRAMUSCULAR; INTRAVENOUS at 12:34

## 2021-01-01 RX ADMIN — APIXABAN 5 MG: 5 TABLET, FILM COATED ORAL at 20:22

## 2021-01-01 RX ADMIN — DEXAMETHASONE 6 MG: 2 TABLET ORAL at 08:01

## 2021-01-01 RX ADMIN — INSULIN ASPART 1 UNITS: 100 INJECTION, SOLUTION INTRAVENOUS; SUBCUTANEOUS at 15:46

## 2021-01-01 RX ADMIN — HYDRALAZINE HYDROCHLORIDE 50 MG: 50 TABLET, FILM COATED ORAL at 22:01

## 2021-01-01 RX ADMIN — HYDRALAZINE HYDROCHLORIDE 50 MG: 25 TABLET, FILM COATED ORAL at 08:00

## 2021-01-01 RX ADMIN — INSULIN ASPART 2 UNITS: 100 INJECTION, SOLUTION INTRAVENOUS; SUBCUTANEOUS at 01:18

## 2021-01-01 RX ADMIN — DOXERCALCIFEROL 4 MCG: 4 INJECTION, SOLUTION INTRAVENOUS at 08:56

## 2021-01-01 RX ADMIN — INSULIN ASPART 5 UNITS: 100 INJECTION, SOLUTION INTRAVENOUS; SUBCUTANEOUS at 20:37

## 2021-01-01 RX ADMIN — INSULIN ASPART 5 UNITS: 100 INJECTION, SOLUTION INTRAVENOUS; SUBCUTANEOUS at 21:12

## 2021-01-01 RX ADMIN — APIXABAN 5 MG: 5 TABLET, FILM COATED ORAL at 23:00

## 2021-01-01 RX ADMIN — CARVEDILOL 12.5 MG: 6.25 TABLET, FILM COATED ORAL at 08:15

## 2021-01-01 RX ADMIN — POLYETHYLENE GLYCOL 3350 17 G: 17 POWDER, FOR SOLUTION ORAL at 15:45

## 2021-01-01 RX ADMIN — INSULIN ASPART 3 UNITS: 100 INJECTION, SOLUTION INTRAVENOUS; SUBCUTANEOUS at 08:56

## 2021-01-01 RX ADMIN — MINERAL OIL AND PETROLATUM: 150; 830 OINTMENT OPHTHALMIC at 00:51

## 2021-01-01 RX ADMIN — FENTANYL CITRATE 25 MCG: 50 INJECTION, SOLUTION INTRAMUSCULAR; INTRAVENOUS at 11:19

## 2021-01-01 RX ADMIN — TAMSULOSIN HYDROCHLORIDE 0.4 MG: 0.4 CAPSULE ORAL at 08:57

## 2021-01-01 RX ADMIN — FUROSEMIDE 7 MG/HR: 10 INJECTION, SOLUTION INTRAVENOUS at 16:48

## 2021-01-01 RX ADMIN — CARVEDILOL 3.12 MG: 3.12 TABLET, FILM COATED ORAL at 20:44

## 2021-01-01 RX ADMIN — FUROSEMIDE 60 MG: 10 INJECTION, SOLUTION INTRAVENOUS at 20:10

## 2021-01-01 RX ADMIN — HYDRALAZINE HYDROCHLORIDE 50 MG: 50 TABLET, FILM COATED ORAL at 01:02

## 2021-01-01 RX ADMIN — HYDROCODONE BITARTRATE AND ACETAMINOPHEN 1 TABLET: 5; 325 TABLET ORAL at 22:11

## 2021-01-01 RX ADMIN — Medication 5 ML: at 08:13

## 2021-01-01 RX ADMIN — HYDROCODONE BITARTRATE AND ACETAMINOPHEN 1 TABLET: 5; 325 TABLET ORAL at 11:45

## 2021-01-01 RX ADMIN — APIXABAN 5 MG: 5 TABLET, FILM COATED ORAL at 21:24

## 2021-01-01 RX ADMIN — DEXAMETHASONE 12 MG: 4 TABLET ORAL at 08:22

## 2021-01-01 RX ADMIN — Medication 40 MG: at 20:19

## 2021-01-01 RX ADMIN — PROPOFOL 50 MG: 10 INJECTION, EMULSION INTRAVENOUS at 16:04

## 2021-01-01 RX ADMIN — HYDRALAZINE HYDROCHLORIDE 50 MG: 50 TABLET, FILM COATED ORAL at 16:58

## 2021-01-01 RX ADMIN — BUMETANIDE 1 MG: 0.25 INJECTION INTRAMUSCULAR; INTRAVENOUS at 15:22

## 2021-01-01 RX ADMIN — HYDRALAZINE HYDROCHLORIDE 50 MG: 50 TABLET, FILM COATED ORAL at 00:09

## 2021-01-01 RX ADMIN — BUMETANIDE 2 MG: 0.25 INJECTION INTRAMUSCULAR; INTRAVENOUS at 16:17

## 2021-01-01 RX ADMIN — INSULIN ASPART 6 UNITS: 100 INJECTION, SOLUTION INTRAVENOUS; SUBCUTANEOUS at 21:27

## 2021-01-01 RX ADMIN — BACITRACIN: 500 OINTMENT TOPICAL at 20:30

## 2021-01-01 RX ADMIN — PROPOFOL 5 MCG/KG/MIN: 10 INJECTION, EMULSION INTRAVENOUS at 08:00

## 2021-01-01 RX ADMIN — BACITRACIN: 500 OINTMENT TOPICAL at 20:32

## 2021-01-01 RX ADMIN — MEROPENEM 500 MG: 500 INJECTION, POWDER, FOR SOLUTION INTRAVENOUS at 15:42

## 2021-01-01 RX ADMIN — INSULIN ASPART 3 UNITS: 100 INJECTION, SOLUTION INTRAVENOUS; SUBCUTANEOUS at 01:06

## 2021-01-01 RX ADMIN — HYDROMORPHONE HYDROCHLORIDE 0.3 MG: 0.2 INJECTION, SOLUTION INTRAMUSCULAR; INTRAVENOUS; SUBCUTANEOUS at 12:22

## 2021-01-01 RX ADMIN — PROPOFOL 30 MCG/KG/MIN: 10 INJECTION, EMULSION INTRAVENOUS at 21:29

## 2021-01-01 RX ADMIN — TETROFOSMIN 8.65 MCI.: 1.38 INJECTION, POWDER, LYOPHILIZED, FOR SOLUTION INTRAVENOUS at 08:43

## 2021-01-01 RX ADMIN — HYDRALAZINE HYDROCHLORIDE 50 MG: 50 TABLET, FILM COATED ORAL at 16:20

## 2021-01-01 RX ADMIN — INSULIN ASPART 5 UNITS: 100 INJECTION, SOLUTION INTRAVENOUS; SUBCUTANEOUS at 12:18

## 2021-01-01 RX ADMIN — MICONAZOLE NITRATE: 2 POWDER TOPICAL at 22:28

## 2021-01-01 RX ADMIN — INSULIN ASPART 2 UNITS: 100 INJECTION, SOLUTION INTRAVENOUS; SUBCUTANEOUS at 23:03

## 2021-01-01 RX ADMIN — ATORVASTATIN CALCIUM 80 MG: 80 TABLET, FILM COATED ORAL at 08:08

## 2021-01-01 RX ADMIN — GUAIFENESIN 10 ML: 200 SOLUTION ORAL at 21:37

## 2021-01-01 RX ADMIN — ATORVASTATIN CALCIUM 80 MG: 80 TABLET, FILM COATED ORAL at 08:41

## 2021-01-01 RX ADMIN — Medication 12 MCG: at 10:32

## 2021-01-01 RX ADMIN — DEXMEDETOMIDINE HYDROCHLORIDE 1 MCG/KG/HR: 100 INJECTION, SOLUTION INTRAVENOUS at 10:55

## 2021-01-01 RX ADMIN — HEPARIN SODIUM 1300 UNITS: 1000 INJECTION INTRAVENOUS; SUBCUTANEOUS at 15:33

## 2021-01-01 RX ADMIN — PANTOPRAZOLE SODIUM 40 MG: 40 TABLET, DELAYED RELEASE ORAL at 07:00

## 2021-01-01 RX ADMIN — INSULIN GLARGINE 10 UNITS: 100 INJECTION, SOLUTION SUBCUTANEOUS at 17:32

## 2021-01-01 RX ADMIN — AMLODIPINE BESYLATE 5 MG: 5 TABLET ORAL at 09:10

## 2021-01-01 RX ADMIN — CARVEDILOL 12.5 MG: 6.25 TABLET, FILM COATED ORAL at 21:04

## 2021-01-01 RX ADMIN — MINERAL OIL AND PETROLATUM: 150; 830 OINTMENT OPHTHALMIC at 08:25

## 2021-01-01 RX ADMIN — CHLORHEXIDINE GLUCONATE 15 ML: 1.2 RINSE ORAL at 20:00

## 2021-01-01 RX ADMIN — HYDRALAZINE HYDROCHLORIDE 50 MG: 50 TABLET, FILM COATED ORAL at 00:00

## 2021-01-01 RX ADMIN — Medication 5 ML: at 09:11

## 2021-01-01 RX ADMIN — ATORVASTATIN CALCIUM 40 MG: 40 TABLET, FILM COATED ORAL at 16:08

## 2021-01-01 RX ADMIN — APIXABAN 5 MG: 5 TABLET, FILM COATED ORAL at 07:55

## 2021-01-01 RX ADMIN — CHLORHEXIDINE GLUCONATE 15 ML: 1.2 RINSE ORAL at 11:16

## 2021-01-01 RX ADMIN — INSULIN ASPART 2 UNITS: 100 INJECTION, SOLUTION INTRAVENOUS; SUBCUTANEOUS at 03:57

## 2021-01-01 RX ADMIN — INSULIN ASPART 6 UNITS: 100 INJECTION, SOLUTION INTRAVENOUS; SUBCUTANEOUS at 16:35

## 2021-01-01 RX ADMIN — APIXABAN 5 MG: 5 TABLET, FILM COATED ORAL at 21:35

## 2021-01-01 RX ADMIN — GUAIFENESIN 10 ML: 200 SOLUTION ORAL at 22:25

## 2021-01-01 RX ADMIN — CARVEDILOL 12.5 MG: 6.25 TABLET, FILM COATED ORAL at 08:01

## 2021-01-01 RX ADMIN — INSULIN GLARGINE 30 UNITS: 100 INJECTION, SOLUTION SUBCUTANEOUS at 20:24

## 2021-01-01 RX ADMIN — FUROSEMIDE 40 MG: 40 TABLET ORAL at 15:33

## 2021-01-01 RX ADMIN — INSULIN GLARGINE 25 UNITS: 100 INJECTION, SOLUTION SUBCUTANEOUS at 20:01

## 2021-01-01 RX ADMIN — HYDRALAZINE HYDROCHLORIDE 10 MG: 20 INJECTION INTRAMUSCULAR; INTRAVENOUS at 01:38

## 2021-01-01 RX ADMIN — DEXTROSE MONOHYDRATE 50 ML: 500 INJECTION PARENTERAL at 02:40

## 2021-01-01 RX ADMIN — ONDANSETRON 4 MG: 2 INJECTION INTRAMUSCULAR; INTRAVENOUS at 10:38

## 2021-01-01 RX ADMIN — REMDESIVIR 100 MG: 100 INJECTION, POWDER, LYOPHILIZED, FOR SOLUTION INTRAVENOUS at 16:38

## 2021-01-01 RX ADMIN — ACETAMINOPHEN 650 MG: 325 TABLET, FILM COATED ORAL at 08:29

## 2021-01-01 RX ADMIN — Medication 40 MG: at 19:49

## 2021-01-01 RX ADMIN — INSULIN ASPART 1 UNITS: 100 INJECTION, SOLUTION INTRAVENOUS; SUBCUTANEOUS at 00:35

## 2021-01-01 RX ADMIN — INSULIN ASPART 6 UNITS: 100 INJECTION, SOLUTION INTRAVENOUS; SUBCUTANEOUS at 04:45

## 2021-01-01 RX ADMIN — INSULIN ASPART 3 UNITS: 100 INJECTION, SOLUTION INTRAVENOUS; SUBCUTANEOUS at 20:00

## 2021-01-01 RX ADMIN — Medication 5 ML: at 09:50

## 2021-01-01 RX ADMIN — OXYCODONE HYDROCHLORIDE 5 MG: 5 TABLET ORAL at 02:44

## 2021-01-01 RX ADMIN — BUMETANIDE 2 MG: 0.25 INJECTION INTRAMUSCULAR; INTRAVENOUS at 04:07

## 2021-01-01 RX ADMIN — PANTOPRAZOLE SODIUM 40 MG: 40 INJECTION, POWDER, FOR SOLUTION INTRAVENOUS at 20:51

## 2021-01-01 RX ADMIN — OXYCODONE HYDROCHLORIDE 5 MG: 5 TABLET ORAL at 09:08

## 2021-01-01 RX ADMIN — MULTIVIT AND MINERALS-FERROUS GLUCONATE 9 MG IRON/15 ML ORAL LIQUID 15 ML: at 08:23

## 2021-01-01 RX ADMIN — INSULIN ASPART 2 UNITS: 100 INJECTION, SOLUTION INTRAVENOUS; SUBCUTANEOUS at 11:27

## 2021-01-01 RX ADMIN — ATORVASTATIN CALCIUM 80 MG: 80 TABLET, FILM COATED ORAL at 09:06

## 2021-01-01 RX ADMIN — FUROSEMIDE 40 MG: 40 TABLET ORAL at 08:38

## 2021-01-01 RX ADMIN — HYDRALAZINE HYDROCHLORIDE 50 MG: 25 TABLET, FILM COATED ORAL at 06:20

## 2021-01-01 RX ADMIN — ACETAZOLAMIDE 500 MG: 500 INJECTION, POWDER, LYOPHILIZED, FOR SOLUTION INTRAVENOUS at 14:33

## 2021-01-01 RX ADMIN — DEXTROSE MONOHYDRATE 25 ML: 500 INJECTION PARENTERAL at 12:52

## 2021-01-01 RX ADMIN — FENTANYL CITRATE 25 MCG: 50 INJECTION, SOLUTION INTRAMUSCULAR; INTRAVENOUS at 12:02

## 2021-01-01 RX ADMIN — HYDRALAZINE HYDROCHLORIDE 50 MG: 25 TABLET, FILM COATED ORAL at 08:01

## 2021-01-01 RX ADMIN — CHLORHEXIDINE GLUCONATE 15 ML: 1.2 RINSE ORAL at 08:14

## 2021-01-01 RX ADMIN — PROPOFOL 15 MCG/KG/MIN: 10 INJECTION, EMULSION INTRAVENOUS at 13:58

## 2021-01-01 RX ADMIN — ACETAMINOPHEN 1000 MG: 500 TABLET, FILM COATED ORAL at 21:22

## 2021-01-01 RX ADMIN — CARVEDILOL 12.5 MG: 6.25 TABLET, FILM COATED ORAL at 10:15

## 2021-01-01 RX ADMIN — ATORVASTATIN CALCIUM 80 MG: 80 TABLET, FILM COATED ORAL at 07:54

## 2021-01-01 RX ADMIN — INSULIN ASPART 1 UNITS: 100 INJECTION, SOLUTION INTRAVENOUS; SUBCUTANEOUS at 17:59

## 2021-01-01 RX ADMIN — INSULIN ASPART 1 UNITS: 100 INJECTION, SOLUTION INTRAVENOUS; SUBCUTANEOUS at 06:00

## 2021-01-01 RX ADMIN — CHLORHEXIDINE GLUCONATE 15 ML: 1.2 RINSE ORAL at 20:32

## 2021-01-01 RX ADMIN — PROPOFOL 60 MCG/KG/MIN: 10 INJECTION, EMULSION INTRAVENOUS at 15:47

## 2021-01-01 RX ADMIN — APIXABAN 5 MG: 5 TABLET, FILM COATED ORAL at 21:39

## 2021-01-01 RX ADMIN — HYDRALAZINE HYDROCHLORIDE 50 MG: 50 TABLET, FILM COATED ORAL at 21:52

## 2021-01-01 RX ADMIN — MICONAZOLE NITRATE: 2 POWDER TOPICAL at 07:58

## 2021-01-01 RX ADMIN — GUAIFENESIN 10 ML: 200 SOLUTION ORAL at 08:07

## 2021-01-01 RX ADMIN — ALBUMIN HUMAN 12.5 G: 0.25 SOLUTION INTRAVENOUS at 04:58

## 2021-01-01 RX ADMIN — INSULIN GLARGINE 35 UNITS: 100 INJECTION, SOLUTION SUBCUTANEOUS at 20:13

## 2021-01-01 RX ADMIN — FERROUS SULFATE TAB 325 MG (65 MG ELEMENTAL FE) 325 MG: 325 (65 FE) TAB at 16:08

## 2021-01-01 RX ADMIN — CHLORHEXIDINE GLUCONATE 15 ML: 1.2 RINSE ORAL at 08:12

## 2021-01-01 RX ADMIN — Medication 40 MG: at 19:40

## 2021-01-01 RX ADMIN — MICONAZOLE NITRATE: 2 POWDER TOPICAL at 08:16

## 2021-01-01 RX ADMIN — INSULIN ASPART 5 UNITS: 100 INJECTION, SOLUTION INTRAVENOUS; SUBCUTANEOUS at 02:33

## 2021-01-01 RX ADMIN — SODIUM CHLORIDE, POTASSIUM CHLORIDE, SODIUM LACTATE AND CALCIUM CHLORIDE: 600; 310; 30; 20 INJECTION, SOLUTION INTRAVENOUS at 10:02

## 2021-01-01 RX ADMIN — INSULIN ASPART 1 UNITS: 100 INJECTION, SOLUTION INTRAVENOUS; SUBCUTANEOUS at 16:30

## 2021-01-01 RX ADMIN — Medication 50 MCG: at 03:33

## 2021-01-01 RX ADMIN — ATORVASTATIN CALCIUM 80 MG: 80 TABLET, FILM COATED ORAL at 08:15

## 2021-01-01 RX ADMIN — APIXABAN 5 MG: 5 TABLET, FILM COATED ORAL at 21:10

## 2021-01-01 RX ADMIN — CARVEDILOL 6.25 MG: 6.25 TABLET, FILM COATED ORAL at 09:06

## 2021-01-01 RX ADMIN — INSULIN ASPART 5 UNITS: 100 INJECTION, SOLUTION INTRAVENOUS; SUBCUTANEOUS at 06:43

## 2021-01-01 RX ADMIN — OMEPRAZOLE 20 MG: 20 CAPSULE, DELAYED RELEASE ORAL at 20:13

## 2021-01-01 RX ADMIN — ASPIRIN 325 MG ORAL TABLET 325 MG: 325 PILL ORAL at 08:40

## 2021-01-01 RX ADMIN — FUROSEMIDE 40 MG: 40 TABLET ORAL at 08:07

## 2021-01-01 RX ADMIN — OXYBUTYNIN CHLORIDE 5 MG: 5 TABLET ORAL at 00:45

## 2021-01-01 RX ADMIN — BUPIVACAINE HYDROCHLORIDE AND EPINEPHRINE BITARTRATE 20 ML: 2.5; .005 INJECTION, SOLUTION EPIDURAL; INFILTRATION; INTRACAUDAL; PERINEURAL at 13:32

## 2021-01-01 RX ADMIN — CARVEDILOL 6.25 MG: 6.25 TABLET, FILM COATED ORAL at 09:21

## 2021-01-01 RX ADMIN — CARVEDILOL 12.5 MG: 6.25 TABLET, FILM COATED ORAL at 08:29

## 2021-01-01 RX ADMIN — Medication 50 MCG/HR: at 08:19

## 2021-01-01 RX ADMIN — INSULIN ASPART 7 UNITS: 100 INJECTION, SOLUTION INTRAVENOUS; SUBCUTANEOUS at 21:12

## 2021-01-01 RX ADMIN — Medication 40 MG: at 20:43

## 2021-01-01 RX ADMIN — INSULIN ASPART 2 UNITS: 100 INJECTION, SOLUTION INTRAVENOUS; SUBCUTANEOUS at 20:29

## 2021-01-01 RX ADMIN — ACETAMINOPHEN 975 MG: 325 TABLET, FILM COATED ORAL at 09:09

## 2021-01-01 RX ADMIN — ISOSORBIDE MONONITRATE 30 MG: 30 TABLET, EXTENDED RELEASE ORAL at 16:08

## 2021-01-01 RX ADMIN — CARVEDILOL 12.5 MG: 6.25 TABLET, FILM COATED ORAL at 21:36

## 2021-01-01 RX ADMIN — REMDESIVIR 100 MG: 100 INJECTION, POWDER, LYOPHILIZED, FOR SOLUTION INTRAVENOUS at 17:58

## 2021-01-01 RX ADMIN — MICONAZOLE NITRATE: 2 POWDER TOPICAL at 21:41

## 2021-01-01 RX ADMIN — FUROSEMIDE 7 MG/HR: 10 INJECTION, SOLUTION INTRAVENOUS at 18:06

## 2021-01-01 RX ADMIN — CHLORHEXIDINE GLUCONATE 15 ML: 1.2 RINSE ORAL at 08:59

## 2021-01-01 RX ADMIN — MICONAZOLE NITRATE: 2 POWDER TOPICAL at 11:48

## 2021-01-01 RX ADMIN — Medication 40 MG: at 21:03

## 2021-01-01 RX ADMIN — ACETAMINOPHEN 650 MG: 325 TABLET, FILM COATED ORAL at 19:07

## 2021-01-01 RX ADMIN — APIXABAN 5 MG: 5 TABLET, FILM COATED ORAL at 20:08

## 2021-01-01 RX ADMIN — MICONAZOLE NITRATE: 2 POWDER TOPICAL at 08:39

## 2021-01-01 RX ADMIN — PROPOFOL 60 MCG/KG/MIN: 10 INJECTION, EMULSION INTRAVENOUS at 18:13

## 2021-01-01 RX ADMIN — HYDRALAZINE HYDROCHLORIDE 50 MG: 25 TABLET, FILM COATED ORAL at 13:44

## 2021-01-01 RX ADMIN — SODIUM CHLORIDE 1000 ML: 9 INJECTION, SOLUTION INTRAVENOUS at 09:25

## 2021-01-01 RX ADMIN — ATORVASTATIN CALCIUM 40 MG: 40 TABLET, FILM COATED ORAL at 21:22

## 2021-01-01 RX ADMIN — PROPOFOL 15 MCG/KG/MIN: 10 INJECTION, EMULSION INTRAVENOUS at 15:41

## 2021-01-01 RX ADMIN — CARVEDILOL 6.25 MG: 6.25 TABLET, FILM COATED ORAL at 18:15

## 2021-01-01 RX ADMIN — INSULIN ASPART 4 UNITS: 100 INJECTION, SOLUTION INTRAVENOUS; SUBCUTANEOUS at 08:00

## 2021-01-01 RX ADMIN — SODIUM CHLORIDE 250 ML: 9 INJECTION, SOLUTION INTRAVENOUS at 08:56

## 2021-01-01 RX ADMIN — BACITRACIN: 500 OINTMENT TOPICAL at 21:19

## 2021-01-01 RX ADMIN — DEXAMETHASONE 6 MG: 2 TABLET ORAL at 08:29

## 2021-01-01 RX ADMIN — INSULIN ASPART 5 UNITS: 100 INJECTION, SOLUTION INTRAVENOUS; SUBCUTANEOUS at 21:31

## 2021-01-01 RX ADMIN — INSULIN ASPART 3 UNITS: 100 INJECTION, SOLUTION INTRAVENOUS; SUBCUTANEOUS at 17:21

## 2021-01-01 RX ADMIN — OMEPRAZOLE 20 MG: 20 CAPSULE, DELAYED RELEASE ORAL at 23:00

## 2021-01-01 RX ADMIN — EPOPROSTENOL 20 NG/KG/MIN: 1.5 INJECTION, POWDER, LYOPHILIZED, FOR SOLUTION INTRAVENOUS at 06:06

## 2021-01-01 RX ADMIN — GUAIFENESIN 10 ML: 200 SOLUTION ORAL at 08:32

## 2021-01-01 RX ADMIN — ALBUMIN HUMAN 12.5 G: 0.25 SOLUTION INTRAVENOUS at 17:39

## 2021-01-01 RX ADMIN — ATORVASTATIN CALCIUM 80 MG: 80 TABLET, FILM COATED ORAL at 08:21

## 2021-01-01 RX ADMIN — QUETIAPINE FUMARATE 25 MG: 25 TABLET ORAL at 01:50

## 2021-01-01 RX ADMIN — INSULIN ASPART 5 UNITS: 100 INJECTION, SOLUTION INTRAVENOUS; SUBCUTANEOUS at 18:01

## 2021-01-01 RX ADMIN — CARVEDILOL 3.12 MG: 3.12 TABLET, FILM COATED ORAL at 17:08

## 2021-01-01 RX ADMIN — PROPOFOL 60 MCG/KG/MIN: 10 INJECTION, EMULSION INTRAVENOUS at 06:23

## 2021-01-01 RX ADMIN — INSULIN GLARGINE 30 UNITS: 100 INJECTION, SOLUTION SUBCUTANEOUS at 08:42

## 2021-01-01 RX ADMIN — CARVEDILOL 12.5 MG: 6.25 TABLET, FILM COATED ORAL at 20:42

## 2021-01-01 RX ADMIN — HYDROMORPHONE HYDROCHLORIDE 0.5 MG: 1 INJECTION, SOLUTION INTRAMUSCULAR; INTRAVENOUS; SUBCUTANEOUS at 09:55

## 2021-01-01 RX ADMIN — MICONAZOLE NITRATE: 2 POWDER TOPICAL at 18:09

## 2021-01-01 RX ADMIN — MICONAZOLE NITRATE: 2 POWDER TOPICAL at 07:48

## 2021-01-01 RX ADMIN — POTASSIUM CHLORIDE 20 MEQ: 1500 TABLET, EXTENDED RELEASE ORAL at 09:11

## 2021-01-01 RX ADMIN — INSULIN ASPART 4 UNITS: 100 INJECTION, SOLUTION INTRAVENOUS; SUBCUTANEOUS at 16:52

## 2021-01-01 RX ADMIN — INSULIN ASPART 2 UNITS: 100 INJECTION, SOLUTION INTRAVENOUS; SUBCUTANEOUS at 00:09

## 2021-01-01 RX ADMIN — AMLODIPINE BESYLATE 5 MG: 5 TABLET ORAL at 09:15

## 2021-01-01 RX ADMIN — EPOPROSTENOL 20 NG/KG/MIN: 1.5 INJECTION, POWDER, LYOPHILIZED, FOR SOLUTION INTRAVENOUS at 09:57

## 2021-01-01 RX ADMIN — QUETIAPINE FUMARATE 25 MG: 25 TABLET ORAL at 03:58

## 2021-01-01 RX ADMIN — HYDRALAZINE HYDROCHLORIDE 50 MG: 50 TABLET, FILM COATED ORAL at 17:05

## 2021-01-01 RX ADMIN — PROPOFOL 25 MCG/KG/MIN: 10 INJECTION, EMULSION INTRAVENOUS at 08:07

## 2021-01-01 RX ADMIN — OMEPRAZOLE 20 MG: 20 CAPSULE, DELAYED RELEASE ORAL at 09:10

## 2021-01-01 RX ADMIN — LABETALOL HYDROCHLORIDE 10 MG: 5 INJECTION, SOLUTION INTRAVENOUS at 06:03

## 2021-01-01 RX ADMIN — MICONAZOLE NITRATE: 2 POWDER TOPICAL at 20:54

## 2021-01-01 RX ADMIN — INSULIN ASPART 2 UNITS: 100 INJECTION, SOLUTION INTRAVENOUS; SUBCUTANEOUS at 04:31

## 2021-01-01 RX ADMIN — MICONAZOLE NITRATE: 2 POWDER TOPICAL at 09:08

## 2021-01-01 RX ADMIN — INSULIN ASPART 3 UNITS: 100 INJECTION, SOLUTION INTRAVENOUS; SUBCUTANEOUS at 00:32

## 2021-01-01 RX ADMIN — TAMSULOSIN HYDROCHLORIDE 0.4 MG: 0.4 CAPSULE ORAL at 08:01

## 2021-01-01 RX ADMIN — MICONAZOLE NITRATE: 2 POWDER TOPICAL at 20:41

## 2021-01-01 RX ADMIN — INSULIN ASPART 3 UNITS: 100 INJECTION, SOLUTION INTRAVENOUS; SUBCUTANEOUS at 12:35

## 2021-01-01 RX ADMIN — MICONAZOLE NITRATE: 2 POWDER TOPICAL at 21:20

## 2021-01-01 RX ADMIN — Medication 0.03 MCG/KG/MIN: at 15:40

## 2021-01-01 RX ADMIN — SODIUM CHLORIDE 250 ML: 9 INJECTION, SOLUTION INTRAVENOUS at 15:28

## 2021-01-01 RX ADMIN — HYDRALAZINE HYDROCHLORIDE 50 MG: 50 TABLET, FILM COATED ORAL at 09:05

## 2021-01-01 RX ADMIN — MICONAZOLE NITRATE: 2 POWDER TOPICAL at 08:33

## 2021-01-01 RX ADMIN — GUAIFENESIN 10 ML: 200 SOLUTION ORAL at 09:10

## 2021-01-01 RX ADMIN — ALBUMIN HUMAN 12.5 G: 0.25 SOLUTION INTRAVENOUS at 01:18

## 2021-01-01 RX ADMIN — AMLODIPINE BESYLATE 5 MG: 5 TABLET ORAL at 08:35

## 2021-01-01 RX ADMIN — HYDROMORPHONE HYDROCHLORIDE 0.2 MG: 0.2 INJECTION, SOLUTION INTRAMUSCULAR; INTRAVENOUS; SUBCUTANEOUS at 15:55

## 2021-01-01 RX ADMIN — ATORVASTATIN CALCIUM 80 MG: 80 TABLET, FILM COATED ORAL at 08:57

## 2021-01-01 RX ADMIN — ALBUMIN HUMAN 25 G: 0.25 SOLUTION INTRAVENOUS at 14:44

## 2021-01-01 RX ADMIN — OMEPRAZOLE 20 MG: 20 CAPSULE, DELAYED RELEASE ORAL at 19:53

## 2021-01-01 RX ADMIN — INSULIN GLARGINE 15 UNITS: 100 INJECTION, SOLUTION SUBCUTANEOUS at 17:23

## 2021-01-01 RX ADMIN — CHLORHEXIDINE GLUCONATE 15 ML: 1.2 RINSE ORAL at 20:17

## 2021-01-01 RX ADMIN — ATORVASTATIN CALCIUM 80 MG: 80 TABLET, FILM COATED ORAL at 08:00

## 2021-01-01 RX ADMIN — APIXABAN 5 MG: 5 TABLET, FILM COATED ORAL at 09:10

## 2021-01-01 RX ADMIN — CARVEDILOL 3.12 MG: 3.12 TABLET, FILM COATED ORAL at 13:04

## 2021-01-01 RX ADMIN — HEPARIN SODIUM 6000 UNITS: 1000 INJECTION INTRAVENOUS; SUBCUTANEOUS at 12:11

## 2021-01-01 RX ADMIN — ACETAMINOPHEN 650 MG: 325 TABLET, FILM COATED ORAL at 08:53

## 2021-01-01 RX ADMIN — HYDRALAZINE HYDROCHLORIDE 50 MG: 50 TABLET, FILM COATED ORAL at 04:29

## 2021-01-01 RX ADMIN — ATORVASTATIN CALCIUM 80 MG: 80 TABLET, FILM COATED ORAL at 08:53

## 2021-01-01 RX ADMIN — Medication 40 MG: at 19:57

## 2021-01-01 RX ADMIN — ATORVASTATIN CALCIUM 80 MG: 80 TABLET, FILM COATED ORAL at 08:22

## 2021-01-01 RX ADMIN — HYDRALAZINE HYDROCHLORIDE 10 MG: 20 INJECTION INTRAMUSCULAR; INTRAVENOUS at 02:31

## 2021-01-01 RX ADMIN — CHLORHEXIDINE GLUCONATE 15 ML: 1.2 RINSE ORAL at 20:34

## 2021-01-01 RX ADMIN — INSULIN ASPART 5 UNITS: 100 INJECTION, SOLUTION INTRAVENOUS; SUBCUTANEOUS at 10:22

## 2021-01-01 RX ADMIN — PROPOFOL 30 MCG/KG/MIN: 10 INJECTION, EMULSION INTRAVENOUS at 22:08

## 2021-01-01 RX ADMIN — GUAIFENESIN 10 ML: 200 SOLUTION ORAL at 08:28

## 2021-01-01 RX ADMIN — INSULIN ASPART 5 UNITS: 100 INJECTION, SOLUTION INTRAVENOUS; SUBCUTANEOUS at 23:32

## 2021-01-01 RX ADMIN — Medication 1 PACKET: at 08:15

## 2021-01-01 RX ADMIN — INSULIN ASPART 2 UNITS: 100 INJECTION, SOLUTION INTRAVENOUS; SUBCUTANEOUS at 04:11

## 2021-01-01 RX ADMIN — CARVEDILOL 6.25 MG: 6.25 TABLET, FILM COATED ORAL at 17:21

## 2021-01-01 RX ADMIN — INSULIN GLARGINE 20 UNITS: 100 INJECTION, SOLUTION SUBCUTANEOUS at 21:36

## 2021-01-01 RX ADMIN — APIXABAN 5 MG: 5 TABLET, FILM COATED ORAL at 08:27

## 2021-01-01 RX ADMIN — DEXTROSE MONOHYDRATE 1000 ML: 100 INJECTION, SOLUTION INTRAVENOUS at 11:45

## 2021-01-01 RX ADMIN — INSULIN ASPART 4 UNITS: 100 INJECTION, SOLUTION INTRAVENOUS; SUBCUTANEOUS at 20:41

## 2021-01-01 RX ADMIN — APIXABAN 5 MG: 5 TABLET, FILM COATED ORAL at 08:02

## 2021-01-01 RX ADMIN — HYDRALAZINE HYDROCHLORIDE 50 MG: 50 TABLET, FILM COATED ORAL at 08:39

## 2021-01-01 RX ADMIN — CHLORHEXIDINE GLUCONATE 15 ML: 1.2 RINSE ORAL at 08:01

## 2021-01-01 RX ADMIN — QUETIAPINE FUMARATE 25 MG: 25 TABLET ORAL at 21:48

## 2021-01-01 RX ADMIN — PROPOFOL 35 MCG/KG/MIN: 10 INJECTION, EMULSION INTRAVENOUS at 15:45

## 2021-01-01 RX ADMIN — Medication 5 ML: at 07:50

## 2021-01-01 RX ADMIN — CHLORHEXIDINE GLUCONATE 15 ML: 1.2 RINSE ORAL at 20:05

## 2021-01-01 RX ADMIN — PROPOFOL 20 MG: 10 INJECTION, EMULSION INTRAVENOUS at 10:42

## 2021-01-01 RX ADMIN — HYDRALAZINE HYDROCHLORIDE 20 MG: 20 INJECTION INTRAMUSCULAR; INTRAVENOUS at 10:54

## 2021-01-01 RX ADMIN — CHLORHEXIDINE GLUCONATE 15 ML: 1.2 RINSE ORAL at 19:48

## 2021-01-01 RX ADMIN — DOXAZOSIN 1 MG: 1 TABLET ORAL at 22:20

## 2021-01-01 RX ADMIN — HYDRALAZINE HYDROCHLORIDE 50 MG: 50 TABLET, FILM COATED ORAL at 06:04

## 2021-01-01 RX ADMIN — INSULIN ASPART 1 UNITS: 100 INJECTION, SOLUTION INTRAVENOUS; SUBCUTANEOUS at 17:23

## 2021-01-01 RX ADMIN — CARVEDILOL 12.5 MG: 6.25 TABLET, FILM COATED ORAL at 21:17

## 2021-01-01 RX ADMIN — Medication 40 MG: at 08:44

## 2021-01-01 RX ADMIN — HYDRALAZINE HYDROCHLORIDE 50 MG: 50 TABLET, FILM COATED ORAL at 18:35

## 2021-01-01 RX ADMIN — INSULIN ASPART 3 UNITS: 100 INJECTION, SOLUTION INTRAVENOUS; SUBCUTANEOUS at 09:11

## 2021-01-01 RX ADMIN — FUROSEMIDE 40 MG: 40 TABLET ORAL at 09:52

## 2021-01-01 RX ADMIN — CARVEDILOL 12.5 MG: 6.25 TABLET, FILM COATED ORAL at 21:13

## 2021-01-01 RX ADMIN — PANTOPRAZOLE SODIUM 40 MG: 40 TABLET, DELAYED RELEASE ORAL at 17:28

## 2021-01-01 RX ADMIN — PROCHLORPERAZINE EDISYLATE 5 MG: 5 INJECTION INTRAMUSCULAR; INTRAVENOUS at 10:09

## 2021-01-01 RX ADMIN — CARVEDILOL 6.25 MG: 6.25 TABLET, FILM COATED ORAL at 18:41

## 2021-01-01 RX ADMIN — CLINDAMYCIN PHOSPHATE 900 MG: 900 INJECTION, SOLUTION INTRAVENOUS at 10:52

## 2021-01-01 RX ADMIN — VANCOMYCIN HYDROCHLORIDE 1500 MG: 5 INJECTION, POWDER, LYOPHILIZED, FOR SOLUTION INTRAVENOUS at 06:44

## 2021-01-01 RX ADMIN — ATORVASTATIN CALCIUM 80 MG: 80 TABLET, FILM COATED ORAL at 08:02

## 2021-01-01 RX ADMIN — OMEPRAZOLE 20 MG: 20 CAPSULE, DELAYED RELEASE ORAL at 08:57

## 2021-01-01 RX ADMIN — HYDROMORPHONE HYDROCHLORIDE 0.2 MG: 0.2 INJECTION, SOLUTION INTRAMUSCULAR; INTRAVENOUS; SUBCUTANEOUS at 14:26

## 2021-01-01 RX ADMIN — GLUCAGON HYDROCHLORIDE 1 MG: KIT at 12:30

## 2021-01-01 RX ADMIN — ACETAMINOPHEN 650 MG: 325 TABLET, FILM COATED ORAL at 19:27

## 2021-01-01 RX ADMIN — HYDRALAZINE HYDROCHLORIDE 50 MG: 25 TABLET, FILM COATED ORAL at 20:42

## 2021-01-01 RX ADMIN — CARVEDILOL 6.25 MG: 6.25 TABLET, FILM COATED ORAL at 08:15

## 2021-01-01 ASSESSMENT — ACTIVITIES OF DAILY LIVING (ADL)
ADLS_ACUITY_SCORE: 11
ADLS_ACUITY_SCORE: 11
ADLS_ACUITY_SCORE: 15
ADLS_ACUITY_SCORE: 11
ADLS_ACUITY_SCORE: 15
ADLS_ACUITY_SCORE: 13
ADLS_ACUITY_SCORE: 13
ADLS_ACUITY_SCORE: 11
ADLS_ACUITY_SCORE: 12
ADLS_ACUITY_SCORE: 15
ADLS_ACUITY_SCORE: 15
ADLS_ACUITY_SCORE: 11
ADLS_ACUITY_SCORE: 9
ADLS_ACUITY_SCORE: 12
ADLS_ACUITY_SCORE: 11
ADLS_ACUITY_SCORE: 13
ADLS_ACUITY_SCORE: 11
ADLS_ACUITY_SCORE: 13
ADLS_ACUITY_SCORE: 15
ADLS_ACUITY_SCORE: 11
ADLS_ACUITY_SCORE: 17
ADLS_ACUITY_SCORE: 13
ADLS_ACUITY_SCORE: 13
ADLS_ACUITY_SCORE: 15
ADLS_ACUITY_SCORE: 3
ADLS_ACUITY_SCORE: 15
ADLS_ACUITY_SCORE: 15
ADLS_ACUITY_SCORE: 3
ADLS_ACUITY_SCORE: 15
ADLS_ACUITY_SCORE: 15
ADLS_ACUITY_SCORE: 13
ADLS_ACUITY_SCORE: 15
ADLS_ACUITY_SCORE: 10
ADLS_ACUITY_SCORE: 13
ADLS_ACUITY_SCORE: 9
ADLS_ACUITY_SCORE: 11
ADLS_ACUITY_SCORE: 17
ADLS_ACUITY_SCORE: 15
ADLS_ACUITY_SCORE: 10
ADLS_ACUITY_SCORE: 13
ADLS_ACUITY_SCORE: 12
ADLS_ACUITY_SCORE: 15
ADLS_ACUITY_SCORE: 13
ADLS_ACUITY_SCORE: 17
ADLS_ACUITY_SCORE: 5
ADLS_ACUITY_SCORE: 13
ADLS_ACUITY_SCORE: 7
ADLS_ACUITY_SCORE: 19
ADLS_ACUITY_SCORE: 15
ADLS_ACUITY_SCORE: 13
ADLS_ACUITY_SCORE: 12
ADLS_ACUITY_SCORE: 15
ADLS_ACUITY_SCORE: 12
ADLS_ACUITY_SCORE: 9
ADLS_ACUITY_SCORE: 11
ADLS_ACUITY_SCORE: 15
ADLS_ACUITY_SCORE: 15
ADLS_ACUITY_SCORE: 9
ADLS_ACUITY_SCORE: 15
ADLS_ACUITY_SCORE: 15
ADLS_ACUITY_SCORE: 13
ADLS_ACUITY_SCORE: 17
ADLS_ACUITY_SCORE: 5
ADLS_ACUITY_SCORE: 15
ADLS_ACUITY_SCORE: 13
ADLS_ACUITY_SCORE: 15
ADLS_ACUITY_SCORE: 9
ADLS_ACUITY_SCORE: 13
ADLS_ACUITY_SCORE: 15
ADLS_ACUITY_SCORE: 13
ADLS_ACUITY_SCORE: 13
ADLS_ACUITY_SCORE: 9
ADLS_ACUITY_SCORE: 17
ADLS_ACUITY_SCORE: 15
ADLS_ACUITY_SCORE: 11
ADLS_ACUITY_SCORE: 15
ADLS_ACUITY_SCORE: 19
ADLS_ACUITY_SCORE: 15
ADLS_ACUITY_SCORE: 15
ADLS_ACUITY_SCORE: 11
ADLS_ACUITY_SCORE: 15
ADLS_ACUITY_SCORE: 11
ADLS_ACUITY_SCORE: 11
ADLS_ACUITY_SCORE: 19
ADLS_ACUITY_SCORE: 17
ADLS_ACUITY_SCORE: 19
ADLS_ACUITY_SCORE: 13
ADLS_ACUITY_SCORE: 15
ADLS_ACUITY_SCORE: 13
ADLS_ACUITY_SCORE: 7
ADLS_ACUITY_SCORE: 13
ADLS_ACUITY_SCORE: 17
ADLS_ACUITY_SCORE: 13
ADLS_ACUITY_SCORE: 13
ADLS_ACUITY_SCORE: 17
ADLS_ACUITY_SCORE: 13
ADLS_ACUITY_SCORE: 11
ADLS_ACUITY_SCORE: 13
ADLS_ACUITY_SCORE: 9
ADLS_ACUITY_SCORE: 3
ADLS_ACUITY_SCORE: 15
ADLS_ACUITY_SCORE: 10
ADLS_ACUITY_SCORE: 15
ADLS_ACUITY_SCORE: 14
ADLS_ACUITY_SCORE: 13
ADLS_ACUITY_SCORE: 15
ADLS_ACUITY_SCORE: 19
ADLS_ACUITY_SCORE: 17
ADLS_ACUITY_SCORE: 14
ADLS_ACUITY_SCORE: 15
ADLS_ACUITY_SCORE: 16
ADLS_ACUITY_SCORE: 15
ADLS_ACUITY_SCORE: 9
ADLS_ACUITY_SCORE: 15
ADLS_ACUITY_SCORE: 12
ADLS_ACUITY_SCORE: 17
ADLS_ACUITY_SCORE: 13
ADLS_ACUITY_SCORE: 17
ADLS_ACUITY_SCORE: 13
ADLS_ACUITY_SCORE: 15
ADLS_ACUITY_SCORE: 13
ADLS_ACUITY_SCORE: 19
ADLS_ACUITY_SCORE: 15
ADLS_ACUITY_SCORE: 13
ADLS_ACUITY_SCORE: 11
ADLS_ACUITY_SCORE: 15
ADLS_ACUITY_SCORE: 15
ADLS_ACUITY_SCORE: 19
ADLS_ACUITY_SCORE: 11
ADLS_ACUITY_SCORE: 13
ADLS_ACUITY_SCORE: 15
ADLS_ACUITY_SCORE: 11
ADLS_ACUITY_SCORE: 15
ADLS_ACUITY_SCORE: 17
ADLS_ACUITY_SCORE: 10
ADLS_ACUITY_SCORE: 13
ADLS_ACUITY_SCORE: 13
ADLS_ACUITY_SCORE: 12
ADLS_ACUITY_SCORE: 15
ADLS_ACUITY_SCORE: 17
ADLS_ACUITY_SCORE: 13
ADLS_ACUITY_SCORE: 11
ADLS_ACUITY_SCORE: 17
ADLS_ACUITY_SCORE: 13
ADLS_ACUITY_SCORE: 13
ADLS_ACUITY_SCORE: 15
ADLS_ACUITY_SCORE: 14
ADLS_ACUITY_SCORE: 10
ADLS_ACUITY_SCORE: 15
ADLS_ACUITY_SCORE: 17
ADLS_ACUITY_SCORE: 13
ADLS_ACUITY_SCORE: 11
ADLS_ACUITY_SCORE: 17
ADLS_ACUITY_SCORE: 13
ADLS_ACUITY_SCORE: 11
ADLS_ACUITY_SCORE: 15
ADLS_ACUITY_SCORE: 14
ADLS_ACUITY_SCORE: 15
ADLS_ACUITY_SCORE: 9
ADLS_ACUITY_SCORE: 15
ADLS_ACUITY_SCORE: 10
ADLS_ACUITY_SCORE: 13
ADLS_ACUITY_SCORE: 11
ADLS_ACUITY_SCORE: 13
ADLS_ACUITY_SCORE: 13
ADLS_ACUITY_SCORE: 17
ADLS_ACUITY_SCORE: 12
ADLS_ACUITY_SCORE: 13
ADLS_ACUITY_SCORE: 17
ADLS_ACUITY_SCORE: 13
ADLS_ACUITY_SCORE: 11
ADLS_ACUITY_SCORE: 15
ADLS_ACUITY_SCORE: 15
ADLS_ACUITY_SCORE: 13
ADLS_ACUITY_SCORE: 15
ADLS_ACUITY_SCORE: 12
ADLS_ACUITY_SCORE: 15
ADLS_ACUITY_SCORE: 13
ADLS_ACUITY_SCORE: 17
ADLS_ACUITY_SCORE: 5
ADLS_ACUITY_SCORE: 12
ADLS_ACUITY_SCORE: 13
ADLS_ACUITY_SCORE: 13
ADLS_ACUITY_SCORE: 9
ADLS_ACUITY_SCORE: 13
ADLS_ACUITY_SCORE: 15
ADLS_ACUITY_SCORE: 13
ADLS_ACUITY_SCORE: 13
ADLS_ACUITY_SCORE: 15
ADLS_ACUITY_SCORE: 17
ADLS_ACUITY_SCORE: 13
ADLS_ACUITY_SCORE: 11
ADLS_ACUITY_SCORE: 11
ADLS_ACUITY_SCORE: 13
ADLS_ACUITY_SCORE: 10
ADLS_ACUITY_SCORE: 15
ADLS_ACUITY_SCORE: 9
ADLS_ACUITY_SCORE: 15
ADLS_ACUITY_SCORE: 19
ADLS_ACUITY_SCORE: 17
ADLS_ACUITY_SCORE: 15
ADLS_ACUITY_SCORE: 17
ADLS_ACUITY_SCORE: 10
ADLS_ACUITY_SCORE: 12
ADLS_ACUITY_SCORE: 17
ADLS_ACUITY_SCORE: 15
ADLS_ACUITY_SCORE: 17
ADLS_ACUITY_SCORE: 10
ADLS_ACUITY_SCORE: 15
ADLS_ACUITY_SCORE: 12
ADLS_ACUITY_SCORE: 13
ADLS_ACUITY_SCORE: 13
ADLS_ACUITY_SCORE: 15
ADLS_ACUITY_SCORE: 13
ADLS_ACUITY_SCORE: 13
ADLS_ACUITY_SCORE: 15
ADLS_ACUITY_SCORE: 11
ADLS_ACUITY_SCORE: 12
ADLS_ACUITY_SCORE: 17
ADLS_ACUITY_SCORE: 11
ADLS_ACUITY_SCORE: 9
ADLS_ACUITY_SCORE: 13
ADLS_ACUITY_SCORE: 9
ADLS_ACUITY_SCORE: 9
ADLS_ACUITY_SCORE: 11
ADLS_ACUITY_SCORE: 15
ADLS_ACUITY_SCORE: 13
IADL_COMMENTS: PT UNABLE TO STATE
ADLS_ACUITY_SCORE: 9
ADLS_ACUITY_SCORE: 15
ADLS_ACUITY_SCORE: 19
ADLS_ACUITY_SCORE: 15
ADLS_ACUITY_SCORE: 12
ADLS_ACUITY_SCORE: 15
ADLS_ACUITY_SCORE: 11
ADLS_ACUITY_SCORE: 11
ADLS_ACUITY_SCORE: 13
ADLS_ACUITY_SCORE: 19
ADLS_ACUITY_SCORE: 9
ADLS_ACUITY_SCORE: 15
ADLS_ACUITY_SCORE: 11
ADLS_ACUITY_SCORE: 15
ADLS_ACUITY_SCORE: 13
ADLS_ACUITY_SCORE: 15
ADLS_ACUITY_SCORE: 11
ADLS_ACUITY_SCORE: 17
ADLS_ACUITY_SCORE: 5
ADLS_ACUITY_SCORE: 13
ADLS_ACUITY_SCORE: 11
ADLS_ACUITY_SCORE: 15
ADLS_ACUITY_SCORE: 19
ADLS_ACUITY_SCORE: 15
ADLS_ACUITY_SCORE: 9
ADLS_ACUITY_SCORE: 11
ADLS_ACUITY_SCORE: 15
ADLS_ACUITY_SCORE: 13
ADLS_ACUITY_SCORE: 3
ADLS_ACUITY_SCORE: 13
ADLS_ACUITY_SCORE: 17
ADLS_ACUITY_SCORE: 17
ADLS_ACUITY_SCORE: 13
ADLS_ACUITY_SCORE: 15
ADLS_ACUITY_SCORE: 13
ADLS_ACUITY_SCORE: 15
ADLS_ACUITY_SCORE: 9
ADLS_ACUITY_SCORE: 13
ADLS_ACUITY_SCORE: 13
ADLS_ACUITY_SCORE: 17
ADLS_ACUITY_SCORE: 12
ADLS_ACUITY_SCORE: 15
ADLS_ACUITY_SCORE: 17
ADLS_ACUITY_SCORE: 15
ADLS_ACUITY_SCORE: 11
ADLS_ACUITY_SCORE: 12
ADLS_ACUITY_SCORE: 15
ADLS_ACUITY_SCORE: 17
ADLS_ACUITY_SCORE: 3
ADLS_ACUITY_SCORE: 15
ADLS_ACUITY_SCORE: 17
ADLS_ACUITY_SCORE: 13
ADLS_ACUITY_SCORE: 15
ADLS_ACUITY_SCORE: 15
ADLS_ACUITY_SCORE: 16
ADLS_ACUITY_SCORE: 17
ADLS_ACUITY_SCORE: 15
ADLS_ACUITY_SCORE: 15
ADLS_ACUITY_SCORE: 13
ADLS_ACUITY_SCORE: 15
ADLS_ACUITY_SCORE: 17
ADLS_ACUITY_SCORE: 17
ADLS_ACUITY_SCORE: 12
ADLS_ACUITY_SCORE: 15
ADLS_ACUITY_SCORE: 17
ADLS_ACUITY_SCORE: 13
ADLS_ACUITY_SCORE: 9
ADLS_ACUITY_SCORE: 13
ADLS_ACUITY_SCORE: 17
ADLS_ACUITY_SCORE: 12
ADLS_ACUITY_SCORE: 9
ADLS_ACUITY_SCORE: 11
ADLS_ACUITY_SCORE: 13
ADLS_ACUITY_SCORE: 11
ADLS_ACUITY_SCORE: 13
ADLS_ACUITY_SCORE: 13
ADLS_ACUITY_SCORE: 15
ADLS_ACUITY_SCORE: 19
ADLS_ACUITY_SCORE: 13
ADLS_ACUITY_SCORE: 13
ADLS_ACUITY_SCORE: 11
ADLS_ACUITY_SCORE: 19
ADLS_ACUITY_SCORE: 19
ADLS_ACUITY_SCORE: 13
ADLS_ACUITY_SCORE: 11
ADLS_ACUITY_SCORE: 17
ADLS_ACUITY_SCORE: 12
ADLS_ACUITY_SCORE: 9
ADLS_ACUITY_SCORE: 3
ADLS_ACUITY_SCORE: 5
ADLS_ACUITY_SCORE: 13
ADLS_ACUITY_SCORE: 13
ADLS_ACUITY_SCORE: 12
ADLS_ACUITY_SCORE: 13
ADLS_ACUITY_SCORE: 15
ADLS_ACUITY_SCORE: 17
ADLS_ACUITY_SCORE: 15
ADLS_ACUITY_SCORE: 9
ADLS_ACUITY_SCORE: 11
ADLS_ACUITY_SCORE: 12
ADLS_ACUITY_SCORE: 17
ADLS_ACUITY_SCORE: 11
ADLS_ACUITY_SCORE: 15
ADLS_ACUITY_SCORE: 15
ADLS_ACUITY_SCORE: 5
ADLS_ACUITY_SCORE: 12
ADLS_ACUITY_SCORE: 17
ADLS_ACUITY_SCORE: 15
ADLS_ACUITY_SCORE: 13
ADLS_ACUITY_SCORE: 17
ADLS_ACUITY_SCORE: 17
ADLS_ACUITY_SCORE: 15
ADLS_ACUITY_SCORE: 13
ADLS_ACUITY_SCORE: 9
ADLS_ACUITY_SCORE: 17
ADLS_ACUITY_SCORE: 15
ADLS_ACUITY_SCORE: 15
ADLS_ACUITY_SCORE: 9
ADLS_ACUITY_SCORE: 13
ADLS_ACUITY_SCORE: 15
ADLS_ACUITY_SCORE: 13
ADLS_ACUITY_SCORE: 15
ADLS_ACUITY_SCORE: 17
ADLS_ACUITY_SCORE: 15
ADLS_ACUITY_SCORE: 11
ADLS_ACUITY_SCORE: 13
ADLS_ACUITY_SCORE: 15
ADLS_ACUITY_SCORE: 15
ADLS_ACUITY_SCORE: 3
ADLS_ACUITY_SCORE: 13
ADLS_ACUITY_SCORE: 5
ADLS_ACUITY_SCORE: 11
ADLS_ACUITY_SCORE: 19
ADLS_ACUITY_SCORE: 19
ADLS_ACUITY_SCORE: 13
ADLS_ACUITY_SCORE: 12
ADLS_ACUITY_SCORE: 15
ADLS_ACUITY_SCORE: 9
ADLS_ACUITY_SCORE: 17
ADLS_ACUITY_SCORE: 15
ADLS_ACUITY_SCORE: 9
ADLS_ACUITY_SCORE: 15
ADLS_ACUITY_SCORE: 9
ADLS_ACUITY_SCORE: 17
ADLS_ACUITY_SCORE: 13
ADLS_ACUITY_SCORE: 15
ADLS_ACUITY_SCORE: 17
ADLS_ACUITY_SCORE: 11
ADLS_ACUITY_SCORE: 13
ADLS_ACUITY_SCORE: 5
ADLS_ACUITY_SCORE: 17
ADLS_ACUITY_SCORE: 19
ADLS_ACUITY_SCORE: 15
ADLS_ACUITY_SCORE: 14
ADLS_ACUITY_SCORE: 9
ADLS_ACUITY_SCORE: 15
ADLS_ACUITY_SCORE: 15
ADLS_ACUITY_SCORE: 12
ADLS_ACUITY_SCORE: 14
ADLS_ACUITY_SCORE: 17
ADLS_ACUITY_SCORE: 15
ADLS_ACUITY_SCORE: 5
ADLS_ACUITY_SCORE: 15
ADLS_ACUITY_SCORE: 13
ADLS_ACUITY_SCORE: 13
ADLS_ACUITY_SCORE: 11
ADLS_ACUITY_SCORE: 15
ADLS_ACUITY_SCORE: 17
ADLS_ACUITY_SCORE: 11
ADLS_ACUITY_SCORE: 15
ADLS_ACUITY_SCORE: 13
ADLS_ACUITY_SCORE: 15
ADLS_ACUITY_SCORE: 21
ADLS_ACUITY_SCORE: 9
ADLS_ACUITY_SCORE: 12
ADLS_ACUITY_SCORE: 9
ADLS_ACUITY_SCORE: 11
ADLS_ACUITY_SCORE: 13
ADLS_ACUITY_SCORE: 15
ADLS_ACUITY_SCORE: 12
ADLS_ACUITY_SCORE: 15
ADLS_ACUITY_SCORE: 15
ADLS_ACUITY_SCORE: 11
ADLS_ACUITY_SCORE: 9
ADLS_ACUITY_SCORE: 10
ADLS_ACUITY_SCORE: 9
ADLS_ACUITY_SCORE: 12
ADLS_ACUITY_SCORE: 11
ADLS_ACUITY_SCORE: 15
ADLS_ACUITY_SCORE: 13
ADLS_ACUITY_SCORE: 17
ADLS_ACUITY_SCORE: 17
ADLS_ACUITY_SCORE: 15
ADLS_ACUITY_SCORE: 13
ADLS_ACUITY_SCORE: 15
ADLS_ACUITY_SCORE: 13
ADLS_ACUITY_SCORE: 13
ADLS_ACUITY_SCORE: 17
ADLS_ACUITY_SCORE: 17
ADLS_ACUITY_SCORE: 15
ADLS_ACUITY_SCORE: 7
ADLS_ACUITY_SCORE: 11
ADLS_ACUITY_SCORE: 3
ADLS_ACUITY_SCORE: 15
ADLS_ACUITY_SCORE: 17
ADLS_ACUITY_SCORE: 15
ADLS_ACUITY_SCORE: 15
ADLS_ACUITY_SCORE: 7
ADLS_ACUITY_SCORE: 13
ADLS_ACUITY_SCORE: 13
ADLS_ACUITY_SCORE: 12
ADLS_ACUITY_SCORE: 9
ADLS_ACUITY_SCORE: 15
ADLS_ACUITY_SCORE: 3
ADLS_ACUITY_SCORE: 15
ADLS_ACUITY_SCORE: 9
ADLS_ACUITY_SCORE: 15
ADLS_ACUITY_SCORE: 5
ADLS_ACUITY_SCORE: 13
ADLS_ACUITY_SCORE: 13
ADLS_ACUITY_SCORE: 9
ADLS_ACUITY_SCORE: 11
ADLS_ACUITY_SCORE: 11
ADLS_ACUITY_SCORE: 15
ADLS_ACUITY_SCORE: 15
ADLS_ACUITY_SCORE: 13
ADLS_ACUITY_SCORE: 5
ADLS_ACUITY_SCORE: 15
ADLS_ACUITY_SCORE: 17
ADLS_ACUITY_SCORE: 15
ADLS_ACUITY_SCORE: 17
ADLS_ACUITY_SCORE: 11
ADLS_ACUITY_SCORE: 13
ADLS_ACUITY_SCORE: 13
ADLS_ACUITY_SCORE: 5
ADLS_ACUITY_SCORE: 17
ADLS_ACUITY_SCORE: 11
ADLS_ACUITY_SCORE: 11
ADLS_ACUITY_SCORE: 12
ADLS_ACUITY_SCORE: 17
ADLS_ACUITY_SCORE: 11
ADLS_ACUITY_SCORE: 13
ADLS_ACUITY_SCORE: 13
ADLS_ACUITY_SCORE: 15
ADLS_ACUITY_SCORE: 15
ADLS_ACUITY_SCORE: 10
ADLS_ACUITY_SCORE: 12
ADLS_ACUITY_SCORE: 17
ADLS_ACUITY_SCORE: 15
ADLS_ACUITY_SCORE: 13
ADLS_ACUITY_SCORE: 17
ADLS_ACUITY_SCORE: 15
ADLS_ACUITY_SCORE: 14
ADLS_ACUITY_SCORE: 15
ADLS_ACUITY_SCORE: 11
ADLS_ACUITY_SCORE: 11
ADLS_ACUITY_SCORE: 13
ADLS_ACUITY_SCORE: 13
ADLS_ACUITY_SCORE: 11
ADLS_ACUITY_SCORE: 13
ADLS_ACUITY_SCORE: 15
ADLS_ACUITY_SCORE: 13
ADLS_ACUITY_SCORE: 15
ADLS_ACUITY_SCORE: 15
ADLS_ACUITY_SCORE: 17
ADLS_ACUITY_SCORE: 13
ADLS_ACUITY_SCORE: 9
ADLS_ACUITY_SCORE: 19
ADLS_ACUITY_SCORE: 19
ADLS_ACUITY_SCORE: 9
ADLS_ACUITY_SCORE: 17
ADLS_ACUITY_SCORE: 13
ADLS_ACUITY_SCORE: 13
ADLS_ACUITY_SCORE: 11
ADLS_ACUITY_SCORE: 15
ADLS_ACUITY_SCORE: 10
ADLS_ACUITY_SCORE: 13
ADLS_ACUITY_SCORE: 17
ADLS_ACUITY_SCORE: 15
ADLS_ACUITY_SCORE: 11
ADLS_ACUITY_SCORE: 13
ADLS_ACUITY_SCORE: 15
ADLS_ACUITY_SCORE: 17
ADLS_ACUITY_SCORE: 13
ADLS_ACUITY_SCORE: 15
ADLS_ACUITY_SCORE: 13
ADLS_ACUITY_SCORE: 15
ADLS_ACUITY_SCORE: 17
ADLS_ACUITY_SCORE: 15
ADLS_ACUITY_SCORE: 17
ADLS_ACUITY_SCORE: 9
ADLS_ACUITY_SCORE: 15
ADLS_ACUITY_SCORE: 12
ADLS_ACUITY_SCORE: 13
ADLS_ACUITY_SCORE: 15
ADLS_ACUITY_SCORE: 11
ADLS_ACUITY_SCORE: 13
ADLS_ACUITY_SCORE: 5
ADLS_ACUITY_SCORE: 11
ADLS_ACUITY_SCORE: 11
ADLS_ACUITY_SCORE: 10
ADLS_ACUITY_SCORE: 13
ADLS_ACUITY_SCORE: 15
ADLS_ACUITY_SCORE: 17
ADLS_ACUITY_SCORE: 19
ADLS_ACUITY_SCORE: 15
ADLS_ACUITY_SCORE: 13
ADLS_ACUITY_SCORE: 11
ADLS_ACUITY_SCORE: 13
ADLS_ACUITY_SCORE: 11
ADLS_ACUITY_SCORE: 11
ADLS_ACUITY_SCORE: 17
ADLS_ACUITY_SCORE: 15
ADLS_ACUITY_SCORE: 5
ADLS_ACUITY_SCORE: 17
ADLS_ACUITY_SCORE: 11
ADLS_ACUITY_SCORE: 15
ADLS_ACUITY_SCORE: 15
ADLS_ACUITY_SCORE: 9
ADLS_ACUITY_SCORE: 17
ADLS_ACUITY_SCORE: 17
ADLS_ACUITY_SCORE: 13
ADLS_ACUITY_SCORE: 15
ADLS_ACUITY_SCORE: 17
ADLS_ACUITY_SCORE: 11
ADLS_ACUITY_SCORE: 17
ADLS_ACUITY_SCORE: 13
ADLS_ACUITY_SCORE: 15
ADLS_ACUITY_SCORE: 15
ADLS_ACUITY_SCORE: 12
ADLS_ACUITY_SCORE: 9
ADLS_ACUITY_SCORE: 13
ADLS_ACUITY_SCORE: 15
ADLS_ACUITY_SCORE: 9
ADLS_ACUITY_SCORE: 10
ADLS_ACUITY_SCORE: 11
ADLS_ACUITY_SCORE: 11
ADLS_ACUITY_SCORE: 13
ADLS_ACUITY_SCORE: 14
ADLS_ACUITY_SCORE: 9
ADLS_ACUITY_SCORE: 12
ADLS_ACUITY_SCORE: 9
ADLS_ACUITY_SCORE: 15
ADLS_ACUITY_SCORE: 15
ADLS_ACUITY_SCORE: 9
ADLS_ACUITY_SCORE: 15
ADLS_ACUITY_SCORE: 13
ADLS_ACUITY_SCORE: 19
ADLS_ACUITY_SCORE: 15
ADLS_ACUITY_SCORE: 17
ADLS_ACUITY_SCORE: 13
ADLS_ACUITY_SCORE: 15
ADLS_ACUITY_SCORE: 14
ADLS_ACUITY_SCORE: 12
ADLS_ACUITY_SCORE: 15
ADLS_ACUITY_SCORE: 17
ADLS_ACUITY_SCORE: 13
ADLS_ACUITY_SCORE: 9
ADLS_ACUITY_SCORE: 13
ADLS_ACUITY_SCORE: 13
ADLS_ACUITY_SCORE: 11
ADLS_ACUITY_SCORE: 5
ADLS_ACUITY_SCORE: 14
ADLS_ACUITY_SCORE: 12
ADLS_ACUITY_SCORE: 13
ADLS_ACUITY_SCORE: 17
ADLS_ACUITY_SCORE: 15
ADLS_ACUITY_SCORE: 14
ADLS_ACUITY_SCORE: 7
ADLS_ACUITY_SCORE: 10
ADLS_ACUITY_SCORE: 13
ADLS_ACUITY_SCORE: 15
ADLS_ACUITY_SCORE: 14
ADLS_ACUITY_SCORE: 15
ADLS_ACUITY_SCORE: 11
ADLS_ACUITY_SCORE: 11
ADLS_ACUITY_SCORE: 17
ADLS_ACUITY_SCORE: 15
ADLS_ACUITY_SCORE: 12
ADLS_ACUITY_SCORE: 11
ADLS_ACUITY_SCORE: 13
ADLS_ACUITY_SCORE: 15
ADLS_ACUITY_SCORE: 15
ADLS_ACUITY_SCORE: 19
ADLS_ACUITY_SCORE: 13
ADLS_ACUITY_SCORE: 13
ADLS_ACUITY_SCORE: 15
ADLS_ACUITY_SCORE: 17
ADLS_ACUITY_SCORE: 13
ADLS_ACUITY_SCORE: 13
ADLS_ACUITY_SCORE: 17
ADLS_ACUITY_SCORE: 13
ADLS_ACUITY_SCORE: 17
ADLS_ACUITY_SCORE: 13
ADLS_ACUITY_SCORE: 15
ADLS_ACUITY_SCORE: 15
ADLS_ACUITY_SCORE: 3
ADLS_ACUITY_SCORE: 7
ADLS_ACUITY_SCORE: 15
ADLS_ACUITY_SCORE: 17
ADLS_ACUITY_SCORE: 9
ADLS_ACUITY_SCORE: 15
ADLS_ACUITY_SCORE: 13
ADLS_ACUITY_SCORE: 13
ADLS_ACUITY_SCORE: 17
ADLS_ACUITY_SCORE: 12
ADLS_ACUITY_SCORE: 15
ADLS_ACUITY_SCORE: 11
ADLS_ACUITY_SCORE: 5
ADLS_ACUITY_SCORE: 15
ADLS_ACUITY_SCORE: 13
ADLS_ACUITY_SCORE: 15
ADLS_ACUITY_SCORE: 15
ADLS_ACUITY_SCORE: 13
ADLS_ACUITY_SCORE: 15
ADLS_ACUITY_SCORE: 13
ADLS_ACUITY_SCORE: 13
ADLS_ACUITY_SCORE: 9
ADLS_ACUITY_SCORE: 12
ADLS_ACUITY_SCORE: 13
ADLS_ACUITY_SCORE: 13
ADLS_ACUITY_SCORE: 15
ADLS_ACUITY_SCORE: 9
ADLS_ACUITY_SCORE: 15
ADLS_ACUITY_SCORE: 15
ADLS_ACUITY_SCORE: 13
ADLS_ACUITY_SCORE: 9
ADLS_ACUITY_SCORE: 15
ADLS_ACUITY_SCORE: 9
ADLS_ACUITY_SCORE: 12
ADLS_ACUITY_SCORE: 15
ADLS_ACUITY_SCORE: 17
ADLS_ACUITY_SCORE: 15
ADLS_ACUITY_SCORE: 12
ADLS_ACUITY_SCORE: 13
ADLS_ACUITY_SCORE: 13
ADLS_ACUITY_SCORE: 15
ADLS_ACUITY_SCORE: 15
ADLS_ACUITY_SCORE: 12
ADLS_ACUITY_SCORE: 13
ADLS_ACUITY_SCORE: 15
ADLS_ACUITY_SCORE: 13
ADLS_ACUITY_SCORE: 13
ADLS_ACUITY_SCORE: 15
ADLS_ACUITY_SCORE: 12
ADLS_ACUITY_SCORE: 9
ADLS_ACUITY_SCORE: 17
ADLS_ACUITY_SCORE: 15
ADLS_ACUITY_SCORE: 13
ADLS_ACUITY_SCORE: 14
ADLS_ACUITY_SCORE: 17
ADLS_ACUITY_SCORE: 9
ADLS_ACUITY_SCORE: 10
ADLS_ACUITY_SCORE: 15
ADLS_ACUITY_SCORE: 12
ADLS_ACUITY_SCORE: 12
ADLS_ACUITY_SCORE: 15
ADLS_ACUITY_SCORE: 15
ADLS_ACUITY_SCORE: 13
ADLS_ACUITY_SCORE: 10
ADLS_ACUITY_SCORE: 17
ADLS_ACUITY_SCORE: 13
ADLS_ACUITY_SCORE: 17
ADLS_ACUITY_SCORE: 11
ADLS_ACUITY_SCORE: 13
ADLS_ACUITY_SCORE: 17
ADLS_ACUITY_SCORE: 15
ADLS_ACUITY_SCORE: 15
ADLS_ACUITY_SCORE: 17
ADLS_ACUITY_SCORE: 15
ADLS_ACUITY_SCORE: 19
ADLS_ACUITY_SCORE: 15
ADLS_ACUITY_SCORE: 13
ADLS_ACUITY_SCORE: 17
ADLS_ACUITY_SCORE: 15
ADLS_ACUITY_SCORE: 13
ADLS_ACUITY_SCORE: 17
ADLS_ACUITY_SCORE: 13
ADLS_ACUITY_SCORE: 12
ADLS_ACUITY_SCORE: 15
ADLS_ACUITY_SCORE: 15
ADLS_ACUITY_SCORE: 17
ADLS_ACUITY_SCORE: 17
ADLS_ACUITY_SCORE: 15
ADLS_ACUITY_SCORE: 17
ADLS_ACUITY_SCORE: 11
ADLS_ACUITY_SCORE: 15
ADLS_ACUITY_SCORE: 17
ADLS_ACUITY_SCORE: 11
ADLS_ACUITY_SCORE: 19
ADLS_ACUITY_SCORE: 11
ADLS_ACUITY_SCORE: 11
ADLS_ACUITY_SCORE: 17
ADLS_ACUITY_SCORE: 13
ADLS_ACUITY_SCORE: 13
ADLS_ACUITY_SCORE: 11
ADLS_ACUITY_SCORE: 13
ADLS_ACUITY_SCORE: 13
ADLS_ACUITY_SCORE: 12
ADLS_ACUITY_SCORE: 17
ADLS_ACUITY_SCORE: 13
ADLS_ACUITY_SCORE: 13
ADLS_ACUITY_SCORE: 15
ADLS_ACUITY_SCORE: 13
ADLS_ACUITY_SCORE: 19
ADLS_ACUITY_SCORE: 13
ADLS_ACUITY_SCORE: 9
ADLS_ACUITY_SCORE: 19
ADLS_ACUITY_SCORE: 13
ADLS_ACUITY_SCORE: 15
ADLS_ACUITY_SCORE: 11
ADLS_ACUITY_SCORE: 11
ADLS_ACUITY_SCORE: 19
ADLS_ACUITY_SCORE: 9
ADLS_ACUITY_SCORE: 12
ADLS_ACUITY_SCORE: 15
ADLS_ACUITY_SCORE: 13
ADLS_ACUITY_SCORE: 15
ADLS_ACUITY_SCORE: 3
ADLS_ACUITY_SCORE: 15
ADLS_ACUITY_SCORE: 10
ADLS_ACUITY_SCORE: 19
ADLS_ACUITY_SCORE: 12
ADLS_ACUITY_SCORE: 15
ADLS_ACUITY_SCORE: 11
ADLS_ACUITY_SCORE: 9
ADLS_ACUITY_SCORE: 7
ADLS_ACUITY_SCORE: 13
ADLS_ACUITY_SCORE: 3
ADLS_ACUITY_SCORE: 19
ADLS_ACUITY_SCORE: 15
ADLS_ACUITY_SCORE: 15
ADLS_ACUITY_SCORE: 13
ADLS_ACUITY_SCORE: 17
ADLS_ACUITY_SCORE: 17
ADLS_ACUITY_SCORE: 15
ADLS_ACUITY_SCORE: 17
ADLS_ACUITY_SCORE: 17
ADLS_ACUITY_SCORE: 15
ADLS_ACUITY_SCORE: 15
ADLS_ACUITY_SCORE: 11
ADLS_ACUITY_SCORE: 15
ADLS_ACUITY_SCORE: 13
ADLS_ACUITY_SCORE: 9
ADLS_ACUITY_SCORE: 17
ADLS_ACUITY_SCORE: 10
ADLS_ACUITY_SCORE: 15
ADLS_ACUITY_SCORE: 3
ADLS_ACUITY_SCORE: 15
ADLS_ACUITY_SCORE: 9
ADLS_ACUITY_SCORE: 15
ADLS_ACUITY_SCORE: 17
ADLS_ACUITY_SCORE: 13
ADLS_ACUITY_SCORE: 13
ADLS_ACUITY_SCORE: 11
ADLS_ACUITY_SCORE: 3
ADLS_ACUITY_SCORE: 15
ADLS_ACUITY_SCORE: 15
ADLS_ACUITY_SCORE: 11
ADLS_ACUITY_SCORE: 15
ADLS_ACUITY_SCORE: 12
ADLS_ACUITY_SCORE: 17
ADLS_ACUITY_SCORE: 15
ADLS_ACUITY_SCORE: 11
ADLS_ACUITY_SCORE: 5
ADLS_ACUITY_SCORE: 15
ADLS_ACUITY_SCORE: 12
ADLS_ACUITY_SCORE: 13
ADLS_ACUITY_SCORE: 15
ADLS_ACUITY_SCORE: 17
ADLS_ACUITY_SCORE: 15
ADLS_ACUITY_SCORE: 17
ADLS_ACUITY_SCORE: 13
ADLS_ACUITY_SCORE: 15
ADLS_ACUITY_SCORE: 17
ADLS_ACUITY_SCORE: 15
ADLS_ACUITY_SCORE: 13
ADLS_ACUITY_SCORE: 13
ADLS_ACUITY_SCORE: 15
ADLS_ACUITY_SCORE: 11
ADLS_ACUITY_SCORE: 15
ADLS_ACUITY_SCORE: 11
ADLS_ACUITY_SCORE: 15
ADLS_ACUITY_SCORE: 19
ADLS_ACUITY_SCORE: 17
ADLS_ACUITY_SCORE: 17
ADLS_ACUITY_SCORE: 14
ADLS_ACUITY_SCORE: 13
ADLS_ACUITY_SCORE: 17
ADLS_ACUITY_SCORE: 13
ADLS_ACUITY_SCORE: 15
ADLS_ACUITY_SCORE: 15
ADLS_ACUITY_SCORE: 13
ADLS_ACUITY_SCORE: 11
ADLS_ACUITY_SCORE: 10
ADLS_ACUITY_SCORE: 5
ADLS_ACUITY_SCORE: 15
ADLS_ACUITY_SCORE: 9
ADLS_ACUITY_SCORE: 11
ADLS_ACUITY_SCORE: 17
ADLS_ACUITY_SCORE: 3
ADLS_ACUITY_SCORE: 15
ADLS_ACUITY_SCORE: 15
ADLS_ACUITY_SCORE: 13
ADLS_ACUITY_SCORE: 5
ADLS_ACUITY_SCORE: 13
ADLS_ACUITY_SCORE: 15
ADLS_ACUITY_SCORE: 14
ADLS_ACUITY_SCORE: 13
ADLS_ACUITY_SCORE: 13
ADLS_ACUITY_SCORE: 15
ADLS_ACUITY_SCORE: 14
ADLS_ACUITY_SCORE: 3
ADLS_ACUITY_SCORE: 17
ADLS_ACUITY_SCORE: 15
ADLS_ACUITY_SCORE: 15
ADLS_ACUITY_SCORE: 13
ADLS_ACUITY_SCORE: 12
ADLS_ACUITY_SCORE: 15
ADLS_ACUITY_SCORE: 11
ADLS_ACUITY_SCORE: 15
ADLS_ACUITY_SCORE: 13
ADLS_ACUITY_SCORE: 13
ADLS_ACUITY_SCORE: 10
ADLS_ACUITY_SCORE: 9
ADLS_ACUITY_SCORE: 13
ADLS_ACUITY_SCORE: 3
ADLS_ACUITY_SCORE: 13
ADLS_ACUITY_SCORE: 17
ADLS_ACUITY_SCORE: 13
ADLS_ACUITY_SCORE: 5
ADLS_ACUITY_SCORE: 15
ADLS_ACUITY_SCORE: 9
ADLS_ACUITY_SCORE: 15
ADLS_ACUITY_SCORE: 9
ADLS_ACUITY_SCORE: 13
ADLS_ACUITY_SCORE: 19
ADLS_ACUITY_SCORE: 13
ADLS_ACUITY_SCORE: 11
ADLS_ACUITY_SCORE: 19
ADLS_ACUITY_SCORE: 11
ADLS_ACUITY_SCORE: 19
ADLS_ACUITY_SCORE: 15
ADLS_ACUITY_SCORE: 12
ADLS_ACUITY_SCORE: 11
ADLS_ACUITY_SCORE: 15
ADLS_ACUITY_SCORE: 11
ADLS_ACUITY_SCORE: 15
ADLS_ACUITY_SCORE: 9
ADLS_ACUITY_SCORE: 13
ADLS_ACUITY_SCORE: 11
ADLS_ACUITY_SCORE: 12
ADLS_ACUITY_SCORE: 17
ADLS_ACUITY_SCORE: 11
ADLS_ACUITY_SCORE: 13
ADLS_ACUITY_SCORE: 15
DEPENDENT_IADLS:: INDEPENDENT
ADLS_ACUITY_SCORE: 13
ADLS_ACUITY_SCORE: 17
ADLS_ACUITY_SCORE: 13
ADLS_ACUITY_SCORE: 14
ADLS_ACUITY_SCORE: 17
ADLS_ACUITY_SCORE: 15
ADLS_ACUITY_SCORE: 17
ADLS_ACUITY_SCORE: 15
ADLS_ACUITY_SCORE: 9
ADLS_ACUITY_SCORE: 11
ADLS_ACUITY_SCORE: 15
ADLS_ACUITY_SCORE: 12
ADLS_ACUITY_SCORE: 13
ADLS_ACUITY_SCORE: 15
ADLS_ACUITY_SCORE: 10
ADLS_ACUITY_SCORE: 15
ADLS_ACUITY_SCORE: 11
ADLS_ACUITY_SCORE: 15
ADLS_ACUITY_SCORE: 17
ADLS_ACUITY_SCORE: 15
ADLS_ACUITY_SCORE: 13
ADLS_ACUITY_SCORE: 15
ADLS_ACUITY_SCORE: 11
ADLS_ACUITY_SCORE: 15
ADLS_ACUITY_SCORE: 19
ADLS_ACUITY_SCORE: 3
ADLS_ACUITY_SCORE: 11
ADLS_ACUITY_SCORE: 17
ADLS_ACUITY_SCORE: 15
ADLS_ACUITY_SCORE: 13
ADLS_ACUITY_SCORE: 15
ADLS_ACUITY_SCORE: 12
ADLS_ACUITY_SCORE: 15
ADLS_ACUITY_SCORE: 11
ADLS_ACUITY_SCORE: 11
ADLS_ACUITY_SCORE: 9
ADLS_ACUITY_SCORE: 13
ADLS_ACUITY_SCORE: 15
ADLS_ACUITY_SCORE: 17
ADLS_ACUITY_SCORE: 13
ADLS_ACUITY_SCORE: 9
ADLS_ACUITY_SCORE: 15
ADLS_ACUITY_SCORE: 15
ADLS_ACUITY_SCORE: 13
ADLS_ACUITY_SCORE: 15
ADLS_ACUITY_SCORE: 12
ADLS_ACUITY_SCORE: 12
ADLS_ACUITY_SCORE: 15
ADLS_ACUITY_SCORE: 13
ADLS_ACUITY_SCORE: 15
ADLS_ACUITY_SCORE: 13
ADLS_ACUITY_SCORE: 15
ADLS_ACUITY_SCORE: 11
ADLS_ACUITY_SCORE: 13
ADLS_ACUITY_SCORE: 17
ADLS_ACUITY_SCORE: 15
ADLS_ACUITY_SCORE: 12
ADLS_ACUITY_SCORE: 15
ADLS_ACUITY_SCORE: 15
ADLS_ACUITY_SCORE: 17
ADLS_ACUITY_SCORE: 15
ADLS_ACUITY_SCORE: 13
ADLS_ACUITY_SCORE: 11
ADLS_ACUITY_SCORE: 17
ADLS_ACUITY_SCORE: 15
ADLS_ACUITY_SCORE: 11
ADLS_ACUITY_SCORE: 15
ADLS_ACUITY_SCORE: 15
ADLS_ACUITY_SCORE: 13
ADLS_ACUITY_SCORE: 17
ADLS_ACUITY_SCORE: 13
ADLS_ACUITY_SCORE: 15
ADLS_ACUITY_SCORE: 17
ADLS_ACUITY_SCORE: 15
ADLS_ACUITY_SCORE: 15
ADLS_ACUITY_SCORE: 12
ADLS_ACUITY_SCORE: 5
ADLS_ACUITY_SCORE: 13
ADLS_ACUITY_SCORE: 15
ADLS_ACUITY_SCORE: 9
ADLS_ACUITY_SCORE: 10
ADLS_ACUITY_SCORE: 13
ADLS_ACUITY_SCORE: 17
ADLS_ACUITY_SCORE: 13
ADLS_ACUITY_SCORE: 3
ADLS_ACUITY_SCORE: 14
ADLS_ACUITY_SCORE: 13
ADLS_ACUITY_SCORE: 15
ADLS_ACUITY_SCORE: 13
ADLS_ACUITY_SCORE: 11
ADLS_ACUITY_SCORE: 15
ADLS_ACUITY_SCORE: 11
ADLS_ACUITY_SCORE: 15
ADLS_ACUITY_SCORE: 17
ADLS_ACUITY_SCORE: 13
ADLS_ACUITY_SCORE: 17
ADLS_ACUITY_SCORE: 10
ADLS_ACUITY_SCORE: 13
ADLS_ACUITY_SCORE: 17
ADLS_ACUITY_SCORE: 13
ADLS_ACUITY_SCORE: 19
ADLS_ACUITY_SCORE: 15
ADLS_ACUITY_SCORE: 17
ADLS_ACUITY_SCORE: 11
ADLS_ACUITY_SCORE: 9
ADLS_ACUITY_SCORE: 14
ADLS_ACUITY_SCORE: 15
ADLS_ACUITY_SCORE: 17
ADLS_ACUITY_SCORE: 15
ADLS_ACUITY_SCORE: 13
ADLS_ACUITY_SCORE: 13
ADLS_ACUITY_SCORE: 19
ADLS_ACUITY_SCORE: 15
ADLS_ACUITY_SCORE: 11
ADLS_ACUITY_SCORE: 3
ADLS_ACUITY_SCORE: 9
ADLS_ACUITY_SCORE: 5
ADLS_ACUITY_SCORE: 13
ADLS_ACUITY_SCORE: 13
ADLS_ACUITY_SCORE: 9
ADLS_ACUITY_SCORE: 15
ADLS_ACUITY_SCORE: 11
ADLS_ACUITY_SCORE: 9
ADLS_ACUITY_SCORE: 19
ADLS_ACUITY_SCORE: 12
ADLS_ACUITY_SCORE: 15
ADLS_ACUITY_SCORE: 3
ADLS_ACUITY_SCORE: 15
ADLS_ACUITY_SCORE: 13
ADLS_ACUITY_SCORE: 17
ADLS_ACUITY_SCORE: 14
ADLS_ACUITY_SCORE: 5
ADLS_ACUITY_SCORE: 15
ADLS_ACUITY_SCORE: 12
ADLS_ACUITY_SCORE: 15
ADLS_ACUITY_SCORE: 14
ADLS_ACUITY_SCORE: 19
ADLS_ACUITY_SCORE: 12
ADLS_ACUITY_SCORE: 11
ADLS_ACUITY_SCORE: 11
ADLS_ACUITY_SCORE: 17
ADLS_ACUITY_SCORE: 14
ADLS_ACUITY_SCORE: 15
ADLS_ACUITY_SCORE: 17
ADLS_ACUITY_SCORE: 15
ADLS_ACUITY_SCORE: 13
ADLS_ACUITY_SCORE: 12
ADLS_ACUITY_SCORE: 15
ADLS_ACUITY_SCORE: 13
ADLS_ACUITY_SCORE: 13
ADLS_ACUITY_SCORE: 9
ADLS_ACUITY_SCORE: 9
ADLS_ACUITY_SCORE: 13
ADLS_ACUITY_SCORE: 11
ADLS_ACUITY_SCORE: 17
ADLS_ACUITY_SCORE: 12
ADLS_ACUITY_SCORE: 15
ADLS_ACUITY_SCORE: 13
ADLS_ACUITY_SCORE: 19
ADLS_ACUITY_SCORE: 11
ADLS_ACUITY_SCORE: 17
ADLS_ACUITY_SCORE: 13
ADLS_ACUITY_SCORE: 15
ADLS_ACUITY_SCORE: 13
ADLS_ACUITY_SCORE: 15
ADLS_ACUITY_SCORE: 17
ADLS_ACUITY_SCORE: 15
ADLS_ACUITY_SCORE: 3
ADLS_ACUITY_SCORE: 15
ADLS_ACUITY_SCORE: 19
ADLS_ACUITY_SCORE: 17
ADLS_ACUITY_SCORE: 15
ADLS_ACUITY_SCORE: 13
ADLS_ACUITY_SCORE: 15
ADLS_ACUITY_SCORE: 11
ADLS_ACUITY_SCORE: 13
ADLS_ACUITY_SCORE: 12
ADLS_ACUITY_SCORE: 13
ADLS_ACUITY_SCORE: 17
ADLS_ACUITY_SCORE: 15
ADLS_ACUITY_SCORE: 11
ADLS_ACUITY_SCORE: 13
ADLS_ACUITY_SCORE: 13
ADLS_ACUITY_SCORE: 17
ADLS_ACUITY_SCORE: 15
ADLS_ACUITY_SCORE: 3
ADLS_ACUITY_SCORE: 17
ADLS_ACUITY_SCORE: 15
ADLS_ACUITY_SCORE: 10
ADLS_ACUITY_SCORE: 13
ADLS_ACUITY_SCORE: 15
ADLS_ACUITY_SCORE: 11
ADLS_ACUITY_SCORE: 19
ADLS_ACUITY_SCORE: 15
ADLS_ACUITY_SCORE: 9
ADLS_ACUITY_SCORE: 13
ADLS_ACUITY_SCORE: 10
ADLS_ACUITY_SCORE: 11
ADLS_ACUITY_SCORE: 15
ADLS_ACUITY_SCORE: 19
ADLS_ACUITY_SCORE: 11
ADLS_ACUITY_SCORE: 12
ADLS_ACUITY_SCORE: 5
ADLS_ACUITY_SCORE: 17
ADLS_ACUITY_SCORE: 15
ADLS_ACUITY_SCORE: 15
ADLS_ACUITY_SCORE: 11
ADLS_ACUITY_SCORE: 15
ADLS_ACUITY_SCORE: 11
ADLS_ACUITY_SCORE: 17
ADLS_ACUITY_SCORE: 10
ADLS_ACUITY_SCORE: 13
ADLS_ACUITY_SCORE: 13
ADLS_ACUITY_SCORE: 17
ADLS_ACUITY_SCORE: 11
ADLS_ACUITY_SCORE: 5
ADLS_ACUITY_SCORE: 17
ADLS_ACUITY_SCORE: 15
ADLS_ACUITY_SCORE: 5
ADLS_ACUITY_SCORE: 12
ADLS_ACUITY_SCORE: 13
ADLS_ACUITY_SCORE: 17
ADLS_ACUITY_SCORE: 15
ADLS_ACUITY_SCORE: 13
ADLS_ACUITY_SCORE: 12
ADLS_ACUITY_SCORE: 12
ADLS_ACUITY_SCORE: 13
ADLS_ACUITY_SCORE: 15
ADLS_ACUITY_SCORE: 5
ADLS_ACUITY_SCORE: 17
ADLS_ACUITY_SCORE: 15
ADLS_ACUITY_SCORE: 13
ADLS_ACUITY_SCORE: 15
ADLS_ACUITY_SCORE: 11
ADLS_ACUITY_SCORE: 10
ADLS_ACUITY_SCORE: 12
ADLS_ACUITY_SCORE: 17
ADLS_ACUITY_SCORE: 12
ADLS_ACUITY_SCORE: 13
ADLS_ACUITY_SCORE: 15
ADLS_ACUITY_SCORE: 15
ADLS_ACUITY_SCORE: 17
ADLS_ACUITY_SCORE: 13
ADLS_ACUITY_SCORE: 17
ADLS_ACUITY_SCORE: 13
ADLS_ACUITY_SCORE: 15
ADLS_ACUITY_SCORE: 5
ADLS_ACUITY_SCORE: 10
ADLS_ACUITY_SCORE: 13
ADLS_ACUITY_SCORE: 19
ADLS_ACUITY_SCORE: 19
ADLS_ACUITY_SCORE: 13
ADLS_ACUITY_SCORE: 15
ADLS_ACUITY_SCORE: 15
ADLS_ACUITY_SCORE: 9
ADLS_ACUITY_SCORE: 17
ADLS_ACUITY_SCORE: 14
ADLS_ACUITY_SCORE: 15
ADLS_ACUITY_SCORE: 16
ADLS_ACUITY_SCORE: 11
ADLS_ACUITY_SCORE: 13
ADLS_ACUITY_SCORE: 17
ADLS_ACUITY_SCORE: 15
ADLS_ACUITY_SCORE: 15
ADLS_ACUITY_SCORE: 19
ADLS_ACUITY_SCORE: 11
ADLS_ACUITY_SCORE: 17
ADLS_ACUITY_SCORE: 11
ADLS_ACUITY_SCORE: 13
ADLS_ACUITY_SCORE: 11
ADLS_ACUITY_SCORE: 17
ADLS_ACUITY_SCORE: 11
ADLS_ACUITY_SCORE: 13
ADLS_ACUITY_SCORE: 17
ADLS_ACUITY_SCORE: 9
ADLS_ACUITY_SCORE: 14
ADLS_ACUITY_SCORE: 19
ADLS_ACUITY_SCORE: 17
ADLS_ACUITY_SCORE: 5
ADLS_ACUITY_SCORE: 19
ADLS_ACUITY_SCORE: 13
ADLS_ACUITY_SCORE: 17
ADLS_ACUITY_SCORE: 15
ADLS_ACUITY_SCORE: 14
ADLS_ACUITY_SCORE: 15
ADLS_ACUITY_SCORE: 17
ADLS_ACUITY_SCORE: 13
ADLS_ACUITY_SCORE: 13
ADLS_ACUITY_SCORE: 15
ADLS_ACUITY_SCORE: 17
ADLS_ACUITY_SCORE: 15
ADLS_ACUITY_SCORE: 15
ADLS_ACUITY_SCORE: 9
ADLS_ACUITY_SCORE: 15
ADLS_ACUITY_SCORE: 11
ADLS_ACUITY_SCORE: 11
ADLS_ACUITY_SCORE: 17
ADLS_ACUITY_SCORE: 13
ADLS_ACUITY_SCORE: 13
ADLS_ACUITY_SCORE: 9
ADLS_ACUITY_SCORE: 15
ADLS_ACUITY_SCORE: 15
ADLS_ACUITY_SCORE: 17
ADLS_ACUITY_SCORE: 3
ADLS_ACUITY_SCORE: 13
ADLS_ACUITY_SCORE: 17
ADLS_ACUITY_SCORE: 11
ADLS_ACUITY_SCORE: 11
ADLS_ACUITY_SCORE: 15
ADLS_ACUITY_SCORE: 15
ADLS_ACUITY_SCORE: 13
ADLS_ACUITY_SCORE: 9
ADLS_ACUITY_SCORE: 7
ADLS_ACUITY_SCORE: 13
ADLS_ACUITY_SCORE: 5
ADLS_ACUITY_SCORE: 15
ADLS_ACUITY_SCORE: 15
ADLS_ACUITY_SCORE: 7
ADLS_ACUITY_SCORE: 15
ADLS_ACUITY_SCORE: 15
ADLS_ACUITY_SCORE: 13
ADLS_ACUITY_SCORE: 17
ADLS_ACUITY_SCORE: 13
ADLS_ACUITY_SCORE: 13
ADLS_ACUITY_SCORE: 10
ADLS_ACUITY_SCORE: 15
ADLS_ACUITY_SCORE: 17
ADLS_ACUITY_SCORE: 13
ADLS_ACUITY_SCORE: 10
ADLS_ACUITY_SCORE: 17
ADLS_ACUITY_SCORE: 17
ADLS_ACUITY_SCORE: 15
ADLS_ACUITY_SCORE: 11
ADLS_ACUITY_SCORE: 17
ADLS_ACUITY_SCORE: 13
ADLS_ACUITY_SCORE: 17
ADLS_ACUITY_SCORE: 12
ADLS_ACUITY_SCORE: 17
ADLS_ACUITY_SCORE: 13
ADLS_ACUITY_SCORE: 17
ADLS_ACUITY_SCORE: 10
ADLS_ACUITY_SCORE: 9
ADLS_ACUITY_SCORE: 15
ADLS_ACUITY_SCORE: 13
ADLS_ACUITY_SCORE: 14
ADLS_ACUITY_SCORE: 15
ADLS_ACUITY_SCORE: 12
ADLS_ACUITY_SCORE: 13
ADLS_ACUITY_SCORE: 17
ADLS_ACUITY_SCORE: 11
ADLS_ACUITY_SCORE: 15
ADLS_ACUITY_SCORE: 13
ADLS_ACUITY_SCORE: 15
ADLS_ACUITY_SCORE: 15
ADLS_ACUITY_SCORE: 13
ADLS_ACUITY_SCORE: 13
ADLS_ACUITY_SCORE: 15
ADLS_ACUITY_SCORE: 12
ADLS_ACUITY_SCORE: 19
ADLS_ACUITY_SCORE: 13
ADLS_ACUITY_SCORE: 12
ADLS_ACUITY_SCORE: 19
ADLS_ACUITY_SCORE: 15
ADLS_ACUITY_SCORE: 15
ADLS_ACUITY_SCORE: 13
ADLS_ACUITY_SCORE: 13
ADLS_ACUITY_SCORE: 11
ADLS_ACUITY_SCORE: 9
ADLS_ACUITY_SCORE: 9
ADLS_ACUITY_SCORE: 15
ADLS_ACUITY_SCORE: 15
ADLS_ACUITY_SCORE: 11
ADLS_ACUITY_SCORE: 11
ADLS_ACUITY_SCORE: 15
ADLS_ACUITY_SCORE: 15
ADLS_ACUITY_SCORE: 10
ADLS_ACUITY_SCORE: 9
ADLS_ACUITY_SCORE: 13
ADLS_ACUITY_SCORE: 13
ADLS_ACUITY_SCORE: 9
ADLS_ACUITY_SCORE: 17
ADLS_ACUITY_SCORE: 11
ADLS_ACUITY_SCORE: 13
ADLS_ACUITY_SCORE: 11
ADLS_ACUITY_SCORE: 15
ADLS_ACUITY_SCORE: 10
ADLS_ACUITY_SCORE: 17
ADLS_ACUITY_SCORE: 15
ADLS_ACUITY_SCORE: 9
ADLS_ACUITY_SCORE: 15
ADLS_ACUITY_SCORE: 11
ADLS_ACUITY_SCORE: 15
ADLS_ACUITY_SCORE: 11
ADLS_ACUITY_SCORE: 11
ADLS_ACUITY_SCORE: 14
ADLS_ACUITY_SCORE: 17
ADLS_ACUITY_SCORE: 15
ADLS_ACUITY_SCORE: 11
ADLS_ACUITY_SCORE: 15
ADLS_ACUITY_SCORE: 11
ADLS_ACUITY_SCORE: 9
ADLS_ACUITY_SCORE: 13
ADLS_ACUITY_SCORE: 15
ADLS_ACUITY_SCORE: 17
ADLS_ACUITY_SCORE: 13
ADLS_ACUITY_SCORE: 13
ADLS_ACUITY_SCORE: 15
ADLS_ACUITY_SCORE: 5
ADLS_ACUITY_SCORE: 13
ADLS_ACUITY_SCORE: 17
ADLS_ACUITY_SCORE: 13
ADLS_ACUITY_SCORE: 17
ADLS_ACUITY_SCORE: 13
ADLS_ACUITY_SCORE: 11
ADLS_ACUITY_SCORE: 15
ADLS_ACUITY_SCORE: 11
ADLS_ACUITY_SCORE: 15
ADLS_ACUITY_SCORE: 21
ADLS_ACUITY_SCORE: 11
ADLS_ACUITY_SCORE: 17
ADLS_ACUITY_SCORE: 15
ADLS_ACUITY_SCORE: 13
ADLS_ACUITY_SCORE: 13
ADLS_ACUITY_SCORE: 15
ADLS_ACUITY_SCORE: 10
ADLS_ACUITY_SCORE: 15
ADLS_ACUITY_SCORE: 12
ADLS_ACUITY_SCORE: 15
ADLS_ACUITY_SCORE: 13
ADLS_ACUITY_SCORE: 15
ADLS_ACUITY_SCORE: 17
ADLS_ACUITY_SCORE: 15
ADLS_ACUITY_SCORE: 11
ADLS_ACUITY_SCORE: 15
ADLS_ACUITY_SCORE: 13
ADLS_ACUITY_SCORE: 19
ADLS_ACUITY_SCORE: 15
ADLS_ACUITY_SCORE: 19
ADLS_ACUITY_SCORE: 13
ADLS_ACUITY_SCORE: 17
ADLS_ACUITY_SCORE: 15
ADLS_ACUITY_SCORE: 13
ADLS_ACUITY_SCORE: 9
ADLS_ACUITY_SCORE: 15
ADLS_ACUITY_SCORE: 11
ADLS_ACUITY_SCORE: 11
ADLS_ACUITY_SCORE: 15
ADLS_ACUITY_SCORE: 13
ADLS_ACUITY_SCORE: 17
ADLS_ACUITY_SCORE: 15
ADLS_ACUITY_SCORE: 13
ADLS_ACUITY_SCORE: 15
ADLS_ACUITY_SCORE: 19
ADLS_ACUITY_SCORE: 12
ADLS_ACUITY_SCORE: 15
ADLS_ACUITY_SCORE: 15
ADLS_ACUITY_SCORE: 13
ADLS_ACUITY_SCORE: 15
ADLS_ACUITY_SCORE: 15
ADLS_ACUITY_SCORE: 11
ADLS_ACUITY_SCORE: 9
ADLS_ACUITY_SCORE: 13
ADLS_ACUITY_SCORE: 10
ADLS_ACUITY_SCORE: 3
ADLS_ACUITY_SCORE: 15
ADLS_ACUITY_SCORE: 15
ADLS_ACUITY_SCORE: 19
ADLS_ACUITY_SCORE: 11
ADLS_ACUITY_SCORE: 19
ADLS_ACUITY_SCORE: 13
ADLS_ACUITY_SCORE: 11
ADLS_ACUITY_SCORE: 15
ADLS_ACUITY_SCORE: 5
ADLS_ACUITY_SCORE: 15
ADLS_ACUITY_SCORE: 13
ADLS_ACUITY_SCORE: 9
ADLS_ACUITY_SCORE: 15
ADLS_ACUITY_SCORE: 13
ADLS_ACUITY_SCORE: 11
ADLS_ACUITY_SCORE: 17
ADLS_ACUITY_SCORE: 11
ADLS_ACUITY_SCORE: 17
ADLS_ACUITY_SCORE: 13
ADLS_ACUITY_SCORE: 13
ADLS_ACUITY_SCORE: 15
ADLS_ACUITY_SCORE: 13
ADLS_ACUITY_SCORE: 17
ADLS_ACUITY_SCORE: 15
ADLS_ACUITY_SCORE: 17
ADLS_ACUITY_SCORE: 12
ADLS_ACUITY_SCORE: 19
ADLS_ACUITY_SCORE: 11
ADLS_ACUITY_SCORE: 13
ADLS_ACUITY_SCORE: 10
ADLS_ACUITY_SCORE: 15
ADLS_ACUITY_SCORE: 17
ADLS_ACUITY_SCORE: 15
ADLS_ACUITY_SCORE: 10
ADLS_ACUITY_SCORE: 9
ADLS_ACUITY_SCORE: 11
ADLS_ACUITY_SCORE: 15
ADLS_ACUITY_SCORE: 12
ADLS_ACUITY_SCORE: 13
ADLS_ACUITY_SCORE: 15
ADLS_ACUITY_SCORE: 13
ADLS_ACUITY_SCORE: 11
ADLS_ACUITY_SCORE: 13
ADLS_ACUITY_SCORE: 13
ADLS_ACUITY_SCORE: 17
ADLS_ACUITY_SCORE: 13
ADLS_ACUITY_SCORE: 11
ADLS_ACUITY_SCORE: 13
ADLS_ACUITY_SCORE: 13
ADLS_ACUITY_SCORE: 9
ADLS_ACUITY_SCORE: 15
ADLS_ACUITY_SCORE: 14
ADLS_ACUITY_SCORE: 15
ADLS_ACUITY_SCORE: 13
ADLS_ACUITY_SCORE: 11
ADLS_ACUITY_SCORE: 11
ADLS_ACUITY_SCORE: 9
ADLS_ACUITY_SCORE: 13
ADLS_ACUITY_SCORE: 17
ADLS_ACUITY_SCORE: 10
ADLS_ACUITY_SCORE: 13
ADLS_ACUITY_SCORE: 15
ADLS_ACUITY_SCORE: 11
ADLS_ACUITY_SCORE: 17
ADLS_ACUITY_SCORE: 10
ADLS_ACUITY_SCORE: 11
ADLS_ACUITY_SCORE: 12
ADLS_ACUITY_SCORE: 15
ADLS_ACUITY_SCORE: 13
ADLS_ACUITY_SCORE: 12
ADLS_ACUITY_SCORE: 17
ADLS_ACUITY_SCORE: 9
ADLS_ACUITY_SCORE: 17
ADLS_ACUITY_SCORE: 9
ADLS_ACUITY_SCORE: 17
ADLS_ACUITY_SCORE: 9
ADLS_ACUITY_SCORE: 19
ADLS_ACUITY_SCORE: 15
ADLS_ACUITY_SCORE: 17
ADLS_ACUITY_SCORE: 12
ADLS_ACUITY_SCORE: 13
ADLS_ACUITY_SCORE: 3
ADLS_ACUITY_SCORE: 13
ADLS_ACUITY_SCORE: 15
ADLS_ACUITY_SCORE: 9
ADLS_ACUITY_SCORE: 11
ADLS_ACUITY_SCORE: 11
ADLS_ACUITY_SCORE: 7
ADLS_ACUITY_SCORE: 3
ADLS_ACUITY_SCORE: 13
ADLS_ACUITY_SCORE: 7
ADLS_ACUITY_SCORE: 13
ADLS_ACUITY_SCORE: 13
ADLS_ACUITY_SCORE: 17
ADLS_ACUITY_SCORE: 19
ADLS_ACUITY_SCORE: 13
ADLS_ACUITY_SCORE: 9
ADLS_ACUITY_SCORE: 13
ADLS_ACUITY_SCORE: 17
ADLS_ACUITY_SCORE: 11
ADLS_ACUITY_SCORE: 9
ADLS_ACUITY_SCORE: 13
ADLS_ACUITY_SCORE: 11
ADLS_ACUITY_SCORE: 12
ADLS_ACUITY_SCORE: 13
ADLS_ACUITY_SCORE: 11
ADLS_ACUITY_SCORE: 14
ADLS_ACUITY_SCORE: 15
ADLS_ACUITY_SCORE: 12
ADLS_ACUITY_SCORE: 19
ADLS_ACUITY_SCORE: 11
ADLS_ACUITY_SCORE: 13
ADLS_ACUITY_SCORE: 17
ADLS_ACUITY_SCORE: 9
ADLS_ACUITY_SCORE: 13
ADLS_ACUITY_SCORE: 13
ADLS_ACUITY_SCORE: 14
ADLS_ACUITY_SCORE: 11
ADLS_ACUITY_SCORE: 9
ADLS_ACUITY_SCORE: 17
ADLS_ACUITY_SCORE: 13
ADLS_ACUITY_SCORE: 13
ADLS_ACUITY_SCORE: 15
ADLS_ACUITY_SCORE: 15
ADLS_ACUITY_SCORE: 11
ADLS_ACUITY_SCORE: 9
ADLS_ACUITY_SCORE: 13
ADLS_ACUITY_SCORE: 15
ADLS_ACUITY_SCORE: 11
ADLS_ACUITY_SCORE: 17
ADLS_ACUITY_SCORE: 13
ADLS_ACUITY_SCORE: 5
ADLS_ACUITY_SCORE: 13
ADLS_ACUITY_SCORE: 17
ADLS_ACUITY_SCORE: 11
ADLS_ACUITY_SCORE: 13
ADLS_ACUITY_SCORE: 11
ADLS_ACUITY_SCORE: 11
ADLS_ACUITY_SCORE: 9
ADLS_ACUITY_SCORE: 12
ADLS_ACUITY_SCORE: 17
ADLS_ACUITY_SCORE: 19
ADLS_ACUITY_SCORE: 13
ADLS_ACUITY_SCORE: 13
ADLS_ACUITY_SCORE: 15
ADLS_ACUITY_SCORE: 13
ADLS_ACUITY_SCORE: 17
ADLS_ACUITY_SCORE: 13
ADLS_ACUITY_SCORE: 13
ADLS_ACUITY_SCORE: 15
ADLS_ACUITY_SCORE: 17
ADLS_ACUITY_SCORE: 15
ADLS_ACUITY_SCORE: 11
ADLS_ACUITY_SCORE: 13
ADLS_ACUITY_SCORE: 17

## 2021-01-01 ASSESSMENT — MIFFLIN-ST. JEOR
SCORE: 1835.38
SCORE: 1851.38
SCORE: 1864.38
SCORE: 1782.26
SCORE: 1834.38
SCORE: 1848.38
SCORE: 1785.89
SCORE: 1732.38
SCORE: 1827.38
SCORE: 1810.84
SCORE: 1750.38
SCORE: 1847.38
SCORE: 1816.73
SCORE: 1848.03
SCORE: 1863.38
SCORE: 1865.26
SCORE: 1909.38
SCORE: 1825.25
SCORE: 1913.25
SCORE: 1850.3
SCORE: 1928.38
SCORE: 1872.38
SCORE: 1754.38
SCORE: 1782.18
SCORE: 1877.98
SCORE: 1858.38
SCORE: 1803.38
SCORE: 1767.38
SCORE: 1903.38
SCORE: 1845.38
SCORE: 1819.91
SCORE: 1805.38
SCORE: 1864.38
SCORE: 1877.98
SCORE: 1816.73
SCORE: 1803.38
SCORE: 1804.94
SCORE: 1839.38
SCORE: 1851.38
SCORE: 1877.06
SCORE: 1795.87
SCORE: 1827.62
SCORE: 1767.38
SCORE: 1781.81
SCORE: 1905.38
SCORE: 1911.25
SCORE: 1935.38
SCORE: 1857.38
SCORE: 1785.38
SCORE: 1842.38
SCORE: 1781.35
SCORE: 1811.38
SCORE: 1933.38
SCORE: 1828.07
SCORE: 1863
SCORE: 1920.62
SCORE: 1843.38
SCORE: 1816.38
SCORE: 1829.38
SCORE: 1829.38
SCORE: 1861.38
SCORE: 1831.7
SCORE: 1846.01
SCORE: 1855.09
SCORE: 1863.38
SCORE: 1795.38
SCORE: 1795.87
SCORE: 1930.38
SCORE: 1765.93

## 2021-01-01 ASSESSMENT — ENCOUNTER SYMPTOMS
BLOOD IN STOOL: 1
SEIZURES: 0
DYSRHYTHMIAS: 1

## 2021-01-01 ASSESSMENT — LIFESTYLE VARIABLES: TOBACCO_USE: 0

## 2021-01-14 PROBLEM — E66.01 MORBID OBESITY (H): Status: ACTIVE | Noted: 2021-01-01

## 2021-01-14 NOTE — LETTER
1/14/2021    [facility]  [address] [facility] [address]    RE: Burton Elizabeth       Dear Colleague,    I had the pleasure of seeing Burton Elizabeth in the [facility].    Cardiology Clinic Progress Note  Burton Elizabeth MRN# [id number]   YOB: 1945 Age: 75 year old   Primary Cardiologist: Dr. Neumann  Reason for visit: Cardiology follow up             Assessment and Plan:   Burton Elizabeth is a very pleasant 75 year old male with a history of coronary artery disease, moderate aortic stenosis, DM, hypertension, obesity, CVA and CKD.      Patient here today for cardiology follow up after nuclear stress test. Chest pain has resolved nuclear stress test results were stable when compared to prior. Recommend continued medical management. Patient has noted worsening exertional dyspnea and lower extremity edema despite weight loss. No recent labs, [facility], he notes he [facility], will work on getting those results. He appears grossly volume up on exam with lower extremity edema up into his thighs. Diuretic management has been challenging due to his concerns about frequent follow up. with multiple complaints today including persisting lower extremity edema, chest tightness and cough. Recommend repeat echocardiogram, diuretic infusion clinic next week, CORE enrollment and possible diuretic adjustments based on kidney stability once [facility] the [facility] are reviewed.         1. Coronary artery disease - coronary angiogram 8/12/2019 noting single vessel occlusive coronary artery disease namely  of non-dominant small circumflex vessel, proximal circumflex 99% stenosis, ostial RPDA 55%, post atrio lesion 50%, and mid to distal LAD 50% stenosis. Stable, chest pain complaints of have resolved.    - Nuclear stress test completed 1/7/21 due to complaints of chest pain, this showed  small area of mild ischemia in the inferior segments. This was stable when compared to prior nuclear stress test in 5/2017 which showed inferior, anterior and anteroseptal wall ischemia.               - Continue aspirin, carvedilol, isosorbide mononitrate and atorvastatin                - Counseled patient on lifestyle modifications  2. Hypertension - controlled, continue current medications  3. Aortic stenosis - moderate, peak AoV pressure gradient is 56mmHg per echo 7/2020. Plan for repeat echo to evaluate valve.   4. Obesity - BMI 37.36, counseled patient on weight loss strategies.   5. CKD - baseline 1.8-2.1, creatinine from 12/9 1.9   6. Chronic heart failure with preserved ejection fraction - echocardiogram 7/2020 showed LVEF 50-55%. Patient appears volume up on exam, tight bilateral lower extremity edema up into thighs. He [facility], will work on getting those results and then make recommendations about diuretics. Also recommend compression stockings which latoya wishes to get through the [facility]. Given his continued volume overload recommended infusion clinic which he is willing to proceed with. Also given continued/worsening symptoms will get repeat echocardiogram.               - NYHA class II-III, stage C              - Continue torsemide to 40mg qam and 20mg qpm              - Counseled on low sodium diet, daily weights and 2L fluid restriction  7. Diabetes mellitus - uncontrolled, HgbA1c 9.5 10/2020  8. Chronic LBBB  9. CVA       Changes today: none, need to review labs [facility]    Follow up plan:     Enroll in CORE clinic    Echocardiogram     Compression stockings, would like to have us coordinate with the [facility].    [facility]  RN to get lab results from the [facility] on Thursday, will determine diuretic adjustments based on results.     Infusion clinic next Wednesday    CORE follow up in 1 month with BMP/BNP prior (at [facility]).     Consider enrollment in [employer]  tracker        History of Presenting Illness:    Burton Elizabeth is a very pleasant 75 year old male with a history of coronary artery disease, moderate aortic stenosis, DM, hypertension, obesity, CVA and CKD.      Patient follows with Dr. Neumann. Patient has a history of abnormal stress test from 2016 which demonstrated a small area of mild ischemia in the anterior/anteroseptal apex/mid ventricle and mild ischemia in the inferior wall. EF 59%. Patient was recommended to have an invasive ischemic evaluation given recent syncopal episode and history of abnormal stress test.      Coronary angiogram 8/12/2019 noting single vessel occlusive coronary artery disease namely  of non-dominant small circumflex vessel, proximal circumflex 99% stenosis, ostial RPDA 55%, post atrio lesion 50%, and mid to distal LAD 50% stenosis. Noted no revascularization is needed prior to his planned knee surgery.      Echocardiogram 7/2020 showed LVEF 50-55%, RV size/function normal, moderate valvular aortic stenosis - no change when compared to prior study.      He was hospitalized in April for chest pain, he was diuresed and symptoms improved. Then unfortunately hospitalized in July for ischemic stroke.      Patient was seen by Dr. Neumann 8/20/20 at that time he was noted to be volume up and torsemide was increased to 20mg BID. Patient called in September with complaints of lower extremity edema, at which time I recommended BMP and clinic follow up. At that time he was volume up on exam, noting no improvement with increase in diuretics. He was advised to increase his torsemide to 40mg qam and 20mg qpm. Due to financial reasons patient notes he is unable to regularly follow up. For this reason I was hesitant to adjust medications significantly for safety reasons due to needed monitoring. [facility] I recommended he connect with his PCP about his volume status and blood pressure control. As patient  could be [facility] and have labs monitored given no concerns about financial burden.     He has continued to request follow up with cardiology here, but wishes to get labs through [facility] and requests to limit follow up visits. During our last visit in December he was volume up on exam, as noted above diuretic management has been difficult due to limitations with lab monitoring and clinic follow-up. He also was complaining of some episodes of chest pain. Recommended nuclear stress test, start isosorbide mononitrate, follow up labs at [facility], compression stockings and follow up with me in 1 month.     Patient is here today for cardiology follow up.     Patient reports feeling increased exertional dyspnea and edema despite weight loss. Monitoring weights daily a home, notes his weight has been going down, yesterday weight was 249#. States his high was after the holidays 264#. Worsening shortness of breath the last 2 weeks. Denies shortness of breath at rest. Exertional dyspnea with walking into clinic. Exertional dyspnea with stairs at home. Denies exertional dyspnea with ADLs. Denies orthopnea or PND. Also complaints of worsening lower extremity edema. Denies abdominal distention/bloating. Energy levels have been okay. Denies chest pain or chest tightness. Denies dizziness, lightheadedness or other presyncopal symptoms. Denies tachycardia or palpitations. Taking medications daily. Taking torsemide 40mg qam and 20mg qpm. Has been taking isosorbide mononitrate 30mg daily, no headache.     [facility], these labs are not available for my review today. Blood pressure 132/71 and HR 70 in clinic today.    Appetite good, notes he is eating a heavier meal at breakfast. Monitoring sodium intake. Not adding additional salt. Eating most meals at home, not adding additional salt. No set exercise routine, which has been limited by COVID-19 and gym closures. He is planning to restart a  light walking/exercise routine. Denies alcohol use. Denies tobacco.         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   Social History     Socioeconomic History     Marital status: Single     Spouse name: Not on file     Number of children: Not on file     Years of education: Not on file     Highest education level: Not on file   Occupational History     Not on file   Social Needs     Financial resource strain: Not on file     Food insecurity     Worry: Not on file     Inability: Not on file     Transportation needs     Medical: Not on file     Non-medical: Not on file   Tobacco Use     Smoking status: Never Smoker     Smokeless tobacco: Never Used   Substance and Sexual Activity     Alcohol use: Yes     Alcohol/week: 2.0 standard drinks     Types: 2 Standard drinks or equivalent per week     Frequency: 2-3 times a week     Drinks per session: 1 or 2     Comment: occasional, social     Drug use: No     Sexual activity: Not on file   Lifestyle     Physical activity     Days per week: Not on file     Minutes per session: Not on file     Stress: Not on file   Relationships     Social connections     Talks on phone: Not on file     Gets together: Not on file     Attends Denominational service: Not on file     Active member of club or organization: Not on file     Attends meetings of clubs or organizations: Not on file     Relationship status: Not on file     Intimate partner violence     Fear of current or ex partner: Not on file     Emotionally abused: Not on file     Physically abused: Not on file     Forced sexual activity: Not on file   Other Topics Concern     Parent/sibling w/ CABG, MI or angioplasty before 65F 55M? Not Asked   Social History Narrative     Not on file            Review of Systems:   Skin:  Negative     Eyes:  Negative    ENT:  Negative   "  Respiratory:  Positive for dyspnea on exertion;shortness of breath  Cardiovascular:    Positive for;edema;fatigue  Gastroenterology: Negative    Genitourinary:  not assessed    Musculoskeletal:  Positive for joint pain  Neurologic:  Positive for incoordination;stroke  Psychiatric:  Positive for depression;sleep disturbances  Heme/Lymph/Imm:  Positive for allergies;weight loss  Endocrine:  Positive for diabetes         Physical Exam:   Vitals: /71   Pulse 70   Ht 1.753 m (5' 9\")   Wt 114.8 kg (253 lb)   BMI 37.36 kg/m     Wt Readings from Last 4 Encounters:   01/14/21 114.8 kg (253 lb)   01/07/21 117.3 kg (258 lb 9.6 oz)   12/07/20 115.7 kg (255 lb)   09/22/20 119.7 kg (264 lb)     GEN: well nourished, in no acute distress.  HEENT:  Pupils equal, round. Sclerae nonicteric.   NECK: Supple, no masses appreciated. JVD hard to assess due to body habitus  C/V:  Regular rate and rhythm, no murmur, rub or gallop.   RESP: Respirations are unlabored. Diminished in bases.   GI: Abdomen distended, nontender.  EXTREM: Bilateral tight LE edema (+2-3) up into thighs  NEURO: Alert and oriented, cooperative.  SKIN: Warm and dry.        Data:   Nuclear stress test 1/7/21     The nuclear stress test is abnormal.     There is a small area of mild ischemia in the entire inferior segment(s) of the left ventricle.     Left ventricular function is normal.     The left ventricular ejection fraction at stress is 64%.     A prior study was conducted on 5/11/2017.     In prior study inferior, anterior and anteroseptal wall ischemia was reported.    ECHO 7/2/20  Left ventricular systolic function is low normal.  The visual ejection fraction is estimated at 50-55%.  Mid and distal anteroseptal hypokinesis.  Moderate valvular aortic stenosis.  The peak AoV pressure gradient is 56mmHg.  Detailed SID calculations wre not performed. AS unchanged compared to prior  study from 4/20. The study was technically limited.     Cardiac " catheterization 8/12/2019    Prox Cx lesion is 99% stenosed.    Ost RPDA lesion is 55% stenosed.    Post Atrio lesion is 50% stenosed.    Ramus lesion is 30% stenosed.    Mid LAD to Dist LAD lesion is 50% stenosed.    LIPID RESULTS:  Lab Results   Component Value Date    CHOL 188 04/23/2020    HDL 31 (L) 04/23/2020     (H) 04/23/2020    TRIG 277 (H) 04/23/2020     LIVER ENZYME RESULTS:  Lab Results   Component Value Date    AST 50 (A) 06/30/2020    ALT 29 06/30/2020     CBC RESULTS:  Lab Results   Component Value Date    WBC 10.7 07/13/2020    RBC 5.10 07/13/2020    HGB 13.2 (L) 07/13/2020    HCT 43.8 07/13/2020    MCV 86 07/13/2020    MCH 25.9 (L) 07/13/2020    MCHC 30.1 (L) 07/13/2020    RDW 16.0 (H) 07/13/2020     07/13/2020     BMP RESULTS:  Lab Results   Component Value Date     07/13/2020    POTASSIUM 3.9 07/13/2020    CHLORIDE 101 07/13/2020    CO2 27 07/13/2020    ANIONGAP 10 07/13/2020    GLC 67 (L) 07/13/2020    BUN 53 (H) 07/13/2020    CR 1.85 (H) 07/13/2020    GFRESTIMATED 35 (L) 07/13/2020    GFRESTBLACK 41 (L) 07/13/2020    GABE 9.2 07/13/2020      A1C RESULTS:  Lab Results   Component Value Date    A1C 7.0 (H) 04/22/2020     INR RESULTS:  Lab Results   Component Value Date    INR 1.1 (A) 06/30/2020    INR 1.12 04/22/2020            Medications     Current Outpatient Medications   Medication Sig Dispense Refill     acetaminophen (TYLENOL) 325 MG tablet Take 2 tablets (650 mg) by mouth every 6 hours as needed for mild pain or fever (> 101 F)       amLODIPine (NORVASC) 10 MG tablet Take 1 tablet (10 mg) by mouth daily       aspirin (ASA) 325 MG EC tablet Take 1 tablet (325 mg) by mouth daily Start 7/25/20.       atorvastatin (LIPITOR) 40 MG tablet Take 1 tablet (40 mg) by mouth every evening 30 tablet 3     blood glucose (NO BRAND SPECIFIED) test strip Use to test blood sugar as directed       carvedilol (COREG) 25 MG tablet Take 1 tablet (25 mg) by mouth 2 times daily (with meals) 60  tablet 0     glimepiride (AMARYL) 4 MG tablet Take 4 mg by mouth 2 times daily        insulin aspart (NOVOLOG FLEXPEN) 100 UNIT/ML pen Inject 2-6 Units Subcutaneous 3 times daily (with meals) (Patient states he bases his dose off of carb counting and blood glucose but did not give exact regimen).       insulin glargine (LANTUS PEN) 100 UNIT/ML pen Inject 15 Units Subcutaneous every evening       isosorbide mononitrate (IMDUR) 30 MG 24 hr tablet Take 1 tablet (30 mg) by mouth daily 90 tablet 1     multivitamin, therapeutic with minerals (THERA-VIT-M) TABS Take 1 tablet by mouth daily       order for DME Equipment being ordered: Walker Wheels () and Walker ()  Treatment Diagnosis: Impaired gait 1 each 0     torsemide (DEMADEX) 20 MG tablet Increase torsemide 40mg qam and 20mg qpm. 90 tablet 1          Past Medical History     Past Medical History:   Diagnosis Date     Arthritis of knee~ s/p replacement 3/24/2016     CAD (coronary artery disease)      Decreased cardiac ejection fraction~44% 3/24/2016     Diabetes mellitus, type 2 (H) 3/24/2016     History of DVT (deep vein thrombosis)      HTN (hypertension) 3/24/2016     LBBB (left bundle branch block) 3/24/2016     Mixed hyperlipidemia 8/23/2016     Past Surgical History:   Procedure Laterality Date     ARTHROPLASTY KNEE Right 3/29/2016    Procedure: ARTHROPLASTY KNEE;  Surgeon: Heena Rosen MD;  Location:  OR     ARTHROPLASTY REVISION KNEE Left 9/27/2019    Procedure: REVISION LEFT TOTAL KNEE  ARTHROPLASTY;  Surgeon: Heena Rosen MD;  Location:  OR     CV LEFT HEART CATH N/A 8/12/2019    Procedure: Left Heart Cath;  Surgeon: Edgardo Beckham MD;  Location:  HEART CARDIAC CATH LAB     EYE SURGERY      cataract removal     ORTHOPEDIC SURGERY      KNEE SCOPES MULTIPLE     Family History   Problem Relation Age of Onset     Coronary Artery Disease No family hx of             Allergies   Penicillins        Brittanie Quiroz, APRN CNP  P  Heart Care  Pager: [phone]        Thank you for allowing me to participate in the care of your patient.    Sincerely,     WALE Fowler CNP     [facility]

## 2021-01-14 NOTE — PATIENT INSTRUCTIONS
1. Echocardiogram   2. [facility]  3. Once I am able to review your labs from Thursday will call you if able to make changes to your diuretics  4. Follow up with Brittanie in clinic in 1 month  5. Infusion clinic at [facility] next Wednesday, nursing staff will you with details.  6. Please call with any questions/concerns [phone]

## 2021-01-14 NOTE — PROGRESS NOTES
Called [facility] #[phone] and spoke to Dr. Betancourt nurse Kennedi regarding below message from Brittanie.    2. Please call [facility] and get copy of his labs from Thursday, if stable kidney function I plan to titrate his oral diuretics.   3. Please communicate recommendation for compression stockings to the [facility], patient would like to get them [facility] the [facility]. He currently has tight lower extremity edema up into the thighs.     Kennedi states they will fax over recent labs.  They are also in Care Everywhere in the meantime for review within the 1/14/21 note from the [facility] but they do not pull over into [facility].  They are also happy to get him set up with compressions socks and will place a referral for him to have a consult with the Prosthetics department to get him measured.  States it may take up to 2 weeks for him to get them.  Kennedi is wondering if we can fax updates to #[phone] with the information below.    1. Med changes after lab review  2. Any follow up lab orders  3. Compression grade, 20-30 mmHg? Thigh or Calf height?    TC Jameson(AAMA) 1/14/21

## 2021-01-14 NOTE — PROGRESS NOTES
Cardiology Clinic Progress Note  Burton Elizabeth MRN# [id number]   YOB: 1945 Age: 75 year old   Primary Cardiologist: Dr. Neumann  Reason for visit: Cardiology follow up             Assessment and Plan:   Burton Elizabeth is a very pleasant 75 year old male with a history of coronary artery disease, moderate aortic stenosis, DM, hypertension, obesity, CVA and CKD.      Patient here today for cardiology follow up after nuclear stress test. Chest pain has resolved nuclear stress test results were stable when compared to prior. Recommend continued medical management. Patient has noted worsening exertional dyspnea and lower extremity edema despite weight loss. No recent labs, [facility], he notes he [facility], will work on getting those results. He appears grossly volume up on exam with lower extremity edema up into his thighs. Diuretic management has been challenging due to his concerns about frequent follow up. with multiple complaints today including persisting lower extremity edema, chest tightness and cough. Recommend repeat echocardiogram, diuretic infusion clinic next week, CORE enrollment and possible diuretic adjustments based on kidney stability once labs from the [facility] are reviewed.         1. Coronary artery disease - coronary angiogram 8/12/2019 noting single vessel occlusive coronary artery disease namely  of non-dominant small circumflex vessel, proximal circumflex 99% stenosis, ostial RPDA 55%, post atrio lesion 50%, and mid to distal LAD 50% stenosis. Stable, chest pain complaints of have resolved.    - Nuclear stress test completed 1/7/21 due to complaints of chest pain, this showed small area of mild ischemia in the inferior segments. This was stable when compared to prior nuclear stress test in 5/2017 which showed inferior, anterior and anteroseptal wall ischemia.               - Continue aspirin, carvedilol,  isosorbide mononitrate and atorvastatin                - Counseled patient on lifestyle modifications  2. Hypertension - controlled, continue current medications  3. Aortic stenosis - moderate, peak AoV pressure gradient is 56mmHg per echo 7/2020. Plan for repeat echo to evaluate valve.   4. Obesity - BMI 37.36, counseled patient on weight loss strategies.   5. CKD - baseline 1.8-2.1, creatinine from 12/9 1.9   6. Chronic heart failure with preserved ejection fraction - echocardiogram 7/2020 showed LVEF 50-55%. Patient appears volume up on exam, tight bilateral lower extremity edema up into thighs. He reports [facility], will work on getting those results and then make recommendations about diuretics. Also recommend compression stockings which latoya wishes to get through the [facility]. Given his continued volume overload recommended infusion clinic which he is willing to proceed with. Also given continued/worsening symptoms will get repeat echocardiogram.               - NYHA class II-III, stage C              - Continue torsemide to 40mg qam and 20mg qpm              - Counseled on low sodium diet, daily weights and 2L fluid restriction  7. Diabetes mellitus - uncontrolled, HgbA1c 9.5 10/2020  8. Chronic LBBB  9. CVA       Changes today: none, need to review labs from [facility]    Follow up plan:     Enroll in CORE clinic    Echocardiogram     Compression stockings, would like to have us coordinate with the [facility].    [facility]  RN to get lab results from the [facility] on Thursday, will determine diuretic adjustments based on results.     Infusion clinic next Wednesday    [facility] follow up in 1 month with BMP/BNP prior (at [facility]).     Consider enrollment in my health tracker        History of Presenting Illness:    Burton Elizabeth is a very pleasant 75 year old male with a history of coronary artery disease, moderate aortic stenosis, DM, hypertension, obesity, CVA and CKD.      Patient follows with  Dr. Neumann. Patient has a history of abnormal stress test from 2016 which demonstrated a small area of mild ischemia in the anterior/anteroseptal apex/mid ventricle and mild ischemia in the inferior wall. EF 59%. Patient was recommended to have an invasive ischemic evaluation given recent syncopal episode and history of abnormal stress test.      Coronary angiogram 8/12/2019 noting single vessel occlusive coronary artery disease namely  of non-dominant small circumflex vessel, proximal circumflex 99% stenosis, ostial RPDA 55%, post atrio lesion 50%, and mid to distal LAD 50% stenosis. Noted no revascularization is needed prior to his planned knee surgery.      Echocardiogram 7/2020 showed LVEF 50-55%, RV size/function normal, moderate valvular aortic stenosis - no change when compared to prior study.      He was hospitalized in April for chest pain, he was diuresed and symptoms improved. Then unfortunately hospitalized in July for ischemic stroke.      Patient was seen by Dr. Neumann 8/20/20 at that time he was noted to be volume up and torsemide was increased to 20mg BID. Patient called in September with complaints of lower extremity edema, at which time I recommended BMP and clinic follow up. At that time he was volume up on exam, noting no improvement with increase in diuretics. He was advised to increase his torsemide to 40mg qam and 20mg qpm. Due to financial reasons patient notes he is unable to regularly follow up. For this reason I was hesitant to adjust medications significantly for safety reasons due to needed monitoring. [facility] I recommended he connect with his PCP about his volume status and blood pressure control. As patient could be seen regularly at the [facility] and have labs monitored given no concerns about financial burden.     He has continued to request follow up with cardiology here, but wishes to get labs through [facility] and requests to limit follow up visits.  During our last visit in December he was volume up on exam, as noted above diuretic management has been difficult due to limitations with lab monitoring and clinic follow-up. He also was complaining of some episodes of chest pain. Recommended nuclear stress test, start isosorbide mononitrate, follow up labs at [facility], compression stockings and follow up with me in 1 month.     Patient is here today for cardiology follow up.     Patient reports feeling increased exertional dyspnea and edema despite weight loss. Monitoring weights daily a home, notes his weight has been going down, yesterday weight was 249#. States his high was after the holidays 264#. Worsening shortness of breath the last 2 weeks. Denies shortness of breath at rest. Exertional dyspnea with walking into clinic. Exertional dyspnea with stairs at home. Denies exertional dyspnea with ADLs. Denies orthopnea or PND. Also complaints of worsening lower extremity edema. Denies abdominal distention/bloating. Energy levels have been okay. Denies chest pain or chest tightness. Denies dizziness, lightheadedness or other presyncopal symptoms. Denies tachycardia or palpitations. Taking medications daily. Taking torsemide 40mg qam and 20mg qpm. Has been taking isosorbide mononitrate 30mg daily, no headache.     Reports [facility], these labs are not available for my review today. Blood pressure 132/71 and HR 70 in clinic today.    Appetite good, notes he is eating a heavier meal at breakfast. Monitoring sodium intake. Not adding additional salt. Eating most meals at home, not adding additional salt. No set exercise routine, which has been limited by COVID-19 and gym closures. He is planning to restart a light walking/exercise routine. Denies alcohol use. Denies tobacco.         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   Social History     Socioeconomic History     Marital status: Single     Spouse name: Not on file     Number of children: Not on file     Years of education: Not on file     Highest education level: Not on file   Occupational History     Not on file   Social Needs     Financial resource strain: Not on file     Food insecurity     Worry: Not on file     Inability: Not on file     Transportation needs     Medical: Not on file     Non-medical: Not on file   Tobacco Use     Smoking status: Never Smoker     Smokeless tobacco: Never Used   Substance and Sexual Activity     Alcohol use: Yes     Alcohol/week: 2.0 standard drinks     Types: 2 Standard drinks or equivalent per week     Frequency: 2-3 times a week     Drinks per session: 1 or 2     Comment: occasional, social     Drug use: No     Sexual activity: Not on file   Lifestyle     Physical activity     Days per week: Not on file     Minutes per session: Not on file     Stress: Not on file   Relationships     Social connections     Talks on phone: Not on file     Gets together: Not on file     Attends [employer] service: Not on file     Active member of club or organization: Not on file     Attends meetings of clubs or organizations: Not on file     Relationship status: Not on file     Intimate partner violence     Fear of current or ex partner: Not on file     Emotionally abused: Not on file     Physically abused: Not on file     Forced sexual activity: Not on file   Other Topics Concern     Parent/sibling w/ CABG, MI or angioplasty before 65F 55M? Not Asked   Social History Narrative     Not on file            Review of Systems:   Skin:  Negative     Eyes:  Negative    ENT:  Negative    Respiratory:  Positive for dyspnea on exertion;shortness of breath  Cardiovascular:    Positive for;edema;fatigue  Gastroenterology: Negative    Genitourinary:  not assessed    Musculoskeletal:  Positive for joint pain  Neurologic:  Positive for  "incoordination;stroke  Psychiatric:  Positive for depression;sleep disturbances  Heme/Lymph/Imm:  Positive for allergies;weight loss  Endocrine:  Positive for diabetes         Physical Exam:   Vitals: /71   Pulse 70   Ht 1.753 m (5' 9\")   Wt 114.8 kg (253 lb)   BMI 37.36 kg/m     Wt Readings from Last 4 Encounters:   01/14/21 114.8 kg (253 lb)   01/07/21 117.3 kg (258 lb 9.6 oz)   12/07/20 115.7 kg (255 lb)   09/22/20 119.7 kg (264 lb)     GEN: well nourished, in no acute distress.  HEENT:  Pupils equal, round. Sclerae nonicteric.   NECK: Supple, no masses appreciated. JVD hard to assess due to body habitus  C/V:  Regular rate and rhythm, no murmur, rub or gallop.   RESP: Respirations are unlabored. Diminished in bases.   GI: Abdomen distended, nontender.  EXTREM: Bilateral tight LE edema (+2-3) up into thighs  NEURO: Alert and oriented, cooperative.  SKIN: Warm and dry.        Data:   Nuclear stress test 1/7/21     The nuclear stress test is abnormal.     There is a small area of mild ischemia in the entire inferior segment(s) of the left ventricle.     Left ventricular function is normal.     The left ventricular ejection fraction at stress is 64%.     A prior study was conducted on 5/11/2017.     In prior study inferior, anterior and anteroseptal wall ischemia was reported.    ECHO 7/2/20  Left ventricular systolic function is low normal.  The visual ejection fraction is estimated at 50-55%.  Mid and distal anteroseptal hypokinesis.  Moderate valvular aortic stenosis.  The peak AoV pressure gradient is 56mmHg.  Detailed SID calculations wre not performed. AS unchanged compared to prior  study from 4/20. The study was technically limited.     Cardiac catheterization 8/12/2019    Prox Cx lesion is 99% stenosed.    Ost RPDA lesion is 55% stenosed.    Post Atrio lesion is 50% stenosed.    Ramus lesion is 30% stenosed.    Mid LAD to Dist LAD lesion is 50% stenosed.    LIPID RESULTS:  Lab Results   Component " Value Date    CHOL 188 04/23/2020    HDL 31 (L) 04/23/2020     (H) 04/23/2020    TRIG 277 (H) 04/23/2020     LIVER ENZYME RESULTS:  Lab Results   Component Value Date    AST 50 (A) 06/30/2020    ALT 29 06/30/2020     CBC RESULTS:  Lab Results   Component Value Date    WBC 10.7 07/13/2020    RBC 5.10 07/13/2020    HGB 13.2 (L) 07/13/2020    HCT 43.8 07/13/2020    MCV 86 07/13/2020    MCH 25.9 (L) 07/13/2020    MCHC 30.1 (L) 07/13/2020    RDW 16.0 (H) 07/13/2020     07/13/2020     BMP RESULTS:  Lab Results   Component Value Date     07/13/2020    POTASSIUM 3.9 07/13/2020    CHLORIDE 101 07/13/2020    CO2 27 07/13/2020    ANIONGAP 10 07/13/2020    GLC 67 (L) 07/13/2020    BUN 53 (H) 07/13/2020    CR 1.85 (H) 07/13/2020    GFRESTIMATED 35 (L) 07/13/2020    GFRESTBLACK 41 (L) 07/13/2020    GABE 9.2 07/13/2020      A1C RESULTS:  Lab Results   Component Value Date    A1C 7.0 (H) 04/22/2020     INR RESULTS:  Lab Results   Component Value Date    INR 1.1 (A) 06/30/2020    INR 1.12 04/22/2020            Medications     Current Outpatient Medications   Medication Sig Dispense Refill     acetaminophen (TYLENOL) 325 MG tablet Take 2 tablets (650 mg) by mouth every 6 hours as needed for mild pain or fever (> 101 F)       amLODIPine (NORVASC) 10 MG tablet Take 1 tablet (10 mg) by mouth daily       aspirin (ASA) 325 MG EC tablet Take 1 tablet (325 mg) by mouth daily Start 7/25/20.       atorvastatin (LIPITOR) 40 MG tablet Take 1 tablet (40 mg) by mouth every evening 30 tablet 3     blood glucose (NO BRAND SPECIFIED) test strip Use to test blood sugar as directed       carvedilol (COREG) 25 MG tablet Take 1 tablet (25 mg) by mouth 2 times daily (with meals) 60 tablet 0     glimepiride (AMARYL) 4 MG tablet Take 4 mg by mouth 2 times daily        insulin aspart (NOVOLOG FLEXPEN) 100 UNIT/ML pen Inject 2-6 Units Subcutaneous 3 times daily (with meals) (Patient states he bases his dose off of carb counting and blood  glucose but did not give exact regimen).       insulin glargine (LANTUS PEN) 100 UNIT/ML pen Inject 15 Units Subcutaneous every evening       isosorbide mononitrate (IMDUR) 30 MG 24 hr tablet Take 1 tablet (30 mg) by mouth daily 90 tablet 1     multivitamin, therapeutic with minerals (THERA-VIT-M) TABS Take 1 tablet by mouth daily       order for DME Equipment being ordered: Walker Wheels () and Walker ()  Treatment Diagnosis: Impaired gait 1 each 0     torsemide (DEMADEX) 20 MG tablet Increase torsemide 40mg qam and 20mg qpm. 90 tablet 1          Past Medical History     Past Medical History:   Diagnosis Date     Arthritis of knee~ s/p replacement 3/24/2016     CAD (coronary artery disease)      Decreased cardiac ejection fraction~44% 3/24/2016     Diabetes mellitus, type 2 (H) 3/24/2016     History of DVT (deep vein thrombosis)      HTN (hypertension) 3/24/2016     LBBB (left bundle branch block) 3/24/2016     Mixed hyperlipidemia 8/23/2016     Past Surgical History:   Procedure Laterality Date     ARTHROPLASTY KNEE Right 3/29/2016    Procedure: ARTHROPLASTY KNEE;  Surgeon: Heena Rosen MD;  Location:  OR     ARTHROPLASTY REVISION KNEE Left 9/27/2019    Procedure: REVISION LEFT TOTAL KNEE  ARTHROPLASTY;  Surgeon: Heena Rosen MD;  Location:  OR     CV LEFT HEART CATH N/A 8/12/2019    Procedure: Left Heart Cath;  Surgeon: Edgardo Beckham MD;  Location:  HEART CARDIAC CATH LAB     EYE SURGERY      cataract removal     ORTHOPEDIC SURGERY      KNEE SCOPES MULTIPLE     Family History   Problem Relation Age of Onset     Coronary Artery Disease No family hx of             Allergies   Penicillins        Brittanie Quiroz, WALE CNP  [facility]  Pager: [phone]

## 2021-01-19 NOTE — PROGRESS NOTES
[facility], kidney function remains the same, creatinine 1.9/BUN 34. Potassium slightly elevated at 5.1. Spironolactone not a great option given elevated potassium. Patient scheduled for diuretic infusion clinic tomorrow.      RECOMMENDATIONS  1. Continue with diuretic infusion clinic as planned for tomorrow.    2. Consideration for up-titration of torsemide can be made at that time.

## 2021-01-19 NOTE — PROGRESS NOTES
[facility]    Thank you for communicating with [facility]. I really appreciate it.     20-30mmHg knee high compression stockings.     CORE can you fax lab orders, will need prior to 1 month follow up. Will be following labs in infusion clinic coming up.       Thanks,   Brittanie

## 2021-01-19 NOTE — PROGRESS NOTES
[facility] dated 1/11/21. Results available in Epic for review. Per THEODORA Florentino, she was planning to titrate oral diuretics based on these results. Sent to THEODORA Florentino for review.     Will send fax with Compression grade/follow up lab orders/etc once here back from THEODORA Florentino.     Val Johnson RN, BSN, CHFN  01/19/21 at 8:24 AM

## 2021-01-19 NOTE — PROGRESS NOTES
[facility]    Called Burton to review plan for IV diuretic infusion appointment for 10am 1/20/2021.    No answer.     Left detailed VM for Burton:  Check in [facility] 1/20/2021 10 am at 1st floor Welcome Desk  Must wear mask  No visitors at this time  Do not take torsemide 1/20 only    I asked that he please call [facility] RN back to confirm plan and review Covid-19 assessment.    Will fax orders for [facility] stockings to [facility] when RN in clinic 1/20--reminder to RN board.    Michelle Cuello RN BSN   12:04 PM 01/19/21

## 2021-01-19 NOTE — PROGRESS NOTES
[facility]    I see that Burton has an echo scheduled for 1/21. He has infusion 1/20. Brittanie, can we do the echo during infusion? Please place echo order in signed/held orders for tomorrow's Care Suites encounter if so.    Thanks!    Michelle Cuello RN BSN   3:31 PM 01/19/21

## 2021-01-19 NOTE — PROGRESS NOTES
Left VM for patient noting that echo can be done at infusion tomorrow and thus he doesn't need to come 1/21 for echo. Asked him to call [facility] RN back to confirm before I cancel 1/21 appt.     Michelle Cuello RN BSN   4:05 PM 01/19/21      Sent reminder to [facility] RN board to perform post infusion call 1/21.    Michelle Cuello RN BSN   4:13 PM 01/19/21

## 2021-01-20 NOTE — PROGRESS NOTES
Care Suites Admission Nursing Note    Patient Information  Name: Burton Elizabeth  Age: 75 year old  Reason for admission: Bumex infusion   Care Suites arrival time: 1000    Visitor Information  Name: n/a  Informed of visitor restrictions: N/A  1 visitor allowed per patient   Visitor must screen negative for COVID symptoms   Visitor must wear a mask  Waiting rooms closed to visitors    Patient Admission/Assessment   Pre-procedure assessment complete: Yes  If abnormal assessment/labs, provider notified: Yes  NPO: N/A  Medications held per instructions/orders: Yes  Consent: obtained  If applicable, pregnancy test status: n/a  Patient oriented to room: Yes  Education/questions answered: Yes  Plan/other: proceed    Discharge Planning  Discharge name/phone number: brother  Overnight post sedation caregiver:n/a  Discharge location: home  PATIENT/VISITOR WELLNESS SCREENING    Step 1 Patient Screening    1. In the last month, have you been in contact with someone who was confirmed or suspected to have Coronavirus/COVID-19? No    2. Do you have the following symptoms?  Fever/Chills? No   Cough? No   Shortness of breath? No   New loss of taste or smell? No  Sore throat? No  Muscle or body aches? No  Headaches? No  Fatigue? No  Vomiting or diarrhea? No    Step 2 Visitor Screening    1. Name of Visitor (1 visitor per patient): n/a      If the visitor has positive symptoms, notify supervisor/manger  Per policy, the visitor will need to leave the facility     Step 3 Refer to logic grid below for actions    NO SYMPTOM(S)    ACTIONS:  1. Standard rooming process  2. Provider to assess per normal protocol  3. Implement precautions as needed and per guidelines     POSITIVE SYMPTOM(S)  If positive for ANY of the following symptoms: fever, cough, shortness of breath, rash    ACTION:  1. Continue to have the patient wear a mask   2. Room patient as soon as possible  3. Don appropriate PPE when entering room  4. Provider  evaluation    Tj Amezquita, RN

## 2021-01-20 NOTE — PROGRESS NOTES
Faxed order to Dr. Murrell office, Attn: [facility] #[phone]     1. BMP & BNP to be done prior to 2/18 CORE follow up with Brittanie  2. 20-30 mmHg knee high compression socks    TC Jameson([facility]) 1/20/21

## 2021-01-20 NOTE — PROGRESS NOTES
"1110 Report received from Tj Amezquita RN.   1115 Bumex gtt cont to infuse w/o difficulty. Pt sitting in cardiac chair. Pt agitated with process. Irritated that \"there is not a bathroom in his room\". Explained that pt can use the bathroom across the fong but that he needs to use the urinal because we need to measure his intake & output. Pt irritated that he \"has been in the hospital too many times in the past couple months\". When asked what writer can do for him - he wants the room warmer ... explained to pt that the rooms are not individually controlled ... and that to have the room warmer - the door must be open somewhat - otherwise the room stays cooler. Pt stated \"just shut the door & leave me alone\".   1215 Report given to Tj Amezquita RN.  "

## 2021-01-20 NOTE — DISCHARGE INSTRUCTIONS
1. Increase torsemide to 40 mg twice a day  2. When you [facility] to the [facility] try to obtain support socks, then on in am and off in the pm   3. Brittanie asked if you could call the [facility] lab and schedule a lab draw around Jan. 28 , she will send the order to the [facility]  4. Strict low sodium diet.   5. Appointment with Brittanie Quiroz 2/18

## 2021-01-20 NOTE — TELEPHONE ENCOUNTER
----- Message from WALE Padilla CNP sent at 1/20/2021  2:38 PM CST -----  Regarding: Fax BMP order to [facility] PCP  Jesus Perdue saw patient  in infusion clinic today.  Plan to increase torsemide to 40mg BID today.  Can you please fax order to [facility] to have BMP drawn in 7-10 days. Can you message board to follow up on results.     If he notes  a more profound diuresis with post infusion phone  follow up tomorrow, will consider another  infusion  appt.     Mohamud,Brittanie       Reminder was sent to [facility] RN board yesterday to perform post infusion HF assessment call 1/21.     Will ask C[facility] CMA to please fax BMP order above. Reminder to RN board to check for results on 1/28.    Michelle Cuello RN BSN   3:29 PM 01/20/21      Note that patient is scheduled for labs 2/17 in our office. Ok to use previously faxed lab orders to [facility] to perform 7-10 day BMP. Reminder to RN board to call or resubmit 7-10 day BMP order to [facility] on 1/25.     Michelle Cuello RN BSN   3:55 PM 01/20/21

## 2021-01-20 NOTE — PROGRESS NOTES
[facility] Suites Discharge Nursing Note (Post Bumex infusion)    Admission weight:  120.3 kg  Discharge weight:  119.024 kg  Total UOP: 950 cc    Patient Information  Name: Burton Elizabeth  Age: 75 year old    Discharge Education:  Discharge instructions reviewed: Yes  Patient/patient representative verbalizes understanding: Yes  Patient discharging on new medications: No  Medication education completed: Yes    Discharge Plans:   Discharge location: home  Discharge ride contacted: Yes  Approximate discharge time: 15:53    Discharge Criteria:  Discharge criteria met and vital signs stable: Yes    Patient Belongs:  Patient belongings returned to patient: Yes

## 2021-01-20 NOTE — PROGRESS NOTES
"[facility] DIURETIC INFUSION VISIT  Burton Elizabeth  : 1945    Date: 2021  Primary Cardiologist: Dr. Neuamnn     HPI:  Burton Elizabeth is a very pleasant 75 year old male with a history of coronary artery disease, moderate aortic stenosis, DM, hypertension, obesity, CVA and CKD.      Patient here today diuretic infusion. Nuclear stress test was stable when compared to prior. Medical management was continued. Patient has noted worsening exertional dyspnea and lower extremity edema.     Echocardiogram 2020 showed LVEF 50-55%. Moderate Aortic stenosis. Patient appears volume up on exam, tight bilateral lower extremity edema up into thighs. Compression stockings were recommended however [facility]     Given his continued volume overload it was recommended infusion clinic.  Bumex 1mg bolus, then 0.5 mg / hour for 4 hours     BMP K+5.0 , creat 1.74       Physical Exam:  Vitals: BP (!) 165/88 (BP Location: Right arm)   Pulse 68   Temp 96.6  F (35.9  C) (Oral)   Resp 16   Ht 1.753 m (5' 9\")   Wt 120.3 kg (265 lb 3.4 oz)   SpO2 96%   BMI 39.17 kg/m   home weight 248 pounds per patient.   Constitutional:  Patient is pleasant, alert, cooperative, and in NAD.   HEENT:  NCAT. PERRLA. EOM's intact.   Neck: JVP elevated  Pulmonary: clear    Cardiac: normal S1/S2, II/VI systolic Ejection murmurs, no rubs/gallops  Abdomen:  Soft, non-tender abdomen, no hepatosplenomegaly appreciated. Firm   Extremities:  2-3+ edema   Neurological:  No gross motor or sensory deficits.   Psych: Appropriate affect.     ASSESSMENT & PLAN  Chronic heart failure with preserved ejection fraction / moderate aortic stenosis   Echocardiogram 2020 showed LVEF 50-55%.   Home weight per patient 248 pounds  Clinic weight 265 pounds   Cooks his own food.   2: 17 pm only 500cc out. Updated Brittanie Quiroz.   Plan:  -- support socks  --consider increasing torsemide to decrease pre load  --consider increasing Imdur to " decrease preload   - repeat echo 1/21   - NYHA class II-III, stage C  - Continue torsemide to 40mg qam and 20mg qpm  - Counseled on low sodium diet, daily weights and 2L fluid restriction  - follow up visit with Brittanie Quiroz 2/18  -- discussed case with Brittanie Quiroz, she suggested to increase Frusemide to 40 mg bid and [facility]. ( She stated she will message [facility] RN to [facility] to [facility])   [facility] will [facility] to [facility] I will place that in his AVS.       Mary Anne Santiago, APRN CNP 1/20/2021 12:31 PM      Orders this Visit:  Orders Placed This Encounter   Procedures     Urea nitrogen     Urea nitrogen     Creatinine     Creatinine     Magnesium     Potassium     Potassium     Sodium     Potassium     EKG 12-lead, tracing only     Vital Signs     Pulse oximetry nursing     Weigh patient     Measure urine output     Patient Teaching: Heart Failure     Peripheral IV catheter     Activity: Up ad jean-claude     Notify Provider     Notify Provider     May discharge when: other (specify in comments) IF Systolic blood pressure is greater than or equal to 90 mmHg AND patient is ambulatory without symptoms.     Discharge planning     Oxygen: Nasal cannula     Echocardiogram Complete     Orders Placed This Encounter   Medications     lidocaine 1 % 0.1-1 mL     lidocaine (LMX4) cream     sodium chloride (PF) 0.9% PF flush 3 mL     sodium chloride (PF) 0.9% PF flush 3 mL     bumetanide (BUMEX) injection 1 mg     bumetanide (BUMEX) 0.25 mg/mL infusion     sodium chloride 0.9% infusion     There are no discontinued medications.        CURRENT MEDICATIONS:  Medications Prior to Admission   Medication Sig Dispense Refill Last Dose     amLODIPine (NORVASC) 10 MG tablet Take 1 tablet (10 mg) by mouth daily   1/19/2021 at Unknown time     aspirin (ASA) 325 MG EC tablet Take 1 tablet (325 mg) by mouth daily Start 7/25/20.   1/20/2021 at Unknown time     atorvastatin (LIPITOR) 40 MG tablet Take 1 tablet (40 mg) by  mouth every evening 30 tablet 3 1/19/2021 at Unknown time     blood glucose (NO BRAND SPECIFIED) test strip Use to test blood sugar as directed   1/20/2021 at Unknown time     carvedilol (COREG) 25 MG tablet Take 1 tablet (25 mg) by mouth 2 times daily (with meals) 60 tablet 0 1/20/2021 at Unknown time     glimepiride (AMARYL) 4 MG tablet Take 4 mg by mouth 2 times daily    1/20/2021 at Unknown time     insulin aspart (NOVOLOG FLEXPEN) 100 UNIT/ML pen Inject 2-6 Units Subcutaneous 3 times daily (with meals) (Patient states he bases his dose off of carb counting and blood glucose but did not give exact regimen).   1/20/2021 at Unknown time     insulin glargine (LANTUS PEN) 100 UNIT/ML pen Inject 15 Units Subcutaneous every evening   1/19/2021 at Unknown time     isosorbide mononitrate (IMDUR) 30 MG 24 hr tablet Take 1 tablet (30 mg) by mouth daily 90 tablet 1 1/19/2021 at Unknown time     multivitamin, therapeutic with minerals (THERA-VIT-M) TABS Take 1 tablet by mouth daily   1/20/2021 at Unknown time     order for DME Equipment being ordered: Walker Wheels () and Walker ()  Treatment Diagnosis: Impaired gait 1 each 0 1/20/2021 at Unknown time     torsemide (DEMADEX) 20 MG tablet Increase torsemide 40mg qam and 20mg qpm. 90 tablet 1 1/19/2021 at Unknown time     acetaminophen (TYLENOL) 325 MG tablet Take 2 tablets (650 mg) by mouth every 6 hours as needed for mild pain or fever (> 101 F)   More than a month at Unknown time       ALLERGIES     Allergies   Allergen Reactions     Penicillins Anaphylaxis       PAST MEDICAL HISTORY:  Past Medical History:   Diagnosis Date     Arthritis of knee~ s/p replacement 3/24/2016     CAD (coronary artery disease)      Decreased cardiac ejection fraction~44% 3/24/2016     Diabetes mellitus, type 2 (H) 3/24/2016     History of DVT (deep vein thrombosis)      HTN (hypertension) 3/24/2016     LBBB (left bundle branch block) 3/24/2016     Mixed hyperlipidemia 8/23/2016        PAST SURGICAL HISTORY:  Past Surgical History:   Procedure Laterality Date     ARTHROPLASTY KNEE Right 3/29/2016    Procedure: ARTHROPLASTY KNEE;  Surgeon: Heena Rosen MD;  Location:  OR     ARTHROPLASTY REVISION KNEE Left 9/27/2019    Procedure: REVISION LEFT TOTAL KNEE  ARTHROPLASTY;  Surgeon: Heena Rosen MD;  Location:  OR     CV LEFT HEART CATH N/A 8/12/2019    Procedure: Left Heart Cath;  Surgeon: Edgardo Beckham MD;  Location:  HEART CARDIAC CATH LAB     EYE SURGERY      cataract removal     ORTHOPEDIC SURGERY      KNEE SCOPES MULTIPLE       FAMILY HISTORY:  Family History   Problem Relation Age of Onset     Coronary Artery Disease No family hx of        SOCIAL HISTORY:  Social History     Socioeconomic History     Marital status: Single     Spouse name: Not on file     Number of children: Not on file     Years of education: Not on file     Highest education level: Not on file   Occupational History     Not on file   Social Needs     Financial resource strain: Not on file     Food insecurity     Worry: Not on file     Inability: Not on file     Transportation needs     Medical: Not on file     Non-medical: Not on file   Tobacco Use     Smoking status: Never Smoker     Smokeless tobacco: Never Used   Substance and Sexual Activity     Alcohol use: Yes     Alcohol/week: 2.0 standard drinks     Types: 2 Standard drinks or equivalent per week     Frequency: 2-3 times a week     Drinks per session: 1 or 2     Comment: occasional, social     Drug use: No     Sexual activity: Not on file   Lifestyle     Physical activity     Days per week: Not on file     Minutes per session: Not on file     Stress: Not on file   Relationships     Social connections     Talks on phone: Not on file     Gets together: Not on file     Attends Buddhism service: Not on file     Active member of club or organization: Not on file     Attends meetings of clubs or organizations: Not on file     Relationship  status: Not on file     Intimate partner violence     Fear of current or ex partner: Not on file     Emotionally abused: Not on file     Physically abused: Not on file     Forced sexual activity: Not on file   Other Topics Concern     Parent/sibling w/ CABG, MI or angioplasty before 65F 55M? Not Asked   Social History Narrative     Not on file       Review of Systems:  10 point review of systems negative except as listed in HPI    Recent Lab Results:  LIPID RESULTS:  Lab Results   Component Value Date    CHOL 97 01/11/2021    HDL 31 (L) 01/11/2021    LDL 46 01/11/2021    TRIG 102 01/11/2021       LIVER ENZYME RESULTS:  Lab Results   Component Value Date    AST 38 (H) 01/11/2021    ALT 25 01/11/2021       CBC RESULTS:  Lab Results   Component Value Date    WBC 7.24 01/11/2021    RBC 4.01 (L) 01/11/2021    HGB 10.9 (L) 01/11/2021    HCT 37.1 (L) 01/11/2021    MCV 92.5 01/11/2021    MCH 27.2 01/11/2021    MCHC 29.4 (L) 01/11/2021    RDW 14.3 01/11/2021     01/11/2021       BMP RESULTS:  Lab Results   Component Value Date     01/20/2021    POTASSIUM 5.0 01/20/2021    CHLORIDE 108 (H) 01/11/2021    CO2 23 01/11/2021    ANIONGAP 10 01/11/2021     01/11/2021    BUN 35 (H) 01/20/2021    CR 1.74 (H) 01/20/2021    GFRESTIMATED 38 (L) 01/20/2021    GFRESTBLACK 44 (L) 01/20/2021    GABE 9.0 01/11/2021        A1C RESULTS:  Lab Results   Component Value Date    A1C 7.9 (H) 01/11/2021       INR RESULTS:  Lab Results   Component Value Date    INR 1.1 (A) 06/30/2020    INR 1.12 04/22/2020         CC  No referring provider defined for this encounter.

## 2021-01-20 NOTE — PROGRESS NOTES
[facility]    Incoming message from patient stating he does not want to do the ECHO and infusion clinic on the same day. He requested that we keep the ECHO scheduled on Thurs(1/21).  Mounika Carreon RN on 1/20/2021 at 7:47 AM

## 2021-01-21 NOTE — PROGRESS NOTES
"[facility]  Post Diuretic Infusion      Diuretic received during infusion   Wed 1/20 - bumex 1mg IVP -> 0.5mg/hr X4hrs     Recent weights:  Patient did not weigh himself this morning or yesterday am. He stated last night he did not sleep well, adding he had been up since 0300 and \"weighing myself was the furthest thing from my mind.\"  He denied any SOB/PND last night. He also stated, \"if someone yesterday had told me to step on the scale this am I would have done that.\"  I reviewed w/him why we ask for daily weights and what we/he are looking for. He added, \"it would have been nice if someone would have told me that before.\"  (per clinic notes from Brittanie Quiroz, she reviewed heart failure management = low Na eating, daily weights, etc)    Current daily diuretic:    torsemide 40mg bid(this was increased as of yesterday - confirmed w/patient)    HF symptoms: Patient was not forthcoming w/any discussion about his sx. His responses were terse. He stated that his breathing is \"fine\".  He further stated that his legs are \"swollen horribly.\"  He also stated, I've been dealing with this for the last 13 months and now all of a sudden I'm getting all these calls asking about how I am doing.... I just hope I can get this thing wrapped up.\"  Eventually he stated that he feels some better today than yesterday am.     We reviewed his upcoming appts. There was a lab scheduled at [facility] on 2/17. [facility]. Reminder has been sent to [facility] board [facility]    Patient had no additional questions.  Mounika Carreon, RN on 1/21/2021 at 11:47 AM    Future Appointments   Date Time Provider Department Center   2/17/2021  8:00 AM [facility]   2/18/2021  8:10 AM Brittanie Quiroz APRN CNP [facility]     "

## 2021-01-21 NOTE — PROGRESS NOTES
Reviewed post infusion note. Torsemide increased to 40mg BID yesterday. Plan for echocardiogram outpatient today. No further recommendations at this time.     WALE Fowler CNP  [facility]  Pager: [phone]

## 2021-01-25 NOTE — PROGRESS NOTES
Wellness Screening Tool    Symptom Screening:    Do you have one of the following NEW symptoms:      Fever (subjective or >100.0)? \No    New cough? No    Shortness of breath? No    Chills? No    New loss of taste or smell? No    Generalized body aches? No    New persistent headache? No    New sore throat? No    Nausea, vomiting or diarrhea? No    Within the past 2 weeks, have you been exposed to someone with a known positive illness below?      COVID - 19 (known or suspected) No    Chicken pox? No    Measles? No    Pertussis? No    Have you had a positive COVID-19 diagnostic test (nasal swab test) in the last 14 days or are you currently   on self-quarantine restrictions (i.e.travel restriction, exposure, etc?) No        Patient notified of visitor restriction:yes  Patient informed to wear a mask: yes    Patient's appointment status: Patient will be seen in clinic as scheduled on 1/28/2021    Michelle Cuello RN BSN   1:12 PM 01/25/21

## 2021-01-25 NOTE — PROGRESS NOTES
"[facility]        I spoke to Magdaleno and reviewed Brittanie PHILIP's message above. Magdaleno expressed some concerns, \"I've had this swelling for 14 months. I'd like it to be resolving faster.\"  \"In the afternoon my balance is poor. I feel like a [age] year old toddling around.\"  \"I'm waking 1-2 hours after falling asleep to pee.\"    Magdaleno and I had a thoughtful discussion about the nature of heart failure and the need to balance treating it and avoiding harm to his kidneys and side effects. He verbalized understanding.    Swelling: peripheral swelling ongoing; [facility]    Recent home weights (lbs):  1/25 247  1/20 248 <---diuretic infusion  --We discussed rationale/importance/process of daily, home weights vs checking weights sporadically on different scales    Balance: Magdaleno denied dizziness, faint-feeling, lightheadedness. He uses a cane when out of house. No falls in last 6 months. He attributes his gait change to heaviness of legs.    He shared with me that he will receive 1st dose of Covid-19 vaccine next week. 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    Reviewed above with Brittanie Quiroz CNP. She recommends proceeding with diuretic infusion this week--route to her to place orders. Magdaleno agreeable to scheduling 1/28. Thus defer BMP that was planned for this week.    Future Appointments   Date Time Provider Department Center   1/28/2021 10:00 AM  [facility] BED 1 [facility]   2/17/2021  8:00 AM [facility]   2/18/2021  8:10 AM Brittanie Quiroz APRN CNP [facility]     Michelle Cuello RN BSN   1:05 PM 01/25/21        "

## 2021-01-28 PROBLEM — I50.9 CHF (CONGESTIVE HEART FAILURE) (H): Status: ACTIVE | Noted: 2020-04-22

## 2021-01-28 NOTE — PROGRESS NOTES
[facility] DIURETIC INFUSION VISIT  Burton Elizabeth  : 1945    Date: 2021  Primary Cardiologist: Dr. Neumann/ Brittanie Quiroz NP      HPI:  Burton Elizabeth is a pleasant 74 yo gentleman with PMH significant for coronary artery disease (known occluded non dominant cir), moderate aortic stenosis, DM, hypertension, obesity, CVA, hx of dvt, and CKD who has struggle with volume overload for unclear reasons.  This has primarily manifested itself as peripheral edema going up to his mid thighs.  He was brought into infusion clinic last week and diuresed fairly well with a drop in 3 pounds in his weight.  He notes when he first got home his legs were less swollen but within 24 hours the leg swelling was back.  Although, his weights never trended up again his weight stayed stable at about 247 at home and despite of the leg swelling increasing.  He states that with the increased dose of torsemide he feels that he is doing better and urinating slightly more.  And at least his weight now is stable.  Of note, he had an echocardiogram done last week during infusion at a weight of 265 pounds and this showed an ongoing dilated IVC.  He states he tries to watch his diet and monitor his fluid intake to prevent weight gain.    Physical Exam:  Vitals: Pulse 70   Temp 96.9  F (36.1  C) (Oral)   Wt 117.4 kg (258 lb 14.4 oz)   BMI 38.23 kg/m     Well-developed well-nourished talkative gentleman in no acute distress.  He is normocephalic and atraumatic.  His heart is regular but distant I do not appreciate murmur rub or gallop.  Lungs are absent breath sounds in the left lower lobe clear in the middle and upper lobes bilaterally right lower lung is clear.  Extremities with 2-3+ edema to mid thigh.  This is consistent with both lymphedema and pitting edema.  Venous stasis skin changes noted.    ASSESSMENT & PLAN  This is a delightful 75-year-old gentleman with past medical history significant for coronary artery disease with  known occluded nondominant circumflex, moderate aortic stenosis, type 2 diabetes, hypertension, obesity, history of CVA with ongoing imbalance, history of DVT and CKD who continues to struggle with volume overload.  Last week at a weight of 265 pounds his IVC was clearly dilated on his echocardiogram. He lost 3 pounds last week in infusion and is down another 4 pounds since then on our scale. Today he continues to have clear ongoing peripheral edema.  Given how poor his breath sounds are in his left lower lobe I wonder if he also possibly has a left pleural effusion.  He is diuresing fairly well today on his Bumex drip and we will continue that.  Once I get his chest x-ray results we will discuss possible adjustments to his medications as an outpatient.    I note that his echocardiogram shows a slightly dilated RV, he has not had a sleep study but states he does snore at night he is not interested in sleep study.  He also has history of multiple DVTs but no history of PE, he did have a negative VQ scan in 2019.  My recommendation for him is to get an outpatient sleep study and if he has sleep apnea for him to be treated and I encouraged him to think about this further.    Dry wt is unclear, but I suspect it's near 240 or lower.    Isabel Reece PA-C 1/28/2021 1:12 PM    ADDENDUM:  Pt given a 1 mg bumex push followed by 1 mg/h for 4 hours.  Wt dropped significantly, more likely due to bm than diuresis.      Vitals:    01/28/21 0953   Weight: 117.4 kg (258 lb 14.4 oz)       Intake/Output Summary (Last 24 hours) at 1/28/2021 1518  Last data filed at 1/28/2021 1437  Gross per 24 hour   Intake 425 ml   Output 680 ml   Net -255 ml     PLAN:  40 mEq of potassium now as I suspect it will drop in the next day with diuresis.  Metolazone 2.5 mg dose on Saturday afternoon prior to afternoon torsemide.    Eat high potassium foods on Saturday and Sunday.   BMP on Tuesday  F/u phone call on Tuesday as well as tomorrow.      Isabel  Ysabel Reece PA-C 3:18 PM         Orders this Visit:  Orders Placed This Encounter   Procedures     Urea nitrogen     Creatinine     Magnesium     Potassium     Sodium     Potassium     Magnesium     Creatinine     Potassium     Urea nitrogen     EKG 12-lead, tracing only     Vital Signs     Pulse oximetry nursing     Weigh patient     Measure urine output     Patient Teaching: Heart Failure     Peripheral IV catheter     Activity: Up ad jean-claude     Notify Provider     Notify Provider     May discharge when: other (specify in comments) IF Systolic blood pressure is greater than or equal to 90 mmHg AND patient is ambulatory without symptoms.     Discharge planning     Oxygen: Nasal cannula     Orders Placed This Encounter   Medications     lidocaine 1 % 0.1-1 mL     lidocaine (LMX4) cream     sodium chloride (PF) 0.9% PF flush 3 mL     sodium chloride (PF) 0.9% PF flush 3 mL     bumetanide (BUMEX) injection 1 mg     bumetanide (BUMEX) 0.25 mg/mL infusion     sodium chloride 0.9% infusion     There are no discontinued medications.      CURRENT MEDICATIONS:  Medications Prior to Admission   Medication Sig Dispense Refill Last Dose     amLODIPine (NORVASC) 10 MG tablet Take 1 tablet (10 mg) by mouth daily   1/27/2021 at Unknown time     aspirin (ASA) 325 MG EC tablet Take 1 tablet (325 mg) by mouth daily Start 7/25/20.   1/27/2021 at Unknown time     atorvastatin (LIPITOR) 40 MG tablet Take 1 tablet (40 mg) by mouth every evening 30 tablet 3 1/27/2021 at Unknown time     carvedilol (COREG) 25 MG tablet Take 1 tablet (25 mg) by mouth 2 times daily (with meals) 60 tablet 0 1/27/2021 at Unknown time     glimepiride (AMARYL) 4 MG tablet Take 4 mg by mouth 2 times daily    1/28/2021 at Unknown time     insulin glargine (LANTUS PEN) 100 UNIT/ML pen Inject 15 Units Subcutaneous every evening   1/27/2021 at Unknown time     isosorbide mononitrate (IMDUR) 30 MG 24 hr tablet Take 1 tablet (30 mg) by mouth daily 90 tablet 1 1/27/2021 at  Unknown time     multivitamin, therapeutic with minerals (THERA-VIT-M) TABS Take 1 tablet by mouth daily   1/27/2021 at Unknown time     torsemide (DEMADEX) 20 MG tablet 2 tablets (40mg ) twice a day 120 tablet 1 1/27/2021 at Unknown time     acetaminophen (TYLENOL) 325 MG tablet Take 2 tablets (650 mg) by mouth every 6 hours as needed for mild pain or fever (> 101 F)   Unknown at Unknown time     blood glucose (NO BRAND SPECIFIED) test strip Use to test blood sugar as directed        insulin aspart (NOVOLOG FLEXPEN) 100 UNIT/ML pen Inject 2-6 Units Subcutaneous 3 times daily (with meals) (Patient states he bases his dose off of carb counting and blood glucose but did not give exact regimen).        order for DME Equipment being ordered: Walker Wheels () and Walker ()  Treatment Diagnosis: Impaired gait 1 each 0        ALLERGIES     Allergies   Allergen Reactions     Penicillins Anaphylaxis       PAST MEDICAL HISTORY:  Past Medical History:   Diagnosis Date     Arthritis of knee~ s/p replacement 3/24/2016     CAD (coronary artery disease)      Decreased cardiac ejection fraction~44% 3/24/2016     Diabetes mellitus, type 2 (H) 3/24/2016     History of DVT (deep vein thrombosis)      HTN (hypertension) 3/24/2016     LBBB (left bundle branch block) 3/24/2016     Mixed hyperlipidemia 8/23/2016       PAST SURGICAL HISTORY:  Past Surgical History:   Procedure Laterality Date     ARTHROPLASTY KNEE Right 3/29/2016    Procedure: ARTHROPLASTY KNEE;  Surgeon: Heena Rosen MD;  Location:  OR     ARTHROPLASTY REVISION KNEE Left 9/27/2019    Procedure: REVISION LEFT TOTAL KNEE  ARTHROPLASTY;  Surgeon: Heena Rosen MD;  Location:  OR     CV LEFT HEART CATH N/A 8/12/2019    Procedure: Left Heart Cath;  Surgeon: Edgardo Beckham MD;  Location:  HEART CARDIAC CATH LAB     EYE SURGERY      cataract removal     ORTHOPEDIC SURGERY      KNEE SCOPES MULTIPLE       FAMILY HISTORY:  Family History    Problem Relation Age of Onset     Coronary Artery Disease No family hx of        SOCIAL HISTORY:  Social History     Socioeconomic History     Marital status: Single     Spouse name: Not on file     Number of children: Not on file     Years of education: Not on file     Highest education level: Not on file   Occupational History     Not on file   Social Needs     Financial resource strain: Not on file     Food insecurity     Worry: Not on file     Inability: Not on file     Transportation needs     Medical: Not on file     Non-medical: Not on file   Tobacco Use     Smoking status: Never Smoker     Smokeless tobacco: Never Used   Substance and Sexual Activity     Alcohol use: Yes     Alcohol/week: 2.0 standard drinks     Types: 2 Standard drinks or equivalent per week     Frequency: 2-3 times a week     Drinks per session: 1 or 2     Comment: occasional, social     Drug use: No     Sexual activity: Not on file   Lifestyle     Physical activity     Days per week: Not on file     Minutes per session: Not on file     Stress: Not on file   Relationships     Social connections     Talks on phone: Not on file     Gets together: Not on file     Attends Latter day service: Not on file     Active member of club or organization: Not on file     Attends meetings of clubs or organizations: Not on file     Relationship status: Not on file     Intimate partner violence     Fear of current or ex partner: Not on file     Emotionally abused: Not on file     Physically abused: Not on file     Forced sexual activity: Not on file   Other Topics Concern     Parent/sibling w/ CABG, MI or angioplasty before 65F 55M? Not Asked   Social History Narrative     Not on file       Review of Systems:  10 point review of systems negative except as listed in HPI.    Recent Lab Results:  LIPID RESULTS:  Lab Results   Component Value Date    CHOL 97 01/11/2021    HDL 31 (L) 01/11/2021    LDL 46 01/11/2021    TRIG 102 01/11/2021       LIVER ENZYME  RESULTS:  Lab Results   Component Value Date    AST 38 (H) 01/11/2021    ALT 25 01/11/2021       CBC RESULTS:  Lab Results   Component Value Date    WBC 7.24 01/11/2021    RBC 4.01 (L) 01/11/2021    HGB 10.9 (L) 01/11/2021    HCT 37.1 (L) 01/11/2021    MCV 92.5 01/11/2021    MCH 27.2 01/11/2021    MCHC 29.4 (L) 01/11/2021    RDW 14.3 01/11/2021     01/11/2021       BMP RESULTS:  Lab Results   Component Value Date     01/28/2021    POTASSIUM 4.6 01/28/2021    CHLORIDE 108 (H) 01/11/2021    CO2 23 01/11/2021    ANIONGAP 10 01/11/2021     01/11/2021    BUN 43 (H) 01/28/2021    CR 1.97 (H) 01/28/2021    GFRESTIMATED 32 (L) 01/28/2021    GFRESTBLACK 37 (L) 01/28/2021    GABE 9.0 01/11/2021        A1C RESULTS:  Lab Results   Component Value Date    A1C 7.9 (H) 01/11/2021       INR RESULTS:  Lab Results   Component Value Date    INR 1.1 (A) 06/30/2020    INR 1.12 04/22/2020         CC  No referring provider defined for this encounter.

## 2021-01-28 NOTE — PROGRESS NOTES
Pt here for Bumex gtt infusion.      Pre weight - 117.4kg.     Pre labs drawn - see results.  1 mg bolus given & infusion intitiated.     K+ level 4.6 no action     Mg level 1.9 no action per Isabel KAMARA  .    ------------------------------------------------    Report received from , TIMA.     cont to infuse w/o difficulty. Pt sitting in cardiac chair. No complaints. Family  in room.     Call received from , CNP/PA in [facility].     Repeat K+ level - 4.2, orders for 40 mEq KCL  PO to be given     Post weight 114.7kg.    Isabel Reece CNP/PA here to see pt & CXR done no further orders Plan of care discussed.      Discharge instructions given to pt &Verbal understanding received. All questions & concerns addressed.     Pt discharged per w/c to private vehicle. All personal belongings sent with pt.   "Gigi Rolon [facility] - HIV Care  Patient:  Gigi Rolon, [facility] and on Prinzide.  Magan Dickson  PGY5 ID Fellow  Pager: [phone]         History of Infectious Disease Illness:     Ms.Tenneh Rolon is a 36-year-old woman who was referred to [facility]  by [facility] 15 at [facility].  In addition, there is documentation of negative HIV screens  through the [facility] laboratory on 11/10/10 and 9/25/08 and at [facility] 

## 2021-01-28 NOTE — PROGRESS NOTES
Care Suites Discharge Nursing Note    Patient Information  Name: Burton Elizabeth  Age: 75 year old    Discharge Education:  Discharge instructions reviewed: Yes  Additional education/resources provided: yes per [facility] PA  Patient/patient representative verbalizes understanding: Yes  Patient discharging on new medications: Yes, pt instructed to  med at hospital pharmacy on way out  Medication education completed: Yes    Discharge Plans:   Discharge location: home  Discharge ride contacted: Yes  Approximate discharge time: 1525    Discharge Criteria:  Discharge criteria met and vital signs stable: Yes    Patient Belongs:  Patient belongings returned to patient: Yes    Thelma Gaines RN

## 2021-01-28 NOTE — PROGRESS NOTES
[facility]    Called patient to arrange lab appt for Tues 2/2 per Isabel More:  Burton lost another 6 pounds at infusion today!  I think he has at least 10 more to go.  CXR looked congested to me.     I gave him a one time dose of metolazone for Saturday with increased K in his diet Sunday and Monday.  He agrees to labs in our clinic on Tuesday- will you pls help arrange that and then call him Tuesday afternoon for an update?  We could always repeat the dose vs brining him back in for infusion again.     LM for call back. Also sent reminder to board for f/u call Tues 2/3.  Mounika Carreon RN on 1/28/2021 at 3:52 PM

## 2021-01-28 NOTE — PROGRESS NOTES
PATIENT/VISITOR WELLNESS SCREENING    Step 1 Patient Screening    1. In the last month, have you been in contact with someone who was confirmed or suspected to have Coronavirus/COVID-19? No    2. Do you have the following symptoms?  Fever/Chills? No   Cough? No   Shortness of breath? No   New loss of taste or smell? No  Sore throat? No  Muscle or body aches? No  Headaches? No  Fatigue? No  Vomiting or diarrhea? No    Step 2 Visitor Screening    1. Name of Visitor (1 visitor per patient):     2. In the last month, have you been in contact with someone who was confirmed or suspected to have Coronavirus/COVID-19? No    3. Do you have the following symptoms?  Fever/Chills? No   Cough? No   Shortness of breath? No   Skin rash? No   Loss of taste or smell? No  Sore throat? No  Runny or stuffy nose? No  Muscle or body aches? No  Headaches? No  Fatigue? No  Vomiting or diarrhea? No          If the visitor has positive symptoms, notify supervisor/manger  Per policy, the visitor will need to leave the facility     Step 3 Refer to logic grid below for actions    NO SYMPTOM(S)    ACTIONS:  1. Standard rooming process  2. Provider to assess per normal protocol  3. Implement precautions as needed and per guidelines     POSITIVE SYMPTOM(S)  If positive for ANY of the following symptoms: fever, cough, shortness of breath, rash    ACTION:  1. Continue to have the patient wear a mask   2. Room patient as soon as possible  3. Don appropriate PPE when entering room  4. Provider evaluation

## 2021-01-28 NOTE — PROGRESS NOTES
Care Suites Admission Nursing Note    Patient Information  Name: Burton Elizabeth  Age: 75 year old  Reason for admission: CHF infusion  Care Suites arrival time: 0945    Visitor Information  Name: none  Informed of visitor restrictions: N/A  1 visitor allowed per patient   Visitor must screen negative for COVID symptoms   Visitor must wear a mask  Waiting rooms closed to visitors    Patient Admission/Assessment   Pre-procedure assessment complete: Yes  If abnormal assessment/labs, provider notified: Yes  NPO: N/A  Medications held per instructions/orders: N/A  Consent: deferred  If applicable, pregnancy test status: deferred  Patient oriented to room: Yes  Education/questions answered: Yes  Plan/other:     Discharge Planning  Discharge name/phone number: pt does not want to give out phone numbers but states has a family member who will pick him up   Overnight post sedation caregiver:  Discharge location: home    Thelma Gaines RN

## 2021-01-28 NOTE — DISCHARGE INSTRUCTIONS
On Saturday afternoon, about 1 hour before you take your torsemide take 1 pill of metolazone.  Only take it one time on Saturday and not again unless our nurses tell you too.    You can eat bananas and other high potassium foods on Saturday and Sunday.    We'll get labs on Tuesday and call with further recommendations.

## 2021-01-29 NOTE — PROGRESS NOTES
"[facility]  Post Diuretic Infusion      Diuretic received during infusion    bumex 1mg IVP -> 1mg/hr X4hrs     Recent weights:   1/29 248   1/28 248   1/27 248  I wonder about the accuracy of his home scale as in the infusion clinic he lost 6# per that scale, but his home weight unchanged. Will ask when we check in again with him next week    Current goal wt: </= ~240#  Current daily diuretic:    torsemide 40mg bid   metolazone 2.5mg X1 on Sat 1/31    HF symptoms: Patient stated \"I feel fine,.... I feel the same as yesterday.\"  He denied any SOB, orthopnea/PND. His leg swelling unchanged.     BMP scheduled for Tues 2/2 - CORE RN will call that day to assess response to metolazone. Reminder sent to board.  Patient verbalized understanding and had no questions.  Mounika Carreon, RN on 1/29/2021 at 9:50 AM    Future Appointments   Date Time Provider Department Center   2/2/2021  9:15 AM [facility]   2/17/2021  8:00 AM [facility]   2/18/2021  8:10 AM Brittanie Quiroz APRN CNP [facility]     "

## 2021-02-02 NOTE — PROGRESS NOTES
Reviewed CORE phone encounter and labs. Agree with plan to continue torsemide and no further metolazone at this time. Keep CORE follow up as scheduled for 2/18/21.     WALE Fowler [facility]  Pager: [phone]

## 2021-02-02 NOTE — PROGRESS NOTES
"[facility]    Called patient for update and to review lab results from earlier today    Current home weights:   2/2 247   2/1 248   1/31 248   1/30 248  Current diuretic regimen:   torsemide 40mg bid   metolazone 2.5mg X1 taken Sat 1/31    Patient stated he \"feels fine\". His breathing remains unaffected. He stated that his leg swelling is \"down quite a bit after taking that pill on Sat.\"  He denied any abdominal bloating or other areas of swelling.     Reviewed lab results from this am:  Component      Latest Ref Rng & Units 2/2/2021   Sodium      133 - 144 mmol/L 135   Potassium      3.4 - 5.3 mmol/L 4.3   Chloride      94 - 109 mmol/L 101   Carbon Dioxide      20 - 32 mmol/L 29   Anion Gap      3 - 14 mmol/L 5   Glucose      70 - 99 mg/dL 237 (H)   Urea Nitrogen      7 - 30 mg/dL 55 (H)   Creatinine      0.66 - 1.25 mg/dL 2.36 (H)   GFR Estimate      >60 mL/min/1.73:m2 26 (L)   GFR Estimate If Black      >60 mL/min/1.73:m2 30 (L)   Calcium      8.5 - 10.1 mg/dL 9.2     Above results reflect above diuretics. His weight on his home scale has changed little, but today patient stating his legs less swollen. Will review w/Brittanie Arriaza. Patient had no other questions/concerns.  Mounika Carreon RN on 2/2/2021 at 10:49 AM  Future Appointments   Date Time Provider Department Center   2/17/2021  8:00 AM [facility]   2/18/2021  8:10 AM Brittanie Quiroz, APRN CNP [facility] PSA CL[facility]         "

## 2021-02-10 NOTE — PROGRESS NOTES
[facility]    [facility] updated documentation and rx for torsemide.     Faxed the following to Kyung ZAVALA (fax: [phone])  --1/20/2021 phone encounter note per Brittanie Quiroz re: torsemide increase  --rx for torsemide  --Most recent BMP  --2/2/2021 care coordination notes    Michelle Cuello RN BSN   2:41 PM 02/10/21

## 2021-02-17 NOTE — PROGRESS NOTES
[facility] CORE [facility]    Incoming fax w/BMP/BNP results. Results entered and doc sent to scanning.  Mounika Carreon RN on 2/17/2021 at 12:07 PM

## 2021-02-18 NOTE — LETTER
2/18/2021    [facility]  [address] [facility] [address]    RE: Burton Elizabeth       Dear Colleague,    I had the pleasure of seeing Burton Elizabeth in the [facility].    Cardiology Clinic Progress Note  Burton Elizabeth MRN# [id number]   YOB: 1945 Age: 75 year old   Primary Cardiologist: Dr. Neumann Reason for visit: CORE follow up             Assessment and Plan:   Burton Elizabeth is a very pleasant 75 year old male with a history of coronary artery disease, moderate aortic stenosis, DM, hypertension, obesity, CVA and CKD.      Patient here today for CORE follow up. Overall he is doing well from a heart failure standpoint. HF symptoms are controlled. He notes his weight has been declining. He is tolerating torsemide 40mg BID and started wearing compression stockings. Reviewed consideration for sleep medicine evaluation and he again declined. [facility]. He is planning doing some traveling over the next month so will plan on CORE follow up in 2 months, sooner if needed. No changes in medications today.     1. Coronary artery disease - coronary angiogram 8/12/2019 noting single vessel occlusive coronary artery disease namely  of non-dominant small circumflex vessel, proximal circumflex 99% stenosis, ostial RPDA 55%, post atrio lesion 50%, and mid to distal LAD 50% stenosis. Stable, no signs/symptoms of angina.               - Nuclear stress test completed 1/7/21 due to complaints of chest pain, this showed small area of mild ischemia in the inferior segments. This was stable when compared to prior nuclear stress test in 5/2017 which showed inferior, anterior and anteroseptal wall ischemia.               - Continue aspirin, carvedilol, isosorbide mononitrate and atorvastatin                - Counseled patient on lifestyle  modifications  2. Hypertension - controlled, continue current medications  3. Aortic stenosis - moderate, Mean gradient 23 mmHg, valve area 1.4 cm2, DI 0.34 per echo 1/2021.  4. Obesity - BMI 37.36, counseled patient on weight loss strategies.   5. CKD - baseline 1.8-2.1, creatinine from 2/12 2.0  6. Chronic heart failure with preserved ejection fraction - echocardiogram 1/202 showed LVEF 55-60%, RV mild-moderately dilated with normal systolic function. He appears compensated and close to euvolemic. Weight today 245# at home.               - NYHA class II-III, stage C   - Dry weight 240-245#              - Continue torsemide to 40mg qam and 20mg qpm              - Counseled on low sodium diet, daily weights and 2L fluid restriction   - Continue compression stockings.   7. Diabetes mellitus - uncontrolled, HgbA1c 9.5 10/2020  8. Chronic LBBB  9. CVA   10. High probability of sleep apnea -reviewed again today, declined sleep medicine evaluation      Changes today: none    Follow up plan:     CORE follow up in 2 months with me, [facility]         History of Presenting Illness:    Burton Elizabeth is a very pleasant 75 year old male with a history of coronary artery disease, moderate aortic stenosis, DM, hypertension, obesity, CVA and CKD.      Patient follows with Dr. Neumann. Patient has a history of abnormal stress test from 2016 which demonstrated a small area of mild ischemia in the anterior/anteroseptal apex/mid ventricle and mild ischemia in the inferior wall. EF 59%. Patient was recommended to have an invasive ischemic evaluation given recent syncopal episode and history of abnormal stress test.      Coronary angiogram 8/12/2019 noting single vessel occlusive coronary artery disease namely  of non-dominant small circumflex vessel, proximal circumflex 99% stenosis, ostial RPDA 55%, post atrio lesion 50%, and mid to distal LAD 50% stenosis. Noted no revascularization is needed prior to his planned  knee surgery.      Echocardiogram 7/2020 showed LVEF 50-55%, RV size/function normal, moderate valvular aortic stenosis - no change when compared to prior study.      He was hospitalized in April for chest pain, he was diuresed and symptoms improved. Then unfortunately hospitalized in July for ischemic stroke.      Over the past 6 months we have struggled with volume overload. Patient was seen by Dr. Neumann 8/20/20 at that time he was noted to be volume up and torsemide was increased to 20mg BID. His torsemide has been titrated and he has underwent diuretic infusion clinic appts. Initially titration of his diuretics was difficult due to him not wanting to follow regularly or get monitoring labs. He underwent a nuclear stress test due to complaints of chest pain and was started on isosorbide mononitrate, small area of mild ischemia noted in inferior segment, this was not a high risk finding and medically managed.     Since his last visit with me on 1/14 he has underwent 2 diuretic infusion clinic appts. He had echocardiogram completed 1/21/21 which showed LVEF 55-60%, RV mild to moderately dilated with normal RV systolic function, moderate aortic stenosis and no significant change when compared to prior echocardiograms.     Patient is here today for CORE follow up.     Patient reports feeling good. Monitoring weights daily a home, states weight has been going down. This morning weight was 245#. States overall he feels things have improved. [facility] which he has been using. He does feel they are helping. Does note some persisting lower extremity edema. Denies shortness of breath at rest. Denies exertional dyspnea. Denies orthopnea or PND. Denies chest pain or chest tightness. Denies dizziness, lightheadedness or other presyncopal symptoms. Denies tachycardia or palpitations. Continued complaints with balance feeling off, this started after his CVA. Notes that [facility] provided him with  exercises to help with post-stroke balance. He feels overall it has improved. Getting second vaccine on Saturday morning. Taking medications daily.     Labs 2/12/21 show stable kidney function (creatinine 2.0), stable electrolytes. Blood pressure 110/72 and HR 66 in clinic today.    Appetite good. Trying to monitor sodium intake. States he hasn't been eating as well as normal, with cold weather he wasn't going to the store so ran out of fruits/vegetables. Has returned to [employer] for exercise routine, goes early in the morning, 20-30mins recumbent bicycle, then does strength training on the machines. He is going to [employer] 4 days per week. Denies tobacco use. Occasional alcohol use. Traveling to [place] in a couple weeks to visit family.         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   Social History     Socioeconomic History     Marital status: Single     Spouse name: Not on file     Number of children: Not on file     Years of education: Not on file     Highest education level: Not on file   Occupational History     Not on file   Social Needs     Financial resource strain: Not on file     Food insecurity     Worry: Not on file     Inability: Not on file     Transportation needs     Medical: Not on file     Non-medical: Not on file   Tobacco Use     Smoking status: Never Smoker     Smokeless tobacco: Never Used   Substance and Sexual Activity     Alcohol use: Yes     Alcohol/week: 2.0 standard drinks     Types: 2 Standard drinks or equivalent per week     Frequency: 2-3 times a week     Drinks per session: 1 or 2     Comment: occasional, social     Drug use: No     Sexual activity: Not on file   Lifestyle     Physical activity     Days per week: Not on file     Minutes per session: Not on file     Stress: Not on file   Relationships      "Social connections     Talks on phone: Not on file     Gets together: Not on file     Attends Congregational service: Not on file     Active member of club or organization: Not on file     Attends meetings of clubs or organizations: Not on file     Relationship status: Not on file     Intimate partner violence     Fear of current or ex partner: Not on file     Emotionally abused: Not on file     Physically abused: Not on file     Forced sexual activity: Not on file   Other Topics Concern     Parent/sibling w/ CABG, MI or angioplasty before 65F 55M? Not Asked   Social History Narrative     Not on file          Review of Systems:   Skin:  Negative     Eyes:  Negative    ENT:  Negative    Respiratory:  Positive for shortness of breath  Cardiovascular:    Positive for;edema;fatigue  Gastroenterology: Negative    Genitourinary:  not assessed    Musculoskeletal:  Positive for joint pain  Neurologic:  Positive for incoordination;stroke  Psychiatric:  Positive for depression;sleep disturbances  Heme/Lymph/Imm:  Positive for allergies;weight loss  Endocrine:  Positive for diabetes         Physical Exam:   Vitals: /72   Pulse 66   Ht 1.753 m (5' 9\")   Wt 114.8 kg (253 lb)   BMI 37.36 kg/m     Wt Readings from Last 4 Encounters:   02/18/21 114.8 kg (253 lb)   01/28/21 114.7 kg (252 lb 14.4 oz)   01/20/21 119 kg (262 lb 6.4 oz)   01/14/21 114.8 kg (253 lb)     GEN: well nourished, in no acute distress.  HEENT:  Pupils equal, round. Sclerae nonicteric.   NECK: Supple, no masses appreciated. No JVD appreciated on exam, body habitus makes exam difficult.   C/V:  Regular rate and rhythm, no murmur, rub or gallop.    RESP: Respirations are unlabored. Clear to auscultation bilaterally without wheezing, rales, or rhonchi.  GI: Abdomen soft, nontender.  EXTREM: Trace to +1 LE edema.  NEURO: Alert and oriented, cooperative.  SKIN: Warm and dry.        Data:   Nuclear stress test 1/7/21     The nuclear stress test is abnormal.    "  There is a small area of mild ischemia in the entire inferior segment(s) of the left ventricle.     Left ventricular function is normal.     The left ventricular ejection fraction at stress is 64%.     A prior study was conducted on 5/11/2017.     In prior study inferior, anterior and anteroseptal wall ischemia was reported.     ECHO 1/21/2021  The left ventricle is normal in size.  The visual ejection fraction is estimated at 55-60%.  Diastolic Doppler findings (E/E' ratio and/or other parameters) suggest left  ventricular filling pressures are increased.  The right ventricle is mild to moderately dilated.  The right ventricular systolic function is normal.  The left atrium is severely dilated.  Moderate valvular aortic stenosis. Mean gradient 23 mmHg, valve area 1.4 cm2,  DI 0.34.  Compared to previous echoes, there has been no significant progression in the  degree of AS.  The study was technically difficult. Contrast was used without apparent  complications     Cardiac catheterization 8/12/2019    Prox Cx lesion is 99% stenosed.    Ost RPDA lesion is 55% stenosed.    Post Atrio lesion is 50% stenosed.    Ramus lesion is 30% stenosed.    Mid LAD to Dist LAD lesion is 50% stenosed.    LIPID RESULTS:  Lab Results   Component Value Date    CHOL 97 01/11/2021    HDL 31 (L) 01/11/2021    LDL 46 01/11/2021    TRIG 102 01/11/2021     LIVER ENZYME RESULTS:  Lab Results   Component Value Date    AST 38 (H) 01/11/2021    ALT 25 01/11/2021     CBC RESULTS:  Lab Results   Component Value Date    WBC 7.24 01/11/2021    RBC 4.01 (L) 01/11/2021    HGB 10.9 (L) 01/11/2021    HCT 37.1 (L) 01/11/2021    MCV 92.5 01/11/2021    MCH 27.2 01/11/2021    MCHC 29.4 (L) 01/11/2021    RDW 14.3 01/11/2021     01/11/2021     BMP RESULTS:  Lab Results   Component Value Date     02/12/2021    POTASSIUM 4.3 02/12/2021    CHLORIDE 101 02/12/2021    CO2 27 02/12/2021    ANIONGAP 8 02/12/2021     (H) 02/12/2021    BUN 52 (H)  02/12/2021    CR 2.0 (H) 02/12/2021    GFRESTIMATED 33 (L) 02/12/2021    GFRESTBLACK 30 (L) 02/02/2021    GABE 8.9 02/12/2021      A1C RESULTS:  Lab Results   Component Value Date    A1C 7.9 (H) 01/11/2021     INR RESULTS:  Lab Results   Component Value Date    INR 1.1 (A) 06/30/2020    INR 1.12 04/22/2020            Medications     Current Outpatient Medications   Medication Sig Dispense Refill     acetaminophen (TYLENOL) 325 MG tablet Take 2 tablets (650 mg) by mouth every 6 hours as needed for mild pain or fever (> 101 F)       amLODIPine (NORVASC) 10 MG tablet Take 1 tablet (10 mg) by mouth daily       aspirin (ASA) 325 MG EC tablet Take 1 tablet (325 mg) by mouth daily Start 7/25/20.       atorvastatin (LIPITOR) 40 MG tablet Take 1 tablet (40 mg) by mouth every evening 30 tablet 3     carvedilol (COREG) 25 MG tablet Take 1 tablet (25 mg) by mouth 2 times daily (with meals) 60 tablet 0     glimepiride (AMARYL) 4 MG tablet Take 4 mg by mouth 2 times daily        insulin aspart (NOVOLOG FLEXPEN) 100 UNIT/ML pen Inject 2-6 Units Subcutaneous 3 times daily (with meals) (Patient states he bases his dose off of carb counting and blood glucose but did not give exact regimen).       insulin glargine (LANTUS PEN) 100 UNIT/ML pen Inject 15 Units Subcutaneous every evening       isosorbide mononitrate (IMDUR) 30 MG 24 hr tablet Take 1 tablet (30 mg) by mouth daily 90 tablet 1     multivitamin, therapeutic with minerals (THERA-VIT-M) TABS Take 1 tablet by mouth daily       torsemide (DEMADEX) 20 MG tablet 2 tablets (40mg ) twice a day 120 tablet 1     blood glucose (NO BRAND SPECIFIED) test strip Use to test blood sugar as directed       metolazone (ZAROXOLYN) 2.5 MG tablet Take one dose- as directed by CORE clinic (Patient not taking: Reported on 2/18/2021) 5 tablet 0     order for DME Equipment being ordered: Walker Wheels () and Walker ()  Treatment Diagnosis: Impaired gait 1 each 0          Past Medical History      Past Medical History:   Diagnosis Date     Arthritis of knee~ s/p replacement 3/24/2016     CAD (coronary artery disease)      Decreased cardiac ejection fraction~44% 3/24/2016     Diabetes mellitus, type 2 (H) 3/24/2016     History of DVT (deep vein thrombosis)      HTN (hypertension) 3/24/2016     LBBB (left bundle branch block) 3/24/2016     Mixed hyperlipidemia 8/23/2016     Past Surgical History:   Procedure Laterality Date     ARTHROPLASTY KNEE Right 3/29/2016    Procedure: ARTHROPLASTY KNEE;  Surgeon: Heena Rosen MD;  Location:  OR     ARTHROPLASTY REVISION KNEE Left 9/27/2019    Procedure: REVISION LEFT TOTAL KNEE  ARTHROPLASTY;  Surgeon: Heena Rosen MD;  Location:  OR     CV LEFT HEART CATH N/A 8/12/2019    Procedure: Left Heart Cath;  Surgeon: Edgardo Beckham MD;  Location:  HEART CARDIAC CATH LAB     EYE SURGERY      cataract removal     ORTHOPEDIC SURGERY      KNEE SCOPES MULTIPLE     Family History   Problem Relation Age of Onset     Coronary Artery Disease No family hx of             Allergies   Penicillins    45 minutes spent on the date of the encounter doing chart review, history and exam, documentation and further activities as noted above      WALE Fowler CNP  [facility]  Pager: [phone]      Thank you for allowing me to participate in the care of your patient.    Sincerely,     WALE Fowler CNP     [facility]

## 2021-02-18 NOTE — PROGRESS NOTES
Cardiology Clinic Progress Note  Burton Elizabeth MRN# [id number]   YOB: 1945 Age: 75 year old   Primary Cardiologist: Dr. Neumann Reason for visit: CORE follow up             Assessment and Plan:   Burton Elizabeth is a very pleasant 75 year old male with a history of coronary artery disease, moderate aortic stenosis, DM, hypertension, obesity, CVA and CKD.      Patient here today for CORE follow up. Overall he is doing well from a heart failure standpoint. HF symptoms are controlled. He notes his weight has been declining. He is tolerating torsemide 40mg BID and started wearing compression stockings. Reviewed consideration for sleep medicine evaluation and he again declined. [facility]. He is planning doing some traveling over the next month so will plan on CORE follow up in 2 months, sooner if needed. No changes in medications today.     1. Coronary artery disease - coronary angiogram 8/12/2019 noting single vessel occlusive coronary artery disease namely  of non-dominant small circumflex vessel, proximal circumflex 99% stenosis, ostial RPDA 55%, post atrio lesion 50%, and mid to distal LAD 50% stenosis. Stable, no signs/symptoms of angina.               - Nuclear stress test completed 1/7/21 due to complaints of chest pain, this showed small area of mild ischemia in the inferior segments. This was stable when compared to prior nuclear stress test in 5/2017 which showed inferior, anterior and anteroseptal wall ischemia.               - Continue aspirin, carvedilol, isosorbide mononitrate and atorvastatin                - Counseled patient on lifestyle modifications  2. Hypertension - controlled, continue current medications  3. Aortic stenosis - moderate, Mean gradient 23 mmHg, valve area 1.4 cm2, DI 0.34 per echo 1/2021.  4. Obesity - BMI 37.36, counseled patient on weight loss strategies.   5. CKD - baseline 1.8-2.1, creatinine from 2/12 2.0  6.  Chronic heart failure with preserved ejection fraction - echocardiogram 1/202 showed LVEF 55-60%, RV mild-moderately dilated with normal systolic function. He appears compensated and close to euvolemic. Weight today 245# at home.               - NYHA class II-III, stage C   - Dry weight 240-245#              - Continue torsemide to 40mg qam and 20mg qpm              - Counseled on low sodium diet, daily weights and 2L fluid restriction   - Continue compression stockings.   7. Diabetes mellitus - uncontrolled, HgbA1c 9.5 10/2020  8. Chronic LBBB  9. CVA   10. High probability of sleep apnea -reviewed again today, declined sleep medicine evaluation      Changes today: none    Follow up plan:     CORE follow up in 2 months with me, [facility]         History of Presenting Illness:    Burton Elizabeth is a very pleasant 75 year old male with a history of coronary artery disease, moderate aortic stenosis, DM, hypertension, obesity, CVA and CKD.      Patient follows with Dr. Neumann. Patient has a history of abnormal stress test from 2016 which demonstrated a small area of mild ischemia in the anterior/anteroseptal apex/mid ventricle and mild ischemia in the inferior wall. EF 59%. Patient was recommended to have an invasive ischemic evaluation given recent syncopal episode and history of abnormal stress test.      Coronary angiogram 8/12/2019 noting single vessel occlusive coronary artery disease namely  of non-dominant small circumflex vessel, proximal circumflex 99% stenosis, ostial RPDA 55%, post atrio lesion 50%, and mid to distal LAD 50% stenosis. Noted no revascularization is needed prior to his planned knee surgery.      Echocardiogram 7/2020 showed LVEF 50-55%, RV size/function normal, moderate valvular aortic stenosis - no change when compared to prior study.      He was hospitalized in April for chest pain, he was diuresed and symptoms improved. Then unfortunately hospitalized in July for ischemic  stroke.      Over the past 6 months we have struggled with volume overload. Patient was seen by Dr. Neumann 8/20/20 at that time he was noted to be volume up and torsemide was increased to 20mg BID. His torsemide has been titrated and he has underwent diuretic infusion clinic appts. Initially titration of his diuretics was difficult due to him not wanting to follow regularly or get monitoring labs. He underwent a nuclear stress test due to complaints of chest pain and was started on isosorbide mononitrate, small area of mild ischemia noted in inferior segment, this was not a high risk finding and medically managed.     Since his last visit with me on 1/14 he has underwent 2 diuretic infusion clinic appts. He had echocardiogram completed 1/21/21 which showed LVEF 55-60%, RV mild to moderately dilated with normal RV systolic function, moderate aortic stenosis and no significant change when compared to prior echocardiograms.     Patient is here today for CORE follow up.     Patient reports feeling good. Monitoring weights daily a home, states weight has been going down. This morning weight was 245#. States overall he feels things have improved. [facility] which he has been using. He does feel they are helping. Does note some persisting lower extremity edema. Denies shortness of breath at rest. Denies exertional dyspnea. Denies orthopnea or PND. Denies chest pain or chest tightness. Denies dizziness, lightheadedness or other presyncopal symptoms. Denies tachycardia or palpitations. Continued complaints with balance feeling off, this started after his CVA. Notes that [facility] provided him with exercises to help with post-stroke balance. He feels overall it has improved. Getting second vaccine on Saturday morning. Taking medications daily.     Labs 2/12/21 show stable kidney function (creatinine 2.0), stable electrolytes. Blood pressure 110/72 and HR 66 in clinic today.    Appetite good. Trying to  monitor sodium intake. States he hasn't been eating as well as normal, with cold weather he wasn't going to the store so ran out of fruits/vegetables. Has returned to [employer] for exercise routine, goes early in the morning, 20-30mins recumbent bicycle, then does strength training on the machines. He is going to [employer] 4 days per week. Denies tobacco use. Occasional alcohol use. Traveling to [place] in a couple weeks to visit family.         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   Social History     Socioeconomic History     Marital status: Single     Spouse name: Not on file     Number of children: Not on file     Years of education: Not on file     Highest education level: Not on file   Occupational History     Not on file   Social Needs     Financial resource strain: Not on file     Food insecurity     Worry: Not on file     Inability: Not on file     Transportation needs     Medical: Not on file     Non-medical: Not on file   Tobacco Use     Smoking status: Never Smoker     Smokeless tobacco: Never Used   Substance and Sexual Activity     Alcohol use: Yes     Alcohol/week: 2.0 standard drinks     Types: 2 Standard drinks or equivalent per week     Frequency: 2-3 times a week     Drinks per session: 1 or 2     Comment: occasional, social     Drug use: No     Sexual activity: Not on file   Lifestyle     Physical activity     Days per week: Not on file     Minutes per session: Not on file     Stress: Not on file   Relationships     Social connections     Talks on phone: Not on file     Gets together: Not on file     Attends Scientologist service: Not on file     Active member of club or organization: Not on file     Attends meetings of clubs or organizations: Not on file     Relationship status: Not on file     Intimate partner violence      "Fear of current or ex partner: Not on file     Emotionally abused: Not on file     Physically abused: Not on file     Forced sexual activity: Not on file   Other Topics Concern     Parent/sibling w/ CABG, MI or angioplasty before 65F 55M? Not Asked   Social History Narrative     Not on file          Review of Systems:   Skin:  Negative     Eyes:  Negative    ENT:  Negative    Respiratory:  Positive for shortness of breath  Cardiovascular:    Positive for;edema;fatigue  Gastroenterology: Negative    Genitourinary:  not assessed    Musculoskeletal:  Positive for joint pain  Neurologic:  Positive for incoordination;stroke  Psychiatric:  Positive for depression;sleep disturbances  Heme/Lymph/Imm:  Positive for allergies;weight loss  Endocrine:  Positive for diabetes         Physical Exam:   Vitals: /72   Pulse 66   Ht 1.753 m (5' 9\")   Wt 114.8 kg (253 lb)   BMI 37.36 kg/m     Wt Readings from Last 4 Encounters:   02/18/21 114.8 kg (253 lb)   01/28/21 114.7 kg (252 lb 14.4 oz)   01/20/21 119 kg (262 lb 6.4 oz)   01/14/21 114.8 kg (253 lb)     GEN: well nourished, in no acute distress.  HEENT:  Pupils equal, round. Sclerae nonicteric.   NECK: Supple, no masses appreciated. No JVD appreciated on exam, body habitus makes exam difficult.   C/V:  Regular rate and rhythm, no murmur, rub or gallop.    RESP: Respirations are unlabored. Clear to auscultation bilaterally without wheezing, rales, or rhonchi.  GI: Abdomen soft, nontender.  EXTREM: Trace to +1 LE edema.  NEURO: Alert and oriented, cooperative.  SKIN: Warm and dry.        Data:   Nuclear stress test 1/7/21     The nuclear stress test is abnormal.     There is a small area of mild ischemia in the entire inferior segment(s) of the left ventricle.     Left ventricular function is normal.     The left ventricular ejection fraction at stress is 64%.     A prior study was conducted on 5/11/2017.     In prior study inferior, anterior and anteroseptal wall ischemia " was reported.     ECHO 1/21/2021  The left ventricle is normal in size.  The visual ejection fraction is estimated at 55-60%.  Diastolic Doppler findings (E/E' ratio and/or other parameters) suggest left  ventricular filling pressures are increased.  The right ventricle is mild to moderately dilated.  The right ventricular systolic function is normal.  The left atrium is severely dilated.  Moderate valvular aortic stenosis. Mean gradient 23 mmHg, valve area 1.4 cm2,  DI 0.34.  Compared to previous echoes, there has been no significant progression in the  degree of AS.  The study was technically difficult. Contrast was used without apparent  complications     Cardiac catheterization 8/12/2019    Prox Cx lesion is 99% stenosed.    Ost RPDA lesion is 55% stenosed.    Post Atrio lesion is 50% stenosed.    Ramus lesion is 30% stenosed.    Mid LAD to Dist LAD lesion is 50% stenosed.    LIPID RESULTS:  Lab Results   Component Value Date    CHOL 97 01/11/2021    HDL 31 (L) 01/11/2021    LDL 46 01/11/2021    TRIG 102 01/11/2021     LIVER ENZYME RESULTS:  Lab Results   Component Value Date    AST 38 (H) 01/11/2021    ALT 25 01/11/2021     CBC RESULTS:  Lab Results   Component Value Date    WBC 7.24 01/11/2021    RBC 4.01 (L) 01/11/2021    HGB 10.9 (L) 01/11/2021    HCT 37.1 (L) 01/11/2021    MCV 92.5 01/11/2021    MCH 27.2 01/11/2021    MCHC 29.4 (L) 01/11/2021    RDW 14.3 01/11/2021     01/11/2021     BMP RESULTS:  Lab Results   Component Value Date     02/12/2021    POTASSIUM 4.3 02/12/2021    CHLORIDE 101 02/12/2021    CO2 27 02/12/2021    ANIONGAP 8 02/12/2021     (H) 02/12/2021    BUN 52 (H) 02/12/2021    CR 2.0 (H) 02/12/2021    GFRESTIMATED 33 (L) 02/12/2021    GFRESTBLACK 30 (L) 02/02/2021    GABE 8.9 02/12/2021      A1C RESULTS:  Lab Results   Component Value Date    A1C 7.9 (H) 01/11/2021     INR RESULTS:  Lab Results   Component Value Date    INR 1.1 (A) 06/30/2020    INR 1.12 04/22/2020             Medications     Current Outpatient Medications   Medication Sig Dispense Refill     acetaminophen (TYLENOL) 325 MG tablet Take 2 tablets (650 mg) by mouth every 6 hours as needed for mild pain or fever (> 101 F)       amLODIPine (NORVASC) 10 MG tablet Take 1 tablet (10 mg) by mouth daily       aspirin (ASA) 325 MG EC tablet Take 1 tablet (325 mg) by mouth daily Start 7/25/20.       atorvastatin (LIPITOR) 40 MG tablet Take 1 tablet (40 mg) by mouth every evening 30 tablet 3     carvedilol (COREG) 25 MG tablet Take 1 tablet (25 mg) by mouth 2 times daily (with meals) 60 tablet 0     glimepiride (AMARYL) 4 MG tablet Take 4 mg by mouth 2 times daily        insulin aspart (NOVOLOG FLEXPEN) 100 UNIT/ML pen Inject 2-6 Units Subcutaneous 3 times daily (with meals) (Patient states he bases his dose off of carb counting and blood glucose but did not give exact regimen).       insulin glargine (LANTUS PEN) 100 UNIT/ML pen Inject 15 Units Subcutaneous every evening       isosorbide mononitrate (IMDUR) 30 MG 24 hr tablet Take 1 tablet (30 mg) by mouth daily 90 tablet 1     multivitamin, therapeutic with minerals (THERA-VIT-M) TABS Take 1 tablet by mouth daily       torsemide (DEMADEX) 20 MG tablet 2 tablets (40mg ) twice a day 120 tablet 1     blood glucose (NO BRAND SPECIFIED) test strip Use to test blood sugar as directed       metolazone (ZAROXOLYN) 2.5 MG tablet Take one dose- as directed by CORE clinic (Patient not taking: Reported on 2/18/2021) 5 tablet 0     order for DME Equipment being ordered: Walker Wheels () and Walker ()  Treatment Diagnosis: Impaired gait 1 each 0          Past Medical History     Past Medical History:   Diagnosis Date     Arthritis of knee~ s/p replacement 3/24/2016     CAD (coronary artery disease)      Decreased cardiac ejection fraction~44% 3/24/2016     Diabetes mellitus, type 2 (H) 3/24/2016     History of DVT (deep vein thrombosis)      HTN (hypertension) 3/24/2016     LBBB (left  bundle branch block) 3/24/2016     Mixed hyperlipidemia 8/23/2016     Past Surgical History:   Procedure Laterality Date     ARTHROPLASTY KNEE Right 3/29/2016    Procedure: ARTHROPLASTY KNEE;  Surgeon: Heena Rosen MD;  Location:  OR     ARTHROPLASTY REVISION KNEE Left 9/27/2019    Procedure: REVISION LEFT TOTAL KNEE  ARTHROPLASTY;  Surgeon: Heena Rosen MD;  Location:  OR     CV LEFT HEART CATH N/A 8/12/2019    Procedure: Left Heart Cath;  Surgeon: Edgardo Beckham MD;  Location:  HEART CARDIAC CATH LAB     EYE SURGERY      cataract removal     ORTHOPEDIC SURGERY      KNEE SCOPES MULTIPLE     Family History   Problem Relation Age of Onset     Coronary Artery Disease No family hx of             Allergies   Penicillins    45 minutes spent on the date of the encounter doing chart review, history and exam, documentation and further activities as noted above      [facility] Fowler [facility] HEART CARE  Pager: [phone]

## 2021-02-18 NOTE — LETTER
2/18/2021    [facility]  [address] [facility] [address]    RE: Burton Elizabeth       Dear Colleague,    I had the pleasure of seeing Burton Elizabeth in the [facility].    Cardiology Clinic Progress Note  Burton Elizabeth MRN# [id number]   YOB: 1945 Age: 75 year old   Primary Cardiologist: Dr. Neumann Reason for visit: CORE follow up             Assessment and Plan:   Burton Elizabeth is a very pleasant 75 year old male with a history of coronary artery disease, moderate aortic stenosis, DM, hypertension, obesity, CVA and CKD.      Patient here today for CORE follow up. Overall he is doing well from a heart failure standpoint. HF symptoms are controlled. He notes his weight has been declining. He is tolerating torsemide 40mg BID and started wearing compression stockings. Reviewed consideration for sleep medicine evaluation and he again declined[facility]. He is planning doing some traveling over the next month so will plan on CORE follow up in 2 months, sooner if needed. No changes in medications today.     1. Coronary artery disease - coronary angiogram 8/12/2019 noting single vessel occlusive coronary artery disease namely  of non-dominant small circumflex vessel, proximal circumflex 99% stenosis, ostial RPDA 55%, post atrio lesion 50%, and mid to distal LAD 50% stenosis. Stable, no signs/symptoms of angina.               - Nuclear stress test completed 1/7/21 due to complaints of chest pain, this showed small area of mild ischemia in the inferior segments. This was stable when compared to prior nuclear stress test in 5/2017 which showed inferior, anterior and anteroseptal wall ischemia.               - Continue aspirin, carvedilol, isosorbide mononitrate and atorvastatin                - Counseled patient on lifestyle  modifications  2. Hypertension - controlled, continue current medications  3. Aortic stenosis - moderate, Mean gradient 23 mmHg, valve area 1.4 cm2, DI 0.34 per echo 1/2021.  4. Obesity - BMI 37.36, counseled patient on weight loss strategies.   5. CKD - baseline 1.8-2.1, creatinine from 2/12 2.0  6. Chronic heart failure with preserved ejection fraction - echocardiogram 1/202 showed LVEF 55-60%, RV mild-moderately dilated with normal systolic function. He appears compensated and close to euvolemic. Weight today 245# at home.               - NYHA class II-III, stage C   - Dry weight 240-245#              - Continue torsemide to 40mg qam and 20mg qpm              - Counseled on low sodium diet, daily weights and 2L fluid restriction   - Continue compression stockings.   7. Diabetes mellitus - uncontrolled, HgbA1c 9.5 10/2020  8. Chronic LBBB  9. CVA   10. High probability of sleep apnea -reviewed again today, declined sleep medicine evaluation      Changes today: none    Follow up plan:     CORE follow up in 2 months with me, [facility]         History of Presenting Illness:    Burton Elizabeth is a very pleasant 75 year old male with a history of coronary artery disease, moderate aortic stenosis, DM, hypertension, obesity, CVA and CKD.      Patient follows with Dr. Neumann. Patient has a history of abnormal stress test from 2016 which demonstrated a small area of mild ischemia in the anterior/anteroseptal apex/mid ventricle and mild ischemia in the inferior wall. EF 59%. Patient was recommended to have an invasive ischemic evaluation given recent syncopal episode and history of abnormal stress test.      Coronary angiogram 8/12/2019 noting single vessel occlusive coronary artery disease namely  of non-dominant small circumflex vessel, proximal circumflex 99% stenosis, ostial RPDA 55%, post atrio lesion 50%, and mid to distal LAD 50% stenosis. Noted no revascularization is needed prior to his planned  knee surgery.      Echocardiogram 7/2020 showed LVEF 50-55%, RV size/function normal, moderate valvular aortic stenosis - no change when compared to prior study.      He was hospitalized in April for chest pain, he was diuresed and symptoms improved. Then unfortunately hospitalized in July for ischemic stroke.      Over the past 6 months we have struggled with volume overload. Patient was seen by Dr. Neumann 8/20/20 at that time he was noted to be volume up and torsemide was increased to 20mg BID. His torsemide has been titrated and he has underwent diuretic infusion clinic appts. Initially titration of his diuretics was difficult due to him not wanting to follow regularly or get monitoring labs. He underwent a nuclear stress test due to complaints of chest pain and was started on isosorbide mononitrate, small area of mild ischemia noted in inferior segment, this was not a high risk finding and medically managed.     Since his last visit with me on 1/14 he has underwent 2 diuretic infusion clinic appts. He had echocardiogram completed 1/21/21 which showed LVEF 55-60%, RV mild to moderately dilated with normal RV systolic function, moderate aortic stenosis and no significant change when compared to prior echocardiograms.     Patient is here today for CORE follow up.     Patient reports feeling good. Monitoring weights daily a home, states weight has been going down. This morning weight was 245#. States overall he feels things have improved. [facility] which he has been using. He does feel they are helping. Does note some persisting lower extremity edema. Denies shortness of breath at rest. Denies exertional dyspnea. Denies orthopnea or PND. Denies chest pain or chest tightness. Denies dizziness, lightheadedness or other presyncopal symptoms. Denies tachycardia or palpitations. Continued complaints with balance feeling off, this started after his CVA. Notes that [facility] provided him with  exercises to help with post-stroke balance. He feels overall it has improved. Getting second vaccine on Saturday morning. Taking medications daily.     Labs 2/12/21 show stable kidney function (creatinine 2.0), stable electrolytes. Blood pressure 110/72 and HR 66 in clinic today.    Appetite good. Trying to monitor sodium intake. States he hasn't been eating as well as normal, with cold weather he wasn't going to the store so ran out of fruits/vegetables. Has returned to [employer] for exercise routine, goes early in the morning, 20-30mins recumbent bicycle, then does strength training on the machines. He is going to [employer] 4 days per week. Denies tobacco use. Occasional alcohol use. Traveling to [place] in a couple weeks to visit family.         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   Social History     Socioeconomic History     Marital status: Single     Spouse name: Not on file     Number of children: Not on file     Years of education: Not on file     Highest education level: Not on file   Occupational History     Not on file   Social Needs     Financial resource strain: Not on file     Food insecurity     Worry: Not on file     Inability: Not on file     Transportation needs     Medical: Not on file     Non-medical: Not on file   Tobacco Use     Smoking status: Never Smoker     Smokeless tobacco: Never Used   Substance and Sexual Activity     Alcohol use: Yes     Alcohol/week: 2.0 standard drinks     Types: 2 Standard drinks or equivalent per week     Frequency: 2-3 times a week     Drinks per session: 1 or 2     Comment: occasional, social     Drug use: No     Sexual activity: Not on file   Lifestyle     Physical activity     Days per week: Not on file     Minutes per session: Not on file     Stress: Not on file   Relationships      "Social connections     Talks on phone: Not on file     Gets together: Not on file     Attends Shinto service: Not on file     Active member of club or organization: Not on file     Attends meetings of clubs or organizations: Not on file     Relationship status: Not on file     Intimate partner violence     Fear of current or ex partner: Not on file     Emotionally abused: Not on file     Physically abused: Not on file     Forced sexual activity: Not on file   Other Topics Concern     Parent/sibling w/ CABG, MI or angioplasty before 65F 55M? Not Asked   Social History Narrative     Not on file          Review of Systems:   Skin:  Negative     Eyes:  Negative    ENT:  Negative    Respiratory:  Positive for shortness of breath  Cardiovascular:    Positive for;edema;fatigue  Gastroenterology: Negative    Genitourinary:  not assessed    Musculoskeletal:  Positive for joint pain  Neurologic:  Positive for incoordination;stroke  Psychiatric:  Positive for depression;sleep disturbances  Heme/Lymph/Imm:  Positive for allergies;weight loss  Endocrine:  Positive for diabetes         Physical Exam:   Vitals: /72   Pulse 66   Ht 1.753 m (5' 9\")   Wt 114.8 kg (253 lb)   BMI 37.36 kg/m     Wt Readings from Last 4 Encounters:   02/18/21 114.8 kg (253 lb)   01/28/21 114.7 kg (252 lb 14.4 oz)   01/20/21 119 kg (262 lb 6.4 oz)   01/14/21 114.8 kg (253 lb)     GEN: well nourished, in no acute distress.  HEENT:  Pupils equal, round. Sclerae nonicteric.   NECK: Supple, no masses appreciated. No JVD appreciated on exam, body habitus makes exam difficult.   C/V:  Regular rate and rhythm, no murmur, rub or gallop.    RESP: Respirations are unlabored. Clear to auscultation bilaterally without wheezing, rales, or rhonchi.  GI: Abdomen soft, nontender.  EXTREM: Trace to +1 LE edema.  NEURO: Alert and oriented, cooperative.  SKIN: Warm and dry.        Data:   Nuclear stress test 1/7/21     The nuclear stress test is abnormal.    "  There is a small area of mild ischemia in the entire inferior segment(s) of the left ventricle.     Left ventricular function is normal.     The left ventricular ejection fraction at stress is 64%.     A prior study was conducted on 5/11/2017.     In prior study inferior, anterior and anteroseptal wall ischemia was reported.     ECHO 1/21/2021  The left ventricle is normal in size.  The visual ejection fraction is estimated at 55-60%.  Diastolic Doppler findings (E/E' ratio and/or other parameters) suggest left  ventricular filling pressures are increased.  The right ventricle is mild to moderately dilated.  The right ventricular systolic function is normal.  The left atrium is severely dilated.  Moderate valvular aortic stenosis. Mean gradient 23 mmHg, valve area 1.4 cm2,  DI 0.34.  Compared to previous echoes, there has been no significant progression in the  degree of AS.  The study was technically difficult. Contrast was used without apparent  complications     Cardiac catheterization 8/12/2019    Prox Cx lesion is 99% stenosed.    Ost RPDA lesion is 55% stenosed.    Post Atrio lesion is 50% stenosed.    Ramus lesion is 30% stenosed.    Mid LAD to Dist LAD lesion is 50% stenosed.    LIPID RESULTS:  Lab Results   Component Value Date    CHOL 97 01/11/2021    HDL 31 (L) 01/11/2021    LDL 46 01/11/2021    TRIG 102 01/11/2021     LIVER ENZYME RESULTS:  Lab Results   Component Value Date    AST 38 (H) 01/11/2021    ALT 25 01/11/2021     CBC RESULTS:  Lab Results   Component Value Date    WBC 7.24 01/11/2021    RBC 4.01 (L) 01/11/2021    HGB 10.9 (L) 01/11/2021    HCT 37.1 (L) 01/11/2021    MCV 92.5 01/11/2021    MCH 27.2 01/11/2021    MCHC 29.4 (L) 01/11/2021    RDW 14.3 01/11/2021     01/11/2021     BMP RESULTS:  Lab Results   Component Value Date     02/12/2021    POTASSIUM 4.3 02/12/2021    CHLORIDE 101 02/12/2021    CO2 27 02/12/2021    ANIONGAP 8 02/12/2021     (H) 02/12/2021    BUN 52 (H)  02/12/2021    CR 2.0 (H) 02/12/2021    GFRESTIMATED 33 (L) 02/12/2021    GFRESTBLACK 30 (L) 02/02/2021    GABE 8.9 02/12/2021      A1C RESULTS:  Lab Results   Component Value Date    A1C 7.9 (H) 01/11/2021     INR RESULTS:  Lab Results   Component Value Date    INR 1.1 (A) 06/30/2020    INR 1.12 04/22/2020            Medications     Current Outpatient Medications   Medication Sig Dispense Refill     acetaminophen (TYLENOL) 325 MG tablet Take 2 tablets (650 mg) by mouth every 6 hours as needed for mild pain or fever (> 101 F)       amLODIPine (NORVASC) 10 MG tablet Take 1 tablet (10 mg) by mouth daily       aspirin (ASA) 325 MG EC tablet Take 1 tablet (325 mg) by mouth daily Start 7/25/20.       atorvastatin (LIPITOR) 40 MG tablet Take 1 tablet (40 mg) by mouth every evening 30 tablet 3     carvedilol (COREG) 25 MG tablet Take 1 tablet (25 mg) by mouth 2 times daily (with meals) 60 tablet 0     glimepiride (AMARYL) 4 MG tablet Take 4 mg by mouth 2 times daily        insulin aspart (NOVOLOG FLEXPEN) 100 UNIT/ML pen Inject 2-6 Units Subcutaneous 3 times daily (with meals) (Patient states he bases his dose off of carb counting and blood glucose but did not give exact regimen).       insulin glargine (LANTUS PEN) 100 UNIT/ML pen Inject 15 Units Subcutaneous every evening       isosorbide mononitrate (IMDUR) 30 MG 24 hr tablet Take 1 tablet (30 mg) by mouth daily 90 tablet 1     multivitamin, therapeutic with minerals (THERA-VIT-M) TABS Take 1 tablet by mouth daily       torsemide (DEMADEX) 20 MG tablet 2 tablets (40mg ) twice a day 120 tablet 1     blood glucose (NO BRAND SPECIFIED) test strip Use to test blood sugar as directed       metolazone (ZAROXOLYN) 2.5 MG tablet Take one dose- as directed by CORE clinic (Patient not taking: Reported on 2/18/2021) 5 tablet 0     order for DME Equipment being ordered: Walker Wheels () and Walker ()  Treatment Diagnosis: Impaired gait 1 each 0          Past Medical History      Past Medical History:   Diagnosis Date     Arthritis of knee~ s/p replacement 3/24/2016     CAD (coronary artery disease)      Decreased cardiac ejection fraction~44% 3/24/2016     Diabetes mellitus, type 2 (H) 3/24/2016     History of DVT (deep vein thrombosis)      HTN (hypertension) 3/24/2016     LBBB (left bundle branch block) 3/24/2016     Mixed hyperlipidemia 8/23/2016     Past Surgical History:   Procedure Laterality Date     ARTHROPLASTY KNEE Right 3/29/2016    Procedure: ARTHROPLASTY KNEE;  Surgeon: Heena Rosen MD;  Location:  OR     ARTHROPLASTY REVISION KNEE Left 9/27/2019    Procedure: REVISION LEFT TOTAL KNEE  ARTHROPLASTY;  Surgeon: Heena Rosen MD;  Location:  OR     CV LEFT HEART CATH N/A 8/12/2019    Procedure: Left Heart Cath;  Surgeon: Edgardo Beckham MD;  Location:  HEART CARDIAC CATH LAB     EYE SURGERY      cataract removal     ORTHOPEDIC SURGERY      KNEE SCOPES MULTIPLE     Family History   Problem Relation Age of Onset     Coronary Artery Disease No family hx of             Allergies   Penicillins    45 minutes spent on the date of the encounter doing chart review, history and exam, documentation and further activities as noted above      WALE Fowler CNP  [facility]  Pager: [phone]        Thank you for allowing me to participate in the care of your patient.      Sincerely,     WALE Fowler CNP     [facility]  cc:   WALE Padilla CNP  [address] [place] [id number]

## 2021-02-18 NOTE — PATIENT INSTRUCTIONS
1. No medication changes  2. Continue to monitor weight daily  3. Please call with questions/concerns [phone]  4. Follow up with Brittanie in 2 months, sooner if needed.

## 2021-02-22 PROBLEM — I50.22 CHRONIC SYSTOLIC HEART FAILURE (H): Status: ACTIVE | Noted: 2021-01-01

## 2021-02-22 NOTE — TELEPHONE ENCOUNTER
[facility]    From: Brittanie Quiroz APRN CNP  Sent: 2/18/2021   8:59 AM CST  To: [facility] Core Nurse  Subject: [facility] prior to [facility] visit in 2 months       Hi,    I saw Burton today and he was doing well! I scheduled him for CORE follow up in 2 months, can you fax order for [facility] (BMP) to the [facility] to be completed prior to our next visit.     Thanks,  Brittanie     Faxed order to Dr. Murrell office, Attn: TIMA [email or web address] [facility] #[phone] for BMP as above to be drawn on or about 4/12/2021.    Future Appointments   Date Time Provider Department Center   4/20/2021  8:10 AM Brittanie Quiroz APRN CNP [facility] PSA TERESITA Cuello RN BSN   12:10 PM 02/22/21

## 2021-04-16 NOTE — TELEPHONE ENCOUNTER
[facility]  Pt to have BMP around this time at [facility] for review at visit 4/20 with THEODORA Florentino. [facility]ed [facility] to [facility].     Val Johnson RN, BSN, CHFN  04/16/21 at 10:23 AM

## 2021-04-19 NOTE — TELEPHONE ENCOUNTER
[facility]    Have not yet received BMP results from [facility].    Spoke with [facility] team (ph: [phone], fax: [phone]) and requested BMP results from Brittanie Quiroz CNP order.     Will watch for results.    Future Appointments   Date Time Provider Department Center   4/20/2021  8:10 AM Brittanie Quiroz APRN CNP [facility] PSA CLIN     .Michelle Cuello RN BSN   2:09 PM 04/19/21

## 2021-04-21 NOTE — PROGRESS NOTES
[facility] - CORE Clinic    Incoming fax from [facility] w/records. We had requested BMP results from last 30 days however doc returned stating no results found. Record received sent to scanning.  Mounika Carreon RN on 4/21/2021 at 11:30 AM

## 2021-06-08 NOTE — PROGRESS NOTES
Burton Elizabeth  2019  POD # 1 s/p conversion of CLAY to LTKA  Admit Date:  2019      Doing well.  Objective: no chest pain or shortness of breath. Will work with therapy. States oxycodone doesn't seem to help and would like to try norco instead. That has helped him in the past. Wants to go home this afternoon.  Blood pressure 113/53, pulse 65, temperature 97.5  F (36.4  C), temperature source Oral, resp. rate 16, SpO2 92 %.    Temperatures:  Current - Temp: 97.5  F (36.4  C); Max - Temp  Av.4  F (36.3  C)  Min: 97  F (36.1  C)  Max: 98.2  F (36.8  C)  Pulse range: Pulse  Av.6  Min: 57  Max: 65  Blood pressure range: Systolic (24hrs), Av , Min:109 , Max:144   ; Diastolic (24hrs), Av, Min:53, Max:87    Exam:  CMS: intact  alert, stable, wound ok  Dressing c/d/i  Calf s/nt  DF/PF 5/5  Able to perform SLR without assistance    Labs:  Recent Labs   Lab Test 19  0902 19  0704 19   POTASSIUM 4.6 4.6 5.5*     Recent Labs   Lab Test 19  0704 19  0630   HGB 10.9* 9.6* 11.9*     Recent Labs   Lab Test 16  1149   INR 1.02     Recent Labs   Lab Test 19  0704 19  1149    320* 191       PLAN: continue current therapy regimen. Aspirin for dvt ppx. Will add on norco as option for pain medication. He can either take norco or oxycodone. Scripts for both will be printed off and whichever one the patient feels works best he can take home with him. The other script should be shredded. Medical team seeing patient for medical problems. Discharge for home today.     As above, doing well, home today    simons     See refill request     Beverly Nelson RN, BSN  [facility]

## 2021-06-14 NOTE — PROGRESS NOTES
Cardiology Clinic Progress Note  Burton Elizabeth MRN# [id number]   YOB: 1945 Age: 76 year old   Primary Cardiologist: Dr. Neumann Reason for visit: CORE follow up             Assessment and Plan:   Burton Elizabeth is a very pleasant 76 year old male with a history of coronary artery disease, moderate aortic stenosis, DM, hypertension, obesity, CVA and CKD.     1. Coronary artery disease - coronary angiogram 8/12/2019 noting single vessel occlusive coronary artery disease namely  of non-dominant small circumflex vessel, proximal circumflex 99% stenosis, ostial RPDA 55%, post atrio lesion 50%, and mid to distal LAD 50% stenosis. Stable, no signs/symptoms of angina.               - Nuclear stress test completed 1/7/21 due to complaints of chest pain, this showed small area of mild ischemia in the inferior segments. This was stable when compared to prior nuclear stress test in 5/2017 which showed inferior, anterior and anteroseptal wall ischemia.               - Continue aspirin, carvedilol, isosorbide mononitrate and atorvastatin                - Counseled patient on lifestyle modifications  2. Hypertension - controlled, continue current medications  3. Aortic stenosis - moderate, Mean gradient 23 mmHg, valve area 1.4 cm2, DI 0.34 per echo 1/2021.  4. Obesity - BMI 35.15, counseled patient on weight loss strategies, praise given for weight loss since our last clinic visit.   5. CKD - baseline 1.8-2.1, creatinine from 6/10 2.1  6. Chronic heart failure with preserved ejection fraction - echocardiogram 1/202 showed LVEF 55-60%, RV mild-moderately dilated with normal systolic function.              - NYHA class II-III, stage C              - Dry weight ~235#              - Continue torsemide 40mg BID              - Counseled on low sodium diet, daily weights and 2L fluid restriction              - Continue compression stockings.   7. Diabetes mellitus - uncontrolled, HgbA1c 9.5 10/2020  8. Chronic  LBBB  9. CVA   10. High probability of sleep apnea - declined sleep medicine evaluation    I saw Burton today for CORE follow up, he is doing well from a heart failure standpoint. Fall 2020/Winter 2021 we were struggling with managing his volume status, his diuretics were adjusted and he had multiple IV diuretic infusion clinic appts. He has been maintained on torsemide 40mg BID and has successfully lost ~ 10# since I last say him in February 2021 with diet modifications. He appears close to euvolemic today. He notes [facility] what sounds a like acute worsening [facility], no [facility] for [facility]. Did get labs to review which does show a drop in his hemoglobin and stable kidney function. Patient notes he has close follow up planned for management. No changes to his heart failure medications today. Plan for follow up with Dr. Neumann as scheduled in August. Support given today. All questions answered.         Changes today: none    Follow up plan:     Follow up with Dr. Neumann in August as scheduled.         History of Presenting Illness:    Burton Elizabeth is a very pleasant 76 year old male with a history of coronary artery disease, moderate aortic stenosis, DM, hypertension, obesity, CVA and CKD.      Patient follows with Dr. Neumann. Patient has a history of abnormal stress test from 2016 which demonstrated a small area of mild ischemia in the anterior/anteroseptal apex/mid ventricle and mild ischemia in the inferior wall. EF 59%. Patient was recommended to have an invasive ischemic evaluation given recent syncopal episode and history of abnormal stress test.      Coronary angiogram 8/12/2019 noting single vessel occlusive coronary artery disease namely  of non-dominant small circumflex vessel, proximal circumflex 99% stenosis, ostial RPDA 55%, post atrio lesion 50%, and mid to distal LAD 50% stenosis. Noted no revascularization is needed prior to his  planned knee surgery.      Echocardiogram 7/2020 showed LVEF 50-55%, RV size/function normal, moderate valvular aortic stenosis - no change when compared to prior study.      He was hospitalized in April for chest pain, he was diuresed and symptoms improved. Then unfortunately hospitalized in July for ischemic stroke.      Fall 2020/Winter 2021 we have struggled with volume overload. Patient was seen by Dr. Neumann 8/20/20 at that time he was noted to be volume up and torsemide was increased to 20mg BID. His torsemide has been titrated and he has underwent diuretic infusion clinic appts. Initially titration of his diuretics was difficult due to him not wanting to follow regularly or get monitoring labs. He underwent a nuclear stress test due to complaints of chest pain and was started on isosorbide mononitrate, small area of mild ischemia noted in inferior segment, this was not a high risk finding and medically managed.     Echocardiogram completed 1/21/21 which showed LVEF 55-60%, RV mild to moderately dilated with normal RV systolic function, moderate aortic stenosis and no significant change when compared to prior echocardiograms.     During my last CORE visit with patient in February 2021 he was overall doing well form a heart failure standpoint. He is tolerating torsemide 40mg BID and started wearing compression stockings.    Patient is here today for CORE follow up.     Patient reports feeling good. Monitoring weights daily a home. Weight is down significantly since our last visit. States he has been motivated with lifestyle modifications. He has been monitoring his caloric intake closely. Weight at home today was 234#, weight has been stable 234-235#, which is down ~ 10# since this winter. Lower extremity improved. Denies abdominal distention. Denies shortness of breath at rest. Some exertional dyspnea with longer periods of activity. Denies this is worsening. Denies orthopnea or PND. Denies chest pain or  chest tightness. Denies dizziness, lightheadedness or other presyncopal symptoms. Denies tachycardia or palpitations. 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 He then noted since this he was extremely fatigued and balance off, [facility] which [facility]. Brings a sheet of paper today which shows labs that were drawn (CBC, sed rate, CRP, retics, peripheral smear, iron studies). He notes they were close to admitting him, appears from what he is telling me he had worsening anemia, but unclear as no records available. He states [facility].     No labs available during clinic visit, [facility] to [facility] and got labs after clinic visit which showed overall stable kidney function, creatinine 2.1. His hemoglobin was 8.2 which appears down from his baseline of 11-13. Blood pressure 123/71 and HR 83 in clinic today.    Appetite good. Eating fruits/vegetables. No set exercise routine. Rare alcohol use, last drink 3 months ago. Denies tobacco use.         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   Social History     Socioeconomic History     Marital status: Single     Spouse name: Not on file     Number of children: Not on file     Years of education: Not on file     Highest education level: Not on file   Occupational History     Not on file   Social Needs     Financial resource strain: Not on file     Food insecurity     Worry: Not on file     Inability: Not on file     Transportation needs     Medical: Not on file     Non-medical: Not on file   Tobacco Use     Smoking status: Never Smoker     Smokeless tobacco: Never Used   Substance and Sexual Activity     Alcohol use: Yes     Alcohol/week: 2.0  "standard drinks     Types: 2 Standard drinks or equivalent per week     Frequency: 2-3 times a week     Drinks per session: 1 or 2     Comment: occasional, social     Drug use: No     Sexual activity: Not on file   Lifestyle     Physical activity     Days per week: Not on file     Minutes per session: Not on file     Stress: Not on file   Relationships     Social connections     Talks on phone: Not on file     Gets together: Not on file     Attends Muslim service: Not on file     Active member of club or organization: Not on file     Attends meetings of clubs or organizations: Not on file     Relationship status: Not on file     Intimate partner violence     Fear of current or ex partner: Not on file     Emotionally abused: Not on file     Physically abused: Not on file     Forced sexual activity: Not on file   Other Topics Concern     Parent/sibling w/ CABG, MI or angioplasty before 65F 55M? Not Asked   Social History Narrative     Not on file            Review of Systems:   Skin:  Negative     Eyes:  Negative    ENT:  Negative    Respiratory:  Positive for shortness of breath  Cardiovascular:  Negative for;palpitations;chest pain;lightheadedness Positive for;edema;fatigue  Gastroenterology: Negative    Genitourinary:  not assessed    Musculoskeletal:  Positive for joint pain  Neurologic:  Positive for incoordination;stroke  Psychiatric:  Positive for depression;sleep disturbances  Heme/Lymph/Imm:  Positive for allergies;weight loss  Endocrine:  Positive for diabetes         Physical Exam:   Vitals: /71   Pulse 83   Ht 1.753 m (5' 9\")   Wt 108 kg (238 lb)   SpO2 96%   BMI 35.15 kg/m     Wt Readings from Last 4 Encounters:   06/14/21 108 kg (238 lb)   02/18/21 114.8 kg (253 lb)   01/28/21 114.7 kg (252 lb 14.4 oz)   01/20/21 119 kg (262 lb 6.4 oz)     GEN: well nourished, in no acute distress.  HEENT:  Pupils equal, round. Sclerae nonicteric.   NECK: Supple, no masses appreciated. JVP appears normal. "   C/V:  Regular rate and rhythm, systolic murmur  RESP: Respirations are unlabored. Clear to auscultation bilaterally without wheezing, rales, or rhonchi.  GI: Abdomen soft, obese, nontender.  EXTREM: +1 bilateral LE edema.  NEURO: Alert and oriented, cooperative.  SKIN: Warm and dry.       Data:       LIPID RESULTS:  Lab Results   Component Value Date    CHOL 97 01/11/2021    HDL 31 (L) 01/11/2021    LDL 46 01/11/2021    TRIG 102 01/11/2021     LIVER ENZYME RESULTS:  Lab Results   Component Value Date    AST 34 06/10/2021    ALT 23 06/10/2021     CBC RESULTS:  Lab Results   Component Value Date    WBC 7.24 01/11/2021    RBC 4.01 (L) 01/11/2021    HGB 8.2 (A) 06/10/2021    HCT 28.3 (A) 06/10/2021    MCV 82.3 06/10/2021    MCH 23.8 (A) 06/10/2021    MCHC 29.0 (A) 06/10/2021    RDW 14.3 01/11/2021     06/10/2021     BMP RESULTS:  Lab Results   Component Value Date     06/10/2021    POTASSIUM 4.4 06/10/2021    CHLORIDE 102 06/10/2021    CO2 29 06/10/2021    ANIONGAP 8 06/10/2021     (A) 06/10/2021    BUN 36 (A) 06/10/2021    CR 2.1 (A) 06/10/2021    GFRESTIMATED 33 (L) 02/12/2021    GFRESTBLACK 30 (L) 02/02/2021    GABE 9.2 06/10/2021      A1C RESULTS:  Lab Results   Component Value Date    A1C 7.9 (H) 01/11/2021     INR RESULTS:  Lab Results   Component Value Date    INR 1.1 (A) 06/30/2020    INR 1.12 04/22/2020            Medications     Current Outpatient Medications   Medication Sig Dispense Refill     acetaminophen (TYLENOL) 325 MG tablet Take 2 tablets (650 mg) by mouth every 6 hours as needed for mild pain or fever (> 101 F)       amLODIPine (NORVASC) 10 MG tablet Take 1 tablet (10 mg) by mouth daily       aspirin (ASA) 325 MG EC tablet Take 1 tablet (325 mg) by mouth daily Start 7/25/20.       atorvastatin (LIPITOR) 40 MG tablet Take 1 tablet (40 mg) by mouth every evening 30 tablet 3     blood glucose (NO BRAND SPECIFIED) test strip Use to test blood sugar as directed       carvedilol  (COREG) 25 MG tablet Take 1 tablet (25 mg) by mouth 2 times daily (with meals) 60 tablet 0     glimepiride (AMARYL) 4 MG tablet Take 4 mg by mouth 2 times daily        insulin aspart (NOVOLOG FLEXPEN) 100 UNIT/ML pen Inject 2-6 Units Subcutaneous 3 times daily (with meals) (Patient states he bases his dose off of carb counting and blood glucose but did not give exact regimen).       insulin glargine (LANTUS PEN) 100 UNIT/ML pen Inject 15 Units Subcutaneous every evening       isosorbide mononitrate (IMDUR) 30 MG 24 hr tablet Take 1 tablet (30 mg) by mouth daily 90 tablet 1     metolazone (ZAROXOLYN) 2.5 MG tablet Take one dose- as directed by CORE clinic 5 tablet 0     multivitamin, therapeutic with minerals (THERA-VIT-M) TABS Take 1 tablet by mouth daily       order for DME Equipment being ordered: Walker Wheels () and Walker ()  Treatment Diagnosis: Impaired gait 1 each 0     torsemide (DEMADEX) 20 MG tablet 2 tablets (40mg ) twice a day 120 tablet 1          Past Medical History     Past Medical History:   Diagnosis Date     Arthritis of knee~ s/p replacement 3/24/2016     CAD (coronary artery disease)      Decreased cardiac ejection fraction~44% 3/24/2016     Diabetes mellitus, type 2 (H) 3/24/2016     History of DVT (deep vein thrombosis)      HTN (hypertension) 3/24/2016     LBBB (left bundle branch block) 3/24/2016     Mixed hyperlipidemia 8/23/2016     Past Surgical History:   Procedure Laterality Date     ARTHROPLASTY KNEE Right 3/29/2016    Procedure: ARTHROPLASTY KNEE;  Surgeon: Heena Rosen MD;  Location:  OR     ARTHROPLASTY REVISION KNEE Left 9/27/2019    Procedure: REVISION LEFT TOTAL KNEE  ARTHROPLASTY;  Surgeon: Heena Rosen MD;  Location:  OR     CV LEFT HEART CATH N/A 8/12/2019    Procedure: Left Heart Cath;  Surgeon: Edgardo Beckham MD;  Location:  HEART CARDIAC CATH LAB     EYE SURGERY      cataract removal     ORTHOPEDIC SURGERY      KNEE SCOPES MULTIPLE      Family History   Problem Relation Age of Onset     Coronary Artery Disease No family hx of             Allergies   Penicillins    40 minutes spent on the date of the encounter doing chart review, history and exam, documentation and further activities as noted above      WALE Fowler CNP  [facility]  Pager: [phone]

## 2021-06-14 NOTE — LETTER
6/14/2021    [facility]  [facility] [address]    RE: Burton Elizabeth       Dear Colleague,    I had the pleasure of seeing Burton Elizabeth in the [facility].    Cardiology Clinic Progress Note  Burton Elizabeth MRN# [id number]   YOB: 1945 Age: 76 year old   Primary Cardiologist: Dr. Neumann Reason for visit: CORE follow up             Assessment and Plan:   Burton Elizabeth is a very pleasant 76 year old male with a history of coronary artery disease, moderate aortic stenosis, DM, hypertension, obesity, CVA and CKD.     1. Coronary artery disease - coronary angiogram 8/12/2019 noting single vessel occlusive coronary artery disease namely  of non-dominant small circumflex vessel, proximal circumflex 99% stenosis, ostial RPDA 55%, post atrio lesion 50%, and mid to distal LAD 50% stenosis. Stable, no signs/symptoms of angina.               - Nuclear stress test completed 1/7/21 due to complaints of chest pain, this showed small area of mild ischemia in the inferior segments. This was stable when compared to prior nuclear stress test in 5/2017 which showed inferior, anterior and anteroseptal wall ischemia.               - Continue aspirin, carvedilol, isosorbide mononitrate and atorvastatin                - Counseled patient on lifestyle modifications  2. Hypertension - controlled, continue current medications  3. Aortic stenosis - moderate, Mean gradient 23 mmHg, valve area 1.4 cm2, DI 0.34 per echo 1/2021.  4. Obesity - BMI 35.15, counseled patient on weight loss strategies, praise given for weight loss since our last clinic visit.   5. CKD - baseline 1.8-2.1, creatinine from 6/10 2.1  6. Chronic heart failure with preserved ejection fraction - echocardiogram 1/202 showed LVEF 55-60%, RV mild-moderately dilated with normal systolic function.              - NYHA class II-III, stage C              - Dry  weight ~235#              - Continue torsemide 40mg BID              - Counseled on low sodium diet, daily weights and 2L fluid restriction              - Continue compression stockings.   7. Diabetes mellitus - uncontrolled, HgbA1c 9.5 10/2020  8. Chronic LBBB  9. CVA   10. High probability of sleep apnea - declined sleep medicine evaluation    I saw Burton today for CORE follow up, he is doing well from a heart failure standpoint. Fall 2020/Winter 2021 we were struggling with managing his volume status, his diuretics were adjusted and he had multiple IV diuretic infusion clinic appts. He has been maintained on torsemide 40mg BID and has successfully lost ~ 10# since I last say him in February 2021 with diet modifications. He appears close to euvolemic today. He notes [facility] what sounds a like acute worsening [facility], no [facility]. Did get labs to review which does show a drop in his hemoglobin and stable kidney function. Patient notes he has close follow up planned for management. No changes to his heart failure medications today. Plan for follow up with Dr. Neumann as scheduled in August. Support given today. All questions answered.         Changes today: none    Follow up plan:     Follow up with Dr. Neumann in August as scheduled.         History of Presenting Illness:    Burton Elizabeth is a very pleasant 76 year old male with a history of coronary artery disease, moderate aortic stenosis, DM, hypertension, obesity, CVA and CKD.      Patient follows with Dr. Neumann. Patient has a history of abnormal stress test from 2016 which demonstrated a small area of mild ischemia in the anterior/anteroseptal apex/mid ventricle and mild ischemia in the inferior wall. EF 59%. Patient was recommended to have an invasive ischemic evaluation given recent syncopal episode and history of abnormal stress test.      Coronary angiogram 8/12/2019 noting  single vessel occlusive coronary artery disease namely  of non-dominant small circumflex vessel, proximal circumflex 99% stenosis, ostial RPDA 55%, post atrio lesion 50%, and mid to distal LAD 50% stenosis. Noted no revascularization is needed prior to his planned knee surgery.      Echocardiogram 7/2020 showed LVEF 50-55%, RV size/function normal, moderate valvular aortic stenosis - no change when compared to prior study.      He was hospitalized in April for chest pain, he was diuresed and symptoms improved. Then unfortunately hospitalized in July for ischemic stroke.      Fall 2020/Winter 2021 we have struggled with volume overload. Patient was seen by Dr. Neumann 8/20/20 at that time he was noted to be volume up and torsemide was increased to 20mg BID. His torsemide has been titrated and he has underwent diuretic infusion clinic appts. Initially titration of his diuretics was difficult due to him not wanting to follow regularly or get monitoring labs. He underwent a nuclear stress test due to complaints of chest pain and was started on isosorbide mononitrate, small area of mild ischemia noted in inferior segment, this was not a high risk finding and medically managed.     Echocardiogram completed 1/21/21 which showed LVEF 55-60%, RV mild to moderately dilated with normal RV systolic function, moderate aortic stenosis and no significant change when compared to prior echocardiograms.     During my last CORE visit with patient in February 2021 he was overall doing well form a heart failure standpoint. He is tolerating torsemide 40mg BID and started wearing compression stockings.    Patient is here today for CORE follow up.     Patient reports feeling good. Monitoring weights daily a home. Weight is down significantly since our last visit. States he has been motivated with lifestyle modifications. He has been monitoring his caloric intake closely. Weight at home today was 234#, weight has been stable 234-235#,  which is down ~ 10# since this winter. Lower extremity improved. Denies abdominal distention. Denies shortness of breath at rest. Some exertional dyspnea with longer periods of activity. Denies this is worsening. Denies orthopnea or PND. Denies chest pain or chest tightness. Denies dizziness, lightheadedness or other presyncopal symptoms. Denies tachycardia or palpitations. 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 He then noted since this he was extremely fatigued and balance off, [facility] which [facility]. Brings a sheet of paper today which shows labs that were drawn (CBC, sed rate, CRP, retics, peripheral smear, iron studies). He notes they were close to admitting him, appears from what he is telling me he had worsening anemia, but unclear as no records available. He states [facility].     No labs available during clinic visit, [facility] to [facility] and got labs after clinic visit which showed overall stable kidney function, creatinine 2.1. His hemoglobin was 8.2 which appears down from his baseline of 11-13. Blood pressure 123/71 and HR 83 in clinic today.    Appetite good. Eating fruits/vegetables. No set exercise routine. Rare alcohol use, last drink 3 months ago. Denies tobacco use.         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   Social History     Socioeconomic History     Marital status: Single     Spouse name: Not on file     Number of children: Not on file     Years of education: Not on file     Highest education level: Not on file   Occupational History     Not on file   Social Needs     Financial resource strain: Not on file     Food insecurity     Worry: Not on file      "Inability: Not on file     Transportation needs     Medical: Not on file     Non-medical: Not on file   Tobacco Use     Smoking status: Never Smoker     Smokeless tobacco: Never Used   Substance and Sexual Activity     Alcohol use: Yes     Alcohol/week: 2.0 standard drinks     Types: 2 Standard drinks or equivalent per week     Frequency: 2-3 times a week     Drinks per session: 1 or 2     Comment: occasional, social     Drug use: No     Sexual activity: Not on file   Lifestyle     Physical activity     Days per week: Not on file     Minutes per session: Not on file     Stress: Not on file   Relationships     Social connections     Talks on phone: Not on file     Gets together: Not on file     Attends Congregational service: Not on file     Active member of club or organization: Not on file     Attends meetings of clubs or organizations: Not on file     Relationship status: Not on file     Intimate partner violence     Fear of current or ex partner: Not on file     Emotionally abused: Not on file     Physically abused: Not on file     Forced sexual activity: Not on file   Other Topics Concern     Parent/sibling w/ CABG, MI or angioplasty before 65F 55M? Not Asked   Social History Narrative     Not on file            Review of Systems:   Skin:  Negative     Eyes:  Negative    ENT:  Negative    Respiratory:  Positive for shortness of breath  Cardiovascular:  Negative for;palpitations;chest pain;lightheadedness Positive for;edema;fatigue  Gastroenterology: Negative    Genitourinary:  not assessed    Musculoskeletal:  Positive for joint pain  Neurologic:  Positive for incoordination;stroke  Psychiatric:  Positive for depression;sleep disturbances  Heme/Lymph/Imm:  Positive for allergies;weight loss  Endocrine:  Positive for diabetes         Physical Exam:   Vitals: /71   Pulse 83   Ht 1.753 m (5' 9\")   Wt 108 kg (238 lb)   SpO2 96%   BMI 35.15 kg/m     Wt Readings from Last 4 Encounters:   06/14/21 108 kg (238 lb) "   02/18/21 114.8 kg (253 lb)   01/28/21 114.7 kg (252 lb 14.4 oz)   01/20/21 119 kg (262 lb 6.4 oz)     GEN: well nourished, in no acute distress.  HEENT:  Pupils equal, round. Sclerae nonicteric.   NECK: Supple, no masses appreciated. JVP appears normal.   C/V:  Regular rate and rhythm, systolic murmur  RESP: Respirations are unlabored. Clear to auscultation bilaterally without wheezing, rales, or rhonchi.  GI: Abdomen soft, obese, nontender.  EXTREM: +1 bilateral LE edema.  NEURO: Alert and oriented, cooperative.  SKIN: Warm and dry.       Data:       LIPID RESULTS:  Lab Results   Component Value Date    CHOL 97 01/11/2021    HDL 31 (L) 01/11/2021    LDL 46 01/11/2021    TRIG 102 01/11/2021     LIVER ENZYME RESULTS:  Lab Results   Component Value Date    AST 34 06/10/2021    ALT 23 06/10/2021     CBC RESULTS:  Lab Results   Component Value Date    WBC 7.24 01/11/2021    RBC 4.01 (L) 01/11/2021    HGB 8.2 (A) 06/10/2021    HCT 28.3 (A) 06/10/2021    MCV 82.3 06/10/2021    MCH 23.8 (A) 06/10/2021    MCHC 29.0 (A) 06/10/2021    RDW 14.3 01/11/2021     06/10/2021     BMP RESULTS:  Lab Results   Component Value Date     06/10/2021    POTASSIUM 4.4 06/10/2021    CHLORIDE 102 06/10/2021    CO2 29 06/10/2021    ANIONGAP 8 06/10/2021     (A) 06/10/2021    BUN 36 (A) 06/10/2021    CR 2.1 (A) 06/10/2021    GFRESTIMATED 33 (L) 02/12/2021    GFRESTBLACK 30 (L) 02/02/2021    GABE 9.2 06/10/2021      A1C RESULTS:  Lab Results   Component Value Date    A1C 7.9 (H) 01/11/2021     INR RESULTS:  Lab Results   Component Value Date    INR 1.1 (A) 06/30/2020    INR 1.12 04/22/2020            Medications     Current Outpatient Medications   Medication Sig Dispense Refill     acetaminophen (TYLENOL) 325 MG tablet Take 2 tablets (650 mg) by mouth every 6 hours as needed for mild pain or fever (> 101 F)       amLODIPine (NORVASC) 10 MG tablet Take 1 tablet (10 mg) by mouth daily       aspirin (ASA) 325 MG EC tablet Take  1 tablet (325 mg) by mouth daily Start 7/25/20.       atorvastatin (LIPITOR) 40 MG tablet Take 1 tablet (40 mg) by mouth every evening 30 tablet 3     blood glucose (NO BRAND SPECIFIED) test strip Use to test blood sugar as directed       carvedilol (COREG) 25 MG tablet Take 1 tablet (25 mg) by mouth 2 times daily (with meals) 60 tablet 0     glimepiride (AMARYL) 4 MG tablet Take 4 mg by mouth 2 times daily        insulin aspart (NOVOLOG FLEXPEN) 100 UNIT/ML pen Inject 2-6 Units Subcutaneous 3 times daily (with meals) (Patient states he bases his dose off of carb counting and blood glucose but did not give exact regimen).       insulin glargine (LANTUS PEN) 100 UNIT/ML pen Inject 15 Units Subcutaneous every evening       isosorbide mononitrate (IMDUR) 30 MG 24 hr tablet Take 1 tablet (30 mg) by mouth daily 90 tablet 1     metolazone (ZAROXOLYN) 2.5 MG tablet Take one dose- as directed by CORE clinic 5 tablet 0     multivitamin, therapeutic with minerals (THERA-VIT-M) TABS Take 1 tablet by mouth daily       order for DME Equipment being ordered: Walker Wheels () and Walker ()  Treatment Diagnosis: Impaired gait 1 each 0     torsemide (DEMADEX) 20 MG tablet 2 tablets (40mg ) twice a day 120 tablet 1          Past Medical History     Past Medical History:   Diagnosis Date     Arthritis of knee~ s/p replacement 3/24/2016     CAD (coronary artery disease)      Decreased cardiac ejection fraction~44% 3/24/2016     Diabetes mellitus, type 2 (H) 3/24/2016     History of DVT (deep vein thrombosis)      HTN (hypertension) 3/24/2016     LBBB (left bundle branch block) 3/24/2016     Mixed hyperlipidemia 8/23/2016     Past Surgical History:   Procedure Laterality Date     ARTHROPLASTY KNEE Right 3/29/2016    Procedure: ARTHROPLASTY KNEE;  Surgeon: Heena Rosen MD;  Location: [facility] OR     ARTHROPLASTY REVISION KNEE Left 9/27/2019    Procedure: REVISION LEFT TOTAL KNEE  ARTHROPLASTY;  Surgeon: Heena Rosen MD;   Location: [facility] OR     CV LEFT HEART CATH N/A 8/12/2019    Procedure: Left Heart Cath;  Surgeon: Edgardo Beckham MD;  Location:  HEART CARDIAC CATH LAB     EYE SURGERY      cataract removal     ORTHOPEDIC SURGERY      KNEE SCOPES MULTIPLE     Family History   Problem Relation Age of Onset     Coronary Artery Disease No family hx of             Allergies   Penicillins    40 minutes spent on the date of the encounter doing chart review, history and exam, documentation and further activities as noted above      [facility]  Pager: [phone]    cc:   No referring provider defined for this encounter.

## 2021-06-14 NOTE — PATIENT INSTRUCTIONS
1. No medication changes  2. Follow up with Dr. Neumann in August   3. Please call with any questions/concerns [phone]  4. Continue to monitor weight and notify clinic of changes.

## 2021-07-06 PROBLEM — N17.9 ACUTE RENAL FAILURE, UNSPECIFIED ACUTE RENAL FAILURE TYPE (H): Status: ACTIVE | Noted: 2021-01-01

## 2021-07-06 PROBLEM — R55 SYNCOPE, UNSPECIFIED SYNCOPE TYPE: Status: ACTIVE | Noted: 2021-01-01

## 2021-07-06 PROBLEM — K92.2 UPPER GI BLEED: Status: ACTIVE | Noted: 2021-01-01

## 2021-07-06 PROBLEM — R79.89 ELEVATED TROPONIN: Status: ACTIVE | Noted: 2021-01-01

## 2021-07-06 PROBLEM — D62 ANEMIA DUE TO BLOOD LOSS, ACUTE: Status: ACTIVE | Noted: 2021-01-01

## 2021-07-06 NOTE — ED TRIAGE NOTES
Arrives via EMS from home. EMS called by family after finding him down on the floor. Last normal was a text message from noon on  7/5/21. Currently somewhat combative for EMS, does have some verbal responses. BG= 364, 18 ga left hand

## 2021-07-06 NOTE — ED NOTES
"Pt verbally hostile with staff yelling \"Don't you touch me, leave me alone with cares.\" Pt oriented to his situation and plan of care by the writer.    "

## 2021-07-06 NOTE — H&P
Admitted: 07/06/2021    CHIEF COMPLAINT:  Increased confusion and melena.    HISTORY OF PRESENT ILLNESS:  Burton Elizabeth is a very pleasant 76-year-old male with a complicated past medical history which includes coronary artery disease, hypertension, hyperlipidemia, heart failure with preserved ejection fraction, chronic left bundle branch block, moderate aortic stenosis, remote history of DVT, history of stroke, type 2 diabetes and temporal arteritis, among other medical problems, who presents to the Emergency Room today for evaluation of new onset altered mental status and melanotic stools.  History was mostly obtained per discussions with his brother and sister at bedside.  I do not have any [facility] at present.  I am able to review some records from previous Cardiology visits in our chart.    The patient again has a longstanding history of numerous medical problems.  His brother and sister say he lives at home independently.  He drives and manages his medications.  They last were together on 7/2/21 and he was his usual self.  He usually talks with his sister on Sundays, but she notes that on 7/4/21 she did not speak with him. She was not overly concerned but this as they had just seen each other and planned to meet for breakfast date today.  He did text his brother at one point 7/4/21.  No one heard from him on 7/5/21.  He was supposed to  his sister-in-law today, and when he did not show up, she became concerned and called the patient's brother, Jose.  They went to check on the patient and found him at home.  He was weak and significantly confused.  He was brought to the emergency room for further evaluation.  There is some mention of dark stools in his home as well.    In the Emergency Room, he was seen and evaluated by Dr. Perez.  He was afebrile and hemodynamically stable with O2 saturations in the 90s.  He was confused and at times agitated.  It sounds as though at  "several points during his visit, he stated he wanted to leave.  His physical exam was notable for a murmur and some lower extremity edema.  He had numerous lab abnormalities, including an elevated creatinine, an elevated lactate (2.8).  His white count was 18, and his hemoglobin was 7.1 (it had been 8.2 on 6/10/21).  He was hyperglycemic with a blood sugar in the 300s.  He had frankly melanotic-appearing stool in the Emergency Room.  His head CT was negative for acute pathology.  He was given a liter of IV fluids, 1 unit of PRBC, a Protonix bolus, and has been started on a Protonix drip.  Dr. Pepper did speak with Dr. Elena, on-call for the Cardiology service, given his troponin bump.  His EKG had no acute ischemic changes, and she did not recommend any acute intervention at this time given his presentation with other ongoing issues including suspected GI bleed.    When I saw the patient, he was lying quietly in bed.  He was answering basic questions appropriately, was somewhat cantankerous at times and told me he wanted to be left alone.  He told me to \"stop yelling\".  He was able to follow a few basic commands but was not conversant.    It sounds as though his brother and sister help with his care.  He was last hospitalized for a stroke in 6/2020 at [facility] and had a brief stay at [facility] until mid 7/2020.  Since his stroke he has been maintained on a full-dose aspirin.  On 6/14/2021, he was seen in [facility] clinic for followup of his numerous cardiovascular issues.  In general, he was felt to be doing stable.  He is maintained on a regimen of medications, including a full-dose aspirin, diuretics, Coreg, Imdur and a statin.  Dr. Neumann and the cardiologist have been monitoring his aortic stenosis, and patient had been told in the past he may eventually need a valve replacement.  At some point during the past several weeks (I could not quite get a clear timeframe), it sounds as though his [facility] " [facility] had diagnosed him with temporal arteritis, and he was placed on a steroid taper.  His family believes he may have completed this several weeks ago.  It was also around this time they had noted he had a drop in his hemoglobin.  It sounds as though they had favored evaluation for potential bone marrow etiology, and I believe he was started on iron, though again awaiting records from the [facility] to confirm this.  When I discussed with the family what his wishes would be for his care, they seemed to be in agreement that he would favor aggressive cares at least initially.    PAST MEDICAL HISTORY:    1.  Coronary artery disease.  Angiogram in 8/2019 showed single-vessel occlusive coronary artery disease including  of nondominant small circumflex proximal circumflex 99% stenosis ostial RPDA 55% post-atrial lesion 50% and mid to distal LAD 50% stenosis.  He had a stress test in 1/2021 due to complaints of chest pain that showed small area of mild ischemia in the inferior segments.  Findings were stable compared to a stress test in 2017.  He is maintained on aspirin, statin, Coreg and Imdur.  2.  Hypertension.  3.  Hyperlipidemia.    4.  Moderate aortic stenosis.  This was per echocardiogram in 1/2021.  5.  Chronic heart failure with preserved ejection fraction.  Echocardiogram in 1/2021 showed an EF of 55-60% with mild to moderately dilated normal RV systolic function.  6.  Chronic left bundle branch block.  7.  History of cerebrovascular accident.  Family reports this was in 07/2020, and he was hospitalized at [facility] for this.  8.  Stage III CKD.  Baseline creatinine previously been 1.8-2.1, but per review of my records more recently it seems to be 2.0-2.3.  9.  Type 2 diabetes.  Uncontrolled.  A1c was 9.5 in 10/2020.  10.  Suspected sleep apnea.  Declined a sleep study in the past.  11.  Obesity.  12.  History of DVT.  He had been on anticoagulation in the past, but it sounds as though this was not continued  in the long-term due to potential complications with bleeding.  11.  Temporal arteritis.  This was diagnosed per [facility],and he was treated with steroids with improvement.  12.  Iron deficiency anemia.  This was a more recent finding.  Again, his family notes that his hemoglobin had been dropping.  On [facility] assessment, he was started on iron supplement.    PAST SURGICAL HISTORY:    1.  Previous right knee arthroplasty in 2016 with revision in 2019.  2.  Cataract removals.    FAMILY HISTORY:  No reported family history of cardiovascular disease, strokes or cancer.    SOCIAL HISTORY:  The patient is a lifelong nonsmoker.  He drinks alcohol on occasion, mostly in social settings, but he does not use daily.  Since recovering from a stroke at times, he attempts to ambulate with the use of a walker or a cane.  Family notes he lives alone.  He still drives.  His brother, Jose lives in town and assists with his care as needed.  His sister Michelle lives out of town but also helps with his care as needed.  He manages his own medications.    HOME MEDICATIONS:    1.  Amlodipine 10 mg daily.    2.  Aspirin 325 mg daily.  3.  Atorvastatin 40 mg daily.  4.  Coreg 25 mg b.i.d.  5.  Ferrous sulfate 325 mg daily.  6.  Glimepiride 4 mg b.i.d.  7.  Lantus 20 units every evening.  8.  Imdur 30 mg daily.  9.  Daily multivitamin.  10.  Semaglutide 0.5 mg subcutaneous once weekly.  11.  Torsemide 40 mg b.i.d.  12.  Tylenol 650 mg every 6 hours as needed.  13.  Metolazone 2.5 mg as directed per the [facility].    ALLERGIES:  PENICILLINS CAUSED ANAPHYLAXIS.  EMPAGLIFLOZIN IS DOCUMENTED AS CAUSING A RISE IN HIS CREATININE.    REVIEW OF SYSTEMS:  Unable to complete a full 10-point review of systems given the patient's current clinical status.    PHYSICAL EXAMINATION:    VITAL SIGNS:  Temperature 97.8, pulse 84, respirations 25, blood pressure 131/74, O2 saturation 97% on room air.  GENERAL:  The patient is a  chronically ill-appearing male lying quietly in the [facility].  He is occasionally arousable to verbal stimuli and will intermittently follow commands.  He appears ill but in no acute distress.  HEENT:  Pupils equal, round, and reactive to light and accommodation.  Extraocular movements are intact.  Mucous membranes moist.  CARDIOVASCULAR:  Heart rate and rhythm regular.  I appreciate a systolic murmur throughout the precordium.  He has trace to +1 bilateral lower extremity edema to the mid shins.  RESPIRATORY:  Diminished air exchange throughout, but no wheeze, rales or rhonchi.  No increased work of breathing.  ABDOMEN:  Obese, soft.  There is some mild diffuse tenderness to palpation.  No guarding, rigidity.  Bowel sounds are positive throughout.  INTEGUMENTARY:  Skin is warm and dry.  No rash, jaundice or ecchymosis.  NEUROLOGIC:  Cranial nerves II-XII are grossly intact.  No focal motor or sensory deficits.  Again, he does follow some commands intermittently and at some point during the conversation yells out and asks us to stop speaking so loudly and makes statements that he is going to leave.  He is in no acute distress.    LABS AND IMAGING:    BMP shows sodium of 144, potassium 4.9, creatinine 3.74, calcium of 9.8, AST 56, ALT 34, alkaline phosphatase 83, total bilirubin 0.4, albumin 2.8, total , glucose 385.  Lactate is 2.8, on recheck is 2.4.  Troponin is 7.9.  CBC showed a white count of 18.6, hemoglobin 7.1, and platelet count of 264.  INR 1.32.  Urinalysis bland, not suggestive of infection.  Blood cultures are pending.  COVID-19 swab is negative.  EKG showed sinus tachycardia with chronic left bundle branch block     Head CT without contrast showed no evidence of acute parenchymal hemorrhage, mass or herniation.  He had findings of chronic microvascular ischemic disease.    ASSESSMENT AND PLAN:  Burton Elizabeth is a 76-year-old male with a complicated past medical history which includes coronary  artery disease, hypertension, hyperlipidemia, chronic left bundle branch block, heart failure with preserved ejection fraction, suspected sleep apnea, type 2 diabetes, stage III chronic kidney disease, moderate aortic stenosis, history of stroke in 07/2020, and temporal arteritis, among other medical problems, who presents to the Emergency Room today for evaluation of altered mental status and melena.  He has numerous lab abnormalities, including an elevated lactate and elevated troponin and a low hemoglobin.  He is being admitted today on under [facility] status for ongoing evaluation and care.    1.  Acute toxic metabolic encephalopathy:  Suspect this is multifactorial and secondary to issues as further outlined below.  Although his white count is elevated, he is otherwise afebrile and has no other signs or symptoms suggestive of a localized infection.  His head CT is unremarkable.  It is unclear what medications he may have been taking, but it does not sound as though he has a chronic opioid use.  He does not consume alcohol.  His blood sugars are stable.  He  follow basic commands in the Emergency Room and is intermittently conversive, at times is quite cantankerous.  Monitor with treatment of conditions as below.  He does have a history of stroke, so if he has no significant improvement after below issues are addressed, could consider further evaluation with an MRI of the brain.  2.  Acute anemia, suspected secondary to upper gastrointestinal bleed:  In the Emergency Room, he was noted to have grossly melanotic stools.  His hemoglobin had been stable at 10 as of 1/2021.  When checked at Cardiology visit on 6/10/21 his hemoglobin was 8.2.  Today's hemoglobin is 7.1.  It sounds as though he had noted worsening anemia per [facility], and he was started on oral iron some time recently.  He did not undergo any endoscopy at that time, and it sounds like they were more concerned about potential bone  marrow etiology.  He does take a full-dose aspirin, and more recently, he had been put on a course of steroids for treatment of temporal arteritis.  I suspect he may have an upper GI bleed secondary to these recent medication changes.  He was given 1 unit PRBCs in the Emergency Room.  At present, he is hemodynamically stable after an additional liter of fluid.  He has been given a Protonix bolus and started on a Protonix drip.  [facility] has been consulted, and they will evaluate the patient today.  Will trend hemoglobins this evening.  Transfuse if his hemoglobin drops to less than 7.  He will remain off his aspirin for now.  3.  Lactic acidosis:  Lactate was elevated at 2.8 on presentation.  Suspect this is secondary to hypoperfusion in the setting of suspected blood loss and anemia.  Although he has an elevated white count as above, there are no other signs or symptoms suggestive of an infection.  He was given IV fluids and some blood.  His lactate is improved to 2.4.  Will cautiously continue  IV fluids and recheck a lactate later this evening.  Will need to monitor volume status closely given his history of moderate aortic stenosis.  4.  Elevated troponin with history of coronary artery disease:  He has a history of coronary artery disease with prior angiograms as outlined above in Past Medical History.  Additionally, he had undergone stress testing earlier this year which showed changes that had been noted on prior stress test in 2017.  He did not endorse any recent chest discomfort or dyspnea on exertion.  His troponin is elevated at 7.9.  His EKG does not show acute ischemic changes, and he has a chronic left bundle branch block.  ED physician discussed with Dr. Elena on call for cardiology.  No plans for urgent intervention right now given issues with potential active GI bleed.  Suspect some degree of troponin bump could be related to demand ischemia, but again he has a known history of  coronary artery disease.  Will trend troponins.  I have ordered a resting echocardiogram.  Cardiology has been consulted, and their assistance with ongoing evaluation and care is much appreciated.  His aspirin will need to be held for the time being.  Will hold his antihypertensives and Imdur for now given his clinical status is somewhat tenuous.  5.  Hypertension, hyperlipidemia:  Again, his home antihypertensives and statin and Imdur will be held for the time being given his acute illness.  Consider resumption of medications as needed once his condition stabilizes.  6.  Heart failure with preserved ejection fraction:  A repeat echocardiogram has been ordered this day given above.  Monitor volume status with need for IV fluids on admission.  7.  Acute kidney injury on stage III chronic kidney disease:  Baseline creatinine previously was 1.8-2.0 but more recently seems to be 2.0-2.3.  Creatinine today is grossly elevated at 3.74.  I suspect some degree of acute injury secondary to hypoperfusion given above issues.  He has been given blood products and IV fluids.  Monitor urine output.  Repeat BMP in the morning for reassessment of creatinine.  8.  Type 2 diabetes:  No recent A1c on file.  Last A1c was 9.5 in 10/2020.  He manages with insulin and oral medications.  These medications will be held given he is presently n.p.o. while awaiting GI evaluation.  I have ordered medium-dose sliding scale and Accu-Cheks every 4 hours.  Resumption of home regimen once his condition stabilizes and able to tolerate p.o.  9.  History of cerebrovascular accident in 7/2020:  Maintained on full-dose aspirin, statin, and antihypertensive medications.  These medications have all been held for now.  He had no persistent deficits following his stroke.  I suspect his encephalopathy is multifactorial as outlined above.  Monitor clinically for now.  Eventually, he will need a PT consult once his condition stabilizes.  10.  Temporal  arteritis:  Recently diagnosed.  He reportedly completed a steroid taper.  No concerns in this regard at present.  11.  History of arthritis and chronic joint pains:  Noted.  He does not take any medication specifically for pain.  Monitor clinically.    DEEP VEIN THROMBOSIS PROPHYLAXIS:  PCDs only, given presentation with acute GI bleed.    CODE STATUS:  I discussed this with patient's brother and sister at bedside this afternoon (as patient himself was not able to participate in the conversation).  His brother states that he would be in favor of attempts at resuscitation, but if he were to become dependent on a ventilator or not be able to live independently, then he would not want to be kept alive.  As such, he has been made a FULL CODE.       Anabelle Kat DO        D: 2021   T: 2021   MT: [facility]    Name:     TAY PAGE  MRN:      [id number]        Account:     [id number]   :      1945           Admitted:    2021       Document: [id number]    cc:  [facility]   Laci Neumann MD [facility]

## 2021-07-06 NOTE — PROGRESS NOTES
RECEIVING UNIT ED HANDOFF REVIEW    ED Nurse Handoff Report was reviewed by: Didi Lucia RN on July 6, 2021 at 3:34 PM

## 2021-07-06 NOTE — ED PROVIDER NOTES
History   Chief Complaint:  Altered Mental Status     The history is provided by the EMS personnel and a relative. The history is limited by the condition of the patient.      Burton Elizabeth is a 76 year old male with history of stroke, hyperlipidemia, CAD, type 2 diabetes, who presents with altered mental status. EMS notes that they received a call from the patient's brother and sister after they checked on the patient today and found him down in his living room and altered. They noted that they last spoke with the patient on Saturday around noon by text message and received a call today as the patient did not show up for a ride at 0830. He was found with fecal matter by him and trailing to the patient's bedroom. EMS notes that the patient lives alone. EMS notes that he was combative with them on the way here. EMS states BS is 100/50 and BS was 369. He had responded to voice sometimes with EMS but seemed altered. Here, he denies chest pain.     Per patient's brother, the patient has a complicated recent medical history.  He was diagnosed with a stroke, followed by a DVT.  [facility], though has not had a work up for GI bleed.  Several weeks ago, he was started on steroids for temporal arteritis, but has completed the steroid burst.  Brother states that family last heard from the patient on 7/4/21, so he may have fallen anytime after that.    Review of Systems   Unable to perform ROS: Mental status change   Cardiovascular: Negative for chest pain.   Gastrointestinal: Positive for blood in stool.     Allergies:  Penicillins  Empagliflozin    Medications:  Amlodipine   Aspirin   Coreg  Imdur   Torsemide  Metolazone   Insulin aspart   Insulin glargine   Atorvastatin   Glimepiride    Past Medical History:    Arthritis of knee   CAD  Diabetes type 2  Hyperlipidemia  LBBB  Hypertension   DVT  Decreased cardiac ejection fraction 44%  CHF  Morbid obesity  Stroke  "  Respiratory failure with hypoxia   Vertigo   Temporal arteritis    Past Surgical History:    Arthroplasty, knee right   Arthroplasty, knee left with revision   CV left heart cath   Eye surgery   Orthopedic surgery     Family History:    The patient denies past family history.     Social History:  Smoking status: never smoker  Alcohol use: yes  Drug use: no  PCP: [facility]  Presents to the ED alone    Physical Exam     Patient Vitals for the past 24 hrs:   BP Temp Temp src Pulse Resp SpO2 Height Weight   07/06/21 1501 -- -- -- 90 17 -- -- --   07/06/21 1445 133/83 -- -- 92 10 97 % -- --   07/06/21 1443 -- -- -- 93 27 96 % -- --   07/06/21 1432 -- -- -- 93 25 95 % -- --   07/06/21 1430 136/71 -- -- 93 -- -- -- --   07/06/21 1420 -- -- -- 96 23 94 % -- --   07/06/21 1412 -- -- -- 65 19 97 % -- --   07/06/21 1411 -- -- -- 96 26 95 % -- --   07/06/21 1400 119/71 -- -- 96 12 -- -- --   07/06/21 1346 -- -- -- 95 12 95 % -- --   07/06/21 1345 -- -- -- 96 26 97 % -- --   07/06/21 1336 -- -- -- 89 11 -- -- --   07/06/21 1330 120/79 -- -- 94 (!) 34 -- -- --   07/06/21 1305 -- 97.3  F (36.3  C) Temporal -- -- -- -- --   07/06/21 1300 114/71 -- -- 94 -- -- -- --   07/06/21 1250 -- -- -- 96 24 95 % -- --   07/06/21 1245 121/67 97.1  F (36.2  C) -- 82 16 -- -- --   07/06/21 1236 -- -- -- 90 30 -- -- --   07/06/21 1202 -- -- -- 95 27 95 % -- --   07/06/21 1200 120/70 -- -- 92 26 93 % -- --   07/06/21 1158 -- -- -- 84 24 97 % -- --   07/06/21 1157 -- -- -- 96 28 94 % -- --   07/06/21 1146 -- -- -- 96 29 95 % -- --   07/06/21 1128 -- -- -- 97 14 -- -- --   07/06/21 1110 -- -- -- 75 16 96 % -- --   07/06/21 1040 123/54 -- -- 52 15 95 % -- --   07/06/21 1035 132/59 -- -- 90 20 93 % -- --   07/06/21 1030 128/70 -- -- 67 29 95 % -- --   07/06/21 1025 124/68 -- -- 98 22 94 % -- --   07/06/21 1020 129/61 -- -- 98 21 92 % -- --   07/06/21 1017 (!) 118/95 97.2  F (36.2  C) Oral 93 18 94 % 1.778 m (5' 10\") 108.4 kg (238 " "lb 15.7 oz)       Physical Exam  General: awake, moaning in pain intermittently.  \"Are you in pain?\"  Pt answers, \"Ya think!\"  Head: atraumatic  HENT: mucous membranes dry  CV: regular rate, regular rhythm  Resp: normal effort, clear throughout, no crackles or wheezing  GI: abdomen soft and nontender, no guarding  : Gross melena in underwear.    MSK: no bony tenderness.  Skin: appropriately warm and dry. Bruising in the right lower quadrant that appears old.  Dried stool on chest, legs.  Pale.  Neuro: awake, moving all extremities equally.  5/5  bilaterally, moves toes to command.  Psych: unable to assess.      Emergency Department Course     ECG:  ECG taken at 1020, ECG read at 1031  Sinus tachycardia  Possible left atrial enlargement  Left bundle branch block  Abnormal ECG  No significant change as compared to prior, dated 06/30/2020.   Rate 104 bpm. ID interval 158 ms. QRS duration 164 ms. QT/QTc 384/504 ms. P-R-T axes 26 8 180.    Imaging:  Head CT:  1. No evidence of acute intracranial hemorrhage, mass, or herniation.   2. Mild diffuse parenchymal volume loss and white matter changes likely due to chronic microvascular ischemic disease    Reading per radiology    Laboratory:  CBC: WBC 18.6(H), HGB 7.1(L),    CMP: Glucose 385(H), Chloride 113(H), (H), GFR 15(L), Albumin 2.8(L), Protein total 5.4(L), AST 56(H), Creatinine: 3.74(H)    UA: Glucose: 70(A), Ketones: 5(A), Mucous: Present, Hyaline Casts: 30 (H), o/w Negative  Troponin (Collected 1028): 7.902(H)  VBG and oxyhgb: pH 7.35 / PCO2 22(L) / PO2 22 / Bicarb 22/ FlO2 room air  / Oxyhemoglobin 28 / Base excess 3.4  CK total: 212  ABO/Rh Type and Screen: A+; antibody negative  Asymptomatic COVID-19 PCR: negative   Lactic acid (Resulted 1209): 2.8(H)    Emergency Department Course:  Reviewed:  I reviewed the patient's nursing notes, vitals, past medical records, Care Everywhere.     Assessments:  1014 I performed an assessment and examination " of the patient as noted above.      1212 I checked on and updated the patient's family.     1350 Findings and plan explained to the Patient and his family who consents to admission. Discussed the patient with Dr. Kat, who will admit the patient to a inpatient bed for further monitoring, evaluation, and treatment.     Consults:  1226 I spoke with Dr. Alvarez of the Cardiology service regarding patient's presentation, findings, and plan of care.     1511  I spoke to Dr. Anabelle Kat of the hospitalist service who accepts the patient for admission.     4:21 PM  I spoke to Dr. Bright with [facility].  We discussed the patient's presentation, labs, and vitals.  She will evaluate the patient this afternoon to determine if he is a good candidate for endoscopy today, versus prep and endoscopy tomorrow.     Interventions:  1238 NS 1L IV Bolus   1237 Protonix, 80 mg, IV  1345 1 unit, packed RBC's, IV infusion  1302 Protonix, in fusion, IV     Disposition:  The patient was admitted to the hospital under the care of Dr. Kat.       Impression & Plan   Medical Decision Making:  Burton Elizabeth is a 76 year old male with history of coronary artery disease, aortic stenosis, and recent diagnosis of temporal arteritis and stroke who presents today with altered mental status after being found down. On exam, the patient is somewhat agitated and awake but confused and unable to give consistent history.  He has gross melena. He has a non focal neurological exam. The differential diagnosis was broad. Workup demonstrates a CT of the head without any evidence of hemorrhage or stroke. He appears to have acute renal failure, but normal CK. I suspect this is prerenal as the patient has been down for an unknown amount of time and appears volume depleted on exam. Labs demonstrate leukocytosis associated with anemia. The anemia does appear to be acute, though based on family's history [facility]  Per family, he has not had  any recent workup for an upper GI bleed. VBG is normal, and he has no hypoxia on room air. The patient has a markedly elevated troponin of 7.9 with a baseline left bundle branch block that is unchanged from prior.  He is an unreliable historian, and can not tell me if he has chest pain.  In regards to the elevated troponin, I think this is likely related to a combination of type 2 ischemia from acute anemia, secondary to upper GI bleed. The patient would not be a candidate for anticoagulation given that he appears to have ongoing upper GI bleeding. I discussed this briefly with Dr. Elena who agrees with the plan to resuscitate, treat underlying GI bleed, and work up with echocardiogram and serial troponins.  In regards to the upper GI bleed, the patient has been hemodynamically stable.  The acuity of his anemia is difficult to assess (hgb 8.2 on 6/10), but he appears pale and has gross melena, so it is likely acute.  He was started on protonix drip, and GI was consulted.  The patient was noted to have an elevated white blood cell count in the setting of lactic acidosis but does not an obvious source of infection on exam, and at this point my suspicion for sepsis is low. I suspect his lactate acidosis is likely related to volume depletion. He was started on IV fluids, follow by red blood cell infusion and Protonix. He will be admitted for continued resuscitation, gastroenterology evaluation, and telemetry monitoring as well as cardiac workup.       Covid-19  Burton Elizabeth was evaluated during a global COVID-19 pandemic, which necessitated consideration that the patient might be at risk for infection with the SARS-CoV-2 virus that causes COVID-19.   Applicable protocols for evaluation were followed during the patient's care.   COVID-19 was considered as part of the patient's evaluation. The plan for testing is:  a test was obtained during this visit.    Critical Care time was 55 minutes for this patient excluding  procedures.     CMS Diagnoses: The Lactic acid level is elevated due to dehydration, at this time there is no sign of severe sepsis or septic shock.    Diagnosis:    ICD-10-CM    1. Upper GI bleed  K92.2    2. Syncope, unspecified syncope type  R55    3. Anemia due to blood loss, acute  D62    4. Acute renal failure, unspecified acute renal failure type (H)  N17.9    5. Elevated troponin  R77.8        Scribe Disclosure:  Ale GARCIA, am serving as a scribe at 10:18 AM on 7/6/2021 to document services personally performed by Sherita Perez MD based on my observations and the provider's statements to me.      Sherita Perez MD  07/06/21 3572

## 2021-07-06 NOTE — ED NOTES
DATE:  7/6/2021   TIME OF RECEIPT FROM LAB:  1123  LAB TEST:  troponin  LAB VALUE:  7.902  RESULTS GIVEN WITH READ-BACK TO (PROVIDER):  Sherita Perez MD  TIME LAB VALUE REPORTED TO PROVIDER:   11:29 AM

## 2021-07-06 NOTE — ED NOTES
Upon arrival, changed pt into depends and pt gown. Pt's underwear had dry remnants of black stool. MD updated. Pt cleaned and changed.

## 2021-07-06 NOTE — PROGRESS NOTES
GI Note    [facility] discussed patient with the ER  Will see today    Monserrat [facility]  Cell  [phone] 8 AM-5 PM  Office

## 2021-07-06 NOTE — PHARMACY-ADMISSION MEDICATION HISTORY
Pharmacy Medication History  Admission medication history interview status for the 7/6/2021  admission is complete. See EPIC admission navigator for prior to admission medications     Location of Interview: Outside patient room but on unit  [facility]    Significant changes made to the medication list:  Added iron and semaglutide  Modified lantus  Removed novolog   Removed metolazone (no further doses per note)    In the past week, patient estimated taking medication this percent of the time: 50-90% due to illness    Additional medication history information:   Unable to confirm list with the patient as he has altered mental status    Medication reconciliation completed by provider prior to medication history? No    Time spent in this activity: 30 minutes    Prior to Admission medications    Medication Sig Last Dose Taking? Auth Provider   amLODIPine (NORVASC) 10 MG tablet Take 1 tablet (10 mg) by mouth daily Past Month at Unknown time Yes Gibson Urias MD   aspirin (ASA) 325 MG EC tablet Take 1 tablet (325 mg) by mouth daily Start 7/25/20. Past Month at Unknown time Yes Henny Le PA   atorvastatin (LIPITOR) 40 MG tablet Take 1 tablet (40 mg) by mouth every evening Past Month at Unknown time Yes Catarino Cherry MD   carvedilol (COREG) 25 MG tablet Take 1 tablet (25 mg) by mouth 2 times daily (with meals) Past Month at Unknown time Yes Henny Le PA   ferrous sulfate (FEROSUL) 325 (65 Fe) MG tablet Take 325 mg by mouth daily Past Month at Unknown time Yes Unknown, Entered By History   glimepiride (AMARYL) 2 MG tablet Take 4 mg by mouth 2 times daily (before meals) Past Month at Unknown time Yes Unknown, Entered By History   insulin glargine (LANTUS PEN) 100 UNIT/ML pen Inject 20 Units Subcutaneous every evening  Past Month at Unknown time Yes Henny Le PA   isosorbide mononitrate (IMDUR) 30 MG 24 hr tablet Take 1 tablet (30 mg) by mouth daily Past  Month at Unknown time Yes Brittanie Quiroz APRN CNP   multivitamin, therapeutic with minerals (THERA-VIT-M) TABS Take 1 tablet by mouth daily Past Month at Unknown time Yes Reported, Patient   SEMAGLUTIDE,0.25 OR 0.5MG/DOS, SC Inject 0.5 mg Subcutaneous once a week  Past Month at Unknown time Yes Unknown, Entered By History   torsemide (DEMADEX) 20 MG tablet 2 tablets (40mg ) twice a day Past Month at Unknown time Yes Mary Anne Santiago APRN CNP   acetaminophen (TYLENOL) 325 MG tablet Take 2 tablets (650 mg) by mouth every 6 hours as needed for mild pain or fever (> 101 F)   Henny Le PA   blood glucose (NO BRAND SPECIFIED) test strip Use to test blood sugar as directed   Reported, Patient   metolazone (ZAROXOLYN) 2.5 MG tablet Take one dose- as directed by [facility]   Isabel Reece PA-C   order for DME Equipment being ordered: Walker Wheels () and Walker ()  Treatment Diagnosis: Impaired gait   Catarino Kearns PA-C       The information provided in this note is only as accurate as the sources available at the time of update(s)

## 2021-07-06 NOTE — ED NOTES
"[facility]  ED Nurse Handoff Report    ED Chief complaint: Altered Mental Status      ED Diagnosis:   Final diagnoses:   None       Code Status: Full Code    Allergies:   Allergies   Allergen Reactions     Penicillins Anaphylaxis     Empagliflozin      Other reaction(s): Serum creatinine raised       Patient Story: Pt presents to  ED after he was found down on the floor of his home by family.  Pt last known to be well last Saturday.  Pt drowsy and confused upon arrival, no focal deficits.  Pt verbally hostile and at one point attempted to hit staff.  Vital signs unremarkable, pt initially stated he had \"pain all over\", now denies pain.  Pt unable or unwilling to answer most assessment questions.  CT imaging unremarkable.  Pt found to have a hgb of 7.1, as well as black tarry stool.  Pt also found to have a troponin of 7.9.  Pt given 1 liter of NS, transfused 1 unit PRBC, and giver Protonix bolus and drip.  Family at bedside, states pt is often \"grumpy\" when in the hospital.        Focused Assessment:  See above     Treatments and/or interventions provided: See above  Patient's response to treatments and/or interventions: See above    To be done/followed up on inpatient unit:  GI follow up    Does this patient have any cognitive concerns?: Forgetful and Disoriented to situation    Activity level - Baseline/Home:  Independent  Activity Level - Current:   Unknown    Patient's Preferred language: English   Needed?: No    Isolation: None  Infection: Not Applicable  Patient tested for COVID 19 prior to admission: YES, negative PCR  Bariatric?: No    Vital Signs:   Vitals:    07/06/21 1330 07/06/21 1336 07/06/21 1345 07/06/21 1346   BP: 120/79      Pulse: 94 89 96 95   Resp: (!) 34 11 26 12   Temp:       TempSrc:       SpO2:   97% 95%   Weight:       Height:           Cardiac Rhythm:Cardiac Rhythm: Sinus bradycardia;Sinus tachycardia    Was the PSS-3 completed:   Yes  What interventions are " required if any?               Family Comments: Brother and sister at bedside, involved in cares.  OBS brochure/video discussed/provided to patient/family: N/A              Name of person given brochure if not patient: NA              Relationship to patient: NA    For the majority of the shift this patient's behavior was Green.   Behavioral interventions performed were Redirection, reorientation, pt was informed by the writer that it is not ok to hit health care professions .    ED NURSE PHONE NUMBER: *[phone]

## 2021-07-06 NOTE — CONSULTS
Consult Date: 07/06/2021    CHIEF COMPLAINT:  Hematemesis and melena with a decreased hemoglobin.    HISTORY OF PRESENT ILLNESS:  The patient is a 76-year-old gentleman we were asked to see by his primary physician, Dr. Josefa Kat for further evaluation of the above problems.    The history is mostly obtained from the chart.  The patient seems to be unable to give a consistent history, although he is oriented to place and time.  The patient is a very pleasant 76-year-old gentleman with a history of aortic stenosis, stroke, hyperlipidemia, coronary artery disease and type 2 diabetes, who was found down at home by his brother. When I checked on the patient today, his brother is present and reports that they had not heard from him since Sunday or Saturday afternoon and the patient did not show up for his ride today, so they went to his home and he was found at home with black vomitus and diarrhea with some bright red blood.  He was brought to the emergency room for further evaluation.    At this time, the patient is not able to give me any additional history.  He reports he does not have pain now.  He does think he has had a colonoscopy in the past.  He is oriented to the hospital and the month of July.  He does remember being started on steroids for his temporal arteritis.  He does not know why he fell and he does remember vomiting and that it was dark.    PAST MEDICAL HISTORY:  Significant for arthritis of his knee, coronary artery disease, type 2 diabetes, hyperlipidemia, and left bundle branch block, hypertension, DVT, decreased cardiac ejection fraction with CHF and aortic stenosis, morbid obesity, stroke secondary to a DVT in the past not on anticoagulation, history of respiratory compromise, vertigo and temporal arteritis.    PAST MEDICAL HISTORY:  Significant for arthroplasty of his right knee, a left heart catheterization, eye surgery and orthopedic surgery.    FAMILY HISTORY:  Negative for ulcer  disease.    SOCIAL HISTORY:  The patient denies tobacco.  Does drink alcohol, [facility]    REVIEW OF SYSTEMS:    CONSTITUTIONAL:  The patient is only really able to answer the here and now, so at this time he does not have fever, chills or headache.  NEUROLOGIC:  He is not dizzy or lightheaded.  CARDIAC:  He has no chest pain.  PULMONARY:  He is not short of breath at rest.  GASTROINTESTINAL:  He denies any abdominal pain.  All other systems were negative.    PHYSICAL EXAMINATION:    GENERAL:  He is an obese white male in bed, very slow to find his words and answer questions, but in no apparent distress and according to his brother is a lot clearer than he was when he first presented.  VITAL SIGNS:  His blood pressure is 135/74, his pulse is 86.  His temperature is 97.8.  HEENT:  His head is atraumatic, normocephalic.  SKIN:  Pale, but without jaundice or rash.  Sclerae nonicteric.  HEART:  S1, S2, has a loud systolic murmur.  LUNGS:  Clear to auscultation.  ABDOMEN:  Obese, soft, nontender to exam.  Right and left lower extremity 1+ edema.  NEUROLOGIC:  He is alert and oriented to hospital and July.  I did not appreciate any focal deficits.  PSYCHIATRIC:  Slow to respond, but cannot detect flattened affect or pressured speech.    LABORATORY DATA:  His glucose was 327.  His electrolytes are normal but his BUN is grossly elevated at 116,  his creatinine 3.74 also grossly elevated. Iron saturation done on 06/10/2021 was 4%.  Troponins are slightly elevated.  Hemoglobin is 7.1, which has dropped from 8.2 in June 2021.  MCV is normal. In January 2021, his hemoglobin was 10.9.    ASSESSMENT:    1.  Hematemesis.  It sounds like this patient had coffee-ground emesis, which would be consistent with an gastrointestinal bleed, upper source.  He denies frequent use of aspirin or nonsteroidal anti-inflammatory agents.  The differential diagnosis of an upper GI bleed has to include malignancy, Diandra-Velez  tear, acid peptic disease or even an AVM.  We suggest upper endoscopy and that will be scheduled tomorrow.  In the meantime, he will be kept on pantoprazole and clear liquid diet.  2.  Iron deficiency anemia.  This patient has an iron deficiency anemia diagnosed last month and we will search the [facility]. If he has not had a recent colonoscopy, we would suggest a colonoscopy and if negative, a small bowel pill camera study.    Thank you for asking us to participate in his care.    Monserrat Bright MD        D: 2021   T: 2021   MT: [id number]    Name:     TAY PAGEJammie  MRN:      [id number]        Account:      [id number]   :      1945           Consult Date: 2021     Document: [id number]    cc:  Anabelle Kat DO

## 2021-07-06 NOTE — ED NOTES
Bed: ST03  Expected date: 7/6/21  Expected time: 9:58 AM  Means of arrival: Ambulance  Comments:  419 76m altered, found down ETA 1010

## 2021-07-07 NOTE — CONSULTS
[facility]    Cardiology Consultation     Date of Admission:  7/6/2021    Assessment & Plan   Burton Elizabeth is a 76 year old male who was admitted on 7/6/2021.    1.  Non-ST elevation myocardial infarction  Patient presented with acute GI bleed and now has been noted to have elevated troponin of 7.1 which is decreased now to 3.5.  EKG is nondiagnostic due to underlying bundle branch block.  Patient has known coronary artery disease history with angiography in 2019 revealed small circumflex which had 99% stenosis which was managed medically.  Rest of the coronary arteries are mild to moderate disease.  It is entirely possible that given his acute GI bleed, decreasing hemoglobin and renal failure, he has a secondary troponin leak in setting of underlying CAD.  I would recommend ongoing medical management for the same.  Once cleared by GI, can resume antiplatelet agents.  No heparin because of recent GI bleed.  Will resume Coreg at a lower dose.  Resume atorvastatin.    2.  Moderate to severe aortic stenosis  Formal echo report is pending.  No gradients have increased further.  He has been following with Dr. Neumann and will need to see him after discharge.  Eventually, he will require transfemoral aortic valve replacement.  Given presence of aortic stenosis, one of the potential cause of GI bleed could be AV malformations.  However, in his case it appears to be an upper GI source at this time.    3.  Acute renal failure  Likely from intravascular volume depletion.  He is being hydrated.  Creatinine is improving.  Now 2.72.    4.  Acute GI bleed  S/p 1 unit of blood transfusion.  On IV Protonix.  Upper endoscopy today.  Management per gastroenterology.    5.  Metabolic encephalopathy  Combination of acute GI bleed, renal failure causing metabolic encephalopathy.  Sensorium slowly improving.    6.  Diabetes, management per hospitalist.  Now on insulin.    7.  History of CVA in July 2020 when  he had a left lateral pontine infarct.  It was felt to be small vessel disease by neurology at that time.  Instead he has had some imbalance with walking and has some disability.    Kedar Sesay MD    Primary Care Physician   [facility]    Reason for Consult   Reason for consult: I was asked by hospitalist to evaluate this patient for non-ST elevation microinfarction and aortic stenosis in setting of GI bleed    History of Present Illness   Burton Elizabeth is a very pleasant 76-year-old male with a complicated past medical history which includes coronary artery disease, hypertension, hyperlipidemia, heart failure with preserved ejection fraction, chronic left bundle branch block, moderate to severe aortic stenosis, remote history of DVT, history of stroke, type 2 diabetes and temporal arteritis, among other medical problems, who was brought to the emergency room by his family because of altered mental status and melena.  He was supposed to meet his sister on this past Sunday but did not keep his breakfast appointment.  His brother and sister try to call him and did not get a response and therefore he went to his home and he was noted to be very weak and confused.  There were dark stools in his home.  He was brought to the emergency room and was noted to have a low hemoglobin of 7.1.  He had renal failure with creatinine of 3.47.  Troponin was increased to 7.1.  White count was 18.  In June 2021, his hemoglobin was 8.2.  CT was negative for any acute etiology.  He received 1 unit of packed red blood cells with initiation of Protonix drip.  His sensorium has improved this morning.  I saw him while a bedside echo was being performed.  His brother was at the bedside.  He denies any chest pain.  He is scheduled for upper endoscopy today.    EKG on admission revealed left bundle branch block with undetermined rhythm.  There are some P waves seen and therefore I feel he has possibly sinus rhythm  with PACs versus atrial tachycardia.  Telemetry now reveals sinus rhythm with first-degree AV block.    Overnight, he has remained pain-free.  His troponins is now decreased to 3.5.  Echocardiogram at bedside revealed normal ejection fraction on my preliminary review.  He does appear to have significant aortic stenosis.  Peak gradient across aortic valve is about 68 mm.  Official report is pending.      Risk Variables Present on Admission Present on Admission:   Cardiovascular: Aortic Stenosis: Nonrheumatic aortic (valve) stenosis  Cardiac Arrhythmia: Paroxysmal tachycardia, unspecified    Fluid & Electrolyte Disorders: hypernatremia and Hyperosmolality    Na 146    Gastroenterology: acute GI bleed, endoscopy pending     Hematology/Oncology: Coagulation Defect: Other hemorrhagic disorder due to intrinsic circulating anticoagulants, antibodies, or inhibitors    Malnutrition: Non-Severe Malnutrition  Low albumin    Nephrology: Acute Renal Failure: Acute kidney failure, unspecified    Neurology: Encephalopathy: Metabolic encephalopathy  History of lateral pontine CVA in July 2020.    Pulmonology: Pulmonary Heart Disease (Pulmonary hypertension or Cor pulmonale): Secondary pulmonary arterial hypertension    Systemic: Chronic Fatigue and Other Debilities: Limitation of activities due to disability  Imbalance while walking    Patient Active Problem List   Diagnosis     LBBB (left bundle branch block)     HTN (hypertension)     Diabetes mellitus, type 2 (H)     Arthritis of knee~ s/p replacement     Primary localized osteoarthritis of right knee     Advance care planning     Mixed hyperlipidemia     Coronary artery disease involving native coronary artery of native heart     Status post coronary angiogram     S/P revision of total knee     Acute systolic congestive heart failure (H)     History of DVT 12/13/19      Vertigo     Acute respiratory failure with hypoxia (H)     Acute ischemic stroke (H)     Morbid obesity (H)      Chronic systolic heart failure (H)     Upper GI bleed     Elevated troponin     Anemia due to blood loss, acute     Acute renal failure, unspecified acute renal failure type (H)     Syncope, unspecified syncope type   History of stroke in June 2020.    Past Medical History   I have reviewed this patient's medical history and updated it with pertinent information if needed.   Past Medical History:   Diagnosis Date     Arthritis of knee~ s/p replacement 3/24/2016     CAD (coronary artery disease)      Decreased cardiac ejection fraction~44% 3/24/2016     Diabetes mellitus, type 2 (H) 3/24/2016     History of DVT (deep vein thrombosis)      HTN (hypertension) 3/24/2016     LBBB (left bundle branch block) 3/24/2016     Mixed hyperlipidemia 8/23/2016   CVA    Past Surgical History   I have reviewed this patient's surgical history and updated it with pertinent information if needed.  Past Surgical History:   Procedure Laterality Date     ARTHROPLASTY KNEE Right 3/29/2016    Procedure: ARTHROPLASTY KNEE;  Surgeon: Heena Rosen MD;  Location: [facility] OR     ARTHROPLASTY REVISION KNEE Left 9/27/2019    Procedure: REVISION LEFT TOTAL KNEE  ARTHROPLASTY;  Surgeon: Heena Rosen MD;  Location:  OR     CV LEFT HEART CATH N/A 8/12/2019    Procedure: Left Heart Cath;  Surgeon: Edgardo Beckham MD;  Location:  HEART CARDIAC CATH LAB     EYE SURGERY      cataract removal     ORTHOPEDIC SURGERY      KNEE SCOPES MULTIPLE       Prior to Admission Medications   Prior to Admission Medications   Prescriptions Last Dose Informant Patient Reported? Taking?   SEMAGLUTIDE,0.25 OR 0.5MG/DOS, SC Past Month at Unknown time  Yes Yes   Sig: Inject 0.5 mg Subcutaneous once a week    acetaminophen (TYLENOL) 325 MG tablet   No No   Sig: Take 2 tablets (650 mg) by mouth every 6 hours as needed for mild pain or fever (> 101 F)   amLODIPine (NORVASC) 10 MG tablet Past Month at Unknown time  No Yes   Sig: Take 1 tablet (10 mg) by  mouth daily   aspirin (ASA) 325 MG EC tablet Past Month at Unknown time  No Yes   Sig: Take 1 tablet (325 mg) by mouth daily Start 20.   atorvastatin (LIPITOR) 40 MG tablet Past Month at Unknown time  No Yes   Sig: Take 1 tablet (40 mg) by mouth every evening   blood glucose (NO BRAND SPECIFIED) test strip  Self Yes No   Sig: Use to test blood sugar as directed   carvedilol (COREG) 25 MG tablet Past Month at Unknown time  No Yes   Sig: Take 1 tablet (25 mg) by mouth 2 times daily (with meals)   ferrous sulfate (FEROSUL) 325 (65 Fe) MG tablet Past Month at Unknown time  Yes Yes   Sig: Take 325 mg by mouth daily   glimepiride (AMARYL) 2 MG tablet Past Month at Unknown time  Yes Yes   Sig: Take 4 mg by mouth 2 times daily (before meals)   insulin glargine (LANTUS PEN) 100 UNIT/ML pen Past Month at Unknown time  Yes Yes   Sig: Inject 20 Units Subcutaneous every evening    isosorbide mononitrate (IMDUR) 30 MG 24 hr tablet Past Month at Unknown time  No Yes   Sig: Take 1 tablet (30 mg) by mouth daily   multivitamin, therapeutic with minerals (THERA-VIT-M) TABS Past Month at Unknown time Self Yes Yes   Sig: Take 1 tablet by mouth daily   order for DME  Self No No   Sig: Equipment being ordered: Walker Wheels () and Walker ()  Treatment Diagnosis: Impaired gait   torsemide (DEMADEX) 20 MG tablet Past Month at Unknown time  No Yes   Si tablets (40mg ) twice a day      Facility-Administered Medications: None     Current Facility-Administered Medications   Medication Dose Route Frequency     sodium chloride 0.9%  1,000 mL Intravenous Once     insulin aspart  1-6 Units Subcutaneous Q4H     sodium chloride (PF)  3 mL Intracatheter Q8H     Current Facility-Administered Medications   Medication Last Rate     pantoprazole (PROTONIX) infusion ADULT/PEDS GREATER than or EQUAL to 45 kg 8 mg/hr (21 0372)     sodium chloride 100 mL/hr at 21 5822     Allergies   Allergies   Allergen Reactions      "Penicillins Anaphylaxis     Empagliflozin      Other reaction(s): Serum creatinine raised       Social History    reports that he has never smoked. He has never used smokeless tobacco. He reports current alcohol use of about 2.0 standard drinks of alcohol per week. He reports that he does not use drugs.    Family History   Family History   Problem Relation Age of Onset     Coronary Artery Disease No family hx of        Review of Systems   The comprehensive 10 point Review of Systems is negative other than noted in the HPI or here.     Physical Exam   Vital Signs with Ranges  Temp:  [97.1  F (36.2  C)-98.1  F (36.7  C)] 97.9  F (36.6  C)  Pulse:  [52-98] 77  Resp:  [7-34] 18  BP: (114-152)/(54-95) 144/81  SpO2:  [92 %-97 %] 94 %  Wt Readings from Last 4 Encounters:   07/07/21 110.7 kg (244 lb)   06/14/21 108 kg (238 lb)   02/18/21 114.8 kg (253 lb)   01/28/21 114.7 kg (252 lb 14.4 oz)     I/O last 3 completed shifts:  In: 1100 [I.V.:800]  Out: 1050 [Urine:1050]      Vitals: BP (!) 144/81   Pulse 77   Temp 97.9  F (36.6  C)   Resp 18   Ht 1.778 m (5' 10\")   Wt 110.7 kg (244 lb)   SpO2 94%   BMI 35.01 kg/m      Constitutional:   moderately obese   Eyes:   extra-ocular muscles intact   ENT:   atramatic   Neck:   no jugular venous distension   Hematologic / Lymphatic:   pallor   Back:   symmetric   Lungs:   clear to auscultation   Cardiovascular:   murmurs include systolic murmur III/VI located at right upper sternal border with radiation to the carotids   Abdomen:   non-distended and non-tender   Neurologic:   Moves all 4 limbs equally, oriented to place and person   Neuropsychiatric:   Affect: flat   Skin:   no rashes       Recent Labs   Lab 07/07/21  0525 07/07/21  0026 07/06/21  1548 07/06/21  1028   TROPI 3.517* 4.419* 7.047* 7.902*       Recent Labs   Lab 07/07/21  0525 07/07/21  0026 07/06/21  1909 07/06/21  1548 07/06/21  1028   WBC 13.0*  --   --   --  18.6*   HGB 7.9* 7.8* 7.7*  --  7.1*   MCV 89  --   " --   --  88     --   --   --  264   INR  --   --   --   --  1.32*   *  --   --   --  144   POTASSIUM 4.5  --   --   --  4.9   CHLORIDE 118*  --   --   --  113*   CO2 22  --   --   --  22   BUN 98*  --   --   --  116*   CR 2.72*  --   --   --  3.74*   GFRESTIMATED 22*  --   --   --  15*   GFRESTBLACK 25*  --   --   --  17*   ANIONGAP 6  --   --   --  9   GABE 8.9  --   --   --  9.8   *  --   --   --  385*   ALBUMIN  --   --   --   --  2.8*   PROTTOTAL  --   --   --   --  5.4*   BILITOTAL  --   --   --   --  0.4   ALKPHOS  --   --   --   --  83   ALT  --   --   --   --  34   AST  --   --   --   --  56*   TROPI 3.517* 4.419*  --  7.047* 7.902*     Recent Labs   Lab Test 01/11/21 04/23/20  0549   CHOL 97 188   HDL 31* 31*   LDL 46 102*   TRIG 102 277*     Recent Labs   Lab 07/07/21  0525 07/07/21  0026 07/06/21  1909 07/06/21  1028   WBC 13.0*  --   --  18.6*   HGB 7.9* 7.8* 7.7* 7.1*   HCT 27.1*  --   --  24.4*   MCV 89  --   --  88     --   --  264     Recent Labs   Lab 07/06/21  1031   PHV 7.35   PO2V 22*   PCO2V 40   HCO3V 22     No results for input(s): NTBNPI, NTBNP in the last 168 hours.  No results for input(s): DD in the last 168 hours.  No results for input(s): SED, CRP in the last 168 hours.  Recent Labs   Lab 07/07/21  0525 07/06/21  1028    264     No results for input(s): TSH in the last 168 hours.  Recent Labs   Lab 07/06/21  1028   COLOR Light Yellow   APPEARANCE Clear   URINEGLC 70*   URINEBILI Negative   URINEKETONE 5*   SG 1.018   UBLD Negative   URINEPH 5.0   PROTEIN Negative   NITRITE Negative   LEUKEST Negative   RBCU 2   WBCU 1       Imaging:  Recent Results (from the past 48 hour(s))   CT Head w/o Contrast    Narrative    CT SCAN OF THE HEAD WITHOUT CONTRAST   7/6/2021 11:01 AM     HISTORY: Altered mental status.    TECHNIQUE:  Axial images of the head and coronal reformations without  IV contrast material. Radiation dose for this scan was reduced using  automated  exposure control, adjustment of the mA and/or kV according  to patient size, or iterative reconstruction technique.    COMPARISON: Brain MR 7/1/2020.    FINDINGS: No evidence of acute intracranial hemorrhage. No mass effect  or midline shift. Mild diffuse parenchymal volume loss. Patchy  periventricular white matter hypodensities are nonspecific, but likely  related to chronic microvascular ischemic disease. Previous area of  infarct in the left pontomedullary junction on MR 7/1/2020 is not well  visualized by CT.    The visualized portions of the sinuses and mastoids appear normal. The  bony calvarium and bones of the skull base appear intact.       Impression    IMPRESSION:     1. No evidence of acute intracranial hemorrhage, mass, or herniation.  2. Mild diffuse parenchymal volume loss and white matter changes  likely due to chronic microvascular ischemic disease.    ZURDO GARCIA MD         SYSTEM ID:  [id number]       Echo:  No results found for this or any previous visit (from the past 4320 hour(s)).

## 2021-07-07 NOTE — ANESTHESIA POSTPROCEDURE EVALUATION
Patient: Burton Elizabeth    Procedure(s):  ESOPHAGOGASTRODUODENOSCOPY, WITH BIOPSY    Diagnosis:Hematemesis [K92.0]  Diagnosis Additional Information: No value filed.    Anesthesia Type:  MAC    Note:  Disposition: Inpatient   Postop Pain Control: Uneventful            Sign Out: Well controlled pain   PONV: No   Neuro/Psych: Uneventful            Sign Out: Acceptable/Baseline neuro status   Airway/Respiratory: Uneventful            Sign Out: Acceptable/Baseline resp. status   CV/Hemodynamics: Uneventful            Sign Out: Acceptable CV status   Other NRE:    DID A NON-ROUTINE EVENT OCCUR? No           Last vitals:  Vitals:    07/07/21 1100 07/07/21 1110 07/07/21 1120   BP: 133/68 (!) 146/75 (!) 148/76   Pulse: 75 74 76   Resp: 20 (!) 32 19   Temp:      SpO2: 98% 98% 98%       Last vitals prior to Anesthesia Care Transfer:  CRNA VITALS  7/7/2021 1025 - 7/7/2021 1125      7/7/2021             Resp Rate (set):  10          Electronically Signed By: Alexa Burks  July 7, 2021  11:48 AM

## 2021-07-07 NOTE — ANESTHESIA PREPROCEDURE EVALUATION
Anesthesia Pre-Procedure Evaluation    Patient: Burton Elizabeth   MRN: [id number] : 1945        Preoperative Diagnosis: Hematemesis [K92.0]   Procedure : Procedure(s):  ESOPHAGOGASTRODUODENOSCOPY (EGD)     Past Medical History:   Diagnosis Date     Arthritis of knee~ s/p replacement 3/24/2016     CAD (coronary artery disease)      Cerebral infarction (H)      Decreased cardiac ejection fraction~44% 3/24/2016     Diabetes mellitus, type 2 (H) 3/24/2016     History of DVT (deep vein thrombosis)      HTN (hypertension) 3/24/2016     LBBB (left bundle branch block) 3/24/2016     Mixed hyperlipidemia 2016      Past Surgical History:   Procedure Laterality Date     ANKLE SURGERY      X 4     APPENDECTOMY       ARTHROPLASTY KNEE Right 3/29/2016    Procedure: ARTHROPLASTY KNEE;  Surgeon: Heena Rosen MD;  Location: [facility] OR     ARTHROPLASTY REVISION KNEE Left 2019    Procedure: REVISION LEFT TOTAL KNEE  ARTHROPLASTY;  Surgeon: Heena Rosen MD;  Location:  OR     CV LEFT HEART CATH N/A 2019    Procedure: Left Heart Cath;  Surgeon: Edgardo Beckham MD;  Location:  HEART CARDIAC CATH LAB     EYE SURGERY      cataract removal     ORTHOPEDIC SURGERY      KNEE SCOPES MULTIPLE      Allergies   Allergen Reactions     Penicillins Anaphylaxis     Empagliflozin      Other reaction(s): Serum creatinine raised      Social History     Tobacco Use     Smoking status: Never Smoker     Smokeless tobacco: Never Used   Substance Use Topics     Alcohol use: Yes     Alcohol/week: 2.0 standard drinks     Types: 2 Standard drinks or equivalent per week     Frequency: 2-3 times a week     Drinks per session: 1 or 2     Comment: occasional, social      Wt Readings from Last 1 Encounters:   21 110.7 kg (244 lb)        Anesthesia Evaluation            ROS/MED HX  ENT/Pulmonary:     (+) sleep apnea, doesn't use CPAP,     Neurologic:     (+) CVA, with deficits, - balance troubles .     Cardiovascular:  Comment: lbbb    Temporal arteritis     Severe aortic stenosis with preserved EF per ECHO today    (+) Dyslipidemia hypertension--CAD ---CHF valvular problems/murmurs type: AS severe3.     METS/Exercise Tolerance:     Hematologic:     (+) History of blood clots, anemia,     Musculoskeletal:       GI/Hepatic:    (-) GERD   Renal/Genitourinary:     (+) renal disease,     Endo:     (+) type II DM, Obesity,     Psychiatric/Substance Use:       Infectious Disease:       Malignancy:       Other:            Physical Exam    Airway        Mallampati: III   TM distance: > 3 FB   Neck ROM: full     Respiratory Devices and Support         Dental  no notable dental history         Cardiovascular   cardiovascular exam normal          Pulmonary           breath sounds clear to auscultation           OUTSIDE LABS:  CBC:   Lab Results   Component Value Date    WBC 13.0 (H) 07/07/2021    WBC 18.6 (H) 07/06/2021    HGB 7.9 (L) 07/07/2021    HGB 7.8 (L) 07/07/2021    HCT 27.1 (L) 07/07/2021    HCT 24.4 (L) 07/06/2021     07/07/2021     07/06/2021     BMP:   Lab Results   Component Value Date     (H) 07/07/2021     07/06/2021    POTASSIUM 4.5 07/07/2021    POTASSIUM 4.9 07/06/2021    CHLORIDE 118 (H) 07/07/2021    CHLORIDE 113 (H) 07/06/2021    CO2 22 07/07/2021    CO2 22 07/06/2021    BUN 98 (H) 07/07/2021     (H) 07/06/2021    CR 2.72 (H) 07/07/2021    CR 3.74 (H) 07/06/2021     (H) 07/07/2021     (H) 07/06/2021     COAGS:   Lab Results   Component Value Date    PTT 28 03/29/2016    INR 1.32 (H) 07/06/2021     POC:   Lab Results   Component Value Date     (H) 07/07/2021     HEPATIC:   Lab Results   Component Value Date    ALBUMIN 2.8 (L) 07/06/2021    PROTTOTAL 5.4 (L) 07/06/2021    ALT 34 07/06/2021    AST 56 (H) 07/06/2021    ALKPHOS 83 07/06/2021    BILITOTAL 0.4 07/06/2021     OTHER:   Lab Results   Component Value Date    LACT 2.2 (H) 07/07/2021    A1C 6.3 (H) 07/06/2021     GABE 8.9 07/07/2021    PHOS 4.2 12/23/2019    MAG 1.6 02/12/2021    TSH 2.21 01/11/2021    SED 15 06/10/2021       Anesthesia Plan    ASA Status:  3      Anesthesia Type: MAC.              Consents    Anesthesia Plan(s) and associated risks, benefits, and realistic alternatives discussed. Questions answered and patient/representative(s) expressed understanding.     - Discussed with:  Patient         Postoperative Care       PONV prophylaxis: Ondansetron (or other 5HT-3)     Comments:                Alexa Burks

## 2021-07-07 NOTE — PLAN OF CARE
No change, had EGD today, non actively bleeding ulcers found biopsies obtained, no adverse effects noted, has tolerated clears to full liquid diet, SR w BBB. States and wished well. B/G stable. Echo with EF 55% with multiple abnormalities.

## 2021-07-07 NOTE — PLAN OF CARE
[facility] Nursing [facility]    Patient Information  Name: Burton Elizabeth  Age: 76 year old    Assessment  Orientation/Neuro: Pt initially would not answer, at 2300 A&O x 3 - disoriented to time, pt would state he is tired and just wants to be left alone.   Cardiac/Tele: SR with 1 degree block and LBBB  Resp: Unable to listen posterior, pt declined repositioning and allowing assessments  Mobility: pt did not get OOB this shift  Pain: denies  Diet: NPO at midnight for endo    Vital Signs  B/P: 140/72, T: 97.9, P: 80, R: 26, O2: 96% on RA    Plan  Plan/Other: endo tomorrow to scope     Clari Tavarez RN

## 2021-07-07 NOTE — PROGRESS NOTES
[facility]    Hospitalist Progress Note    Brief Summary:  Burton Elizabeth is a 76-year-old male with a complicated past medical history which includes coronary artery disease, hypertension, hyperlipidemia, chronic left bundle branch block, heart failure with preserved ejection fraction, suspected sleep apnea, type 2 diabetes, stage III chronic kidney disease, moderate aortic stenosis, history of stroke in 07/2020, and temporal arteritis, among other medical problems, who presents to the Emergency Room today for evaluation of altered mental status and melena.  He has numerous lab abnormalities, including an elevated lactate and elevated troponin and a low hemoglobin.  He is being admitted  under [facility] status for ongoing evaluation and care.    Assessment & Plan      Acute Upper GI bleeding.    Acute Blood loss anemia.       In the Emergency Room, he was noted to have grossly melanotic stools.  His last hemoglobin  at 10.9 as of 1/2021. .  When checked at Cardiology visit on 6/10/21 his hemoglobin was 8.2.  Now with Hb of 7.1.  He was transfused 1 unit of Pack RBC and started on IV Protonix infusion at 8 mg/hr after bolus. GI is consulted and was taken for EGD which shows Cabrera's esophagus and multiple non bleeding gastric ulcer and gastritis. No active bleeding. His hemoglobin is now stable and improve to 7.9.   I will stop the Protonix infusion and start him on IV Protonix 40 mg bid today and likely change it to oral from tomorrow. He remain hemodynamically stable. Appreciate input from GI.        NSTEMI  Elevated troponin with history of coronary artery disease,   He has a history of coronary artery disease with prior angiograms as outlined above in Past Medical History.  Additionally, he had undergone stress testing earlier this year which showed changes that had been noted on prior stress test in 2017.  He did not endorse any recent chest discomfort or dyspnea on exertion.  His troponin  is elevated at 7.9.  His EKG has chronic LBBB so not diagnostic for any ischemia at this time.  Cardiology consulted and recommend conservative management given GI bleeding. No IV heparin. Likely NSTEMI secondary to anemia, and renal failure. He is chest pain free at this time. Restart aspirin and Plavix once clear by the GI.     I will resume his amlodipine, Coreg and Lipitor today but cut down the dose of amlodipine to 5 mg and Coreg to 12.5 mg bid and if remain stable will resume his PTA doses. Continue to hold aspirin today. Restart Imdur 30 mg daily   Echocardiogram done reviewed, shows EF of 55%, hypokinesis of the distal misty/inferoseptal septum which is new, in addition to abnormal conduction septal motion. Cardiology is consulted and following. Defer further management to Cardiology.      Acute kidney injury on stage III chronic kidney disease:    Baseline creatinine previously was 1.8-2.0 but more recently seems to be 2.0-2.3.  Creatinine was  elevated at 3.74 on admission. Likely pre-renal although no FeNA was obtained on admission. He was started on IV fluids and creatinine is trending down, making urine, and creatinine improve to 2.72 at this time.   I will cut down IV fluids to 75 ml/hr now and repeat the labs in AM.       Acute toxic metabolic encephalopathy:    Suspect this is multifactorial and secondary anemia, NSTEMI and acute renal failure.     Although his white count is elevated, he is otherwise afebrile and has no other signs or symptoms suggestive of a localized infection.  His head CT is unremarkable..  He does not consume alcohol.  His blood sugars are stable. He is quite sedated at time of my visit after the procedure.     Lactic acidosis:    Lactate was elevated at 2.8 on presentation.  Suspect this is secondary to hypoperfusion in the setting of suspected blood loss and anemia. He is on IV fluids and trending down now.      Hypertension   Hyperlipidemia:   Resume Coreg Amlodipine,  Imdur and Lipitor at this time.  Decrease dose of Coreg to 12.5 mg bid and Amlodipine to 5 mg today, if remain stable will resume PTA doses.    Heart failure with preserved ejection fraction:   He does have bilateral LE edema, not in exacerbation at this time, but will like to prevent volume overload.  He was on torsemide at home, will continue to hold it for now.  Decrease IV fluid to 75 ml/hr and likely stop it tomorrow, if creatinine improve, can restart his torsemide from tomorrow as well.      Type 2 diabetes:    A1c check on admission was 6.3 suggest good control of diabetes at home.   PTA medication include Lantuss 20 units HS, glimepiride and semaglutide Every week.  His BS were uncontrol on admission but now getting better, he is on sliding scale insulin medium dose NPO protocol, I will start him on Lantuss at 10 units tonight and will increase it once start eating better.   Keep him on hypoglycemia protocol.      History of cerebrovascular accident in 7/2020:    Maintained on full-dose aspirin, statin, and antihypertensive medications.    Stable, will order PT and OT once more stable. Resume aspirin once ok with GI       Temporal arteritis:  Recently diagnosed.  He reportedly completed a steroid taper.  No concerns in this regard at present.      History of arthritis and chronic joint pains:  Noted.  He does not take any medication specifically for pain.  Monitor clinically.     DEEP VEIN THROMBOSIS PROPHYLAXIS:  PCDs only, given presentation with acute GI bleed.       DVT Prophylaxis: Pneumatic Compression Devices  Code Status: Full Code    Disposition: Expected discharge in 2-3 days once more stable.     Discussed with the nursing staff taking care of the patient today     Pb Goodwin MD  Text Page  (7am - 6pm)    Interval History   Patient seen after the EGD procedure, still quite sedated and unable to answer much. Denies any chest pain or SOB at this time.     No other significant event overnight.      -Data reviewed today: I reviewed all new labs and imaging results over the last 24 hours. I personally reviewed no images or EKG's today.    Physical Exam   Temp: 97.9  F (36.6  C) Temp src: Oral BP: (!) 148/76 Pulse: 76   Resp: 19 SpO2: 98 % O2 Device: Nasal cannula Oxygen Delivery: 2 LPM  Vitals:    07/06/21 1017 07/06/21 1631 07/07/21 0500   Weight: 108.4 kg (238 lb 15.7 oz) 107.5 kg (237 lb 1.6 oz) 110.7 kg (244 lb)     Vital Signs with Ranges  Temp:  [97.8  F (36.6  C)-98.1  F (36.7  C)] 97.9  F (36.6  C)  Pulse:  [74-93] 76  Resp:  [7-32] 19  BP: (120-152)/(67-85) 148/76  SpO2:  [92 %-99 %] 98 %  I/O last 3 completed shifts:  In: 1100 [I.V.:800]  Out: 1050 [Urine:1050]    Constitutional: fatigued and sedated.   Eyes: Lids and lashes normal, pupils equal, round and reactive to light, extra ocular muscles intact, sclera clear, conjunctiva normal  Respiratory: No increased work of breathing, good air exchange, clear to auscultation bilaterally, no crackles or wheezing  Cardiovascular: Normal apical impulse, regular rate and rhythm, normal S1 and S2, no S3 or S4, and no murmur noted  GI: No scars, normal bowel sounds, soft, non-distended, non-tender, no masses palpated, no hepatosplenomegally  Skin: no bruising or bleeding  Musculoskeletal: 1+ lower extremity pitting edema present  Neurologic: no foal deficit.     Medications     pantoprazole (PROTONIX) infusion ADULT/PEDS GREATER than or EQUAL to 45 kg 8 mg/hr (07/07/21 0925)     sodium chloride 1,000 mL (07/07/21 0925)       sodium chloride 0.9%  1,000 mL Intravenous Once     insulin aspart  1-6 Units Subcutaneous Q4H     sodium chloride (PF)  3 mL Intracatheter Q8H       Data   Recent Labs   Lab 07/07/21  1202 07/07/21  0525 07/07/21  0026 07/06/21  1548 07/06/21  1548 07/06/21  1028   WBC  --  13.0*  --   --   --  18.6*   HGB 7.9* 7.9* 7.8*   < >  --  7.1*   MCV  --  89  --   --   --  88   PLT  --  219  --   --   --  264   INR  --   --   --   --   --   1.32*   NA  --  146*  --   --   --  144   POTASSIUM  --  4.5  --   --   --  4.9   CHLORIDE  --  118*  --   --   --  113*   CO2  --  22  --   --   --  22   BUN  --  98*  --   --   --  116*   CR  --  2.72*  --   --   --  3.74*   ANIONGAP  --  6  --   --   --  9   GABE  --  8.9  --   --   --  9.8   GLC  --  246*  --   --   --  385*   ALBUMIN  --   --   --   --   --  2.8*   PROTTOTAL  --   --   --   --   --  5.4*   BILITOTAL  --   --   --   --   --  0.4   ALKPHOS  --   --   --   --   --  83   ALT  --   --   --   --   --  34   AST  --   --   --   --   --  56*   TROPI  --  3.517* 4.419*  --  7.047* 7.902*    < > = values in this interval not displayed.       Recent Results (from the past 24 hour(s))   Echocardiogram Complete   Result Value    LVEF  55%    Narrative    [id number]  [id number]  [id number]  [id number]^LU^GRISEL^PRABHU     [facility]  Echocardiography Laboratory  [address] [place] [address]     Name: TAY PAGE  MRN: [id number]  : 1945  Study Date: 2021 07:54 AM  Age: 76 yrs  Gender: Male  Patient Location: [facility]  Reason For Study: CAD  Ordering Physician: GRISEL LEIGH  Performed By: Bhavesh Charles     BSA: 2.2 m2  Height: 70 in  Weight: 238 lb  HR: 70  BP: 144/81 mmHg  ______________________________________________________________________________  Procedure  Complete Portable Echo Adult. Optison (NDC #1403-3021) given intravenously.  ______________________________________________________________________________  Interpretation Summary     Technically challenging study due to patient body habitus, even with the use  of contrast imaging.     Left ventricular systolic function is normal. The visual ejection fraction is  estimated at 55%.  There is hypokinesis of the distal misty/inferoseptal septum, in addition to  abnormal conduction septal motion.  Right ventricular global function is normal.  Moderate-severe aortic stenosis, Vmax 4.1 m/s, mean  gradient 39 mmHg, SID  1.1cm2, DI 0.27. SVi 50 cc/m2.  A bicuspid aortic valve cannot be excluded.  Mild aortic root dilatation. Max diameter of the visualized portion 4.3 cm.     This study was compared to a TTE from 1/21/2021. The severity of aortic  stenosis has progressed. There is now distal septal hypokinesis.  ______________________________________________________________________________  Left Ventricle  The left ventricle is normal in size. A sigmoid septum is present. Left  ventricular systolic function is normal. The visual ejection fraction is  estimated at 55%. Left ventricular diastolic function is indeterminate. Septal  motion is consistent with conduction abnormality. There is hypokinesis of the  distal misty/inferoseptal septum, in addition to abnormal conduction septal  motion.     Right Ventricle  The right ventricle is normal in structure, function and size.     Atria  The left atrium is moderate to severely dilated. Right atrial size is normal.     Mitral Valve  The mitral valve is normal in structure and function.     Tricuspid Valve  The tricuspid valve is not well visualized, but is grossly normal. There is  trace to mild tricuspid regurgitation. Right ventricle systolic pressure  estimate normal. The right ventricular systolic pressure is approximated at  15.5 mmHg plus the right atrial pressure.     Aortic Valve  A bicuspid aortic valve cannot be excluded. Moderate-severe aortic stenosis,  Vmax 4.1 m/s, mean gradient 39 mmHg, SID 1.1cm2, DI 0.27. SVi 50 cc/m2.     Pulmonic Valve  The pulmonic valve is not well seen, but is grossly normal.     Vessels  Mild aortic root dilatation. Max diameter of the visualized portion 4.3 cm.  Normal size ascending aorta.     Pericardium  There is no pericardial effusion.     ______________________________________________________________________________  MMode/2D Measurements & Calculations  IVSd: 1.7 cm  LVIDd: 5.7 cm  LVIDs: 4.4 cm  LVPWd: 1.2 cm  FS:  23.2 %     LV mass(C)d: 378.4 grams  LV mass(C)dI: 168.3 grams/m2  Ao root diam: 4.3 cm  LA dimension: 4.6 cm  asc Aorta Diam: 3.3 cm  LA/Ao: 1.1  LVOT diam: 2.3 cm  LVOT area: 4.3 cm2  RWT: 0.41     Doppler Measurements & Calculations  MV E max lefty: 101.0 cm/sec  MV A max lefty: 161.6 cm/sec  MV E/A: 0.63  MV max P.7 mmHg  MV mean PG: 3.9 mmHg  MV V2 VTI: 35.5 cm  MVA(VTI): 3.2 cm2  MV dec slope: 843.6 cm/sec2  Ao V2 max: 409.2 cm/sec  Ao max P.0 mmHg  Ao V2 mean: 290.8 cm/sec  Ao mean P.7 mmHg  Ao V2 VTI: 104.4 cm  SID(I,D): 1.1 cm2  SID(V,D): 1.2 cm2  LV V1 max P.9 mmHg  LV V1 max: 111.1 cm/sec  LV V1 VTI: 26.1 cm  SV(LVOT): 112.3 ml  SI(LVOT): 50.0 ml/m2  PA acc time: 0.10 sec  TR max lefty: 196.1 cm/sec  TR max PG: 15.5 mmHg  AV Lefty Ratio (DI): 0.27     SID Index (cm2/m2): 0.48  E/E' av.5  Lateral E/e': 9.6  Medial E/e': 13.5     ______________________________________________________________________________  Report approved by: Dominique Amador 2021 12:45 PM

## 2021-07-07 NOTE — PROVIDER NOTIFICATION
MD Notification    Notified Person: MD    Notified Person Name: Dr. Kat    Notification Date/Time: 20:55 pm    Notification Interaction: discussed in person    Purpose of Notification: Pt will not follow commands. Pt will not open up eyes. Pt did start to state name and , but refused to reposition or follow any assessments.     Orders Received: No new orders, continue to monitor and see if pt's mental status improves with fluids.     Comments:

## 2021-07-07 NOTE — ANESTHESIA CARE TRANSFER NOTE
Patient: Burton Elizabeth    Procedure(s):  ESOPHAGOGASTRODUODENOSCOPY, WITH BIOPSY    Diagnosis: Hematemesis [K92.0]  Diagnosis Additional Information: No value filed.    Anesthesia Type:   MAC     Note:    Oropharynx: oropharynx clear of all foreign objects and spontaneously breathing  Level of Consciousness: drowsy  Oxygen Supplementation: nasal cannula    Independent Airway: airway patency satisfactory and stable  Dentition: dentition unchanged  Vital Signs Stable: post-procedure vital signs reviewed and stable  Report to RN Given: handoff report given  Patient transferred to: PACU    Handoff Report: Identifed the Patient, Identified the Reponsible Provider, Reviewed the pertinent medical history, Discussed the surgical course, Reviewed Intra-OP anesthesia mangement and issues during anesthesia, Set expectations for post-procedure period and Allowed opportunity for questions and acknowledgement of understanding      Vitals: (Last set prior to Anesthesia Care Transfer)  CRNA VITALS  7/7/2021 1025 - 7/7/2021 1100      7/7/2021             Resp Rate (set):  10      spo2 96  Electronically Signed By: WALE Galdamez CRNA  July 7, 2021  11:00 AM

## 2021-07-07 NOTE — PROGRESS NOTES
GI NOTE ( see procedure report)    EGD  Multiple antral ulcers , Clan based - low risk of rebleed.  No blood       Plan  No aspirin or NSAID probably the cause of ulcers.  Biopsied for H. Pylori  PPI BID life long  Repeat EGD 2 months to check healing    Call if we can be of further assistance.    Monserrat Bright MD  [facility]  Cell  [phone] 8 AM-5 PM  Office

## 2021-07-07 NOTE — PLAN OF CARE
VSS. Denies pain. Pt has word finding difficulty- I updated the MD. He is confused to the time but knows where he is and why. Lactic acid increased to 3.7, I paged the crosscoabdifatah MD. Will continue to monitor.

## 2021-07-07 NOTE — PLAN OF CARE
Pt has been stable over night with no change in his current condition.  HGB has been stable and no bleeding noted.  No c/o pain or SOB.  He is alert but doesn't remember why or how he came to be in the hospital. He is disoriented to time and situation. He appears to be angry and upset and wishes to be left alone.  VS have been stable and he is in a SR with BBB.

## 2021-07-07 NOTE — PROVIDER NOTIFICATION
MD Notification    Notified Person: MD    Notified Person Name: Dr Kat    Notification Date/Time: 7/6/21 1945    Notification Interaction: text page    Purpose of Notification: pt's lactic increased to 3.7    Orders Received: 1L over 4 hours, lactic at midnight    Comments:

## 2021-07-08 NOTE — PHARMACY-ADMISSION MEDICATION HISTORY
Pharmacy Medication History  Admission medication history interview status for the 7/6/2021  admission is complete. See EPIC admission navigator for prior to admission medications     Location of Interview: Patient room  Medication history sources: [facility], [facility] and [facility]    Significant changes made to the medication list:  [facility]  Patient unable to discuss med list     In the past week, patient estimated taking medication this percent of the time: Unknown    Additional medication history information:        Medication reconciliation completed by provider prior to medication history? Yes    Time spent in this activity: greater than 45 minutes with multiple interview attempts.      Prior to Admission medications    Medication Sig Last Dose Taking? Auth Provider   amLODIPine (NORVASC) 10 MG tablet Take 1 tablet (10 mg) by mouth daily Past Month at Unknown time Yes Gibson Urias MD   aspirin (ASA) 325 MG EC tablet Take 1 tablet (325 mg) by mouth daily Start 7/25/20. Past Month at Unknown time Yes Henny Le PA   atorvastatin (LIPITOR) 40 MG tablet Take 1 tablet (40 mg) by mouth every evening Past Month at Unknown time Yes Catarino Cherry MD   carvedilol (COREG) 25 MG tablet Take 1 tablet (25 mg) by mouth 2 times daily (with meals) Past Month at Unknown time Yes Henny Le PA   ferrous sulfate (FEROSUL) 325 (65 Fe) MG tablet Take 325 mg by mouth daily Past Month at Unknown time Yes Unknown, Entered By History   glimepiride (AMARYL) 2 MG tablet Take 4 mg by mouth 2 times daily (before meals) Past Month at Unknown time Yes Unknown, Entered By History   insulin glargine (LANTUS PEN) 100 UNIT/ML pen Inject 20 Units Subcutaneous every evening  Past Month at Unknown time Yes Henny Le PA   isosorbide mononitrate (IMDUR) 30 MG 24 hr tablet Take 1 tablet (30 mg) by mouth daily Past Month at Unknown time Yes Brittanie Quiroz APRN CNP    multivitamin, therapeutic with minerals (THERA-VIT-M) TABS Take 1 tablet by mouth daily Past Month at Unknown time Yes Reported, Patient   SEMAGLUTIDE,0.25 OR 0.5MG/DOS, SC Inject 0.5 mg Subcutaneous once a week  Past Month at Unknown time Yes Unknown, Entered By History   torsemide (DEMADEX) 20 MG tablet 2 tablets (40mg ) twice a day Past Month at Unknown time Yes Mary Anne Santiago APRN CNP   acetaminophen (TYLENOL) 325 MG tablet Take 2 tablets (650 mg) by mouth every 6 hours as needed for mild pain or fever (> 101 F)   Henny Le, PA   blood glucose (NO BRAND SPECIFIED) test strip Use to test blood sugar as directed   Reported, Patient   order for DME Equipment being ordered: Walker Wheels () and Walker ()  Treatment Diagnosis: Impaired gait   Catarino Kearns, PA-C       The information provided in this note is only as accurate as the sources available at the time of update(s)

## 2021-07-08 NOTE — PROGRESS NOTES
Addendum  created 11/01/17 1501 by Keisha Flores MD    ED Visit Navigator Clinical Impressions section accepted [facility]    Hospitalist Progress Note    Brief Summary:  Burton Elizabeth is a 76-year-old male with a complicated past medical history which includes coronary artery disease, hypertension, hyperlipidemia, chronic left bundle branch block, heart failure with preserved ejection fraction, suspected sleep apnea, type 2 diabetes, stage III chronic kidney disease, moderate aortic stenosis, history of stroke in 07/2020, and temporal arteritis, among other medical problems, who presents to the Emergency Room today for evaluation of altered mental status and melena.  He has numerous lab abnormalities, including an elevated lactate and elevated troponin and a low hemoglobin.  He is being admitted  under [facility] status for ongoing evaluation and care.    Assessment & Plan      Acute Upper GI bleeding.    Acute Blood loss anemia.       In the Emergency Room, he was noted to have grossly melanotic stools.  His last hemoglobin  at 10.9 as of 1/2021. .  When checked at Cardiology visit on 6/10/21 his hemoglobin was 8.2.  Now with Hb of 7.1.  He was transfused 1 unit of Pack RBC and started on IV Protonix infusion at 8 mg/hr after bolus. GI is consulted and was taken for EGD which shows Cabrera's esophagus and multiple non bleeding gastric ulcer and gastritis. No active bleeding. His hemoglobin drop to 6.4 later yesterday and received 1 more unit of PRBC on 7/7. Now Hb is stable    Keep him on IV Protonix 40 bid today and see how he does. He is hemodynamically stable, tolerate clear liquid diet, advance diet as tolerated now. No aspirin or NSAID as per GI recommendations, awaiting pathology for H pylori. He will need repeat EGD in 2 months and need biopsy for his Cabrera's as well.  Patient denies using any NSAID's PTA        NSTEMI  Aortic Stenosis   Elevated troponin with history of coronary artery disease,   He has a history of coronary artery disease with prior angiograms as outlined above in Past  Medical History.  Additionally, he had undergone stress testing earlier this year which showed changes that had been noted on prior stress test in 2017.  He did not endorse any recent chest discomfort or dyspnea on exertion.  His troponin is elevated at 7.9.  His EKG has chronic LBBB so not diagnostic for any ischemia at this time.  Cardiology consulted and recommend conservative management given GI bleeding. No IV heparin. Likely NSTEMI secondary to anemia, and renal failure. He is chest pain free at this time. Restart aspirin and Plavix once clear by the GI.     Resume his amlodipine, Coreg and Lipitor on 7/7 but cut down the dose of amlodipine to 5 mg and Coreg to 12.5 mg bid and if remain stable will resume his PTA doses. Continue to hold aspirin today. Restart Imdur 30 mg daily,. Tolerating it well.   He is loaded with Plavix 300 mg by cardiology today and 75 mg daily from tomorrow, continue to monitor his Hemoglobin and sign of bleeding at this timee  Echocardiogram done reviewed, shows EF of 55%, hypokinesis of the distal misty/inferoseptal septum which is new, in addition to abnormal conduction septal motion. Cardiology is consulted and following. Conservative management at this time, need out patient follow up with his cardiology for further work up for CAD and Aortic stenosis.      Acute kidney injury on stage III chronic kidney disease:    Baseline creatinine previously was 1.8-2.0 but more recently seems to be 2.0-2.3.  Creatinine was  elevated at 3.74 on admission. Likely pre-renal although no FeNA was obtained on admission. He was started on IV fluids and creatinine is trending down, making urine, and creatinine improve to 2.29 at this time.   Stop IV fluids now.       Acute toxic metabolic encephalopathy:now resolved.     Suspect this is multifactorial and secondary anemia, NSTEMI and acute renal failure.     Although his white count is elevated on admission likely reactive, he is otherwise afebrile  and has no other signs or symptoms suggestive of a localized infection.  His head CT is unremarkable..  He does not consume alcohol.  His blood sugars are stable.   He is now awake, alert and oriented X 3      Lactic acidosis:    Lactate was elevated at 2.8 on presentation.  Suspect this is secondary to hypoperfusion in the setting of suspected blood loss and anemia. Now improve.      Hypertension   Hyperlipidemia:   Resume Coreg Amlodipine, Imdur and Lipitor at this time.  Decrease dose of Coreg to 12.5 mg bid and Amlodipine to 5 mg today, if remain stable will resume PTA doses.    Heart failure with preserved ejection fraction:   He does have bilateral LE edema, not in exacerbation at this time, but will like to prevent volume overload.  He was on torsemide at home, will continue to hold it for now.     Type 2 diabetes:    A1c check on admission was 6.3 suggest good control of diabetes at home.   PTA medication include Lantuss 20 units HS, glimepiride and semaglutide Every week.  His BS were uncontrol on admission but now getting better, he is on sliding scale insulin medium dose NPO protocol and Lantuss at 10 units .  Now on oral diet, increase lantuss to 20 units and switch sliding scale insulin with meal.      History of cerebrovascular accident in 7/2020:    Maintained on full-dose aspirin, statin, and antihypertensive medications.    Stable, will order PT and OT once more stable. Resume aspirin once ok with GI       Temporal arteritis:  Recently diagnosed.  He reportedly completed a steroid taper.  No concerns in this regard at present.      History of arthritis and chronic joint pains:  Noted.  He does not take any medication specifically for pain.  Monitor clinically.     DEEP VEIN THROMBOSIS PROPHYLAXIS:  PCDs only, given presentation with acute GI bleed.    Hemodynamically stable, ok to discontinue IMC orders  Advance diet as tolerated.   Increase Lantuss to 20 units and sliding scale insulin to medium  dose with meal.   As he is loaded with plavix, keep an eye on bleeding and recheck Hb tomorrow.   PT and OT evaluation now.        DVT Prophylaxis: Pneumatic Compression Devices  Code Status: Full Code    Disposition: Expected discharge in tomorrow if remain stable.     Discussed with patient, patient  Brother at bedside and the nursing staff taking care of the patient today.     Pb Goodwin MD  Text Page  (7am - 6pm)    Interval History   Patient is awake and alert today, feeling well, tolerating the liquid diet, denies any nausea, vomiting, chest pain, SOB, fever, chills, headache or dizziness.     No other significant event overnight.     -Data reviewed today: I reviewed all new labs and imaging results over the last 24 hours. I personally reviewed no images or EKG's today.    Physical Exam   Temp: 98.7  F (37.1  C) Temp src: Oral BP: 128/69 Pulse: 75   Resp: 22 SpO2: 97 % O2 Device: Nasal cannula Oxygen Delivery: 1.5 LPM  Vitals:    07/06/21 1631 07/07/21 0500 07/08/21 0600   Weight: 107.5 kg (237 lb 1.6 oz) 110.7 kg (244 lb) 112.4 kg (247 lb 12.8 oz)     Vital Signs with Ranges  Temp:  [97.6  F (36.4  C)-98.9  F (37.2  C)] 98.7  F (37.1  C)  Pulse:  [73-82] 75  Resp:  [17-24] 22  BP: (112-146)/(60-86) 128/69  SpO2:  [81 %-99 %] 97 %  I/O last 3 completed shifts:  In: 891.25 [P.O.:360; I.V.:211.25]  Out: 1525 [Urine:1525]    Constitutional: awake,alert and in no distress.   Eyes: Lids and lashes normal, pupils equal, round and reactive to light, extra ocular muscles intact, sclera clear, conjunctiva normal  Respiratory: No increased work of breathing, good air exchange, clear to auscultation bilaterally, no crackles or wheezing  Cardiovascular: Normal apical impulse, regular rate and rhythm, normal S1 and S2, no S3 or S4, and no murmur noted  GI: No scars, normal bowel sounds, soft, non-distended, non-tender, no masses palpated, no hepatosplenomegally  Skin: no bruising or bleeding  Musculoskeletal: 1+ lower  extremity pitting edema present  Neurologic: no foal deficit.     Medications       sodium chloride 0.9%  1,000 mL Intravenous Once     amLODIPine  5 mg Oral Daily     atorvastatin  40 mg Oral QPM     carvedilol  12.5 mg Oral BID w/meals     [START ON 7/9/2021] clopidogrel  75 mg Oral Daily     insulin aspart  1-6 Units Subcutaneous Q4H     insulin glargine  10 Units Subcutaneous At Bedtime     isosorbide mononitrate  30 mg Oral Daily     pantoprazole (PROTONIX) IV  40 mg Intravenous BID     sodium chloride (PF)  3 mL Intracatheter Q8H       Data   Recent Labs   Lab 07/08/21  0524 07/07/21  2308 07/07/21  1637 07/07/21  0525 07/07/21  0525 07/07/21  0026 07/06/21  1548 07/06/21  1548 07/06/21  1028   WBC 8.2  --   --   --  13.0*  --   --   --  18.6*   HGB 7.6* 6.4* 7.4*   < > 7.9* 7.8*   < >  --  7.1*   MCV 92  --   --   --  89  --   --   --  88     --   --   --  219  --   --   --  264   INR  --   --   --   --   --   --   --   --  1.32*     --  147*  --  146*  --   --   --  144   POTASSIUM 4.9  --  4.9  --  4.5  --   --   --  4.9   CHLORIDE 113*  --  118*  --  118*  --   --   --  113*   CO2 23  --  26  --  22  --   --   --  22   BUN 73*  --  85*  --  98*  --   --   --  116*   CR 2.29*  --  2.32*  --  2.72*  --   --   --  3.74*   ANIONGAP 5  --  3  --  6  --   --   --  9   GABE 8.6  --  8.9  --  8.9  --   --   --  9.8   *  --  256*  --  246*  --   --   --  385*   ALBUMIN 2.4*  --   --   --   --   --   --   --  2.8*   PROTTOTAL  --   --   --   --   --   --   --   --  5.4*   BILITOTAL  --   --   --   --   --   --   --   --  0.4   ALKPHOS  --   --   --   --   --   --   --   --  83   ALT  --   --   --   --   --   --   --   --  34   AST  --   --   --   --   --   --   --   --  56*   TROPI  --   --   --   --  3.517* 4.419*  --  7.047* 7.902*    < > = values in this interval not displayed.       No results found for this or any previous visit (from the past 24 hour(s)).

## 2021-07-08 NOTE — PROVIDER NOTIFICATION
MD Notification    Notified Person: MD    Notified Person Name: Dr. Bright    Notification Date/Time: 07/08/21 1027    Notification Interaction: Telephone    Purpose of Notification: Anticoagulant therapy for pt    Orders Received: Can begin plavix.    Comments:  Cardiology would like to start pt on plavix but wanted GI to OK it.

## 2021-07-08 NOTE — PLAN OF CARE
Pt stable over night, VSS, no c/o Chest pain or sob.  HGB was 6.4 and the Pt received 1 unit of PRBC's with a recheck of 7.6 this morning.  No signs of bleeding noted. Pt slept well between cares. He has been in SR with BBB.

## 2021-07-08 NOTE — PROGRESS NOTES
07/08/21 1500   Quick Adds   Type of Visit Initial PT Evaluation   Living Environment   People in home alone   Current Living Arrangements house   Home Accessibility stairs within home   Number of Stairs, Within Home, Primary other (see comments)  (12 stairs )   Stair Railings, Within Home, Primary railing on right side (ascending)   Transportation Anticipated family or friend will provide   Living Environment Comments Brother would provide transportation    Self-Care   Usual Activity Tolerance good   Current Activity Tolerance fair   Regular Exercise No   Equipment Currently Used at Home walker, rolling   Disability/Function   Hearing Difficulty or Deaf no   Wear Glasses or Blind no   Concentrating, Remembering or Making Decisions Difficulty no   Difficulty Communicating no   Difficulty Eating/Swallowing no   Walking or Climbing Stairs Difficulty no   Dressing/Bathing Difficulty no   Toileting issues no   Doing Errands Independently Difficulty (such as shopping) no   Fall history within last six months yes   Number of times patient has fallen within last six months 1   Change in Functional Status Since Onset of Current Illness/Injury no   General Information   Onset of Illness/Injury or Date of Surgery 07/06/21   Referring Physician Payal Kat    Patient/Family Therapy Goals Statement (PT) To go home    Pertinent History of Current Problem (include personal factors and/or comorbidities that impact the POC) 76-year-old male with a complicated past medical history which includes coronary artery disease, hypertension, hyperlipidemia, chronic left bundle branch block, heart failure with preserved ejection fraction, suspected sleep apnea, type 2 diabetes, stage III chronic kidney disease, moderate aortic stenosis, history of stroke in 07/2020, and temporal arteritis, among other medical problems, who presents to the Emergency Room today for evaluation of altered mental status and melena   Existing  Precautions/Restrictions fall   Cognition   Orientation Status (Cognition) oriented x 4   Affect/Mental Status (Cognition) WFL   Follows Commands (Cognition) WFL   Pain Assessment   Patient Currently in Pain No   Integumentary/Edema   Integumentary/Edema no deficits were identifed   Posture    Posture Forward head position;Kyphosis   Strength   Manual Muscle Testing Quick Adds Deficits observed during functional mobility   Strength Comments Difficulty tolerating prolonged amb   Bed Mobility   Bed Mobility supine-sit;sit-supine   Supine-Sit [place] (Bed Mobility) minimum assist (75% patient effort)   Sit-Supine [place] (Bed Mobility) minimum assist (75% patient effort)   Bed Mobility Limitations decreased ability to use arms for pushing/pulling;decreased ability to use legs for bridging/pushing   Impairments Contributing to Impaired Bed Mobility decreased strength   Sit-Stand Transfer   Sit-Stand [place] (Transfers) contact guard   Assistive Device (Sit-Stand Transfers) walker, front-wheeled   Gait/Stairs (Locomotion)   [place] Level (Gait) contact guard   Assistive Device (Gait) walker, front-wheeled   Distance in Feet (Required for LE Total Joints) 6'x8   Pattern (Gait) step-through   Deviations/Abnormal Patterns (Gait) base of support, narrow;winnie decreased;gait speed decreased;stride length decreased   Comment (Gait/Stairs) Stairs not applicable    Balance   Balance other (describe)   Balance Comments Fair dynamic balance during amb   Sensory Examination   Sensory Perception WFL   Coordination   Coordination no deficits were identified   Clinical Impression   Criteria for Skilled Therapeutic Intervention yes, treatment indicated   PT Diagnosis (PT) Impaired bed mobility; impaired transfers; impaired gait   Influenced by the following impairments Generalized weakness   Functional limitations due to impairments GI bleed; anemia   Clinical Presentation Stable/Uncomplicated   Clinical  Presentation Rationale Patient's condition currently stable    Clinical Decision Making (Complexity) low complexity   Therapy Frequency (PT) 5x/week   Predicted Duration of Therapy Intervention (days/wks) 7 days   Planned Therapy Interventions (PT) balance training;bed mobility training;gait training;home exercise program;neuromuscular re-education;strengthening;transfer training   Anticipated Equipment Needs at Discharge (PT) walker, rolling   Risk & Benefits of therapy have been explained evaluation/treatment results reviewed;care plan/treatment goals reviewed;risks/benefits reviewed   Clinical Impression Comments Patient demonstrating reduced tolerance to activity during transfers and amb w/ inherent balance deficits secondary to GI bleed and associated anemia    PT Discharge Planning    PT Discharge Recommendation (DC Rec) Transitional Care Facility   PT Rationale for DC Rec Patient below baseline and having difficulty performing significant amb due to fatigue and weakness; will require substantial assist during daily activities   PT Brief overview of current status  Ax1   Total Evaluation Time   Total Evaluation Time (Minutes) 16

## 2021-07-08 NOTE — PROGRESS NOTES
"Cardiology Progress Note  Kedar Sesay MD         Assessment and Plan:      Non-ST elevation myocardial infarction  This occurred in setting of acute GI bleed and renal failure  Troponin is decreasing.  Patient never had any chest pain.  Per GI, avoid aspirin for next few weeks.  Nursing team did discuss possibility of starting Plavix with gastroenterologist and they have okayed it.  Will start Plavix 75 mg daily after loading dose.  Given recent GI bleed and antral ulcers and inability to use aspirin short-term, as well as absence of any chest pain, would continue conservative management for his CAD at this time.  He will follow up with his cardiologist to get further outpatient work-up including work-up for TAVR given his worsening aortic stenosis.  EF 55% with mild septal hypokinesis in setting of conduction system disease.  Continue beta-blockers, amlodipine, statin.    2.  Moderate to severe aortic stenosis with a mean gradient 39 mm and valve area 1.1 cm  with dimensionless index of 0.27.  Follow-up with Dr. Neumann as an outpatient    3.  Acute GI bleed, related to gastric/antral ulcer, on PPI  Had second blood transfusion yesterday and hemoglobin now 7.6.    4.  Acute renal failure, improving with hydration.  Creatinine now down to 2.29.    Overall, stable from cardiac perspective.  He should follow-up with cardiology as an outpatient.  We will sign off.  Recall if questions.                    Interval History:   denies chest pain          Review of Systems:   Reviewed, as mentioned above, no chets pain          Physical Exam:        Blood pressure 128/69, pulse 75, temperature 98.7  F (37.1  C), resp. rate 22, height 1.778 m (5' 10\"), weight 112.4 kg (247 lb 12.8 oz), SpO2 97 %.  Vitals:    07/06/21 1631 07/07/21 0500 07/08/21 0600   Weight: 107.5 kg (237 lb 1.6 oz) 110.7 kg (244 lb) 112.4 kg (247 lb 12.8 oz)     Weights since admission  Vitals:    07/06/21 1017 07/06/21 1631 07/07/21 0500 07/08/21 " 0600   Weight: 108.4 kg (238 lb 15.7 oz) 107.5 kg (237 lb 1.6 oz) 110.7 kg (244 lb) 112.4 kg (247 lb 12.8 oz)     Vital Signs with Ranges  Temp:  [97.6  F (36.4  C)-98.9  F (37.2  C)] 98.7  F (37.1  C)  Pulse:  [73-82] 75  Resp:  [13-24] 22  BP: (112-148)/(60-86) 128/69  SpO2:  [81 %-99 %] 97 %  I/O's Last 24 hours  I/O last 3 completed shifts:  In: 891.25 [P.O.:360; I.V.:211.25]  Out: 1525 [Urine:1525]  Net I/O since admission  07/03 0700 - 07/08 0659  In: 1991.25 [P.O.:360; I.V.:1011.25]  Out: 2575 [Urine:2575]  Net: -583.75    EXAM:    Skin:   normal     Chest:   Normal Symmetry and no tenderness     Lungs:   scattered wheezes     Cardiovascular:   normal S1 and S2 and murmurs include systolic murmur III/VI located at right upper sternal border with radiation to the carotids     Extremities and Back:   No edema     Neurological:   No gross or focal neurologic abnormalities            Medications:          sodium chloride 0.9%  1,000 mL Intravenous Once     amLODIPine  5 mg Oral Daily     atorvastatin  40 mg Oral QPM     carvedilol  12.5 mg Oral BID w/meals     insulin aspart  1-6 Units Subcutaneous Q4H     insulin glargine  10 Units Subcutaneous At Bedtime     isosorbide mononitrate  30 mg Oral Daily     pantoprazole (PROTONIX) IV  40 mg Intravenous BID     sodium chloride (PF)  3 mL Intracatheter Q8H            Data:      All new lab and imaging data was reviewed.   Recent Labs   Lab Test 07/08/21  0524 07/07/21  2308 07/07/21  1637 07/07/21  0525 07/07/21  0525 07/06/21  1028 07/06/21  1028 06/30/20  0000 06/30/20 04/22/20  0917   WBC 8.2  --   --   --  13.0*  --  18.6*   < >  --  7.8   HGB 7.6* 6.4* 7.4*   < > 7.9*   < > 7.1*   < >  --  9.0*   MCV 92  --   --   --  89  --  88   < >  --  90     --   --   --  219  --  264   < >  --  224   INR  --   --   --   --   --   --  1.32*  --  1.1* 1.12    < > = values in this interval not displayed.      Recent Labs   Lab Test 07/08/21  0524 07/07/21  0642  07/07/21  0525    147* 146*   POTASSIUM 4.9 4.9 4.5   CHLORIDE 113* 118* 118*   CO2 23 26 22   BUN 73* 85* 98*   CR 2.29* 2.32* 2.72*   ANIONGAP 5 3 6   AGBE 8.6 8.9 8.9   * 256* 246*     Recent Labs   Lab Test 07/07/21  0525 07/07/21  0026 07/06/21  1548   TROPI 3.517* 4.419* 7.047*              Imaging:   No results found for this or any previous visit (from the past 24 hour(s)).

## 2021-07-09 NOTE — PLAN OF CARE
PT: attempted session, patient agitated and requesting more time with care manager who is present in room despite care management's encouragement for patient to work with therapy; will r/s for tomorrow.

## 2021-07-09 NOTE — PROGRESS NOTES
"   07/09/21 1002   Quick Adds   Type of Visit Initial Occupational Therapy Evaluation   Living Environment   People in home alone   Current Living Arrangements house   Home Accessibility stairs within home   Number of Stairs, Within Home, Primary   (12 to basement)   Transportation Anticipated family or friend will provide;car, drives self  (pt typically drives)   Living Environment Comments Brother can provide transportation at discharge. Pt reports no concerns with mobility in his home.    Self-Care   Usual Activity Tolerance good   Current Activity Tolerance fair   Regular Exercise No   Equipment Currently Used at Home walker, rolling   Activity/Exercise/Self-Care Comment Pt reports indep with ADLs and IADLs at baseline. Pt states that only deficit from stroke last year was \"vision and slight balance deficits\". Pt reports to be functioning back to baseline since stroke   Disability/Function   Fall history within last six months yes   Number of times patient has fallen within last six months 1   General Information   Onset of Illness/Injury or Date of Surgery 07/06/21   Referring Physician Pb Goodwin MD   Patient/Family Therapy Goal Statement (OT) Return home and increased strength   Additional Occupational Profile Info/Pertinent History of Current Problem 76-year-old male with a complicated past medical history which includes coronary artery disease, hypertension, hyperlipidemia, chronic left bundle branch block, heart failure with preserved ejection fraction, suspected sleep apnea, type 2 diabetes, stage III chronic kidney disease, moderate aortic stenosis, history of stroke in 07/2020, and temporal arteritis, among other medical problems, who presents to the Emergency Room today for evaluation of altered mental status and melena   Existing Precautions/Restrictions fall   Cognitive Status Examination   Orientation Status orientation to person, place and time   Cognitive Status Comments Pt very " tangential in converstaion. Very talkative, orientated x4.    Visual Perception   Visual Impairment/Limitations WNL   Pain Assessment   Patient Currently in Pain Yes, see Vital Sign flowsheet  (back hurts while standing)   Posture   Posture forward head position;protracted shoulders;kyphosis   Range of Motion Comprehensive   General Range of Motion no range of motion deficits identified   Strength Comprehensive (MMT)   General Manual Muscle Testing (MMT) Assessment no strength deficits identified   Coordination   Upper Extremity Coordination No deficits were identified   Bed Mobility   Bed Mobility supine-sit   Supine-Sit [place] (Bed Mobility) supervision;verbal cues   Comment (Bed Mobility) Significantly incrased time to complete. Cues needed for technique, uses bed rail   Transfers   Transfers sit-stand transfer;toilet transfer   Transfer Comments Min A for transfers and CGA for monility with FWW. Pt loses balance posteriorly x2 with mod A to steady.    Activities of Daily Living   BADL Assessment/Intervention lower body dressing;grooming   Lower Body Dressing Assessment/Training   [place] Level (Lower Body Dressing) minimum assist (75% patient effort)   Grooming Assessment/Training   [place] Level (Grooming) contact guard assist   Position (Grooming) sink side   Clinical Impression   Criteria for Skilled Therapeutic Interventions Met (OT) yes;meets criteria;skilled treatment is necessary   OT Diagnosis Impaired functional mobility and self-cares   OT Problem List-Impairments impacting ADL problems related to;activity tolerance impaired;balance;mobility;strength   Assessment of Occupational Performance 3-5 Performance Deficits   Identified Performance Deficits dressing, functional mobility, bed mobility   Planned Therapy Interventions (OT) ADL retraining;bed mobility training;strengthening;home program guidelines;progressive activity/exercise   Clinical Decision Making Complexity (OT) moderate  complexity   Therapy Frequency (OT) Daily   Predicted Duration of Therapy 3   Risk & Benefits of therapy have been explained evaluation/treatment results reviewed;care plan/treatment goals reviewed   OT Discharge Planning    OT Discharge Recommendation (DC Rec) Transitional Care Facility;Home with assist;home with home care occupational therapy   OT Rationale for DC Rec Based on pt need for at least min A for functional mobility and decreased activity tolerance, pt would benefit from TCU to increased indep and safety in home environment. Pt adamently declining at this time. Pt verbalizes understanding of the risks of falling again at home and pt continued to decline rehab stay. If pt d/c home, would recommend min A for all functional mobility.    Total Evaluation Time (Minutes)   Total Evaluation Time (Minutes) 8

## 2021-07-09 NOTE — CONSULTS
Care Management Initial Consult    General Information  Assessment completed with: Patient,    Type of CM/SW Visit: Initial Assessment    Primary Care Provider verified and updated as needed: Yes   Readmission within the last 30 days: no previous admission in last 30 days      Reason for Consult: discharge planning  Advance Care Planning:            Communication Assessment  Patient's communication style: spoken language (English or Bilingual)    Hearing Difficulty or Deaf: no   Wear Glasses or Blind: no    Cognitive  Cognitive/Neuro/Behavioral: WDL  Level of Consciousness: alert  Arousal Level: opens eyes spontaneously  Orientation: oriented x 4  Mood/Behavior: calm, cooperative  Best Language: 0 - No aphasia  Speech: clear, spontaneous    Living Environment:   People in home: alone     Current living Arrangements: house      Able to return to prior arrangements: (TCU recommended, patient strongly refuses)       Family/Social Support:  Care provided by: self  Provides care for:       Sibling(s)          Description of Support System:      Support Assessment: Adequate family and caregiver support(patient details several family who live within 5-8 miles)    Current Resources:   Patient receiving home care services: No     Community Resources:    Equipment currently used at home: walker, rolling  Supplies currently used at home:      Employment/Financial:  Employment Status:          Financial Concerns:             Lifestyle & Psychosocial Needs:        Socioeconomic History     Marital status: Single     Spouse name: Not on file     Number of children: Not on file     Years of education: Not on file     Highest education level: Not on file     Tobacco Use     Smoking status: Never Smoker     Smokeless tobacco: Never Used   Substance and Sexual Activity     Alcohol use: Yes     Alcohol/week: 2.0 standard drinks     Types: 2 Standard drinks or equivalent per week     Frequency: 2-3 times a week     Drinks per session:  1 or 2     Comment: occasional, social     Drug use: No       Functional Status:  Prior to admission patient needed assistance:   Dependent ADLs:: Independent  Dependent IADLs:: Independent       Mental Health Status:  Mental Health Status: No Current Concerns       Chemical Dependency Status:  Chemical Dependency Status: No Current Concerns             Values/Beliefs:  Spiritual, Cultural Beliefs, [facility] Practices, Values that affect care:                 Additional Information:  Met with patient, introduced role in discharge planning and reviewed TCU recommendation. First patient shared he would not go to TCU because he incured a cost from a previous stay and is still paying the bill.He thought PT was good in the TCU but found the OT a waste of time .  Writer reviewed that with his [employer] product the first 20 days are covered at 100% if he needs daily therapy.  Patient not confident with this explanation based on his preference experience.    Patient also went into great detail about his ortho experience with [facility], Dr Rosen and a therapist he has used for years.  His plan is to go home and either accept home PT or have good friends who can transport him to [facility] for out patient therapy.    Writer expressed concern with patient going home alone as here he is not able to walk on his own.   While we were talking, therapist came to work with him.  Writer volunteered to leave and let therapy work with him and he insisted they leave because we were talking.     Writer reviewed that discharge may be tomorrow pending his progress, per Dr Goodwin, and patient said discharge may not be till Sunday or Monday and he anticipates walking better by then.     Brought up brother's request that patient have [facility] information.  Patient responded by describing  his brother in laws's medical condition in detail and that his brother in law has an alert device. Writer at this point, politely interupted to bring the subject  back to him.  Pt did accept information about [facility].  Patient is very talkative and goes into great detail of what he wants to relay to writer.  At this time, no referrals have been made for rehab as patient will not consider TCU and plans to discharge home with home therapy or out patient therapy  The weekend staff will need to re-visit with patient to finalize his plan   Juana Hurd, LUISSW

## 2021-07-09 NOTE — PROGRESS NOTES
Chart reviewed.  No new recommendations from cardiac perspective.  Creatinine still elevated at 2.29.  We will have to do further cardiac work-up as an outpatient.  Suggest nephrology consult as patient's renal failure is not improving as expected.  We will sign off.  Recall if questions.

## 2021-07-09 NOTE — PROGRESS NOTES
[facility]    Hospitalist Progress Note    Brief Summary:  Burton Elizabeth is a 76-year-old male with a complicated past medical history which includes coronary artery disease, hypertension, hyperlipidemia, chronic left bundle branch block, heart failure with preserved ejection fraction, suspected sleep apnea, type 2 diabetes, stage III chronic kidney disease, moderate aortic stenosis, history of stroke in 07/2020, and temporal arteritis, among other medical problems, who presents to the Emergency Room today for evaluation of altered mental status and melena.  He has numerous lab abnormalities, including an elevated lactate and elevated troponin and a low hemoglobin.  He is being admitted  [facility] [place] [facility] for ongoing evaluation and care.    Assessment & Plan      Acute Upper GI bleeding.    Acute Blood loss anemia.       In the Emergency Room, he was noted to have grossly melanotic stools.  His last hemoglobin  at 10.9 as of 1/2021. .  When checked at Cardiology visit on 6/10/21 his hemoglobin was 8.2.  Now with Hb of 7.1 on admission.   He was transfused 1 unit of Pack RBC and started on IV Protonix infusion at 8 mg/hr after bolus. GI is consulted and was taken for EGD which shows Cabrera's esophagus and multiple non bleeding gastric ulcer and gastritis. No active bleeding. His hemoglobin drop to 6.4 later  and received 1 more unit of PRBC on 7/7. Now Hb is stable and improving, no further bleeding.   He is on  IV Protonix 40 bid. He is hemodynamically stable,tolerating soft diet now.  No aspirin or NSAID as per GI recommendations, awaiting pathology for H pylori. He will need repeat EGD in 2 months and need biopsy for his Cabrera's as well.  Patient denies using any NSAID's PTA      Stop IV protonix and start on oral Protonix 40 mg bid now, advance to regular diabetic diet. Hb improve to 8.3 now.        NSTEMI  Aortic Stenosis   Elevated troponin with history of coronary artery disease,    He has a history of coronary artery disease with prior angiograms as outlined above in Past Medical History.  Additionally, he had undergone stress testing earlier this year which showed changes that had been noted on prior stress test in 2017.  He did not endorse any recent chest discomfort or dyspnea on exertion.  His troponin is elevated at 7.9.  His EKG has chronic LBBB so not diagnostic for any ischemia at this time.  Cardiology consulted and recommend conservative management given GI bleeding. No IV heparin. Likely NSTEMI secondary to anemia, and renal failure. He is chest pain free at this time. Restart aspirin and Plavix once clear by the GI.     Resume his amlodipine, Coreg and Lipitor on 7/7 but cut down the dose of amlodipine to 5 mg and Coreg to 12.5 mg bid and if remain stable will resume his PTA doses. Continue to hold aspirin today. Restart Imdur 30 mg daily,. Tolerating it well.   He was loaded with Plavix 300 mg by cardiology on 7/8 and 75 mg daily now, continue to monitor, tolerated it well, Hb stable and improve.   Echocardiogram done reviewed, shows EF of 55%, hypokinesis of the distal misty/inferoseptal septum which is new, in addition to abnormal conduction septal motion. Cardiology is consulted and following. Conservative management at this time, need out patient follow up with his cardiology for further work up for CAD and Aortic stenosis.      Acute kidney injury on stage III chronic kidney disease:    Baseline creatinine previously was 1.8-2.0 but more recently seems to be 2.0-2.3.  Creatinine was  elevated at 3.74 on admission. Likely pre-renal although no FeNA was obtained on admission. He was started on IV fluids and creatinine is trending down, making urine, and creatinine is continue to trend down and 2.17 now.   Stop IV fluids on 7/8      Acute toxic metabolic encephalopathy:now resolved.     Suspect this is multifactorial and secondary anemia, NSTEMI and acute renal failure.      Although his white count is elevated on admission likely reactive, he is otherwise afebrile and has no other signs or symptoms suggestive of a localized infection.  His head CT is unremarkable..  He does not consume alcohol.  His blood sugars are stable.   He is now awake, alert and oriented X 3      Lactic acidosis:    Lactate was elevated at 2.8 on presentation.  Suspect this is secondary to hypoperfusion in the setting of suspected blood loss and anemia. Now improve.      Hypertension   Hyperlipidemia:   Resume Coreg Amlodipine, Imdur and Lipitor at this time.  Decrease dose of Coreg to 12.5 mg bid and Amlodipine to 5 mg today, if remain stable will resume PTA doses.    Heart failure with preserved ejection fraction:   He does have bilateral LE edema, not in exacerbation at this time, but will like to prevent volume overload.  He was on torsemide at home, will continue to hold it for now, may be restart from tomorrow.      Type 2 diabetes:    A1c check on admission was 6.3 suggest good control of diabetes at home.   PTA medication include Lantuss 20 units HS, glimepiride and semaglutide Every week.  His BS were uncontrol on admission but now getting better, he is on sliding scale insulin medium dose   Increase Lantuss to 20 units now, keep him on sliding scale insulin for correction and Hypoglycemia protocol .     History of cerebrovascular accident in 7/2020:    Maintained on full-dose aspirin, statin, and antihypertensive medications.    Stable, will order PT and OT once more stable. Resume aspirin once ok with GI       Temporal arteritis:  Recently diagnosed.  He reportedly completed a steroid taper.  No concerns in this regard at present.      History of arthritis and chronic joint pains:  Noted.  He does not take any medication specifically for pain.  Monitor clinically.     DEEP VEIN THROMBOSIS PROPHYLAXIS:  PCDs only, given presentation with acute GI bleed.    PT and OT to evaluate him today  SW/Care  coordinator for discharge planning.   Patient told me he will prefer to go home and open with home health rather than TCU placement.   His brother is interested in life line alert system as he live alone.     DVT Prophylaxis: Pneumatic Compression Devices  Code Status: Full Code    Disposition: Expected discharge tomorrow if remain stable,    Discussed with patient, patient  Brother at bedside and the nursing staff taking care of the patient today.     Pb Goodwin MD  Text Page  (7am - 6pm)    Interval History   Patient was sleeping at time of my visit, but woke up easily, brother was at bedside as well, feeling better, denies any chest pain, SOB, fever, chills nausea, vomiting, headache or dizziness, has some weakness and times feeling tired. No hematemesis, melena or hematochezia at this time.     No other significant event overnight.     -Data reviewed today: I reviewed all new labs and imaging results over the last 24 hours. I personally reviewed no images or EKG's today.    Physical Exam   Temp: 97.3  F (36.3  C) Temp src: Oral BP: 139/76 Pulse: 81   Resp: 20 SpO2: 93 % O2 Device: None (Room air)    Vitals:    07/07/21 0500 07/08/21 0600 07/09/21 0652   Weight: 110.7 kg (244 lb) 112.4 kg (247 lb 12.8 oz) 117 kg (257 lb 15 oz)     Vital Signs with Ranges  Temp:  [97.3  F (36.3  C)-98.6  F (37  C)] 97.3  F (36.3  C)  Pulse:  [81-87] 81  Resp:  [20-22] 20  BP: (116-139)/(69-84) 139/76  SpO2:  [90 %-94 %] 93 %  I/O last 3 completed shifts:  In: 120 [P.O.:120]  Out: 775 [Urine:775]    Constitutional: awake,alert and in no distress.   Eyes: Lids and lashes normal, pupils equal, round and reactive to light, extra ocular muscles intact, sclera clear, conjunctiva normal  Respiratory: No increased work of breathing, good air exchange, clear to auscultation bilaterally, no crackles or wheezing  Cardiovascular: Normal apical impulse, regular rate and rhythm, normal S1 and S2, no S3 or S4, and no murmur noted  GI: No  scars, normal bowel sounds, soft, non-distended, non-tender, no masses palpated, no hepatosplenomegally  Skin: no bruising or bleeding  Musculoskeletal: 1+ lower extremity pitting edema present  Neurologic: no foal deficit.     Medications       sodium chloride 0.9%  1,000 mL Intravenous Once     amLODIPine  5 mg Oral Daily     atorvastatin  40 mg Oral QPM     carvedilol  12.5 mg Oral BID w/meals     clopidogrel  75 mg Oral Daily     insulin aspart  1-7 Units Subcutaneous TID AC     insulin aspart  1-5 Units Subcutaneous At Bedtime     insulin glargine  10 Units Subcutaneous Once     insulin glargine  20 Units Subcutaneous At Bedtime     isosorbide mononitrate  30 mg Oral Daily     pantoprazole (PROTONIX) IV  40 mg Intravenous BID     sodium chloride (PF)  3 mL Intracatheter Q8H       Data   Recent Labs   Lab 07/09/21  0850 07/08/21  0524 07/07/21  2308 07/07/21  1637 07/07/21  0525 07/07/21  0525 07/07/21  0026 07/06/21  1548 07/06/21  1548 07/06/21  1028   WBC 7.1 8.2  --   --   --  13.0*  --   --   --  18.6*   HGB 8.3* 7.6* 6.4* 7.4*   < > 7.9* 7.8*   < >  --  7.1*   MCV 91 92  --   --   --  89  --   --   --  88    171  --   --   --  219  --   --   --  264   INR  --   --   --   --   --   --   --   --   --  1.32*    141  --  147*  --  146*  --   --   --  144   POTASSIUM 5.2 4.9  --  4.9  --  4.5  --   --   --  4.9   CHLORIDE 112* 113*  --  118*  --  118*  --   --   --  113*   CO2 25 23  --  26  --  22  --   --   --  22   BUN 64* 73*  --  85*  --  98*  --   --   --  116*   CR 2.17* 2.29*  --  2.32*  --  2.72*  --   --   --  3.74*   ANIONGAP 2* 5  --  3  --  6  --   --   --  9   GABE 8.8 8.6  --  8.9  --  8.9  --   --   --  9.8   * 194*  --  256*  --  246*  --   --   --  385*   ALBUMIN 2.7* 2.4*  --   --   --   --   --   --   --  2.8*   PROTTOTAL  --   --   --   --   --   --   --   --   --  5.4*   BILITOTAL  --   --   --   --   --   --   --   --   --  0.4   ALKPHOS  --   --   --   --   --   --    --   --   --  83   ALT  --   --   --   --   --   --   --   --   --  34   AST  --   --   --   --   --   --   --   --   --  56*   TROPI  --   --   --   --   --  3.517* 4.419*  --  7.047* 7.902*    < > = values in this interval not displayed.       No results found for this or any previous visit (from the past 24 hour(s)).

## 2021-07-09 NOTE — PLAN OF CARE
Cognitive Concerns/ Orientation : Alert and oriented x 4   BEHAVIOR & AGGRESSION TOOL COLOR: Green   CIWA SCORE: N/A    ABNL VS/O2: VSS on room air   MOBILITY: Assist of one to two   PAIN MANAGMENT: Denies   DIET: [facility] dental soft   BOWEL/BLADDER: Voiding per urinal   ABNL LAB/BG: Creat 2.29, Hemoglobin 7.6 after one unit of blood Blood glucose [phone]  DRAIN/DEVICES: Peripheral IV left hand   TELEMETRY RHYTHM:   SKIN: Pale, bruising   TESTS/PROCEDURES: EGD done and showed no bleeding ulcers   D/C DAY/GOALS/PLACE: Pending   OTHER IMPORTANT INFO: Transfer from heart center at 8pm.  +1 generalized edema, + 2 lower extremity edema.

## 2021-07-09 NOTE — PROGRESS NOTES
"Summary:   Pt admitted with AMS and melena on 7/7/2021 . PMH : CVA July 2020,CHF,DM 2,CAD stage 3,HTN,HLD.  DATE & TIME: 7-9-20210045=[id number]  Cognitive Concerns/ Orientation : Alert and oriented x 4   BEHAVIOR & AGGRESSION TOOL COLOR: Green   CIWA SCORE: N/A    ABNL VS/O2: VSS , on room air./76   Pulse 81   Temp 97.3  F (36.3  C) (Oral)   Resp 20   Ht 1.778 m (5' 10\")   Wt 117 kg (257 lb 15 oz)   SpO2 93%   BMI 37.01 kg/m      MOBILITY: Assist of 1-2/walker/BG. PT/OT following  PAIN MANAGMENT: Denies   DIET: [facility] dental soft   BOWEL/BLADDER: Voiding per urinal   ABNL LAB/BG: Creat 2.17 Hemoglobin 8.3,BUN 64 . and 238, pt received appropriate coverage.  DRAIN/DEVICES: Peripheral IV left hand   TELEMETRY RHYTHM: SR w/BBB  SKIN: Pale, bruises BUE's and scabbing bilateral 2 nd toes.    TESTS/PROCEDURES: EGD done and showed no bleeding ulcers   D/C DAY/GOALS/PLACE: Pending   OTHER IMPORTANT INFO: +1 generalized edema, + 2 lower extremity edema.    "

## 2021-07-10 NOTE — PROGRESS NOTES
[facility]    Hospitalist Progress Note    Brief Summary:  Burton Elizabeth is a 76-year-old male with a complicated past medical history which includes coronary artery disease, hypertension, hyperlipidemia, chronic left bundle branch block, heart failure with preserved ejection fraction, suspected sleep apnea, type 2 diabetes, stage III chronic kidney disease, moderate aortic stenosis, history of stroke in 07/2020, and temporal arteritis, among other medical problems, who presents to the Emergency Room today for evaluation of altered mental status and melena.  He has numerous lab abnormalities, including an elevated lactate and elevated troponin and a low hemoglobin.  He is being admitted  under [facility] status for ongoing evaluation and care.    Assessment & Plan      Acute Upper GI bleeding.    Acute Blood loss anemia.       In the Emergency Room, he was noted to have grossly melanotic stools.  His last hemoglobin  at 10.9 as of 1/2021. .  When checked at Cardiology visit on 6/10/21 his hemoglobin was 8.2.  Now with Hb of 7.1 on admission.   He was transfused 1 unit of Pack RBC and started on IV Protonix infusion at 8 mg/hr after bolus. GI is consulted and was taken for EGD which shows Cabrera's esophagus and multiple non bleeding gastric ulcer and gastritis. No active bleeding. His hemoglobin drop to 6.4 later  and received 1 more unit of PRBC on 7/7. Now Hb is stable and improving, no further bleeding.   He is on  IV Protonix 40 bid. He is hemodynamically stable,tolerating soft diet now.  No aspirin or NSAID as per GI recommendations, awaiting pathology for H pylori. He will need repeat EGD in 2 months and need biopsy for his Cabrera's as well.  Patient denies using any NSAID's PTA      Stop IV protonix and start on oral Protonix 40 mg bid now, advance to regular diabetic diet. Hb improve remain stable at this time.       NSTEMI  Aortic Stenosis   Elevated troponin with history of coronary  artery disease,   He has a history of coronary artery disease with prior angiograms as outlined above in Past Medical History.  Additionally, he had undergone stress testing earlier this year which showed changes that had been noted on prior stress test in 2017.  He did not endorse any recent chest discomfort or dyspnea on exertion.  His troponin is elevated at 7.9.  His EKG has chronic LBBB so not diagnostic for any ischemia at this time.  Cardiology consulted and recommend conservative management given GI bleeding. No IV heparin. Likely NSTEMI secondary to anemia, and renal failure. He is chest pain free at this time. Holding aspirin for at least 1 week, restarted back on Plavix with load and tolerating it well.     Resume his amlodipine, Coreg and Lipitor on 7/7 but cut down the dose of amlodipine to 5 mg and Coreg to 12.5 mg bid and if remain stable will resume his PTA doses. Continue to hold aspirin today. Restart Imdur 30 mg daily,. Tolerating it well.   He was loaded with Plavix 300 mg by cardiology on 7/8 and 75 mg daily now, continue to monitor, tolerated it well, Hb stable and improve.   Echocardiogram done reviewed, shows EF of 55%, hypokinesis of the distal misty/inferoseptal septum which is new, in addition to abnormal conduction septal motion. Cardiology is consulted and following. Conservative management at this time, need out patient follow up with his cardiology for further work up for CAD and Aortic stenosis. He does follow with Dr Neumann for aortic stenosis.      Acute kidney injury on stage III chronic kidney disease:    Baseline creatinine previously was 1.8-2.0 but more recently seems to be 2.0-2.3.  Creatinine was  elevated at 3.74 on admission. Likely pre-renal although no FeNA was obtained on admission. He was started on IV fluids and creatinine is trending down, making urine, and creatinine is continue to trend down and 2.17 on 7/9 and today's pending at time of this note.   Stop IV fluids  on 7/8      Acute toxic metabolic encephalopathy:now resolved.     Suspect this is multifactorial and secondary anemia, NSTEMI and acute renal failure.     Although his white count is elevated on admission likely reactive, he is otherwise afebrile and has no other signs or symptoms suggestive of a localized infection.  His head CT is unremarkable..  He does not consume alcohol.  His blood sugars are stable.   He is now awake, alert and oriented X 3      Lactic acidosis:    Lactate was elevated at 2.8 on presentation.  Suspect this is secondary to hypoperfusion in the setting of suspected blood loss and anemia. Now improve.      Hypertension   Hyperlipidemia:   Resume Coreg Amlodipine, Imdur and Lipitor at this time.  Decrease dose of Coreg to 12.5 mg bid and Amlodipine to 5 mg today, if remain stable will resume PTA doses.  BP is in good control at this time.     Heart failure with preserved ejection fraction:   He does have bilateral LE edema, not in exacerbation at this time, but will like to prevent volume overload.  He was on torsemide 40 mg daily at home, will see if his creatinine remain stable restart Torsemide at a  Lower dose of 20 mg daily from today      Type 2 diabetes:    A1c check on admission was 6.3 suggest good control of diabetes at home.   PTA medication include Lantuss 20 units HS, glimepiride and semaglutide Every week.  He is on Lantuss 20 units currently and sliding scale insulin   His BS reasonable control at this time.  keep him on sliding scale insulin for correction and Hypoglycemia protocol .     History of cerebrovascular accident in 7/2020:    Maintained on full-dose aspirin, statin, and antihypertensive medications.    Stable, will order PT and OT once more stable. Resume aspirin once ok with GI       Temporal arteritis:  Recently diagnosed.  He reportedly completed a steroid taper.  No concerns in this regard at present.      History of arthritis and chronic joint pains:  Noted.  He  does not take any medication specifically for pain.  Monitor clinically.     DEEP VEIN THROMBOSIS PROPHYLAXIS:  PCDs only, given presentation with acute GI bleed.    PT evaluation again recommend TCU, but patient is very adamant that he wants to go home.   He is considering his previous Physical therapist from [facility] when discharge.   He is open to the idea of [facility].   Continue PT and OT today and likely discharge home tomorrow with home health if continue to refuse  TCU placement       DVT Prophylaxis: Pneumatic Compression Devices  Code Status: Full Code    Disposition: Expected discharge tomorrow if remain stable,    Discussed with patient, and the nursing staff taking care of the patient today.     Pb Goodwin MD  Text Page  (7am - 6pm)    Interval History   Patient is overall feeling well, denies any chest pain, SOB, nausea, vomiting, headache, dizziness, abdominal pain at this time, think his weakness is improving as well. He refusing to go to the TCU at this time.     No other significant event overnight.     -Data reviewed today: I reviewed all new labs and imaging results over the last 24 hours. I personally reviewed no images or EKG's today.    Physical Exam   Temp: 98  F (36.7  C) Temp src: Oral BP: 139/78 Pulse: 82   Resp: 18 SpO2: 92 % O2 Device: None (Room air)    Vitals:    07/07/21 0500 07/08/21 0600 07/09/21 0652   Weight: 110.7 kg (244 lb) 112.4 kg (247 lb 12.8 oz) 117 kg (257 lb 15 oz)     Vital Signs with Ranges  Temp:  [96.4  F (35.8  C)-98  F (36.7  C)] 98  F (36.7  C)  Pulse:  [78-82] 82  Resp:  [18-20] 18  BP: (125-139)/(75-78) 139/78  SpO2:  [91 %-94 %] 92 %  I/O last 3 completed shifts:  In: 270 [P.O.:270]  Out: 600 [Urine:600]    Constitutional: awake,alert and in no distress.   Eyes: Lids and lashes normal, pupils equal, round and reactive to light, extra ocular muscles intact, sclera clear, conjunctiva normal  Respiratory: No increased work of breathing, good air exchange, clear to  auscultation bilaterally, no crackles or wheezing  Cardiovascular: Normal apical impulse, regular rate and rhythm, normal S1 and S2, no S3 or S4, and no murmur noted  GI: No scars, normal bowel sounds, soft, non-distended, non-tender, no masses palpated, no hepatosplenomegally  Skin: no bruising or bleeding  Musculoskeletal: 1+ lower extremity pitting edema present  Neurologic: no foal deficit.     Medications       sodium chloride 0.9%  1,000 mL Intravenous Once     amLODIPine  5 mg Oral Daily     atorvastatin  40 mg Oral QPM     carvedilol  12.5 mg Oral BID w/meals     clopidogrel  75 mg Oral Daily     ferrous sulfate  325 mg Oral Daily     insulin aspart  1-7 Units Subcutaneous TID AC     insulin aspart  1-5 Units Subcutaneous At Bedtime     insulin glargine  20 Units Subcutaneous At Bedtime     isosorbide mononitrate  30 mg Oral Daily     pantoprazole  40 mg Oral BID AC     sodium chloride (PF)  3 mL Intracatheter Q8H       Data   Recent Labs   Lab 07/10/21  1053 07/09/21  0850 07/08/21  0524 07/07/21  1637 07/07/21  1637 07/07/21  0525 07/07/21  0525 07/07/21  0026 07/06/21  1548 07/06/21  1548 07/06/21  1028   WBC 8.5 7.1 8.2  --   --   --  13.0*  --   --   --  18.6*   HGB 8.2* 8.3* 7.6*   < > 7.4*   < > 7.9* 7.8*   < >  --  7.1*   MCV 90 91 92  --   --   --  89  --   --   --  88    157 171  --   --   --  219  --   --   --  264   INR  --   --   --   --   --   --   --   --   --   --  1.32*   NA  --  139 141  --  147*  --  146*  --   --   --  144   POTASSIUM  --  5.2 4.9  --  4.9  --  4.5  --   --   --  4.9   CHLORIDE  --  112* 113*  --  118*  --  118*  --   --   --  113*   CO2  --  25 23  --  26  --  22  --   --   --  22   BUN  --  64* 73*  --  85*  --  98*  --   --   --  116*   CR  --  2.17* 2.29*  --  2.32*  --  2.72*  --   --   --  3.74*   ANIONGAP  --  2* 5  --  3  --  6  --   --   --  9   GABE  --  8.8 8.6  --  8.9  --  8.9  --   --   --  9.8   GLC  --  237* 194*  --  256*  --  246*  --   --   --   385*   ALBUMIN  --  2.7* 2.4*  --   --   --   --   --   --   --  2.8*   PROTTOTAL  --   --   --   --   --   --   --   --   --   --  5.4*   BILITOTAL  --   --   --   --   --   --   --   --   --   --  0.4   ALKPHOS  --   --   --   --   --   --   --   --   --   --  83   ALT  --   --   --   --   --   --   --   --   --   --  34   AST  --   --   --   --   --   --   --   --   --   --  56*   TROPI  --   --   --   --   --   --  3.517* 4.419*  --  7.047* 7.902*    < > = values in this interval not displayed.       No results found for this or any previous visit (from the past 24 hour(s)).

## 2021-07-10 NOTE — PLAN OF CARE
Summary:   Pt admitted with AMS and melena on 7/7/2021 both now resolved.   PMH : CVA July 2020,CHF,DM 2,CAD stage 3,HTN,HLD.  DATE & TIME: 7-9-2021, 3-11pm shift  Cognitive Concerns/ Orientation : Alert and oriented x 4   BEHAVIOR & AGGRESSION TOOL COLOR: Green   CIWA SCORE: N/A    ABNL VS/O2: VSS , on room air.  MOBILITY: Assist of 1-2/walker/BG. PT/OT following patient refusing TCU  PAIN MANAGMENT: Denies   [facility]   BOWEL/BLADDER: Voiding per urinal   ABNL LAB/BG: Creat 2.17 Hemoglobin 8.3,BUN 64 . and 309, pt's overnight lantus increased.  DRAIN/DEVICES: Peripheral IV left hand is SL  TELEMETRY RHYTHM: SR w/BBB  SKIN: Pale, bruises BUE's and scabbing bilateral 2 nd toes.  TESTS/PROCEDURES: EGD done and showed no bleeding ulcers   D/C DAY/GOALS/PLACE: Pending, possibly tomorrow-patient refusing TCU, would go home with homecares  OTHER IMPORTANT INFO: +1 generalized edema, + 2 lower extremity edema.  LS diminished and fine crackles.

## 2021-07-10 NOTE — PLAN OF CARE
Summary:   Pt admitted with AMS and melena on 7/7/2021 both now resolved.   PMH : CVA July 2020,CHF,DM 2,CAD stage 3,HTN,HLD.  DATE & TIME: 7/11/21 9971-4063  Cognitive Concerns/ Orientation : Alert and oriented x 4   BEHAVIOR & AGGRESSION TOOL COLOR: Green   CIWA SCORE: N/A    ABNL VS/O2: VSS , on room air.  MOBILITY: Assist of 1 walker/belt.   PAIN MANAGMENT: reports back pain; text to MD for prn tylenol  DIET: Mod CHO; good intake  BOWEL/BLADDER: Voiding per urinal   ABNL LAB/BG: , 228  DRAIN/DEVICES: Peripheral IV left hand is SL  TELEMETRY RHYTHM: SR w/BBB and 1st degree AVB  SKIN: Pale, bruises BUE's and scabbing bilateral 2nd toes.  TESTS/PROCEDURES: None  D/C DAY/GOALS/PLACE: Possible discharge tomorrow if remains stable -patient refusing TCU, would go home with homecares  OTHER IMPORTANT INFO:  generalized edema 1+, lower extremity edema 2+

## 2021-07-10 NOTE — PLAN OF CARE
Summary:   Pt admitted with AMS and melena on 7/7/2021 both now resolved.   PMH : CVA July 2020,CHF,DM 2,CAD stage 3,HTN,HLD.  DATE & TIME: 7/10/21 3223-1268  Cognitive Concerns/ Orientation : Alert and oriented x 4   BEHAVIOR & AGGRESSION TOOL COLOR: Green   CIWA SCORE: N/A    ABNL VS/O2: VSS , on room air.  MOBILITY: Assist of 1-2/walker/belt.   PAIN MANAGMENT: Ice packs given for hip pain, pt states from PTA fall.  DIET: Mod CHO  BOWEL/BLADDER: Voiding per urinal   ABNL LAB/BG:   DRAIN/DEVICES: Peripheral IV left hand is SL  TELEMETRY RHYTHM: SR w/BBB and 1st degree AVB  SKIN: Pale, bruises BUE's and scabbing bilateral 2nd toes.  TESTS/PROCEDURES: None  D/C DAY/GOALS/PLACE: Possible discharge today if remains stable -patient refusing TCU, would go home with homecares  OTHER IMPORTANT INFO:  generalized edema 1+, lower extremity edema 2+

## 2021-07-11 NOTE — PLAN OF CARE
Physical Therapy Discharge Summary    Reason for therapy discharge:    Discharged to home with home therapy.    Progress towards therapy goal(s). See goals on Care Plan in [facility] electronic health record for goal details.  Goals met    Therapy recommendation(s):    Continued therapy is recommended.  Rationale/Recommendations:  Home PT to progress mobility.

## 2021-07-11 NOTE — DISCHARGE INSTRUCTIONS
HOMECARE NOTE:   Your doctor has ordered home care to help you after your hospital stay.  The staff will contact you to schedule your first visit.  [facility] [place] [facility].  If you have any question, or have not received a call within 48 hours of discharge, please call them at [phone].  *please see homecare quality ratings for all homecares in your area at www.medicare.gov

## 2021-07-11 NOTE — PROGRESS NOTES
Care Management Discharge Note    Discharge Date: 07/11/2021       Discharge Disposition: Home with [facility] to provide PT and OT    Discharge DME: None    Discharge Transportation: Brother will transport home at 1400    Private pay costs discussed: N/A    PAS Confirmation Code:  N/A  Patient/family educated on Medicare website which has current facility and service quality ratings: no    Education Provided on the Discharge Plan:    Persons Notified of Discharge Plans: Patient  Patient/Family in Agreement with the Plan:  Yes    Additional Information:  CC met with patient who agrees to home care. His address and primary physician were verified; [facility]   SW then found [facility] (phone [phone] and fax [phone]) can accept this patient and can provide a therapy visit within 48 hours.   Orders, face sheet and a H&P were faxed.        ROSELINE Mane

## 2021-07-11 NOTE — PLAN OF CARE
Occupational Therapy Discharge Summary    Reason for therapy discharge:    Discharged to home.    Progress towards therapy goal(s). See goals on Care Plan in [facility] electronic health record for goal details.  Goals partially met.  Barriers to achieving goals:   limited tolerance for therapy and discharge from facility.    Therapy recommendation(s):    Continued therapy is recommended.  Rationale/Recommendations:  ..Pt. has made progress in occupational therapy towards functional goals, but has not yet fully met goals, and is not yet safe and I with all ADLs at this time. Recommend in home occupational therapy safety evaluation. However, pt is declining in home OT at this time.

## 2021-07-11 NOTE — DISCHARGE SUMMARY
[facility]    Discharge Summary  Hospitalist    Date of Admission:  7/6/2021  Date of Discharge:  7/11/2021  Discharging Provider: Pb Goodwin MD  Date of Service (when I saw the patient): 07/11/21    Discharge Diagnoses   Please refer below     History of Present Illness   Burton Elizabeth is an 76 year old male who presented with AMS and melena     Hospital Course   Burton Elizabeth is a 76-year-old male with a complicated past medical history which includes coronary artery disease, hypertension, hyperlipidemia, chronic left bundle branch block, heart failure with preserved ejection fraction, suspected sleep apnea, type 2 diabetes, stage III chronic kidney disease, moderate aortic stenosis, history of stroke in 07/2020, and temporal arteritis, among other medical problems, who presents to the Emergency Room today for evaluation of altered mental status and melena.  He has numerous lab abnormalities, including an elevated lactate and elevated troponin and a low hemoglobin.  He is being admitted  under [facility] status for ongoing evaluation and care.     Final Discharge Diagnosis and Hospital Course.          Acute Upper GI bleeding.    Acute Blood loss anemia.    Multiple Gastric Ulcer   Cabrera's esophagus      In the Emergency Room, he was noted to have grossly melanotic stools.  His last hemoglobin  at 10.9 as of 1/2021. .  When checked at Cardiology visit on 6/10/21 his hemoglobin was 8.2.  Now with Hb of 7.1 on admission.   He was transfused 1 unit of Pack RBC and started on IV Protonix infusion at 8 mg/hr after bolus. GI is consulted and was taken for EGD which shows Cabrera's esophagus and multiple non bleeding gastric ulcer and gastritis. No active bleeding. His hemoglobin drop to 6.4 later  and received 1 more unit of PRBC on 7/7. Now Hb is stable and improving, no further bleeding.   He is on  IV Protonix 40 bid. He is hemodynamically stable,tolerating soft diet now.  No aspirin or  NSAID as per GI recommendations. He will need repeat EGD in 2 months and need biopsy for his Cabrera's as well.  Patient denies using any NSAID's PTA       Stop IV protonix and start on oral Protonix 40 mg bid now, advance to regular diabetic diet. Hb improve remain stable at this time. Hb on day of discharge 8.6. He is feeling well denies any abdominal pain, nausea, vomiting, hematemesis, melena or hematochezia. He is advice to avoid NSAID's and continue taking his Protonix.         NSTEMI  Aortic Stenosis   Elevated troponin with history of coronary artery disease,   He has a history of coronary artery disease with prior angiograms as outlined above in Past Medical History.  Additionally, he had undergone stress testing earlier this year which showed changes that had been noted on prior stress test in 2017.  He did not endorse any recent chest discomfort or dyspnea on exertion.  His troponin is elevated at 7.9.  His EKG has chronic LBBB so not diagnostic for any ischemia at this time.  Cardiology consulted and recommend conservative management given GI bleeding. No IV heparin. Likely NSTEMI secondary to anemia, and renal failure. He is chest pain free at this time. Holding aspirin for at least 1 week, restarted back on Plavix with load and tolerating it well.      Resume his amlodipine, Coreg and Lipitor on 7/7 but cut down the dose of amlodipine to 5 mg and Coreg to 12.5 mg bid and if remain stable will resume his PTA doses. Continue to hold aspirin for 2 weeks. Restart Imdur 30 mg daily,. Tolerating it well.   He was loaded with Plavix 300 mg by cardiology on 7/8 and 75 mg daily now, continue to monitor, tolerated it well, Hb stable and improve.   Echocardiogram done reviewed, shows EF of 55%, hypokinesis of the distal misty/inferoseptal septum which is new, in addition to abnormal conduction septal motion. Cardiology is consulted and following. Conservative management at this time, need out patient follow up  with his cardiology for further work up for CAD and Aortic stenosis. He does follow with Dr Neumann for aortic stenosis.       Acute kidney injury on stage III chronic kidney disease:    Baseline creatinine previously was 1.8-2.0 but more recently seems to be 2.0-2.3.  Creatinine was  elevated at 3.74 on admission. Likely pre-renal although no FeNA was obtained on admission. He was started on IV fluids and creatinine is trending down, making urine, and creatinine is continue to trend down to 1.82 at this time, his torsemide is restarted today.   Stop IV fluids on 7/8       Acute toxic metabolic encephalopathy:now resolved.     Suspect this is multifactorial and secondary anemia, NSTEMI and acute renal failure.     Although his white count is elevated on admission likely reactive, he is otherwise afebrile and has no other signs or symptoms suggestive of a localized infection.  His head CT is unremarkable..  He does not consume alcohol.  His blood sugars are stable.   He is now awake, alert and oriented X 3       Lactic acidosis:    Lactate was elevated at 2.8 on presentation.  Suspect this is secondary to hypoperfusion in the setting of suspected blood loss and anemia. Now improve.       Hypertension   Hyperlipidemia:   Resume Coreg Amlodipine, Imdur and Lipitor at this time.  Decrease dose of Coreg to 12.5 mg bid and Amlodipine to 5 mg today, blood pressure is good control with  Reduce dosing, discharge home on reduce dosing of amlodipine and coreg.   BP is in good control at this time.      Heart failure with preserved ejection fraction:   He does have bilateral LE edema, not in exacerbation at this time, but will like to prevent volume overload.  He was on torsemide 40 mg daily at home, his torsemide was started on 20 mg daily, and now can go   Back to his 40 mg on discharsge.       Type 2 diabetes:    A1c check on admission was 6.3 suggest good control of diabetes at home.   PTA medication include Lantuss 20 units  HS, glimepiride and semaglutide Every week.  He is on Lantuss 20 units currently and sliding scale insulin   His BS reasonable control at this time.  keep him on sliding scale insulin for correction and Hypoglycemia protocol .      History of cerebrovascular accident in 7/2020:    Maintained on full-dose aspirin, statin, and antihypertensive medications.    Stable, will order PT and OT once more stable. Resume aspirin once ok with GI        Temporal arteritis:  Recently diagnosed.  He reportedly completed a steroid taper.  No concerns in this regard at present.       History of arthritis and chronic joint pains:  Noted.  He does not take any medication specifically for pain.  Monitor clinically.     Patient is doing well and getting better, PT re-evaluation recommend home with home health and patient agree to that.  He will be discharge home with home PT and OT in stable condition.     He will need to follow up with PCP in 1 week  F/U with GI with repeat EGD in 2 months.   Continue taking Protonix 40 mg bid at this time.  Avoid NSAID's  Decrease Coreg to 12.5 mg bid and Amlodipine to 5 mg daily.  Discharge home in stable condition      Pb Goodwin MD, MD    Significant Results and Procedures   EGD  Impression:               - Esophageal mucosal changes suggestive of                             long-segment Cabrera's esophagus. No specimens                             collected.                             - Medium-sized hiatal hernia.                             - Non-bleeding gastric ulcers with no stigmata of                             bleeding.                             - Erythematous mucosa in the stomach. Biopsied.                             - Normal examined duodenum    Pending Results   These results will be followed up by PCP  Unresulted Labs Ordered in the Past 30 Days of this Admission     Date and Time Order Name Status Description    7/6/2021  3:12 PM Blood Culture Preliminary     7/6/2021  3:12 PM  Blood Culture Preliminary           Code Status   Full Code       Primary Care Physician   [facility]    Physical Exam   Temp: 97.4  F (36.3  C) Temp src: Oral BP: 128/74 Pulse: 76   Resp: 18 SpO2: 96 % O2 Device: None (Room air)    Vitals:    07/08/21 0600 07/09/21 0652 07/11/21 0045   Weight: 112.4 kg (247 lb 12.8 oz) 117 kg (257 lb 15 oz) 117.2 kg (258 lb 6.1 oz)     Vital Signs with Ranges  Temp:  [97.4  F (36.3  C)-97.8  F (36.6  C)] 97.4  F (36.3  C)  Pulse:  [74-79] 76  Resp:  [16-18] 18  BP: (113-138)/(69-83) 128/74  SpO2:  [94 %-96 %] 96 %  I/O last 3 completed shifts:  In: 460 [P.O.:460]  Out: 600 [Urine:600]    Constitutional: awake, alert, cooperative, no apparent distress, and appears stated age  Eyes: Lids and lashes normal, pupils equal, round and reactive to light, extra ocular muscles intact, sclera clear, conjunctiva normal  Respiratory: No increased work of breathing, good air exchange, clear to auscultation bilaterally, no crackles or wheezing  Cardiovascular: Normal apical impulse, regular rate and rhythm, normal S1 and S2, no S3 or S4, and no murmur noted  GI: No scars, normal bowel sounds, soft, non-distended, non-tender, no masses palpated, no hepatosplenomegally  Neurologic: no focal deficit.     Discharge Disposition   Discharged to home  Condition at discharge: Stable    Consultations This Hospital Stay   CARDIOLOGY IP CONSULT  GASTROENTEROLOGY IP CONSULT  PHYSICAL THERAPY ADULT IP CONSULT  OCCUPATIONAL THERAPY ADULT IP CONSULT  CARE MANAGEMENT / SOCIAL WORK IP CONSULT  CARE MANAGEMENT / SOCIAL WORK IP CONSULT    Time Spent on this Encounter   IPb MD, personally saw the patient today and spent greater than 30 minutes discharging this patient.    Discharge Orders      Home Care PT Referral for Hospital Discharge      Home Care OT Referral for Hospital Discharge      Reason for your hospital stay    Acute blood loss anemia secondary to UGI bleeding      Follow-up and recommended labs and tests     Follow up with primary care provider, [facility], within 7 days for hospital follow- up.  The following labs/tests are recommended: cbc, bmp .     Activity    Your activity upon discharge: activity as tolerated     MD face to face encounter    Documentation of Face to Face and Certification for Home Health Services    I certify that patient: Burton Elizabeth is under my care and that I, or a nurse practitioner or physician's assistant working with me, had a face-to-face encounter that meets the physician face-to-face encounter requirements with this patient on: 7/11/2021.    This encounter with the patient was in whole, or in part, for the following medical condition, which is the primary reason for home health care: Acute GI bleeding, Acute blood loss anemia and significant deconditioning.    I certify that, based on my findings, the following services are medically necessary home health services: Occupational Therapy and Physical Therapy.    My clinical findings support the need for the above services because: Occupational Therapy Services are needed to assess and treat cognitive ability and address ADL safety due to impairment in mobility. and Physical Therapy Services are needed to assess and treat the following functional impairments: mobility.    Further, I certify that my clinical findings support that this patient is homebound (i.e. absences from home require considerable and taxing effort and are for medical reasons or Religion services or infrequently or of short duration when for other reasons) because: Requires assistance of another person or specialized equipment to access medical services because patient: Requires supervision of another for safe transfer...    Based on the above findings. I certify that this patient is confined to the home and needs intermittent skilled nursing care, physical therapy and/or speech therapy.  The  patient is under my care, and I have initiated the establishment of the plan of care.  This patient will be followed by a physician who will periodically review the plan of care.  Physician/Provider to provide follow up care: [facility]    Attending hospital physician (the Medicare certified GAGE provider): Pb Goodwin MD  Physician Signature: See electronic signature associated with these discharge orders.  Date: 7/11/2021     Full Code     Diet    Follow this diet upon discharge: Orders Placed This Encounter      Combination Diet 5813-7635 Calories: Moderate Consistent CHO (4-6 CHO units/meal)     Discharge Medications   Discharge Medication List as of 7/11/2021  2:03 PM      START taking these medications    Details   clopidogrel (PLAVIX) 75 MG tablet Take 1 tablet (75 mg) by mouth daily, Disp-30 tablet, R-0, E-Prescribe      nitroGLYcerin (NITROSTAT) 0.4 MG sublingual tablet For chest pain place 1 tablet under the tongue every 5 minutes for 3 doses. If symptoms persist 5 minutes after 1st dose call 911., Disp-30 tablet, R-0, E-Prescribe      pantoprazole (PROTONIX) 40 MG EC tablet Take 1 tablet (40 mg) by mouth 2 times daily (before meals), Disp-60 tablet, R-0, E-Prescribe         CONTINUE these medications which have CHANGED    Details   amLODIPine (NORVASC) 10 MG tablet Take 0.5 tablets (5 mg) by mouth daily, No Print Out      aspirin (ASA) 81 MG EC tablet Take 4 tablets (325 mg) by mouth daily Start 7/25/20., No Print Out      carvedilol (COREG) 25 MG tablet Take 0.5 tablets (12.5 mg) by mouth 2 times daily (with meals), Disp-60 tablet, R-0, No Print Out         CONTINUE these medications which have NOT CHANGED    Details   atorvastatin (LIPITOR) 40 MG tablet Take 1 tablet (40 mg) by mouth every evening, Disp-30 tablet,R-3, Local Print      ferrous sulfate (FEROSUL) 325 (65 Fe) MG tablet Take 325 mg by mouth daily, Historical      glimepiride (AMARYL) 2 MG tablet Take 4 mg by mouth 2  times daily (before meals), Historical      insulin glargine (LANTUS PEN) 100 UNIT/ML pen Inject 20 Units Subcutaneous every evening , HistoricalIf Lantus is not covered by insurance, may substitute Basaglar at same dose and frequency.        isosorbide mononitrate (IMDUR) 30 MG 24 hr tablet Take 1 tablet (30 mg) by mouth daily, Disp-90 tablet, R-1, Local Print      multivitamin, therapeutic with minerals (THERA-VIT-M) TABS Take 1 tablet by mouth daily, Historical      SEMAGLUTIDE,0.25 OR 0.5MG/DOS, SC Inject 0.5 mg Subcutaneous once a week , Historical      torsemide (DEMADEX) 20 MG tablet 2 tablets (40mg ) twice a day, Disp-120 tablet, R-1, E-Prescribe      acetaminophen (TYLENOL) 325 MG tablet Take 2 tablets (650 mg) by mouth every 6 hours as needed for mild pain or fever (> 101 F), OTC      blood glucose (NO BRAND SPECIFIED) test strip Use to test blood sugar as directed, Historical      order for DME Equipment being ordered: Walker Wheels () and Walker ()  Treatment Diagnosis: Impaired gaitDisp-1 each,R-0, Local Print           Allergies   Allergies   Allergen Reactions     Penicillins Anaphylaxis     Empagliflozin      Other reaction(s): Serum creatinine raised     Data   Most Recent 3 CBC's:Recent Labs   Lab Test 07/11/21  0714 07/10/21  1053 07/09/21  0850   WBC 9.0 8.5 7.1   HGB 8.6* 8.2* 8.3*   MCV 87 90 91    160 157      Most Recent 3 BMP's:  Recent Labs   Lab Test 07/11/21  1134 07/11/21  0714 07/10/21  1053 07/09/21  0850   NA  --  138 140 139   POTASSIUM  --  4.7 5.0 5.2   CHLORIDE  --  109 112* 112*   CO2  --  23 25 25   BUN  --  45* 51* 64*   CR  --  1.82* 2.05* 2.17*   ANIONGAP  --  6 3 2*   GABE  --  8.9 8.7 8.8   * 158* 225* 237*     Most Recent 2 LFT's:  Recent Labs   Lab Test 07/06/21  1028 06/10/21  0000   AST 56* 34   ALT 34 23   ALKPHOS 83 118   BILITOTAL 0.4 0.6     Most Recent INR's and Anticoagulation Dosing History:  Anticoagulation Dose History     Recent Dosing  and Labs Latest Ref Rng & Units 3/29/2016 12/20/2019 12/21/2019 12/25/2019 4/22/2020 6/30/2020 7/6/2021    INR 0.86 - 1.14 1.02 2.41(H) 1.94(H) 1.41(H) 1.12 1.1(A) 1.32(H)        Most Recent 3 Troponin's:  Recent Labs   Lab Test 07/07/21  0525 07/07/21  0026 07/06/21  1548   TROPI 3.517* 4.419* 7.047*     Most Recent Cholesterol Panel:  Recent Labs   Lab Test 01/11/21  0000   CHOL 97   LDL 46   HDL 31*   TRIG 102     Most Recent 6 Bacteria Isolates From Any Culture (See EPIC Reports for Culture Details):  Recent Labs   Lab Test 07/06/21  1548 07/06/21  1030   CULT No growth after 5 days No growth after 5 days     Most Recent TSH, T4 and A1c Labs:  Recent Labs   Lab Test 07/06/21  1909 01/11/21  0000   TSH  --  2.21   A1C 6.3* 7.9*     Results for orders placed or performed during the hospital encounter of 07/06/21   CT Head w/o Contrast    Narrative    CT SCAN OF THE HEAD WITHOUT CONTRAST   7/6/2021 11:01 AM     HISTORY: Altered mental status.    TECHNIQUE:  Axial images of the head and coronal reformations without  IV contrast material. Radiation dose for this scan was reduced using  automated exposure control, adjustment of the mA and/or kV according  to patient size, or iterative reconstruction technique.    COMPARISON: Brain MR 7/1/2020.    FINDINGS: No evidence of acute intracranial hemorrhage. No mass effect  or midline shift. Mild diffuse parenchymal volume loss. Patchy  periventricular white matter hypodensities are nonspecific, but likely  related to chronic microvascular ischemic disease. Previous area of  infarct in the left pontomedullary junction on MR 7/1/2020 is not well  visualized by CT.    The visualized portions of the sinuses and mastoids appear normal. The  bony calvarium and bones of the skull base appear intact.       Impression    IMPRESSION:     1. No evidence of acute intracranial hemorrhage, mass, or herniation.  2. Mild diffuse parenchymal volume loss and white matter changes  likely due  to chronic microvascular ischemic disease.    ZURDO GARCIA MD         SYSTEM ID:  [id number]   Echocardiogram Complete     Value    LVEF  55%    [facility]    [id number]  [id number]  [id number]^GRISEL^PRABHU     [facility]  Echocardiography Laboratory  [address] [place] [id number]     Name: TAY PAGE  MRN: [id number]  : 1945  Study Date: 2021 07:54 AM  Age: 76 yrs  Gender: Male  Patient Location: [facility]  Reason For Study: CAD  Ordering Physician: GRISEL LEIGH  Performed By: Bhavesh Charles     BSA: 2.2 m2  Height: 70 in  Weight: 238 lb  HR: 70  BP: 144/81 mmHg  ______________________________________________________________________________  Procedure  Complete Portable Echo Adult. Optison (NDC #[id number]) given intravenously.  ______________________________________________________________________________  Interpretation Summary     Technically challenging study due to patient body habitus, even with the use  of contrast imaging.     Left ventricular systolic function is normal. The visual ejection fraction is  estimated at 55%.  There is hypokinesis of the distal misty/inferoseptal septum, in addition to  abnormal conduction septal motion.  Right ventricular global function is normal.  Moderate-severe aortic stenosis, Vmax 4.1 m/s, mean gradient 39 mmHg, SID  1.1cm2, DI 0.27. SVi 50 cc/m2.  A bicuspid aortic valve cannot be excluded.  Mild aortic root dilatation. Max diameter of the visualized portion 4.3 cm.     This study was compared to a TTE from 2021. The severity of aortic  stenosis has progressed. There is now distal septal hypokinesis.  ______________________________________________________________________________  Left Ventricle  The left ventricle is normal in size. A sigmoid septum is present. Left  ventricular systolic function is normal. The visual ejection fraction is  estimated at 55%. Left ventricular diastolic function is  indeterminate. Septal  motion is consistent with conduction abnormality. There is hypokinesis of the  distal misty/inferoseptal septum, in addition to abnormal conduction septal  motion.     Right Ventricle  The right ventricle is normal in structure, function and size.     Atria  The left atrium is moderate to severely dilated. Right atrial size is normal.     Mitral Valve  The mitral valve is normal in structure and function.     Tricuspid Valve  The tricuspid valve is not well visualized, but is grossly normal. There is  trace to mild tricuspid regurgitation. Right ventricle systolic pressure  estimate normal. The right ventricular systolic pressure is approximated at  15.5 mmHg plus the right atrial pressure.     Aortic Valve  A bicuspid aortic valve cannot be excluded. Moderate-severe aortic stenosis,  Vmax 4.1 m/s, mean gradient 39 mmHg, SID 1.1cm2, DI 0.27. SVi 50 cc/m2.     Pulmonic Valve  The pulmonic valve is not well seen, but is grossly normal.     Vessels  Mild aortic root dilatation. Max diameter of the visualized portion 4.3 cm.  Normal size ascending aorta.     Pericardium  There is no pericardial effusion.     ______________________________________________________________________________  MMode/2D Measurements & Calculations  IVSd: 1.7 cm  LVIDd: 5.7 cm  LVIDs: 4.4 cm  LVPWd: 1.2 cm  FS: 23.2 %     LV mass(C)d: 378.4 grams  LV mass(C)dI: 168.3 grams/m2  Ao root diam: 4.3 cm  LA dimension: 4.6 cm  asc Aorta Diam: 3.3 cm  LA/Ao: 1.1  LVOT diam: 2.3 cm  LVOT area: 4.3 cm2  RWT: 0.41     Doppler Measurements & Calculations  MV E max lola: 101.0 cm/sec  MV A max lloa: 161.6 cm/sec  MV E/A: 0.63  MV max P.7 mmHg  MV mean PG: 3.9 mmHg  MV V2 VTI: 35.5 cm  MVA(VTI): 3.2 cm2  MV dec slope: 843.6 cm/sec2  Ao V2 max: 409.2 cm/sec  Ao max P.0 mmHg  Ao V2 mean: 290.8 cm/sec  Ao mean P.7 mmHg  Ao V2 VTI: 104.4 cm  SID(I,D): 1.1 cm2  SID(V,D): 1.2 cm2  LV V1 max P.9 mmHg  LV V1 max: 111.1  cm/sec  LV V1 VTI: 26.1 cm  SV(LVOT): 112.3 ml  SI(LVOT): 50.0 ml/m2  PA acc time: 0.10 sec  TR max lefty: 196.1 cm/sec  TR max PG: 15.5 mmHg  AV Lefty Ratio (DI): 0.27     SID Index (cm2/m2): 0.48  E/E' av.5  Lateral E/e': 9.6  Medial E/e': 13.5     ______________________________________________________________________________  Report approved by: Dominique Amador 2021 12:45 PM             Most Recent 3 CBC's:Recent Labs   Lab Test 21  0714 07/10/21  1053 21  0850   WBC 9.0 8.5 7.1   HGB 8.6* 8.2* 8.3*   MCV 87 90 91    160 157     Most Recent 3 BMP's:Recent Labs   Lab Test 21  1134 21  0714 07/10/21  1053 21  0850   NA  --  138 140 139   POTASSIUM  --  4.7 5.0 5.2   CHLORIDE  --  109 112* 112*   CO2  --  23 25 25   BUN  --  45* 51* 64*   CR  --  1.82* 2.05* 2.17*   ANIONGAP  --  6 3 2*   GABE  --  8.9 8.7 8.8   * 158* 225* 237*     Most Recent 2 LFT's:Recent Labs   Lab Test 21  1028 06/10/21  0000   AST 56* 34   ALT 34 23   ALKPHOS 83 118   BILITOTAL 0.4 0.6

## 2021-07-11 NOTE — PLAN OF CARE
Summary:   Pt admitted with AMS and melena and NSTEMI on 7/7/2021 now resolved.   PMH : CVA July 2020, CHF, DM 2, CAD stage 3, HTN, HLD.  DATE & TIME: 7/10/21 3-11pm shift  Cognitive Concerns/ Orientation : Alert and oriented x 4   BEHAVIOR & AGGRESSION TOOL COLOR: Green   CIWA SCORE: N/A    ABNL VS/O2: VSS , on room air.  MOBILITY: Assist of 1 walker/belt.   PAIN MANAGMENT: hip pain kept patient up last night so requested tylenol nearer bedtime tonight-given  DIET: Mod CHO; good intake  BOWEL/BLADDER: Voiding per urinal   ABNL LAB/BG: , 258  DRAIN/DEVICES: Peripheral IV left hand is SL  TELEMETRY RHYTHM: SR w/BBB and 1st degree AVB  SKIN: Pale, bruises BUE's and scabbing bilateral 2nd toes.  TESTS/PROCEDURES: None  D/C DAY/GOALS/PLACE: Possible discharge tomorrow if remains stable -patient refusing TCU, would go home with homecares  OTHER IMPORTANT INFO:  generalized edema 1+, lower extremity edema 2+. Plan to resume lower dose diuretic tomorrow per notes.

## 2021-07-11 NOTE — PLAN OF CARE
Summary:   Pt admitted with AMS and melena and NSTEMI on 7/7/2021 now resolved.   PMH : CVA July 2020, CHF, DM 2, CAD stage 3, HTN, HLD.  DATE & TIME: 7/11/21 3896-2439  Cognitive Concerns/ Orientation : Alert and oriented x 4   BEHAVIOR & AGGRESSION TOOL COLOR: Green   CIWA SCORE: N/A    ABNL VS/O2: VSS , on room air.  MOBILITY: Assist of 1 walker/belt.   PAIN MANAGMENT: L hip pain, ice applied with improvement  DIET: Mod CHO  BOWEL/BLADDER: Voiding per urinal   ABNL LAB/BG:   DRAIN/DEVICES: Peripheral IV left hand is SL  TELEMETRY RHYTHM: SR w/BBB and 1st degree AVB  SKIN: Pale, bruises BUE's and scabbing bilateral 2nd toes.  TESTS/PROCEDURES: None  D/C DAY/GOALS/PLACE: Possible discharge today if remains stable -patient refusing TCU, would go home with homecares  OTHER IMPORTANT INFO:  lower extremity edema 2+. Plan to resume lower dose diuretic today per notes.

## 2021-07-11 NOTE — PLAN OF CARE
Discharge    Patient discharged to home via private transportation with brother  Care plan note:  up with assist, walker.  Patient declined TCU opting for home care PT/OT.  Good intake.  , 215.  Mild L hip pain, declined tylenol, put ice pack on this morning.  Able to dress self at discharge.      Listed belongings gathered and returned to patient. Yes  Belongings returned to patient from security/pharmacy  n/a  Care Plan and Patient education resolved: Yes  Prescriptions if needed, hard copies sent with patient  filled and given ot patient at discharge  Home and hospital acquired medications returned to patient: NA  Medication Bin checked and emptied on discharge Yes  Follow up appointment made for patient: No, he will make.

## 2021-07-12 NOTE — TELEPHONE ENCOUNTER
Patient was evaluated by cardiology after presenting with bloody emesis and melena, acute GIB and renal failure with elevated troponin-NSTEMI. PMH: coronary artery disease, hypertension, hyperlipidemia, chronic left bundle branch block, heart failure with preserved ejection fraction, suspected sleep apnea, type 2 diabetes, stage III chronic kidney disease, moderate aortic stenosis, history of stroke in 07/2020, and temporal arteritis. Echo showed EF of 55% with hypokinesis of the distal misty/inferoseptal septum, in addition to abnormal conduction septal motion and now moderate-severe aortic stenosis. GI was consulted and EGD showed Cabrera's esophagus and multiple non bleeding gastric ulcer and gastritis. No active bleeding. Hgb dropped to 6.4-IV PPI and PRBC's administered. Pt did not have chest pain so decision was made to start pt on Plavix after cleared with GI. F/U with Dr. Neumann for TAVR consultation. Pt was discharged to home on Plavix, Protonix and PRN SL NTG with OP cardiac f/u. Writer attempted to call patient to discuss any post hospital d/c questions he may have, review medication changes, and confirm f/u appts, but no answer. VM left to return my phone call. RN will confirm with patient that he has an OV scheduled on 8/11/21 at 1515 with Dr. Neumann at our [facility]. Pt was discharged to home with [facility] services. SABI Shoemaker RN.

## 2021-07-13 NOTE — TELEPHONE ENCOUNTER
Writer returned pt's message. Pt denies any chest pain, SOB or further bleeding. Is taking Plavix as prescribed. No medication questions. Reviewed f/u appt as scheduled below and Iveth Greco's as well as scheduling phone numbers provided per his request. Pt verbalized appreciation without further questions. SABI Shoemaker RN.

## 2021-07-13 NOTE — PROGRESS NOTES
Clinic Care Coordination Contact  [facility]/Voicemail       Clinical Data: Care Coordinator Outreach    Outreach attempted x 1.  Left message on patient's voicemail with call back information and requested return call.    Plan: Will attempt to reach patient again in 1 business day.    AARON Teixeira   Social Work Clinic Care Coordinator   [facility]  PH: [phone]  [email or web address]

## 2021-07-14 NOTE — PROGRESS NOTES
Clinic Care Coordination Contact    Clinic Care Coordination Contact  OUTREACH    CC MING called patient who states that he is busy right now and asks if I can call back tomorrow morning.    Plan: ROSI LAI to call patient back on 6/15.    AARON Teixeira   Social Work Clinic Care Coordinator   [facility]  PH: [phone]  [email or web address]

## 2021-07-15 NOTE — PROGRESS NOTES
Clinic Care Coordination Contact  [facility]: Post-Discharge Note  SITUATION                                                      Admission:    Admission Date: 07/06/21   Reason for Admission: Acute blood loss anemia secondary to UGI bleeding  Discharge:   Discharge Date: 07/07/21  Discharge Diagnosis: Acute blood loss anemia secondary to UGI bleeding    BACKGROUND                                                      Burton Elizabeth is a 76-year-old male with a complicated past medical history which includes coronary artery disease, hypertension, hyperlipidemia, chronic left bundle branch block, heart failure with preserved ejection fraction, suspected sleep apnea, type 2 diabetes, stage III chronic kidney disease, moderate aortic stenosis, history of stroke in 07/2020, and temporal arteritis, among other medical problems, who presents to the Emergency Room today for evaluation of altered mental status and melena.  He has numerous lab abnormalities, including an elevated lactate and elevated troponin and a low hemoglobin.  He is being admitted  under [facility] status for ongoing evaluation and care.    ASSESSMENT      Discharge Assessment  How are you doing now that you are home?: Feeling good, no concerns.Pt has noticed in the last 3 or 4 days he has really improved. Balance is much better, less weakness in legs.  How are your symptoms? (Red Flag symptoms escalate to triage hotline per guidelines): Improved  Do you feel your condition is stable enough to be safe at home until your provider visit?: Yes  Does the patient have their discharge instructions? : Yes  Does the patient have questions regarding their discharge instructions? : No  Were you started on any new medications or were there changes to any of your previous medications? : Yes - Schedule [facility] appt within 48 business hours (No questions or concerns. Did question the change from asprin to plavix but has no reactions to this)  Does the patient have all  of their medications?: Yes  Do you have questions regarding any of your medications? : No  Do you have all of your needed medical supplies or equipment (DME)?  (i.e. oxygen tank, CPAP, cane, etc.): Yes  Discharge follow-up appointment scheduled within 14 calendar days? : Yes  Discharge Follow Up Appointment Date: 07/22/21  Discharge Follow Up Appointment Scheduled with?: [facility] ([facility]  Is patient agreeable to assistance with scheduling? :  (Patient will schedule on his own)    Post-op  Did the patient have surgery or a procedure: No  Fever: No  Chills: No  Eating & Drinking: eating and drinking without complaints/concerns  Bowel Function: normal  Date of last BM: 07/15/21  Urinary Status: voiding without complaint/concerns        PLAN                                                      Outpatient Plan:    Follow up with primary care provider, [facility], within 7 days for hospital follow- up.  The following labs/tests are recommended: cbc, bmp .        Future Appointments   Date Time Provider Department Center   8/11/2021  3:15 PM Laci Neumann MD [facility] [place]         For any urgent concerns, please contact our 24 hour nurse triage line: [phone] ([phone])         AARON Mcgill

## 2021-08-03 NOTE — TELEPHONE ENCOUNTER
Reached out to Magdaleno regarding his need for cardiac clearance for knee surgery. We have an opening with Dr. Beckham tomorrow so we will get him in then.Leandro Monroy RN

## 2021-08-04 NOTE — LETTER
8/4/2021    [facility]  [address] [address]    RE: Burton Elizabeth       Dear Colleague,    I had the pleasure of seeing Burton Elizabeth in the [facility].        Cardiology Consultation       Assessment & Plan     1.  Preoperative clearance for knee surgery  2.  Moderate to severe aortic stenosis  3.  Single-vessel occlusive coronary disease by angiogram 2019, namely  of nondominant small circumflex coronary artery.  Remainder of coronaries with moderate nonocclusive disease  4.  Recent admission July 2021 with upper GI bleed with multiple gastric ulcers.  Hemoglobin dropped to 6.4  5.  Chronic left bundle branch block  6.  History of CVA 2020  7.  Diabetes mellitus  8.  Temporal arteritis    Recommendations    1.  Preoperative clearance: At this time would recommend he proceed with his EGD as scheduled on August 13, 2021.  We will have an echocardiogram performed the following week.  It is possible that his elevated gradients compared to his prior were related to acute blood loss and anemia.  As such an echocardiogram will be performed.  A clinic visit will be arranged during that week.  The echocardiogram will be reviewed and patient can then be cleared for his upcoming urgent knee surgery.    2.  It was a pleasure seeing this gentleman on your behalf on an urgent basis due to his knee pain and need for surgery.  We will have him follow-up with his established care team moving forward.          History of present illness    Patient is a 76-year-old male who I am meeting for the first time.  He is an established patient of my colleague Dr. Neumann.  He is placed in my clinic for preoperative clearance for knee surgery.  He has a notable history which is defined above.  Briefly he has been followed in the cardiology clinic for heart failure with preserved LV systolic function, chronic left bundle branch block,  aortic stenosis, and hyperlipidemia.  He also has known coronary artery disease which is small vessel in nature and has been treated medically for this.  Patient had a recent visit with our [facility] colleague earlier this year.  His most recent evaluation was during his hospitalization last month where he had melena and acute blood loss.  This occurred at home.  Patient had a fall on his right knee and injured a previously repaired knee.  His hemoglobin dropped to 6.4 and he was transfused and given proton pump inhibitor.  Ultimately he underwent EGD, please see report for details.  Demonstrated erosions and ulcers.  He had a non-STEMI during that admission which has been treated medically.  He here to discuss potential preoperative clearance for knee surgery.  He is scheduled for a follow-up EGD on August 13.  He brings with him lab work from the [facility] and his hemoglobin has trended upward to 10.1 on most recent check on July 28, 2021.  Cardiac wise no active symptoms.      Echo: July 2021    Left ventricular systolic function is normal. The visual ejection fraction is  estimated at 55%.  There is hypokinesis of the distal misty/inferoseptal septum, in addition to  abnormal conduction septal motion.  Right ventricular global function is normal.  Moderate-severe aortic stenosis, Vmax 4.1 m/s, mean gradient 39 mmHg, SID  1.1cm2, DI 0.27. SVi 50 cc/m2.  A bicuspid aortic valve cannot be excluded.  Mild aortic root dilatation. Max diameter of the visualized portion 4.3 cm.     This study was compared to a TTE from 1/21/2021. The severity of aortic  stenosis has progressed. There is now distal septal hypokinesis.        Nuclear MPI: Jan 2021       The nuclear stress test is abnormal.     There is a small area of mild ischemia in the entire inferior segment(s) of the left ventricle.     Left ventricular function is normal.     The left ventricular ejection fraction at stress is 64%.     A prior study was conducted on  5/11/2017.     In prior study inferior, anterior and anteroseptal wall ischemia was reported.        Edgardo Beckham MD    Primary Care Physician   [facility]      Patient Active Problem List   Diagnosis     LBBB (left bundle branch block)     HTN (hypertension)     Diabetes mellitus, type 2 (H)     Arthritis of knee~ s/p replacement     Primary localized osteoarthritis of right knee     Advance care planning     Mixed hyperlipidemia     Coronary artery disease involving native coronary artery of native heart     Status post coronary angiogram     S/P revision of total knee     Acute systolic congestive heart failure (H)     History of DVT 12/13/19      Vertigo     Acute respiratory failure with hypoxia (H)     Acute ischemic stroke (H)     Morbid obesity (H)     Chronic systolic heart failure (H)     Upper GI bleed     Elevated troponin     Anemia due to blood loss, acute     Acute renal failure, unspecified acute renal failure type (H)     Syncope, unspecified syncope type       Past Medical History   I have reviewed this patient's medical history and updated it with pertinent information if needed.   Past Medical History:   Diagnosis Date     Arthritis of knee~ s/p replacement 3/24/2016     CAD (coronary artery disease)      Cerebral infarction (H)      Decreased cardiac ejection fraction~44% 3/24/2016     Diabetes mellitus, type 2 (H) 3/24/2016     History of DVT (deep vein thrombosis)      HTN (hypertension) 3/24/2016     LBBB (left bundle branch block) 3/24/2016     Mixed hyperlipidemia 8/23/2016       Past Surgical History   I have reviewed this patient's surgical history and updated it with pertinent information if needed.  Past Surgical History:   Procedure Laterality Date     ANKLE SURGERY      X 4     APPENDECTOMY       ARTHROPLASTY KNEE Right 3/29/2016    Procedure: ARTHROPLASTY KNEE;  Surgeon: Heena Rosen MD;  Location: [facility] OR     ARTHROPLASTY REVISION KNEE Left 9/27/2019     Procedure: REVISION LEFT TOTAL KNEE  ARTHROPLASTY;  Surgeon: Heena Rosen MD;  Location:  OR     CV LEFT HEART CATH N/A 8/12/2019    Procedure: Left Heart Cath;  Surgeon: Edgardo Beckham MD;  Location:  HEART CARDIAC CATH LAB     ESOPHAGOSCOPY, GASTROSCOPY, DUODENOSCOPY (EGD), COMBINED N/A 7/7/2021    Procedure: ESOPHAGOGASTRODUODENOSCOPY, WITH BIOPSY;  Surgeon: Monserrat Bright MD;  Location:  GI     EYE SURGERY      cataract removal     ORTHOPEDIC SURGERY      KNEE SCOPES MULTIPLE       Prior to Admission Medications   Cannot display prior to admission medications because the patient has not been admitted in this contact.     [unfilled]  [unfilled]  Allergies   Allergies   Allergen Reactions     Penicillins Anaphylaxis     Empagliflozin      Other reaction(s): Serum creatinine raised       Social History    reports that he has never smoked. He has never used smokeless tobacco. He reports current alcohol use of about 2.0 standard drinks of alcohol per week. He reports that he does not use drugs.    Family History   Family History   Problem Relation Age of Onset     Coronary Artery Disease No family hx of        Review of Systems   The comprehensive 10 point Review of Systems is negative other than noted in the HPI or here.     Physical Exam   Vital Signs with Ranges     Wt Readings from Last 4 Encounters:   07/11/21 117.2 kg (258 lb 6.1 oz)   06/14/21 108 kg (238 lb)   02/18/21 114.8 kg (253 lb)   01/28/21 114.7 kg (252 lb 14.4 oz)     [unfilled]      Vitals: There were no vitals taken for this visit.          Thank you for allowing me to participate in the care of your patient.      Sincerely,     Edgardo Beckham MD     [facility]  cc:   No referring provider defined for this encounter.

## 2021-08-04 NOTE — PROGRESS NOTES
Cardiology Consultation       Assessment & Plan     1.  Preoperative clearance for knee surgery  2.  Moderate to severe aortic stenosis  3.  Single-vessel occlusive coronary disease by angiogram 2019, namely  of nondominant small circumflex coronary artery.  Remainder of coronaries with moderate nonocclusive disease  4.  Recent admission July 2021 with upper GI bleed with multiple gastric ulcers.  Hemoglobin dropped to 6.4  5.  Chronic left bundle branch block  6.  History of CVA 2020  7.  Diabetes mellitus  8.  Temporal arteritis    Recommendations    1.  Preoperative clearance: At this time would recommend he proceed with his EGD as scheduled on August 13, 2021.  We will have an echocardiogram performed the following week.  It is possible that his elevated gradients compared to his prior were related to acute blood loss and anemia.  As such an echocardiogram will be performed.  A clinic visit will be arranged during that week.  The echocardiogram will be reviewed and patient can then be cleared for his upcoming urgent knee surgery.    2.  It was a pleasure seeing this gentleman on your behalf on an urgent basis due to his knee pain and need for surgery.  We will have him follow-up with his established care team moving forward.          History of present illness    Patient is a 76-year-old male who I am meeting for the first time.  He is an established patient of my colleague Dr. Neumann.  He is placed in my clinic for preoperative clearance for knee surgery.  He has a notable history which is defined above.  Briefly he has been followed in the cardiology clinic for heart failure with preserved LV systolic function, chronic left bundle branch block, aortic stenosis, and hyperlipidemia.  He also has known coronary artery disease which is small vessel in nature and has been treated medically for this.  Patient had a recent visit with our [facility] colleague earlier this year.  His most recent evaluation was during  his hospitalization last month where he had melena and acute blood loss.  This occurred at home.  Patient had a fall on his right knee and injured a previously repaired knee.  His hemoglobin dropped to 6.4 and he was transfused and given proton pump inhibitor.  Ultimately he underwent EGD, please see report for details.  Demonstrated erosions and ulcers.  He had a non-STEMI during that admission which has been treated medically.  He here to discuss potential preoperative clearance for knee surgery.  He is scheduled for a follow-up EGD on August 13.  He brings with him lab work from the [facility] and his hemoglobin has trended upward to 10.1 on most recent check on July 28, 2021.  Cardiac wise no active symptoms.      Echo: July 2021    Left ventricular systolic function is normal. The visual ejection fraction is  estimated at 55%.  There is hypokinesis of the distal misty/inferoseptal septum, in addition to  abnormal conduction septal motion.  Right ventricular global function is normal.  Moderate-severe aortic stenosis, Vmax 4.1 m/s, mean gradient 39 mmHg, SID  1.1cm2, DI 0.27. SVi 50 cc/m2.  A bicuspid aortic valve cannot be excluded.  Mild aortic root dilatation. Max diameter of the visualized portion 4.3 cm.     This study was compared to a TTE from 1/21/2021. The severity of aortic  stenosis has progressed. There is now distal septal hypokinesis.        Nuclear MPI: Jan 2021       The nuclear stress test is abnormal.     There is a small area of mild ischemia in the entire inferior segment(s) of the left ventricle.     Left ventricular function is normal.     The left ventricular ejection fraction at stress is 64%.     A prior study was conducted on 5/11/2017.     In prior study inferior, anterior and anteroseptal wall ischemia was reported.        Edgardo Beckham MD    Primary Care Physician   [facility]      Patient Active Problem List   Diagnosis     LBBB (left bundle branch  block)     HTN (hypertension)     Diabetes mellitus, type 2 (H)     Arthritis of knee~ s/p replacement     Primary localized osteoarthritis of right knee     Advance care planning     Mixed hyperlipidemia     Coronary artery disease involving native coronary artery of native heart     Status post coronary angiogram     S/P revision of total knee     Acute systolic congestive heart failure (H)     History of DVT 12/13/19      Vertigo     Acute respiratory failure with hypoxia (H)     Acute ischemic stroke (H)     Morbid obesity (H)     Chronic systolic heart failure (H)     Upper GI bleed     Elevated troponin     Anemia due to blood loss, acute     Acute renal failure, unspecified acute renal failure type (H)     Syncope, unspecified syncope type       Past Medical History   I have reviewed this patient's medical history and updated it with pertinent information if needed.   Past Medical History:   Diagnosis Date     Arthritis of knee~ s/p replacement 3/24/2016     CAD (coronary artery disease)      Cerebral infarction (H)      Decreased cardiac ejection fraction~44% 3/24/2016     Diabetes mellitus, type 2 (H) 3/24/2016     History of DVT (deep vein thrombosis)      HTN (hypertension) 3/24/2016     LBBB (left bundle branch block) 3/24/2016     Mixed hyperlipidemia 8/23/2016       Past Surgical History   I have reviewed this patient's surgical history and updated it with pertinent information if needed.  Past Surgical History:   Procedure Laterality Date     ANKLE SURGERY      X 4     APPENDECTOMY       ARTHROPLASTY KNEE Right 3/29/2016    Procedure: ARTHROPLASTY KNEE;  Surgeon: Heena Rosen MD;  Location:  OR     ARTHROPLASTY REVISION KNEE Left 9/27/2019    Procedure: REVISION LEFT TOTAL KNEE  ARTHROPLASTY;  Surgeon: Heena Rosen MD;  Location:  OR     CV LEFT HEART CATH N/A 8/12/2019    Procedure: Left Heart Cath;  Surgeon: Edgardo Beckham MD;  Location:  HEART CARDIAC CATH LAB      ESOPHAGOSCOPY, GASTROSCOPY, DUODENOSCOPY (EGD), COMBINED N/A 7/7/2021    Procedure: ESOPHAGOGASTRODUODENOSCOPY, WITH BIOPSY;  Surgeon: Monserrat Bright MD;  Location:  GI     EYE SURGERY      cataract removal     ORTHOPEDIC SURGERY      KNEE SCOPES MULTIPLE       Prior to Admission Medications   Cannot display prior to admission medications because the patient has not been admitted in this contact.     [unfilled]  [unfilled]  Allergies   Allergies   Allergen Reactions     Penicillins Anaphylaxis     Empagliflozin      Other reaction(s): Serum creatinine raised       Social History    reports that he has never smoked. He has never used smokeless tobacco. He reports current alcohol use of about 2.0 standard drinks of alcohol per week. He reports that he does not use drugs.    Family History   Family History   Problem Relation Age of Onset     Coronary Artery Disease No family hx of        Review of Systems   The comprehensive 10 point Review of Systems is negative other than noted in the HPI or here.     Physical Exam   Vital Signs with Ranges     Wt Readings from Last 4 Encounters:   07/11/21 117.2 kg (258 lb 6.1 oz)   06/14/21 108 kg (238 lb)   02/18/21 114.8 kg (253 lb)   01/28/21 114.7 kg (252 lb 14.4 oz)     [unfilled]      Vitals: There were no vitals taken for this visit.

## 2021-08-18 NOTE — TELEPHONE ENCOUNTER
Called to let Magdaleno know that Dr. Beckham has looked at his echo and he is cleared to proceed with his knee surgery. He will follow up with Dr. Neumann post surgery.Leandro Monroy RN

## 2021-08-19 NOTE — TELEPHONE ENCOUNTER
----- Message from Alexus Wellington sent at 8/18/2021  2:37 PM CDT -----  Regarding: Please call pt  Hi this pt called wanted paper work for him to do his surgery.    Thank you,  Sp

## 2021-08-19 NOTE — TELEPHONE ENCOUNTER
Called Silas's nurse and let them know that Magdaleno has clearance to go forward with knee surgery. THey will let us know if they need anything further.Leandro Monroy RN

## 2021-08-25 PROBLEM — Z96.659 S/P REVISION OF TOTAL KNEE: Status: ACTIVE | Noted: 2019-09-27

## 2021-08-25 NOTE — PROGRESS NOTES
Arrived from Recovery room.  Oriented but somnolent, able to make needs known.   Oriented to room/unit.  Mild surgical pain, ice pack applied.

## 2021-08-25 NOTE — PROGRESS NOTES
"   08/25/21 1455   Quick Adds   Type of Visit Initial PT Evaluation   Living Environment   People in home alone  (family)   Current Living Arrangements house   Home Accessibility stairs to enter home   Stair Railings, Main Entrance none   Transportation Anticipated family or friend will provide   Living Environment Comments pt was groggy s/p R TKR and would not answer all subjective questions, will check when pt is less lethargic for information.    Self-Care   Usual Activity Tolerance good   Current Activity Tolerance fair   Equipment Currently Used at Home cane, straight   Activity/Exercise/Self-Care Comment Per previous admit \"7/2021 Pt reports indep with ADLs and IADLs at baseline. Pt states that only deficit from stroke last year was \"vision and slight balance deficits\". Pt reports to be functioning back to baseline since stroke\"   Disability/Function   Hearing Difficulty or Deaf yes  (per nursing chart)   Wear Glasses or Blind no   Mobility Management uses cane per nursing   Number of times patient has fallen within last six months 1  (chart inidicates 2; pt states 1)   Change in Functional Status Since Onset of Current Illness/Injury yes   General Information   Onset of Illness/Injury or Date of Surgery 08/25/21   Referring Physician Jonathon Hylton PA-C   Patient/Family Therapy Goals Statement (PT) not stated as pt was lethargic and not able to answer all subjective questions at this time.   Pertinent History of Current Problem (include personal factors and/or comorbidities that impact the POC) 76 y.o. Male presents s/p R TK revision on POD# 0. PMH includes aortic stenosis, GI bleeding, CVA, DM, CAD.   Existing Precautions/Restrictions fall  (knee imobilizer at all times)   Weight-Bearing Status - RLE weight-bearing as tolerated   General Observations Pt was very lethargic and pain noted s/p R TKR. Will check back with patient for more subjective information as available.    Cognition   Orientation " Status (Cognition) oriented x 4   Affect/Mental Status (Cognition) WNL   Follows Commands (Cognition) WNL   Pain Assessment   Patient Currently in Pain Yes, see Vital Sign flowsheet   Integumentary/Edema   Integumentary/Edema no deficits were identifed   Integumentary/Edema Comments ACE wrap bandage on R leg; placed KI   Posture    Posture Forward head position;Kyphosis   Range of Motion (ROM)   ROM Comment R knee limited s/p R TKR   Strength   Strength Comments Pt was able to use UE for bed mobility and B LE in standing   Bed Mobility   Comment (Bed Mobility) Sup>sit SBA    Transfers   Transfer Safety Comments Sit>stand CGA   Gait/Stairs (Locomotion)   [place] Level (Gait) supervision   Assistive Device (Gait) walker, front-wheeled   Distance in Feet (Required for LE Total Joints) 10'   Comment (Gait/Stairs) Unable to assess gait pattern, but was CGA with FWW for side of bed.   Balance   Balance Comments seated balance and standing balance supervision   Sensory Examination   Sensory Perception WNL   Sensory Perception Comments Pt could feel bilateral LE   Coordination   Coordination no deficits were identified   Muscle Tone   Muscle Tone no deficits were identified   Clinical Impression   Criteria for Skilled Therapeutic Intervention yes, treatment indicated   PT Diagnosis (PT) gait impairment   Influenced by the following impairments decreased ROM, generalized weakness and pain s/p R TKA ; impaired activity tolerance   Functional limitations due to impairments Impaired functional activity tolerance with mobility/ADLs   Clinical Presentation Stable/Uncomplicated   Clinical Presentation Rationale see MR   Clinical Decision Making (Complexity) low complexity   Therapy Frequency (PT) 2x/day   Predicted Duration of Therapy Intervention (days/wks) 3 days   Planned Therapy Interventions (PT) balance training;cryotherapy;bed mobility training;home exercise program;gait training;motor coordination  training;patient/family education;ROM (range of motion);stair training;strengthening;transfer training;progressive activity/exercise;home program guidelines;stretching   Risk & Benefits of therapy have been explained evaluation/treatment results reviewed;care plan/treatment goals reviewed;risks/benefits reviewed;current/potential barriers reviewed;participants voiced agreement with care plan;patient;participants included   PT Discharge Planning    PT Rationale for DC Rec Anticipate pt will be SBA or less withfunctional mobility by time of discharge.   PT Brief overview of current status  Pt is supervision-CGA for sit <>stand, transfers and side stepping at edge of bed, with walker. Note KI to be donned at all times.   Total Evaluation Time   Total Evaluation Time (Minutes) 10

## 2021-08-25 NOTE — ANESTHESIA PREPROCEDURE EVALUATION
Anesthesia Pre-Procedure Evaluation    Patient: Burton Elizabeth   MRN: [id number] : 1945        Preoperative Diagnosis: Mechanical loosening of prosthetic hip (H) [T84.038A, Z96.649]   Procedure : Procedure(s):  RIGHT TOTAL KNEE REVISION     Past Medical History:   Diagnosis Date     Arthritis of knee~ s/p replacement 3/24/2016     CAD (coronary artery disease)      Cardiomyopathy (H)      Cerebral infarction (H)      Congestive heart failure (H)      Decreased cardiac ejection fraction~44% 3/24/2016     Diabetes mellitus, type 2 (H) 3/24/2016     History of DVT (deep vein thrombosis)      HTN (hypertension) 3/24/2016     LBBB (left bundle branch block) 3/24/2016     Mixed hyperlipidemia 2016     Renal disease     stage 3 chronic kidney disease     Rheumatic aortic stenosis      Upper GI bleed       Past Surgical History:   Procedure Laterality Date     ANKLE SURGERY      X 4     APPENDECTOMY       ARTHROPLASTY KNEE Right 3/29/2016    Procedure: ARTHROPLASTY KNEE;  Surgeon: Heena Rosen MD;  Location:  OR     ARTHROPLASTY REVISION KNEE Left 2019    Procedure: REVISION LEFT TOTAL KNEE  ARTHROPLASTY;  Surgeon: Heena Rosen MD;  Location:  OR     CV LEFT HEART CATH N/A 2019    Procedure: Left Heart Cath;  Surgeon: Edgardo Beckham MD;  Location:  HEART CARDIAC CATH LAB     ESOPHAGOSCOPY, GASTROSCOPY, DUODENOSCOPY (EGD), COMBINED N/A 2021    Procedure: ESOPHAGOGASTRODUODENOSCOPY, WITH BIOPSY;  Surgeon: Monserrat Bright MD;  Location:  GI     EYE SURGERY      cataract removal     ORTHOPEDIC SURGERY      KNEE SCOPES MULTIPLE      Allergies   Allergen Reactions     Penicillins Anaphylaxis     Empagliflozin      Other reaction(s): Serum creatinine raised      Social History     Tobacco Use     Smoking status: Never Smoker     Smokeless tobacco: Never Used   Substance Use Topics     Alcohol use: Not Currently     Alcohol/week: 2.0 standard drinks     Types: 2  Standard drinks or equivalent per week     Comment: occasional, social      Wt Readings from Last 1 Encounters:   08/25/21 110.3 kg (243 lb 1.6 oz)        Anesthesia Evaluation            ROS/MED HX  ENT/Pulmonary:     (+) sleep apnea, doesn't use CPAP,     Neurologic:     (+) CVA, with deficits, - balance troubles .     Cardiovascular: Comment: lbbb    Temporal arteritis     Severe aortic stenosis with preserved EF per ECHO today    (+) Dyslipidemia hypertension--CAD ---CHF valvular problems/murmurs type: AS severe. Previous cardiac testing   Echo: Date: Results:  Procedure  Complete Echo Adult. Optison (NDC #0669-5182) given intravenously.     ______________________________________________________________________________  Interpretation Summary     Severe valvular aortic stenosis.  The mean AoV pressure gradient is 44mmHg.  Dimentionless index is 0.24.  Left ventricular systolic function is normal.  The visual ejection fraction is 55-60%.  Compared to 7/21, mean gardient across AV is 43mm compared to 39mm on prior  study.  The study was technically difficult.  ______________________________________________________________________________  Left Ventricle  The left ventricle is normal in size. Left ventricular hypertrophy: asymmetric  with no LVOT obstruction. A sigmoid septum is present. Left ventricular  systolic function is normal. The visual ejection fraction is 55-60%. Left  ventricular diastolic function is indeterminate. Septal motion is consistent  with conduction abnormality. Mild anteroseptal hypokinesis.     Right Ventricle  The right ventricle is normal size. The right ventricular systolic function is  normal.     Atria  The left atrium is mildly dilated. Right atrial size is normal.     Mitral Valve  There is mild mitral annular calcification. The mitral valve leaflets are  mildly thickened. There is trace to mild mitral regurgitation.     Tricuspid Valve  No tricuspid regurgitation. Right ventricular  systolic pressure could not be  approximated due to inadequate tricuspid regurgitation.     Aortic Valve  AV is severely calcified. Cannot accurately determine number of cusps. Severe  valvular aortic stenosis. The mean AoV pressure gradient is 44mmHg.     Pulmonic Valve  The pulmonic valve is not well visualized.     Vessels  The aortic root is normal size.     Pericardium  There is no pericardial effusion.     Rhythm  The rhythm was undetermined.  Stress Test: Date: Results:    ECG Reviewed: Date: Results:    Cath: Date: Results:      METS/Exercise Tolerance:     Hematologic:     (+) History of blood clots, anemia,     Musculoskeletal:       GI/Hepatic:    (-) GERD   Renal/Genitourinary:     (+) renal disease,     Endo:     (+) type II DM, Obesity,     Psychiatric/Substance Use:       Infectious Disease:       Malignancy:       Other:            Physical Exam    Airway        Mallampati: III   TM distance: > 3 FB   Neck ROM: full     Respiratory Devices and Support         Dental  no notable dental history         Cardiovascular   cardiovascular exam normal          Pulmonary           breath sounds clear to auscultation           OUTSIDE LABS:  CBC:   Lab Results   Component Value Date    WBC 9.0 07/11/2021    WBC 8.5 07/10/2021    HGB 12.3 (L) 08/25/2021    HGB 8.6 (L) 07/11/2021    HCT 29.2 (L) 07/11/2021    HCT 28.2 (L) 07/10/2021     07/11/2021     07/10/2021     BMP:   Lab Results   Component Value Date     07/11/2021     07/10/2021    POTASSIUM 3.6 08/25/2021    POTASSIUM 4.7 07/11/2021    CHLORIDE 109 07/11/2021    CHLORIDE 112 (H) 07/10/2021    CO2 23 07/11/2021    CO2 25 07/10/2021    BUN 45 (H) 07/11/2021    BUN 51 (H) 07/10/2021    CR 2.21 (H) 08/25/2021    CR 1.82 (H) 07/11/2021     (H) 08/25/2021     (H) 07/11/2021     COAGS:   Lab Results   Component Value Date    PTT 28 03/29/2016    INR 1.32 (H) 07/06/2021     POC:   Lab Results   Component Value Date    [facility]  181 (H) 07/11/2021     HEPATIC:   Lab Results   Component Value Date    ALBUMIN 2.8 (L) 07/11/2021    PROTTOTAL 5.4 (L) 07/06/2021    ALT 34 07/06/2021    AST 56 (H) 07/06/2021    ALKPHOS 83 07/06/2021    BILITOTAL 0.4 07/06/2021     OTHER:   Lab Results   Component Value Date    LACT 2.2 (H) 07/07/2021    A1C 6.3 (H) 07/06/2021    GABE 8.9 07/11/2021    PHOS 2.8 07/11/2021    MAG 1.6 02/12/2021    TSH 2.21 01/11/2021    SED 15 06/10/2021       Anesthesia Plan    ASA Status:  3      Anesthesia Type: General.     - Airway: ETT   Induction: Intravenous, Propofol.   Maintenance: Balanced.        Consents    Anesthesia Plan(s) and associated risks, benefits, and realistic alternatives discussed. Questions answered and patient/representative(s) expressed understanding.     - Discussed with:  Patient         Postoperative Care    Pain management: IV analgesics, Multi-modal analgesia, Peripheral nerve block (Single Shot).   PONV prophylaxis: Ondansetron (or other 5HT-3), Dexamethasone or Solumedrol, Background Propofol Infusion     Comments:                Demond Ray MD

## 2021-08-25 NOTE — OP NOTE
Procedure Date: 08/25/2021    PREOPERATIVE DIAGNOSIS:  Aseptic loosening, right total knee arthroplasty.    POSTOPERATIVE DIAGNOSES:  Aseptic loosening, right total knee arthroplasty.    PROCEDURE PERFORMED:  Right total knee arthroplasty with revision of both femoral and tibial components using the [employer] revision knee system.    INDICATIONS FOR PROCEDURE:  The patient is a 76-year-old male who is approximately 10 years out from a right total knee arthroplasty.  He was doing very well until approximately a year ago, he fell and sustained a nondisplaced fracture of the medial tibial plateau.  This went on to a nonunion and resulted in aseptic loosening of the tibial component.  He was worked up for infection, found to have no evidence of infection.  I recommended revision total knee arthroplasty.  I explained the risks, benefits, and potential complications of the procedure.  The discussion included but was not specific to infection, vascular and neurologic complications, DVT, septic and aseptic loosening, arthrofibrosis, fracture and the possible need for further revision surgery.  After this discussion, the patient wanted to proceed with surgery.    ANESTHETIC USED:  General.    ASSISTANT:  Remi Hylton PA-C    DESCRIPTION OF PROCEDURE:  The patient was taken to the operating room and placed on the operating table in supine position.  After adequate induction of general anesthetic, a pneumatic tourniquet was applied to the right thigh and a bump was placed beneath the right hip.  The patient was given 2 grams of Ancef for infection prophylaxis given 1 hour prior to incision.  We then performed sterile prep and drape of the right knee and right lower extremity.  After sterile prep and drape, the limb was exsanguinated and tourniquet was elevated to 300 mmHg.  We then opened his old surgical scar and proceeded with a medial parapatellar arthrotomy in a quad sparing approach.  In order to correct the varus deformity,  release was done off the medial side of the tibia.  The loose fragments of the medial tibial plateau were removed.  The tibial component was grossly loose, it was actually pistoning in the tibia.  Femoral component was well fixed.  We then elected to remove the femoral component to gain exposure.  The femoral component was removed with little or no bone loss first using a Gigli saw, followed by osteotomes.  We then removed the tibial component and removed the cement mantle.  We then began preparing the femur by reaming for the stem and then broaching for the sleeve.  We eventually after reaming and broaching constructed a trial and the fit was ideal.  We then directed our attention to the femur.  Again through a series of reaming and broaching, prepared the femur, then prepared the trial, put the trial in place.  We then put in a trial polyethylene and we had good soft tissue balancing in both flexion and extension.  The alignment of the leg was neutral.  We then removed the trial components, let down the tourniquet and assembled the components on the back table.  Once these components were assembled, we then began preparing the cement when preparing the cement surfaces using pulsatile jet lavage antibiotic saline.  Once cement was appropriate consistency, we cemented in the tibial component, taking care to not get any cement on the ingrowth surface.  Once the tibial component was fully seated, we removed any excess cement using Singer elevator.  We then directed our attention to the femur, and again applied cement distally, taking care to not get any cement on the ingrowth portion of the component.  Once the femur was fully seated, we then put in a trial polyethylene, placed the knee in full extension and allowed the cement to harden.  We also soaked the knee in a dilute solution of Betadine for 3 minutes and irrigated with pulse jet lavage used approximately 3 liters of antibiotic saline and pulse jet lavage.  Once  cement had hardened, took the knee through a range of motion.  Patella tracked ideally with good soft tissue balance in both flexion and extension.  We then removed the trial polyethylene, inspected the joint for loose bone and cement removed when it was found.  We then irrigated again and removed the trial polyethylene and put in the 12 mm TC3 polyethylene, reduced the knee and again the patella tracked ideally with good soft tissue balancing in both flexion and extension.  We then irrigated with pulse jet lavage and closed the arthrotomy using 0 Ethibond sutures, it was oversewn using 0 Stratafix.  The deep subQ was closed using 0 Vicryl, superficial subQ was closed with 2-0 Vicryl, skin was closed with a running 3-0 Monocryl subcuticular stitch, followed by Prineo dressing.  Sterile dressing was applied, followed by knee immobilizer.  The patient tolerated the procedure.  There were no intraoperative complications.  The patient was sent to [facility] in stable condition.    Plan will be for patient to get 24 hours of Ancef for infection prophylaxis, 30 days of aspirin for DVT prophylaxis.      Heena Rosen MD        D: 2021   T: 2021   MT: [id number]    Name:     TAY PAGE  MRN:      [id number]        Account:        [id number]   :      1945           Procedure Date: 2021     Document: [id number]

## 2021-08-25 NOTE — PROGRESS NOTES
PTA medications completed by Medication Scribe day of surgery     Medication history sources: Patient, Surescripts and H&P  In the past week, patient estimated taking medication this percent of the time: Greater than 90%  Adherence assessment: N/A Not Observed    Significant changes made to the medication list:  Patient reports no longer taking the following meds (med scribe removed from PTA med list): Omeprazole, Diclofenac Gel, Camphor Lotion, Acetminophen      Additional medication history information:   None    Medication reconciliation completed by provider prior to medication history? No    Time spent in this activity: 25 minutes    The information provided in this note is only as accurate as the sources available at the time of update(s)      Prior to Admission medications    Medication Sig Last Dose Taking? Auth Provider   amLODIPine (NORVASC) 10 MG tablet Take 0.5 tablets (5 mg) by mouth daily 8/24/2021 at A M Yes Pb Goodwin MD   atorvastatin (LIPITOR) 80 MG tablet Take 40 mg by mouth daily (0.5 X 80 mg) 8/24/2021 at PM Yes Reported, Patient   carvedilol (COREG) 25 MG tablet Take 25 mg by mouth every morning 8/24/2021 at AM Yes Reported, Patient   ferrous sulfate (FEROSUL) 325 (65 Fe) MG tablet Take 325 mg by mouth daily 8/24/2021 at AM Yes Unknown, Entered By History   glimepiride (AMARYL) 2 MG tablet Take 4 mg by mouth 2 times daily (before meals) 8/24/2021 at PM Yes Unknown, Entered By History   isosorbide mononitrate (IMDUR) 30 MG 24 hr tablet Take 1 tablet (30 mg) by mouth daily 8/24/2021 at AM Yes Brittanie Quiroz APRN CNP   multivitamin, therapeutic with minerals (THERA-VIT-M) TABS Take 1 tablet by mouth daily 8/24/2021 at AM Yes Reported, Patient   nitroGLYcerin (NITROSTAT) 0.4 MG sublingual tablet For chest pain place 1 tablet under the tongue every 5 minutes for 3 doses. If symptoms persist 5 minutes after 1st dose call 911.  at PRN Yes Pb Goodwin MD   pantoprazole (PROTONIX) 40  MG EC tablet Take 1 tablet (40 mg) by mouth 2 times daily (before meals) 8/24/2021 at AM Yes Pb Goodwin MD   SEMAGLUTIDE,0.25 OR 0.5MG/DOS, SC Inject 0.5 mg Subcutaneous once a week Mondays 8/23/2021 at AM Yes Unknown, Entered By History   torsemide (DEMADEX) 20 MG tablet Take 20 mg by mouth 2 times daily 8/24/2021 at PM Yes Reported, Patient   blood glucose (NO BRAND SPECIFIED) test strip Use to test blood sugar as directed   Reported, Patient   clopidogrel (PLAVIX) 75 MG tablet Take 1 tablet (75 mg) by mouth daily  Patient taking differently: Take 75 mg by mouth daily Will be stopped 8/5/21 for endoscopy 8/5/2021 at UNKNOWN  Pb Goodwin MD   insulin glargine (LANTUS PEN) 100 UNIT/ML pen Inject 20 Units Subcutaneous as needed (Rx: 20 units daily; pt taking differently)   Henny Le PA   order for DME Equipment being ordered: Walker Wheels () and Walker ()  Treatment Diagnosis: Impaired gait   Catarino Kearns PA-C Gerard Burgess, [place]  Medication [facility]

## 2021-08-25 NOTE — ANESTHESIA CARE TRANSFER NOTE
Patient: Burton Elizabeth    Procedure(s):  REVISION OF RIGHT TOTAL KNEE ARTHROPLASTY    Diagnosis: Mechanical loosening of prosthetic hip (H) [T84.038A, Z96.649]  Diagnosis Additional Information: No value filed.    Anesthesia Type:   General     Note:    Oropharynx: oropharynx clear of all foreign objects  Level of Consciousness: awake and drowsy  Oxygen Supplementation: face mask  Level of Supplemental Oxygen (L/min / FiO2): 6  Independent Airway: airway patency satisfactory and stable  Dentition: dentition unchanged  Vital Signs Stable: post-procedure vital signs reviewed and stable  Report to RN Given: handoff report given  Patient transferred to: PACU  Comments: To PACU: Arouses easily, good airway, 02 face mask, VSS  Report to RN  Handoff Report: Identifed the Patient, Identified the Reponsible Provider, Reviewed the pertinent medical history, Discussed the surgical course, Reviewed Intra-OP anesthesia mangement and issues during anesthesia, Set expectations for post-procedure period and Allowed opportunity for questions and acknowledgement of understanding      Vitals:  Vitals Value Taken Time   /91 08/25/21 1110   Temp     Pulse 75 08/25/21 1113   Resp 20 08/25/21 1113   SpO2 96 % 08/25/21 1113   Vitals shown include unvalidated device data.    Electronically Signed By: WALE Tinoco CRNA  August 25, 2021  11:14 AM

## 2021-08-25 NOTE — ANESTHESIA PROCEDURE NOTES
Femoral Procedure Note  Pre-Procedure   Staff -        Anesthesiologist:  Imer Avitia MD       Performed By: anesthesiologist       Location: pre-op       Pre-Anesthestic Checklist: patient identified, IV checked, site marked, risks and benefits discussed, informed consent, monitors and equipment checked, pre-op evaluation, at physician/surgeon's request and post-op pain management  Timeout:       Correct Patient: Yes        Correct Procedure: Yes        Correct Site: Yes        Correct Position: Yes        Correct Laterality: Yes        Site Marked: Yes  Procedure Documentation  Procedure: Femoral       Laterality: right       Patient Position: supine       Patient Prep/Sterile Barriers: sterile gloves, mask, patient draped       Skin prep: Chloraprep      Local skin infiltrated with mL of 1% lidocaine.        Needle Type: [employer] needle       Needle Gauge: 17.        Needle Length (Inches): 3.13        Catheter: 16 G.         Catheter threaded easily.         Ultrasound guided       1. Ultrasound was used to identify targeted nerve, plexus, vascular marker, or fascial plane and place a needle adjacent to it in real-time.       2. Ultrasound was used to visualize the spread of anesthetic in close proximity to the above referenced structure.       3. A permanent image is entered into the patient's record.       4. The visualized anatomic structures appeared normal.       5. There were no apparent abnormal pathologic findings.    Assessment/Narrative         The placement was negative for: blood aspirated, painful injection and site bleeding       Paresthesias: No.     Bolus given via needle..        Secured via.        Insertion/Infusion Method: Single Shot       Complications: none    Medication(s) Administered   Bupivacaine 0.25% w/ 1:200K Epi (Injection), 20 mL  Medication Administration Time: 8/25/2021 1:32 PM    Comments:  Rescue block performed in PACU after obtaining consent from patient.  Site prepped  sterilely with Chlorprep and sterile drape applied to area.  Ultrasound with sterile sheath used to visualize femoral artery, nerve and vein.  1% lidocaine used for skin topicalization.  Pajunk needle advanced under direct ultrasound guidance.  20 ml 0.25% bupivacaine with 1:200,000 epinephrine injected incrementally, with aspiration every 5 ml negative for heme. Patient tolerated procedure well.    Ultrasound Interpretation, peripheral nerve block    1.  As noted above, under ultrasound guidance, the needle was inserted and placed in close proximity to the femoral nerve.  2. Ultrasound was also used to visualize the spread of the anesthetic in close proximity to the nerve being blocked.  3. The nerve appeared anatomically normal.  4. There were no apparent abnormal pathological findings.  5. A permanent ultrasound image was saved n the patient's record.    Imer Avitia MD   1:39 PM

## 2021-08-25 NOTE — BRIEF OP NOTE
[facility]    Brief Operative Note    Pre-operative diagnosis: Mechanical loosening of prosthetic hip (H) [T84.038A, Z96.649]  Post-operative diagnosis Same as pre-operative diagnosis    Procedure: Procedure(s):  REVISION OF RIGHT TOTAL KNEE ARTHROPLASTY  Surgeon: Surgeon(s) and Role:     * Heena Rosen MD - Primary     * Jonathon Hylton PA-C - Assisting  Anesthesia: General   Estimated blood loss: Less than 50 ml  Drains: None  Specimens: * No specimens in log *  Findings:   None.  Complications: None.  Implants:   Implant Name Type Inv. Item Serial No.  Lot No. LRB No. Used Action   BONE CEMENT SIMPLEX W/TOBRAMYCIN 6197-9-001 - [id number] Cement, Bone BONE CEMENT SIMPLEX W/TOBRAMYCIN 6197-9-001  [employer] [id number] Right 1 Implanted   BONE CEMENT SIMPLEX W/TOBRAMYCIN 6197-9-001 - [id number] Cement, Bone BONE CEMENT SIMPLEX W/TOBRAMYCIN 6197-9-001  [employer] [id number] Right 1 Implanted   ATUN TIB SLV M/L 37MM HALF POR - [id number] Total Joint Component/Insert ATUN TIB SLV M/L 37MM HALF POR  [employer]- [id number] Right 1 Implanted   IMP  ATTUNE  REVISION FEMORAL SLEEVE POROCOAT  PARTAIL COATED  40mm  1511-     [id number] Right 1 Implanted   ATUNE REV RP TIB BASE SZ 5 RUPERTO - [id number] Total Joint Component/Insert ATUNE REV RP TIB BASE SZ 5 RUPERTO  [employer]- [id number] Right 1 Implanted   IMP ATTUNE  REVISION CRS FEMORAL  6  RIGHT  CEMENTED  1504-     [id number] Right 1 Implanted   ATUN PRESSFIT STR [id number] - [id number] Total Joint Component/Insert ATUN PRESSFIT STR [id number]  [employer] [id number] Right 1 Implanted   ATUNE PRESSFIT STR [id number] - [id number] Total Joint Component/Insert ATUNE PRESSFIT STR [id number]  [employer]- [id number] Right 1 Implanted   ATTUNE POS FEM AUG SZ 6 4MM - [id number] Total Joint Component/Insert ATTUNE POS FEM AUG SZ 6 4MM  [employer]- [id number] Right 1 Implanted   ATTUNE POS  FEM AUG SZ 6 4MM - [id number] Total Joint Component/Insert ATTUNE POS FEM AUG SZ 6 4MM  [employer]- [id number] Right 1 Implanted   ATTUNE CRS RP INSRT SZ 6 12MM - [id number] Total Joint Component/Insert ATTUNE CRS RP INSRT SZ 6 12MM  [employer]- [id number] Right 1 Implanted

## 2021-08-25 NOTE — CONSULTS
[facility]  Hospitalist Service Consultation  JoAnna K. Barthell, PA-C pager [phone]    Date of Admission:  8/25/2021  Date of Consult: 8/25/2021    Assessment & Plan   Burton Elizabeth is a 76 year old male with PMHx DM2, aortic stenosis, CAD, chronic HFpEF, and recent GI bleed / mtpl gastric ulcers who is admitted under the care of the orthopedic service for post op management following revision R TKA surgery (8/25/2021). Hospitalist service consulted for med co-mgnt.    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  EBL less than 50.  Preop Hgb 12.3.  - Post op mngt per ortho.  - Pain controlled with scheduled APAP and PRN oxycodone.  - Postop DVT prophylaxis per chart review appears to be ASA -- note recent GI bleed and ASA discontinued at recent discharge in favor of Plavix.  Plavix has been ordered appropriately. Also has hx DVT.  - Treated with preop vancomycin and postop clindamycin 900mg x2 doses    Moderate non-occlusive CAD and known  non-dominant small Cfx.  Chronic HFpEF.  Chronic LBBB.  Moderate-severe aortic stenosis.  HTN / HLD.  [PTA: atorvastatin 40mg, clopidogrel 75mg, carvedilol 25mg, amlodipine 5mg, Imdur 30mg, torsemide 20mg BID]  - Daily weights and I&Os.  - Cont PTA regimen.  - Resume ASA when OK with ortho.  - Resume torsemide in AM if stable kidney function and BP allows.  - Stop IV fluids given severe aortic stenosis and patient just started dinner.    ID-DM2. A1C 7.8 (6/10/2021).  - Hold PTA glimepiride and semaglutide.  - Cont PTA Lantus 20u.  - MSSI.  - Changed mod carb diet.    CKD, stage 3.  Baseline Cr fluctuates and unclear, but appears 1.8-2.3. Estimated CrCl 35. Received preop vancomycin.  - Monitor.    Multiple gastric ulcers. Diagnosed by EGD July 2021 and associated then with acute blood loss anemia hgb 6.4.  Cabrera's esophagus.  - Protonix 40mg BID and iron replacement at discharge.    Suspected CRISTHIAN. Historically declined sleep  study.  Medically complicated morbid obesity. Contributes to all cause morbidity and mortality.    Asymptomatic COVID-19. Negative 8/22/2021.    DVT Prophylaxis: Pneumatic Compression Devices and Plavix  Code Status: Full Code confirmed    This patient was discussed with Dr. Vernon of the Hospitalist Service who agrees with current plans as outlined above.    Disposition: Expected discharge in 1-2 days per ortho.    JoAnna K. Barthell, PA-C    Burton Elizabeth MRN# [id number]   YOB: 1945 Age: 76 year old   Primary Care Physician: [place], [facility] None    Requesting Physician: Heena Rosen  Reason for Consult: medical co-mngt    History provided by patient and chart review.    HPI  Burton Elizabeth is a 76 year old male with PMHx DM2, aortic stenosis, CAD, chronic HFpEF, and recent GI bleed / mtpl gastric ulcers who is admitted under the care of the orthopedic service for post op management following revision R TKA surgery (8/25/2021).    Patient evaluated at bedside.  Main complaint is that he is tired and irritated as his roommate is speaking loudly.  He denies chest pain, difficulty breathing, abdominal pain, nausea, and vomiting.  Midway through our conversation he does report that he has upset stomach.    It is difficult to get information in conversation with the patient and he tells me he takes his Lantus as needed if his BG is 150, but seems to indicate he test his blood sugar 4 times a day, this will need clarification.  Additionally I note he is no longer on Coreg 12.5 mg twice daily and is now on 25 mg daily.  Have asked pharmacy to perform med reconciliation when patient in better mood this evening if they are able.    PAST MEDICAL HISTORY  1.  Coronary artery disease.  Angiogram in 8/2019 showed single-vessel occlusive coronary artery disease including  of nondominant small circumflex proximal circumflex 99% stenosis ostial RPDA 55% post-atrial lesion 50% and mid to  distal LAD 50% stenosis.  2.  Hypertension.  3.  Hyperlipidemia.    4.  Severe aortic stenosis.   5.  Chronic heart failure with preserved ejection fraction.  6.  Chronic left bundle branch block.  7.  History of cerebrovascular accident 7/2020.  8.  Stage III CKD.  9.  Type 2 diabetes.  10.  Suspected sleep apnea.  Declined a sleep study in the past.  11.  Obesity.  12.  History of DVT.  He had been on anticoagulation in the past, but it sounds as though this was not continued in the long-term due to potential complications with bleeding.  11.  Temporal arteritis.  This [facility],and he was treated with steroids with improvement.  12.  Iron deficiency anemia.  13. Cabrera's Esophagus.  14. Multiple gastric ulcers. EGD July 2021.    PAST SURGICAL HISTORY  Past Surgical History:   Procedure Laterality Date     ANKLE SURGERY      X 4     APPENDECTOMY       ARTHROPLASTY KNEE Right 3/29/2016    Procedure: ARTHROPLASTY KNEE;  Surgeon: Heena Rosen MD;  Location:  OR     ARTHROPLASTY REVISION KNEE Left 9/27/2019    Procedure: REVISION LEFT TOTAL KNEE  ARTHROPLASTY;  Surgeon: Heena Rosen MD;  Location:  OR     CV LEFT HEART CATH N/A 8/12/2019    Procedure: Left Heart Cath;  Surgeon: Edgardo Beckham MD;  Location:  HEART CARDIAC CATH LAB     ESOPHAGOSCOPY, GASTROSCOPY, DUODENOSCOPY (EGD), COMBINED N/A 7/7/2021    Procedure: ESOPHAGOGASTRODUODENOSCOPY, WITH BIOPSY;  Surgeon: Monserrat Bright MD;  Location:  GI     EYE SURGERY      cataract removal     ORTHOPEDIC SURGERY      KNEE SCOPES MULTIPLE       HOME MEDICATIONS  Prior to Admission medications    Medication Sig Last Dose Taking? Auth Provider   amLODIPine (NORVASC) 10 MG tablet Take 0.5 tablets (5 mg) by mouth daily 8/24/2021 at A M Yes Pb Goodwin MD   atorvastatin (LIPITOR) 80 MG tablet Take 40 mg by mouth daily (0.5 X 80 mg) 8/24/2021 at PM Yes Reported, Patient   carvedilol (COREG)  25 MG tablet Take 25 mg by mouth every morning 8/24/2021 at AM Yes Reported, Patient   ferrous sulfate (FEROSUL) 325 (65 Fe) MG tablet Take 325 mg by mouth daily 8/24/2021 at AM Yes Unknown, Entered By History   glimepiride (AMARYL) 2 MG tablet Take 4 mg by mouth 2 times daily (before meals) 8/24/2021 at PM Yes Unknown, Entered By History   isosorbide mononitrate (IMDUR) 30 MG 24 hr tablet Take 1 tablet (30 mg) by mouth daily 8/24/2021 at AM Yes Brittanie Quiroz APRN CNP   multivitamin, therapeutic with minerals (THERA-VIT-M) TABS Take 1 tablet by mouth daily 8/24/2021 at AM Yes Reported, Patient   nitroGLYcerin (NITROSTAT) 0.4 MG sublingual tablet For chest pain place 1 tablet under the tongue every 5 minutes for 3 doses. If symptoms persist 5 minutes after 1st dose call 911.  at PRN Yes Pb Goodwin MD   pantoprazole (PROTONIX) 40 MG EC tablet Take 1 tablet (40 mg) by mouth 2 times daily (before meals) 8/24/2021 at AM Yes Pb Goodwin MD   SEMAGLUTIDE,0.25 OR 0.5MG/DOS, SC Inject 0.5 mg Subcutaneous once a week Mondays 8/23/2021 at AM Yes Unknown, Entered By History   torsemide (DEMADEX) 20 MG tablet Take 20 mg by mouth 2 times daily 8/24/2021 at PM Yes Reported, Patient   blood glucose (NO BRAND SPECIFIED) test strip Use to test blood sugar as directed   Reported, Patient   clopidogrel (PLAVIX) 75 MG tablet Take 1 tablet (75 mg) by mouth daily  Patient taking differently: Take 75 mg by mouth daily Will be stopped 8/5/21 for endoscopy 8/5/2021 at UNKNOWN  Pb Goodwin MD   insulin glargine (LANTUS PEN) 100 UNIT/ML pen Inject 20 Units Subcutaneous as needed (Rx: 20 units daily; pt taking differently)   Henny Le PA   order for DME Equipment being ordered: Walker Wheels () and Walker ()  Treatment Diagnosis: Impaired gait   Catarino Kearns PA-C       ALLERGIES  Allergies   Allergen Reactions     Penicillins Anaphylaxis     Empagliflozin      Other reaction(s): Serum  "creatinine raised       SOCIAL HISTORY   reports that he has never smoked. He has never used smokeless tobacco. He reports previous alcohol use of about 2.0 standard drinks of alcohol per week. He reports that he does not use drugs.    FAMILY HISTORY  No reported family history heart disease, strokes, cancer.    REVIEW OF SYSTEMS  A 10 point ROS was negative other than the symptoms noted above in the HPI.    PHYSICAL EXAM  Nursing Notes Reviewed.  /84 (BP Location: Left arm)   Pulse 68   Temp 97.5  F (36.4  C) (Oral)   Resp 11   Ht 1.753 m (5' 9\")   Wt 110.3 kg (243 lb 1.6 oz)   SpO2 95%   BMI 35.90 kg/m     General:  Appears stated age in no acute distress.  Appears irritated and slightly drowsy.  Skin:  Warm, dry. No rashes or lesions on exposed skin.  HEENT:  Normocephalic, atraumatic. EOMs grossly intact.  Neck:  Supple. Short and thick.  Chest:  Breath sounds CTA anteriorly, but difficult to appreciate given body habitus. No increased work of breathing on 3 L O2 via nasal cannula.  Cardiovascular:  RRR, no rub or murmur appreciated but again difficult to appreciate given body habitus.  Eyes-1+ peripheral edema on left.  Right lower extremity not evaluated as is currently Ace wrapped.  Abdomen:  Soft, morbidly obese, non-tender, non-distended.  Musculoskeletal:  Moves all four extremities. Right LE ACE wrapped. Distal CMS intact.  Neurological:  CN 2-12 grossly intact.    Data   Data reviewed today:  I personally reviewed no images or EKG's today.  Recent Labs   Lab 08/25/21  1116 08/25/21  0615   HGB  --  12.3*   POTASSIUM  --  3.6   CR  --  2.21*   * 147*       Imaging:  No results found for this or any previous visit (from the past 24 hour(s)).    "

## 2021-08-25 NOTE — ANESTHESIA POSTPROCEDURE EVALUATION
Patient: Burton Elizabeth    Procedure(s):  REVISION OF RIGHT TOTAL KNEE ARTHROPLASTY    Diagnosis:Mechanical loosening of prosthetic hip (H) [T84.038A, Z96.640]  Diagnosis Additional Information: No value filed.    Anesthesia Type:  General    Note:  Disposition: Inpatient   Postop Pain Control: Uneventful            Sign Out: Well controlled pain   PONV: No   Neuro/Psych: Uneventful            Sign Out: Acceptable/Baseline neuro status   Airway/Respiratory: Uneventful            Sign Out: Acceptable/Baseline resp. status   CV/Hemodynamics: Uneventful            Sign Out: Acceptable CV status   Other NRE: NONE   DID A NON-ROUTINE EVENT OCCUR? No           Last vitals:  Vitals Value Taken Time   /83 08/25/21 1200   Temp 36.9  C (98.5  F) 08/25/21 1110   Pulse 71 08/25/21 1204   Resp 10 08/25/21 1204   SpO2 98 % 08/25/21 1204   Vitals shown include unvalidated device data.    Electronically Signed By: Demond Ray MD  August 25, 2021  12:05 PM

## 2021-08-25 NOTE — ADDENDUM NOTE
Addendum  created 08/25/21 1343 by Imer Avitia MD    Child order released for a procedure order, Clinical Note Signed, Intraprocedure Blocks edited

## 2021-08-25 NOTE — ANESTHESIA PROCEDURE NOTES
Airway       Patient location during procedure: OR       Procedure Start/Stop Times: 8/25/2021 7:54 AM  Staff -        Performed By: CRNA  Consent for Airway        Urgency: elective  Indications and Patient Condition       Indications for airway management: cj-procedural       Induction type:intravenous       Mask difficulty assessment: 2 - vent by mask + OA or adjuvant +/- NMBA    Final Airway Details       Final airway type: endotracheal airway       Successful airway: ETT - single and Oral  Endotracheal Airway Details        ETT size (mm): 8.0       Cuffed: yes       Successful intubation technique: video laryngoscopy       VL Blade Size: Glidescope 4       Grade View of Cords: 1       Adjucts: stylet       Position: Right       Measured from: lips       Secured at (cm): 22       Bite block used: Soft    Post intubation assessment        Placement verified by: capnometry, equal breath sounds and chest rise        Number of attempts at approach: 1       Secured with: pink tape       Ease of procedure: easy       Dentition: Intact and Unchanged

## 2021-08-25 NOTE — PROVIDER NOTIFICATION
Pt wanted Faroese toast with syrup and was upset when unable to under Mod carb 60g per meal diet. He refused dinner, notified PA and diet changed to regular to accommodate pt. Edu provided.

## 2021-08-26 NOTE — PROGRESS NOTES
"Burton Elizabeth  2021  POD # 1 sp revision tka    Admit Date:  2021      Doing well.  Normal healing wound.  No immediate surgical complications identified.  No excessive bleeding  Tolerating physical therapy and rehabilitation well.  Objective:  Blood pressure 131/81, pulse 75, temperature 97.8  F (36.6  C), temperature source Axillary, resp. rate 16, height 1.753 m (5' 9\"), weight 110.3 kg (243 lb 1.6 oz), SpO2 96 %.    Temperatures:  Current - Temp: 97.8  F (36.6  C); Max - Temp  Av.8  F (36.6  C)  Min: 97.5  F (36.4  C)  Max: 98.5  F (36.9  C)  Pulse range: Pulse  Av  Min: 65  Max: 80  Blood pressure range: Systolic (24hrs), Av , Min:117 , Max:153   ; Diastolic (24hrs), Av, Min:70, Max:92    Exam:  CMS: intact  alert, stable, wound ok  Calf nontender b le.       Labs:  Recent Labs   Lab Test 21  0615 21  0714 07/10/21  1053   POTASSIUM 3.6 4.7 5.0     Recent Labs   Lab Test 21  0615 21  0714 07/10/21  1053   HGB 12.3* 8.6* 8.2*     Recent Labs   Lab Test 21  1028 20  0000 20  0917   INR 1.32* 1.1* 1.12     Recent Labs   Lab Test 21  0714 07/10/21  1053 21  0850    160 157       PLAN: Weight bearing as tolerated  Continue physical therapy  Pain control measures  Additional problems to be followed by medicine physician  Plan discharge to home today if ok with medicine.       "

## 2021-08-26 NOTE — PLAN OF CARE
Patient vital signs are at baseline: pending, on 4L O2, 89%> on 3L.   Patient able to ambulate as they were prior to admission or with assist devices provided by therapies during their stay:  No,  Reason:  Pt refused early ambulation, stating he needs to sleep. Edu provided   Patient MUST void prior to discharge:  No,  Reason:  bladder scan for 720cc, pt refused attempt to void in urinal or BR, stating he does not need to urinate at this point and will let staff know when he needs. Pt also refused straight cath, DTV. Edu provided   Patient able to tolerate oral intake:  No,  Reason:  pending, pt did not order dinner, pt is notified and aware he is on regular diet, tolerating some crackers with ice chips.   Pain has adequate pain control using Oral analgesics:  Yes, pain is managed with oxy 5mg.     Pt is AOx4, CMS intact, dressing CDI, KI on, refused evening tylenol and insulin coverage, stating he does not need them and would like to sleep. Pt Also refused repositioning. Edu provided. ADLs and IS encouraged. Will cont to monitor.

## 2021-08-26 NOTE — PLAN OF CARE
OT: Orders received. Chart reviewed and discussed with care team.  OT not indicated due to:Pt. related this is his 4th R knee surgery,  has also had multiple surgeries on L knee.Pt.will have assist from family as needed at home, describes a safe bathroom set-up at home, support by toilet, rec. supervision w/ bathing/tub transfers. No skilled OT intervention indicated. Pt. reports he is moving well w/ PT, no concerns in regards to going home.  Defer discharge recommendations to PT.  Will complete orders.

## 2021-08-26 NOTE — DISCHARGE SUMMARY
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, Z96.649]  S/P revision of total knee [Z96.659]  Status post revision of total replacement of right knee [Z96.651]  Discharge Diagnosis: failed right total knee replacement  Procedures Performed: revision right total knee replacement  Complications: None apparent   History of Present Illness: see operative report for full HPI  Past Medical History:   Past Medical History:   Diagnosis Date     Arthritis of knee~ s/p replacement 3/24/2016     CAD (coronary artery disease)      Cardiomyopathy (H)      Cerebral infarction (H)      Congestive heart failure (H)      Decreased cardiac ejection fraction~44% 3/24/2016     Diabetes mellitus, type 2 (H) 3/24/2016     History of DVT (deep vein thrombosis)      HTN (hypertension) 3/24/2016     LBBB (left bundle branch block) 3/24/2016     Mixed hyperlipidemia 8/23/2016     Renal disease     stage 3 chronic kidney disease     Rheumatic aortic stenosis      Upper GI bleed      Patient Active Problem List   Diagnosis     LBBB (left bundle branch block)     HTN (hypertension)     Diabetes mellitus, type 2 (H)     Arthritis of knee~ s/p replacement     Primary localized osteoarthritis of right knee     Advance care planning     Mixed hyperlipidemia     Coronary artery disease involving native coronary artery of native heart     Status post coronary angiogram     S/P revision of total knee     Acute systolic congestive heart failure (H)     History of DVT 12/13/19      Vertigo     Acute respiratory failure with hypoxia (H)     Acute ischemic stroke (H)     Morbid obesity (H)     Chronic systolic heart failure (H)     Upper GI bleed     Elevated troponin     Anemia due to blood loss, acute     Acute renal failure, unspecified acute  renal failure type (H)     Syncope, unspecified syncope type     Past Surgical History:   Procedure Laterality Date     ANKLE SURGERY      X 4     APPENDECTOMY       ARTHROPLASTY KNEE Right 3/29/2016    Procedure: ARTHROPLASTY KNEE;  Surgeon: Heena Rosen MD;  Location:  OR     ARTHROPLASTY REVISION KNEE Left 9/27/2019    Procedure: REVISION LEFT TOTAL KNEE  ARTHROPLASTY;  Surgeon: Heena Roesn MD;  Location:  OR     CV LEFT HEART CATH N/A 8/12/2019    Procedure: Left Heart Cath;  Surgeon: Edgardo Beckham MD;  Location:  HEART CARDIAC CATH LAB     ESOPHAGOSCOPY, GASTROSCOPY, DUODENOSCOPY (EGD), COMBINED N/A 7/7/2021    Procedure: ESOPHAGOGASTRODUODENOSCOPY, WITH BIOPSY;  Surgeon: Monserrat Bright MD;  Location:  GI     EYE SURGERY      cataract removal     ORTHOPEDIC SURGERY      KNEE SCOPES MULTIPLE     Social History     Socioeconomic History     Marital status: Single     Spouse name: Not on file     Number of children: Not on file     Years of education: Not on file     Highest education level: Not on file   Occupational History     Not on file   Tobacco Use     Smoking status: Never Smoker     Smokeless tobacco: Never Used   Substance and Sexual Activity     Alcohol use: Not Currently     Alcohol/week: 2.0 standard drinks     Types: 2 Standard drinks or equivalent per week     Comment: occasional, social     Drug use: No     Sexual activity: Not on file   Other Topics Concern     Parent/sibling w/ CABG, MI or angioplasty before 65F 55M? Not Asked   Social History Narrative     Not on file     Social Determinants of Health     Financial Resource Strain:      Difficulty of Paying Living Expenses:    Food Insecurity:      Worried About Running Out of Food in the Last Year:      Ran Out of Food in the Last Year:    Transportation Needs:      Lack of Transportation (Medical):      Lack of Transportation (Non-Medical):    Physical Activity:      Days of Exercise per Week:      Minutes  of Exercise per Session:    Stress:      Feeling of Stress :    Social Connections:      Frequency of Communication with Friends and Family:      Frequency of Social Gatherings with Friends and Family:      Attends [employer] Services:      Active Member of Clubs or Organizations:      Attends Club or Organization Meetings:      Marital Status:    Intimate Partner Violence:      Fear of Current or Ex-Partner:      Emotionally Abused:      Physically Abused:      Sexually Abused:      Medications on admission:   Medications Prior to Admission   Medication Sig Dispense Refill Last Dose     amLODIPine (NORVASC) 10 MG tablet Take 0.5 tablets (5 mg) by mouth daily   8/24/2021 at A M     atorvastatin (LIPITOR) 80 MG tablet Take 40 mg by mouth daily (0.5 X 80 mg)   8/24/2021 at PM     carvedilol (COREG) 12.5 MG tablet Take 12.5 mg by mouth 2 times daily (with meals)    8/24/2021 at AM     ferrous sulfate (FEROSUL) 325 (65 Fe) MG tablet Take 325 mg by mouth daily   8/24/2021 at AM     glimepiride (AMARYL) 2 MG tablet Take 4 mg by mouth 2 times daily (before meals)   8/24/2021 at PM     insulin glargine (LANTUS PEN) 100 UNIT/ML pen Inject 20 Units Subcutaneous daily as needed (Rx: 20 units daily; pt taking differently: 20 units as needed for BG > 150)        isosorbide mononitrate (IMDUR) 30 MG 24 hr tablet Take 1 tablet (30 mg) by mouth daily 90 tablet 1 8/24/2021 at AM     multivitamin, therapeutic with minerals (THERA-VIT-M) TABS Take 1 tablet by mouth daily   8/24/2021 at AM     nitroGLYcerin (NITROSTAT) 0.4 MG sublingual tablet For chest pain place 1 tablet under the tongue every 5 minutes for 3 doses. If symptoms persist 5 minutes after 1st dose call 911. 30 tablet 0  at PRN     pantoprazole (PROTONIX) 40 MG EC tablet Take 1 tablet (40 mg) by mouth 2 times daily (before meals) 60 tablet 0 8/24/2021 at AM     SEMAGLUTIDE,0.25 OR 0.5MG/DOS, SC Inject 0.5 mg Subcutaneous once a week Mondays 8/23/2021 at AM     torsemide  (DEMADEX) 20 MG tablet Take 40 mg by mouth 2 times daily    8/24/2021 at PM     blood glucose (NO BRAND SPECIFIED) test strip Use to test blood sugar as directed        clopidogrel (PLAVIX) 75 MG tablet Take 1 tablet (75 mg) by mouth daily (Patient taking differently: Take 75 mg by mouth daily Will be stopped 8/5/21 for endoscopy) 30 tablet 0 8/5/2021 at UNKNOWN     order for DME Equipment being ordered: Walker Wheels () and Walker ()  Treatment Diagnosis: Impaired gait 1 each 0      Allergies:    Allergies   Allergen Reactions     Penicillins Anaphylaxis     Empagliflozin      Other reaction(s): Serum creatinine raised       Hospital Course: Patient was admitted to Orthopedics and brought to the OR on where he underwent the above named procedure. Postoperatively he did very well with no apparent complications, and he had an uneventful hospital stay. Antibiotics were given for 24 hours after surgery. He was given plavix for DVT prophylaxis and his pain was well controlled with oral meds, then transitioned to oral pain medications during his hospital stay. He progressed slowly with physical therapy and discharge to home was recommended. His chronic medical problems were followed by the medicine team during his hospital stay and there were no significant changes to conditions or treatment plans. No new medical problems presented during his hospital stay.   Consultations Obtained During Hospitalization:  1. Internal medicine for management of comorbid conditions and home medication management.  2. Physical Therapy for gait training, mobilization, range of motion and strengthening exercises  3. Occupational Therapy for activities of daily living.  4. Social Work for disposition planning.  Active Problems:    S/P revision of total knee    Discharge Disposition: patient improving  Discharge Diet: resume normal pre op diet  Discharge Medications:   Current Discharge Medication List      START taking these  medications    Details   HYDROcodone-acetaminophen (NORCO) 5-325 MG tablet Take 1-2 tablets by mouth every 4 hours as needed for pain  Qty: 30 tablet, Refills: 0    Associated Diagnoses: Status post revision of total replacement of right knee      hydrOXYzine (ATARAX) 10 MG tablet Take 1 tablet (10 mg) by mouth every 6 hours as needed for itching or anxiety (with pain, moderate pain)  Qty: 30 tablet, Refills: 0    Associated Diagnoses: Status post revision of total replacement of right knee      ondansetron (ZOFRAN-ODT) 4 MG ODT tab Take 1 tablet (4 mg) by mouth every 8 hours as needed for nausea Dissolve ON the tongue.  Qty: 30 tablet, Refills: 0    Associated Diagnoses: Status post revision of total replacement of right knee      senna-docusate (SENOKOT-S/PERICOLACE) 8.6-50 MG tablet Take 1-2 tablets by mouth 2 times daily Take while on oral narcotics to prevent or treat constipation.  Qty: 30 tablet, Refills: 0    Comments: While taking narcotics  Associated Diagnoses: Status post revision of total replacement of right knee         CONTINUE these medications which have NOT CHANGED    Details   amLODIPine (NORVASC) 10 MG tablet Take 0.5 tablets (5 mg) by mouth daily    Associated Diagnoses: Coronary artery disease involving native coronary artery of native heart with angina pectoris (H)      atorvastatin (LIPITOR) 80 MG tablet Take 40 mg by mouth daily (0.5 X 80 mg)      carvedilol (COREG) 12.5 MG tablet Take 12.5 mg by mouth 2 times daily (with meals)       ferrous sulfate (FEROSUL) 325 (65 Fe) MG tablet Take 325 mg by mouth daily      glimepiride (AMARYL) 2 MG tablet Take 4 mg by mouth 2 times daily (before meals)      insulin glargine (LANTUS PEN) 100 UNIT/ML pen Inject 20 Units Subcutaneous daily as needed (Rx: 20 units daily; pt taking differently: 20 units as needed for BG > 150)  Qty:      Comments: If Lantus is not covered by insurance, may substitute Basaglar at same dose and frequency.        isosorbide  mononitrate (IMDUR) 30 MG 24 hr tablet Take 1 tablet (30 mg) by mouth daily  Qty: 90 tablet, Refills: 1    Associated Diagnoses: Coronary artery disease involving native coronary artery of native heart with angina pectoris (H)      multivitamin, therapeutic with minerals (THERA-VIT-M) TABS Take 1 tablet by mouth daily      nitroGLYcerin (NITROSTAT) 0.4 MG sublingual tablet For chest pain place 1 tablet under the tongue every 5 minutes for 3 doses. If symptoms persist 5 minutes after 1st dose call 911.  Qty: 30 tablet, Refills: 0    Associated Diagnoses: NSTEMI (non-ST elevated myocardial infarction) (H)      pantoprazole (PROTONIX) 40 MG EC tablet Take 1 tablet (40 mg) by mouth 2 times daily (before meals)  Qty: 60 tablet, Refills: 0    Associated Diagnoses: Gastrointestinal hemorrhage associated with acute gastritis; Acute gastric ulcer with hemorrhage      SEMAGLUTIDE,0.25 OR 0.5MG/DOS, SC Inject 0.5 mg Subcutaneous once a week Mondays      torsemide (DEMADEX) 20 MG tablet Take 40 mg by mouth 2 times daily       blood glucose (NO BRAND SPECIFIED) test strip Use to test blood sugar as directed      clopidogrel (PLAVIX) 75 MG tablet Take 1 tablet (75 mg) by mouth daily  Qty: 30 tablet, Refills: 0    Associated Diagnoses: NSTEMI (non-ST elevated myocardial infarction) (H)      order for DME Equipment being ordered: Walker Wheels () and Walker ()  Treatment Diagnosis: Impaired gait  Qty: 1 each, Refills: 0    Associated Diagnoses: Status post total right knee replacement           Code Status:  X-rays needed at the follow up visit:   Jonathon Hylton PA-C  8/26/2021 7:50 AM   LUIS FERNAND[facility]  [place]  [phone]

## 2021-08-26 NOTE — PLAN OF CARE
Patient Due to void. States he does not need to void and will let staff know when he needs to. Refusing bladder scan and straight cath at this time. Education provided.

## 2021-08-26 NOTE — PLAN OF CARE
Patient vital signs are at baseline: Yes  Patient able to ambulate as they were prior to admission or with assist devices provided by therapies during their stay:  Yes  Patient MUST void prior to discharge:  Yes  Patient able to tolerate oral intake:  Yes  Pain has adequate pain control using Oral analgesics:  Yes    Pt up with assist of 1 and walker. PVR of 233, pt states he rarely goes to the bathroom at home and does not feel concerned. Pt discharged home when his brother arrives to pick him up any minute.

## 2021-08-30 NOTE — PLAN OF CARE
[facility]      OUTPATIENT PHYSICAL THERAPY EVALUATION  PLAN OF TREATMENT FOR OUTPATIENT REHABILITATION  (COMPLETE FOR INITIAL CLAIMS ONLY)  Patient's Last Name, First Name, M.I.  YOB: 1945  Burton Elizabeth                        Provider's Name  [facility] Medical Record No.  [id number]                               Onset Date:  08/25/21   Start of Care Date:         Type:     _X_PT   ___OT   ___SLP Medical Diagnosis:                           PT Diagnosis:  gait impairment   Visits from SOC:  1   _________________________________________________________________________________  Plan of Treatment/Functional Goals    Planned Interventions: balance training, cryotherapy, bed mobility training, home exercise program, gait training, motor coordination training, patient/family education, ROM (range of motion), stair training, strengthening, transfer training, progressive activity/exercise, home program guidelines, stretching     Goals: See Physical Therapy Goals on Care Plan in [employer] electronic health record.    Therapy Frequency: 2x/day  Predicted Duration of Therapy Intervention: 3 days  _________________________________________________________________________________    I CERTIFY THE NEED FOR THESE SERVICES FURNISHED UNDER        THIS PLAN OF TREATMENT AND WHILE UNDER MY CARE     (Physician co-signature of this document indicates review and certification of the therapy plan).               ,      Referring Physician: Jonathon Hylton PA-C            Initial Assessment        See Physical Therapy evaluation dated   in Epic electronic health record.

## 2021-09-15 NOTE — PROGRESS NOTES
HPI and Plan:     Burton Elizabeth is a very pleasant 76-year-old comes in with his brother today.  He was recently discharged on 9/9/2021 from [facility] having knee replacement subsequent complications none including metabolic encephalopathy, congestive heart failure, non-ST segment elevation MI, acute renal insufficiency, and GI bleed.  He is currently residing at [facility] and his been recovering with stable hemoglobin in the 11-12 range.  His creatinine is improving from 3-1/2 down to 2-1/2 which is baseline is around 2.0.  He denies any chest pain.  His echocardiogram recently done shows now severe aortic valve stenosis preserved ejection fraction mean gradient gradient 40 mmHg and valve area approximately 0.7.    This is a complex patient with multiple medical issues recently recovering for a complex hospitalization with multiple comorbidities.  His STS score puts him at 5% intermediate risk however I think he is really high risk based on recent hospitalization course.  Given his non-ST elevation MI renal insufficiency and recent GI bleed I would like him to wait 4 to 6 weeks prior to aortic valve replacement.  I would strongly prefer catheter aortic valve replacement over open surgical.  Patient understands this.  We will need to proceed and I have just ordered a coronary angiogram as well as TAVR CT scan.  Risk and benefits were explained.  Patient wishes to proceed.  His recent echocardiographic images, lab work were all reviewed.     CURRENT MEDICATIONS:  Current Outpatient Medications   Medication Sig Dispense Refill     acetaminophen (TYLENOL) 325 MG tablet Take 325-650 mg by mouth every 4 hours as needed       apixaban ANTICOAGULANT (ELIQUIS) 5 MG tablet Take 5 mg by mouth 2 times daily       atorvastatin (LIPITOR) 80 MG tablet Take 80 mg by mouth daily       carvedilol (COREG) 3.125 MG tablet Take 3.125 mg by mouth 2 times daily       glimepiride (AMARYL) 2 MG tablet Take 4 mg by  mouth 2 times daily       HYDROcodone-acetaminophen (NORCO) 5-325 MG tablet Take 1-2 tablets by mouth as needed       hydrOXYzine (ATARAX) 10 MG tablet Take 1 tablet (10 mg) by mouth every 6 hours as needed for itching or anxiety (with pain, moderate pain) 30 tablet 0     insulin glargine (LANTUS VIAL) 100 UNIT/ML vial Inject 20 Units Subcutaneous       isosorbide mononitrate (IMDUR) 30 MG 24 hr tablet Take 30 mg by mouth daily       multivitamin w/minerals (MULTIVITAMIN, THERAPEUTIC WITH MINERALS) tablet Take 1 tablet by mouth daily       omeprazole (PRILOSEC) 40 MG DR capsule Take 40 capsules by mouth 2 times daily       polyethylene glycol (MIRALAX) 17 GM/Dose powder Take 17 g by mouth       semaglutide (OZEMPIC) 2 MG/1.5ML SOPN pen INJECT 0.5MG UNDER THE SKIN EVERY WEEK MULTIPLE DOSES PER PEN.       torsemide (DEMADEX) 20 MG tablet Take 20 mg by mouth daily       blood glucose (NO BRAND SPECIFIED) test strip Use to test blood sugar as directed       insulin glargine (LANTUS PEN) 100 UNIT/ML pen INJECT 20 UNITS UNDER THE SKIN EVERY DAY       nitroGLYcerin (NITROSTAT) 0.4 MG sublingual tablet For chest pain place 1 tablet under the tongue every 5 minutes for 3 doses. If symptoms persist 5 minutes after 1st dose call 911. 30 tablet 0     order for DME Equipment being ordered: Walker Wheels () and Walker ()  Treatment Diagnosis: Impaired gait 1 each 0     Semaglutide-Weight Management 0.5 MG/0.5ML SOAJ Inject 0.5 mg Subcutaneous       senna-docusate (SENOKOT-S/PERICOLACE) 8.6-50 MG tablet Take 1-2 tablets by mouth 2 times daily Take while on oral narcotics to prevent or treat constipation. 30 tablet 0       ALLERGIES     Allergies   Allergen Reactions     Penicillins Anaphylaxis     Empagliflozin      Other reaction(s): Serum creatinine raised       PAST MEDICAL HISTORY:  Past Medical History:   Diagnosis Date     Arthritis of knee~ s/p replacement 3/24/2016     CAD (coronary artery disease)       Cardiomyopathy (H)      Cerebral infarction (H)      Congestive heart failure (H)      Decreased cardiac ejection fraction~44% 3/24/2016     Diabetes mellitus, type 2 (H) 3/24/2016     History of DVT (deep vein thrombosis)      HTN (hypertension) 3/24/2016     LBBB (left bundle branch block) 3/24/2016     Mixed hyperlipidemia 8/23/2016     Renal disease     stage 3 chronic kidney disease     Rheumatic aortic stenosis      Upper GI bleed        PAST SURGICAL HISTORY:  Past Surgical History:   Procedure Laterality Date     ANKLE SURGERY      X 4     APPENDECTOMY       ARTHROPLASTY KNEE Right 3/29/2016    Procedure: ARTHROPLASTY KNEE;  Surgeon: Heena Rosen MD;  Location:  OR     ARTHROPLASTY REVISION KNEE Left 9/27/2019    Procedure: REVISION LEFT TOTAL KNEE  ARTHROPLASTY;  Surgeon: Heena Rosen MD;  Location:  OR     ARTHROPLASTY REVISION KNEE Right 8/25/2021    Procedure: REVISION OF RIGHT TOTAL KNEE ARTHROPLASTY;  Surgeon: Heena Rosen MD;  Location:  OR     CV LEFT HEART CATH N/A 8/12/2019    Procedure: Left Heart Cath;  Surgeon: Edgardo Beckham MD;  Location:  HEART CARDIAC CATH LAB     ESOPHAGOSCOPY, GASTROSCOPY, DUODENOSCOPY (EGD), COMBINED N/A 7/7/2021    Procedure: ESOPHAGOGASTRODUODENOSCOPY, WITH BIOPSY;  Surgeon: Monserrat Bright MD;  Location:  GI     EYE SURGERY      cataract removal     ORTHOPEDIC SURGERY      KNEE SCOPES MULTIPLE       FAMILY HISTORY:  Family History   Problem Relation Age of Onset     Coronary Artery Disease No family hx of        SOCIAL HISTORY:  Social History     Socioeconomic History     Marital status: Single     Spouse name: None     Number of children: None     Years of education: None     Highest education level: None   Occupational History     None   Tobacco Use     Smoking status: Never Smoker     Smokeless tobacco: Never Used   Substance and Sexual Activity     Alcohol use: Not Currently     Alcohol/week: 2.0 standard drinks     Types:  "2 Standard drinks or equivalent per week     Comment: occasional, social     Drug use: No     Sexual activity: None   Other Topics Concern     Parent/sibling w/ CABG, MI or angioplasty before 65F 55M? Not Asked   Social History Narrative     None     Social Determinants of Health     Financial Resource Strain:      Difficulty of Paying Living Expenses:    Food Insecurity:      Worried About Running Out of Food in the Last Year:      Ran Out of Food in the Last Year:    Transportation Needs:      Lack of Transportation (Medical):      Lack of Transportation (Non-Medical):    Physical Activity:      Days of Exercise per Week:      Minutes of Exercise per Session:    Stress:      Feeling of Stress :    Social Connections:      Frequency of Communication with Friends and Family:      Frequency of Social Gatherings with Friends and Family:      Attends Yarsani Services:      Active Member of Clubs or Organizations:      Attends Club or Organization Meetings:      Marital Status:    Intimate Partner Violence:      Fear of Current or Ex-Partner:      Emotionally Abused:      Physically Abused:      Sexually Abused:        Review of Systems:  Skin:  Negative     Eyes:  Positive for glasses  ENT:  Negative    Respiratory:  Positive for dyspnea on exertion  Cardiovascular:    Positive for;edema  Gastroenterology: Positive for stomach or duodenal ulcer  Genitourinary:  not assessed    Musculoskeletal:  Positive for arthritis  Neurologic:  Negative    Psychiatric:  Negative    Heme/Lymph/Imm:  Negative    Endocrine:  Negative      Physical Exam:  Vitals: /76   Pulse 71   Ht 1.753 m (5' 9.02\")   Wt 112.9 kg (249 lb)   BMI 36.75 kg/m      Constitutional:           Skin:             Head:           Eyes:           Lymph:      ENT:           Neck:           Respiratory:            Cardiac:                                                           GI:           Extremities and Muscular Skeletal:             "     Neurological:           Psych:         Recent Lab Results:  LIPID RESULTS:  Lab Results   Component Value Date    CHOL 97 01/11/2021    HDL 31 (L) 01/11/2021    LDL 46 01/11/2021    TRIG 102 01/11/2021       LIVER ENZYME RESULTS:  Lab Results   Component Value Date    AST 56 (H) 07/06/2021    ALT 34 07/06/2021       CBC RESULTS:  Lab Results   Component Value Date    WBC 9.0 07/11/2021    RBC 3.34 (L) 07/11/2021    HGB 11.4 (L) 08/26/2021    HGB 8.6 (L) 07/11/2021    HCT 29.2 (L) 07/11/2021    MCV 87 07/11/2021    MCH 25.7 (L) 07/11/2021    MCHC 29.5 (L) 07/11/2021    RDW 18.6 (H) 07/11/2021     07/11/2021       BMP RESULTS:  Lab Results   Component Value Date     08/26/2021     07/11/2021    POTASSIUM 4.1 08/26/2021    POTASSIUM 4.7 07/11/2021    CHLORIDE 104 08/26/2021    CHLORIDE 109 07/11/2021    CO2 25 08/26/2021    CO2 23 07/11/2021    ANIONGAP 7 08/26/2021    ANIONGAP 6 07/11/2021     (H) 08/26/2021     (H) 07/11/2021    BUN 49 (H) 08/26/2021    BUN 45 (H) 07/11/2021    CR 1.97 (H) 08/26/2021    CR 1.82 (H) 07/11/2021    GFRESTIMATED 32 (L) 08/26/2021    GFRESTIMATED 35 (L) 07/11/2021    GFRESTBLACK 41 (L) 07/11/2021    GABE 9.0 08/26/2021    GABE 8.9 07/11/2021        A1C RESULTS:  Lab Results   Component Value Date    A1C 6.3 (H) 07/06/2021       INR RESULTS:  Lab Results   Component Value Date    INR 1.32 (H) 07/06/2021    INR 1.1 (A) 06/30/2020           CC  No referring provider defined for this encounter.

## 2021-09-15 NOTE — NURSING NOTE
TAVR Coordinator visit:  Provided additional education regarding TAVR procedure, after being present for discussion with physician. Explained the work-up process and next steps for patient. Patient provided our direct contact number and instructed to call with any questions.     Completed frailty testing and KCCQ.   5 meter walk, unable to walk due to recent knee surgery    KCCQ Results:   1a. 3  1b. 6  1c. 6  2. 1  3. 1  4. 2  5. 1  6. 1  7. 1  8a. 2  8b. 2  8c. 4    Preliminary STS Risk Score: 4.8%  NYHA Class: III    Patient will be seeing his nephrologist on 9/28/21.  If OK to proceed with further testing due to kidney function.  Will scheduled TAVR CT and coronary angiogram per Dr. Neumann.    France Greco RN  Structural Heart Coordinator  [facility]

## 2021-09-20 NOTE — TELEPHONE ENCOUNTER
Patient called to inform us he will be discharged from [facility].  Will be intouch with his nephrologist before proceeding with further TAVR work up.

## 2021-09-22 NOTE — PROGRESS NOTES
Patient will be having CMP drawn today with his nephrology, Dr. Llamas.  He will then be seen by Dr. Llamas on 9/28/21.  Dr. Llamas is aware that patient will need TAVR CT which requires 150 cc of contrast.  Plan per Dr. Llamas is:  1.  Hold torsemide the day before and day of CT.  2.  Give 150 cc/hour x 2 hours prior to CT.

## 2021-10-06 NOTE — PROGRESS NOTES
Dr. Neumann spoke with patient's nephrologist regarding kidney function.  OK to proceed with coronary angiogram.  Hold torsemid the day before and day of procedure.    
[FreeTextEntry1] : 67-year-old male with HTN and HLD presents for followup.  Patient was last seen on 10/21/17 for evaluation of syncope.  Patient underwent an echocardiogram and it showed normal LV function without significant valvular pathology.  Patient underwent a treadmill stress test and completed 7.5 minutes of Asael protocol.  There were non-diagnostic ST depressions on ECG but no symptoms.  Following treadmill stress, there was no echocardiographic evidence of ischemia.  His syncope was felt to be vasovagal in etiology.  He is on Losartan 50 mg.  He is on Fenofibrate for HLD.  Patient reports that for the past month he has been experiencing episodes of left-sided CP, described as fluttering or squeezing discomfort, not related to exertion,  lasting seconds.  He started ASA 81 mg on his own.

## 2021-10-06 NOTE — TELEPHONE ENCOUNTER
[facility] HEART [facility] - ANGIOGRAM INSTRUCTIONS:  - Burton Elizabeth is scheduled for a coronary angiogram at [facility] on 10/11/21. Check in time is at 0730 and procedure time is at 0930.  - Advised patient not eat or drink after midnight on 10/11/21.   - Patient advised to take morning medications with a sip of water on the day of the procedure, noting the followin. Aspirin: Patient does not currently take aspirin, patient will take 325 mg the day before and morning of the procedure.  2. Diabetic Medications: Patient will hold other oral diabetic medications on the morning of the procedure.  3. Anticoagulants: Patient does not take an anticoagulant.  4. Diuretics: Patient advised to hold torsemide the day before and  the morning of the procedure.   5. All vitamins and supplements to be held the morning of the procedure.  - Verified patient does not have an allergy to contrast dye.  - Verified patient has someone available to drive them home from the hospital and can stay with them for 24 hours after the procedure. They understand that one visitor may accompany them into the hospital on the day of angiogram, with both patient and visitor to wear a mask.  - COVID testing: Scheduled on 10/8/21    Angiogram orders have been signed & held.    France Greco RN  [facility] Heart [facility]

## 2021-10-11 NOTE — PRE-PROCEDURE
GENERAL PRE-PROCEDURE:   Date/Time:  10/11/2021 8:22 AM    Written consent obtained?: Yes    Risks and benefits: Risks, benefits and alternatives were discussed    DC Plan: Appropriate discharge home plan in place for patients who are going home after procedure   Consent given by:  Patient  Patient states understanding of procedure being performed: Yes    Patient's understanding of procedure matches consent: Yes    Procedure consent matches procedure scheduled: Yes    Expected level of sedation:  Moderate  Appropriately NPO:  Yes  ASA Class:  4  Mallampati  :  Grade 3- soft palate visible, posterior pharyngeal wall not visible  Lungs:  Lungs clear with good breath sounds bilaterally  Heart:  Systolic murmur  History & Physical reviewed:  History and physical reviewed and no updates needed  Statement of review:  I have reviewed the lab findings, diagnostic data, medications, and the plan for sedation

## 2021-10-11 NOTE — PROGRESS NOTES
Care Suites Post Procedure Note    Patient Information  Name: Burton Elizabeth  Age: 76 year old    Post Procedure  Time patient returned to Care Suites: 1100  Concerns/abnormal assessment: NA  If abnormal assessment, provider notified: N/A  Plan/Other: post procedure care as ordered.  Right groin site with gauze/tegaderm CDI/soft, 02 placed on pt at 2L, denies pain.     Florina Yang RN

## 2021-10-11 NOTE — PROGRESS NOTES
Care Suites Discharge Nursing Note    Patient Information  Name: Burton Elizabeth  Age: 76 year old    Discharge Education:  Discharge instructions reviewed: Yes  Additional education/resources provided: AVS  Patient/patient representative verbalizes understanding: Yes  Patient discharging on new medications: No  Medication education completed: N/A    Discharge Plans:   Discharge location: home  Discharge ride contacted: Yes  Approximate discharge time: 1700    Discharge Criteria:  Discharge criteria met and vital signs stable: Yes    Patient Belongs:  Patient belongings returned to patient: Yes    Jeff Sheets RN

## 2021-10-11 NOTE — PROGRESS NOTES
Care Suites Admission Nursing Note    Patient Information  Name: Burton Elizabeth  Age: 76 year old  Reason for admission: heart cath  Care Suites arrival time: 0745    Visitor Information  Name: BRANDY      Patient Admission/Assessment   Pre-procedure assessment complete: Yes  If abnormal assessment/labs, provider notified: N/A  NPO: Yes  Medications held per instructions/orders: N/A  Consent: obtained  If applicable, pregnancy test status: deferred  Patient oriented to room: Yes  Education/questions answered: Yes  Plan/other: proceed as scheduled    Discharge Planning  Discharge name/phone number: [phone]  Overnight post sedation caregiver: Jose  Discharge location: home    Florina Yang RN     PATIENT/VISITOR WELLNESS SCREENING    Step 1 Patient Screening    1. In the last month, have you been in contact with someone who was confirmed or suspected to have Coronavirus/COVID-19? No    2. Do you have the following symptoms?  Fever/Chills? No   Cough? No   Shortness of breath? No   New loss of taste or smell? No  Sore throat? No  Muscle or body aches? No  Headaches? No  Fatigue? No  Vomiting or diarrhea? No    Step 2 Visitor Screening    1. Name of Visitor (1 visitor per patient): BRANDY

## 2021-10-11 NOTE — DISCHARGE INSTRUCTIONS
Cardiac Angiogram Discharge Instructions - Femoral/Groin    After you go home:      Have an adult stay with you until tomorrow.    Drink extra fluids for 2 days.    You may resume your normal diet.    No smoking       For 24 hours - due to the sedation you received:    Relax and take it easy.    Do NOT make any important or legal decisions.    Do NOT drive or operate machines at home or at work.    Do NOT drink alcohol.    Care of Groin Puncture Site:      For the first 24 hrs - check the puncture site every 1-2 hours while awake.    For 2 days, when you cough, sneeze, laugh or move your bowels, hold your hand over the puncture site and press firmly.    Remove the bandaid after 24 hours. If there is minor oozing, apply another bandaid and remove it after 12 hours.    It is normal to have a small bruise or pea size lump at the site.    You may shower tomorrow. Do NOT take a bath, or use a hot tub or pool for at least 3 days. Do NOT scrub the site. Do not use lotion or powder near the puncture site.    Activity:            For 2 days:    No stooping or squatting    Do NOT do any heavy activity such as exercise, lifting, or straining.     No housework, yard work or any activity that make you sweat    Do NOT lift more than 10 pounds    Bleeding:      If you start bleeding from the site in your groin, lie down flat and press firmly on/above the site for 10 minutes.     Once bleeding stops, lay flat for 2 hours.     Call [facility] as soon as you can.       Call 911 right away if you have heavy bleeding or bleeding that does not stop.      Medicines:      If you are taking an antiplatelet medication such as Plavix, Brilinta or Effient, do not stop taking it until you talk to your cardiologist.      If you are on Metformin (Glucophage), do not restart it until you have blood tests (within 2 to 3 days after discharge).  After you have your blood drawn, you may restart the Metformin.     Take your medications, including  blood thinners, unless your provider tells you not to.      If you take Coumadin (Warfarin), have your INR checked by your provider in  3-5 days. Call your clinic to schedule this.    If you have stopped any medicines, check with your provider about when to restart them.    Follow Up Appointments:      Follow up with [facility] Nurse Practitioner at [facility] of patient preference in 7-10 days.    Call the clinic if:      You have increased pain or a large or growing hard lump around the site.    The site is red, swollen, hot or tender.    Blood or fluid is draining from the site.    You have chills or a fever greater than 101 F (38 C).    Your leg feels numb, cool or changes color.    You have hives, a rash or unusual itching.    New pain in the back or belly that you cannot control with Tylenol.    Any questions or concerns.          [facility] at [place]:    [phone] [facility] (7 days a week)

## 2021-10-12 PROBLEM — N18.32 STAGE 3B CHRONIC KIDNEY DISEASE (H): Status: ACTIVE | Noted: 2021-01-01

## 2021-10-12 PROBLEM — I50.43 CHF (CONGESTIVE HEART FAILURE), NYHA CLASS I, ACUTE ON CHRONIC, COMBINED (H): Status: ACTIVE | Noted: 2021-01-01

## 2021-10-12 PROBLEM — N17.0 ATN (ACUTE TUBULAR NECROSIS) (H): Status: ACTIVE | Noted: 2021-01-01

## 2021-10-12 PROBLEM — I35.0 AORTIC VALVE STENOSIS: Status: ACTIVE | Noted: 2021-01-01

## 2021-10-12 NOTE — TELEPHONE ENCOUNTER
Magdaleno called to report he will not be able to make his 10/20 appt. Explained this would be his TAVR H&P likely, we will present at this week's TAVR conference. He would like appt to be 10/21. Will have scheduling reschedule appt and contact patient.    Sally Langley RN  [facility] Heart [facility]

## 2021-10-14 NOTE — TELEPHONE ENCOUNTER
Patient was presented this morning at the multidisciplinary TAVR conference. Plan is to proceed with TAVR procedure.    Valve: Medtronic  Approach: Left TF  Andrews: Yes  Sedation: GA and OSMANY - would like clear imaging (outside echo) and ideally with OSMANY would use less contrast to assess perivalvular leak post-deployment    Above information was called out to the patient. Will plan for TAVR 10/26/21, 3rd case. Pt verbalized understanding.    Sally Langley RN  [facility] Heart [facility]

## 2021-10-18 NOTE — TELEPHONE ENCOUNTER
BC/BS called requesting Dr. Neumann's dictation from [facility] heart clinic.  This was done on 9/15/21.  Dictation faxed to [phone].

## 2021-10-18 NOTE — PATIENT INSTRUCTIONS
Increase torsemide to 20mg twice a day    TAVR pre-procedure instructions reviewed with you today:    - Your TAVR is scheduled Tuesday 10/26/21, 2nd case. Please check-in at 0745 AM at the Registration Desk in the [facility] Lobby at [facility].    - Use the Hibiclens soap I have provided you the night before and morning of the procedure. Use from chin to toes, please avoid your face and eyes.    - Nothing to eat or drink the morning of your TAVR.     - You make take your morning medications with sips of water. Hold your Demedex the day before and day of your TAVR. Please hold all vitamins and supplements the morning of your procedure.    - You will need to hold your Eliquis prior to TAVR, take your last dose on: 10/23/21. You may continue/start taking 81mg aspirin daily.    - COVID-19 restrictions: You will need a COVID test prior to procedure, this is scheduled on 10/22/21. On the day of the procedure, you may have one visitor in the pre-operative area. Patient and visitor must wear face masks. Visitors will be instructed to leave the campus after you leave the pre-op area for the procedure room. One visitor may return when you get to your room in the Heart Center.    - You will stay overnight on Tuesday night. We will monitor you overnight for signs of bleeding, stroke, as well as need for pacemaker. On Wednesday morning, you will have an echo of your new valve and work with cardiac rehab.     It was nice to see you today! Please call with any other questions or concerns: [phone]    France Greco RN  [facility] Heart [facility]

## 2021-10-21 NOTE — PROGRESS NOTES
[facility]  H&P    TAVR and primary Cardiologist: Dr Neumann     History of Present Illness  Burton Elizabeth is a pleasant 76 year old male who presents for pre-operative H&P in preparation for a TAVR on 10/26/21 at [facility]. Today is the first time I am meeting him.     Past medical history is notable for history of known coronary disease with a  of the non-dominant small circumflex, small vessel disease and PDA branch. HFpEF, DM, hypertension, obesity (BMI 36), hx of DVT, CVA June 2020, CKD IIIb-IV, gastric ulcers EGD in July 2021, Cabrera's esophagus, TKA with recent revision in late Aug 2021, left bundle branch block, paroxysmal AFL and severe aortic stenosis.     Was hospitalized at [facility] in Sept with acute metabolic encephalopathy acute decompensated HF, demand MI and OPAL on CKD III-IV. Last saw Dr Neumann after this on 9/15. TAVR recommended but he wanted to give him a bit more time to recover from his recent hospital stays.    He is a bit of a difficult historian but endorsed increased LE edema and more LOAIZA. Historically has not slept well or for long durations, doesn't seem to have orthopnea or PND as best I can tell. No f/c/s.    Currently on torsemide 20mg in the AM and 10mg in the afternoon per the direction of his nephrologist.    Current Medications:  Current Outpatient Medications   Medication Sig Dispense Refill     acetaminophen (TYLENOL) 325 MG tablet Take 325-650 mg by mouth every 4 hours as needed       apixaban ANTICOAGULANT (ELIQUIS) 5 MG tablet Take 1 tablet (5 mg) by mouth 2 times daily 60 tablet 0     aspirin (ASPIR-LOW) 81 MG EC tablet Take 1 tablet (81 mg) by mouth daily 1 tablet 0     atorvastatin (LIPITOR) 80 MG tablet Take 80 mg by mouth daily       carvedilol (COREG) 3.125 MG tablet Take 3.125 mg by mouth 2 times daily       glimepiride (AMARYL) 2 MG tablet Take 1 tablet (2 mg) by mouth daily 1 tablet 0     HYDROcodone-acetaminophen  (NORCO) 5-325 MG tablet Take 1-2 tablets by mouth 2 times daily as needed       hydrOXYzine (ATARAX) 10 MG tablet Take 1 tablet (10 mg) by mouth every 6 hours as needed for itching or anxiety (with pain, moderate pain) 30 tablet 0     insulin glargine (LANTUS PEN) 100 UNIT/ML pen INJECT 20 UNITS UNDER THE SKIN EVERY DAY       insulin glargine (LANTUS VIAL) 100 UNIT/ML vial Inject 20 Units Subcutaneous       multivitamin w/minerals (MULTIVITAMIN, THERAPEUTIC WITH MINERALS) tablet Take 1 tablet by mouth daily       nitroGLYcerin (NITROSTAT) 0.4 MG sublingual tablet For chest pain place 1 tablet under the tongue every 5 minutes for 3 doses. If symptoms persist 5 minutes after 1st dose call 911. 30 tablet 0     omeprazole (PRILOSEC) 20 MG DR capsule Take 1 capsule (20 mg) by mouth daily 90 capsule 0     polyethylene glycol (MIRALAX) 17 GM/Dose powder Take 17 g by mouth       semaglutide (OZEMPIC) 2 MG/1.5ML SOPN pen INJECT 0.5MG UNDER THE SKIN EVERY WEEK MULTIPLE DOSES PER PEN.       Semaglutide-Weight Management 0.5 MG/0.5ML SOAJ Inject 0.5 mg Subcutaneous       senna-docusate (SENOKOT-S/PERICOLACE) 8.6-50 MG tablet Take 1-2 tablets by mouth 2 times daily Take while on oral narcotics to prevent or treat constipation. 30 tablet 0     torsemide (DEMADEX) 20 MG tablet Take 20 mg in the AM and 10mg in the PM       blood glucose (NO BRAND SPECIFIED) test strip Use to test blood sugar as directed       order for DME Equipment being ordered: Walker Wheels () and Walker ()  Treatment Diagnosis: Impaired gait 1 each 0       Allergies:  Allergies   Allergen Reactions     Penicillins Anaphylaxis     Empagliflozin      Other reaction(s): Serum creatinine raised       Past Medical History:  Past Medical History:   Diagnosis Date     Arthritis of knee~ s/p replacement 03/24/2016     Atypical atrial flutter (H)      CAD (coronary artery disease)      Cardiomyopathy (H)      Cerebral infarction (H)      Congestive heart  failure (H)      Decreased cardiac ejection fraction~44% 03/24/2016     Diabetes mellitus, type 2 (H) 03/24/2016     History of DVT (deep vein thrombosis)      HTN (hypertension) 03/24/2016     LBBB (left bundle branch block) 03/24/2016     Mixed hyperlipidemia 08/23/2016     Renal disease     stage 3 chronic kidney disease     Rheumatic aortic stenosis      Upper GI bleed        Past Surgical History:  Past Surgical History:   Procedure Laterality Date     ANKLE SURGERY      X 4     APPENDECTOMY       ARTHROPLASTY KNEE Right 3/29/2016    Procedure: ARTHROPLASTY KNEE;  Surgeon: Heena Rosen MD;  Location:  OR     ARTHROPLASTY REVISION KNEE Left 9/27/2019    Procedure: REVISION LEFT TOTAL KNEE  ARTHROPLASTY;  Surgeon: Heena Rosen MD;  Location:  OR     ARTHROPLASTY REVISION KNEE Right 8/25/2021    Procedure: REVISION OF RIGHT TOTAL KNEE ARTHROPLASTY;  Surgeon: Heena Rosen MD;  Location:  OR     CV FEMORAL ANGIOGRAM N/A 10/11/2021    Procedure: Femoral Angiogram;  Surgeon: Edgardo Beckham MD;  Location:  HEART CARDIAC CATH LAB     CV HEART CATHETERIZATION WITH POSSIBLE INTERVENTION N/A 10/11/2021    Procedure: Coronary Angiogram;  Surgeon: Edgardo Beckham MD;  Location:  HEART CARDIAC CATH LAB     CV LEFT HEART CATH N/A 8/12/2019    Procedure: Left Heart Cath;  Surgeon: Edgardo Beckham MD;  Location:  HEART CARDIAC CATH LAB     ESOPHAGOSCOPY, GASTROSCOPY, DUODENOSCOPY (EGD), COMBINED N/A 7/7/2021    Procedure: ESOPHAGOGASTRODUODENOSCOPY, WITH BIOPSY;  Surgeon: Monserrat Bright MD;  Location:  GI     EYE SURGERY      cataract removal     ORTHOPEDIC SURGERY      KNEE SCOPES MULTIPLE       Family History:  Family History   Problem Relation Age of Onset     Coronary Artery Disease No family hx of        Social History:  Social History     Socioeconomic History     Marital status: Single     Spouse name: None     Number of children: None     Years of education:  "None     Highest education level: None   Occupational History     None   Tobacco Use     Smoking status: Never Smoker     Smokeless tobacco: Never Used   Substance and Sexual Activity     Alcohol use: Not Currently     Alcohol/week: 2.0 standard drinks     Types: 2 Standard drinks or equivalent per week     Comment: occasional, social     Drug use: No     Sexual activity: None   Other Topics Concern     Parent/sibling w/ CABG, MI or angioplasty before 65F 55M? Not Asked   Social History Narrative     None       Physical Exam:  Vitals: BP (!) 154/80   Pulse 67   Ht 1.753 m (5' 9.02\")   Wt 112 kg (247 lb)   BMI 36.46 kg/m     General: NAD  Heart: RRR with 3/6 EMILY at RUSB  Lungs: crackles in the R lung base  Abdomen: obese  Extremities: 2+ BLE edema with chronic venous stasis changes.   Neurologic: Alert and oriented to person/place/time, normal speech, gait and affect    Laboratory Studies:  LIPID RESULTS:  Lab Results   Component Value Date    CHOL 97 01/11/2021    HDL 31 (L) 01/11/2021    LDL 46 01/11/2021    TRIG 102 01/11/2021       LIVER ENZYME RESULTS:  Lab Results   Component Value Date    AST 27 10/21/2021    AST 56 (H) 07/06/2021    ALT 22 10/21/2021    ALT 34 07/06/2021       CBC RESULTS:  Lab Results   Component Value Date    WBC 7.2 10/21/2021    WBC 9.0 07/11/2021    RBC 4.78 10/21/2021    RBC 3.34 (L) 07/11/2021    HGB 12.3 (L) 10/21/2021    HGB 8.6 (L) 07/11/2021    HCT 41.2 10/21/2021    HCT 29.2 (L) 07/11/2021    MCV 86 10/21/2021    MCV 87 07/11/2021    MCH 25.7 (L) 10/21/2021    MCH 25.7 (L) 07/11/2021    MCHC 29.9 (L) 10/21/2021    MCHC 29.5 (L) 07/11/2021    RDW 16.3 (H) 10/21/2021    RDW 18.6 (H) 07/11/2021     10/21/2021     07/11/2021       BMP RESULTS:  Lab Results   Component Value Date     10/21/2021     07/11/2021    POTASSIUM 4.8 10/21/2021    POTASSIUM 4.7 07/11/2021    CHLORIDE 109 10/21/2021    CHLORIDE 109 07/11/2021    CO2 24 10/21/2021    CO2 23 " 07/11/2021    ANIONGAP 6 10/21/2021    ANIONGAP 6 07/11/2021     (H) 10/21/2021     (H) 07/11/2021    BUN 50 (H) 10/21/2021    BUN 45 (H) 07/11/2021    CR 1.74 (H) 10/21/2021    CR 1.82 (H) 07/11/2021    GFRESTIMATED 38 (L) 10/21/2021    GFRESTIMATED 36 (L) 10/01/2021    GFRESTIMATED 35 (L) 07/11/2021    GFRESTBLACK 41 (L) 07/11/2021    GABE 9.0 10/21/2021    GABE 8.9 07/11/2021        A1C RESULTS:  Lab Results   Component Value Date    A1C 6.3 (H) 07/06/2021       INR RESULTS:  Lab Results   Component Value Date    INR 1.26 (H) 10/21/2021    INR 1.14 10/11/2021    INR 1.32 (H) 07/06/2021    INR 1.1 (A) 06/30/2020       Assessment and Plan:   1. Severe aortic stenosis plan for TAVR 10/26/21  Valve: Medtronic  Approach: Left TF  [place]: Yes  Sedation: GA (needs OSMANY)  Risks and benefits of transcatheter aortic valve replacement procedure were discussed today including, bleeding at access site, bruising, infection, allergic reaction, kidney damage (including need for dialysis), stroke (3-4%), heart attack, vascular damage, arrhythmic abnormalities including high degree AV block (requiring Pacemaker implant- 10%) or atrial fibrillation,  emergency open heart surgery, up to and including death.   -Labs and type and screen completed.   -Supplies for scrubbing provided.   -Pre-TAVR instructions provided in written format.   -Pt does appear to be in mild HF with increased LE edema and R base crackles. Will increase his torsemide to 20mg BID until TAVR. Keep a close eye on weight and symptoms. Pt instructed to present to ED for worsening symptoms. Ideally we would admit him a day early for IV diuretics to optimize for TAVR, but unfortunately due to bed availably this is very unlikely.     2. HFpEF  -see above  -NT-BNP 7,819 today  -increase torsemide to 20mg BID    3. CKD III-IV  -BUN 50/Cr 1.7 overall stable.  -Watch closely  -Follows with Dr. Llamas, his office notified of the increased dose of  torsemide    4. Hypoalbuminemia  -likely also contributes to difficulty managing fluid status.     5. CAD with a  of the non-dominant small circumflex, small vessel disease and PDA branch disease  -felt to be stable on cath this year    6. GI bleed earlier this year  -hgb 12 and stable    7. LBBB    8. Paroxysmal AFL    9. DM/obesity/probably CRISTHIAN/hx of DVT/hx of CVA  -add to medical complexity    Reviewed with Dr Neumann    80 minutes spent on the date of the encounter doing chart review, review of outside records, review of test results, patient visit and documentation        Megan Day PA-C

## 2021-10-21 NOTE — TELEPHONE ENCOUNTER
Patient seen in clinic today.  Increase lower extremity edema bilaterally.  Plan was to increase Torsemide from 20 mg AM and 10 mg PM to 20 mg AM and PM.  I have contacted Dr. Llamas office at [phone] to make sure he is OK with the plan.  I left a msg with Bibi, his nurse.

## 2021-10-21 NOTE — LETTER
10/21/2021    [facility]  [address]  [facility] [address]    RE: Burton SCOTT Glenn       Dear Colleague,    I had the pleasure of seeing Burton Elizabeth in the [facility].        [facility]  H&P    TAVR and primary Cardiologist: Dr Neumann     History of Present Illness  Burton Elizabeth is a pleasant 76 year old male who presents for pre-operative H&P in preparation for a TAVR on 10/26/21 at [facility]. Today is the first time I am meeting him.     Past medical history is notable for history of known coronary disease with a  of the non-dominant small circumflex, small vessel disease and PDA branch. HFpEF, DM, hypertension, obesity (BMI 36), hx of DVT, CVA June 2020, CKD IIIb-IV, gastric ulcers EGD in July 2021, Cabrera's esophagus, TKA with recent revision in late Aug 2021, left bundle branch block, paroxysmal AFL and severe aortic stenosis.     Was hospitalized at [facility] in Sept with acute metabolic encephalopathy acute decompensated HF, demand MI and OPAL on CKD III-IV. Last saw Dr Neumann after this on 9/15. TAVR recommended but he wanted to give him a bit more time to recover from his recent hospital stays.    He is a bit of a difficult historian but endorsed increased LE edema and more LOAIZA. Historically has not slept well or for long durations, doesn't seem to have orthopnea or PND as best I can tell. No f/c/s.    Currently on torsemide 20mg in the AM and 10mg in the afternoon per the direction of his nephrologist.    Current Medications:  Current Outpatient Medications   Medication Sig Dispense Refill     acetaminophen (TYLENOL) 325 MG tablet Take 325-650 mg by mouth every 4 hours as needed       apixaban ANTICOAGULANT (ELIQUIS) 5 MG tablet Take 1 tablet (5 mg) by mouth 2 times daily 60 tablet 0     aspirin (ASPIR-LOW) 81 MG EC tablet Take 1 tablet (81 mg) by mouth daily 1 tablet 0      atorvastatin (LIPITOR) 80 MG tablet Take 80 mg by mouth daily       carvedilol (COREG) 3.125 MG tablet Take 3.125 mg by mouth 2 times daily       glimepiride (AMARYL) 2 MG tablet Take 1 tablet (2 mg) by mouth daily 1 tablet 0     HYDROcodone-acetaminophen (NORCO) 5-325 MG tablet Take 1-2 tablets by mouth 2 times daily as needed       hydrOXYzine (ATARAX) 10 MG tablet Take 1 tablet (10 mg) by mouth every 6 hours as needed for itching or anxiety (with pain, moderate pain) 30 tablet 0     insulin glargine (LANTUS PEN) 100 UNIT/ML pen INJECT 20 UNITS UNDER THE SKIN EVERY DAY       insulin glargine (LANTUS VIAL) 100 UNIT/ML vial Inject 20 Units Subcutaneous       multivitamin w/minerals (MULTIVITAMIN, THERAPEUTIC WITH MINERALS) tablet Take 1 tablet by mouth daily       nitroGLYcerin (NITROSTAT) 0.4 MG sublingual tablet For chest pain place 1 tablet under the tongue every 5 minutes for 3 doses. If symptoms persist 5 minutes after 1st dose call 911. 30 tablet 0     omeprazole (PRILOSEC) 20 MG DR capsule Take 1 capsule (20 mg) by mouth daily 90 capsule 0     polyethylene glycol (MIRALAX) 17 GM/Dose powder Take 17 g by mouth       semaglutide (OZEMPIC) 2 MG/1.5ML SOPN pen INJECT 0.5MG UNDER THE SKIN EVERY WEEK MULTIPLE DOSES PER PEN.       Semaglutide-Weight Management 0.5 MG/0.5ML SOAJ Inject 0.5 mg Subcutaneous       senna-docusate (SENOKOT-S/PERICOLACE) 8.6-50 MG tablet Take 1-2 tablets by mouth 2 times daily Take while on oral narcotics to prevent or treat constipation. 30 tablet 0     torsemide (DEMADEX) 20 MG tablet Take 20 mg in the AM and 10mg in the PM       blood glucose (NO BRAND SPECIFIED) test strip Use to test blood sugar as directed       order for DME Equipment being ordered: Walker Wheels () and Walker ()  Treatment Diagnosis: Impaired gait 1 each 0       Allergies:  Allergies   Allergen Reactions     Penicillins Anaphylaxis     Empagliflozin      Other reaction(s): Serum creatinine raised        Past Medical History:  Past Medical History:   Diagnosis Date     Arthritis of knee~ s/p replacement 03/24/2016     Atypical atrial flutter (H)      CAD (coronary artery disease)      Cardiomyopathy (H)      Cerebral infarction (H)      Congestive heart failure (H)      Decreased cardiac ejection fraction~44% 03/24/2016     Diabetes mellitus, type 2 (H) 03/24/2016     History of DVT (deep vein thrombosis)      HTN (hypertension) 03/24/2016     LBBB (left bundle branch block) 03/24/2016     Mixed hyperlipidemia 08/23/2016     Renal disease     stage 3 chronic kidney disease     Rheumatic aortic stenosis      Upper GI bleed        Past Surgical History:  Past Surgical History:   Procedure Laterality Date     ANKLE SURGERY      X 4     APPENDECTOMY       ARTHROPLASTY KNEE Right 3/29/2016    Procedure: ARTHROPLASTY KNEE;  Surgeon: Heena Rosen MD;  Location:  OR     ARTHROPLASTY REVISION KNEE Left 9/27/2019    Procedure: REVISION LEFT TOTAL KNEE  ARTHROPLASTY;  Surgeon: Heena Rosen MD;  Location:  OR     ARTHROPLASTY REVISION KNEE Right 8/25/2021    Procedure: REVISION OF RIGHT TOTAL KNEE ARTHROPLASTY;  Surgeon: Heena Rosen MD;  Location:  OR     CV FEMORAL ANGIOGRAM N/A 10/11/2021    Procedure: Femoral Angiogram;  Surgeon: Edgardo Beckham MD;  Location:  HEART CARDIAC CATH LAB     CV HEART CATHETERIZATION WITH POSSIBLE INTERVENTION N/A 10/11/2021    Procedure: Coronary Angiogram;  Surgeon: Edgardo Beckham MD;  Location:  HEART CARDIAC CATH LAB     CV LEFT HEART CATH N/A 8/12/2019    Procedure: Left Heart Cath;  Surgeon: Edgardo Beckham MD;  Location:  HEART CARDIAC CATH LAB     ESOPHAGOSCOPY, GASTROSCOPY, DUODENOSCOPY (EGD), COMBINED N/A 7/7/2021    Procedure: ESOPHAGOGASTRODUODENOSCOPY, WITH BIOPSY;  Surgeon: Monserrat Bright MD;  Location:  GI     EYE SURGERY      cataract removal     ORTHOPEDIC SURGERY      KNEE SCOPES MULTIPLE       Family  "History:  Family History   Problem Relation Age of Onset     Coronary Artery Disease No family hx of        Social History:  Social History     Socioeconomic History     Marital status: Single     Spouse name: None     Number of children: None     Years of education: None     Highest education level: None   Occupational History     None   Tobacco Use     Smoking status: Never Smoker     Smokeless tobacco: Never Used   Substance and Sexual Activity     Alcohol use: Not Currently     Alcohol/week: 2.0 standard drinks     Types: 2 Standard drinks or equivalent per week     Comment: occasional, social     Drug use: No     Sexual activity: None   Other Topics Concern     Parent/sibling w/ CABG, MI or angioplasty before 65F 55M? Not Asked   Social History Narrative     None       Physical Exam:  Vitals: BP (!) 154/80   Pulse 67   Ht 1.753 m (5' 9.02\")   Wt 112 kg (247 lb)   BMI 36.46 kg/m     General: NAD  Heart: RRR with 3/6 EMILY at RUSB  Lungs: crackles in the R lung base  Abdomen: obese  Extremities: 2+ BLE edema with chronic venous stasis changes.   Neurologic: Alert and oriented to person/place/time, normal speech, gait and affect    Laboratory Studies:  LIPID RESULTS:  Lab Results   Component Value Date    CHOL 97 01/11/2021    HDL 31 (L) 01/11/2021    LDL 46 01/11/2021    TRIG 102 01/11/2021       LIVER ENZYME RESULTS:  Lab Results   Component Value Date    AST 27 10/21/2021    AST 56 (H) 07/06/2021    ALT 22 10/21/2021    ALT 34 07/06/2021       CBC RESULTS:  Lab Results   Component Value Date    WBC 7.2 10/21/2021    WBC 9.0 07/11/2021    RBC 4.78 10/21/2021    RBC 3.34 (L) 07/11/2021    HGB 12.3 (L) 10/21/2021    HGB 8.6 (L) 07/11/2021    HCT 41.2 10/21/2021    HCT 29.2 (L) 07/11/2021    MCV 86 10/21/2021    MCV 87 07/11/2021    MCH 25.7 (L) 10/21/2021    MCH 25.7 (L) 07/11/2021    MCHC 29.9 (L) 10/21/2021    MCHC 29.5 (L) 07/11/2021    RDW 16.3 (H) 10/21/2021    RDW 18.6 (H) 07/11/2021     10/21/2021 "     07/11/2021       BMP RESULTS:  Lab Results   Component Value Date     10/21/2021     07/11/2021    POTASSIUM 4.8 10/21/2021    POTASSIUM 4.7 07/11/2021    CHLORIDE 109 10/21/2021    CHLORIDE 109 07/11/2021    CO2 24 10/21/2021    CO2 23 07/11/2021    ANIONGAP 6 10/21/2021    ANIONGAP 6 07/11/2021     (H) 10/21/2021     (H) 07/11/2021    BUN 50 (H) 10/21/2021    BUN 45 (H) 07/11/2021    CR 1.74 (H) 10/21/2021    CR 1.82 (H) 07/11/2021    GFRESTIMATED 38 (L) 10/21/2021    GFRESTIMATED 36 (L) 10/01/2021    GFRESTIMATED 35 (L) 07/11/2021    GFRESTBLACK 41 (L) 07/11/2021    GABE 9.0 10/21/2021    GABE 8.9 07/11/2021        A1C RESULTS:  Lab Results   Component Value Date    A1C 6.3 (H) 07/06/2021       INR RESULTS:  Lab Results   Component Value Date    INR 1.26 (H) 10/21/2021    INR 1.14 10/11/2021    INR 1.32 (H) 07/06/2021    INR 1.1 (A) 06/30/2020       Assessment and Plan:   1. Severe aortic stenosis plan for TAVR 10/26/21  Valve: Medtronic  Approach: Left TF  [place]: Yes  Sedation: GA (needs OSMANY)  Risks and benefits of transcatheter aortic valve replacement procedure were discussed today including, bleeding at access site, bruising, infection, allergic reaction, kidney damage (including need for dialysis), stroke (3-4%), heart attack, vascular damage, arrhythmic abnormalities including high degree AV block (requiring Pacemaker implant- 10%) or atrial fibrillation,  emergency open heart surgery, up to and including death.   -Labs and type and screen completed.   -Supplies for scrubbing provided.   -Pre-TAVR instructions provided in written format.   -Pt does appear to be in mild HF with increased LE edema and R base crackles. Will increase his torsemide to 20mg BID until TAVR. Keep a close eye on weight and symptoms. Pt instructed to present to ED for worsening symptoms. Ideally we would admit him a day early for IV diuretics to optimize for TAVR, but unfortunately due to bed  availably this is very unlikely.     2. HFpEF  -see above  -NT-BNP 7,819 today  -increase torsemide to 20mg BID    3. CKD III-IV  -BUN 50/Cr 1.7 overall stable.  -Watch closely  -Follows with Dr. Llamas, his office notified of the increased dose of torsemide    4. Hypoalbuminemia  -likely also contributes to difficulty managing fluid status.     5. CAD with a  of the non-dominant small circumflex, small vessel disease and PDA branch disease  -felt to be stable on cath this year    6. GI bleed earlier this year  -hgb 12 and stable    7. LBBB    8. Paroxysmal AFL    9. DM/obesity/probably CRISTHIAN/hx of DVT/hx of CVA  -add to medical complexity    Reviewed with Dr Neumann    80 minutes spent on the date of the encounter doing chart review, review of outside records, review of test results, patient visit and documentation        Megan Day PA-C         Thank you for allowing me to participate in the care of your patient.      Sincerely,     Megan Day PA-C     [facility]  cc:   No referring provider defined for this encounter.

## 2021-10-25 NOTE — TELEPHONE ENCOUNTER
Patient was instructed to call this morning from Iveth RN. He reports no worsening shortness of breath this weekend. Slept well this weekend. No weight change, notes he is 1/2 lb down today. Will review with Dr. Neumann and Megan KAMARA, however, told patient we will likely proceed with TAVR tomorrow due to limited beds. Pt would prefer this, would prefer to stay home tonight.    Pt notified by Iveth RN to check in at previously reviewed time.    Sally Langley RN  [facility]

## 2021-10-26 PROBLEM — I35.0 AORTIC STENOSIS, SEVERE: Status: ACTIVE | Noted: 2021-01-01

## 2021-10-26 NOTE — ANESTHESIA POSTPROCEDURE EVALUATION
Patient: Burton Elizabeth    Procedure: Procedure(s):  Transcatheter Aortic Valve Replacement       Diagnosis:valvular disease  Diagnosis Additional Information: No value filed.    Anesthesia Type:  MAC    Note:  Disposition: Admission   Postop Pain Control: Uneventful            Sign Out: Well controlled pain   PONV: No   Neuro/Psych: Uneventful            Sign Out: Acceptable/Baseline neuro status   Airway/Respiratory: Uneventful            Sign Out: Acceptable/Baseline resp. status   CV/Hemodynamics: Uneventful            Sign Out: Acceptable CV status; No obvious hypovolemia; No obvious fluid overload   Other NRE: NONE   DID A NON-ROUTINE EVENT OCCUR? No           Last vitals:  Vitals Value Taken Time   /87 10/26/21 1620   Temp 36.1  C (97  F) 10/26/21 1610   Pulse 53 10/26/21 1620   Resp 13 10/26/21 1620   SpO2 99 % 10/26/21 1620   Vitals shown include unvalidated device data.    Electronically Signed By: Piper Estrada MD  October 26, 2021  5:31 PM

## 2021-10-26 NOTE — OR NURSING
Pt placed on CPAP on arrival to the PACU.   Gases 7.28 60 94 28 on 50%  Dr QUINONES notified and IPAP increased to 15/5.Tital volume 600.  afib with a LBBB.

## 2021-10-26 NOTE — PROGRESS NOTES
PTA medications completed by Medication Scribe day of surgery     Medication history sources: Patient, Surescripts and H&P  In the past week, patient estimated taking medication this percent of the time: Greater than 90%  Adherence assessment: N/A Not Observed    Significant changes made to the medication list:  None      Additional medication history information:   None    Medication reconciliation completed by provider prior to medication history? No    Time spent in this activity: 30 MINUTES    The information provided in this note is only as accurate as the sources available at the time of update(s)      Prior to Admission medications    Medication Sig Last Dose Taking? Auth Provider   acetaminophen (TYLENOL) 325 MG tablet Take 325-650 mg by mouth every 4 hours as needed 10/23/2021 at AM Yes Reported, Patient   apixaban ANTICOAGULANT (ELIQUIS) 5 MG tablet Take 1 tablet (5 mg) by mouth 2 times daily 10/23/2021 at PM Yes Laci Neumann MD   aspirin (ASPIR-LOW) 81 MG EC tablet Take 1 tablet (81 mg) by mouth daily 10/22/2021 at AM Yes Laci Neumann MD   atorvastatin (LIPITOR) 80 MG tablet Take 80 mg by mouth daily 10/25/2021 at AM Yes Reported, Patient   blood glucose (NO BRAND SPECIFIED) test strip Use to test blood sugar as directed  Yes Reported, Patient   carvedilol (COREG) 3.125 MG tablet Take 3.125 mg by mouth 2 times daily 10/25/2021 at PM Yes Reported, Patient   glimepiride (AMARYL) 2 MG tablet Take 2 mg by mouth 2 times daily  10/25/2021 at PM Yes Laci Neumann MD   HYDROcodone-acetaminophen (NORCO) 5-325 MG tablet Take 1-2 tablets by mouth 2 times daily as needed More Than a Week at PRN Yes Reported, Patient   insulin glargine (LANTUS PEN) 100 UNIT/ML pen Inject 18 Units Subcutaneous At Bedtime  10/24/2021 at PM Yes Reported, Patient   multivitamin w/minerals (MULTIVITAMIN, THERAPEUTIC WITH MINERALS) tablet Take 1 tablet by mouth daily 10/24/2021 at AM Yes Reported, Patient    omeprazole (PRILOSEC) 20 MG DR capsule Take 1 capsule (20 mg) by mouth daily  Patient taking differently: Take 20 mg by mouth 2 times daily  10/25/2021 at AM Yes Arabella Patricia MD   order for DME Equipment being ordered: Walker Wheels () and Walker ()  Treatment Diagnosis: Impaired gait  Yes Catarino Kearns PA-C   semaglutide (OZEMPIC) 2 MG/1.5ML SOPN pen Inject 0.25 mg Subcutaneous every 7 days on Monday 10/25/2021 at AM Yes Reported, Patient   torsemide (DEMADEX) 20 MG tablet Take 1 tablet (20 mg) by mouth 2 times daily  Patient taking differently: Take 50 mg by mouth daily 40MG(20mg x 2) in the morning & 10MG(20mg x 0.5) in the evening 10/24/2021 at PM Yes Megan Day PA-C

## 2021-10-26 NOTE — PLAN OF CARE
[id number] VSS. Slow atrial flutter. CMS intact. Very sleepy and arouses to vigorous touch and voice. Groin sites and radial site are WDL. Will continue to monitor.

## 2021-10-26 NOTE — ANESTHESIA PROCEDURE NOTES
Arterial Line Procedure Note    Pre-Procedure   Staff -        Anesthesiologist:  Piper Estrada MD       Performed By: anesthesiologist       Location: pre-op       Pre-Anesthestic Checklist: patient identified, IV checked, site marked, risks and benefits discussed, informed consent, monitors and equipment checked, pre-op evaluation and at physician/surgeon's request  Timeout:       Correct Patient: Yes        Correct Procedure: Yes        Correct Site: Yes        Correct Position: Yes   Procedure   Procedure: arterial line       Laterality: left       Insertion Site: radial.  Sterile Prep        All elements of maximal sterile barrier technique followed       Skin prep: Chloraprep  Insertion/Injection        Technique: Seldinger Technique and ultrasound guided (no image saved)        1. Ultrasound was used to evaluate the access site.       2. Artery evaluated via ultrasound for patency/adequacy.       3. Using real-time ultrasound the needle/catheter was observed entering the artery/vein.       5. The visualized structures were anatomically normal.       6. There were no apparent abnormal pathologic findings.       Catheter Type/Size: 20 gauge, 1.75 in/4.5 cm quick cath (integral wire)  Narrative        Tegaderm dressing used.       Complications: None apparent,        Arterial waveform: Yes        IBP within 10% of NIBP: Yes

## 2021-10-26 NOTE — H&P
[facility]    History and Physical  Hospitalist       Date of Admission:  10/26/2021    Assessment & Plan   Burton Elizabeth is a 76 year old male who presented to [facility] on 10/26/2021 for planned TAVR.  The procedure was well-tolerated is performed in [place] has been admitted to [facility] CCU post procedure.  The hospital medicine service has been consulted for medical co-management.    Aortic stenosis, severe, now status post TAVR, 10/26/2021  Heart failure with preserved EF, secondary to aortic stenosis  Burton has been followed as an outpatient by Dr. Neumann, cardiology, for symptomatic aortic stenosis.  He was deemed suitable for TAVR, which was well-tolerated today.  Last echocardiogram on 8/16/2021 with normal LV size, systolic function, severe AS, trace to mild MR, no TR.  -Admit inpatient, [facility] status  -Full plan of care per TAVR team  -Started on Eliquis postop, defer resuming ASA to TAVR service    Coronary artery disease  NSTEMI, July 2021  Hypertension, treated  Hyperlipidemia, treated  Atrial flutter  On chronic AC with apixaban. Diagnostic coronary angiogram on 10/11/2021 similar to prior diagnostic coronary angiogram in 2019 with two-vessel coronary artery disease including chronically totally occluded circumflex and small vessel disease of the PDA branch.  -As above, defer resuming home aspirin, torsemide to cardiology  -Resumed PTA atorvastatin, Coreg    Chronic kidney disease, stage III  Burton has a personal history of OPAL during July 2021 admission for NSTEMI.  Baseline creatinine likely 2.0-2.3, was as high as 3.74 in July 2021.  Today crea clot [place] inine 1.54.  -Avoid hypotension, optimize hydration  -Generally avoid nephrotoxic agents, renal dosing is appropriate  -Avoid NSAIDs other than aspirin    Diabetes mellitus, type II, on long-term insulin  Hemoglobin A1c 6.3% 7/6/2021. PTA regimen of Lantus 18u at bedtime, glimepiride, semaglutide  -Hold home glimepiride  -Hold  home semaglutide   -PTA Lantus at reduced dose of 9u at bedtime     Acute GI bleed, secondary to gastritis July 2021  Cabrera's esophagus diagnosed on endoscopy  - PTA PPI    Suspected CRISTHIAN  Admission at [facility] [place]note 2] with acute hypoxic, hypercarbic respiratory failure requiring BiPAP suspected to be related to CHF with severe aortic stenosis. Notes previously mention suspected CRISTHIAN however pt had not completed sleep study as outpatient. Appears to continue on BiPAP nocturnally.  - BiPAP with home settings    DVT Prophylaxis: Defer to primary service  Code Status: Full Code    Disposition: per Cardiology     The above assessment and plan has been discussed with Dr. Vernon, who is in agreement with the above assessment and plan.     Braxton Ingram PA-C  10/26/2021, 5:44 PM  Pager: [phone]    Primary Care Physician   [facility] builder    History of Present Illness   Burton Elizabeth is a 76 year old male who presented to [facility] on 10/26/2021 for planned TAVR.  The procedure was well-tolerated is performed in [place] has been admitted to [facility] CCU post procedure.  The hospital medicine service has been consulted for medical co-management.    At this time Burton is resting in CCU post procedure.  He is resting comfortably, has BiPAP on per home use. Denies pain, sob, numbness/tingling. Attempting to get some rest. No acute concerns at this time.    Past Medical History    I personally reviewed history with patient:  Past Medical History:   Diagnosis Date     Arthritis of knee~ s/p replacement 03/24/2016     Atypical atrial flutter (H)      CAD (coronary artery disease)      Cardiomyopathy (H)      Cerebral infarction (H)     7/21     Congestive heart failure (H)      Decreased cardiac ejection fraction~44% 03/24/2016     Diabetes mellitus, type 2 (H) 03/24/2016     History of DVT (deep vein thrombosis)      HTN (hypertension) 03/24/2016     LBBB (left bundle branch block) 03/24/2016     Mixed  hyperlipidemia 08/23/2016     Renal disease     stage 3 chronic kidney disease     Rheumatic aortic stenosis      Upper GI bleed      Past Surgical History   I personally reviewed history with patient:  Past Surgical History:   Procedure Laterality Date     ANKLE SURGERY      X 4     APPENDECTOMY       ARTHROPLASTY KNEE Right 3/29/2016    Procedure: ARTHROPLASTY KNEE;  Surgeon: Heena Rosen MD;  Location: [facility] OR     ARTHROPLASTY REVISION KNEE Left 9/27/2019    Procedure: REVISION LEFT TOTAL KNEE  ARTHROPLASTY;  Surgeon: Heena Rosen MD;  Location:  OR     ARTHROPLASTY REVISION KNEE Right 8/25/2021    Procedure: REVISION OF RIGHT TOTAL KNEE ARTHROPLASTY;  Surgeon: Heena Rosen MD;  Location:  OR     CV FEMORAL ANGIOGRAM N/A 10/11/2021    Procedure: Femoral Angiogram;  Surgeon: Edgardo Beckham MD;  Location:  HEART CARDIAC CATH LAB     CV HEART CATHETERIZATION WITH POSSIBLE INTERVENTION N/A 10/11/2021    Procedure: Coronary Angiogram;  Surgeon: Edgardo Beckham MD;  Location:  HEART CARDIAC CATH LAB     CV LEFT HEART CATH N/A 8/12/2019    Procedure: Left Heart Cath;  Surgeon: Edgardo Beckham MD;  Location:  HEART CARDIAC CATH LAB     ESOPHAGOSCOPY, GASTROSCOPY, DUODENOSCOPY (EGD), COMBINED N/A 7/7/2021    Procedure: ESOPHAGOGASTRODUODENOSCOPY, WITH BIOPSY;  Surgeon: Monserrat Bright MD;  Location:  GI     EYE SURGERY      cataract removal     ORTHOPEDIC SURGERY      KNEE SCOPES MULTIPLE     Prior to Admission Medications   Prior to Admission Medications   Prescriptions Last Dose Informant Patient Reported? Taking?   HYDROcodone-acetaminophen (NORCO) 5-325 MG tablet More Than a Week at PRN Self Yes Yes   Sig: Take 1-2 tablets by mouth 2 times daily as needed   acetaminophen (TYLENOL) 325 MG tablet 10/23/2021 at AM Self Yes Yes   Sig: Take 325-650 mg by mouth every 4 hours as needed   apixaban ANTICOAGULANT (ELIQUIS) 5 MG tablet 10/24/2021 at PM Self No Yes   Sig:  Take 1 tablet (5 mg) by mouth 2 times daily   aspirin (ASPIR-LOW) 81 MG EC tablet Past Month at AM Self Yes Yes   Sig: Take 1 tablet (81 mg) by mouth daily   atorvastatin (LIPITOR) 80 MG tablet 10/25/2021 at AM Self Yes Yes   Sig: Take 80 mg by mouth daily   blood glucose (NO BRAND SPECIFIED) test strip  Self Yes Yes   Sig: Use to test blood sugar as directed   carvedilol (COREG) 3.125 MG tablet 10/25/2021 at PM Self Yes Yes   Sig: Take 3.125 mg by mouth 2 times daily   glimepiride (AMARYL) 2 MG tablet 10/25/2021 at PM Self Yes Yes   Sig: Take 2 mg by mouth 2 times daily    insulin glargine (LANTUS PEN) 100 UNIT/ML pen 10/24/2021 at PM Self Yes Yes   Sig: Inject 18 Units Subcutaneous At Bedtime    multivitamin w/minerals (MULTIVITAMIN, THERAPEUTIC WITH MINERALS) tablet 10/24/2021 at AM Self Yes Yes   Sig: Take 1 tablet by mouth daily   omeprazole (PRILOSEC) 20 MG DR capsule 10/25/2021 at AM Self No Yes   Sig: Take 1 capsule (20 mg) by mouth daily   Patient taking differently: Take 20 mg by mouth 2 times daily    order for DME  Self No Yes   Sig: Equipment being ordered: Walker Wheels () and Walker ()  Treatment Diagnosis: Impaired gait   semaglutide (OZEMPIC) 2 MG/1.5ML SOPN pen 10/25/2021 at AM Self Yes Yes   Sig: Inject 0.25 mg Subcutaneous every 7 days on Monday   torsemide (DEMADEX) 20 MG tablet 10/24/2021 at PM Self No Yes   Sig: Take 1 tablet (20 mg) by mouth 2 times daily   Patient taking differently: Take 50 mg by mouth daily 40MG(20mg x 2) in the morning & 10MG(20mg x 0.5) in the evening      Facility-Administered Medications: None     Allergies   Allergies   Allergen Reactions     Penicillins Anaphylaxis       Social History   I personally reviewed history with patient:  - nonsmoker, social ETOH     Family History   No significant family hx, no hx CAD.    Review of Systems   The 10 point Review of Systems is negative other than noted in the HPI or here.     Physical Exam   Temp: 97.4  F (36.3  C)  Temp src: Oral BP: (!) 149/79 Pulse: 57   Resp: 20 SpO2: 99 % O2 Device: BiPAP/CPAP    Vital Signs with Ranges  Temp:  [96  F (35.6  C)-98.3  F (36.8  C)] 97.4  F (36.3  C)  Pulse:  [49-62] 57  Resp:  [14-29] 20  BP: (139-170)/() 149/79  MAP:  [69 mmHg-88 mmHg] 88 mmHg  Arterial Line BP: (118-149)/(50-63) 142/62  FiO2 (%):  [50 %] 50 %  SpO2:  [94 %-100 %] 99 %  248 lbs 0 oz    General: Appears stated age, no acute distress.  Skin:  Warm, dry. No rashes or lesions on exposed skin.  HEENT:  Normocephalic, atraumatic.  Chest:  Bilateral anterior and posterior lung fields clear to auscultation. No increased work of breathing. No wheeze.  Cardiovascular:  Regular rate and rhythm, without murmur, rub, or gallop. Bilateral upper and lower distal pulses palpable.   Abdomen:  Soft, non-tender, non-distended. Bowel sounds present. Groin sites c/d/i without hematoma  Musculoskeletal:  Moves all four extremities.   Neurological:  Alert and oriented x 4. Cranial nerves II-XII grossly intact.   Psychiatric:  Affect and mood congruent.    Data   Data reviewed today:  I personally reviewed no images or EKG's today.    Recent Labs   Lab 10/26/21  1520 10/26/21  0853 10/21/21  1346   WBC  --   --  7.2   HGB  --   --  12.3*   MCV  --   --  86   PLT  --   --  212   INR  --   --  1.26*   NA  --  139 139   POTASSIUM  --  4.0 4.8   CHLORIDE  --  108 109   CO2  --  25 24   BUN  --  40* 50*   CR  --  1.54* 1.74*   ANIONGAP  --  6 6   GABE  --  8.9 9.0   * 170* 250*   ALBUMIN  --   --  2.7*   PROTTOTAL  --   --  6.5*   BILITOTAL  --   --  0.5   ALKPHOS  --   --  201*   ALT  --   --  22   AST  --   --  27       Imaging:  Recent Results (from the past 24 hour(s))   Cardiac Catheterization    Narrative      Successful transfemoral transcatheter aortic valve replacement with a   34mm Medtronic Evolut PRO+ valve    Acute on chronic diastolic heart failure.    LVEDP 30 mmHg.      Echo Complete   Result Value    LVEF  55-60%     [facility]    [id number]  [id number]  [id number]  [id number]^VALERIE^AURELIA^WAN     [facility]  Echocardiography Laboratory  [address] [place] [address]     Name: TAY PAGE  MRN: [id number]  : 1945  Study Date: 10/26/2021 09:13 AM  Age: 76 yrs  Gender: Male  Patient Location: [facility]  Reason For Study: 34 mm MEDTRONIC TAVR  Ordering Physician: AURELIA PADILLA  Referring Physician: [facility]  Performed By: Tj Sterling RDCS     BSA: 2.3 m2  Height: 69 in  Weight: 247 lb  HR: 74  BP: 170/110 mmHg  ______________________________________________________________________________  Procedure  Complete Portable Echo Adult. Technically difficult study.  ______________________________________________________________________________  Interpretation Summary     PROCEDURE  Intra-procedural transthoracic echocardiogram for transfemoral transcatheter  aortic valve replacement with a 34-mm Medtronic valve.  Image acquisition by sonographer.     PRE-PROCEDURAL FINDINGS:  1. Severe calcific aortic stenosis with trace aortic regurgitation.  2. Normal left ventricular systolic function. Visually estimated LVEF 55-60%.  3. Trace mitral valve regurgitation.     POST-PROCEDURAL SURVEY:  1. The valve was successfully deployed.  2. Valve is well seated. No cj-prosthetic regurgitation. Mean systolic  gradient 3 mmHg.  3. No pericardial effusion.     ______________________________________________________________________________  Left Ventricle  The left ventricle is normal in size. There is mild concentric left  ventricular hypertrophy. Left ventricular systolic function is normal. The  visual ejection fraction is 55-60%. Left ventricular diastolic function is  indeterminate. No regional wall motion abnormalities noted.     Right Ventricle  The right ventricle is normal in size and function.     Atria  The left atrium is moderately dilated. Right atrial size is normal.  Right  atrium not well visualized.     Mitral Valve  The mitral valve is not well visualized. There is trace mitral regurgitation.     Tricuspid Valve  The tricuspid valve is not well visualized. There is trace tricuspid  regurgitation.     Aortic Valve  Calcified aortic valve. There is trace aortic regurgitation. Severe valvular  aortic stenosis.     Pulmonic Valve  The pulmonic valve is not well visualized.     Vessels  The aortic root is normal size. Normal size ascending aorta. Inferior vena  cava not well visualized for estimation of right atrial pressure.     Pericardium  There is no pericardial effusion.     Rhythm  Sinus rhythm was noted.  ______________________________________________________________________________  MMode/2D Measurements & Calculations  IVSd: 1.5 cm     LVIDd: 5.1 cm  LVIDs: 2.4 cm  LVPWd: 1.2 cm  FS: 52.8 %  LV mass(C)d: 292.5 grams  LV mass(C)dI: 129.5 grams/m2  LVOT diam: 2.2 cm  LVOT area: 3.7 cm2  RWT: 0.48     Doppler Measurements & Calculations  Ao V2 max: 122.6 cm/sec  Ao max P.0 mmHg  Ao V2 mean: 77.3 cm/sec  Ao mean P.8 mmHg  Ao V2 VTI: 21.2 cm  SID(I,D): 3.8 cm2  SID(V,D): 3.4 cm2  LV V1 max P.3 mmHg  LV V1 max: 114.9 cm/sec  LV V1 VTI: 22.2 cm  SV(LVOT): 81.2 ml  SI(LVOT): 35.9 ml/m2  AV Lefty Ratio (DI): 0.94  SID Index (cm2/m2): 1.7     ______________________________________________________________________________  Report approved by: Dr Yemi Dunlap 10/26/2021 01:46 PM

## 2021-10-26 NOTE — ANESTHESIA PREPROCEDURE EVALUATION
Anesthesia Pre-Procedure Evaluation    Patient: Burton Elizabeth   MRN: [id number] : 1945        Preoperative Diagnosis: valvular disease    Procedure : Procedure(s):  Transcatheter Aortic Valve Replacement          Past Medical History:   Diagnosis Date     Arthritis of knee~ s/p replacement 2016     Atypical atrial flutter (H)      CAD (coronary artery disease)      Cardiomyopathy (H)      Cerebral infarction (H)      Congestive heart failure (H)      Decreased cardiac ejection fraction~44% 2016     Diabetes mellitus, type 2 (H) 2016     History of DVT (deep vein thrombosis)      HTN (hypertension) 2016     LBBB (left bundle branch block) 2016     Mixed hyperlipidemia 2016     Renal disease     stage 3 chronic kidney disease     Rheumatic aortic stenosis      Upper GI bleed       Past Surgical History:   Procedure Laterality Date     ANKLE SURGERY      X 4     APPENDECTOMY       ARTHROPLASTY KNEE Right 3/29/2016    Procedure: ARTHROPLASTY KNEE;  Surgeon: Heena Rosen MD;  Location: [facility] OR     ARTHROPLASTY REVISION KNEE Left 2019    Procedure: REVISION LEFT TOTAL KNEE  ARTHROPLASTY;  Surgeon: Heena Rosen MD;  Location:  OR     ARTHROPLASTY REVISION KNEE Right 2021    Procedure: REVISION OF RIGHT TOTAL KNEE ARTHROPLASTY;  Surgeon: Heena Rosen MD;  Location:  OR     CV FEMORAL ANGIOGRAM N/A 10/11/2021    Procedure: Femoral Angiogram;  Surgeon: Edgardo Beckham MD;  Location:  HEART CARDIAC CATH LAB     CV HEART CATHETERIZATION WITH POSSIBLE INTERVENTION N/A 10/11/2021    Procedure: Coronary Angiogram;  Surgeon: Edgardo Beckham MD;  Location:  HEART CARDIAC CATH LAB     CV LEFT HEART CATH N/A 2019    Procedure: Left Heart Cath;  Surgeon: Edgardo Beckham MD;  Location:  HEART CARDIAC CATH LAB     ESOPHAGOSCOPY, GASTROSCOPY, DUODENOSCOPY (EGD), COMBINED N/A 2021    Procedure: ESOPHAGOGASTRODUODENOSCOPY,  WITH BIOPSY;  Surgeon: Monserrat Bright MD;  Location:  GI     EYE SURGERY      cataract removal     ORTHOPEDIC SURGERY      KNEE SCOPES MULTIPLE      Allergies   Allergen Reactions     Penicillins Anaphylaxis     Empagliflozin      Other reaction(s): Serum creatinine raised      Social History     Tobacco Use     Smoking status: Never Smoker     Smokeless tobacco: Never Used   Substance Use Topics     Alcohol use: Not Currently     Alcohol/week: 2.0 standard drinks     Types: 2 Standard drinks or equivalent per week     Comment: occasional, social      Wt Readings from Last 1 Encounters:   10/21/21 112 kg (247 lb)        Allergies   Allergen Reactions     Penicillins Anaphylaxis     Prior to Admission medications    Medication Sig Start Date End Date Taking? Authorizing Provider   acetaminophen (TYLENOL) 325 MG tablet Take 325-650 mg by mouth every 4 hours as needed 9/9/21  Yes Reported, Patient   apixaban ANTICOAGULANT (ELIQUIS) 5 MG tablet Take 1 tablet (5 mg) by mouth 2 times daily 10/18/21  Yes Laci Neumann MD   aspirin (ASPIR-LOW) 81 MG EC tablet Take 1 tablet (81 mg) by mouth daily 10/18/21  Yes Laci Neumann MD   atorvastatin (LIPITOR) 80 MG tablet Take 80 mg by mouth daily 12/14/20  Yes Reported, Patient   blood glucose (NO BRAND SPECIFIED) test strip Use to test blood sugar as directed   Yes Reported, Patient   carvedilol (COREG) 3.125 MG tablet Take 3.125 mg by mouth 2 times daily 9/9/21  Yes Reported, Patient   glimepiride (AMARYL) 2 MG tablet Take 2 mg by mouth 2 times daily  10/6/21  Yes Laci Neumann MD   HYDROcodone-acetaminophen (NORCO) 5-325 MG tablet Take 1-2 tablets by mouth 2 times daily as needed 9/9/21  Yes Reported, Patient   insulin glargine (LANTUS PEN) 100 UNIT/ML pen Inject 18 Units Subcutaneous At Bedtime  5/25/21  Yes Reported, Patient   multivitamin w/minerals (MULTIVITAMIN, THERAPEUTIC WITH MINERALS) tablet Take 1 tablet by mouth daily   Yes Reported,  Patient   omeprazole (PRILOSEC) 20 MG DR capsule Take 1 capsule (20 mg) by mouth daily  Patient taking differently: Take 20 mg by mouth 2 times daily  10/11/21 1/9/22 Yes Arabella Patricia MD   order for DME Equipment being ordered: Walker Wheels () and Walker ()  Treatment Diagnosis: Impaired gait 4/1/16  Yes Catarino Kearns PA-C   semaglutide (OZEMPIC) 2 MG/1.5ML SOPN pen Inject 0.25 mg Subcutaneous every 7 days on Monday 8/4/21  Yes Reported, Patient   torsemide (DEMADEX) 20 MG tablet Take 1 tablet (20 mg) by mouth 2 times daily  Patient taking differently: Take 50 mg by mouth daily 40MG(20mg x 2) in the morning & 10MG(20mg x 0.5) in the evening 10/21/21  Yes Megan Day PA-C     RECENT LABS:   ECG:   ECHO: 1.  Two-vessel coronary artery disease namely  of circumflex and small vessel disease and PDA branch.  2.  Findings are similar to the 2019 angiogram  CXR:        Anesthesia Evaluation   Pt has had prior anesthetic. Type: General.    No history of anesthetic complications       ROS/MED HX  ENT/Pulmonary:    (-) tobacco use, asthma and sleep apnea   Neurologic:     (+) CVA,  (-) no seizures and migraines   Cardiovascular:     (+) hypertension--CAD ---CHF dysrhythmias, a-fib, valvular problems/murmurs type: AS  (-) angina, LOAIZA, dyslipidemia and angina   METS/Exercise Tolerance:     Hematologic:     (+) History of blood clots, anemia,     Musculoskeletal:   (+) arthritis,     GI/Hepatic: Comment: Hx upper GI bleed   (-) GERD and liver disease   Renal/Genitourinary:     (+) renal disease, type: CRI,  (-) nephrolithiasis   Endo:     (+) type II DM, Obesity,  (-) Type I DM and thyroid disease   Psychiatric/Substance Use:    (-) psychiatric history   Infectious Disease:    (-) Recent Fever   Malignancy:       Other:            Physical Exam    Airway  airway exam normal      Mallampati: III   TM distance: > 3 FB   Neck ROM: limited   Mouth opening: > 3 cm    Respiratory Devices and  Support         Dental  no notable dental history         Cardiovascular   cardiovascular exam normal          Pulmonary   pulmonary exam normal        breath sounds clear to auscultation           OUTSIDE LABS:  CBC:   Lab Results   Component Value Date    WBC 7.2 10/21/2021    WBC 7.4 10/11/2021    HGB 12.3 (L) 10/21/2021    HGB 12.5 (L) 10/11/2021    HCT 41.2 10/21/2021    HCT 41.2 10/11/2021     10/21/2021     10/11/2021     BMP:   Lab Results   Component Value Date     10/21/2021     10/11/2021    POTASSIUM 4.8 10/21/2021    POTASSIUM 3.8 10/11/2021    CHLORIDE 109 10/21/2021    CHLORIDE 109 10/11/2021    CO2 24 10/21/2021    CO2 25 10/11/2021    BUN 50 (H) 10/21/2021    BUN 36 (H) 10/11/2021    CR 1.74 (H) 10/21/2021    CR 1.69 (H) 10/11/2021     (H) 10/21/2021     (H) 10/11/2021     COAGS:   Lab Results   Component Value Date    PTT 28 03/29/2016    INR 1.26 (H) 10/21/2021     POC:   Lab Results   Component Value Date     (H) 07/11/2021     HEPATIC:   Lab Results   Component Value Date    ALBUMIN 2.7 (L) 10/21/2021    PROTTOTAL 6.5 (L) 10/21/2021    ALT 22 10/21/2021    AST 27 10/21/2021    ALKPHOS 201 (H) 10/21/2021    BILITOTAL 0.5 10/21/2021     OTHER:   Lab Results   Component Value Date    LACT 2.2 (H) 07/07/2021    A1C 6.3 (H) 07/06/2021    GABE 9.0 10/21/2021    PHOS 2.8 07/11/2021    MAG 1.6 02/12/2021    TSH 2.21 01/11/2021    SED 15 06/10/2021       Anesthesia Plan    ASA Status:  4   NPO Status:  NPO Appropriate    Anesthesia Type: MAC.     - Reason for MAC: straight local not clinically adequate         Techniques and Equipment:     - Lines/Monitors: Arterial Line     Consents    Anesthesia Plan(s) and associated risks, benefits, and realistic alternatives discussed. Questions answered and patient/representative(s) expressed understanding.     - Discussed with:  Patient         Postoperative Care    Pain management: Multi-modal analgesia.   PONV  prophylaxis: Ondansetron (or other 5HT-3), Dexamethasone or Solumedrol     Comments:                Piper Estrada MD

## 2021-10-26 NOTE — ANESTHESIA CARE TRANSFER NOTE
Patient: Burton Elizabeth    Procedure: Procedure(s):  Transcatheter Aortic Valve Replacement       Diagnosis: valvular disease  Diagnosis Additional Information: No value filed.    Anesthesia Type:   MAC     Note:    Oropharynx: oropharynx clear of all foreign objects and spontaneously breathing (called RT for CPAP,  MDA called.  belly breathing as he was preoperatively )  Level of Consciousness: drowsy  Oxygen Supplementation: face mask  Level of Supplemental Oxygen (L/min / FiO2): 6  Independent Airway: airway patency satisfactory and stable  Dentition: dentition unchanged  Vital Signs Stable: post-procedure vital signs reviewed and stable  Report to RN Given: handoff report given  Patient transferred to: PACU    Handoff Report: Identifed the Patient, Identified the Reponsible Provider, Reviewed the pertinent medical history, Discussed the surgical course, Reviewed Intra-OP anesthesia mangement and issues during anesthesia, Set expectations for post-procedure period and Allowed opportunity for questions and acknowledgement of understanding      Vitals:  Vitals Value Taken Time   /77 10/26/21 1318   Temp     Pulse 62 10/26/21 1324   Resp 10 10/26/21 1324   SpO2 98 % 10/26/21 1324   Vitals shown include unvalidated device data.    Electronically Signed By: WALE Tomlinson CRNA  October 26, 2021  1:25 PM

## 2021-10-26 NOTE — CONSULTS
Consult Date: 10/26/2021    REFERRING CARDIOLOGIST:  Dr. Jose F Neumann.    REASON FOR CONSULTATION:  Evaluation for severe aortic stenosis.    HISTORY OF PRESENT ILLNESS:  A 76-year-old gentleman with multiple medical comorbidities who was recently evaluated by Dr. Neumann [facility] in September for severe aortic stenosis.  His most recent echocardiogram demonstrated preserved LV systolic function with severe aortic stenosis with a mean gradient of 40 mmHg.  His STS score was around 5% putting him in the intermediate risk group.  He also notably recently had a non-ST elevation MI with some renal insufficiency and a recent GI bleed as well.  I have asked to evaluate the patient from a surgical standpoint.    PAST MEDICAL HISTORY:  Osteoarthritis, coronary artery disease, cardiomyopathy, previous stroke, congestive heart failure, type 2 diabetes, prior DVT, hypertension, left bundle branch block, chronic renal insufficiency stage III, aortic stenosis, upper GI bleed.    PAST SURGICAL HISTORY:  Includes ankle surgery, appendectomy, arthroscopic knee surgeries and cataract surgery.    ALLERGIES:  PENICILLIN AND EMPAGLIFLOZIN.    CURRENT OUTPATIENT MEDICATIONS:  Reviewed in Epic chart.    FAMILY HISTORY:  Significant for coronary artery disease.    SOCIAL HISTORY:  He is single.  He has never smoked.  He drinks alcohol occasionally and socially.    REVIEW OF SYSTEMS:  As per HPI.  He complains of shortness of breath.  All other 10-point review of systems are completed and were otherwise negative unless stated above.    PHYSICAL EXAMINATION:    VITAL SIGNS:  Stable.  HEENT:  Within normal limits.  NECK:  Supple, no lymphadenopathy.  CARDIOVASCULAR:  Regular rate and rhythm, normal S1, S2.  Grade 3/6 systolic murmur at the right upper sternal border.  LUNGS:  Clear bilaterally.  ABDOMEN:  Soft, nontender, nondistended.  EXTREMITIES:  Negative for edema, cyanosis or clubbing.  NEUROLOGIC:  A and O x3.  No  focal deficits.    PERTINENT LABORATORY STUDIES:  Coronary angiogram from 10/11/2021 demonstrated 2-vessel coronary artery disease similar to the angiogram findings in 2019 with a chronic total occlusion of the circumflex and small vessel disease involving the PDA branch.  His last echocardiogram was from 2021 demonstrating normal LV systolic size and function with severe aortic stenosis with a mean gradient of 44 mmHg.  Trace to mild MR, no TR.  TAVR protocol CT scan performed 10/01/2021 demonstrates anatomy that appears to be amenable for a transfemoral percutaneous approach.    IMPRESSION AND PLAN:  Mr. Elizabeth is a 76-year-old gentleman with severe symptomatic aortic stenosis.  Given his medical comorbidities including a history of recent GI bleed, chronic renal insufficiency, etc., I think he is a better TAVR candidate than open surgical AVR.  Pros and cons of both approaches were explained to the patient and the reason for recommending a TAVR in his particular situation.  He is agreeable to proceed.    Urmila Mckeon MD        D: 10/26/2021   T: 10/26/2021   MT: [id number]    Name:     TAY ELIZABETH  MRN:      [id number]        Account:      [id number]   :      1945           Consult Date: 10/26/2021     Document: [id number]

## 2021-10-26 NOTE — PRE-PROCEDURE
GENERAL PRE-PROCEDURE:   Procedure:  Transcatheter aortic valve replacement  Date/Time:  10/26/2021 11:45 AM    Written consent obtained?: Yes    Risks and benefits: Risks, benefits and alternatives were discussed    Consent given by:  Patient  Patient states understanding of procedure being performed: Yes    Patient's understanding of procedure matches consent: Yes    Procedure consent matches procedure scheduled: Yes    Expected level of sedation:  Moderate  Appropriately NPO:  Yes  ASA Class:  2  Mallampati  :  Grade 2- soft palate, base of uvula, tonsillar pillars, and portion of posterior pharyngeal wall visible  Lungs:  Lungs clear with good breath sounds bilaterally  Heart:  Normal heart sounds and rate, a-flutter and systolic murmur  History & Physical reviewed:  History and physical reviewed and no updates needed  Statement of review:  I have reviewed the lab findings, diagnostic data, medications, and the plan for sedation

## 2021-10-27 NOTE — CONSULTS
Care Management Initial Consult    General Information  Assessment completed with: Burton Rios  Type of CM/SW Visit: Initial Assessment    Primary Care Provider verified and updated as needed: [facility]   Readmission within the last 30 days:           Advance Care Planning:            Communication Assessment  Patient's communication style: spoken language (English or Bilingual)    Hearing Difficulty or Deaf: no   Wear Glasses or Blind: no    Cognitive  Cognitive/Neuro/Behavioral: .WDL except (grumpy and in pain)  Level of Consciousness: alert  Arousal Level: opens eyes spontaneously  Orientation: oriented x 4  Mood/Behavior: agitated  Best Language: 0 - No aphasia  Speech: clear    Living Environment:   People in home: alone     Current living Arrangements: house      Able to return to prior arrangements:         Family/Social Support:  Care provided by: self  Provides care for: no one  Marital Status: Single  Sibling(s), Other (specify) (sister in law, nieces and nephews)          Description of Support System: Supportive         Current Resources:   Patient receiving home care services: No     Community Resources: None  Equipment currently used at home: none  Supplies currently used at home: None    Employment/Financial:  Employment Status: retired        Financial Concerns: No concerns identified           Lifestyle & Psychosocial Needs:  Social Determinants of Health     Tobacco Use: Low Risk      Smoking Tobacco Use: Never Smoker     Smokeless Tobacco Use: Never Used   Alcohol Use: Unknown     Frequency of Alcohol Consumption: 2-3 times a week     Average Number of Drinks: 1 or 2     Frequency of Binge Drinking: Not on file   Financial Resource Strain:      Difficulty of Paying Living Expenses:    Food Insecurity:      Worried About Running Out of Food in the Last Year:      Ran Out of Food in the Last Year:    Transportation Needs:      Lack of Transportation (Medical):      Lack of  Transportation (Non-Medical):    Physical Activity:      Days of Exercise per Week:      Minutes of Exercise per Session:    Stress:      Feeling of Stress :    Social Connections:      Frequency of Communication with Friends and Family:      Frequency of Social Gatherings with Friends and Family:      Attends Mandaeism Services:      Active Member of Clubs or Organizations:      Attends Club or Organization Meetings:      Marital Status:    Intimate Partner Violence:      Fear of Current or Ex-Partner:      Emotionally Abused:      Physically Abused:      Sexually Abused:    Depression: Not at risk     PHQ-2 Score: 0   Housing Stability:      Unable to Pay for Housing in the Last Year:      Number of Places Lived in the Last Year:      Unstable Housing in the Last Year:        Functional Status:  Prior to admission patient needed assistance:   Dependent ADLs:: Independent          Mental Health Status:  Mental Health Status: Other (see comment) (patient grumpy and in pain)       Chemical Dependency Status:                Values/Beliefs:  Spiritual, Cultural Beliefs, Mandaeism Practices, Values that affect care: no               Additional Information:  Met with patient who is not very happy to see anyone. He has a headache and is grumpy. Writer will follow for any discharge needs.    Domenica Brody RN

## 2021-10-27 NOTE — PROVIDER NOTIFICATION
Dr Lopez paged regarding patient headache pain. Home norco ordered. Also mentioned patient on 2L NC, and edema. Probably needs lasix. Defer Lasxi to cards.

## 2021-10-27 NOTE — PROGRESS NOTES
"Patient c/o headache. Initially refused any interventions, PRN Tylenol, hot or cold packs offered. Patient said \"it won't do any damn good, it's this thing in my nose. I've never had a headache before you put this on me.\" Writer offered to page the doctor to request another PRN and patient said, \"don't even bother.\" Patient eventually asked writer to request another PRN. Writer told patient that the doctor might suggest starting with Tylenol and patient replied, \"then you can tell the doctor to go to [facility], cause that's not gonna work.\" Writer asked patient if he knew of anything that would help and patient said, \"I don't know, just do your job.\"    Writer paged on-call at 0200 and received one-time order for oxycodone. Writer also discussed patient's BP despite PRN hydralazine, PRN clonidine ordered.    When writer told patient about the oxycodone he said, \"oxycodone isn't gonna work for me, it gives me weird dreams and makes me see colors; I've never had a problem with Vicodin.\" Writer re-paged MD who entered a one-time dose of Vicodin which patient said was helpful.  "

## 2021-10-27 NOTE — PROGRESS NOTES
Provider paged d/t elevated BP after PRN clonidine. PRN labetalol ordered and given, BP recheck still above 140. MD notified and PRN hydralazine given

## 2021-10-27 NOTE — OP NOTE
Procedure Date: 10/26/2021    PREOPERATIVE DIAGNOSIS:  Severe symptomatic aortic stenosis.    POSTOPERATIVE DIAGNOSIS:  Severe symptomatic aortic stenosis.      INTERVENTIONAL CARDIOLOGISTS:  Edgardo Beckham MD and Sudheer Anguiano MD    SURGEON:  Urmila Mckeon MD    ASSISTANT:  Charles Chiu MD    NAME OF OPERATION:  Transcatheter aortic valve replacement (TAVR) with a 34 mm Medtronic Evolut Pro Plus valve, left percutaneous transfemoral approach.    ANESTHESIA:  Monitored anesthesia care.    INDICATIONS FOR PROCEDURE:  Mr. Elizabeth is a very pleasant 76-year-old gentleman with multiple medical comorbidities with severe symptomatic aortic stenosis.  He was recently evaluated for TAVR versus surgical AVR and was deemed an appropriate TAVR candidate.  He was taken to the hybrid room today for a transfemoral percutaneous TAVR.    DESCRIPTION OF PROCEDURE:  After informed consent was obtained, the patient was brought down to the hybrid room and was placed on the OR table in the supine position.  Access was obtained in the right and left common femoral arteries and in the right common femoral vein by the Interventional Cardiology team.  The left common femoral artery used for valve delivery was preclosed using the Perclose ProGlide devices.  Temporary pacer was positioned in the right ventricle via the right common femoral vein.  Please refer to Interventional Cardiology dictation for details of the operation.  In brief, access was obtained in the right radial artery for sentinel embolic protection device deployment during the procedure.  Access was eventually obtained in the left ventricle via the left common femoral artery.  An aortic balloon valvuloplasty was performed using a 22 mm True balloon with rapid pacing at 180 beats per minute.  The 34 mm Medtronic Evolut Pro Plus prosthetic valve was then positioned across the native aortic valve with a depth of approximately 3 mm.  Fluoroscopy was used to  confirm optimal valve positioning.  The valve was then successfully deployed via rapid pacing at 120 beats per minute.  TTE demonstrated no paravalvular leaks.  The sentinel protection device was removed and the valve sheath access arteriotomy was closed after removing the delivery system using the Perclose device.  Completion iliofemoral angiogram demonstrated no contrast extravasation or stenosis.  The temporary pacer was also removed.    The patient was brought to the PACU in hemodynamically stable condition.    Urmila Mckeon MD        D: 10/26/2021   T: 10/26/2021   MT: EMANUEL    Name:     TAY PAGEJammie  MRN:      [id number]        Account:        [id number]   :      1945           Procedure Date: 10/26/2021     Document: [id number]

## 2021-10-27 NOTE — PLAN OF CARE
Summary: POD1 TAVR    Neuro: A/O x4, q2 neuro checks intact    Tele/Cardiac: aflutter/afib    Vitals: SBP frequently above 140, PRNs given as available and on-call provider updated    Respiratory: Bipap removed evening, NC applied 2LPM, sats drop to mid-80s on RA; LOAIZA, LS diminished    GI/: Pt unable to void, bladder scan 755cc & straight cath 700cc at 2300, refused bladder scan at 0630 and refuses to attempt to void    Activity: sat at edge of bed, refuses further activity    Pain: c/o headache; PRN Vicodin x1 somewhat helpful    Aggression: Yellow. Pt agitated, irritable, uncooperative at times. Yells and curses at staff at times. Refused AM weight    Other: left groin site leaking evening, pressure applied; new dressing w/ some drainage; other sites WDL, CMS intact; +3 edema BLE

## 2021-10-27 NOTE — PLAN OF CARE
A/Ox4. Patient irritable and uncooperative at times. Neuro intact. Bilateral groin sites ecchymotic with weak pulses. Right radial site CDI with CMS intact. Patient c/o 11/10 headache. O2 94% on 2L, desats to 85 on RA. RR 30.  PRN norco given with some relief. Lung sounds have crackles in bases. IV lasix given and giron inserted d/t retention. Patient had low urine output after lasix and cardiology notified.  Patient refusing to get OOB d/t headache and declined to work with cardiac rehab today.

## 2021-10-27 NOTE — PROGRESS NOTES
"Patient had not voided since procedure. Bladder scanned at 2120 for 759. Patient states he does not need to pee. Refused to try to get up to bathroom or void in urinal. Refused straight cath. Another RN approached in 30 minutes. Again patient refusing voiding or straight cath. Patient had also been very angry at the dinner tray he received, that it was \"road kill\". Declined any other snacks available on unit. States he \"does not need to be pleasant while he is here because we are not listening to him. Just leave me alone.\"   "

## 2021-10-27 NOTE — PROGRESS NOTES
[facility]- TAVR Progress Note   Primary Cardiologist: Dr. Neumann  Date of Service: 10/27/21       Interval History:   Reports intermittent headache since last night.  At worst it was 11 out of 10 and now is at 7 out of 10 with Vicodin.  He feels like tension from his forehead to his back of his neck.  He denies any vision changes or focal weakness.  He denies chest discomfort.  He does have worsening peripheral edema, abdominal distention, fatigue, and shortness of breath.  He is requiring oxygen.  Overall he does not feel the best.  He thought he would feel better after his TAVR procedure.  He tells me he has been on torsemide but this was reduced in dose.    ---------------------------------------------------------------------------------------------------------------------    Assessment:  Burton Elizabeth is a 76 year old male who was admitted on 10/26/2021 for elective Transfemoral Aortic Valve Replacement (TAVR).       1) S/p TAVR   -Status post 34 mm Medtronic valve with no complications  - ECG demonstrated chronic left bundle branch block  - Echocardiogram pending at this time  - Access Site with no significant hematoma, bruit, or ecchymoses  - Neuro check normal  - Antiplatelet therapy - no antiplt therapy- only apixaban  - CBC with evidence of mild anemia and BMP with creatinine at 1.6 which is his baseline  - No urine output today  - Cardiac rehab/PT/OT is scheduled for today    2) Acute on Chronic HFpEF  -PTA regimen includes torsemide 20 mg in a.m. and 10 mg in p.m.  -During TAVR H&P visit he was noted to be in acute heart failure  --No urine output today; weight is up; net output is positive; oxygen dependent and has symptoms of orthopnea, herminia distention, and worsening peripheral edema    3) Headache  --Intermittent since last night  --Appears to be tension type of headache  --No focal weakness or vision changes  --Vicodin seems to help  --Could be  multifactorial in etiology (poor sleep; recent sedation; high blood pressure)    4) Hypertension  --High numbers    5) CAD  -- small circumflex with small vessel disease and remaining coronary tree  --No angina on this admission    6) Paroxysmal atrial flutter  --On apixaban 5 mg twice daily    7) morbid obesity    8) CKD  9) history of gastric ulcers/but Cabrera's esophagus  --TAVR team recommended no aspirin or Plavix    10) recent history revision of TKA  11) history of CVA in 2020    ---------------------------------------------------------------------------------------------------------------------    Plan:   -- Tylenol as needed for headache  --Experienced a urine retention this morning; patient is willing to have bladder scan giron catheter placement this morning   -- Initiate IV lasix 40 mg daily  -- Full resting echocardiogram today   --Continue with apixaban 5 mg twice daily per TAVR team  -- Likely discharge to home tomorrow (10/28) once has adequate response to IV diuresis     ATTESTATION:  Mr. Elizabeth was seen and examined. Agree with note and plan of care.   He has had urine output this afternoon. Headache better; may have been due to lack of caffeine.  Plan discharge likely tomorrow. ADALBERTO Elena MD     -------------------------------------------------------------------------------------------------------------    Physical Exam   Temp: 98.1  F (36.7  C) Temp src: Oral BP: 131/70 Pulse: 72   Resp: 29 SpO2: 94 % O2 Device: Nasal cannula Oxygen Delivery: 2 LPM  Vitals:    10/26/21 0838 10/27/21 0732   Weight: 112.5 kg (248 lb) 113.8 kg (250 lb 12.8 oz)     General-in mild distress.  Oxygen on nasal cannula.  Morbid obesity.   Respiratory-fine crackles at the bases bilaterally.  Cardiac-moderate JVD at 45 degree angle.  Regular rate and rhythm with 1 out of 6 systolic ejection murmur at right upper sternal border.  Abdomen-mild distention.  Extremities-2+ pitting edema up to knees  bilaterally  Skin-no significant ecchymosis, hematoma, or bruit at the femoral access sites  Neuro-brief cranial nerve exam normal.  5 out of 5 motor strength in all 4 extremities.      Medications       apixaban ANTICOAGULANT  5 mg Oral BID     atorvastatin  80 mg Oral Daily     carvedilol  3.125 mg Oral BID     furosemide  40 mg Intravenous Daily     insulin aspart  1-7 Units Subcutaneous TID AC     insulin aspart  1-5 Units Subcutaneous At Bedtime     insulin glargine  18 Units Subcutaneous At Bedtime     omeprazole  20 mg Oral BID       Data   Reviewed.     Tele: NSR      This note was completed in part using Dragon voice recognition software. Although reviewed after completion, some word and grammatical errors may occur.    Timothy Juarez PA-C   10/27/2021  Pager: [phone]

## 2021-10-27 NOTE — PROGRESS NOTES
[facility]    Medicine Progress Note - Hospitalist Service       Date of Admission:  10/26/2021    Assessment & Plan         Burton Elizabeth is a 76 year old male who presented to [facility] on 10/26/2021 for planned TAVR.  The procedure was well-tolerated is performed in [place] has been admitted to [facility] CCU post procedure.  The hospital medicine service has been consulted for medical co-management.     Aortic stenosis, severe, now status post TAVR, 10/26/2021  Heart failure with preserved EF, secondary to aortic stenosis, mild exacerbation  Burton has been followed as an outpatient by Dr. Neumann, cardiology, for symptomatic aortic stenosis.  He was deemed suitable for TAVR, which was well-tolerated today.  Last echocardiogram on 8/16/2021 with normal LV size, systolic function, severe AS, trace to mild MR, no TR.  - Full plan of care per TAVR team  - Started on Eliquis postop, defer resuming ASA to TAVR service  - IV Lasix 40 mg po every day   - Echo pending   - Cardiac rehab     Headache: Started at 9PM 10/27/21. Persistent. Localized to frontal region, wrapping around to back of head. No hx of headaches. No chronic neck pain. Has had a cup of coffee this AM. No vision changes, nausea, vomiting. No focal neuro deficits.   - PRN tylenol, PRN Norco. Unfortunately, cannot use Toradol due to renal fxn  - Monitor sx throughout the AM     ADDENDUM 1543: Headache improving with increased dose of Norco 10 mg. Sleeping and appears comfortable when I entered the room. No focal neuro deficits.   - If pain worsens or persists or if pt develops neuro deficits, would recommend more advanced imaging (MRI without contrast)     Urinary retention: Noted post-op.   - Bladder scan and straight cath per protocol   - Monitor throughout the day, can start Flomax if continues     ADDENDUM 1512: Ongoing retention, Zuniga placed. Flomax started.     Coronary artery disease  NSTEMI, July 2021  Hypertension,  treated  Hyperlipidemia, treated  Atrial flutter  On chronic AC with apixaban. Diagnostic coronary angiogram on 10/11/2021 similar to prior diagnostic coronary angiogram in 2019 with two-vessel coronary artery disease including chronically totally occluded circumflex and small vessel disease of the PDA branch.  - As above, defer resuming home aspirin to cardiology   - Continue PTA atorvastatin, Coreg     Chronic kidney disease, stage III  Burton has a personal history of OPAL during July 2021 admission for NSTEMI.  Baseline creatinine likely 2.0-2.3, was as high as 3.74 in July 2021.  Creatinine on admission 1.54.   - Avoid hypotension, optimize hydration  - Generally avoid nephrotoxic agents, renal dosing is appropriate  - Avoid NSAIDs other than aspirin     Diabetes mellitus, type II, on long-term insulin  Hemoglobin A1c 6.3% 7/6/2021. PTA regimen of Lantus 18u at bedtime, glimepiride, semaglutide  - Hold home glimepiride  - Hold home semaglutide   - Increase Lantus to PTA dose of 18 U at bedtime as pt has fully advanced diet      Acute GI bleed, secondary to gastritis July 2021  Cabrera's esophagus diagnosed on endoscopy  - PTA PPI     Suspected CRISTHIAN  Admission at [facility] with acute hypoxic, hypercarbic respiratory failure requiring BiPAP suspected to be related to CHF with severe aortic stenosis. Notes previously mention suspected CRISTHIAN however pt had not completed sleep study as outpatient. Appears to continue on BiPAP nocturnally.  - BiPAP with home settings     Diet: Regular Diet Adult    DVT Prophylaxis: DOAC  Zuniga Catheter: Not present  Central Lines: PRESENT     Code Status: Full Code      Disposition Plan   Expected discharge: Pending post-procedure evaluation, cardiology input and improvement in headache, likely 10/28/21.      The patient's care was discussed with the Attending Physician, Dr. Bullard, Bedside Nurse, Patient and Patient's Family.    Gayathri Lopez PA-C  Hospitalist  [facility]  Securely message with the [employer] Web Console (learn more here)  Text page via [facility] Paging/Directory  ______________________________________________________________________    Interval History   Complains of significant headache as above. Vitals stable, utilizing 2 LPM supplemental oxygen via NC. Echo completed.     Data reviewed today: I reviewed all medications, new labs and imaging results over the last 24 hours. I personally reviewed all labs and imaging to date.     Physical Exam   Vital Signs: Temp: 98.1  F (36.7  C) Temp src: Oral BP: 131/68 Pulse: 71   Resp: 28 SpO2: 91 % O2 Device: None (Room air) Oxygen Delivery: 1 LPM  Weight: 248 lbs 0 oz    CONSTITUTIONAL: Pt laying in bed, dressed in hospital garb. Appears comfortable. Cooperative with interview. Accompanied by brother at bedside.   HEENT: Normocephalic, atraumatic.   CARDIOVASCULAR: RRR, no murmurs, rubs, or extra heart sounds appreciated. Pulses +2/4 and regular in upper and lower extremities, bilaterally.   RESPIRATORY: No increased work of breathing.  Supplemental oxygenation via 2 LPM NC. Diminished lung sounds at bases.   GASTROINTESTINAL:  Abdomen soft, non-distended. BS auscultated in all four quadrants. Negative for tenderness to palpation.  No masses or organomegaly noted.  MUSCULOSKELETAL: Strength +5/5 in upper and lower extremities, bilaterally. No gross deformities noted. Normal muscle tone.   HEMATOLOGIC/LYMPHATIC/IMMUNOLOGIC: 2-3+ edema in lower extremities   NEUROLOGIC: Alert and oriented to person, place, and time.  strength intact. CN's II-XII grossly intact. Sensation equal and intact in upper and lower extremities, bilat.   SKIN: Warm, dry, intact. No jaundice noted. Negative for suspicious lesions, rashes, bruising, open sores or abrasions.     Data   Recent Labs   Lab 10/27/21  0551 10/27/21  0107 10/26/21  2145 10/26/21  1520 10/26/21  0853 10/21/21  1346 10/21/21  1346   WBC  9.6  --   --   --   --   --  7.2   HGB 11.5*  --   --   --   --   --  12.3*   MCV 87  --   --   --   --   --  86     --   --   --   --   --  212   INR  --   --   --   --   --   --  1.26*     --   --   --  139  --  139   POTASSIUM 4.8  --   --   --  4.0  --  4.8   CHLORIDE 108  --   --   --  108  --  109   CO2 25  --   --   --  25  --  24   BUN 39*  --   --   --  40*  --  50*   CR 1.61*  --   --   --  1.54*  --  1.74*   ANIONGAP 5  --   --   --  6  --  6   GABE 8.4*  --   --   --  8.9  --  9.0   * 119* 124*   < > 170*   < > 250*   ALBUMIN  --   --   --   --   --   --  2.7*   PROTTOTAL  --   --   --   --   --   --  6.5*   BILITOTAL  --   --   --   --   --   --  0.5   ALKPHOS  --   --   --   --   --   --  201*   ALT  --   --   --   --   --   --  22   AST  --   --   --   --   --   --  27    < > = values in this interval not displayed.     Recent Results (from the past 24 hour(s))   Cardiac Catheterization    Narrative      Successful transfemoral transcatheter aortic valve replacement with a   34mm Medtronic Evolut PRO+ valve    Acute on chronic diastolic heart failure.    LVEDP 30 mmHg.      Echo Complete   Result Value    LVEF  55-60%    Narrative    [id number]  [id number]  [id number]  [id number]^VALERIE^AURELIA^WAN     [facility]  Echocardiography Laboratory  [address] [place] [id number]     Name: TAY PAGE  MRN: [id number]  : 1945  Study Date: 10/26/2021 09:13 AM  Age: 76 yrs  Gender: Male  Patient Location: [facility]  Reason For Study: 34 mm MEDTRONIC TAVR  Ordering Physician: AURELIA PADILLA  Referring Physician: [facility]  Performed By: Tj Sterling RDCS     BSA: 2.3 m2  Height: 69 in  Weight: 247 lb  HR: 74  BP: 170/110 mmHg  ______________________________________________________________________________  Procedure  Complete Portable Echo Adult. Technically difficult  study.  ______________________________________________________________________________  Interpretation Summary     PROCEDURE  Intra-procedural transthoracic echocardiogram for transfemoral transcatheter  aortic valve replacement with a 34-mm Medtronic valve.  Image acquisition by sonographer.     PRE-PROCEDURAL FINDINGS:  1. Severe calcific aortic stenosis with trace aortic regurgitation.  2. Normal left ventricular systolic function. Visually estimated LVEF 55-60%.  3. Trace mitral valve regurgitation.     POST-PROCEDURAL SURVEY:  1. The valve was successfully deployed.  2. Valve is well seated. No cj-prosthetic regurgitation. Mean systolic  gradient 3 mmHg.  3. No pericardial effusion.     ______________________________________________________________________________  Left Ventricle  The left ventricle is normal in size. There is mild concentric left  ventricular hypertrophy. Left ventricular systolic function is normal. The  visual ejection fraction is 55-60%. Left ventricular diastolic function is  indeterminate. No regional wall motion abnormalities noted.     Right Ventricle  The right ventricle is normal in size and function.     Atria  The left atrium is moderately dilated. Right atrial size is normal. Right  atrium not well visualized.     Mitral Valve  The mitral valve is not well visualized. There is trace mitral regurgitation.     Tricuspid Valve  The tricuspid valve is not well visualized. There is trace tricuspid  regurgitation.     Aortic Valve  Calcified aortic valve. There is trace aortic regurgitation. Severe valvular  aortic stenosis.     Pulmonic Valve  The pulmonic valve is not well visualized.     Vessels  The aortic root is normal size. Normal size ascending aorta. Inferior vena  cava not well visualized for estimation of right atrial pressure.     Pericardium  There is no pericardial effusion.     Rhythm  Sinus rhythm was  noted.  ______________________________________________________________________________  MMode/2D Measurements & Calculations  IVSd: 1.5 cm     LVIDd: 5.1 cm  LVIDs: 2.4 cm  LVPWd: 1.2 cm  FS: 52.8 %  LV mass(C)d: 292.5 grams  LV mass(C)dI: 129.5 grams/m2  LVOT diam: 2.2 cm  LVOT area: 3.7 cm2  RWT: 0.48     Doppler Measurements & Calculations  Ao V2 max: 122.6 cm/sec  Ao max P.0 mmHg  Ao V2 mean: 77.3 cm/sec  Ao mean P.8 mmHg  Ao V2 VTI: 21.2 cm  SID(I,D): 3.8 cm2  SID(V,D): 3.4 cm2  LV V1 max P.3 mmHg  LV V1 max: 114.9 cm/sec  LV V1 VTI: 22.2 cm  SV(LVOT): 81.2 ml  SI(LVOT): 35.9 ml/m2  AV Lefty Ratio (DI): 0.94  SID Index (cm2/m2): 1.7     ______________________________________________________________________________  Report approved by: Dr Yemi Dunlap 10/26/2021 01:46 PM           Medications       apixaban ANTICOAGULANT  5 mg Oral BID     atorvastatin  80 mg Oral Daily     carvedilol  3.125 mg Oral BID     insulin aspart  1-7 Units Subcutaneous TID AC     insulin aspart  1-5 Units Subcutaneous At Bedtime     insulin glargine  18 Units Subcutaneous At Bedtime     omeprazole  20 mg Oral BID

## 2021-10-27 NOTE — PROGRESS NOTES
"This RN approached patient to void this AM. Patient refused. Asked to bladder scan patient, patient stated \"get the hell out of my room\". Refused AM weight. Refused Tylenol, heat or ice for headache. Stated to patient would page for Norco that had been given as a one time order over night. Patient sitting up on side of bed. Refused to sit in chair for more comfortable positioning.   "

## 2021-10-27 NOTE — PROVIDER NOTIFICATION
MD Notification    Notified Person: MD    Notified Person Name: Cherry    Notification Date/Time:0405 10/27/2021    Notification Interaction:web page    Purpose of Notification:275. Glenn. Burton. 's despite better pain control. TAVR yesterday. Can we given the clonidine now despite order saying >180? Isabel RN *[id number]    Orders Received:parameters changed to >140    Comments:

## 2021-10-28 NOTE — PROGRESS NOTES
Pt is A&Ox4, cooperative, calm. Restraints d/c'd at 0600. UO 30 mL since 2300, bladder scanned for 0. Edema BLE 3+ pitting, generalized 2-3 pitting. 3LNC does not feel short of breath or that it is labored.

## 2021-10-28 NOTE — PROVIDER NOTIFICATION
275 TT- Pt very combative/abusive, need restraint order. TY! Zayda ALFRED [phone]    Sent to [facility].

## 2021-10-28 NOTE — PROGRESS NOTES
[facility]- TAVR Progress Note   Primary Cardiologist: Dr. Neumann  Date of Service: 10/28/21       Interval History:   No significant information from patient. He wants to sleep. Per nurse patient has been combative overnight.     ---------------------------------------------------------------------------------------------------------------------    Assessment:  Burton Elizabeth is a 76 year old male who was admitted on 10/26/2021 for elective Transfemoral Aortic Valve Replacement (TAVR).       # S/p TAVR   -Status post 34 mm Medtronic valve with no complications  - ECG demonstrated chronic left bundle branch block  - Echocardiogram showed well-seated bioprosthetic aortic valve with mean gradient of 9 mmHg with trivial AI. LVEF preserved. No pericardial effusion.  - Access Site with no significant hematoma, bruit, or ecchymoses  - Neuro check normal  - Antiplatelet therapy - no antiplt therapy- only apixaban  - CBC with evidence of mild anemia and BMP with creatinine at 1.6 which is his baseline  - No urine output today  - Cardiac rehab/PT/OT is scheduled for today    # OPAL in the setting of known CKD  - urinary retention issues post TAVR  - felt to be hypervolemic just prior to TAVR and was started on IV diuresis after giron placement   - had some urine output but not adequate   - Cr increased from 1.6 to 2.9 today   - diuretics held; discussed with hospital service who will consider renal US and nephrology consult    # Acute on Chronic HFpEF  -PTA regimen includes torsemide 20 mg in a.m. and 10 mg in p.m.  -During TAVR H&P visit he was noted to be in acute heart failure; LVEDP was elevated post TAVR   -- Reported SOB, abdominal distension, and worse peripheral edema   -- Attempted IV diuresis yesterday with no adequate urine output     # Hypertension  -- continues to be suboptimal     # Headache (resolved)  -- CT head w/o acute changes    # CAD  -- small  circumflex with small vessel disease and remaining coronary tree  --No angina on this admission    # Paroxysmal atrial flutter/A fib  --On apixaban 5 mg twice daily    # Chronic LBBB  # morbid obesity  # history of gastric ulcers/but Cabrera's esophagus  --TAVR team recommended no aspirin or Plavix    # recent history revision of TKA  # history of CVA in 2020    ---------------------------------------------------------------------------------------------------------------------    Plan:   -- Pt may benefit from Renal US and possible nephrology consult (case discussed with hospitalist service)  -- Echo demonstrated normal function of bioprosthetic aortic valve   -- Structural heart clinic follow up arranged     ATTESTATION:  Mr. Elizabeth was seen and examined. Agree with note and plan of care.  ADALBERTO Elena MD   -------------------------------------------------------------------------------------------------------------    Physical Exam   Temp: 98.9  F (37.2  C) Temp src: Oral BP: (!) 158/74 Pulse: 74   Resp: 25 SpO2: 94 % O2 Device: Nasal cannula Oxygen Delivery: 3 LPM  Vitals:    10/26/21 0838 10/27/21 0732   Weight: 112.5 kg (248 lb) 113.8 kg (250 lb 12.8 oz)     General-in mild distress.  Oxygen on nasal cannula.  Morbid obesity.   Respiratory-fine crackles at the bases bilaterally.  Cardiac-moderate JVD at 45 degree angle.  Regular rate and rhythm with 1 out of 6 systolic ejection murmur at right upper sternal border.  Abdomen-mild distention.  Extremities-2+ pitting edema up to knees bilaterally  Skin-no significant ecchymosis, hematoma, or bruit at the femoral access sites  Neuro-brief cranial nerve exam normal.  5 out of 5 motor strength in all 4 extremities.      Medications       amLODIPine  5 mg Oral Daily     apixaban ANTICOAGULANT  5 mg Oral BID     atorvastatin  80 mg Oral Daily     carvedilol  3.125 mg Oral BID     [Held by provider] furosemide  40 mg Intravenous Daily     insulin aspart  1-7 Units  Subcutaneous TID AC     insulin aspart  1-5 Units Subcutaneous At Bedtime     insulin glargine  18 Units Subcutaneous At Bedtime     omeprazole  20 mg Oral BID     tamsulosin  0.4 mg Oral Daily       Data   Reviewed.     Tele: NSR with SHABNAM      This note was completed in part using Dragon voice recognition software. Although reviewed after completion, some word and grammatical errors may occur.    Timothy Juarez PA-C   10/28/2021  Pager: [phone]

## 2021-10-28 NOTE — PROGRESS NOTES
[facility]    Medicine History and Physical - Hospitalist Service       Date of Admission:  10/26/2021    Assessment & Plan   Burton Elizabeth is a 76 year old male admitted on 10/26/2021. PMHx CRISTHIAN, UGI bleed, CKD, DM2, paroxysmal atrial flutter, CAD, chronic HFpEF, and severe aortic stenosis who is admitted for post op monitoring following elective TAVR. Post procedure complicated acute on chronic HFpEF, urinary retention, and OPAL.    Severe aortic stenosis s/p TAVR 34mm Medtronic valve (10/26/2021)  - Post op mngt per cardiology.  - Eliquis restarted POD#0.  - No anti-plt.  - IV Lasix 40 mg po every day   - TTE 10/26 showing prosthetic aortic valve mean gradient 9mmHg and trivial paravalvular AI.     Headache. - resolved.  Started at 9PM 10/27/21. Persistent. Localized to frontal region, wrapping around to back of head. No hx of headaches. No chronic neck pain. Has had a cup of coffee this AM. No vision changes, nausea, vomiting. No focal neuro deficits. CT H negative.  - PRN tylenol, PRN Norco.    Oliguria.  OPAL on CKD 3.   Post op urinary retention.  Follows with nephrology Dr. Llamas. Baseline Cr variable appears 2-2.3, but 1.54 on adm. POD#2 OPAL with Cr 2.94. Diuresed POD#1 with IV Lasix and preoperatively with increased Torsemide.  - Zuniga placed 10/27 and Flomax started.  - Check urine studies, consider renal u/s (last 9/2021 with nephrology did not show stones or atrophy).    Acute on chronic HFpEF    CAD with NSTEMI (July 2021)  HTN / HLD  Diagnostic coronary angiogram on 10/11/2021 similar to prior diagnostic coronary angiogram in 2019 with two-vessel coronary artery disease including chronically totally occluded circumflex and small vessel disease of the PDA branch.  - PTA atorvastatin, Coreg.  - ASA discontinued.    Paroxysmal atrial flutter  - As above, defer resuming home aspirin to cardiology   - Continue PTA atorvastatin, Coreg     ID-DM2. A1c 7.6% 10/27/2021.  PTA regimen  "of Lantus 18u at bedtime, glimepiride, semaglutide  - Hold home glimepiride and semaglutide   - Decrease Lantus d/t OPAL     Acute GI bleed, secondary to gastritis July 2021  Cabrera's esophagus diagnosed on endoscopy  - PTA PPI     Suspected CRISTHIAN  Admission at [facility] [place] 2[facility] with acute hypoxic, hypercarbic respiratory failure requiring BiPAP suspected to be related to CHF with severe aortic stenosis. Notes previously mention suspected CRISTHIAN however pt had not completed sleep study as outpatient.  - BiPAP ordered    Diet: Regular Diet Adult    Zuniga Catheter: PRESENT, indication: Retention    DVT Prophylaxis: DOAC  Code Status: Full Code    Expected discharge: 10/29/2021   recommended to TCU or home with 24 hr assist once mental status at baseline, renal function improved and safe disposition plan/ TCU bed available.    The patient's care was discussed with the Attending Physician, Dr. Lebron, Bedside Nurse, Patient and cardiology Consultant.    JoAnna K. Barthell, PA-C  Hospitalist Service  [facility]    ______________________________________________________________________    Interval History   Headachee resolved. Zuniga placed for urinary retenton yesterday. Little UOP despite diuretics. New OPAL today. Denies cp, SOB. Feels tired. Refuses CPAP/BIPAP.    Placed on BIPAP after nursing noted desat and have \"guppy breathing\", subsequently required restraints as became agitated and aggressive.    Data reviewed today: I reviewed all medications, new labs and imaging results over the last 24 hours. I personally reviewed the EKG tracing showing af with lbbb.    Physical Exam   Vital Signs: Temp: 98.9  F (37.2  C) Temp src: Oral BP: (!) 158/74 Pulse: 74   Resp: 25 SpO2: 94 % O2 Device: Nasal cannula Oxygen Delivery: 3 LPM  Weight: 250 lbs 12.8 oz  Constitutional: Appears stated age, no acute distress. Sleeping with periodic loud gasps for air. Awakens easily and answers questions appropriately. Wide " neck, crowed oropharynx with large tongue.  Respiratory: Breath sounds CTA. No increased work of breathing on 3L O2.  Cardiovascular: Irreg, irreg no rub or murmur. 2+ peripheral edema.  GI: Soft, morbidly obese, non-tender, non-distended.  Skin: Warm, dry, no rashes or lesions.    Medications       amLODIPine  5 mg Oral Daily     apixaban ANTICOAGULANT  5 mg Oral BID     atorvastatin  80 mg Oral Daily     carvedilol  3.125 mg Oral BID     [Held by provider] furosemide  40 mg Intravenous Daily     insulin aspart  1-7 Units Subcutaneous TID AC     insulin aspart  1-5 Units Subcutaneous At Bedtime     insulin glargine  18 Units Subcutaneous At Bedtime     omeprazole  20 mg Oral BID     tamsulosin  0.4 mg Oral Daily       Data   Recent Labs   Lab 10/28/21  0818 10/28/21  0555 10/27/21  2222 10/27/21  1156 10/27/21  0551 10/26/21  1520 10/26/21  0853 10/21/21  1346 10/21/21  1346   WBC  --  5.9  --   --  9.6  --   --   --  7.2   HGB  --  10.1*  --   --  11.5*  --   --   --  12.3*   MCV  --  88  --   --  87  --   --   --  86   PLT  --  121*  --   --  160  --   --   --  212   INR  --   --   --   --   --   --   --   --  1.26*   NA  --  140  --   --  138  --  139   < > 139   POTASSIUM  --  4.8  --   --  4.8  --  4.0   < > 4.8   CHLORIDE  --  108  --   --  108  --  108   < > 109   CO2  --  25  --   --  25  --  25   < > 24   BUN  --  48*  --   --  39*  --  40*   < > 50*   CR  --  2.94*  --   --  1.61*  --  1.54*   < > 1.74*   ANIONGAP  --  7  --   --  5  --  6   < > 6   GABE  --  8.5  --   --  8.4*  --  8.9   < > 9.0   * 201* 261*   < > 146*   < > 170*   < > 250*   ALBUMIN  --   --   --   --   --   --   --   --  2.7*   PROTTOTAL  --   --   --   --   --   --   --   --  6.5*   BILITOTAL  --   --   --   --   --   --   --   --  0.5   ALKPHOS  --   --   --   --   --   --   --   --  201*   ALT  --   --   --   --   --   --   --   --  22   AST  --   --   --   --   --   --   --   --  27    < > = values in this interval not  displayed.       Imaging:  Recent Results (from the past 24 hour(s))   CT Head w/o Contrast    Narrative    EXAM: CT HEAD W/O CONTRAST  LOCATION: [facility]  DATE/TIME: 10/27/2021 5:20 PM    INDICATION: Headache, new or worsening (Age >= 50y)  COMPARISON: 07/06/2021 and MRI brain 07/01/2020  TECHNIQUE: Routine CT Head without IV contrast. Multiplanar reformats. Dose reduction techniques were used.    FINDINGS:  INTRACRANIAL CONTENTS: No intracranial hemorrhage, extraaxial collection, or mass effect.  No CT evidence of acute infarct. Mild presumed chronic small vessel ischemic changes. Mild generalized volume loss. No hydrocephalus. Atherosclerotic   calcifications of the cavernous internal carotid arteries and distal vertebral arteries.    VISUALIZED ORBITS/SINUSES/MASTOIDS: Prior bilateral cataract surgery. Visualized portions of the orbits are otherwise unremarkable. No paranasal sinus mucosal disease. No middle ear or mastoid effusion.    BONES/SOFT TISSUES: No acute abnormality.        Impression    IMPRESSION:  1.  No acute intracranial abnormality.  2.  Stable mild chronic age-related changes.

## 2021-10-28 NOTE — PROGRESS NOTES
10/28/21 1016   Quick Adds   Type of Visit Initial PT Evaluation   Living Environment   People in home alone   Current Living Arrangements house   Home Accessibility stairs to enter home;stairs within home   Number of Stairs, Main Entrance 2   Stair Railings, Main Entrance railings on both sides of stairs   Number of Stairs, Within Home, Primary none   Stair Railings, Within Home, Primary none   Transportation Anticipated car, drives self   Living Environment Comments Pt reports he has family that can assist.    Self-Care   Usual Activity Tolerance good   Current Activity Tolerance moderate   Equipment Currently Used at Home cane, straight;walker, rolling  (Outside of the house)   Disability/Function   Fall history within last six months no   General Information   Onset of Illness/Injury or Date of Surgery 10/26/21   Referring Physician Edgardo Beckham MD   Patient/Family Therapy Goals Statement (PT) Discharge home.    Pertinent History of Current Problem (include personal factors and/or comorbidities that impact the POC) 75 y/o male with severe aortic stenosis now s/p TAVR on 10/26/21. PMH including NSTEMI in 7/21, HTN, hyperlipidemia, DM 2, CKD stage 3.    Existing Precautions/Restrictions fall   General Observations Pt in supine upon arrival of therapist.    Cognition   Orientation Status (Cognition) person;place   Affect/Mental Status (Cognition) agitated;confused   Follows Commands (Cognition) 50-74% accuracy;repetition of directions required;verbal cues/prompting required;increased processing time needed   Pain Assessment   Patient Currently in Pain No   Integumentary/Edema   Integumentary/Edema Comments B groin sites not observed.    Strength   Strength Comments Not formally assessed, pt demonstrates at least 3/5 grossly in B LEs with functional mobility.    Bed Mobility   Comment (Bed Mobility) Supine-sit with modA of 2.    Transfers   Transfer Safety Comments Sit <> stand with FWW and Aleta.     Gait/Stairs (Locomotion)   Comment (Gait/Stairs) Pt amb 5' with FWW and CGA-Aleta.    Balance   Balance Comments Noted good seated balance and fair standing/dynamic balance.    Clinical Impression   Criteria for Skilled Therapeutic Intervention yes, treatment indicated   PT Diagnosis (PT) Difficulty with functional mobility.    Influenced by the following impairments Generalized weakness, Decreased activity tolerance, Impaired balance, SOB, lack of education on s/p TAVR   Functional limitations due to impairments Limited functional mobility requiring AD and assist.    Clinical Presentation Stable/Uncomplicated   Clinical Presentation Rationale Based on PMH, current presentation, and social support.    Clinical Decision Making (Complexity) low complexity   Therapy Frequency (PT) Daily   Predicted Duration of Therapy Intervention (days/wks) 5 days   Planned Therapy Interventions (PT) balance training;bed mobility training;gait training;home exercise program;patient/family education;transfer training;progressive activity/exercise   Risk & Benefits of therapy have been explained patient   PT Discharge Planning    PT Discharge Recommendation (DC Rec) Transitional Care Facility;home with assist;home with home care physical therapy   PT Rationale for DC Rec Pt is significantly below baseilne, limited by confusion and weakness. Pt will benefit from continued skilled PT intervention at TCU to progress independence and safety with functional mobility. If pt were to discharge home recommend 24/7 assist d/t confusion and lack of safety awareness and home PT.    Total Evaluation Time   Total Evaluation Time (Minutes) 10

## 2021-10-28 NOTE — PROVIDER NOTIFICATION
MD Notification    Notified Person: MD    Notified Person Name: Dr. Mixon    Notification Date/Time: 10/28/21 3217    Notification Interaction: amcom paged    Purpose of Notification:  275-1 T.T.    Patient has only had 70cc of urine out since 0600 and has increased edema and crackles in lungs. nephrology has seen patient. Do you want anything?    Jdtie *[id number]     Orders Received:    Comments:

## 2021-10-28 NOTE — CONSULTS
Nephrology Initial Consult  October 28, 2021      Burton Elizabeth MRN:[id number] YOB: 1945  Date of Admission:10/26/2021  Primary care provider: [place], [facility]  Requesting physician: Edgardo Beckham, *    ASSESSMENT AND RECOMMENDATIONS:     1 CKD 3B- Baseline creatinine appears to fluctuate around 1.7-2.  He follows with Dr. Llamas of [facility] nephrology for his CKD care.  CKD 3B is attributed to diabetes, hypertension, cardiorenal state as well as likely underlying renovascular disease.    2 status post TAVR on 10/26    3 heart failure with preserved ejection fraction    4 acute renal failure-renal ultrasound okay.  No documented hypotension.  Clinically volume overloaded.  Oliguric OPAL with sharp rise post TAVR, with likely contribution by IV contrast use.  Fractional excretion of sodium 0.2 is consistent with contrast-induced nephropathy   DDx - atheroembolic dis post endovascular procedure. No clinical s/o atheroembolism in his extremities. Check diff for eosinophilia   UA with pyuria . Culture pending     5 Vol status - chronic LE edema on Torsemide 20/10 . Total body vol up . Few crackles at lung bases as well.     6 HTN =- on Coreg /Amlod. Now worse with OPAL and vol overload     Recc -  - Continue Zuniga   - PO fluid restriction to 1500 ml/ d  - Hold off diuretics. Use IV lasix 60-80 mg PRn if significant s/o resp distress.   -Continue COreg / Amlodipine   - Daily RFP. CBC with diff    Thank you for the consult. Will continue to follow along with you .      Juwan Bradley MD  [facility] - Nephrology   [phone]        REASON FOR CONSULT: Acute renal failure    HISTORY OF PRESENT ILLNESS:  Burton Elizabeth is a 76 year old male with past medical history of severe aortic stenosis, heart failure with preserved ejection fraction, hypertension, type 2 diabetes mellitus who was admitted on 10/26/2021 for elective TAVR, which he underwent without significant  perioperative complication.  Post TAVR echocardiogram showed well-seated aortic valve with trivial paravalvular AI.  Left ventricular ejection fraction was normal.  Post procedure, there was concern for volume overload and and diuresis was attempted with IV Lasix without significant response.  Blood pressure has remained in hypertensive range without any episodes of hypotension.  We have been consulted secondary to start rising creatinine along with oliguria over last 24 hours.  Baseline creatinine appears to fluctuate around 1.7-2.  He follows with Dr. Llamas of [facility] nephrology for his CKD care.  CKD 3B is attributed to diabetes, hypertension, cardiorenal state as well as likely underlying renovascular disease.  With hydration it was down to 1.6 yesterday and sharply up to 2.9 today associated with oliguria.  Urine sodium is to help with the flexion-extension sodium 0.2.  Urinalysis appears to be traumatic sample with Zuniga catheter with large amount of RBC and 2+ protein.  Renal ultrasound shows normal-sized kidneys bilaterally without any obstruction.  Nonobstructing stone in right kidney..  1 dose of Lasix IV 40 mg yesterday.  He got IV contrast with however (?  Amount).  No other nephrotoxins identified.    On eval , sitting up . Comfortable. Denies chest pain, resp distress. Describes chronic leg edema and thinks it is stable.     PAST MEDICAL HISTORY:  Reviewed with patient on 10/28/2021  and is as listed in HPI.       MEDICATIONS:  PTA Meds  Prior to Admission medications    Medication Sig Last Dose Taking? Auth Provider   acetaminophen (TYLENOL) 325 MG tablet Take 325-650 mg by mouth every 4 hours as needed 10/23/2021 at AM Yes Reported, Patient   apixaban ANTICOAGULANT (ELIQUIS) 5 MG tablet Take 1 tablet (5 mg) by mouth 2 times daily 10/24/2021 at PM Yes Laci Neumann MD   aspirin (ASPIR-LOW) 81 MG EC tablet Take 1 tablet (81 mg) by mouth daily Past Month at AM Yes Laci Neumann MD    atorvastatin (LIPITOR) 80 MG tablet Take 80 mg by mouth daily 10/25/2021 at AM Yes Reported, Patient   blood glucose (NO BRAND SPECIFIED) test strip Use to test blood sugar as directed  Yes Reported, Patient   carvedilol (COREG) 3.125 MG tablet Take 3.125 mg by mouth 2 times daily 10/25/2021 at PM Yes Reported, Patient   glimepiride (AMARYL) 2 MG tablet Take 2 mg by mouth 2 times daily  10/25/2021 at PM Yes Laci Neumann MD   HYDROcodone-acetaminophen (NORCO) 5-325 MG tablet Take 1-2 tablets by mouth 2 times daily as needed More Than a Week at PRN Yes Reported, Patient   insulin glargine (LANTUS PEN) 100 UNIT/ML pen Inject 18 Units Subcutaneous At Bedtime  10/24/2021 at PM Yes Reported, Patient   multivitamin w/minerals (MULTIVITAMIN, THERAPEUTIC WITH MINERALS) tablet Take 1 tablet by mouth daily 10/24/2021 at AM Yes Reported, Patient   omeprazole (PRILOSEC) 20 MG DR capsule Take 1 capsule (20 mg) by mouth daily  Patient taking differently: Take 20 mg by mouth 2 times daily  10/25/2021 at AM Yes Arabella Patricia MD   order for DME Equipment being ordered: Walker Wheels () and Walker ()  Treatment Diagnosis: Impaired gait  Yes Catarino Kearns PA-C   semaglutide (OZEMPIC) 2 MG/1.5ML SOPN pen Inject 0.25 mg Subcutaneous every 7 days on Monday 10/25/2021 at AM Yes Reported, Patient   torsemide (DEMADEX) 20 MG tablet Take 1 tablet (20 mg) by mouth 2 times daily  Patient taking differently: Take 50 mg by mouth daily 40MG(20mg x 2) in the morning & 10MG(20mg x 0.5) in the evening 10/24/2021 at PM Yes Megan Day PA-C      Current Meds    amLODIPine  5 mg Oral Daily     apixaban ANTICOAGULANT  5 mg Oral BID     atorvastatin  80 mg Oral Daily     carvedilol  3.125 mg Oral BID     [Held by provider] furosemide  40 mg Intravenous Daily     insulin aspart  1-7 Units Subcutaneous TID AC     insulin aspart  1-5 Units Subcutaneous At Bedtime     insulin glargine  15 Units Subcutaneous At  "Bedtime     omeprazole  20 mg Oral BID     tamsulosin  0.4 mg Oral Daily     Infusion Meds      ALLERGIES:    Allergies   Allergen Reactions     Penicillins Anaphylaxis       REVIEW OF SYSTEMS:  A comprehensive of systems was negative except as noted above.    SOCIAL HISTORY:   Reviewed with patient on 10/28/2021     FAMILY MEDICAL HISTORY:   Family History   Problem Relation Age of Onset     Coronary Artery Disease No family hx of      Reviewed with patient on 10/28/2021     PHYSICAL EXAM:   Temp  Av.8  F (36.6  C)  Min: 96  F (35.6  C)  Max: 99.2  F (37.3  C)  Arterial Line BP  Min: 118/50  Max: 149/63  Arterial Line MAP (mmHg)  Av.5 mmHg  Min: 69 mmHg  Max: 88 mmHg      Pulse  Av.4  Min: 49  Max: 85 Resp  Av.7  Min: 10  Max: 34  FiO2 (%)  Av %  Min: 50 %  Max: 50 %  SpO2  Av.8 %  Min: 87 %  Max: 100 %       BP (!) 167/72 (BP Location: Right arm)   Pulse 85   Temp 98.9  F (37.2  C) (Oral)   Resp 27   Ht 1.753 m (5' 9\")   Wt 113.8 kg (250 lb 12.8 oz)   SpO2 95%   BMI 37.04 kg/m     Date 10/28/21 0700 - 10/29/21 0659   Shift 7365-3684 8430-5551 6984-7808 24 Hour Total   INTAKE   P.O. 180   180   Shift Total(mL/kg) 180(1.58)   180(1.58)   OUTPUT   Shift Total(mL/kg)       Weight (kg) 113.76 113.76 113.76 113.76      Admit Weight: 112.5 kg (248 lb)     GENERAL APPEARANCE: no distress,  awake  EYES: no scleral icterus, pupils equal  HENT: NC/AT,  mouth  without ulcers or lesions  Lymphatics: no cervical or supraclavicular LAD  Endo: no moon facies, no goiter  Pulmonary: few crackles at bases   CV: regular rhythm, normal rate, no rub   - JVP  - could not eval   - Edema++  GI: soft, nontender,   MS: no evidence of inflammation in joints.   : + giron  SKIN: no rash, warm, dry, no cyanosis  NEURO: face symmetric, no asterixis     LABS:   CMP  Recent Labs   Lab 10/28/21  1148 10/28/21  0818 10/28/21  0555 10/27/21  2222 10/27/21  1156 10/27/21  0551 10/26/21  1520 10/26/21  0853   NA  " --   --  140  --   --  138  --  139   POTASSIUM  --   --  4.8  --   --  4.8  --  4.0   CHLORIDE  --   --  108  --   --  108  --  108   CO2  --   --  25  --   --  25  --  25   ANIONGAP  --   --  7  --   --  5  --  6   * 161* 201* 261*   < > 146*   < > 170*   BUN  --   --  48*  --   --  39*  --  40*   CR  --   --  2.94*  --   --  1.61*  --  1.54*   GFRESTIMATED  --   --  20*  --   --  41*  --  43*   GABE  --   --  8.5  --   --  8.4*  --  8.9   MAG  --   --  1.7  --   --  1.7  --   --    PHOS  --   --  4.5  --   --  3.4  --   --     < > = values in this interval not displayed.     CBC  Recent Labs   Lab 10/28/21  0555 10/27/21  0551   HGB 10.1* 11.5*   WBC 5.9 9.6   RBC 4.04* 4.54   HCT 35.7* 39.6*   MCV 88 87   MCH 25.0* 25.3*   MCHC 28.3* 29.0*   RDW 16.6* 16.7*   * 160     INRNo lab results found in last 7 days.  ABG  Recent Labs   Lab 10/26/21  1507 10/26/21  1356   PH 7.30* 7.28*   PCO2 56* 60*   PO2 96 94   HCO3 28 28   O2PER 50 50      URINE STUDIES  Recent Labs   Lab Test 10/28/21  1113 07/06/21  1028 04/22/20  1447 12/21/19  0600   COLOR Yellow Light Yellow Yellow Yellow   APPEARANCE Slightly Cloudy* Clear Clear Clear   URINEGLC 50 * 70* Negative Negative   URINEBILI Negative Negative Negative Negative   URINEKETONE Negative 5* Negative Negative   SG 1.035 1.018 1.011 1.014   UBLD Large* Negative Negative Negative   URINEPH 5.0 5.0 5.5 5.0   PROTEIN 100 * Negative 30* Negative   NITRITE Negative Negative Negative Negative   LEUKEST Large* Negative Negative Negative   RBCU >182* 2 0 1   WBCU 82* 1 1 0     No lab results found.  PTH  No lab results found.  IRON STUDIES  Recent Labs   Lab Test 06/10/21  0000   IRON 15*      IRONSAT 4*   BOBO 73.9       IMAGING:  Personally reviewed the images and findings are as listed in HPI     Juwan Bradley MD

## 2021-10-28 NOTE — PROGRESS NOTES
"Pt SpO2 started to decrease, went in to assess, pt refusing to put NC back on, is \"guppy\" breathing, starts hitting/biting/yelling when attempt to put NC back on. Sats are continuing to drop, 2 RNs had to restrain pt in order to apply BiPAP. Requested restraint (soft wrist BUE) from Dilip MANN, martínez, and have a sitter at bedside.  "

## 2021-10-28 NOTE — PLAN OF CARE
A&Ox4, neurologically intact. C/o headache with relief from norco. CT head this evening reveals no acute changes. Tele AFIB CVR, VSS. Denies SOB although he appears SOB. Endorses LOAIZA. Lungs with fine crackles, O2 via NC at 3L. BLE edematous. Zuniga catheter drained 100 mL pratik urine. Minimal PO intake this shift. Plan for IV lasix in am, discharge next 1-2 days.

## 2021-10-28 NOTE — DISCHARGE INSTRUCTIONS
TAVR Discharge Instructions  You have just had your aortic valve replaced with a new biological tissue valve. You should feel better after your surgery, but complete recovery may take several weeks. Please follow these instructions carefully and please call the [facility] ([phone]) with any questions or concerns.      AFTER YOU GO HOME:    Drink extra fluids for 2 days.    You may resume your normal diet.    Do not smoke.    Do not drink alcohol.    Relax and take it easy.    Do not make any important or legal decisions.    FOR 1 WEEK AFTER TAVR:    Do not drive or operate machines at home or at work. No driving until you return for your 1 week follow-up appointment. You and the provider will discuss this at the appointment.     Refrain from sexual intercourse for 1 week.    You may shower the day after your procedure. Do NOT take a bath, or use a hot tub or pool for at least 1 week. Do NOT scrub the site. Do not use lotion or powder near the puncture site.    Do not lift more than 10 pounds.     Do not do any heavy activity such as exercise, lifting, or straining.  No housework, yard work, or any activities that make you sweat.    CARE OF WRIST AND GROIN SITES:    For 2 days, when you cough, sneeze, laugh or move your bowels, hold your hand over the puncture site and press firmly on/above the site.    Remove the bandage after 24 hours. If there is minor oozing, apply another bandage and remove it after 12 hours.    It is normal to have bruising or pea size lump at the site.    If you have a wrist site puncture: Do not use your hand or arm to support your weight (such as rising from a chair)or bend your wrist (such as lifting a garage door) for 1 week.    No stooping or squatting for 1 week.     BLEEDING:  If you start bleeding from the site in your wrist:    Sit down and press firmly on/above the site with your fingers for 10 minutes.     Once bleeding stops, keep your arm still  for 2 hours.     Call [facility] ([phone]) as soon as you can.  If you start bleeding from the site in your groin:    Lie down flat and press firmly on/above the site for 10 minutes.    Once bleeding stops lay flat for 2 hours.     Call [facility] ([phone]) as soon as you can.  Call 911 right away if you have heavy bleeding or bleeding that does not stop.    MEDICINES:    You will likely be started on an anti-platelet medication, such as Plavix. Please call the [facility] with any questions regarding this medication.    If you have stopped any medicines, check with your provider about when to restart them.    You will require lifetime prophylactic antibiotics for dental cleanings and dental work after your TAVR, please inquire with your provider prior to your scheduled cleanings.     FOLLOW-UP APPOINTMENTS:    Follow-up with a Structural Heart Nurse Practitioner at [facility] in 7-10 days after your procedure.    You will also follow-up at [facility] 1 month after your procedure which will include a visit with a nurse practitioner, an echocardiogram (Echo), and electrocardiogram (ECG), and lab work. We will also want to see you back in the clinic 1 year after your procedure.    Cardiac Rehab will contact you for follow up care. You can enroll at a site convenient to you.     CALL THE CLINIC IF:     You have increased pain or a large or growing hard lump around the site.    The site is red, swollen, hot, or tender.    Blood or fluid is draining from the site.    You have chills or a fever greater than 101 F (38 C).    Your arm or leg feels numb, cool, or changes color.    You have hives, a rash, or unusual itching.    New pain in the back or belly that you cannot control with Tylenol.    Any questions or concerns.    OTHER INSTRUCTIONS:    You will receive a card with your new valve information in the  mail, directly from the .     CONTACT INFORMATION:   [facility]:  [phone] (7 days a week)  Valve Coordinators (Iveth RN and Sally ALFRED) can be reached at:  [phone]

## 2021-10-28 NOTE — PLAN OF CARE
Heart Failure Care Map  GOALS TO BE MET BEFORE DISCHARGE:    1. Decrease congestion and/or edema with diuretic therapy to achieve near optimal volume status.     Dyspnea improved: No, further care required to meet this goal. Please explain Increased SOB   Edema improved: No, further care required to meet this goal. Please explain Edema has increased        Net I/O and Weights since admission:   09/28 2300 - 10/28 2259  In: 1870 [P.O.:520; I.V.:1350]  Out: 1340 [Urine:1340]  Net: 530     Vitals:    10/26/21 0838 10/27/21 0732   Weight: 112.5 kg (248 lb) 113.8 kg (250 lb 12.8 oz)       2.  O2 sats > 90% on room air, or at prior home O2 therapy level.      Able to wean O2 this shift to keep sats above 90%?: No, further care required to meet this goal. Please explain Patient requiring 3L O2 on 94%   Does patient use Home O2? No          Current oxygenation status:   SpO2: 94 %     O2 Device: Nasal cannula, Oxygen Delivery: 3 LPM    3.  Tolerates ambulation and mobility near baseline.     Ambulation: No, further care required to meet this goal. Please explain LOAIZA   Times patient ambulated with staff this shift: 1    Please review the Heart Failure Care Map for additional HF goal outcomes.    Patricia Santana RN  10/28/2021     A/Ox4. O2 94 with 3L via NC.  Patient voiced frustration regarding being restrained last night, writer acknowledged patients concerns. Lung sounds have increased crackles. Edema increasing to +2/+3 generalized. Zuniga in place with low urine output of 70cc. Up with assistx1 using walker and gait belt. 1500cc FR.  Tele Afib CVR with BBB.

## 2021-10-29 NOTE — PROGRESS NOTES
Overnight events noted. Worsening resp status. Minimal urine output with lasix 60 mg IV    Recc-   plz get RFP stat  IV Bumex 4 mg +Diuril 500 mg  May need dialysis if he does not respond to diuretics and has worsening resp status.   Plz keep NPO for possible line placement.     Juwan Bradley MD  [facility] Nephrology   [phone]

## 2021-10-29 NOTE — PROGRESS NOTES
[facility]  Cardiology Progress Note  Date of Service: 10/29/2021  Primary Cardiologist: Dr. Neumann    Assessment & Plan    Burton Elizabeth is a 76 year old male admitted on 10/26/2021 with for TAVR, complicated by acute on chronic renal failure, with plans to start dialysis today.     Assessment:  1.  Severe aortic stenosis s/p TAVR 34mm Medtronic valve (10/26/2021)   - Echocardiogram 10/27/21 showed mean gradient 9 mmHg.  Vmax 2.1 m/sec. DVI 0.62. Trivial paravalvular AI. No valvular AI. Normal  Doppler interrogation for this valve.  2. Acute on chronic renal failure - thought to be secondary to contrast nephropathy.    - Creatinine 1.6-->2.9-->4.4   - Nephrology following, plans to start dialysis today  3. Acute on chronic diastolic heart failure - with significant volume overload insetting of acute renal failure.    - PTA diuretics torsemide 20mg qam and 10mg qpm.    - No response to high dose IV diuretics  4. Acute hypoxic and hypercarbic respiratory failure secondary to volume overload.   5. Hypertension  6. Coronary artery disease - stable   - Cardiac catheterization 10/11/62336 showed chronically totally occluded circumflex and small vessel disease of the PDA branch.  7. Paroxsymal atrial flutter/fibrillation - currently in atrial fibrillation with controlled ventricular rate.   8. Chronic LBBB  9. Morbid obesity   10. History of CVA   11. Type 2 DM - insulin dependent  12. High probability for sleep apnea     I saw Magdaleno at bedside. Unfortunately his post procedure coarse has been complicated by acute on chronic renal failure with massive volume overload, worsening respiratory status and increased somnolence secondary to hypercapnea. He has been on BiPAP intermittently since last night.     Appreciate nephrology assistance plan to start dialysis today and tomorrow. Diuresis will be managed by nephrology and dialysis.     Will need to monitor blood pressures with the initiation of  dialysis may need to back off on some of his antihypertensive agents.    Plan:   1. Continue amlodipine 5mg daily   2. Continue apixaban 5mg BID - no aspirin or plavix  3. Continue atovastatin 80mg daily  4. Continue carvedilol 3.125mg BID  5. Plan to start dialysis today per nephrology   6. Daily BMP  7. Strict I&O, daily weight, low sodium diet, 2L fluid restriction  8. Will need close cardiology follow up at discharge with structural heart team and CORE clinic (message sent to get CORE follow up scheduled).   9. Cardiology will continue to follow.     WALE Fowler CNP  [facility]  Pager: [phone]    ATTESTATION:  Mr. Elizabeth was seen and examined. Agree with excellent note and plan of care.   He is severely volume up at this point; unfortunately, needs dialysis for contrast nephropathy.  Will start today.  Hopefully his kidneys will recover. We will continue to follow. ADALBERTO Elena MD       Interval History   Worsening respiratory status overnight. Worsening volume overload. Patient is somnolent, unable to engage during my visit. BiPAP just placed and plan for dialysis this afternoon.     Physical Exam   Temp: 98.4  F (36.9  C) Temp src: Oral BP: 123/64 Pulse: 75   Resp: 19 SpO2: 95 % O2 Device: Nasal cannula Oxygen Delivery: 3 LPM  Vitals:    10/26/21 0838 10/27/21 0732 10/29/21 0500   Weight: 112.5 kg (248 lb) 113.8 kg (250 lb 12.8 oz) 115.2 kg (253 lb 14.4 oz)   GEN: well nourished, elderly  NECK: Supple, no masses appreciated. JVP elevated.   C/V:  Regular rate and rhythm, no murmur, rub or gallop.    RESP: Respirations are unlabored. Decreased flow with diffuse crackles  GI: Abdomen obese, distended, nontender.  EXTREM: anasarca, +3 LE pitting edema up into thighs.  NEURO: lethargic  SKIN: Warm and dry.     Medications     - MEDICATION INSTRUCTIONS -       - MEDICATION INSTRUCTIONS -       - MEDICATION INSTRUCTIONS -       sodium chloride 0.9%         - MEDICATION INSTRUCTIONS for  Dialysis Patients -   Does not apply See Admin Instructions     amLODIPine  5 mg Oral Daily     apixaban ANTICOAGULANT  5 mg Oral BID     atorvastatin  80 mg Oral Daily     carvedilol  3.125 mg Oral BID     [Held by provider] furosemide  40 mg Intravenous Daily     insulin aspart  1-7 Units Subcutaneous TID AC     insulin aspart  1-5 Units Subcutaneous At Bedtime     omeprazole  20 mg Oral BID     sodium chloride (PF)  3 mL Intracatheter Q8H     tamsulosin  0.4 mg Oral Daily       Data   Last 24 hours labs reviewed     Tele: atrial fibrillation     Echo: 10/27  Left ventricular systolic function is normal.  The visual ejection fraction is 60-65%.  There is mild concentric left ventricular hypertrophy.  Normal right ventricle structure and function.  34-mm Medtronic aortic valve prosthesis is well seated. Mean gradient 9 mmHg.  Vmax 2.1 m/sec. DVI 0.62. Trivial paravalvular AI. No valvular AI. Normal  Doppler interrogation for this valve.  IVC not well visualized.  There is no pericardial effusion.     Compared to OSMANY dated 10/26/2021, gradient is slightly increased across aortic  prosthesis but stiill normal. There is possible trivial paravalvular AI which  is insignificant.

## 2021-10-29 NOTE — PROGRESS NOTES
[facility]    Hospitalist Progress Note    Interval History   - Respiratory distress overnight, did not respond to Lasix. Minimal response to Diuril today--patient is oliguric and nearly anuric.  - Markedly more volume overloaded today and also more somnolent due to hypercapnea  - NPO, dialysis per nephrology  - Called and updated brother Jose    Assessment & Plan   Summary: Burton Elizabeth is a 76 year old male with PMH CRISTHIAN, UGI bleed, CKD, DM2, paroxysmal atrial flutter, CAD, chronic HFpEF, and severe aortic stenosis who was admitted 10/26/2021 for post op monitoring following elective TAVR. Post procedure complicated acute oliguric renal failure.    Acute oliguric renal failure on CKD3, due to contrast nephropathy  Patient developed rapidly rising creatinine on 10/28, approx two days after TAVR, contrast. Noted to be more volume overloaded on 10/28 and not responding to IV diuresis. Received Diuril and Bumex on 10/29 without significant response.  Creatinine 1.6-->2.9-->4.4. Urine studies and clinical picture consistent with contrast nephropathy.  - Appreciate Nephrology consult   - Likely dialysis today   - Further diuresis per their management  - Continue giron for post-op urinary retention, and now for close I&O    Acute hypoxic and hypercarbic respiratory failure  Pulmonary edema, volume overloaded  Patient has a history of HFpEf. Echo following TAVR with good LV, RV function. He was noticeably more volume overloaded following TAVR, and had respiratory distress overnight on 10/27 and 10/28 requiring diuresis. Etiology is primarily due to acute renal failure. VBG on 10/29 shows PCO2 55 and pH 7.29 indicating acute hypercapnea, likely due to pulmonary edema/respiratory faliure.  - Start IMC status, BiPap PRN  - Scheduled BiPap overnight    Acute metabolic encephalopathy  Due to hypercapnea, critical illness.  - Supportive management  - Seroquel PRN    Severe aortic stenosis s/p TAVR  34mm Medtronic valve (10/26/2021)  Post op mngt per cardiology, now signed off. Eliquis restarted POD#0. No anti-plt. TTE 10/26 showing prosthetic aortic valve mean gradient 9mmHg and trivial paravalvular AI.  - Continue Eliquis    Acute on chronic HFpEF    CAD with NSTEMI (July 2021)  HTN / HLD  Diagnostic coronary angiogram on 10/11/2021 similar to prior diagnostic coronary angiogram in 2019 with two-vessel coronary artery disease including chronically totally occluded circumflex and small vessel disease of the PDA branch.  - PTA atorvastatin, Coreg  - ASA discontinued  - See above for volume management    Paroxysmal atrial flutter:   - Eliquis as above  - Continue PTA atorvastatin, Coreg     ID-DM2. A1c 7.6% 10/27/2021.  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 Notes previously mention suspected CRISTHIAN however pt had not completed sleep study as outpatient.  - See above for management    DVT Prophylaxis: DOAC  Code Status: Full Code  PT/OT: ordered  Diet: Fluid restriction 2000 ML FLUID  NPO for Medical/Clinical Reasons Except for: Meds, Ice Chips      Disposition: Expected discharge on 3+ days    I spent 30 minutes of critical care time on the unit/floor managing the care of Burton SCOTT Dafnejuanjose. Upon evaluation, this patient had a high probability of imminent or life-threatening deterioration due to acute illness, which required my direct attention, intervention, and personal management. 50% of my time was spent at the bedside counseling the patient and/or coordinating care regarding services listed in this  note.    Ehsan Lebron MD  Text Page  (7am to 6pm)  -Data reviewed today: I reviewed all new labs and imaging results over the last 24 hours.    Physical Exam   Temp: 99.5  F (37.5  C) Temp src: Oral BP: (!) 145/67 Pulse: 73   Resp: 24 SpO2: 97 % O2 Device: Nasal cannula Oxygen Delivery: 3 LPM  Vitals:    10/26/21 0838 10/27/21 0732 10/29/21 0500   Weight: 112.5 kg (248 lb) 113.8 kg (250 lb 12.8 oz) 115.2 kg (253 lb 14.4 oz)     Vital Signs with Ranges  Temp:  [98.9  F (37.2  C)-100.8  F (38.2  C)] 99.5  F (37.5  C)  Pulse:  [69-88] 73  Resp:  [12-34] 24  BP: (121-162)/(61-89) 145/67  SpO2:  [83 %-99 %] 97 %  I/O last 3 completed shifts:  In: 420 [P.O.:420]  Out: 70 [Urine:70]  O2 requirements: yes    Constitutional: Male appears somnolent  HEENT: Eyes nonicteric  Cardiovascular: RRR, normal S1/2, no m/r/g  Respiratory: Decreased flow with crackles  Vascular: 3+ BLE pitting edema, anasarca with L > R arm swelling  GI: Normoactive bowel sounds, nontender  Skin/Integumen: No rashes  Neuro/Psych: Appropriate affect and mood, but more somnolent today. A&Ox3, moves all extremities    Medications     - MEDICATION INSTRUCTIONS -       - MEDICATION INSTRUCTIONS -         amLODIPine  5 mg Oral Daily     apixaban ANTICOAGULANT  5 mg Oral BID     atorvastatin  80 mg Oral Daily     carvedilol  3.125 mg Oral BID     [Held by provider] furosemide  40 mg Intravenous Daily     insulin aspart  1-7 Units Subcutaneous TID AC     insulin aspart  1-5 Units Subcutaneous At Bedtime     insulin glargine  15 Units Subcutaneous At Bedtime     omeprazole  20 mg Oral BID     tamsulosin  0.4 mg Oral Daily       Data   Recent Labs   Lab 10/29/21  0848 10/29/21  0834 10/29/21  0155 10/28/21  0818 10/28/21  0555 10/27/21  1156 10/27/21  0551   WBC  --  5.3  --   --  5.9  --  9.6   HGB  --  10.8*  --   --  10.1*  --  11.5*   MCV  --  87  --   --  88  --  87   PLT  --  110*  --   --  121*  --  160   NA  --  136  --   --  140  --  138    POTASSIUM  --  4.8  --   --  4.8  --  4.8   CHLORIDE  --  105  --   --  108  --  108   CO2  --  24  --   --  25  --  25   BUN  --  52*  --   --  48*  --  39*   CR  --  4.38*  --   --  2.94*  --  1.61*   ANIONGAP  --  7  --   --  7  --  5   GABE  --  8.5  --   --  8.5  --  8.4*   * 117* 137*   < > 201*   < > 146*   ALBUMIN  --  2.5*  --   --   --   --   --     < > = values in this interval not displayed.       Imaging:   Recent Results (from the past 24 hour(s))   US Renal Complete    Narrative    ULTRASOUND RENAL COMPLETE  10/28/2021 1:51 PM     HISTORY: Acute kidney insufficiency.    COMPARISON: 12/20/2019.    FINDINGS: Exam is limited related to patient body habitus and  prominent overlying bowel gas.    Right kidney measures 12.5 x 5.9 x 6.1 cm. Cortical thickness measures  1.4 cm AP. There is no hydronephrosis. A 0.8 cm echogenic focus in the  interpolar region of the right kidney is suspicious for a  nonobstructing stone. No solid renal masses are evident.     Left kidney measures 11.8 x 5.9 x 5.7 cm. Cortical thickness measures  1.4 cm AP. There is no hydronephrosis. No renal calculi or solid renal  masses are evident.     Zuniga catheter in the bladder.      Impression    IMPRESSION:   1. A 0.8 cm echogenic focus in the interpolar region of the right  kidney is suspicious for a nonobstructing stone.  2. Zuniga catheter in the bladder.   3. No hydronephrosis.    HUMERA GARAY MD         SYSTEM ID:  [id number]   XR Chest 2 Views    Narrative    EXAM: XR CHEST 2 VW  LOCATION: [facility]  DATE/TIME: 10/28/2021 7:45 PM    INDICATION: evaluate CHF  COMPARISON: 01/28/2021      Impression    IMPRESSION: Mild pulmonary edema and small bilateral pleural effusions. Cardiomegaly. Mild bibasilar atelectasis. No pneumothorax.

## 2021-10-29 NOTE — PLAN OF CARE
..Neuro- AxO 4 Forgetful   Tele/Cardiac- Afib CVR w/BBB  Resp- Bipap   Activity- 1AGW  Pain- Denies  Drips- none  Drains/Tubes- PIV x 1  Skin- Dependent edema, bilateral groin sites ecchymotic, Rt Radial site CDL   GI/- Indwelling giron  Aggression Color- Green   COVID status- Neg  Plan- Pending Discharge 10/29  Misc- Pt did have confusion episode in middle of night, was redirected and put on Bipap. Confused in the AM about situation.

## 2021-10-29 NOTE — PROGRESS NOTES
Pt A&Ox4 today however lethargic/sleepy. Compliant with cares. Very poor response to IV diuretics ordered per Neph MD, with widespread edema, and diminished/tight lung sounds with crackles.  VS stable, on 3LNC.  To IR today for HD cath placement, and in HD at this writing.  Pt states no BM since 10/25 that he can recall, so bowel regimen ordered (prn) and to start tonight.  R/L groin sites ecchymotic, unchanged.  X1 assist with GB, FWW for transfers.  PT/OT orders placed and to be seen tomorrow.  Per Provider, BiPap at HS with repeat VBG, labs in AM.

## 2021-10-29 NOTE — PRE-PROCEDURE
GENERAL PRE-PROCEDURE:   Procedure:  Tunneled dialysis catheter placement with intravenous moderate sedation   Date/Time:  10/29/2021 12:10 PM    Written consent obtained?: Yes    Risks and benefits: Risks, benefits and alternatives were discussed    Consent given by:  Patient  Patient states understanding of procedure being performed: Yes    Patient's understanding of procedure matches consent: Yes    Procedure consent matches procedure scheduled: Yes    Expected level of sedation:  Moderate  Appropriately NPO:  Yes  ASA Class:  3  Mallampati  :  Grade 4- soft palate obscured by base of tongue  Lungs:  Other (comment), crackles left base, crackles right base and wheezes  Lung exam comment:  Decreased in bases   Heart:  Normal heart sounds and rate and systolic murmur  History & Physical reviewed:  History and physical reviewed and no updates needed  Statement of review:  I have reviewed the lab findings, diagnostic data, medications, and the plan for sedation

## 2021-10-29 NOTE — IR NOTE
Patient Name: Burton Elizabeth  Medical Record Number: [id number]  Today's Date: October 29, 2021    Start Time: 1349  End of procedure time: 1357  Procedure: tunneled dialysis catheter placement with sedation  Report given to: TIMA Mccormack  Time pt departs:  1405    Other Notes: Pt into IR suite 1 via cart IVF infusing. Pt awake and alert. To table in supine position prepped and draped with 2%. VSS. Tele a.fib. Dr. Bray in room. Time out and procedure started. Pt tolerated procedure well. Debrief with Dr. Bray. Pressure held until hemostasis achieved. Dressing CDI. No complications. [facility]. Report given to TIMA Mccormack.    Medications:    Versed 0.5mg  Fentanyl 25mcg  Lidocaine 1% 10ml  Heparin 4000 units  Elizabeth Joshua RN

## 2021-10-29 NOTE — PROGRESS NOTES
Nephrology Progress Note  10/29/2021         ASSESSMENT AND RECOMMENDATIONS:      1 CKD 3B- Baseline creatinine appears to fluctuate around 1.7-2.  He follows with Dr. Llamas of [facility] nephrology for his CKD care.  CKD 3B is attributed to diabetes, hypertension, cardiorenal state as well as likely underlying renovascular disease.     2 status post TAVR on 10/26     3 heart failure with preserved ejection fraction     4 acute renal failure-renal ultrasound okay.  No documented hypotension.  Clinically volume overloaded.  Oliguric OPAL with sharp rise post TAVR, with likely contribution by IV contrast use.  Fractional excretion of sodium 0.2 is consistent with contrast-induced nephropathy   DDx - atheroembolic dis post endovascular procedure. No clinical s/o atheroembolism in his extremities.  No peripheral eosinophilia  UA with pyuria . Culture pending        5 Vol status - chronic LE edema on Torsemide 20/10 . Total body vol up . Few crackles at lung bases as well.   -No response to high-dose diuretics.  Remains volume overloaded.     6 HTN =- on Coreg /Amlod. Now worse with OPAL and vol overload      Recc -  -worsening respiratory status, volume overload, B- OPAL with no response to diuretics.  Discussed with patient and his brother over the phone.  Discussed with primary team in person.  He is at high risk of continued respiratory decompensation .  We will plan to proceed with dialysis today   -Discussed with IR.  Request for tunneled dialysis catheter placed.  -Plan to do dialysis today.  3 hours with 2 to 3 L ultrafiltration.  Repeat dialysis tomorrow.  -Closely monitor I's/O and daily renal function panel.  - PO fluid restriction to 1500 ml/ d  -Continue COreg / Amlodipine   - Daily RFP. CBC with diff    Recommendations were communicated to primary team in person    Juwan Bradley MD  [facility] - Nephrology   [phone]      Interval History :   Seen / examined.   Overnight events noted.  Remains in  oligoanuric renal failure.  Worsening respiratory status.  Was on BiPAP overnight.  Intermittent confusion.  Bumex 4 mg IV with Diuril 500 mg attempted.  Minimal urine output.  Creatinine up to 4.4.  BUN 52.  Potassium 4.8.      Review of Systems:   A 10 point review of systems was negative except as noted above.  Notably: poo appetite. no nausea or vomiting or diarrhea.  intermittent confusion, no chest pain.  Shortness of breath at rest.  Sitting up.  On nasal cannula oxygen.  Physical Exam:   I/O last 3 completed shifts:  In: 420 [P.O.:420]  Out: 70 [Urine:70]    GENERAL APPEARANCE: mild resp distress,  awake  EYES: no scleral icterus, pupils equal  HENT: NC/AT,  mouth  without ulcers or lesions  Lymphatics: no cervical or supraclavicular LAD  Endo: no moon facies, no goiter  Pulmonary: few crackles at bases   CV: regular rhythm, normal rate, no rub   - JVP  - could not eval   - Edema++  GI: soft, nontender,   MS: no evidence of inflammation in joints.   : + giron  SKIN: no rash, warm, dry, no cyanosis  NEURO: face symmetric, no asterixis     Labs:   All labs reviewed by me  Electrolytes/Renal - Recent Labs   Lab Test 10/29/21  0848 10/29/21  0834 10/29/21  0155 10/28/21  0818 10/28/21  0555 10/27/21  1156 10/27/21  0551   NA  --  136  --   --  140  --  138   POTASSIUM  --  4.8  --   --  4.8  --  4.8   CHLORIDE  --  105  --   --  108  --  108   CO2  --  24  --   --  25  --  25   BUN  --  52*  --   --  48*  --  39*   CR  --  4.38*  --   --  2.94*  --  1.61*   * 117* 137*   < > 201*   < > 146*   GABE  --  8.5  --   --  8.5  --  8.4*   MAG  --  2.0  --   --  1.7  --  1.7   PHOS  --  4.6*  --   --  4.5  --  3.4    < > = values in this interval not displayed.       CBC -   Recent Labs   Lab Test 10/29/21  0834 10/28/21  0555 10/27/21  0551   WBC 5.3 5.9 9.6   HGB 10.8* 10.1* 11.5*   * 121* 160       LFTs -   Recent Labs   Lab Test 10/29/21  0834 10/21/21  1346 07/11/21  0714 07/08/21  0524  07/06/21  1028 06/10/21  0000 06/10/21  0000   ALKPHOS  --  201*  --   --  83  --  118   BILITOTAL  --  0.5  --   --  0.4  --  0.6   ALT  --  22  --   --  34  --  23   AST  --  27  --   --  56*  --  34   PROTTOTAL  --  6.5*  --   --  5.4*  --  6.0   ALBUMIN 2.5* 2.7* 2.8*   < > 2.8*   < > 3.3*    < > = values in this interval not displayed.       Iron Panel -   Recent Labs   Lab Test 06/10/21  0000   IRON 15*   IRONSAT 4*   BOBO 73.9         Current Medications:    amLODIPine  5 mg Oral Daily     apixaban ANTICOAGULANT  5 mg Oral BID     atorvastatin  80 mg Oral Daily     carvedilol  3.125 mg Oral BID     clindamycin  900 mg Intravenous Pre-Op/Pre-procedure x 1 dose     [Held by provider] furosemide  40 mg Intravenous Daily     insulin aspart  1-7 Units Subcutaneous TID AC     insulin aspart  1-5 Units Subcutaneous At Bedtime     omeprazole  20 mg Oral BID     sodium chloride (PF)  3 mL Intracatheter Q8H     tamsulosin  0.4 mg Oral Daily       - MEDICATION INSTRUCTIONS -       - MEDICATION INSTRUCTIONS -       - MEDICATION INSTRUCTIONS -       sodium chloride 0.9%       Juwan Bradley MD

## 2021-10-29 NOTE — PROGRESS NOTES
Overnight events noted. OPAL felt to be secondary to contrast dye nephropathy. Patient's clinical exam was consistent with hypervolemia. Appreciate nephrology team's recommendations. No new changes from cardiology standpoint. Bioprosthetic valve function is normal per recent echo. Cardiology follow up arranged. Cardiology will sign off.     Timothy Juarez PA-C   10/29/2021  Pager: [phone]

## 2021-10-29 NOTE — PROGRESS NOTES
Interventional Radiology - Pre-Procedure Note:  10/29/2021    Procedure Requested: Tunneled dialysis catheter placement   Requested by: Dr Bradley    Brief HPI: Burton Elizabeth is a 76 year old male with PMH CRISTHIAN, UGI bleed, CKD, DM2, paroxysmal atrial flutter, CAD, chronic HFpEF and severe aortic stenosis who was admitted 10/26/2021 for post op monitoring following elective TAVR. Post procedure complicated acute oliguric renal failure.     IMAGING:    EXAM: XR CHEST 2 VW  LOCATION: [facility]  DATE/TIME: 10/28/2021 7:45 PM     INDICATION: evaluate CHF  COMPARISON: 01/28/2021                                                                IMPRESSION: Mild pulmonary edema and small bilateral pleural effusions. Cardiomegaly. Mild bibasilar atelectasis. No pneumothorax.    NPO: Yes  ANTICOAGULANTS: Yes, Eliquis  ANTIBIOTICS: Per pre procedure order    ALLERGIES  Allergies   Allergen Reactions     Penicillins Anaphylaxis     LABS:  ROUTINE ICU LABS (Last four results)  CMPRecent Labs   Lab 10/29/21  0848 10/29/21  0834 10/29/21  0155 10/28/21  2113 10/28/21  0818 10/28/21  0555 10/27/21  1156 10/27/21  0551 10/26/21  1520 10/26/21  0853   NA  --  136  --   --   --  140  --  138  --  139   POTASSIUM  --  4.8  --   --   --  4.8  --  4.8  --  4.0   CHLORIDE  --  105  --   --   --  108  --  108  --  108   CO2  --  24  --   --   --  25  --  25  --  25   ANIONGAP  --  7  --   --   --  7  --  5  --  6   * 117* 137* 219*   < > 201*   < > 146*   < > 170*   BUN  --  52*  --   --   --  48*  --  39*  --  40*   CR  --  4.38*  --   --   --  2.94*  --  1.61*  --  1.54*   GFRESTIMATED  --  12*  --   --   --  20*  --  41*  --  43*   GABE  --  8.5  --   --   --  8.5  --  8.4*  --  8.9   MAG  --  2.0  --   --   --  1.7  --  1.7  --   --    PHOS  --  4.6*  --   --   --  4.5  --  3.4  --   --    ALBUMIN  --  2.5*  --   --   --   --   --   --   --   --     < > = values in this interval not displayed.      CBC  Recent Labs   Lab 10/29/21  0834 10/28/21  0555 10/27/21  0551   WBC 5.3 5.9 9.6   RBC 4.18* 4.04* 4.54   HGB 10.8* 10.1* 11.5*   HCT 36.4* 35.7* 39.6*   MCV 87 88 87   MCH 25.8* 25.0* 25.3*   MCHC 29.7* 28.3* 29.0*   RDW 16.6* 16.6* 16.7*   * 121* 160     INRNo lab results found in last 7 days.  Arterial Blood Gas  Recent Labs   Lab 10/29/21  0834 10/26/21  1507 10/26/21  1356   PH  --  7.30* 7.28*   PCO2  --  56* 60*   PO2  --  96 94   HCO3  --  28 28   O2PER 50 50 50     EXAM:  Temp:  [98.9  F (37.2  C)-100.8  F (38.2  C)] 99.5  F (37.5  C)  Pulse:  [69-88] 77  Resp:  [12-34] 23  BP: (121-162)/(61-89) 123/69  SpO2:  [83 %-99 %] 97 %     General/Neuro: Alert, oriented, male with flat affect sitting up in chair in no acute distress. Moves all extremities equally.  Resp:  Lungs decreased with fine rales in bases fausto, scattered exp wheezes,  Cardio:  S1S2 and reg, murmur,   Abdomen:  Soft, distended, obese, non-tender, positive bowel sounds.    Pre-Sedation Code Status Assessment:  Code Status: Full Code intra procedure, per chart review.       History and Physical Reviewed: H&P documented within 30 days.  I have personally reviewed the patient's medical history and have updated the medical record as necessary.    ASSESSMENT/PLAN: Burton Elizabeth is a 76 year old male with PMH CRISTHIAN, UGI bleed, CKD, DM2, paroxysmal atrial flutter, CAD, chronic HFpEF and severe aortic stenosis who was admitted 10/26/2021 for post op monitoring following elective TAVR. Post procedure complicated acute oliguric renal failure.    Procedure, risks/benefits, details, alternatives, and sedation reviewed with patient who verbalized understanding. All questions answered. OK to proceed with above radiology procedure.     Discussed above with Dr Bradley and RN Niranjan today.     Thanks [facility] Interventional Radiology CNP ([phone]) (phone [phone])

## 2021-10-29 NOTE — PROVIDER NOTIFICATION
TT  One time 60mg lasix given @ 2000  40 ml output. Do we want to add or adjust another medication?  Thanks,  Brad RN   *[id number]    Orders Received: Continue to monitor      TT   Pt work of breathing Increased  He is willing to do BiPap  Can I get a order   Thanks,  Brad RN  *[id number]    Orders Received: BiPaP ordered

## 2021-10-29 NOTE — SIGNIFICANT EVENT
Significant Event Note    Time of event: 7:23 PM October 28, 2021    Description of event:    Increasing resp distress    Plan:    IV Lasix 60 mg x 1 per nephrology recs    Discussed with: bedside nurse    Ravindra Mixon MD

## 2021-10-30 NOTE — PROGRESS NOTES
Potassium   Date Value Ref Range Status   10/30/2021 4.6 3.4 - 5.3 mmol/L Final   07/11/2021 4.7 3.4 - 5.3 mmol/L Final     Hemoglobin   Date Value Ref Range Status   10/30/2021 10.0 (L) 13.3 - 17.7 g/dL Final   07/11/2021 8.6 (L) 13.3 - 17.7 g/dL Final     Creatinine   Date Value Ref Range Status   10/30/2021 3.36 (H) 0.66 - 1.25 mg/dL Final   07/11/2021 1.82 (H) 0.66 - 1.25 mg/dL Final     Urea Nitrogen   Date Value Ref Range Status   10/30/2021 41 (H) 7 - 30 mg/dL Final   07/11/2021 45 (H) 7 - 30 mg/dL Final     Sodium   Date Value Ref Range Status   10/30/2021 137 133 - 144 mmol/L Final   07/11/2021 138 133 - 144 mmol/L Final     INR   Date Value Ref Range Status   10/21/2021 1.26 (H) 0.85 - 1.15 Final   07/06/2021 1.32 (H) 0.86 - 1.14 Final       DIALYSIS PROCEDURE NOTE  Hepatitis status of previous patient on machine log was checked and verified ok to use with this patients hepatitis status.  Patient dialyzed for 3.5 hrs. on a K2 bath with a net fluid removal of  3L.  A BFR of 350 ml/min was obtained via a right tunneled CVC.   The treatment plan was discussed with Dr. Bradley during the treatment.    Total heparin received during the treatment: 0 units.   Line flushed, clamped and capped with heparin 1:1000 1.9 mL (1900 units) per lumen    Meds  given: none ordered   Complications: none      Person educated: patient. Knowledge base minimal. Barriers to learning: none. Educated on procedure via verbal mode. Patient verbalized understanding. Pt prefers oral education style.     ICEBOAT? Timeout performed pre-treatment  I: Patient was identified using 2 identifiers  C:  Consent Signed Yes  E: Equipment preventative maintenance is current and dialysis delivery system OK to use  B: Hepatitis B Surface Antigen: Unk; Draw Date: 10.29.21      Hepatitis B Surface Antibody: Unk; Draw Date: 10.29.21  O: Dialysis orders present and complete prior to treatment  A: Vascular access verified and assessed prior to treatment  T:  Treatment was performed at a clinically appropriate time  ?: Patient was allowed to ask questions and address concerns prior to treatment  See flowsheet in EPIC for further details and post assessment.  Machine water alarm in place and functioning. Transducer pods intact and checked every 15min.   Pt returned via bed to room 275.  Chlorine/Chloramine water system checked every 4 hours.  Outpatient Dialysis TBD.

## 2021-10-30 NOTE — PLAN OF CARE
Magdaleno was tolerated BiPAP for four hours overnight. He woke confused (disoriented to place, situation, time) and reported feeling distrustful. He was verbally abusive to staff and refused some am cares, including vital sign assessment, weight, or giron care. He was able to calm down and fall back asleep after his brother arrived. He denies discomfort or dyspnea.     Giron intact with 200 mL output from midnight-0430.  SaO2 stable on 3L via nasal cannula. Did not attempt to wean O2 overnight. Up with GB, walker and A1-2. Bed alarm for safety.

## 2021-10-30 NOTE — PROGRESS NOTES
[facility]    Hospitalist Progress Note    Interval History   - Breathing improved. Currently getting second run of dialysis.  - PCO2 remain high today--bipap tonight and assess to get out of IMC tomorrow    Assessment & Plan   Summary: Burton Elizabeth is a 76 year old male with PMH CRISTHIAN, UGI bleed, CKD, DM2, paroxysmal atrial flutter, CAD, chronic HFpEF, and severe aortic stenosis who was admitted 10/26/2021 for post op monitoring following elective TAVR. Post procedure complicated acute oliguric renal failure.    Acute oliguric renal failure on CKD3, due to contrast nephropathy  S/p initiation of HD on 10/29  Patient developed rapidly rising creatinine on 10/28, approx two days after TAVR, contrast. Noted to be more volume overloaded on 10/28 and not responding to IV diuresis. Received Diuril and Bumex on 10/29 without significant response.  Creatinine 1.6-->2.9-->4.4. Urine studies and clinical picture consistent with contrast nephropathy.  - Appreciate Nephrology consult   - HD   - Further diuresis per their management  - Continue giron for post-op urinary retention, and now for close I&O    Acute hypoxic and hypercarbic respiratory failure  Pulmonary edema, volume overloaded  Patient has a history of HFpEf. Echo following TAVR with good LV, RV function. He was noticeably more volume overloaded following TAVR, and had respiratory distress overnight on 10/27 and 10/28 requiring diuresis. Etiology is primarily due to acute renal failure. VBG on 10/29 shows PCO2 55 and pH 7.29 indicating acute hypercapnea, likely due to pulmonary edema/respiratory faliure.  - Start IMC status, BiPap PRN  - Schedule biapp overnight tonight  - Recheck VBG in AM  - Continue IMC until at least tomorrow    Acute metabolic encephalopathy, improved  Due to hypercapnea, critical illness.  - Dialysis as above  - Seroquel PRN    Severe aortic stenosis s/p TAVR 34mm [employer] valve (10/26/2021)  Post op mngt per  cardiology, now signed off. Eliquis restarted POD#0. No anti-plt. TTE 10/26 showing prosthetic aortic valve mean gradient 9mmHg and trivial paravalvular AI.  - Continue Eliquis    Acute on chronic HFpEF    CAD with NSTEMI (July 2021)  HTN / HLD  Diagnostic coronary angiogram on 10/11/2021 similar to prior diagnostic coronary angiogram in 2019 with two-vessel coronary artery disease including chronically totally occluded circumflex and small vessel disease of the PDA branch.  - PTA atorvastatin, Coreg  - ASA discontinued  - See above for volume management    Paroxysmal atrial flutter:   - Eliquis as above  - Continue PTA atorvastatin, Coreg     ID-DM2. A1c 7.6% 10/27/2021.  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 Notes previously mention suspected CRISTHIAN however pt had not completed sleep study as outpatient.  - See above for management    DVT Prophylaxis: DOAC  Code Status: Full Code  PT/OT: ordered  Diet: Fluid restriction 2000 ML FLUID  Combination Diet Regular Diet Adult; 2 gm NA Diet; Low Saturated Fat Diet      Disposition: Expected discharge on 3+ days    Ehsan Lebron MD  Text Page  (7am to 6pm)  -Data reviewed today: I reviewed all new labs and imaging results over the last 24 hours.    Physical Exam   Temp: 99.2  F (37.3  C) (recheck) Temp src: Axillary BP: (!) 143/78 Pulse: 77   Resp: 30 SpO2: 97 % O2 Device: Nasal cannula Oxygen Delivery: 3 LPM  Vitals:    10/26/21 0838 10/27/21 0732 10/29/21 0500   Weight: 112.5 kg (248 lb) 113.8 kg (250 lb 12.8 oz) 115.2 kg (253 lb 14.4 oz)     Vital Signs with Ranges  Temp:  [98.4  F (36.9   C)-100.5  F (38.1  C)] 99.2  F (37.3  C)  Pulse:  [65-86] 77  Resp:  [13-35] 30  BP: (124-167)/(66-95) 143/78  SpO2:  [92 %-99 %] 97 %  I/O last 3 completed shifts:  In: 720 [P.O.:720]  Out: 2900 [Urine:400; Other:2500]  O2 requirements: yes    Constitutional: Male appears somnolent  HEENT: Eyes nonicteric  Cardiovascular: RRR, normal S1/2, no m/r/g  Respiratory: Decreased flow with crackles  Vascular: 3+ BLE pitting edema, anasarca with L > R arm swelling  GI: Normoactive bowel sounds, nontender  Skin/Integumen: No rashes  Neuro/Psych: Appropriate affect and mood, but more somnolent today. A&Ox3, moves all extremities    Medications     - MEDICATION INSTRUCTIONS -       - MEDICATION INSTRUCTIONS -       - MEDICATION INSTRUCTIONS -       sodium chloride 0.9%         - MEDICATION INSTRUCTIONS for Dialysis Patients -   Does not apply See Admin Instructions     amLODIPine  5 mg Oral Daily     apixaban ANTICOAGULANT  5 mg Oral BID     atorvastatin  80 mg Oral Daily     carvedilol  3.125 mg Oral BID     [Held by provider] furosemide  40 mg Intravenous Daily     sodium chloride (PF) 0.9%  1.3-2.6 mL Intracatheter Once in dialysis/CRRT    Followed by     heparin  3 mL Intracatheter Once in dialysis/CRRT     sodium chloride (PF) 0.9%  1.3-2.6 mL Intracatheter Once in dialysis/CRRT    Followed by     heparin  3 mL Intracatheter Once in dialysis/CRRT     insulin aspart  1-7 Units Subcutaneous TID AC     insulin aspart  1-5 Units Subcutaneous At Bedtime     omeprazole  20 mg Oral BID     sodium chloride (PF)  3 mL Intracatheter Q8H     tamsulosin  0.4 mg Oral Daily       Data   Recent Labs   Lab 10/30/21  1212 10/30/21  0736 10/30/21  0623 10/29/21  0848 10/29/21  0834 10/28/21  0818 10/28/21  0555   WBC  --   --  5.7  --  5.3  --  5.9   HGB  --   --  10.0*  --  10.8*  --  10.1*   MCV  --   --  88  --  87  --  88   PLT  --   --  123*  --  110*  --  121*   NA  --   --  137  --  136  --  140   POTASSIUM  --   --  4.6  --  4.8   --  4.8   CHLORIDE  --   --  106  --  105  --  108   CO2  --   --  25  --  24  --  25   BUN  --   --  41*  --  52*  --  48*   CR  --   --  3.36*  --  4.38*  --  2.94*   ANIONGAP  --   --  6  --  7  --  7   GABE  --   --  8.3*  --  8.5  --  8.5   * 95 98   < > 117*   < > 201*   ALBUMIN  --   --  2.4*  --  2.5*  --   --     < > = values in this interval not displayed.       Imaging:   No results found for this or any previous visit (from the past 24 hour(s)).

## 2021-10-30 NOTE — PLAN OF CARE
OT: Orders rec'd. Discussed with PT who has evaluated patient. Needs during this stay are being met by PT at this time. OT deferring eval to next level of care. Order complete.

## 2021-10-30 NOTE — PROGRESS NOTES
[facility]    Cardiology Progress Note     Assessment & Plan   1.  Severe aortic stenosis s/p TAVR 34mm Medtronic valve (10/26/2021)              - Echocardiogram 10/27/21 showed mean gradient 9 mmHg.    2. Acute on chronic renal failure - thought to be secondary to contrast nephropathy.               - Creatinine 1.6-->2.9-->4.4              - Nephrology following, started dialysis on 10/29 with another session planned for today  3. Acute on chronic diastolic heart failure - with significant volume overload insetting of acute renal failure.               - PTA diuretics torsemide 20mg qam and 10mg qpm.               - No response to high dose IV diuretics  4. Acute hypoxic and hypercarbic respiratory failure secondary to volume overload.   5. Hypertension  6. Coronary artery disease  - stable              - Cardiac catheterization 10/11/11209 showed chronically totally occluded circumflex and small vessel disease of the PDA branch.  7. Paroxsymal atrial flutter/fibrillation - currently in atrial fibrillation with controlled ventricular rate.   8. Chronic LBBB  9. Morbid obesity   10. History of CVA   11. Type 2 DM - insulin dependent  12. High probability for sleep apnea      I saw Magdaleno at bedside. Unfortunately his post procedure course has been complicated by acute on chronic renal failure for which he is getting IHD.  Diuresis will be managed by nephrology and dialysis. BP is on the higher side today but will plan on adjusting once we see where he is after dialysis.     Plan:   1. Continue amlodipine 5mg daily, can give extra 5mg tonight if BP continues to be high post dialysis  2. Continue apixaban 5mg BID - no aspirin or plavix  3. Continue atovastatin 80mg daily  4. Continue carvedilol 3.125mg BID  5. Plan to start dialysis today per nephrology   6. Daily BMP  7. Strict I&O, daily weight, low sodium diet, 2L fluid restriction  8. Will need close cardiology follow up at discharge with  structural heart team and CORE clinic (message sent to get CORE follow up scheduled).   9. Cardiology will continue to follow.         Mounika Melo MD    Interval History   No acute events overnight. Feels well this AM.    Physical Exam   Temp: 99  F (37.2  C) Temp src: Oral BP: (!) 161/74 Pulse: 74   Resp: 14 SpO2: 92 % O2 Device: Nasal cannula Oxygen Delivery: 3 LPM  Vitals:    10/26/21 0838 10/27/21 0732 10/29/21 0500   Weight: 112.5 kg (248 lb) 113.8 kg (250 lb 12.8 oz) 115.2 kg (253 lb 14.4 oz)     Vital Signs with Ranges  Temp:  [98.4  F (36.9  C)-99  F (37.2  C)] 99  F (37.2  C)  Pulse:  [65-86] 74  Resp:  [13-24] 14  BP: (111-165)/(52-88) 161/74  FiO2 (%):  [50 %] 50 %  SpO2:  [92 %-99 %] 92 %  I/O last 3 completed shifts:  In: 840 [P.O.:840]  Out: 3290 [Urine:790; Other:2500]    Constitutional: No apparent distress.   Eyes: No xanthelasma or conjunctivitis  Respiratory: Clear to auscultation bilaterally. No crackles or wheezes.  Cardiovascular: Regular rate and rhythm. Normal S1 and S2. No murmurs. No extra heart sounds. No JVD.   Extremities: No peripheral edema.  Neurologic: Moving all extremities. No facial assymmetry.  Psychiatric: Alert and oriented. Answers questions appropriately.     Medications     - MEDICATION INSTRUCTIONS -       - MEDICATION INSTRUCTIONS -       - MEDICATION INSTRUCTIONS -       sodium chloride 0.9%         - MEDICATION INSTRUCTIONS for Dialysis Patients -   Does not apply See Admin Instructions     sodium chloride 0.9%  250 mL Intravenous Once in dialysis/CRRT     sodium chloride 0.9%  300 mL Hemodialysis Machine Once     amLODIPine  5 mg Oral Daily     apixaban ANTICOAGULANT  5 mg Oral BID     atorvastatin  80 mg Oral Daily     carvedilol  3.125 mg Oral BID     [Held by provider] furosemide  40 mg Intravenous Daily     sodium chloride (PF) 0.9%  1.3-2.6 mL Intracatheter Once in dialysis/CRRT    Followed by     heparin  3 mL Intracatheter Once in dialysis/CRRT      sodium chloride (PF) 0.9%  1.3-2.6 mL Intracatheter Once in dialysis/CRRT    Followed by     heparin  3 mL Intracatheter Once in dialysis/CRRT     insulin aspart  1-7 Units Subcutaneous TID AC     insulin aspart  1-5 Units Subcutaneous At Bedtime     - MEDICATION INSTRUCTIONS -   Does not apply Once     omeprazole  20 mg Oral BID     sodium chloride (PF)  3 mL Intracatheter Q8H     tamsulosin  0.4 mg Oral Daily       Data   Results for orders placed or performed during the hospital encounter of 10/26/21 (from the past 24 hour(s))   Glucose by meter   Result Value Ref Range    GLUCOSE BY METER POCT 100 (H) 70 - 99 mg/dL   IR CVC Tunnel Placement > 5 Yrs of Age    Narrative    [facility]    EXAM: TUNNELED CENTRAL VENOUS CATHETER PLACEMENT    LOCATION: [facility]    CLINICAL HISTORY: The patient has a history of renal failure and  requires dialysis.    PROCEDURES PERFORMED:  1. Ultrasound-guided puncture of jugular vein  2. Creation of subcutaneous tunnel in chest wall.  3. Placement of dialysis catheter through subcutaneous tunnel, into  peel away sheath, and into SVC.     MODERATE SEDATION: 0.5 Versed and 25 Fentanyl were administered  intravenously for moderate sedation. Pulse oximetry, heart rate and  blood pressure were continuously monitored by an independent trained  observer. The physician spent 15 minutes of face-to-face moderate  sedation time with the patient.    CONTRAST: none    FLUOROSCOPIC TIME: 0.3 minutes  CUMULATIVE AIR KERMA/DOSE: 3 mGy    STERILE BARRIER TECHNIQUE: Maximal Sterile Barrier Technique Utilized:  Cap AND mask AND sterile gown AND sterile gloves AND sterile full body  drape AND hand hygiene AND skin preparation 2% chlorhexidine for  cutaneous antisepsis (or acceptable alternative antiseptics).    Sterile Ultrasound Technique Utilized ?Sterile gel AND sterile probe  covers.    UNIVERSAL PROTOCOL: Standard universal protocol per facility  guidelines was followed.  See EMR for documentation.     TECHNIQUE:   Risks, benefits and alternatives were explained to the patient and  written, informed consent was obtained. The patient was placed in the  supine position on the angiography table. The neck and chest were  prepped and draped in the usual fashion and anesthetized with 1%  lidocaine. Using ultrasound guidance, the internal jugular vein was  accessed with a micropuncture system..  A 0.35 wire was advanced  through the sheath and into the IVC. A subcutaneous tunnel was then  created in the chest wall using blunt dissection. The catheter was  then attached to a tunneling device and brought through the tunnel.  The venostomy site was sequentially dilated. The catheter was then  placed through a peel away sheath, and into the right atrium. The  puncture site was closed using surgical glue  The catheter was secured  to the skin using a Prolene stitch. Each port was dwelled with heparin  (1000 units per cc) solution, and the site was dressed in a sterile  fashion.  The patient tolerated the procedure well without immediate  complications.    FINDINGS:   The right internal jugular vein is anechoic, compressible and patent  by ultrasound, and ultrasound images obtained during access show the  needle within the vein. Ultrasound images have been permanently  captured for documentation.  Fluoroscopic images obtained following placement of the catheter, show  that the catheter terminates near the cavoatrial junction..  The catheter has a smooth course in the chest wall. The catheter  functions well and is available for immediate use.      Impression    IMPRESSION:  1. Ultrasound and fluoroscopic guided placement of tunneled 23 cm tip  to cuff, right internal jugular dialysis catheter.                          ELVIRA STOUT MD         SYSTEM ID:  [id number]   Glucose by meter   Result Value Ref Range    GLUCOSE BY METER POCT 86 70 - 99 mg/dL   Glucose by meter   Result Value Ref Range     GLUCOSE BY METER POCT 163 (H) 70 - 99 mg/dL   CBC with platelets differential    Narrative    The following orders were created for panel order CBC with platelets differential.  Procedure                               Abnormality         Status                     ---------                               -----------         ------                     CBC with platelets and d...[[id number]]  Abnormal            Final result                 Please view results for these tests on the individual orders.   Magnesium   Result Value Ref Range    Magnesium 1.8 1.6 - 2.3 mg/dL   Renal panel   Result Value Ref Range    Sodium 137 133 - 144 mmol/L    Potassium 4.6 3.4 - 5.3 mmol/L    Chloride 106 94 - 109 mmol/L    Carbon Dioxide (CO2) 25 20 - 32 mmol/L    Anion Gap 6 3 - 14 mmol/L    Urea Nitrogen 41 (H) 7 - 30 mg/dL    Creatinine 3.36 (H) 0.66 - 1.25 mg/dL    Calcium 8.3 (L) 8.5 - 10.1 mg/dL    Glucose 98 70 - 99 mg/dL    Albumin 2.4 (L) 3.4 - 5.0 g/dL    Phosphorus 4.6 (H) 2.5 - 4.5 mg/dL    GFR Estimate 17 (L) >60 mL/min/1.73m2   Blood gas venous   Result Value Ref Range    pH Venous 7.29 (L) 7.32 - 7.43    pCO2 Venous 59 (H) 40 - 50 mm Hg    pO2 Venous 56 (H) 25 - 47 mm Hg    Bicarbonate Venous 29 (H) 21 - 28 mmol/L    Base Excess/Deficit (+/-) 1.1 -7.7 - 1.9 mmol/L    FIO2 3    CBC with platelets and differential   Result Value Ref Range    WBC Count 5.7 4.0 - 11.0 10e3/uL    RBC Count 3.97 (L) 4.40 - 5.90 10e6/uL    Hemoglobin 10.0 (L) 13.3 - 17.7 g/dL    Hematocrit 34.8 (L) 40.0 - 53.0 %    MCV 88 78 - 100 fL    MCH 25.2 (L) 26.5 - 33.0 pg    MCHC 28.7 (L) 31.5 - 36.5 g/dL    RDW 16.4 (H) 10.0 - 15.0 %    Platelet Count 123 (L) 150 - 450 10e3/uL    % Neutrophils 67 %    % Lymphocytes 12 %    % Monocytes 19 %    % Eosinophils 2 %    % Basophils 0 %    % Immature Granulocytes 0 %    NRBCs per 100 WBC 0 <1 /100    Absolute Neutrophils 3.8 1.6 - 8.3 10e3/uL    Absolute Lymphocytes 0.7 (L) 0.8 - 5.3 10e3/uL    Absolute  Monocytes 1.1 0.0 - 1.3 10e3/uL    Absolute Eosinophils 0.1 0.0 - 0.7 10e3/uL    Absolute Basophils 0.0 0.0 - 0.2 10e3/uL    Absolute Immature Granulocytes 0.0 <=0.0 10e3/uL    Absolute NRBCs 0.0 10e3/uL   Glucose by meter   Result Value Ref Range    GLUCOSE BY METER POCT 95 70 - 99 mg/dL   EKG 12-lead, tracing only   Result Value Ref Range    Systolic Blood Pressure  mmHg    Diastolic Blood Pressure  mmHg    Ventricular Rate 73 BPM    Atrial Rate 92 BPM    OR Interval  ms    QRS Duration 172 ms     ms    QTc 493 ms    P Axis  degrees    R AXIS 51 degrees    T Axis 145 degrees    Interpretation ECG       Atrial fibrillation  Left bundle branch block  Abnormal ECG  When compared with ECG of 29-OCT-2021 07:07, (unconfirmed)  QRS axis Shifted right  T wave inversion less evident in Lateral leads

## 2021-10-30 NOTE — PROGRESS NOTES
Nephrology Progress Note  10/30/2021         ASSESSMENT AND RECOMMENDATIONS:      1 CKD 3B- Baseline creatinine appears to fluctuate around 1.7-2.  He follows with Dr. Llamas of [facility] nephrology for his CKD care.  CKD 3B is attributed to diabetes, hypertension, cardiorenal state as well as likely underlying renovascular disease.     2 status post TAVR on 10/26     3 heart failure with preserved ejection fraction     4 acute renal failure-renal ultrasound okay.  No documented hypotension.  Clinically volume overloaded.  Oliguric OPLA with sharp rise post TAVR, with likely contribution by IV contrast use.  Fractional excretion of sodium 0.2 is consistent with contrast-induced nephropathy   DDx - atheroembolic dis post endovascular procedure. No clinical s/o atheroembolism in his extremities.  No peripheral eosinophilia  UA with pyuria . Culture pending        5 Vol status - chronic LE edema on Torsemide 20/10 . Total body vol up . Few crackles at lung bases as well.   -No response to high-dose diuretics.  Started on dialysis on 10/29.  2.5 L removed.     6 HTN =- on Coreg /Amlod. Now worse with OPAL and vol overload      Recc -  -plan for another session of dialysis today.  2 to 3 L ultrafiltration as tolerated.  Starting to make some urine.  Likely dialysis tomorrow and monitor for effect.  Plan to resume diuretics tomorrow.  -Closely monitor I's/O and daily renal function panel.  - PO fluid restriction to 1500 ml/ d  -Continue COreg / Amlodipine   - Daily RFP. CBC with diff    Recommendations were communicated to primary team in person    Juwan Bradley MD  [facility] - Nephrology   [phone]      Interval History :   Seen / examined.    Dialyzed yesterday with 2.5 L removed.  States stable throughout dialysis.  Starting to make some urine.  Mentation appears more clear.  Breathing comfortably.  Fair clearance with dialysis.  Creatinine 3.3.  BUN 41.      Review of Systems:   A 10 point review of systems  was negative except as noted above.  Notably: poor appetite. no nausea or vomiting or diarrhea.  No shortness of breath at rest.  Sitting up.  On nasal cannula oxygen.  Physical Exam:   I/O last 3 completed shifts:  In: 840 [P.O.:840]  Out: 3290 [Urine:790; Other:2500]    GENERAL APPEARANCE: mild resp distress,  awake  EYES: no scleral icterus, pupils equal  HENT: NC/AT,  mouth  without ulcers or lesions  Lymphatics: no cervical or supraclavicular LAD  Endo: no moon facies, no goiter  Pulmonary: few crackles at bases   CV: regular rhythm, normal rate, no rub   - JVP  - could not eval   - Edema++  GI: soft, nontender,   MS: no evidence of inflammation in joints.   : + giron  SKIN: no rash, warm, dry, no cyanosis  NEURO: face symmetric, no asterixis     Labs:   All labs reviewed by me  Electrolytes/Renal -   Recent Labs   Lab Test 10/30/21  1212 10/30/21  0736 10/30/21  0623 10/29/21  0848 10/29/21  0834 10/28/21  0818 10/28/21  0555   NA  --   --  137  --  136  --  140   POTASSIUM  --   --  4.6  --  4.8  --  4.8   CHLORIDE  --   --  106  --  105  --  108   CO2  --   --  25  --  24  --  25   BUN  --   --  41*  --  52*  --  48*   CR  --   --  3.36*  --  4.38*  --  2.94*   * 95 98   < > 117*   < > 201*   GABE  --   --  8.3*  --  8.5  --  8.5   MAG  --   --  1.8  --  2.0  --  1.7   PHOS  --   --  4.6*  --  4.6*  --  4.5    < > = values in this interval not displayed.       CBC -   Recent Labs   Lab Test 10/30/21  0623 10/29/21  0834 10/28/21  0555   WBC 5.7 5.3 5.9   HGB 10.0* 10.8* 10.1*   * 110* 121*       LFTs -   Recent Labs   Lab Test 10/30/21  0623 10/29/21  0834 10/21/21  1346 07/08/21  0524 07/06/21  1028 06/10/21  0000 06/10/21  0000   ALKPHOS  --   --  201*  --  83  --  118   BILITOTAL  --   --  0.5  --  0.4  --  0.6   ALT  --   --  22  --  34  --  23   AST  --   --  27  --  56*  --  34   PROTTOTAL  --   --  6.5*  --  5.4*  --  6.0   ALBUMIN 2.4* 2.5* 2.7*   < > 2.8*   < > 3.3*    < > = values in  this interval not displayed.       Iron Panel -   Recent Labs   Lab Test 06/10/21  0000   IRON 15*   IRONSAT 4*   BOBO 73.9         Current Medications:    - MEDICATION INSTRUCTIONS for Dialysis Patients -   Does not apply See Admin Instructions     sodium chloride 0.9%  250 mL Intravenous Once in dialysis/CRRT     sodium chloride 0.9%  300 mL Hemodialysis Machine Once     amLODIPine  5 mg Oral Daily     apixaban ANTICOAGULANT  5 mg Oral BID     atorvastatin  80 mg Oral Daily     carvedilol  3.125 mg Oral BID     [Held by provider] furosemide  40 mg Intravenous Daily     sodium chloride (PF) 0.9%  1.3-2.6 mL Intracatheter Once in dialysis/CRRT    Followed by     heparin  3 mL Intracatheter Once in dialysis/CRRT     sodium chloride (PF) 0.9%  1.3-2.6 mL Intracatheter Once in dialysis/CRRT    Followed by     heparin  3 mL Intracatheter Once in dialysis/CRRT     insulin aspart  1-7 Units Subcutaneous TID AC     insulin aspart  1-5 Units Subcutaneous At Bedtime     - MEDICATION INSTRUCTIONS -   Does not apply Once     omeprazole  20 mg Oral BID     sodium chloride (PF)  3 mL Intracatheter Q8H     tamsulosin  0.4 mg Oral Daily       - MEDICATION INSTRUCTIONS -       - MEDICATION INSTRUCTIONS -       - MEDICATION INSTRUCTIONS -       sodium chloride 0.9%       Juwan Bradley MD

## 2021-10-30 NOTE — PLAN OF CARE
Neuro- A&OX4, pleasant and cooperative for this shift  Most Recent Vitals- Temp: 99.2  F (37.3  C) (recheck) Temp src: Axillary BP: (!) 148/75 Pulse: 77   Resp: 30 SpO2: 97 % O2 Device: Nasal cannula   Tele/Cardiac- A-fib with CVR   Resp- 3L NSC   Activity- Up with 1-2, gait belt and walker   Pain- C/O Headache and prn Tylenol given   Drips- none   Drains/Tubes- P-IV SL, dialysis tunneled catheter right upper chest   Skin- Generalized edema   GI/- Zuniga catheter   Aggression Color- Green  COVID status- Negative  Plan- dialysis today, PT/OT.   Misc-     Mare Palomo RN

## 2021-10-31 NOTE — PROGRESS NOTES
[facility]. A&O x4, slightly more forgetful after 2100. VSS on 2L O2. Tele A fib w/ BBB. Denies CP/SOB/pain. Up A1 w/ walker and gait belt. Zuniga patent. Offered bipap multiple times and pt has refused. Will continue to monitor.

## 2021-10-31 NOTE — PROGRESS NOTES
[facility]    Hospitalist Progress Note    Interval History   - Feels overall improved today. Looks improved--more energy, more awake  - Not using BiPap much overnight  - Low grade temp 99F, however patient keeps his room at 80F, clinically he does not have any signs of infection  - Diuresis only today? Or dialysis? Per nephrology  - Updated brother Jose at bedside    Assessment & Plan   Summary: Burton Elizabeth is a 76 year old male with PMH CRISTHIAN, UGI bleed, CKD, DM2, paroxysmal atrial flutter, CAD, chronic HFpEF, and severe aortic stenosis who was admitted 10/26/2021 for post op monitoring following elective TAVR. Post procedure complicated acute oliguric renal failure.    Acute oliguric renal failure on CKD3, due to contrast nephropathy  S/p initiation of HD on 10/29  Patient developed rapidly rising creatinine on 10/28, approx two days after TAVR, contrast. Noted to be more volume overloaded on 10/28 and not responding to IV diuresis. Received Diuril and Bumex on 10/29 without significant response.  Creatinine 1.6-->2.9-->4.4. Urine studies and clinical picture consistent with contrast nephropathy.  - Appreciate Nephrology consult   - HD   - Further diuresis per their management  - Continue giron for post-op urinary retention, and now for close I&O    Acute hypoxic and hypercarbic respiratory failure  Pulmonary edema, volume overloaded  Patient has a history of HFpEf. Echo following TAVR with good LV, RV function. He was noticeably more volume overloaded following TAVR, and had respiratory distress overnight on 10/27 and 10/28 requiring diuresis. Etiology is primarily due to acute renal failure. VBG on 10/29 shows PCO2 55 and pH 7.29 indicating acute hypercapnea, likely due to pulmonary edema/respiratory faliure. PCO2 remains elevated at 60 on 10/31, however clinically improved.  - BipAP at bedtime, off IMC  - Monitor bicarb with renal panel  - Needs likely outpatient sleep study,  there is concern for underlying sleep apnea    Acute metabolic encephalopathy, improved  Due to hypercapnea, critical illness.  - Dialysis as above  - Seroquel PRN    Severe aortic stenosis s/p TAVR 34mm Medtronic valve (10/26/2021)  Post op mngt per cardiology, now signed off. Eliquis restarted POD#0. No anti-plt. TTE 10/26 showing prosthetic aortic valve mean gradient 9mmHg and trivial paravalvular AI.  - Continue Eliquis    Acute on chronic HFpEF    CAD with NSTEMI (July 2021)  HTN / HLD  Diagnostic coronary angiogram on 10/11/2021 similar to prior diagnostic coronary angiogram in 2019 with two-vessel coronary artery disease including chronically totally occluded circumflex and small vessel disease of the PDA branch.  - PTA atorvastatin, Coreg  - ASA discontinued  - See above for volume management    Paroxysmal atrial flutter:   - Eliquis as above  - Continue PTA atorvastatin, Coreg     ID-DM2. A1c 7.6% 10/27/2021.  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 Notes previously mention suspected CRISTHIAN however pt had not completed sleep study as outpatient.  - See above for management    DVT Prophylaxis: DOAC  Code Status: Full Code  PT/OT: ordered  Diet: Fluid restriction 2000 ML FLUID  Combination Diet Regular Diet Adult; 2 gm NA Diet; Low Saturated Fat Diet      Disposition: Expected discharge this coming week    Ehsan Lebron MD  Text Page  (7am to 6pm)  -Data reviewed today: I reviewed all new labs and imaging results over the last 24 hours.    Physical Exam   Temp: 99.7  F (37.6  C) Temp src: Oral BP: 136/65 Pulse: 73   Resp: 20 SpO2: 94  % O2 Device: Nasal cannula Oxygen Delivery: 2 LPM  Vitals:    10/27/21 0732 10/29/21 0500 10/31/21 0500   Weight: 113.8 kg (250 lb 12.8 oz) 115.2 kg (253 lb 14.4 oz) 110.6 kg (243 lb 14.4 oz)     Vital Signs with Ranges  Temp:  [98.7  F (37.1  C)-101.1  F (38.4  C)] 99.7  F (37.6  C)  Pulse:  [60-86] 73  Resp:  [14-35] 20  BP: (128-175)/() 136/65  FiO2 (%):  [35 %] 35 %  SpO2:  [84 %-100 %] 94 %  I/O last 3 completed shifts:  In: 200 [P.O.:200]  Out: 3360 [Urine:360; Other:3000]  O2 requirements: yes    Constitutional: Male appears somnolent  HEENT: Eyes nonicteric  Cardiovascular: RRR, normal S1/2, no m/r/g  Respiratory: Bibasilar crackles, slightly improved  Vascular: 2+ BLE pitting edema, anasarca with L > R arm swelling  GI: Normoactive bowel sounds, nontender  Skin/Integumen: No rashes  Neuro/Psych: Appropriate affect and mood, but more somnolent today. A&Ox3, moves all extremities    Medications     - MEDICATION INSTRUCTIONS -       - MEDICATION INSTRUCTIONS -       - MEDICATION INSTRUCTIONS -       sodium chloride 0.9%         - MEDICATION INSTRUCTIONS for Dialysis Patients -   Does not apply See Admin Instructions     amLODIPine  5 mg Oral Daily     apixaban ANTICOAGULANT  5 mg Oral BID     atorvastatin  80 mg Oral Daily     carvedilol  3.125 mg Oral BID     [Held by provider] furosemide  40 mg Intravenous Daily     insulin aspart  1-7 Units Subcutaneous TID AC     insulin aspart  1-5 Units Subcutaneous At Bedtime     insulin glargine  10 Units Subcutaneous Q24H     omeprazole  20 mg Oral BID     sodium chloride (PF)  3 mL Intracatheter Q8H     tamsulosin  0.4 mg Oral Daily       Data   Recent Labs   Lab 10/31/21  0753 10/31/21  0540 10/31/21  0218 10/30/21  0736 10/30/21  0623 10/29/21  0848 10/29/21  0834   WBC  --  5.2  --   --  5.7  --  5.3   HGB  --  10.5*  --   --  10.0*  --  10.8*   MCV  --  88  --   --  88  --  87   PLT  --  100*  --   --  123*  --  110*   NA  --  137  --   --  137  --  136    POTASSIUM  --  4.0  --   --  4.6  --  4.8   CHLORIDE  --  104  --   --  106  --  105   CO2  --  31  --   --  25  --  24   BUN  --  27  --   --  41*  --  52*   CR  --  2.44*  --   --  3.36*  --  4.38*   ANIONGAP  --  2*  --   --  6  --  7   GABE  --  8.3*  --   --  8.3*  --  8.5   * 132* 118*   < > 98   < > 117*   ALBUMIN  --  2.4*  --   --  2.4*   < > 2.5*    < > = values in this interval not displayed.       Imaging:   No results found for this or any previous visit (from the past 24 hour(s)).

## 2021-10-31 NOTE — PROGRESS NOTES
A&O x4. VSS on 2L O2 except slightly elevated SBP in 160's, PRN hydralazine given. Tele A fib CVR w/ BBB. Denies CP/SOB/pain. Up w/ A1 w/ walker and gait belt. Pt continues to have swelling in upper and lower extremities and slight crackles in lungs. One time dose IV bumex given. Zuniga in place. IMC status discontinued. Plan to reassess renal function in AM.

## 2021-10-31 NOTE — PROVIDER NOTIFICATION
MD Notification     Notified Person: MD     Notified Person Name: Dr. Lebron      Notification Date/Time: 10/310 1745     Notification Interaction: text page      Purpose of Notification:      Pt seems to be having slightly worse word finding difficulty this afternoon. All other neuro's intact. Do you feel we need to do any head scans or is it more due to his hypercapnea?   He just seems to be having a harder time finding words to express himself. He had this issue earlier, but he seems to be rambling a bit more this afternoon.     Orders Received: Continue to monitor for now, he's been having some waxing and waning of mental status. Escalate to code stroke if any dysarthria present.

## 2021-10-31 NOTE — PROGRESS NOTES
Nephrology Progress Note  10/31/2021         ASSESSMENT AND RECOMMENDATIONS:      1 CKD 3B- Baseline creatinine appears to fluctuate around 1.7-2.  He follows with Dr. Llamas of [facility] nephrology for his CKD care.  CKD 3B is attributed to diabetes, hypertension, cardiorenal state as well as likely underlying renovascular disease.     2 status post TAVR on 10/26     3 heart failure with preserved ejection fraction     4 acute renal failure-renal ultrasound okay.  No documented hypotension.  Clinically volume overloaded.  Oliguric OPAL with sharp rise post TAVR, with likely contribution by IV contrast use.  Fractional excretion of sodium 0.2 is consistent with contrast-induced nephropathy   DDx - atheroembolic dis post endovascular procedure. No clinical s/o atheroembolism in his extremities.  No peripheral eosinophilia     5 Vol status - chronic LE edema on Torsemide 20/10 . Total body vol up . Few crackles at lung bases as well.   -No response to high-dose diuretics.  Started on dialysis on 10/29 to avoid intubation..  5.5 L removed over last 2 days..     6 HTN =- on Coreg /Amlod. Now worse with OPAL and vol overload      Recc -  -hold dialysis today.  Bumex 2 mg IV x1.  -Reassess labs and volume status in the morning.  Urine output is improving and this may be early recovery from OPAL.  -Low-salt diet  -Closely monitor I's/O and daily renal function panel.  - PO fluid restriction to 1500 ml/ d  -Continue COreg / Amlodipine   - Daily RFP    Recommendations were communicated to primary team in person    Juwan Bradley MD  [facility] - Nephrology   [phone]      Interval History :   Seen / examined.  Brother at bedside.  Patient appears a lot more awake and alert.  More comfortable.  Denies any difficulty in breathing.  Dialyzed for last 2 days in a row with good clearance.  5.5 L of fluid removed.  Urine output starting to .  350 cc since morning.      Review of Systems:   A 10 point review of systems  was negative except as noted above.  Notably: poor appetite. no nausea or vomiting or diarrhea.  No shortness of breath at rest.  Sitting up.  On nasal cannula oxygen.  Physical Exam:   I/O last 3 completed shifts:  In: 200 [P.O.:200]  Out: 3360 [Urine:360; Other:3000]    GENERAL APPEARANCE: mild resp distress,  awake  EYES: no scleral icterus, pupils equal  HENT: NC/AT,  mouth  without ulcers or lesions  Lymphatics: no cervical or supraclavicular LAD  Endo: no moon facies, no goiter  Pulmonary: few crackles at bases   CV: regular rhythm, normal rate, no rub   - JVP  - could not eval   - Edema++  GI: soft, nontender,   MS: no evidence of inflammation in joints.   : + giron  SKIN: no rash, warm, dry, no cyanosis  NEURO: face symmetric, no asterixis     Labs:   All labs reviewed by me  Electrolytes/Renal -   Recent Labs   Lab Test 10/31/21  0753 10/31/21  0540 10/31/21  0218 10/30/21  0736 10/30/21  0623 10/29/21  0848 10/29/21  0834   NA  --  137  --   --  137  --  136   POTASSIUM  --  4.0  --   --  4.6  --  4.8   CHLORIDE  --  104  --   --  106  --  105   CO2  --  31  --   --  25  --  24   BUN  --  27  --   --  41*  --  52*   CR  --  2.44*  --   --  3.36*  --  4.38*   * 132* 118*   < > 98   < > 117*   GABE  --  8.3*  --   --  8.3*  --  8.5   MAG  --  1.9  --   --  1.8  --  2.0   PHOS  --  3.1  --   --  4.6*  --  4.6*    < > = values in this interval not displayed.       CBC -   Recent Labs   Lab Test 10/31/21  0540 10/30/21  0623 10/29/21  0834   WBC 5.2 5.7 5.3   HGB 10.5* 10.0* 10.8*   * 123* 110*       LFTs -   Recent Labs   Lab Test 10/31/21  0540 10/30/21  0623 10/29/21  0834 10/21/21  1346 10/21/21  1346 07/08/21  0524 07/06/21  1028 06/10/21  0000 06/10/21  0000   ALKPHOS  --   --   --   --  201*  --  83  --  118   BILITOTAL  --   --   --   --  0.5  --  0.4  --  0.6   ALT  --   --   --   --  22  --  34  --  23   AST  --   --   --   --  27  --  56*  --  34   PROTTOTAL  --   --   --   --  6.5*   --  5.4*  --  6.0   ALBUMIN 2.4* 2.4* 2.5*   < > 2.7*   < > 2.8*   < > 3.3*    < > = values in this interval not displayed.       Iron Panel -   Recent Labs   Lab Test 06/10/21  0000   IRON 15*   IRONSAT 4*   BOBO 73.9         Current Medications:    - MEDICATION INSTRUCTIONS for Dialysis Patients -   Does not apply See Admin Instructions     amLODIPine  5 mg Oral Daily     apixaban ANTICOAGULANT  5 mg Oral BID     atorvastatin  80 mg Oral Daily     carvedilol  3.125 mg Oral BID     [Held by provider] furosemide  40 mg Intravenous Daily     insulin aspart  1-7 Units Subcutaneous TID AC     insulin aspart  1-5 Units Subcutaneous At Bedtime     insulin glargine  10 Units Subcutaneous Q24H     omeprazole  20 mg Oral BID     sodium chloride (PF)  3 mL Intracatheter Q8H     tamsulosin  0.4 mg Oral Daily       - MEDICATION INSTRUCTIONS -       - MEDICATION INSTRUCTIONS -       - MEDICATION INSTRUCTIONS -       sodium chloride 0.9%       Juwan Bradley MD

## 2021-10-31 NOTE — PROGRESS NOTES
"Pt stating he is unable to tolerate BiPAP. \"it just forces so much air and gives me a headache.\" Pt educated on purpose of BiPAP and it's relation to Pt's plan of care. Pt demonstrates little to no interest in this education. Pt placed on 2 L oxymask. Maintaining sats in mid 90's. Declined PRN seroquel.      Addendum: 0210    While checking pt's blood glucose, pt stated, \"you guys sure have an inbred method of healing.\" RN inquired to what pt meant. Pt stated, \"you guys never let me rest, and you interrupt me when I'm relaxed.\" RN asked patient to confirm that he had a transcatheter aortic valve replacement. Pt responded \"I had a broken heart valve fixed\". RN informed patient that due to the nature of the pt's procedure, we unfortunately must assess him about every four hours at this point, and that we try to cluster cares as best we can on the overnight shift, but sometimes it cannot be helped. RN informed patient of the plan for the rest of the night. Pt was agreeable.       "

## 2021-10-31 NOTE — PLAN OF CARE
A&O. VSS on 2L NC. Denies pain, SOB. CMS intact. Puncture sites CDI. LS dim. Tele AF CVR+BBB. UOP increasing. Rechecking kidney function today. Possible dialysis. Discharge pending progress.

## 2021-11-01 NOTE — PROGRESS NOTES
"[facility]    Hospitalist Progress Note    Interval History   - No dialysis yesterday. He diuresed 1.2L yesterday, Creatinine went from 2.44 to 2.33. No dialysis again today. While he does not \"need\" dialysis he remains fairly volume overloaded and given his borderline urine output and ongoing oxygen needs, I think that dialysis will have ongoing benefits while he a dialysis line in. Defer to Nephrology    Assessment & Plan   Summary: Burton Elizabeth is a 76 year old male with PMH CRISTHIAN, UGI bleed, CKD, DM2, paroxysmal atrial flutter, CAD, chronic HFpEF, and severe aortic stenosis who was admitted 10/26/2021 for post op monitoring following elective TAVR. Post procedure complicated acute oliguric renal failure.    Acute oliguric renal failure on CKD3, due to contrast nephropathy  S/p initiation of HD on 10/29  Patient developed rapidly rising creatinine on 10/28, approx two days after TAVR, contrast. Noted to be more volume overloaded on 10/28 and not responding to IV diuresis. Received Diuril and Bumex on 10/29 without significant response.  Creatinine 1.6-->2.9-->4.4. Urine studies and clinical picture consistent with contrast nephropathy.  - Appreciate Nephrology consult   - HD   - Further diuresis per their management  - Continue giron for post-op urinary retention, and now for close I&O    Acute hypoxic and hypercarbic respiratory failure  Pulmonary edema, volume overloaded  Patient has a history of HFpEf. Echo following TAVR with good LV, RV function. He was noticeably more volume overloaded following TAVR, and had respiratory distress overnight on 10/27 and 10/28 requiring diuresis. Etiology is primarily due to acute renal failure. VBG on 10/29 shows PCO2 55 and pH 7.29 indicating acute hypercapnea, likely due to pulmonary edema/respiratory faliure. PCO2 remains elevated at 60 on 10/31, however clinically improved.  - BipAP at bedtime, off IMC  - Monitor bicarb with renal panel  - " Needs likely outpatient sleep study, there is concern for underlying sleep apnea    Acute metabolic encephalopathy, improved  Due to hypercapnea, critical illness.  - Dialysis as above  - Seroquel PRN    Severe aortic stenosis s/p TAVR 34mm [employer] valve (10/26/2021)  Post op mngt per cardiology, now signed off. Eliquis restarted POD#0. No anti-plt. TTE 10/26 showing prosthetic aortic valve mean gradient 9mmHg and trivial paravalvular AI.  - Continue Eliquis    Acute on chronic HFpEF    CAD with NSTEMI (July 2021)  HTN / HLD  Diagnostic coronary angiogram on 10/11/2021 similar to prior diagnostic coronary angiogram in 2019 with two-vessel coronary artery disease including chronically totally occluded circumflex and small vessel disease of the PDA branch.  - PTA atorvastatin, Coreg  - ASA discontinued  - See above for volume management    Paroxysmal atrial flutter:   - Eliquis as above  - Continue PTA atorvastatin, Coreg     ID-DM2. A1c 7.6% 10/27/2021.  PTA regimen of Lantus 18u at bedtime, glimepiride, semaglutide. Hyperglycemic on 11/1.  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 with acute hypoxic, hypercarbic respiratory failure requiring BiPAP suspected to be related to CHF with severe aortic stenosis. Notes previously mention suspected CRISTHIAN however pt had not completed sleep study as outpatient.  - See above for management    DVT Prophylaxis: DOAC  Code Status: Full Code  PT/OT: ordered  Diet: Fluid restriction 2000 ML FLUID  Combination Diet Regular Diet Adult; 2 gm NA Diet; Low Saturated Fat Diet      Disposition: Expected discharge this week pending further diuresis    Ehsan Lebron MD  Text Page  (7am to 6pm)  -Data reviewed today: I reviewed all new labs and imaging results over the last 24  hours.    Physical Exam   Temp: 99.2  F (37.3  C) Temp src: Oral BP: (!) 151/73 Pulse: 70   Resp: 20 SpO2: 94 % O2 Device: Nasal cannula Oxygen Delivery: 5 LPM  Vitals:    10/29/21 0500 10/31/21 0500 11/01/21 0400   Weight: 115.2 kg (253 lb 14.4 oz) 110.6 kg (243 lb 14.4 oz) 110.2 kg (243 lb)     Vital Signs with Ranges  Temp:  [99  F (37.2  C)-101.6  F (38.7  C)] 99.2  F (37.3  C)  Pulse:  [64-92] 70  Resp:  [18-22] 20  BP: (139-166)/() 151/73  FiO2 (%):  [35 %-50 %] 50 %  SpO2:  [85 %-100 %] 94 %  I/O last 3 completed shifts:  In: 720 [P.O.:720]  Out: 1250 [Urine:1250]  O2 requirements: yes    Constitutional: Male appears somnolent  HEENT: Eyes nonicteric  Cardiovascular: RRR, normal S1/2, no m/r/g  Respiratory: Bibasilar crackles, slightly improved  Vascular: 2+ BLE pitting edema, anasarca with L > R arm swelling  GI: Normoactive bowel sounds, nontender  Skin/Integumen: No rashes  Neuro/Psych: Appropriate affect and mood, but more somnolent today. A&Ox3, moves all extremities    Medications     - MEDICATION INSTRUCTIONS -       - MEDICATION INSTRUCTIONS -       - MEDICATION INSTRUCTIONS -       sodium chloride 0.9%         - MEDICATION INSTRUCTIONS for Dialysis Patients -   Does not apply See Admin Instructions     amLODIPine  5 mg Oral Daily     apixaban ANTICOAGULANT  5 mg Oral BID     atorvastatin  80 mg Oral Daily     bumetanide  2 mg Intravenous Q8H     carvedilol  3.125 mg Oral BID     [Held by provider] furosemide  40 mg Intravenous Daily     insulin aspart  1-7 Units Subcutaneous TID AC     insulin aspart  1-5 Units Subcutaneous At Bedtime     insulin glargine  15 Units Subcutaneous Q24H     omeprazole  20 mg Oral BID     sodium chloride (PF)  3 mL Intracatheter Q8H     tamsulosin  0.4 mg Oral Daily       Data   Recent Labs   Lab 11/01/21  1308 11/01/21  0833 11/01/21  0534 10/31/21  0753 10/31/21  0540 10/30/21  0736 10/30/21  0623   WBC  --   --  5.5  --  5.2  --  5.7   HGB  --   --  10.5*  --   10.5*  --  10.0*   MCV  --   --  87  --  88  --  88   PLT  --   --  91*  --  100*  --  123*   NA  --   --  139  --  137  --  137   POTASSIUM  --   --  4.3  --  4.0  --  4.6   CHLORIDE  --   --  105  --  104  --  106   CO2  --   --  26  --  31  --  25   BUN  --   --  34*  --  27  --  41*   CR  --   --  2.33*  --  2.44*  --  3.36*   ANIONGAP  --   --  8  --  2*  --  6   GABE  --   --  8.4*  --  8.3*  --  8.3*   * 125* 135*   < > 132*   < > 98   ALBUMIN  --   --  2.3*  --  2.4*   < > 2.4*    < > = values in this interval not displayed.       Imaging:   No results found for this or any previous visit (from the past 24 hour(s)).

## 2021-11-01 NOTE — PLAN OF CARE
A&Ox2, disoriented to time and situation. Mentation fluctuates, trouble finding words. Tele- Afib CVR with BBB. Used BIPAP for ~2 hours. Temp max 101.6 tylenol given. +2 BLE edema,  LS diminished,  and 119.  Regular diet, 2000 ml fluid restrictions. Up with assist of 1 with walker and belt. Zuniga patent. Hypertensive overnight

## 2021-11-01 NOTE — PLAN OF CARE
Up in chair. Taking o2  off-  sats drop to 82%. Stubborn about keeping o2 on.  Ebony diet. Incont. Of large amt loose stool. Brother here to visit

## 2021-11-01 NOTE — PROGRESS NOTES
" Renal Medicine Progress Note            Assessment/Plan:     # CKD 3B- Baseline creatinine appears to fluctuate around 1.7-2. Presumed due to DM/HTN    # Post TAVR OPAL: Scr is stable. Excellent urine output.     # Status post TAVR on 10/26: Normal LV/RV function.     # HFpEF: Hypervolemia: He has at least 2+ pitting edema in the legs. Net neg 5 liters and wet down 5 kilos.     # Hypertension: Low dose Coreg. Norvasc can contribute to fluid edema.     # IDDM and obesity     Plan:  # Start Bumex 2 mg q8rhs.   # Fluid restriction to 1200 -1500 ml per day  # 2 grams Na restricted diet  # No HD today. Will leave CVC in while being diures  # Consider changing Norvasc to hydralazine. Can also increase Coreg.         Interval History:       Pt is sleeping in the chair. He does not like the CPAP mask. He has excellent urine output. Scr is stable. Brother is at the bedside side.          Medications and Allergies:       - MEDICATION INSTRUCTIONS for Dialysis Patients -   Does not apply See Admin Instructions     amLODIPine  5 mg Oral Daily     apixaban ANTICOAGULANT  5 mg Oral BID     atorvastatin  80 mg Oral Daily     bumetanide  2 mg Intravenous Q8H     carvedilol  3.125 mg Oral BID     [Held by provider] furosemide  40 mg Intravenous Daily     insulin aspart  1-7 Units Subcutaneous TID AC     insulin aspart  1-5 Units Subcutaneous At Bedtime     insulin glargine  10 Units Subcutaneous Q24H     omeprazole  20 mg Oral BID     sodium chloride (PF)  3 mL Intracatheter Q8H     tamsulosin  0.4 mg Oral Daily        Allergies   Allergen Reactions     Penicillins Anaphylaxis            Physical Exam:   Vitals were reviewed   , Blood pressure (!) 151/73, pulse 70, temperature 99.2  F (37.3  C), temperature source Oral, resp. rate 20, height 1.753 m (5' 9\"), weight 110.2 kg (243 lb), SpO2 94 %.    Wt Readings from Last 3 Encounters:   11/01/21 110.2 kg (243 lb)   10/21/21 112 kg (247 lb)   10/11/21 109.5 kg (241 lb 4.8 oz) "       Intake/Output Summary (Last 24 hours) at 11/1/2021 1258  Last data filed at 11/1/2021 0835  Gross per 24 hour   Intake 780 ml   Output 1100 ml   Net -320 ml       GENERAL APPEARANCE: NAD. Comfortable.  HEENT:  Eyes/ears/nose/neck grossly normal  RESP: Bibasilar crackles. Good airflow in the upper lobes  CV: RRR, nl S1/S2, + murmur  ABDOMEN: obese, soft, NT  EXTREMITIES/SKIN: no rashes/lesions on observed skin; 2+edema  NEURO: Answering questions. Grossly intact.  RIJ Tunneled CVC CDI         Data:     CBC RESULTS:     Recent Labs   Lab 11/01/21  0534 10/31/21  0540 10/30/21  0623 10/29/21  0834 10/28/21  0555 10/27/21  0551   WBC 5.5 5.2 5.7 5.3 5.9 9.6   RBC 4.14* 4.12* 3.97* 4.18* 4.04* 4.54   HGB 10.5* 10.5* 10.0* 10.8* 10.1* 11.5*   HCT 36.0* 36.3* 34.8* 36.4* 35.7* 39.6*   PLT 91* 100* 123* 110* 121* 160       Basic Metabolic Panel:  Recent Labs   Lab 11/01/21  0833 11/01/21  0534 11/01/21  0144 10/31/21  2058 10/31/21  1701 10/31/21  1148 10/31/21  0753 10/31/21  0540 10/30/21  0736 10/30/21  0623 10/29/21  0848 10/29/21  0834 10/28/21  0818 10/28/21  0555 10/27/21  1156 10/27/21  0551   NA  --  139  --   --   --   --   --  137  --  137  --  136  --  140  --  138   POTASSIUM  --  4.3  --   --   --   --   --  4.0  --  4.6  --  4.8  --  4.8  --  4.8   CHLORIDE  --  105  --   --   --   --   --  104  --  106  --  105  --  108  --  108   CO2  --  26  --   --   --   --   --  31  --  25  --  24  --  25  --  25   BUN  --  34*  --   --   --   --   --  27  --  41*  --  52*  --  48*  --  39*   CR  --  2.33*  --   --   --   --   --  2.44*  --  3.36*  --  4.38*  --  2.94*  --  1.61*   * 135* 119* 132* 204* 232*   < > 132*   < > 98   < > 117*   < > 201*   < > 146*   GABE  --  8.4*  --   --   --   --   --  8.3*  --  8.3*  --  8.5  --  8.5  --  8.4*    < > = values in this interval not displayed.       INRNo lab results found in last 7 days.   Attestation:   I have reviewed today's relevant vital signs, notes,  medications, labs and imaging.    Driss Hogan MD  [facility] - Nephrology  Office phone :[phone]  Pager: [phone]

## 2021-11-01 NOTE — PROGRESS NOTES
Pt A&O x2. BP hypertensive, prn given w/o significant change. Pt refused long acting insulin coverage. Febrile, refused tylenol. Very edematous. No dialysis today. Zuniga 400 ml out for 4 hrs this evening. Will cont to closely monitor.

## 2021-11-01 NOTE — PROGRESS NOTES
[facility]  Cardiology Progress Note    Date of Service (when I saw the patient): 11/01/2021  Summary: Burton Elizabeth is a 76 year old male with history of severe AS who was admitted on 10/26/2021 following his elective TAVR.  Interval History   Sleeping this afternoon. I did not awaken. Brother at bedside. Good UOP yesterday. Remains swollen and hypoxic if off O2. He has had some word finding difficulties on and off.   Assessment & Plan   1.  Severe aortic stenosis s/p TAVR 34mm Medtronic valve on 10/26/2021. Echocardiogram 10/27/21 showed mean gradient 9 mmHg. Trivial paravalvular AI. Biventricular function is normal.   -  TAVR team recommended no aspirin or Plavix. Has hx of gastric ulcers/Cabrera's esophagus. On Eliquis.  -  Will need TAVR and CORE follow up post discharge.  2. Acute on CKD stage III. With creatinine up from 1.6 to 4.4 post TAVR. Though to be secondary to contrast nephropathy.   -  Appreciate nephrology assistance.   3. Volume overload. In the setting of acute renal failure. PTA on Torsemide 20 mg in the morning and 10 mg in the afternoon. No response to IV diuretics initially  -  As above, dialysis initiated by nephrology - treatment on 10/29 and 10/30  -  Now on IV Bumex per nephrology.  4. Acute hypoxic and hypercarbic respiratory failure secondary to volume overload.   -  Diuresis as above.  5. Hypertension. On carvedilol 3.125 mg twice daily and amlodipine 5 mg daily.  -  If BPs remain elevated, consider increasing carvedilol.   6. Coronary artery disease. Stable.  - Cardiac catheterization 10/11/54848 showed chronically totally occluded circumflex and small vessel disease of the PDA branch.  -   On statin, beta blocker, not on aspirin as is on anticoagulation.   7. Paroxsymal atrial flutter/fibrillation. Rate controlled on carvedilol 3.125 mg twice daily.   -  On Eliquis for thromboembolic prevention.   8. Chronic LBBB  9. Morbid obesity   10. History of CVA    11. Type 2 DM. Insulin dependent  12. High probability for sleep apnea.     Desi Card PA-C    Physical Exam   Temp: 99.2  F (37.3  C) Temp src: Oral BP: (!) 151/73 Pulse: 70   Resp: 20 SpO2: 94 % O2 Device: Nasal cannula Oxygen Delivery: 5 LPM  Vitals:    10/26/21 0838 10/27/21 0732 10/29/21 0500 10/31/21 0500   Weight: 112.5 kg (248 lb) 113.8 kg (250 lb 12.8 oz) 115.2 kg (253 lb 14.4 oz) 110.6 kg (243 lb 14.4 oz)    11/01/21 0400   Weight: 110.2 kg (243 lb)     Vital Signs with Ranges  Temp:  [99  F (37.2  C)-101.6  F (38.7  C)] 99.2  F (37.3  C)  Pulse:  [64-92] 70  Resp:  [18-22] 20  BP: (139-166)/() 151/73  FiO2 (%):  [35 %-50 %] 50 %  SpO2:  [85 %-100 %] 94 %  10/27 0700 - 11/01 0659  In: 2280 [P.O.:2280]  Out: 8540 [Urine:3040]  Net: -6260  Constitutional: sleeping.  Respiratory: anterior breath sounds are clear.  Cardiovascular: RRR, s1s2, no murmur.  GI: soft, BS+  Skin: warm, no rashes  Musculoskeletal: Moving all extremities. ++ pitting edema bilaterally.  Neurologic: Alert, oriented x 3  Neuropsychiatric: Normal affect   Data   Results for orders placed or performed during the hospital encounter of 10/26/21 (from the past 24 hour(s))   Glucose by meter   Result Value Ref Range    GLUCOSE BY METER POCT 204 (H) 70 - 99 mg/dL   Lactic Acid STAT   Result Value Ref Range    Lactic Acid 1.4 0.7 - 2.0 mmol/L   Glucose by meter   Result Value Ref Range    GLUCOSE BY METER POCT 132 (H) 70 - 99 mg/dL   Glucose by meter   Result Value Ref Range    GLUCOSE BY METER POCT 119 (H) 70 - 99 mg/dL   CBC with platelets differential    Narrative    The following orders were created for panel order CBC with platelets differential.  Procedure                               Abnormality         Status                     ---------                               -----------         ------                     CBC with platelets and d...[[id number]]  Abnormal            Final result                 Please view results  for these tests on the individual orders.   Magnesium   Result Value Ref Range    Magnesium 1.8 1.6 - 2.3 mg/dL   Renal panel   Result Value Ref Range    Sodium 139 133 - 144 mmol/L    Potassium 4.3 3.4 - 5.3 mmol/L    Chloride 105 94 - 109 mmol/L    Carbon Dioxide (CO2) 26 20 - 32 mmol/L    Anion Gap 8 3 - 14 mmol/L    Urea Nitrogen 34 (H) 7 - 30 mg/dL    Creatinine 2.33 (H) 0.66 - 1.25 mg/dL    Calcium 8.4 (L) 8.5 - 10.1 mg/dL    Glucose 135 (H) 70 - 99 mg/dL    Albumin 2.3 (L) 3.4 - 5.0 g/dL    Phosphorus 3.2 2.5 - 4.5 mg/dL    GFR Estimate 26 (L) >60 mL/min/1.73m2   CBC with platelets and differential   Result Value Ref Range    WBC Count 5.5 4.0 - 11.0 10e3/uL    RBC Count 4.14 (L) 4.40 - 5.90 10e6/uL    Hemoglobin 10.5 (L) 13.3 - 17.7 g/dL    Hematocrit 36.0 (L) 40.0 - 53.0 %    MCV 87 78 - 100 fL    MCH 25.4 (L) 26.5 - 33.0 pg    MCHC 29.2 (L) 31.5 - 36.5 g/dL    RDW 16.2 (H) 10.0 - 15.0 %    Platelet Count 91 (L) 150 - 450 10e3/uL    % Neutrophils 70 %    % Lymphocytes 12 %    % Monocytes 17 %    % Eosinophils 1 %    % Basophils 0 %    % Immature Granulocytes 0 %    NRBCs per 100 WBC 0 <1 /100    Absolute Neutrophils 3.8 1.6 - 8.3 10e3/uL    Absolute Lymphocytes 0.7 (L) 0.8 - 5.3 10e3/uL    Absolute Monocytes 1.0 0.0 - 1.3 10e3/uL    Absolute Eosinophils 0.1 0.0 - 0.7 10e3/uL    Absolute Basophils 0.0 0.0 - 0.2 10e3/uL    Absolute Immature Granulocytes 0.0 <=0.0 10e3/uL    Absolute NRBCs 0.0 10e3/uL   EKG 12-lead, tracing only   Result Value Ref Range    Systolic Blood Pressure  mmHg    Diastolic Blood Pressure  mmHg    Ventricular Rate 81 BPM    Atrial Rate 65 BPM    AK Interval  ms    QRS Duration 168 ms     ms    QTc 504 ms    P Axis  degrees    R AXIS -25 degrees    T Axis 105 degrees    Interpretation ECG       Atrial fibrillation with premature ventricular or aberrantly conducted complexes  Left bundle branch block  Abnormal ECG  When compared with ECG of 31-OCT-2021 07:44, (unconfirmed)  QRS  axis Shifted left     Glucose by meter   Result Value Ref Range    GLUCOSE BY METER POCT 125 (H) 70 - 99 mg/dL     Tele AF, CVR    Medications     - MEDICATION INSTRUCTIONS -       - MEDICATION INSTRUCTIONS -       - MEDICATION INSTRUCTIONS -       sodium chloride 0.9%         - MEDICATION INSTRUCTIONS for Dialysis Patients -   Does not apply See Admin Instructions     amLODIPine  5 mg Oral Daily     apixaban ANTICOAGULANT  5 mg Oral BID     atorvastatin  80 mg Oral Daily     carvedilol  3.125 mg Oral BID     [Held by provider] furosemide  40 mg Intravenous Daily     insulin aspart  1-7 Units Subcutaneous TID AC     insulin aspart  1-5 Units Subcutaneous At Bedtime     insulin glargine  10 Units Subcutaneous Q24H     omeprazole  20 mg Oral BID     sodium chloride (PF)  3 mL Intracatheter Q8H     tamsulosin  0.4 mg Oral Daily

## 2021-11-02 NOTE — PROGRESS NOTES
Pt placed on BIPAP 14/7 50% this aftenoon due to increased confusion and lethargy.  Mepilex in place.  Alarm volume limit set at 10.  Will continue to follow  Ector Dutta, RT  11/2/2021

## 2021-11-02 NOTE — PLAN OF CARE
.Neuro- AxO 3 Forgetful/Situation  Tele/Cardiac- Afib CVR with PVC's, Couple runs of V tach under 10 beats MD notified   Resp- BiPAP on for 5hrs, Pt refused switched to Oxy mask 5-6L  Activity- 1AGW  Pain- 8/10 Back pain Tylenol and Ice given See Emar  Drips- none, Bummex I.V   Drains/Tubes- PIV x 1  Skin- Edema dependent/LE   GI/- Internal Zuniga CDI total output this shift 700ml  Aggression Color- Green  COVID status- Neg   Plan- Continue monitor and diuresis.   Misc-  Mg replaced Recheck in AM

## 2021-11-02 NOTE — PROGRESS NOTES
[facility]  Hospitalist Progress Note  Christian Francois MD  11/02/2021    Assessment & Plan    Burton Elizabeth is a 76 year old male with PMH CRISTHIAN, UGI bleed, CKD, DM2, paroxysmal atrial flutter, CAD, chronic HFpEF, and severe aortic stenosis who was admitted 10/26/2021 for post op monitoring following elective TAVR. Post procedure complicated acute oliguric renal failure.     Acute oliguric renal failure on CKD3, due to contrast nephropathy  S/p initiation of HD on 10/29  Patient developed rapidly rising creatinine on 10/28, approx two days after TAVR, contrast. Noted to be more volume overloaded on 10/28 and not responding to IV diuresis. Received Diuril and Bumex on 10/29 without significant response.  Creatinine 1.6-->2.9-->4.4. Urine studies and clinical picture consistent with contrast nephropathy.  - Appreciate Nephrology consult                - no HD today                - Further diuresis with bumex 2mg q8 and iv diuril x1  - Continue giron for post-op urinary retention, and now for close I&O     Acute hypoxic and hypercarbic respiratory failure  Pulmonary edema, volume overloaded  Patient has a history of HFpEf. Echo following TAVR with good LV, RV function. He was noticeably more volume overloaded following TAVR, and had respiratory distress overnight on 10/27 and 10/28 requiring diuresis. Etiology is primarily due to acute renal failure. VBG on 10/29 shows PCO2 55 and pH 7.29 indicating acute hypercapnea, likely due to pulmonary edema/respiratory faliure. PCO2 remains elevated at 60 on 10/31, however clinically improved.  - BiPAP at bedtime, off IMC  - Monitor bicarb with renal panel  - Needs likely outpatient sleep study, there is concern for underlying sleep apnea  - repeat VBG due to AMS     Acute metabolic encephalopathy, improved  Due to hypercapnea, critical illness.  - Dialysis as above  - Seroquel PRN     Severe aortic stenosis s/p TAVR 34mm Medtronic valve  "(10/26/2021)  Post op mngt per cardiology, now signed off. Eliquis restarted POD#0. No anti-plt. TTE 10/26 showing prosthetic aortic valve mean gradient 9mmHg and trivial paravalvular AI.  - Continue Eliquis     Acute on chronic HFpEF    CAD with NSTEMI (July 2021)  HTN / HLD  Diagnostic coronary angiogram on 10/11/2021 similar to prior diagnostic coronary angiogram in 2019 with two-vessel coronary artery disease including chronically totally occluded circumflex and small vessel disease of the PDA branch.  - PTA atorvastatin, Coreg  - ASA discontinued  - See above for volume management     Paroxysmal atrial flutter:   - Eliquis as above  - Continue PTA atorvastatin, Coreg     ID-DM2. A1c 7.6% 10/27/2021.  PTA regimen of Lantus 18u at bedtime, glimepiride, semaglutide. Hyperglycemic on 11/1.  - Hold home glimepiride and semaglutide due to renal failure  - Increased lantus 15 units q24h  - BG < 200     Acute GI bleed, secondary to gastritis July 2021  Cabrera's esophagus diagnosed on endoscopy  - continue PTA PPI     Suspected CRISTHIAN  Admission at [facility] with acute hypoxic, hypercarbic respiratory failure requiring BiPAP suspected to be related to CHF with severe aortic stenosis. Notes previously mention suspected CRISTHIAN however pt had not completed sleep study as outpatient.  - See above for management     DVT Prophylaxis: DOAC  Code Status: Full Code  PT/OT: ordered  Diet: Fluid restriction 2000 ML FLUID  Combination Diet Regular Diet Adult; 2 gm NA Diet; Low Saturated Fat Diet       Disposition:   > 2 days    Interval History   -- lethargic most of the morning per RN and placed on bipap  -- cardiology and nephrology notes reviewed    -Data reviewed today: I reviewed all new labs and imaging over the last 24 hours. I personally reviewed no images or EKG's today.    Physical Exam    , Blood pressure (!) 148/66, pulse 85, temperature 97.8  F (36.6  C), temperature source Oral, resp. rate 20, height 1.753 m (5' 9\"), " weight 107 kg (236 lb), SpO2 93 %.  Vitals:    10/31/21 0500 11/01/21 0400 11/02/21 0600   Weight: 110.6 kg (243 lb 14.4 oz) 110.2 kg (243 lb) 107 kg (236 lb)     Vital Signs with Ranges  Temp:  [97  F (36.1  C)-100.6  F (38.1  C)] 97.8  F (36.6  C)  Pulse:  [74-92] 85  Resp:  [20] 20  BP: (109-206)/() 148/66  FiO2 (%):  [50 %] 50 %  SpO2:  [85 %-99 %] 93 %  I/O's Last 24 hours  I/O last 3 completed shifts:  In: 300 [P.O.:300]  Out: 1250 [Urine:1250]    Constitutional: On bipap,   Respiratory: Diminished bilaterally  Cardiovascular: irregular  GI: Normal bowel sounds, soft, non-distended, non-tender  Skin/Integumen: No rashes, no cyanosis, ++ edema  Other:      Medications   All medications were reviewed.    - MEDICATION INSTRUCTIONS -       - MEDICATION INSTRUCTIONS -       - MEDICATION INSTRUCTIONS -       sodium chloride 0.9%         - MEDICATION INSTRUCTIONS for Dialysis Patients -   Does not apply See Admin Instructions     amLODIPine  5 mg Oral Daily     apixaban ANTICOAGULANT  5 mg Oral BID     atorvastatin  80 mg Oral Daily     bumetanide  2 mg Intravenous Q8H     carvedilol  6.25 mg Oral BID     chlorothiazide  250 mg Intravenous BID     [Held by provider] furosemide  40 mg Intravenous Daily     insulin aspart  1-7 Units Subcutaneous TID AC     insulin aspart  1-5 Units Subcutaneous At Bedtime     insulin glargine  15 Units Subcutaneous Q24H     omeprazole  20 mg Oral BID     sodium chloride (PF)  3 mL Intracatheter Q8H     tamsulosin  0.4 mg Oral Daily        Data   Recent Labs   Lab 11/02/21  1217 11/02/21  0851 11/02/21  0608 11/01/21  2114 11/01/21  0833 11/01/21  0534 11/01/21  0534 10/31/21  0753 10/31/21  0540   WBC  --   --  5.3  --   --   --  5.5  --  5.2   HGB  --   --  10.6*  --   --   --  10.5*  --  10.5*   MCV  --   --  87  --   --   --  87  --  88   PLT  --   --  101*  --   --   --  91*  --  100*   NA  --   --  136  --   --   --  139  --  137   POTASSIUM  --   --  4.1 4.0  --   --  4.3    < > 4.0   CHLORIDE  --   --  104  --   --   --  105  --  104   CO2  --   --  31  --   --   --  26  --  31   BUN  --   --  41*  --   --   --  34*  --  27   CR  --   --  2.20*  --   --   --  2.33*  --  2.44*   ANIONGAP  --   --  1*  --   --   --  8  --  2*   GABE  --   --  8.9  --   --   --  8.4*  --  8.3*   * 163* 165*  --    < >   < > 135*   < > 132*   ALBUMIN  --   --  2.1*  --   --   --  2.3*   < > 2.4*    < > = values in this interval not displayed.       No results found for this or any previous visit (from the past 24 hour(s)).    Christian Francois MD  Text Page  (7am to 6pm)

## 2021-11-02 NOTE — PROVIDER NOTIFICATION
MD Notification        Notified Person Name:  Dr. Francois  Notification Date/Time: 11/01/21 8:53 PM  Notification Interaction: AmCom     Purpose of Notification: MD Notification Patient having short runs VT (under 10 beats) every few mins. Requesting orders to check potassium and magnesium.      Orders Placed TBD

## 2021-11-02 NOTE — PLAN OF CARE
More alert this evening. Back on NC and up in the chair for dinner. Plans to go back on BIPAP for HS.

## 2021-11-02 NOTE — PLAN OF CARE
Shift Summary    Neuro: Dis to situation. Forgetful  Cardiac: Afib RVR. BP elevated. PRN hydralazine given. Tele Afib CVR.  Pulmonary: 5L NC  GI/: Zuniga for retention/Strict I&O  Skin: Bruising in groin sites. Scattered scabs  Activity: Up with 1  Pain: Denies       Addendum: Increased confusion this afternoon. BIPAP applied.

## 2021-11-02 NOTE — PROGRESS NOTES
" Renal Medicine Progress Note            Assessment/Plan:       # CKD 3B- Baseline creatinine appears to fluctuate around 1.7-2. Presumed due to DM/HTN     # Post TAVR OPAL-severe: Improved.      # Status post TAVR on 10/26: Normal LV/RV function.      # HFpEF: Hypervolemia: He has at least 2+ pitting edema in the legs. Diuresing well. Wt is down.      # Hypertension: Low dose Coreg. Norvasc can contribute to edema.      # IDDM and obesity     Plan:  # Start Bumex 2 mg q8rhs.   # Diuril 250 mg IV bid added this morning.  # Fluid restriction to 1200 -1500 ml per day  # 2 grams Na restricted diet  # No HD today. Will leave CVC in while being diures        Interval History:     Afebrile. VSS. \"I feel better. We did well last night,\" he says. He says his breathing is better. No chest pain, N/V. Diuresing well. Weight is down. Kidney function improved and stable.           Medications and Allergies:       - MEDICATION INSTRUCTIONS for Dialysis Patients -   Does not apply See Admin Instructions     amLODIPine  5 mg Oral Daily     apixaban ANTICOAGULANT  5 mg Oral BID     atorvastatin  80 mg Oral Daily     bumetanide  2 mg Intravenous Q8H     carvedilol  6.25 mg Oral BID     chlorothiazide  250 mg Intravenous BID     [Held by provider] furosemide  40 mg Intravenous Daily     insulin aspart  1-7 Units Subcutaneous TID AC     insulin aspart  1-5 Units Subcutaneous At Bedtime     insulin glargine  15 Units Subcutaneous Q24H     omeprazole  20 mg Oral BID     sodium chloride (PF)  3 mL Intracatheter Q8H     tamsulosin  0.4 mg Oral Daily        Allergies   Allergen Reactions     Penicillins Anaphylaxis            Physical Exam:   Vitals were reviewed   , Blood pressure (!) 159/80, pulse 87, temperature 97.2  F (36.2  C), temperature source Oral, resp. rate 20, height 1.753 m (5' 9\"), weight 107 kg (236 lb), SpO2 94 %.    Wt Readings from Last 3 Encounters:   11/02/21 107 kg (236 lb)   10/21/21 112 kg (247 lb)   10/11/21 109.5 kg " (241 lb 4.8 oz)       Intake/Output Summary (Last 24 hours) at 11/2/2021 1052  Last data filed at 11/2/2021 0800  Gross per 24 hour   Intake --   Output 1425 ml   Net -1425 ml     GENERAL APPEARANCE: NAD. Comfortable.  HEENT:  Eyes/ears/nose/neck grossly normal  RESP: Bibasilar crackles. Good airflow in the upper lobes  CV: RRR, nl S1/S2, + murmur  ABDOMEN: obese, soft, NT  EXTREMITIES/SKIN: no rashes/lesions on observed skin; 2+edema-improving.  NEURO: Answering questions. Grossly intact.  RIJ Tunneled CVC CDI            Data:     CBC RESULTS:     Recent Labs   Lab 11/02/21  0608 11/01/21  0534 10/31/21  0540 10/30/21  0623 10/29/21  0834 10/28/21  0555   WBC 5.3 5.5 5.2 5.7 5.3 5.9   RBC 4.18* 4.14* 4.12* 3.97* 4.18* 4.04*   HGB 10.6* 10.5* 10.5* 10.0* 10.8* 10.1*   HCT 36.2* 36.0* 36.3* 34.8* 36.4* 35.7*   * 91* 100* 123* 110* 121*       Basic Metabolic Panel:  Recent Labs   Lab 11/02/21  0851 11/02/21  0608 11/01/21  2114 11/01/21  2109 11/01/21  1702 11/01/21  1308 11/01/21  0833 11/01/21  0534 11/01/21  0144 10/31/21  0753 10/31/21  0540 10/30/21  0736 10/30/21  0623 10/29/21  0848 10/29/21  0834 10/28/21  0818 10/28/21  0555   NA  --  136  --   --   --   --   --  139  --   --  137  --  137  --  136  --  140   POTASSIUM  --  4.1 4.0  --   --   --   --  4.3  --   --  4.0  --  4.6  --  4.8   < > 4.8   CHLORIDE  --  104  --   --   --   --   --  105  --   --  104  --  106  --  105  --  108   CO2  --  31  --   --   --   --   --  26  --   --  31  --  25  --  24  --  25   BUN  --  41*  --   --   --   --   --  34*  --   --  27  --  41*  --  52*  --  48*   CR  --  2.20*  --   --   --   --   --  2.33*  --   --  2.44*  --  3.36*  --  4.38*  --  2.94*   * 165*  --  157* 158* 257* 125* 135*   < >   < > 132*   < > 98   < > 117*   < > 201*   GABE  --  8.9  --   --   --   --   --  8.4*  --   --  8.3*  --  8.3*  --  8.5  --  8.5    < > = values in this interval not displayed.       INRNo lab results found in last  7 days.   Attestation:   I have reviewed today's relevant vital signs, notes, medications, labs and imaging.    Driss Hogan MD  [facility] - Nephrology  Office phone :[phone]  Pager: [phone]

## 2021-11-02 NOTE — PROGRESS NOTES
[facility]  Cardiology Progress Note    Date of Service (when I saw the patient): 11/02/2021  Summary: Burton Elizabeth is a 76 year old male with history of severe AS who was admitted on 10/26/2021 following his elective TAVR.  Interval History   Renal function slightly better today. 1200 ml of UOP yesterday. He had some short runs of NSVT overnight. Mag was low and was replaced. He is sleepy this morning but wakes and answers questions.  Assessment & Plan   1.  Severe aortic stenosis s/p TAVR 34mm Medtronic valve on 10/26/2021. Echocardiogram 10/27/21 showed mean gradient 9 mmHg. Trivial paravalvular AI. Biventricular function is normal.   -  TAVR team recommended no aspirin or Plavix. Has hx of gastric ulcers/Cabrera's esophagus. On Eliquis.  -  Will need TAVR and CORE follow up post discharge.  2. Acute on CKD stage III. With creatinine up from 1.6 to 4.4 post TAVR. Though to be secondary to contrast nephropathy.   -  Appreciate nephrology assistance.   3. Volume overload. In the setting of acute renal failure. PTA on Torsemide 20 mg in the morning and 10 mg in the afternoon. No response to IV diuretics initially  -  As above, dialysis initiated by nephrology - treatment on 10/29 and 10/30  -  Now on IV Bumex per nephrology started on 11/2. IV diuril added 11/2.  4. Acute hypoxic and hypercarbic respiratory failure secondary to volume overload.   -  Diuresis as above per nephrology.  5. Hypertension. On carvedilol 3.125 mg twice daily and amlodipine 5 mg daily.  -  Carvedilol increased to 6.25 mg twice daily on 11/1.  6. Coronary artery disease. Stable.  - Cardiac catheterization 10/11/08556 showed chronically totally occluded circumflex and small vessel disease of the PDA branch.  -   On statin, beta blocker, not on aspirin as is on anticoagulation.   7. Paroxsymal atrial flutter/fibrillation. Rate controlled on carvedilol.  -  On Eliquis for thromboembolic prevention.   8. Chronic  LBBB  9. Morbid obesity   10. History of CVA   11. Type 2 DM. Insulin dependent  12. High probability for sleep apnea.     Desi Card PA-C    Physical Exam   Temp: 97.2  F (36.2  C) Temp src: Oral BP: (!) 159/80 Pulse: 87   Resp: 20 SpO2: 94 % O2 Device: Nasal cannula Oxygen Delivery: 5 LPM  Vitals:    10/26/21 0838 10/27/21 0732 10/29/21 0500 10/31/21 0500   Weight: 112.5 kg (248 lb) 113.8 kg (250 lb 12.8 oz) 115.2 kg (253 lb 14.4 oz) 110.6 kg (243 lb 14.4 oz)    11/01/21 0400 11/02/21 0600   Weight: 110.2 kg (243 lb) 107 kg (236 lb)     Vital Signs with Ranges  Temp:  [97  F (36.1  C)-100.6  F (38.1  C)] 97.2  F (36.2  C)  Pulse:  [70-92] 87  Resp:  [20] 20  BP: (109-206)/() 159/80  SpO2:  [85 %-99 %] 94 %  10/28 0700 - 11/02 0659  In: 2480 [P.O.:2480]  Out: 9220 [Urine:3720]  Net: -6740  Constitutional: sleepy but awakes to voice.  Respiratory: anterior breath sounds are clear.  Cardiovascular: IRR, s1s2, no murmur.  GI: soft, BS+  Skin: warm, no rashes  Musculoskeletal: Moving all extremities. ++ pitting edema bilaterally.  Neurologic: Alert, oriented x 3  Neuropsychiatric: Normal affect   Data   Results for orders placed or performed during the hospital encounter of 10/26/21 (from the past 24 hour(s))   Glucose by meter   Result Value Ref Range    GLUCOSE BY METER POCT 257 (H) 70 - 99 mg/dL   Glucose by meter   Result Value Ref Range    GLUCOSE BY METER POCT 158 (H) 70 - 99 mg/dL   Glucose by meter   Result Value Ref Range    GLUCOSE BY METER POCT 157 (H) 70 - 99 mg/dL   Potassium   Result Value Ref Range    Potassium 4.0 3.4 - 5.3 mmol/L   Magnesium   Result Value Ref Range    Magnesium 1.5 (L) 1.6 - 2.3 mg/dL   Lactic Acid STAT   Result Value Ref Range    Lactic Acid 1.4 0.7 - 2.0 mmol/L   CBC with platelets differential    Narrative    The following orders were created for panel order CBC with platelets differential.  Procedure                               Abnormality         Status                      ---------                               -----------         ------                     CBC with platelets and d...[[id number]]  Abnormal            Final result                 Please view results for these tests on the individual orders.   Magnesium   Result Value Ref Range    Magnesium 2.1 1.6 - 2.3 mg/dL   Renal panel   Result Value Ref Range    Sodium 136 133 - 144 mmol/L    Potassium 4.1 3.4 - 5.3 mmol/L    Chloride 104 94 - 109 mmol/L    Carbon Dioxide (CO2) 31 20 - 32 mmol/L    Anion Gap 1 (L) 3 - 14 mmol/L    Urea Nitrogen 41 (H) 7 - 30 mg/dL    Creatinine 2.20 (H) 0.66 - 1.25 mg/dL    Calcium 8.9 8.5 - 10.1 mg/dL    Glucose 165 (H) 70 - 99 mg/dL    Albumin 2.1 (L) 3.4 - 5.0 g/dL    Phosphorus 3.7 2.5 - 4.5 mg/dL    GFR Estimate 28 (L) >60 mL/min/1.73m2   CBC with platelets and differential   Result Value Ref Range    WBC Count 5.3 4.0 - 11.0 10e3/uL    RBC Count 4.18 (L) 4.40 - 5.90 10e6/uL    Hemoglobin 10.6 (L) 13.3 - 17.7 g/dL    Hematocrit 36.2 (L) 40.0 - 53.0 %    MCV 87 78 - 100 fL    MCH 25.4 (L) 26.5 - 33.0 pg    MCHC 29.3 (L) 31.5 - 36.5 g/dL    RDW 16.0 (H) 10.0 - 15.0 %    Platelet Count 101 (L) 150 - 450 10e3/uL    % Neutrophils 74 %    % Lymphocytes 12 %    % Monocytes 13 %    % Eosinophils 1 %    % Basophils 0 %    % Immature Granulocytes 0 %    NRBCs per 100 WBC 0 <1 /100    Absolute Neutrophils 4.0 1.6 - 8.3 10e3/uL    Absolute Lymphocytes 0.6 (L) 0.8 - 5.3 10e3/uL    Absolute Monocytes 0.7 0.0 - 1.3 10e3/uL    Absolute Eosinophils 0.0 0.0 - 0.7 10e3/uL    Absolute Basophils 0.0 0.0 - 0.2 10e3/uL    Absolute Immature Granulocytes 0.0 <=0.0 10e3/uL    Absolute NRBCs 0.0 10e3/uL   EKG 12-lead, tracing only   Result Value Ref Range    Systolic Blood Pressure  mmHg    Diastolic Blood Pressure  mmHg    Ventricular Rate 85 BPM    Atrial Rate 82 BPM    AK Interval  ms    QRS Duration 166 ms     ms    QTc 514 ms    P Axis  degrees    R AXIS 137 degrees    T Axis 0 degrees     Interpretation ECG       Atrial fibrillation  Right axis deviation  Left bundle branch block  Abnormal ECG  When compared with ECG of 01-NOV-2021 07:24, (unconfirmed)  QRS axis Shifted right  T wave inversion now evident in Inferior leads     Glucose by meter   Result Value Ref Range    GLUCOSE BY METER POCT 163 (H) 70 - 99 mg/dL     Tele AF, CVR    Medications     - MEDICATION INSTRUCTIONS -       - MEDICATION INSTRUCTIONS -       - MEDICATION INSTRUCTIONS -       sodium chloride 0.9%         - MEDICATION INSTRUCTIONS for Dialysis Patients -   Does not apply See Admin Instructions     amLODIPine  5 mg Oral Daily     apixaban ANTICOAGULANT  5 mg Oral BID     atorvastatin  80 mg Oral Daily     bumetanide  2 mg Intravenous Q8H     carvedilol  6.25 mg Oral BID     chlorothiazide  250 mg Intravenous BID     [Held by provider] furosemide  40 mg Intravenous Daily     insulin aspart  1-7 Units Subcutaneous TID AC     insulin aspart  1-5 Units Subcutaneous At Bedtime     insulin glargine  15 Units Subcutaneous Q24H     omeprazole  20 mg Oral BID     sodium chloride (PF)  3 mL Intracatheter Q8H     tamsulosin  0.4 mg Oral Daily

## 2021-11-02 NOTE — PROVIDER NOTIFICATION
Dr Francois notified pt increased confusion and lethargy throughout the morning.       XR and ABG ordered.

## 2021-11-02 NOTE — PROGRESS NOTES
CLINICAL NUTRITION SERVICES  -  ASSESSMENT NOTE    RECOMMENDATIONS FOR MD/PROVIDER TO ORDER:   - Encourage oral intake.      Recommendations Ordered by Registered Dietitian (RD):   - Pt declined supplements and room service assistance.   - Encouraged pt to order more protein foods, reviewed sources.     **Pt needs increased protein intake in the setting of ordering mostly CHO-dense foods since admission, new dialysis, observed muscle losses, and to balance meals with diabetes.    Malnutrition: (11/2)   % Weight Loss:  Up to 5% in 1 month (moderate malnutrition)  % Intake:  </= 50% for >/= 5 days (severe malnutrition)  Subcutaneous Fat Loss:  Upper arm region mild depletion and Thoracic region mild depletion  Muscle Loss:  Clavicle bone region mild depletion, Acromion bone region mild depletion and Scapular bone region mild depletion  Fluid Retention:  Moderate 2-3+    Malnutrition Diagnosis: Moderate malnutrition  In Context of:  Acute illness or injury     REASON FOR ASSESSMENT  Burton Elizabeth is a 76 year old male seen by Registered Dietitian for LOS    NUTRITION HISTORY  - Information obtained from chart - pt drowsy during visit. Did not delve in to diet hx.     CURRENT NUTRITION ORDERS  Diet Order:     2000 mg Sodium, Low Saturated Fat    Current Intake/Tolerance:  Patient is a little drowsy during visit, but is alert enough to answer questions. States that he is eating, but meals are small due to his diet restriction. Encouraged him to focus more on protein foods, as he orders mostly CHO-dense meals and very little protein. Pt receptive - though promptly fell asleep. Declined supplements and room service assistance.     - Tolerating 100% of small, CHO-dense/Protein-Poor meals.   - Has not ordered a meal since yesterday morning 11/1    NUTRITION FOCUSED PHYSICAL ASSESSMENT FOR DIAGNOSING MALNUTRITION)  Yes         Observed:    Muscle wasting (refer to documentation in Malnutrition section) and Subcutaneous  "fat loss (refer to documentation in Malnutrition section)    Obtained from Chart/Interdisciplinary Team:  Mild-Medium 2-3+ Edema generalized  Last BM 11/1  Sudarshan - Nutrition 3; Total 18    ANTHROPOMETRICS  Height: 5' 9\"  Weight: 236 lbs 0 oz (107 kg)   Body mass index is 34.85 kg/m .  Weight Status:  Obesity Grade I BMI 30-34.9  IBW: 72.7 kg   % IBW: 147%  Weight History: Pt suspects he's lost up to 8-9#. Thinks this is in addition to any fluid shifts. ?3-4% weight loss.   Wt Readings from Last 10 Encounters:   11/02/21 107 kg (236 lb)   10/21/21 112 kg (247 lb)   10/11/21 109.5 kg (241 lb 4.8 oz)   09/15/21 112.9 kg (249 lb)   08/25/21 110.3 kg (243 lb 1.6 oz)   08/04/21 107 kg (236 lb)   07/11/21 117.2 kg (258 lb 6.1 oz)   06/14/21 108 kg (238 lb)   02/18/21 114.8 kg (253 lb)   01/28/21 114.7 kg (252 lb 14.4 oz)       LABS  Labs reviewed  BUN 41 (H), Cr 2.2 (H) - HD  Recent Labs   Lab 11/02/21  0851 11/02/21  0608 11/01/21  2109 11/01/21  1702 11/01/21  1308 11/01/21  0833   * 165* 157* 158* 257* 125*     Lab Results   Component Value Date    A1C 7.6 10/27/2021    A1C 6.3 07/06/2021    A1C 7.9 01/11/2021    A1C 7.0 04/22/2020    A1C 6.5 09/27/2019    A1C 9.3 03/30/2016       MEDICATIONS  Medications reviewed  Diuresing on Bumex - lasix on hold  Med sliding scale insulin + Lantus 15 units every evening    ASSESSED NUTRITION NEEDS PER APPROVED PRACTICE GUIDELINES:  Dosing Weight 81.3 kg - adjusted   Estimated Energy Needs: 4081-6338 kcals (25-30 Kcal/Kg)  Justification: maintenance and repletion  Estimated Protein Needs:  grams protein (1.2-1.8 g pro/Kg)  Justification: dialysis and preservation of lean body mass  Estimated Fluid Needs: 2000 mL - per MD    MALNUTRITION:  % Weight Loss:  Up to 5% in 1 month (moderate malnutrition)  % Intake:  </= 50% for >/= 5 days (severe malnutrition)  Subcutaneous Fat Loss:  Upper arm region mild depletion and Thoracic region mild depletion  Muscle Loss:  Clavicle " bone region mild depletion, Acromion bone region mild depletion and Scapular bone region mild depletion  Fluid Retention:  Moderate 2-3+    Malnutrition Diagnosis: Moderate malnutrition  In Context of:  Acute illness or injury    NUTRITION DIAGNOSIS:  Inadequate oral intake related to pt cites diet restriction, suspect reduced appetite/lethargy as evidenced by pt has been ordering just 1-2 meals most days x7 day admission, these have been low in protein and higher in CHO-dense foods.       NUTRITION INTERVENTIONS  Recommendations / Nutrition Prescription  2g Na diet (remove sat fat restriction)     - Pt declined supplements and room service assistance.   - Encouraged pt to order more protein foods, reviewed sources.     **Pt needs increased protein intake in the setting of ordering mostly CHO-dense foods since admission, new dialysis, observed muscle losses, and to balance meals with diabetes.      Implementation  Nutrition education: Provided education on (see above)      Nutrition Goals  Intake of 100% of at least meals BID each with a protein source.       MONITORING AND EVALUATION:  Progress towards goals will be monitored and evaluated per protocol and Practice Guidelines    Doreen Mae RD, LD  [facility], [facility], [facility]  Pager: [phone]  Weekend Pager: [phone]

## 2021-11-03 NOTE — PROGRESS NOTES
[facility]  Hospitalist Progress Note  Christian Francois MD  11/03/2021    Assessment & Plan    Burton Elizabeth is a 76 year old male with PMH CRISTHIAN, UGI bleed, CKD, DM2, paroxysmal atrial flutter, CAD, chronic HFpEF, and severe aortic stenosis who was admitted 10/26/2021 for post op monitoring following elective TAVR. Post procedure complicated acute oliguric renal failure.     Acute oliguric renal failure on CKD3, due to contrast nephropathy  S/p initiation of HD on 10/29  Patient developed rapidly rising creatinine on 10/28, approx two days after TAVR, contrast. Noted to be more volume overloaded on 10/28 and not responding to IV diuresis. Received Diuril and Bumex on 10/29 without significant response.  Creatinine 1.6-->2.9-->4.4. Urine studies and clinical picture consistent with contrast nephropathy.  - Appreciate Nephrology consult                - no further HD with CVC dialysis catheter removed                - Further diuresis with bumex per nephrology with lymphedema consult  - Continue giron for post-op urinary retention, and now for close I&O     Acute hypoxic and hypercarbic respiratory failure  Pulmonary edema, volume overloaded  Patient has a history of HFpEf. Echo following TAVR with good LV, RV function. He was noticeably more volume overloaded following TAVR, and had respiratory distress overnight on 10/27 and 10/28 requiring diuresis. Etiology is primarily due to acute renal failure. VBG on 10/29 shows PCO2 55 and pH 7.29 indicating acute hypercapnea, likely due to pulmonary edema/respiratory faliure. PCO2 remains elevated at 60 on 10/31, however clinically improved.  - BiPAP at bedtime, off IMC  - Needs likely outpatient sleep study, there is concern for underlying sleep apnea  - recent cxr shows increase in patchy bilateral pulmonary opacities     Acute metabolic encephalopathy, improved  Due to hypercapnea, critical illness.  - Seroquel PRN     Severe aortic stenosis  "s/p TAVR 34mm Medtronic valve (10/26/2021)  Post op mngt per cardiology, now signed off. Eliquis restarted POD#0. No anti-plt. TTE 10/26 showing prosthetic aortic valve mean gradient 9mmHg and trivial paravalvular AI.  - Continue Eliquis     Acute on chronic HFpEF    CAD with NSTEMI (July 2021)  HTN / HLD  Diagnostic coronary angiogram on 10/11/2021 similar to prior diagnostic coronary angiogram in 2019 with two-vessel coronary artery disease including chronically totally occluded circumflex and small vessel disease of the PDA branch.  - PTA atorvastatin, Coreg  - ASA discontinued  - See above for volume management     Paroxysmal atrial flutter:   - Eliquis as above  - Continue PTA atorvastatin, Coreg     ID-DM2. A1c 7.6% 10/27/2021.  PTA regimen of Lantus 18u at bedtime, glimepiride, semaglutide. Hyperglycemic on 11/1.  - Hold home glimepiride and semaglutide due to renal failure  - Increased lantus 15 units q24h  - mostly BG < 200     Acute GI bleed, secondary to gastritis July 2021  Cabrera's esophagus diagnosed on endoscopy  - continue PTA PPI     Suspected CRISTHIAN  Admission at [facility] with acute hypoxic, hypercarbic respiratory failure requiring BiPAP suspected to be related to CHF with severe aortic stenosis. Notes previously mention suspected CRISTHIAN however pt had not completed sleep study as outpatient.  - See above for management     DVT Prophylaxis: DOAC  Code Status: Full Code  PT/OT: ordered  Diet: 2 gram  Combination Diet Regular Diet Adult; 2 gm NA Diet; Low Saturated Fat Diet       Disposition:   > 2 days    Interval History   -- more alert today  -- cardiology and nephrology notes reviewed    -Data reviewed today: I reviewed all new labs and imaging over the last 24 hours. I personally reviewed no images or EKG's today.    Physical Exam    , Blood pressure 130/65, pulse 76, temperature 99.1  F (37.3  C), temperature source Axillary, resp. rate 20, height 1.753 m (5' 9\"), weight 105.7 kg (233 lb), " SpO2 93 %.  Vitals:    11/01/21 0400 11/02/21 0600 11/03/21 0600   Weight: 110.2 kg (243 lb) 107 kg (236 lb) 105.7 kg (233 lb)     Vital Signs with Ranges  Temp:  [98.6  F (37  C)-100.5  F (38.1  C)] 99.1  F (37.3  C)  Pulse:  [76-90] 76  Resp:  [20] 20  BP: (129-171)/(63-80) 130/65  FiO2 (%):  [50 %] 50 %  SpO2:  [91 %-97 %] 93 %  I/O's Last 24 hours  I/O last 3 completed shifts:  In: 1180 [P.O.:1180]  Out: 1575 [Urine:1575]    Constitutional: On bipap,   Respiratory: Diminished bilaterally  Cardiovascular: irregular  GI: Normal bowel sounds, soft, non-distended, non-tender  Skin/Integumen: No rashes, no cyanosis, ++ edema  Other:      Medications   All medications were reviewed.    - MEDICATION INSTRUCTIONS -       - MEDICATION INSTRUCTIONS -       - MEDICATION INSTRUCTIONS -       sodium chloride 0.9%         - MEDICATION INSTRUCTIONS for Dialysis Patients -   Does not apply See Admin Instructions     [START ON 11/4/2021] amLODIPine  7.5 mg Oral Daily     apixaban ANTICOAGULANT  5 mg Oral BID     atorvastatin  80 mg Oral Daily     bumetanide  2 mg Intravenous Q8H     carvedilol  12.5 mg Oral BID     chlorothiazide  250 mg Intravenous BID     [Held by provider] furosemide  40 mg Intravenous Daily     insulin aspart  1-7 Units Subcutaneous TID AC     insulin aspart  1-5 Units Subcutaneous At Bedtime     insulin glargine  15 Units Subcutaneous Q24H     omeprazole  20 mg Oral BID     sodium chloride (PF)  3 mL Intracatheter Q8H     tamsulosin  0.4 mg Oral Daily        Data   Recent Labs   Lab 11/03/21  1230 11/03/21  0824 11/03/21  0603 11/02/21  0851 11/02/21  0608 11/01/21  2114 11/01/21  0833 11/01/21  0534 11/01/21  0534   WBC  --   --  5.6  --  5.3  --   --   --  5.5   HGB  --   --  10.4*  --  10.6*  --   --   --  10.5*   MCV  --   --  85  --  87  --   --   --  87   PLT  --   --  114*  --  101*  --   --   --  91*   NA  --   --  136  --  136  --   --   --  139   POTASSIUM  --   --  4.3  --  4.1 4.0   < >  --  4.3    CHLORIDE  --   --  103  --  104  --   --   --  105   CO2  --   --  28  --  31  --   --   --  26   BUN  --   --  52*  --  41*  --   --   --  34*   CR  --   --  2.19*  --  2.20*  --   --   --  2.33*   ANIONGAP  --   --  5  --  1*  --   --   --  8   GABE  --   --  8.9  --  8.9  --   --   --  8.4*   * 162* 181*   < > 165*  --    < >   < > 135*   ALBUMIN  --   --  2.1*  --  2.1*  --   --    < > 2.3*    < > = values in this interval not displayed.       Recent Results (from the past 24 hour(s))   IR CVC Tunnel Removal Right    Narrative    EXAM: IR CVC TUNNEL REMOVAL RIGHT  11/3/2021 1:59 PM       HISTORY: No longer needs dialysis. Request for dialysis catheter  removal.      PROCEDURE: After the risks and benefits were explained and informed  consent was obtained, the area was prepped and draped. Using sterile  technique, the tunneled catheter was freed from the local tissue using  gentle tension.  The catheter was then removed without complications.   Pressure was applied to the right internal jugular vein as well as the  exit site for approximately 3-5 minutes.  The site was covered with a  gauze and Tegaderm dressing. The patient tolerated the procedure well.   The procedure was performed at the patient's bedside.  Face to face  time of 15 minutes.    Medications Used: None    FINDINGS: Technically successful removal of a right internal jugular  tunneled dialysis catheter.    EL NEVAREZ PA-C         SYSTEM ID:  [id number]       Christian Francois MD  Text Page  (7am to 6pm)

## 2021-11-03 NOTE — PROGRESS NOTES
[facility]  Cardiology Progress Note    Date of Service (when I saw the patient): 11/03/2021  Summary: Burton Elizabeth is a 76 year old male with history of severe AS who was admitted on 10/26/2021 following his elective TAVR.  Interval History   He has been on and off of BiPAP due to lethargy and confusion. This afternoon he is awake and sitting in the chair. Denies significant shortness of breath. Remains edematous. Required IV hydralazine and labetalol last night for hypertension. He has been having short runs of NSVT on telemetry (4 - 5 beats).  Assessment & Plan   1.  Severe aortic stenosis s/p TAVR 34mm Medtronic valve on 10/26/2021. Echocardiogram 10/27/21 showed mean gradient 9 mmHg. Trivial paravalvular AI. Biventricular function is normal.   -  TAVR team recommended no aspirin or Plavix. Has hx of gastric ulcers/Cabrera's esophagus. On Eliquis.  -  Will need TAVR and CORE follow up post discharge.  2. Acute on CKD stage III. With creatinine up from 1.6 to 4.4 post TAVR. Though to be secondary to contrast nephropathy.   -  Appreciate nephrology assistance.   3. Volume overload. In the setting of acute renal failure. PTA on Torsemide 20 mg in the morning and 10 mg in the afternoon. No response to IV diuretics initially  -  As above, dialysis initiated by nephrology - treatment on 10/29 and 10/30  -  Now on IV Bumex per nephrology started on 11/2. IV diuril added 11/2.  4. Acute hypoxic and hypercarbic respiratory failure secondary to volume overload.   -  Diuresis as above per nephrology.  5. Hypertension. On carvedilol 3.125 mg twice daily and amlodipine 5 mg daily.  -  Carvedilol increased to 6.25 mg twice daily on 11/1.  6. Coronary artery disease. Stable.  - Cardiac catheterization 10/11/75846 showed chronically totally occluded circumflex and small vessel disease of the PDA branch.  -   On statin, beta blocker, not on aspirin as is on anticoagulation.   7. Paroxsymal atrial  flutter/fibrillation. Rate controlled on carvedilol.  -  On Eliquis for thromboembolic prevention.   8. Chronic LBBB  9. Morbid obesity   10. History of CVA   11. Type 2 DM. Insulin dependent  12. High probability for sleep apnea.     Plan:  1.  Continue diuresis.  2.  Increase carvedilol to 12.5 mg twice daily for hypertension and NSVT.    Desi Card PA-C    Physical Exam   Temp: 100.4  F (38  C) Temp src: Axillary BP: (!) 153/80 Pulse: 89   Resp: 20 SpO2: 93 % O2 Device: Oxymizer cannula Oxygen Delivery: 7 LPM  Vitals:    10/26/21 0838 10/27/21 0732 10/29/21 0500 10/31/21 0500   Weight: 112.5 kg (248 lb) 113.8 kg (250 lb 12.8 oz) 115.2 kg (253 lb 14.4 oz) 110.6 kg (243 lb 14.4 oz)    11/01/21 0400 11/02/21 0600 11/03/21 0600   Weight: 110.2 kg (243 lb) 107 kg (236 lb) 105.7 kg (233 lb)     Vital Signs with Ranges  Temp:  [97.8  F (36.6  C)-100.4  F (38  C)] 100.4  F (38  C)  Pulse:  [69-90] 89  Resp:  [20] 20  BP: (132-171)/(63-80) 153/80  FiO2 (%):  [50 %] 50 %  SpO2:  [89 %-97 %] 93 %  10/29 0700 - 11/03 0659  In: 3200 [P.O.:3200]  Out: 52279 [Urine:5425]  Net: -7725  Constitutional: awake, sitting in the chair.  Respiratory: breath sounds are diminished at the bases with crackles bilaterally.   Cardiovascular: IRR, s1s2, no murmur.  GI: soft, BS+  Skin: warm, no rashes  Musculoskeletal: Moving all extremities. ++ pitting edema bilaterally.  Neurologic: Alert, oriented x 3  Neuropsychiatric: Normal affect   Data   Results for orders placed or performed during the hospital encounter of 10/26/21 (from the past 24 hour(s))   Glucose by meter   Result Value Ref Range    GLUCOSE BY METER POCT 166 (H) 70 - 99 mg/dL   Blood gas arterial   Result Value Ref Range    pH Arterial 7.36 7.35 - 7.45    pCO2 Arterial 54 (H) 35 - 45 mm Hg    pO2 Arterial 79 (L) 80 - 105 mm Hg    FIO2 50     Bicarbonate Arterial 31 (H) 21 - 28 mmol/L    Base Excess/Deficit (+/-) 4.1 (H) -9.0 - 1.8 mmol/L   XR Chest Port 1 View     Narrative    CHEST ONE VIEW PORTABLE   11/2/2021 2:45 PM     HISTORY:  Shortness of breath. Tachypnea.    COMPARISON: 10/28/2021.      Impression    IMPRESSION: There has been interval placement of a right IJ dual-lumen  central venous catheter, with tip in the right atrium. No  pneumothorax. Patchy opacities in both lower lungs have increased  since the previous exam. Small bilateral pleural effusions are again  noted, unchanged on the right, and likely increased slightly on the  left. Postoperative changes of transcatheter aortic valve replacement.    HUMERA GARAY MD         SYSTEM ID:  [id number]   Glucose by meter   Result Value Ref Range    GLUCOSE BY METER POCT 154 (H) 70 - 99 mg/dL   Glucose by meter   Result Value Ref Range    GLUCOSE BY METER POCT 257 (H) 70 - 99 mg/dL   CBC with platelets differential    Narrative    The following orders were created for panel order CBC with platelets differential.  Procedure                               Abnormality         Status                     ---------                               -----------         ------                     CBC with platelets and d...[[id number]]  Abnormal            Final result                 Please view results for these tests on the individual orders.   Magnesium   Result Value Ref Range    Magnesium 1.6 1.6 - 2.3 mg/dL   Renal panel   Result Value Ref Range    Sodium 136 133 - 144 mmol/L    Potassium 4.3 3.4 - 5.3 mmol/L    Chloride 103 94 - 109 mmol/L    Carbon Dioxide (CO2) 28 20 - 32 mmol/L    Anion Gap 5 3 - 14 mmol/L    Urea Nitrogen 52 (H) 7 - 30 mg/dL    Creatinine 2.19 (H) 0.66 - 1.25 mg/dL    Calcium 8.9 8.5 - 10.1 mg/dL    Glucose 181 (H) 70 - 99 mg/dL    Albumin 2.1 (L) 3.4 - 5.0 g/dL    Phosphorus 2.8 2.5 - 4.5 mg/dL    GFR Estimate 28 (L) >60 mL/min/1.73m2   CBC with platelets and differential   Result Value Ref Range    WBC Count 5.6 4.0 - 11.0 10e3/uL    RBC Count 4.13 (L) 4.40 - 5.90 10e6/uL    Hemoglobin 10.4 (L) 13.3  - 17.7 g/dL    Hematocrit 35.1 (L) 40.0 - 53.0 %    MCV 85 78 - 100 fL    MCH 25.2 (L) 26.5 - 33.0 pg    MCHC 29.6 (L) 31.5 - 36.5 g/dL    RDW 15.9 (H) 10.0 - 15.0 %    Platelet Count 114 (L) 150 - 450 10e3/uL    % Neutrophils 81 %    % Lymphocytes 10 %    % Monocytes 9 %    % Eosinophils 0 %    % Basophils 0 %    % Immature Granulocytes 0 %    NRBCs per 100 WBC 0 <1 /100    Absolute Neutrophils 4.5 1.6 - 8.3 10e3/uL    Absolute Lymphocytes 0.6 (L) 0.8 - 5.3 10e3/uL    Absolute Monocytes 0.5 0.0 - 1.3 10e3/uL    Absolute Eosinophils 0.0 0.0 - 0.7 10e3/uL    Absolute Basophils 0.0 0.0 - 0.2 10e3/uL    Absolute Immature Granulocytes 0.0 <=0.0 10e3/uL    Absolute NRBCs 0.0 10e3/uL   EKG 12-lead, tracing only   Result Value Ref Range    Systolic Blood Pressure  mmHg    Diastolic Blood Pressure  mmHg    Ventricular Rate 81 BPM    Atrial Rate 441 BPM    KS Interval  ms    QRS Duration 172 ms     ms    QTc 497 ms    P Axis  degrees    R AXIS 33 degrees    T Axis 138 degrees    Interpretation ECG       Atrial fibrillation  Left bundle branch block  Abnormal ECG  When compared with ECG of 02-NOV-2021 07:46, (unconfirmed)  QRS axis Shifted left  T wave inversion no longer evident in Inferior leads     Glucose by meter   Result Value Ref Range    GLUCOSE BY METER POCT 162 (H) 70 - 99 mg/dL     Tele AF, CVR    Medications     - MEDICATION INSTRUCTIONS -       - MEDICATION INSTRUCTIONS -       - MEDICATION INSTRUCTIONS -       sodium chloride 0.9%         - MEDICATION INSTRUCTIONS for Dialysis Patients -   Does not apply See Admin Instructions     [START ON 11/4/2021] amLODIPine  7.5 mg Oral Daily     apixaban ANTICOAGULANT  5 mg Oral BID     atorvastatin  80 mg Oral Daily     bumetanide  2 mg Intravenous Q8H     carvedilol  12.5 mg Oral BID     chlorothiazide  250 mg Intravenous BID     [Held by provider] furosemide  40 mg Intravenous Daily     insulin aspart  1-7 Units Subcutaneous TID AC     insulin aspart  1-5 Units  Subcutaneous At Bedtime     insulin glargine  15 Units Subcutaneous Q24H     omeprazole  20 mg Oral BID     sodium chloride (PF)  3 mL Intracatheter Q8H     tamsulosin  0.4 mg Oral Daily

## 2021-11-03 NOTE — PROGRESS NOTES
RADIOLOGY PROCEDURE NOTE  Patient name: Burton Elizabeth  MRN: [id number]  : 1945    Pre-procedure diagnosis: no longer needs dialysis, request for removal of right internal jugular tunneled dialysis catheter  Post-procedure diagnosis: Same    Procedure Date/Time: November 3, 2021  12:34 PM  Procedure: removal of right internal jugular tunneled dialysis catheter  Estimated blood loss: None  Specimen(s) collected with description: none  The patient tolerated the procedure well with no immediate complications.  Significant findings:none    See imaging dictation for procedural details.    Provider name: Darling Vaughan PA-C  Assistant(s):None

## 2021-11-03 NOTE — PROGRESS NOTES
" Renal Medicine Progress Note            Assessment/Plan:     # CKD 3B- Baseline creatinine appears to fluctuate around 1.7-2. Presumed due to DM/HTN     # Post TAVR OPAL-severe: Improved and stable.     # Status post TAVR on 10/26: Normal LV/RV function.      # HFpEF: Hypervolemia: He has at least 2+ pitting edema in the legs. Diuresing well. Wt is down.      # Hypertension: Low dose Coreg. Norvasc can contribute to edema.      # IDDM and obesity    # CRISTHIAN/CPAP:     Plan:  # Cont IV diuretics.  # Remove tunneled CVC-order placed for IR  # Renal panel in AM  # Lymphedema consult        Interval History:     Afebrile. BP is not bad. Pt is sleeping with CPAP on. Hard to arouse. Per   RN, patient was up eating breakfast and did not complain of worsening shortness of breath.           Medications and Allergies:       - MEDICATION INSTRUCTIONS for Dialysis Patients -   Does not apply See Admin Instructions     amLODIPine  5 mg Oral Daily     apixaban ANTICOAGULANT  5 mg Oral BID     atorvastatin  80 mg Oral Daily     bumetanide  2 mg Intravenous Q8H     carvedilol  12.5 mg Oral BID     chlorothiazide  250 mg Intravenous BID     [Held by provider] furosemide  40 mg Intravenous Daily     insulin aspart  1-7 Units Subcutaneous TID AC     insulin aspart  1-5 Units Subcutaneous At Bedtime     insulin glargine  15 Units Subcutaneous Q24H     omeprazole  20 mg Oral BID     sodium chloride (PF)  3 mL Intracatheter Q8H     tamsulosin  0.4 mg Oral Daily        Allergies   Allergen Reactions     Penicillins Anaphylaxis            Physical Exam:   Vitals were reviewed   , Blood pressure (!) 153/80, pulse 89, temperature 100.4  F (38  C), temperature source Axillary, resp. rate 20, height 1.753 m (5' 9\"), weight 105.7 kg (233 lb), SpO2 93 %.    Wt Readings from Last 3 Encounters:   11/03/21 105.7 kg (233 lb)   10/21/21 112 kg (247 lb)   10/11/21 109.5 kg (241 lb 4.8 oz)       Intake/Output Summary (Last 24 hours) at 11/3/2021 " 0943  Last data filed at 11/3/2021 0900  Gross per 24 hour   Intake 980 ml   Output 1600 ml   Net -620 ml     GENERAL APPEARANCE: NAD. Sleeping.  HEENT:  Eyes/ears/nose/neck grossly normal  RESP: Poor airflow anteriorly. No wheezes.  CV: RRR, nl S1/S2, + murmur  ABDOMEN: obese, soft, NT  EXTREMITIES/SKIN: no rashes/lesions on observed skin; 2+edema-improving.  NEURO: Sleeping. Arouse to voice and answers some questions.   RIJ Tunneled CVC CDI            Data:     CBC RESULTS:     Recent Labs   Lab 11/03/21  0603 11/02/21  0608 11/01/21  0534 10/31/21  0540 10/30/21  0623 10/29/21  0834   WBC 5.6 5.3 5.5 5.2 5.7 5.3   RBC 4.13* 4.18* 4.14* 4.12* 3.97* 4.18*   HGB 10.4* 10.6* 10.5* 10.5* 10.0* 10.8*   HCT 35.1* 36.2* 36.0* 36.3* 34.8* 36.4*   * 101* 91* 100* 123* 110*       Basic Metabolic Panel:  Recent Labs   Lab 11/03/21  0824 11/03/21  0603 11/02/21  2203 11/02/21  1715 11/02/21  1217 11/02/21  0851 11/02/21  0608 11/01/21  2114 11/01/21  2109 11/01/21  0833 11/01/21  0534 11/01/21  0534 10/31/21  0753 10/31/21  0540 10/30/21  0736 10/30/21  0623 10/29/21  0848 10/29/21  0834   NA  --  136  --   --   --   --  136  --   --   --   --  139  --  137  --  137  --  136   POTASSIUM  --  4.3  --   --   --   --  4.1 4.0  --   --   --  4.3  --  4.0  --  4.6   < > 4.8   CHLORIDE  --  103  --   --   --   --  104  --   --   --   --  105  --  104  --  106  --  105   CO2  --  28  --   --   --   --  31  --   --   --   --  26  --  31  --  25  --  24   BUN  --  52*  --   --   --   --  41*  --   --   --   --  34*  --  27  --  41*  --  52*   CR  --  2.19*  --   --   --   --  2.20*  --   --   --   --  2.33*  --  2.44*  --  3.36*  --  4.38*   * 181* 257* 154* 166* 163* 165*  --    < >   < >   < > 135*   < > 132*   < > 98   < > 117*   GABE  --  8.9  --   --   --   --  8.9  --   --   --   --  8.4*  --  8.3*  --  8.3*  --  8.5    < > = values in this interval not displayed.       INRNo lab results found in last 7 days.    Attestation:   I have reviewed today's relevant vital signs, notes, medications, labs and imaging.    Driss Hogan MD  [facility] - Nephrology  Office phone :[phone]  Pager: [phone]

## 2021-11-03 NOTE — PROGRESS NOTES
Neuro- AxO 2 Forgetful/Situation/Time  Tele/Cardiac- Afib CVR   Resp- BiPAP on for 10hrs total, when refused switch to Oxy mask on 5-6L for 2hrs total   Activity- 1AGW  Pain- Denies  Drips- none, Bummex and Diuril I.V   Drains/Tubes- PIV x 1  Skin- Edema dependent/LE   GI/- Internal Zuniga CDI total output this shift 1100ml  Aggression Color- Green  COVID status- Neg   Plan- Continue monitor and diuresis.   Misc- PRN hydralazine and labetol given for HTN, 's-170's

## 2021-11-04 NOTE — PLAN OF CARE
Shift: [id number]    Updates:   -COVID positive test today  -Sat in chair most of the morning  -BiPAP in place from 09:00-10:30 this morning    Neuro/Orientation:   -alert and oriented through the day shift  -pleasant this morning    Tele/Cardiac:  -Afib CVR 80s    Pertinent Labs:   -Cr 2.28  -COVID positive    Vitals: Temp: 98.7  F (37.1  C) Temp src: Oral BP: 126/65 Pulse: 84   Resp: 22 SpO2: 92 % O2 Device: Oxymizer cannula Oxygen Delivery: 10 LPM    Pain: denies    Access/Drips: R PIV    Respiratory/Oxygen:   -lungs coarse/diminished  -Oxymizer cannula/Oxymask at 10-12 LPM to keep patient at 90-92%  -BiPAP for a short while this morning    GI/: giron catheter for retention; no BM this shift  -Diet: 2 gm sodium diet with 2L fluid restriction    Skin:  -left calf - scab  -Coccyx - redness blanchable - mepilex  -Right radial WNL  -Right groin - slightly bruised, soft   -Left groin - bruised, soft    Mobility: up stand by assist, gait belt and walker    Discharge Planning: pending    Aggression tool: yellow

## 2021-11-04 NOTE — PROGRESS NOTES
[facility]  Hospitalist Progress Note  Christian Francois MD  11/04/2021    Assessment & Plan    Burton Elizabeth is a 76 year old male with PMH CRISTHIAN, UGI bleed, CKD, DM2, paroxysmal atrial flutter, CAD, chronic HFpEF, and severe aortic stenosis who was admitted 10/26/2021 for post op monitoring following elective TAVR. Post procedure complicated acute oliguric renal failure.     Acute oliguric renal failure on CKD3, due to contrast nephropathy  S/p initiation of HD on 10/29  Patient developed rapidly rising creatinine on 10/28, approx two days after TAVR, contrast. Noted to be more volume overloaded on 10/28 and not responding to IV diuresis. Received Diuril and Bumex on 10/29 without significant response.  Creatinine 1.6-->2.9-->4.4. Urine studies and clinical picture consistent with contrast nephropathy.  - Appreciate Nephrology consult                - no further HD with CVC dialysis catheter removed                - Further diuresis with bumex per nephrology with lymphedema consult  - Continue giron for post-op urinary retention, and now for close I&O     COVID-19 Pneumonia  Acute hypoxic and hypercarbic respiratory failure  Pulmonary edema, volume overloaded  Patient has a history of HFpEf. Echo following TAVR with good LV, RV function. He was noticeably more volume overloaded following TAVR, and had respiratory distress overnight on 10/27 and 10/28 requiring diuresis. Etiology is primarily due to acute renal failure. VBG on 10/29 shows PCO2 55 and pH 7.29 indicating acute hypercapnea, likely due to pulmonary edema/respiratory faliure. PCO2 remains elevated at 60 on 10/31, however clinically improved.  - BiPAP at bedtime, off IMC, incentive spirometry  - Needs likely outpatient sleep study, there is concern for underlying sleep apnea  - recent cxr shows increase in patchy bilateral pulmonary opacities  - noted increased pulmonary infiltrates without clinical signs of pneumonia  -  discussed with nephrology and believe patient is still vol  - lower ext ace wraps, increase ambulation  - patient had low grade fever 100.5 on 11/3 at 10 AM along with progressive increase in oxygen requirements 5L >> 15 L  - start decadron, hold off on remdesivir as patient seems to be clinically getting better.     Acute metabolic encephalopathy, improved  Due to critical illness, underlying sleep apnea, CHF.  - Seroquel PRN  - he is overall improving     Severe aortic stenosis s/p TAVR 34mm Medtronic valve (10/26/2021)  Post op mngt per cardiology, now signed off. Eliquis restarted POD#0. No anti-plt. TTE 10/26 showing prosthetic aortic valve mean gradient 9mmHg and trivial paravalvular AI.  - Continue Eliquis     Acute on chronic HFpEF    CAD with NSTEMI (July 2021)  HTN / HLD  Diagnostic coronary angiogram on 10/11/2021 similar to prior diagnostic coronary angiogram in 2019 with two-vessel coronary artery disease including chronically totally occluded circumflex and small vessel disease of the PDA branch.  - PTA atorvastatin, Coreg  - ASA discontinued  - See above for volume management     Paroxysmal atrial flutter:   - Eliquis as above  - Continue PTA atorvastatin, Coreg     ID-DM2. A1c 7.6% 10/27/2021.  PTA regimen of Lantus 18u at bedtime, glimepiride, semaglutide. Hyperglycemic on 11/1.  - Hold home glimepiride and semaglutide due to renal failure  - Increased lantus 15 units q24h  - mostly BG < 200     Acute GI bleed, secondary to gastritis July 2021  Cabrera's esophagus diagnosed on endoscopy  - continue PTA PPI     Suspected CRISTHIAN  Admission at [facility] with acute hypoxic, hypercarbic respiratory failure requiring BiPAP suspected to be related to CHF with severe aortic stenosis. Notes previously mention suspected CRISTHIAN however pt had not completed sleep study as outpatient.  - See above for management     DVT Prophylaxis: DOAC  Code Status: Full Code  PT/OT: ordered  Diet: 2 gram  Combination Diet  "Regular Diet Adult; 2 gm NA Diet; Low Saturated Fat Diet       Disposition:   > 2 days    Interval History   -- more alert today  -- cardiology and nephrology notes reviewed  -- noted continued diuresis  -- noted increase in oxygen saturation    -Data reviewed today: I reviewed all new labs and imaging over the last 24 hours. I personally reviewed no images or EKG's today.    Physical Exam    , Blood pressure (!) 146/72, pulse 86, temperature 98.2  F (36.8  C), temperature source Oral, resp. rate 26, height 1.753 m (5' 9\"), weight 105.7 kg (232 lb 15.7 oz), SpO2 91 %.  Vitals:    11/02/21 0600 11/03/21 0600 11/04/21 0546   Weight: 107 kg (236 lb) 105.7 kg (233 lb) 105.7 kg (232 lb 15.7 oz)     Vital Signs with Ranges  Temp:  [98.2  F (36.8  C)-99.1  F (37.3  C)] 98.2  F (36.8  C)  Pulse:  [60-86] 86  Resp:  [20-26] 26  BP: (129-155)/(65-80) 146/72  FiO2 (%):  [50 %] 50 %  SpO2:  [88 %-93 %] 91 %  I/O's Last 24 hours  I/O last 3 completed shifts:  In: 400 [P.O.:400]  Out: 2175 [Urine:2175]    Constitutional: On bipap,   Respiratory: Diminished bilaterally  Cardiovascular: irregular  GI: Normal bowel sounds, soft, non-distended, non-tender  Skin/Integumen: No rashes, no cyanosis, ++ edema  Other:      Medications   All medications were reviewed.    - MEDICATION INSTRUCTIONS -       - MEDICATION INSTRUCTIONS -       - MEDICATION INSTRUCTIONS -       sodium chloride 0.9%         apixaban ANTICOAGULANT  5 mg Oral BID     atorvastatin  80 mg Oral Daily     bumetanide  2 mg Intravenous Q8H     carvedilol  12.5 mg Oral BID     chlorothiazide  250 mg Intravenous BID     [Held by provider] furosemide  40 mg Intravenous Daily     hydrALAZINE  50 mg Oral Q8H MOE     insulin aspart  1-7 Units Subcutaneous TID AC     insulin aspart  1-5 Units Subcutaneous At Bedtime     insulin glargine  15 Units Subcutaneous Q24H     omeprazole  20 mg Oral BID     sodium chloride (PF)  3 mL Intracatheter Q8H     tamsulosin  0.4 mg Oral Daily    "     Data   Recent Labs   Lab 11/04/21  0609 11/04/21  0205 11/03/21  1933 11/03/21  0824 11/03/21  0603 11/02/21  0851 11/02/21  0608   WBC 6.9  --   --   --  5.6  --  5.3   HGB 10.7*  --   --   --  10.4*  --  10.6*   MCV 84  --   --   --  85  --  87   *  --   --   --  114*  --  101*     --   --   --  136  --  136   POTASSIUM 4.0  --   --   --  4.3  --  4.1   CHLORIDE 101  --   --   --  103  --  104   CO2 33*  --   --   --  28  --  31   BUN 59*  --   --   --  52*  --  41*   CR 2.28*  --   --   --  2.19*  --  2.20*   ANIONGAP 3  --   --   --  5  --  1*   GABE 8.7  --   --   --  8.9  --  8.9   * 119* 158*   < > 181*   < > 165*   ALBUMIN 2.0*  --   --   --  2.1*   < > 2.1*    < > = values in this interval not displayed.       Recent Results (from the past 24 hour(s))   IR CVC Tunnel Removal Right    Narrative    EXAM: IR CVC TUNNEL REMOVAL RIGHT  11/3/2021 1:59 PM       HISTORY: No longer needs dialysis. Request for dialysis catheter  removal.      PROCEDURE: After the risks and benefits were explained and informed  consent was obtained, the area was prepped and draped. Using sterile  technique, the tunneled catheter was freed from the local tissue using  gentle tension.  The catheter was then removed without complications.   Pressure was applied to the right internal jugular vein as well as the  exit site for approximately 3-5 minutes.  The site was covered with a  gauze and Tegaderm dressing. The patient tolerated the procedure well.   The procedure was performed at the patient's bedside.  Face to face  time of 15 minutes.    Medications Used: None    FINDINGS: Technically successful removal of a right internal jugular  tunneled dialysis catheter.    EL NEVAREZ PA-C         SYSTEM ID:  [id number]       Christian Francois MD  Text Page  (7am to 6pm)

## 2021-11-04 NOTE — PROGRESS NOTES
RRT called for change in LOC. Pt was not responding to RN. Pt would moan to sternal rub but would not follow commands. NP came to see pt, pt's LOC improved and pt became combative hitting and swearing at staff.

## 2021-11-04 NOTE — PROGRESS NOTES
" Renal Medicine Progress Note            Assessment/Plan:     # CKD 3B- Baseline creatinine appears to fluctuate around 1.7-2. Presumed due to DM/HTN     # Post TAVR OPAL-severe: Improved and stable.     # Status post TAVR on 10/26: Normal LV/RV function.      # HFpEF: Hypervolemia: He has at least 2+ pitting edema in the legs. Diuresing well. Wt is down. Net 9 liter since admission.      # Hypertension: Low dose Coreg. Norvasc can contribute to edema.      # IDDM and obesity     # CRISTHIAN/CPAP:      Plan:  # Continue IV Bumex and IV Diuril.   # Stop Norvasc   # Start hydralazine 50 mg q8hrs  # Aggressive pulmonary toilet    I discussed the case with Dr. Francois, patient and his brother.        Interval History:     Afebrile. BP is on the high side. Using a bit more O2. His breathing is not worse, but he says he could be better. Diuresing well. Net 9 liters since admission.           Medications and Allergies:       amLODIPine  7.5 mg Oral Daily     apixaban ANTICOAGULANT  5 mg Oral BID     atorvastatin  80 mg Oral Daily     bumetanide  2 mg Intravenous Q8H     carvedilol  12.5 mg Oral BID     chlorothiazide  250 mg Intravenous BID     [Held by provider] furosemide  40 mg Intravenous Daily     insulin aspart  1-7 Units Subcutaneous TID AC     insulin aspart  1-5 Units Subcutaneous At Bedtime     insulin glargine  15 Units Subcutaneous Q24H     omeprazole  20 mg Oral BID     sodium chloride (PF)  3 mL Intracatheter Q8H     tamsulosin  0.4 mg Oral Daily        Allergies   Allergen Reactions     Penicillins Anaphylaxis            Physical Exam:   Vitals were reviewed   , Blood pressure (!) 146/72, pulse 86, temperature 98.2  F (36.8  C), temperature source Oral, resp. rate 26, height 1.753 m (5' 9\"), weight 105.7 kg (232 lb 15.7 oz), SpO2 91 %.    Wt Readings from Last 3 Encounters:   11/04/21 105.7 kg (232 lb 15.7 oz)   10/21/21 112 kg (247 lb)   10/11/21 109.5 kg (241 lb 4.8 oz)       Intake/Output Summary (Last 24 hours) " at 11/4/2021 1009  Last data filed at 11/4/2021 0900  Gross per 24 hour   Intake 210 ml   Output 2175 ml   Net -1965 ml     GENERAL APPEARANCE: NAD. Sitting in chair.  HEENT:  Eyes/ears/nose/neck grossly normal  RESP: Poor airflow anteriorly. No wheezes.   CV: RRR, nl S1/S2, + murmur  ABDOMEN: obese, soft, NT  EXTREMITIES/SKIN: no rashes/lesions on observed skin; 2+edema-improving. Extremities are still pretty puffy.   NEURO: Awake and alert. Conversing.   RIJ tunneled removed.           Data:     CBC RESULTS:     Recent Labs   Lab 11/04/21  0609 11/03/21  0603 11/02/21  0608 11/01/21  0534 10/31/21  0540 10/30/21  0623   WBC 6.9 5.6 5.3 5.5 5.2 5.7   RBC 4.27* 4.13* 4.18* 4.14* 4.12* 3.97*   HGB 10.7* 10.4* 10.6* 10.5* 10.5* 10.0*   HCT 36.0* 35.1* 36.2* 36.0* 36.3* 34.8*   * 114* 101* 91* 100* 123*       Basic Metabolic Panel:  Recent Labs   Lab 11/04/21  0609 11/04/21  0205 11/03/21  1933 11/03/21  1737 11/03/21  1726 11/03/21  1230 11/03/21  0824 11/03/21  0603 11/02/21  0851 11/02/21  0608 11/01/21  2114 11/01/21  0833 11/01/21  0534 11/01/21  0534 10/31/21  0753 10/31/21  0540 10/30/21  0736 10/30/21  0623     --   --   --   --   --   --  136  --  136  --   --   --  139  --  137  --  137   POTASSIUM 4.0  --   --   --   --   --   --  4.3  --  4.1 4.0  --   --  4.3  --  4.0   < > 4.6   CHLORIDE 101  --   --   --   --   --   --  103  --  104  --   --   --  105  --  104  --  106   CO2 33*  --   --   --   --   --   --  28  --  31  --   --   --  26  --  31  --  25   BUN 59*  --   --   --   --   --   --  52*  --  41*  --   --   --  34*  --  27  --  41*   CR 2.28*  --   --   --   --   --   --  2.19*  --  2.20*  --   --   --  2.33*  --  2.44*  --  3.36*   * 119* 158* 203* 220* 155*   < > 181*   < > 165*  --    < >   < > 135*   < > 132*   < > 98   GABE 8.7  --   --   --   --   --   --  8.9  --  8.9  --   --   --  8.4*  --  8.3*  --  8.3*    < > = values in this interval not displayed.       INRNo lab  results found in last 7 days.   Attestation:   I have reviewed today's relevant vital signs, notes, medications, labs and imaging.    Driss Hogan MD  [facility] - Nephrology  Office phone :[phone]  Pager: [phone]

## 2021-11-04 NOTE — PROGRESS NOTES
[facility]  Cardiology Progress Note    Date of Service (when I saw the patient): 11/04/2021  Summary: Burton Elizabeth is a 76 year old male with history of severe AS who was admitted on 10/26/2021 following his elective TAVR.  Interval History   Overnight events noted. RRT was called for somnolence. He then became combative. This morning he is on BiPAP and mentation seems improved however oxygen requirements have worsened. He had good UOP of over 2 L yesterday. BPs are better in the last 24 hours.   Assessment & Plan   1.  Severe aortic stenosis s/p TAVR 34mm [employer] valve on 10/26/2021. Echocardiogram 10/27/21 showed mean gradient 9 mmHg. Trivial paravalvular AI. Biventricular function is normal.   -  TAVR team recommended no aspirin or Plavix. Has hx of gastric ulcers/Cabrera's esophagus. On Eliquis.  -  Will need TAVR and CORE follow up post discharge.  2. Acute on CKD stage III. With creatinine up from 1.6 to 4.4 post TAVR. Though to be secondary to contrast nephropathy.   -  Appreciate nephrology assistance.   3. Volume overload. In the setting of acute renal failure. PTA on Torsemide 20 mg in the morning and 10 mg in the afternoon. No response to IV diuretics initially  -  As above, dialysis initiated by nephrology - treatment on 10/29 and 10/30  -  Now on IV Bumex per nephrology started on 11/2. IV diuril added 11/2.  4. Acute hypoxic and hypercarbic respiratory failure secondary to volume overload.   -  Diuresis as above per nephrology.  5. Hypertension. On carvedilol 3.125 mg twice daily and amlodipine 5 mg daily.  -  Carvedilol increased to 21.5 mg twice daily on 11/3.  6. Coronary artery disease. Stable.  - Cardiac catheterization 10/11/39178 showed chronically totally occluded circumflex and small vessel disease of the PDA branch.  -   On statin, beta blocker, not on aspirin as is on anticoagulation.   7. Paroxsymal atrial flutter/fibrillation. Rate controlled on  carvedilol.  -  On Eliquis for thromboembolic prevention.   8. Chronic LBBB  9. Morbid obesity   10. History of CVA   11. Type 2 DM. Insulin dependent  12. High probability for sleep apnea. Outpatient evaluation.     Desi Card PA-C    Physical Exam   Temp: 98.2  F (36.8  C) Temp src: Oral BP: (!) 146/72 Pulse: 86   Resp: 26 SpO2: 91 % O2 Device: Oxymask Oxygen Delivery: 15 LPM  Vitals:    10/26/21 0838 10/27/21 0732 10/29/21 0500 10/31/21 0500   Weight: 112.5 kg (248 lb) 113.8 kg (250 lb 12.8 oz) 115.2 kg (253 lb 14.4 oz) 110.6 kg (243 lb 14.4 oz)    11/01/21 0400 11/02/21 0600 11/03/21 0600 11/04/21 0546   Weight: 110.2 kg (243 lb) 107 kg (236 lb) 105.7 kg (233 lb) 105.7 kg (232 lb 15.7 oz)     Vital Signs with Ranges  Temp:  [98.2  F (36.8  C)-99.1  F (37.3  C)] 98.2  F (36.8  C)  Pulse:  [60-86] 86  Resp:  [20-26] 26  BP: (129-155)/(65-80) 146/72  FiO2 (%):  [50 %] 50 %  SpO2:  [88 %-93 %] 91 %  10/30 0700 - 11/04 0659  In: 2760 [P.O.:2760]  Out: 9810 [Urine:6810]  Net: -7050  Constitutional: awake, sitting in the chair with BiPAP on.   Respiratory: breath sounds are diminished and coarse anteriorly. Crackles at the bases.  Cardiovascular: IRR, s1s2, no murmur.  GI: soft, BS+  Skin: warm, no rashes  Musculoskeletal: Moving all extremities. ++ pitting edema bilaterally.  Neurologic: Alert, oriented x 3  Neuropsychiatric: Normal affect   Data   Results for orders placed or performed during the hospital encounter of 10/26/21 (from the past 24 hour(s))   Glucose by meter   Result Value Ref Range    GLUCOSE BY METER POCT 155 (H) 70 - 99 mg/dL   IR CVC Tunnel Removal Right    Narrative    EXAM: IR CVC TUNNEL REMOVAL RIGHT  11/3/2021 1:59 PM       HISTORY: No longer needs dialysis. Request for dialysis catheter  removal.      PROCEDURE: After the risks and benefits were explained and informed  consent was obtained, the area was prepped and draped. Using sterile  technique, the tunneled catheter was freed from  the local tissue using  gentle tension.  The catheter was then removed without complications.   Pressure was applied to the right internal jugular vein as well as the  exit site for approximately 3-5 minutes.  The site was covered with a  gauze and Tegaderm dressing. The patient tolerated the procedure well.   The procedure was performed at the patient's bedside.  Face to face  time of 15 minutes.    Medications Used: None    FINDINGS: Technically successful removal of a right internal jugular  tunneled dialysis catheter.    EL NEVAREZ PA-C         SYSTEM ID:  [id number]   Glucose by meter   Result Value Ref Range    GLUCOSE BY METER POCT 220 (H) 70 - 99 mg/dL   Glucose by meter   Result Value Ref Range    GLUCOSE BY METER POCT 203 (H) 70 - 99 mg/dL   Glucose by meter   Result Value Ref Range    GLUCOSE BY METER POCT 158 (H) 70 - 99 mg/dL   Glucose by meter   Result Value Ref Range    GLUCOSE BY METER POCT 119 (H) 70 - 99 mg/dL   CBC with platelets differential    Narrative    The following orders were created for panel order CBC with platelets differential.  Procedure                               Abnormality         Status                     ---------                               -----------         ------                     CBC with platelets and d...[[id number]]  Abnormal            Final result                 Please view results for these tests on the individual orders.   Magnesium   Result Value Ref Range    Magnesium 1.7 1.6 - 2.3 mg/dL   Renal panel   Result Value Ref Range    Sodium 137 133 - 144 mmol/L    Potassium 4.0 3.4 - 5.3 mmol/L    Chloride 101 94 - 109 mmol/L    Carbon Dioxide (CO2) 33 (H) 20 - 32 mmol/L    Anion Gap 3 3 - 14 mmol/L    Urea Nitrogen 59 (H) 7 - 30 mg/dL    Creatinine 2.28 (H) 0.66 - 1.25 mg/dL    Calcium 8.7 8.5 - 10.1 mg/dL    Glucose 127 (H) 70 - 99 mg/dL    Albumin 2.0 (L) 3.4 - 5.0 g/dL    Phosphorus 3.5 2.5 - 4.5 mg/dL    GFR Estimate 27 (L) >60 mL/min/1.73m2   CBC with  platelets and differential   Result Value Ref Range    WBC Count 6.9 4.0 - 11.0 10e3/uL    RBC Count 4.27 (L) 4.40 - 5.90 10e6/uL    Hemoglobin 10.7 (L) 13.3 - 17.7 g/dL    Hematocrit 36.0 (L) 40.0 - 53.0 %    MCV 84 78 - 100 fL    MCH 25.1 (L) 26.5 - 33.0 pg    MCHC 29.7 (L) 31.5 - 36.5 g/dL    RDW 15.9 (H) 10.0 - 15.0 %    Platelet Count 142 (L) 150 - 450 10e3/uL    % Neutrophils 79 %    % Lymphocytes 10 %    % Monocytes 10 %    % Eosinophils 1 %    % Basophils 0 %    % Immature Granulocytes 0 %    NRBCs per 100 WBC 0 <1 /100    Absolute Neutrophils 5.4 1.6 - 8.3 10e3/uL    Absolute Lymphocytes 0.7 (L) 0.8 - 5.3 10e3/uL    Absolute Monocytes 0.7 0.0 - 1.3 10e3/uL    Absolute Eosinophils 0.1 0.0 - 0.7 10e3/uL    Absolute Basophils 0.0 0.0 - 0.2 10e3/uL    Absolute Immature Granulocytes 0.0 <=0.0 10e3/uL    Absolute NRBCs 0.0 10e3/uL     Tele AF, CVR    Medications     - MEDICATION INSTRUCTIONS -       - MEDICATION INSTRUCTIONS -       - MEDICATION INSTRUCTIONS -       sodium chloride 0.9%         amLODIPine  7.5 mg Oral Daily     apixaban ANTICOAGULANT  5 mg Oral BID     atorvastatin  80 mg Oral Daily     bumetanide  2 mg Intravenous Q8H     carvedilol  12.5 mg Oral BID     chlorothiazide  250 mg Intravenous BID     [Held by provider] furosemide  40 mg Intravenous Daily     insulin aspart  1-7 Units Subcutaneous TID AC     insulin aspart  1-5 Units Subcutaneous At Bedtime     insulin glargine  15 Units Subcutaneous Q24H     omeprazole  20 mg Oral BID     sodium chloride (PF)  3 mL Intracatheter Q8H     tamsulosin  0.4 mg Oral Daily

## 2021-11-04 NOTE — PROGRESS NOTES
"BRIEF HOUSE OFFICER NOTE:    Ongoing metabolic encephalopathy  RRT called for altered mentation. Nursing staff note having a difficult time waking patient when he had been awake, interactive, and out of bed earlier today. He is in bed resting on bipap upon my arrival bedside. On review of documentation, it appears the patient's mentation has been waxing and weaning, thus this presentation in not necessary a change in status. With several sternal rubs the patient became aggressive trying to hit staff including myself. He state's he wants to be left alone. He only answers \"no\" to orientation questions. He refuses to follow commands or allow for pupillary assessment. T 98.7. Blood glucose 158. When staff tried to place protective foam under bipap he became very aggressive an pulled bipap mask off refusing to wear. He is now interacting verbally.  -Continue to monitor  -Defer CT head as patient is now alert, interacting, and moving extremities  -Defer VBG being patient has removed bipap  -If ongoing concerns with mentation consider repeating VBG    WALE Walker CNP  Text Page   "

## 2021-11-04 NOTE — PROVIDER NOTIFICATION
Paged Dr. Francois 11/4 08:18 AM. Last COVID PCR done on 10/22. Do you want to order another PCR test?

## 2021-11-04 NOTE — PLAN OF CARE
[facility] Nursing Note    Cared for pt from 700-2300    Pt became combative and verbally threatening after RRT called for decreased LOC. Pt refused bedtime pills and assessments.     Patient Information  Name: Burton Elizabeth  Age: 76 year old    Assessment  Orientation/Neuro: A&O x 3 in morning, pt was disoriented to time, but able to look at the white board to figure out the day, pt did not know the year. Pt has difficulty with word finding and is slow to respond.   Cardiac/Tele: Afib CVR  Resp: Dyspnea on exertion, RR mid 20s to 30s, pt has been on bipap intermittently, pt will remove his own bipap, oxymask or oxymizer placed per pt's tolerance.   GI/: GI - due for BM,  - giron in place  Mobility: A1 - A2 with walker and gait belt  Pain: Denies  Diet: Orders Placed This Encounter      Combination Diet Regular Diet Adult; 2 gm NA Diet    Vital Signs  B/P: 137/76, T: 98.8, P: 60, R: 20, O2: 93% on 10L oxymask  Pt had low grade fever today that resolved    Plan  Plan/Other: continue to diurese, Right CVC tunnel cath today per Dr Hogan, wean O2 as able    Clrai Tavarez RN

## 2021-11-05 NOTE — PLAN OF CARE
Pt is doing much better than this am. Off bipap since about 11am, up in chair for meals , covered for b/g as needed. Initiated on Remdesivir, continues on steroids. Diuresing well, albumin given per orders. Good urine output. Brother Bill updated.

## 2021-11-05 NOTE — PROVIDER NOTIFICATION
Brief update:    Paged regarding request for chest x-ray    Patient Covid positive, respiratory failure.  On and off of BiPAP yesterday.  Was lethargic this evening and placed back on BiPAP around midnight for nursing    Continue to do poorly, BiPAP settings increased to 20/10, 80% FiO2 approximately 3:15 AM.  RT recommended chest x-ray    Discussed with nursing staff, despite being on BiPAP since midnight, patient is still lethargic.    Obtain ABG now.  Again, this is 15 minutes after BiPAP pressure adjustment.  Ensure no hypercarbia resulting in CO2 narcosis  Stat chest x-ray ordered  Concern with degree of somnolence and noninvasive ventilatory management.  Recommend rapid assessment be called as patient might require intubation for respiratory failure with or without ability to protect airway.    Tj Yañez MD  3:34 AM

## 2021-11-05 NOTE — PROGRESS NOTES
Brief cardiology note:  Tested positive for COVID yesterday. He remains on BiPAP. Appreciate hospitalist and nephrology cares. We will continue to follow peripherally. Please call us with any questions.     CAMPBELL Card PA-C 11:00 AM 11/5/2021

## 2021-11-05 NOTE — PLAN OF CARE
Alert, Lethargic/Somnolent. VSS ex was placed on BiPAP overnight. Tele: AFib CVR. Up with A1 GB and walker. NPO with BiPAP otherwise Regular, 2 gram Na diet with 2000mL Fluid restriction. . Denies pain. LS diminished, expiratory wheezes to lower lobes. Xray chest done and ABG's d/t increase BiPAP needs and increase lethargy. Denies SOB/CP. Edema. Zuniga patent for retention. IV-SL. Discharge pending. COVID positive, special precautions maintained. Continue to monitor.

## 2021-11-05 NOTE — PROGRESS NOTES
Pt with increasing O2 needs/lethargy throughout the night, now on 20/10 70% Bipap. ABG done, CXR done, MD notified. Will continue to follow.     Recent Labs   Lab 11/05/21  0337 11/02/21  1419 10/31/21  0540 10/30/21  0623   PH 7.35 7.36  --   --    PCO2 60* 54*  --   --    PO2 134* 79*  --   --    HCO3 33* 31*  --   --    O2PER 80 50 35 3         NACHO Kelley, RRT

## 2021-11-05 NOTE — PROGRESS NOTES
" Renal Medicine Progress Note            Assessment/Plan:     # CKD 3B- Baseline creatinine appears to fluctuate around 1.7-2. Presumed due to DM/HTN     # Post TAVR OPAL-severe: Improved but trend up today.      # Status post TAVR on 10/26: Normal LV/RV function.      # HFpEF: Hypervolemia: He has at least 2+ pitting edema in the legs. Diuresing well. Overall, net negative ~ 10 liters and weight is down.      # Hypertension: Low dose Coreg. Norvasc can contribute to edema.      # IDDM and obesity     # CRISTHIAN/CPAP:     # COVID 19+ 11/4/2021 and was negative on 10/22. Using 12 liters.   -dexa    Plan:  # Hold diuretics for now  # Will give six doses of 25% albumin to mobilize fluid    I discussed the case with Dr. Francois and cardiology        Interval History:     Clinically, patient feels better per RN. He is in bed and eating. He is using 12 liters.  Scr is up today. Overall, ~ 10 liters net negative and weight is down from from 12.5 kg to 104 kg.           Medications and Allergies:       albumin human  12.5 g Intravenous Q8H     apixaban ANTICOAGULANT  5 mg Oral BID     atorvastatin  80 mg Oral Daily     bumetanide  2 mg Intravenous Q8H     carvedilol  12.5 mg Oral BID     chlorothiazide  250 mg Intravenous BID     dexamethasone  6 mg Oral Daily     [Held by provider] furosemide  40 mg Intravenous Daily     hydrALAZINE  50 mg Oral Q8H MOE     insulin aspart  1-7 Units Subcutaneous TID AC     insulin aspart  1-5 Units Subcutaneous At Bedtime     insulin glargine  15 Units Subcutaneous Q24H     omeprazole  20 mg Oral BID     sodium chloride (PF)  3 mL Intracatheter Q8H     tamsulosin  0.4 mg Oral Daily        Allergies   Allergen Reactions     Penicillins Anaphylaxis            Physical Exam:   Vitals were reviewed   , Blood pressure 124/65, pulse 69, temperature 97.5  F (36.4  C), temperature source Axillary, resp. rate 23, height 1.753 m (5' 9\"), weight 104.1 kg (229 lb 6.4 oz), SpO2 92 %.    Wt Readings from Last 3 " Encounters:   11/05/21 104.1 kg (229 lb 6.4 oz)   10/21/21 112 kg (247 lb)   10/11/21 109.5 kg (241 lb 4.8 oz)       Intake/Output Summary (Last 24 hours) at 11/5/2021 1136  Last data filed at 11/5/2021 0630  Gross per 24 hour   Intake 120 ml   Output 1050 ml   Net -930 ml     Limited exam due to COVID 19 infection protocol.   Pt looks comfortable in bed and eating.          Data:     CBC RESULTS:     Recent Labs   Lab 11/05/21  0623 11/04/21  0609 11/03/21  0603 11/02/21  0608 11/01/21  0534 10/31/21  0540   WBC 4.2 6.9 5.6 5.3 5.5 5.2   RBC 4.03* 4.27* 4.13* 4.18* 4.14* 4.12*   HGB 10.2* 10.7* 10.4* 10.6* 10.5* 10.5*   HCT 34.5* 36.0* 35.1* 36.2* 36.0* 36.3*   * 142* 114* 101* 91* 100*       Basic Metabolic Panel:  Recent Labs   Lab 11/05/21  0752 11/05/21  0623 11/05/21  0200 11/04/21  2152 11/04/21  1726 11/04/21  0609 11/03/21  0824 11/03/21  0603 11/02/21  0851 11/02/21  0608 11/01/21  2114 11/01/21  0833 11/01/21  0534 11/01/21  0534 10/31/21  0753 10/31/21  0540   NA  --  133  --   --   --  137  --  136  --  136  --   --   --  139  --  137   POTASSIUM  --  4.2  --   --   --  4.0  --  4.3  --  4.1 4.0  --   --  4.3   < > 4.0   CHLORIDE  --  97  --   --   --  101  --  103  --  104  --   --   --  105  --  104   CO2  --  32  --   --   --  33*  --  28  --  31  --   --   --  26  --  31   BUN  --  77*  --   --   --  59*  --  52*  --  41*  --   --   --  34*  --  27   CR  --  2.68*  --   --   --  2.28*  --  2.19*  --  2.20*  --   --   --  2.33*  --  2.44*   * 300* 283* 323* 149* 127*   < > 181*   < > 165*  --    < >   < > 135*   < > 132*   GABE  --  8.7  --   --   --  8.7  --  8.9  --  8.9  --   --   --  8.4*  --  8.3*    < > = values in this interval not displayed.       INRNo lab results found in last 7 days.   Attestation:   I have reviewed today's relevant vital signs, notes, medications, labs and imaging.    Driss Hogan MD  [facility]  Office phone :[phone]  Pager:  [phone]

## 2021-11-05 NOTE — PROVIDER NOTIFICATION
MD Notification    Notified Person: MD    Notified Person Name: Tj Yañez    Notification Date/Time: 11/5 @0325    Notification Interaction: [employer] text page with phone call    Purpose of Notification: Pt placed back on BiPAP. Setting now changed to 20/10 at 80% sats 95%. Blood gasses pending. Do we want to repeat chest XRay?     Orders Received: ABG (RT in room drawing ABG's) Can order Chest Xray but wont help patient. Recommend RRT    Comments: Text page NP to come assess patient

## 2021-11-05 NOTE — PROGRESS NOTES
House ARCH[facility] brief note:    Was paged by nursing at 0401 as hospitalist requesting house ARCHIE bedside evaluation in setting of lethargy.  Review of EMR, note pt's mentation has been waxing waning throughout hospitalization first noted 10/29.  Upon arrival, pt lying in bed, resting, in no overt distress, VS noting HR 70s, SBP 130s, RR upper 10s-low 20s, O2 sats > 92% on 70% FiO2.  RT placed pt on Bipap, 20/10/16/70% with pt initially triggering 50s%; however, with stimulation, pt up to 70s%.  Pt pulling adequate TV of > 400.  Recent ABG notes corrected pH.  BG elevated.  Pt PERRL, able to protrude tongue, able to state name, location and president.  Follows commands x4 extremities including grasp, able to lift BUE off bed weakly and dorsi/plantar flex BLE.  No obvious focal neuro deficit noted.  No recent medications that would alter pt's mentation.  At this time, pt to continue on Bipap as needed.      WALE Sanchez, CNP  House [facility]    No charge.

## 2021-11-05 NOTE — PROGRESS NOTES
Pt remains on oximizer for the night, RN able to wean from 10-7Lpm with Sp02 90-94%. Ce Cronin, RT

## 2021-11-05 NOTE — PLAN OF CARE
Pt is A&Ox4, maintain special precauiton for tested COVID positive 11/4, tired and lethargic, easy to wake up, VSS and on tele A-Fib CVR,  was on 12 L and now on 7L Oximyzer canula and LS coarse and diminished, Legs and arms edema +3, scrotal edema +2, R radial, L &R groin sites unchanged, soft and bruised, up with one assist, sat in chair most of the evening, had large BM, Zuniga in, Bumex 2 mg/8 ml IV and Diuril 250 mg IV given,  at dinner and 323 at HS, continue to monitor.

## 2021-11-05 NOTE — PROGRESS NOTES
"SPIRITUAL HEALTH SERVICES  SPIRITUAL ASSESSMENT Progress Note  [facility]     REFERRAL SOURCE: Phone Call from Bedside Nurse    Reviewed documentation. Reflective conversation shared with Magdaleno which integrated elements of illness and family narratives.       Magdaleno shared that he believes he \"is continuing to fail and I'm ready to die.\" He expressed frustration that \"no one will tell me the truth.\"    Magdaleno shared his frustration that due to his Covid positive status, he is not able to have Faith sacrament of anointing of the sick, but welcomed Faith communion, which Fr Mcgrath provided with the support and facilitation of the bedside nurse and myself.     I offered spiritual and emotional support through reflective listening that affirmed emotions, experience, and meaning.     PLAN: Spoke with the doctor and the bedside nurse regarding Magdaleno's concerns. [facility] remains available for support.    Karen Arora  Associate   Pager [phone]  Phone [phone]        "

## 2021-11-05 NOTE — PROVIDER NOTIFICATION
Discussed use of remdesivir with Dr. Ball in pt with GFR<30, as guidelines unclear as to risk vs benefit in pt's with this degree of renal failure. He states aware of risk and would like to try short course anyway.

## 2021-11-06 NOTE — PLAN OF CARE
[id number]    A&Ox4, anxious at times. Tele: Afib CVR, HR 50's-70's. Mild HTN (140s/80s-90s). On 12-15L oximizer canula the first part of this shift, then BiPAP up to 100% FiO2.  Denies SOB at rest. Dyspnea with minimal activity. Zuniga patent; urine output lower this 12 hour shift compared to the previous. Denies pain. Robitussin given for cough. BG elevated (460 and bedtime, 388 at 0200, see previous MD notification notes). AX2 with GB and walker to pivot from chair to bed.       Addendum:  Around 0500 patient took BiPAP mask off, when writer got into the room patient's O2 sats were in the mid 70's, and patient began reporting 8/10 chest pain. Patient placed back on BiPAP mask. EKG performed. After being placed back on the BiPAP mask with improvement of O2 saturations, patient reported improvement in the chest pain. When writer returned 5 minutes later to check on patient the patient was sleeping comfortably.

## 2021-11-06 NOTE — PROVIDER NOTIFICATION
11/05/21 9:34 PM Paged Dr. Ruggiero    Pt requesting cough medication. Also,FYI blood sugar 460, 5 units of insulin given per sliding scale.    Orders received for PRN robitussin and 5 additional units of insulin aspart

## 2021-11-06 NOTE — PROVIDER NOTIFICATION
Paged Dr. Cherry 11/06/21 2073: Blood sugar 388 (was 460 at bedtime, total of 10 units given).    Orders received for 5 additional units of insulin one time

## 2021-11-06 NOTE — PROGRESS NOTES
[facility]  Hospitalist Progress Note  Ravindra Mixon MD  11/06/2021    Assessment & Plan    Burton Elizabeth is a 76 year old male with PMH CRISTHIAN, UGI bleed, CKD, DM2, paroxysmal atrial flutter, CAD, chronic HFpEF, and severe aortic stenosis who was admitted 10/26/2021 for post op monitoring following elective TAVR. Post procedure complicated acute oliguric renal failure.     Acute oliguric renal failure on CKD3, due to contrast nephropathy  S/p initiation of HD on 10/29  Patient developed rapidly rising creatinine on 10/28, approx two days after TAVR, contrast. Noted to be more volume overloaded on 10/28 and not responding to IV diuresis. Received Diuril and Bumex on 10/29 without significant response.  Creatinine 1.6-->2.9-->4.4. Urine studies and clinical picture consistent with contrast nephropathy.  - Appreciate Nephrology consult  - stopped HD, CR up  - holding diuresis and to be given albumin to mobilized fluid per nephrology today  - Continues to have good urine output, giron in place     COVID-19 Pneumonia  Acute hypoxic and hypercarbic respiratory failure  Pulmonary edema, volume overloaded  Patient has a history of HFpEf. Echo following TAVR with good LV, RV function. He was noticeably more volume overloaded following TAVR, and had respiratory distress overnight on 10/27 and 10/28 requiring diuresis. Etiology is primarily due to acute renal failure. VBG on 10/29 shows PCO2 55 and pH 7.29 indicating acute hypercapnea, likely due to pulmonary edema/respiratory faliure. PCO2 remains elevated at 60 on 10/31, however clinically improved.  -  incentive spirometry  - Needs likely outpatient sleep study, there is concern for underlying sleep apnea  - recent cxr shows increase in patchy bilateral pulmonary opacities  - noted increased pulmonary infiltrates without clinical signs of pneumonia  - lower ext ace wraps, increase ambulation  - patient had low grade fever 100.5 on 11/3 at 10 AM  along with progressive increase in oxygen requirements 5L >> 15 L > 12L  - patient dx with covid, his brother who was visiting him also tested + so suspect his brother is the transmitter.  - continue decadron   - remdesivir held due to GFR  - monitor renal function closely    - he seems to be overall stable / improving     Acute metabolic encephalopathy, improved  Due to critical illness, underlying sleep apnea, CHF.  - Seroquel PRN  - he is overall improving     Severe aortic stenosis s/p TAVR 34mm Medtronic valve (10/26/2021)  Post op mngt per cardiology, now signed off. Eliquis restarted POD#0. No anti-plt. TTE 10/26 showing prosthetic aortic valve mean gradient 9mmHg and trivial paravalvular AI.  - Continue Eliquis     Acute on chronic HFpEF    CAD with NSTEMI (July 2021)  HTN / HLD  Diagnostic coronary angiogram on 10/11/2021 similar to prior diagnostic coronary angiogram in 2019 with two-vessel coronary artery disease including chronically totally occluded circumflex and small vessel disease of the PDA branch.  - PTA atorvastatin, Coreg  - ASA discontinued  - See above for volume management     Paroxysmal atrial flutter:   - Eliquis as above  - Continue PTA atorvastatin, Coreg     ID-DM2. A1c 7.6% 10/27/2021.  PTA regimen of Lantus 18u at bedtime, glimepiride, semaglutide. Hyperglycemic on 11/1.  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 Notes previously mention suspected CRISTHIAN however pt had not completed sleep study as outpatient.  - See above for management  - no bipap with COVID Dx     DVT Prophylaxis:  "DOAC  Code Status: Full Code  PT/OT: ordered  Diet: 2 gram  Combination Diet Regular Diet Adult; 2 gm NA Diet; Low Saturated Fat Diet       Disposition:  > 2 days to TCU once O2 needs improved and nephrology plan finalized    Total time on the floor involved in the patient's care: 40 minutes   Total time spent in counseling/care coordination: >50% spent in counseling pain and symptom management in setting of COVID-19 PNA / acute respiratory failure / renal failure      Interval History      --  No acute events overnight  -- nephrology notes reviewed  -- noted continued diuresis wit IV Bumex  -- oxygen requirements stable for most part, doesn't like being on BiPAP  -- Continues to have good urine output  -- no fevers or chills  -- Becoming frustrated with not feeling much improved per patient  -- Doesn't like fluid restriction    -Data reviewed today: I reviewed all new labs and imaging over the last 24 hours. I personally reviewed no images or EKG's today.    Physical Exam    , Blood pressure 138/83, pulse 74, temperature 97.6  F (36.4  C), temperature source Oral, resp. rate 22, height 1.753 m (5' 9\"), weight 105.6 kg (232 lb 14.4 oz), SpO2 98 %.  Vitals:    11/04/21 0546 11/05/21 0630 11/06/21 0646   Weight: 105.7 kg (232 lb 15.7 oz) 104.1 kg (229 lb 6.4 oz) 105.6 kg (232 lb 14.4 oz)     Vital Signs with Ranges  Temp:  [97.6  F (36.4  C)] 97.6  F (36.4  C)  Pulse:  [54-80] 74  Resp:  [20-22] 22  BP: (123-146)/(66-91) 138/83  FiO2 (%):  [80 %-100 %] 100 %  SpO2:  [89 %-98 %] 98 %  I/O's Last 24 hours  I/O last 3 completed shifts:  In: 1200 [P.O.:1200]  Out: 1225 [Urine:1225]    Constitutional: On N/C  Respiratory: Diminished bilaterally  Cardiovascular: irregular  GI: Normal bowel sounds, soft, non-distended, non-tender  Skin/Integumen: No rashes, no cyanosis, ++ edema  Other: Flat mood and affect    Medications   All medications were reviewed.    - MEDICATION INSTRUCTIONS -       - MEDICATION INSTRUCTIONS -       " - MEDICATION INSTRUCTIONS -       sodium chloride 0.9%         albumin human  12.5 g Intravenous Q8H     apixaban ANTICOAGULANT  5 mg Oral BID     atorvastatin  80 mg Oral Daily     bumetanide  2 mg Intravenous Once     carvedilol  12.5 mg Oral BID     dexamethasone  6 mg Oral Daily     [Held by provider] furosemide  40 mg Intravenous Daily     hydrALAZINE  50 mg Oral Q8H MOE     insulin aspart  1-10 Units Subcutaneous TID AC     insulin aspart  1-7 Units Subcutaneous At Bedtime     insulin glargine  15 Units Subcutaneous Q24H     omeprazole  20 mg Oral BID     sodium chloride (PF)  3 mL Intracatheter Q8H     tamsulosin  0.4 mg Oral Daily        Data   Recent Labs   Lab 11/06/21  1209 11/06/21  0737 11/06/21  0606 11/05/21  0752 11/05/21  0623 11/04/21  1726 11/04/21  0609   WBC  --   --  6.8  --  4.2  --  6.9   HGB  --   --  10.4*  --  10.2*  --  10.7*   MCV  --   --  84  --  86  --  84   PLT  --   --  186  --  145*  --  142*   NA  --   --  134  --  133  --  137   POTASSIUM  --   --  3.7  --  4.2  --  4.0   CHLORIDE  --   --  97  --  97  --  101   CO2  --   --  32  --  32  --  33*   BUN  --   --  86*  --  77*  --  59*   CR  --   --  2.48*  --  2.68*  --  2.28*   ANIONGAP  --   --  5  --  4  --  3   GABE  --   --  8.6  --  8.7  --  8.7   * 274* 312*   < > 300*   < > 127*   ALBUMIN  --   --  2.2*  --  1.8*  --  2.0*    < > = values in this interval not displayed.       No results found for this or any previous visit (from the past 24 hour(s)).    Christian Francois MD  Text Page  (7am to 6pm)

## 2021-11-06 NOTE — PROGRESS NOTES
Hospitalist Progress Note  Christian Francois MD  11/06/2021    Assessment & Plan    Burton Elizabeth is a 76 year old male with PMH CRISTHIAN, UGI bleed, CKD, DM2, paroxysmal atrial flutter, CAD, chronic HFpEF, and severe aortic stenosis who was admitted 10/26/2021 for post op monitoring following elective TAVR. Post procedure complicated acute oliguric renal failure.     Acute oliguric renal failure on CKD3, due to contrast nephropathy  S/p initiation of HD on 10/29  Patient developed rapidly rising creatinine on 10/28, approx two days after TAVR, contrast. Noted to be more volume overloaded on 10/28 and not responding to IV diuresis. Received Diuril and Bumex on 10/29 without significant response.  Creatinine 1.6-->2.9-->4.4. Urine studies and clinical picture consistent with contrast nephropathy.  - Appreciate Nephrology consult  - stopped HD, CR up  - holding diuresis and to be given albumin to mobilized fluid per nephrology today  - Continues to have good urine output, giron in place     COVID-19 Pneumonia  Acute hypoxic and hypercarbic respiratory failure  Pulmonary edema, volume overloaded  Patient has a history of HFpEf. Echo following TAVR with good LV, RV function. He was noticeably more volume overloaded following TAVR, and had respiratory distress overnight on 10/27 and 10/28 requiring diuresis. Etiology is primarily due to acute renal failure. VBG on 10/29 shows PCO2 55 and pH 7.29 indicating acute hypercapnea, likely due to pulmonary edema/respiratory faliure. PCO2 remains elevated at 60 on 10/31, however clinically improved.  -  incentive spirometry  - Needs likely outpatient sleep study, there is concern for underlying sleep apnea  - recent cxr shows increase in patchy bilateral pulmonary opacities  - noted increased pulmonary infiltrates without clinical signs of pneumonia  - lower ext ace wraps, increase ambulation  - patient had low grade fever 100.5 on 11/3 at 10  AM along with progressive increase in oxygen requirements 5L >> 15 L > 12L  - patient dx with covid, his brother who was visiting him also tested + so suspect his brother is the transmitter.  - continue decadron start remdesivir and monitor renal function closely    - he seems to be overall improving     Acute metabolic encephalopathy, improved  Due to critical illness, underlying sleep apnea, CHF.  - Seroquel PRN  - he is overall improving     Severe aortic stenosis s/p TAVR 34mm Medtronic valve (10/26/2021)  Post op mngt per cardiology, now signed off. Eliquis restarted POD#0. No anti-plt. TTE 10/26 showing prosthetic aortic valve mean gradient 9mmHg and trivial paravalvular AI.  - Continue Eliquis     Acute on chronic HFpEF    CAD with NSTEMI (July 2021)  HTN / HLD  Diagnostic coronary angiogram on 10/11/2021 similar to prior diagnostic coronary angiogram in 2019 with two-vessel coronary artery disease including chronically totally occluded circumflex and small vessel disease of the PDA branch.  - PTA atorvastatin, Coreg  - ASA discontinued  - See above for volume management     Paroxysmal atrial flutter:   - Eliquis as above  - Continue PTA atorvastatin, Coreg     ID-DM2. A1c 7.6% 10/27/2021.  PTA regimen of Lantus 18u at bedtime, glimepiride, semaglutide. Hyperglycemic on 11/1.  - Hold home glimepiride and semaglutide due to renal failure  - lantus 15 units q24h  - change insulin to high sliding scale insulin   - BG up due to decadron     Acute GI bleed, secondary to gastritis July 2021  Cabrera's esophagus diagnosed on endoscopy  - continue PTA PPI     Suspected CRISTHIAN  Admission at [facility] with acute hypoxic, hypercarbic respiratory failure requiring BiPAP suspected to be related to CHF with severe aortic stenosis. Notes previously mention suspected CRISTHIAN however pt had not completed sleep study as outpatient.  - See above for management  - no bipap with COVID Dx     DVT Prophylaxis: DOAC  Code Status: Full  "Code  PT/OT: ordered  Diet: 2 gram  Combination Diet Regular Diet Adult; 2 gm NA Diet; Low Saturated Fat Diet       Disposition:   > 2 days    Interval History   -- cardiology and nephrology notes reviewed  -- noted continued diuresis  -- oxygen requirements stabilized  -- continues to have good urine output    -Data reviewed today: I reviewed all new labs and imaging over the last 24 hours. I personally reviewed no images or EKG's today.    Physical Exam    , Blood pressure (!) 144/83, pulse 54, temperature 97.6  F (36.4  C), temperature source Oral, resp. rate 22, height 1.753 m (5' 9\"), weight 104.1 kg (229 lb 6.4 oz), SpO2 92 %.  Vitals:    11/03/21 0600 11/04/21 0546 11/05/21 0630   Weight: 105.7 kg (233 lb) 105.7 kg (232 lb 15.7 oz) 104.1 kg (229 lb 6.4 oz)     Vital Signs with Ranges  Temp:  [97.6  F (36.4  C)-98.1  F (36.7  C)] 97.6  F (36.4  C)  Pulse:  [54-83] 54  Resp:  [20-22] 22  BP: (128-146)/(66-91) 144/83  FiO2 (%):  [80 %-95 %] 85 %  SpO2:  [89 %-98 %] 92 %  I/O's Last 24 hours  I/O last 3 completed shifts:  In: 1080 [P.O.:1080]  Out: 1100 [Urine:1100]    Constitutional: On N/C  Respiratory: Diminished bilaterally  Cardiovascular: irregular  GI: Normal bowel sounds, soft, non-distended, non-tender  Skin/Integumen: No rashes, no cyanosis, ++ edema  Other:      Medications   All medications were reviewed.    - MEDICATION INSTRUCTIONS -       - MEDICATION INSTRUCTIONS -       - MEDICATION INSTRUCTIONS -       sodium chloride 0.9%         remdesivir  100 mg Intravenous Q24H    And     sodium chloride 0.9%  50 mL Intravenous Q24H     albumin human  12.5 g Intravenous Q8H     apixaban ANTICOAGULANT  5 mg Oral BID     atorvastatin  80 mg Oral Daily     carvedilol  12.5 mg Oral BID     dexamethasone  6 mg Oral Daily     [Held by provider] furosemide  40 mg Intravenous Daily     hydrALAZINE  50 mg Oral Q8H MOE     insulin aspart  1-7 Units Subcutaneous TID AC     insulin aspart  1-5 Units Subcutaneous At " Bedtime     insulin glargine  15 Units Subcutaneous Q24H     omeprazole  20 mg Oral BID     sodium chloride (PF)  3 mL Intracatheter Q8H     tamsulosin  0.4 mg Oral Daily        Data   Recent Labs   Lab 11/06/21  0606 11/06/21  0113 11/05/21  2102 11/05/21  1642 11/05/21  0752 11/05/21  0623 11/04/21  1726 11/04/21  0609 11/03/21  0824 11/03/21  0603   WBC 6.8  --   --   --   --  4.2  --  6.9   < > 5.6   HGB 10.4*  --   --   --   --  10.2*  --  10.7*   < > 10.4*   MCV 84  --   --   --   --  86  --  84   < > 85     --   --   --   --  145*  --  142*   < > 114*   NA  --   --   --   --   --  133  --  137  --  136   POTASSIUM  --   --   --   --   --  4.2  --  4.0  --  4.3   CHLORIDE  --   --   --   --   --  97  --  101  --  103   CO2  --   --   --   --   --  32  --  33*  --  28   BUN  --   --   --   --   --  77*  --  59*  --  52*   CR  --   --   --   --   --  2.68*  --  2.28*  --  2.19*   ANIONGAP  --   --   --   --   --  4  --  3  --  5   GABE  --   --   --   --   --  8.7  --  8.7  --  8.9   GLC  --  388* 460* 291*   < > 300*   < > 127*   < > 181*   ALBUMIN  --   --   --   --   --  1.8*  --  2.0*   < > 2.1*    < > = values in this interval not displayed.       No results found for this or any previous visit (from the past 24 hour(s)).    Christian Francois MD  Text Page  (7am to 6pm)

## 2021-11-06 NOTE — PHARMACY
Prescriber Notification Note    The pharmacist has communicated with this patient's provider regarding a concern or therapy recommendation.    Notified Person: Dr Mixon  Date/Time of Notification: 11/6/21  Interaction: Face to face  Concern/Recommendation: Pt's GFR <30 (was on HD, followed by nephrology. Would not expect it to get much better soon).  Remdesivir contraindicated, despite nephrology ok with continuing.       Comments/Additional Details: Ok to hold for now, patient doing fairly well also.     Val Thomas, PharmD

## 2021-11-06 NOTE — PROGRESS NOTES
Renal Medicine Progress Note            Assessment/Plan:     # CKD 3B- Baseline creatinine appears to fluctuate around 1.7-2. Presumed due to DM/HTN     # Post TAVR OPAL-severe: Improved.      # Status post TAVR on 10/26: Normal LV/RV function.      # HFpEF: Hypervolemia: He has at least 2+ pitting edema in the legs. Diuresing well. Overall, net negative ~ 10 liters and weight is down. Bumex/Diuril held yesterday due to bump in Scr.    -legs are wrapped.      # Hypertension: Low dose Coreg. Norvasc can contribute to edema--->switched to hydralazine. BP is controlled.      # COVID 19+ 11/4/2021 and was negative on 10/22. Using 12 liters.               -dexa    -remdesivir     # IDDM and obesity: Uncontrolled. A1C is high.      # CRISTHIAN/CPAP:      Plan:  # Bumex 2 mg IV x 1 to be scheduled after next dose of albumin  # Monitor while on remdesivir with CKD  # Overall, clinically improving        Interval History:     Afebrile. BP is okay. Patient is sitting in the chair. He is using 12 liters via NC. He looks well. Per RN, he feel better.  Had chest pain when he took his CPAP off this morning. No more chest pain. Legs are wrapped.           Medications and Allergies:       remdesivir  100 mg Intravenous Q24H    And     sodium chloride 0.9%  50 mL Intravenous Q24H     albumin human  12.5 g Intravenous Q8H     apixaban ANTICOAGULANT  5 mg Oral BID     atorvastatin  80 mg Oral Daily     carvedilol  12.5 mg Oral BID     dexamethasone  6 mg Oral Daily     [Held by provider] furosemide  40 mg Intravenous Daily     hydrALAZINE  50 mg Oral Q8H MOE     insulin aspart  1-10 Units Subcutaneous TID AC     insulin aspart  1-7 Units Subcutaneous At Bedtime     insulin glargine  15 Units Subcutaneous Q24H     omeprazole  20 mg Oral BID     sodium chloride (PF)  3 mL Intracatheter Q8H     tamsulosin  0.4 mg Oral Daily        Allergies   Allergen Reactions     Penicillins Anaphylaxis            Physical Exam:   Vitals were reviewed   ,  "Blood pressure (!) 140/74, pulse 64, temperature 97.6  F (36.4  C), temperature source Oral, resp. rate 22, height 1.753 m (5' 9\"), weight 105.6 kg (232 lb 14.4 oz), SpO2 98 %.    Wt Readings from Last 3 Encounters:   11/06/21 105.6 kg (232 lb 14.4 oz)   10/21/21 112 kg (247 lb)   10/11/21 109.5 kg (241 lb 4.8 oz)       Intake/Output Summary (Last 24 hours) at 11/6/2021 1008  Last data filed at 11/6/2021 0545  Gross per 24 hour   Intake 840 ml   Output 1225 ml   Net -385 ml       Limited exam due to COVID 19 infection.   Pt is sitting in the chair and looks comfortable.  Legs are wrapped and elevated.   Breathing is non-labored.   Zuniga cath in place.         Data:     CBC RESULTS:     Recent Labs   Lab 11/06/21  0606 11/05/21  0623 11/04/21  0609 11/03/21  0603 11/02/21  0608 11/01/21  0534   WBC 6.8 4.2 6.9 5.6 5.3 5.5   RBC 4.18* 4.03* 4.27* 4.13* 4.18* 4.14*   HGB 10.4* 10.2* 10.7* 10.4* 10.6* 10.5*   HCT 35.2* 34.5* 36.0* 35.1* 36.2* 36.0*    145* 142* 114* 101* 91*       Basic Metabolic Panel:  Recent Labs   Lab 11/06/21  0737 11/06/21  0606 11/06/21  0113 11/05/21  2102 11/05/21  1642 11/05/21  1133 11/05/21  0752 11/05/21  0623 11/04/21  1726 11/04/21  0609 11/03/21  0824 11/03/21  0603 11/02/21  0851 11/02/21  0608 11/01/21  2114 11/01/21  0833 11/01/21  0534 11/01/21  0534   NA  --  134  --   --   --   --   --  133  --  137  --  136  --  136  --   --   --  139   POTASSIUM  --  3.7  --   --   --   --   --  4.2  --  4.0  --  4.3  --  4.1 4.0   < >  --  4.3   CHLORIDE  --  97  --   --   --   --   --  97  --  101  --  103  --  104  --   --   --  105   CO2  --  32  --   --   --   --   --  32  --  33*  --  28  --  31  --   --   --  26   BUN  --  86*  --   --   --   --   --  77*  --  59*  --  52*  --  41*  --   --   --  34*   CR  --  2.48*  --   --   --   --   --  2.68*  --  2.28*  --  2.19*  --  2.20*  --   --   --  2.33*   * 312* 388* 460* 291* 234*   < > 300*   < > 127*   < > 181*   < > 165*  --  "   < >   < > 135*   GABE  --  8.6  --   --   --   --   --  8.7  --  8.7  --  8.9  --  8.9  --   --   --  8.4*    < > = values in this interval not displayed.       INRNo lab results found in last 7 days.   Attestation:   I have reviewed today's relevant vital signs, notes, medications, labs and imaging.    Driss Hogan MD  [facility] - Nephrology  Office phone :[phone]  Pager: [phone]

## 2021-11-06 NOTE — PLAN OF CARE
Continues to make improvement, lower extremity edema much improved, cont to ace wrap and elevate, good urine output. O2 via oxymiser @ 12 L. Sats 85-94% Able to transfer from bed to recliner with assist, LOAIZA but recovers well.    20:20

## 2021-11-07 NOTE — PROGRESS NOTES
Renal Medicine Progress Note            Assessment/Plan:     # CKD 3B- Baseline creatinine appears to fluctuate around 1.7-2. Presumed due to DM/HTN     # Post TAVR OPAL-severe: Improved.      # Status post TAVR on 10/26: Normal LV/RV function.      # HFpEF: Hypervolemia: He has at least 2+ pitting edema in the legs. Diuresing well. Overall, net negative ~ 10 liters and weight is down. Bumex/Diuril held yesterday due to bump in Scr.                -legs are wrapped.      # Hypertension: Low dose Coreg. Norvasc can contribute to edema--->switched to hydralazine. BP is controlled.      # COVID 19+ 11/4/2021 and was negative on 10/22. Using 12 liters.               -dexa                -remdesivir      # IDDM and obesity: Uncontrolled. A1C is high.      # CRISTHIAN/CPAP: Refuses BIPAP at night.      Plan:  # Stop albumin  # Lasix 2 mg IX x 1        Interval History:     Per RN, patient feels better clinically. He refuses to wear BIPAP at night. He is using HF with 45 liters. Up and sitting in the chair. Looks well. Good urine output. Scr is trending down.          Medications and Allergies:       [START ON 11/8/2021] remdesivir  100 mg Intravenous Q24H    And     [START ON 11/8/2021] sodium chloride 0.9%  50 mL Intravenous Q24H     albumin human  12.5 g Intravenous Q8H     apixaban ANTICOAGULANT  5 mg Oral BID     atorvastatin  80 mg Oral Daily     carvedilol  12.5 mg Oral BID     [START ON 11/8/2021] dexamethasone  12 mg Oral Daily     [Held by provider] furosemide  40 mg Intravenous Daily     hydrALAZINE  50 mg Oral Q8H MOE     insulin aspart  1-10 Units Subcutaneous TID AC     insulin aspart  1-7 Units Subcutaneous At Bedtime     [START ON 11/8/2021] insulin glargine  20 Units Subcutaneous QAM AC     omeprazole  20 mg Oral BID     sodium chloride (PF)  3 mL Intracatheter Q8H     tamsulosin  0.4 mg Oral Daily        Allergies   Allergen Reactions     Penicillins Anaphylaxis            Physical Exam:   Vitals were  "reviewed   , Blood pressure (!) 144/76, pulse 71, temperature 98.2  F (36.8  C), temperature source Oral, resp. rate 18, height 1.753 m (5' 9\"), weight 105.6 kg (232 lb 14.4 oz), SpO2 90 %.    Wt Readings from Last 3 Encounters:   11/06/21 105.6 kg (232 lb 14.4 oz)   10/21/21 112 kg (247 lb)   10/11/21 109.5 kg (241 lb 4.8 oz)       Intake/Output Summary (Last 24 hours) at 11/7/2021 1442  Last data filed at 11/7/2021 1400  Gross per 24 hour   Intake 280 ml   Output 1400 ml   Net -1120 ml     Limited exam due to COVID 19 infection.   Pt is sitting in the chair. Looks well. No NAD.   Legs are wrapped and elevated-->legs are skinnier now.  Breathing is non-labored.   Zuniga cath in place.         Data:     CBC RESULTS:     Recent Labs   Lab 11/07/21  0638 11/06/21  0606 11/05/21  0623 11/04/21  0609 11/03/21  0603 11/02/21  0608   WBC 8.7 6.8 4.2 6.9 5.6 5.3   RBC 4.30* 4.18* 4.03* 4.27* 4.13* 4.18*   HGB 10.7* 10.4* 10.2* 10.7* 10.4* 10.6*   HCT 35.0* 35.2* 34.5* 36.0* 35.1* 36.2*    186 145* 142* 114* 101*       Basic Metabolic Panel:  Recent Labs   Lab 11/07/21  1156 11/07/21  0807 11/07/21  0638 11/07/21  0118 11/06/21  2115 11/06/21  1729 11/06/21  0737 11/06/21  0606 11/05/21  0752 11/05/21  0623 11/04/21  1726 11/04/21  0609 11/03/21  0824 11/03/21  0603 11/02/21  0851 11/02/21  0608   NA  --   --  131*  --   --   --   --  134  --  133  --  137  --  136  --  136   POTASSIUM  --   --  3.7  --   --   --   --  3.7  --  4.2  --  4.0  --  4.3  --  4.1   CHLORIDE  --   --  97  --   --   --   --  97  --  97  --  101  --  103  --  104   CO2  --   --  28  --   --   --   --  32  --  32  --  33*  --  28  --  31   BUN  --   --  103*  --   --   --   --  86*  --  77*  --  59*  --  52*  --  41*   CR  --   --  2.27*  --   --   --   --  2.48*  --  2.68*  --  2.28*  --  2.19*  --  2.20*   * 206* 237* 337* 459* 397*   < > 312*   < > 300*   < > 127*   < > 181*   < > 165*   GABE  --   --  9.1  --   --   --   --  8.6  --  " 8.7  --  8.7  --  8.9  --  8.9    < > = values in this interval not displayed.       INRNo lab results found in last 7 days.   Attestation:   I have reviewed today's relevant vital signs, notes, medications, labs and imaging.    Driss Hogan MD  [facility] - Nephrology  Office phone :[phone]  Pager: [phone]

## 2021-11-07 NOTE — PLAN OF CARE
Vitals stable and cardiac rhythm remains AF CVR BBB.  Pt up in chair for several hours.  Zuniga catheter patent.  Oxymizer cannula at 12LPM, oxygen sats adequate.  Pt declines Bipap per RT.  Albumin and Bumex administered.  Up with assist 1-2.  BGM were 397 and 459, MD aware.  Insulin given as prescribed sliding scale, with an additional dose per MD order.  Fair appetite with 2000ml FR.

## 2021-11-07 NOTE — PLAN OF CARE
Slow improvements, tolerating exertion, I.e transfers from bed to chair. C/o back this afternoon, tylenol 650 mg and ice given.   Continues in Afib w cvr &  BBB.   Urine output adequate but not optimal, d/w Dr. Ball, 1 dose of Bumex given.

## 2021-11-07 NOTE — PLAN OF CARE
Neuro- A&O 4 but forgetful  Most Recent Vitals- Temp: 97.8  F (36.6  C) Temp src: Oral BP: (!) 150/76 Pulse: 71   Resp: 18 SpO2: 99 % O2 Device: High Flow Nasal Cannula (HFNC)   Tele/Cardiac- Afib CVR w BBB  Resp- LS diminished with wheezes on hi flow at 80%, 45L, refused bipap went on hiflow at 0300  Activity- limited due to desatting  Pain- denies  Drips- int albumin  Drains/Tubes- PIV  Skin- some bruising , TAVR puncture sites WNL  GI/- voiding adequately with catheter  Aggression Color- Green  COVID status- Negative  Plan- discharge pending weaning O2  Misc-     Aida Khan RN

## 2021-11-07 NOTE — PROGRESS NOTES
[facility]  Hospitalist Progress Note  Ravindra Mixon MD  11/07/2021    Assessment & Plan    Burton Elizabeth is a 76 year old male with PMH CRISTHIAN, UGI bleed, CKD, DM2, paroxysmal atrial flutter, CAD, chronic HFpEF, and severe aortic stenosis who was admitted 10/26/2021 for post op monitoring following elective TAVR. Post procedure complicated acute oliguric renal failure.     Acute oliguric renal failure on CKD3, due to contrast nephropathy  S/p initiation of HD on 10/29  Patient developed rapidly rising creatinine on 10/28, approx two days after TAVR, contrast. Noted to be more volume overloaded on 10/28 and not responding to IV diuresis. Received Diuril and Bumex on 10/29 without significant response.  Creatinine 1.6-->2.9-->4.4. Urine studies and clinical picture consistent with contrast nephropathy.  - Appreciate Nephrology consult  - stopped HD, CR up  - holding diuresis and to be given albumin to mobilized fluid per nephrology today  - Continues to have good urine output, giron in place     COVID-19 Pneumonia  Acute hypoxic and hypercarbic respiratory failure  Pulmonary edema, volume overloaded  Patient has a history of HFpEf. Echo following TAVR with good LV, RV function. He was noticeably more volume overloaded following TAVR, and had respiratory distress overnight on 10/27 and 10/28 requiring diuresis. Etiology is primarily due to acute renal failure. VBG on 10/29 shows PCO2 55 and pH 7.29 indicating acute hypercapnea, likely due to pulmonary edema/respiratory faliure. PCO2 remains elevated at 60 on 10/31, however clinically improved.  -  incentive spirometry  - Needs likely outpatient sleep study, there is concern for underlying sleep apnea  - recent cxr shows increase in patchy bilateral pulmonary opacities  - noted increased pulmonary infiltrates without clinical signs of pneumonia  - lower ext ace wraps, increase ambulation  - patient had low grade fever 100.5 on 11/3 at 10 AM  along with progressive increase in oxygen requirements 5L >> 15 L > 12L  - patient dx with covid, his brother who was visiting him also tested + so suspect his brother is the transmitter.  - continue decadron, increased dose to 12 mg  - remdesivir protocol, GFR acceptable today  - monitor renal function closely    - he seems to be overall stable / improving     Acute metabolic encephalopathy, improved  Due to critical illness, underlying sleep apnea, CHF.  - Seroquel PRN  - he is overall improving     Severe aortic stenosis s/p TAVR 34mm [employer] valve (10/26/2021)  Post op mngt per cardiology, now signed off. Eliquis restarted POD#0. No anti-plt. TTE 10/26 showing prosthetic aortic valve mean gradient 9mmHg and trivial paravalvular AI.  - Continue Eliquis     Acute on chronic HFpEF    CAD with NSTEMI (July 2021)  HTN / HLD  Diagnostic coronary angiogram on 10/11/2021 similar to prior diagnostic coronary angiogram in 2019 with two-vessel coronary artery disease including chronically totally occluded circumflex and small vessel disease of the PDA branch.  - PTA atorvastatin, Coreg  - ASA discontinued  - See above for volume management     Paroxysmal atrial flutter:   - Eliquis as above  - Continue PTA atorvastatin, Coreg     ID-DM2. A1c 7.6% 10/27/2021.  PTA regimen of Lantus 18u at bedtime, glimepiride, semaglutide. Hyperglycemic on 11/1.  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 Notes previously mention suspected CRISTHIAN however pt had not completed sleep study as outpatient.  - See above for management  - no bipap  "with COVID Dx     DVT Prophylaxis: DOAC  Code Status: Full Code  PT/OT: ordered  Diet: 2 gram  Combination Diet Regular Diet Adult; 2 gm NA Diet; Low Saturated Fat Diet       Disposition:  > 2 days to TCU once O2 needs improved and nephrology plan finalized    Total time on the floor involved in the patient's care: 40 minutes   Total time spent in counseling/care coordination: >50% spent in counseling pain and symptom management in setting of COVID-19 PNA / acute respiratory failure / renal failure      Interval History      --  No acute events overnight  -- nephrology notes reviewed  -- noted continued diuresis with Lasix today  -- oxygen requirements stable for most part, doesn't like being on BiPAP and okay with HFNC today  -- Continues to have good urine output  -- no fevers or chills  -- no new CP, no nausea / vomiting or abdominal pain    -Data reviewed today: I reviewed all new labs and imaging over the last 24 hours. I personally reviewed no images or EKG's today.    Physical Exam    , Blood pressure (!) 144/76, pulse 71, temperature 98.2  F (36.8  C), temperature source Oral, resp. rate 18, height 1.753 m (5' 9\"), weight 105.6 kg (232 lb 14.4 oz), SpO2 90 %.  Vitals:    11/04/21 0546 11/05/21 0630 11/06/21 0646   Weight: 105.7 kg (232 lb 15.7 oz) 104.1 kg (229 lb 6.4 oz) 105.6 kg (232 lb 14.4 oz)     Vital Signs with Ranges  Temp:  [97.3  F (36.3  C)-98.2  F (36.8  C)] 98.2  F (36.8  C)  Pulse:  [63-83] 71  Resp:  [18] 18  BP: (129-152)/(68-92) 144/76  FiO2 (%):  [80 %] 80 %  SpO2:  [82 %-99 %] 90 %  I/O's Last 24 hours  I/O last 3 completed shifts:  In: 280 [P.O.:180; I.V.:100]  Out: 1400 [Urine:1400]    Constitutional: On N/C  Respiratory: Diminished bilaterally  Cardiovascular: irregular, normal rate. No m/r/g  GI: Normal bowel sounds, soft, non-distended, non-tender  Skin/Integumen: No rashes, no cyanosis, ++ edema  Other: Normal mood and affect    Medications   All medications were reviewed.    - " MEDICATION INSTRUCTIONS -       - MEDICATION INSTRUCTIONS -       - MEDICATION INSTRUCTIONS -       sodium chloride 0.9%         [START ON 11/8/2021] remdesivir  100 mg Intravenous Q24H    And     [START ON 11/8/2021] sodium chloride 0.9%  50 mL Intravenous Q24H     apixaban ANTICOAGULANT  5 mg Oral BID     atorvastatin  80 mg Oral Daily     carvedilol  12.5 mg Oral BID     [START ON 11/8/2021] dexamethasone  12 mg Oral Daily     hydrALAZINE  50 mg Oral Q8H MOE     insulin aspart  1-10 Units Subcutaneous TID AC     insulin aspart  1-7 Units Subcutaneous At Bedtime     [START ON 11/8/2021] insulin glargine  20 Units Subcutaneous QAM AC     omeprazole  20 mg Oral BID     sodium chloride (PF)  3 mL Intracatheter Q8H     tamsulosin  0.4 mg Oral Daily        Data   Recent Labs   Lab 11/07/21  1156 11/07/21  0807 11/07/21  0638 11/06/21  0737 11/06/21  0606 11/05/21  0752 11/05/21  0623   WBC  --   --  8.7  --  6.8  --  4.2   HGB  --   --  10.7*  --  10.4*  --  10.2*   MCV  --   --  81  --  84  --  86   PLT  --   --  193  --  186  --  145*   NA  --   --  131*  --  134  --  133   POTASSIUM  --   --  3.7  --  3.7  --  4.2   CHLORIDE  --   --  97  --  97  --  97   CO2  --   --  28  --  32  --  32   BUN  --   --  103*  --  86*  --  77*   CR  --   --  2.27*  --  2.48*  --  2.68*   ANIONGAP  --   --  6  --  5  --  4   GABE  --   --  9.1  --  8.6  --  8.7   * 206* 237*   < > 312*   < > 300*   ALBUMIN  --   --  2.7*  --  2.2*  --  1.8*   PROTTOTAL  --   --  6.4*  --   --   --   --    BILITOTAL  --   --  0.5  --   --   --   --    ALKPHOS  --   --  148  --   --   --   --    ALT  --   --  33  --   --   --   --    AST  --   --  66*  --   --   --   --     < > = values in this interval not displayed.       No results found for this or any previous visit (from the past 24 hour(s)).    DEUCE ANTHONY MD  [facility]

## 2021-11-07 NOTE — PROVIDER NOTIFICATION
MD Notification    Notified Person: MD Hospitalist     Notified Person Name: Dr. Cherry     Notification Date/Time: 11/6/21 2200    Notification Interaction: Telephone     Purpose of Notification: Elevated  1508    Orders Received: Give additional Novolog 7 units

## 2021-11-07 NOTE — PROVIDER NOTIFICATION
MD Notification    Notified Person: MD    Notified Person Name: Dr Fili Cherry    Notification Date/Time:     Notification Interaction: Talked with MD    Purpose of Notification:  Pt had HS BS of 459 and was given total of 14 units insulin. current BS of 337  Do you want to give anything to correct.    Orders Received: Do not give correction dose     Comments:

## 2021-11-08 NOTE — PROGRESS NOTES
"CLINICAL NUTRITION SERVICES - REASSESSMENT NOTE      Recommendations Ordered by Registered Dietitian (RD):   Ensure Enlive (vanilla) BID at 10 am and 2 pm    Future/Additional Recommendations:   Monitor BG and need for change to Glucerna supplement (lower in CHO though also lower in calories and protein)   Malnutrition: (11/2)   % Weight Loss:  Up to 5% in 1 month (moderate malnutrition)  % Intake:  </= 50% for >/= 5 days (severe malnutrition) - slowly improving  Subcutaneous Fat Loss:  Upper arm region mild depletion and Thoracic region mild depletion  Muscle Loss:  Clavicle bone region mild depletion, Acromion bone region mild depletion and Scapular bone region mild depletion  Fluid Retention:  Moderate 2-3+     Malnutrition Diagnosis: Moderate malnutrition  In Context of:  Acute illness or injury       EVALUATION OF PROGRESS TOWARD GOALS   Diet:  2 gram Sodium, 2000 ml Fluid Restriction     Supplements: Pt declined use of supplements during RD assessment on 11/2.     Intake/Tolerance:  Pt has been ordering 2-3 meals daily from the kitchen with % intakes over the past several days. Fair appetite per RN notes. However, most meals are still CHO-dense and lacking in protein. Some improvement with protein intake including intake of sausage, can and yogurt.     Spoke with pt over the phone today who states his appetite is \"OK\". Appetite is not improving much. Trying to order protein with every meal but states it has been hard as he is \"not fond of the protein options\". Open to trying a protein shake now. States he drinks Premier Protein shakes at home, has never had Ensure but open to trying this.       ASSESSED NUTRITION NEEDS:  Dosing Weight 81.3 kg - adjusted   Estimated Energy Needs: 4281-9521 kcals (25-30 Kcal/Kg)  Justification: maintenance and repletion  Estimated Protein Needs:  grams protein (1.2-1.8 g pro/Kg)  Justification: dialysis and preservation of lean body mass  Estimated Fluid Needs: " 2000 mL - per MD      NEW FINDINGS:   - Labs: Reviewed. K+ and Mg WNL, Phos 2.2 (L). CRP 26.8 (H). Hgb A1c 7.8% on 11/6, -302. BUN 99 (H), Cr 2.20 (H), GFR 28 (L).   - Meds: bumex BID, high resistance sliding scale insulin, 20 units lantus daily in the morning  - GI: BM x 1 on 11/7 and x 1 yesterday   - Renal: HD on 10/29 and 10/30, now discontinued. Responding to diuresis.   - Resp: Found to be COVID+ on 11/4. Off and on Bipap though noted over the past couple nights pt has been refusing.   - Weight: Weight overall down 11# in the past week and 16# over the past 2 weeks likley 2/2 diuresis. Per prior RD note, pt thought he had lost 8-9# in addition to fluid shifts, potential for 3-4% wt loss over the past month.     Previous Goals:   Intake of 100% of at least meals BID each with a protein source.   Evaluation: Not met    Previous Nutrition Diagnosis:   Inadequate oral intake related to pt cites diet restriction, suspect reduced appetite/lethargy as evidenced by pt has been ordering just 1-2 meals most days x7 day admission, these have been low in protein and higher in CHO-dense foods.   Evaluation: No change, updated below       CURRENT NUTRITION DIAGNOSIS  Inadequate oral intake related to pt cites diet restriction, reduced appetite/lethargy as evidenced by pt ordering 2-3 meals most days over admission, which have been low in protein and higher in CHO-dense foods.     INTERVENTIONS  Recommendations / Nutrition Prescription  Ensure Enlive (vanilla) BID at 10 am and 2 pm   Monitor BG and need for change to Glucerna supplement (lower in CHO though also lower in calories and protein)    Implementation  Medical Food Supplement    Goals  Intake of % of meals at least BID + 100% intake of at least 1 supplement daily       MONITORING AND EVALUATION:  Progress towards goals will be monitored and evaluated per protocol and Practice Guidelines      Pooja Cortez RD, LD  Unit RD Pager: [phone]

## 2021-11-08 NOTE — PROVIDER NOTIFICATION
MD Notification    Notified Person: MD Hospitalist    Notified Person Name: Dr. Ireland    Notification Date/Time: 11/7/21 1950    Notification Interaction: Page sent through answering service     Purpose of Notification: Pt requesting sleeping pill     Orders Received: Trazodone

## 2021-11-08 NOTE — PROGRESS NOTES
Brief Hospitalist Cross cover note:    Temp: 97.3  F (36.3  C) Temp src: Oral BP: 129/78 Pulse: 82   Resp: 18 SpO2: 95 % O2 Device: High Flow Nasal Cannula (HFNC) Oxygen Delivery: 45 LPM     Paged for sleep med, something stronger than melatonin.   - ordered trazodone 50mg prn        Krystyna Stone MD  7:56 PM

## 2021-11-08 NOTE — PROGRESS NOTES
[facility]  Hospitalist Progress Note    Ravindra Mixon MD  11/08/2021    Assessment & Plan     Burton Elizabeth is a 76 year old male with PMH CRISTHIAN, UGI bleed, CKD, DM2, paroxysmal atrial flutter, CAD, chronic HFpEF, and severe aortic stenosis who was admitted 10/26/2021 for post op monitoring following elective TAVR. Post procedure complicated acute oliguric renal failure.     Acute oliguric renal failure on CKD3, due to contrast nephropathy  S/p initiation of HD on 10/29  Patient developed rapidly rising creatinine on 10/28, approx two days after TAVR, contrast. Noted to be more volume overloaded on 10/28 and not responding to IV diuresis. Received Diuril and Bumex on 10/29 without significant response.  Creatinine 1.6-->2.9-->4.4. Urine studies and clinical picture consistent with contrast nephropathy. Cr now downtrending with diuresis and good UOP  Plan:  - Appreciate Nephrology consult -> now signed off  - stopped HD, CR up  - holding diuresis and to be given albumin to mobilized fluid per nephrology today  - Continues to have good urine output, giron in place     COVID-19 Pneumonia  Acute hypoxic and hypercarbic respiratory failure  Pulmonary edema, volume overloaded  Patient has a history of HFpEf. Echo following TAVR with good LV, RV function. He was noticeably more volume overloaded following TAVR, and had respiratory distress overnight on 10/27 and 10/28 requiring diuresis. Etiology is primarily due to acute renal failure. VBG on 10/29 shows PCO2 55 and pH 7.29 indicating acute hypercapnea, likely due to pulmonary edema/respiratory faliure. PCO2 remains elevated at 60 on 10/31, however clinically improved.  -  incentive spirometry  - Needs likely outpatient sleep study, there is concern for underlying sleep apnea  - recent cxr shows increase in patchy bilateral pulmonary opacities  - noted increased pulmonary infiltrates without clinical signs of pneumonia  - lower ext ace wraps,  increase ambulation  - patient had low grade fever 100.5 on 11/3 at 10 AM along with progressive increase in oxygen requirements -> now on HFNC 45L  - Bumex 2 mg BID  - patient dx with covid, his brother who was visiting him also tested + so suspect his brother is the transmitter.  - BiPAP QHS  - continue decadron, increased dose to 12 mg  - remdesivir protocol, GFR acceptable   - monitor renal function closely    - he seems to be overall stable for now     Acute metabolic encephalopathy, improved  Due to critical illness, underlying sleep apnea, CHF.  - Seroquel PRN  - he is overall improving     Severe aortic stenosis s/p TAVR 34mm Medtronic valve (10/26/2021)  Post op mngt per cardiology, now signed off. Eliquis restarted POD#0. No anti-plt. TTE 10/26 showing prosthetic aortic valve mean gradient 9mmHg and trivial paravalvular AI.  - Continue Eliquis     Acute on chronic HFpEF    CAD with NSTEMI (July 2021)  HTN / HLD  Diagnostic coronary angiogram on 10/11/2021 similar to prior diagnostic coronary angiogram in 2019 with two-vessel coronary artery disease including chronically totally occluded circumflex and small vessel disease of the PDA branch.  - PTA atorvastatin, Coreg  - ASA discontinued  - See above for volume management     Paroxysmal atrial flutter:   - Eliquis as above  - Continue PTA atorvastatin, Coreg     ID-DM2. A1c 7.6% 10/27/2021.  PTA regimen of Lantus 18u at bedtime, glimepiride, semaglutide. Hyperglycemic on 11/1.  Plan:  - Hold home glimepiride and semaglutide due to renal failure  - lantus increased to 26 units q24h  - Continue high sliding scale insulin   - BG likely up in big part due to decadron     Acute GI bleed, secondary to gastritis July 2021  Cabrera's esophagus diagnosed on endoscopy  - continue PTA PPI     Suspected CRISTHIAN  Admission at [facility] with acute hypoxic, hypercarbic respiratory failure requiring BiPAP suspected to be related to CHF with severe aortic stenosis. Notes  "previously mention suspected CRISTHIAN however pt had not completed sleep study as outpatient.  - See above for management  - no bipap with COVID Dx     DVT Prophylaxis: DOAC  Code Status: Full Code  PT/OT: ordered  Diet: 2 gram  Combination Diet Regular Diet Adult; 2 gm NA Diet; Low Saturated Fat Diet       Disposition:  > 2 days to TCU once O2 needs improved and nephrology plan finalized    Total time on the floor involved in the patient's care: 40 minutes   Total time spent in counseling/care coordination: >50% spent in counseling pain and symptom management in setting of COVID-19 PNA / acute respiratory failure / renal failure      Interval History      --  No acute events overnight  -- oxygen requirements stable for most part, doesn't like being on BiPAP but okay to use at bedtime per patient  -- Continues to have good urine output  -- no fevers or chills  -- no new CP, no nausea / vomiting or abdominal pain  -- no new complaints    -Data reviewed today: I reviewed all new labs and imaging over the last 24 hours. I personally reviewed no images or EKG's today.    Physical Exam    , Blood pressure (!) 153/88, pulse 64, temperature 97.9  F (36.6  C), temperature source Oral, resp. rate 16, height 1.753 m (5' 9\"), weight 105.6 kg (232 lb 12.8 oz), SpO2 92 %.  Vitals:    11/05/21 0630 11/06/21 0646 11/08/21 0500   Weight: 104.1 kg (229 lb 6.4 oz) 105.6 kg (232 lb 14.4 oz) 105.6 kg (232 lb 12.8 oz)     Vital Signs with Ranges  Temp:  [97.3  F (36.3  C)-98.6  F (37  C)] 97.9  F (36.6  C)  Pulse:  [55-82] 64  Resp:  [16-20] 16  BP: (117-154)/() 153/88  FiO2 (%):  [70 %-95 %] 95 %  SpO2:  [82 %-97 %] 92 %  I/O's Last 24 hours  I/O last 3 completed shifts:  In: 555 [P.O.:555]  Out: 1075 [Urine:1075]    Constitutional: On N/C  Respiratory: Diminished bilaterally  Cardiovascular: irregular, normal rate. No m/r/g  GI: Normal bowel sounds, soft, non-distended, non-tender  Skin/Integumen: No rashes, no cyanosis, ++ " edema  Other: Normal mood and affect    Medications   All medications were reviewed.    - MEDICATION INSTRUCTIONS -       - MEDICATION INSTRUCTIONS -       - MEDICATION INSTRUCTIONS -       sodium chloride 0.9%         remdesivir  100 mg Intravenous Q24H    And     sodium chloride 0.9%  50 mL Intravenous Q24H     apixaban ANTICOAGULANT  5 mg Oral BID     atorvastatin  80 mg Oral Daily     bumetanide  2 mg Oral BID     carvedilol  12.5 mg Oral BID     dexamethasone  12 mg Oral Daily     hydrALAZINE  50 mg Oral Q8H MOE     insulin aspart  5 Units Subcutaneous Once     insulin aspart  1-10 Units Subcutaneous TID AC     insulin aspart  1-7 Units Subcutaneous At Bedtime     [START ON 11/9/2021] insulin glargine  26 Units Subcutaneous QAM AC     omeprazole  20 mg Oral BID     sodium chloride (PF)  3 mL Intracatheter Q8H     tamsulosin  0.4 mg Oral Daily        Data   Recent Labs   Lab 11/08/21  1145 11/08/21  0918 11/08/21  0631 11/07/21  0807 11/07/21  0638 11/06/21  0737 11/06/21  0606   WBC  --   --  8.6  --  8.7  --  6.8   HGB  --   --  10.7*  --  10.7*  --  10.4*   MCV  --   --  81  --  81  --  84   PLT  --   --  192  --  193  --  186   NA  --   --  133  --  131*  --  134   POTASSIUM  --   --  3.8  --  3.7  --  3.7   CHLORIDE  --   --  97  --  97  --  97   CO2  --   --  30  --  28  --  32   BUN  --   --  99*  --  103*  --  86*   CR  --   --  2.20*  --  2.27*  --  2.48*   ANIONGAP  --   --  6  --  6  --  5   GABE  --   --  9.1  --  9.1  --  8.6   * 265* 273*   < > 237*   < > 312*   ALBUMIN  --   --  2.7*  --  2.7*  --  2.2*   PROTTOTAL  --   --  6.3*  --  6.4*  --   --    BILITOTAL  --   --  0.8  --  0.5  --   --    ALKPHOS  --   --  152*  --  148  --   --    ALT  --   --  44  --  33  --   --    AST  --   --  88*  --  66*  --   --     < > = values in this interval not displayed.       No results found for this or any previous visit (from the past 24 hour(s)).    DEUCE ANTHONY MD  [facility]

## 2021-11-08 NOTE — TELEPHONE ENCOUNTER
[place] [employer] [facility] [employer] rep called attempted to get a hold of pt. I transferred her to [facility] as pt is not with [facility] and currently in hospital.     Thanks,  TIMA Araujo  [facility]

## 2021-11-08 NOTE — PROGRESS NOTES
"Cardiology Progress Note          Assessment and Plan:         76-year-old male who was admitted after TAVR with a 34 mm Medtronic valve on 10/26/2021.  Stay complicated by contrast-induced nephropathy, volume overload for which he received temporary hemodialysis. Now receiving IV bumex with acceptable urine output and stable creatinine. Also tested positive for COVID-19 after admission.  Pertinent history includes CAD, paroxysmal atrial fibrillation [on Eliquis], chronic left bundle branch block, history of CVA, type 2 diabetes and hypertension.    IMPRESSION:    1. Severe aortic stenosis s/p TAVR 34mm Medtronic valve on 10/26/2021. Echocardiogram 10/27/21 demonstrated normal prosthetic valve function with trivial PVL.  2. Acute on chronic renal failure as described above.  3. COVID-19 infection  4. Hypertension  5. PAF on eliquis  6. LBBB    PLAN  - Switch to PO bumex  - please call with questions.           Interval History:     Feels about the same. Some dyspnea at rest. Cr stable.        Review of Systems:   As per subjective, otherwise 5 systems reviewed and negative.           Physical Exam:   Blood pressure (!) 143/79, pulse 55, temperature 97.9  F (36.6  C), temperature source Oral, resp. rate 20, height 1.753 m (5' 9\"), weight 105.6 kg (232 lb 12.8 oz), SpO2 91 %.      Vital Sign Ranges  Temperature Temp  Av  F (36.7  C)  Min: 97.3  F (36.3  C)  Max: 98.6  F (37  C)   Blood pressure Systolic (24hrs), Av , Min:129 , Max:154        Diastolic (24hrs), Av, Min:71, Max:87      Pulse Pulse  Av.2  Min: 55  Max: 82   Respirations Resp  Av.4  Min: 18  Max: 20   Pulse oximetry SpO2  Av.7 %  Min: 82 %  Max: 97 %         Intake/Output Summary (Last 24 hours) at 2021 0921  Last data filed at 2021 0500  Gross per 24 hour   Intake 555 ml   Output 1075 ml   Net -520 ml       Constitutional: NAD  Skin: Warm and dry  Neck: No JVD  Lungs: CTA  Cardiovascular: irregular  Abdomen: Soft, " non tender.  Extremities and Back: +2 BLE edema  Neurological: Nonfocal           Medications:     I have reviewed this patient's current medications.              Data:     Labs reviewed.     Tele: EZRA Majano MD, MDAILY.

## 2021-11-08 NOTE — PLAN OF CARE
Vitals stable and cardiac rhythm remains AF CVR BBB.  Pt up in chair for several hours.  Fair appetite with 2000ml FR.  Pt on HF oxygen, occasionally desats, but recovers well.  Up with assist 1-2.  BGM were 288 and 285, sliding scale Novolog and Lantus administered.

## 2021-11-08 NOTE — PLAN OF CARE
A&OX3, HFNC in place, Tele- Afib CVR with BBB, PVCs, regular diet with 2 L fluid restrictions, assist of 1 with walker and belt.Zuniga in place with good urine output, . Rested comfortably this night.

## 2021-11-08 NOTE — PROGRESS NOTES
[facility]     Renal Progress Note       SHORTHAND KEY FOR MY NOTES:  c = with, s = without, p = after, a = before, x = except, asx = asymptomatic, tx = transplant or treatment, sx = symptoms or symptomatic, cx = canceled or culture, rxn = reaction, yday = yesterday, nl = normal, abx = antibiotics, fxn = function, dx = diagnosis, dz = disease, m/h = melena/hematochezia, c/d/l/ha = cramping/dizziness/lightheadedness/headache, d/c = discharge or diarrhea/constipation, f/c/n/v = fevers/chills/nausea/vomiting, cp/sob = chest pain/shortness of breath, tbv = total body volume, rxn = reaction, tdc = tunneled dialysis catheter, pta = prior to admission, hd = hemodialysis, pd = peritoneal dialysis, hhd = home hemodialysis, edw = estimated dry wt         Assessment/Plan:     1.  OPAL/CKD IV.  Pt's cr is back to baseline.  Good uo.  Chemistries are ok.  A.  Change bumet to 2 mg po bid.  There is 1:1 bioavailability of IV:PO.    D/w Dr. Majano (Cards) and Eric RN.    Thank you for this consultation.  I will sign off.  Please call if any questions.        Interval History:     Notes, labs, vitals reviewed.  Per RN, pt is doing ok.  Good uo.          Medications and Allergies:       remdesivir  100 mg Intravenous Q24H    And     sodium chloride 0.9%  50 mL Intravenous Q24H     apixaban ANTICOAGULANT  5 mg Oral BID     atorvastatin  80 mg Oral Daily     bumetanide  2 mg Oral BID     carvedilol  12.5 mg Oral BID     dexamethasone  12 mg Oral Daily     hydrALAZINE  50 mg Oral Q8H MOE     insulin aspart  1-10 Units Subcutaneous TID AC     insulin aspart  1-7 Units Subcutaneous At Bedtime     insulin glargine  20 Units Subcutaneous QAM AC     omeprazole  20 mg Oral BID     sodium chloride (PF)  3 mL Intracatheter Q8H     tamsulosin  0.4 mg Oral Daily     Allergies   Allergen Reactions     Penicillins Anaphylaxis          Physical Exam:     Vitals were reviewed     , Blood pressure (!) 150/67, pulse 64,  "temperature 97.9  F (36.6  C), temperature source Oral, resp. rate 20, height 1.753 m (5' 9\"), weight 105.6 kg (232 lb 12.8 oz), SpO2 92 %.  Wt Readings from Last 3 Encounters:   11/08/21 105.6 kg (232 lb 12.8 oz)   10/21/21 112 kg (247 lb)   10/11/21 109.5 kg (241 lb 4.8 oz)     Intake/Output Summary (Last 24 hours) at 11/8/2021 1039  Last data filed at 11/8/2021 0500  Gross per 24 hour   Intake 555 ml   Output 1075 ml   Net -520 ml     GENERAL APPEARANCE: pleasant, NAD, sitting in chair  Pt not examined due to COVID + status.         Data:     CBC RESULTS:     Recent Labs   Lab 11/08/21  0631 11/07/21  0638 11/06/21  0606 11/05/21  0623 11/04/21  0609 11/03/21  0603   WBC 8.6 8.7 6.8 4.2 6.9 5.6   RBC 4.26* 4.30* 4.18* 4.03* 4.27* 4.13*   HGB 10.7* 10.7* 10.4* 10.2* 10.7* 10.4*   HCT 34.4* 35.0* 35.2* 34.5* 36.0* 35.1*    193 186 145* 142* 114*     Basic Metabolic Panel:  Recent Labs   Lab 11/08/21  0918 11/08/21  0631 11/08/21  0123 11/07/21  2213 11/07/21  1741 11/07/21  1156 11/07/21  0807 11/07/21  0638 11/06/21  0737 11/06/21  0606 11/05/21  0752 11/05/21  0623 11/04/21  1726 11/04/21  0609 11/03/21  0824 11/03/21  0603   NA  --  133  --   --   --   --   --  131*  --  134  --  133  --  137  --  136   POTASSIUM  --  3.8  --   --   --   --   --  3.7  --  3.7  --  4.2  --  4.0  --  4.3   CHLORIDE  --  97  --   --   --   --   --  97  --  97  --  97  --  101  --  103   CO2  --  30  --   --   --   --   --  28  --  32  --  32  --  33*  --  28   BUN  --  99*  --   --   --   --   --  103*  --  86*  --  77*  --  59*  --  52*   CR  --  2.20*  --   --   --   --   --  2.27*  --  2.48*  --  2.68*  --  2.28*  --  2.19*   * 273* 268* 285* 288* 243*   < > 237*   < > 312*   < > 300*   < > 127*   < > 181*   GABE  --  9.1  --   --   --   --   --  9.1  --  8.6  --  8.7  --  8.7  --  8.9    < > = values in this interval not displayed.     INRNo lab results found in last 7 days.   Attestation:   I have reviewed today's " relevant vital signs, notes, medications, labs and imaging.    Gavino Shipley MD  [facility] - Nephrology  [phone]

## 2021-11-09 NOTE — PROGRESS NOTES
[facility]  Hospitalist Progress Note    Ravindra Mixon MD  11/09/2021    Assessment & Plan     Burton Elizabeth is a 76 year old male with PMH CRISTHIAN, UGI bleed, CKD, DM2, paroxysmal atrial flutter, CAD, chronic HFpEF, and severe aortic stenosis who was admitted 10/26/2021 for post op monitoring following elective TAVR. Post procedure complicated acute oliguric renal failure.     Acute oliguric renal failure on CKD3, due to contrast nephropathy  S/p initiation of HD on 10/29  Patient developed rapidly rising creatinine on 10/28, approx two days after TAVR, contrast. Noted to be more volume overloaded on 10/28 and not responding to IV diuresis. Received Diuril and Bumex on 10/29 without significant response.  Creatinine 1.6-->2.9-->4.4. Urine studies and clinical picture consistent with contrast nephropathy. Cr now downtrending with diuresis and good UOP  Plan:  - Appreciate Nephrology consult -> now signed off  - stopped HD, CR improving  - Continues to have good urine output, giron in place     COVID-19 Pneumonia  Acute hypoxic and hypercarbic respiratory failure  Pulmonary edema, volume overloaded  Patient has a history of HFpEf. Echo following TAVR with good LV, RV function. He was noticeably more volume overloaded following TAVR, and had respiratory distress overnight on 10/27 and 10/28 requiring diuresis. Etiology is primarily due to acute renal failure. VBG on 10/29 shows PCO2 55 and pH 7.29 indicating acute hypercapnea, likely due to pulmonary edema/respiratory faliure. PCO2 remains elevated at 60 on 10/31, however clinically improved.  -  incentive spirometry  - Needs likely outpatient sleep study, there is concern for underlying sleep apnea  - recent cxr shows increase in patchy bilateral pulmonary opacities  - noted increased pulmonary infiltrates without clinical signs of pneumonia  - lower ext ace wraps, increase ambulation  - patient had low grade fever 100.5 on 11/3 at 10 AM along  with progressive increase in oxygen requirements -> now on HFNC 45L  - Bumex 2 mg BID  - patient dx with covid, his brother who was visiting him also tested + so suspect his brother is the transmitter.  - BiPAP QHS  - continue decadron, increased dose to 12 mg  - remdesivir protocol, GFR acceptable   - monitor renal function closely    - he seems to be overall stable for now     Acute metabolic encephalopathy, improved  Due to critical illness, underlying sleep apnea, CHF.  - Seroquel PRN  - he is overall improving     Severe aortic stenosis s/p TAVR 34mm Medtronic valve (10/26/2021)  Post op mngt per cardiology, now signed off. Eliquis restarted POD#0. No anti-plt. TTE 10/26 showing prosthetic aortic valve mean gradient 9mmHg and trivial paravalvular AI.  - Continue Eliquis     Acute on chronic HFpEF    CAD with NSTEMI (July 2021)  HTN / HLD  Diagnostic coronary angiogram on 10/11/2021 similar to prior diagnostic coronary angiogram in 2019 with two-vessel coronary artery disease including chronically totally occluded circumflex and small vessel disease of the PDA branch.  - PTA atorvastatin, Coreg  - ASA discontinued  - See above for volume management     Paroxysmal atrial flutter:   - Eliquis as above  - Continue PTA atorvastatin, Coreg     ID-DM2. A1c 7.6% 10/27/2021.  PTA regimen of Lantus 18u at bedtime, glimepiride, semaglutide. Hyperglycemic on 11/1.  Plan:  - Hold home glimepiride and semaglutide due to renal failure  - lantus increased to 26 units q24h  - Continue high sliding scale insulin   - BG likely up in big part due to decadron     Acute GI bleed, secondary to gastritis July 2021  Cabrera's esophagus diagnosed on endoscopy  - continue PTA PPI     Suspected CRISTHIAN  Admission at [facility] [place] 2] with acute hypoxic, hypercarbic respiratory failure requiring BiPAP suspected to be related to CHF with severe aortic stenosis. Notes previously mention suspected CRISTHIAN however pt had not completed sleep study as  "outpatient.  - See above for management  - no bipap with COVID Dx     DVT Prophylaxis: DOAC  Code Status: Full Code  PT/OT: ordered  Diet: 2 gram  Combination Diet Regular Diet Adult; 2 gm NA Diet; Low Saturated Fat Diet       Disposition:  > 2 days to TCU once O2 needs improved and nephrology plan finalized    Total time on the floor involved in the patient's care: 40 minutes   Total time spent in counseling/care coordination: >50% spent in counseling pain and symptom management in setting of COVID-19 PNA / acute respiratory failure / renal failure    Interval History      --  No acute events overnight  -- oxygen requirements declining slowly  -- Continues to have good urine output  -- no fevers or chills  -- no new CP, no nausea / vomiting or abdominal pain  -- no new complaints, anxious for discharge    -Data reviewed today: I reviewed all new labs and imaging over the last 24 hours. I personally reviewed no images or EKG's today.    Physical Exam    , Blood pressure (!) 146/74, pulse 66, temperature 97.4  F (36.3  C), temperature source Oral, resp. rate 20, height 1.753 m (5' 9\"), weight 106.1 kg (233 lb 12.8 oz), SpO2 93 %.  Vitals:    11/06/21 0646 11/08/21 0500 11/09/21 0542   Weight: 105.6 kg (232 lb 14.4 oz) 105.6 kg (232 lb 12.8 oz) 106.1 kg (233 lb 12.8 oz)     Vital Signs with Ranges  Temp:  [97.3  F (36.3  C)-98.6  F (37  C)] 97.4  F (36.3  C)  Pulse:  [62-87] 66  Resp:  [18-22] 20  BP: (143-161)/(74-90) 146/74  FiO2 (%):  [45 %-100 %] 70 %  SpO2:  [90 %-100 %] 93 %  I/O's Last 24 hours  I/O last 3 completed shifts:  In: 680 [P.O.:680]  Out: 1250 [Urine:1250]    Constitutional: On HFNC, no apparent distress  Respiratory: Diminished bilaterally  Cardiovascular: irregular, normal rate. No m/r/g  GI: Normal bowel sounds, soft, non-distended, non-tender  Skin/Integumen: No rashes, no cyanosis, ++ edema  Other: Normal mood and affect    Medications   All medications were reviewed.    - MEDICATION " INSTRUCTIONS -       - MEDICATION INSTRUCTIONS -       - MEDICATION INSTRUCTIONS -       - MEDICATION INSTRUCTIONS -       sodium chloride 0.9%         remdesivir  100 mg Intravenous Q24H    And     sodium chloride 0.9%  50 mL Intravenous Q24H     apixaban ANTICOAGULANT  5 mg Oral BID     atorvastatin  80 mg Oral Daily     bumetanide  2 mg Oral BID     carvedilol  12.5 mg Oral BID     dexamethasone  12 mg Oral Daily     hydrALAZINE  50 mg Oral Q8H MOE     insulin aspart  1-10 Units Subcutaneous TID AC     insulin aspart  1-7 Units Subcutaneous At Bedtime     insulin glargine  20 Units Subcutaneous BID     omeprazole  20 mg Oral BID     sodium chloride (PF)  3 mL Intracatheter Q8H     tamsulosin  0.4 mg Oral Daily        Data   Recent Labs   Lab 11/09/21  1059 11/09/21  0814 11/09/21  0602 11/08/21  0918 11/08/21  0631 11/07/21  0807 11/07/21  0638   WBC  --   --  8.3  --  8.6  --  8.7   HGB  --   --  10.8*  --  10.7*  --  10.7*   MCV  --   --  82  --  81  --  81   PLT  --   --  193  --  192  --  193   NA  --   --  133  --  133  --  131*   POTASSIUM  --   --  3.8  --  3.8  --  3.7   CHLORIDE  --   --  96  --  97  --  97   CO2  --   --  29  --  30  --  28   BUN  --   --  113*  --  99*  --  103*   CR  --   --  2.21*  --  2.20*  --  2.27*   ANIONGAP  --   --  8  --  6  --  6   GABE  --   --  9.1  --  9.1  --  9.1   * 314* 414*   < > 273*   < > 237*   ALBUMIN  --   --  2.6*  --  2.7*  --  2.7*   PROTTOTAL  --   --  6.3*  --  6.3*  --  6.4*   BILITOTAL  --   --  0.7  --  0.8  --  0.5   ALKPHOS  --   --  179*  --  152*  --  148   ALT  --   --  64  --  44  --  33   AST  --   --  107*  --  88*  --  66*    < > = values in this interval not displayed.       No results found for this or any previous visit (from the past 24 hour(s)).    DEUCE ANTHONY MD  [facility]

## 2021-11-09 NOTE — PROGRESS NOTES
"Care Management Follow Up    Length of Stay (days): 14    Expected Discharge Date: 11/12/2021     Concerns to be Addressed: adjustment to diagnosis/illness,coping/stress     Patient plan of care discussed at interdisciplinary rounds: Yes    Anticipated Discharge Disposition: Outpatient Rehab (PT, OT, SLP, Cardiac or Pulmonary)     Anticipated Discharge Services:    Anticipated Discharge DME:      Patient/family educated on Medicare website which has current facility and service quality ratings:    Education Provided on the Discharge Plan:    Patient/Family in Agreement with the Plan: yes    Referrals Placed by CM/SW:    Private pay costs discussed: Not applicable    Additional Information:  Contacted patient via phone to discuss options for discharge, including LTACH.  Patient states, \"the only place I am going is home.\"  Patient states that he lives alone but has a lot of support from friends and family.  Patient was not interested in hearing any information regarding [facility] or TCU as discharge options.  Care coordination team will continue to follow and assist with needs at discharge.  Mykel with [facility] updated with above.  Mykel will continue to follow peripherally.    June Theodore RN  Care Coordinator  [facility]  [phone] (text or call)        "

## 2021-11-09 NOTE — PLAN OF CARE
A&OX3 forgetful, multiple attempts to get pt to use BIPAP. Eventually, agreeable to use  BIPAP from  0320, /82 clonidine given. Resting on the recliners, declined to lay down in bed.  HFNC at 90 Fio2, 55 LPM, Tele- Afib CVR with BBB, PVCs, regular diet with 2 L fluid restrictions, assist of 1 with walker and belt.Zuniga in place,  covered with novolog. Rested comfortably this night. Coccyx nonblanchable covered with mepilex,lymph wraps on,  pt declined  laying down in bed.

## 2021-11-09 NOTE — PLAN OF CARE
No change, continues on HFLO2, 95% 55 L. Doing fair, feels good. LE wraps on, diuresing now with oral Bumex, good urine output. Bipap overninght for optimal lung function, pt had discussion with Dr Mixon and agreed to be on Bipap tonight. On Remdesivr IV.

## 2021-11-10 NOTE — PROGRESS NOTES
Care Management Follow Up    Length of Stay (days): 15    Expected Discharge Date: 11/11/2021     Concerns to be Addressed: adjustment to diagnosis/illness,coping/stress     Patient plan of care discussed at interdisciplinary rounds: Yes    Anticipated Discharge Disposition: LTACH    Anticipated Discharge Services:    Anticipated Discharge DME:      Patient/family educated on Medicare website which has current facility and service quality ratings:    Education Provided on the Discharge Plan:    Patient/Family in Agreement with the Plan: yes    Referrals Placed by CM/SW:    Private pay costs discussed: Not applicable    Additional Information:  Writer placed call to Mykel from [facility] for admission . Patient would have to be off the Remdesivir , which last dose is 11/12 and down to 60% or less FIO2 and 40L or less.So, hopefully early next week, he would be at that point. Will bandar along.      Domenica Brody RN

## 2021-11-10 NOTE — PROVIDER NOTIFICATION
MD Notification    Notified Person: MD    Notified Person Name: Dr. Mixon     Notification Date/Time: 11/10 1024    Notification Interaction: text page/ phone call     Purpose of Notification: Pt very agitated about getting his blood sugar checked q1 hour. He said he was up all night because the nurses were constantly in the room. He said if he doesn't sleep, [facility]'s [facility].     Orders Received: Lantus 25 units started. Turn insulin drip off 2 hours after lantus is given.    Comments:

## 2021-11-10 NOTE — PLAN OF CARE
A&OX3 forgetful, agitated  overnight due to being woken up hourly for blood sugar checks. Was on HFNC until 0200 and started desating, placed on BIPAP. It took multiple attempts to get pt to use BIPAP. Refused to lay down in bed, sat on the chair all night. Tele- Afib CVR with BBB, PVCs, regular diet with 2 L fluid restrictions, NPO with BIPAP.  Assist of 1 with walker and belt. Zuniga in place, coccyx nonblanchable covered with mepilex ,lymph wraps on,  pt declined  laying down in bed.

## 2021-11-10 NOTE — PROGRESS NOTES
A&O x4. Very angry and upset when trying to educate about illness. Pt refused to get out of chair today. VSS on HFNC 65% FiO2 and 40L. Tele A fib CVR and BBB. Denies CP/SOB/pain. Up A1 w/ walker. Blood glucose elevated, insulin drip started. Will continue to monitor.

## 2021-11-10 NOTE — PROGRESS NOTES
[facility]  Hospitalist Progress Note    Ravindra Mixon MD  11/10/2021    Assessment & Plan     Burton Elizabeth is a 76 year old male with PMH CRISTHIAN, UGI bleed, CKD, DM2, paroxysmal atrial flutter, CAD, chronic HFpEF, and severe aortic stenosis who was admitted 10/26/2021 for post op monitoring following elective TAVR. Post procedure complicated acute oliguric renal failure.     Acute oliguric renal failure on CKD3, due to contrast nephropathy  S/p initiation of HD on 10/29  Patient developed rapidly rising creatinine on 10/28, approx two days after TAVR, contrast. Noted to be more volume overloaded on 10/28 and not responding to IV diuresis. Received Diuril and Bumex on 10/29 without significant response.  Creatinine 1.6-->2.9-->4.4. Urine studies and clinical picture consistent with contrast nephropathy. Cr now downtrending with diuresis and good UOP  Plan:  - Appreciate Nephrology consult -> now signed off  - stopped HD, CR improving  - Continues to have good urine output, giron in place     COVID-19 Pneumonia  Acute hypoxic and hypercarbic respiratory failure  Pulmonary edema, volume overloaded  Patient has a history of HFpEf. Echo following TAVR with good LV, RV function. He was noticeably more volume overloaded following TAVR, and had respiratory distress overnight on 10/27 and 10/28 requiring diuresis. Etiology is primarily due to acute renal failure. VBG on 10/29 shows PCO2 55 and pH 7.29 indicating acute hypercapnea, likely due to pulmonary edema/respiratory faliure. PCO2 remains elevated at 60 on 10/31, however clinically improved.  -  incentive spirometry  - Needs likely outpatient sleep study, there is concern for underlying sleep apnea  - recent cxr shows increase in patchy bilateral pulmonary opacities  - noted increased pulmonary infiltrates without clinical signs of pneumonia  - lower ext ace wraps, increase ambulation  - patient had low grade fever 100.5 on 11/3 at 10 AM along  with progressive increase in oxygen requirements -> now on HFNC 45L  - Bumex 2 mg BID  - patient dx with covid, his brother who was visiting him also tested + so suspect his brother is the transmitter.  - BiPAP QHS  - continue decadron, increased dose to 12 mg  - remdesivir protocol, GFR acceptable   - monitor renal function closely    - he seems to be overall stable for now     Acute metabolic encephalopathy, improved  Due to critical illness, underlying sleep apnea, CHF.  - Seroquel PRN  - he is overall improving     Severe aortic stenosis s/p TAVR 34mm [employer]tr[employer] valve (10/26/2021)  Post op mngt per cardiology, now signed off. Eliquis restarted POD#0. No anti-plt. TTE 10/26 showing prosthetic aortic valve mean gradient 9mmHg and trivial paravalvular AI.  - Continue Eliquis     Acute on chronic HFpEF    CAD with NSTEMI (July 2021)  HTN / HLD  Diagnostic coronary angiogram on 10/11/2021 similar to prior diagnostic coronary angiogram in 2019 with two-vessel coronary artery disease including chronically totally occluded circumflex and small vessel disease of the PDA branch.  - PTA atorvastatin, Coreg  - ASA discontinued  - See above for volume management     Paroxysmal atrial flutter:   - Eliquis as above  - Continue PTA atorvastatin, Coreg     ID-DM2. A1c 7.6% 10/27/2021.  PTA regimen of Lantus 18u at bedtime, glimepiride, semaglutide. Hyperglycemic on 11/1. Briefly on insulin gtt 11/09.  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 Notes previously mention suspected  "CRISTHIAN however pt had not completed sleep study as outpatient.  - See above for management  - no bipap with COVID Dx     DVT Prophylaxis: DOAC  Code Status: Full Code  PT/OT: ordered  Diet: 2 gram  Combination Diet Regular Diet Adult; 2 gm NA Diet; Low Saturated Fat Diet       Disposition:  > 2 days to TCU once O2 needs improved and nephrology plan finalized    Total time on the floor involved in the patient's care: 40 minutes   Total time spent in counseling/care coordination: >50% spent in counseling pain and symptom management in setting of COVID-19 PNA / acute respiratory failure / renal failure    Interval History      --  No acute events overnight  -- oxygen requirements declining slowly  -- Continues to have good urine output  -- no fevers or chills  -- no new CP, no nausea / vomiting or abdominal pain  -- no new complaints, anxious and becoming frustrated and wants discharge soon    -Data reviewed today: I reviewed all new labs and imaging over the last 24 hours. I personally reviewed no images or EKG's today.    Physical Exam    , Blood pressure 138/68, pulse 81, temperature 98.3  F (36.8  C), temperature source Oral, resp. rate 18, height 1.753 m (5' 9\"), weight 106.1 kg (233 lb 12.8 oz), SpO2 97 %.  Vitals:    11/06/21 0646 11/08/21 0500 11/09/21 0542   Weight: 105.6 kg (232 lb 14.4 oz) 105.6 kg (232 lb 12.8 oz) 106.1 kg (233 lb 12.8 oz)     Vital Signs with Ranges  Temp:  [98.2  F (36.8  C)-98.6  F (37  C)] 98.3  F (36.8  C)  Pulse:  [58-87] 81  Resp:  [18-22] 18  BP: (121-148)/(55-82) 138/68  FiO2 (%):  [65 %-75 %] 65 %  SpO2:  [74 %-100 %] 97 %  I/O's Last 24 hours  I/O last 3 completed shifts:  In: 240 [P.O.:240]  Out: 650 [Urine:650]    Constitutional: On HFNC, no apparent distress  Respiratory: Diminished bilaterally  Cardiovascular: irregular, normal rate. No m/r/g  GI: Normal bowel sounds, soft, non-distended, non-tender  Skin/Integumen: No rashes, no cyanosis, ++ edema  Other: Normal mood and " affect    Medications   All medications were reviewed.    dextrose       - MEDICATION INSTRUCTIONS -       - MEDICATION INSTRUCTIONS -       - MEDICATION INSTRUCTIONS -       - MEDICATION INSTRUCTIONS -       sodium chloride 0.9%       sodium chloride Stopped (11/10/21 1308)       remdesivir  100 mg Intravenous Q24H    And     sodium chloride 0.9%  50 mL Intravenous Q24H     apixaban ANTICOAGULANT  5 mg Oral BID     atorvastatin  80 mg Oral Daily     bumetanide  2 mg Oral BID     carvedilol  12.5 mg Oral BID     dexamethasone  12 mg Oral Daily     hydrALAZINE  50 mg Oral Q8H [facility]     [START ON 11/11/2021] insulin aspart   Subcutaneous QAM AC     insulin aspart   Subcutaneous Daily with lunch     insulin aspart   Subcutaneous Daily with supper     insulin aspart  1-10 Units Subcutaneous TID AC     insulin aspart  1-7 Units Subcutaneous At Bedtime     insulin glargine  25 Units Subcutaneous BID     omeprazole  20 mg Oral BID     sodium chloride (PF)  3 mL Intracatheter Q8H     tamsulosin  0.4 mg Oral Daily        Data   Recent Labs   Lab 11/10/21  1006 11/10/21  0837 11/10/21  0732 11/10/21  0643 11/10/21  0616 11/09/21  0814 11/09/21  0602 11/08/21  0918 11/08/21  0631   WBC  --   --   --   --  12.2*  --  8.3  --  8.6   HGB  --   --   --   --  10.7*  --  10.8*  --  10.7*   MCV  --   --   --   --  81  --  82  --  81   PLT  --   --   --   --  179  --  193  --  192   NA  --   --   --   --  136  --  133  --  133   POTASSIUM  --   --   --   --  3.7  --  3.8  --  3.8   CHLORIDE  --   --   --   --  98  --  96  --  97   CO2  --   --   --   --  29  --  29  --  30   BUN  --   --   --   --  120*  --  113*  --  99*   CR  --   --   --   --  2.25*  --  2.21*  --  2.20*   ANIONGAP  --   --   --   --  9  --  8  --  6   GABE  --   --   --   --  9.2  --  9.1  --  9.1   * 103* 110*   < > 110*   < > 414*   < > 273*   ALBUMIN  --   --   --   --  2.8*  --  2.6*  --  2.7*   PROTTOTAL  --   --   --   --  6.4*  --  6.3*  --  6.3*    BILITOTAL  --   --   --   --  0.6  --  0.7  --  0.8   ALKPHOS  --   --   --   --  190*  --  179*  --  152*   ALT  --   --   --   --  83*  --  64  --  44   AST  --   --   --   --  105*  --  107*  --  88*    < > = values in this interval not displayed.       No results found for this or any previous visit (from the past 24 hour(s)).    DEUCE ANTHONY MD  [facility]

## 2021-11-11 NOTE — CONSULTS
"[facility] Nurse Inpatient Wound Assessment   Reason for consultation: Evaluate and treat bilateral buttocks wounds    Assessment  Bilateral buttocks wounds due to Pressure Injury, Friction, Shear, and Moisture Associated Skin Damage (MASD)  Status: initial assessment  Patient refusing repositioning repeatedly due to SOB, sleeping in chair and only sometimes agreeable to standing, very low tolerance for standing   Patient on cushion in chair   Treatment Plan    LEFT Sacrum wound: Every 3 days   Cleanse the area with NS and pat dry.  Apply No sting film barrier to periwound skin.  Cover wound with Sacral Mepilex (#[id number])  Change dressing Q 3 days.  Turn and reposition Q 2hrs side to side only.  Ensure pt has Yao-cushion while sitting up in the chair.  FYI- If pt has constant incontinent loose stools needing dressing changes Q shift please discontinue the Mepilex dressing and apply criticaid barrier paste BID and PRN.      PIP ACTIVITY MEASURES:  If pt is refusing to turn or reposition they must be educated on the  potential injury from not off loading pressure.  Then this \"educated refusal\" needs to be documented as an \"educated refusal to turn/ reposition\" and document if alert, etc. Additionally, if repeated refusals ensure the charge nurse, nurse manager and the provider is aware.  Follow Sudarshan Risk recommendations      CHAIR: Pt should sit on a chair cushion ([id number]) when up to the chair and not sit for more than one hour at a time before fully offloading backside (either stand for a couple of minutes and/or return to bed, positioning on a side) to relieve pressure and re-perfuse tissue.  Additionally, encourage pt to shift side to side every 15 minutes, too.    NOTE: the Z-Flow Pad is NOT a cushion, pt should NOT sit on the Z-Flow pads      BED:  Reposition side to side every 1-2 hours when awake.   No direct supine positioning, position only side to side  Consider Z-Patrick Pillows to help keep pt on side " (#[id number] or #[id number]). *Z-Flows are for positioning, DO NOT SIT ON!  Keep heels elevated  As able keep HOB below 30 degrees  Evaluate for specialty mattress  Use TAPS wedges and perform frequent microturns as pt tolerates   Orders Written  Recommended provider order: None, at this time  WOC Nurse follow-up plan:twice weekly  Nursing to notify the Provider(s) and re-consult the WOC Nurse if wound(s) deteriorates or new skin concern.    Patient History  According to provider note(s):  Burton Elizabeth is a 76 year old male who presented to [facility] on 10/26/2021 for planned TAVR.  The procedure was well-tolerated is performed in [place] has been admitted to [facility] CCU post procedure.  The hospital medicine service has been consulted for medical co-management.     Aortic stenosis, severe, now status post TAVR, 10/26/2021  Heart failure with preserved EF, secondary to aortic stenosis  Burton has been followed as an outpatient by Dr. Neumann, cardiology, for symptomatic aortic stenosis.  He was deemed suitable for TAVR, which was well-tolerated today.  Last echocardiogram on 8/16/2021 with normal LV size, systolic function, severe AS, trace to mild MR, no TR.  -Admit inpatient, IMC status  -Full plan of care per TAVR team  -Started on Eliquis postop, defer resuming ASA to TAVR service    Objective Data  Containment of urine/stool: Incontinence Protocol and Indwelling catheter    Active Diet Order  Orders Placed This Encounter      Combination Diet Regular Diet Adult; 2 gm NA Diet      Output:   I/O last 3 completed shifts:  In: 680 [P.O.:680]  Out: 750 [Urine:750]    Risk Assessment:   Sensory Perception: 4-->no impairment  Moisture: 3-->occasionally moist  Activity: 2-->chairfast  Mobility: 3-->slightly limited  Nutrition: 3-->adequate  Friction and Shear: 3-->no apparent problem  Sudarshan Score: 18                          Labs:   Recent Labs   Lab 11/11/21  0552 11/10/21  0616 11/07/21  0638 11/06/21  0606   ALBUMIN  2.6* 2.8*   < > 2.2*   HGB 10.9* 10.7*   < > 10.4*   WBC 12.7* 12.2*   < > 6.8   A1C  --   --   --  7.8*   CRP  --  14.8*   < >  --     < > = values in this interval not displayed.       Physical Exam  Areas of skin assessed: focused bilateral buttocks     Wound Location:  left buttock just to the left of the gluteal crease not on a bony prominence   Date of last photo unable to get a clear photo after several attempts stopped trying due to poor endurance for standing   Wound History: hospital acquired due to refusal to reposition and sleep in the bed. Well documented  Wound Base: 100 % epidermis     Palpation of the wound bed: normal      Drainage: none     Description of drainage: none     Measurements (length x width x depth, in cm) 2  x 3  x  0.1 cm area is 4 superficial horizontal lines grouped together      Periwound skin: dry/scaly and erythema- blanchable      Color: pale      Temperature: normal   Odor: none  Pain: denies , none    Left posterior lower leg is weeping significant amount of serous fluid - documented as a skin tear. Old dressing saturated with bloody drainage.   Wound cleansed and new dressing applied.    Interventions  Visual inspection and assessment completed   Wound Care Rationale Protect periwound skin  Wound Care: done per plan of care  Supplies: gathered and placed at the bedside  Current off-loading measures: Chair cushion and Pillows  Current support surface: Standard  Atmos Air mattress  Education provided to: importance of repositioning, plan of care, and Off-loading pressure  Discussed plan of care with Patient RN     Carleen Bravo RN BS CWOCN

## 2021-11-11 NOTE — PROGRESS NOTES
"A&O x4. Very frustrated with hospitalization, \"I'm never going to get better\". He expresses that he feels like the nursing staff is giving up on him. Pt remained in chair all day, refused to ambulate or lay in bed. Writer expressed the importance of repositioning due to skin breakdown on coccyx. Mepilex applied and WOC consulted. VSS on HFNC 65% FiO2 and 35 LPM. Tele A fib CVR w/ BBB. C/o back and headache, PRN norco given with some relief.  Denies CP/SOB. Up A1 w/ walker and gait belt. Insulin gtt discontinued and pt started back on lantus and sliding scale insulin. New IV placed. Plan is to continue to wean O2.          "

## 2021-11-11 NOTE — PLAN OF CARE
"Shift: [id number]    Updates:   -IV hydralazine given this afternoon for /90s  -Blood pressures remained >140/80s. Patient refused any further IV or PO medications.   -Patient refused repositioning, ace wraps, and to stand up out of chair. Patient stated \"I'm never getting any better because you keep bothering me\". Writer will try again in a little while.   -Patient has slept the majority of the shift.     Neuro/Orientation: alert and oriented x 4, withdrawn, frustrated    Tele/Cardiac: Atrial fibrillation CVR    Pertinent Labs:   -Cr 2.50, WBC 12.7  -Blood sugars 140-280    Vitals: Temp: 97.3  F (36.3  C) Temp src: Oral BP: (!) 148/78 Pulse: 70   Resp: 20 SpO2: 93 % O2 Device: High Flow Nasal Cannula (HFNC) Oxygen Delivery: 30 LPM    Pain: denies    Access/Drips: Left PIV    Respiratory/Oxygen:   -lungs diminished  -High flow nasal cannula 30 LPM 65%    GI/: giron catheter (urine cloudy with sediment), no BM this shift  -Diet: 2 gm sodium diet    Skin: WOC seen patient today  -Left sacrum wound - dressing changed  -Left calf wound - mepilex    Mobility: up with 1, gait belt and walker    Discharge Planning: LTACH    Aggression tool: Yellow       "

## 2021-11-11 NOTE — PLAN OF CARE
Patient refusing ambulation and/or return to bed or observed weight shift per standard recommendations every 1-2 hours.  Consents to weight shifting but unable to visualize these changes when with patient. Informed Refusal documented for full skin assessment.      Patient cites fatigue and declines further discussion.  Education offered and declined.    Problem: Adult Inpatient Plan of Care  Goal: Plan of Care Review  Outcome: No Change  Flowsheets (Taken 11/11/2021 2597)  Plan of Care Reviewed With: patient  Progress: no change     Problem: Adult Inpatient Plan of Care  Goal: Optimal Comfort and Wellbeing  Outcome: No Change     Problem: Risk for Delirium  Goal: Optimal Coping  Outcome: No Change     Problem: Diabetes Comorbidity  Goal: Blood Glucose Level Within Targeted Range  Outcome: Declining

## 2021-11-11 NOTE — PROGRESS NOTES
Hospitalist Progress Note      Hospital summary: 81 y.o man with Parkinson's disease, presence of cardiac pacemaker, who presented with a fall and slurred speech. Assessment/Plan:  Slurred speech: (POA) resolved, concern for TIA initially. He notes some word-finding difficulties and \"stuttering. \"   -unable to obtain MRI due to presence of pacemaker  -CTA head/neck ordered  -started ASA 81 mg daily, his daughter tells me he was on this PTA  -neurology consulted    UTI: (POA) he was dx'd with a UTI about a week ago and prescribed Bactrim. Daughter states his mental state is markedly improved since starting the antibiotic. He's had mild intermittent confusion which is likely due to being in an unfamiliar setting/hospitalization here   -d/c ceftriaxone and resume home Bactrim  -I don't think he has a \"new\" UTI coming here  -urine was not sent for culture on admission    Parkinson's disease: resume home meds    Anxiety/depression: resume home meds including PRN lorazepam to avoid withdrawal  -would be best to wean off lorazepam as an outpatient; discussed this w/ his daughter    HTN    Code status: DNR  DVT prophylaxis: SCDs  Disposition: back to assisted living likely tomorrow. PT rec SNF but apparently he has max assist at the assisted living facility  ----------------------------------------------    CC: slurred speech    S: speech is back to normal, he denies any pain, dyspnea, headache, n/v/d. Asking when he can go home. Review of Systems:  Pertinent items are noted in HPI.     O:  Visit Vitals    BP (!) 157/93    Pulse 81    Temp 98.4 °F (36.9 °C)    Resp 20    Ht 6' 2\" (1.88 m)    Wt 92.5 kg (204 lb)    SpO2 100%    BMI 26.19 kg/m2       PHYSICAL EXAM:  Gen: NAD, non-toxic  HEENT: anicteric sclerae, normal conjunctiva, oropharynx clear, MM moist  Neck: supple  Heart: RRR, no MRG, no JVD, no peripheral edema  Lungs: CTA b/l, non-labored respirations  Abd: soft, NT, ND, [facility]  Hospitalist Progress Note    Ravindra Mixon MD  11/11/2021    Assessment & Plan     Burton Elizabeth is a 76 year old male with PMH CRISTHIAN, UGI bleed, CKD, DM2, paroxysmal atrial flutter, CAD, chronic HFpEF, and severe aortic stenosis who was admitted 10/26/2021 for post op monitoring following elective TAVR. Post procedure complicated acute oliguric renal failure.     Acute oliguric renal failure on CKD3, due to contrast nephropathy  S/p initiation of HD on 10/29  Patient developed rapidly rising creatinine on 10/28, approx two days after TAVR, contrast. Noted to be more volume overloaded on 10/28 and not responding to IV diuresis. Received Diuril and Bumex on 10/29 without significant response.  Creatinine 1.6-->2.9-->4.4. Urine studies and clinical picture consistent with contrast nephropathy. Cr now downtrending with diuresis and good UOP  Plan:  - Appreciate Nephrology consult -> now signed off  - stopped HD, CR improving  - Continues to have good urine output, giron in place     COVID-19 Pneumonia  Acute hypoxic and hypercarbic respiratory failure  Pulmonary edema, volume overloaded  Patient has a history of HFpEf. Echo following TAVR with good LV, RV function. He was noticeably more volume overloaded following TAVR, and had respiratory distress overnight on 10/27 and 10/28 requiring diuresis. Etiology is primarily due to acute renal failure. VBG on 10/29 shows PCO2 55 and pH 7.29 indicating acute hypercapnea, likely due to pulmonary edema/respiratory faliure. PCO2 remains elevated at 60 on 10/31, however clinically improved.  -  incentive spirometry  - Needs likely outpatient sleep study, there is concern for underlying sleep apnea  - recent cxr shows increase in patchy bilateral pulmonary opacities  - noted increased pulmonary infiltrates without clinical signs of pneumonia  - lower ext ace wraps, increase ambulation  - patient had low grade fever 100.5 on 11/3 at 10 AM along  BS+  Extr: warm  Skin: dry, no rash  Neuro: CN II-XII grossly intact, normal speech  Psych: normal mood, appropriate affect    No intake or output data in the 24 hours ending 06/27/17 1526     Recent labs & imaging reviewed:    Recent Labs      06/26/17 1907   WBC  7.1   HGB  12.0*   HCT  36.2*   PLT  270     Recent Labs      06/26/17 1907   NA  134*   K  4.2   CL  100   CO2  25   BUN  21*   CREA  1.18   GLU  96   CA  9.0     Recent Labs      06/26/17 1907   SGOT  23   ALT  10*   AP  76   TBILI  0.6   TP  8.0   ALB  3.9   GLOB  4.1*     Recent Labs      06/27/17   0225  06/26/17 1903   INR  1.1  <0.9   PTP  11.0   --    APTT  27.6   --         Med list reviewed  Current Facility-Administered Medications   Medication Dose Route Frequency    sodium chloride (NS) flush 5-10 mL  5-10 mL IntraVENous Q8H    sodium chloride (NS) flush 5-10 mL  5-10 mL IntraVENous PRN    sodium chloride 0.9 % bolus infusion 100 mL  100 mL IntraVENous RAD ONCE    iopamidol (ISOVUE-370) 76 % injection 100 mL  100 mL IntraVENous RAD ONCE    sodium chloride (NS) flush 10 mL  10 mL IntraVENous RAD ONCE    [START ON 6/28/2017] aspirin delayed-release tablet 81 mg  81 mg Oral DAILY    trimethoprim-sulfamethoxazole (BACTRIM DS, SEPTRA DS) 160-800 mg per tablet 1 Tab  1 Tab Oral Q12H    LORazepam (ATIVAN) tablet 0.5 mg  0.5 mg Oral Q6H PRN    QUEtiapine (SEROquel) tablet 25 mg  25 mg Oral QPM    [START ON 6/28/2017] rivastigmine (EXELON) 4.6 mg/24 hr patch 1 Patch  1 Patch TransDERmal DAILY    [START ON 6/28/2017] escitalopram oxalate (LEXAPRO) tablet 20 mg  20 mg Oral DAILY    . PHARMACY TO SUBSTITUTE PER PROTOCOL    Per Protocol    [START ON 6/28/2017] pindolol (VISKIN) tablet 2.5 mg  2.5 mg Oral PCL    [START ON 6/28/2017] tolterodine ER (DETROL-LA) capsule 2 mg  2 mg Oral DAILY    carbidopa-levodopa (SINEMET)  mg per tablet 2 Tab  2 Tab Oral QID    . PHARMACY TO SUBSTITUTE PER PROTOCOL    Per Protocol    0.9% sodium with progressive increase in oxygen requirements -> now O2 needs declining and on HFNC 30L  - Bumex 2 mg BID  - Zuniga cath out soon?  - patient dx with covid, his brother who was visiting him also tested + so suspect his brother is the transmitter.  - BiPAP QHS  - continue decadron, increased dose to 12 mg  - remdesivir protocol, GFR acceptable   - monitor renal function closely given Cr is rising     Acute metabolic encephalopathy, improved  Due to critical illness, underlying sleep apnea, CHF.  - Seroquel PRN  - he is overall improving     Severe aortic stenosis s/p TAVR 34mm Medtronic valve (10/26/2021)  Post op mngt per cardiology, now signed off. Eliquis restarted POD#0. No anti-plt. TTE 10/26 showing prosthetic aortic valve mean gradient 9mmHg and trivial paravalvular AI.  - Continue Eliquis     Acute on chronic HFpEF    CAD with NSTEMI (July 2021)  HTN / HLD  Diagnostic coronary angiogram on 10/11/2021 similar to prior diagnostic coronary angiogram in 2019 with two-vessel coronary artery disease including chronically totally occluded circumflex and small vessel disease of the PDA branch.  - PTA atorvastatin, Coreg  - ASA discontinued  - See above for volume management     Paroxysmal atrial flutter:   - Eliquis as above  - Continue PTA atorvastatin, Coreg     ID-DM2. A1c 7.6% 10/27/2021.  PTA regimen of Lantus 18u at bedtime, glimepiride, semaglutide. Hyperglycemic on 11/1. Briefly on insulin gtt 11/09.  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 Notes  "previously mention suspected CRISTHIAN however pt had not completed sleep study as outpatient.  - See above for management  - no bipap with COVID Dx     DVT Prophylaxis: DOAC  Code Status: Full Code  PT/OT: ordered  Diet: 2 gram  Combination Diet Regular Diet Adult; 2 gm NA Diet; Low Saturated Fat Diet       Disposition:  > 2 days to TCU once O2 needs improved and nephrology plan finalized    Total time on the floor involved in the patient's care: 40 minutes   Total time spent in counseling/care coordination: >50% spent in counseling pain and symptom management in setting of COVID-19 PNA / acute respiratory failure / renal failure    Interval History      -- No acute events overnight, slept better last evening and feels better today  -- oxygen requirements stable at  30L HFNC today  -- Continues to have good urine output  -- no fevers or chills  -- no new CP, no nausea / vomiting or abdominal pain  -- no new complaints    -Data reviewed today: I reviewed all new labs and imaging over the last 24 hours. I personally reviewed no images or EKG's today.    Physical Exam    , Blood pressure (!) 149/78, pulse 76, temperature 97.3  F (36.3  C), temperature source Oral, resp. rate 20, height 1.753 m (5' 9\"), weight 106.1 kg (233 lb 12.8 oz), SpO2 93 %.  Vitals:    11/06/21 0646 11/08/21 0500 11/09/21 0542   Weight: 105.6 kg (232 lb 14.4 oz) 105.6 kg (232 lb 12.8 oz) 106.1 kg (233 lb 12.8 oz)     Vital Signs with Ranges  Temp:  [97.2  F (36.2  C)-97.3  F (36.3  C)] 97.3  F (36.3  C)  Pulse:  [55-76] 76  Resp:  [16-20] 20  BP: (126-163)/(71-87) 149/78  FiO2 (%):  [60 %-65 %] 65 %  SpO2:  [89 %-94 %] 93 %  I/O's Last 24 hours  I/O last 3 completed shifts:  In: 480 [P.O.:480]  Out: 1125 [Urine:1125]    Constitutional: On HFNC, no apparent distress  Respiratory: Diminished bilaterally  Cardiovascular: irregular, normal rate. No m/r/g  GI: Normal bowel sounds, soft, non-distended, non-tender  Skin/Integumen: No rashes, no cyanosis, ++ " chloride infusion  75 mL/hr IntraVENous CONTINUOUS       Care Plan discussed with:  Patient/Family, Nurse and  daughter over phone    Raul Pderaza MD  Internal Medicine  Date of Service: 6/27/2017 edema  Other: Normal mood and affect    Medications   All medications were reviewed.    dextrose       - MEDICATION INSTRUCTIONS -       - MEDICATION INSTRUCTIONS -       - MEDICATION INSTRUCTIONS -       - MEDICATION INSTRUCTIONS -       sodium chloride 0.9%       sodium chloride Stopped (11/10/21 1308)       remdesivir  100 mg Intravenous Q24H    And     sodium chloride 0.9%  50 mL Intravenous Q24H     apixaban ANTICOAGULANT  5 mg Oral BID     atorvastatin  80 mg Oral Daily     bumetanide  2 mg Oral BID     carvedilol  12.5 mg Oral BID     dexamethasone  12 mg Oral Daily     hydrALAZINE  50 mg Oral Q8H MOE     insulin aspart   Subcutaneous QAM AC     insulin aspart   Subcutaneous Daily with lunch     insulin aspart   Subcutaneous Daily with supper     insulin aspart  1-10 Units Subcutaneous TID AC     insulin aspart  1-7 Units Subcutaneous At Bedtime     insulin glargine  26 Units Subcutaneous BID     omeprazole  20 mg Oral BID     sodium chloride (PF)  3 mL Intracatheter Q8H     tamsulosin  0.4 mg Oral Daily        Data   Recent Labs   Lab 11/11/21  1234 11/11/21  0734 11/11/21  0552 11/10/21  0643 11/10/21  0616 11/09/21  0814 11/09/21  0602   WBC  --   --  12.7*  --  12.2*  --  8.3   HGB  --   --  10.9*  --  10.7*  --  10.8*   MCV  --   --  82  --  81  --  82   PLT  --   --  174  --  179  --  193   NA  --   --  133  --  136  --  133   POTASSIUM  --   --  4.2  --  3.7  --  3.8   CHLORIDE  --   --  96  --  98  --  96   CO2  --   --  30  --  29  --  29   BUN  --   --  146*  --  120*  --  113*   CR  --   --  2.50*  --  2.25*  --  2.21*   ANIONGAP  --   --  7  --  9  --  8   GABE  --   --  9.6  --  9.2  --  9.1   * 149* 173*   < > 110*   < > 414*   ALBUMIN  --   --  2.6*  --  2.8*  --  2.6*   PROTTOTAL  --   --   --   --  6.4*  --  6.3*   BILITOTAL  --   --   --   --  0.6  --  0.7   ALKPHOS  --   --   --   --  190*  --  179*   ALT  --   --   --   --  83*  --  64   AST  --   --   --   --  105*  --  107*    < >  = values in this interval not displayed.       No results found for this or any previous visit (from the past 24 hour(s)).    DEUCE ANTHONY MD  [facility]

## 2021-11-11 NOTE — PROVIDER NOTIFICATION
Notified MD at 1714  PM regarding Blood glucose level Pre-prandial 405.    Spoke with: Dr. Mixon.    Orders  Were obtained.    Comments: Low appetite, eating only ENSURE today for lunch and planned for dinner. 8 Units additional Insulin dose obtained.

## 2021-11-11 NOTE — PLAN OF CARE
VSS, BP elevated. PRNs available for SBP >140. HR controlled, afib. Remains on HFNC 65% and 35 L, declines to wear BIPAP with sleep. Education attempted. States breathing is fine. Declines to get up from chair for skin check or repositioning. Declines to stand up for morning weight. Education attempted. Alert/oriented, calm but adamant on things he will/wont do. Seroquel available for agitation/anxiety, used overnight with success. Ace wraps off for nights-need to be rewrapped in the morning. Mild to mod LE edema. Blood sugar elevated, lantus and sliding scale given. PRN norco for back pain given with good effect.

## 2021-11-11 NOTE — PROGRESS NOTES
Patient remains on HFNC with current settings of 30 LPM FiO2 60% with SpO2 in the low 90's. Opti foam and mepilex in place. Skin intact.  Will cont to monitor.  11/11/2021  Zayda Crawford, RT

## 2021-11-12 NOTE — PLAN OF CARE
Shift: [id number]    Updates:   -Seemed to be in better spirits this morning.  -Worked the PT today  -Moved and shifted in the chair more today. Refused to get back to bed.   -Compression wraps on during the day.   -Giron out at 18:30    Neuro/Orientation: Alert and oriented x 4.     Tele/Cardiac: Afib CVR    Pertinent Labs:   -BNP 10,491  -Procalcitonin 0.19, CRP 8.6   -Cr 2.43    Vitals: Temp: 97.2  F (36.2  C) Temp src: Oral BP: (!) 145/74 Pulse: 78   Resp: 18 SpO2: 94 % O2 Device: High Flow Nasal Cannula (HFNC) Oxygen Delivery: 30 LPM    Pain: back pain - tylenol given; T pump ordered    Access/Drips: left PIV     Respiratory/Oxygen:  -lungs diminished   -high flow nasal cannula 60% 30 LPM     GI/: giron out @ 18:30, no void yet  -Diet: 2 gm sodium diet, 1500 ml fluid restriction    Skin: WOC following  -Left buttocks - mepilex  -Left calf - scab    Mobility: up with 1, gait belt and walker.     Discharge Planning: LTACH pending    Aggression tool: Yellow

## 2021-11-12 NOTE — PROGRESS NOTES
Care Management Follow Up    Length of Stay (days): 17    Expected Discharge Date: 11/15/2021     Concerns to be Addressed: adjustment to diagnosis/illness,coping/stress     Patient plan of care discussed at interdisciplinary rounds: Yes    Anticipated Discharge Disposition: likely LTACH Anticipated Discharge Services:    Anticipated Discharge DME:      Referrals Placed by CM/SW:    Private pay costs discussed: Not applicable    Additional Information:  Updated Mykel @ [facility]. Per MD, Neph to re consult, watch diuresis/renal fx over weekend. Likely ready for discharge early next week

## 2021-11-12 NOTE — PLAN OF CARE
"Vitals: BP (!) 153/74   Pulse 56   Temp 97.7  F (36.5  C) (Axillary)   Resp 22   Ht 1.753 m (5' 9\")   Wt 106.1 kg (233 lb 12.8 oz)   SpO2 93%   BMI 34.53 kg/m      Neuro: AxOx4, forgetful. Alert to voice. Appears withdrawn, calm. Forgetful. Refusing care at times.  Given Trazodone for sleep.      CV:  Afib w/ BBB. 3+ edema throughout. PRN Clonidine given for SBP >140.    Respiratory: Heated high flow nasal cannula. 60% FiO2, 30L.  Diminished lung sounds, dyspnea on exertion    GI/: giron intact with sediment, finally agreed to giron care this AM. No BM this shift.     Skin: unable to assess, pt currently in chair, refuses to stand for buttocks assessment, or weight. L posterior leg wound covered with mepilex.     Activity: up with 1, GB, RW. Currently up in chair, refuses to get back into bed after being asked multiple times. Pt states, \"I'm fine\".      Pain: PRN Oxycodone and Tylenol.     IV: PIV in L arm, SL.     Diet: Sodium restricted, 2L FR. Decreased appetite, only requesting to drink Ensure, per report.        *See EPIC flowsheet for full nursing assessment findings       "

## 2021-11-12 NOTE — PROVIDER NOTIFICATION
Paged Dr. Shipley, Nephrology to please advise on patient diurectics. BNP 10,491, Creatinine 2.43, and weight up 7 lbs.     Repaged at 16:30. Dr. Shipley will contact Dr. Alan.

## 2021-11-13 NOTE — PROGRESS NOTES
[facility]  Hospitalist Progress Note   11/12/2021          Assessment and Plan:       Burton Elizabeth is a 76 year old male with PMH CRISTHIAN, UGI bleed, CKD, DM2, paroxysmal atrial flutter, CAD, chronic HFpEF, and severe aortic stenosis admitted 10/26/2021 for post op monitoring following elective TAVR. Post procedure complicated acute oliguric renal failure, COVID-19 infection.     COVID-19 Pneumonia  Acute hypoxic hypercapnic respiratory failure multifactorial.  Volume overload from heart failure exacerbation, renal disease, steroid use.  Leukocytosis likely reactive from steroid therapy.  Echo following TAVR with good LV, RV function.  Tested positive for COVID-19 on 11/4.  On high-dose prednisone 12 mg oral daily from 11/8.  Last chest x-ray from 11/5 showing increase in patchy bilateral pulmonary opacities.  proBNP.  Procalcitonin today.  Chest x-ray today.  Patient currently on high flow nasal oxygen 30 L, wean off oxygen as able to.  Decrease prednisone from 12 mg to 6 mg oral daily.  Completed course of remdesivir.  On Bumex 2 mg twice daily, will administer extra dose of 2 mg Bumex x1 today. Discussed with nephrology today.  If continues to be volume overloaded will discuss with cardiology tomorrow.    Consider antibiotics if spikes fever.  Aggressive incentive spirometry if able to tolerate.  Outpatient sleep study, there is concern for underlying sleep apnea  Special precautions, patient's brother who was visiting him also tested positive.  Input output monitoring, daily weights.  Patient agreeing for weight check today.  2 g sodium diet.    Acute oliguric renal failure on CKD3, due to contrast nephropathy  S/p initiation of HD on 10/29  Patient developed rapidly rising creatinine on 10/28, approx two days after TAVR, contrast.  Creatinine peaked from 1.6-4.4, Urine studies and clinical picture consistent with contrast nephropathy.  Creatinine trended down to 2.4, has been around 2-2.5  over the last few days.  Nephrology signed off.  Again discussed with Dr. Shipley from nephrology today.  Discontinue Zuniga catheter today.    Acute metabolic encephalopathy, improved  Due to critical illness, underlying sleep apnea, CHF.  Seroquel PRN  Mental status closely.     Severe aortic stenosis s/p TAVR 34mm Medtronic valve (10/26/2021)  Post op mngt per cardiology, now signed off. Eliquis restarted POD#0. No anti-plt.   TTE 10/26 showing prosthetic aortic valve mean gradient 9mmHg and trivial paravalvular AI.  Continue Eliquis.  Cardiology signed off.  Will likely reconsult in AM.     Acute on chronic HFpEF    CAD with NSTEMI (July 2021)  HTN / HLD  Diagnostic coronary angiogram on 10/11/2021 similar to prior diagnostic coronary angiogram in 2019 with two-vessel coronary artery disease including chronically totally occluded circumflex and small vessel disease of the PDA branch.  Aspirin discontinued, on Eliquis.  Continue PTA Coreg, Lipitor.  Diuresis as above.     Paroxysmal atrial flutter:   On Coreg, Eliquis.     Hyperglycemia likely from steroid therapy.  ID-DM2. A1c 7.6% 10/27/2021.  PTA regimen of Lantus 18u at bedtime, glimepiride, semaglutide.    Hold home glimepiride and semaglutide due to renal failure  Lantus 30 units twice daily.  Continue carb counting insulin.  Continue high sliding scale insulin.  Decreased dose of steroids today.     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 with acute hypoxic, hypercarbic respiratory failure requiring BiPAP suspected to be related to CHF with severe aortic stenosis. Notes previously mention suspected CRISTHIAN however pt had not completed sleep study as outpatient.  See above for management  No bipap with COVID Dx    Hematuria, pyuria likely catheter related.  Culture negative.  UA from 10/28 with blood, WBCs.  Cultures negative.  Discontinue Zuniga  catheter.    Physical deconditioning in the setting of ongoing medical illness.  PT, OT ongoing.  Will likely need TCU versus LTAC.    Obesity with a BMI of 35.53.  We will need to consider lifestyle modification with diet and exercise as able to.    Hypoalbuminemia likely from poor oral intake.  Dietary supplements, diuresis as above.  Monitor in AM.    Transaminitis   AST improving, follow CK levels.      Orders Placed This Encounter      Combination Diet Regular Diet Adult; 2 gm NA Diet      DVT Prophylaxis: SCD.  On anticoagulation.  Code Status: Full Code  Disposition: Expected discharge  greater than 2 days.    Discussed with patient, bedside RN multiple times, nephrologist.  More than 50% of time spent in direct patient care, care coordination, patient/caregiver counseling, and formalizing plan of care.   Total Visit Time: Greater than 75 min  Content of the Prolonged Time: Have taken over patient's care on day 17 of hospitalization, multiple ongoing medical issues including acute hypoxic respiratory failure requiring high flow nasal oxygen, COVID-19 infection, volume overload, renal failure.    Angelia Alan MD        Interval History:      Patient lying in bed,  Complaining of shortness of breath.    Denies any chest pain.  Complains of generalized weakness.  On 60% FiO2 30 L on high flow nasal cannula.  Per nursing report refusing care at times, refused weight check this morning.  Continues to be forgetful on and off per report.  Requiring assistance of 1 with gait belt.  Receiving as needed oxycodone and Tylenol for pain.       Physical Exam:        Physical Exam   Temp:  [97.1  F (36.2  C)-97.7  F (36.5  C)] 97.2  F (36.2  C)  Pulse:  [53-78] 78  Resp:  [18-22] 18  BP: (104-158)/(52-89) 145/74  FiO2 (%):  [60 %-65 %] 60 %  SpO2:  [92 %-95 %] 94 %    Intake/Output Summary (Last 24 hours) at 11/12/2021 1813  Last data filed at 11/12/2021 1400  Gross per 24 hour   Intake 750 ml   Output 1050 ml   Net  -300 ml       Admission Weight: 112.5 kg (248 lb)  Current Weight: 109.1 kg (240 lb 9.6 oz)    PHYSICAL EXAM  GENERAL: Patient is in no distress.  Alert, forgetful.  HEENT: Oropharynx dry.  HEART: Regular rate and rhythm. S1S2.  Systolic murmur right sternal border.  LUNGS: Coarse crackles present.  Respirations unlabored  ABDOMEN: Soft, distended, bowel sounds heard.  NEURO: Moving all extremities.  EXTREMITIES: 3+ pitting pedal edema. 2+ peripheral pulses.  SKIN: Warm, dry. + bruising.  PSYCHIATRY Cooperative       Medications:          apixaban ANTICOAGULANT  5 mg Oral BID     atorvastatin  80 mg Oral Daily     bumetanide  2 mg Oral Once     bumetanide  2 mg Oral BID     carvedilol  12.5 mg Oral BID     [START ON 11/13/2021] dexamethasone  6 mg Oral Daily     hydrALAZINE  50 mg Oral Q8H MOE     insulin aspart   Subcutaneous QAM AC     insulin aspart   Subcutaneous Daily with lunch     insulin aspart   Subcutaneous Daily with supper     insulin aspart  1-10 Units Subcutaneous TID AC     insulin aspart  1-7 Units Subcutaneous At Bedtime     insulin glargine  30 Units Subcutaneous BID     omeprazole  20 mg Oral BID     sodium chloride (PF)  3 mL Intracatheter Q8H     tamsulosin  0.4 mg Oral Daily     acetaminophen, artificial tears ophthalmic solution, cloNIDine, dextrose, glucose **OR** dextrose **OR** glucagon, guaiFENesin, HOLD MEDICATION, hydrALAZINE, HYDROcodone-acetaminophen, labetalol, lidocaine 4%, lidocaine (buffered or not buffered), - MEDICATION INSTRUCTIONS -, naloxone **OR** naloxone **OR** naloxone **OR** naloxone, nitroGLYcerin, - MEDICATION INSTRUCTIONS -, - MEDICATION INSTRUCTIONS -, - MEDICATION INSTRUCTIONS -, polyethylene glycol, QUEtiapine, senna-docusate, sodium chloride (PF), sodium chloride 0.9%, traZODone         Data:      All new lab and imaging data was reviewed.

## 2021-11-13 NOTE — PLAN OF CARE
Pt A&Ox4, vss, afebrile, on HF 60%/30l.  Complaint of generalized back pain, T pump applied to good effect.  Care clustered for pt rest.  Encouraged frequent movement/repositioning, continuing to refuse to return to bed, refused gown change.  Independent with urinal.  Denies needs.  Plan to cintinue decadron, PT, wean O2 as tolerated.

## 2021-11-13 NOTE — PLAN OF CARE
Shift: [id number]    Updates:  -Gave 60 mg Lasix IV and started Lasix drip this evening  -Only 100 ml urine out since start of lasix.     Neuro/Orientation: Alert and oriented x 4    Tele/Cardiac: Afib CVR BBB    Pertinent Labs:   -Cr 2.51  -BNP 12,042  -D Dimer 2.06  -Blood sugars 173-235-308    Vitals: Temp: 97.5  F (36.4  C)   BP: (!) 143/77 Pulse: 65   Resp: 21 SpO2: 93 % O2 Device: High Flow Nasal Cannula (HFNC) Oxygen Delivery: 30 LPM     Pain: Generalized back pain - tylenol given, voltaren gel ordered    Access/Drips: Left PIV, lasix @ 7 mg/hr    Respiratory/Oxygen: lungs coarse, high flow nasal cannula 65%, 30 LPM    GI/: voiding well  -Diet: 2 gm sodium diet, 1500 ml fluid restriction    Skin:  -left intergluteal buttock - mepilex on  -left calf - mepilex    Mobility: up with 1, gait belt and walker    Discharge Planning: LTACH    Aggression tool: green

## 2021-11-13 NOTE — PROGRESS NOTES
[facility]  Hospitalist Progress Note   11/13/2021          Assessment and Plan:       Burton Elizabeth is a 76 year old male with medical history of coronary artery disease, atrial flutter on anticoagulation, hypertension, hyperlipidemia, severe aortic stenosis, heart failure with preserved EF, CKD stage III, diabetes mellitus stage II, history of GI bleed, suspected obstructive sleep apnea admitted on 10/26/2021 for elective TAVR.  Hospital course complicated by contrast nephropathy, volume overload, hypoxic respiratory failure multifactorial, Covid pneumonia and physical deconditioning.    Acute hypoxic respiratory failure multifactorial, from HF exacerbation, renal disease, COVID-19 pneumonia, obstructive sleep apnea undiagnosed, deconditioning.  Patient admitted for TAVR, postprocedure had volume overload, received intravenous diuresis, switch to oral diuresis.  Nephrology and cardiology signed off.  Patient diagnosed with COVID-19 infection on 11/4.  Treated with steroids and remdesivir.  Have taken over patient's care 11/12, appears volume overloaded. On high flow nasal oxygen 30 L.  proBNP 73095, procalcitonin 0.19.  CRP 7.4.  Chest x-ray Cardiomegaly with central vascular congestion, pulmonary edema and small bilateral pleural effusions with adjacent consolidation, left greater than right.  Continue IMC care.  On Bumex 2mg oral daily, switch to IV Bumex 1 mg twice daily.  Afebrile, leukocytosis likely from steroid therapy.  Will consider antibiotics if spikes fever.  Discussed with nephrology on 11/12, defer diuresis to cardiology.  Reconsult cardiology today.  Continue high flow supplemental, wean as tolerated.  Will need repeat CT imaging in coming weeks or earlier if symptoms not improving  Further medical management as below.    Acute on chronic heart failure with preserved EF.  CAD with NSTEMI (July 2021)  HTN / HLD  Diagnostic coronary angiogram on 10/11/2021 two-vessel coronary  artery disease including chronically totally occluded circumflex and small vessel disease of the PDA branch.   Echo following TAVR with good LV, RV function.  On Bumex 2mg oral daily, switch to IV Bumex 1 mg twice daily.  PTA aspirin discontinued, on Eliquis.  Continue PTA Coreg.  Continue PTA Lipitor.  Continue telemetry monitoring.  Strict input output monitoring, daily weights patient agreement during later part of the day.  Fluid restriction, low-salt diet.    COVID-19 pneumonia.  Tested positive for COVID-19 on 11/4 after few days of hospital stay.    Per discussion with patient's brother has been vaccinated for COVID in spring of note patient's brother who has visited patient during hospital has also tested positive.  Completed course of remdesivir.  Was on 6 mg Decadron from 11/4- 11/7, high-dose Decadron 12 mg oral daily from 11/8, switch to 6 mg Decadron on 11/12.  Continue Decadron for total of 10 days.  On anticoagulation for DVT prophylaxis.  Consider antibiotics if spikes fever.  CRP trending down.  Special precautions per protocol.  Repeat imaging of chest in few weeksOr earlier if symptoms not improving.    Acute on chronic kidney injury from contrast nephropathy s/p TAVR  CKD stage III.  Baseline creatinine between 1.7-2.0.  Patient developed rapidly rising creatinine on 10/28, approx two days after TAVR, contrast.  Creatinine peaked from 1.6 >4.4, Urine studies and clinical picture consistent with contrast nephropathy. Received hemodialysis briefly, albumin infusion.  Creatinine trended down to 2.4, has been around 2-2.5 over the last few days.  Nephrology signed off.    Discussed with Dr. Shipley from nephrology 11/12, recommend diuresis per cardiology.  Monitor renal function closely     Suspected CRISTHIAN  Admission at [facility] with acute hypoxic, hypercarbic respiratory failure requiring BiPAP suspected to be related to CHF with severe aortic stenosis. Notes previously mention suspected CRISTHIAN  however pt had not completed sleep study as outpatient.  See above for management, needs sleep studies as outpatient.  No bipap with COVID Dx    Severe aortic stenosis s/p TAVR 34mm Medtronic valve (10/26/2021)  TTE 10/26 showing prosthetic aortic valve mean gradient 9mmHg and trivial paravalvular AI.  Continue Eliquis, cardiology reconsult requested.      Paroxysmal atrial flutter:   Admitted with atrial fibrillation.  Continue Coreg, Eliquis.    Acute metabolic encephalopathy likely from critical illness, isolation for COVID-19, possible low mood from prolonged hospitalization.  Per discussion with patient's brother  no cognitive impairment prior to admission. Monitor mental status closely.  OT for cognitive assessment as outpatient  Seroquel PRN     Hyperglycemia likely from steroid therapy.  ID-DM2. A1c 7.6% 10/27/2021.  PTA regimen of Lantus 18u at bedtime, glimepiride, semaglutide.   Hold home glimepiride and semaglutide due to renal failure  Continue PTA insulin Lantus 30 units twice daily, carb counting insulin.  Continue high intensity sliding scale insulin.  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  Culture negative.  UA from 10/28 with blood, WBCs.  Cultures negative.  Repeat  Urinalysis today.    Physical deconditioning in the setting of ongoing medical illness.  PT, OT ongoing.  Will likely need TCU versus LTAC.    Obesity with a BMI of 35.53.  Consider lifestyle modification with diet and exercise as able to.    Hypoalbuminemia likely from poor oral intake.  Received IV albumin infusion during his hospital.  Dietary supplements, diuresis as above.  Monitor in AM.    Transaminitis   AST improving, follow CK levels.    Orders Placed This Encounter      Combination Diet Regular Diet Adult; 2 gm NA Diet      DVT Prophylaxis: SCD.  On  anticoagulation.  Code Status: Full Code  Disposition: Expected discharge greater than 2 days.    Discussed with patient, floor RN, patient's brother Bill updated on the telephone [11/13]  Total time > 45 min. More than 60% of time spent in direct patient care, care coordination, patient counseling, and formalizing plan of care.     Angelia Alan MD        Interval History:      Patient lying in bed, drowsy but arousable.  Reports he feels fine.  Continues to have shortness of breath with minimal ambulation,On 60% FiO2 30 L on high flow nasal cannula.  Report refusing nursing case intermittently.  Refused weight check this morning.  Continues to be forgetful on and off per report.  Requiring assistance of 1 with gait belt.  Telemetry A. fib with rate control.       Physical Exam:        Physical Exam   Temp:  [97.2  F (36.2  C)-97.5  F (36.4  C)] 97.5  F (36.4  C)  Pulse:  [51-78] 73  Resp:  [18-20] 18  BP: (114-154)/(68-89) 145/83  FiO2 (%):  [50 %-60 %] 50 %  SpO2:  [90 %-97 %] 94 %    Intake/Output Summary (Last 24 hours) at 11/12/2021 1813  Last data filed at 11/12/2021 1400  Gross per 24 hour   Intake 750 ml   Output 1050 ml   Net -300 ml       Admission Weight: 112.5 kg (248 lb)  Current Weight: 109.1 kg (240 lb 9.6 oz)    PHYSICAL EXAM  GENERAL: Patient is in no distress.  Alert, forgetful.  HEART: irregular rate and rhythm. S1S2.  Systolic murmur right sternal border.  LUNGS: Coarse crackles present. Respirations unlabored on high flow nasal oxygen.  ABDOMEN: Soft, distended, bowel sounds heard.  NEURO: Moving all extremities.  EXTREMITIES: 3+ pitting pedal edema. 2+ peripheral pulses.  SKIN: Warm, dry. + bruising.  PSYCHIATRY Cooperative       Medications:          apixaban ANTICOAGULANT  5 mg Oral BID     atorvastatin  80 mg Oral Daily     bumetanide  2 mg Oral BID     carvedilol  12.5 mg Oral BID     dexamethasone  6 mg Oral Daily     hydrALAZINE  50 mg Oral Q8H [facility]     insulin aspart   Subcutaneous  QAM AC     insulin aspart   Subcutaneous Daily with lunch     insulin aspart   Subcutaneous Daily with supper     insulin aspart  1-10 Units Subcutaneous TID AC     insulin aspart  1-7 Units Subcutaneous At Bedtime     insulin glargine  30 Units Subcutaneous BID     pantoprazole  40 mg Oral BID     sodium chloride (PF)  3 mL Intracatheter Q8H     tamsulosin  0.4 mg Oral Daily     acetaminophen, artificial tears ophthalmic solution, cloNIDine, dextrose, glucose **OR** dextrose **OR** glucagon, guaiFENesin, HOLD MEDICATION, hydrALAZINE, HYDROcodone-acetaminophen, labetalol, lidocaine 4%, lidocaine (buffered or not buffered), - MEDICATION INSTRUCTIONS -, naloxone **OR** naloxone **OR** naloxone **OR** naloxone, nitroGLYcerin, - MEDICATION INSTRUCTIONS -, - MEDICATION INSTRUCTIONS -, - MEDICATION INSTRUCTIONS -, polyethylene glycol, QUEtiapine, senna-docusate, sodium chloride (PF), sodium chloride 0.9%, traZODone         Data:      All new lab and imaging data was reviewed.

## 2021-11-13 NOTE — PROGRESS NOTES
Cardiology Progress Note          Assessment and Plan:         76-year-old male who was admitted after TAVR with a 34 mm Medtronic valve on 10/26/2021.  Stay complicated by contrast-induced nephropathy, volume overload for which he received temporary hemodialysis. Now receiving IV bumex with acceptable urine output and stable creatinine. Also tested positive for COVID-19 after admission.  Pertinent history includes CAD, paroxysmal atrial fibrillation [on Eliquis], chronic left bundle branch block, history of CVA, type 2 diabetes and hypertension.    IMPRESSION:    1. Severe aortic stenosis s/p TAVR 34mm Medtronic valve on 10/26/2021. Echocardiogram 10/27/21 demonstrated normal prosthetic valve function with trivial PVL.  2. Acute on chronic renal failure as described above.  3. COVID-19 infection  4. Hypertension  5. PAF on eliquis  6. LBBB    Cardiology reconsulted due to ongoing fluid retention and requirement of high flow oxygen.    Patient is getting frustrated.    Clearly his legs are volume overloaded and his lung sounds are coarse.  CXR yesterday notes bilateral pleural effusions and pulmonary edema.    He has active COVID and is receiving therapies for that including steroids which will increase his fluid retention.    Plan:  60mg IV lasix now followed by infusion at 7mg/hr.  Goal of net negative 2L today and we may need to adjust infusion to achieve this    Wrap legs with ACE-Wraps every day.  Ok to take off at night.  This is an important issue and should be in the nursing sign-out information.  The patient is sedentary and has a large abdomen.  This compresses his venous return (vein compression at his hips).  Diuretics are not going to be as effective if his legs are not wrapped.    COVID treatment per primary service.    Daily electrolytes including magnesium    Daily weights.  Please try to make these very accurate            Interval History:     Continues to have shortness of breath and is on  "high-flow O2         Physical Exam:   Blood pressure (!) 148/81, pulse 71, temperature 97.5  F (36.4  C), resp. rate 22, height 1.753 m (5' 9\"), weight 109.1 kg (240 lb 9.6 oz), SpO2 91 %.      Vital Sign Ranges  Temperature Temp  Av  F (36.7  C)  Min: 97.3  F (36.3  C)  Max: 98.6  F (37  C)   Blood pressure Systolic (24hrs), Av , Min:129 , Max:154        Diastolic (24hrs), Av, Min:71, Max:87      Pulse Pulse  Av.2  Min: 55  Max: 82   Respirations Resp  Av.4  Min: 18  Max: 20   Pulse oximetry SpO2  Av.7 %  Min: 82 %  Max: 97 %         Intake/Output Summary (Last 24 hours) at 2021 0921  Last data filed at 2021 0500  Gross per 24 hour   Intake 555 ml   Output 1075 ml   Net -520 ml       Constitutional: NAD  Skin: Warm and dry  Neck: No JVD  Lungs: crackles in the bases  Cardiovascular: irregular no MRG  Abdomen: Soft, non tender.  Extremities and Back: +2 BLE edema  Neurological: Nonfocal           Medications:     I have reviewed this patient's current medications.              Data:     Labs reviewed.     Tele: AF      "

## 2021-11-14 NOTE — PROGRESS NOTES
Cardiology Progress Note          Assessment and Plan:         76-year-old male who was admitted after TAVR with a 34 mm Medtronic valve on 10/26/2021.  Stay complicated by contrast-induced nephropathy, volume overload for which he received temporary hemodialysis. Now receiving IV bumex with acceptable urine output and stable creatinine. Also tested positive for COVID-19 after admission.  Pertinent history includes CAD, paroxysmal atrial fibrillation [on Eliquis], chronic left bundle branch block, history of CVA, type 2 diabetes and hypertension.    IMPRESSION:    1. Severe aortic stenosis s/p TAVR 34mm Medtronic valve on 10/26/2021. Echocardiogram 10/27/21 demonstrated normal prosthetic valve function with trivial PVL.  2. Acute on chronic renal failure as described above.  3. COVID-19 infection  4. Hypertension  5. PAF on eliquis  6. LBBB    Cardiology reconsulted due to ongoing fluid retention and requirement of high flow oxygen.    Patient is getting frustrated.    Clearly his legs are volume overloaded and his lung sounds are coarse.  CXR yesterday notes bilateral pleural effusions and pulmonary edema.    He has active COVID and is receiving therapies for that including steroids which will increase his fluid retention.    Plan:  Continue lasix infusion at 7mg/hr.      Making progress and off of high flow oxygen now    Ok with concentrated albumin single dose infused slowly    Wrap legs with ACE-Wraps every day.  Ok to take off at night.  This is an important issue and should be in the nursing sign-out information.  The patient is sedentary and has a large abdomen.  This compresses his venous return (vein compression at his hips).  Diuretics are not going to be as effective if his legs are not wrapped.    COVID treatment per primary service.    Daily electrolytes including magnesium    Daily weights.  Please try to make these very accurate            Interval History:     Continues to have shortness of breath  "but is off high flow O2         Physical Exam:   Blood pressure 125/76, pulse 65, temperature 97.2  F (36.2  C), temperature source Oral, resp. rate 18, height 1.753 m (5' 9\"), weight 108.5 kg (239 lb 4.8 oz), SpO2 95 %.      Vital Sign Ranges  Temperature Temp  Av  F (36.7  C)  Min: 97.3  F (36.3  C)  Max: 98.6  F (37  C)   Blood pressure Systolic (24hrs), Av , Min:129 , Max:154        Diastolic (24hrs), Av, Min:71, Max:87      Pulse Pulse  Av.2  Min: 55  Max: 82   Respirations Resp  Av.4  Min: 18  Max: 20   Pulse oximetry SpO2  Av.7 %  Min: 82 %  Max: 97 %         Intake/Output Summary (Last 24 hours) at 2021 0921  Last data filed at 2021 0500  Gross per 24 hour   Intake 555 ml   Output 1075 ml   Net -520 ml       Constitutional: NAD  Skin: Warm and dry  Neck: No JVD  Lungs: crackles in the bases  Cardiovascular: irregular no MRG  Abdomen: Soft, non tender.  Extremities and Back: +2 BLE edema  Neurological: Nonfocal           Medications:     I have reviewed this patient's current medications.              Data:     Labs reviewed.     Tele: AF      "

## 2021-11-14 NOTE — PROGRESS NOTES
[facility]  Hospitalist Progress Note   11/14/2021          Assessment and Plan:       Burton Elizabeth is a 76 year old male with medical history of coronary artery disease, atrial flutter on anticoagulation, hypertension, hyperlipidemia, severe aortic stenosis, heart failure with preserved EF, CKD stage III, diabetes mellitus stage II, history of GI bleed, suspected obstructive sleep apnea admitted on 10/26/2021 for elective TAVR.  Hospital course complicated by contrast nephropathy, volume overload, hypoxic respiratory failure multifactorial, Covid pneumonia and physical deconditioning.    Acute hypoxic respiratory failure multifactorial, from HF exacerbation, renal disease, COVID-19 pneumonia, obstructive sleep apnea undiagnosed, deconditioning.  Patient admitted for TAVR, postprocedure had volume overload, underwent intravenous diuresis, clinical symptoms improved, was switched to oral Bumex and nephrology and cardiology signed off.   *On 11/12, appeared volume overloaded. On high flow nasal oxygen 30 L. proBNP 19852, procalcitonin 0.19.  CRP 7.4.  Chest x-ray Cardiomegaly with central vascular congestion, pulmonary edema and small bilateral pleural effusions with adjacent consolidation, left greater than right.  Switch to oral Bumex to IV Bumex, discussed with nephrology -defer diuresis to cardiology.  Reconsulted cardiology on 11/13, started on IV Lasix drip.  Appreciate cardiology comanagement.  Discussed with cardiologist today, will administer IV albumin 25% x 1 dose today.  Continue IV Lasix drip.  Afebrile, leukocytosis likely from steroid therapy.  Will consider antibiotics if spikes fever.  Continue high flow supplemental, wean as tolerated.  Will need repeat CT imaging in coming weeks or earlier if symptoms not improving  Further medical management as below.    Acute on chronic heart failure with preserved EF.  CAD with NSTEMI (July 2021)  HTN / HLD  Diagnostic coronary angiogram on  10/11/2021 two-vessel coronary artery disease including chronically totally occluded circumflex and small vessel disease of the PDA branch.   Echo following TAVR with good LV, RV function.  On IV Lasix with diuresis as above.  IV albumin x1 today.  PTA aspirin discontinued, Continue on Eliquis.  Continue Coreg 12.5 mg twice daily, hydralazine 50 mg every 8 hours.  Continue PTA Lipitor.  Continue telemetry monitoring.  Strict input output monitoring, daily weights patient agreement during later part of the day.  Fluid restriction, low-salt diet.  Ace wraps, limb elevation.     COVID-19 pneumonia.  Tested positive for COVID-19 on 11/4 after few days of hospital stay.    Per discussion with patient's brother has been vaccinated for COVID in spring. Of note patient's brother who has visited patient during hospital has also tested positive.  Completed course of remdesivir.  Was on 6 mg Decadron from 11/4- 11/7, high-dose Decadron 12 mg oral daily from 11/8, switch to 6 mg Decadron on 11/12 -11/14.  On anticoagulation for DVT prophylaxis.  Consider antibiotics if spikes fever.  Special precautions per protocol.  Repeat imaging of chest in few weeks or earlier if symptoms not improving.    Acute on chronic kidney injury from contrast nephropathy s/p TAVR  CKD stage III.  Baseline creatinine between 1.7-2.0.  Creatinine peaked from 1.6 >4.4 post TAVR, Urine studies and clinical picture consistent with contrast nephropathy. Received hemodialysis briefly, albumin infusion.  Creatinine trended down to 2.4, has been around 2-2.5 over the last few days.  Nephrology signed off.   Discussed with Dr. Shipley from nephrology 11/12, recommend diuresis per cardiology.  Monitor renal function closely while on IV Lasix.    Suspected CRISTHIAN  Admission at [facility] with acute hypoxic, hypercarbic respiratory failure requiring BiPAP suspected to be related to CHF with severe aortic stenosis. Notes previously mention suspected CRISTHIAN however pt  had not completed sleep study as outpatient.  See above for management, needs sleep studies as outpatient.  No bipap with COVID Dx    Severe aortic stenosis s/p TAVR 34mm [employer] valve (10/26/2021)  TTE 10/26 showing prosthetic aortic valve mean gradient 9mmHg and trivial paravalvular AI.  Continue Eliquis, cardiology following for diuresis as above.  Appreciate input.     Paroxysmal atrial flutter:   Continue Coreg, Eliquis.    Acute metabolic encephalopathy likely from critical illness, isolation for COVID-19, possible low mood from prolonged hospitalization.  Per discussion with patient's brother  no cognitive impairment prior to admission.   Monitor mental status closely.  Delirium Precautions.  OT for cognitive assessment as outpatient  Seroquel PRN     Hyperglycemia likely from steroid therapy.  ID-DM2. A1c 7.6% 10/27/2021.  PTA regimen of Lantus 18u at bedtime, glimepiride, semaglutide.   Hold home glimepiride and semaglutide due to renal failure  Continue PTA insulin Lantus, increased to 30 units twice daily  Continue meal time carb counting insulin.  Continue high intensity sliding scale insulin.  Hoping insulin requirement will come down as last dose of steroids today.  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  Culture negative.  Urinary retention on Zuniga catheter.  UA from 10/28 with blood, WBCs.  Urine cultures from 10/28 -.  Zuniga catheter replaced 11/13 for urinary retention.  Catheter care.     Physical deconditioning in the setting of ongoing medical illness.  PT, OT ongoing.  Will likely need TCU versus LTAC.  His brother in agreement.    Obesity with a BMI of 35.53.  Consider lifestyle modification with diet and exercise as able to.    Hypoalbuminemia likely from poor oral intake.  Received IV albumin infusion during his hospital.  IV  albumin today x1  Dietary supplements, diuresis as above.    Transaminitis   AST improving, follow CK levels.    Orders Placed This Encounter      Combination Diet Regular Diet Adult; 2 gm NA Diet      DVT Prophylaxis: SCD.  On anticoagulation.  Code Status: Full Code  Disposition: Expected discharge in 2 days pending clinical improvement.  Continue [facility] care.    Discussed with patient, floor RN, cardiologist.  Patient's brother Bill updated on the telephone [11/13]  Total time > 35 min. More than 60% of time spent in direct patient care, care coordination, patient counseling, and formalizing plan of care.     Angelia Alan MD        Interval History:      Patient sitting up in recliner , much more awake.  Reports he feels fine.  Shortness of breath slowly improving, on high flow nasal oxygen.  Agreeing with Ace wraps for bilateral lower extremity.  Mental status much more awake today.  Telemetry A. fib with rate controlled.  Lasix drip.  Admits some improvement in back pain with Tylenol and Voltaren gel.         Physical Exam:        Physical Exam   Temp:  [97.2  F (36.2  C)-98  F (36.7  C)] 97.2  F (36.2  C)  Pulse:  [56-90] 71  Resp:  [18-22] 18  BP: (110-143)/(67-95) 116/71  FiO2 (%):  [48 %-65 %] 48.8 %  SpO2:  [87 %-98 %] 97 %    Intake/Output Summary (Last 24 hours) at 11/12/2021 1813  Last data filed at 11/12/2021 1400  Gross per 24 hour   Intake 750 ml   Output 1050 ml   Net -300 ml       Admission Weight: 112.5 kg (248 lb)  Current Weight: 109.1 kg (240 lb 9.6 oz)    PHYSICAL EXAM  GENERAL: Patient is in no distress.  Alert, forgetful.  HEART: irregular rate and rhythm. S1S2.  Systolic murmur right sternal border.  LUNGS: Coarse crackles present. Respirations unlabored on high flow nasal oxygen.  ABDOMEN: Soft, distended, bowel sounds heard.  NEURO: Moving all extremities.  EXTREMITIES: 3+ pitting pedal edema. 2+ peripheral pulses.  SKIN: Warm, dry. + bruising.  PSYCHIATRY Cooperative       Medications:           apixaban ANTICOAGULANT  5 mg Oral BID     atorvastatin  80 mg Oral Daily     carvedilol  12.5 mg Oral BID     dexamethasone  6 mg Oral Daily     hydrALAZINE  50 mg Oral Q8H MOE     insulin aspart   Subcutaneous QAM AC     insulin aspart   Subcutaneous Daily with lunch     insulin aspart   Subcutaneous Daily with supper     insulin aspart  1-10 Units Subcutaneous TID AC     insulin aspart  1-7 Units Subcutaneous At Bedtime     insulin glargine  30 Units Subcutaneous BID     pantoprazole  40 mg Oral BID     sodium chloride (PF)  3 mL Intracatheter Q8H     tamsulosin  0.4 mg Oral Daily     acetaminophen, artificial tears ophthalmic solution, cloNIDine, dextrose, glucose **OR** dextrose **OR** glucagon, diclofenac, guaiFENesin, HOLD MEDICATION, hydrALAZINE, labetalol, lidocaine 4%, lidocaine (buffered or not buffered), - MEDICATION INSTRUCTIONS -, naloxone **OR** naloxone **OR** naloxone **OR** naloxone, nitroGLYcerin, - MEDICATION INSTRUCTIONS -, - MEDICATION INSTRUCTIONS -, - MEDICATION INSTRUCTIONS -, polyethylene glycol, QUEtiapine, senna-docusate, sodium chloride (PF), sodium chloride 0.9%, traZODone         Data:      All new lab and imaging data was reviewed.

## 2021-11-14 NOTE — PROGRESS NOTES
Pt is A&O x 4. VSS on HF FiO2 50% 30LNC saturating low 90s. Up in the recliner, refused to lay down in bed. Education provided. Lower back pain managed with scheduled Tylenol and Voltaren gel. Lasix drip infusing at 7 mL/hr. Pt had UO of 820 total during this shift. On Fluid restriction 1500 mL. 4+ edema to BLE, Legs wrapped with ace wrap, encouraged to elevate BLE with pillows. Meplex dressing at on coccyx area. Tele Afib CVR+BBB. Continue to monitor.

## 2021-11-14 NOTE — PROGRESS NOTES
Paged for recurrent urinary retention after Zuniga removed today, bladder scanned for 450 ml; Zuniga replaced

## 2021-11-15 NOTE — PROGRESS NOTES
MD Notification    Notified Person: MD    Notified Person Name: Israel    Notification Date/Time: 11/15/21 8:22 AM     Notification Interaction: Talked with MD    Purpose of Notification: 275: Pt BGS critically low, trying to order food but listed as NPO? Not sure why, can we get a diet ordre please? thank  you Lashonda ALFRED [id number]    Orders Received:     Comments:

## 2021-11-15 NOTE — PROCEDURES
[facility]    Triple Lumen PICC Placement    Date/Time: 11/15/2021 12:25 PM  Performed by: Zayda Banerjee RN  Authorized by: Kate Meredith NP   Indications: vascular access    UNIVERSAL PROTOCOL   Site Marked: Yes  Prior Images Obtained and Reviewed:  Yes  Required items: Required blood products, implants, devices and special equipment available    Patient identity confirmed:  Verbally with patient and arm band  NA - No sedation, light sedation, or local anesthesia  Confirmation Checklist:  Patient's identity using two indicators, relevant allergies, procedure was appropriate and matched the consent or emergent situation and correct equipment/implants were available  Time out: Immediately prior to the procedure a time out was called    Universal Protocol: the Joint Commission Universal Protocol was followed    Preparation: Patient was prepped and draped in usual sterile fashion    ESBL (mL):  1         ANESTHESIA    Anesthesia: See MAR for details  Local Anesthetic:  Lidocaine 1% without epinephrine  Anesthetic Total (mL):  1      SEDATION    Patient Sedated: No        Preparation: skin prepped with ChloraPrep  Skin prep agent: skin prep agent completely dried prior to procedure  Sterile barriers: maximum sterile barriers were used: cap, mask, sterile gown, sterile gloves, and large sterile sheet  Hand hygiene: hand hygiene performed prior to central venous catheter insertion  Type of line used: Power PICC  Catheter type: triple lumen  Catheter size: 5 Fr  Brand: Bard  Lot number: [id number]  Placement method: MST and ultrasound  Number of attempts: 1  Successful placement: yes  Orientation: right  Location: basilic vein  Arm circumference: adults 10 cm  Extremity circumference: 30  Visible catheter length: 2  Internal length: 44 cm  Total catheter length: 46  Dressing and securement: chlorhexidine disc applied, gauze, occlusive dressing applied, securement device, site cleaned  and sterile dressing applied  Post procedure assessment: blood return through all ports  PROCEDURE   Patient Tolerance:  Patient tolerated the procedure well with no immediate complications  Describe Procedure: Successful placement of triple lumen PICC LUE basilic vein. Await CXR to verify placement. Bedside RN notified  Addendum: CXR results now noted. PICC line is in good position and is ready for immediate use. Bedside RN updated.

## 2021-11-15 NOTE — PROGRESS NOTES
[facility]    Medicine Progress Note - Hospitalist Service        Date of Admission:  10/26/2021  8:01 AM    Assessment & Plan:   Burton Elizabeth is a 76 year old male with medical history of coronary artery disease, atrial flutter on anticoagulation, hypertension, hyperlipidemia, severe aortic stenosis, heart failure with preserved EF, CKD stage III, diabetes mellitus stage II, history of GI bleed, suspected obstructive sleep apnea admitted on 10/26/2021 for elective TAVR.  Hospital course complicated by contrast nephropathy, volume overload, hypoxic respiratory failure multifactorial, Covid pneumonia and physical deconditioning.     Acute hypoxic respiratory failure- multifactorial  -Patient admitted for TAVR, postprocedure had volume overload   *On 11/12, appeared volume overloaded.   -Chest x-ray on 11/12 showed cardiomegaly with central vascular congestion, pulmonary edema and small bilateral pleural effusions with adjacent consolidation, left greater than right.  -Cardiology following, currently on a Lasix drip, respiratory status was up-and-down in the last 24 hours but worse overnight and this morning  -Switched from high flow to BiPAP at 90%/14/6  -Check procalcitonin and lactate to rule out infectious etiology, empirically start Levaquin in the meantime  -Repeat chest x-ray this morning shows low lung volumes, pulmonary infiltrates in both lungs with probable small pleural effusions.  -Hypokalemia, family discussing amongst themselves about direction of further care.    Severe aortic stenosis s/p TAVR  Acute on chronic heart failure with preserved EF.  Paroxysmal atrial flutter  CAD with NSTEMI (July 2021)  HTN / HLD  -Diagnostic coronary angiogram on 10/11/2021 two-vessel coronary artery disease including chronically totally occluded circumflex and small vessel disease of the PDA branch.   -Underwent TAVR on 10/26/2021  -Echo following TAVR with good LV, RV function.  -Continue on  Eliquis.  -Continue Coreg 12.5 mg twice daily, hydralazine 50 mg every 8 hours when able, currently n.p.o. due to being on BiPAP  -Fluid restriction, low-salt diet.  -Ace wraps, limb elevation.      COVID-19 pneumonia.  -Tested positive for COVID-19 on 11/4 after few days of hospital stay.    -Per discussion with patient's brother has been vaccinated for COVID in spring  -Completed course of remdesivir.  -Continue Decadron  -On anticoagulation for DVT prophylaxis.  -Empiric antibiotics as mentioned above.     Acute on chronic kidney injury from contrast nephropathy s/p TAVR  CKD stage III.  -Baseline creatinine between 1.7-2.0.  -Creatinine peaked from 1.6 >4.4 post TAVR, Urine studies and clinical picture consistent with contrast nephropathy. Received hemodialysis briefly, albumin infusion. -Creatinine trended down to 2.4, has been around 2-2.5 over the last few days. -Nephrology signed off.     -Creatinine marginally higher today, monitor renal function closely while on IV Lasix.     Suspected CRISTHIAN  Admission at [facility] with acute hypoxic, hypercarbic respiratory failure requiring BiPAP suspected to be related to CHF with severe aortic stenosis. Notes previously mention suspected CRISTHIAN however pt had not completed sleep study as outpatient.  -See above for management, needs sleep studies as outpatient.      Acute metabolic encephalopathy likely from critical illness  -Per discussion with patient's brother  no cognitive impairment prior to admission.   -Continues to be encephalopathic, continue to monitor mental status closely.  -Delirium Precautions.     Persistent hypoglycemia  ID-DM2. A1c 7.6% 10/27/2021.  PTA regimen of Lantus 18u at bedtime, glimepiride, semaglutide.   -Prior to admission glimepiride and semaglutide on hold  -Patient had issues with hypoglycemia due to concurrent steroid use in the hospital and Lantus was increased however now having issues with hypoglycemia since this morning  -Hold all  insulin  -Hypoglycemia protocol     History of GI bleed, secondary to gastritis July 2021  Cabrera's esophagus diagnosed on endoscopy  -Continue PTA PPI     Hematuria, pyuria likely catheter related.  Culture negative.  Urinary retention on Zuniga catheter.  UA from 10/28 with blood, WBCs.    -Zuniga catheter replaced 11/13 for urinary retention.     Physical deconditioning in the setting of ongoing medical illness.  -PT, OT ongoing.  Will likely need TCU versus LTAC pending clinical course      Transaminitis   -Trending down    Diet: Fluid restriction 1500 ML FLUID  Snacks/Supplements Adult: Glucerna; Between Meals  Moderate Consistent Carb (60 g CHO per Meal) Diet     DVT Prophylaxis: DOAC   Zuniga Catheter: Not present  Code Status: Full Code     Disposition Plan    Expected discharge: Unclear at this time, patient with worsening respiratory status in the last 24 hours, likely will need several days of hospitalization.  Entered: Sly Pierre MD 11/15/2021, 8:27 AM        Clinically Significant Risk Factors Present on Admission                The patient's care was discussed with the Bedside Nurse and Patient.    Critical care time spent 35 minutes    Sly Pierre MD  Hospitalist Service  [facility]  Text Page 7AM-6PM  Securely message with the Vocera Web Console (learn more here)  Text page via [employer] Paging/Directory    ______________________________________________________________________    Interval History   Patient with worsening respiratory status overnight, now placed on BiPAP.  Continues to be encephalopathic.  Having ongoing issues with hypoglycemia.  Continues on Lasix drip.  Renal function marginally worse.  Afebrile.    Data reviewed today: I reviewed all medications, new labs and imaging results over the last 24 hours. I personally reviewed the EKG tracing showing A. fib with CVR.    Physical Exam   Vital signs:  Temp: 97.8  F (36.6  C) Temp src: Oral BP: (!) 154/78  "Pulse: 58   Resp: 30 SpO2: 90 % O2 Device: High Flow Nasal Cannula (HFNC) Oxygen Delivery: 55 LPM Height: 175.3 cm (5' 9\") Weight: 108.5 kg (239 lb 4.8 oz)  Estimated body mass index is 35.34 kg/m  as calculated from the following:    Height as of this encounter: 1.753 m (5' 9\").    Weight as of this encounter: 108.5 kg (239 lb 4.8 oz).      Wt Readings from Last 2 Encounters:   11/14/21 108.5 kg (239 lb 4.8 oz)   10/21/21 112 kg (247 lb)       Gen: Awake, answers 1 or 2 questions appropriately, encephalopathic  HEENT: Supple neck, moist oral mucosa, no pallor  Resp: Coarse breath sounds bilaterally with diminished air entry at both the bases, slightly increased work of breathing  CVS: RRR, no murmur  Abd/GI: Soft, non-tender. BS- normoactive.  No G/R/R  Skin: Warm, dry no rashes  MSK: 2-3+ pedal edema  Neuro- CN- intact. No focal deficits.      Data   Recent Labs   Lab 11/15/21  0815 11/15/21  0754 11/15/21  0600 11/14/21  0800 11/14/21  0653 11/13/21  0749 11/13/21  0611 11/12/21  0728 11/12/21  0555   WBC  --   --  18.0*  --  16.5*  --  18.5*  --  12.1*   HGB  --   --  10.5*  --   --   --  11.7*  --  10.9*   MCV  --   --  82  --   --   --  81  --  81   PLT  --   --  121*  --   --   --  150  --  163   NA  --   --  138  --  132*  --  134  --  135   POTASSIUM  --   --  3.9  --  4.1  --  4.6  --  4.3   CHLORIDE  --   --  98  --  94  --  97  --  98   CO2  --   --  30  --  30  --  25  --  28   BUN  --   --  170*  --  157*  --  142*  --  142*   CR  --   --  2.75*  --  2.67*  --  2.51*  --  2.43*   ANIONGAP  --   --  10  --  8  --  12  --  9   GABE  --   --  8.8  --  9.3  --  9.3  --  9.2   * 21* 43*   < > 117*   < > 218*   < > 180*   ALBUMIN  --   --  2.7*  --  2.7*  --  2.7*  --  2.4*   PROTTOTAL  --   --  6.0*  --  6.5*  --  6.6*  --  6.0*   BILITOTAL  --   --  0.7  --  0.7  --  0.7  --  0.5   ALKPHOS  --   --  137  --  159*  --  159*  --  159*   ALT  --   --  57  --  68  --  65  --  65   AST  --   --  60*  --  " 73*  --  75*  --  75*    < > = values in this interval not displayed.       No results found for this or any previous visit (from the past 24 hour(s)).  Medications     dextrose       furosemide 7 mg/hr (11/15/21 0800)     - MEDICATION INSTRUCTIONS -       - MEDICATION INSTRUCTIONS -       - MEDICATION INSTRUCTIONS -       - MEDICATION INSTRUCTIONS -       - MEDICATION INSTRUCTIONS -       - MEDICATION INSTRUCTIONS -       sodium chloride 0.9%         apixaban ANTICOAGULANT  5 mg Oral BID     atorvastatin  80 mg Oral Daily     carvedilol  12.5 mg Oral BID     dexamethasone  6 mg Oral Daily     hydrALAZINE  50 mg Oral Q8H MOE     insulin aspart   Subcutaneous QAM AC     insulin aspart   Subcutaneous Daily with lunch     insulin aspart   Subcutaneous Daily with supper     insulin aspart  1-10 Units Subcutaneous TID AC     insulin aspart  1-7 Units Subcutaneous At Bedtime     [Held by provider] insulin glargine  30 Units Subcutaneous BID     pantoprazole  40 mg Oral BID     sodium chloride (PF)  3 mL Intracatheter Q8H     tamsulosin  0.4 mg Oral Daily

## 2021-11-15 NOTE — PROGRESS NOTES
Discussed with brother Randy on the phone at length.  Randy is patient's brother, next of kin and healthcare agent.  Updated him on the clinical situation.  Patient is borderline hypoxic despite being on pretty close to maximal support on BiPAP.  I updated Randy that the next step likely will be intubating him and putting him on a mechanical ventilation if we wanted to continue restorative care.  Randy was initially unsure about the next step but by the end of the conversation he wanted a trial of intubation and mechanical ventilation(if that was necessary) at least for 48 to 72 hours to see if there would be clinical improvement.  I explained to Randy that Magdaleno's prognosis is going to be poor even with the help of mechanical ventilation which he understood.  This was updated to andrew Love MD team.

## 2021-11-15 NOTE — PROGRESS NOTES
Pt is alert and oriented x 4. Agitated/iritable and refusing cares at times. VSS on HF nasal cannula FiO2 98% on 50 LNC saturation l;ow 90s, Denied pain. Lung sound diminished/coarse, LOAIZA with minimal activities. RT following. Lasix drip running at 7 mL/hr. Voiding well. Tele Afib CVR+BBB and occasional PVCs. Small BM, groin area bruised/reddned. Right/left gluteal buttock/coccxyx area break down. Barrier cream applied. Needs encouragement to turn and reposition, declining most of the time. Up x 1-2 assist with walker/GB. Had hypoglycemic episode BG 45, given D50, recheck 104, MD updated. Lantus on hold.  Continue to monitor.

## 2021-11-15 NOTE — CODE/RAPID RESPONSE
Called for persistent hypoglycemia and general worsening of condition. D50W given x 2 this AM. Chart reviewed. HCD from 2011 he identifies brother, Jose, as surrogate decision maker. The document is very clear about DNR including he would not want intubation/mechanical ventilation, feeding tube, or dialysis. Jose and I reviewed the document; Jose will discuss with sister and Dr. Pierre will call Jose later today to close the loop.     Jose and I discussed the hope that his brother will improve, however we need to make plans in the event his condition worsens.     - D10 infusion for hypoglycemia; will titrate to avoid giving more fluid than necessary   - will discuss with Cardiology  - procalcitonin, lactic, VBG  - will check for ileus given persistent nausea   - PICC line, please place multi-lumen    WALE Yan, CNP  Hospitalist Service, House Officer  [facility]     Text Page  Pager: [phone]

## 2021-11-15 NOTE — PROGRESS NOTES
[facility]    Cardiology Progress Note    Date of Service (when I saw the patient): 11/15/2021            Assessment and Plan:         76-year-old male who was admitted after TAVR with a 34 mm Medtronic valve on 10/26/2021.  Stay complicated by contrast-induced nephropathy, volume overload for which he received temporary hemodialysis and active COVID infection. Now receiving IV Lasix and received albumin infusion yesterday.  Pertinent history includes CAD, paroxysmal atrial fibrillation [on Eliquis], chronic left bundle branch block, history of CVA, type 2 diabetes and hypertension.     IMPRESSION:  1.  Acute hypoxic respiratory failure:  In the setting of active COVID infection, volume overload.  Now requiring  BIPAP for progressive respiratory failure despite treatments aimed at COVID and diuresis.    1. Severe aortic stenosis s/p TAVR 34mm Medtronic valve on 10/26/2021. Echocardiogram 10/27/21 demonstrated normal prosthetic valve function with trivial PVL.  2. Acute on chronic renal failure as described above.  3. COVID-19 infection  4. Hypertension  5. PAF on eliquis  6. LBBB  7.  Volume overload:  With significant third spacing.         Plan:  Continue lasix infusion at 7mg/hr for now.    Given progressive respiratory failure despite aggressive treatment with diuresis and treatment for COVID, prognosis is overall poor.  Recommend goals of care discussion, code status discussion with family       Marleen Harvey MD [facility]            Interval History:     Worsening respiratory status over the past 24 hours; now on maximal support BiPAP.  RRT called this morning for hypoglycemia; now on D10 infusion.  Patient minimally responsive.               Medications:       apixaban ANTICOAGULANT  5 mg Oral BID     atorvastatin  80 mg Oral Daily     carvedilol  12.5 mg Oral BID     dexamethasone  6 mg Oral Daily     hydrALAZINE  50 mg Oral Q8H MOE     [Held by provider]  "insulin aspart  1-10 Units Subcutaneous TID AC     [Held by provider] insulin aspart  1-7 Units Subcutaneous At Bedtime     [Held by provider] insulin glargine  30 Units Subcutaneous BID     levofloxacin  500 mg Intravenous Once     pantoprazole  40 mg Oral BID     sodium chloride (PF)  10-40 mL Intracatheter Q7 Days     sodium chloride (PF)  3 mL Intracatheter Q8H     tamsulosin  0.4 mg Oral Daily            Physical Exam:   Blood pressure (!) 154/78, pulse 58, temperature 97.8  F (36.6  C), temperature source Oral, resp. rate 30, height 1.753 m (5' 9\"), weight 108.5 kg (239 lb 4.8 oz), SpO2 92 %.  Vitals:    21 0542 21 1400 21 0700   Weight: 106.1 kg (233 lb 12.8 oz) 109.1 kg (240 lb 9.6 oz) 108.5 kg (239 lb 4.8 oz)       Intake/Output Summary (Last 24 hours) at 11/15/2021 1138  Last data filed at 11/15/2021 0800  Gross per 24 hour   Intake 255.4 ml   Output 950 ml   Net -694.6 ml           Vital Sign Ranges  Temperature Temp  Av.6  F (36.4  C)  Min: 97.4  F (36.3  C)  Max: 97.8  F (36.6  C)   Blood pressure Systolic (24hrs), Av , Min:116 , Max:154        Diastolic (24hrs), Av, Min:63, Max:91      Pulse Pulse  Av.1  Min: 58  Max: 85   Respirations Resp  Av  Min: 16  Max: 30   Pulse oximetry SpO2  Av.6 %  Min: 90 %  Max: 97 %         EXAM:    Constitutional:    On BiPAP, minimally responsive    Skin:    normal    Head:    Normocephalic, atramatic    Eyes:    pupils equal, round and reactive to light, extra ocular muscles intact, sclera clear, conjunctiva normal    Neck:    JVP    Lungs:    Coarse breath sounds bilaterally   Cardiovascular:    S1, S2 RRR no m/r/g JVD difficult to assess    Abdomen:    normal bowel sounds    Extremities and Back:    no cyanosis or clubbing and +2 edema of legs bilaterally, in ace wraps     Neurological:    No gross or focal neurologic abnormalities               Data:     Recent Labs   Lab Test 11/15/21  0600 21  0653 " 11/13/21  0611 10/27/21  0551 10/21/21  1346 10/11/21  0826   WBC 18.0* 16.5* 18.5*   < > 7.2 7.4   HGB 10.5*  --  11.7*   < > 12.3* 12.5*   MCV 82  --  81   < > 86 84   *  --  150   < > 212 222   INR  --   --   --   --  1.26* 1.14    < > = values in this interval not displayed.      Recent Labs   Lab Test 11/15/21  0600 11/14/21  0653    132*   POTASSIUM 3.9 4.1   CHLORIDE 98 94   * 157*   CR 2.75* 2.67*     Recent Labs   Lab 11/15/21  0959 11/15/21  0842 11/15/21  0815 11/15/21  0754   * 100* 112* 21*     Recent Labs   Lab Test 11/15/21  0600 11/14/21  0653   ALT 57 68   AST 60* 73*     Troponin I ES   Date Value Ref Range Status   07/07/2021 3.517 (HH) 0.000 - 0.045 ug/L Final     Comment:     The 99th percentile for upper reference range is 0.045 ug/L.  Troponin values   in the range of 0.045 - 0.120 ug/L may be associated with risks of adverse   clinical events.  Critical result, provider not notified due to previous critical result   notification.     07/07/2021 4.419 (HH) 0.000 - 0.045 ug/L Final     Comment:     The 99th percentile for upper reference range is 0.045 ug/L.  Troponin values   in the range of 0.045 - 0.120 ug/L may be associated with risks of adverse   clinical events.  Critical result, provider not notified due to previous critical result   notification.     07/06/2021 7.047 (HH) 0.000 - 0.045 ug/L Final     Comment:     The 99th percentile for upper reference range is 0.045 ug/L.  Troponin values   in the range of 0.045 - 0.120 ug/L may be associated with risks of adverse   clinical events.  Critical result, provider not notified due to previous critical result   notification.     07/06/2021 7.902 (HH) 0.000 - 0.045 ug/L Final     Comment:     The 99th percentile for upper reference range is 0.045 ug/L.  Troponin values   in the range of 0.045 - 0.120 ug/L may be associated with risks of adverse   clinical events.  Critical Value called to and read back by  SONYA  PASQUALE IN ED AT 1121 DN     04/23/2020 0.097 (H) 0.000 - 0.045 ug/L Final     Comment:     The 99th percentile for upper reference range is 0.045 ug/L.  Troponin values   in the range of 0.045 - 0.120 ug/L may be associated with risks of adverse   clinical events.           Recent Labs   Lab Test 11/15/21  0600 11/14/21  0653 11/13/21  0611   MAG 2.2 2.1 2.0       Lab Results   Component Value Date    CHOL 97 01/11/2021     Lab Results   Component Value Date    HDL 31 01/11/2021     Lab Results   Component Value Date    LDL 46 01/11/2021     Lab Results   Component Value Date    TRIG 102 01/11/2021     No results found for: CHOLHDLRATIO       TSH   Date Value Ref Range Status   01/11/2021 2.21 0.35 - 4.94 mcU/mL Final           Marleen Harvey MD, FACC  Cardiology

## 2021-11-15 NOTE — PROGRESS NOTES
MD Notification    Notified Person: MD    Notified Person Name: Israel    Notification Date/Time: 11/15/21 7:53 AM     Notification Interaction: Talked with MD    Purpose of Notification: 275: KHARI MULLEN this am 44, holding insulin, giving D50    Orders Received:     Comments:

## 2021-11-15 NOTE — PROGRESS NOTES
MD saw pt, CXR looking worse, recommend placing pt on bipap. trialing bipap. Rechecked btgs, 100, gave morew d50  Recheck sugar hour later, stayed above 100. Pt very agitated demanded bipap be removed, Retried HFNC, pt sats in high 50's, moved back to bipap.  Gave compazine, pt still nauseous,,   RRT called for fear of pt vomiting on bipap and for reassesment. Pt w/ increased work of breathing.     Started abxs  Dilaudid x2  Wraps off for an hour, reapplied   PICC inserted    RRT #2 called for sats in 80s maxed on bipap

## 2021-11-15 NOTE — PROGRESS NOTES
MD Notification    Notified Person: MD    Notified Person Name: Israel    Notification Date/Time: 11/15/21 7:35 AM 7:47 AM      Notification Interaction: Talked with MD    Purpose of Notification: 275: Pt nausous, not doing well on high flow NC, respiratory assessed and feels he should be on bipap. No antiemetics ordered. if we could try some antiemetics? Lashonda ALFRED [id number]    Orders Received: compazine    Comments:

## 2021-11-15 NOTE — PROGRESS NOTES
MD Notification    Notified Person: MD    Notified Person Name: Israel    Notification Date/Time: 11/15/21 4:14 PM     Notification Interaction: Talked with MD    Purpose of Notification: 275: He's maxed on bipap, cant gets sats above 89. can you give me a call? Lashonda RN [phone]    Orders Received: To call family and readdress code status, may need to intubate    Comments:

## 2021-11-15 NOTE — CODE/RAPID RESPONSE
[facility]    RRT Note  11/15/2021   Time Called: 5: 01 PM    RRT called for:  Hypoxia     Assessment & Plan   Acute hypoxic respiratory failure, multifactorial including COVID infection   RRT for persistent hypoxia at rest while on BiPap 14/6 100%. Increased to 20/12 100%. He is on Lasix drip for diuresis. Conversations with JUN, Jose, today. Current plan of care is resuscitative and Bill is open to trial of intubation. On exam Magdaleno is obtunded and very ill appearing.     INTERVENTIONS:  - dexamethasone 4- mg IV x 1 now then increase to 10- mg daily   - Xray has been concerning for bilateral small pleural effusions, consult to US for consideration of US thoracentesis tonight in an attempt to avoid intubation    - normoglycemia on D10, decrease drip rate to 40- ml/hour     Code Status: Full Code     WALE Yan, CNP  Hospitalist Service, House Officer  [facility]     Text Page  Pager: [phone]

## 2021-11-15 NOTE — PLAN OF CARE
Shift: [id number]    Updates:   -Weaned off high flow to 10 LPM Oxymizer Cannula  -Gave albumin   -Bladder scans in the 100s. No issues with retention since giron out.     Neuro/Orientation: alert and oriented x 4    Tele/Cardiac: Afib CVR BBB PVC occasional     Pertinent Labs:   -Cr 2.67  -WBC 16.5    Vitals: Temp: 97.5  F (36.4  C) Temp src: Axillary BP: (!) 149/91 Pulse: 85   Resp: 16 SpO2: 95 % O2 Device: Oxymizer cannula Oxygen Delivery: 8 LPM    Pain: back pain - tylenol and voltaren gel     Access/Drips: R/L PIV    Respiratory/Oxygen: lungs diminished; oxymizer cannula 8 liters    GI/: voiding well, minimal retention. Intermittently incontinent;  11/12  -Diet: 2 gm sodium diet. 1500 ml Fluid Restriction    Skin: right/left intergluteal buttocks breakdown - mepilex in place, barrier cream on buttocks    Mobility: up with 1-2, gait belt and walker    Discharge Planning: pending, LTACH    Aggression tool: Green

## 2021-11-15 NOTE — PROGRESS NOTES
CLINICAL NUTRITION SERVICES - REASSESSMENT NOTE      Recommendations Ordered by Registered Dietitian (RD):   Sending 4 oz Glucerna BID with addition of fluid restriction today   Future/Additional Recommendations:   Encourage PO intake as able with respiratory needs, noted no FT per pt's [facility]   Malnutrition: (11/2)   % Weight Loss:  Up to 5% in 1 month (moderate malnutrition)  % Intake:  </= 50% for >/= 5 days (severe malnutrition) - slowly improving  Subcutaneous Fat Loss:  Upper arm region mild depletion and Thoracic region mild depletion  Muscle Loss:  Clavicle bone region mild depletion, Acromion bone region mild depletion and Scapular bone region mild depletion  Fluid Retention:  Moderate 2-3+     Malnutrition Diagnosis: Moderate malnutrition  In Context of:  Acute illness or injury       EVALUATION OF PROGRESS TOWARD GOALS   Diet:  Moderate Consistent Carb (60 grams CHO per meal), 1500 ml Fluid Restriction     Supplements: Glucerna BID at 10 am and 2 pm --> changed on 11/12 due to diet order. Previously sending Ensure supplements BID.     Intake/Tolerance:  Pt ordered breakfast this morning of an Ensure, rice krispie cereal, can and sausage. Eating % of meals over the past week. Ordering 2-3 meals daily, some meals are nutritionally adequate and others are small. Likes the Ensure and is ordering this frequently.       ASSESSED NUTRITION NEEDS:  Dosing Weight 81.3 kg - adjusted   Estimated Energy Needs: 0440-0636 kcals (25-30 Kcal/Kg)  Justification: maintenance and repletion  Estimated Protein Needs:  grams protein (1.2-1.8 g pro/Kg)  Justification: dialysis and preservation of lean body mass  Estimated Fluid Needs: 2000 mL - per MD      NEW FINDINGS:   - Labs: Reviewed. Alfonso WNL.  (H), Cr 2.75 (H), GFR 22 (L). BGM .   - Meds: high resistance sliding scale insulin, 30 units lantus BID --> insulin being held currently in setting of very low blood sugar this morning. Lasix gtt.    - GI:  Last BM x 1 on 11/12  - Renal: HD on 10/29 and 10/30, now discontinued. No further dialysis at this time per health care directive.   - Resp: Found to be COVID+ on 11/4. Tenuous respiratory status with worsening decline over the past 24 hours per chart review. RRT called this morning for persistent hypoglycemia and general worsening of condition. Pt is currently DNR/DNI, pt would not want intubation/mechanical ventilation, feeding tube or dialysis per MD note.   - GOC: Family discussing pt's GOC for further direction of care in the event pt's condition continues to worsen.   - Weight: Weight overall down 11# in the past week likely 2/2 diuresis as noted pt is on lasix gtt. Per prior RD notes, pt thought he had lost 8-9# in addition to fluid shifts, potential for 3-4% wt loss over the past month.     Previous Goals:   Intake of % of meals at least BID + 100% intake of at least 1 supplement daily   Evaluation: Met     Previous Nutrition Diagnosis:   Inadequate oral intake related to pt cites diet restriction, reduced appetite/lethargy as evidenced by pt ordering 2-3 meals most days over admission, which have been low in protein and higher in CHO-dense foods.   Evaluation: Improved, though suspect going to decline again with tenuous respiratory status, updated below       CURRENT NUTRITION DIAGNOSIS  Predicted inadequate nutrient intake (protein-calorie) related to meeting nutrition goals with good PO intakes + supplement use over the past week though likely to experience PO decline with tenuous respiratory status, Bipap needs currently and general worsening of condition per chart review       INTERVENTIONS  Recommendations / Nutrition Prescription  Sending 4 oz Glucerna BID with addition of fluid restriction today  Encourage PO intake as able with respiratory needs, noted no FT per pt's GOC    Implementation  Medical Food Supplement    Goals  Intake of % of meals at least BID + 100% intake of at least 1  supplement daily       MONITORING AND EVALUATION:  Progress towards goals will be monitored and evaluated per protocol and Practice Guidelines      Pooja Cortez RD, LD  Unit RD Pager: [phone]

## 2021-11-16 NOTE — CODE/RAPID RESPONSE
Brief house NP note:    RRT paged for continued hypoxia. Refer to my colleague's notes from earlier today (Nader Pierre and Viri). Patient was satting in the low 80's on 22/10/100%, tachypneic. Called and updated brother, Jose, on plans for intubation.     PLAN:  -- intubation by CRNAs  -- transfer to ICU  -- propofol infusion   -- hand off given to Dr. Alexander of critical-care medicine     Please page with additional concerns,     WALE Brunner, CNP  Hospitalist - House [facility]  Text Page  [phone])

## 2021-11-16 NOTE — PLAN OF CARE
Arrived to ICU at 2245. Proned at approximately 0030.     Neuro: Intubated, sedated, and paralyzed. TOF 2/4 twitches. BIS score 30-40.   CV: Afib SVR/CVR with BBB. Blood pressures stable.   Respiratory: PEEP 16 500 TV Rate 22. Weaned to 60% Fio2. Moderate amounts of thick, creamy, blood-tinged secretions.   GI/: Zuniga in place, adequate output. No BM since 11/13.   Skin: Breakdown on coccyx noted. Open to air while patient is proned. Generalized scabbing/bruising. +3/+4 edema all over body. ACE wraps to be applied to legs during the day.  Activity: Bedrest. Q2h repositioning and head turns.   Diet: NPO. OG tube in place, clamped. Verified by X-ray and OK for use.   Drips: Propofol, vecuronium, fentanyl, and D10.   Other: Blood sugars stable overnight after one time dose D10.  Plan: Unclear at this time. Needing a lot of vent support.

## 2021-11-16 NOTE — PROGRESS NOTES
Pt is alert and oriented x 4. VSS except pt saturation dipped to low 80's despite BiPAP 100% Pt's WOB increased throughout the shift. Turned prone to right lung without improvement. Oxygen saturation remained low 80s. RRT called and Pt intubated. Tele Afib CVR + BBB. D10 infusing at 40 mL/hr. Meplex dressing on coccyx area. BLE 3+ edema, Ace wrap in placed. Pt is on lasix drip 7 mL/hr. Pt transferred to ICU, report given to receiving RN. Belongings sent with pt. Continue to monitor.

## 2021-11-16 NOTE — CONSULTS
BRIEF NUTRITION NOTE:    Received consult to begin TF for patient.  A full Nutrition Reassessment was completed 11/15.  See note for details.    NEW FINDINGS:  11/15:  RRT called for:  Hypoxia   11/15:  Intubated  11/15:  AXR = Tip in body of stomach. Sidehole is below the GE junction (OG)  11/16:  Proned    Norepinephrine off this morning  Propofol running at 39.1 mL/hr = 1032 kcals (lipid) - for sedation.   (H)  Cr 2.7 (H)  Phos 3.5 (NL)  Mg 1.9 (NL)  K 4.6 (NL).    REASSESSED NUTRITION NEEDS: (Modified using critically ill obese guidelines)  Dosing Weight:  107.8 kg (11/16) for energy, 72.7 kg (IBW for protein)   Estimated Energy Needs: 1253-8138 kcals (14-17 Kcal/Kg)  Justification: obesity, vent  Estimated Protein Needs:  grams protein (1.2-1.5 g pro/Kg)  Justification: repletion, stress, BUN > 100 (Liberalize protein as able)    INTERVENTIONS:  Enteral Nutrition - Initiate TF Vital HP at 10 mL/hr;  After 24 hrs increase to 25 mL/hr = 600 kcals, 52 gm pro, 500 mL H20, 27 gm CHO, no fiber  Medical food supplement therapy -Starting tomorrow, 2 packets Prosource every 12 hrs = 160 kcals, 44 gm pro  Total (TF + Prosource + Propofol):  1792 kcals (17 kcal/kg), 96 gm pro (1.3 gm/kg)  Feeding tube flush - 30 mL every 4 hrs  Multivitamin/mineral supplement therapy - Certavite daily via FT    Francine Ramirez, RD, LD, CNSC       [email or web address]  [phone]    Gastroenterology follow up-Progress note    Impression:  1. Abdominal pain - RLQ - improved  2. Abnormal CT - notable for soft tissue mass in medial wall of cecum  3. Pneumonia - management as per medical team  4. COPD on home O2    Plan:  1. Clear liquid diet today  2. NPO p MN for colonoscopy tomorrow  3. Golytely split dose prep (2L starting at 6pm then 2L 6 hours prior to procedure) with zofran prn for nausea - patient agreeable to prep as above    Chief Complaint: lower abdominal pain      Subjective:  Reports RLQ Pain improved, not adequately prepped for colonoscopy today with several formed stools throughout the night, no nausea, vomiting, or blood in stool. ROS: Denies any fevers, chills, rash.      Eyes: conjunctiva normal, EOM normal   Neck: ROM normal, supple and trachea normal   Cardiovascular: heart normal, intact distal pulses, normal rate and regular rhythm   Pulmonary/Chest Wall: ronchi throughout   Abdominal: appearance normal, bowel sounds normal and soft, non-acute, mildly TTP over RLQ     Patient Active Problem List   Diagnosis Code    COPD, severe ([facility]) J44.9    Nicotine addiction F17.200    Hemoptysis R04.2    Lumbar spinal stenosis M48.061    Facet arthritis of lumbar region ([facility] [address] M46.96    Neuritis of lower extremity G57.90    Emphysema of lung ([facility]) J43.9    COPD with acute exacerbation ([facility]) J44.1    Muscle spasm of back M62.830    Sacroiliac joint pain M53.3    Current chronic use of systemic steroids Z79.52    Baker's cyst of knee M71.20    Acute exacerbation of chronic obstructive pulmonary disease (COPD) ([facility]) J44.1    Degenerative disc disease, lumbar M51.36    Left-sided low back pain with sciatica M54.42    COPD with exacerbation ([facility]) J44.1    COPD (chronic obstructive pulmonary disease) ([facility]) J44.9    Pneumonia J18.9    Abdominal pain R10.9    Sepsis due to pneumonia ([facility]) J18.9, A41.9    Abdominal mass R19.00 Visit Vitals    BP 93/61 (BP 1 Location: Left arm, BP Patient Position: At rest)    Pulse 87    Temp 98 °F (36.7 °C)    Resp 16    Ht 5' 6\" (1.676 m)    Wt 53.1 kg (117 lb)    SpO2 96%    BMI 18.88 kg/m2           Intake/Output Summary (Last 24 hours) at 09/21/18 1002  Last data filed at 09/21/18 1000   Gross per 24 hour   Intake                0 ml   Output             1150 ml   Net            -1150 ml       CBC w/Diff    Lab Results   Component Value Date/Time    WBC 6.8 09/21/2018 04:03 AM    RBC 3.77 (L) 09/21/2018 04:03 AM    HGB 12.1 09/21/2018 04:03 AM    HCT 36.3 09/21/2018 04:03 AM    MCV 96.3 09/21/2018 04:03 AM    MCH 32.1 09/21/2018 04:03 AM    MCHC 33.3 09/21/2018 04:03 AM    RDW 13.3 09/21/2018 04:03 AM     09/21/2018 04:03 AM    Lab Results   Component Value Date/Time    GRANS 85 (H) 09/21/2018 04:03 AM    LYMPH 11 (L) 09/21/2018 04:03 AM    EOS 0 09/21/2018 04:03 AM    BANDS 3 12/02/2016 03:29 AM    BASOS 0 09/21/2018 04:03 AM      Basic Metabolic Profile   Recent Labs      09/21/18   0403   NA  136   K  4.3   CL  99*   CO2  32   BUN  18   CA  8.8        Hepatic Function    Lab Results   Component Value Date/Time    ALB 2.9 (L) 09/20/2018 06:30 AM    TP 6.1 (L) 09/20/2018 06:30 AM    AP 73 09/20/2018 06:30 AM    Lab Results   Component Value Date/Time    SGOT 13 (L) 09/20/2018 06:30 AM          Coags   No results for input(s): PTP, INR, APTT in the last 72 hours. No lab exists for component: INREXT        Clent Leventhal, NP    [facility]. [email or web address]  Phone: [phone]  Pager: [phone]

## 2021-11-16 NOTE — PROGRESS NOTES
Events from last night noted.  Patient was intubated and placed on mechanical ventilation and transferred to the intensive care unit.  Hospitalist service will sign off for now.

## 2021-11-16 NOTE — PROGRESS NOTES
Critical Care Progress Note      11/16/2021    Name: Burton Elizabeth MRN#: [id number]   Age: 76 year old YOB: 1945     [facility]  [facility]    MV DAY # 1             Problem List:   Active Problems:    Aortic stenosis, severe           Summary/Hospital Course:     Burton Elizabeth is a 76 year old male with medical history of coronary artery disease, atrial flutter on anticoagulation, hypertension, hyperlipidemia, severe aortic stenosis, heart failure with preserved EF, CKD stage III, diabetes mellitus stage II, history of GI bleed, suspected obstructive sleep apnea admitted on 10/26/2021 for elective TAVR.  Hospital course complicated by contrast nephropathy, volume overload, hypoxic respiratory failure multifactorial, Covid pneumonia and physical deconditioning.    -Patient has had worsening hypoxemia and was aintuabted on 1/15/21 for severe hypoxemic respiratroy failure due to covid-19 pneumonia which he might have contracted from his brother.       Assessment and plan :     Burton Elizabeth IS a 76 year old male admitted on 10/26/2021 for Covid-19 Pneumonia associated ARDS.   I have personally reviewed the daily labs, imaging studies, cultures and discussed the case with referring physician and consulting physicians.     My assessment and plan by system for this patient is as follows:    Neurology/Psychiatry:   Propofol , fentanyl plus vecuronium a  -Deeply sedated and paralyzed.     Cardiovascular:   Severe aortic stenosis s/p TAVR  Acute on chronic heart failure with preserved EF.  Paroxysmal atrial flutter  CAD with NSTEMI (July 2021)  HTN / HLD    -Diagnostic coronary angiogram on 10/11/2021 two-vessel coronary artery disease including chronically totally occluded circumflex and small vessel disease of the PDA branch.   -Underwent TAVR on 10/26/2021  -Echo following TAVR with good LV, RV function.  -Continue on Eliquis.    Pulmonary/Ventilator Management:     -Covid-19 Pneumonia  -  Pleural effusion.  -Intubated on 11/16,   - Prone, paralysed and started on valetri.   -Day 8 of dexamethason on 11/16.  -A single dose of tocilizumab on 11/16.   -can not do remdesvir due to Salome.  - Diuresis as able.   -Infection work up and broad spectrum antibiotics have been started.     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  - Chor pack placement ordered.     Renal/Fluids/Electrolytes:   Acute on chronic kidney injury from contrast nephropathy s/p TAVR  CKD stage III.  -Baseline creatinine between 1.7-2.0.  -Creatinine peaked from 1.6 >4.4 post TAVR, Urine studies and clinical picture consistent with contrast nephropathy. Received hemodialysis briefly, albumin infusion. -Creatinine trended down to 2.4, has been around 2-2.5 over the last few days. -Nephrology signed off.      Infectious Disease:   Covid-19 Pneumonia  - On Dexamethasone day 7 on 11/6  -single dose of tocilizumab on 11/15  - Broad spectrum abx started on 11/15  -Follow urine, sputum and blood culture. Mildly increased procalcitonin.     Endocrine:   ID-DM2  -    Hematology/Oncology:   -Leucocytosis    IV/Access:   1. Venous access -  PICC  2. Arterial access -   Arterial line   3.  Plan  - central access required and necessary      ICU Prophylaxis:   1. DVT:  Apixaban  2. Stress Ulcer: PPI  3. Restraints: Nonviolent soft two point restraints required and necessary for patient safety and continued cares and good effect as patient continues to pull at necessary lines, tubes despite education and distraction. Will readdress daily.   4. Feeding -  Og tube Nutrition consult palced.   5. Family Update: yes  6. Disposition - To stay in icu.              Key Medications:       apixaban ANTICOAGULANT  5 mg Oral BID     artificial tears   Both Eyes Q8H     atorvastatin  80 mg Oral Daily     carvedilol  12.5 mg Oral BID     dexamethasone  10 mg  Intravenous Q24H     furosemide  60 mg Intravenous Q8H     hydrALAZINE  50 mg Oral Q8H MOE     meropenem  500 mg Intravenous Q8H     pantoprazole  40 mg Oral BID     sodium chloride (PF)  10-40 mL Intracatheter Q7 Days     sodium chloride (PF)  3 mL Intracatheter Q8H     tamsulosin  0.4 mg Oral Daily     vancomycin  1,000 mg Intravenous Q24H       dextrose 10% 40 mL/hr at 11/15/21 2332     dextrose       epoprostenol (VELETRI) 20 mcg/mL in sterile water inhalation solution 20 ng/kg/min (11/16/21 0421)     fentaNYL 100 mcg/hr (11/15/21 2314)     - MEDICATION INSTRUCTIONS -       norepinephrine Stopped (11/16/21 0402)     - MEDICATION INSTRUCTIONS -       - MEDICATION INSTRUCTIONS -       - MEDICATION INSTRUCTIONS -       propofol (DIPRIVAN) infusion 60 mcg/kg/min (11/16/21 0608)     sodium chloride 0.9%       vecuronium (NORCURON) infusion ADULT 1.2 mcg/kg/min (11/16/21 0634)               Physical Examination:   Temp:  [96.5  F (35.8  C)-98.4  F (36.9  C)] 96.6  F (35.9  C)  Pulse:  [47-80] 47  Resp:  [15-33] 22  BP: ()/(49-85) 81/49  MAP:  [60 mmHg-107 mmHg] 82 mmHg  Arterial Line BP: ()/(36-70) 104/51  FiO2 (%):  [60 %-100 %] 60 %  SpO2:  [76 %-100 %] 100 %    Intake/Output Summary (Last 24 hours) at 11/16/2021 0639  Last data filed at 11/16/2021 0600  Gross per 24 hour   Intake 1716.12 ml   Output 1350 ml   Net 366.12 ml     Wt Readings from Last 4 Encounters:   11/16/21 107.8 kg (237 lb 10.5 oz)   10/21/21 112 kg (247 lb)   10/11/21 109.5 kg (241 lb 4.8 oz)   09/15/21 112.9 kg (249 lb)     Arterial Line BP: ()/(36-70) 104/51  MAP:  [60 mmHg-107 mmHg] 82 mmHg  BP - Mean:  [] 57  Ventilation Mode: CMV/AC  (Continuous Mandatory Ventilation/ Assist Control)  FiO2 (%): 60 %  Rate Set (breaths/minute): 22 breaths/min  Tidal Volume Set (mL): 500 mL  PEEP (cm H2O): 16 cmH2O  Oxygen Concentration (%): 100 %  Resp: 22    Recent Labs   Lab 11/16/21  0504 11/15/21  2339 11/15/21  1047 11/15/21  1043    PH 7.53* 7.40  --  7.36   PCO2 40 58*  --  63*   PO2 241* 66*  --  72*   HCO3 34* 36*  --  35*   O2PER 80 100 90 90       GEN: no acute distress   HEENT: head ncat, sclera anicteric, OP patent, trachea midline   PULM:  Decreased air entry is noted.   CV/COR: RRR S1S2 no gallop,  No rub, no murmur  ABD: soft nontender, hypoactive bowel sounds, no mass  EXT:  Edema   warm  NEURO: grossly intact  SKIN: no obvious rash  LINES: clean, dry intact         Data:   All data and imaging reviewed     ROUTINE ICU LABS (Last four results)  CMP  Recent Labs   Lab 11/16/21  0503 11/16/21  0413 11/16/21  0133 11/16/21  0007 11/15/21  0754 11/15/21  0600 11/14/21  0800 11/14/21  0653 11/13/21  0749 11/13/21  0611 11/12/21  0728 11/12/21  0555     --   --   --   --  138  --  132*  --  134  --  135   POTASSIUM 4.6  --   --   --   --  3.9  --  4.1  --  4.6  --  4.3   CHLORIDE 97  --   --   --   --  98  --  94  --  97  --  98   CO2 30  --   --   --   --  30  --  30  --  25  --  28   ANIONGAP 8  --   --   --   --  10  --  8  --  12  --  9   * 166* 130* 146*   < > 43*   < > 117*   < > 218*   < > 180*   *  --   --   --   --  170*  --  157*  --  142*  --  142*   CR 2.70*  --   --   --   --  2.75*  --  2.67*  --  2.51*  --  2.43*   GFRESTIMATED 22*  --   --   --   --  22*  --  22*  --  24*  --  25*   GABE 8.3*  --   --   --   --  8.8  --  9.3  --  9.3  --  9.2   MAG 1.9  --   --   --   --  2.2  --  2.1  --  2.0  --  1.9   PHOS 3.5  --   --   --   --  4.1  --  3.5  --  3.2  --  2.7   PROTTOTAL  --   --   --   --   --  6.0*  --  6.5*  --  6.6*  --  6.0*   ALBUMIN  --   --   --   --   --  2.7*  --  2.7*  --  2.7*  --  2.4*   BILITOTAL  --   --   --   --   --  0.7  --  0.7  --  0.7  --  0.5   ALKPHOS  --   --   --   --   --  137  --  159*  --  159*  --  159*   AST  --   --   --   --   --  60*  --  73*  --  75*  --  75*   ALT  --   --   --   --   --  57  --  68  --  65  --  65    < > = values in this interval not displayed.      CBC  Recent Labs   Lab 11/16/21  0503 11/15/21  0600 11/14/21  0653 11/13/21  0611 11/12/21  0555   WBC 21.3* 18.0* 16.5* 18.5* 12.1*   RBC 3.75* 4.15*  --  4.58 4.38*   HGB 9.5* 10.5*  --  11.7* 10.9*   HCT 30.4* 34.1*  --  37.1* 35.6*   MCV 81 82  --  81 81   MCH 25.3* 25.3*  --  25.5* 24.9*   MCHC 31.3* 30.8*  --  31.5 30.6*   RDW 16.0* 16.0*  --  15.8* 15.6*   PLT 99* 121*  --  150 163     INRNo lab results found in last 7 days.  Arterial Blood Gas  Recent Labs   Lab 11/16/21  0504 11/15/21  2339 11/15/21  1047 11/15/21  1043   PH 7.53* 7.40  --  7.36   PCO2 40 58*  --  63*   PO2 241* 66*  --  72*   HCO3 34* 36*  --  35*   O2PER 80 100 90 90       All cultures:  No results for input(s): CULT in the last 168 hours.  Recent Results (from the past 24 hour(s))   XR Chest Port 1 View    Narrative    XR CHEST PORT 1 VIEW  11/15/2021 8:10 AM       INDICATION: f/u COVID, resp failure  COMPARISON: 11/12/2021       Impression    IMPRESSION: Lung volumes are low. There pulmonary infiltrates in both  lungs with probable small pleural effusions. TAVR. Little interval  change. No pneumothorax.    MARY MARTINEZ MD         SYSTEM ID:  [id number]   XR Abdomen Port 1 View    Narrative    XR ABDOMEN PORT 1 VIEWS 11/15/2021 11:50 AM    HISTORY: nausea, prolonged hospital stay    COMPARISON: None.      Impression    IMPRESSION: Pulmonary infiltrates and pleural effusions in the lung  bases. There is a large amount of stool in the colon. Bowel gas  pattern is unremarkable. No evidence of obstruction or ileus.    MARY MARTINEZ MD         SYSTEM ID:  [id number]   XR Chest Port 1 View    Narrative    XR CHEST PORT 1 VIEW 11/15/2021 1:29 PM    HISTORY: RN placed PICC - verify tip placement    COMPARISON: 11/15/2021 at 8:00 AM      Impression    IMPRESSION: Left arm PICC tip is at the SVC/right atrial junction.  Remainder stable.    FABI AUSTIN MD         SYSTEM ID:  [id number]   XR Abdomen Port 1 View    Narrative    EXAM: XR  ABDOMEN PORT 1 VIEWS  LOCATION: [facility]  DATE/TIME: 11/15/2021 11:21 PM    INDICATION: Check orogastric tube placement  COMPARISON: 11/15/2021.      Impression    IMPRESSION: Enteric tube with tip in the body of the stomach. Sidehole is below the GE junction. Nonspecific bowel gas pattern in the visualized abdomen. Vascular calcification. Patchy bilateral lower lung infiltrates.    XR Chest Port 1 View    Narrative    EXAM: XR CHEST PORT 1 VIEW  LOCATION: [facility]  DATE/TIME: 11/15/2021 11:21 PM    INDICATION: ETT placement  COMPARISON: 11/15/2021.      Impression    IMPRESSION: Endotracheal tube tip 4 cm above the devaughn. Enteric tube tip below the diaphragm. Left-sided PICC line with tip over the SVC. TAVR. Cardiac enlargement with increased pulmonary vascularity and questionable small bilateral pleural effusions.   Patchy bilateral lower lung infiltrates with superimposed pneumonitis not excluded. No significant bony abnormalities.         Billing: This patient is critically ill: Yes. Total critical care time today 40 min.

## 2021-11-16 NOTE — PROGRESS NOTES
[facility]    Cardiology Progress Note    Date of Service (when I saw the patient): 11/16/2021            Assessment and Plan:    76-year-old male who was admitted after TAVR with a 34 mm Medtronic valve on 10/26/2021.  Stay complicated by contrast-induced nephropathy, volume overload for which he received temporary hemodialysis and active COVID infection. Now receiving IV Lasix and received albumin infusion yesterday.  Pertinent history includes CAD, paroxysmal atrial fibrillation [on Eliquis], chronic left bundle branch block, history of CVA, type 2 diabetes and hypertension.  Progressive hypoxic respiratory failure resulted in intubation yesterday evening.       IMPRESSION:  1.  Acute hypoxic respiratory failure:  In the setting of active COVID infection; subsequently intubated yesterday evening and proned per ICU service.    1. Severe aortic stenosis s/p TAVR 34mm Medtronic valve on 10/26/2021. Echocardiogram 10/27/21 demonstrated normal prosthetic valve function with trivial PVL.  2. Acute on chronic renal failure as described above.  3. COVID-19 infection  4. Hypertension  5. PAF on eliquis  6. LBBB  7.  Volume overload:  With significant third spacing.          Plan:  -Limited echocardiogram ordered by ICU service; will need to obtain images once patient is in supine position (early tomorrow morning)  -No further cardiology recommendations at this time; will sign off.  Please call with questions.    Marleen Harvey MD [facility]          Interval History:     Intubated overnight, now proned.              Medications:       apixaban ANTICOAGULANT  5 mg Oral BID     artificial tears   Both Eyes Q8H     atorvastatin  80 mg Oral Daily     carvedilol  12.5 mg Oral BID     chlorhexidine  15 mL Mouth/Throat Q12H     dexamethasone  10 mg Intravenous Q24H     [Held by provider] furosemide  60 mg Intravenous Q8H     hydrALAZINE  50 mg Oral Q8H MOE     meropenem  500 mg  "Intravenous Q8H     pantoprazole  40 mg Oral or Feeding Tube BID AC     sodium chloride (PF)  10-40 mL Intracatheter Q7 Days     sodium chloride (PF)  3 mL Intracatheter Q8H     tamsulosin  0.4 mg Oral Daily     vancomycin  1,000 mg Intravenous Q24H            Physical Exam:   Blood pressure (!) 81/49, pulse 58, temperature (!) 96.4  F (35.8  C), resp. rate 22, height 1.753 m (5' 9\"), weight 107.8 kg (237 lb 10.5 oz), SpO2 100 %.  Vitals:    21 1400 21 0700 11/15/21 2300 21 0200   Weight: 109.1 kg (240 lb 9.6 oz) 108.5 kg (239 lb 4.8 oz) 107.8 kg (237 lb 10.5 oz) 107.8 kg (237 lb 10.5 oz)       Intake/Output Summary (Last 24 hours) at 2021 1118  Last data filed at 2021 1000  Gross per 24 hour   Intake 2078.37 ml   Output 1960 ml   Net 118.37 ml           Vital Sign Ranges  Temperature Temp  Av.2  F (36.2  C)  Min: 96.4  F (35.8  C)  Max: 98.4  F (36.9  C)   Blood pressure Systolic (24hrs), Av , Min:81 , Max:154        Diastolic (24hrs), Av, Min:49, Max:85      Pulse Pulse  Av.4  Min: 47  Max: 80   Respirations Resp  Av.8  Min: 15  Max: 33   Pulse oximetry SpO2  Av.1 %  Min: 76 %  Max: 100 %         EXAM:    Constitutional:    Intubated and proned   Skin:    normal    Head:    Normocephalic, atramatic    Eyes:    pupils equal, round and reactive to light, extra ocular muscles intact, sclera clear, conjunctiva normal    Neck:    JVP    Lungs:    Coarse breath sounds bilaterally   Cardiovascular:    S1, S2    Abdomen:    normal bowel sounds    Extremities and Back:    no cyanosis or clubbing and +2 edema bilaterally   Neurological:    No gross or focal neurologic abnormalities               Data:     Recent Labs   Lab Test 21  0503 11/15/21  0600 10/27/21  0551 10/21/21  1346 10/11/21  0826   WBC 21.3* 18.0*   < > 7.2 7.4   HGB 9.5* 10.5*   < > 12.3* 12.5*   MCV 81 82   < > 86 84   PLT 99* 121*   < > 212 222   INR  --   --   --  1.26* 1.14    < > = values " in this interval not displayed.      Recent Labs   Lab Test 11/16/21  0503 11/15/21  0600    138   POTASSIUM 4.6 3.9   CHLORIDE 97 98   * 170*   CR 2.70* 2.75*     Recent Labs   Lab 11/16/21  0828 11/16/21  0503 11/16/21  0413 11/16/21  0133   * 180* 166* 130*     Recent Labs   Lab Test 11/15/21  0600 11/14/21  0653   ALT 57 68   AST 60* 73*     Troponin I ES   Date Value Ref Range Status   07/07/2021 3.517 (HH) 0.000 - 0.045 ug/L Final     Comment:     The 99th percentile for upper reference range is 0.045 ug/L.  Troponin values   in the range of 0.045 - 0.120 ug/L may be associated with risks of adverse   clinical events.  Critical result, provider not notified due to previous critical result   notification.     07/07/2021 4.419 (HH) 0.000 - 0.045 ug/L Final     Comment:     The 99th percentile for upper reference range is 0.045 ug/L.  Troponin values   in the range of 0.045 - 0.120 ug/L may be associated with risks of adverse   clinical events.  Critical result, provider not notified due to previous critical result   notification.     07/06/2021 7.047 (HH) 0.000 - 0.045 ug/L Final     Comment:     The 99th percentile for upper reference range is 0.045 ug/L.  Troponin values   in the range of 0.045 - 0.120 ug/L may be associated with risks of adverse   clinical events.  Critical result, provider not notified due to previous critical result   notification.     07/06/2021 7.902 (HH) 0.000 - 0.045 ug/L Final     Comment:     The 99th percentile for upper reference range is 0.045 ug/L.  Troponin values   in the range of 0.045 - 0.120 ug/L may be associated with risks of adverse   clinical events.  Critical Value called to and read back by  SONYA GIORDANO IN ED AT 1121 DN     04/23/2020 0.097 (H) 0.000 - 0.045 ug/L Final     Comment:     The 99th percentile for upper reference range is 0.045 ug/L.  Troponin values   in the range of 0.045 - 0.120 ug/L may be associated with risks of adverse    clinical events.           Recent Labs   Lab Test 11/16/21  0503 11/15/21  0600 11/14/21  0653   MAG 1.9 2.2 2.1       Lab Results   Component Value Date    CHOL 97 01/11/2021     Lab Results   Component Value Date    HDL 31 01/11/2021     Lab Results   Component Value Date    LDL 46 01/11/2021     Lab Results   Component Value Date    TRIG 102 01/11/2021     No results found for: CHOLHDLRATIO       TSH   Date Value Ref Range Status   01/11/2021 2.21 0.35 - 4.94 mcU/mL Final         Marleen Harvey MD, FACC  Cardiology

## 2021-11-16 NOTE — PLAN OF CARE
Pt on HFNC this am, sats dropped to mid 60's. Quickly transitioned to bipap.Tele Afib CVR w/ PVC's, did have 10 beat run vtach, asymptomatic, MD aware. Lethargic but oriented. Covid precautions maintained. Turned and repositioned in bed, pt refused to turn occasionally due to requiring HOB at 90, using urinal in bed. Compression stockings removed for one hour, reapplied. C/o generalized pain, given dilaudid for pain and work of breathing. Pt did desat after second dose to high 80's. Pt had 2 RRT's today, pt had repeated low BGS requring D50 and eventually a D10 infusion and also was desatting on bipap. Pt was nauseous with those events, gave zofran and compazine. Pt work of breathing increased throughout shift, MD aware. MD started abxs. PICC inserted. Lasix infusion continues at 7. Plan to aggressively turn side to side, continue iv abxs, continue to watch sugars, attempt to wean oxygen. Continue to monitor.

## 2021-11-16 NOTE — PROGRESS NOTES
SPIRITUAL HEALTH SERVICES  SPIRITUAL ASSESSMENT Progress Note  [facility] ICU     REFERRAL SOURCE: Unit     I called and shared a reflective phone call with pt Magdaleno's brother, Jose today. I introduced self/role and shs. Jose welcomed the call and Magdaleno has been visited by chaplains while on the [facility].    -Jose spent some time processing his grief and Magdaleno's condition. He shares his sister is driving up from [place] to spend time with them and be there for support.    -Jose shares Magdaleno's [employer] huey is very important to him and asked about Religious sacrament and ritual. We reviewed what would be available for Magdaleno now and that if his condition were to change, we can request a diocesan  to anoint him given his covid positive status. Given they would be considered a visitor, we currently cannot provide that. I shared I'd be happy to visit Magdaleno for prayer, which Jose asked we do and will do as able.    I spent time providing emotional and spiritual support through reflective listening that affirmed emotions, experiences and meaning.     PLAN: SHS will continue to follow.     Syeda Barone  Associate    Phone: [phone]  Pager: [phone]

## 2021-11-16 NOTE — PROGRESS NOTES
[facility] ICU RESPIRATORY NOTE        Date of Admission: 10/26/2021    Date of Intubation (most recent):  11/15/21    Reason for Mechanical Ventilation: Resp failure    Number of Days on Mechanical Ventilation: 2    Met Criteria for Spontaneous Breathing Trial: No    Reason for No Spontaneous Breathing Trial: Per MD    Significant Events Today: Pt was supined and ETAD replaced.      ABG Results:   Recent Labs   Lab 11/16/21  1135 11/16/21  0504 11/15/21  2339 11/15/21  1047 11/15/21  1043   PH 7.49* 7.53* 7.40  --  7.36   PCO2 45 40 58*  --  63*   PO2 163* 241* 66*  --  72*   HCO3 34* 34* 36*  --  35*   O2PER 60 80 100 90 90       Current Vent Settings: Ventilation Mode: CMV/AC  (Continuous Mandatory Ventilation/ Assist Control)  FiO2 (%): 50 %  Rate Set (breaths/minute): 22 breaths/min  Tidal Volume Set (mL): 430 mL  PEEP (cm H2O): 16 cmH2O  Oxygen Concentration (%): 50 %  Resp: 22      Plan:  Pt remains on full vent support overnight    Ector Dutta, RT

## 2021-11-16 NOTE — PROGRESS NOTES
Physician Attestation   I, Rajiv Manrique MD, saw this patient with the resident and agree with the resident/fellow's findings and plan of care as documented in the note.      I personally reviewed vital signs, medications, labs and imaging.    Key findings: 76 year old male with multiple medical problems had an elective TAVR, complicated by COVID pneumonia, OPAL and acute hypoxemic respiratory failure. Patient has ARDS and needed to be proned and paralyized to oxygenate and ventilate patient.     Rajiv Manrique MD  Date of Service (when I saw the patient): 11/16/2021    Critical care time excluding procedures 33 Mins    [facility]    ICU Progress Note       Date of Admission:  10/26/2021    Assessment: Critical Care   Burton Elizabeth is a 76 year old male with PMHx of coronary artery disease, atrial flutter on eliquis, hypertension, hyperlipidemia, severe aortic stenosis, HFpEF, CKD stage III, diabetes mellitus II, history of GI bleed, suspected obstructive sleep apnea admitted on 10/26/2021 for elective TAVR.  Hospital course complicated by OPAL from contrast nephropathy, volume overload, hypoxic respiratory failure multifactorial, Covid pneumonia and physical deconditioning.  11/15- patient intubated for severe hypoxic respiratory failure. Patient was proned and paralyzed.        Plan: Critical Care   Neuro/ pain/ sedation:  Sedated and paralyzed for maximum vent synchrony  - Monitor neurological status. Notify provider for any acute changes in exam  - titrate vec for 1/4 on train of floor  - Titrate fent and prop for BIS of ~40    Pulmonary:  Acute hypoxic respiratory failure secondary to covid pneumonia  - Ventilator settings: Ventilation Mode: CMV/AC  (Continuous Mandatory Ventilation/ Assist Control)  FiO2 (%): 60 %  Rate Set (breaths/minute): 22 breaths/min  Tidal Volume Set (mL): 430 mL  PEEP (cm H2O): 16 cmH2O  Oxygen Concentration (%): 60 %  Resp: 22  - supplemental oxygen  to keep saturation about 92%  - continue veletri    Cardiovascular:  Severe aortic stenosis, s/p TAVR 10/26  A flutter  - Monitor hemodynamic status.  - continue Eliquis    GI/Nutrition:  No acute issues  - Nutrition consulted. Appreciate recommendations.   - start tube feeds    Renal/ Fluid Balance:   OPAL on CKD, stable  - Will monitor intake and output  - ICU electrolyte replacement protocol     Endocrine:  Diabetes mellitus II  - sliding scale insulin, medium dose    ID:  Sepsis, broad spectrum antibiotics started overnight  MRSA colonization  Covid 19 pneumonia  - continue decadron  - continue Meropenem and  Vanco, follow up cultures. If no growth on day 3 will stop    Hematology:  Coagulopathy of covid  - continue eliquis    MSK:   No acute issues       Lines/ tubes/ drains:  Zungia Catheter: PRESENT, indication: Retention, Deep Sedation/Paralysis  Central Lines: PRESENT  PICC Triple Lumen 11/15/21 Right Basilic access-Site Assessment: WDL    Prophylaxis:  - DVT Prophylaxis: DOAC  - PUD Prophylaxis: protonix    Code Status: Full Code      Disposition:  - ICU, critical     The patient's care was discussed with the multi-disciplinary Team.    Will call brother after rounds.     Greg River MD  [facility]  Securely message with the Vocera Web Console (learn more here)  Text page via [employer] Paging/Directory      Clinically Significant Risk Factors Present on Admission                ______________________________________________________________________    Interval History   Patient intubated overnight, proned and paralyzed. PF ration improved.     Physical Exam   Vital Signs: Temp: (!) 96.4  F (35.8  C) Temp src: Bladder BP: (!) 81/49 Pulse: 54   Resp: 22 SpO2: 100 % O2 Device: Mechanical Ventilator    Weight: 237 lbs 10.49 oz    Gen: laying in bed, proned, appears older than stated age  HEENT: ETT in place, no visible pressure ulcer on the lips, anicteric, MMM  Neck, trachea midline,  no crepitus  Pulm: muffled coarse breath sounds throughout  Cor: RRR, limited exam secondary to being prone, NSR on monitor  Abd: soft, obese, no palpable masses (limited exam secondary to being prone)  Skin: no jaundice  Ext: +edema    Data   I reviewed all medications, new labs and imaging results over the last 24 hours.  Arterial Blood Gases   Recent Labs   Lab 11/16/21  0504 11/15/21  2339 11/15/21  1043   PH 7.53* 7.40 7.36   PCO2 40 58* 63*   PO2 241* 66* 72*   HCO3 34* 36* 35*       Complete Blood Count   Recent Labs   Lab 11/16/21  0503 11/15/21  0600 11/14/21  0653 11/13/21  0611 11/12/21  0555   WBC 21.3* 18.0* 16.5* 18.5* 12.1*   HGB 9.5* 10.5*  --  11.7* 10.9*   PLT 99* 121*  --  150 163       Basic Metabolic Panel  Recent Labs   Lab 11/16/21  0828 11/16/21  0503 11/16/21  0413 11/16/21  0133 11/15/21  0754 11/15/21  0600 11/14/21  0800 11/14/21  0653 11/13/21  0749 11/13/21  0611   NA  --  135  --   --   --  138  --  132*  --  134   POTASSIUM  --  4.6  --   --   --  3.9  --  4.1  --  4.6   CHLORIDE  --  97  --   --   --  98  --  94  --  97   CO2  --  30  --   --   --  30  --  30  --  25   BUN  --  161*  --   --   --  170*  --  157*  --  142*   CR  --  2.70*  --   --   --  2.75*  --  2.67*  --  2.51*   * 180* 166* 130*   < > 43*   < > 117*   < > 218*    < > = values in this interval not displayed.       Liver Function Tests  Recent Labs   Lab 11/15/21  0600 11/14/21  0653 11/13/21  0611 11/12/21  0555   AST 60* 73* 75* 75*   ALT 57 68 65 65   ALKPHOS 137 159* 159* 159*   BILITOTAL 0.7 0.7 0.7 0.5   ALBUMIN 2.7* 2.7* 2.7* 2.4*       Pancreatic Enzymes  No lab results found in last 7 days.    Coagulation Profile  No lab results found in last 7 days.    IMAGING:  Recent Results (from the past 24 hour(s))   XR Abdomen Port 1 View    Narrative    XR ABDOMEN PORT 1 VIEWS 11/15/2021 11:50 AM    HISTORY: nausea, prolonged hospital stay    COMPARISON: None.      Impression    IMPRESSION: Pulmonary  infiltrates and pleural effusions in the lung  bases. There is a large amount of stool in the colon. Bowel gas  pattern is unremarkable. No evidence of obstruction or ileus.    MARY MARTINEZ MD         SYSTEM ID:  [id number]   XR Chest Port 1 View    Narrative    XR CHEST PORT 1 VIEW 11/15/2021 1:29 PM    HISTORY: RN placed PICC - verify tip placement    COMPARISON: 11/15/2021 at 8:00 AM      Impression    IMPRESSION: Left arm PICC tip is at the SVC/right atrial junction.  Remainder stable.    FABI AUSTIN MD         SYSTEM ID:  [id number]   XR Abdomen Port 1 View    Narrative    EXAM: XR ABDOMEN PORT 1 VIEWS  LOCATION: [facility]  DATE/TIME: 11/15/2021 11:21 PM    INDICATION: Check orogastric tube placement  COMPARISON: 11/15/2021.      Impression    IMPRESSION: Enteric tube with tip in the body of the stomach. Sidehole is below the GE junction. Nonspecific bowel gas pattern in the visualized abdomen. Vascular calcification. Patchy bilateral lower lung infiltrates.    XR Chest Port 1 View    Narrative    EXAM: XR CHEST PORT 1 VIEW  LOCATION: [facility]  DATE/TIME: 11/15/2021 11:21 PM    INDICATION: ETT placement  COMPARISON: 11/15/2021.      Impression    IMPRESSION: Endotracheal tube tip 4 cm above the devaughn. Enteric tube tip below the diaphragm. Left-sided PICC line with tip over the SVC. TAVR. Cardiac enlargement with increased pulmonary vascularity and questionable small bilateral pleural effusions.   Patchy bilateral lower lung infiltrates with superimposed pneumonitis not excluded. No significant bony abnormalities.

## 2021-11-16 NOTE — PLAN OF CARE
Physical Therapy Discharge Summary    Reason for therapy discharge:    Change in medical status.  Please reorder therapy if patient becomes medically appropriate and able to participate in therapy.    Progress towards therapy goal(s). See goals on Care Plan in [facility] electronic health record for goal details.  Goals not met.  Barriers to achieving goals:   limited tolerance for therapy.    Therapy recommendation(s):    No further therapy is recommended.

## 2021-11-16 NOTE — PHARMACY-VANCOMYCIN DOSING SERVICE
Pharmacy Vancomycin Initial Note  Date of Service November 15, 2021  Patient's  1945  76 year old, male    Indication: Sepsis    Current estimated CrCl = Estimated Creatinine Clearance: 28.2 mL/min (A) (based on SCr of 2.75 mg/dL (H)).    Creatinine for last 3 days  2021:  6:11 AM Creatinine 2.51 mg/dL  2021:  6:53 AM Creatinine 2.67 mg/dL  11/15/2021:  6:00 AM Creatinine 2.75 mg/dL    Recent Vancomycin Level(s) for last 3 days  No results found for requested labs within last 72 hours.      Vancomycin IV Administrations (past 72 hours)      No vancomycin orders with administrations in past 72 hours.                Nephrotoxins and other renal medications (From now, onward)    Start     Dose/Rate Route Frequency Ordered Stop    21 2330  vancomycin (VANCOCIN) 1000 mg in dextrose 5% 200 mL PREMIX         1,000 mg  200 mL/hr over 1 Hours Intravenous EVERY 24 HOURS 11/15/21 2319      11/15/21 2330  vancomycin 2000 mg in 0.9% NaCl 500 ml intermittent infusion 2,000 mg         2,000 mg  over 2 Hours Intravenous ONCE 11/15/21 2319      11/13/21 1530  furosemide (LASIX) 500 mg/50mL infusion ADULT MAX CONC         7 mg/hr  0.7 mL/hr  Intravenous CONTINUOUS 21 1522            Contrast Orders - past 72 hours (72h ago, onward)            None          [employer] Prediction of Planned Initial Vancomycin Regimen  Loading dose: N/A  Regimen: 1000 mg IV every 24 hours.  Start time: 23:30 on 11/15/2021  Exposure target: AUC24 (range)400-600 mg/L.hr   AUC24,ss: 569 mg/L.hr  Probability of AUC24 > 400: 85 %  Ctrough,ss: 19.9 mg/L  Probability of Ctrough,ss > 20: 49 %  Probability of nephrotoxicity (Lodise NELLY ): 17 %          Plan:  1. Start vancomycin  1000 mg IV q24h, after load of 2000 mg.   2. Vancomycin monitoring method: AUC  3. Vancomycin therapeutic monitoring goal: 400-600 mg*h/L  4. Pharmacy will check vancomycin levels as appropriate in 1-3 Days.    5. Serum creatinine levels will be  ordered daily for the first week of therapy and at least twice weekly for subsequent weeks.      Akil Aliheyder, RP

## 2021-11-17 NOTE — PLAN OF CARE
CV:  Afib/flutter, rate 47-60.  BP stable, remains off norepi. Coreg held for low HR.  3+ generalized edema.   Resp:  50% FiO2, P16.  Lungs diminished. Supine @ 1600.  Large amt of pink-tinged secretions this morning; less this afternoon.  Full strength Valetri. ABG pending @ 1800. ABG q 6hrs x 3 ord. Plan to remain supine if pt does well.   Neuro:  Sedated, on vec.  BIS 40s-50s.  TOF 0/4; vec decreased.   GI/:  smear of stool x 2; bloody/brown.  ICU fellow updated.  UO adequate; no lasix today/creat rising.  TF ordered to start this evening.  Off D10 infusion; BG 200s. Sliding scale insulin   Skin:  Scattered bruising on arms/groin/buttocks. Mepilex on left lower arm; small open areas oozing serous fluid.  Mepilex in place on coccyx since supine; 1 sm open area w/ serosang drainage. Dry skin/scabbing surrounding.  Bruising under butt cheek.   IVs/gtts:  Right PICC; on Vec, propofol, Fentanyl, Valetri.

## 2021-11-17 NOTE — PROCEDURES
[facility]    Arterial line placement    Date/Time: 11/17/2021 1:00 AM  Performed by: Otf Alexander MD  Authorized by: Otf Alexander MD     UNIVERSAL PROTOCOL   Site Marked: Yes  Prior Images Obtained and Reviewed:  Yes  Required items: Required blood products, implants, devices and special equipment available    Patient identity confirmed:  Arm band and provided demographic data  Patient was reevaluated immediately before administering moderate or deep sedation or anesthesia  Confirmation Checklist:  Patient's identity using two indicators and relevant allergies  Time out: Immediately prior to the procedure a time out was called    Universal Protocol: the Joint Commission Universal Protocol was followed    Preparation: Patient was prepped and draped in usual sterile fashion        Indication: multiple ABGs  Location: right radial      SEDATION    Patient Sedated: No      PROCEDURE DETAILS  Dae's Test Normal?: Dae's test not abnormal  Needle Gauge:  20  Seldinger technique: Seldinger technique used    Number of Attempts:  1  Post-procedure:  Line sutured and dressing applied  CMS: normal and unchanged  PROCEDURE Describe Procedure: Ultrasound was used for the procedure.   Length of time physician/provider present for 1:1 monitoring during sedation: 0

## 2021-11-17 NOTE — PROGRESS NOTES
[facility]    ICU Progress Note       Date of Admission:  10/26/2021    Assessment: Critical Care   Burton Elizabeth is a 76 year old male with PMHx of coronary artery disease, atrial flutter on eliquis, hypertension, hyperlipidemia, severe aortic stenosis, HFpEF, CKD stage III, diabetes mellitus II, history of GI bleed, suspected obstructive sleep apnea admitted on 10/26/2021 for elective TAVR.  Hospital course complicated by OPAL from contrast nephropathy, volume overload, hypoxic respiratory failure multifactorial, Covid pneumonia and physical deconditioning.  11/15- patient intubated for severe hypoxic respiratory failure. Patient was proned and paralyzed.   11/16- supined yesterday, tolerating, weaning vec         Plan: Critical Care   Neuro/ pain/ sedation:  Sedated and paralyzed for maximum vent synchrony  - Monitor neurological status. Notify provider for any acute changes in exam  - wean vec to off,   - Titrate fent and prop for BIS of ~40, can stop BIS once off of vec, then titrate for RASS of -2 to -3    Pulmonary:  Acute hypoxic respiratory failure secondary to covid pneumonia  Respiratory alkalosis  - Ventilator settings: Ventilation Mode: CMV/AC  (Continuous Mandatory Ventilation/ Assist Control)  FiO2 (%): 40 %  Rate Set (breaths/minute): 20 breaths/min  Tidal Volume Set (mL): 430 mL  PEEP (cm H2O): 14 cmH2O  Oxygen Concentration (%): 50 %  Resp: 18  - supplemental oxygen to keep saturation about 92%  - continue veletri  - RR dropped to 18     Cardiovascular:  Severe aortic stenosis, s/p TAVR 10/26  A flutter  - Monitor hemodynamic status.  - continue Eliquis     GI/Nutrition:  No acute issues  - Nutrition consulted. Appreciate recommendations.   - continue tube feeds     Renal/ Fluid Balance:   OPAL on CKD, creatinine up slightly, non-oliguric  - Will monitor intake and output  - ICU electrolyte replacement protocol      Endocrine:  Diabetes mellitus II  - sliding scale  insulin, medium dose     ID:  Proteus in blood and urine, awaiting speciation and sensitivities  MRSA colonization  Covid 19 pneumonia  - continue decadron  - continue Meropenem, stop vanco      Hematology:  Coagulopathy of covid  - continue eliquis     MSK:   No acute issues        Lines/ tubes/ drains:  Zuniga Catheter: PRESENT, indication: Retention, Deep Sedation/Paralysis  Central Lines: PRESENT  PICC Triple Lumen 11/15/21 Right Basilic access-Site Assessment: WDL     Prophylaxis:  - DVT Prophylaxis: DOAC  - PUD Prophylaxis: protonix     Code Status: Full Code       Disposition:  - ICU, critical      The patient's care was discussed with the multi-disciplinary Team.     Will call brother after rounds.     Greg River MD  [facility]  Securely message with the Vocera Web Console (learn more here)  Text page via [employer] Paging/Directory      Clinically Significant Risk Factors Present on Admission                ______________________________________________________________________    Interval History   Respiratory alkalosis improved. Doing well off of paralytic.     Physical Exam   Vital Signs: Temp: 98.1  F (36.7  C) Temp src: Bladder BP: 130/75 Pulse: 55     SpO2: 96 % O2 Device: Mechanical Ventilator    Weight: 244 lbs 11.37 oz    Gen: laying in bed, supined, appears older than stated age  HEENT: ETT in place, anicteric, MMM  Neck, trachea midline, no crepitus  Pulm: muffled coarse breath sounds throughout  Cor: RRR, limited exam secondary to being prone, NSR on monitor  Abd: soft, obese, no palpable masses  Skin: no jaundice  Ext: +edema    Data   I reviewed all medications, new labs and imaging results over the last 24 hours.  Arterial Blood Gases   Recent Labs   Lab 11/17/21  0519 11/16/21  2329 11/16/21  1810 11/16/21  1135   PH 7.52* 7.53* 7.50* 7.49*   PCO2 41 39 42 45   PO2 85 110* 128* 163*   HCO3 33* 33* 33* 34*       Complete Blood Count   Recent Labs   Lab 11/17/21  4826  11/16/21  0503 11/15/21  0600 11/14/21  0653 11/13/21  0611   WBC 13.4* 21.3* 18.0* 16.5* 18.5*   HGB 9.1* 9.5* 10.5*  --  11.7*   PLT 94* 99* 121*  --  150       Basic Metabolic Panel  Recent Labs   Lab 11/17/21  0802 11/17/21  0401 11/17/21  0359 11/16/21  2333 11/16/21  0828 11/16/21  0503 11/15/21  0754 11/15/21  0600 11/14/21  0800 11/14/21  0653   NA  --  135  --   --   --  135  --  138  --  132*   POTASSIUM  --  4.6  --   --   --  4.6  --  3.9  --  4.1   CHLORIDE  --  96  --   --   --  97  --  98  --  94   CO2  --  29  --   --   --  30  --  30  --  30   BUN  --  159*  --   --   --  161*  --  170*  --  157*   CR  --  2.68*  2.67*  --   --   --  2.70*  --  2.75*  --  2.67*   * 299* 289* 296*   < > 180*   < > 43*   < > 117*    < > = values in this interval not displayed.       Liver Function Tests  Recent Labs   Lab 11/15/21  0600 11/14/21  0653 11/13/21  0611 11/12/21  0555   AST 60* 73* 75* 75*   ALT 57 68 65 65   ALKPHOS 137 159* 159* 159*   BILITOTAL 0.7 0.7 0.7 0.5   ALBUMIN 2.7* 2.7* 2.7* 2.4*       Pancreatic Enzymes  No lab results found in last 7 days.    Coagulation Profile  No lab results found in last 7 days.    IMAGING:  Recent Results (from the past 24 hour(s))   XR Chest Port 1 View    Narrative    EXAM: XR CHEST PORT 1 VIEW  LOCATION: [facility]  DATE/TIME: 11/16/2021 5:25 PM    INDICATION: check ETT placement  COMPARISON: 11/15/2021      Impression    IMPRESSION: Endotracheal tube tip is about 5 cm above the devaughn. Left arm PICC tip at the SVC/right atrial junction. Slight improvement in patchy opacities in the left lung and otherwise stable interstitial and airspace opacities in the lungs.. No   pleural effusion or pneumothorax.     Physician Attestation   I, Rajiv Manrique MD, saw this patient with the resident and agree with the resident/fellow's findings and plan of care as documented in the note.      I personally reviewed vital signs, medications, labs  and imaging.    Key findings: 76 year old male s/p TAVR with PMHx of coronary artery disease, atrial flutter on eliquis, hypertension, hyperlipidemia, severe aortic stenosis, HFpEF, CKD stage III, diabetes mellitus II, history of GI bleed, suspected obstructive sleep apnea admitted on 10/26/2021 for elective TAVR.  Hospital course complicated by OPAL from contrast nephropathy, acute hypoxic respiratory failure Covid pneumonia with ARDS and physical deconditioning.Remains intubated on full ventilatory support.      Rajiv Manrique MD  Date of Service (when I saw the patient): 11/17/21  Critical care time excluding procedures 37 Mins

## 2021-11-17 NOTE — CONSULTS
"[facility] Nurse Inpatient Wound Assessment   Reason for consultation: Evaluate and treat bilateral buttocks wounds    Assessment  Bilateral buttocks wounds due to Pressure Injury, Friction, Shear, and Moisture Associated Skin Damage (MASD)  Status: improving     L posterior thigh wounds due to unknown etiology- multiple open, partial thickness areas not consistent with pressure. Possibly edema related  Patient refusing repositioning repeatedly due to SOB, sleeping in chair and only sometimes agreeable to standing, very low tolerance for standing when in HC, now in ICU and sedated  Treatment Plan      LEFT Sacrum & Left Posterior thigh wound: Every 3 days     Cleanse the area with NS and pat dry.    Apply No sting film barrier to periwound skin.    Cover Sacrum with Sacral Mepilex (#[id number]) and Left posterior thigh with Mepilex 4x4    Change dressing Q 3 days.    Turn and reposition Q 2hrs side to side only.    Ensure pt has Yao-cushion while sitting up in the chair.    FYI- If pt has constant incontinent loose stools needing dressing changes Q shift please discontinue the Mepilex dressing and apply criticaid barrier paste BID and PRN.      PIP ACTIVITY MEASURES:  If pt is refusing to turn or reposition they must be educated on the  potential injury from not off loading pressure.  Then this \"educated refusal\" needs to be documented as an \"educated refusal to turn/ reposition\" and document if alert, etc. Additionally, if repeated refusals ensure the charge nurse, nurse manager and the provider is aware.  Follow Sudarshan Risk recommendations      CHAIR: Pt should sit on a chair cushion ([id number]) when up to the chair and not sit for more than one hour at a time before fully offloading backside (either stand for a couple of minutes and/or return to bed, positioning on a side) to relieve pressure and re-perfuse tissue.  Additionally, encourage pt to shift side to side every 15 minutes, too.    NOTE: the Z-Flow Pad is NOT a " cushion, pt should NOT sit on the Z-Flow pads      BED:  Reposition side to side every 1-2 hours when awake.     No direct supine positioning, position only side to side    Consider Z-Patrick Pillows to help keep pt on side (#[id number] or #[id number]- large). *Z-Flows are for positioning, DO NOT SIT ON!    Keep heels elevated    As able keep HOB below 30 degrees    Evaluate for specialty mattress    Use TAPS wedges and perform frequent microturns as pt tolerates   Orders Written  Recommended provider order: None, at this time  [facility] Nurse follow-up plan:weekly  Nursing to notify the Provider(s) and re-consult the [facility] Nurse if wound(s) deteriorates or new skin concern.    Patient History  According to provider note(s):  Burton Elizabeth is a 76 year old male who presented to [facility] on 10/26/2021 for planned TAVR.  The procedure was well-tolerated is performed in Burton has been admitted to [facility] CCU post procedure.  The hospital medicine service has been consulted for medical co-management.     Aortic stenosis, severe, now status post TAVR, 10/26/2021  Heart failure with preserved EF, secondary to aortic stenosis  Burton has been followed as an outpatient by Dr. Neumann, cardiology, for symptomatic aortic stenosis.  He was deemed suitable for TAVR, which was well-tolerated today.  Last echocardiogram on 8/16/2021 with normal LV size, systolic function, severe AS, trace to mild MR, no TR.  -Admit inpatient, [facility] [place] [place] status  -Full plan of care per TAVR team  -Started on Eliquis postop, defer resuming ASA to TAVR service    Objective Data  Containment of urine/stool: Incontinence Protocol and Indwelling catheter    Active Diet Order  Orders Placed This Encounter      NPO for Medical/Clinical Reasons Except for: NPO but receiving Tube Feeding      Output:   I/O last 3 completed shifts:  In: 2140.01 [I.V.:1860.01; NG/GT:180]  Out: 2060 [Urine:2060]    Risk Assessment:   Sensory Perception: 1-->completely limited  Moisture:  3-->occasionally moist  Activity: 1-->bedfast  Mobility: 1-->completely immobile  Nutrition: 2-->probably inadequate  Friction and Shear: 1-->problem  Sudarshan Score: 9                          Labs:   Recent Labs   Lab 11/17/21  0406 11/16/21  0503 11/15/21  1757 11/15/21  0600   ALBUMIN  --   --   --  2.7*   HGB 9.1*   < >  --  10.5*   WBC 13.4*   < >  --  18.0*   CRP  --   --  17.3* 6.7    < > = values in this interval not displayed.       Physical Exam  Areas of skin assessed: focused bilateral buttocks     Wound Location:  left buttock just to the left of the gluteal crease not on a bony prominence   Date of last photo 11/16        Wound History: hospital acquired due to refusal to reposition and sleep in the bed. Well documented  Wound Base: 100 % dermis     Palpation of the wound bed: normal      Drainage: none     Description of drainage: none     Measurements (length x width x depth, in cm) Open L buttock 0.5 x 0.5 x 0.1 cm, Flaky, rough epidermis in an area measuring 4cm x 5cm       Periwound skin: dry/scaly and erythema- blanchable      Color: pale      Temperature: normal   Odor: none  Pain: no grimacing or signs of discomfort, none      Wound Location:  L posterior thigh  Date of last Photo 11/17/21        Wound History: Unknown, patient did have unknown weeping to posterior leg on previous assessment. Likely edema related  Measurements (length x width x depth, in cm) Approximately 6 open small wound beds in a 3cm x 6cm x 0.1cm area  Wound Base:  100 % dermis  Tunneling N/A  Undermining N/A  Palpation of the wound bed: normal   Periwound skin: erythema- blanchable  Color: pink  Temperature: normal   Drainage:, scant  Description of drainage: serosanguinous  Odor: none  Pain: no grimacing or signs of discomfort, none        Interventions  Visual inspection and assessment completed   Wound Care Rationale Protect periwound skin and Promote moist wound healing without tissue dehydration   Wound Care: done per  plan of care  Supplies: gathered and placed at the bedside  Current off-loading measures: Chair cushion, Pillows and Wedge positioning system  Current support surface: Standard  ICU isolibrium  Education provided to: importance of repositioning, plan of care, and Off-loading pressure  Discussed plan of care with Patient, RN     Svetlana Bonilla RN BS CWOCN

## 2021-11-17 NOTE — PROGRESS NOTES
SPIRITUAL HEALTH SERVICES  SPIRITUAL ASSESSMENT Progress Note  [facility] ICU     REFERRAL SOURCE: Follow Up    I visited Magdaleno today in the ICU, per family request for visits for prayer. Magdaleno's brother and sister were on the ipad visiting him. I engaged in a brief visit with them, they were about to sign off but welcomed prayer at bedside, which I shared with Magdaleno.     PLAN: [facility] will continue to follow.     Syeda Barone  Associate    Phone: [phone]  Pager: [phone]

## 2021-11-17 NOTE — PROGRESS NOTES
"[facility] ICU RESPIRATORY NOTE           Date of Admission: 10/26/2021     Date of Intubation (most recent):  11/15/21     Reason for Mechanical Ventilation: Resp failure     Number of Days on Mechanical Ventilation: 3     Met Criteria for Spontaneous Breathing Trial: No     Reason for No Spontaneous Breathing Trial: Per MD     Significant Events Today: none    /75   Pulse 53   Temp 98  F (36.7  C) (Oral)   Resp 22   Ht 1.753 m (5' 9\")   Wt 107.8 kg (237 lb 10.5 oz)   SpO2 100%   BMI 35.10 kg/m      Recent Labs   Lab 11/16/21 2329 11/16/21 1810 11/16/21  1135 11/16/21  0504   PH 7.53* 7.50* 7.49* 7.53*   PCO2 39 42 45 40   PO2 110* 128* 163* 241*   HCO3 33* 33* 34* 34*   O2PER 50 50 60 80       Venous Blood Gas  Recent Labs   Lab 11/16/21 2329 11/16/21 1810 11/16/21  1135 11/16/21  0504 11/15/21  2339 11/15/21  1047 11/15/21  1043 11/13/21  0611   PHV  --   --   --   --   --  7.43  --  7.49*   PCO2V  --   --   --   --   --  52*  --  45   PO2V  --   --   --   --   --  125*  --  55*   HCO3V  --   --   --   --   --  35*  --  34*   JUAN  --   --   --   --   --  8.9*  --  9.3*   O2PER 50 50 60 80   < > 90   < > 30    < > = values in this interval not displayed.     Ventilation Mode: CMV/AC  (Continuous Mandatory Ventilation/ Assist Control)  FiO2 (%): 50 %  Rate Set (breaths/minute): 10 breaths/min  Tidal Volume Set (mL): 430 mL  PEEP (cm H2O): 14 cmH2O  Oxygen Concentration (%): 50 %  Resp: 22    JAVIER CROSS r, RT    "

## 2021-11-17 NOTE — PLAN OF CARE
Pt remains vented. Weaned off Vec by noon.  40% FiO2.  PO2 72 on ABG; no weaning of valetri today.  Lungs diminished. Echo done; unchanged. Supine since yesterday @ 1600.  Aflutter, rate 60s; am dose of Coreg given. BP 110s-120s.  UO adequate.  Propofol decreased to 50, fentanyl remains at 100.  TF will advance to goal this evening.   No further bloody stool smears.  Facetimed with family today; brother Jose came to door to  pt's belongings-including cell phone. Will con't to monitor closely.

## 2021-11-17 NOTE — PLAN OF CARE
Mech vent continued; ween Peep, decreased rate. Paralytic decreased. Pain controlled with fent infusion. Prop continued for sedation. LS dim throughout. Trickle feed started. Bedrest continued. BS hypo. Zuniga with adequate urine output.

## 2021-11-18 NOTE — PROGRESS NOTES
[facility] ICU RESPIRATORY NOTE           Date of Admission: 10/26/2021     Date of Intubation (most recent): 11/15/21     Reason for Mechanical Ventilation: respiratory failure     Number of Days on Mechanical Ventilation: 4     Met Criteria for Spontaneous Breathing Trial: No     Reason for No Spontaneous Breathing Trial: On full strength veletri and PEEP of 14     Significant Events Today: none     ABG Results:   Recent Labs   Lab 11/18/21  0355 11/17/21  1205 11/17/21  0519 11/16/21  2329   PH 7.41 7.43 7.52* 7.53*   PCO2 53* 52* 41 39   PO2 79* 72* 85 110*   HCO3 33* 34* 33* 33*   O2PER 40 40 50 50     Ventilation Mode: CMV/AC  (Continuous Mandatory Ventilation/ Assist Control)  FiO2 (%): 40 %  Rate Set (breaths/minute): 18 breaths/min  Tidal Volume Set (mL): 430 mL  PEEP (cm H2O): 14 cmH2O  Oxygen Concentration (%): 40 %  Resp: 18    TJ Warren, RRT

## 2021-11-18 NOTE — PHARMACY-VANCOMYCIN DOSING SERVICE
Pharmacy Vancomycin Initial Note  Date of Service 2021  Patient's  1945  76 year old, male    Indication: Community Acquired Pneumonia    Current estimated CrCl = Estimated Creatinine Clearance: 28.4 mL/min (A) (based on SCr of 2.73 mg/dL (H)).    Creatinine for last 3 days  2021:  5:03 AM Creatinine 2.70 mg/dL  2021:  4:01 AM Creatinine 2.67 mg/dL;  4:01 AM Creatinine 2.68 mg/dL  2021:  3:56 AM Creatinine 2.73 mg/dL    Recent Vancomycin Level(s) for last 3 days  2021: 10:58 AM Vancomycin 17.1 mg/L      Vancomycin IV Administrations (past 72 hours)                   vancomycin (VANCOCIN) 1000 mg in dextrose 5% 200 mL PREMIX (mg) 1,000 mg New Bag 21 0100    vancomycin 2000 mg in 0.9% NaCl 500 ml intermittent infusion 2,000 mg (mg) 2,000 mg New Bag 21 0217                Nephrotoxins and other renal medications (From now, onward)    Start     Dose/Rate Route Frequency Ordered Stop    21 1200  vancomycin (VANCOCIN) 1000 mg in dextrose 5% 200 mL PREMIX         1,000 mg  200 mL/hr over 1 Hours Intravenous ONCE 21 1157      21 1156  vancomycin place vaughan - receiving intermittent dosing         1 each Intravenous SEE ADMIN INSTRUCTIONS 21 1157      21 0600  [Held by provider]  furosemide (LASIX) injection 60 mg        (Held by provider since 2021 at 1054 by Greg River MD.Hold Reason: Other.)    60 mg  over 1-3 Minutes Intravenous EVERY 8 HOURS 21 0011            Contrast Orders - past 72 hours (72h ago, onward)            Start     Dose/Rate Route Frequency Ordered Stop    21 1500  perflutren diluted 1mL to 2mL with saline (OPTISON) diluted injection 5 mL  Status:  Discontinued         5 mL Intravenous ONCE 21 1450 21 1451    21 1500  perflutren diluted 1mL to 2mL with saline (OPTISON) diluted injection 5 mL         5 mL Intravenous ONCE 21 1451 21 1452                 Plan:  1. Start vancomycin 1000 mg IV x 1  2. Vancomycin monitoring method: Trough (Method 1 = dosing nomogram)  3. Vancomycin therapeutic monitoring goal: 15-20 mg/L  4. Pharmacy will check vancomycin levels as appropriate in 1-3 Days.    5. Serum creatinine levels will be ordered daily for the first week of therapy and at least twice weekly for subsequent weeks.      Shannon Dewey, PharmD

## 2021-11-18 NOTE — PROGRESS NOTES
[facility] ICU RESPIRATORY NOTE        Date of Admission: 10/26/2021    Date of Intubation (most recent):  11/15/21    Reason for Mechanical Ventilation: Resp failure    Number of Days on Mechanical Ventilation: 4    Met Criteria for Spontaneous Breathing Trial: No    Reason for No Spontaneous Breathing Trial: Per MD    Significant Events Today: Veletri was stopped today    ABG Results:   Recent Labs   Lab 11/18/21  1230 11/18/21  0355 11/17/21  1205 11/17/21  0519   PH 7.41 7.41 7.43 7.52*   PCO2 53* 53* 52* 41   PO2 75* 79* 72* 85   HCO3 34* 33* 34* 33*   O2PER 40 40 40 50       Current Vent Settings: Ventilation Mode: CMV/AC  (Continuous Mandatory Ventilation/ Assist Control)  FiO2 (%): 40 %  Rate Set (breaths/minute): 18 breaths/min  Tidal Volume Set (mL): 430 mL  PEEP (cm H2O): 14 cmH2O  Oxygen Concentration (%): 40 %  Resp: 18      Plan:  Pt to remain on full vent support overnight    Ector Dutta, RT

## 2021-11-18 NOTE — PLAN OF CARE
1900-0730    Patient remains sedated. On vent 40%/18/430/14. A. Flutter controlled. BP stable, hydralazine held. No BM, TF @ goal. L PICC infusing propofol and fentanyl. On veletri full str. R radial a line. BG elevated, likely back on Lantus today. Continue to monitor.

## 2021-11-18 NOTE — PROGRESS NOTES
[facility]    ICU Progress Note       Date of Admission:  10/26/2021    Assessment: Critical Care   Burton Elizabeth is a 76 year old male with PMHx of coronary artery disease, atrial flutter on eliquis, hypertension, hyperlipidemia, severe aortic stenosis, HFpEF, CKD stage III, diabetes mellitus II, history of GI bleed, suspected obstructive sleep apnea admitted on 10/26/2021 for elective TAVR.  Hospital course complicated by OPAL from contrast nephropathy, volume overload, hypoxic respiratory failure multifactorial, Covid pneumonia and physical deconditioning.  11/15- patient intubated for severe hypoxic respiratory failure. Patient was proned and paralyzed.   11/16- supined yesterday, tolerating, weaning vec  11/18- off of paralytics       Plan: Critical Care   Neuro/ pain/ sedation:  Sedated and paralyzed for maximum vent synchrony  - Monitor neurological status. Notify provider for any acute changes in exam  - titrate sedation for RASS -1 to -2    Pulmonary:  Acute hypoxic respiratory failure secondary to covid pneumonia  ARDS  - Ventilator settings: Ventilation Mode: CMV/AC  (Continuous Mandatory Ventilation/ Assist Control)  FiO2 (%): 40 %  Rate Set (breaths/minute): 20 breaths/min  Tidal Volume Set (mL): 430 mL  PEEP (cm H2O): 14 cmH2O  Oxygen Concentration (%): 50 %  Resp: 18  - supplemental oxygen to keep saturation about 92%  - wean veletri    Cardiovascular:  Severe aortic stenosis, s/p TAVR 10/26  A flutter  - Monitor hemodynamic status.  - continue Eliquis    GI/Nutrition:  No acute issues  - Nutrition consulted. Appreciate recommendations.   - continue tube feeds    Renal/ Fluid Balance:   OPAL on CKD, creatinine up slightly, non-oliguric  - Will monitor intake and output  - ICU electrolyte replacement protocol     Endocrine:  Diabetes mellitus II  - sliding scale insulin, high dose  - home dose of lantus started    ID:  Proteus in blood and urine, awaiting speciation and  sensitivities  MRSA colonization  Covid 19 pneumonia  MRSA in sputum  - continue decadron  - continue Meropenem, stop vanco     Hematology:  Coagulopathy of covid  - continue eliquis    MSK:   No acute issues       Lines/ tubes/ drains:  Zuniga Catheter: PRESENT, indication: Retention, Deep Sedation/Paralysis;Strict 1-2 Hour I&O  Central Lines: PRESENT  PICC Triple Lumen 11/15/21 Right Basilic access-Site Assessment: WDL    Prophylaxis:  - DVT Prophylaxis: DOAC  - PUD Prophylaxis: protonix    Code Status: Full Code      Disposition:  - ICU, critical     The patient's care was discussed with the multi-disciplinary Team.    Will call brother after rounds.     Greg River MD  [facility]  Securely message with the Vocera Web Console (learn more here)  Text page via [employer] Paging/Directory      Clinically Significant Risk Factors Present on Admission                ______________________________________________________________________    Interval History   Respiratory alkalosis improved. Doing well off of paralytic.     Physical Exam   Vital Signs: Temp: 97.7  F (36.5  C) Temp src: Bladder BP: 124/69 Pulse: 61   Resp: 18 SpO2: 97 % O2 Device: Mechanical Ventilator    Weight: 239 lbs 6.71 oz    Gen: laying in bed, supined, appears older than stated age  HEENT: ETT in place, anicteric, moist mucus membranes  Neck, trachea midline, no crepitus  Pulm: muffled coarse breath sounds throughout  Cor: RRR, a flutter on monitor  Abd: soft, obese, no palpable masses, no HSM  Skin: no jaundice  Ext: +edema    Data   I reviewed all medications, new labs and imaging results over the last 24 hours.  Arterial Blood Gases   Recent Labs   Lab 11/18/21  0355 11/17/21  1205 11/17/21  0519 11/16/21  2329   PH 7.41 7.43 7.52* 7.53*   PCO2 53* 52* 41 39   PO2 79* 72* 85 110*   HCO3 33* 34* 33* 33*       Complete Blood Count   Recent Labs   Lab 11/18/21  0356 11/17/21  0406 11/16/21  0503 11/15/21  0600   WBC 11.5*  13.4* 21.3* 18.0*   HGB 9.2* 9.1* 9.5* 10.5*   PLT 88* 94* 99* 121*       Basic Metabolic Panel  Recent Labs   Lab 21  1103 21  0806 21  0356 21  0352 21  0802 21  0401 21  0828 21  0503 11/15/21  0754 11/15/21  0600   NA  --   --  134  --   --  135  --  135  --  138   POTASSIUM  --   --  4.3  --   --  4.6  --  4.6  --  3.9   CHLORIDE  --   --  95  --   --  96  --  97  --  98   CO2  --   --  30  --   --  29  --  30  --  30   BUN  --   --  164*  --   --  159*  --  161*  --  170*   CR  --   --  2.73*  --   --  2.68*  2.67*  --  2.70*  --  2.75*   * 268* 407* 372*   < > 299*   < > 180*   < > 43*    < > = values in this interval not displayed.       Liver Function Tests  Recent Labs   Lab 11/15/21  0600 21  0653 21  0611 21  0555   AST 60* 73* 75* 75*   ALT 57 68 65 65   ALKPHOS 137 159* 159* 159*   BILITOTAL 0.7 0.7 0.7 0.5   ALBUMIN 2.7* 2.7* 2.7* 2.4*       Pancreatic Enzymes  No lab results found in last 7 days.    Coagulation Profile  No lab results found in last 7 days.    IMAGING:  Recent Results (from the past 24 hour(s))   Echocardiogram Limited   Result Value    LVEF  60-65%    Narrative    [id number]  [id number]  [id number]  [id number]^CONCEPCION^HEM     [facility]  Echocardiography Laboratory  [address] [place] [address]     Name: ATY PAGE  MRN: [id number]  : 1945  Study Date: 2021 02:23 PM  Age: 76 yrs  Gender: Male  Patient Location: [facility]  Reason For Study: Heart Failure  Ordering Physician: JORDYN CONCEPCION  Referring Physician: AURELIA PADILLA  Performed By: Tj Sterling RDCS     BSA: 2.2 m2  Height: 69 in  Weight: 239 lb  HR: 61  BP: 118/46 mmHg  ______________________________________________________________________________  Procedure  Limited Portable Echo Adult. Optison (NDC #[id number]) given  intravenously.  ______________________________________________________________________________  Interpretation Summary     Left ventricular systolic function is normal.  The visual ejection fraction is 60-65%.  There is moderate concentric left ventricular hypertrophy.  34-mm Medtronic aortic valve prosthesis is well seated. Mean gradient 5 mmHg.  DVI 0.7. Trivial paravalvular AI. No valvular AI. Normal Doppler interrogation  for this valve.  Dilation of the inferior vena cava is present with abnormal respiratory  variation in diameter.  There is no pericardial effusion.     Compared to prior study dated 10/27/2021, findings are similar.  ______________________________________________________________________________  Left Ventricle  The left ventricle is normal in size. There is moderate concentric left  ventricular hypertrophy. Left ventricular systolic function is normal. The  visual ejection fraction is 60-65%. Normal left ventricular wall motion.  Septal motion is consistent with conduction abnormality.     Mitral Valve  The mitral valve is normal in structure and function. There is mild (1+)  mitral regurgitation.     Tricuspid Valve  The tricuspid valve is normal in structure and function.     Aortic Valve  The prosthetic aortic valve is well-seated. 34-mm Medtronic aortic valve  prosthesis is well seated. Mean gradient 5 mmHg. DVI 0.7. Trivial paravalvular  AI. No valvular AI. Normal Doppler interrogation for this valve.     Pulmonic Valve  The pulmonic valve is normal in structure and function. There is mild (1+)  pulmonic valvular regurgitation.     Vessels  The aortic root is normal size. Dilation of the inferior vena cava is present  with abnormal respiratory variation in diameter.     Pericardium  There is no pericardial effusion.  ______________________________________________________________________________  MMode/2D Measurements & Calculations  IVSd: 1.7 cm  LVIDd: 4.6 cm  LVIDs: 2.1 cm  LVPWd: 1.2  cm  FS: 54.1 %  LV mass(C)d: 271.1 grams  LV mass(C)dI: 121.7 grams/m2  LVOT diam: 2.0 cm  LVOT area: 3.1 cm2  RWT: 0.52     Doppler Measurements & Calculations  Ao V2 max: 159.0 cm/sec  Ao max PG: 10.0 mmHg  Ao V2 mean: 103.1 cm/sec  Ao mean P.0 mmHg  Ao V2 VTI: 30.1 cm  SID(I,D): 2.2 cm2  SID(V,D): 2.2 cm2  LV V1 max P.0 mmHg  LV V1 max: 111.6 cm/sec  LV V1 VTI: 21.9 cm     SV(LVOT): 67.5 ml  SI(LVOT): 30.3 ml/m2  AV Lefty Ratio (DI): 0.70  SID Index (cm2/m2): 1.0     ______________________________________________________________________________  Report approved by: Dominique James 2021 03:36 PM         .attestPhysician Attestation   I, Rajiv Manrique MD, saw this patient with the resident and agree with the resident/fellow's findings and plan of care as documented in the note.      I personally reviewed vital signs, medications, labs and imaging.    Key findings: 76 year old male s/p TAVR with PMHx of coronary artery disease, atrial flutter on eliquis, hypertension, hyperlipidemia, severe aortic stenosis, HFpEF, CKD stage III, diabetes mellitus II, history of GI bleed, suspected obstructive sleep apnea admitted on 10/26/2021 for elective TAVR.  Hospital course complicated by OPAL from contrast nephropathy, acute hypoxic respiratory failure Covid pneumonia with ARDS and physical deconditioning.Remains intubated on full ventilatory support.      Greg River MD  Date of Service (when I saw the patient): 21  Critical care time excluding procedures 37 Mins

## 2021-11-18 NOTE — PROGRESS NOTES
[facility] ICU RESPIRATORY NOTE        Date of Admission: 10/26/2021    Date of Intubation (most recent): 11/15/21    Reason for Mechanical Ventilation: respiratory failure    Number of Days on Mechanical Ventilation: 3    Met Criteria for Spontaneous Breathing Trial: No    Reason for No Spontaneous Breathing Trial: On full strength veletri and PEEP of 14    Significant Events Today: none    ABG Results:   Recent Labs   Lab 11/17/21  1205 11/17/21  0519 11/16/21  2329 11/16/21  1810   PH 7.43 7.52* 7.53* 7.50*   PCO2 52* 41 39 42   PO2 72* 85 110* 128*   HCO3 34* 33* 33* 33*   O2PER 40 50 50 50       Current Vent Settings: Ventilation Mode: CMV/AC  (Continuous Mandatory Ventilation/ Assist Control)  FiO2 (%): 40 %  Rate Set (breaths/minute): 18 breaths/min  Tidal Volume Set (mL): 430 mL  PEEP (cm H2O): 14 cmH2O  Oxygen Concentration (%): 40 %  Resp: 22      Skin Assessment: intact    Plan: Continue on full vent support and wean as tolerated.    Monik Shen, RT

## 2021-11-18 NOTE — PROGRESS NOTES
CLINICAL NUTRITION SERVICES - REASSESSMENT NOTE      Recommendations Ordered by Registered Dietitian (RD): Change TF goal to Replete at 60 mL/hr to provide:  1440 kcal, 92 g protein (1.3 g/kg), 161 g CHO, no fiber, 1210 mL H2O  Total with Propofol= 1783 kcal (17 kcal/kg)  Discontinue the ProSource (no longer needed)   Malnutrition: (11/2)   % Weight Loss:  Up to 5% in 1 month (moderate malnutrition)  % Intake:  </= 50% for >/= 5 days (severe malnutrition) - slowly improving  Subcutaneous Fat Loss:  Upper arm region mild depletion and Thoracic region mild depletion  Muscle Loss:  Clavicle bone region mild depletion, Acromion bone region mild depletion and Scapular bone region mild depletion  Fluid Retention:  Moderate 2-3+     Malnutrition Diagnosis: Moderate malnutrition  In Context of:  Acute illness or injury       EVALUATION OF PROGRESS TOWARD GOALS   Diet:  NPO on vent     Nutrition Support:  Patient started on TF 11/16, advanced to goal on 11/17, and continues as follows ~    Nutrition Support Enteral:  Type of Feeding Tube: OG  Enteral Frequency:  Continuous  Enteral Regimen: Vital HP at 25 mL/hr  Total Enteral Provisions: 600 kcal, 52 g protein, 500 mL H2O, 27 g CHO, no fiber   Free Water Flush: 30 mL every 4 hours   ProSource 2 pkts every 12 hours = 160 kcal and 44 g protein  Propofol at 13 mL/hr= 343 kcal   Total = 1103 kcal (10 kcal/kg and 74% needs), 96 g protein (1.3 g/kg)      Intake/Tolerance:    K and Mg normal  Phos 4.9 (H),  (H), Cr 2.73 (H) - OPAL on CKD   -407 with High sliding scale insulin   I/O 2100/1475, wt 108.6 kg (up slightly from DW)  Last BM 11/16      ASSESSED NUTRITION NEEDS:  Dosing Weight:  107.8 kg (11/16) for energy, 72.7 kg (IBW for protein)   Estimated Energy Needs: 3561-6762 kcals (14-17 Kcal/Kg)  Justification: obesity, vent  Estimated Protein Needs:  grams protein (1.2-1.5 g pro/Kg)  Justification: repletion, stress, BUN > 100 (Liberalize protein as  able)      NEW FINDINGS:   11/17:  WOCN= Bilateral buttocks wounds due to Pressure Injury, Friction, Shear, and Moisture Associated Skin Damage (MASD)   Status: improving       L posterior thigh wounds due to unknown etiology- multiple open, partial thickness areas not consistent with pressure. Possibly edema related   Patient refusing repositioning repeatedly due to SOB, sleeping in chair and only sometimes agreeable to standing, very low tolerance for standing when in HC, now in ICU and sedated    Patient receiving Certavite daily per Pressure Injury Protocol   Norepi has been off since 11/16  Vec drip continues     11/16:  Supine 1600     Previous Goals (11/15):   Intake of % of meals at least BID + 100% intake of at least 1 supplement daily  Evaluation: Not met    Previous Nutrition Diagnosis (11/15):   Predicted inadequate nutrient intake (protein-calorie) related to meeting nutrition goals with good PO intakes + supplement use over the past week though likely to experience PO decline with tenuous respiratory status, Bipap needs currently and general worsening of condition per chart review   Evaluation: No change      CURRENT NUTRITION DIAGNOSIS  Inadequate energy intake related to TF at 25 mL/hr, Propofol off as evidenced by meeting 74% energy needs     INTERVENTIONS  Recommendations / Nutrition Prescription  Change TF goal to Replete at 60 mL/hr to provide:  1440 kcal, 92 g protein (1.3 g/kg), 161 g CHO, no fiber, 1210 mL H2O  Total with Propofol= 1783 kcal (17 kcal/kg)  Discontinue the ProSource (no longer needed)    Implementation  EN Composition, EN Schedule:  Orders written to change TF product to Replete at 30 mL/hr and increase every 6 hours by 15 mL to goal   Medical Food Supplement:  Discontinue as above     Goals  Replete at 60 mL/hr + Propofol will meet % needs     MONITORING AND EVALUATION:  Progress towards goals will be monitored and evaluated per protocol and Practice  Guidelines    Mariia Clarke RD, LD, CNSC   Clinical Dietitian - [facility]            normal...

## 2021-11-19 NOTE — PHARMACY-VANCOMYCIN DOSING SERVICE
Pharmacy Vancomycin Note  Date of Service 2021  Patient's  1945   76 year old, male    Indication: Community Acquired Pneumonia  Day of Therapy: 2  Current vancomycin regimen:  Intermittent based on levels  Current vancomycin monitoring method: Trough (Method 1 = dosing nomogram)  Current vancomycin therapeutic monitoring goal: 15-20 mg/L      Current estimated CrCl = Estimated Creatinine Clearance: 30.1 mL/min (A) (based on SCr of 2.58 mg/dL (H)).    Creatinine for last 3 days  2021:  4:01 AM Creatinine 2.67 mg/dL;  4:01 AM Creatinine 2.68 mg/dL  2021:  3:56 AM Creatinine 2.73 mg/dL  2021:  4:04 AM Creatinine 2.58 mg/dL    Recent Vancomycin Levels (past 3 days)  2021: 10:58 AM Vancomycin 17.1 mg/L  2021: 12:29 AM Vancomycin 24.5 mg/L;  1:51 PM Vancomycin 20.2 mg/L    Vancomycin IV Administrations (past 72 hours)                   vancomycin (VANCOCIN) 1000 mg in dextrose 5% 200 mL PREMIX (mg) 1,000 mg New Bag 21 1224    vancomycin (VANCOCIN) 1000 mg in dextrose 5% 200 mL PREMIX (mg) 1,000 mg New Bag 21 0100                Nephrotoxins and other renal medications (From now, onward)    Start     Dose/Rate Route Frequency Ordered Stop    21 2000  vancomycin (VANCOCIN) 1000 mg in dextrose 5% 200 mL PREMIX         1,000 mg  200 mL/hr over 1 Hours Intravenous ONCE 21 1453      21 1156  vancomycin place vaughan - receiving intermittent dosing         1 each Intravenous SEE ADMIN INSTRUCTIONS 21 1157      21 0600  [Held by provider]  furosemide (LASIX) injection 60 mg        (Held by provider since 2021 at 1054 by Greg River MD.Hold Reason: Other.)    60 mg  over 1-3 Minutes Intravenous EVERY 8 HOURS 21 0011               Contrast Orders - past 72 hours (72h ago, onward)            Start     Dose/Rate Route Frequency Ordered Stop    21 1500  perflutren diluted 1mL to 2mL with saline (OPTISON) diluted  injection 5 mL  Status:  Discontinued         5 mL Intravenous ONCE 11/17/21 1450 11/17/21 1451    11/17/21 1500  perflutren diluted 1mL to 2mL with saline (OPTISON) diluted injection 5 mL         5 mL Intravenous ONCE 11/17/21 1451 11/17/21 1452          Interpretation of levels and current regimen:  Vancomycin level is reflective of therapeutic level    Has serum creatinine changed greater than 50% in last 72 hours: No    Urine output: adequate urine output    Renal Function: Stable      Plan:  1. Will re-dose with 1000 mg x 1  2. Vancomycin monitoring method: Trough (Method 1 = dosing nomogram)  3. Vancomycin therapeutic monitoring goal: 15-20 mg/L  4. Pharmacy will check vancomycin levels as appropriate in 1-3 Days.  5. Serum creatinine levels will be ordered daily for the first week of therapy and at least twice weekly for subsequent weeks.    Shannon Dewey, AbranD

## 2021-11-19 NOTE — PLAN OF CARE
Shift Summary    Neuro: Weaning sedation. On Fentanyl and Prop  Cardiac: Aflutter SVR/CVR. BP stable  Pulmonary: Off veletri. 50% 18R 430 TV 14P  GI/: Zuniga. On TF, next increase in rate at 2000.   Skin: Multiple skin issues  Activity: BR

## 2021-11-19 NOTE — PLAN OF CARE
[id number]     Patient remains lightly sedated. On vent 40%/18/430/14. A. Flutter controlled. BP <140. No BM, TF @ goal. L PICC infusing propofol and fentanyl. R radial a line. Continue to monitor.

## 2021-11-19 NOTE — PROGRESS NOTES
[facility] ICU RESPIRATORY NOTE        Date of Admission: 10/26/2021    Date of Intubation (most recent): 11/15/2021     Reason for Mechanical Ventilation: Respiratory failure    Number of Days on Mechanical Ventilation: 5    Met Criteria for Spontaneous Breathing Trial: No    Reason for No Spontaneous Breathing Trial: Peep > 8    Significant Events Today: None overnight    ABG Results:   Recent Labs   Lab 11/19/21  0405 11/18/21  1230 11/18/21  0355 11/17/21  1205   PH 7.44 7.41 7.41 7.43   PCO2 51* 53* 53* 52*   PO2 79* 75* 79* 72*   HCO3 34* 34* 33* 34*   O2PER 40 40 40 40       Current Vent Settings: Ventilation Mode: CMV/AC  (Continuous Mandatory Ventilation/ Assist Control)  FiO2 (%): 40 %  Rate Set (breaths/minute): 18 breaths/min  Tidal Volume Set (mL): 430 mL  PEEP (cm H2O): 14 cmH2O  Oxygen Concentration (%): 40 %  Resp: 18      Plan: Continue full vent support with daily wean assessment.    Hima Garcia, RRT

## 2021-11-19 NOTE — PROGRESS NOTES
[facility]    ICU Progress Note       Date of Admission:  10/26/2021    Assessment: Critical Care   Burton Elizabeth is a 76 year old male with PMHx of coronary artery disease, atrial flutter on eliquis, hypertension, hyperlipidemia, severe aortic stenosis, HFpEF, CKD stage III, diabetes mellitus II, history of GI bleed, suspected obstructive sleep apnea admitted on 10/26/2021 for elective TAVR.  Hospital course complicated by OPAL from contrast nephropathy, volume overload, hypoxic respiratory failure multifactorial, Covid pneumonia and physical deconditioning.  11/15- patient intubated for severe hypoxic respiratory failure. Patient was proned and paralyzed.   11/16- supined yesterday, tolerating, weaning vec  11/18- off of paralytics, doing well   11/19- veletri weaned off yesterday, tolerated       Plan: Critical Care   Neuro/ pain/ sedation:  Sedated and paralyzed for maximum vent synchrony  - Monitor neurological status. Notify provider for any acute changes in exam  - titrate sedation for RASS -1 to -2    Pulmonary:  Acute hypoxic respiratory failure secondary to covid pneumonia  ARDS  MRSA pneumonia  - Ventilator settings: Ventilation Mode: CMV/AC  (Continuous Mandatory Ventilation/ Assist Control)  FiO2 (%): 40 %  Rate Set (breaths/minute): 20 breaths/min  Tidal Volume Set (mL): 430 mL  PEEP (cm H2O): 14 cmH2O  Oxygen Concentration (%): 50 %  Resp: 18  - supplemental oxygen to keep saturation about 92%  - continue vancomycin  - wean PEEP to 12 today    Cardiovascular:  Severe aortic stenosis, s/p TAVR 10/26  A flutter, rate controlled  - Monitor hemodynamic status.  - continue Eliquis    GI/Nutrition:  No acute issues  - Nutrition consulted. Appreciate recommendations.   - continue tube feeds    Renal/ Fluid Balance:   OPAL on CKD, creatinine down slightly, non-oliguric  - Will monitor intake and output  - ICU electrolyte replacement protocol     Endocrine:  Diabetes mellitus II  -  sliding scale insulin, high dose  - increase Lantus    ID:  Proteus in blood and urine, awaiting speciation and sensitivities  Covid 19 pneumonia  MRSA in sputum  - continue decadron  - continue Meropenem  - continue vanco    Hematology:  Coagulopathy of covid  - continue eliquis    MSK:   No acute issues       Lines/ tubes/ drains:  Zuniga Catheter: PRESENT, indication: Retention, Deep Sedation/Paralysis  Central Lines: PRESENT  PICC Triple Lumen 11/15/21 Right Basilic access-Site Assessment: WDL    Prophylaxis:  - DVT Prophylaxis: DOAC  - PUD Prophylaxis: protonix    Code Status: Full Code      Disposition:  - ICU, critical     The patient's care was discussed with the multi-disciplinary Team.    Will call brother after rounds.     Greg River MD  [facility]  Securely message with the Vocera Web Console (learn more here)  Text page via [employer] Paging/Directory      Clinically Significant Risk Factors Present on Admission                ______________________________________________________________________    Interval History   Doing well off of veletri and paralytics    Physical Exam   Vital Signs: Temp: 97.7  F (36.5  C) Temp src: Bladder   Pulse: 73   Resp: 18 SpO2: 95 % O2 Device: Mechanical Ventilator    Weight: 240 lbs 8.35 oz    Gen: laying in bed, supined, appears older than stated age  HEENT: ETT in place, anicteric, moist mucus membranes  Neck, trachea midline, no crepitus  Pulm: muffled coarse breath sounds throughout  Cor: RRR, a flutter on monitor  Abd: soft, obese, no palpable masses, no hepatosplenomegaly  Skin: no jaundice  Ext: +edema    Data   I reviewed all medications, new labs and imaging results over the last 24 hours.  Arterial Blood Gases   Recent Labs   Lab 11/19/21  0405 11/18/21  1230 11/18/21  0355 11/17/21  1205   PH 7.44 7.41 7.41 7.43   PCO2 51* 53* 53* 52*   PO2 79* 75* 79* 72*   HCO3 34* 34* 33* 34*       Complete Blood Count   Recent Labs   Lab  11/19/21  0404 11/18/21  0356 11/17/21  0406 11/16/21  0503   WBC 6.3 11.5* 13.4* 21.3*   HGB 9.4* 9.2* 9.1* 9.5*   PLT 86* 88* 94* 99*       Basic Metabolic Panel  Recent Labs   Lab 11/19/21  0814 11/19/21  0404 11/19/21  0402 11/19/21  0027 11/18/21  0806 11/18/21  0356 11/17/21  0802 11/17/21  0401 11/16/21  0828 11/16/21  0503   NA  --  137  --   --   --  134  --  135  --  135   POTASSIUM  --  4.3  --   --   --  4.3  --  4.6  --  4.6   CHLORIDE  --  98  --   --   --  95  --  96  --  97   CO2  --  29  --   --   --  30  --  29  --  30   BUN  --  174*  --   --   --  164*  --  159*  --  161*   CR  --  2.58*  --   --   --  2.73*  --  2.68*  2.67*  --  2.70*   * 281* 227* 264*   < > 407*   < > 299*   < > 180*    < > = values in this interval not displayed.       Liver Function Tests  Recent Labs   Lab 11/15/21  0600 11/14/21  0653 11/13/21  0611   AST 60* 73* 75*   ALT 57 68 65   ALKPHOS 137 159* 159*   BILITOTAL 0.7 0.7 0.7   ALBUMIN 2.7* 2.7* 2.7*       Pancreatic Enzymes  No lab results found in last 7 days.    Coagulation Profile  No lab results found in last 7 days.    IMAGING:  No results found for this or any previous visit (from the past 24 hour(s))..attest  Physician Attestation   I, Rajiv Manrique MD, saw this patient with the resident and agree with the resident/fellow's findings and plan of care as documented in the note.      I personally reviewed vital signs, medications, labs and imaging.    Key findings: 76 year old male with PMHx of coronary artery disease, atrial flutter on eliquis, hypertension, hyperlipidemia, severe aortic stenosis, HFpEF, CKD stage III, diabetes mellitus II, s/p TAVR. Hospital stay complicated by acute hypoxic respiratory failure secondary to COVID pneumonia, ARDS, requiring significant ventilatory support.   Rajiv Manrique MD  Date of Service (when I saw the patient): 11/19/21  Critical care time excluding procedures 34 Mins

## 2021-11-20 NOTE — PROGRESS NOTES
[facility]    ICU Progress Note       Date of Admission:  10/26/2021    Assessment: Critical Care   Burton Elizabeth is a 76 year old male with PMHx of coronary artery disease, atrial flutter on eliquis, hypertension, hyperlipidemia, severe aortic stenosis, HFpEF, CKD stage III, diabetes mellitus II, history of GI bleed, suspected obstructive sleep apnea admitted on 10/26/2021 for elective TAVR.  Hospital course complicated by OPAL from contrast nephropathy, volume overload, hypoxic respiratory failure multifactorial, Covid pneumonia and physical deconditioning.  11/15- patient intubated for severe hypoxic respiratory failure. Patient was proned and paralyzed.   11/16- supined yesterday, tolerating, weaning vec  11/18- off of paralytics, doing well   11/19- veletri weaned off yesterday, tolerated       Interval History   - weaned off paralytics and veletri, though not tolerating weaning of  sedation on vent but no major event overnight  - concern for cuff leak this AM , CXR show ett in good position  - sugars uncontrolled despite 30 lantus       Assessment   76 year old male with PMHx of coronary artery disease, atrial flutter on eliquis, hypertension, hyperlipidemia, severe aortic stenosis, HFpEF, CKD stage III, diabetes mellitus II, s/p TAVR. Hospital stay complicated by acute hypoxic respiratory failure secondary to COVID pneumonia, ARDS, requiring significant ventilatory support.       Plan: Critical Care   Neuro/ pain/ sedation:  Sedated and paralyzed for maximum vent synchrony  - Monitor neurological status. Notify provider for any acute changes in exam  - titrate sedation for RASS -1 to -2    Pulmonary:  Acute hypoxic respiratory failure secondary to covid pneumonia  ARDS  MRSA pneumonia  - Ventilator settings: Ventilation Mode: CMV/AC  (Continuous Mandatory Ventilation/ Assist Control)  FiO2 (%): 40 %  Rate Set (breaths/minute): 20 breaths/min  Tidal Volume Set (mL): 430 mL  PEEP  (cm H2O): 14 cmH2O  Oxygen Concentration (%): 50 %  Resp: 18  - supplemental oxygen to keep saturation about 92%  - continue vancomycin  - continue current setting  - call anesthesia for ETT exchange     Cardiovascular:  Severe aortic stenosis, s/p TAVR 10/26  A flutter, rate controlled  - Monitor hemodynamic status. Continue coreg  - continue Eliquis    GI/Nutrition:  Severe to Moderate malnutrition   - Nutrition consulted. Appreciate recommendations.   - continue tube feeds    Renal/ Fluid Balance:   OPAL on CKD, creatinine down slightly, non-oliguric  - Will monitor intake and output  - ICU electrolyte replacement protocol     Endocrine:  Diabetes mellitus II with stress hyperglycemia   - sliding scale insulin, high dose  - increase Lantus to 45     ID:  Septic shock 2/2 Proteus in blood and urine, ~ pansensitive  Covid 19 pneumonia  MRSA in sputum  - continue decadron  - discontinue Meropenem, narrow to cephalosporin  - continue vanco    Hematology:  Coagulopathy of covid  Thrombocytopenia -multifactorial - stable  - continue eliquis  - watch for s/sx     MSK:   No acute issues       Lines/ tubes/ drains:  Zuniga Catheter: PRESENT, indication: Retention, Strict 1-2 Hour I&O  Central Lines: PRESENT  PICC Triple Lumen 11/15/21 Left Basilic access-Site Assessment: WDL    Prophylaxis:  - DVT Prophylaxis: DOAC  - PUD Prophylaxis: protonix    Code Status: Full Code      Disposition:  - ICU, critical     The patient's care was discussed with the multi-disciplinary Team.             ______________________________________________________________________    Physical Exam   Vital Signs: Temp: (!) 96.6  F (35.9  C) Temp src: Bladder BP: (!) 157/73 Pulse: 62   Resp: 18 SpO2: 94 % O2 Device: Mechanical Ventilator    Weight: 243 lbs 6.21 oz    Gen: laying in bed, supined, appears older than stated age  HEENT: ETT in place, anicteric, moist mucus membranes  Neck, trachea midline, no crepitus  Pulm: muffled coarse breath sounds  throughout  Cor: RRR, a flutter on monitor  Abd: soft, obese, no palpable masses, no hepatosplenomegaly  Skin: no jaundice  Ext: +edema    Data   I reviewed all medications, new labs and imaging results over the last 24 hours.  Arterial Blood Gases   Recent Labs   Lab 11/20/21  0409 11/19/21  0405 11/18/21  1230 11/18/21  0355   PH 7.43 7.44 7.41 7.41   PCO2 52* 51* 53* 53*   PO2 83 79* 75* 79*   HCO3 35* 34* 34* 33*       Complete Blood Count   Recent Labs   Lab 11/20/21  0409 11/19/21  0404 11/18/21  0356 11/17/21  0406   WBC 7.2 6.3 11.5* 13.4*   HGB 10.2* 9.4* 9.2* 9.1*   PLT 74* 86* 88* 94*       Basic Metabolic Panel  Recent Labs   Lab 11/20/21  0749 11/20/21  0409 11/20/21  0030 11/19/21  2043 11/19/21  0814 11/19/21  0404 11/18/21  0806 11/18/21  0356 11/17/21  0802 11/17/21  0401   NA  --  134  --   --   --  137  --  134  --  135   POTASSIUM  --  4.3  --   --   --  4.3  --  4.3  --  4.6   CHLORIDE  --  97  --   --   --  98  --  95  --  96   CO2  --  32  --   --   --  29  --  30  --  29   BUN  --  169*  --   --   --  174*  --  164*  --  159*   CR  --  2.28*  --   --   --  2.58*  --  2.73*  --  2.68*  2.67*   * 296* 306* 284*   < > 281*   < > 407*   < > 299*    < > = values in this interval not displayed.       Liver Function Tests  Recent Labs   Lab 11/15/21  0600 11/14/21  0653   AST 60* 73*   ALT 57 68   ALKPHOS 137 159*   BILITOTAL 0.7 0.7   ALBUMIN 2.7* 2.7*       Pancreatic Enzymes  No lab results found in last 7 days.    Coagulation Profile  No lab results found in last 7 days.    IMAGING:  Recent Results (from the past 24 hour(s))   XR Chest Port 1 View    Narrative    EXAM: XR CHEST PORT 1 VIEW  LOCATION: [facility]  DATE/TIME: 11/20/2021 11:20 AM    INDICATION: eval ett placement  COMPARISON: 11/15/2021      Impression    IMPRESSION: Endotracheal tube tip is about 3.9 cm above the devaughn. Enteric tube coursing below the left hemidiaphragm. Slight improvement in  infiltrates in the left lung and stable right lung infiltrates. No pleural effusion or pneumothorax. Left arm   PICC tip is at the SVC/right atrial junction.       Maurilio Lee MD  40 min CCT excluding procedures

## 2021-11-20 NOTE — PROGRESS NOTES
[facility] ICU RESPIRATORY NOTE           Date of Admission: 11/12/2021     Date of Intubation (most recent): 11/12/2021     Reason for Mechanical Ventilation: Respiratory failure     Number of Days on Mechanical Ventilation: 9     Met Criteria for Spontaneous Breathing Trial: No     Reason for No Spontaneous Breathing Trial: Peep > 8     Significant Events Today: None overnight  Recent Labs   Lab 11/20/21  0409 11/19/21  0405 11/18/21  1230 11/18/21  0355   PH 7.43 7.44 7.41 7.41   PCO2 52* 51* 53* 53*   PO2 83 79* 75* 79*   HCO3 35* 34* 34* 33*   O2PER 1 40 40 40   Ventilation Mode: CMV/AC  (Continuous Mandatory Ventilation/ Assist Control)  FiO2 (%): 50 %  Rate Set (breaths/minute): 18 breaths/min  Tidal Volume Set (mL): 430 mL  PEEP (cm H2O): 10 cmH2O  Oxygen Concentration (%): 50 %  Resp: 18    Plan to monitor pt on full vent support.

## 2021-11-20 NOTE — PROGRESS NOTES
Neuro-  Pt unable to tolerate vent with light sedation.  RR went up to 40's and sates dropped to low 80's.  Remains on propofol and fentanyl gtt's.  Not following.  Will try to open eyes on turns.    Resp-  L/S diminished.  Long recover times after turns.  Peep 10  Fio2 50%.       CV- A-flut 60's  BP slightly hypertensive getting scheduled anti hypertensive meds.    GI/- tolerating TF.  Urine out put adequate.      Endo- hyperglycemic, covered with sliding scale.  May be helpful to increase long acting insulin.

## 2021-11-21 NOTE — ANESTHESIA PROCEDURE NOTES
Airway       Patient location during procedure: ICU       Procedure Start/Stop Times: 11/20/2021 4:04 PM  Staff -        CRNA: Cleopatra Carpenter APRN CRNA       Performed By: CRNA  Consent for Airway        Urgency: emergent      Final Airway Details       Final airway type: endotracheal airway       Successful airway: ETT - single  Endotracheal Airway Details        ETT size (mm): 8.0       Cuffed: yes       Intubation method: cuff exchange with bougie.       Adjucts: bougie       Position: Left       Measured from: lips       Secured at (cm): 24    Post intubation assessment        ETT secured, Vent settings by primary/ICU team, Primary/ICU team to review CXR, Sedation to be ordered by primary/ICU team and No apparent complications       Placement verified by: capnometry, equal breath sounds and chest rise        Number of attempts at approach: 1       Number of other approaches attempted: 0       Ease of procedure: easy       Dentition: Intact and Unchanged    Medication(s) Administered   propofol (DIPRIVAN) injection 10 mg/mL vial, 50 mg  Medication Administration Time: 11/20/2021 4:04 PM    Additional Comments       Diagnosis/Indication: Airway protection.  Procedure: tube exchange 2  Ordering Physician: Dr. messina.  Location: [facility], Bed 359.    Preoxygenated with 100% oxygen at 15 liters per minute via vent, induced with 50 mg IV propofol, bougie inserted and tube exchanged. Tube secured at depth 24 cm at the lip, endotracheal tube position unchanged, equal, bilateral breath sounds auscultated in ICU room, patient placed on ICU ventilator by respiratory therapist, teeth and oral mucosa intact and unchanged at handoff of care. greater than 95% after intubation, clinical monitors and alarms on and functioning, report on patient's clinical status given to ICU RN, ICU staff questions answered. See EPIC anesthesia record for vital signs recorded during anesthesia care.

## 2021-11-21 NOTE — PROGRESS NOTES
[facility] ICU RESPIRATORY NOTE        Date of Admission: 10/26/2021    Date of Intubation (most recent): 11/12/21    Reason for Mechanical Ventilation: RF    Number of Days on Mechanical Ventilation: 9    Met Criteria for Spontaneous Breathing Trial: No    Reason for No Spontaneous Breathing Trial: PEEP >8    Significant Events Today: ETT exchange 2/2 cuff leak.    ABG Results:   Recent Labs   Lab 11/20/21  0409 11/19/21  0405 11/18/21  1230 11/18/21  0355   PH 7.43 7.44 7.41 7.41   PCO2 52* 51* 53* 53*   PO2 83 79* 75* 79*   HCO3 35* 34* 34* 33*   O2PER 1 40 40 40       Current Vent Settings: Ventilation Mode: CMV/AC  (Continuous Mandatory Ventilation/ Assist Control)  FiO2 (%): 50 %  Rate Set (breaths/minute): 18 breaths/min  Tidal Volume Set (mL): 430 mL  PEEP (cm H2O): 12 cmH2O  Oxygen Concentration (%): 60 %  Resp: 18      Skin Assessment: done    Plan: maintain    Lance Perez RT

## 2021-11-21 NOTE — PROGRESS NOTES
[facility] ICU RESPIRATORY NOTE        Date of Admission: 10/26/2021    Date of Intubation (most recent): 11/12/21    Reason for Mechanical Ventilation: Respiratory failure    Number of Days on Mechanical Ventilation: 10    Met Criteria for Spontaneous Breathing Trial: No    Reason for No Spontaneous Breathing Trial: PEEP>8    Significant Events Today: None    ABG Results:   Recent Labs   Lab 11/20/21  0409 11/19/21  0405 11/18/21  1230 11/18/21  0355   PH 7.43 7.44 7.41 7.41   PCO2 52* 51* 53* 53*   PO2 83 79* 75* 79*   HCO3 35* 34* 34* 33*   O2PER 1 40 40 40       Current Vent Settings: Ventilation Mode: CMV/AC  (Continuous Mandatory Ventilation/ Assist Control)  FiO2 (%): 50 %  Rate Set (breaths/minute): 18 breaths/min  Tidal Volume Set (mL): 430 mL  PEEP (cm H2O): 12 cmH2O  Oxygen Concentration (%): 50 %  Resp: 19      Skin Assessment: Intact    Plan: Continue to monitor and assess respiratory status while in ICU.  Continue to maintain ETT patency.  Continue to wean from mechanical ventilation and oxygen as tolerated per protocol.  Check skin integrity at least once per shift.      Greg Solomon, RT

## 2021-11-21 NOTE — PLAN OF CARE
Shift: [id number]      Neuro: Pupils are equal and reactive; pt grimaces to pain, otherwise doesn't withdraw or follow     CV: Heart rhythm is atrial fibrillation, rate controlled; BP's within limits; afebrile     Resp: Lungs are diminished, vent settings unchanged     GI: small/soft BM overnight; TF@ goal rate of 60    : Zuniga in place, adequate output     Skin: See skin assessment in flow sheet     Mobility/Activity: Turning and repositioning in the bed overnight     Pain: Fentanyl drip for pain.     Family Updated: No contact with family this shift.

## 2021-11-21 NOTE — PROGRESS NOTES
[facility]    ICU Progress Note       Date of Admission:  10/26/2021    Assessment: Critical Care   Burton Elizabeth is a 76 year old male with PMHx of coronary artery disease, atrial flutter on eliquis, hypertension, hyperlipidemia, severe aortic stenosis, HFpEF, CKD stage III, diabetes mellitus II, history of GI bleed, suspected obstructive sleep apnea admitted on 10/26/2021 for elective TAVR.  Hospital course complicated by OPAL from contrast nephropathy, volume overload, hypoxic respiratory failure multifactorial, Covid pneumonia and physical deconditioning.  11/15- patient intubated for severe hypoxic respiratory failure. Patient was proned and paralyzed.   11/16- supined yesterday, tolerating, weaning vec  11/18- off of paralytics, doing well   11/19- veletri weaned off yesterday, tolerated  11/20- not tolerating sedation wean as tachypneic, dysynchronous. Cuff leak noted thus ETT exchanged       Interval History   - sugars still high despite lantus increase   - CXR with PICC malpositioned (tip in SVC)    Assessment   76 year old male with PMHx of coronary artery disease, atrial flutter on eliquis, hypertension, hyperlipidemia, severe aortic stenosis, HFpEF, CKD stage III, diabetes mellitus II, s/p TAVR. Hospital stay complicated by acute hypoxic respiratory failure secondary to COVID pneumonia, ARDS, requiring significant ventilatory support.       Plan: Critical Care   Neuro/ pain/ sedation:  1. Sedated and paralyzed for maximum vent synchrony  - Monitor neurological status. Notify provider for any acute changes in exam  - propofol and fentanyl infusions   - titrate sedation for RASS -1 to -2  -     Pulmonary:  1. Acute hypoxic respiratory failure secondary to covid pneumonia, ett exchange   2. ARDS  3. MRSA pneumonia  - continue lung protective ventilation, supplemental oxygen to keep saturation about 92%  - continue vancomycin  - call anesthesia for ETT exchange        Cardiovascular:  1. Severe aortic stenosis, s/p TAVR 10/26  2. A flutter, rate controlled  3. S/P TAVR   - Monitor hemodynamic status. Continue coreg  - continue Eliquis    GI/Nutrition:  1. Severe to Moderate malnutrition  2. Mild AST elevation, exam benign 11/20    - Nutrition consulted. Appreciate recommendations.   - continue tube feeds    Renal/ Fluid Balance:   1. OPAL on CKD, creatinine variable, non-oliguric  2. Volume overload   - Will monitor intake and output  - ICU electrolyte replacement protocol   - prn lasix , avoid schedule at present    Endocrine:  1. Diabetes mellitus II with stress hyperglycemia   - sliding scale insulin, high dose  - increase Lantus to 50     ID:  1. Septic shock 2/2 Proteus in blood and urine, ~ pansensitive  2. Covid 19 pneumonia  2. MRSA HCAP   - continue decadron  - continue ceftriaxone and vanco    Hematology:  1. Covid associated Coagulopathy  2. Thrombocytopenia -multifactorial - slow drift   - continue eliquis  - watch for s/sx of bleeding    MSK:   1. Deconditioning   -turns and reposition per protocols     Lines/ tubes/ drains:  Zuniga Catheter: PRESENT, indication: Retention, Strict 1-2 Hour I&O  Central Lines: PRESENT  PICC Triple Lumen 11/15/21 Left Basilic access-Site Assessment: WDL    Prophylaxis:  - DVT Prophylaxis: DOAC  - PUD Prophylaxis: protonix    Code Status: Full Code      Disposition:  - ICU, critical     The patient's care was discussed with the multi-disciplinary Team.             ______________________________________________________________________    Physical Exam   Vital Signs: Temp: 100.2  F (37.9  C) Temp src: Bladder BP: 122/49 (ART line) Pulse: 70   Resp: 19 SpO2: 94 % O2 Device: Mechanical Ventilator    Weight: 246 lbs 7.59 oz    Gen: laying in bed, supined, appears older than stated age  HEENT: ETT in place, anicteric, moist mucus membranes  Neck, trachea midline, no crepitus  Pulm: muffled coarse breath sounds throughout synchronous with  vent  Cor: RRR, a flutter on monitor  Abd: soft, obese, no palpable masses, no hepatosplenomegaly  Skin: no jaundice  Ext: +edema  Neuro: limited by sedation, grimaces to pain    Data   I reviewed all medications, new labs and imaging results over the last 24 hours.  Arterial Blood Gases   Recent Labs   Lab 11/21/21  0534 11/20/21  0409 11/19/21  0405 11/18/21  1230   PH 7.45 7.43 7.44 7.41   PCO2 49* 52* 51* 53*   PO2 71* 83 79* 75*   HCO3 34* 35* 34* 34*       Complete Blood Count   Recent Labs   Lab 11/21/21  0753 11/20/21  0409 11/19/21  0404 11/18/21  0356   WBC 8.0 7.2 6.3 11.5*   HGB 9.3* 10.2* 9.4* 9.2*   PLT 62* 74* 86* 88*       Basic Metabolic Panel  Recent Labs   Lab 11/21/21  0754 11/21/21  0536 11/21/21  0532 11/21/21  0025 11/20/21  0749 11/20/21  0409 11/19/21  0814 11/19/21  0404 11/18/21  0806 11/18/21  0356   NA  --  133  --   --   --  134  --  137  --  134   POTASSIUM  --  4.7  --   --   --  4.3  --  4.3  --  4.3   CHLORIDE  --  95  --   --   --  97  --  98  --  95   CO2  --  31  --   --   --  32  --  29  --  30   BUN  --  191*  --   --   --  169*  --  174*  --  164*   CR  --  2.44*  --   --   --  2.28*  --  2.58*  --  2.73*   * 270* 250* 197*   < > 296*   < > 281*   < > 407*    < > = values in this interval not displayed.       Liver Function Tests  Recent Labs   Lab 11/21/21  0536 11/15/21  0600   AST 67* 60*   ALT 57 57   ALKPHOS 140 137   BILITOTAL 0.6 0.7   ALBUMIN 1.7* 2.7*       Pancreatic Enzymes  No lab results found in last 7 days.    Coagulation Profile  No lab results found in last 7 days.    IMAGING:  Recent Results (from the past 24 hour(s))   XR Chest Port 1 View    Narrative    EXAM: XR CHEST PORT 1 VIEW  LOCATION: [facility]  DATE/TIME: 11/20/2021 11:20 AM    INDICATION: eval ett placement  COMPARISON: 11/15/2021      Impression    IMPRESSION: Endotracheal tube tip is about 3.9 cm above the devaughn. Enteric tube coursing below the left  hemidiaphragm. Slight improvement in infiltrates in the left lung and stable right lung infiltrates. No pleural effusion or pneumothorax. Left arm   PICC tip is at the SVC/right atrial junction.   XR Chest Port 1 View    Narrative    EXAM: XR CHEST PORT 1 VIEW  LOCATION: [facility]  DATE/TIME: 11/20/2021 4:30 PM    INDICATION: replace ENT tube  COMPARISON: 11/20/2021       Impression    IMPRESSION: The endotracheal tube tip is approximately 6 cm from the devaughn. An OG or NG tube terminates off the field-of-view. A left PICC is present. Bibasilar opacities are again seen and represent pleural fluid and atelectasis. Underlying airspace   disease is suspected as well. No pneumothorax. The cardiomediastinal silhouette is enlarged though is otherwise not well assessed. A TAVR stent is present.    XR Abdomen Port 1 View    Narrative    EXAM: XR ABDOMEN PORT 1 VIEWS  LOCATION: [facility]  DATE/TIME: 11/20/2021 4:30 PM    INDICATION: Check OG tube placement.  COMPARISON: 11/15/2021      Impression    IMPRESSION: Orogastric tube tip in gastric body with side port in the fundus. Nonobstructive bowel gas pattern. TAVR prosthesis unchanged. Bilateral pleural plaques, bibasilar opacities and effusions unchanged.    XR Chest Port 1 View    Narrative    EXAM: XR CHEST PORT 1 VIEW  LOCATION: [facility]  DATE/TIME: 11/21/2021 6:00 AM    INDICATION: intubated resp failure  COMPARISON: 11/20/2021      Impression    IMPRESSION: Cardiac enlargement shallow inspiration. Left PICC line tip is within the SVC but is directed cranially. Repositioning recommended. Endotracheal and enteric tube. TAVR. Bilateral infiltrates and small pleural effusions.    Results called to Dr. Marla Gonzáles at 0615       Maurilio Lee MD  30 min CCT excluding procedures

## 2021-11-21 NOTE — PHARMACY-VANCOMYCIN DOSING SERVICE
Pharmacy Vancomycin Note  Date of Service 2021  Patient's  1945   76 year old, male    Indication: Community Acquired Pneumonia  Day of Therapy: 6  Current vancomycin regimen: Intermittent dosing based on levels. 2g given on .  1g given on ,  and   Current vancomycin monitoring method: AUC  Current vancomycin therapeutic monitoring goal: 400-600 mg*h/L    [employer]ight[employer] Prediction of Current Vancomycin Regimen  Regimen: 1000 mg IV every 24 hours.  Start time: 20:32 on 2021  Exposure target: AUC24 (range)400-600 mg/L.hr   AUC24,ss: 724 mg/L.hr  Probability of AUC24 > 400: 100 %  Ctrough,ss: 25.3 mg/L  Probability of Ctrough,ss > 20: 90 %  Probability of nephrotoxicity (Lodise NELLY ): 29 %      Current estimated CrCl = Estimated Creatinine Clearance: 34.3 mL/min (A) (based on SCr of 2.28 mg/dL (H)).    Creatinine for last 3 days  2021:  3:56 AM Creatinine 2.73 mg/dL  2021:  4:04 AM Creatinine 2.58 mg/dL  2021:  4:09 AM Creatinine 2.28 mg/dL    Recent Vancomycin Levels (past 3 days)  2021: 10:58 AM Vancomycin 17.1 mg/L  2021: 12:29 AM Vancomycin 24.5 mg/L;  1:51 PM Vancomycin 20.2 mg/L  2021:  6:28 PM Vancomycin 25.4 mg/L    Vancomycin IV Administrations (past 72 hours)                   vancomycin (VANCOCIN) 1000 mg in dextrose 5% 200 mL PREMIX (mg) 1,000 mg New Bag 21    vancomycin (VANCOCIN) 1000 mg in dextrose 5% 200 mL PREMIX (mg) 1,000 mg New Bag 21 1224                Nephrotoxins and other renal medications (From now, onward)    Start     Dose/Rate Route Frequency Ordered Stop    21 1156  vancomycin place vaughan - receiving intermittent dosing         1 each Intravenous SEE ADMIN INSTRUCTIONS 21 1157      21 0600  [Held by provider]  furosemide (LASIX) injection 60 mg        (Held by provider since 2021 at 1054 by Greg River MD.Hold Reason: Other.)    60 mg  over 1-3 Minutes  Intravenous EVERY 8 HOURS 11/16/21 0011          Interpretation of levels and current regimen:  Vancomycin level is reflective of AUC greater than 600  Has serum creatinine changed greater than 50% in last 72 hours: No  Urine output:  diminished urine output  Renal Function: Improving      Plan:  1. No Vancomycin dose today. WIll recheck a Vanco level with am labs tomorrow  2. Vancomycin monitoring method: AUC  3. Vancomycin therapeutic monitoring goal: 400-600 mg*h/L  4. Pharmacy will check vancomycin levels as appropriate in 12 hours.  5. Serum creatinine levels will be ordered daily for the first week of therapy and at least twice weekly for subsequent weeks.    Baldev Navarro RPH

## 2021-11-21 NOTE — PHARMACY-VANCOMYCIN DOSING SERVICE
Pharmacy Vancomycin Note  Date of Service 2021  Patient's  1945   76 year old, male    Indication: Community Acquired Pneumonia  Day of Therapy: 7  Current vancomycin regimen:Intermittent dosing based on levels. 2g given on .  1g given on ,  and   Current vancomycin monitoring method: Trough (Method 1 = dosing nomogram)  Current vancomycin therapeutic monitoring goal: 15-20 mg/L    [employer] Prediction of Current Vancomycin Regimen  NA  Current estimated CrCl = Estimated Creatinine Clearance: 32.2 mL/min (A) (based on SCr of 2.44 mg/dL (H)).    Creatinine for last 3 days  2021:  4:04 AM Creatinine 2.58 mg/dL  2021:  4:09 AM Creatinine 2.28 mg/dL  2021:  5:36 AM Creatinine 2.44 mg/dL    Recent Vancomycin Levels (past 3 days)  2021: 10:58 AM Vancomycin 17.1 mg/L  2021: 12:29 AM Vancomycin 24.5 mg/L;  1:51 PM Vancomycin 20.2 mg/L  2021:  6:28 PM Vancomycin 25.4 mg/L  2021:  5:36 AM Vancomycin 25.4 mg/L    Vancomycin IV Administrations (past 72 hours)                   vancomycin (VANCOCIN) 1000 mg in dextrose 5% 200 mL PREMIX (mg) 1,000 mg New Bag 21    vancomycin (VANCOCIN) 1000 mg in dextrose 5% 200 mL PREMIX (mg) 1,000 mg New Bag 21 1224                Nephrotoxins and other renal medications (From now, onward)    Start     Dose/Rate Route Frequency Ordered Stop    21 1156  vancomycin place vaughan - receiving intermittent dosing         1 each Intravenous SEE ADMIN INSTRUCTIONS 21 1157      21 0600  [Held by provider]  furosemide (LASIX) injection 60 mg        (Held by provider since 2021 at 1054 by Greg River MD.Hold Reason: Other.)    60 mg  over 1-3 Minutes Intravenous EVERY 8 HOURS 21 0011               Contrast Orders - past 72 hours (72h ago, onward)            None          Interpretation of levels and current regimen:  Vancomycin level is reflective of supratherapeutic  level    Has serum creatinine changed greater than 50% in last 72 hours: No    Urine output:  diminished urine output    Renal Function: Worsening    Insight[employer] Prediction of Planned New Vancomycin Regimen  NA    Plan:  1. No dose today  2. Vancomycin monitoring method: Trough (Method 1 = dosing nomogram)  3. Vancomycin therapeutic monitoring goal: 15-20 mg/L  4. Pharmacy will check vancomycin levels as appropriate in am 11/22.  5. Serum creatinine levels will be ordered a minimum of twice weekly.    Ysabel Pena, PRAVEENH

## 2021-11-22 NOTE — PROGRESS NOTES
[facility] ICU RESPIRATORY NOTE        Date of Admission: 10/26/2021    Date of Intubation (most recent): 11/12/21    Reason for Mechanical Ventilation: respiratory failure    Number of Days on Mechanical Ventilation: 11    Met Criteria for Spontaneous Breathing Trial: No    Reason for No Spontaneous Breathing Trial: PEEP of 12     Significant Events Today: None     ABG Results:   Recent Labs   Lab 11/22/21  0519 11/21/21  0534 11/20/21  0409 11/19/21  0405   PH 7.52* 7.45 7.43 7.44   PCO2 42 49* 52* 51*   PO2 87 71* 83 79*   HCO3 35* 34* 35* 34*   O2PER 50 50 1 40       Current Vent Settings: Ventilation Mode: CMV/AC  (Continuous Mandatory Ventilation/ Assist Control)  FiO2 (%): 50 %  Rate Set (breaths/minute): 12 breaths/min  Tidal Volume Set (mL): 500 mL  PEEP (cm H2O): 12 cmH2O  Oxygen Concentration (%): 50 %      Skin Assessment: Skin intact     Plan: Continue full vent support and wean as tolerated     Monik Shen, RT

## 2021-11-22 NOTE — PROGRESS NOTES
[facility]    ICU Progress Note       Date of Admission:  10/26/2021    Assessment: Critical Care   Burton Elizabeth is a 76 year old male with PMHx of coronary artery disease, atrial flutter on eliquis, hypertension, hyperlipidemia, severe aortic stenosis, HFpEF, CKD stage III, diabetes mellitus II, history of GI bleed, suspected obstructive sleep apnea admitted on 10/26/2021 for elective TAVR.  Hospital course complicated by OPAL from contrast nephropathy, volume overload, hypoxic respiratory failure multifactorial, Covid pneumonia and physical deconditioning.  Also proteus UTI and bacteremia.  On 11/15 the patient was intubated for severe hypoxic respiratory failure. Patient was proned and paralyzed. Supined by 11/16, off paralytics by 11/18, velitri weaned off on 11/19.     Interval History   Sugars remain high.  Increasing Lantus.  Otherwise no acute events.  Unable to obtain history directly due to underlying intubated/vented status.    Assessment   76 year old male with PMHx of coronary artery disease, atrial flutter on eliquis, hypertension, hyperlipidemia, severe aortic stenosis, HFpEF, CKD stage III, diabetes mellitus II, s/p TAVR. Hospital stay complicated by acute hypoxic respiratory failure secondary to COVID pneumonia, ARDS, requiring significant ventilatory support.     Plan: Critical Care   Neuro/ pain/ sedation:  1. Sedated and paralyzed for maximum vent synchrony  - propofol and fentanyl infusions   - titrate sedation for RASS -1 to -2    Pulmonary:  1. Acute hypoxic respiratory failure secondary to covid pneumonia   2. ARDS  3. MRSA pneumonia  - continue lung protective ventilation, supplemental oxygen to keep saturation about 92%, PEEP support  - continue vancomycin    Cardiovascular:  1. Severe aortic stenosis, s/p TAVR 10/26  2. A flutter, rate controlled  3. S/P TAVR   4.  Shock, likely mixed etiology vasoplegic due to sedation and obstructive due to pos press vent  -  Monitor hemodynamic status. Continue coreg  - continue Eliquis     GI/Nutrition:  1. Moderate malnutrition  2. Mild AST elevation (improved to 50), exam benign 11/20    - Nutrition consulted. Appreciate recommendations.   - continue tube feeds    Renal/ Fluid Balance:   1. OPAL on CKD, creatinine up to 2.68 (baseline 1.7-2.0), non-oliguric  2. Volume overload   - Will monitor intake and output  - ICU electrolyte replacement protocol   - Initiate diuresis with bumex 1 mg IV bid.  Plan to increase as needed to get at least to volume even.  Hopeful that some degree of his renal failure is cardiorenal and will improve with diuresis.    Endocrine:  1. Diabetes mellitus II with stress hyperglycemia--still with elevated fsg to 200s.   - sliding scale insulin, high dose  - increase Lantus 50 every day to 35 bid.    ID:  1. Septic shock 2/2 Proteus in blood and urine, ~ pansensitive  2. Covid 19 pneumonia  2. MRSA HCAP   - continue decadron  - continue ceftriaxone and vanco (started 11/20)    Hematology:  1. Covid associated Coagulopathy  2. Thrombocytopenia -multifactorial - slow drift - stable at 62 today  - continue eliquis  - watch for s/sx of bleeding    MSK:   1. Deconditioning   -turns and reposition per protocols     Lines/ tubes/ drains:  Zuniga Catheter: PRESENT, indication: Retention, Strict 1-2 Hour I&O  Central Lines: PRESENT  PICC Triple Lumen 11/15/21 Left Basilic access-Site Assessment: WDL    Prophylaxis:  - DVT Prophylaxis: DOAC  - PUD Prophylaxis: protonix    Code Status: Full Code      Disposition:  - ICU, critical     The patient's care was discussed with the multi-disciplinary Team.             ______________________________________________________________________    Physical Exam   Vital Signs: Temp: 99.7  F (37.6  C) Temp src: Bladder BP: (!) 140/57 Pulse: 68     SpO2: 98 % O2 Device: Mechanical Ventilator    Weight: 248 lbs 3.81 oz    Gen: laying in bed, supined, appears older than stated age  HEENT:  ETT in place, anicteric, moist mucus membranes  Neck, trachea midline, no crepitus  Pulm: muffled coarse breath sounds throughout synchronous with vent  Cor: RRR, a flutter on monitor  Abd: soft, obese, no palpable masses, no hepatosplenomegaly  Skin: no jaundice  Ext: +edema  Neuro: limited by sedation, grimaces to pain    Data   I reviewed all medications, new labs and imaging results over the last 24 hours.  Arterial Blood Gases   Recent Labs   Lab 11/22/21  0519 11/21/21  0534 11/20/21  0409 11/19/21  0405   PH 7.52* 7.45 7.43 7.44   PCO2 42 49* 52* 51*   PO2 87 71* 83 79*   HCO3 35* 34* 35* 34*       Complete Blood Count   Recent Labs   Lab 11/22/21  0519 11/21/21  0753 11/20/21  0409 11/19/21  0404   WBC 11.1* 8.0 7.2 6.3   HGB 9.8* 9.3* 10.2* 9.4*   PLT 62* 62* 74* 86*       Basic Metabolic Panel  Recent Labs   Lab 11/22/21  1129 11/22/21  0800 11/22/21  0519 11/21/21  0754 11/21/21  0536 11/20/21  0749 11/20/21  0409 11/19/21  0814 11/19/21  0404   NA  --   --  136  --  133  --  134  --  137   POTASSIUM  --   --  5.1  --  4.7  --  4.3  --  4.3   CHLORIDE  --   --  97  --  95  --  97  --  98   CO2  --   --  30  --  31  --  32  --  29   BUN  --   --  194*  --  191*  --  169*  --  174*   CR  --   --  2.68*  --  2.44*  --  2.28*  --  2.58*   * 236* 226*  202*   < > 270*   < > 296*   < > 281*    < > = values in this interval not displayed.       Liver Function Tests  Recent Labs   Lab 11/22/21  0519 11/21/21  0536   AST 50* 67*   ALT 48 57   ALKPHOS 155* 140   BILITOTAL 0.6 0.6   ALBUMIN 1.8* 1.7*       Pancreatic Enzymes  No lab results found in last 7 days.    Coagulation Profile  No lab results found in last 7 days.    IMAGING:  No results found for this or any previous visit (from the past 24 hour(s)).    40 min CCT excluding procedures

## 2021-11-22 NOTE — PHARMACY-VANCOMYCIN DOSING SERVICE
Pharmacy Vancomycin Note  Date of Service 2021  Patient's  1945   76 year old, male    Indication: Community Acquired Pneumonia  Day of Therapy: 8  Current vancomycin regimen: Intermittent based on levels. 2g given on .  1g given on ,  and   Current vancomycin monitoring method: Trough (Method 1 = dosing nomogram)  Current vancomycin therapeutic monitoring goal: 15-20 mg/L      Current estimated CrCl = Estimated Creatinine Clearance: 29.5 mL/min (A) (based on SCr of 2.68 mg/dL (H)).    Creatinine for last 3 days  2021:  4:09 AM Creatinine 2.28 mg/dL  2021:  5:36 AM Creatinine 2.44 mg/dL  2021:  5:19 AM Creatinine 2.68 mg/dL    Recent Vancomycin Levels (past 3 days)  2021:  1:51 PM Vancomycin 20.2 mg/L  2021:  6:28 PM Vancomycin 25.4 mg/L  2021:  5:36 AM Vancomycin 25.4 mg/L  2021:  5:19 AM Vancomycin 21.4 mg/L    Vancomycin IV Administrations (past 72 hours)                   vancomycin (VANCOCIN) 1000 mg in dextrose 5% 200 mL PREMIX (mg) 1,000 mg New Bag 21                Nephrotoxins and other renal medications (From now, onward)    Start     Dose/Rate Route Frequency Ordered Stop    21 0900  norepinephrine (LEVOPHED) 4 mg in  mL infusion PREMIX         0.01-0.6 mcg/kg/min × 112.6 kg  4.2-253.4 mL/hr  Intravenous CONTINUOUS 21 0832      21 1156  vancomycin place vaughan - receiving intermittent dosing         1 each Intravenous SEE ADMIN INSTRUCTIONS 21 1157               Contrast Orders - past 72 hours (72h ago, onward)            None          Interpretation of levels and current regimen:  Vancomycin level is reflective of supratherapeutic level    Has serum creatinine changed greater than 50% in last 72 hours: No    Urine output:  diminished urine output    Renal Function: Worsening    Plan:  1. No dose today  2. Vancomycin monitoring method: Trough (Method 1 = dosing  nomogram)  3. Vancomycin therapeutic monitoring goal: 15-20 mg/L  4. Pharmacy will check vancomycin levels as appropriate in 1-3 Days.  5. Serum creatinine levels will be ordered daily for the first week of therapy and at least twice weekly for subsequent weeks.    Shannon Dewey, PharmD

## 2021-11-22 NOTE — PROGRESS NOTES
[facility] ICU RESPIRATORY NOTE        Date of Admission: 10/26/2021    Date of Intubation (most recent): 11/12/2021    Reason for Mechanical Ventilation: Respiratory failure    Number of Days on Mechanical Ventilation: 11    Met Criteria for Spontaneous Breathing Trial: No    Reason for No Spontaneous Breathing Trial: Peep > 8    Significant Events Today: None overnight  ABG Results:   Recent Labs   Lab 11/21/21  0534 11/20/21  0409 11/19/21  0405 11/18/21  1230   PH 7.45 7.43 7.44 7.41   PCO2 49* 52* 51* 53*   PO2 71* 83 79* 75*   HCO3 34* 35* 34* 34*   O2PER 50 1 40 40       Current Vent Settings: Ventilation Mode: CMV/AC  (Continuous Mandatory Ventilation/ Assist Control)  FiO2 (%): 50 %  Rate Set (breaths/minute): 18 breaths/min  Tidal Volume Set (mL): 430 mL  PEEP (cm H2O): 10 cmH2O  Oxygen Concentration (%): 50 %          Plan: Continue full vent support with daily wean assessment.    Hima Garcia, RRT

## 2021-11-22 NOTE — PLAN OF CARE
Patient remains intubated and sedated with propofol and fentanyl throughout shift. Does wake to voice, does not follow commands, when awake becomes tachypneic and O2 sats do not maintain above 90%. Vent settings reduced today, see Respiratory Therapy charting, tolerating well. Remains vitally stable throughout shift, patient did require levophed for hypotension for most of shift, off this evening. Patient remains in atrial fib, with controlled heart rate. Patient has 4+ weeping edema throughout extremities, bumex started and administered. Patient has adequate urine output throughout shift. No bowel movement. Tolerating tube feeds. Family updated this shift & utilized ipad at bedside to talk to patient. Will plan to wean sedation and ventilator as tolerated. Will continue to monitor and assess patient.

## 2021-11-22 NOTE — PROGRESS NOTES
[facility] ICU RESPIRATORY NOTE        Date of Admission: 10/26/2021    Date of Intubation (most recent): 11/12/21    Reason for Mechanical Ventilation: Respiratory  Failure    Number of Days on Mechanical Ventilation: 10    Met Criteria for Spontaneous Breathing Trial: No    Reason for No Spontaneous Breathing Trial: PEEP of 10    Significant Events Today: none    ABG Results:   Recent Labs   Lab 11/21/21  0534 11/20/21  0409 11/19/21  0405 11/18/21  1230   PH 7.45 7.43 7.44 7.41   PCO2 49* 52* 51* 53*   PO2 71* 83 79* 75*   HCO3 34* 35* 34* 34*   O2PER 50 1 40 40       Current Vent Settings: Ventilation Mode: CMV/AC  (Continuous Mandatory Ventilation/ Assist Control)  FiO2 (%): 50 %  Rate Set (breaths/minute): 18 breaths/min  Tidal Volume Set (mL): 430 mL  PEEP (cm H2O): 10 cmH2O  Oxygen Concentration (%): 50 %  Resp: 19      Skin Assessment: intact    Plan: Continue on full ventilatory support. Wean from mechanical ventilation as able.    Tristin Carrillo, RT

## 2021-11-23 NOTE — PHARMACY-VANCOMYCIN DOSING SERVICE
Pharmacy Vancomycin Note  Date of Service 2021  Patient's  1945   76 year old, male    Indication: MRSA pneumonia  Day of Therapy: 9  Current vancomycin regimen:  Per levels  Current vancomycin monitoring method: Trough (Method 2 = manual dose calculation)  Current vancomycin therapeutic monitoring goal: 15-20 mg/L    Current estimated CrCl = Estimated Creatinine Clearance: 27.5 mL/min (A) (based on SCr of 2.9 mg/dL (H)).    Creatinine for last 3 days  2021:  5:36 AM Creatinine 2.44 mg/dL  2021:  5:19 AM Creatinine 2.68 mg/dL  2021:  4:15 AM Creatinine 2.90 mg/dL    Recent Vancomycin Levels (past 3 days)  2021:  6:28 PM Vancomycin 25.4 mg/L  2021:  5:36 AM Vancomycin 25.4 mg/L  2021:  5:19 AM Vancomycin 21.4 mg/L  2021:  4:15 AM Vancomycin 17.3 mg/L    Vancomycin IV Administrations (past 72 hours)      No vancomycin orders with administrations in past 72 hours.                Nephrotoxins and other renal medications (From now, onward)    Start     Dose/Rate Route Frequency Ordered Stop    21 0800  vancomycin (VANCOCIN) 1,750 mg in sodium chloride 0.9 % 250 mL intermittent infusion         1,750 mg (central catheter)  over 90 Minutes Intravenous ONCE 21 0758      21 0900  norepinephrine (LEVOPHED) 4 mg in  mL infusion PREMIX         0.01-0.6 mcg/kg/min × 112.6 kg  4.2-253.4 mL/hr  Intravenous CONTINUOUS 21 0832      21 1156  vancomycin place vaughan - receiving intermittent dosing         1 each Intravenous SEE ADMIN INSTRUCTIONS 21 1157               Contrast Orders - past 72 hours (72h ago, onward)            None          Interpretation of levels and current regimen:  Vancomycin level is reflective of therapeutic level    Has serum creatinine changed greater than 50% in last 72 hours: No but consistently worsening    Urine output:  diminished urine output    Renal Function: Worsening    Plan:  1. Give one time  dose of 1750mg x 1  2. Vancomycin monitoring method: Trough (Method 2 = manual dose calculation)  3. Vancomycin therapeutic monitoring goal: 15-20 mg/L  4. Pharmacy will check vancomycin levels as appropriate in 1-3 Days. Set up for 11/25 AM  5. Serum creatinine levels will be ordered daily for the first week of therapy and at least twice weekly for subsequent weeks.    Val Thomas, [facility], PharmD

## 2021-11-23 NOTE — PLAN OF CARE
Shift [id number]: VSS ex. hypotensive towards change of shift. Neuro: NEISHA orientation, does not follow commands, occasionally seen moving head and right shoulder. Pulm: Lungs: diminished throughout, mechanically ventilated, small-moderate amounts of secretions w/ in-line suctioning. GI/: BS present, BM; Zuniga in place: WDL output. Diet: continuous enteral feedings. Skin: PI on bottom--see LDA; continues to weep from upper extremities bilaterally. No pain per CPOT. Access: PICC x 3 lumens, A-line; propofol gtt, fentanyl gtt, levo gtt, NS for TKO.    Overnight:  - BM's overnight were maroon, red-streaked in color and foul smelling--notified MD, see my provider notification for details.  - Levo gtt re-started towards change of shift, but low dose.

## 2021-11-23 NOTE — CONSULTS
Patient has been seen by [facility] in 07/2021 for EGD  Please contact [facility] for GI evaluation.    [facility]  [phone]

## 2021-11-23 NOTE — CONSULTS
GASTROENTEROLOGY CONSULTATION    Burton Elizabeth  [address]   [facility] [address]  76 year old male    Admission Date/Time: 10/26/2021  Primary Care Provider: [place], [facility]    We were asked to see the patient in consultation by Dr. Mckeon for evaluation of possible GI bleed as the cause of profound uremia.       HPI: Burton Elizabeth is a 76 year old male with a past medical history of coronary artery disease, atrial flutter on eliquis, hypertension, hyperlipidemia, severe aortic stenosis, HFpEF, CKD stage III, diabetes mellitus II, history of GI bleed, suspected obstructive sleep apnea admitted on 10/26/2021 for elective TAVR. Hospital course complicated by hypoxic respiratory failure/COVID pneumonia, OPAL from contrast nephropathy, volume overload, proteus UTI and bacteremia.    He is vented/sedated in ICU. Kidney function is worsening. Hemodialysis was started today. He is on tube feeds. He started passing bloody stools yesterday. He has had 4 stools total. Hemoglobin stable at 9. He is on PPI BID. He is on Eliquis and this was held today. EGD 7/7/21 for hematemesis showed non-bleeding gastric ulcers with no stigmata of bleeding, erythematous mucosa in the stomach, long segment of suspected Cabrera's seen. Gastric biopsy path was consistent with benign gastric antral mucosa with minimal vascular congestion and stromal edema. Repeat EGD in 2 months recommended.     Hemoglobin has been in the 9 range since 11/16/21. Unsure if he has had a colonoscopy.     ROS: A comprehensive ten point review of systems was negative aside from those in mentioned in the HPI.      MEDICATIONS: No current facility-administered medications on file prior to encounter.  acetaminophen (TYLENOL) 325 MG tablet, Take 325-650 mg by mouth every 4 hours as needed  atorvastatin (LIPITOR) 80 MG tablet, Take 80 mg by mouth daily  blood glucose (NO BRAND SPECIFIED) test strip, Use to test blood sugar as  directed  carvedilol (COREG) 3.125 MG tablet, Take 3.125 mg by mouth 2 times daily  glimepiride (AMARYL) 2 MG tablet, Take 2 mg by mouth 2 times daily   HYDROcodone-acetaminophen (NORCO) 5-325 MG tablet, Take 1-2 tablets by mouth 2 times daily as needed  insulin glargine (LANTUS PEN) 100 UNIT/ML pen, Inject 18 Units Subcutaneous At Bedtime   multivitamin w/minerals (MULTIVITAMIN, THERAPEUTIC WITH MINERALS) tablet, Take 1 tablet by mouth daily  omeprazole (PRILOSEC) 20 MG DR capsule, Take 1 capsule (20 mg) by mouth daily (Patient taking differently: Take 20 mg by mouth 2 times daily )  order for DME, Equipment being ordered: Walker Wheels () and Walker ()  Treatment Diagnosis: Impaired gait  semaglutide (OZEMPIC) 2 MG/1.5ML SOPN pen, Inject 0.25 mg Subcutaneous every 7 days on Monday        ALLERGIES:   Allergies   Allergen Reactions     Penicillins Anaphylaxis       Past Medical History:   Diagnosis Date     Arthritis of knee~ s/p replacement 03/24/2016     Atypical atrial flutter (H)      CAD (coronary artery disease)      Cardiomyopathy (H)      Cerebral infarction (H)     7/21     Congestive heart failure (H)      Decreased cardiac ejection fraction~44% 03/24/2016     Diabetes mellitus, type 2 (H) 03/24/2016     History of DVT (deep vein thrombosis)      HTN (hypertension) 03/24/2016     LBBB (left bundle branch block) 03/24/2016     Mixed hyperlipidemia 08/23/2016     Renal disease     stage 3 chronic kidney disease     Rheumatic aortic stenosis      Upper GI bleed        Past Surgical History:   Procedure Laterality Date     ANKLE SURGERY      X 4     APPENDECTOMY       ARTHROPLASTY KNEE Right 3/29/2016    Procedure: ARTHROPLASTY KNEE;  Surgeon: Heena Rosen MD;  Location: [facility] OR     ARTHROPLASTY REVISION KNEE Left 9/27/2019    Procedure: REVISION LEFT TOTAL KNEE  ARTHROPLASTY;  Surgeon: Heena Rosen MD;  Location: [facility] OR     ARTHROPLASTY REVISION KNEE Right 8/25/2021    Procedure: REVISION  "OF RIGHT TOTAL KNEE ARTHROPLASTY;  Surgeon: Heena Rosen MD;  Location:  OR     CV FEMORAL ANGIOGRAM N/A 10/11/2021    Procedure: Femoral Angiogram;  Surgeon: Edgardo Beckham MD;  Location:  HEART CARDIAC CATH LAB     CV HEART CATHETERIZATION WITH POSSIBLE INTERVENTION N/A 10/11/2021    Procedure: Coronary Angiogram;  Surgeon: Edgardo Beckham MD;  Location:  HEART CARDIAC CATH LAB     CV LEFT HEART CATH N/A 8/12/2019    Procedure: Left Heart Cath;  Surgeon: Edgardo Beckham MD;  Location:  HEART CARDIAC CATH LAB     CV TRANSCATHETER AORTIC VALVE REPLACEMENT N/A 10/26/2021    Procedure: Transcatheter Aortic Valve Replacement;  Surgeon: Edgardo Beckham MD;  Location:  HEART CARDIAC CATH LAB     ESOPHAGOSCOPY, GASTROSCOPY, DUODENOSCOPY (EGD), COMBINED N/A 7/7/2021    Procedure: ESOPHAGOGASTRODUODENOSCOPY, WITH BIOPSY;  Surgeon: Monserrat Bright MD;  Location:  GI     EYE SURGERY      cataract removal     IR CVC TUNNEL PLACEMENT > 5 YRS OF AGE  10/29/2021     IR CVC TUNNEL REMOVAL RIGHT  11/3/2021     ORTHOPEDIC SURGERY      KNEE SCOPES MULTIPLE       SOCIAL HISTORY:  Social History     Tobacco Use     Smoking status: Never Smoker     Smokeless tobacco: Never Used   Substance Use Topics     Alcohol use: Yes     Alcohol/week: 2.0 standard drinks     Types: 2 Standard drinks or equivalent per week     Comment: occasional, social     Drug use: No       FAMILY HISTORY:  Family History   Problem Relation Age of Onset     Coronary Artery Disease No family hx of        PHYSICAL EXAM:   /51 (BP Location: Right arm)   Pulse 63   Temp 97.2  F (36.2  C) (Bladder)   Resp 18   Ht 1.753 m (5' 9\")   Wt 114.7 kg (252 lb 13.9 oz)   SpO2 98%   BMI 37.34 kg/m     Constitutional: vented/sedated   Cardiovascular: regular rate and rhythm  Respiratory: clear to auscultation anteriorly   Head: atraumatic, normocephalic  Neck: supple, no thyromegaly  ENT: no oral lesions are " noted  Abdomen: soft, non-distended, normally active bowel sounds  Neuro: Neurologically intact grossly, no asterixis   Skin: pale       LABORATORY WORK  Recent Labs   Lab Test 11/23/21  1134 11/23/21  0416 11/22/21  0519 10/27/21  0551 10/21/21  1346 10/11/21  0826   WBC 9.6 9.6 11.1*   < > 7.2 7.4   HGB 9.2*  9.2* 9.1* 9.8*   < > 12.3* 12.5*   MCV 83 81 80   < > 86 84   PLT 57* 68* 62*   < > 212 222   INR 1.51*  --   --   --  1.26* 1.14    < > = values in this interval not displayed.     Recent Labs   Lab Test 11/23/21  0415 11/22/21  0519 11/21/21  0536    136 133   POTASSIUM 4.8 5.1 4.7   CHLORIDE 97 97 95   CO2 32 30 31   * 194* 191*   CR 2.90* 2.68* 2.44*   ANIONGAP 7 9 7   GABE 8.2* 8.5 8.0*     Recent Labs   Lab Test 11/22/21  0519 11/21/21  0536 11/15/21  2330 11/15/21  0600 11/13/21  0611 11/12/21  0555 10/29/21  0834 10/28/21  1113 07/08/21  0524 07/06/21  1028   ALBUMIN 1.8* 1.7*  --  2.7*   < > 2.4*   < >  --    < > 2.8*   BILITOTAL 0.6 0.6  --  0.7   < > 0.5   < >  --    < > 0.4   DBIL  --   --   --   --   --  0.2  --   --   --   --    ALT 48 57  --  57   < > 65   < >  --    < > 34   AST 50* 67*  --  60*   < > 75*   < >  --    < > 56*   ALKPHOS 155* 140  --  137   < > 159*   < >  --    < > 83   PROTEIN  --   --  70 *  --   --   --   --  100 *  --  Negative    < > = values in this interval not displayed.       CONSULTATION ASSESSMENT AND PLAN  76 year old male with a past medical history of coronary artery disease, atrial flutter on eliquis, hypertension, hyperlipidemia, severe aortic stenosis, HFpEF, CKD stage III, diabetes mellitus II, history of GI bleed, suspected obstructive sleep apnea admitted on 10/26/2021 for elective TAVR, hospital course complicated by hypoxic respiratory failure/COVID pneumonia, OPAL, volume overload, UTI and bacteremia, now with 4 episodes of blood per rectum with stable hemoglobin. Differentials include gastric ulcers, gastritis, AVM, ischemia, etc. Eliquis was  "held this AM.    Plan  -PPI BID  -Hold Eliquis   -Stop tube feeds at midnight  -EGD/Flex sig tomorrow - water enema prep  -Monitor hemoglobin and transfuse to keep >7  -Monitor stools     I will discuss the patient plan with Dr. Lawson. Thank you for asking us to participate in the care of this patient.    Aida Jang, CNP  [facility]  Cell: [phone]  Office: [phone]      Staff addendum: 76 yom with hx of , CAD, A flutter on anticoagulation. Currently critically ill with COVID PNA, recent TAVR. With hematochezia but hgb stable. Pt intubated and sedated unable to give info.    /51 (BP Location: Right arm)   Pulse 63   Temp 97.2  F (36.2  C) (Bladder)   Resp 18   Ht 1.753 m (5' 9\")   Wt 114.7 kg (252 lb 13.9 oz)   SpO2 98%   BMI 37.34 kg/m    Gen: sedated and intubated NAD  Cor: RRR  Abd: S NT ND    A/P: 76 yom with maroon stools, stable hgb, doesn't appear to be actively bleeding. May be PUD, gastritis etc. With concurrent illnesses at risk for ischemic colitis  -EGD/flex tomorrow  -Stop TF at MN  -PPI  -Transfusions per hospitalist    Krystyna Lawson MD  [facility]  Phone [phone] until 5 pm  Office [phone] for after hours on call provider    "

## 2021-11-23 NOTE — CONSULTS
Consult Date: 11/23/2021    RENAL CONSULTATION    REQUESTING PHYSICIAN:  Dr. Jules    REASON FOR CONSULTATION:  Acute kidney injury and chronic kidney disease stage III.    HISTORY OF PRESENT ILLNESS:  This is a complicated patient who has been in the hospital for 28 days.  He was admitted on 10/26/2021 for aortic stenosis and underwent a TAVR.  He has a history of chronic kidney disease stage III.  He had an episode of OPAL in 07/2021, when he was admitted with an MI.  On admission for this hospitalization, his creatinine was 1.54, which was about his baseline.  He has had a stormy course and is now in the ICU with respiratory failure.  He turned COVID positive and also has MRSA pneumonia and ARDS.  He remains on the ventilator.  His blood pressure medications are on hold and he is now on pressors.  He is getting tube feedings.    PAST MEDICAL HISTORY:  Includes ASCVD with atrial fibrillation and congestive heart failure, hypertension, diabetes, obstructive sleep apnea and an upper GI bleed in the past.    PAST SURGICAL HISTORY:  Includes total knee arthroplasty bilaterally, ankle surgery, appendectomy, cataracts.    CURRENT MEDICATIONS:  Include Levophed, Rocephin, vanco, tube feedings and water per the feeding tube.    SOCIAL HISTORY:  He does not smoke or drink.    REVIEW OF SYSTEMS:  The patient is unable.    FAMILY HISTORY:  The patient is unable.    PHYSICAL EXAMINATION:  The patient is in COVID isolation.  A detailed physical examination is not done.  He is seen through the glass window and is supine and intubated with a feeding tube in place.  I reviewed the physical exam by the intensivist today.  He noted the patient to be supine with an ETT in place.  He was anicteric.  LUNGS:  Showed coarse breath sounds.  CARDIAC:  Regular rhythm with atrial flutter on the monitor.  ABDOMEN:  Soft with no hepatosplenomegaly.   SKIN:  Shows no icterus.  EXTREMITIES:  Showed edema.    Today, his intake and output are   in and 1322 out.  His weight on admission was 112.5 and today is 114.7.  However, on , the weight was 108.6 and is now 114.7.  It has steadily increased and is up about 6 kg over the last week.  He is afebrile.  Blood pressure is 127/49 on Levophed.    LABORATORY DATA:  On 10/26/2021, creatinine was 1.54.  On 10/29, the creatinine bumped up to 4.38 and then came down; it has been in the 2.2-2.6 range since then.  However, most recently, over the last 3 days, it has risen from 2.28 steadily up to 2.90.  The BUN is quite high at 201.  Electrolytes show sodium 136, potassium 4.8, chloride 97, bicarbonate 32.  The blood gases show hypoxia and metabolic alkalosis.    IMPRESSION:  1.  Respiratory failure, on mechanical ventilation.  He has ARDS, MRSA pneumonia and COVID.  He is being managed by the ICU team.  2.  Aortic stenosis, status post TAVR in atrial flutter.  3.  Hypotension.  He is on pressors at this point and his blood pressure medications are on hold.  4.  Chronic kidney disease stage III, likely due to diabetic and hypertensive nephropathy.  5.  Acute kidney injury.  His creatinine is rising.  At this time, the main indications for dialysis are fluid overload and azotemia with a very high BUN at 201.  His electrolytes and blood gases are reasonable at this point.  We will put in a dialysis catheter and we will do hemodialysis.  He will get a shorter run today because of the high BUN and we want to avoid disequilibrium.  We will aim for 1.5-2.0 liters ultrafiltration and clearance.  We will run him again tomorrow and as needed subsequently.    Imer Ba MD        D: 2021   T: 2021   MT: [id number]    Name:     TAY APGE  MRN:      [id number]        Account:      [id number]   :      1945           Consult Date: 2021     Document: [id number]

## 2021-11-23 NOTE — PROVIDER NOTIFICATION
Notified Dr. Talamantes regarding pt's BM that was maroon, red-streaked in color and foul smelling.    Plan:  - No new orders at this time; will check AM CBC as already ordered; will continue to monitor output and will notify MD if it worsens

## 2021-11-23 NOTE — PROGRESS NOTES
[facility]    ICU Progress Note       Date of Admission:  10/26/2021    Assessment: Critical Care   Burton Elizabeth is a 76 year old male with PMHx of coronary artery disease, atrial flutter on eliquis, hypertension, hyperlipidemia, severe aortic stenosis, HFpEF, CKD stage III, diabetes mellitus II, history of GI bleed, suspected obstructive sleep apnea admitted on 10/26/2021 for elective TAVR.  Hospital course complicated by OPAL from contrast nephropathy, volume overload, hypoxic respiratory failure multifactorial, Covid pneumonia and physical deconditioning.  Also proteus UTI and bacteremia.  On 11/15 the patient was intubated for severe hypoxic respiratory failure. Patient was proned and paralyzed. Supined by 11/16, off paralytics by 11/18, velitri weaned off on 11/19.     Interval History   Apparently had some maroon stools overnight.  No changes to hemodynamics, no hgb drop.   Changed from a fib to a flutter this morning.  Associated axis change with this rhythm change.  No changes to hemodynamics and still rate controlled.   Unable to obtain history directly due to intubated vented status.    Assessment   76 year old male with PMHx of coronary artery disease, atrial flutter on eliquis, hypertension, hyperlipidemia, severe aortic stenosis, HFpEF, CKD stage III, diabetes mellitus II, s/p TAVR. Hospital stay complicated by acute hypoxic respiratory failure secondary to COVID pneumonia, ARDS, requiring significant ventilatory support.     Plan: Critical Care   Neuro/ pain/ sedation:  1. Sedated and paralyzed for maximum vent synchrony  - propofol and fentanyl infusions   - titrate sedation for RASS -1 to -2    Pulmonary:  1. Acute hypoxic respiratory failure secondary to covid pneumonia  2. ARDS  3. MRSA pneumonia  - continue lung protective ventilation, supplemental oxygen to keep saturation about 92%, PEEP support  - continue vancomycin    Cardiovascular:  1. Severe aortic stenosis,  s/p TAVR 10/26  2. A flutter, rate controlled  3. S/P TAVR   4.  Shock, likely mixed etiology vasoplegic due to sedation and obstructive due to pos press vent  - Will eventually need coreg restarted  - holding eliquis for now for GI bleeding.  Will eventually need this restarted for a fib.    GI/Nutrition:  1. Moderate malnutrition  2. Mild AST elevation (improved to 50), exam benign 11/20    3.  Maroon stools:  Already on bid PPI.  pltlts acceptable.  Checking ptt/INR.  Trend hgb. GI consult.  - Nutrition consulted. Appreciate recommendations.   - continue tube feeds  - occasional spot check of lft's    Renal/ Fluid Balance:   1. OPAL on CKD, creatinine up to 2.90 (baseline 1.7-2.0), non-oliguric  2. Volume overload   - Will monitor intake and output  - ICU electrolyte replacement protocol   - Volume status and creat did not improve with diuresis.  BUN is 200.  Nephrology cslt today.    Endocrine:  1. Diabetes mellitus II with stress hyperglycemia--still with elevated fsg to 200s.   - sliding scale insulin, high dose  - increase Lantus 35 bid to 50 bid.     ID:  1. Septic shock 2/2 Proteus in blood and urine, ~ pansensitive  2. Covid 19 pneumonia   3. MRSA HCAP   - continue decadron  - continue ceftriaxone (proteus bacteremia; total course started 11/15)  - continue vanco (MRSA pna; started 11/20)    Hematology:  1. Covid associated Coagulopathy  2. Thrombocytopenia -multifactorial - up to 68 today  - continue eliquis  - watch for s/sx of bleeding    MSK:   1. Deconditioning   -turns and reposition per protocols     Lines/ tubes/ drains:  Zuniga Catheter: PRESENT, indication: Retention, Strict 1-2 Hour I&O  Central Lines: PRESENT  PICC Triple Lumen 11/15/21 Left Basilic access-Site Assessment: WDL    Prophylaxis:  - DVT Prophylaxis: DOAC  - PUD Prophylaxis: protonix    Code Status: Full Code      Disposition:  - ICU, critical     The patient's care was discussed with the multi-disciplinary Team.              ______________________________________________________________________    Physical Exam   Vital Signs: Temp: 96.8  F (36  C) Temp src: Bladder BP: 115/48 Pulse: 68     SpO2: 99 % O2 Device: Mechanical Ventilator    Weight: 252 lbs 13.88 oz    Gen: laying in bed, supined, appears older than stated age  HEENT: ETT in place, anicteric, moist mucus membranes  Neck, trachea midline, no crepitus  Pulm: muffled coarse breath sounds throughout synchronous with vent  Cor: RRR, a flutter on monitor  Abd: soft, obese, no palpable masses, no hepatosplenomegaly  Skin: no jaundice  Ext: +edema  Neuro: limited by sedation, grimaces to pain    Data   I reviewed all medications, new labs and imaging results over the last 24 hours.  Arterial Blood Gases   Recent Labs   Lab 11/23/21 0416 11/22/21 0519 11/21/21  0534 11/20/21  0409   PH 7.48* 7.52* 7.45 7.43   PCO2 46* 42 49* 52*   PO2 81 87 71* 83   HCO3 34* 35* 34* 35*       Complete Blood Count   Recent Labs   Lab 11/23/21 0416 11/22/21 0519 11/21/21  0753 11/20/21  0409   WBC 9.6 11.1* 8.0 7.2   HGB 9.1* 9.8* 9.3* 10.2*   PLT 68* 62* 62* 74*       Basic Metabolic Panel  Recent Labs   Lab 11/23/21 0415 11/23/21 0414 11/22/21  2331 11/22/21 2012 11/22/21  0800 11/22/21  0519 11/21/21  0754 11/21/21  0536 11/20/21  0749 11/20/21  0409     --   --   --   --  136  --  133  --  134   POTASSIUM 4.8  --   --   --   --  5.1  --  4.7  --  4.3   CHLORIDE 97  --   --   --   --  97  --  95  --  97   CO2 32  --   --   --   --  30  --  31  --  32   *  --   --   --   --  194*  --  191*  --  169*   CR 2.90*  --   --   --   --  2.68*  --  2.44*  --  2.28*   * 287* 297* 244*   < > 226*  202*   < > 270*   < > 296*    < > = values in this interval not displayed.       Liver Function Tests  Recent Labs   Lab 11/22/21  0519 11/21/21  0536   AST 50* 67*   ALT 48 57   ALKPHOS 155* 140   BILITOTAL 0.6 0.6   ALBUMIN 1.8* 1.7*       Pancreatic Enzymes  No lab results found in  last 7 days.    Coagulation Profile  No lab results found in last 7 days.    IMAGING:  No results found for this or any previous visit (from the past 24 hour(s)).    EKG: personally reviewed/interpreted:  Now in a flutter in 60s.  [place] axis.  Nl intervals.  No acute ischemic changes.    40 min CCT excluding procedures

## 2021-11-23 NOTE — PROGRESS NOTES
[facility] ICU RESPIRATORY NOTE        Date of Admission: 10/26/2021    Date of Intubation (most recent): 11/12/2021    Reason for Mechanical Ventilation: Respiratory failure    Number of Days on Mechanical Ventilation: 12    Met Criteria for Spontaneous Breathing Trial: No    Reason for No Spontaneous Breathing Trial: Peep > 8    Significant Events Today: None overnight    ABG Results:   Recent Labs   Lab 11/23/21  0416 11/22/21  0519 11/21/21  0534 11/20/21  0409   PH 7.48* 7.52* 7.45 7.43   PCO2 46* 42 49* 52*   PO2 81 87 71* 83   HCO3 34* 35* 34* 35*   O2PER 50 50 50 1       Current Vent Settings: Ventilation Mode: CMV/AC  (Continuous Mandatory Ventilation/ Assist Control)  FiO2 (%): 50 %  Rate Set (breaths/minute): 12 breaths/min  Tidal Volume Set (mL): 500 mL  PEEP (cm H2O): 12 cmH2O  Oxygen Concentration (%): 50 %        Plan: Continue full vent support with daily PS assessment.    Hima Garcia, RRT

## 2021-11-23 NOTE — PROCEDURES
Procedure: Central Line Placement  Consent:   1. Obtained from wife after discussion of risk/benefit/alternatives) and all questions answered to the best of my knowledge. This signed consent is in the chart. Yes     Universal Protocol:   Patient Identification was verified, time out was performed, site marking N/A, Imaging data N/A. Full Barrier precaution done: Hands washed, mask, gloves, gown, cap and eye protection all used.    Diagnosis: renal failure requiring dialysis  Indication: dialysis    Narrative: The right IJ vein was identified with portable ultrasound device. Area prepped with chlorhexedine and Patient was head to toe draped. Sterile technique and full barrier precautions used. A double lumen catheter was inserted using standard Seldinger technique under real time US guidance after careful evaluation of the best site to minimize risk of infection and complication related to insertion. The central line was sutured at 16 cm depth.    Post-procedure imaging:  Central line is in good position and no visible pneumothorax per my review.     Complications: No apparent complication.    Procedure performed by Dr Mckeon on November 23, 2021

## 2021-11-23 NOTE — PROGRESS NOTES
CLINICAL NUTRITION SERVICES - REASSESSMENT NOTE      Recommendations Ordered by Registered Dietitian (RD):   Change TF to Novasource Renal at 35 mL/hr = 1680 kcals, 76 gm pro (1.1 gm/kg), 602 mL H20, 154 gm CHO, no fiber  Total (TF + Propofol):  1929 kcals (18 kcal/kg)    Change Certavite to Nephronex   Malnutrition:  (11/2)   % Weight Loss:  Up to 5% in 1 month (moderate malnutrition)  % Intake:  </= 50% for >/= 5 days (severe malnutrition) - slowly improving  Subcutaneous Fat Loss:  Upper arm region mild depletion and Thoracic region mild depletion  Muscle Loss:  Clavicle bone region mild depletion, Acromion bone region mild depletion and Scapular bone region mild depletion  Fluid Retention:  Moderate 2-3+     Malnutrition Diagnosis: Moderate malnutrition  In Context of:  Acute illness or injury       EVALUATION OF PROGRESS TOWARD GOALS   Diet:  NPO on vent    Nutrition Support:  TF product and rate was changed on 11/18 as below:    Nutrition Support Enteral:  Type of Feeding Tube: OG  Enteral Frequency:  Continuous  Enteral Regimen: Replete at 60 mL/hr  Total Enteral Provisions: 1440 kcal, 92 g protein (1.3 g/kg), 161 g CHO, no fiber, 1210 mL H2O  Free Water Flush: 30 mL every 4 hrs  Certavite daily - per PI protocol    Propofol is running at 9.8 mL/hr = 259 kcals  Total (TF + Propofol):  1700 kcals (16 kcal/kg)    Intake/Tolerance:    Stool:  x1 on 11/21, x3 today (red streaked, foul smelling).     (H), Cr 2.9 (H), Mg 2.5 (H), Phos 4.8 (H) - Pt with OPAL on CKD starting HD today.  BGM:  282, 244, 297, 287, 250, 244 - High Resistant sliding scale insulin + Lantus 50 Units BID for glycemic control.  I/O:  2324/1925.  Wt:  114.7 kg (up 6.9 kg since 11/16).  Pt with 3-4+ generalized edema.    ASSESSED NUTRITION NEEDS: (Modified for higher kcals and lower protein)  Dosing Weight:  107.8 kg (11/16) for energy, 72.7 kg (IBW for protein)   Estimated Energy Needs: 5468-8470 kcals  (15-20 Kcal/Kg)  Justification: obesity, vent  Estimated Protein Needs: 73-95 grams protein (1-1.3 g pro/Kg)  Justification: repletion, stress, BUN > 200, starting HD (liberalize as able)    NEW FINDINGS:   11/20:  AXR = Orogastric tube tip in gastric body with side port in the fundus (OG)   11/23:  Begin HD   11/24:  plan EGD/Flex sig    Norepinephrine continues for hypotension     Previous Goals (11/18):   Replete at 60 mL/hr + Propofol will meet % needs   Evaluation: Met    Previous Nutrition Diagnosis (11/18):   Inadequate energy intake related to TF at 25 mL/hr, Propofol off as evidenced by meeting 74% energy needs   Evaluation:  Resolved      CURRENT NUTRITION DIAGNOSIS  Altered nutrition-related lab values (high Phos, Mg, BUN and Cr) related to OPAL on CKD and patient on a standard EN formula as evidenced by Phos 4.8, Mg 2.6, , and Cr 2.9.    INTERVENTIONS  Recommendations / Nutrition Prescription  Change TF to Novasource Renal at 35 mL/hr = 1680 kcals, 76 gm pro (1.1 gm/kg), 602 mL H20, 154 gm CHO, no fiber  Total (TF + Propofol):  1929 kcals (18 kcal/kg)    Change Certavite to Nephronex    Implementation  Collaboration and Referral of Nutrition care - Pt was discussed during ICU interdisciplinary rounds this morning  EN Composition and EN Schedule - Changed TF in Epic as above  Multivitamin/mineral supplement therapy - Ordered change in Epic as above    Goals  TF Novasource Renal at 35 mL/hr + Propofol will meet % estimated needs    MONITORING AND EVALUATION:  Progress towards goals will be monitored and evaluated per protocol and Practice Guidelines    Francine Ramirez, RD, LD, CNSC

## 2021-11-23 NOTE — PLAN OF CARE
[id number]    Neuro: Opens eyes to vigorous stimulation, RASS -3. No movement noted in extremities. PERRLA.   Respiratory: Remains on ventilator with settings unchanged - CMV with RR 12, tidal volume 500, FiO2 50%, PEEP 12. Lung sounds diminished. Small amount of tan, thin secretions.  Cardiac: Atrial fibrillation - rate controlled in 70-80s. Normotensive with no assistance. Tmax 37.7 C. Severe edema throughout. Weak pulses, but palpable.   GI: Tube feeds continue to run at goal. Belly rounded, but soft. No BM, hypoactive bowel sounds.   : Zuniga in place with adequate urine output.   Skin: Pale throughout with scattered bruising. Redness continues to be noted on coccyx. See flowsheet for detailed skin/wound assessment.   LDA: LUE PICC, right radial arterial line   Gtts: Propofol at 15 mcg/kg/min, fentanyl at 25 mcg/hour    Problem: Adult Inpatient Plan of Care  Goal: Plan of Care Review  Outcome: No Change  Flowsheets (Taken 11/22/2021 2119)  Plan of Care Reviewed With: patient  Progress: no change     Problem: Diabetes Comorbidity  Goal: Blood Glucose Level Within Targeted Range  Outcome: No Change     Problem: Gas Exchange Impaired  Goal: Optimal Gas Exchange  Outcome: No Change  Intervention: Optimize Oxygenation and Ventilation  Recent Flowsheet Documentation  Taken 11/22/2021 2000 by Lea Renae, RN  Head of Bed (HOB) Positioning: HOB at 30 degrees     Problem: Adult Inpatient Plan of Care  Goal: Absence of Hospital-Acquired Illness or Injury  Intervention: Identify and Manage Fall Risk  Recent Flowsheet Documentation  Taken 11/22/2021 2000 by Lea Renae, RN  Safety Promotion/Fall Prevention:    bed alarm on    clutter free environment maintained    fall prevention program maintained    increase visualization of patient    patient and family education    room organization consistent    room near nurse's station    safety round/check completed    treat reversible contributory factors    treat underlying  cause  Intervention: Prevent Skin Injury  Recent Flowsheet Documentation  Taken 11/22/2021 2000 by Lea Renae RN  Body Position:    turned    heels elevated    legs elevated    upper extremity elevated  Intervention: Prevent and Manage VTE (Venous Thromboembolism) Risk  Recent Flowsheet Documentation  Taken 11/22/2021 2000 by Lea Renae RN  VTE Prevention/Management:    pneumatic compression device    anticoagulant therapy maintained    bleeding risk assessed    dorsiflexion/plantar flexion performed    PROM (passive range of motion) performed  Intervention: Prevent Infection  Recent Flowsheet Documentation  Taken 11/22/2021 2000 by Lea Renae RN  Infection Prevention:    personal protective equipment utilized    rest/sleep promoted    single patient room provided    hand hygiene promoted    equipment surfaces disinfected    environmental surveillance performed     Problem: Pain Acute  Goal: Acceptable Pain Control and Functional Ability  Intervention: Develop Pain Management Plan  Recent Flowsheet Documentation  Taken 11/22/2021 2000 by Lea Renae RN  Pain Management Interventions:    pain pump in use    repositioned    rest  Intervention: Prevent or Manage Pain  Recent Flowsheet Documentation  Taken 11/22/2021 2000 by Lea Renae RN  Medication Review/Management: medications reviewed     Problem: Heart Failure Comorbidity  Goal: Maintenance of Heart Failure Symptom Control  Intervention: Maintain Heart Failure-Management  Recent Flowsheet Documentation  Taken 11/22/2021 2000 by Lea Renae RN  Medication Review/Management: medications reviewed     Problem: Hypertension Comorbidity  Goal: Blood Pressure in Desired Range  Intervention: Maintain Blood Pressure Management  Recent Flowsheet Documentation  Taken 11/22/2021 2000 by Lea Renae RN  Medication Review/Management: medications reviewed

## 2021-11-24 NOTE — PLAN OF CARE
[id number]    Neuro: RASS -3. Opens eyes to vigorous stimulation, no movement noted in extremities. PERRLA.  Respiratory: Ventilator settings unchanged (CMV, RR 12, Tidal Volume 500, 45%, 12 PEEP). Lung sounds diminished. Minimal, thin, tan, inline secretions.  Cardiac: Remains in atrial fibrillation, rate is controlled in 50-70s. Levophed titrated to maintain MAP >65. Afebrile. Continues to have severe edema throughout with weeping in LUE.   GI: Tube feeds continue to run at goal till NPO at midnight. Partially loose/partially formed, bloody stool x2. 25 mL of D50 given x 3 for hypoglycemia and then D10 started at 35 mL/hour.   : HD completed shortly after 1900. Zuniga in place with adequate urine output.  Skin: Pale throughout with scattered bruising and pressure injury on coccyx.   LDA: LUE PICC, R internal jugular HD catheter, right radial arterial line  Gtts: propofol at 15 mcg/kg/min, fentanyl at 25 mcg/hour, levophed at 0.06 mcg/kg/min, D10 at 35 mL/hour  Misc: Monitoring Hgb every 6 hours (0000 - 9.1, 0600- 9.4). Plan for EGD today.      Problem: Gas Exchange Impaired  Goal: Optimal Gas Exchange  Outcome: No Change  Intervention: Optimize Oxygenation and Ventilation  Recent Flowsheet Documentation  Taken 11/23/2021 2000 by Lea Renae, RN  Head of Bed (HOB) Positioning: HOB at 30 degrees     Problem: Adult Inpatient Plan of Care  Goal: Plan of Care Review  Outcome: Declining  Flowsheets (Taken 11/23/2021 2146)  Plan of Care Reviewed With: patient  Progress: declining     Problem: Adult Inpatient Plan of Care  Goal: Absence of Hospital-Acquired Illness or Injury  Intervention: Identify and Manage Fall Risk  Recent Flowsheet Documentation  Taken 11/23/2021 2000 by Lea Renae, RN  Safety Promotion/Fall Prevention:    bed alarm on    clutter free environment maintained    fall prevention program maintained    increase visualization of patient    patient and family education    patient video monitoring     room near nurse's station    room organization consistent    safety round/check completed  Intervention: Prevent Skin Injury  Recent Flowsheet Documentation  Taken 11/23/2021 2000 by Lea Renae RN  Body Position: turned  Intervention: Prevent and Manage VTE (Venous Thromboembolism) Risk  Recent Flowsheet Documentation  Taken 11/23/2021 2000 by Lea Renae RN  VTE Prevention/Management:    pneumatic compression device    PROM (passive range of motion) performed  Intervention: Prevent Infection  Recent Flowsheet Documentation  Taken 11/23/2021 2000 by Lea Renae RN  Infection Prevention:    hand hygiene promoted    personal protective equipment utilized    single patient room provided    rest/sleep promoted    visitors restricted/screened    environmental surveillance performed    equipment surfaces disinfected     Problem: Pain Acute  Goal: Acceptable Pain Control and Functional Ability  Intervention: Develop Pain Management Plan  Recent Flowsheet Documentation  Taken 11/23/2021 2000 by Lea Renae RN  Pain Management Interventions:    pain pump in use    repositioned    rest  Intervention: Prevent or Manage Pain  Recent Flowsheet Documentation  Taken 11/23/2021 2000 by Lea Renae RN  Medication Review/Management: medications reviewed     Problem: Heart Failure Comorbidity  Goal: Maintenance of Heart Failure Symptom Control  Intervention: Maintain Heart Failure-Management  Recent Flowsheet Documentation  Taken 11/23/2021 2000 by Lea Renae RN  Medication Review/Management: medications reviewed     Problem: Hypertension Comorbidity  Goal: Blood Pressure in Desired Range  Intervention: Maintain Blood Pressure Management  Recent Flowsheet Documentation  Taken 11/23/2021 2000 by Lea Renae RN  Medication Review/Management: medications reviewed

## 2021-11-24 NOTE — PROGRESS NOTES
Potassium   Date Value Ref Range Status   11/24/2021 4.2 3.4 - 5.3 mmol/L Final   07/11/2021 4.7 3.4 - 5.3 mmol/L Final     Hemoglobin   Date Value Ref Range Status   11/24/2021 9.1 (L) 13.3 - 17.7 g/dL Final   07/11/2021 8.6 (L) 13.3 - 17.7 g/dL Final     Creatinine   Date Value Ref Range Status   11/24/2021 2.05 (H) 0.66 - 1.25 mg/dL Final   07/11/2021 1.82 (H) 0.66 - 1.25 mg/dL Final     Urea Nitrogen   Date Value Ref Range Status   11/24/2021 134 (H) 7 - 30 mg/dL Final   07/11/2021 45 (H) 7 - 30 mg/dL Final     Sodium   Date Value Ref Range Status   11/24/2021 138 133 - 144 mmol/L Final   07/11/2021 138 133 - 144 mmol/L Final     INR   Date Value Ref Range Status   11/23/2021 1.51 (H) 0.85 - 1.15 Final   07/06/2021 1.32 (H) 0.86 - 1.14 Final       DIALYSIS PROCEDURE NOTE  Hepatitis status of previous patient on machine log was checked and verified ok to use with this patients hepatitis status.  Patient dialyzed for 3 hrs. on a K3 bath with a net fluid removal of  2L.  A BFR of 200 ml/min was obtained via a RIJ temp catheter.  The treatment plan was discussed with Dr. Matias during the treatment.    Total heparin received during the treatment: 1500 units.   Line flushed, clamped and capped with heparin 1:1000 1.3 mL (1300 units) per lumen    Meds  given: EPO 4000units IV   Complications: Arterial collapse and reversed lines still only able to obtain a blood flow rate at 200ml/min. Also within 5 min of pt being on the machine BP went into the 60-70's supported with NS and Albumin 25% with stop pulling fluid for awhile. Dr matias aware and Levophed increased.     Person educated: pt but sedate and vented at this time will assess later.    ICEBOAT? Timeout performed pre-treatment  I: Patient was identified using 2 identifiers  C:  Consent Signed Yes  E: Equipment preventative maintenance is current and dialysis delivery system OK to use  B: Hepatitis B Surface Antigen: neg ; Draw Date:  10/28/2021      Hepatitis B  Surface Antibody: Succept ; Draw Date: 10/28/2021  O: Dialysis orders present and complete prior to treatment  A: Vascular access verified and assessed prior to treatment  T: Treatment was performed at a clinically appropriate time  ?: Patient was allowed to ask questions and address concerns prior to treatment  See flowsheet in EPIC for further details and post assessment.  Machine water alarm in place and functioning. Transducer pods intact and checked every 15min.   Pt dialyzed in the ICU.  Chlorine/Chloramine water system checked every 4 hours.

## 2021-11-24 NOTE — PROGRESS NOTES
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 Previous gastric ulcers have healed    Flex sig: showed poor rectal sphincter tone, external hemorrhoids, sigmoid diverticulosis, no evidence of lower GI bleed    Recs  -high dose PPI  -Iron supplementation PRN  -[facility] will follow up path  -OK to restart anticoagulation  -OK to restart tube feeds    Will sign off. Call with questions.    Krystyna Lawson MD  [facility]  Phone [phone] until 5 pm  Office [phone] for after hours on call provider

## 2021-11-24 NOTE — PLAN OF CARE
Patient remains intubated and sedated with propofol and fentanyl throughout shift. Tolerating ventilator. No ventilator changes this shift. Patient remains vitally stable throughout shift. Requiring levophed to maintain MAP within goal. Patient underwent colonscopy and EGD, tolerated well and remained stable. Tube feeds restarted, blood sugars improving, as patient was hypoglycemic while NPO. Patient undergoing dialysis this evening, tolerating well. Patient continues to have adequate urine output throughout shift. Continues to have loose/soft brown bowel movements. Family participated in ipad session with patient. Will continue to monitor and assess patient.

## 2021-11-24 NOTE — PLAN OF CARE
Patient remains intubated and sedated with propofol and fentanyl throughout shift. Tolerating ventilator well. No vent changes this shift. Patient requiring levophed off/on to maintain MAP greater than 65. Patient continues to have 4+ pitting weeping edema throughout extremities. Adequate urine output with giron. Patient had 2 bowel movements today, loose and bloody. GI consult. Planning to do an EGD and colonoscopy tomorrow morning. NPO at midnight. Patient undergoing dialysis this evening. Will continue to monitor and assess patient.

## 2021-11-24 NOTE — PROGRESS NOTES
Assessment and Plan:   OPAL: First dialysis run was yesterday. Ran 2.5 h with 3K bath and 1.5L UF. RIJ Isael with 300 BFR.   Labs today: Na 138, K 4.2, HCO3 30, Cr: 2.90 > 2.05,  > 134. Wt down: 3.3 kg.  I/O 2763/2005 UO, 1500 ml UF.      Will run again today: 3h, 140 Na 3K 32 HCO3, R isael, 400 BFR, 2L UF. EPO on the run.   Baseline CKD-3, now with OPAL, non-oliguric.              Interval History:   Aortic stenosis: S/P TAVR.   COVID pneumonia with resp failure: intubated.                  Review of Systems:   Intubated.           Medications:       [Held by provider] apixaban ANTICOAGULANT  5 mg Oral or Feeding Tube BID     atorvastatin  80 mg Oral or Feeding Tube Daily     B and C vitamin Complex with folic acid  5 mL Per Feeding Tube Daily     [Held by provider] carvedilol  12.5 mg Oral BID     cefTRIAXone  2 g Intravenous Q24H     chlorhexidine  15 mL Mouth/Throat Q12H     [Held by provider] hydrALAZINE  50 mg Oral or Feeding Tube Q8H MOE     insulin aspart  1-12 Units Subcutaneous Q4H     insulin glargine  50 Units Subcutaneous BID     pantoprazole  40 mg Oral or Feeding Tube BID AC     sodium chloride (PF)  10-40 mL Intracatheter Q7 Days     sodium chloride (PF)  3 mL Intracatheter Q8H     [Held by provider] tamsulosin  0.4 mg Oral Daily     vancomycin place vaughan - receiving intermittent dosing  1 each Intravenous See Admin Instructions       dextrose 1,000 mL (11/24/21 0622)     fentaNYL 25 mcg/hr (11/22/21 2342)     - MEDICATION INSTRUCTIONS -       norepinephrine 0.06 mcg/kg/min (11/24/21 0600)     - MEDICATION INSTRUCTIONS -       - MEDICATION INSTRUCTIONS -       - MEDICATION INSTRUCTIONS -       propofol (DIPRIVAN) infusion 15 mcg/kg/min (11/24/21 0813)     sodium chloride 0.9%       sodium chloride 20 mL/hr at 11/22/21 2300     Current active medications and PTA medications reviewed, see medication list for details.            Physical Exam:   Vitals were reviewed  Patient Vitals  for the past 24 hrs:   BP Temp Temp src Pulse Resp SpO2 Weight   11/24/21 0743 -- -- -- 62 -- 98 % --   11/24/21 0645 -- 97.7  F (36.5  C) -- 62 -- 97 % --   11/24/21 0630 -- 97.7  F (36.5  C) -- 64 -- 97 % --   11/24/21 0615 -- 97.7  F (36.5  C) -- 63 -- 97 % --   11/24/21 0600 -- 97.9  F (36.6  C) Bladder 63 -- 98 % --   11/24/21 0545 -- 97.9  F (36.6  C) -- 66 -- 98 % --   11/24/21 0530 -- 97.9  F (36.6  C) -- 63 -- 97 % --   11/24/21 0515 -- 97.9  F (36.6  C) -- 62 -- 96 % --   11/24/21 0500 -- 98.1  F (36.7  C) -- 59 -- 95 % --   11/24/21 0445 -- 98.1  F (36.7  C) -- 56 -- 95 % --   11/24/21 0430 -- 98.1  F (36.7  C) -- 61 -- 96 % --   11/24/21 0415 -- 98.2  F (36.8  C) -- 64 -- 92 % --   11/24/21 0400 -- 98.2  F (36.8  C) Bladder 66 -- 100 % 111.4 kg (245 lb 9.5 oz)   11/24/21 0345 -- 98.2  F (36.8  C) -- 65 -- 97 % --   11/24/21 0338 -- -- -- -- -- 94 % --   11/24/21 0315 -- 98.2  F (36.8  C) -- 58 -- 98 % --   11/24/21 0300 -- 98.2  F (36.8  C) -- 64 -- 99 % --   11/24/21 0245 -- 98.2  F (36.8  C) -- 57 -- 97 % --   11/24/21 0230 -- 98.2  F (36.8  C) -- 69 -- 98 % --   11/24/21 0215 -- 98.2  F (36.8  C) -- 60 -- 98 % --   11/24/21 0200 -- 98.2  F (36.8  C) Bladder 60 -- 98 % --   11/24/21 0145 -- 98.1  F (36.7  C) -- 67 -- 98 % --   11/24/21 0130 -- 98.1  F (36.7  C) -- 63 -- 98 % --   11/24/21 0115 -- 98.1  F (36.7  C) -- 65 -- 97 % --   11/24/21 0100 -- 98.1  F (36.7  C) -- 65 -- 97 % --   11/24/21 0045 -- 97.9  F (36.6  C) -- 64 -- 96 % --   11/24/21 0030 -- 97.9  F (36.6  C) -- 66 -- 97 % --   11/24/21 0015 -- 98.1  F (36.7  C) -- 59 -- 98 % --   11/24/21 0000 -- 98.1  F (36.7  C) Bladder 56 -- 99 % --   11/23/21 2345 -- 98.1  F (36.7  C) -- 63 -- 99 % --   11/23/21 2330 -- 98.1  F (36.7  C) -- 62 -- 99 % --   11/23/21 2315 -- 98.2  F (36.8  C) -- 58 -- 98 % --   11/23/21 2300 -- 98.2  F (36.8  C) -- 65 -- 98 % --   11/23/21 2255 -- -- -- -- -- 98 % --   11/23/21 2245 -- 98.1  F (36.7  C) -- 63 -- 98 %  --   11/23/21 2230 -- 98.1  F (36.7  C) -- 64 -- 98 % --   11/23/21 2215 -- 98.2  F (36.8  C) -- 62 -- 99 % --   11/23/21 2200 -- 98.2  F (36.8  C) Bladder 57 -- 99 % --   11/23/21 2145 -- 98.2  F (36.8  C) -- 65 -- 98 % --   11/23/21 2130 -- 98.2  F (36.8  C) -- 62 -- 99 % --   11/23/21 2115 -- 98.2  F (36.8  C) -- 62 -- 98 % --   11/23/21 2100 -- 98.2  F (36.8  C) -- 67 -- 95 % --   11/23/21 2045 -- 98.2  F (36.8  C) -- 70 -- 98 % --   11/23/21 2030 -- 98.2  F (36.8  C) -- 77 -- 97 % --   11/23/21 2015 -- 98.2  F (36.8  C) -- 70 -- 98 % --   11/23/21 2000 -- 98.1  F (36.7  C) Bladder 66 -- 98 % --   11/23/21 1945 -- 98.1  F (36.7  C) -- 71 -- 99 % --   11/23/21 1930 -- 98.1  F (36.7  C) -- 75 -- 99 % --   11/23/21 1915 -- 97.9  F (36.6  C) -- 71 -- 97 % --   11/23/21 1911 -- 97.9  F (36.6  C) -- 81 -- 96 % --   11/23/21 1900 -- 97.7  F (36.5  C) -- 71 -- 97 % --   11/23/21 1845 -- 97.7  F (36.5  C) -- 83 -- 98 % --   11/23/21 1830 -- 97.5  F (36.4  C) -- 82 -- 98 % --   11/23/21 1815 -- 97.3  F (36.3  C) -- 71 -- 98 % --   11/23/21 1800 119/60 97.2  F (36.2  C) Bladder 80 18 97 % --   11/23/21 1745 -- 97  F (36.1  C) -- 77 -- 97 % --   11/23/21 1730 -- 97  F (36.1  C) -- 70 -- 97 % --   11/23/21 1715 -- 96.8  F (36  C) -- 67 -- 97 % --   11/23/21 1700 (!) 89/47 96.8  F (36  C) Bladder 69 18 95 % --   11/23/21 1645 -- (!) 96.6  F (35.9  C) -- 64 -- 98 % --   11/23/21 1630 -- (!) 96.6  F (35.9  C) -- 68 -- 98 % --   11/23/21 1600 106/48 96.8  F (36  C) Bladder 62 18 96 % --   11/23/21 1555 -- 96.8  F (36  C) -- 62 -- 95 % --   11/23/21 1550 -- 96.8  F (36  C) -- 68 -- 94 % --   11/23/21 1545 -- 96.8  F (36  C) -- 64 -- 97 % --   11/23/21 1540 -- 96.8  F (36  C) -- 63 -- 97 % --   11/23/21 1535 -- 96.8  F (36  C) -- 60 -- 97 % --   11/23/21 1530 -- 96.8  F (36  C) -- 61 -- 95 % --   11/23/21 1525 -- 96.8  F (36  C) -- 58 -- 95 % --   11/23/21 1520 -- 96.8  F (36  C) -- 68 -- 94 % --   11/23/21 1515 -- 96.8  F (36  C)  -- 66 -- 93 % --   21 1510 -- 96.8  F (36  C) -- 61 -- 94 % --   21 1505 -- 96.8  F (36  C) -- 66 -- 92 % --   21 1500 -- 96.8  F (36  C) -- 61 -- 93 % --   21 1455 -- 96.8  F (36  C) -- 67 -- 94 % --   21 1450 -- 96.8  F (36  C) -- 63 -- 94 % --   21 1445 -- 96.8  F (36  C) -- 66 -- 94 % --   21 1440 -- 96.8  F (36  C) -- 64 -- 93 % --   21 1200 129/51 97.2  F (36.2  C) Bladder 63 18 98 % --   21 1100 127/48 97.2  F (36.2  C) Bladder 62 18 98 % --   21 1000 111/45 97.2  F (36.2  C) Bladder 61 18 97 % --       Temp:  [96.6  F (35.9  C)-98.2  F (36.8  C)] 97.7  F (36.5  C)  Pulse:  [56-83] 62  Resp:  [18] 18  BP: ()/(45-60) 119/60  MAP:  [59 mmHg-197 mmHg] 83 mmHg  Arterial Line BP: ()/() 114/52  FiO2 (%):  [45 %-50 %] 45 %  SpO2:  [92 %-100 %] 98 %    Temperatures:  Current - Temp: 97.7  F (36.5  C); Max - Temp  Av.6  F (36.4  C)  Min: 96.6  F (35.9  C)  Max: 98.2  F (36.8  C)  Respiration range: Resp  Av  Min: 18  Max: 18  Pulse range: Pulse  Av.2  Min: 56  Max: 83  Blood pressure range: Systolic (24hrs), Av , Min:89 , Max:129   ; Diastolic (24hrs), Av, Min:45, Max:60    Pulse oximetry range: SpO2  Av.8 %  Min: 92 %  Max: 100 %    I/O last 3 completed shifts:  In: 2592.65 [I.V.:1412.65; NG/GT:200]  Out: 3055 [Urine:1555; Other:1500]      Intake/Output Summary (Last 24 hours) at 2021 0909  Last data filed at 2021 0600  Gross per 24 hour   Intake 2322.65 ml   Output 2905 ml   Net -582.35 ml       Supine, intubated  Observed through the glass door, in COVID ISO.       Wt Readings from Last 4 Encounters:   21 111.4 kg (245 lb 9.5 oz)   10/21/21 112 kg (247 lb)   10/11/21 109.5 kg (241 lb 4.8 oz)   09/15/21 112.9 kg (249 lb)          Data:          Lab Results   Component Value Date     2021     2021     2021     2021     07/10/2021      07/09/2021    Lab Results   Component Value Date    CHLORIDE 103 11/24/2021    CHLORIDE 97 11/23/2021    CHLORIDE 97 11/22/2021    CHLORIDE 109 07/11/2021    CHLORIDE 112 07/10/2021    CHLORIDE 112 07/09/2021    Lab Results   Component Value Date     11/24/2021     11/23/2021     11/22/2021    BUN 45 07/11/2021    BUN 51 07/10/2021    BUN 64 07/09/2021      Lab Results   Component Value Date    POTASSIUM 4.2 11/24/2021    POTASSIUM 4.8 11/23/2021    POTASSIUM 5.1 11/22/2021    POTASSIUM 4.7 07/11/2021    POTASSIUM 5.0 07/10/2021    POTASSIUM 5.2 07/09/2021    Lab Results   Component Value Date    CO2 30 11/24/2021    CO2 32 11/23/2021    CO2 30 11/22/2021    CO2 23 07/11/2021    CO2 25 07/10/2021    CO2 25 07/09/2021    Lab Results   Component Value Date    CR 2.05 11/24/2021    CR 2.90 11/23/2021    CR 2.68 11/22/2021    CR 1.82 07/11/2021    CR 2.05 07/10/2021    CR 2.17 07/09/2021        Recent Labs   Lab Test 11/24/21  0530 11/24/21  0412 11/24/21  0021 11/23/21  1839 11/23/21  1134 11/23/21 0416   WBC  --  8.9  --   --  9.6 9.6   HGB 9.4* 9.6* 9.1*   < > 9.2*  9.2* 9.1*   HCT  --  31.0*  --   --  29.9* 28.9*   MCV  --  83  --   --  83 81   PLT  --  66*  --   --  57* 68*    < > = values in this interval not displayed.     Recent Labs   Lab Test 11/22/21  0519 11/21/21  0536 11/15/21  0600   AST 50* 67* 60*   ALT 48 57 57   ALKPHOS 155* 140 137   BILITOTAL 0.6 0.6 0.7       Recent Labs   Lab Test 11/24/21  0412 11/23/21 0415 11/22/21 0519   MAG 2.3 2.6* 2.5*     Recent Labs   Lab Test 11/24/21 0412 11/23/21 0415 11/22/21 0519   PHOS 3.9 4.8* 4.2     Recent Labs   Lab Test 11/24/21 0412 11/23/21 0415 11/22/21 0519   GABE 8.5 8.2* 8.5       Lab Results   Component Value Date    GABE 8.5 11/24/2021     Lab Results   Component Value Date    WBC 8.9 11/24/2021    HGB 9.4 (L) 11/24/2021    HCT 31.0 (L) 11/24/2021    MCV 83 11/24/2021    PLT 66 (L) 11/24/2021     Lab Results   Component  Value Date     11/24/2021    POTASSIUM 4.2 11/24/2021    CHLORIDE 103 11/24/2021    CO2 30 11/24/2021    GLC 66 (L) 11/24/2021     Lab Results   Component Value Date     (H) 11/24/2021    CR 2.05 (H) 11/24/2021     Lab Results   Component Value Date    MAG 2.3 11/24/2021     Lab Results   Component Value Date    PHOS 3.9 11/24/2021       Creatinine   Date Value Ref Range Status   11/24/2021 2.05 (H) 0.66 - 1.25 mg/dL Final   11/23/2021 2.90 (H) 0.66 - 1.25 mg/dL Final   11/22/2021 2.68 (H) 0.66 - 1.25 mg/dL Final   11/21/2021 2.44 (H) 0.66 - 1.25 mg/dL Final   11/20/2021 2.28 (H) 0.66 - 1.25 mg/dL Final   11/19/2021 2.58 (H) 0.66 - 1.25 mg/dL Final   07/11/2021 1.82 (H) 0.66 - 1.25 mg/dL Final   07/10/2021 2.05 (H) 0.66 - 1.25 mg/dL Final   07/09/2021 2.17 (H) 0.66 - 1.25 mg/dL Final   07/08/2021 2.29 (H) 0.66 - 1.25 mg/dL Final   07/07/2021 2.32 (H) 0.66 - 1.25 mg/dL Final   07/07/2021 2.72 (H) 0.66 - 1.25 mg/dL Final       Attestation:  I have reviewed today's vital signs, notes, medications, labs and imaging.  Seen during dialysis.      Imer Ba MD

## 2021-11-24 NOTE — PROGRESS NOTES
Potassium   Date Value Ref Range Status   11/23/2021 4.8 3.4 - 5.3 mmol/L Final   07/11/2021 4.7 3.4 - 5.3 mmol/L Final     Hemoglobin   Date Value Ref Range Status   11/23/2021 9.2 (L) 13.3 - 17.7 g/dL Final   11/23/2021 9.2 (L) 13.3 - 17.7 g/dL Final   07/11/2021 8.6 (L) 13.3 - 17.7 g/dL Final     Creatinine   Date Value Ref Range Status   11/23/2021 2.90 (H) 0.66 - 1.25 mg/dL Final   07/11/2021 1.82 (H) 0.66 - 1.25 mg/dL Final     Urea Nitrogen   Date Value Ref Range Status   11/23/2021 201 (H) 7 - 30 mg/dL Final   07/11/2021 45 (H) 7 - 30 mg/dL Final     Sodium   Date Value Ref Range Status   11/23/2021 136 133 - 144 mmol/L Final   07/11/2021 138 133 - 144 mmol/L Final     INR   Date Value Ref Range Status   11/23/2021 1.51 (H) 0.85 - 1.15 Final   07/06/2021 1.32 (H) 0.86 - 1.14 Final       DIALYSIS PROCEDURE NOTE  Hepatitis status of previous patient on machine log was checked and verified ok to use with this patients hepatitis status.  Patient dialyzed for 2.5 hrs. on a K3 bath with a net fluid removal of  1.5L.  A BFR of 300 ml/min was obtained via a RIJ      Total heparin received during the treatment: 0 units.      Line flushed, clamped and capped with heparin 1:1000 1.3 mL (1300 units) per lumen    Meds  given: epo   Complications: none      No education. Pt intubated, sedated.    ICEBOAT? Timeout performed pre-treatment  I: Patient was identified using 2 identifiers  C:  Consent Signed Yes  E: Equipment preventative maintenance is current and dialysis delivery system OK to use  B: Hepatitis B Surface Antigen: neg; Draw Date: 10/29/21       Hepatitis B Surface Antibody: merle; Draw Date: 10/29/21  O: Dialysis orders present and complete prior to treatment  A: Vascular access verified and assessed prior to treatment  T: Treatment was performed at a clinically appropriate time  ?: Patient was allowed to ask questions and address concerns prior to treatment  See flowsheet in EPIC for further details and post  assessment.  Machine water alarm in place and functioning. Transducer pods intact and checked every 15min. .  Chlorine/Chloramine water system checked every 4 hours.

## 2021-11-24 NOTE — PROGRESS NOTES
WOC Focused note    Attempted to see patient twice. Unable to turn both times. Discussed bottom with RN who indicated that area is still red but believes that current treatment plan is working. Will attempt to see later this week.    Svetlana Bonilla RN [email or web address]

## 2021-11-24 NOTE — PROGRESS NOTES
[facility]    ICU Progress Note       Date of Admission:  10/26/2021    Assessment: Critical Care   Burton Elizabeth is a 76 year old male with PMHx of coronary artery disease, atrial flutter on eliquis, hypertension, hyperlipidemia, severe aortic stenosis, HFpEF, CKD stage III, diabetes mellitus II, history of GI bleed, suspected obstructive sleep apnea admitted on 10/26/2021 for elective TAVR.  Hospital course complicated by OPAL from contrast nephropathy, volume overload, hypoxic respiratory failure multifactorial, Covid pneumonia and physical deconditioning.  Also proteus UTI and bacteremia.  On 11/15 the patient was intubated for severe hypoxic respiratory failure. Patient was proned and paralyzed. Supined by 11/16, off paralytics by 11/18, velitri weaned off on 11/19.        Interval History   Apparently had some maroon stools overnight.  No changes to hemodynamics, no hgb drop.   Changed from a fib to a flutter this morning.  Associated axis change with this rhythm change.  No changes to hemodynamics and still rate controlled.   Unable to obtain history directly due to intubated vented status.    Assessment   76 year old male with PMHx of coronary artery disease, atrial flutter on eliquis, hypertension, hyperlipidemia, severe aortic stenosis, HFpEF, CKD stage III, diabetes mellitus II, s/p TAVR. Hospital stay complicated by acute hypoxic respiratory failure secondary to COVID pneumonia, ARDS, requiring significant ventilatory support.     Plan: Critical Care   Neuro/ pain/ sedation:  1. Sedated for maximum vent synchrony  - propofol and fentanyl infusions   - titrate sedation for RASS -1 to -2    Pulmonary:  1. Acute hypoxic respiratory failure secondary to covid pneumonia  2. ARDS  3. MRSA pneumonia  - continue lung protective ventilation, supplemental oxygen to keep saturation about 92%, PEEP support  - continue vancomycin    Cardiovascular:  1. Severe aortic stenosis, s/p TAVR  10/26  2. A flutter, rate controlled  3. S/P TAVR   4.  Shock, likely mixed etiology vasoplegic due to sedation and obstructive due to pos press vent  - Will eventually need coreg restarted  - holding eliquis for now for GI bleeding.  Will eventually need this restarted for a fib.    GI/Nutrition:  1. Moderate malnutrition  2. Mild AST elevation (improved to 50), exam benign 11/20    3. Maroon stools:  Already on bid PPI.  pltlts acceptable.  Checking ptt/INR.  Trend hgb. GI consult--scopes today.  - Nutrition consulted. Appreciate recommendations.   - continue tube feeds  - occasional spot check of lft's    Renal/ Fluid Balance:   1. OPAL on CKD, creatinine to 2.05 (baseline 1.7-2.0), with dialysis, non-oliguric  2. Volume overload   - Will monitor intake and output   - ICU electrolyte replacement protocol   - Appreciate nephrology input.  Now receiving HD.    Endocrine:  1. Diabetes mellitus II with stress hyperglycemia--hypoglycemic with npo status for scope  - sliding scale insulin, high dose  - increased Lantus 35 bid to 50 bid.   - hypoglycemic with NPO status for scope--bridging with d10    ID:  1. Septic shock 2/2 Proteus in blood and urine, ~ pansensitive  2. Covid 19 pneumonia   3. MRSA HCAP    - completed decadron  - continue ceftriaxone (proteus bacteremia; total course started 11/15)  - continue vanco (MRSA pna; started 11/20)    Hematology:  1. Covid associated Coagulopathy  2. Thrombocytopenia -multifactorial - up to 66 today  - continue eliquis  - watch for s/sx of bleeding    MSK:   1. Deconditioning   -turns and reposition per protocols     Lines/ tubes/ drains:  Zuniga Catheter: PRESENT, indication: Retention, Strict 1-2 Hour I&O  Central Lines: PRESENT  PICC Triple Lumen 11/15/21 Left Basilic access-Site Assessment: WDL  CVC Double Lumen Right Internal jugular-Site Assessment: WDL    Prophylaxis:  - DVT Prophylaxis: DOAC  - PUD Prophylaxis: protonix    Code Status: Full Code      Disposition:  -  ICU, critical     The patient's care was discussed with the multi-disciplinary Team.             ______________________________________________________________________    Physical Exam   Vital Signs: Temp: 97.7  F (36.5  C) Temp src: Bladder BP: (!) 143/56 Pulse: 61   Resp: 18 SpO2: 98 % O2 Device: Mechanical Ventilator    Weight: 245 lbs 9.48 oz    Gen: laying in bed, supined, appears older than stated age  HEENT: ETT in place, anicteric, moist mucus membranes  Neck, trachea midline, no crepitus  Pulm: muffled coarse breath sounds throughout synchronous with vent  Cor: RRR, a flutter on monitor  Abd: soft, obese, no palpable masses, no hepatosplenomegaly  Skin: no jaundice  Ext: +edema  Neuro: limited by sedation, grimaces to pain    Data   I reviewed all medications, new labs and imaging results over the last 24 hours.  Arterial Blood Gases   Recent Labs   Lab 11/24/21  0530 11/23/21  0416 11/22/21  0519 11/21/21  0534   PH 7.45 7.48* 7.52* 7.45   PCO2 47* 46* 42 49*   PO2 97 81 87 71*   HCO3 33* 34* 35* 34*       Complete Blood Count   Recent Labs   Lab 11/24/21  0530 11/24/21  0412 11/24/21  0021 11/23/21  1839 11/23/21  1134 11/23/21  0416 11/22/21  0519   WBC  --  8.9  --   --  9.6 9.6 11.1*   HGB 9.4* 9.6* 9.1* 10.8* 9.2*  9.2* 9.1* 9.8*   PLT  --  66*  --   --  57* 68* 62*       Basic Metabolic Panel  Recent Labs   Lab 11/24/21  0815 11/24/21  0605 11/24/21  0547 11/24/21  0530 11/24/21  0414 11/24/21  0412 11/23/21  0844 11/23/21  0415 11/22/21  0800 11/22/21  0519 11/21/21  0754 11/21/21  0536   NA  --   --   --   --   --  138  --  136  --  136  --  133   POTASSIUM  --   --   --   --   --  4.2  --  4.8  --  5.1  --  4.7   CHLORIDE  --   --   --   --   --  103  --  97  --  97  --  95   CO2  --   --   --   --   --  30  --  32  --  30  --  31   BUN  --   --   --   --   --  134*  --  201*  --  194*  --  191*   CR  --   --   --   --   --  2.05*  --  2.90*  --  2.68*  --  2.44*   GLC 66* 127* 94 64*   < > 76    < > 319*   < > 226*  202*   < > 270*    < > = values in this interval not displayed.       Liver Function Tests  Recent Labs   Lab 11/23/21  1134 11/22/21  0519 11/21/21  0536   AST  --  50* 67*   ALT  --  48 57   ALKPHOS  --  155* 140   BILITOTAL  --  0.6 0.6   ALBUMIN  --  1.8* 1.7*   INR 1.51*  --   --        Pancreatic Enzymes  No lab results found in last 7 days.    Coagulation Profile  Recent Labs   Lab 11/23/21  1134   INR 1.51*   PTT 36       IMAGING:  Recent Results (from the past 24 hour(s))   XR Chest Port 1 View    Narrative    CHEST ONE VIEW  11/23/2021 4:04 PM     HISTORY: Dialysis cath insertion.    COMPARISON: November 21, 2021      Impression    IMPRESSION: Right IJ line noted with tip in the mid SVC. No  pneumothorax. Moderate bilateral infiltrate similar to previous.  Endotracheal tube tip approximate 4 cm from the devaughn.    MARC GRANADOS MD         SYSTEM ID:  [id number]         40 min CCT excluding procedures

## 2021-11-24 NOTE — PROVIDER NOTIFICATION
Notified Intensivist at 0613 AM regarding lab results and change in condition.      Spoke with: MD Talamantes    Orders were obtained.    Comments: Blood sugar 69 at 0400 - 25 mL of D50 given and repeat blood sugar 108. Rechecked blood sugar again at 0530 and it was 65 - 25 mL of D50 given again x2 - blood sugar up to 127. Patient received 50 units of lantus at bedtime, 3 units of aspart at 2000 and 1 unit of aspart at 0000. Patient has been NPO/tube feeds off since 0000 for scope today. No D10 gtt order - per MD Talamantes reorder D10 gtt.

## 2021-11-25 NOTE — PROGRESS NOTES
[facility]    ICU Progress Note       Date of Admission:  10/26/2021    Assessment: Critical Care   Burton Elizabeth is a 76 year old male with PMHx of coronary artery disease, atrial flutter on eliquis, hypertension, hyperlipidemia, severe aortic stenosis, HFpEF, CKD stage III, diabetes mellitus II, history of GI bleed, suspected obstructive sleep apnea admitted on 10/26/2021 for elective TAVR.  Hospital course complicated by OPAL from contrast nephropathy, volume overload, hypoxic respiratory failure multifactorial, Covid pneumonia and physical deconditioning.  Also proteus UTI and bacteremia.  On 11/15 the patient was intubated for severe hypoxic respiratory failure. Patient was proned and paralyzed. Supined by 11/16, off paralytics by 11/18, velitri weaned off on 11/19.        Interval History   No events overnight.   Unable to obtain history directly due to intubated vented status.    Assessment   76 year old male with PMHx of coronary artery disease, atrial flutter on eliquis, hypertension, hyperlipidemia, severe aortic stenosis, HFpEF, CKD stage III, diabetes mellitus II, s/p TAVR. Hospital stay complicated by acute hypoxic respiratory failure secondary to COVID pneumonia, ARDS, requiring significant ventilatory support.     Plan: Critical Care   Neuro/ pain/ sedation:  1. Sedated for maximum vent synchrony  - propofol and fentanyl infusions   - titrate sedation for RASS -1 to -2    Pulmonary:  1. Acute hypoxic respiratory failure secondary to covid pneumonia  2. ARDS  3. MRSA pneumonia  - continue lung protective ventilation, supplemental oxygen to keep saturation about 92%, PEEP support  - continue vancomycin    Cardiovascular:  1. Severe aortic stenosis, s/p TAVR 10/26  2. A flutter, rate controlled  3. S/P TAVR   4.  Shock, likely mixed etiology vasoplegic due to sedation and obstructive due to pos press vent  - Will eventually need coreg restarted  - resuming eliquis  today    GI/Nutrition:  1. Moderate malnutrition  2. Mild AST elevation (improved to 50), exam benign 11/20    3. Maroon stools--resolved:  Already on bid PPI.  pltlts acceptable.  Checking ptt/INR.  Trend hgb. GI consult--scopes showed hemorrhoids and lesions in hiatal hernia.  - Nutrition consulted. Appreciate recommendations.   - continue tube feeds  - occasional spot check of lft's    Renal/ Fluid Balance:   1. OPAL on CKD, now on IHD; creat 1.66 today  2. Volume overload   - Will monitor intake and output   - ICU electrolyte replacement protocol   - Appreciate nephrology input.  Now receiving HD.    Endocrine:  1. Diabetes mellitus II with stress hyperglycemia  - sliding scale insulin, high dose  - increased Lantus 35 bid to 50 bid.     ID:  1. Septic shock 2/2 Proteus in blood and urine, ~ pansensitive  2. Covid 19 pneumonia   3. MRSA HCAP    - completed decadron  - continue ceftriaxone (proteus bacteremia; total course started 11/15)  - continue vanco (MRSA pna; started 11/20)    Hematology:  1. Covid associated Coagulopathy  2. Thrombocytopenia -multifactorial - up to 66 today  - continue eliquis  - watch for s/sx of bleeding    MSK:   1. Deconditioning   -turns and reposition per protocols     Lines/ tubes/ drains:  Zuniga Catheter: PRESENT, indication: Retention, Strict 1-2 Hour I&O;Retention  Central Lines: PRESENT  PICC Triple Lumen 11/15/21 Left Basilic access-Site Assessment: WDL  CVC Double Lumen Right Internal jugular-Site Assessment: WDL    Prophylaxis:  - DVT Prophylaxis: DOAC  - PUD Prophylaxis: protonix    Code Status: Full Code      Disposition:  - ICU, critical     The patient's care was discussed with the multi-disciplinary Team.             ______________________________________________________________________    Physical Exam   Vital Signs: Temp: 97.3  F (36.3  C) Temp src: Bladder BP: 138/68 Pulse: 65   Resp: 18 SpO2: 96 % O2 Device: Mechanical Ventilator Oxygen Delivery: 40 LPM  Weight: 246  lbs 4.06 oz    Gen: laying in bed, supined, appears older than stated age  HEENT: ETT in place, anicteric, moist mucus membranes  Neck, trachea midline, no crepitus  Pulm: muffled coarse breath sounds throughout synchronous with vent  Cor: RRR, a flutter on monitor  Abd: soft, obese, no palpable masses, no hepatosplenomegaly  Skin: no jaundice  Ext: +edema  Neuro: limited by sedation, grimaces to pain    Data   I reviewed all medications, new labs and imaging results over the last 24 hours.  Arterial Blood Gases   Recent Labs   Lab 11/25/21  0420 11/24/21  0530 11/23/21  0416 11/22/21  0519   PH 7.41 7.45 7.48* 7.52*   PCO2 48* 47* 46* 42   PO2 70* 97 81 87   HCO3 30* 33* 34* 35*       Complete Blood Count   Recent Labs   Lab 11/25/21  0434 11/24/21  1257 11/24/21  0530 11/24/21  0412 11/23/21  1839 11/23/21  1134 11/23/21  0416   WBC 6.3  --   --  8.9  --  9.6 9.6   HGB 8.3* 9.1* 9.4* 9.6*   < > 9.2*  9.2* 9.1*   PLT 54*  --   --  66*  --  57* 68*    < > = values in this interval not displayed.       Basic Metabolic Panel  Recent Labs   Lab 11/25/21  1142 11/25/21  0814 11/25/21  0434 11/25/21  0422 11/24/21  0414 11/24/21  0412 11/23/21  0844 11/23/21  0415 11/22/21  0800 11/22/21  0519   NA  --   --  137  --   --  138  --  136  --  136   POTASSIUM  --   --  3.9  --   --  4.2  --  4.8  --  5.1   CHLORIDE  --   --  102  --   --  103  --  97  --  97   CO2  --   --  28  --   --  30  --  32  --  30   BUN  --   --  95*  --   --  134*  --  201*  --  194*   CR  --   --  1.66*  --   --  2.05*  --  2.90*  --  2.68*   * 191* 197* 171*   < > 76   < > 319*   < > 226*  202*    < > = values in this interval not displayed.       Liver Function Tests  Recent Labs   Lab 11/23/21  1134 11/22/21  0519 11/21/21  0536   AST  --  50* 67*   ALT  --  48 57   ALKPHOS  --  155* 140   BILITOTAL  --  0.6 0.6   ALBUMIN  --  1.8* 1.7*   INR 1.51*  --   --        Pancreatic Enzymes  No lab results found in last 7 days.    Coagulation  Profile  Recent Labs   Lab 11/23/21  1134   INR 1.51*   PTT 36       IMAGING:  No results found for this or any previous visit (from the past 24 hour(s)).      40 min CCT excluding procedures

## 2021-11-25 NOTE — PLAN OF CARE
[id number]    Neuro: Sedated. PERRL. Withdraws/grimaces to pain.      Respiratory: Intubated. LS diminished. Moderate secretions. 40%/12     Cardiac: Tele: Afib CVR with BBB - levo gtt for MAP>65     GI/: +BS/BM, giron in place with adequate/borderline output.     Skin: scattered bruising and scabbing with weeping edema in L arm. mepilex to thigh wound in place     Pain: CPOT-1 RASS -2/3     Mobility: bedrest. Turned q2h for pressure injury prevention     Diet: TF running at goal 35ml/hr via OG    IVF: fentanyl, propofol, levophed, tko    Plan of Care: continue supportive cares, monitor for s/s of bleeding.

## 2021-11-25 NOTE — PHARMACY-VANCOMYCIN DOSING SERVICE
Pharmacy Vancomycin Note  Date of Service 2021  Patient's  1945   76 year old, male    Indication: MRSA Pneumonia  Day of Therapy: 10  Current vancomycin regimen:  Intermittent dosing basd upon Measured drug levels  Current vancomycin monitoring method: Trough (Method 2 = manual dose calculation)  Current vancomycin therapeutic monitoring goal: 15-20 mg/L for redosing      Current estimated CrCl = DIALYSIS    Recent Vancomycin Levels (past 3 days)  2021:  5:19 AM Vancomycin 21.4 mg/L  2021:  4:15 AM Vancomycin 17.3 mg/L  2021:  5:10 PM Vancomycin 15.8 mg/L    Vancomycin IV Administrations (past 72 hours)                   vancomycin (VANCOCIN) 1,750 mg in sodium chloride 0.9 % 250 mL intermittent infusion (mg) 1,750 mg New Bag 21 1115                Nephrotoxins and other renal medications (From now, onward)    Start     Dose/Rate Route Frequency Ordered Stop    21 0900  norepinephrine (LEVOPHED) 4 mg in  mL infusion PREMIX         0.01-0.6 mcg/kg/min × 112.6 kg  4.2-253.4 mL/hr  Intravenous CONTINUOUS 21 0832      21 1156  vancomycin place vaughan - receiving intermittent dosing         1 each Intravenous SEE ADMIN INSTRUCTIONS 21 1157               Contrast Orders - past 72 hours (72h ago, onward)            None          Interpretation of levels and current regimen:  Vancomycin post dialysis level is  appropriate for redosing        Plan:  1. Redose 1000 mg (9 mg/kg) IV x 1  2. Vancomycin monitoring method: Trough (Method 2 = manual dose calculation)  3. Vancomycin therapeutic monitoring goal: 15-20 mg/L  4. Pharmacy will check vancomycin levels as appropriate in Prior to next dialysis run .      Td Valdez, [facility]

## 2021-11-25 NOTE — PROGRESS NOTES
Assessment and Plan:   OPAL: Will run again on dialysis in am. He ran last 2 days.   I/O 2154/3180. UO 1180. Wt stable. Cr: 2.90 > 2.05 > 1.66. Na 137, K 3.9, HCo3 28.     ABG: hypoxia, resp ac and met alk. PH 7.41.     Orders reviewed and placed for dialysis in am.       Interval History:   Resp failure/mech vent:    COVID-19: pneumonia, resp failure  Aortic stenosis: S/P TAVR                Review of Systems:   Intubated.           Medications:       - MEDICATION INSTRUCTIONS for Dialysis Patients -   Does not apply See Admin Instructions     [Held by provider] apixaban ANTICOAGULANT  5 mg Oral or Feeding Tube BID     atorvastatin  80 mg Oral or Feeding Tube Daily     B and C vitamin Complex with folic acid  5 mL Per Feeding Tube Daily     [Held by provider] carvedilol  12.5 mg Oral BID     chlorhexidine  15 mL Mouth/Throat Q12H     [Held by provider] hydrALAZINE  50 mg Oral or Feeding Tube Q8H MOE     insulin aspart  1-12 Units Subcutaneous Q4H     insulin glargine  50 Units Subcutaneous BID     pantoprazole  40 mg Oral or Feeding Tube BID AC     sodium chloride (PF)  10-40 mL Intracatheter Q7 Days     sodium chloride (PF)  3 mL Intracatheter Q8H     [Held by provider] tamsulosin  0.4 mg Oral Daily     vancomycin place vaughan - receiving intermittent dosing  1 each Intravenous See Admin Instructions       dextrose 1,000 mL (11/24/21 0622)     fentaNYL 25 mcg/hr (11/25/21 0823)     - MEDICATION INSTRUCTIONS -       norepinephrine 0.03 mcg/kg/min (11/25/21 0825)     - MEDICATION INSTRUCTIONS -       - MEDICATION INSTRUCTIONS -       - MEDICATION INSTRUCTIONS -       propofol (DIPRIVAN) infusion 20 mcg/kg/min (11/25/21 0837)     sodium chloride 0.9%       sodium chloride 20 mL/hr at 11/25/21 0826     Current active medications and PTA medications reviewed, see medication list for details.            Physical Exam:   Vitals were reviewed  Patient Vitals for the past 24 hrs:   BP Temp Temp src Pulse Resp SpO2  Weight   11/25/21 0800 138/68 -- -- 65 -- 94 % --   11/25/21 0732 -- -- -- 61 -- 96 % --   11/25/21 0730 -- -- -- 57 -- 95 % --   11/25/21 0715 -- -- -- 67 -- 94 % --   11/25/21 0700 -- 97.3  F (36.3  C) -- 60 -- 96 % --   11/25/21 0645 -- 97.3  F (36.3  C) -- 61 -- 97 % --   11/25/21 0630 -- 97.3  F (36.3  C) -- 61 -- 96 % --   11/25/21 0615 -- 97.3  F (36.3  C) -- 58 -- 96 % --   11/25/21 0600 -- 97.3  F (36.3  C) -- 64 -- 96 % --   11/25/21 0545 -- 97.3  F (36.3  C) -- 58 -- 96 % --   11/25/21 0530 -- 97.3  F (36.3  C) -- 64 -- 95 % --   11/25/21 0515 -- 97.3  F (36.3  C) -- 64 -- 95 % --   11/25/21 0500 -- 97.5  F (36.4  C) -- 66 -- 100 % --   11/25/21 0445 -- 97.3  F (36.3  C) -- 67 -- 92 % --   11/25/21 0430 -- 97.5  F (36.4  C) -- 60 -- 99 % --   11/25/21 0415 -- 97.5  F (36.4  C) -- 59 -- 98 % --   11/25/21 0400 -- 97.5  F (36.4  C) -- 57 -- 98 % 111.7 kg (246 lb 4.1 oz)   11/25/21 0345 -- 97.5  F (36.4  C) -- 56 -- 99 % --   11/25/21 0341 -- -- -- -- -- 99 % --   11/25/21 0330 -- 97.5  F (36.4  C) -- 64 -- 99 % --   11/25/21 0315 -- 97.5  F (36.4  C) -- 61 -- 100 % --   11/25/21 0300 -- 97.5  F (36.4  C) -- 57 -- 100 % --   11/25/21 0245 -- 97.7  F (36.5  C) -- 65 -- 100 % --   11/25/21 0230 -- 97.7  F (36.5  C) -- 62 -- 98 % --   11/25/21 0215 -- 97.7  F (36.5  C) -- 61 -- 97 % --   11/25/21 0200 -- 97.9  F (36.6  C) -- 60 -- 95 % --   11/25/21 0145 -- 97.9  F (36.6  C) -- 64 -- 94 % --   11/25/21 0130 -- 97.9  F (36.6  C) -- 63 -- 96 % --   11/25/21 0115 -- 97.9  F (36.6  C) -- 65 -- 100 % --   11/25/21 0100 -- 97.7  F (36.5  C) -- 57 -- 98 % --   11/25/21 0045 -- 97.9  F (36.6  C) -- 60 -- 99 % --   11/25/21 0030 -- 97.9  F (36.6  C) -- 53 -- 97 % --   11/25/21 0015 -- 97.9  F (36.6  C) -- 54 -- 98 % --   11/25/21 0000 -- 97.9  F (36.6  C) -- 62 -- 97 % --   11/24/21 2345 -- 97.9  F (36.6  C) -- 61 -- 98 % --   11/24/21 2337 -- -- -- -- -- 98 % --   11/24/21 2330 -- 97.7  F (36.5  C) -- 58 -- 97 % --    11/24/21 2315 -- 97.7  F (36.5  C) -- 61 -- 98 % --   11/24/21 2300 -- 97.7  F (36.5  C) -- 64 -- 95 % --   11/24/21 2245 -- 97.7  F (36.5  C) -- 56 -- 96 % --   11/24/21 2230 -- 97.7  F (36.5  C) -- 60 -- 97 % --   11/24/21 2215 -- 97.7  F (36.5  C) -- 64 -- 96 % --   11/24/21 2200 -- 97.9  F (36.6  C) -- 59 -- 95 % --   11/24/21 2145 -- 97.9  F (36.6  C) -- 66 -- 93 % --   11/24/21 2130 -- 97.9  F (36.6  C) -- 56 -- 92 % --   11/24/21 2115 -- 97.9  F (36.6  C) -- 61 -- 91 % --   11/24/21 2100 -- 97.9  F (36.6  C) -- 63 -- 97 % --   11/24/21 2045 -- 97.9  F (36.6  C) -- 63 -- 99 % --   11/24/21 2030 -- 97.9  F (36.6  C) -- 60 -- 98 % --   11/24/21 2015 -- 97.9  F (36.6  C) -- 64 -- 98 % --   11/24/21 2000 -- 97.7  F (36.5  C) -- 65 -- 98 % --   11/24/21 1945 -- 97.7  F (36.5  C) -- 61 -- 97 % --   11/24/21 1930 -- 97.7  F (36.5  C) -- 64 -- 98 % --   11/24/21 1922 -- -- -- -- -- 98 % --   11/24/21 1915 -- 97.7  F (36.5  C) -- 67 -- 98 % --   11/24/21 1900 -- 97.5  F (36.4  C) -- 64 -- 98 % --   11/24/21 1845 -- 97.5  F (36.4  C) -- 63 -- 97 % --   11/24/21 1830 -- 97.5  F (36.4  C) -- 67 -- 98 % --   11/24/21 1815 -- 97.5  F (36.4  C) -- 65 -- 97 % --   11/24/21 1800 114/46 97.5  F (36.4  C) Bladder 65 18 96 % --   11/24/21 1745 -- 97.3  F (36.3  C) -- 68 -- -- --   11/24/21 1736 -- 97.3  F (36.3  C) -- 71 -- 95 % --   11/24/21 1730 -- 97.3  F (36.3  C) -- 64 -- 96 % --   11/24/21 1715 -- 97.2  F (36.2  C) -- 57 -- 95 % --   11/24/21 1709 -- -- -- -- -- 96 % --   11/24/21 1700 111/51 97.2  F (36.2  C) Bladder 58 -- 95 % --   11/24/21 1645 -- 97  F (36.1  C) -- 66 -- 95 % --   11/24/21 1630 -- 97  F (36.1  C) -- 58 -- 96 % --   11/24/21 1615 -- 96.8  F (36  C) -- 57 -- 97 % --   11/24/21 1600 134/53 96.8  F (36  C) Bladder 61 18 96 % --   11/24/21 1545 -- 96.8  F (36  C) -- 61 -- 97 % --   11/24/21 1530 -- (!) 96.6  F (35.9  C) -- 61 -- 96 % --   11/24/21 1515 -- (!) 96.6  F (35.9  C) -- 57 -- 97 % --   11/24/21  1500 -- (!) 96.4  F (35.8  C) Bladder 67 18 95 % --   21 1450 -- (!) 96.4  F (35.8  C) -- 60 -- 96 % --   21 1445 -- (!) 96.4  F (35.8  C) -- 62 -- 91 % --   21 1435 -- (!) 96.4  F (35.8  C) -- 58 -- 98 % --   21 1430 -- (!) 96.4  F (35.8  C) -- 59 -- 97 % --   21 1400 121/49 (!) 96.3  F (35.7  C) Bladder 64 18 95 % --   21 1300 125/49 (!) 96.3  F (35.7  C) Bladder 67 18 94 % --   21 1200 132/51 (!) 96.4  F (35.8  C) Bladder 66 18 96 % --   21 1129 -- 97.2  F (36.2  C) -- 60 -- 95 % --   21 1126 -- 97.3  F (36.3  C) -- 66 -- 96 % --   21 1122 -- 97.3  F (36.3  C) -- 63 -- 94 % --   21 1120 -- 97.3  F (36.3  C) Bladder 64 18 94 % --   21 1118 -- -- -- -- 18 -- --   21 1117 -- 97.3  F (36.3  C) Bladder 67 18 93 % --   21 1114 -- 97.3  F (36.3  C) Bladder 67 18 95 % --   21 1000 -- 97.5  F (36.4  C) -- 53 -- 98 % --   21 0900 -- 97.7  F (36.5  C) -- 61 -- 98 % --       Temp:  [96.3  F (35.7  C)-97.9  F (36.6  C)] 97.3  F (36.3  C)  Pulse:  [53-71] 65  Resp:  [18] 18  BP: (111-138)/(46-68) 138/68  MAP:  [50 mmHg-93 mmHg] 79 mmHg  Arterial Line BP: ()/(35-63) 125/63  FiO2 (%):  [40 %-50 %] 40 %  SpO2:  [91 %-100 %] 94 %    Temperatures:  Current - Temp: 97.3  F (36.3  C); Max - Temp  Av.4  F (36.3  C)  Min: 96.3  F (35.7  C)  Max: 97.9  F (36.6  C)  Respiration range: Resp  Av  Min: 18  Max: 18  Pulse range: Pulse  Av.8  Min: 53  Max: 71  Blood pressure range: Systolic (24hrs), Av , Min:111 , Max:138   ; Diastolic (24hrs), Av, Min:46, Max:68    Pulse oximetry range: SpO2  Av.5 %  Min: 91 %  Max: 100 %    I/O last 3 completed shifts:  In: 2338 [I.V.:1543; NG/GT:165]  Out: 3040 [Urine:1040; Other:2000]      Intake/Output Summary (Last 24 hours) at 2021 0857  Last data filed at 2021 0800  Gross per 24 hour   Intake 2533.52 ml   Output 2925 ml   Net -391.48 ml       Supine, intubated,  on TF  In COVID ISO       Wt Readings from Last 4 Encounters:   11/25/21 111.7 kg (246 lb 4.1 oz)   10/21/21 112 kg (247 lb)   10/11/21 109.5 kg (241 lb 4.8 oz)   09/15/21 112.9 kg (249 lb)          Data:          Lab Results   Component Value Date     11/25/2021     11/24/2021     11/23/2021     07/11/2021     07/10/2021     07/09/2021    Lab Results   Component Value Date    CHLORIDE 102 11/25/2021    CHLORIDE 103 11/24/2021    CHLORIDE 97 11/23/2021    CHLORIDE 109 07/11/2021    CHLORIDE 112 07/10/2021    CHLORIDE 112 07/09/2021    Lab Results   Component Value Date    BUN 95 11/25/2021     11/24/2021     11/23/2021    BUN 45 07/11/2021    BUN 51 07/10/2021    BUN 64 07/09/2021      Lab Results   Component Value Date    POTASSIUM 3.9 11/25/2021    POTASSIUM 4.2 11/24/2021    POTASSIUM 4.8 11/23/2021    POTASSIUM 4.7 07/11/2021    POTASSIUM 5.0 07/10/2021    POTASSIUM 5.2 07/09/2021    Lab Results   Component Value Date    CO2 28 11/25/2021    CO2 30 11/24/2021    CO2 32 11/23/2021    CO2 23 07/11/2021    CO2 25 07/10/2021    CO2 25 07/09/2021    Lab Results   Component Value Date    CR 1.66 11/25/2021    CR 2.05 11/24/2021    CR 2.90 11/23/2021    CR 1.82 07/11/2021    CR 2.05 07/10/2021    CR 2.17 07/09/2021        Recent Labs   Lab Test 11/25/21  0434 11/24/21  1257 11/24/21  0530 11/24/21  0412 11/23/21  1839 11/23/21  1134   WBC 6.3  --   --  8.9  --  9.6   HGB 8.3* 9.1* 9.4* 9.6*   < > 9.2*  9.2*   HCT 27.3*  --   --  31.0*  --  29.9*   MCV 85  --   --  83  --  83   PLT 54*  --   --  66*  --  57*    < > = values in this interval not displayed.     Recent Labs   Lab Test 11/22/21  0519 11/21/21  0536 11/15/21  0600   AST 50* 67* 60*   ALT 48 57 57   ALKPHOS 155* 140 137   BILITOTAL 0.6 0.6 0.7       Recent Labs   Lab Test 11/25/21  0434 11/24/21 0412 11/23/21 0415   MAG 2.0 2.3 2.6*     Recent Labs   Lab Test 11/25/21  0434 11/24/21 0412 11/23/21 0415    PHOS 3.8 3.9 4.8*     Recent Labs   Lab Test 11/25/21  0434 11/24/21  0412 11/23/21  0415   GABE 8.1* 8.5 8.2*       Lab Results   Component Value Date    GABE 8.1 (L) 11/25/2021     Lab Results   Component Value Date    WBC 6.3 11/25/2021    HGB 8.3 (L) 11/25/2021    HCT 27.3 (L) 11/25/2021    MCV 85 11/25/2021    PLT 54 (L) 11/25/2021     Lab Results   Component Value Date     11/25/2021    POTASSIUM 3.9 11/25/2021    CHLORIDE 102 11/25/2021    CO2 28 11/25/2021     (H) 11/25/2021     Lab Results   Component Value Date    BUN 95 (H) 11/25/2021    CR 1.66 (H) 11/25/2021     Lab Results   Component Value Date    MAG 2.0 11/25/2021     Lab Results   Component Value Date    PHOS 3.8 11/25/2021       Creatinine   Date Value Ref Range Status   11/25/2021 1.66 (H) 0.66 - 1.25 mg/dL Final   11/24/2021 2.05 (H) 0.66 - 1.25 mg/dL Final   11/23/2021 2.90 (H) 0.66 - 1.25 mg/dL Final   11/22/2021 2.68 (H) 0.66 - 1.25 mg/dL Final   11/21/2021 2.44 (H) 0.66 - 1.25 mg/dL Final   11/20/2021 2.28 (H) 0.66 - 1.25 mg/dL Final   07/11/2021 1.82 (H) 0.66 - 1.25 mg/dL Final   07/10/2021 2.05 (H) 0.66 - 1.25 mg/dL Final   07/09/2021 2.17 (H) 0.66 - 1.25 mg/dL Final   07/08/2021 2.29 (H) 0.66 - 1.25 mg/dL Final   07/07/2021 2.32 (H) 0.66 - 1.25 mg/dL Final   07/07/2021 2.72 (H) 0.66 - 1.25 mg/dL Final       Attestation:  I have reviewed today's vital signs, notes, medications, labs and imaging.     Imer Ba MD

## 2021-11-25 NOTE — PROGRESS NOTES
[facility] ICU RESPIRATORY NOTE        Date of Admission: 10/26/2021    Date of Intubation (most recent): 11/15/2021    Reason for Mechanical Ventilation: respiratory failure    Number of Days on Mechanical Ventilation: 10    Met Criteria for Spontaneous Breathing Trial: no    Reason for No Spontaneous Breathing Trial: peep 12     Significant Events Today: none    ABG Results:   Recent Labs   Lab 11/24/21  0530 11/23/21  0416 11/22/21  0519 11/21/21  0534   PH 7.45 7.48* 7.52* 7.45   PCO2 47* 46* 42 49*   PO2 97 81 87 71*   HCO3 33* 34* 35* 34*   O2PER 45 50 50 50       Current Vent Settings: Ventilation Mode: CMV/AC  (Continuous Mandatory Ventilation/ Assist Control)  FiO2 (%): 40 %  Rate Set (breaths/minute): 12 breaths/min  Tidal Volume Set (mL): 500 mL  PEEP (cm H2O): 12 cmH2O  Oxygen Concentration (%): 40 %  Resp: 18    Lungs: Bilateral breath sounds coarse   Skin Assessment: intact, barriers in place    Plan: continue full vent support and assess to wean daily    Ce Cronin, RT

## 2021-11-26 NOTE — PROGRESS NOTES
[facility] ICU RESPIRATORY NOTE        Date of Admission: 10/26/2021    Date of Intubation (most recent): 11/15/21    Reason for Mechanical Ventilation: Resp Failure    Number of Days on Mechanical Ventilation: 12    Met Criteria for Spontaneous Breathing Trial: No    Reason for No Spontaneous Breathing Trial: PEEP 8    Significant Events Today: PEEP decreased from 10    ABG Results:   Recent Labs   Lab 11/26/21  0437 11/25/21  0420 11/24/21  0530 11/23/21  0416   PH 7.46* 7.41 7.45 7.48*   PCO2 43 48* 47* 46*   PO2 74* 70* 97 81   HCO3 31* 30* 33* 34*   O2PER 40 40 45 50       Current Vent Settings: Ventilation Mode: CMV/AC  (Continuous Mandatory Ventilation/ Assist Control)  FiO2 (%): 40 %  Rate Set (breaths/minute): 12 breaths/min  Tidal Volume Set (mL): 500 mL  PEEP (cm H2O): 8 cmH2O  Oxygen Concentration (%): 40 %      Skin Assessment: clean, dry, intact    Plan: Continue to asses daily for weaning.    Ale Heard, RT

## 2021-11-26 NOTE — PROGRESS NOTES
Potassium   Date Value Ref Range Status   11/26/2021 3.8 3.4 - 5.3 mmol/L Final   07/11/2021 4.7 3.4 - 5.3 mmol/L Final     Hemoglobin   Date Value Ref Range Status   11/26/2021 8.4 (L) 13.3 - 17.7 g/dL Final   07/11/2021 8.6 (L) 13.3 - 17.7 g/dL Final     Creatinine   Date Value Ref Range Status   11/26/2021 1.94 (H) 0.66 - 1.25 mg/dL Final   07/11/2021 1.82 (H) 0.66 - 1.25 mg/dL Final     Urea Nitrogen   Date Value Ref Range Status   11/26/2021 101 (H) 7 - 30 mg/dL Final   07/11/2021 45 (H) 7 - 30 mg/dL Final     Sodium   Date Value Ref Range Status   11/26/2021 138 133 - 144 mmol/L Final   07/11/2021 138 133 - 144 mmol/L Final     INR   Date Value Ref Range Status   11/23/2021 1.51 (H) 0.85 - 1.15 Final   07/06/2021 1.32 (H) 0.86 - 1.14 Final       DIALYSIS PROCEDURE NOTE  Hepatitis status of previous patient on machine log was checked and verified ok to use with this patients hepatitis status.  Patient dialyzed for 3 hrs. on a K3 bath with a net fluid removal of  2L.  A BFR of 400 ml/min was obtained via a RIJ.      The treatment plan was discussed with Dr. Ba during the treatment.    Total heparin received during the treatment: 0 units.      Line flushed, clamped and capped with heparin 1:1000 1.3 mL (1300 units) per lumen    Meds  given: epogen, hectorol   Complications: labile bps; gave 5% albumin       Unable to educate; patient sedated with no family present.     ICEBOAT? Timeout performed pre-treatment  I: Patient was identified using 2 identifiers  C:  Consent Signed Yes  E: Equipment preventative maintenance is current and dialysis delivery system OK to use  B: Hepatitis B Surface Antigen: Negative; Draw Date: 10/29/21      Hepatitis B Surface Antibody: Susceptible; Draw Date: 10/29/21  O: Dialysis orders present and complete prior to treatment  A: Vascular access verified and assessed prior to treatment  T: Treatment was performed at a clinically appropriate time  ?: Patient was allowed to ask  questions and address concerns prior to treatment  See flowsheet in EPIC for further details and post assessment.  Machine water alarm in place and functioning. Transducer pods intact and checked every 15min.  Chlorine/Chloramine water system checked every 4 hours.

## 2021-11-26 NOTE — PROGRESS NOTES
[facility]    ICU Progress Note       Date of Admission:  10/26/2021    Assessment: Critical Care   [place] Elizabeth is a 76 year old male with PMHx of coronary artery disease, atrial flutter on eliquis, hypertension, hyperlipidemia, severe aortic stenosis, HFpEF, CKD stage III, diabetes mellitus II, history of GI bleed, suspected obstructive sleep apnea admitted on 10/26/2021 for elective TAVR.  Hospital course complicated by OPAL from contrast nephropathy, volume overload, hypoxic respiratory failure multifactorial, Covid pneumonia and physical deconditioning.  Also proteus UTI and bacteremia.  On 11/15 the patient was intubated for severe hypoxic respiratory failure. Patient was proned and paralyzed. Supined by 11/16, off paralytics by 11/18, velitri weaned off on 11/19.        Interval History   No events overnight.   Unable to obtain history directly due to intubated vented status.    Assessment   76 year old male with PMHx of coronary artery disease, atrial flutter on eliquis, hypertension, hyperlipidemia, severe aortic stenosis, HFpEF, CKD stage III, diabetes mellitus II, s/p TAVR. Hospital stay complicated by acute hypoxic respiratory failure secondary to COVID pneumonia, ARDS, requiring ventilatory support.     Plan: Critical Care   Neuro/ pain/ sedation:  1. Sedated for maximum vent synchrony  - propofol and fentanyl infusions   - titrate sedation for RASS -1 to -2    Pulmonary:  1. Acute hypoxic respiratory failure secondary to covid pneumonia  2. ARDS  3. MRSA pneumonia  - continue lung protective ventilation, supplemental oxygen to keep saturation about 92%, PEEP support weaned to 8 today  - continue vancomycin    Cardiovascular:  1. Severe aortic stenosis, s/p TAVR 10/26  2. A flutter, rate controlled  3. S/P TAVR   4.  Shock, likely mixed etiology vasoplegic due to sedation and obstructive due to pos press vent  - Will eventually need coreg restarted  - on eliquis  - on and  off low dose levophed    GI/Nutrition:  1. Moderate malnutrition  2. Mild AST elevation (improved to 50), exam benign 11/20    3. Maroon stools--resolved:  Already on bid PPI.  pltlts acceptable.  Checking ptt/INR.  Trend hgb. GI consult--scopes showed hemorrhoids and lesions in hiatal hernia.  - Nutrition consulted. Appreciate recommendations.   - continue tube feeds  - occasional spot check of lft's    Renal/ Fluid Balance:   1. OPAL on CKD, now on IHD; creat 1.94 today  2. Volume overload   - Will monitor intake and output   - ICU electrolyte replacement protocol   - Appreciate nephrology input.  Now receiving HD.    Endocrine:  1. Diabetes mellitus II with stress hyperglycemia:  Seems to be better controlled.  - sliding scale insulin, high dose  - decreased Lantus 50 to 25 bid.     ID:  1. Septic shock 2/2 Proteus in blood and urine, ~ pansensitive  2. Covid 19 pneumonia   3. MRSA HCAP    - completed decadron  - completed 10d ceftriaxone (proteus bacteremia; total course started 11/15)  - continue vanco (MRSA pna; started 11/20)    Hematology:  1. Covid associated Coagulopathy  2. Thrombocytopenia -multifactorial - 56 today  - continue eliquis  - watch for s/sx of bleeding    MSK:   1. Deconditioning   -turns and reposition per protocols     Lines/ tubes/ drains:  Zuniga Catheter: PRESENT, indication: Retention, Retention;Strict 1-2 Hour I&O  Central Lines: PRESENT  PICC Triple Lumen 11/15/21 Left Basilic access-Site Assessment: WDL  CVC Double Lumen Right Internal jugular-Site Assessment: WDL    Prophylaxis:  - DVT Prophylaxis: DOAC  - PUD Prophylaxis: protonix    Code Status: Full Code      Disposition:  - ICU, critical     The patient's care was discussed with the multi-disciplinary Team.             ______________________________________________________________________    Physical Exam   Vital Signs: Temp: 99  F (37.2  C)   BP: 131/67 Pulse: 52     SpO2: 97 % O2 Device: Mechanical Ventilator    Weight: 247 lbs  5.7 oz    Gen: laying in bed, supined, appears older than stated age  HEENT: ETT in place, anicteric, moist mucus membranes  Neck, trachea midline, no crepitus  Pulm: muffled coarse breath sounds throughout synchronous with vent  Cor: RRR, a flutter on monitor  Abd: soft, obese, no palpable masses, no hepatosplenomegaly  Skin: no jaundice  Ext: +edema  Neuro: limited by sedation, grimaces to pain    Data   I reviewed all medications, new labs and imaging results over the last 24 hours.  Arterial Blood Gases   Recent Labs   Lab 11/26/21  0437 11/25/21  0420 11/24/21  0530 11/23/21  0416   PH 7.46* 7.41 7.45 7.48*   PCO2 43 48* 47* 46*   PO2 74* 70* 97 81   HCO3 31* 30* 33* 34*       Complete Blood Count   Recent Labs   Lab 11/26/21  0436 11/25/21  0434 11/24/21  1257 11/24/21  0530 11/24/21  0412 11/23/21  1839 11/23/21  1134   WBC 5.2 6.3  --   --  8.9  --  9.6   HGB 8.4* 8.3* 9.1* 9.4* 9.6*   < > 9.2*  9.2*   PLT 56* 54*  --   --  66*  --  57*    < > = values in this interval not displayed.       Basic Metabolic Panel  Recent Labs   Lab 11/26/21  0535 11/26/21  0454 11/26/21  0438 11/26/21  0436 11/25/21  0814 11/25/21  0434 11/24/21  0414 11/24/21  0412 11/23/21  0844 11/23/21  0415   NA  --   --   --  138  --  137  --  138  --  136   POTASSIUM  --   --   --  3.8  --  3.9  --  4.2  --  4.8   CHLORIDE  --   --   --  103  --  102  --  103  --  97   CO2  --   --   --  28  --  28  --  30  --  32   BUN  --   --   --  101*  --  95*  --  134*  --  201*   CR  --   --   --  1.94*  --  1.66*  --  2.05*  --  2.90*   * 168* 69* 79   < > 197*   < > 76   < > 319*    < > = values in this interval not displayed.       Liver Function Tests  Recent Labs   Lab 11/23/21  1134 11/22/21  0519 11/21/21  0536   AST  --  50* 67*   ALT  --  48 57   ALKPHOS  --  155* 140   BILITOTAL  --  0.6 0.6   ALBUMIN  --  1.8* 1.7*   INR 1.51*  --   --        Pancreatic Enzymes  No lab results found in last 7 days.    Coagulation  Profile  Recent Labs   Lab 11/23/21  1134   INR 1.51*   PTT 36       IMAGING:  No results found for this or any previous visit (from the past 24 hour(s)).      40 min CCT excluding procedures

## 2021-11-26 NOTE — PROGRESS NOTES
[facility] ICU RESPIRATORY NOTE           Date of Admission: 10/26/2021     Date of Intubation (most recent): 11/15/21     Reason for Mechanical Ventilation: respiratory failure      Number of Days on Mechanical Ventilation: 11     Met Criteria for Spontaneous Breathing Trial: No     Reason for No Spontaneous Breathing Trial: Peep of 10     Significant Events Today: none  Recent Labs   Lab 11/26/21  0437 11/25/21  0420 11/24/21  0530 11/23/21  0416   PH 7.46* 7.41 7.45 7.48*   PCO2 43 48* 47* 46*   PO2 74* 70* 97 81   HCO3 31* 30* 33* 34*   O2PER 40 40 45 50   Ventilation Mode: CMV/AC  (Continuous Mandatory Ventilation/ Assist Control)  FiO2 (%): 40 %  Rate Set (breaths/minute): 12 breaths/min  Tidal Volume Set (mL): 500 mL  PEEP (cm H2O): 10 cmH2O  Oxygen Concentration (%): 40 %    Plan: to monitor pt on full vent support.

## 2021-11-26 NOTE — PHARMACY-VANCOMYCIN DOSING SERVICE
Pharmacy Vancomycin Note  Date of Service 2021  Patient's  1945   76 year old, male    Indication: MRSA Pneumonia  Day of Therapy: 12  Current vancomycin regimen: per levels  Current vancomycin monitoring method: Trough (Method 2 = manual dose calculation)  Current vancomycin therapeutic monitoring goal: 15-20 mg/L    Current estimated CrCl = Estimated Creatinine Clearance: 40.6 mL/min (A) (based on SCr of 1.94 mg/dL (H)).    Creatinine for last 3 days  2021:  4:12 AM Creatinine 2.05 mg/dL  2021:  4:34 AM Creatinine 1.66 mg/dL  2021:  4:36 AM Creatinine 1.94 mg/dL    Recent Vancomycin Levels (past 3 days)  2021:  5:10 PM Vancomycin 15.8 mg/L  2021:  4:36 AM Vancomycin 23.5 mg/L    Vancomycin IV Administrations (past 72 hours)                   vancomycin (VANCOCIN) 1000 mg in dextrose 5% 200 mL PREMIX (mg) 1,000 mg New Bag 21 2116    vancomycin (VANCOCIN) 1,750 mg in sodium chloride 0.9 % 250 mL intermittent infusion (mg) 1,750 mg New Bag 21 1115                Nephrotoxins and other renal medications (From now, onward)    Start     Dose/Rate Route Frequency Ordered Stop    21 0900  norepinephrine (LEVOPHED) 4 mg in  mL infusion PREMIX         0.01-0.6 mcg/kg/min × 112.6 kg  4.2-253.4 mL/hr  Intravenous CONTINUOUS 21 0832      21 1156  vancomycin place vaughan - receiving intermittent dosing         1 each Intravenous SEE ADMIN INSTRUCTIONS 21 1157               Contrast Orders - past 72 hours (72h ago, onward)            None          Interpretation of levels and current regimen:  Vancomycin level is reflective of therapeutic level    Has serum creatinine changed greater than 50% in last 72 hours: No    Urine output:  diminished urine output    Renal Function: ARF on Dialysis    Plan:  1. Based on vanco AM level, post HD level expected to be 14-17, will give 1000mg vanco post HD today  2. Vancomycin monitoring method: Trough  (Method 2 = manual dose calculation)  3. Vancomycin therapeutic monitoring goal: 15-20 mg/L  4. Pharmacy will check vancomycin levels as appropriate in 1-3 Days.  5. Serum creatinine levels will be ordered daily for the first week of therapy and at least twice weekly for subsequent weeks.    Codie Bolden

## 2021-11-26 NOTE — PROGRESS NOTES
[facility] ICU RESPIRATORY NOTE        Date of Admission: 10/26/2021    Date of Intubation (most recent): 11/15/21    Reason for Mechanical Ventilation: respiratory failure     Number of Days on Mechanical Ventilation: 10    Met Criteria for Spontaneous Breathing Trial: No    Reason for No Spontaneous Breathing Trial: Peep of 12    Significant Events Today: none    ABG Results:   Recent Labs   Lab 11/25/21  0420 11/24/21  0530 11/23/21  0416 11/22/21  0519   PH 7.41 7.45 7.48* 7.52*   PCO2 48* 47* 46* 42   PO2 70* 97 81 87   HCO3 30* 33* 34* 35*   O2PER 40 45 50 50       Current Vent Settings: Ventilation Mode: CMV/AC  (Continuous Mandatory Ventilation/ Assist Control)  FiO2 (%): 40 %  Rate Set (breaths/minute): 12 breaths/min  Tidal Volume Set (mL): 500 mL  PEEP (cm H2O): 10 cmH2O  Oxygen Concentration (%): 40 %  Resp: 18      Skin Assessment: Intact    Plan: Continue full vent support and wean as tolerated     Monik Shen, RT

## 2021-11-26 NOTE — PROGRESS NOTES
"[facility] Nurse Inpatient Wound Assessment   Reason for consultation: Re-evaluate and treat bilateral buttocks wounds    Assessment  Bilateral buttocks wounds due to mixed etiology Pressure Injury, Friction, Shear, and Moisture Associated Skin Damage (MASD)  Status: improving     L posterior thigh wounds due to unknown etiology- multiple open, partial thickness areas not consistent with pressure. Possibly edema related  Patient refusing repositioning repeatedly due to SOB, sleeping in chair and only sometimes agreeable to standing, very low tolerance for standing when in HC, now in ICU and sedated  Treatment Plan      LEFT Sacrum & Left Posterior thigh wound: Every 3 days     Cleanse the area with NS and pat dry.    Apply No sting film barrier to periwound skin.    Cover Sacrum with Sacral Mepilex (#[id number]) and Left posterior thigh with Mepilex 4x4    Change dressing Q 3 days.    Turn and reposition Q 2hrs side to side only.    Ensure pt has Yao-cushion while sitting up in the chair.    FYI- If pt has constant incontinent loose stools needing dressing changes Q shift please discontinue the Mepilex dressing and apply criticaid barrier paste BID and PRN.      PIP ACTIVITY MEASURES:  If pt is refusing to turn or reposition they must be educated on the  potential injury from not off loading pressure.  Then this \"educated refusal\" needs to be documented as an \"educated refusal to turn/ reposition\" and document if alert, etc. Additionally, if repeated refusals ensure the charge nurse, nurse manager and the provider is aware.  Follow Sudarshan Risk recommendations      CHAIR: Pt should sit on a chair cushion ([id number]) when up to the chair and not sit for more than one hour at a time before fully offloading backside (either stand for a couple of minutes and/or return to bed, positioning on a side) to relieve pressure and re-perfuse tissue.  Additionally, encourage pt to shift side to side every 15 minutes, too.    NOTE: the Z-Flow " "Pad is NOT a cushion, pt should NOT sit on the Z-Flow pads      BED:  Reposition side to side every 1-2 hours when awake.     No direct supine positioning, position only side to side    Consider Z-Patrick Pillows to help keep pt on side (#[id number] or #[id number]- large). *Z-Flows are for positioning, DO NOT SIT ON!    Keep heels elevated    As able keep HOB below 30 degrees    Evaluate for specialty mattress    Use TAPS wedges and perform frequent microturns as pt tolerates   Orders Reviewed  Recommended provider order: None, at this time  WO Nurse follow-up plan:weekly  Nursing to notify the Provider(s) and re-consult the WO Nurse if wound(s) deteriorates or new skin concern.    Patient History  According to provider note(s):  \" 76 year old male who presented to [facility] on 10/26/2021 for planned TAVR.  The procedure was well-tolerated is performed in [place] has been admitted to [facility] CCU post procedure.  The hospital medicine service has been consulted for medical co-management.   Aortic stenosis, severe, now status post TAVR, 10/26/2021  Heart failure with preserved EF, secondary to aortic stenosis  [place] has been followed as an outpatient by Dr. Neumann, cardiology, for symptomatic aortic stenosis.  He was deemed suitable for TAVR, which was well-tolerated today.  Last echocardiogram on 8/16/2021 with normal LV size, systolic function, severe AS, trace to mild MR, no TR.  -[facility], [facility] [place] [facility]  -Full plan of care per TAVR team  -Started on Eliquis postop, defer resuming ASA to TAVR service\"    Objective Data  Containment of urine/stool: Incontinence Protocol and Indwelling catheter    Active Diet Order  Orders Placed This Encounter      NPO per Anesthesia Guidelines for Procedure/Surgery Except for: Meds, Other; Specify: Stop tube feeds at midnight      Output:   I/O last 3 completed shifts:  In: 2243.8 [I.V.:1058.8; NG/GT:345]  Out: 850 [Urine:850]    Risk Assessment:   Sensory Perception: 1-->completely " "limited  Moisture: 3-->occasionally moist  Activity: 1-->bedfast  Mobility: 1-->completely immobile  Nutrition: 3-->adequate  Friction and Shear: 1-->problem  Sudarshan Score: 10                          Labs:   Recent Labs   Lab 11/26/21  0436 11/23/21  1839 11/23/21  1134 11/23/21  0416 11/22/21  0519   ALBUMIN  --   --   --   --  1.8*   HGB 8.4*   < > 9.2*  9.2*   < > 9.8*   INR  --   --  1.51*  --   --    WBC 5.2   < > 9.6   < > 11.1*    < > = values in this interval not displayed.       Physical Exam  Areas of skin assessed: focused bilateral buttocks , mouth    Wound Location:  Buttock   Date of last photo 11/26      Wound History: per prior [facility] charting \"hospital acquired due to refusal to reposition and sleep in the bed. Well documented\"  Wound Base: 100 % epidermis     Palpation of the wound bed: normal      Drainage: none     Description of drainage: none     Measurements (length x width x depth, in cm): healed, closed with epidermis      Periwound skin: dry/scaly and erythema- blanchable      Color: pink      Temperature: normal   Odor: none  Pain: nonverbal indicators absent     Wound Location:  L posterior thigh  Date of last Photo 11/26/21    Wound History: per prior [facility] charting \"Unknown, patient did have unknown weeping to posterior leg on previous assessment. Likely edema related\"  Measurements (length x width x depth, in cm) cluster measurement 3cm x 6cm x less than 0.1cm area  Wound Base:   epidermis and dermis  Tunneling N/A  Undermining N/A  Palpation of the wound bed: normal   Periwound skin: erythema- blanchable  Color: pink  Temperature: normal   Drainage:, scant  Description of drainage: serous  Odor: none  Pain: no grimacing or signs of discomfort, none       11-26-21  Primary RN request assistance / assessment of lower lip, abrasion noted, mouth moisturizer applied, continue to care for lip abrasion with mouth care/vent cares.         Interventions  Visual inspection and assessment completed "   Wound Care Rationale Protect periwound skin and Promote moist wound healing without tissue dehydration   Wound Care: done per plan of care  Supplies: floor stock and discussed with RN  Current off-loading measures: Chair cushion, Pillows and Wedge positioning system  Current support surface: Standard  ICU isolibrium  Education provided to: importance of repositioning, plan of care, and Off-loading pressure  Discussed plan of care with Patient, RN     Chantale JONAS

## 2021-11-26 NOTE — PROGRESS NOTES
Assessment and Plan:   OPAL:   Dialysis today:  3 h, HANG Rob, 400 BFR,  2L UF. 3K 32 HCO3 and 140 Na. Hectorol and EPO on the run. No heparin. Pt is on Eliquis.   I/O 6239/785.  Labs show Na 138, K 3.8, HCO3 28,  and Cr 1.94.    Improved azotemia. Lytes nominal. Will monitor for recovery of kidney fnx.             Interval History:   COVID-19 pneumonia: resp failure/mech vent.  On vanco.      Aortic stenosis: S/P TAVR. Aflutter. Off and on levophed.    DM  HT: BP meds on hold.                Review of Systems:   Intubated.           Medications:       - MEDICATION INSTRUCTIONS for Dialysis Patients -   Does not apply See Admin Instructions     apixaban ANTICOAGULANT  5 mg Oral or Feeding Tube BID     atorvastatin  80 mg Oral or Feeding Tube Daily     B and C vitamin Complex with folic acid  5 mL Per Feeding Tube Daily     [Held by provider] carvedilol  12.5 mg Oral BID     chlorhexidine  15 mL Mouth/Throat Q12H     [Held by provider] hydrALAZINE  50 mg Oral or Feeding Tube Q8H MOE     insulin aspart  1-12 Units Subcutaneous Q4H     insulin glargine  25 Units Subcutaneous BID     - MEDICATION INSTRUCTIONS -   Does not apply Once     pantoprazole  40 mg Oral or Feeding Tube BID AC     sodium chloride (PF)  10-40 mL Intracatheter Q7 Days     sodium chloride (PF)  3 mL Intracatheter Q8H     sodium chloride (PF)  9 mL Intracatheter During Dialysis/CRRT (from stock)     sodium chloride (PF)  9 mL Intracatheter During Dialysis/CRRT (from stock)     [Held by provider] tamsulosin  0.4 mg Oral Daily     vancomycin  1,000 mg Intravenous Once     vancomycin place vaughan - receiving intermittent dosing  1 each Intravenous See Admin Instructions       dextrose 1,000 mL (11/24/21 0622)     fentaNYL 25 mcg/hr (11/26/21 0800)     - MEDICATION INSTRUCTIONS -       norepinephrine 0.06 mcg/kg/min (11/26/21 1249)     - MEDICATION INSTRUCTIONS -       propofol (DIPRIVAN) infusion 15 mcg/kg/min (11/26/21 1145)      sodium chloride 0.9%       sodium chloride 20 mL/hr at 11/25/21 0826     Current active medications and PTA medications reviewed, see medication list for details.            Physical Exam:   Vitals were reviewed  Patient Vitals for the past 24 hrs:   BP Temp Temp src Pulse SpO2 Weight   11/26/21 1515 -- 99.3  F (37.4  C) -- 76 97 % --   11/26/21 1500 -- 99.3  F (37.4  C) -- 72 98 % --   11/26/21 1445 -- 99.3  F (37.4  C) -- 73 99 % --   11/26/21 1430 -- 99.3  F (37.4  C) -- 71 97 % --   11/26/21 1415 -- 99.3  F (37.4  C) -- 67 98 % --   11/26/21 1400 -- 99.3  F (37.4  C) -- 67 98 % --   11/26/21 1345 -- 99.5  F (37.5  C) -- 66 99 % --   11/26/21 1330 -- 99.5  F (37.5  C) -- 66 97 % --   11/26/21 1315 -- 99.5  F (37.5  C) -- 70 97 % --   11/26/21 1300 -- 99.5  F (37.5  C) -- 71 97 % --   11/26/21 1245 -- 99.7  F (37.6  C) -- 73 98 % --   11/26/21 1230 -- 99.7  F (37.6  C) -- 71 95 % --   11/26/21 1224 -- -- -- 68 98 % --   11/26/21 1215 -- 99.7  F (37.6  C) -- 71 96 % --   11/26/21 1200 -- 99.7  F (37.6  C) Bladder 71 95 % --   11/26/21 1145 -- -- -- 69 96 % --   11/26/21 1130 -- -- -- 70 96 % --   11/26/21 1115 -- -- -- 71 96 % --   11/26/21 1100 -- -- -- 70 97 % --   11/26/21 1045 -- 99.3  F (37.4  C) -- 70 96 % --   11/26/21 1030 -- 99.3  F (37.4  C) -- 68 96 % --   11/26/21 1015 -- 99.3  F (37.4  C) -- 73 98 % --   11/26/21 1000 -- 99.3  F (37.4  C) -- 79 97 % --   11/26/21 0945 -- 99.3  F (37.4  C) -- 70 97 % --   11/26/21 0930 -- 99.3  F (37.4  C) -- 73 97 % --   11/26/21 0915 -- 99.1  F (37.3  C) -- 76 96 % --   11/26/21 0900 -- 99.1  F (37.3  C) -- 65 97 % --   11/26/21 0845 -- 99.1  F (37.3  C) -- 58 95 % --   11/26/21 0830 -- 99.1  F (37.3  C) -- 58 97 % --   11/26/21 0815 -- 99.1  F (37.3  C) -- 57 97 % --   11/26/21 0800 -- 99.1  F (37.3  C) Bladder 57 97 % --   11/26/21 0745 -- 99.1  F (37.3  C) -- 53 97 % --   11/26/21 0730 -- 99.1  F (37.3  C) -- 58 97 % --   11/26/21 0719 -- -- -- -- 97 % --    11/26/21 0715 -- -- -- -- 97 % --   11/26/21 0700 -- -- -- -- 97 % --   11/26/21 0645 -- 99  F (37.2  C) -- 52 96 % --   11/26/21 0630 -- 99  F (37.2  C) -- 57 96 % --   11/26/21 0615 -- 99.1  F (37.3  C) -- 67 96 % --   11/26/21 0600 -- 99.1  F (37.3  C) -- 72 94 % --   11/26/21 0545 -- 99.1  F (37.3  C) -- 71 98 % --   11/26/21 0530 131/67 99  F (37.2  C) -- 75 99 % --   11/26/21 0515 -- 99  F (37.2  C) -- 71 99 % --   11/26/21 0500 -- 99  F (37.2  C) -- 68 100 % 112.2 kg (247 lb 5.7 oz)   11/26/21 0445 -- 99  F (37.2  C) -- 64 98 % --   11/26/21 0430 -- 99  F (37.2  C) -- 71 99 % --   11/26/21 0415 -- 99  F (37.2  C) -- 70 98 % --   11/26/21 0400 -- 99  F (37.2  C) -- 67 97 % --   11/26/21 0345 -- 99  F (37.2  C) -- 66 95 % --   11/26/21 0330 132/64 98.8  F (37.1  C) -- 71 97 % --   11/26/21 0320 -- -- -- 69 94 % --   11/26/21 0315 -- 99  F (37.2  C) -- 68 94 % --   11/26/21 0300 -- 98.8  F (37.1  C) -- 66 95 % --   11/26/21 0245 -- 98.8  F (37.1  C) -- 65 95 % --   11/26/21 0230 -- 98.8  F (37.1  C) -- 61 93 % --   11/26/21 0215 -- 98.8  F (37.1  C) -- 58 95 % --   11/26/21 0200 -- 99  F (37.2  C) -- 59 93 % --   11/26/21 0145 -- 99  F (37.2  C) -- 59 94 % --   11/26/21 0130 -- 99  F (37.2  C) -- 57 95 % --   11/26/21 0115 -- 99  F (37.2  C) -- 63 94 % --   11/26/21 0100 -- 99.1  F (37.3  C) -- 66 91 % --   11/26/21 0045 -- 99.1  F (37.3  C) -- 60 96 % --   11/26/21 0030 -- 99.1  F (37.3  C) -- 65 95 % --   11/26/21 0015 -- 99.1  F (37.3  C) -- 64 96 % --   11/26/21 0000 -- 99.1  F (37.3  C) -- 63 96 % --   11/25/21 2345 -- 99.1  F (37.3  C) -- 68 96 % --   11/25/21 2330 -- 99  F (37.2  C) -- 57 96 % --   11/25/21 2315 -- 99  F (37.2  C) -- 63 96 % --   11/25/21 2300 -- 99  F (37.2  C) -- 67 95 % --   11/25/21 2245 -- 99  F (37.2  C) -- 62 95 % --   11/25/21 2230 -- 99  F (37.2  C) -- 67 95 % --   11/25/21 2215 -- 98.8  F (37.1  C) -- 66 97 % --   11/25/21 2200 -- 98.8  F (37.1  C) -- 69 95 % --   11/25/21   -- 98.8  F (37.1  C) -- 76 96 % --   21 -- 98.8  F (37.1  C) -- 65 97 % --   21 -- 98.8  F (37.1  C) -- 61 96 % --   21 -- 98.8  F (37.1  C) -- 62 97 % --   21 -- 98.8  F (37.1  C) -- 57 97 % --   21 -- 98.8  F (37.1  C) -- 56 97 % --   21 -- 98.8  F (37.1  C) -- 55 97 % --   21 -- 98.8  F (37.1  C) -- 58 100 % --   21 -- -- -- -- 97 % --   21 -- 98.8  F (37.1  C) -- 58 98 % --   21 -- 98.8  F (37.1  C) -- 59 96 % --   21 -- 98.8  F (37.1  C) -- 59 96 % --   21 190 -- 98.8  F (37.1  C) -- 57 97 % --   21 1800 -- 98.8  F (37.1  C) -- 68 98 % --   21 1700 130/64 98.6  F (37  C) -- 60 97 % --   21 1600 -- 98.6  F (37  C) -- 69 93 % --       Temp:  [98.6  F (37  C)-99.7  F (37.6  C)] 99.3  F (37.4  C)  Pulse:  [52-79] 76  BP: (130-132)/(64-67) 131/67  MAP:  [55 mmHg-98 mmHg] 86 mmHg  Arterial Line BP: ()/(37-62) 175/55  FiO2 (%):  [40 %] 40 %  SpO2:  [91 %-100 %] 97 %    Temperatures:  Current - Temp: 99.3  F (37.4  C); Max - Temp  Av.1  F (37.3  C)  Min: 98.6  F (37  C)  Max: 99.7  F (37.6  C)  Respiration range: No data recorded  Pulse range: Pulse  Av.6  Min: 52  Max: 79  Blood pressure range: Systolic (24hrs), Av , Min:130 , Max:132   ; Diastolic (24hrs), Av, Min:64, Max:67    Pulse oximetry range: SpO2  Av.4 %  Min: 91 %  Max: 100 %    I/O last 3 completed shifts:  In: 2182.86 [I.V.:997.86; NG/GT:345]  Out: 850 [Urine:850]      Intake/Output Summary (Last 24 hours) at 2021 1522  Last data filed at 2021 1400  Gross per 24 hour   Intake 2182.86 ml   Output 850 ml   Net 1332.86 ml       Seen through glass door.   Supine, intubated  R CVC Darron SEXTON iso       Wt Readings from Last 4 Encounters:   21 112.2 kg (247 lb 5.7 oz)   10/21/21 112 kg (247 lb)   10/11/21 109.5 kg (241 lb 4.8 oz)   09/15/21 112.9 kg (249 lb)           Data:          Lab Results   Component Value Date     11/26/2021     11/25/2021     11/24/2021     07/11/2021     07/10/2021     07/09/2021    Lab Results   Component Value Date    CHLORIDE 103 11/26/2021    CHLORIDE 102 11/25/2021    CHLORIDE 103 11/24/2021    CHLORIDE 109 07/11/2021    CHLORIDE 112 07/10/2021    CHLORIDE 112 07/09/2021    Lab Results   Component Value Date     11/26/2021    BUN 95 11/25/2021     11/24/2021    BUN 45 07/11/2021    BUN 51 07/10/2021    BUN 64 07/09/2021      Lab Results   Component Value Date    POTASSIUM 3.8 11/26/2021    POTASSIUM 3.9 11/25/2021    POTASSIUM 4.2 11/24/2021    POTASSIUM 4.7 07/11/2021    POTASSIUM 5.0 07/10/2021    POTASSIUM 5.2 07/09/2021    Lab Results   Component Value Date    CO2 28 11/26/2021    CO2 28 11/25/2021    CO2 30 11/24/2021    CO2 23 07/11/2021    CO2 25 07/10/2021    CO2 25 07/09/2021    Lab Results   Component Value Date    CR 1.94 11/26/2021    CR 1.66 11/25/2021    CR 2.05 11/24/2021    CR 1.82 07/11/2021    CR 2.05 07/10/2021    CR 2.17 07/09/2021        Recent Labs   Lab Test 11/26/21  0436 11/25/21  0434 11/24/21  1257 11/24/21  0530 11/24/21  0412   WBC 5.2 6.3  --   --  8.9   HGB 8.4* 8.3* 9.1*   < > 9.6*   HCT 27.5* 27.3*  --   --  31.0*   MCV 85 85  --   --  83   PLT 56* 54*  --   --  66*    < > = values in this interval not displayed.     Recent Labs   Lab Test 11/22/21  0519 11/21/21  0536 11/15/21  0600   AST 50* 67* 60*   ALT 48 57 57   ALKPHOS 155* 140 137   BILITOTAL 0.6 0.6 0.7       Recent Labs   Lab Test 11/26/21  0436 11/25/21  0434 11/24/21  0412   MAG 2.2 2.0 2.3     Recent Labs   Lab Test 11/26/21  0436 11/25/21  0434 11/24/21  0412   PHOS 3.8 3.8 3.9     Recent Labs   Lab Test 11/26/21  0436 11/25/21  0434 11/24/21  0412   GABE 8.2* 8.1* 8.5       Lab Results   Component Value Date    GABE 8.2 (L) 11/26/2021     Lab Results   Component Value Date    WBC 5.2 11/26/2021    HGB  8.4 (L) 11/26/2021    HCT 27.5 (L) 11/26/2021    MCV 85 11/26/2021    PLT 56 (L) 11/26/2021     Lab Results   Component Value Date     11/26/2021    POTASSIUM 3.8 11/26/2021    CHLORIDE 103 11/26/2021    CO2 28 11/26/2021     (H) 11/26/2021     Lab Results   Component Value Date     (H) 11/26/2021    CR 1.94 (H) 11/26/2021     Lab Results   Component Value Date    MAG 2.2 11/26/2021     Lab Results   Component Value Date    PHOS 3.8 11/26/2021       Creatinine   Date Value Ref Range Status   11/26/2021 1.94 (H) 0.66 - 1.25 mg/dL Final   11/25/2021 1.66 (H) 0.66 - 1.25 mg/dL Final   11/24/2021 2.05 (H) 0.66 - 1.25 mg/dL Final   11/23/2021 2.90 (H) 0.66 - 1.25 mg/dL Final   11/22/2021 2.68 (H) 0.66 - 1.25 mg/dL Final   11/21/2021 2.44 (H) 0.66 - 1.25 mg/dL Final   07/11/2021 1.82 (H) 0.66 - 1.25 mg/dL Final   07/10/2021 2.05 (H) 0.66 - 1.25 mg/dL Final   07/09/2021 2.17 (H) 0.66 - 1.25 mg/dL Final   07/08/2021 2.29 (H) 0.66 - 1.25 mg/dL Final   07/07/2021 2.32 (H) 0.66 - 1.25 mg/dL Final   07/07/2021 2.72 (H) 0.66 - 1.25 mg/dL Final       Attestation:  I have reviewed today's vital signs, notes, medications, labs and imaging.  Seen during dialysis.      Imer Ba MD

## 2021-11-26 NOTE — PLAN OF CARE
[id number]     Neuro: Sedated. PERRL. Withdraws/grimaces to pain.      Respiratory: Intubated. LS diminished, coarse in upper lobes at times. Moderate secretions. 40%/10     Cardiac: Tele: Afib CVR with BBB - levo gtt for MAP>65     GI/: +BS/BM, giron in place with adequate  output.     Skin: scattered bruising and scabbing with weeping edema in L arm. mepilex to sacrum and thigh wound in place, interdry to groin for redness/breakdown      Pain: CPOT: 0 RASS: -2     Mobility: bedrest. Turned q2h for pressure injury prevention     Diet: TF running at goal 35ml/hr via OG     IVF: fentanyl, propofol, levophed, tko (D50 given x1 for low BG overnight)     Plan of Care: continue supportive cares, monitor for s/s of bleeding.

## 2021-11-27 NOTE — PLAN OF CARE
End of Shift Nursing Summary    Neuro:  Sedated and intubated on propofol with RASS -2  Pt opens eyes and grimaces intermittently to pain.    Pain: Low dose fentanyl infusing.  CV:  BP's labile.  On and off Levo to maintain map > 65 mmHg  Required upward titration and albumin for dialysis. A-fib with CVR  Pulm: Remains on full vent support.  Peep dropped to 8 from 10.  Not appropriate for SBT.  GI/: Tolerating tube feeding.  Small loose incontinent BM.  Zuniga for strict I/O and retention.  UOP OK.  2 liters off with HD today.    Endocrine: Lantus decreased to 25 units BID.  Sliding scale coverage as ordered.  Skin: WOC at bedside with nursing today.  Pressure injury noted on coccyx/buttocks, left posterior thigh and bottom lip. Treated per poc. Scattered scabbing, abrasions and ecchymosis.  Fever: afebrile.  Lines/drips: right internal jugular dialysis cath, left UE PICC..     *See EPIC flowsheet for full nursing assessment findings

## 2021-11-27 NOTE — PROGRESS NOTES
Assessment and Plan:   OPAL: He is on intermittent HD. He ran yesterday 3h, RIJV Darron, 2L UF.   Hx of CKD-3. He is on Vanco.   I/O 2017/785 UO + 2000 UF. Na 137, K 3.8, HCO3 27, Cr 1.68.     11/27/21 0515 110.9 kg (244 lb 7.8 oz)    11/26/21 0500 112.2 kg (247 lb 5.7 oz)      Plan HD in am. Follow for any signs of recovery of kidney function.             Interval History:   COVID-19 pneumonia: resp failure/mech vent  AORTIC STENOSIS: S/P TAVR  . Aflutter.   DM  HT     Anemia: Hgb 8.5. EPO/venofer on dialysis.            Review of Systems:   Intubated.           Medications:       - MEDICATION INSTRUCTIONS for Dialysis Patients -   Does not apply See Admin Instructions     apixaban ANTICOAGULANT  5 mg Oral or Feeding Tube BID     atorvastatin  80 mg Oral or Feeding Tube Daily     B and C vitamin Complex with folic acid  5 mL Per Feeding Tube Daily     [Held by provider] carvedilol  12.5 mg Oral BID     chlorhexidine  15 mL Mouth/Throat Q12H     EPINEPHrine         [Held by provider] hydrALAZINE  50 mg Oral or Feeding Tube Q8H MOE     insulin aspart  1-12 Units Subcutaneous Q4H     insulin glargine  25 Units Subcutaneous BID     - MEDICATION INSTRUCTIONS -   Does not apply Once     pantoprazole  40 mg Oral or Feeding Tube BID AC     sodium chloride (PF)  10-40 mL Intracatheter Q7 Days     sodium chloride (PF)  3 mL Intracatheter Q8H     sodium chloride (PF)  9 mL Intracatheter During Dialysis/CRRT (from stock)     sodium chloride (PF)  9 mL Intracatheter During Dialysis/CRRT (from stock)     [Held by provider] tamsulosin  0.4 mg Oral Daily     vancomycin place vaughan - receiving intermittent dosing  1 each Intravenous See Admin Instructions       dextrose 1,000 mL (11/24/21 0622)     fentaNYL 25 mcg/hr (11/26/21 1945)     - MEDICATION INSTRUCTIONS -       norepinephrine Stopped (11/26/21 2053)     - MEDICATION INSTRUCTIONS -       propofol (DIPRIVAN) infusion 20 mcg/kg/min (11/27/21 0621)     sodium  chloride 0.9%       sodium chloride 20 mL/hr at 11/1946     Current active medications and PTA medications reviewed, see medication list for details.            Physical Exam:   Vitals were reviewed  Patient Vitals for the past 24 hrs:   BP Temp Temp src Pulse SpO2 Weight   11/27/21 0915 (!) 173/71 97.2  F (36.2  C) -- 73 -- --   11/27/21 0900 (!) 162/66 97.2  F (36.2  C) -- 69 99 % --   11/27/21 0800 (!) 162/62 97.2  F (36.2  C) Bladder 74 98 % --   11/27/21 0700 (!) 157/60 97.3  F (36.3  C) -- 70 100 % --   11/27/21 0645 (!) 161/64 97.3  F (36.3  C) -- 70 100 % --   11/27/21 0630 (!) 152/63 97.3  F (36.3  C) -- 75 100 % --   11/27/21 0615 (!) 175/63 97.5  F (36.4  C) -- 62 100 % --   11/27/21 0600 (!) 150/64 97.5  F (36.4  C) -- 60 100 % --   11/27/21 0545 (!) 141/64 97.7  F (36.5  C) -- 57 100 % --   11/27/21 0530 137/54 97.7  F (36.5  C) -- 58 100 % --   11/27/21 0515 (!) 147/56 97.7  F (36.5  C) -- 53 100 % 110.9 kg (244 lb 7.8 oz)   11/27/21 0500 (!) 181/77 -- -- 59 100 % --   11/27/21 0445 (!) 175/73 -- -- 53 100 % --   11/27/21 0430 (!) 179/72 -- -- 54 100 % --   11/27/21 0415 (!) 173/71 -- -- 55 100 % --   11/27/21 0400 (!) 167/66 -- -- 59 100 % --   11/27/21 0345 131/55 -- -- (!) 48 100 % --   11/27/21 0335 -- -- -- -- 100 % --   11/27/21 0330 126/56 -- -- (!) 48 100 % --   11/27/21 0315 122/56 -- -- 53 100 % --   11/27/21 0300 120/47 -- -- 52 100 % --   11/27/21 0245 119/54 -- -- 56 100 % --   11/27/21 0230 125/55 -- -- 56 100 % --   11/27/21 0215 122/51 -- -- 56 100 % --   11/27/21 0200 110/49 -- -- 58 99 % --   11/27/21 0145 117/44 -- -- 63 100 % --   11/27/21 0130 -- -- -- 66 100 % --   11/27/21 0115 (!) 162/86 -- -- 65 100 % --   11/27/21 0100 (!) 220/85 -- -- 71 95 % --   11/27/21 0045 131/49 98.8  F (37.1  C) -- 65 (!) 32 % --   11/27/21 0030 (!) 151/58 98.8  F (37.1  C) -- 66 99 % --   11/27/21 0015 (!) 146/61 99  F (37.2  C) -- 58 98 % --   11/27/21 0000 109/43 99  F (37.2  C) -- 53 99 % --    11/26/21 2345 113/48 99.1  F (37.3  C) -- 53 99 % --   11/26/21 2330 108/48 99.1  F (37.3  C) -- 54 99 % --   11/26/21 2326 -- -- -- -- 99 % --   11/26/21 2315 110/52 99.3  F (37.4  C) -- 58 98 % --   11/26/21 2300 103/50 99.5  F (37.5  C) -- 58 99 % --   11/26/21 2245 109/44 99.5  F (37.5  C) -- 63 98 % --   11/26/21 2230 103/46 99.7  F (37.6  C) -- 55 98 % --   11/26/21 2215 121/48 99.7  F (37.6  C) -- 66 97 % --   11/26/21 2200 124/52 99.7  F (37.6  C) -- 59 95 % --   11/26/21 2145 102/45 99.7  F (37.6  C) -- 60 96 % --   11/26/21 2130 98/46 99.7  F (37.6  C) -- 58 96 % --   11/26/21 2115 112/46 99.7  F (37.6  C) -- 64 96 % --   11/26/21 2100 126/53 99.7  F (37.6  C) -- 75 98 % --   11/26/21 2045 (!) 143/58 99.7  F (37.6  C) -- 72 96 % --   11/26/21 2030 -- 99.5  F (37.5  C) -- 64 97 % --   11/26/21 2015 -- 99.7  F (37.6  C) -- 69 97 % --   11/26/21 2000 135/60 99.5  F (37.5  C) Bladder 63 94 % --   11/26/21 1945 -- 99.5  F (37.5  C) -- 65 96 % --   11/26/21 1930 -- 99.5  F (37.5  C) -- 58 96 % --   11/26/21 1915 -- 99.5  F (37.5  C) -- 61 95 % --   11/26/21 1909 -- -- -- 60 95 % --   11/26/21 1900 -- 99.5  F (37.5  C) -- 57 96 % --   11/26/21 1830 -- 99.5  F (37.5  C) -- 71 -- --   11/26/21 1815 -- 99.5  F (37.5  C) -- 66 98 % --   11/26/21 1800 -- 99.5  F (37.5  C) Bladder 61 97 % --   11/26/21 1745 -- 99.5  F (37.5  C) -- 62 98 % --   11/26/21 1730 -- 99.5  F (37.5  C) -- 64 100 % --   11/26/21 1715 -- -- -- 73 98 % --   11/26/21 1700 -- -- -- 61 98 % --   11/26/21 1645 -- -- -- 63 97 % --   11/26/21 1630 -- 99.7  F (37.6  C) -- 65 96 % --   11/26/21 1615 -- 99.7  F (37.6  C) -- 76 95 % --   11/26/21 1603 -- -- -- 65 95 % --   11/26/21 1600 -- 99.5  F (37.5  C) Bladder 71 94 % --   11/26/21 1545 -- 99.3  F (37.4  C) -- 80 93 % --   11/26/21 1530 -- 99.3  F (37.4  C) -- 73 98 % --   11/26/21 1519 -- -- Bladder -- -- --   11/26/21 1515 -- 99.3  F (37.4  C) -- 76 97 % --   11/26/21 1500 -- 99.3  F (37.4  C) --  72 98 % --   21 1445 -- 99.3  F (37.4  C) -- 73 99 % --   21 1430 -- 99.3  F (37.4  C) -- 71 97 % --   21 1415 -- 99.3  F (37.4  C) -- 67 98 % --   21 1400 -- 99.3  F (37.4  C) -- 67 98 % --   21 1345 -- 99.5  F (37.5  C) -- 66 99 % --   21 1330 -- 99.5  F (37.5  C) -- 66 97 % --   21 1315 -- 99.5  F (37.5  C) -- 70 97 % --   21 1300 -- 99.5  F (37.5  C) -- 71 97 % --   21 1245 -- 99.7  F (37.6  C) -- 73 98 % --   21 1230 -- 99.7  F (37.6  C) -- 71 95 % --   21 1224 -- -- -- 68 98 % --   21 1215 -- 99.7  F (37.6  C) -- 71 96 % --   21 1200 -- 99.7  F (37.6  C) Bladder 71 95 % --   21 1145 -- -- -- 69 96 % --   21 1130 -- -- -- 70 96 % --   21 1115 -- -- -- 71 96 % --   21 1100 -- -- -- 70 97 % --   21 1045 -- 99.3  F (37.4  C) -- 70 96 % --       Temp:  [97.2  F (36.2  C)-99.7  F (37.6  C)] 97.2  F (36.2  C)  Pulse:  [48-80] 73  BP: ()/(43-86) 173/71  MAP:  [50 mmHg-121 mmHg] 69 mmHg  Arterial Line BP: ()/(33-77) 128/49  FiO2 (%):  [40 %-100 %] 50 %  SpO2:  [32 %-100 %] 99 %    Temperatures:  Current - Temp: 97.2  F (36.2  C); Max - Temp  Av.1  F (37.3  C)  Min: 97.2  F (36.2  C)  Max: 99.7  F (37.6  C)  Respiration range: No data recorded  Pulse range: Pulse  Av.1  Min: 48  Max: 80  Blood pressure range: Systolic (24hrs), Av , Min:98 , Max:220   ; Diastolic (24hrs), Av, Min:43, Max:86    Pulse oximetry range: SpO2  Av.1 %  Min: 32 %  Max: 100 %    I/O last 3 completed shifts:  In: 1910.02 [I.V.:820.02; NG/GT:320]  Out: 2650 [Urine:650; Other:2000]      Intake/Output Summary (Last 24 hours) at 2021 1039  Last data filed at 2021 0600  Gross per 24 hour   Intake 1499.03 ml   Output 2550 ml   Net -1050.97 ml       Supine, intubated  COVID-19 iso       Wt Readings from Last 4 Encounters:   21 110.9 kg (244 lb 7.8 oz)   10/21/21 112 kg (247 lb)   10/11/21 109.5  kg (241 lb 4.8 oz)   09/15/21 112.9 kg (249 lb)          Data:          Lab Results   Component Value Date     11/27/2021     11/26/2021     11/25/2021     07/11/2021     07/10/2021     07/09/2021    Lab Results   Component Value Date    CHLORIDE 104 11/27/2021    CHLORIDE 103 11/26/2021    CHLORIDE 102 11/25/2021    CHLORIDE 109 07/11/2021    CHLORIDE 112 07/10/2021    CHLORIDE 112 07/09/2021    Lab Results   Component Value Date    BUN 65 11/27/2021     11/26/2021    BUN 95 11/25/2021    BUN 45 07/11/2021    BUN 51 07/10/2021    BUN 64 07/09/2021      Lab Results   Component Value Date    POTASSIUM 3.8 11/27/2021    POTASSIUM 3.8 11/26/2021    POTASSIUM 3.9 11/25/2021    POTASSIUM 4.7 07/11/2021    POTASSIUM 5.0 07/10/2021    POTASSIUM 5.2 07/09/2021    Lab Results   Component Value Date    CO2 27 11/27/2021    CO2 28 11/26/2021    CO2 28 11/25/2021    CO2 23 07/11/2021    CO2 25 07/10/2021    CO2 25 07/09/2021    Lab Results   Component Value Date    CR 1.68 11/27/2021    CR 1.94 11/26/2021    CR 1.66 11/25/2021    CR 1.82 07/11/2021    CR 2.05 07/10/2021    CR 2.17 07/09/2021        Recent Labs   Lab Test 11/27/21  0404 11/26/21  0436 11/25/21  0434   WBC 4.5 5.2 6.3   HGB 8.5* 8.4* 8.3*   HCT 27.7* 27.5* 27.3*   MCV 86 85 85   PLT 63* 56* 54*     Recent Labs   Lab Test 11/22/21  0519 11/21/21  0536 11/15/21  0600   AST 50* 67* 60*   ALT 48 57 57   ALKPHOS 155* 140 137   BILITOTAL 0.6 0.6 0.7       Recent Labs   Lab Test 11/27/21  0404 11/26/21  0436 11/25/21  0434   MAG 2.1 2.2 2.0     Recent Labs   Lab Test 11/27/21  0404 11/26/21  0436 11/25/21  0434   PHOS 3.6 3.8 3.8     Recent Labs   Lab Test 11/27/21  0404 11/26/21  0436 11/25/21  0434   GABE 8.0* 8.2* 8.1*       Lab Results   Component Value Date    GABE 8.0 (L) 11/27/2021     Lab Results   Component Value Date    WBC 4.5 11/27/2021    HGB 8.5 (L) 11/27/2021    HCT 27.7 (L) 11/27/2021    MCV 86 11/27/2021    PLT  63 (L) 11/27/2021     Lab Results   Component Value Date     11/27/2021    POTASSIUM 3.8 11/27/2021    CHLORIDE 104 11/27/2021    CO2 27 11/27/2021     (H) 11/27/2021     Lab Results   Component Value Date    BUN 65 (H) 11/27/2021    CR 1.68 (H) 11/27/2021     Lab Results   Component Value Date    MAG 2.1 11/27/2021     Lab Results   Component Value Date    PHOS 3.6 11/27/2021       Creatinine   Date Value Ref Range Status   11/27/2021 1.68 (H) 0.66 - 1.25 mg/dL Final   11/26/2021 1.94 (H) 0.66 - 1.25 mg/dL Final   11/25/2021 1.66 (H) 0.66 - 1.25 mg/dL Final   11/24/2021 2.05 (H) 0.66 - 1.25 mg/dL Final   11/23/2021 2.90 (H) 0.66 - 1.25 mg/dL Final   11/22/2021 2.68 (H) 0.66 - 1.25 mg/dL Final   07/11/2021 1.82 (H) 0.66 - 1.25 mg/dL Final   07/10/2021 2.05 (H) 0.66 - 1.25 mg/dL Final   07/09/2021 2.17 (H) 0.66 - 1.25 mg/dL Final   07/08/2021 2.29 (H) 0.66 - 1.25 mg/dL Final   07/07/2021 2.32 (H) 0.66 - 1.25 mg/dL Final   07/07/2021 2.72 (H) 0.66 - 1.25 mg/dL Final       Attestation:  I have reviewed today's vital signs, notes, medications, labs and imaging.     Imer Ba MD

## 2021-11-27 NOTE — PROGRESS NOTES
[facility] ICU RESPIRATORY NOTE        Date of Admission: 10/26/2021    Date of Intubation (most recent): 11/15/2021     Reason for Mechanical Ventilation: Respiratory failure    Number of Days on Mechanical Ventilation: 13    Met Criteria for Spontaneous Breathing Trial: No    Reason for No Spontaneous Breathing Trial: Peep 8    Significant Events Today: None overnight    ABG Results:   Recent Labs   Lab 11/27/21  0404 11/27/21  0232 11/26/21  0437 11/25/21  0420   PH 7.43 7.43 7.46* 7.41   PCO2 44 45 43 48*   PO2 173* 214* 74* 70*   HCO3 29* 30* 31* 30*   O2PER 80 100 40 40       Current Vent Settings: Ventilation Mode: CMV/AC  (Continuous Mandatory Ventilation/ Assist Control)  FiO2 (%): 60 %  Rate Set (breaths/minute): 12 breaths/min  Tidal Volume Set (mL): 500 mL  PEEP (cm H2O): 8 cmH2O  Oxygen Concentration (%): 40 %        Plan: Continue full vent support with daily wean assessment    Hima Garcia, RRT

## 2021-11-27 NOTE — PROGRESS NOTES
[facility]    ICU Progress Note       Date of Admission:  10/26/2021    Assessment: Critical Care   Burton Elizabeth is a 76 year old male with PMHx of coronary artery disease, atrial flutter on eliquis, hypertension, hyperlipidemia, severe aortic stenosis, HFpEF, CKD stage III, diabetes mellitus II, history of GI bleed, suspected obstructive sleep apnea admitted on 10/26/2021 for elective TAVR.  Hospital course complicated by OPAL from contrast nephropathy, volume overload, hypoxic respiratory failure multifactorial, Covid pneumonia and physical deconditioning.  Also proteus UTI and bacteremia.  On 11/15 the patient was intubated for severe hypoxic respiratory failure. Patient was proned and paralyzed. Supined by 11/16, off paralytics by 11/18, velitri weaned off on 11/19.      Interval History   Accidental tube dislodgement overnight; re-intubated by intensivist.  Recovered as expected following tube replacement.  Unable to obtain history directly due to intubated vented status.    Assessment   76 year old male with PMHx of coronary artery disease, atrial flutter on eliquis, hypertension, hyperlipidemia, severe aortic stenosis, HFpEF, CKD stage III, diabetes mellitus II, s/p TAVR. Hospital stay complicated by acute hypoxic respiratory failure secondary to COVID pneumonia, ARDS, requiring ventilatory support.     Plan: Critical Care   Neuro/ pain/ sedation:  1. Sedated for maximum vent synchrony  - propofol and fentanyl infusions   - titrate sedation for RASS -1 to -2    Pulmonary:  1. Acute hypoxic respiratory failure secondary to covid pneumonia  2. ARDS  3. MRSA pneumonia  - continue lung protective ventilation, supplemental oxygen to keep saturation about 92%, PEEP support weaned to 8 today  - continue vancomycin until 12/2 (14 d course)    Cardiovascular:  1. Severe aortic stenosis, s/p TAVR 10/26   2. A flutter, rate controlled   3. S/P TAVR   4.  Shock, likely mixed etiology  vasoplegic due to sedation and obstructive due to pos press vent  - Will eventually need coreg restarted   - on eliquis  - on and off low dose levophed    GI/Nutrition:  1. Moderate malnutrition  2. Mild AST elevation (improved to 50), exam benign 11/20    3. Maroon stools--resolved:  Already on bid PPI.  pltlts acceptable.  Checking ptt/INR.  Trend hgb. GI consult--scopes showed hemorrhoids and lesions in hiatal hernia.  - Nutrition consulted. Appreciate recommendations.   - continue tube feeds  - occasional spot check of lft's    Renal/ Fluid Balance:   1. OPAL on CKD, now on IHD; creat 1.68 today  2. Volume overload   - Will monitor intake and output   - ICU electrolyte replacement protocol   - Appreciate nephrology input.  Now receiving HD.    Endocrine:  1. Diabetes mellitus II with stress hyperglycemia:  Seems to be better controlled.  - sliding scale insulin, high dose and lantus at 25 bid seems to adequately controlling sugars    ID:  1. Septic shock 2/2 Proteus in blood and urine, ~ pansensitive  2. Covid 19 pneumonia   3. MRSA HCAP    - completed decadron  - completed 10d ceftriaxone (proteus bacteremia; total course started 11/15)  - continue vanco (MRSA pna; completes 12/2 for 14d course)    Hematology:  1. Covid associated Coagulopathy  2. Thrombocytopenia -multifactorial - 63 today  - continue eliquis  - watch for s/sx of bleeding    MSK:   1. Deconditioning   -turns and reposition per protocols     Lines/ tubes/ drains:  Zuniga Catheter: PRESENT, indication: Retention, Strict 1-2 Hour I&O;Retention  Central Lines: PRESENT  PICC Triple Lumen 11/15/21 Left Basilic access-Site Assessment: WDL  CVC Double Lumen Right Internal jugular-Site Assessment: WDL    Prophylaxis:  - DVT Prophylaxis: DOAC  - PUD Prophylaxis: protonix    Code Status: Full Code      Disposition:  - ICU, critical     The patient's care was discussed with the multi-disciplinary Team.              ______________________________________________________________________    Physical Exam   Vital Signs: Temp: 97.2  F (36.2  C) Temp src: Bladder BP: (!) 173/71 Pulse: 73     SpO2: 99 % O2 Device: Mechanical Ventilator    Weight: 244 lbs 7.84 oz    Gen: laying in bed, supined, appears older than stated age  HEENT: ETT in place, anicteric, moist mucus membranes  Neck, trachea midline, no crepitus  Pulm: muffled coarse breath sounds throughout synchronous with vent  Cor: RRR, a flutter on monitor  Abd: soft, obese, no palpable masses, no hepatosplenomegaly  Skin: no jaundice  Ext: +edema  Neuro: limited by sedation, grimaces to pain    Data   I reviewed all medications, new labs and imaging results over the last 24 hours.  Arterial Blood Gases   Recent Labs   Lab 11/27/21  0404 11/27/21  0232 11/26/21  0437 11/25/21  0420   PH 7.43 7.43 7.46* 7.41   PCO2 44 45 43 48*   PO2 173* 214* 74* 70*   HCO3 29* 30* 31* 30*       Complete Blood Count   Recent Labs   Lab 11/27/21  0404 11/26/21  0436 11/25/21  0434 11/24/21  1257 11/24/21  0530 11/24/21  0412   WBC 4.5 5.2 6.3  --   --  8.9   HGB 8.5* 8.4* 8.3* 9.1*   < > 9.6*   PLT 63* 56* 54*  --   --  66*    < > = values in this interval not displayed.       Basic Metabolic Panel  Recent Labs   Lab 11/27/21  0859 11/27/21  0404 11/27/21  0007 11/26/21  0438 11/26/21  0436 11/25/21  0814 11/25/21  0434 11/24/21  0414 11/24/21  0412   NA  --  137  --   --  138  --  137  --  138   POTASSIUM  --  3.8  --   --  3.8  --  3.9  --  4.2   CHLORIDE  --  104  --   --  103  --  102  --  103   CO2  --  27  --   --  28  --  28  --  30   BUN  --  65*  --   --  101*  --  95*  --  134*   CR  --  1.68*  --   --  1.94*  --  1.66*  --  2.05*   * 164*  139* 134*   < > 79   < > 197*   < > 76    < > = values in this interval not displayed.       Liver Function Tests  Recent Labs   Lab 11/23/21  1134 11/22/21  0519 11/21/21  0536   AST  --  50* 67*   ALT  --  48 57   ALKPHOS  --  155* 140    BILITOTAL  --  0.6 0.6   ALBUMIN  --  1.8* 1.7*   INR 1.51*  --   --        Pancreatic Enzymes  No lab results found in last 7 days.    Coagulation Profile  Recent Labs   Lab 11/23/21  1134   INR 1.51*   PTT 36       IMAGING:  Recent Results (from the past 24 hour(s))   XR Chest Port 1 View    Narrative    EXAM: XR CHEST PORT 1 VIEW  LOCATION: [facility]  DATE/TIME: 11/27/2021 1:04 AM    INDICATION: ETT placement  COMPARISON: Portable chest radiograph from 11/23/2021      Impression    IMPRESSION: Endotracheal tube tip terminates about 1.7 cm above the devaughn. Enteric tube terminates below the diaphragm. Cardiac silhouette is enlarged. Bilateral mid and lower lung zone airspace infiltrates again present, minimally improved on the   right. No visible pneumothorax. Probable small pleural effusions. TAVR. Left upper extremity PICC tip overlies mid SVC level.     40 min CCT excluding procedures

## 2021-11-27 NOTE — PLAN OF CARE
Neuro: opens eyes spontaneously, does not follow commands, PERRL.   CV: Afib, levo gtt off at 2100, PRN hydralazine given x3 to keep SBP<140. Pt becomes hypertensive with stimulation.   Resp: tube exchange done @ 0130. Weaning on FiO2. Thick secretions from ETT.   GI/: New OG placed. Small BM. Zuniga with marginal UOP.   Skin: bilateral edema in all extremities.    Continue to monitor.

## 2021-11-27 NOTE — PROCEDURES
[facility]    Intubation    Date/Time: 11/27/2021 1:05 AM  Performed by: Maurilio Lee MD  Authorized by: Maurilio Lee MD   Indications: respiratory failure and  airway protection  Intubation method: fiberoptic oral      UNIVERSAL PROTOCOL   Site Marked: NA  Prior Images Obtained and Reviewed:  NA  Required items: Required blood products, implants, devices and special equipment available    Patient identity confirmed:  Anonymous protocol, patient vented/unresponsive  NA - No sedation, light sedation, or local anesthesia  Confirmation Checklist:  Procedure was appropriate and matched the consent or emergent situation  Universal Protocol: the [facility] Universal Protocol was followed    Preparation: Patient was prepped and draped in usual sterile fashion      Patient status: sedated  Preoxygenation: BVM  Laryngoscope size: Mac 4  Tube size: 7.5 mm  Tube type: cuffed  Number of attempts: 1  Cricoid pressure: no  Cords visualized: yes  Post-procedure assessment: chest rise and ETCO2 monitor  Breath sounds: equal  Cuff inflated: yes  ETT to lip: 25 cm  Chest x-ray findings: endotracheal tube too low  Tube secured with: ETT vaughan  Tube repositioned: tube repositioned successfully      PROCEDURE  Describe Procedure: Emergent procedure - pt ett dislodged after turn with desat into 40s.  RT attempted reposition however no improvement in saturations or volume, despite Bag ventilation.  Airway inspection via bronch demonstrated ETT likely in esophagus thus ETT removed.       Via videolaryngoscopy cords visualized and patient reintubated with 7.5 ~ 26 cm at teeth. CXR with ett abov ecarina thus retracted 1.5 cm.~ 25 cm at teeth  Repeat airway inspection via bronch revealed ett in good position.  Patient with desat in to 30s for 2-3 minutes initially with ETT in esophagus - Sats post intubation 100%    Patient complications:  Other (see comment)  Length of time  physician/provider present for 1:1 monitoring during sedation: 15

## 2021-11-27 NOTE — PLAN OF CARE
End of Shift Nursing Summary     Neuro:  Sedated and intubated on propofol with RASS -2  Spontaneously moves RLE.  Increased propofol due to stacking breaths on vent.    Pain: Low dose fentanyl infusing.  CV:  Stable BP.  Cuff and a-line not correlating.  Per intensivist, go off a-line.  A-fib with CVR  Pulm: Remains on full vent support.  Peep dropped to 6 from 8.  Not appropriate for SBT.  FiO2 increased with turns ~ 45-70%  GI/: Tolerating tube feeding.  Small loose incontinent BM.  Zuniga for strict I/O and retention.  UOP marginal to low.  MD aware.  Had HD yesterday and 2 liters off.    Endocrine: BMG stable with lantus and sliding scale coverage as ordered.  Skin: Blanchable erythema on back and buttocks. Pressure injury noted on coccyx/buttocks, left posterior thigh and bottom lip. Treated per poc. Scattered scabbing, abrasions and ecchymosis.  Fever: afebrile.  Lines/drips: right internal jugular dialysis cath, left UE PICC.     *See EPIC flowsheet for full nursing assessment findings

## 2021-11-28 NOTE — PROGRESS NOTES
[facility]    ICU Progress Note       Date of Admission:  10/26/2021    Assessment: Critical Care   Burton Elizabeth is a 76 year old male with PMHx of coronary artery disease, atrial flutter on eliquis, hypertension, hyperlipidemia, severe aortic stenosis, HFpEF, CKD stage III, diabetes mellitus II, history of GI bleed, suspected obstructive sleep apnea admitted on 10/26/2021 for elective TAVR.  Hospital course complicated by OPAL from contrast nephropathy, volume overload, hypoxic respiratory failure multifactorial, Covid pneumonia and physical deconditioning.  Also proteus UTI and bacteremia.  On 11/15 the patient was intubated for severe hypoxic respiratory failure. Patient was proned and paralyzed. Supined by 11/16, off paralytics by 11/18, velitri weaned off on 11/19.      Interval History   No events.  Unable to obtain history directly due to intubated vented status.    Assessment   76 year old male with PMHx of coronary artery disease, atrial flutter on eliquis, hypertension, hyperlipidemia, severe aortic stenosis, HFpEF, CKD stage III, diabetes mellitus II, s/p TAVR. Hospital stay complicated by acute hypoxic respiratory failure secondary to COVID pneumonia, ARDS, requiring ventilatory support.     Plan: Critical Care   Neuro/ pain/ sedation:  1. Sedated for maximum vent synchrony  - propofol infusion, prn midazolam  - stop fent drip today and transition to prn dialudid  - titrate sedation for RASS -1 to -2    Pulmonary:  1. Acute hypoxic respiratory failure secondary to covid pneumonia  2. ARDS  3. MRSA pneumonia  - continue lung protective ventilation, supplemental oxygen to keep saturation about 92%, PEEP support weaned to 5 today  - continue vancomycin until 12/2 (14 d course)    Cardiovascular:  1. Severe aortic stenosis, s/p TAVR 10/26   2. A fib/flutter, rate controlled   3. S/P TAVR   4.  Shock, likely mixed etiology vasoplegic due to sedation and obstructive due to pos  press vent  - Will eventually need coreg restarted   - on eliquis  - on and off low dose levophed    GI/Nutrition:  1. Moderate malnutrition  2. Mild AST elevation (improved to 50), exam benign 11/20    3. Maroon stools--resolved:  Already on bid PPI.  pltlts acceptable.  Checking ptt/INR.  Trend hgb. GI consult--scopes showed hemorrhoids and lesions in hiatal hernia, not actively bleeding.  - Nutrition consulted. Appreciate recommendations.   - continue tube feeds  - occasional spot check of lft's    Renal/ Fluid Balance:   1. OPAL on CKD, now on IHD; creat 1.94 today  2. Volume overload   - Will monitor intake and output   - ICU electrolyte replacement protocol   - Appreciate nephrology input.  Now receiving HD.    Endocrine:  1. Diabetes mellitus II with stress hyperglycemia:  Seems to be better controlled.  - sliding scale insulin, high dose and lantus at 25 bid seems to adequately controlling sugars    ID:  1. Septic shock 2/2 Proteus in blood and urine, ~ pansensitive  2. Covid 19 pneumonia   3. MRSA HCAP    - completed decadron  - completed 10d ceftriaxone (proteus bacteremia; total course started 11/15)  - continue vanco (MRSA pna; completes 12/2 for 14d course)    Hematology:  1. Covid associated Coagulopathy  2. Thrombocytopenia -multifactorial - 72 today  - continue eliquis  - watch for s/sx of bleeding    MSK:   1. Deconditioning   -turns and reposition per protocols     Lines/ tubes/ drains:  Zuniga Catheter: PRESENT, indication: Retention, Strict 1-2 Hour I&O;Retention  Central Lines: PRESENT  PICC Triple Lumen 11/15/21 Left Basilic access-Site Assessment: WDL  CVC Double Lumen Right Internal jugular-Site Assessment: WDL    Prophylaxis:  - DVT Prophylaxis: DOAC  - PUD Prophylaxis: protonix    Code Status: Full Code      Disposition:  - ICU, critical     The patient's care was discussed with the multi-disciplinary Team.              ______________________________________________________________________    Physical Exam   Vital Signs: Temp: 97.2  F (36.2  C) Temp src: Bladder BP: (!) 149/66 Pulse: 64     SpO2: 96 % O2 Device: Mechanical Ventilator    Weight: 246 lbs 14.64 oz    Gen: laying in bed, supined, appears older than stated age  HEENT: ETT in place, anicteric, moist mucus membranes  Neck, trachea midline, no crepitus  Pulm: muffled coarse breath sounds throughout synchronous with vent  Cor: RRR, a flutter on monitor  Abd: soft, obese, no palpable masses, no hepatosplenomegaly  Skin: no jaundice  Ext: +edema  Neuro: limited by sedation, grimaces to pain    Data   I reviewed all medications, new labs and imaging results over the last 24 hours.  Arterial Blood Gases   Recent Labs   Lab 11/28/21 0346 11/27/21  0404 11/27/21  0232 11/26/21  0437   PH 7.45 7.43 7.43 7.46*   PCO2 42 44 45 43   PO2 73* 173* 214* 74*   HCO3 29* 29* 30* 31*       Complete Blood Count   Recent Labs   Lab 11/28/21  0346 11/27/21  0404 11/26/21  0436 11/25/21  0434   WBC 3.7* 4.5 5.2 6.3   HGB 8.8* 8.5* 8.4* 8.3*   PLT 72* 63* 56* 54*       Basic Metabolic Panel  Recent Labs   Lab 11/28/21  0346 11/27/21  2354 11/27/21  1934 11/27/21  0859 11/27/21  0404 11/26/21  0438 11/26/21  0436 11/25/21  0814 11/25/21  0434     --   --   --  137  --  138  --  137   POTASSIUM 3.6  --   --   --  3.8  --  3.8  --  3.9   CHLORIDE 104  --   --   --  104  --  103  --  102   CO2 26  --   --   --  27  --  28  --  28   BUN 80*  --   --   --  65*  --  101*  --  95*   CR 1.94*  --   --   --  1.68*  --  1.94*  --  1.66*   *  159* 134* 123*   < > 164*  139*   < > 79   < > 197*    < > = values in this interval not displayed.       Liver Function Tests  Recent Labs   Lab 11/23/21  1134 11/22/21  0519   AST  --  50*   ALT  --  48   ALKPHOS  --  155*   BILITOTAL  --  0.6   ALBUMIN  --  1.8*   INR 1.51*  --        Pancreatic Enzymes  No lab results found in last 7  days.    Coagulation Profile  Recent Labs   Lab 11/23/21  1134   INR 1.51*   PTT 36       IMAGING:  No results found for this or any previous visit (from the past 24 hour(s)).  40 min CCT excluding procedures

## 2021-11-28 NOTE — PROGRESS NOTES
[facility] ICU RESPIRATORY NOTE        Date of Admission: 10/26/2021    Date of Intubation (most recent): 11/15/2021    Reason for Mechanical Ventilation: RESP. FAILURE    Number of Days on Mechanical Ventilation: 14    Met Criteria for Spontaneous Breathing Trial: Yes - 5/5 for 15 minute. BP dropped and switched back to full support      ABG Results:   Recent Labs   Lab 11/28/21  0346 11/27/21  0404 11/27/21  0232 11/26/21  0437   PH 7.45 7.43 7.43 7.46*   PCO2 42 44 45 43   PO2 73* 173* 214* 74*   HCO3 29* 29* 30* 31*   O2PER 40 80 100 40       Current Vent Settings: Ventilation Mode: CMV/AC  (Continuous Mandatory Ventilation/ Assist Control)  FiO2 (%): 40 %  Rate Set (breaths/minute): 12 breaths/min  Tidal Volume Set (mL): 500 mL  PEEP (cm H2O): 5 cmH2O  Pressure Support (cm H2O): 5 cmH2O  Oxygen Concentration (%): 40 %        Plan: Will continue full ventilatory support for now and assess for weaning readiness daily.       Ce Rai, RRT  11/28/2021

## 2021-11-28 NOTE — PLAN OF CARE
End of Shift Nursing Summary    Neuro:  Fent gtt turned off.  PRN if necessary.  Propofol at lowest dose for vent compliance.  Coughing/gagging/yawning but no grimace or withdrawal.  Pain: PRN dilaudid if needed.  CV:  Afib with CVR however dropped to 40's and became hypotensive with SBT-self limiting.  Pulm: SBT 5/5 for 15 minutes.  Peep dropped to 5 today.    GI/: HD today-2 liters off.  Required Levophed during HD.  Able to wean off slowly.  Endocrine: Covered with lantus and sliding scale insulin.  Skin: treated per poc.  Scattered areas weeping on LUE.  Covered with meplix  Fever: Afebrile.  Lines/drips: right internal jugular for dialysis.  Left picc.  Additional: Lytes checked for increased ectopy.  WNL      *See EPIC flowsheet for full nursing assessment findings

## 2021-11-28 NOTE — PHARMACY-VANCOMYCIN DOSING SERVICE
Pharmacy Vancomycin Note  Date of Service 2021  Patient's  1945   76 year old, male    Indication: MRSA pneumonia  Day of Therapy: 11  Current vancomycin regimen:  Intermittent dosing  Current vancomycin monitoring method: Trough (Method 1 = dosing nomogram) HD patient  Current vancomycin therapeutic monitoring goal: 15-20 mg/L       Current estimated CrCl = Estimated Creatinine Clearance: 40.6 mL/min (A) (based on SCr of 1.94 mg/dL (H)).    Creatinine for last 3 days  2021:  4:36 AM Creatinine 1.94 mg/dL  2021:  4:04 AM Creatinine 1.68 mg/dL  2021:  3:46 AM Creatinine 1.94 mg/dL    Recent Vancomycin Levels (past 3 days)  2021:  4:36 AM Vancomycin 23.5 mg/L  2021:  3:46 AM Vancomycin 19.6 mg/L    Vancomycin IV Administrations (past 72 hours)                   vancomycin (VANCOCIN) 1000 mg in dextrose 5% 200 mL PREMIX (mg) 1,000 mg New Bag 21 1559                Nephrotoxins and other renal medications (From now, onward)    Start     Dose/Rate Route Frequency Ordered Stop    21 1300  vancomycin 1250 mg in 0.9% NaCl 250 mL intermittent infusion 1,250 mg         1,250 mg  over 90 Minutes Intravenous ONCE 21 1123      21 0900  norepinephrine (LEVOPHED) 4 mg in  mL infusion PREMIX         0.01-0.6 mcg/kg/min × 112.6 kg  4.2-253.4 mL/hr  Intravenous CONTINUOUS 21 0832      21 1156  vancomycin place vaughan - receiving intermittent dosing         1 each Intravenous SEE ADMIN INSTRUCTIONS 21 1157 21 2359             Contrast Orders - past 72 hours (72h ago, onward)            None          Interpretation of levels and current regimen:  Vancomycin level is reflective of therapeutic level (exptrapolated)    Has serum creatinine changed greater than 50% in last 72 hours: No    Urine output:  unable to determine    Renal Function: ESRD on Dialysis       Plan:  1. Redose with 1250 mg x 1 dose  2. Vancomycin monitoring method:  Trough (Method 1 = dosing nomogram)  3. Vancomycin therapeutic monitoring goal: 15-20 mg/L  4. Pharmacy will check vancomycin levels as appropriate in 1-3 Days.  5. Serum creatinine levels will be ordered daily for the first week of therapy and at least twice weekly for subsequent weeks.    Iron Goode, [facility], located within [facility]

## 2021-11-28 NOTE — PROGRESS NOTES
Potassium   Date Value Ref Range Status   11/28/2021 3.6 3.4 - 5.3 mmol/L Final   07/11/2021 4.7 3.4 - 5.3 mmol/L Final     Hemoglobin   Date Value Ref Range Status   11/28/2021 8.8 (L) 13.3 - 17.7 g/dL Final   07/11/2021 8.6 (L) 13.3 - 17.7 g/dL Final     Creatinine   Date Value Ref Range Status   11/28/2021 1.94 (H) 0.66 - 1.25 mg/dL Final   07/11/2021 1.82 (H) 0.66 - 1.25 mg/dL Final     Urea Nitrogen   Date Value Ref Range Status   11/28/2021 80 (H) 7 - 30 mg/dL Final   07/11/2021 45 (H) 7 - 30 mg/dL Final     Sodium   Date Value Ref Range Status   11/28/2021 138 133 - 144 mmol/L Final   07/11/2021 138 133 - 144 mmol/L Final     INR   Date Value Ref Range Status   11/23/2021 1.51 (H) 0.85 - 1.15 Final   07/06/2021 1.32 (H) 0.86 - 1.14 Final       DIALYSIS PROCEDURE NOTE  Hepatitis status of previous patient on machine log was checked and verified ok to use with this patients hepatitis status.  Patient dialyzed for 3 hrs. on a K3 bath with a net fluid removal of  2L.  A BFR of 400 ml/min was obtained via a non-tunneled right internal jugular.      The treatment plan was discussed with Dr. Ba during the treatment.    Total heparin received during the treatment: 0 units.   Line flushed, clamped and capped with heparin 1:1000 1.7 mL (1700 units) per lumen    Meds  given: Epogen, hectorol, and venofer, see MAR   Complications: none      Person educated: none. Patient sedated and no family present.     ICEBOAT? Timeout performed pre-treatment  I: Patient was identified using 2 identifiers  C:  Consent Signed Yes  E: Equipment preventative maintenance is current and dialysis delivery system OK to use  B: Hepatitis B Surface Antigen: NEGATIVE; Draw Date: 11/24/2021      Hepatitis B Surface Antibody: SUSCEPTIBLE; Draw Date: 11/24/2021  O: Dialysis orders present and complete prior to treatment  A: Vascular access verified and assessed prior to treatment  T: Treatment was performed at a clinically appropriate time  ?:  Patient was allowed to ask questions and address concerns prior to treatment  See flowsheet in EPIC for further details and post assessment.  Machine water alarm in place and functioning. Transducer pods intact and checked every 15min.  Chlorine/Chloramine water system checked every 4 hours.  Outpatient Dialysis at [facility]

## 2021-11-28 NOTE — PROGRESS NOTES
Formerly [facility], [facility]–1983 ICU RESPIRATORY NOTE        Date of Admission: 10/26/2021    Date of Intubation (most recent): 11/15/2021    Reason for Mechanical Ventilation: Respiratory failure    Number of Days on Mechanical Ventilation: 13    Met Criteria for Spontaneous Breathing Trial: No    Reason for No Spontaneous Breathing Trial: Per MD    Significant Events Today: PEEP decreased to 6 today    ABG Results:   Recent Labs   Lab 11/27/21  0404 11/27/21  0232 11/26/21  0437 11/25/21  0420   PH 7.43 7.43 7.46* 7.41   PCO2 44 45 43 48*   PO2 173* 214* 74* 70*   HCO3 29* 30* 31* 30*   O2PER 80 100 40 40       Current Vent Settings: Ventilation Mode: CMV/AC  (Continuous Mandatory Ventilation/ Assist Control)  FiO2 (%): 70 %  Rate Set (breaths/minute): 12 breaths/min  Tidal Volume Set (mL): 500 mL  PEEP (cm H2O): 6 cmH2O  Oxygen Concentration (%): 40 %        Plan: Will continue full ventilatory support for now and assess for weaning readiness daily.               Ce Rai, RRT

## 2021-11-28 NOTE — PROGRESS NOTES
Assessment and Plan:   OPAL: dialysis today > 3h, 2L UF, R isael, 400 BFR. 3K 33 HCO3 140 Na. EPO and hectorol. No heparin.   Plan to run as needed, likely every other day.             Interval History:   Resp failure/mech vent: 13 days on the vent.   COVID-19 pneumonia: resp failure/mech vent. On vanco.   AORTIC STENOSIS: S/P TAVR  . Aflutter.   DM  HT     Anemia: Hgb 8.5. EPO/venofer on dialysis.             Review of Systems:   Intubated.           Medications:       - MEDICATION INSTRUCTIONS for Dialysis Patients -   Does not apply See Admin Instructions     apixaban ANTICOAGULANT  5 mg Oral or Feeding Tube BID     atorvastatin  80 mg Oral or Feeding Tube Daily     B and C vitamin Complex with folic acid  5 mL Per Feeding Tube Daily     [Held by provider] carvedilol  12.5 mg Oral BID     chlorhexidine  15 mL Mouth/Throat Q12H     heparin  500 Units Hemodialysis Machine OR IV Push Once in dialysis/CRRT     [Held by provider] hydrALAZINE  50 mg Oral or Feeding Tube Q8H MOE     insulin aspart  1-12 Units Subcutaneous Q4H     insulin glargine  25 Units Subcutaneous BID     - MEDICATION INSTRUCTIONS -   Does not apply Once     pantoprazole  40 mg Oral or Feeding Tube BID AC     sodium chloride (PF)  10-40 mL Intracatheter Q7 Days     sodium chloride (PF)  3 mL Intracatheter Q8H     sodium chloride (PF)  9 mL Intracatheter During Dialysis/CRRT (from stock)     sodium chloride (PF)  9 mL Intracatheter During Dialysis/CRRT (from stock)     sodium chloride (PF)  9 mL Intracatheter During Dialysis/CRRT (from stock)     sodium chloride (PF)  9 mL Intracatheter During Dialysis/CRRT (from stock)     [Held by provider] tamsulosin  0.4 mg Oral Daily     vancomycin place vaughan - receiving intermittent dosing  1 each Intravenous See Admin Instructions       dextrose 1,000 mL (11/24/21 0622)     fentaNYL 25 mcg/hr (11/27/21 2010)     heparin (porcine)       - MEDICATION INSTRUCTIONS -       norepinephrine 0.03  mcg/kg/min (11/28/21 0845)     - MEDICATION INSTRUCTIONS -       propofol (DIPRIVAN) infusion 30 mcg/kg/min (11/28/21 0834)     sodium chloride 0.9%       sodium chloride 10 mL/hr at 11/27/21 2008     Current active medications and PTA medications reviewed, see medication list for details.            Physical Exam:   Vitals were reviewed  Patient Vitals for the past 24 hrs:   BP Temp Temp src Pulse SpO2 Weight   11/28/21 0930 -- 97.2  F (36.2  C) -- 63 95 % --   11/28/21 0915 -- 97.2  F (36.2  C) -- 68 96 % --   11/28/21 0900 -- 97.2  F (36.2  C) -- 75 95 % --   11/28/21 0845 -- 97.2  F (36.2  C) -- 75 98 % --   11/28/21 0837 -- 97.2  F (36.2  C) -- 72 96 % --   11/28/21 0830 -- 97.2  F (36.2  C) -- 73 98 % --   11/28/21 0826 -- 97.2  F (36.2  C) -- 64 96 % --   11/28/21 0815 -- -- -- -- 97 % --   11/28/21 0800 -- 97.2  F (36.2  C) -- 58 99 % --   11/28/21 0645 -- 97.2  F (36.2  C) -- 63 99 % --   11/28/21 0630 -- 97.2  F (36.2  C) -- 63 100 % --   11/28/21 0615 -- 97.2  F (36.2  C) -- 69 99 % --   11/28/21 0600 -- 97.2  F (36.2  C) -- 61 96 % --   11/28/21 0545 -- 97.2  F (36.2  C) -- 53 96 % --   11/28/21 0530 -- 97.3  F (36.3  C) -- 58 98 % --   11/28/21 0515 -- 97.3  F (36.3  C) -- 60 97 % --   11/28/21 0500 -- 97.3  F (36.3  C) -- 57 97 % --   11/28/21 0445 -- 97.3  F (36.3  C) -- 61 94 % --   11/28/21 0430 -- 97.3  F (36.3  C) -- 67 94 % --   11/28/21 0415 -- 97.3  F (36.3  C) -- 73 94 % --   11/28/21 0400 -- 97.3  F (36.3  C) Bladder 70 95 % 112 kg (246 lb 14.6 oz)   11/28/21 0345 -- 97.3  F (36.3  C) -- 66 98 % --   11/28/21 0330 -- 97.3  F (36.3  C) -- 63 96 % --   11/28/21 0315 -- 97.3  F (36.3  C) -- 71 95 % --   11/28/21 0300 -- 97.5  F (36.4  C) -- 68 98 % --   11/28/21 0245 -- 97.5  F (36.4  C) -- 67 98 % --   11/28/21 0230 -- 97.5  F (36.4  C) -- 61 97 % --   11/28/21 0215 -- 97.5  F (36.4  C) -- 65 98 % --   11/28/21 0200 -- 97.5  F (36.4  C) -- 62 100 % --   11/28/21 0145 -- 97.5  F (36.4  C) -- 57  100 % --   11/28/21 0130 -- 97.5  F (36.4  C) -- 52 100 % --   11/28/21 0115 -- 97.5  F (36.4  C) -- 53 100 % --   11/28/21 0100 -- 97.5  F (36.4  C) -- 55 100 % --   11/28/21 0045 -- 97.5  F (36.4  C) -- (!) 49 100 % --   11/28/21 0030 -- 97.7  F (36.5  C) -- 52 100 % --   11/28/21 0015 -- 97.7  F (36.5  C) -- 51 100 % --   11/28/21 0000 -- 97.7  F (36.5  C) Bladder 58 100 % --   11/27/21 2345 -- 97.7  F (36.5  C) -- 56 100 % --   11/27/21 2330 -- 97.7  F (36.5  C) -- 58 100 % --   11/27/21 2315 -- 97.9  F (36.6  C) -- 60 98 % --   11/27/21 2300 -- 97.9  F (36.6  C) -- 57 98 % --   11/27/21 2245 (!) 149/66 97.9  F (36.6  C) -- 64 100 % --   11/27/21 2230 -- 98.1  F (36.7  C) -- 61 100 % --   11/27/21 2215 -- 98.1  F (36.7  C) -- 65 100 % --   11/27/21 2200 -- 98.1  F (36.7  C) -- 64 100 % --   11/27/21 2145 -- 98.1  F (36.7  C) -- 66 98 % --   11/27/21 2130 138/59 98.2  F (36.8  C) -- 61 94 % --   11/27/21 2115 -- 98.2  F (36.8  C) -- 54 96 % --   11/27/21 2100 -- -- -- 62 95 % --   11/27/21 2045 -- 98.4  F (36.9  C) -- 64 94 % --   11/27/21 2030 -- 98.4  F (36.9  C) -- 63 95 % --   11/27/21 2015 -- 98.4  F (36.9  C) -- 70 97 % --   11/27/21 2000 -- 98.4  F (36.9  C) Bladder 61 97 % --   11/27/21 1945 -- 98.4  F (36.9  C) -- 64 100 % --   11/27/21 1930 -- 98.4  F (36.9  C) -- 57 96 % --   11/27/21 1915 -- 98.4  F (36.9  C) -- 61 98 % --   11/27/21 1900 (!) 157/76 98.4  F (36.9  C) -- 67 95 % --   11/27/21 1845 -- 98.2  F (36.8  C) -- 65 97 % --   11/27/21 1830 -- 98.2  F (36.8  C) -- 68 97 % --   11/27/21 1823 -- 98.4  F (36.9  C) -- 61 97 % --   11/27/21 1800 -- 98.2  F (36.8  C) -- 64 93 % --   11/27/21 1730 -- 98.2  F (36.8  C) -- 64 91 % --   11/27/21 1700 -- -- -- -- 90 % --   11/27/21 1648 -- -- -- -- (!) 88 % --   11/27/21 1643 -- -- -- -- (!) 88 % --   11/27/21 1630 -- 97.9  F (36.6  C) -- 77 98 % --   11/27/21 1600 -- 97.7  F (36.5  C) Bladder 71 90 % --   11/27/21 1530 -- 97.7  F (36.5  C) -- 70 94 % --    21 1500 -- 97.5  F (36.4  C) -- 63 94 % --   21 1430 -- 97.5  F (36.4  C) -- 68 95 % --   21 1400 -- 97.5  F (36.4  C) -- -- 91 % --   21 1330 -- 97.3  F (36.3  C) -- -- 92 % --   21 1300 (!) 145/56 97.3  F (36.3  C) -- 75 95 % --   21 1230 -- -- -- 75 96 % --   21 1200 -- -- -- -- 97 % --   21 1130 -- -- -- -- 93 % --   21 1100 -- 96.8  F (36  C) -- 65 98 % --   21 1030 -- -- -- -- 99 % --   21 1000 -- 96.8  F (36  C) -- 67 99 % --       Temp:  [96.8  F (36  C)-98.4  F (36.9  C)] 97.2  F (36.2  C)  Pulse:  [49-77] 63  BP: (138-157)/(56-76) 149/66  MAP:  [55 mmHg-121 mmHg] 77 mmHg  Arterial Line BP: ()/() 125/59  FiO2 (%):  [40 %-70 %] 40 %  SpO2:  [88 %-100 %] 95 %    Temperatures:  Current - Temp: 97.2  F (36.2  C); Max - Temp  Av.6  F (36.4  C)  Min: 96.8  F (36  C)  Max: 98.4  F (36.9  C)  Respiration range: No data recorded  Pulse range: Pulse  Av.4  Min: 49  Max: 77  Blood pressure range: Systolic (24hrs), Av , Min:138 , Max:157   ; Diastolic (24hrs), Av, Min:56, Max:76    Pulse oximetry range: SpO2  Av.6 %  Min: 88 %  Max: 100 %    I/O last 3 completed shifts:  In: 1858.37 [I.V.:798.37; NG/GT:290]  Out: 235 [Urine:235]      Intake/Output Summary (Last 24 hours) at 2021 0936  Last data filed at 2021 0600  Gross per 24 hour   Intake 1631.37 ml   Output 205 ml   Net 1426.37 ml       Supine, intubated, COVID-19 ISO.       Wt Readings from Last 4 Encounters:   21 112 kg (246 lb 14.6 oz)   10/21/21 112 kg (247 lb)   10/11/21 109.5 kg (241 lb 4.8 oz)   09/15/21 112.9 kg (249 lb)          Data:          Lab Results   Component Value Date     2021     2021     2021     2021     07/10/2021     2021    Lab Results   Component Value Date    CHLORIDE 104 2021    CHLORIDE 104 2021    CHLORIDE 103 2021    CHLORIDE 109  07/11/2021    CHLORIDE 112 07/10/2021    CHLORIDE 112 07/09/2021    Lab Results   Component Value Date    BUN 80 11/28/2021    BUN 65 11/27/2021     11/26/2021    BUN 45 07/11/2021    BUN 51 07/10/2021    BUN 64 07/09/2021      Lab Results   Component Value Date    POTASSIUM 3.6 11/28/2021    POTASSIUM 3.8 11/27/2021    POTASSIUM 3.8 11/26/2021    POTASSIUM 4.7 07/11/2021    POTASSIUM 5.0 07/10/2021    POTASSIUM 5.2 07/09/2021    Lab Results   Component Value Date    CO2 26 11/28/2021    CO2 27 11/27/2021    CO2 28 11/26/2021    CO2 23 07/11/2021    CO2 25 07/10/2021    CO2 25 07/09/2021    Lab Results   Component Value Date    CR 1.94 11/28/2021    CR 1.68 11/27/2021    CR 1.94 11/26/2021    CR 1.82 07/11/2021    CR 2.05 07/10/2021    CR 2.17 07/09/2021        Recent Labs   Lab Test 11/28/21  0346 11/27/21  0404 11/26/21  0436   WBC 3.7* 4.5 5.2   HGB 8.8* 8.5* 8.4*   HCT 28.4* 27.7* 27.5*   MCV 86 86 85   PLT 72* 63* 56*     Recent Labs   Lab Test 11/22/21  0519 11/21/21  0536 11/15/21  0600   AST 50* 67* 60*   ALT 48 57 57   ALKPHOS 155* 140 137   BILITOTAL 0.6 0.6 0.7       Recent Labs   Lab Test 11/28/21  0346 11/27/21  0404 11/26/21  0436   MAG 2.0 2.1 2.2     Recent Labs   Lab Test 11/28/21  0346 11/27/21  0404 11/26/21  0436   PHOS 4.0 3.6 3.8     Recent Labs   Lab Test 11/28/21  0346 11/27/21  0404 11/26/21  0436   GABE 8.1* 8.0* 8.2*       Lab Results   Component Value Date    GABE 8.1 (L) 11/28/2021     Lab Results   Component Value Date    WBC 3.7 (L) 11/28/2021    HGB 8.8 (L) 11/28/2021    HCT 28.4 (L) 11/28/2021    MCV 86 11/28/2021    PLT 72 (L) 11/28/2021     Lab Results   Component Value Date     11/28/2021    POTASSIUM 3.6 11/28/2021    CHLORIDE 104 11/28/2021    CO2 26 11/28/2021     (H) 11/28/2021     Lab Results   Component Value Date    BUN 80 (H) 11/28/2021    CR 1.94 (H) 11/28/2021     Lab Results   Component Value Date    MAG 2.0 11/28/2021     Lab Results   Component Value  Date    PHOS 4.0 11/28/2021       Creatinine   Date Value Ref Range Status   11/28/2021 1.94 (H) 0.66 - 1.25 mg/dL Final   11/27/2021 1.68 (H) 0.66 - 1.25 mg/dL Final   11/26/2021 1.94 (H) 0.66 - 1.25 mg/dL Final   11/25/2021 1.66 (H) 0.66 - 1.25 mg/dL Final   11/24/2021 2.05 (H) 0.66 - 1.25 mg/dL Final   11/23/2021 2.90 (H) 0.66 - 1.25 mg/dL Final   07/11/2021 1.82 (H) 0.66 - 1.25 mg/dL Final   07/10/2021 2.05 (H) 0.66 - 1.25 mg/dL Final   07/09/2021 2.17 (H) 0.66 - 1.25 mg/dL Final   07/08/2021 2.29 (H) 0.66 - 1.25 mg/dL Final   07/07/2021 2.32 (H) 0.66 - 1.25 mg/dL Final   07/07/2021 2.72 (H) 0.66 - 1.25 mg/dL Final       Attestation:  I have reviewed today's vital signs, notes, medications, labs and imaging.  Seen on dialysis.      Imer Ba MD

## 2021-11-29 NOTE — PROGRESS NOTES
" Renal Medicine Progress Note            Assessment/Plan:     76 y.o man with CKD IIIB and following for dialysis dependent OPAL.     # CKD IIIB: Admitted with a serum creatinine of 1.54 mg/dl.     # Acute kidney injury:    -started HD on 11/23 and last HD tx on 11/28    # FEN: Some edema but not bad. Electrolytes are okya.     # COVID  19 PNA. 11/4: Consider recovered. Remains intubated.     # Severe aortic stenosis s/p TAVR 10/26. EF 60-65%.     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    Plan:  # IHD TTS schedule. Next HD treatment is on Tuesday.   # IV loop diuretic as needed. He has some urine output.           Interval History:     Afebrile. VSS. FIO2 at 40%. Had HD yesterday. Some urine output but not much. NE at 0.02 mcg.           Medications and Allergies:       - MEDICATION INSTRUCTIONS for Dialysis Patients -   Does not apply See Admin Instructions     apixaban ANTICOAGULANT  5 mg Oral or Feeding Tube BID     atorvastatin  80 mg Oral or Feeding Tube Daily     B and C vitamin Complex with folic acid  5 mL Per Feeding Tube Daily     [Held by provider] carvedilol  12.5 mg Oral BID     chlorhexidine  15 mL Mouth/Throat Q12H     [Held by provider] hydrALAZINE  50 mg Oral or Feeding Tube Q8H MOE     insulin aspart  1-12 Units Subcutaneous Q4H     insulin glargine  18 Units Subcutaneous BID     pantoprazole  40 mg Oral or Feeding Tube BID AC     sodium chloride (PF)  10-40 mL Intracatheter Q7 Days     sodium chloride (PF)  3 mL Intracatheter Q8H     [Held by provider] tamsulosin  0.4 mg Oral Daily     vancomycin place vaughan - receiving intermittent dosing  1 each Intravenous See Admin Instructions        Allergies   Allergen Reactions     Penicillins Anaphylaxis            Physical Exam:   Vitals were reviewed   , Blood pressure 113/52, pulse 57, temperature 97  F (36.1  C), resp. rate 18, height 1.753 m (5' 9\"), weight 112.6 kg (248 lb 3.8 oz), SpO2 98 %.    Wt Readings from Last 3 Encounters:   11/29/21 112.6 kg (248 lb 3.8 " oz)   10/21/21 112 kg (247 lb)   10/11/21 109.5 kg (241 lb 4.8 oz)       Intake/Output Summary (Last 24 hours) at 11/29/2021 1058  Last data filed at 11/29/2021 1000  Gross per 24 hour   Intake 2051.48 ml   Output 2180 ml   Net -128.52 ml       GENERAL APPEARANCE: critically ill.   HEENT:  Eyes are closed. Intubated.  RESP: Coarse BS anteriorly.   CV:  Irregular.   ABDOMEN: obese, soft.   EXTREMITIES/SKIN: no rashes/lesions on observed skin; + edema. Hands are puffy.   NEURO: Sedated.         Data:     CBC RESULTS:     Recent Labs   Lab 11/29/21  0353 11/28/21  0346 11/27/21  0404 11/26/21  0436 11/25/21  0434 11/24/21  1257 11/24/21  0530 11/24/21  0412   WBC 4.3 3.7* 4.5 5.2 6.3  --   --  8.9   RBC 3.28* 3.31* 3.21* 3.25* 3.22*  --   --  3.74*   HGB 8.7* 8.8* 8.5* 8.4* 8.3* 9.1*   < > 9.6*   HCT 28.6* 28.4* 27.7* 27.5* 27.3*  --   --  31.0*   PLT 86* 72* 63* 56* 54*  --   --  66*    < > = values in this interval not displayed.       Basic Metabolic Panel:  Recent Labs   Lab 11/29/21  0844 11/29/21  0353 11/29/21  0352 11/28/21  2309 11/28/21 2005 11/28/21  1551 11/28/21  1532 11/28/21  0841 11/28/21  0346 11/27/21  0859 11/27/21  0404 11/26/21  0438 11/26/21  0436 11/25/21  0814 11/25/21  0434 11/24/21  0414 11/24/21  0412   NA  --  138  --   --   --   --   --   --  138  --  137  --  138  --  137  --  138   POTASSIUM  --  3.6  --   --   --   --  3.4  --  3.6  --  3.8  --  3.8  --  3.9  --  4.2   CHLORIDE  --  105  --   --   --   --   --   --  104  --  104  --  103  --  102  --  103   CO2  --  28  --   --   --   --   --   --  26  --  27  --  28  --  28  --  30   BUN  --  54*  --   --   --   --   --   --  80*  --  65*  --  101*  --  95*  --  134*   CR  --  1.63*  --   --   --   --   --   --  1.94*  --  1.68*  --  1.94*  --  1.66*  --  2.05*   GLC 89 78 73 112* 94 102*  --    < > 177*  159*   < > 164*  139*   < > 79   < > 197*   < > 76   GABE  --  8.1*  --   --   --   --   --   --  8.1*  --  8.0*  --  8.2*  --   8.1*  --  8.5    < > = values in this interval not displayed.       INR  Recent Labs   Lab 11/23/21  1134   INR 1.51*      Attestation:   I have reviewed today's relevant vital signs, notes, medications, labs and imaging.    Driss Hogan MD  [facility] - Nephrology  Office phone :[phone]  Pager: [phone]

## 2021-11-29 NOTE — PLAN OF CARE
End of Shift Nursing Summary     Neuro:  Propofol weaned to 10. No response to voice/stimulation. Pt biting suction cath/yawning.  Pain: PRN dilaudid if needed.  CV:  Afib with CVR. MAP>65; levo off this am.   Pulm: SBT 12/5 since noon. Scant secretions.   GI/: giron; small amounts of urine. OG w/ TF @goal.   Endocrine: Covered with lantus and sliding scale insulin.  Skin: treated per poc.  Scattered areas weeping on LUE.  Covered with meplix  Fever: Afebrile.  Lines/drips: right internal jugular for dialysis.  Left picc.     *See EPIC flowsheet for full nursing assessment findings    Daly Zuleta RN 11/29/21 2:54 PM

## 2021-11-29 NOTE — PROGRESS NOTES
[facility] ICU RESPIRATORY NOTE        Date of Admission: 10/26/2021    Date of Intubation (most recent): 11/15/2021    Reason for Mechanical Ventilation: Respiratory failure    Number of Days on Mechanical Ventilation:     Met Criteria for Spontaneous Breathing Trial: Yes     Significant Events Today: Pressure supported Started PS at 15:00, still continues pressure support after 19:00.    ABG Results:   Recent Labs   Lab 11/29/21  0353 11/28/21  0346 11/27/21  0404 11/27/21  0232   PH 7.41 7.45 7.43 7.43   PCO2 47* 42 44 45   PO2 106* 73* 173* 214*   HCO3 30* 29* 29* 30*   O2PER 40 40 80 100       Current Vent Settings: Ventilation Mode: CPAP/PS  (Continuous positive airway pressure with Pressure Support)  FiO2 (%): 40 %  Rate Set (breaths/minute): 12 breaths/min  Tidal Volume Set (mL): 500 mL  PEEP (cm H2O): 5 cmH2O  Pressure Support (cm H2O): 10 cmH2O  Oxygen Concentration (%): 40 %      Skin Assessment: Intact    Plan: Continue to monitor    Cristina Gallagher

## 2021-11-29 NOTE — PROGRESS NOTES
[facility] ICU RESPIRATORY NOTE           Date of Admission: 10/26/2021     Date of Intubation (most recent): 11/15/2021     Reason for Mechanical Ventilation: RESP. FAILURE     Number of Days on Mechanical Ventilation: 15     Met Criteria for Spontaneous Breathing Trial: Yes      ABG Results:   Recent Labs   Lab 11/28/21  0346 11/27/21  0404 11/27/21  0232 11/26/21  0437   PH 7.45 7.43 7.43 7.46*   PCO2 42 44 45 43   PO2 73* 173* 214* 74*   HCO3 29* 29* 30* 31*   O2PER 40 80 100 40     Ventilation Mode: CMV/AC  (Continuous Mandatory Ventilation/ Assist Control)  FiO2 (%): 40 %  Rate Set (breaths/minute): 12 breaths/min  Tidal Volume Set (mL): 500 mL  PEEP (cm H2O): 5 cmH2O  Pressure Support (cm H2O): 5 cmH2O  Oxygen Concentration (%): 40 %    NACHO Kelley, RRT

## 2021-11-29 NOTE — PROGRESS NOTES
[facility]    ICU Progress Note       Date of Admission:  10/26/2021    Assessment: Critical Care   Burton Elizabeth is a 76 year old male with PMHx of coronary artery disease, atrial flutter on eliquis, hypertension, hyperlipidemia, severe aortic stenosis, HFpEF, CKD stage III, diabetes mellitus II, history of GI bleed, suspected obstructive sleep apnea admitted on 10/26/2021 for elective TAVR.  Hospital course complicated by OPAL from contrast nephropathy, volume overload, hypoxic respiratory failure multifactorial, Covid pneumonia and physical deconditioning.  Also proteus UTI and bacteremia.  On 11/15 the patient was intubated for severe hypoxic respiratory failure. Patient was proned and paralyzed. Supined by 11/16, off paralytics by 11/18, velitri weaned off on 11/19.  11/29/2021- covid recovered, stable on minimal vent settings, MRSA VAP       Plan: Critical Care   Neuro/ pain/ sedation:  Sedated for ventilator optimization  - Monitor neurological status. Notify provider for any acute changes in exam  - wean sedation for PST daily, RASS goal of 0 to -1    Pulmonary:  Acute hypoxic respiratory failure secondary to covid pneumonia,   MRSA VAP  - Ventilator settings: Ventilation Mode: CMV/AC  (Continuous Mandatory Ventilation/ Assist Control)  FiO2 (%): 40 %  Rate Set (breaths/minute): 12 breaths/min  Tidal Volume Set (mL): 500 mL  PEEP (cm H2O): 5 cmH2O  Pressure Support (cm H2O): 5 cmH2O  Oxygen Concentration (%): 40 %    - supplemental oxygen to keep saturation about 92%  - daily PST     Cardiovascular:  Severe aortic stenosis, s/p TAVR 10/26   A fib/flutter, rate controlled   Hypotension/vasoplegia, likely secondary to propofol  - continue eliquis  - titrate levophed as tolerated    GI/Nutrition:  GI bleed, s/p colonoscopy and EGD: hemorrhoids and erosions in hiatal hernia without active bleeding  - Diet: Adult Formula Drip Feeding: Specified Time Novasource Renal; Orogastric tube;  Goal Rate: 35; mL/hr; Medication - Feeding Tube Flush Frequency: At least 15-30 mL water before and after medication administration and with tube clogging; Amount to Sen...  NPO per Anesthesia Guidelines for Procedure/Surgery Except for: Meds, Other; Specify: Stop tube feeds at midnight    - Nutrition consulted. Appreciate recommendations.   - continue tube feeds  - continue BID protonix    Renal/ Fluid Balance:   ARF on CKD, now on HD  Volume overload, net +6.4L   - Will monitor intake and output  - KVO for IV fluid hydration  - ICU electrolyte replacement protocol   - HD per nephrology    Endocrine:  DM II   Stress induced hyperglycemia, resolving  - decrease lantus to home dose of 18U BID    ID:  Covid pneumonia  Proteus bacteremia and UTI, treated  MRSA VAP  - completed decadron  - continue Vancomycin 11/14    Hematology:  Coagulopathy of covid  Thrombocytopenia, improving, multifactorial  - continue eliquis    MSK:   Deconditioning       Lines/ tubes/ drains:  Zuniga Catheter: PRESENT, indication: Retention, Strict 1-2 Hour I&O  Central Lines: PRESENT  PICC Triple Lumen 11/15/21 Left Basilic access-Site Assessment: WDL  CVC Double Lumen Right Internal jugular-Site Assessment: WDL    Prophylaxis:  - DVT Prophylaxis: DOAC  - PUD Prophylaxis: protonix BID    Code Status: Full Code      Disposition:  - ICU, critical     The patient's care was discussed with the multi-disciplinary team Team.  Will call brother after rounds    Greg River MD  [facility]  Securely message with the Vocera Web Console (learn more here)  Text page via [employer] Paging/Directory      ____________________________________________________________________    Interval History   No acute events overnight. Vent stable    Physical Exam   Vital Signs: Temp: 96.8  F (36  C) Temp src: Bladder BP: 113/52 Pulse: 64     SpO2: 99 % O2 Device: Mechanical Ventilator    Weight: 248 lbs 3.81 oz    Gen- intubated, sedated, no acute  distress, appears older than stated age  HEENT- anicteric, MMM, ETT in place   Neck- supple, trachea midline  Pulm- Coarse breath sounds  Cor- irregularly, irregular, no P waves appreciated on tele  Abd- soft, obese, no guarding/rigidity, no appreciable HSM  Ext- non-pitting edema bilaterally, calves supple  Skin- no jaundice, no rashes    Data   I reviewed all medications, new labs and imaging results over the last 24 hours.  Arterial Blood Gases   Recent Labs   Lab 11/29/21  0353 11/28/21  0346 11/27/21  0404 11/27/21  0232   PH 7.41 7.45 7.43 7.43   PCO2 47* 42 44 45   PO2 106* 73* 173* 214*   HCO3 30* 29* 29* 30*       Complete Blood Count   Recent Labs   Lab 11/29/21  0353 11/28/21  0346 11/27/21  0404 11/26/21  0436   WBC 4.3 3.7* 4.5 5.2   HGB 8.7* 8.8* 8.5* 8.4*   PLT 86* 72* 63* 56*       Basic Metabolic Panel  Recent Labs   Lab 11/29/21  0844 11/29/21  0353 11/29/21  0352 11/28/21  2309 11/28/21  1551 11/28/21  1532 11/28/21  0841 11/28/21  0346 11/27/21  0859 11/27/21  0404 11/26/21  0438 11/26/21  0436   NA  --  138  --   --   --   --   --  138  --  137  --  138   POTASSIUM  --  3.6  --   --   --  3.4  --  3.6  --  3.8  --  3.8   CHLORIDE  --  105  --   --   --   --   --  104  --  104  --  103   CO2  --  28  --   --   --   --   --  26  --  27  --  28   BUN  --  54*  --   --   --   --   --  80*  --  65*  --  101*   CR  --  1.63*  --   --   --   --   --  1.94*  --  1.68*  --  1.94*   GLC 89 78 73 112*   < >  --    < > 177*  159*   < > 164*  139*   < > 79    < > = values in this interval not displayed.       Liver Function Tests  Recent Labs   Lab 11/23/21  1134   INR 1.51*       Pancreatic Enzymes  No lab results found in last 7 days.    Coagulation Profile  Recent Labs   Lab 11/23/21  1134   INR 1.51*   PTT 36       IMAGING:  No results found for this or any previous visit (from the past 24 hour(s)).

## 2021-11-30 NOTE — PLAN OF CARE
Neuro: Intubated and sedated. Moving head and opening eyes slightly but does not follow commands nor track. Pupils equal but sluggish. Weaning down sedation with no improvement in neuro status.  CV: Baseline afib CVR.  Respiratory: Pressure support all night 12/5 35% FiO2.  GI/: Zuniga with some urine output, appears to be making a little more urine than before. No BM overnight.  Skin: Generalized bruising, edematous. Skin weeping in arms. Preexisting coccyx wound. WOC following  Activity: Bedrest  Diet: OG in place. TF at goal rate 35/hr.  Drips: Propofol at very low dose. Levophed restarted briefly but turned off again and patient had maintained blood pressure goals the rest of the night.   Plan: Wean sedation and ventilatory support as patient tolerates.

## 2021-11-30 NOTE — PROGRESS NOTES
ICU Progress Note       Date of Admission:  10/26/2021    Assessment: Critical Care   Burton Elizabeth is a 76 year old male with PMHx of coronary artery disease, atrial flutter on eliquis, hypertension, hyperlipidemia, severe aortic stenosis, HFpEF, CKD stage III, diabetes mellitus II, history of GI bleed, suspected obstructive sleep apnea admitted on 10/26/2021 for elective TAVR.  Hospital course complicated by OPAL from contrast nephropathy, volume overload, hypoxic respiratory failure multifactorial, Covid pneumonia and physical deconditioning.  Also proteus UTI and bacteremia.  On 11/15 the patient was intubated for severe hypoxic respiratory failure. Patient was proned and paralyzed. Supined by 11/16, off paralytics by 11/18, velitri weaned off on 11/19.  11/29/2021- covid recovered, stable on minimal vent settings, MRSA VAP  11/30/2021- tolerating CPAP/PS since yesterday midday.        Plan: Critical Care   Neuro/ pain/ sedation:  On minimal sedation, but slow to wake up  Encephalopathy, multifactorial  - Monitor neurological status. Notify provider for any acute changes in exam  - turn off sedation, will use PRNs for agitation     Pulmonary:  Acute hypoxic respiratory failure secondary to covid pneumonia,   MRSA VAP  - Ventilator settings: Ventilation Mode: CPAP/PS  (Continuous positive airway pressure with Pressure Support)  FiO2 (%): 35 %  Rate Set (breaths/minute): 12 breaths/min  Tidal Volume Set (mL): 500 mL  PEEP (cm H2O): 5 cmH2O  Pressure Support (cm H2O): 10 cmH2O  Oxygen Concentration (%): 35 %  Resp: (!) 38    - supplemental oxygen to keep saturation about 92%  - pressure support as tolerated    Cardiovascular:  Severe aortic stenosis, s/p TAVR 10/26   A fib/flutter, rate controlled   Hypotension/vasoplegia, likely secondary to propofol, currently off of levophed  - continue eliquis  - titrate levophed as tolerated    GI/Nutrition:  GI bleed, s/p colonoscopy  and EGD: hemorrhoids and erosions in hiatal hernia without active bleeding  - Diet: Adult Formula Drip Feeding: Specified Time Novasource Renal; Orogastric tube; Goal Rate: 35; mL/hr; Medication - Feeding Tube Flush Frequency: At least 15-30 mL water before and after medication administration and with tube clogging; Amount to Sen...  NPO per Anesthesia Guidelines for Procedure/Surgery Except for: Meds, Other; Specify: Stop tube feeds at midnight    - Nutrition consulted. Appreciate recommendations.   - continue tube feeds  - continue BID protonix    Renal/ Fluid Balance:   ARF on CKD, now on HD  - Will monitor intake and output  - KVO for IV fluid hydration  - ICU electrolyte replacement protocol   - HD per nephrology    Endocrine:  DM II   Stress induced hyperglycemia, resolving  - controlled on home dose of Lantus + sliding scale insulin     ID:  Covid pneumonia, reoovered  Proteus bacteremia and UTI, treated  MRSA VAP  - completed decadron  - continue Vancomycin 12/14    Hematology:  Coagulopathy of covid  Thrombocytopenia, improving, multifactorial  - continue eliquis    MSK:   Deconditioning, PT/OT once more awake and follow commands       Lines/ tubes/ drains:  Zuniga Catheter: PRESENT, indication: Retention, Strict 1-2 Hour I&O  Central Lines: PRESENT  PICC Triple Lumen 11/15/21 Left Basilic access-Site Assessment: WDL  CVC Double Lumen Right Internal jugular-Site Assessment: WDL    Prophylaxis:  - DVT Prophylaxis: DOAC  - PUD Prophylaxis: protonix BID    Code Status: Full Code      Disposition:  - ICU, critical     The patient's care was discussed with the multi-disciplinary team Team.  Will call brother after rounds    Greg River MD  [facility]  Securely message with the Vocera Web Console (learn more here)  Text page via [place] Paging/Directory      ____________________________________________________________________    Interval History   No acute events overnight. Tolerating  PST    Physical Exam   Vital Signs: Temp: 99.7  F (37.6  C) Temp src: Bladder BP: (!) 175/58 Pulse: 71   Resp: (!) 38 SpO2: 98 % O2 Device: Mechanical Ventilator    Weight: 250 lbs 7.08 oz    Gen- intubated, sedated, nontoxic appearing, appears older than stated age  HEENT- anicteric, MMM, ETT in place   Neck- supple, trachea midline  Pulm- Coarse breath sounds  Cor- irregularly, irregular, no P waves appreciated on tele  Abd- soft, obese, no guarding/rigidity, no appreciable HSM  Ext- non-pitting edema bilaterally, calves supple  Skin- no jaundice, no rashes    Data   I reviewed all medications, new labs and imaging results over the last 24 hours.  Arterial Blood Gases   Recent Labs   Lab 11/30/21 0441 11/29/21  0353 11/28/21  0346 11/27/21  0404   PH 7.43 7.41 7.45 7.43   PCO2 44 47* 42 44   PO2 67* 106* 73* 173*   HCO3 29* 30* 29* 29*       Complete Blood Count   Recent Labs   Lab 11/30/21  0441 11/29/21  0353 11/28/21  0346 11/27/21  0404   WBC 3.4* 4.3 3.7* 4.5   HGB 8.8* 8.7* 8.8* 8.5*   PLT 97* 86* 72* 63*       Basic Metabolic Panel  Recent Labs   Lab 11/30/21  0810 11/30/21  0444 11/30/21  0441 11/30/21  0003 11/29/21  0844 11/29/21  0353 11/28/21  1551 11/28/21  1532 11/28/21  0841 11/28/21  0346 11/27/21  0859 11/27/21  0404   NA  --   --  139  --   --  138  --   --   --  138  --  137   POTASSIUM  --   --  3.7  --   --  3.6  --  3.4  --  3.6  --  3.8   CHLORIDE  --   --  106  --   --  105  --   --   --  104  --  104   CO2  --   --  30  --   --  28  --   --   --  26  --  27   BUN  --   --  71*  --   --  54*  --   --   --  80*  --  65*   CR  --   --  1.86*  --   --  1.63*  --   --   --  1.94*  --  1.68*   * 135* 142* 159*   < > 78   < >  --    < > 177*  159*   < > 164*  139*    < > = values in this interval not displayed.       Liver Function Tests  Recent Labs   Lab 11/23/21  1134   INR 1.51*       Pancreatic Enzymes  No lab results found in last 7 days.    Coagulation Profile  Recent Labs   Lab  11/23/21  1134   INR 1.51*   PTT 36       IMAGING:  No results found for this or any previous visit (from the past 24 hour(s)).

## 2021-11-30 NOTE — PROGRESS NOTES
[facility] ICU RESPIRATORY NOTE        Date of Admission: 10/26/2021    Date of Intubation (most recent): 11/15/21    Reason for Mechanical Ventilation: Respiratory failure    Number of Days on Mechanical Ventilation: 17    Met Criteria for Spontaneous Breathing Trial: Yes      Significant Events Today: Pt remained on PS all night without complication.    ABG Results:   Recent Labs   Lab 11/29/21  0353 11/28/21  0346 11/27/21  0404 11/27/21  0232   PH 7.41 7.45 7.43 7.43   PCO2 47* 42 44 45   PO2 106* 73* 173* 214*   HCO3 30* 29* 29* 30*   O2PER 40 40 80 100       Current Vent Settings: Ventilation Mode: CPAP/PS  (Continuous positive airway pressure with Pressure Support)  FiO2 (%): 35 %  Rate Set (breaths/minute): 12 breaths/min  Tidal Volume Set (mL): 500 mL  PEEP (cm H2O): 5 cmH2O  Pressure Support (cm H2O): 10 cmH2O  Oxygen Concentration (%): 35 %  Resp: 25      Skin Assessment: Intact    Plan: Continue to monitor and assess respiratory status while in ICU.  Continue to maintain ETT patency.  Continue to wean from mechanical ventilation and oxygen as tolerated per protocol.  Assess skin integrity at least once per shift.    Greg Solomon, RT

## 2021-11-30 NOTE — PROGRESS NOTES
CLINICAL NUTRITION SERVICES - REASSESSMENT NOTE      Recommendations Ordered by Registered Dietitian (RD):   Add 3 packets Prosource daily = 120 kcal, 33 g protein    Novasource Renal @ 35 ml/hr (840 ml) provides 1680 kcal, 76 g pro, 154 g CHO, 602 ml free water, and 0 g fiber daily.   TOTAL (TF+ Prosource) = 1800 kcal (17 kcal/kg) and 109 g protein (1.5 g/kg)   Malnutrition: (11/30)  % Weight Loss:  Up to 5% in 1 month (moderate malnutrition)  % Intake:  Decreased intake does not meet criteria for malnutrition   Subcutaneous Fat Loss:  Upper arm region mild depletion and Thoracic region mild depletion  Muscle Loss:  Clavicle bone region mild depletion, Acromion bone region mild depletion and Scapular bone region mild depletion  Fluid Retention:  Moderate 2-3+     Malnutrition Diagnosis: Moderate malnutrition  In Context of:  Acute illness or injury       EVALUATION OF PROGRESS TOWARD GOALS   Diet:  NPO (vented)    Nutrition Support:  TF formula changed on 11/23. Off on 11/24 for EGD and flex sig. Restarted afterwards at goal and continues as follows ~      Nutrition Support Enteral:  Type of Feeding Tube: OGT (11/15)  Enteral Frequency:  Continuous  Enteral Regimen: Novasource Renal at goal 35 ml/hr   Total Enteral Provisions: 1680 kcals (16 kcal/kg), 76 gm pro (1.1 gm/kg), 602 mL H20, 154 gm CHO, no fiber   Free Water Flush: 30 ml q 4 hours   Nephronex daily for micronutrient needs       Intake/Tolerance: Pt has received an average of 747 ml formula daily over the past 6 days providing 1494 kcal (14 kcal/kg, 96% energy needs) and 68 g protein (0.9 g/kg, 72% reassessed protein needs).     - Lytes WNL  - BUN 71 (H), Cr 1.86 (H), GFR 35 (L) --> Last HD run on 11/28. Lasix dose today per provider in rounds - pt producing more urine.   - -159 --> 18 units lantus BID (home regimen) + high sliding scale insulin for correction   - Last BM on 11/27  - I/O 1702/415 (11/29), wt 113.6 kg (11/30) --> overall stable from  admission with fluctuations likely 2/2 fluid status       ASSESSED NUTRITION NEEDS - updated:  Dosing Weight:  104 kg for energy (lowest wt this adm on 11/5), 72.7 kg for protein (IBW)  Estimated Energy Needs: 6489-5729 kcal (15-20 kcal/kg)  Justification: vented, obesity   Estimated Protein Needs: + grams (1.3-1.5+ g/kg)  Justification: iHD, repletion, hypercatabolism with critical illness       NEW FINDINGS:   - Meds: propofol off this morning   - Skin: Per WOCN on 11/26 -- bilateral buttock wounds are improving. Also noted to have L posterior thigh wounds due to unknown etiology, possible edema related.   - Renal: Last HD run on 11/28 with epo, hectorol and venofer given. Dose of lasix given today in place of HD d/t increasing urine output per MD.    - Resp: Remains intubated, PST overnight     Previous Goals:   TF Novasource Renal at 35 mL/hr + Propofol will meet % estimated needs  Evaluation: Not met    Previous Nutrition Diagnosis:   Altered nutrition-related lab values (high Phos, Mg, BUN and Cr) related to OPAL on CKD and patient on a standard EN formula as evidenced by Phos 4.8, Mg 2.6, , and Cr 2.9.  Evaluation: Improving      CURRENT NUTRITION DIAGNOSIS  Inadequate protein intake related to need for increased protein with HD start and BUN decreasing as evidenced by currently meeting 72% low end of reassessed protein needs     INTERVENTIONS  Recommendations / Nutrition Prescription  Add 3 packets Prosource daily = 120 kcal, 33 g protein    Novasource Renal @ 35 ml/hr (840 ml) provides 1680 kcal, 76 g pro, 154 g CHO, 602 ml free water, and 0 g fiber daily.   TOTAL (TF+ Prosource) = 1800 kcal (17 kcal/kg) and 109 g protein (1.5 g/kg)    Implementation  Medical Food Supplement  Collaboration and Referral of Nutrition care - pt discussed this morning during interdisciplinary rounds     Goals  TF + Prosource will meet % assessed nutrition needs       MONITORING AND EVALUATION:  Progress  towards goals will be monitored and evaluated per protocol and Practice Guidelines      Pooja Cortez RD, LD  ICU RD Pager: [phone]

## 2021-11-30 NOTE — PROGRESS NOTES
" Renal Medicine Progress Note            Assessment/Plan:       76 y.o man with CKD IIIB and following for dialysis dependent OPAL.      # CKD IIIB: Admitted with a serum creatinine of 1.54 mg/dl.      # Acute kidney injury:                -started HD on 11/23 and last HD tx on 11/28    -R temp internal jugular      # FEN: Some edema but not bad. Electrolytes are okya.      # COVID  19 PNA. 11/4: Consider recovered. Remains intubated.      # Severe aortic stenosis s/p TAVR 10/26. EF 60-65%.      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    # Anemia and thrombocytopenia: Hgb and PLT seem stable.     Plan:  # No need for hemodialysis today.  # Okay with IV Lasix    I discussed the case with the ICU team        Interval History:     Afebrile. VSS. FIO2 down to 35%. He is making some urine. Scr is slightly up but not bad.           Medications and Allergies:       - MEDICATION INSTRUCTIONS for Dialysis Patients -   Does not apply See Admin Instructions     apixaban ANTICOAGULANT  5 mg Oral or Feeding Tube BID     atorvastatin  80 mg Oral or Feeding Tube Daily     B and C vitamin Complex with folic acid  5 mL Per Feeding Tube Daily     [Held by provider] carvedilol  12.5 mg Oral BID     chlorhexidine  15 mL Mouth/Throat Q12H     [Held by provider] hydrALAZINE  50 mg Oral or Feeding Tube Q8H MOE     insulin aspart  1-12 Units Subcutaneous Q4H     insulin glargine  18 Units Subcutaneous BID     pantoprazole  40 mg Oral or Feeding Tube BID AC     sodium chloride (PF)  10-40 mL Intracatheter Q7 Days     sodium chloride (PF)  3 mL Intracatheter Q8H     [Held by provider] tamsulosin  0.4 mg Oral Daily     vancomycin place vaughan - receiving intermittent dosing  1 each Intravenous See Admin Instructions        Allergies   Allergen Reactions     Penicillins Anaphylaxis            Physical Exam:   Vitals were reviewed   , Blood pressure (!) 175/58, pulse 71, temperature 99.7  F (37.6  C), resp. rate (!) 38, height 1.753 m (5' 9\"), weight 113.6 kg " (250 lb 7.1 oz), SpO2 98 %.    Wt Readings from Last 3 Encounters:   11/30/21 113.6 kg (250 lb 7.1 oz)   10/21/21 112 kg (247 lb)   10/11/21 109.5 kg (241 lb 4.8 oz)       Intake/Output Summary (Last 24 hours) at 11/30/2021 1016  Last data filed at 11/30/2021 0800  Gross per 24 hour   Intake 1174.35 ml   Output 645 ml   Net 529.35 ml     GENERAL APPEARANCE: critically ill.   HEENT:  Eyes are closed. Intubated.  RESP: Coarse BS anteriorly.   CV:  regular   ABDOMEN: obese, soft.   EXTREMITIES/SKIN: no rashes/lesions on observed skin; + edema in the LE but not bad.. Hands are puffy.   NEURO: Sedated.  R temp internal jugular CDI  +giron catheter in place with some urine.         Data:     CBC RESULTS:     Recent Labs   Lab 11/30/21  0441 11/29/21  0353 11/28/21  0346 11/27/21  0404 11/26/21  0436 11/25/21  0434   WBC 3.4* 4.3 3.7* 4.5 5.2 6.3   RBC 3.32* 3.28* 3.31* 3.21* 3.25* 3.22*   HGB 8.8* 8.7* 8.8* 8.5* 8.4* 8.3*   HCT 29.2* 28.6* 28.4* 27.7* 27.5* 27.3*   PLT 97* 86* 72* 63* 56* 54*       Basic Metabolic Panel:  Recent Labs   Lab 11/30/21  0810 11/30/21  0444 11/30/21  0441 11/30/21  0003 11/29/21  1950 11/29/21  1532 11/29/21  0844 11/29/21  0353 11/28/21  1551 11/28/21  1532 11/28/21  0841 11/28/21  0346 11/27/21  0859 11/27/21  0404 11/26/21  0438 11/26/21  0436 11/25/21  0814 11/25/21  0434   NA  --   --  139  --   --   --   --  138  --   --   --  138  --  137  --  138  --  137   POTASSIUM  --   --  3.7  --   --   --   --  3.6  --  3.4  --  3.6  --  3.8  --  3.8  --  3.9   CHLORIDE  --   --  106  --   --   --   --  105  --   --   --  104  --  104  --  103  --  102   CO2  --   --  30  --   --   --   --  28  --   --   --  26  --  27  --  28  --  28   BUN  --   --  71*  --   --   --   --  54*  --   --   --  80*  --  65*  --  101*  --  95*   CR  --   --  1.86*  --   --   --   --  1.63*  --   --   --  1.94*  --  1.68*  --  1.94*  --  1.66*   * 135* 142* 159* 147* 137*   < > 78   < >  --    < > 177*  159*    < > 164*  139*   < > 79   < > 197*   GABE  --   --  8.4*  --   --   --   --  8.1*  --   --   --  8.1*  --  8.0*  --  8.2*  --  8.1*    < > = values in this interval not displayed.       INR  Recent Labs   Lab 11/23/21  1134   INR 1.51*      Attestation:   I have reviewed today's relevant vital signs, notes, medications, labs and imaging.    Driss Hogan MD  [facility] - Nephrology  Office phone :[phone]  Pager: [phone]

## 2021-11-30 NOTE — PLAN OF CARE
End of Shift Nursing Summary    Neuro: off sedation 0720 11/30/2021. Doesn't follow or track. Perrla.   CV:  Afib cvr on eliquis.HTN. started on coreg. Prn hydralazine. Generalized/dependent 2-3+ edema  Pulm: on PS since 11/29 3PM. Minimal secretions. Diminished lung sounds  GI/: TF at goal. Elevated BS on sliding scale insulin. No HD today.80 Lasix IV given with minimal return.  Skin: paper thin skin. Right arm with new skin tear. Weeping from both hands.   Family: updated by MD. Family used video ipad this evening  with   POC:     *See EPIC flowsheet for full nursing assessment findings

## 2021-12-01 NOTE — PROGRESS NOTES
"[facility] Nurse Inpatient Wound Assessment   Reason for consultation: Re-evaluate and treat bilateral buttocks wounds    Assessment  Bilateral buttocks wounds due to mixed etiology Pressure Injury, Friction, Shear, and Moisture Associated Skin Damage (MASD)  Status: stable     L posterior thigh wounds due to unknown etiology- multiple open, partial thickness areas not consistent with pressure. Possibly edema related- blanchable epidermis- mostly healed    NEW 12/1  L arm wound due to skin tear and edema.- Initial Assessment    Treatment Plan      LEFT Sacrum & Left Posterior thigh wound: Every 3 days     Cleanse the area with NS and pat dry.    Apply No sting film barrier to periwound skin.    Cover Sacrum with Sacral Mepilex (#[id number]) and Left posterior thigh with Mepilex 4x4    Change dressing Q 3 days.    Turn and reposition Q 2hrs side to side only.    Ensure pt has Yao-cushion while sitting up in the chair.    FYI- If pt has constant incontinent loose stools needing dressing changes Q shift please discontinue the Mepilex dressing and apply criticaid barrier paste BID and PRN.      PIP ACTIVITY MEASURES:  If pt is refusing to turn or reposition they must be educated on the  potential injury from not off loading pressure.  Then this \"educated refusal\" needs to be documented as an \"educated refusal to turn/ reposition\" and document if alert, etc. Additionally, if repeated refusals ensure the charge nurse, nurse manager and the provider is aware.  Follow Sudarshan Risk recommendations      CHAIR: Pt should sit on a chair cushion ([id number]) when up to the chair and not sit for more than one hour at a time before fully offloading backside (either stand for a couple of minutes and/or return to bed, positioning on a side) to relieve pressure and re-perfuse tissue.  Additionally, encourage pt to shift side to side every 15 minutes, too.    NOTE: the Z-Flow Pad is NOT a cushion, pt should NOT sit on the Z-Flow pads      BED:  " "Reposition side to side every 1-2 hours when awake.     No direct supine positioning, position only side to side    Consider Z-Patrick Pillows to help keep pt on side (#[id number] or #[id number]- large). *Z-Flows are for positioning, DO NOT SIT ON!    Keep heels elevated    As able keep HOB below 30 degrees    Evaluate for specialty mattress    Use TAPS wedges and perform frequent microturns as pt tolerates       L forearm wound: Q3D and PRN for drainage.  1. Cleanse with wound cleanser. Pat Dry.  2. Apply Cavilon No Sting Barrier Film to Periwound skin.  3. Cover with oil emulsion gauze (adaptic) and secure with Mepilex 4x4 or ABD secured with kerrlix depending on drainage.  4. Time and Date Dressing and place arrow on dressing in direction of the arrow.    Orders Reviewed and Updated  Recommended provider order: None, at this time  WOC Nurse follow-up plan:weekly  Nursing to notify the Provider(s) and re-consult the WOC Nurse if wound(s) deteriorates or new skin concern.    Patient History  According to provider note(s):  \" 76 year old male who presented to [facility] on 10/26/2021 for planned TAVR.  The procedure was well-tolerated is performed in [place] has been admitted to [facility] CCU post procedure.  The hospital medicine service has been consulted for medical co-management.   Aortic stenosis, severe, now status post TAVR, 10/26/2021  Heart failure with preserved EF, secondary to aortic stenosis  [place] has been followed as an outpatient by Dr. Neumann, cardiology, for symptomatic aortic stenosis.  He was deemed suitable for TAVR, which was well-tolerated today.  Last echocardiogram on 8/16/2021 with normal LV size, systolic function, severe AS, trace to mild MR, no TR.  -Admit inpatient, IMC status  -Full plan of care per TAVR team  -Started on Eliquis postop, defer resuming ASA to TAVR service\"    Objective Data  Containment of urine/stool: Incontinence Protocol and Indwelling catheter    Active Diet Order  Orders Placed This " "Encounter      NPO per Anesthesia Guidelines for Procedure/Surgery Except for: Meds, Other; Specify: Stop tube feeds at midnight      Output:   I/O last 3 completed shifts:  In: 1144.95 [I.V.:124.95; NG/GT:180]  Out: 1050 [Urine:1050]    Risk Assessment:   Sensory Perception: 2-->very limited  Moisture: 3-->occasionally moist  Activity: 2-->chairfast  Mobility: 1-->completely immobile  Nutrition: 3-->adequate  Friction and Shear: 1-->problem  Sudarshan Score: 12                          Labs:   Recent Labs   Lab 12/01/21  0351   HGB 9.1*   WBC 3.3*       Physical Exam  Areas of skin assessed: focused bilateral buttocks , mouth    Wound Location:  Buttock   Date of last photo 12/1        Wound History: per prior [facility] charting \"hospital acquired due to refusal to reposition and sleep in the bed. Well documented\"  Wound Base: 100 % non-blanchable and purple   Measurements: 1.5cm x 0.3cm     Palpation of the wound bed: normal      Drainage: none     Description of drainage: none     Measurements (length x width x depth, in cm):      Periwound skin: intact      Color: normal and consistent with surrounding tissue      Temperature: normal   Odor: none  Pain: nonverbal indicators absent     Wound Location:  L posterior thigh  Date of last Photo 12/1/21        Wound History: per prior wo charting \"Unknown, patient did have unknown weeping to posterior leg on previous assessment. Likely edema related\"  Measurements (length x width x depth, in cm) cluster measurement 3cm x 6cm area  Wound Base:   epidermis, blanchable pink  Tunneling N/A  Undermining N/A  Palpation of the wound bed: normal   Periwound skin: erythema- blanchable  Color: pink  Temperature: normal   Drainage:, none  Description of drainage: none and serous  Odor: none  Pain: no grimacing or signs of discomfort, none       NEW    Wound Location:  L forearm  Date of last Photo 12/1      Wound History: Patient with significant edema and weeping from wound. Likely, " edema caused fragile skin.  Measurements (length x width x depth, in cm) 3cm x 1.5 cm  x  0.1 cm   Wound Base:  100% dermis with about 80% re- approximation of skin flap  Tunneling N/A  Undermining N/A  Palpation of the wound bed: normal   Periwound skin: ecchymosis  Color: normal and consistent with surrounding tissue  Temperature: normal   Drainage:, copious, edema  related  Description of drainage: serous  Odor: none  Pain: absent and denies , none          Interventions  Visual inspection and assessment completed   Wound Care Rationale Protect periwound skin and Promote moist wound healing without tissue dehydration   Wound Care: done per plan of care  Supplies: floor stock and discussed with RN  Current off-loading measures: Chair cushion, Pillows and Wedge positioning system  Current support surface: Standard  ICU isolibrium  Education provided to: importance of repositioning, plan of care, and Off-loading pressure  Discussed plan of care with Patient, RN     Svetlana Bonilla RN CWOCN

## 2021-12-01 NOTE — PROGRESS NOTES
SPIRITUAL HEALTH SERVICES  SPIRITUAL ASSESSMENT Progress Note  [facility] ICU     REFERRAL SOURCE: Follow Up    Pt Magdaleno and his family asked for Magdaleno to receive Anabaptism SOA when first tested positive for covid, but we were unable to have this provided at the time. Now that pt is off precautions, I asked Fr. Mcgrath to visit pt for sacrament.     PLAN: [facility] will continue to follow.     Syeda Barone  Associate    Phone: [phone]  Pager: [phone]

## 2021-12-01 NOTE — PLAN OF CARE
Care provided 1134-1211    Neuro: Opening eyes more readily to voice. Moving UE spontaneously antigravity stronger on right arm. Eye contact inconsistent. Questionable following of basic simple commands.   CV:  VSS, arterial line positional. L)  TL PICC SL.  Pulm:  Changed to Full vent settings at 1025 am due to tachpnea and use of accessory muscles.  No desaturations today. ETT Suction every 2-4 hours with strong cough.  Plan for Rest settings at night and PSV daytime.  GI/:Small soft BM x3.  Zuniga catheter in place with adequate UO followed by Nephrology. NO HD today. NO Lasix today. Increase TF flushes 100 ml every 4 hours per Neph.  Skin: WOC visit today.  Weaping from RUE.    Plan:  Considering extubation versus trach  in next several days  per medical team evaluation.     *see assessment flowsheet for additional findings       Problem: Adult Inpatient Plan of Care  Goal: Plan of Care Review  Outcome: Improving  Flowsheets (Taken 12/1/2021 1546)  Plan of Care Reviewed With:    patient    sibling  Progress: improving     Problem: Adult Inpatient Plan of Care  Goal: Optimal Comfort and Wellbeing  Outcome: Improving     Problem: Risk for Delirium  Goal: Optimal Coping  Outcome: No Change     Problem: Diabetes Comorbidity  Goal: Blood Glucose Level Within Targeted Range  Outcome: No Change     Problem: Hypertension Comorbidity  Goal: Blood Pressure in Desired Range  Outcome: No Change  Intervention: Maintain Blood Pressure Management  Recent Flowsheet Documentation  Taken 12/1/2021 1600 by Rhoda Delarosa, RN  Medication Review/Management: medications reviewed    Problem: Pain Acute  Goal: Acceptable Pain Control and Functional Ability  Outcome: No Change  Intervention: Develop Pain Management Plan  Recent Flowsheet Documentation  Taken 12/1/2021 1025 by Rhoda Delarosa, RN  Pain Management Interventions:    medication (see MAR)    quiet environment facilitated    repositioned    rest    Intervention: Prevent or  Manage Pain  Recent Flowsheet Documentation  Taken 12/1/2021 1600 by Rhoda Delarosa, RN  Medication Review/Management: medications reviewed  Tylenol given ATC  Problem: Violence Risk or Actual  Goal: Anger and Impulse Control  Outcome: Completed     Problem: Gas Exchange Impaired  Goal: Optimal Gas Exchange  Outcome: Improving  Intervention: Optimize Oxygenation and Ventilation  Recent Flowsheet Documentation  Taken 12/1/2021 1600 by Rhoda Delarosa, RN  Head of Bed (HOB) Positioning: HOB at 30 degrees

## 2021-12-01 NOTE — PROGRESS NOTES
[facility]    ICU Progress Note       Date of Admission:  10/26/2021    Assessment: Critical Care   [place] Elizabeth is a 76 year old male with PMHx of coronary artery disease, atrial flutter on eliquis, hypertension, hyperlipidemia, severe aortic stenosis, HFpEF, CKD stage III, diabetes mellitus II, history of GI bleed, suspected obstructive sleep apnea admitted on 10/26/2021 for elective TAVR.  Hospital course complicated by OPAL from contrast nephropathy, volume overload, hypoxic respiratory failure multifactorial, Covid pneumonia and physical deconditioning.  Also proteus UTI and bacteremia.  On 11/15 the patient was intubated for severe hypoxic respiratory failure. Patient was proned and paralyzed. Supined by 11/16, off paralytics by 11/18, velitri weaned off on 11/19.  11/29/2021- covid recovered, stable on minimal vent settings, MRSA VAP  12/1/2021- PST for 48 hours, sedation off yesterday       Plan: Critical Care   Neuro/ pain/ sedation:  Encephalopathy, multifactorial, slow to improve  - Monitor neurological status. Notify provider for any acute changes in exam    Pulmonary:  Acute hypoxic respiratory failure secondary to covid pneumonia,   MRSA VAP  - Ventilator settings: Ventilation Mode: CPAP/PS  (Continuous positive airway pressure with Pressure Support)  FiO2 (%): 35 %  PEEP (cm H2O): 5 cmH2O  Pressure Support (cm H2O): 10 cmH2O  Oxygen Concentration (%): 35 %  Resp: 28    - supplemental oxygen to keep saturation about 92%  - pressure support as tolerated    Cardiovascular:  Severe aortic stenosis, s/p TAVR 10/26   A fib/flutter, rate controlled   - continue eliquis    GI/Nutrition:  GI bleed, s/p colonoscopy and EGD: hemorrhoids and erosions in hiatal hernia without active bleeding  - Diet: NPO per Anesthesia Guidelines for Procedure/Surgery Except for: Meds, Other; Specify: Stop tube feeds at midnight  Adult Formula Drip Feeding: Specified Time Novasource Renal; Orogastric  tube; Goal Rate: 35; mL/hr; Medication - Feeding Tube Flush Frequency: At least 15-30 mL water before and after medication administration and with tube clogging; Amount to Sen...    - Nutrition consulted. Appreciate recommendations.   - continue tube feeds  - continue BID protonix    Renal/ Fluid Balance:   ARF on CKD, improving yesterday so no HD. 80 mg lasix was given with increased UOP but creatinine bumped today, will not repeat for now  - Will monitor intake and output  - KVO for IV fluid hydration  - ICU electrolyte replacement protocol   - HD per nephrology    Endocrine:  DM II   Stress induced hyperglycemia, resolving  - controlled on home dose of Lantus + sliding scale insulin     ID:  Covid pneumonia, reoovered  Proteus bacteremia and UTI, treated  MRSA VAP  - completed decadron  - continue Vancomycin 13/14, check level    Hematology:  Coagulopathy of covid  Thrombocytopenia, resolved  - continue eliquis    MSK:   Deconditioning, PT/OT once more awake and follow commands       Lines/ tubes/ drains:  Zuniga Catheter: PRESENT, indication: Retention, Strict 1-2 Hour I&O  Central Lines: PRESENT  PICC Triple Lumen 11/15/21 Left Basilic access-Site Assessment: WDL  CVC Double Lumen Right Internal jugular-Site Assessment: WDL    Prophylaxis:  - DVT Prophylaxis: DOAC  - PUD Prophylaxis: protonix BID    Code Status: Full Code      Disposition:  - ICU, stable     The patient's care was discussed with the multi-disciplinary team Team.  Will call brother after rounds    Greg River MD  [facility]  Securely message with the Vocera Web Console (learn more here)  Text page via [employer] Paging/Directory      ____________________________________________________________________    Interval History   No acute events overnight. Tolerating PST for ~48 hours    Physical Exam   Vital Signs: Temp: 98.2  F (36.8  C) Temp src: Bladder BP: 124/66 Pulse: 79   Resp: 28 SpO2: 94 % O2 Device: Mechanical  Ventilator    Weight: 249 lbs 12.5 oz    Gen- intubated, sedated, nontoxic appearing, appears older than stated age  HEENT- anicteric, MMM, ETT in place   Neck- supple, trachea midline  Pulm- Coarse breath sounds  Cor- irregularly, irregular,a fib on tele  Abd- soft, obese, no guarding/rigidity, no appreciable HSM  Ext- trace non-pitting edema bilaterally, calves supple  Skin- no jaundice, no rashes  Neuro- grimaces to noxious stimuli to the face, no response to deep nail bed pressure x4    Data   I reviewed all medications, new labs and imaging results over the last 24 hours.  Arterial Blood Gases   Recent Labs   Lab 12/01/21  0357 11/30/21 0441 11/29/21 0353 11/28/21  0346   PH 7.46* 7.43 7.41 7.45   PCO2 42 44 47* 42   PO2 79* 67* 106* 73*   HCO3 30* 29* 30* 29*       Complete Blood Count   Recent Labs   Lab 12/01/21 0351 11/30/21 0441 11/29/21 0353 11/28/21 0346   WBC 3.3* 3.4* 4.3 3.7*   HGB 9.1* 8.8* 8.7* 8.8*   * 97* 86* 72*       Basic Metabolic Panel  Recent Labs   Lab 12/01/21  0850 12/01/21 0356 12/01/21 0351 11/30/21  2352 11/30/21  0444 11/30/21  0441 11/29/21  0844 11/29/21  0353 11/28/21  1551 11/28/21  1532 11/28/21  0841 11/28/21  0346   NA  --   --  140  --   --  139  --  138  --   --   --  138   POTASSIUM  --   --  3.4  --   --  3.7  --  3.6  --  3.4  --  3.6   CHLORIDE  --   --  105  --   --  106  --  105  --   --   --  104   CO2  --   --  27  --   --  30  --  28  --   --   --  26   BUN  --   --  80*  --   --  71*  --  54*  --   --   --  80*   CR  --   --  2.10*  --   --  1.86*  --  1.63*  --   --   --  1.94*   * 174* 187* 214*   < > 142*   < > 78   < >  --    < > 177*  159*    < > = values in this interval not displayed.       Liver Function Tests  No lab results found in last 7 days.    Pancreatic Enzymes  No lab results found in last 7 days.    Coagulation Profile  No lab results found in last 7 days.    IMAGING:  No results found for this or any previous visit (from the  past 24 hour(s)).

## 2021-12-01 NOTE — PROGRESS NOTES
Renal Medicine Progress Note            Assessment/Plan:     76 y.o man with CKD IIIB and following for dialysis dependent OPAL.      # CKD IIIB: Admitted with a serum creatinine of 1.54 mg/dl.      # Acute kidney injury:                -started HD on 11/23 and last HD tx on 11/28                -R temp internal jugular      # FEN: Some edema but not bad. Electrolytes are okay.      # COVID  19 PNA. 11/4: Consider recovered. Remains intubated. Did well with pressure support per RT.     # Severe aortic stenosis s/p TAVR 10/26. EF 60-65%.      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     # Anemia and thrombocytopenia: Hgb and PLT seem stable.     Plan:  #  No need for hemodialysis today. Will re-evaluate for HD daily. He has good urine output, so hoping his BUN/Scr will plateau soon  # Increase water flush to 150 mg q4hrs to avoid hypernatremia  # No diuretic today     I discussed the case with the ICU team        Interval History:     Afebrile. BP is on the low side. He had low dose Coreg this morning. He open eyes and withdraw to pain. Did well with pressure support per RT. BUN/Scr are high. He has excellent urine output.           Medications and Allergies:       - MEDICATION INSTRUCTIONS for Dialysis Patients -   Does not apply See Admin Instructions     apixaban ANTICOAGULANT  5 mg Oral or Feeding Tube BID     atorvastatin  80 mg Oral or Feeding Tube Daily     B and C vitamin Complex with folic acid  5 mL Per Feeding Tube Daily     [Held by provider] carvedilol  12.5 mg Oral BID     carvedilol  3.125 mg Oral BID w/meals     chlorhexidine  15 mL Mouth/Throat Q12H     [Held by provider] hydrALAZINE  50 mg Oral or Feeding Tube Q8H MOE     insulin aspart  1-12 Units Subcutaneous Q4H     insulin glargine  18 Units Subcutaneous BID     pantoprazole  40 mg Oral or Feeding Tube BID AC     protein modular  1 packet Per Feeding Tube TID     sodium chloride (PF)  10-40 mL Intracatheter Q7 Days     sodium chloride (PF)  3 mL Intracatheter  "Q8H     [Held by provider] tamsulosin  0.4 mg Oral Daily     vancomycin place vaughan - receiving intermittent dosing  1 each Intravenous See Admin Instructions        Allergies   Allergen Reactions     Penicillins Anaphylaxis            Physical Exam:   Vitals were reviewed   , Blood pressure 124/66, pulse 79, temperature 98.2  F (36.8  C), resp. rate 28, height 1.753 m (5' 9\"), weight 113.3 kg (249 lb 12.5 oz), SpO2 94 %.    Wt Readings from Last 3 Encounters:   12/01/21 113.3 kg (249 lb 12.5 oz)   10/21/21 112 kg (247 lb)   10/11/21 109.5 kg (241 lb 4.8 oz)       Intake/Output Summary (Last 24 hours) at 12/1/2021 1035  Last data filed at 12/1/2021 0900  Gross per 24 hour   Intake 1125 ml   Output 1029 ml   Net 96 ml     GENERAL APPEARANCE: critically ill.   HEENT:  Eyes are closed. Intubated.  RESP: Coarse BS anteriorly.   CV:  regular   ABDOMEN: obese, soft.   EXTREMITIES/SKIN: no rashes/lesions on observed skin; + edema in the LE but not bad.. Hands are less puffy.   NEURO: open eyes and withdraw to pain  R temp internal jugular CDI  +giron catheter in place with some urine.         Data:     CBC RESULTS:     Recent Labs   Lab 12/01/21  0351 11/30/21  0441 11/29/21  0353 11/28/21  0346 11/27/21  0404 11/26/21  0436   WBC 3.3* 3.4* 4.3 3.7* 4.5 5.2   RBC 3.38* 3.32* 3.28* 3.31* 3.21* 3.25*   HGB 9.1* 8.8* 8.7* 8.8* 8.5* 8.4*   HCT 29.6* 29.2* 28.6* 28.4* 27.7* 27.5*   * 97* 86* 72* 63* 56*       Basic Metabolic Panel:  Recent Labs   Lab 12/01/21  0850 12/01/21  0356 12/01/21  0351 11/30/21  2352 11/30/21  1959 11/30/21  1641 11/30/21  0444 11/30/21  0441 11/29/21  0844 11/29/21  0353 11/28/21  1551 11/28/21  1532 11/28/21  0841 11/28/21  0346 11/27/21  0859 11/27/21  0404 11/26/21  0438 11/26/21  0436   NA  --   --  140  --   --   --   --  139  --  138  --   --   --  138  --  137  --  138   POTASSIUM  --   --  3.4  --   --   --   --  3.7  --  3.6  --  3.4  --  3.6  --  3.8  --  3.8   CHLORIDE  --   --  105 "  --   --   --   --  106  --  105  --   --   --  104  --  104  --  103   CO2  --   --  27  --   --   --   --  30  --  28  --   --   --  26  --  27  --  28   BUN  --   --  80*  --   --   --   --  71*  --  54*  --   --   --  80*  --  65*  --  101*   CR  --   --  2.10*  --   --   --   --  1.86*  --  1.63*  --   --   --  1.94*  --  1.68*  --  1.94*   * 174* 187* 214* 197* 168*   < > 142*   < > 78   < >  --    < > 177*  159*   < > 164*  139*   < > 79   GABE  --   --  8.7  --   --   --   --  8.4*  --  8.1*  --   --   --  8.1*  --  8.0*  --  8.2*    < > = values in this interval not displayed.       INRNo lab results found in last 7 days.   Attestation:   I have reviewed today's relevant vital signs, notes, medications, labs and imaging.    Driss Hogan MD  [facility] - Nephrology  Office phone :[phone]  Pager: [phone]

## 2021-12-01 NOTE — PLAN OF CARE
Pt obtunded with no command following. Attempts to open eyes with vigorous stimulation. VSS. Minimal secretions. Adequate UOP, TF continued. Will continue to monitor.

## 2021-12-01 NOTE — PROGRESS NOTES
[facility] ICU RESPIRATORY NOTE        Date of Admission: 10/26/2021    Date of Intubation (most recent): 11/15/2021    Reason for Mechanical Ventilation: Respiratory failure    Number of Days on Mechanical Ventilation: 18    Met Criteria for Spontaneous Breathing Trial: Yes    Significant Events Today: Pt has been on PS for two days     ABG Results:   Recent Labs   Lab 11/30/21  0441 11/29/21  0353 11/28/21  0346 11/27/21  0404   PH 7.43 7.41 7.45 7.43   PCO2 44 47* 42 44   PO2 67* 106* 73* 173*   HCO3 29* 30* 29* 29*   O2PER 35 40 40 80       Current Vent Settings: Ventilation Mode: CPAP/PS  (Continuous positive airway pressure with Pressure Support)  FiO2 (%): 35 %  PEEP (cm H2O): 5 cmH2O  Pressure Support (cm H2O): 10 cmH2O  Oxygen Concentration (%): 35 %  Resp: 29        Plan: Continue full vent support with daily wean assessment    Hima Garcia, RRT

## 2021-12-02 NOTE — PROGRESS NOTES
Neuro:  PERRL.  Moves all extremities. Intermittently following commands. Withdraws from pain.  Became very agitated with turns.   Cardiovascular: Afib- controlled rate. Hemodynamically stable.  Pulmonary:  Tolerating ventilator. PRN dilaudid given.  Oxygen saturation > 92  GI/:  Adequate UOP. 2 large BMs.   Endocrine: Increased lantus. Q4hr sliding scale.  Skin:  Intact.  Scattered bruising. Skin tears to L forearm. Protective mepilex applied to sacrum.  Restraints: Not in use or necessary. When more awake may become necessary  AM labs noted. K 3.4- not replaced per MD due to patient receiving dialysis.     Continue to monitor closely.    Recent Labs   Lab 12/02/21 0400 12/01/21 0357 11/30/21 0441 11/29/21 0353   PH 7.45 7.46* 7.43 7.41   PCO2 43 42 44 47*   PO2 72* 79* 67* 106*   HCO3 29* 30* 29* 30*   O2PER 35 35 35 40       Lab Results   Component Value Date    TROPI 3.517 (HH) 07/07/2021    TROPI 4.419 (HH) 07/07/2021    TROPI 7.047 (HH) 07/06/2021    TROPI 7.902 (HH) 07/06/2021    TROPI 0.097 (H) 04/23/2020       ROUTINE IP LABS (Last four results)  BMP  Recent Labs   Lab 12/02/21 0401 12/02/21 0359 12/02/21  0009 12/01/21 2011 12/01/21 0356 12/01/21 0351 11/30/21 0444 11/30/21 0441 11/29/21  0844 11/29/21 0353   NA  --  140  --   --   --  140  --  139  --  138   POTASSIUM  --  3.4  --   --   --  3.4  --  3.7  --  3.6   CHLORIDE  --  105  --   --   --  105  --  106  --  105   GABE  --  8.4*  --   --   --  8.7  --  8.4*  --  8.1*   CO2  --  30  --   --   --  27  --  30  --  28   BUN  --  95*  --   --   --  80*  --  71*  --  54*   CR  --  2.27*  --   --   --  2.10*  --  1.86*  --  1.63*   * 182* 214* 256*   < > 187*   < > 142*   < > 78    < > = values in this interval not displayed.     CBC  Recent Labs   Lab 12/02/21  0359 12/01/21  0351 11/30/21  0441 11/29/21  0353   WBC 3.4* 3.3* 3.4* 4.3   RBC 3.41* 3.38* 3.32* 3.28*   HGB 9.2* 9.1* 8.8* 8.7*   HCT 30.2* 29.6* 29.2* 28.6*   MCV 89 88 88  87   MCH 27.0 26.9 26.5 26.5   MCHC 30.5* 30.7* 30.1* 30.4*   RDW 23.4* 22.9* 22.2* 21.7*   * 119* 97* 86*     INRNo lab results found in last 7 days.  LACTIC ACID  Lactic Acid (mmol/L)   Date Value   11/15/2021 1.3   11/01/2021 1.4   10/31/2021 1.4   10/26/2021 1.0   07/07/2021 2.2 (H)   07/06/2021 3.7 (H)   07/06/2021 2.4 (H)   07/06/2021 2.8 (H)     Blood Glucose  Glucose (mg/dL)   Date Value   07/11/2021 181 (H)   07/10/2021 258 (H)   07/10/2021 238 (H)   07/10/2021 228 (H)   07/10/2021 173 (H)   07/10/2021 209 (H)       Intake/Output Summary (Last 24 hours) at 12/2/2021 0605  Last data filed at 12/2/2021 0600  Gross per 24 hour   Intake 1390 ml   Output 784 ml   Net 606 ml       Valentin Mcintosh, RN-BSN  [facility]  Intensive Care Unit

## 2021-12-02 NOTE — PROGRESS NOTES
[facility] ICU RESPIRATORY NOTE        Date of Admission: 10/26/2021    Date of Intubation (most recent): 11/15/2021    Reason for Mechanical Ventilation: Respiratory failure    Number of Days on Mechanical Ventilation: 19    Met Criteria for Spontaneous Breathing Trial: Yes     Significant Events Today:  None on overnight    ABG Results:   Recent Labs   Lab 12/02/21  0400 12/01/21  0357 11/30/21  0441 11/29/21  0353   PH 7.45 7.46* 7.43 7.41   PCO2 43 42 44 47*   PO2 72* 79* 67* 106*   HCO3 29* 30* 29* 30*   O2PER 35 35 35 40       Current Vent Settings: Ventilation Mode: CMV/AC  (Continuous Mandatory Ventilation/ Assist Control)  FiO2 (%): 35 %  Rate Set (breaths/minute): 12 breaths/min  Tidal Volume Set (mL): 500 mL  PEEP (cm H2O): 5 cmH2O  Pressure Support (cm H2O): 10 cmH2O  Oxygen Concentration (%): 35 %  Resp: 16          Plan: pressure support daily as patient tolerates.    Mark Pedro, RT

## 2021-12-02 NOTE — PROGRESS NOTES
[facility]    ICU Progress Note       Date of Admission:  10/26/2021    Assessment: Critical Care   Burton Elizabeth is a 76 year old male with PMHx of coronary artery disease, atrial flutter on eliquis, hypertension, hyperlipidemia, severe aortic stenosis, HFpEF, CKD stage III, diabetes mellitus II, history of GI bleed, suspected obstructive sleep apnea admitted on 10/26/2021 for elective TAVR.  Hospital course complicated by OPAL from contrast nephropathy, volume overload, hypoxic respiratory failure multifactorial, Covid pneumonia and physical deconditioning.  Also proteus UTI and bacteremia.  On 11/15 the patient was intubated for severe hypoxic respiratory failure. Patient was proned and paralyzed. Supined by 11/16, off paralytics by 11/18, velitri weaned off on 11/19.  11/29/2021- covid recovered, stable on minimal vent settings, MRSA VAP  12/1/2021- PST for 48 hours, sedation off yesterday  12/2- slow to wake up. Rested on vent overnight. Back to PST this am       Plan: Critical Care   Neuro/ pain/ sedation:  Encephalopathy, multifactorial, slow to improve  - Monitor neurological status. Notify provider for any acute changes in exam    Pulmonary:  Acute hypoxic respiratory failure secondary to covid pneumonia,   MRSA VAP  - Ventilator settings: Ventilation Mode: (S) PS  (Pressure Support) (PSV daily weaning. )  FiO2 (%): 35 %  Rate Set (breaths/minute): 12 breaths/min  Tidal Volume Set (mL): 500 mL  PEEP (cm H2O): 5 cmH2O  Pressure Support (cm H2O): 10 cmH2O  Oxygen Concentration (%): 35 %  Resp: 15    - supplemental oxygen to keep saturation about 92%  - pressure support daily as tolerated    Cardiovascular:  Severe aortic stenosis, s/p TAVR 10/26   A fib/flutter, rate controlled   - continue eliquis    GI/Nutrition:  GI bleed, s/p colonoscopy and EGD: hemorrhoids and erosions in hiatal hernia without active bleeding  - Diet: NPO per Anesthesia Guidelines for Procedure/Surgery  Except for: Meds, Other; Specify: Stop tube feeds at midnight  Adult Formula Drip Feeding: Specified Time Novasource Renal; Orogastric tube; Goal Rate: 35; mL/hr; Medication - Feeding Tube Flush Frequency: At least 15-30 mL water before and after medication administration and with tube clogging; Amount to Sen...    - Nutrition consulted. Appreciate recommendations.   - continue tube feeds  - continue BID protonix    Renal/ Fluid Balance:   ARF on CKD, non-oliguric, creatinine above the patient's baseline but improved from yesterday.   - Will monitor intake and output  - KVO for IV fluid hydration  - ICU electrolyte replacement protocol   - HD as needed per nephrology    Endocrine:  DM II   Stress induced hyperglycemia, resolving  - controlled on home dose of Lantus + sliding scale insulin     ID:  Covid pneumonia, recovered  Proteus bacteremia and UTI, treated  MRSA VAP, vancomycin treatment completed  - completed decadron    Hematology:  Coagulopathy of covid  Thrombocytopenia, resolved  - continue eliquis    MSK:   Deconditioning, PT/OT once more awake and follow commands       Lines/ tubes/ drains:  Zuniga Catheter: PRESENT, indication: Retention, Strict 1-2 Hour I&O  Central Lines: PRESENT  PICC Triple Lumen 11/15/21 Left Basilic access-Site Assessment: WDL  CVC Double Lumen Right Internal jugular-Site Assessment: WDL    Prophylaxis:  - DVT Prophylaxis: DOAC  - PUD Prophylaxis: protonix BID    Code Status: Full Code      Disposition:  - ICU, stable     The patient's care was discussed with the multi-disciplinary team Team.  Will call brother after rounds to discuss trach/peg and LTACH    Greg River MD  [facility]  Securely message with the Vocera Web Console (learn more here)  Text page via [employer] Paging/Directory      ____________________________________________________________________    Interval History   No acute events overnight. Rested on vent overnight, back to PS/CPAP this  am.     Physical Exam   Vital Signs: Temp: 98.8  F (37.1  C) Temp src: Bladder BP: 105/58 Pulse: 64   Resp: 15 SpO2: 97 % O2 Device: Mechanical Ventilator    Weight: 251 lbs 1.66 oz    Gen- intubated, nontoxic appearing, appears older than stated age  HEENT- anicteric, MMM, ETT in place   Neck- supple, trachea midline  Pulm- Coarse breath sounds  Cor- irregularly, irregular,a fib on tele  Abd- soft, obese, no guarding/rigidity, no appreciable HSM  Ext- trace non-pitting edema bilaterally, calves supple  Skin- no jaundice, no rashes  Neuro- opens eyes to name, grimaces to noxious stimuli to the face, no response to deep nail bed pressure x4    Data   I reviewed all medications, new labs and imaging results over the last 24 hours.  Arterial Blood Gases   Recent Labs   Lab 12/02/21 0400 12/01/21 0357 11/30/21 0441 11/29/21 0353   PH 7.45 7.46* 7.43 7.41   PCO2 43 42 44 47*   PO2 72* 79* 67* 106*   HCO3 29* 30* 29* 30*       Complete Blood Count   Recent Labs   Lab 12/02/21 0359 12/01/21 0351 11/30/21 0441 11/29/21 0353   WBC 3.4* 3.3* 3.4* 4.3   HGB 9.2* 9.1* 8.8* 8.7*   * 119* 97* 86*       Basic Metabolic Panel  Recent Labs   Lab 12/02/21  0858 12/02/21 0401 12/02/21 0359 12/02/21  0009 12/01/21  0356 12/01/21 0351 11/30/21 0444 11/30/21 0441 11/29/21  0844 11/29/21 0353   NA  --   --  140  --   --  140  --  139  --  138   POTASSIUM  --   --  3.4  --   --  3.4  --  3.7  --  3.6   CHLORIDE  --   --  105  --   --  105  --  106  --  105   CO2  --   --  30  --   --  27  --  30  --  28   BUN  --   --  95*  --   --  80*  --  71*  --  54*   CR  --   --  2.27*  --   --  2.10*  --  1.86*  --  1.63*   * 174* 182* 214*   < > 187*   < > 142*   < > 78    < > = values in this interval not displayed.       Liver Function Tests  No lab results found in last 7 days.    Pancreatic Enzymes  No lab results found in last 7 days.    Coagulation Profile  No lab results found in last 7 days.    IMAGING:  No  results found for this or any previous visit (from the past 24 hour(s)).

## 2021-12-02 NOTE — PROGRESS NOTES
Cared for patient for 4 hours. Patient given Quetiapine one time for agitation. There was no other changes in the patient's status from day shift during writer's care of the patient.

## 2021-12-02 NOTE — PROGRESS NOTES
[facility] ICU RESPIRATORY NOTE      Date of Admission: 10/26/2021    Date of Intubation (most recent): 11/15/21    Reason for Mechanical Ventilation: Respiratory failure    Number of Days on Mechanical Ventilation: 19    Met Criteria for Spontaneous Breathing Trial: Yes    Significant Events Today: PSV since 7:30am     ABG Results:   Recent Labs   Lab 12/02/21  0400 12/01/21  0357 11/30/21  0441 11/29/21  0353   PH 7.45 7.46* 7.43 7.41   PCO2 43 42 44 47*   PO2 72* 79* 67* 106*   HCO3 29* 30* 29* 30*   O2PER 35 35 35 40       Current Vent Settings: Ventilation Mode: PS  (Pressure Support)  FiO2 (%): 35 %  Rate Set (breaths/minute): 12 breaths/min  Tidal Volume Set (mL): 500 mL  PEEP (cm H2O): 5 cmH2O  Pressure Support (cm H2O): 10 cmH2O  Oxygen Concentration (%): 35 %  Resp: 12    Skin Assessment: clean and dry     Plan: PSV as tolerated per MD. Harrison Majano, RRT

## 2021-12-02 NOTE — PLAN OF CARE
Provided care [id number]:    NEURO: More awake today RASS -1, not following commands readily but establishing eye contact and awakens easily to voice and spont.      CV:  Left PICC line secure.  A-line positional, using cuff pressures.     RESP: PSV all day 10/5 35%.  Tolerating very well.     GI/: Zuniga catheter urine with sediment oliguric today. Neph following, creat 2.27 trending up. Tube feeds at goal 35 ml/hr and water fllushes increased to 150 q 4 hours today.    SKIN: No acute changes, anasarca, weeping UE.  MD Intensivist GUS  notified at 1400 with request for antifungal powder for perineal rash.  New order obtained.     PAIN: Tylenol given.    Problem: Adult Inpatient Plan of Care  Goal: Plan of Care Review  Outcome: No Change     Problem: Risk for Delirium  Goal: Optimal Coping  Outcome: No Change     Problem: Diabetes Comorbidity  Goal: Blood Glucose Level Within Targeted Range  Outcome: Declining     Problem: Hypertension Comorbidity  Goal: Blood Pressure in Desired Range  Outcome: No Change  Intervention: Maintain Blood Pressure Management  Recent Flowsheet Documentation  Taken 12/2/2021 1200 by Rhoda Delarosa, RN  Medication Review/Management: medications reviewed    Problem: Gas Exchange Impaired  Goal: Optimal Gas Exchange  Outcome: Improving  Intervention: Optimize Oxygenation and Ventilation  Recent Flowsheet Documentation  Taken 12/2/2021 1300 by Rhoda Delarosa, RN  Head of Bed (HOB) Positioning: HOB at 20-30 degrees  Taken 12/2/2021 1230 by Rhoda Delarosa, RN  Head of Bed (HOB) Positioning: HOB at 60-90 degrees up to chair

## 2021-12-02 NOTE — PHARMACY-CONSULT NOTE
Pharmacy Vancomycin Note  Date of Service 2021  Patient's  1945   76 year old, male    Indication: Community Acquired Pneumonia and MRSA  Day of Therapy: 15/15  Current vancomycin regimen:  Intermittent dosing  Current vancomycin monitoring method: Trough (Method 1 = dosing nomogram), HD   Current vancomycin therapeutic monitoring goal: 15-20 mg/L      Current estimated CrCl = Estimated Creatinine Clearance: 35 mL/min (A) (based on SCr of 2.27 mg/dL (H)).    Creatinine for last 3 days  2021:  4:41 AM Creatinine 1.86 mg/dL  2021:  3:51 AM Creatinine 2.10 mg/dL  2021:  3:59 AM Creatinine 2.27 mg/dL    Recent Vancomycin Levels (past 3 days)  2021:  4:41 AM Vancomycin 20.8 mg/L  2021:  3:51 AM Vancomycin 19.8 mg/L  2021:  3:59 AM Vancomycin 17.0 mg/L    Vancomycin IV Administrations (past 72 hours)      No vancomycin orders with administrations in past 72 hours.                Nephrotoxins and other renal medications (From now, onward)    Start     Dose/Rate Route Frequency Ordered Stop    21 1156  vancomycin place vaughan - receiving intermittent dosing         1 each Intravenous SEE ADMIN INSTRUCTIONS 21 1157 21 [id number]             Contrast Orders - past 72 hours (72h ago, onward)            None          Interpretation of levels and current regimen:  Vancomycin level is reflective of therapeutic level    Has serum creatinine changed greater than 50% in last 72 hours: No    Urine output:  Improving, last HD     Renal Function: Improving    Plan:  1. No dose  2. Vancomycin monitoring method: Trough (Method 1 = dosing nomogram)  3. Vancomycin therapeutic monitoring goal: 15-20 mg/L  4. Pharmacy will check vancomycin levels as appropriate in N/A, last day of abx therapy.  5. Serum creatinine levels will be ordered N/A.    Luz Elena Velazquez, Pharmacy Student

## 2021-12-02 NOTE — PROGRESS NOTES
Renal Medicine Progress Note            Assessment/Plan:     76 y.o man with CKD IIIB and following for dialysis dependent OPAL.      # CKD IIIB: Admitted with a serum creatinine of 1.54 mg/dl.      # Acute kidney injury:                -started HD on 11/23 and last HD tx on 11/28                -R temp internal jugular      # FEN: Some edema but not bad. Electrolytes are okay.      # COVID  19 PNA. 11/4: Consider recovered. Remains intubated. D     # Severe aortic stenosis s/p TAVR 10/26. EF 60-65%.      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     # Anemia and thrombocytopenia: Hgb and PLT seem stable.     Plan:  # No hemodialysis today. Leave temp internal jugular in.  # Cont to monitor for kidney function recovery    I discussed the plan with the ICU team.          Interval History:     Afebrile. VSS. Patient is on CPAP trial. He opens eyes to voice. Decent urine output and overall 600 ml + last 24 hrs without counting insensible water loss.           Medications and Allergies:       - MEDICATION INSTRUCTIONS for Dialysis Patients -   Does not apply See Admin Instructions     apixaban ANTICOAGULANT  5 mg Oral or Feeding Tube BID     atorvastatin  80 mg Oral or Feeding Tube Daily     B and C vitamin Complex with folic acid  5 mL Per Feeding Tube Daily     carvedilol  3.125 mg Oral BID w/meals     chlorhexidine  15 mL Mouth/Throat Q12H     [Held by provider] hydrALAZINE  50 mg Oral or Feeding Tube Q8H MOE     insulin aspart  1-12 Units Subcutaneous Q4H     insulin glargine  22 Units Subcutaneous BID     pantoprazole  40 mg Oral or Feeding Tube BID AC     protein modular  1 packet Per Feeding Tube TID     sodium chloride (PF)  10-40 mL Intracatheter Q7 Days     sodium chloride (PF)  3 mL Intracatheter Q8H     [Held by provider] tamsulosin  0.4 mg Oral Daily     vancomycin place vaughan - receiving intermittent dosing  1 each Intravenous See Admin Instructions        Allergies   Allergen Reactions     Penicillins Anaphylaxis         "    Physical Exam:   Vitals were reviewed   , Blood pressure 139/76, pulse 74, temperature 98.1  F (36.7  C), resp. rate 18, height 1.753 m (5' 9\"), weight 113.9 kg (251 lb 1.7 oz), SpO2 99 %.    Wt Readings from Last 3 Encounters:   12/02/21 113.9 kg (251 lb 1.7 oz)   10/21/21 112 kg (247 lb)   10/11/21 109.5 kg (241 lb 4.8 oz)       Intake/Output Summary (Last 24 hours) at 12/2/2021 0947  Last data filed at 12/2/2021 0800  Gross per 24 hour   Intake 1425 ml   Output 725 ml   Net 700 ml     GENERAL APPEARANCE: critically ill. Better.  HEENT:  Opens yes to voice. Intubated.   RESP: No wheezes.   CV:  regular   ABDOMEN: obese, soft.   EXTREMITIES/SKIN: no rashes/lesions on observed skin; Some edema but not bad.   NEURO: Opens eyes to voice.   R temp internal jugular CDI  +giron catheter in place + urine.         Data:     CBC RESULTS:     Recent Labs   Lab 12/02/21  0359 12/01/21  0351 11/30/21  0441 11/29/21  0353 11/28/21  0346 11/27/21  0404   WBC 3.4* 3.3* 3.4* 4.3 3.7* 4.5   RBC 3.41* 3.38* 3.32* 3.28* 3.31* 3.21*   HGB 9.2* 9.1* 8.8* 8.7* 8.8* 8.5*   HCT 30.2* 29.6* 29.2* 28.6* 28.4* 27.7*   * 119* 97* 86* 72* 63*       Basic Metabolic Panel:  Recent Labs   Lab 12/02/21  0858 12/02/21  0401 12/02/21  0359 12/02/21  0009 12/01/21 2011 12/01/21  1729 12/01/21  0356 12/01/21  0351 11/30/21  0444 11/30/21  0441 11/29/21  0844 11/29/21  0353 11/28/21  1551 11/28/21  1532 11/28/21  0841 11/28/21  0346 11/27/21  0859 11/27/21  0404   NA  --   --  140  --   --   --   --  140  --  139  --  138  --   --   --  138  --  137   POTASSIUM  --   --  3.4  --   --   --   --  3.4  --  3.7  --  3.6  --  3.4  --  3.6  --  3.8   CHLORIDE  --   --  105  --   --   --   --  105  --  106  --  105  --   --   --  104  --  104   CO2  --   --  30  --   --   --   --  27  --  30  --  28  --   --   --  26  --  27   BUN  --   --  95*  --   --   --   --  80*  --  71*  --  54*  --   --   --  80*  --  65*   CR  --   --  2.27*  --   --   -- "   --  2.10*  --  1.86*  --  1.63*  --   --   --  1.94*  --  1.68*   * 174* 182* 214* 256* 238*   < > 187*   < > 142*   < > 78   < >  --    < > 177*  159*   < > 164*  139*   GABE  --   --  8.4*  --   --   --   --  8.7  --  8.4*  --  8.1*  --   --   --  8.1*  --  8.0*    < > = values in this interval not displayed.       INRNo lab results found in last 7 days.   Attestation:   I have reviewed today's relevant vital signs, notes, medications, labs and imaging.    Driss Hogan MD  [facility] - Nephrology  Office phone :[phone]  Pager: [phone]

## 2021-12-03 NOTE — PROGRESS NOTES
Neuro: Rass -1. Intermittently follows. Some agitation this shift. Mitts due to patient reaching for face. Given quetiapine and trazadone this shift for sleep patient agitated later in the night, given dilaudid thinking agitation was due to pain which was only effective for short period. Gave PRN versed which was more effective in calming patient.   CV: Afib Bps stable  Resp: Taken off pressure support at beginning of shift. Moderate white secretions  GI/: Giron bypassed at some point likely during previous shift. Patient bypassed moderate amount of urine and output appeared to drop off. Output returned to normal with repositioning of giron and irrigation  Skin: Multiple skin issues, please see charting.    Order for ABG draw this morning, based on results it is likely that sample was venous.

## 2021-12-03 NOTE — PROGRESS NOTES
CLINICAL NUTRITION SERVICES - REASSESSMENT NOTE      Recommendations Ordered by Registered Dietitian (RD): Discontinue the ProSource for now   Novasource Renal at 35 mL/hr to provide:  1680 kcal (16 kcal/kg), 76 g protein (1.1 g/kg), 602 mL H2O, 154 g CHO, no fiber    Malnutrition: (11/30)  % Weight Loss:  Up to 5% in 1 month (moderate malnutrition)  % Intake:  Decreased intake does not meet criteria for malnutrition   Subcutaneous Fat Loss:  Upper arm region mild depletion and Thoracic region mild depletion  Muscle Loss:  Clavicle bone region mild depletion, Acromion bone region mild depletion and Scapular bone region mild depletion  Fluid Retention:  Moderate 2-3+     Malnutrition Diagnosis: Moderate malnutrition  In Context of:  Acute illness or injury       EVALUATION OF PROGRESS TOWARD GOALS   Diet:  NPO on vent     Nutrition Support:  Patient continues on goal TF regimen as follows ~    Nutrition Support Enteral:  Type of Feeding Tube: OG tube  Enteral Frequency:  Continuous  Enteral Regimen: Novasource Renal at 35 mL/hr  Total Enteral Provisions: 1680 kcal (16 kcal/kg), 76 g protein (1.1 g/kg), 602 mL H2O, 154 g CHO, no fiber   Free Water Flush: 150 mL every 4 hours   ProSource TID= 120 kcal and 33 g protein   Total = 1800 kcal (17 kcal/kg), 109 g protein (1.5 g/kg)    Intake/Tolerance:    K 3.2 (L)  Mg and Phos normal  BUN 99 (H), Cr 2.14 (H) - OPAL on CKD, last HD 11/28 with no further runs planned at this time per Neph   , 203, 161, 190, 163, 157 - High sliding scale insulin, Lantus 30 units BID   BM x 4 today and x 2 yesterday   I/O 1780/665, wt 113.8 kg (up overall)      ASSESSED NUTRITION NEEDS:  Dosing Weight:  104 kg for energy (lowest wt this adm on 11/5), 72.7 kg for protein (IBW)  Estimated Energy Needs: 1572-9732 kcal (15-20 kcal/kg)  Justification: vented, obesity   Estimated Protein Needs: 70-95 grams (1-1.3 g/kg)  Justification: hypercatabolism with critical illness, BUN approaching  100      NEW FINDINGS:   12/1: WOCN = bilateral buttock wounds are stable, L posterior thigh wounds are mostly healed and NEW L arm wound d/t skin tear and edema    Patient receiving Nephronex daily    Previous Goals (11/30):   TF + Prosource will meet % assessed nutrition needs   Evaluation: Not met, patient now exceeding re-estimated protein needs     Previous Nutrition Diagnosis (11/30):   Inadequate protein intake related to need for increased protein with HD start and BUN decreasing as evidenced by currently meeting 72% low end of reassessed protein needs   Evaluation: Resolved       CURRENT NUTRITION DIAGNOSIS  Excessive protein intake related to TF + ProSource continue, now BUN approaching 100 and no HD planned as evidenced by meeting 115% high-end of re-estimated protein needs     INTERVENTIONS  Recommendations / Nutrition Prescription  Discontinue the ProSource for now   Novasource Renal at 35 mL/hr to provide:  1680 kcal (16 kcal/kg), 76 g protein (1.1 g/kg), 602 mL H2O, 154 g CHO, no fiber     Implementation  Medical Food Supplement:  Orders written as above   Collaboration and Referral of Nutrition care:  Patient discussed today during interdisciplinary bedside rounds     Goals  TF will meet % needs     MONITORING AND EVALUATION:  Progress towards goals will be monitored and evaluated per protocol and Practice Guidelines    Mariia Clarke, RD, LD, CNSC   Clinical Dietitian - [facility]

## 2021-12-03 NOTE — PROGRESS NOTES
[facility]    ICU Progress Note       Date of Admission:  10/26/2021    Assessment: Critical Care   Burton Elizabeth is a 76 year old male with PMHx of coronary artery disease, atrial flutter on eliquis, hypertension, hyperlipidemia, severe aortic stenosis, HFpEF, CKD stage III, diabetes mellitus II, history of GI bleed, suspected obstructive sleep apnea admitted on 10/26/2021 for elective TAVR.  Hospital course complicated by OPAL from contrast nephropathy, volume overload, hypoxic respiratory failure multifactorial, Covid pneumonia and physical deconditioning.  Also proteus UTI and bacteremia.  On 11/15 the patient was intubated for severe hypoxic respiratory failure. Patient was proned and paralyzed. Supined by 11/16, off paralytics by 11/18, velitri weaned off on 11/19.  11/29/2021- covid recovered, stable on minimal vent settings, MRSA VAP  12/1/2021- PST for 48 hours, sedation off yesterday  12/2- slow to wake up. Rested on vent overnight. Back to PST this am  12/3- Tolerating pressure support       Plan: Critical Care   Neuro/ pain/ sedation:  Encephalopathy, multifactorial, slowly improving  - Monitor neurological status. Notify provider for any acute changes in exam    Pulmonary:  Acute hypoxic respiratory failure secondary to covid pneumonia,   MRSA VAP, vanco treatment completed  - Ventilator settings: Ventilation Mode: CPAP/PS  (Continuous positive airway pressure with Pressure Support)  FiO2 (%): 40 %  Rate Set (breaths/minute): 12 breaths/min  Tidal Volume Set (mL): 500 mL  PEEP (cm H2O): 5 cmH2O  Pressure Support (cm H2O): 10 cmH2O  Oxygen Concentration (%): 40 %  Resp: 17  - supplemental oxygen to keep saturation about 92%  - pressure support daily as tolerated, 8/5    Cardiovascular:  Severe aortic stenosis, s/p TAVR 10/26   A fib/flutter, rate controlled   - continue eliquis    GI/Nutrition:  GI bleed, s/p colonoscopy and EGD: hemorrhoids and erosions in hiatal hernia  without active bleeding  - Diet: NPO per Anesthesia Guidelines for Procedure/Surgery Except for: Meds, Other; Specify: Stop tube feeds at midnight  Adult Formula Drip Feeding: Specified Time Novasource Renal; Orogastric tube; Goal Rate: 35; mL/hr; Medication - Feeding Tube Flush Frequency: At least 15-30 mL water before and after medication administration and with tube clogging; Amount to Sen...    - Nutrition consulted. Appreciate recommendations.   - continue tube feeds  - continue BID protonix    Renal/ Fluid Balance:   ARF on CKD, non-oliguric, creatinine above the patient's baseline but improved slightly from yesterday.   - Will monitor intake and output  - KVO for IV fluid hydration  - ICU electrolyte replacement protocol   - HD as needed per nephrology    Endocrine:  DM II   Stress induced hyperglycemia, resolving  - increase dose of Lantus to 30 U     ID:  Covid pneumonia, recovered  Proteus bacteremia and UTI, treated  MRSA VAP, vancomycin treatment completed  - completed decadron    Hematology:  Coagulopathy of covid  Thrombocytopenia, resolved  - continue eliquis    MSK:   Deconditioning, PT/OT once more awake and follow commands       Lines/ tubes/ drains:  Zuniga Catheter: PRESENT, indication: Retention, Strict 1-2 Hour I&O  Central Lines: PRESENT  PICC Triple Lumen 11/15/21 Left Basilic access-Site Assessment: WDL  CVC Double Lumen Right Internal jugular-Site Assessment: WDL    Prophylaxis:  - DVT Prophylaxis: DOAC  - PUD Prophylaxis: protonix BID    Code Status: Full Code      Disposition:  - ICU, stable     The patient's care was discussed with the multi-disciplinary team Team.  Will call brother after rounds to discuss trach/peg and LTACH    Greg River MD  [facility]  Securely message with the Vocera Web Console (learn more here)  Text page via [employer] Paging/Directory      ____________________________________________________________________    Interval History   No  acute events overnight. Rested on vent overnight, back to PS/CPAP this am.  Slightly more awake.      Physical Exam   Vital Signs: Temp: 98.2  F (36.8  C) Temp src: Axillary BP: 122/60 Pulse: 77   Resp: 17 SpO2: 99 % O2 Device: Mechanical Ventilator    Weight: 250 lbs 14.14 oz    Gen- intubated, nontoxic appearing, appears older than stated age  HEENT- anicteric, MMM, ETT in place   Neck- supple, trachea midline  Pulm- Coarse breath sounds  Cor- irregularly, irregular,a fib on monitor  Abd- soft, obese, no guarding/rigidity, no appreciable HSM  Ext- trace non-pitting edema bilaterally, calves supple  Skin- no jaundice, no rashes  Neuro- opens eyes to name, purposeful movement of his hands, does not follow commands    Data   I reviewed all medications, new labs and imaging results over the last 24 hours.  Arterial Blood Gases   Recent Labs   Lab 12/03/21 0407 12/02/21  0400 12/01/21 0357 11/30/21 0441   PH 7.39 7.45 7.46* 7.43   PCO2 50* 43 42 44   PO2 40* 72* 79* 67*   HCO3 31* 29* 30* 29*       Complete Blood Count   Recent Labs   Lab 12/03/21 0428 12/02/21 0359 12/01/21 0351 11/30/21 0441   WBC 3.0* 3.4* 3.3* 3.4*   HGB 9.0* 9.2* 9.1* 8.8*   * 111* 119* 97*       Basic Metabolic Panel  Recent Labs   Lab 12/03/21  0823 12/03/21 0428 12/03/21 0421 12/03/21  0002 12/02/21  0401 12/02/21  0359 12/01/21  0356 12/01/21 0351 11/30/21 0444 11/30/21 0441   NA  --  139  --   --   --  140  --  140  --  139   POTASSIUM  --  3.2*  --   --   --  3.4  --  3.4  --  3.7   CHLORIDE  --  104  --   --   --  105  --  105  --  106   CO2  --  29  --   --   --  30  --  27  --  30   BUN  --  99*  --   --   --  95*  --  80*  --  71*   CR  --  2.14*  --   --   --  2.27*  --  2.10*  --  1.86*   * 190* 161* 203*   < > 182*   < > 187*   < > 142*    < > = values in this interval not displayed.       Liver Function Tests  No lab results found in last 7 days.    Pancreatic Enzymes  No lab results found in last 7  days.    Coagulation Profile  No lab results found in last 7 days.    IMAGING:  No results found for this or any previous visit (from the past 24 hour(s)).

## 2021-12-03 NOTE — PLAN OF CARE
Followed pt from [id number], stable no acute events    Problem: Adult Inpatient Plan of Care  Goal: Absence of Hospital-Acquired Illness or Injury  Intervention: Identify and Manage Fall Risk  Recent Flowsheet Documentation  Taken 12/2/2021 1600 by Renetta Parker RN  Safety Promotion/Fall Prevention: bed alarm on  Intervention: Prevent Skin Injury  Recent Flowsheet Documentation  Taken 12/2/2021 1800 by Renetta Parker RN  Body Position:   turned   log-rolled   upper extremity elevated   weight shifting  Taken 12/2/2021 1600 by Renetta Parker RN  Body Position:   turned   log-rolled   upper extremity elevated   weight shifting  Intervention: Prevent and Manage VTE (Venous Thromboembolism) Risk  Recent Flowsheet Documentation  Taken 12/2/2021 1600 by Renetta Parker RN  VTE Prevention/Management:   pneumatic compression device   PROM (passive range of motion) performed  Intervention: Prevent Infection  Recent Flowsheet Documentation  Taken 12/2/2021 1600 by Renetta Parker RN  Infection Prevention: environmental surveillance performed

## 2021-12-03 NOTE — PROGRESS NOTES
Renal Medicine Progress Note            Assessment/Plan:     76 y.o man with CKD IIIB and following for dialysis dependent OPAL.      # CKD IIIB: Admitted with a serum creatinine of 1.54 mg/dl.      # Acute kidney injury:                -started HD on 11/23 and last HD tx on 11/28                -R temp internal jugular      # FEN: Some edema but not bad. Hypokalemia.      # COVID  19 PNA. 11/4: Doing well with CPAP. Not extubating due to mental status.      # Severe aortic stenosis s/p TAVR 10/26. EF 60-65%.      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     # Anemia and thrombocytopenia: Hgb and PLT seem stable.     Plan:  # 20 meq KCl IV  # No need for hemodialysis today. Leave temp dialysis CVC in for now     I discussed the plan with the ICU team.        Interval History:     Afebrile. VSS. He tolerates CPAP well. Not extubating due to mental status. Kidney function seems stable.           Medications and Allergies:       - MEDICATION INSTRUCTIONS for Dialysis Patients -   Does not apply See Admin Instructions     apixaban ANTICOAGULANT  5 mg Oral or Feeding Tube BID     atorvastatin  80 mg Oral or Feeding Tube Daily     B and C vitamin Complex with folic acid  5 mL Per Feeding Tube Daily     carvedilol  3.125 mg Oral BID w/meals     chlorhexidine  15 mL Mouth/Throat Q12H     [Held by provider] hydrALAZINE  50 mg Oral or Feeding Tube Q8H MOE     insulin aspart  1-12 Units Subcutaneous Q4H     insulin glargine  30 Units Subcutaneous BID     miconazole   Topical BID     pantoprazole  40 mg Oral or Feeding Tube BID AC     protein modular  1 packet Per Feeding Tube TID     sodium chloride (PF)  10-40 mL Intracatheter Q7 Days     sodium chloride (PF)  3 mL Intracatheter Q8H     [Held by provider] tamsulosin  0.4 mg Oral Daily        Allergies   Allergen Reactions     Penicillins Anaphylaxis            Physical Exam:   Vitals were reviewed   , Blood pressure 123/70, pulse 71, temperature 98.2  F (36.8  C), resp. rate 22, height  "1.753 m (5' 9\"), weight 113.8 kg (250 lb 14.1 oz), SpO2 99 %.    Wt Readings from Last 3 Encounters:   12/03/21 113.8 kg (250 lb 14.1 oz)   10/21/21 112 kg (247 lb)   10/11/21 109.5 kg (241 lb 4.8 oz)       Intake/Output Summary (Last 24 hours) at 12/3/2021 1021  Last data filed at 12/3/2021 0900  Gross per 24 hour   Intake 1760 ml   Output 790 ml   Net 970 ml     GENERAL APPEARANCE: critically ill. Better.  HEENT:  Opens yes to voice. Intubated.   RESP: No wheezes. Not coarse.   CV:  RRR. S1S2.   ABDOMEN: obese, soft.   EXTREMITIES/SKIN: no rashes/lesions on observed skin: Minimal edema.   NEURO: Follows come commands per RN.   R temp internal jugular CDI  +giron catheter in place + urine.         Data:     CBC RESULTS:     Recent Labs   Lab 12/03/21  0428 12/02/21  0359 12/01/21  0351 11/30/21  0441 11/29/21  0353 11/28/21  0346   WBC 3.0* 3.4* 3.3* 3.4* 4.3 3.7*   RBC 3.30* 3.41* 3.38* 3.32* 3.28* 3.31*   HGB 9.0* 9.2* 9.1* 8.8* 8.7* 8.8*   HCT 29.5* 30.2* 29.6* 29.2* 28.6* 28.4*   * 111* 119* 97* 86* 72*       Basic Metabolic Panel:  Recent Labs   Lab 12/03/21  0823 12/03/21  0428 12/03/21  0421 12/03/21  0002 12/02/21  1934 12/02/21  1650 12/02/21  0401 12/02/21  0359 12/01/21  0356 12/01/21  0351 11/30/21  0444 11/30/21  0441 11/29/21  0844 11/29/21  0353 11/28/21  1551 11/28/21  1532 11/28/21  0841 11/28/21  0346   NA  --  139  --   --   --   --   --  140  --  140  --  139  --  138  --   --   --  138   POTASSIUM  --  3.2*  --   --   --   --   --  3.4  --  3.4  --  3.7  --  3.6  --  3.4  --  3.6   CHLORIDE  --  104  --   --   --   --   --  105  --  105  --  106  --  105  --   --   --  104   CO2  --  29  --   --   --   --   --  30  --  27  --  30  --  28  --   --   --  26   BUN  --  99*  --   --   --   --   --  95*  --  80*  --  71*  --  54*  --   --   --  80*   CR  --  2.14*  --   --   --   --   --  2.27*  --  2.10*  --  1.86*  --  1.63*  --   --   --  1.94*   * 190* 161* 203* 209* 209*   < > " 182*   < > 187*   < > 142*   < > 78   < >  --    < > 177*  159*   GABE  --  8.5  --   --   --   --   --  8.4*  --  8.7  --  8.4*  --  8.1*  --   --   --  8.1*    < > = values in this interval not displayed.       INRNo lab results found in last 7 days.   Attestation:   I have reviewed today's relevant vital signs, notes, medications, labs and imaging.    Driss Hogan MD  [facility] - Nephrology  Office phone :[phone]  Pager: [phone]

## 2021-12-03 NOTE — PROGRESS NOTES
[facility] ICU RESPIRATORY NOTE        Date of Admission: 10/26/2021     Date of Intubation (most recent): 11/15/21     Reason for Mechanical Ventilation: Respiratory failure     Number of Days on Mechanical Ventilation: 20     Met Criteria for Spontaneous Breathing Trial: Yes     Significant Events Today: PSV  7:30am  to 7 pm, put pt back to full support for resting.  Recent Labs   Lab 12/02/21  0400 12/01/21  0357 11/30/21  0441 11/29/21  0353   PH 7.45 7.46* 7.43 7.41   PCO2 43 42 44 47*   PO2 72* 79* 67* 106*   HCO3 29* 30* 29* 30*   O2PER 35 35 35 40   Ventilation Mode: CMV/AC  (Continuous Mandatory Ventilation/ Assist Control)  FiO2 (%): 35 %  Rate Set (breaths/minute): 12 breaths/min  Tidal Volume Set (mL): 500 mL  PEEP (cm H2O): 5 cmH2O  Pressure Support (cm H2O): 10 cmH2O  Oxygen Concentration (%): 35 %  Resp: 15    Plan: To put pt back  on PS during the day .

## 2021-12-04 NOTE — PROGRESS NOTES
Patient alternates between BiPAP and Heated high-flow nasal cannula during the day time.Lung sounds diminished. Patient SpO2: Mid to high 90's. RT to continue to follow.

## 2021-12-04 NOTE — PROGRESS NOTES
"BP (!) 155/92   Pulse 68   Temp 99.1  F (37.3  C)   Resp 26   Ht 1.753 m (5' 9\")   Wt 113.8 kg (250 lb 14.1 oz)   SpO2 100%   BMI 37.05 kg/m      Pt self extubated during turn.  During turn, pt  coughed and used mitts to create tension on ventilator tubing dislodging his ETT. Pt was placed on BiPAP 10/5 40% and is tolerating a full face mask well. VSS. Will continue to assess and trial off BiPAP. RT following.   JAVIER CROSS r, RT    "

## 2021-12-04 NOTE — PLAN OF CARE
[id number]    Lethargic, follows commands intermittently. Moving all extremities. A. Fib CVR. Edema 2-3+. Self extubated around 0015, now on Bipap 30% 8/5. LS diminished. NPO as feeding tube came out with extubation. Serquel given. PICC SL. Coccyx red, open bleeding areas to coccyx and rectal area. Small formed stools frequent. K low, will replace per nephrology.

## 2021-12-04 NOTE — PLAN OF CARE
0800 Patient placed on CPAP/PS, PS 10, PEEP 5, FiO2 40%. SBT continued throughout shift with no signs of respiratory distress. Patient remains intubated per MD d/t neurologically unable to follow commands. 1735 CXR to confirm ETT placement, MD verbally ordered to advance 2cm. 1800 RRT advanced ETT to 25 @Lip. Remains on vent settings CPAP/PS, PS 8, PEEP 5, FiO2 40%, Vt adequate, will continue to monitor respiratory status throughout shift.

## 2021-12-04 NOTE — PROGRESS NOTES
Addended by: ANNA VICTOR on: 10/4/2019 12:36 PM     Modules accepted: Orders     Dae's test performed prior to radial ABG draw. Collateral circulation confirmed.

## 2021-12-04 NOTE — PROGRESS NOTES
"Discussed with Ina pt siblings. Update about status and extubation and tenuous status .     We reviewed initial presentation briefly and hospital course thus far with recent events     Advised overall long term prognosis guarded given accumulated injury/organ failure in setting of chronic illness. Family in agreement with DNR status , unclear initially about reintubation. Reviewed pt HD directive pt clear about only wanting vent for \"very temporary; duration. Advised 4 week duration thus far qualifies and that if should fail NIV support that we should abide by patient directive and not intubate but rather focus goal of care to palliative from restorative.     Code status updated to DNR/DNI    Will continue all other restorative measures and try an optimize respiratory status short of reintubating.     Maurilio Lee MD              "

## 2021-12-04 NOTE — PROGRESS NOTES
Reportedly self extubated. Had tolerated PST earlier during the day but remained encephalopathic. Appears to be somewhat alert for not fully oriented, strong cough and following minimal commands. Will place on BIPAP and monitor very closely.  Avoid sedatives.    Marla Gonzáles MD  Pulmonary, Critical Care and Sleep Medicine  [facility]  Pager: [phone]

## 2021-12-04 NOTE — PROVIDER NOTIFICATION
Pt self extubated during turn.  During turn, pt  coughed and used mitts to create tension on ventilator tubing dislodging his ETT.  Pt clearly leaking around cuff and ETT balloon creating an obstruction, so ETT fully removed.  All sedation off already, tube feeding stopped.  Pt independently ventilating, blow by O2 applied with Sats upper 90s%.  Dr. Gonzáles and RT called to bedside.  BiPAP applied, 10/5 40% fiO2.  Now sating upper 90s%, with TV 700s.  Pt opens eyes and tracks, intermittently following weakly.  Addressed with pt's bedside RN Krish.

## 2021-12-04 NOTE — PLAN OF CARE
[id number]    Neuro: Lethargic, follows inconsistently. PERRL  CV: AFib CVR, BP stable.   Resp: BiPap/ tolerated hiflo for short time this morning  GI: BS normoactive, Started D10 as pt has been off tube feed and has DM on lantus, NG placed and clamped. Continues to be NPO  : Zuniga patent, good UOP  Access/Sites: PICC, R I J HD cath  Drips: D10, TKO  Abnml Labs: Potassium replaced per nephrology order  Skin: Skin tears, blanchable redness to coccyx  Social/Family: Family updated, they would like to have iPad set up at 1630.  Plan: Intensivist spoke with family, pt made DNR/DNI. Lethargic on BiPap. BG low, gave D50 and started D10 infusion per orders. Plan to continue course of care.

## 2021-12-04 NOTE — PROGRESS NOTES
Pt. Agitated regarding mitts being on.  Discussed with pt he needs to not pull on tubes. Denies having pain--shakes head no. Mitt on left hand taken off at 1545. Pt more relaxed after this and and saw family on IPAD and recognized family. Pt. Restarted on tube feeding.

## 2021-12-04 NOTE — PROGRESS NOTES
[facility]    ICU Progress Note       Date of Admission:  10/26/2021    Assessment: Critical Care   Burton Elizabeth is a 76 year old male with PMHx of coronary artery disease, atrial flutter on eliquis, hypertension, hyperlipidemia, severe aortic stenosis, HFpEF, CKD stage III, diabetes mellitus II, history of GI bleed, suspected obstructive sleep apnea admitted on 10/26/2021 for elective TAVR.  Hospital course complicated by OPAL from contrast nephropathy, volume overload, hypoxic respiratory failure multifactorial, Covid pneumonia and physical deconditioning. Also proteus UTI and bacteremia.    On 11/15 the patient was intubated for severe hypoxic respiratory failure. Patient was proned and paralyzed. Supined by 11/16, off paralytics by 11/18, velitri weaned off on 11/19.  11/29/2021- covid recovered, stable on minimal vent settings, MRSA VAP  12/1/2021- PST for 48 hours, sedation off yesterday  12/2- slow to wake up. Rested on vent overnight. Back to PST this am  12/3- Tolerating pressure support       Plan: Critical Care   Neuro/ pain/ sedation:  1. Encephalopathy, multifactorial, slowly improving  - Monitor neurological status. Notify provider for any acute changes in exam  - discontinue sedative infusions     Pulmonary:  1. Acute hypoxic respiratory failure secondary to covid pneumonia, self extubated 12/4   2. MRSA VAP, vanco treatment completed  - Ventilator settings: Ventilation Mode: CMV/AC  (Continuous Mandatory Ventilation/ Assist Control)  FiO2 (%): 40 %  Rate Set (breaths/minute): 12 breaths/min  Tidal Volume Set (mL): 500 mL  PEEP (cm H2O): 5 cmH2O  Pressure Support (cm H2O): 8 cmH2O  Oxygen Concentration (%): 40 %  Resp: 24  - supplemental oxygen to keep saturation about 92%  - NIV and HFNC support as tolerated  - low threshold to retinubate pending family discussion.       Cardiovascular:  1. Severe aortic stenosis, s/p TAVR 10/26   2. A fib/flutter, rate controlled   3.  Hypertension   - continue eliquis  - adjust IV meds as npo    GI/Nutrition:  1. GI bleed, s/p colonoscopy and EGD: hemorrhoids and erosions in hiatal hernia without active bleeding  2. Moderate malnutrition   - Diet: NPO per Anesthesia Guidelines for Procedure/Surgery Except for: Meds, Other; Specify: Stop tube feeds at midnight  Adult Formula Drip Feeding: Specified Time Novasource Renal; Orogastric tube; Goal Rate: 35; mL/hr; Medication - Feeding Tube Flush Frequency: At least 15-30 mL water before and after medication administration and with tube clogging; Amount to Sen...    - Nutrition consulted. Appreciate recommendations.   - Hold tube feeds  - continue BID protonix  - NG tube for enteral access as able     Renal/ Fluid Balance:   1. ARF on CKD, non-oliguric, creatinine above the patient's baseline but stable   - Will monitor intake and output  - KVO for IV fluid hydration  - ICU electrolyte replacement protocol   - HD as needed per nephrology    Endocrine:  1. DM II with stress induced hyperglycemia, resolving -   - hold Lantus to 30 U as nPO since extubation    ID:  1.Covid pneumonia, recovered  2.Proteus bacteremia and UTI, treated  3. MRSA VAP, vancomycin treatment completed  - completed decadron  -follow clinically     Hematology:  1.Leukopenia, chronic  2. Anemia - no s/sx of acute blood loss  3. Coagulopathy of covid  4. Thrombocytopenia, improving   - continue eliquis  - serial CBC, coags     MSK:   Deconditioning, PT/OT once more awake and follow commands       Lines/ tubes/ drains:  Zuniga Catheter: PRESENT, indication: Retention, Strict 1-2 Hour I&O  Central Lines: PRESENT  PICC Triple Lumen 11/15/21 Left Basilic access-Site Assessment: WDL  CVC Double Lumen Right Internal jugular-Site Assessment: WDL    Prophylaxis:  - DVT Prophylaxis: DOAC  - PUD Prophylaxis: protonix BID    Code Status: Full Code      Disposition:  - ICU, stable     The patient's care was discussed with the multi-disciplinary  team Team.    Discussed with brother about events and tenuous status - agreed to DNR , wants to discuss with sister about DNI status. Reviewed overall course thus far and prognosis particularly if gets reintubated. Of note pt Healthcare directive was DNR/DNI     80 min CCT     Maurilio Lee MD  [facility]  Securely message with the Vocera Web Console (learn more here)  Text page via [employer] Paging/Directory      ____________________________________________________________________    Interval History   - Self extubated overnight, stabilized with BIPAP, somnolent but more arousable and weaned to HFNC this AM  ABG with mild PCO2 47 and stable O2 03  - NT suctioned with some secretions  - NG tube pending  -       Physical Exam   Vital Signs: Temp: 97.5  F (36.4  C) Temp src: Bladder BP: (!) 153/79 Pulse: 76   Resp: 24 SpO2: 98 % O2 Device: High Flow Nasal Cannula (HFNC) Oxygen Delivery: 40 LPM  Weight: 250 lbs 14.14 oz    Gen- nontoxic appearing, appears older than stated age confused   HEENT- anicteric, MMM,    Neck- supple, trachea midline  Pulm- Coarse breath sounds mild labored, cough present acceptable, protecting airway mostly mouth breathing   Cor- irregularly, irregular,a fib on monitor  Abd- soft, obese, no guarding/rigidity, no appreciable HSM  Ext- trace non-pitting edema bilaterally, calves supple  Skin- no jaundice, no rashes  Neuro- opens eyes to name, purposeful movement of his hands, intermittently following commands    Data   I reviewed all medications, new labs and imaging results over the last 24 hours.  Arterial Blood Gases   Recent Labs   Lab 12/04/21  0756 12/03/21  0407 12/02/21  0400 12/01/21  0357   PH 7.43 7.39 7.45 7.46*   PCO2 47* 50* 43 42   PO2 93 40* 72* 79*   HCO3 32* 31* 29* 30*       Complete Blood Count   Recent Labs   Lab 12/04/21  0347 12/03/21  0428 12/02/21  0359 12/01/21  0351   WBC 3.8* 3.0* 3.4* 3.3*   HGB 8.4* 9.0* 9.2* 9.1*   * 112*  111* 119*       Basic Metabolic Panel  Recent Labs   Lab 12/04/21  1137 12/04/21  0347 12/04/21  0346 12/04/21  0004 12/03/21  0823 12/03/21  0428 12/02/21  0401 12/02/21  0359 12/01/21  0356 12/01/21  0351   NA  --  141  --   --   --  139  --  140  --  140   POTASSIUM  --  3.3*  --   --   --  3.2*  --  3.4  --  3.4   CHLORIDE  --  104  --   --   --  104  --  105  --  105   CO2  --  31  --   --   --  29  --  30  --  27   BUN  --  103*  --   --   --  99*  --  95*  --  80*   CR  --  2.11*  --   --   --  2.14*  --  2.27*  --  2.10*   GLC 54* 98 83 162*   < > 190*   < > 182*   < > 187*    < > = values in this interval not displayed.       Liver Function Tests  No lab results found in last 7 days.    Pancreatic Enzymes  No lab results found in last 7 days.    Coagulation Profile  No lab results found in last 7 days.    IMAGING:  Recent Results (from the past 24 hour(s))   XR Chest Port 1 View    Narrative    EXAM: XR CHEST PORT 1 VIEW  LOCATION: [facility]  DATE/TIME: 12/3/2021 5:25 PM    INDICATION: ?ET tube displacement  COMPARISON: 12/03/2021 at 0934 hours      Impression    IMPRESSION: Low lung volumes. Cardiomegaly. Congestion of the central vessels. Small bilateral pleural effusions and infiltrates or atelectasis both lower lobes, similar to the earlier study. Endotracheal tube tip in satisfactory position 2.5 cm above   the devaughn. Right IJ central line tip distal SVC. Left PICC line tip mid SVC. No pneumothorax. TAVR. Enteric tube extends into the stomach.

## 2021-12-04 NOTE — PROGRESS NOTES
" Renal Medicine Progress Note            Assessment/Plan:     76 y.o man with CKD IIIB and following for dialysis dependent OPAL.      # CKD IIIB: Admitted with a serum creatinine of 1.54 mg/dl.      # Acute kidney injury:                -started HD on 11/23 and last HD tx on 11/28                -R temp internal jugular      # FEN: Some edema but not bad. Hypokalemia.      # COVID  19 PNA. 11/4: Doing well with CPAP. Self extubate 12/4.     # Severe aortic stenosis s/p TAVR 10/26. EF 60-65%.      # MRSA VAP completed vancomycin.     # Anemia and thrombocytopenia: Hgb and PLT seem stable.     Plan:  # Potassium chloride 20 meq x 1  # No hemodialysis today. Leave temp internal jugular in for another day and can remove tomorrow if remains stable. Last HD tx was on 11/18.         Interval History:     Patient dislodged his ETT early this morning. Afebrile. VSS. Good urine output. Scr is stable.           Medications and Allergies:       - MEDICATION INSTRUCTIONS for Dialysis Patients -   Does not apply See Admin Instructions     apixaban ANTICOAGULANT  5 mg Oral or Feeding Tube BID     atorvastatin  80 mg Oral or Feeding Tube Daily     B and C vitamin Complex with folic acid  5 mL Per Feeding Tube Daily     carvedilol  3.125 mg Oral BID w/meals     chlorhexidine  15 mL Mouth/Throat Q12H     [Held by provider] hydrALAZINE  50 mg Oral or Feeding Tube Q8H MOE     insulin aspart  1-12 Units Subcutaneous Q4H     insulin glargine  30 Units Subcutaneous BID     miconazole   Topical BID     pantoprazole  40 mg Oral or Feeding Tube BID AC     sodium chloride (PF)  10-40 mL Intracatheter Q7 Days     sodium chloride (PF)  3 mL Intracatheter Q8H     [Held by provider] tamsulosin  0.4 mg Oral Daily        Allergies   Allergen Reactions     Penicillins Anaphylaxis            Physical Exam:   Vitals were reviewed   , Blood pressure (!) 162/92, pulse 73, temperature 97.7  F (36.5  C), resp. rate 26, height 1.753 m (5' 9\"), weight 113.8 " kg (250 lb 14.1 oz), SpO2 100 %.    Wt Readings from Last 3 Encounters:   12/04/21 113.8 kg (250 lb 14.1 oz)   10/21/21 112 kg (247 lb)   10/11/21 109.5 kg (241 lb 4.8 oz)       Intake/Output Summary (Last 24 hours) at 12/4/2021 1045  Last data filed at 12/4/2021 0800  Gross per 24 hour   Intake 1230 ml   Output 875 ml   Net 355 ml       GENERAL APPEARANCE: Stable.   HEENT:  Opens yes to voice.   RESP: No wheezes. Poor airflow.   CV:  RRR. S1S2.   ABDOMEN: obese, soft.   EXTREMITIES/SKIN: no rashes/lesions on observed skin: Some edema but not bad.   NEURO: Awake and but not following commands.  R temp internal jugular CDI           Data:     CBC RESULTS:     Recent Labs   Lab 12/04/21  0347 12/03/21  0428 12/02/21  0359 12/01/21  0351 11/30/21  0441 11/29/21  0353   WBC 3.8* 3.0* 3.4* 3.3* 3.4* 4.3   RBC 3.18* 3.30* 3.41* 3.38* 3.32* 3.28*   HGB 8.4* 9.0* 9.2* 9.1* 8.8* 8.7*   HCT 28.8* 29.5* 30.2* 29.6* 29.2* 28.6*   * 112* 111* 119* 97* 86*       Basic Metabolic Panel:  Recent Labs   Lab 12/04/21  0347 12/04/21  0346 12/04/21  0004 12/03/21  2127 12/03/21  1610 12/03/21  1130 12/03/21  0823 12/03/21  0428 12/02/21  0401 12/02/21  0359 12/01/21  0356 12/01/21  0351 11/30/21  0444 11/30/21  0441 11/29/21  0844 11/29/21  0353     --   --   --   --   --   --  139  --  140  --  140  --  139  --  138   POTASSIUM 3.3*  --   --   --   --   --   --  3.2*  --  3.4  --  3.4  --  3.7  --  3.6   CHLORIDE 104  --   --   --   --   --   --  104  --  105  --  105  --  106  --  105   CO2 31  --   --   --   --   --   --  29  --  30  --  27  --  30  --  28   *  --   --   --   --   --   --  99*  --  95*  --  80*  --  71*  --  54*   CR 2.11*  --   --   --   --   --   --  2.14*  --  2.27*  --  2.10*  --  1.86*  --  1.63*   GLC 98 83 162* 151* 153* 157*   < > 190*   < > 182*   < > 187*   < > 142*   < > 78   GABE 8.4*  --   --   --   --   --   --  8.5  --  8.4*  --  8.7  --  8.4*  --  8.1*    < > = values in this  interval not displayed.       INRNo lab results found in last 7 days.   Attestation:   I have reviewed today's relevant vital signs, notes, medications, labs and imaging.    Driss Hogan MD  [facility]phrology  Office phone :[phone]  Pager: [phone]

## 2021-12-05 NOTE — PROGRESS NOTES
[facility]    ICU Progress Note       Date of Admission:  10/26/2021    Assessment: Critical Care   Burton Elizabeth is a 76 year old male with PMHx of coronary artery disease, atrial flutter on eliquis, hypertension, hyperlipidemia, severe aortic stenosis, HFpEF, CKD stage III, diabetes mellitus II, history of GI bleed, suspected obstructive sleep apnea admitted on 10/26/2021 for elective TAVR.  Hospital course complicated by OPAL from contrast nephropathy, volume overload, hypoxic respiratory failure multifactorial, Covid pneumonia and physical deconditioning. Also proteus UTI and bacteremia.    On 11/15 the patient was intubated for severe hypoxic respiratory failure. Patient was proned and paralyzed. Supined by 11/16, off paralytics by 11/18, velitri weaned off on 11/19.  11/29/2021- covid recovered, stable on minimal vent settings, MRSA VAP  12/1/2021- PST for 48 hours, sedation off yesterday  12/2- slow to wake up. Rested on vent overnight. Back to PST this am  12/3- Tolerating pressure support  12/4 - self extubated, tenuous but stabilized with BIPAP/ HFNC , NG tube place, family updated made DN/DNI    INTERIM HISTORY  - delirious, restless overnight, pulling off BIPAP  - mostly mouth breathing with HFNC, sats stable      Plan: Critical Care   Neuro/ pain/ sedation:  1. Encephalopathy, multifactorial, slowly improving  - Monitor neurological status. Notify provider for any acute changes in exam    Pulmonary:  1. Acute hypoxic respiratory failure secondary to covid pneumonia, self extubated 12/4 , stabilized on BIPAP/HFNC, DNR/DNI   2. MRSA VAP, vanco treatment completed  - Ventilator settings: FiO2 (%): 40 %  Resp: 28  - supplemental oxygen to keep saturation about 92%  - NIV and HFNC support as tolerated  - DNR/DNI  - pulmonary toilet as indicated    Cardiovascular:  1. Severe aortic stenosis, s/p TAVR 10/26   2. A fib/flutter, rate controlled   3. Hypertension   - continue eliquis  - now PO access again  - diuresis as tolerated    GI/Nutrition:  1. GI bleed, s/p colonoscopy and EGD: hemorrhoids and erosions in hiatal hernia without active bleeding  2. Moderate malnutrition   - Diet: NPO per Anesthesia Guidelines for Procedure/Surgery Except for: Meds, Other; Specify: Stop tube feeds at midnight  Adult Formula Drip Feeding: Specified Time Novasource Renal; Orogastric tube; Goal Rate: 35; mL/hr; Medication - Feeding Tube Flush Frequency: At least 15-30 mL water before and after medication administration and with tube clogging; Amount to Sen...    - Nutrition consulted. Appreciate recommendations.   - continue  tube feeds  - continue BID protonix    Renal/ Fluid Balance:   1. ARF on CKD, non-oliguric, creatinine above the patient's baseline but stable , still uremic   - Will monitor intake and output, diuresis 40 lasix   - KVO for IV fluid hydration  - ICU electrolyte replacement protocol   - ? Pull HD line, will d/w nephrology vs     Endocrine:  1. DM II with stress induced hyperglycemia, resolving - sugars rising but lantus on hold   - continue hold Lantus to 30 U BID until PO status stable  - cover with sliding scale insulin     ID:  1.Covid pneumonia, recovered; completed decadron  2.Proteus bacteremia and UTI, treated  3. MRSA VAP, vancomycin treatment completed  -follow clinically     Hematology:  1.Leukopenia, chronic, stable  2. Anemia - no s/sx of acute blood loss  3. Coagulopathy of covid  4. Thrombocytopenia, improving , stable   - continue eliquis  - serial CBC, coags     MSK:   1. Deconditioning, PT/OT once more awake and follow commands       Lines/ tubes/ drains:  Zuniga Catheter: PRESENT, indication: Retention, Strict 1-2 Hour I&O;Deep Sedation/Paralysis  Central Lines: PRESENT  PICC Triple Lumen 11/15/21 Left Basilic access-Site Assessment: WDL  CVC Double Lumen Right Internal jugular-Site Assessment: WDL;Other (Comment) (dried blood/drainage)    Prophylaxis:  - DVT Prophylaxis:  DOAC  - PUD Prophylaxis: protonix BID    Code Status: No CPR- Do NOT Intubate      Disposition:  - ICU, stable     The patient's care was discussed with the multi-disciplinary team Team.  Family update pending    35 min CCT     Maurilio Lee MD  [facility]  Securely message with the Vocera Web Console (learn more here)  Text page via [employer] Paging/Directory      ____________________________________________________________________      Physical Exam   Vital Signs: Temp: 98.4  F (36.9  C) Temp src: Bladder BP: 112/84 Pulse: 89   Resp: 28 SpO2: 94 % O2 Device: High Flow Nasal Cannula (HFNC) Oxygen Delivery: 40 LPM  Weight: 247 lbs 9.23 oz    Gen- appears fatigued,older than stated age confused   HEENT- anicteric, MMM,  Neck- supple, trachea midline  Pulm- Coarse breath sounds mild labored, cough present acceptable, protecting airway mostly mouth breathing   Cor- irregularly, irregular,a fib on monitor  Abd- soft, obese, no guarding/rigidity, no appreciable HSM  Ext- trace non-pitting edema bilaterally, calves supple  Skin- no jaundice, no rashes  Neuro- opens eyes to name, purposeful movement of his hands, tracks,  following commands intermittently     Data   I reviewed all medications, new labs and imaging results over the last 24 hours.  Arterial Blood Gases   Recent Labs   Lab 12/04/21  1347 12/04/21  0756 12/03/21  0407 12/02/21  0400   PH 7.41 7.43 7.39 7.45   PCO2 50* 47* 50* 43   PO2 126* 93 40* 72*   HCO3 32* 32* 31* 29*       Complete Blood Count   Recent Labs   Lab 12/05/21  0451 12/04/21  0347 12/03/21  0428 12/02/21  0359   WBC 4.0 3.8* 3.0* 3.4*   HGB 9.3* 8.4* 9.0* 9.2*   * 126* 112* 111*       Basic Metabolic Panel  Recent Labs   Lab 12/05/21  0451 12/05/21  0323 12/04/21  2344 12/04/21  1917 12/04/21  1137 12/04/21  0347 12/03/21  0823 12/03/21  0428 12/02/21  0401 12/02/21  0359     --   --   --   --  141  --  139  --  140   POTASSIUM 3.8  --   --   --    --  3.3*  --  3.2*  --  3.4   CHLORIDE 104  --   --   --   --  104  --  104  --  105   CO2 30  --   --   --   --  31  --  29  --  30   *  --   --   --   --  103*  --  99*  --  95*   CR 1.95*  --   --   --   --  2.11*  --  2.14*  --  2.27*   * 187* 176* 139*   < > 98   < > 190*   < > 182*    < > = values in this interval not displayed.       Liver Function Tests  Recent Labs   Lab 12/05/21  0451   AST 51*   ALT 36   ALKPHOS 220*   BILITOTAL 0.6   ALBUMIN 1.8*       Pancreatic Enzymes  No lab results found in last 7 days.    Coagulation Profile  No lab results found in last 7 days.    IMAGING:  Recent Results (from the past 24 hour(s))   XR Abdomen Port 1 View    Narrative    EXAM: XR ABDOMEN PORT 1 VIEWS  LOCATION: [facility]  DATE/TIME: 12/4/2021 12:30 PM    INDICATION: NG tube placement check.  COMPARISON: None.      Impression    IMPRESSION: Enteric tube coiled in the expected location of the stomach.    XR Chest Port 1 View    Narrative    EXAM: XR CHEST PORTABLE 1 VIEW  LOCATION: [facility]  DATE/TIME: 12/05/2021, 4:50 AM    INDICATION: COVID follow-up.  COMPARISON: 12/03/2021.    FINDINGS: Right IJ central venous catheter and the left PICC remain in place. The ET tube has been removed. Aortic valve prosthesis. No pneumothorax. There are again bilateral pulmonary infiltrates without significant change. Probable bilateral pleural   effusions.      Impression    IMPRESSION: Persistent bilateral pulmonary infiltrates and pleural effusions.

## 2021-12-05 NOTE — PLAN OF CARE
[id number]     Neuro: Lethargic. Opens eyes spontaneously. PERRL. Follows inconsistently.     Respiratory: LS diminished. BiPap supported.      Cardiac: Tele: Afib CVR with BBB. SBP within goal <160     GI/: +BS/BM, giron in place with adequate  output.     Skin: scattered bruising and scabbing. mepilex to L forearme, sacrum and thigh wounds in place.      Pain: Shakes head 'no' to pain.      Mobility: bedrest. Assisted with turns q2h for pressure injury prevention - patient able to weight shift/micro turn on own.     Diet: TF running at goal 35ml/hr via NG - prosource TID     Restraint: no restraints needed. mits removed prior shift, patient occasionally pulling at mask but redirectable at this time.      Plan of Care: DNR/DNI as of 12/4. Continue supportive cares. Palliative care TBD?

## 2021-12-05 NOTE — PROGRESS NOTES
Renal Medicine Progress Note            Assessment/Plan:     76 y.o man with CKD IIIB and following for dialysis dependent OPAL.      # CKD IIIB: Admitted with a serum creatinine of 1.54 mg/dl.      # Acute kidney injury: Stable.                -started HD on 11/23 and last HD tx on 11/28                -R temp internal jugular      # FEN: Some edema but not bad. Hypokalemia.      # COVID  19 PNA. On HF.      # Severe aortic stenosis s/p TAVR 10/26. EF 60-65%.      # MRSA VAP completed vancomycin.     # Anemia and thrombocytopenia: Hgb and PLT seem stable.     Plan:  # Kidney function has been stable. Last HD tx was on 11/28. He has excellent urine output. Okay to remove temp dialysis CVC.   # Intermittent IV Loop diuretic as needed        Interval History:     Afebrile. VSS. No major issues overnight. He is on HF wigth 40% FIO2. Patient is awake but not following verbal commands. Excellent urine output. Kidney function stable.           Medications and Allergies:       - MEDICATION INSTRUCTIONS for Dialysis Patients -   Does not apply See Admin Instructions     apixaban ANTICOAGULANT  5 mg Oral or Feeding Tube BID     atorvastatin  80 mg Oral or Feeding Tube Daily     B and C vitamin Complex with folic acid  5 mL Per Feeding Tube Daily     [Held by provider] carvedilol  3.125 mg Oral BID w/meals     hydrALAZINE  20 mg Intravenous Q8H     [Held by provider] hydrALAZINE  50 mg Oral or Feeding Tube Q8H MOE     insulin aspart  1-12 Units Subcutaneous Q4H     [Held by provider] insulin glargine  30 Units Subcutaneous BID     miconazole   Topical BID     pantoprazole  40 mg Oral or Feeding Tube BID AC     sodium chloride (PF)  10-40 mL Intracatheter Q7 Days     [Held by provider] tamsulosin  0.4 mg Oral Daily        Allergies   Allergen Reactions     Penicillins Anaphylaxis            Physical Exam:   Vitals were reviewed   , Blood pressure 112/84, pulse 89, temperature 98.4  F (36.9  C), resp. rate 28, height 1.753 m  "(5' 9\"), weight 112.3 kg (247 lb 9.2 oz), SpO2 94 %.    Wt Readings from Last 3 Encounters:   12/05/21 112.3 kg (247 lb 9.2 oz)   10/21/21 112 kg (247 lb)   10/11/21 109.5 kg (241 lb 4.8 oz)       Intake/Output Summary (Last 24 hours) at 12/5/2021 1020  Last data filed at 12/5/2021 1000  Gross per 24 hour   Intake 2333.75 ml   Output 1295 ml   Net 1038.75 ml     GENERAL APPEARANCE: Stable.   HEENT:  Eyes are open. EOMI.   RESP: No wheezes. Poor airflow.   CV:  RRR. S1S2.   ABDOMEN: obese, soft.   EXTREMITIES/SKIN: no rashes/lesions on observed skin: Some edema but not bad.   NEURO: Awake and but not following commands.  R temp internal jugular CDI         Data:     CBC RESULTS:     Recent Labs   Lab 12/05/21  0451 12/04/21  0347 12/03/21  0428 12/02/21  0359 12/01/21  0351 11/30/21  0441   WBC 4.0 3.8* 3.0* 3.4* 3.3* 3.4*   RBC 3.51* 3.18* 3.30* 3.41* 3.38* 3.32*   HGB 9.3* 8.4* 9.0* 9.2* 9.1* 8.8*   HCT 31.9* 28.8* 29.5* 30.2* 29.6* 29.2*   * 126* 112* 111* 119* 97*       Basic Metabolic Panel:  Recent Labs   Lab 12/05/21  0451 12/05/21  0323 12/04/21  2344 12/04/21  1917 12/04/21  1553 12/04/21  1210 12/04/21  1137 12/04/21  0347 12/03/21  0823 12/03/21  0428 12/02/21  0401 12/02/21  0359 12/01/21  0356 12/01/21  0351 11/30/21  0444 11/30/21  0441     --   --   --   --   --   --  141  --  139  --  140  --  140  --  139   POTASSIUM 3.8  --   --   --   --   --   --  3.3*  --  3.2*  --  3.4  --  3.4  --  3.7   CHLORIDE 104  --   --   --   --   --   --  104  --  104  --  105  --  105  --  106   CO2 30  --   --   --   --   --   --  31  --  29  --  30  --  27  --  30   *  --   --   --   --   --   --  103*  --  99*  --  95*  --  80*  --  71*   CR 1.95*  --   --   --   --   --   --  2.11*  --  2.14*  --  2.27*  --  2.10*  --  1.86*   * 187* 176* 139* 93 104*   < > 98   < > 190*   < > 182*   < > 187*   < > 142*   GABE 8.8  --   --   --   --   --   --  8.4*  --  8.5  --  8.4*  --  8.7  --  8.4*    " < > = values in this interval not displayed.       INRNo lab results found in last 7 days.   Attestation:   I have reviewed today's relevant vital signs, notes, medications, labs and imaging.    Driss Hogan MD  [facility]  Office phone :[phone]  Pager: [phone]

## 2021-12-05 NOTE — PROGRESS NOTES
[facility]  Hospitalist Progress Note  Name: Burton Elizabeth    MRN: [id number]  Physician:  Rahat Gan DO, FHM (Text Page)  Securely message with the Vocera Web Console (learn more here)    Summary of Stay:  Burton Elizabeth is a 76 year old male with PMHx of coronary artery disease, atrial flutter on eliquis, hypertension, hyperlipidemia, severe aortic stenosis, HFpEF, CKD stage III, diabetes mellitus II, history of GI bleed, suspected obstructive sleep apnea admitted on 10/26/2021 for elective TAVR.  Hospital course complicated by OPAL from contrast nephropathy, volume overload, hypoxic respiratory failure multifactorial, Covid pneumonia and physical deconditioning. Also proteus UTI and bacteremia.    Coarse summary:  11/15- patient intubated for severe hypoxic respiratory failure. Patient was proned and paralyzed.   11/16- supined yesterday, weaning vec   11/18- off of paralytics off Veletri   11/22: Started scheduled IV Bumex   11/23: BM's--maroon, red-streaked, foul-smelling (overnight)   11/27: ETT came out during turn, bronch and tube exchange done.   11/29: PS 5/5 for 15. PEEP decreased to 5.   12/1:  ON PS FOR 43 hours (changed to IMV and notable lessening in RR from 30 to 17 and less WOB.  No HD today increased TF Flush 100 q 4.  Request schedule PSV daytime with rest at night.   12/2 More awake RASS -1.  Considering extubation in 1-2 days, Rest on CMV overnight. NO HD today.   12/3 Daytime PSV. ETT advanced to 25 @Lip. NO HD today.   12/4 self extubated during turn per night nurse @ 0015. Placed on BiPap, attempted hiflo, did not tolerate. Pt made DNR/DNI. NG tube placed.   12/5: HFNC over night, matilde better than bipap. HD line out. TX to [facility] [place]    Assessment & Plan      COVID-19 Pneumonia  MRSA VAP  Acute hypoxic respiratory failure (multifactorial with above):  -  Self extubated 12/4 , stabilized on BIPAP/HFNC, DNR/DNI  -  Off COVID precautions as initial COVID diagnosis over  a month ago  -  No ongoing COVID tx's, completed abx for pneumonia  -  Resp status remains tenuous.  At this point does not appear to tolerate BiPAP well with agitation.  He has been essentially on HiFlow last night for the most part and all day today with stable resp status.  Recent ABG showed stable PCO2.  Pulm toilet as able, Nebs PRN.    -  Wean O2  Recent Labs   Lab 12/04/21  1347 12/04/21  0756 12/03/21  0407   PH 7.41 7.43 7.39   PO2 126* 93 40*   PCO2 50* 47* 50*   HCO3 32* 32* 31*   VALERIY 6.5* 6.6* 4.8*     Severe aortic stenosis, s/p TAVR 10/26   A fib/flutter, rate controlled   Hypertension:  -  Apixaban  -  Gentle diuresis  -  Resume coreg low dose this evening given BP's.  Try to stop scheduled IV hydralazine and move to oral hydralazine.    GI bleed earlier in stay, s/p colonoscopy and EGD:   -  Hemorrhoids and erosions in hiatal hernia without active bleeding  -  No overt ongoing bleeding noted  -  PPI    Moderate malnutrition:   - Diet: NPO per Anesthesia Guidelines for Procedure/Surgery Except for: Meds, Other; Specify: Stop tube feeds at midnight  Adult Formula Drip Feeding: Specified Time Novasource Renal; Orogastric tube; Goal Rate: 35; mL/hr; Medication - Feeding Tube Flush Frequency: At least 15-30 mL water before and after medication administration and with tube clogging; Amount to Sen...    - Nutrition consulted. Appreciate recommendations.   - continue  tube feeds  - continue BID protonix    ARF on CKD, non-oliguric, creatinine above the patient's baseline but stable:  - Will monitor intake and output, diuresis 40 lasix   - KVO for IV fluid hydration  - Electrolyte replacement protocol   -  Nephrology said dialysis line could now be removed, no ongoing HD needed.    DM II with stress induced hyperglycemia, resolving - sugars rising but lantus on hold   - Resume reduced dose lantus, continue sliding scale insulin     Proteus bacteremia and UTI, treated    Leukopenia, chronic, stable  Anemia -  no s/sx of acute blood loss  Coagulopathy of covid earlier in stay  Thrombocytopenia, improving , stable   - continue eliquis  - Periodic CBC, coags     Agitation/Encephalopathy:  -  Exacerbated by prolonged severe illness.  Follows basic commands.  Continue working on reassurance and mobility.  Consider some low dose seroquel tomorrow if not further improved.  Hand mits PRN pulling at lines for his safety.    Deconditioning:  -  PT/OT once more ability to follow requests/participate.  If further improvement try to get up to chair more tomorrow.         COVID Status:  COVID-19 PCR Results    COVID-19 PCR Results 6/30/20 7/6/21 8/22/21 10/8/21 10/22/21 11/4/21   COVID-19 Virus PCR to [facility] - Result Not Detected        COVID-19 Virus PCR to [facility] - Source Nasal        SARS-CoV-2 Virus Specimen Source  Nasopharyngeal       SARS-CoV-2 PCR Result  NEGATIVE       SARS CoV2 PCR   Negative Negative Negative Positive (A)   (A) Abnormal value       Comments are available for some flowsheets but are not being displayed.         COVID-19 Antibody Results, Testing for Immunity    COVID-19 Antibody Results, Testing for Immunity   No data to display.            Diet: NPO per Anesthesia Guidelines for Procedure/Surgery Except for: Meds, Other; Specify: Stop tube feeds at midnight  Adult Formula Drip Feeding: Specified Time Novasource Renal; Orogastric tube; Goal Rate: 35; mL/hr; Medication - Feeding Tube Flush Frequency: At least 15-30 mL water before and after medication administration and with tube clogging; Amount to Sen...    DVT Prophylaxis: DOAC  Zuniga Catheter: PRESENT, indication: Retention, Strict 1-2 Hour I&O;Pressure Ulcer with Incontinence  Code Status: No CPR- Do NOT Intubate      Disposition Plan    Expect several more days in the hospital.  Moving to IMC status today out of ICU.     Entered: Rahat Gan 12/05/2021, 3:00 PM       Interval History   Asked to assume care from ICU service as they are transferring  him out of the ICU.  Patient confused.  Doesn't answer detailed questions.  Nods no to SOB/pain currently.      -Data reviewed today: I reviewed all new labs and imaging reports over the last 24 hours. I personally reviewed no images or EKG's today.    Physical Exam   Temp: 98.8  F (37.1  C) Temp src: Bladder BP: 129/63 Pulse: 89   Resp: (!) 32 SpO2: 95 % O2 Device: High Flow Nasal Cannula (HFNC) Oxygen Delivery: 40 LPM  Vitals:    12/03/21 0200 12/04/21 0200 12/05/21 0500   Weight: 113.8 kg (250 lb 14.1 oz) 113.8 kg (250 lb 14.1 oz) 112.3 kg (247 lb 9.2 oz)     Vital Signs with Ranges  Temp:  [96  F (35.6  C)-99  F (37.2  C)] 98.8  F (37.1  C)  Pulse:  [68-97] 89  Resp:  [9-32] 32  BP: (100-149)/(46-87) 129/63  FiO2 (%):  [40 %] 40 %  SpO2:  [93 %-99 %] 95 %  I/O last 3 completed shifts:  In: 2055 [I.V.:390; NG/GT:1000]  Out: 1200 [Urine:1200]      GEN:  Alert, orientation hard to determine, appears ill but comfortable, follows my basic extremity commands.  HEENT:  Normocephalic/atraumatic, no scleral icterus, no nasal discharge, mouth moist.  NGT in place.  CV:  Regular, distant.  No rub.  LUNGS:  Clear upper, decreased towards bases.  No wheezes/retractions.  Symmetric chest rise on inhalation noted.  ABD:  Active bowel sounds, soft, non-tender, mildly distended.  No rebound/guarding/rigidity.  EXT:  +1-2 edema.  No cyanosis.  No acute joint synovitis noted.  SKIN:  Dry to touch, no exanthems noted in the visualized areas.    Medications     dextrose Stopped (12/04/21 1630)     - MEDICATION INSTRUCTIONS -       - MEDICATION INSTRUCTIONS -       sodium chloride 10 mL/hr at 11/30/21 0518       - MEDICATION INSTRUCTIONS for Dialysis Patients -   Does not apply See Admin Instructions     apixaban ANTICOAGULANT  5 mg Oral or Feeding Tube BID     atorvastatin  80 mg Oral or Feeding Tube Daily     B and C vitamin Complex with folic acid  5 mL Per Feeding Tube Daily     carvedilol  3.125 mg Oral BID w/meals      hydrALAZINE  50 mg Oral or Feeding Tube Q8H MOE     insulin aspart  1-12 Units Subcutaneous Q4H     [Held by provider] insulin glargine  30 Units Subcutaneous BID     miconazole   Topical BID     pantoprazole  40 mg Oral or Feeding Tube BID AC     sodium chloride (PF)  10-40 mL Intracatheter Q7 Days     [Held by provider] tamsulosin  0.4 mg Oral Daily     Data     Recent Labs   Lab 12/05/21  1220 12/04/21  1347 12/04/21  0756 12/03/21  0407   PH  --  7.41 7.43 7.39   PHV 7.38  --   --   --    PO2  --  126* 93 40*   PO2V 39  --   --   --    PCO2  --  50* 47* 50*   PCO2V 55*  --   --   --    HCO3  --  32* 32* 31*   HCO3V 32*  --   --   --      Recent Labs   Lab 12/05/21  0451 12/04/21  0347 12/03/21  0428   WBC 4.0 3.8* 3.0*   HGB 9.3* 8.4* 9.0*   HCT 31.9* 28.8* 29.5*   MCV 91 91 89   * 126* 112*     Recent Labs   Lab 12/05/21  1526 12/05/21  1217 12/05/21  0451 12/04/21  1137 12/04/21  0347 12/03/21  0823 12/03/21  0428   NA  --   --  139  --  141  --  139   POTASSIUM  --   --  3.8  --  3.3*  --  3.2*   CHLORIDE  --   --  104  --  104  --  104   CO2  --   --  30  --  31  --  29   ANIONGAP  --   --  5  --  6  --  6   * 254* 225*   < > 98   < > 190*   BUN  --   --  103*  --  103*  --  99*   CR  --   --  1.95*  --  2.11*  --  2.14*   GFRESTIMATED  --   --  33*  --  30*  --  29*   GABE  --   --  8.8  --  8.4*  --  8.5   MAG  --   --  2.2  --  2.1  --  2.1   PHOS  --   --  4.3  --  4.1  --  3.9   PROTTOTAL  --   --  5.0*  --   --   --   --    ALBUMIN  --   --  1.8*  --   --   --   --    BILITOTAL  --   --  0.6  --   --   --   --    ALKPHOS  --   --  220*  --   --   --   --    AST  --   --  51*  --   --   --   --    ALT  --   --  36  --   --   --   --     < > = values in this interval not displayed.       Recent Results (from the past 24 hour(s))   XR Chest Port 1 View    Narrative    EXAM: XR CHEST PORTABLE 1 VIEW  LOCATION: [facility]  DATE/TIME: 12/05/2021, 4:50 AM    INDICATION:  COVID follow-up.  COMPARISON: 12/03/2021.    FINDINGS: Right IJ central venous catheter and the left PICC remain in place. The ET tube has been removed. Aortic valve prosthesis. No pneumothorax. There are again bilateral pulmonary infiltrates without significant change. Probable bilateral pleural   effusions.      Impression    IMPRESSION: Persistent bilateral pulmonary infiltrates and pleural effusions.

## 2021-12-05 NOTE — PROGRESS NOTES
Pt progressing slowly today.   Following commands, but not vocalizing answers. Bilat Mitts as pt removes equipment/oxygen. Denies pain.  HFNC 40%/40L.    RIJ HD cath removed without issue. Site CDI.   No BM today yet. Low UO. Zuniga remains in place.   Family face-timed this morning with patient.   Pt to transfer to [facility] this afternoon. Report given to [facility] RN an ICU RN until transfer occurs.

## 2021-12-06 NOTE — PROGRESS NOTES
[facility]     Renal Progress Note       SHORTHAND KEY FOR MY NOTES:  c = with, s = without, p = after, a = before, x = except, asx = asymptomatic, tx = transplant or treatment, sx = symptoms or symptomatic, cx = canceled or culture, rxn = reaction, yday = yesterday, nl = normal, abx = antibiotics, fxn = function, dx = diagnosis, dz = disease, m/h = melena/hematochezia, c/d/l/ha = cramping/dizziness/lightheadedness/headache, d/c = discharge or diarrhea/constipation, f/c/n/v = fevers/chills/nausea/vomiting, cp/sob = chest pain/shortness of breath, tbv = total body volume, rxn = reaction, tdc = tunneled dialysis catheter, pta = prior to admission, hd = hemodialysis, pd = peritoneal dialysis, hhd = home hemodialysis, edw = estimated dry wt         Assessment/Plan:     1.  OPAL/CKD IIIb.  Pt's cr is stable ~2.  He is making a good amt of urine and his TBV is up, but ok.  His TDC was removed yday.  A.  Follow labs, uo, sx daily.  B.  Avoid nephrotoxics.  C.  Use diuretics prn.    Thank you for this consultation.  I will sign off.  Please call if any questions.        Interval History:     Unable.  Pt is not verbal.  He opens his eyes to voice.          Medications and Allergies:       - MEDICATION INSTRUCTIONS for Dialysis Patients -   Does not apply See Admin Instructions     apixaban ANTICOAGULANT  5 mg Oral or Feeding Tube BID     atorvastatin  80 mg Oral or Feeding Tube Daily     B and C vitamin Complex with folic acid  5 mL Per Feeding Tube Daily     carvedilol  3.125 mg Oral BID w/meals     hydrALAZINE  50 mg Oral or Feeding Tube Q8H MOE     insulin aspart  1-12 Units Subcutaneous Q4H     insulin glargine  10 Units Subcutaneous BID     miconazole   Topical BID     pantoprazole  40 mg Oral or Feeding Tube BID AC     sodium chloride (PF)  10-40 mL Intracatheter Q7 Days     sodium chloride (PF)  3 mL Intracatheter Q8H     tamsulosin  0.4 mg Per Feeding Tube Daily     Allergies   Allergen  "Reactions     Penicillins Anaphylaxis          Physical Exam:     Vitals were reviewed     , Blood pressure (!) 148/87, pulse 93, temperature 98  F (36.7  C), temperature source Oral, resp. rate 27, height 1.753 m (5' 9\"), weight 112.3 kg (247 lb 9.2 oz), SpO2 97 %.  Wt Readings from Last 3 Encounters:   12/05/21 112.3 kg (247 lb 9.2 oz)   10/21/21 112 kg (247 lb)   10/11/21 109.5 kg (241 lb 4.8 oz)     Intake/Output Summary (Last 24 hours) at 12/6/2021 1044  Last data filed at 12/6/2021 0600  Gross per 24 hour   Intake 1180 ml   Output 650 ml   Net 530 ml     GENERAL APPEARANCE: lying in bed, not interactive  HEENT:  + nasal FT  RESP: CTA B ant/lat c decent efforts  CV: RRR, nl S1/S2   ABDOMEN: o/s/nt/nd  EXTREMITIES/SKIN: + ble edema  ACCESS:  + L PICC         Data:     CBC RESULTS:     Recent Labs   Lab 12/06/21  0520 12/05/21  0451 12/04/21  0347 12/03/21  0428 12/02/21  0359 12/01/21  0351   WBC 5.5 4.0 3.8* 3.0* 3.4* 3.3*   RBC 3.76* 3.51* 3.18* 3.30* 3.41* 3.38*   HGB 10.0* 9.3* 8.4* 9.0* 9.2* 9.1*   HCT 34.0* 31.9* 28.8* 29.5* 30.2* 29.6*    127* 126* 112* 111* 119*     Basic Metabolic Panel:  Recent Labs   Lab 12/06/21  0811 12/06/21  0520 12/06/21  0252 12/05/21  2035 12/05/21  1526 12/05/21  1217 12/05/21  0451 12/04/21  1137 12/04/21  0347 12/03/21  0823 12/03/21  0428 12/02/21  0401 12/02/21  0359 12/01/21  0356 12/01/21  0351   NA  --  141  --   --   --   --  139  --  141  --  139  --  140  --  140   POTASSIUM  --  3.8  --   --   --   --  3.8  --  3.3*  --  3.2*  --  3.4  --  3.4   CHLORIDE  --  105  --   --   --   --  104  --  104  --  104  --  105  --  105   CO2  --  31  --   --   --   --  30  --  31  --  29  --  30  --  27   BUN  --  112*  --   --   --   --  103*  --  103*  --  99*  --  95*  --  80*   CR  --  1.93*  --   --   --   --  1.95*  --  2.11*  --  2.14*  --  2.27*  --  2.10*   * 306* 288* 269* 229* 254* 225*   < > 98   < > 190*   < > 182*   < > 187*   GABE  --  9.4  --   --   " --   --  8.8  --  8.4*  --  8.5  --  8.4*  --  8.7    < > = values in this interval not displayed.     INRNo lab results found in last 7 days.   Attestation:   I have reviewed today's relevant vital signs, notes, medications, labs and imaging.    Gavino Shipley MD  [facility] - Nephrology  [phone]

## 2021-12-06 NOTE — PROGRESS NOTES
[facility]  Hospitalist Progress Note  Name: Burton Elizabeth    MRN: [id number]  Physician:  Rahat Gan DO, FHM (Text Page)  Securely message with the Vocera Web Console (learn more here)    Summary of Stay:  Burton Elizabeth is a 76 year old male with PMHx of coronary artery disease, atrial flutter on eliquis, hypertension, hyperlipidemia, severe aortic stenosis, HFpEF, CKD stage III, diabetes mellitus II, history of GI bleed, suspected obstructive sleep apnea admitted on 10/26/2021 for elective TAVR.  Hospital course complicated by OPAL from contrast nephropathy, volume overload, hypoxic respiratory failure multifactorial, Covid pneumonia and physical deconditioning. Also proteus UTI and bacteremia.    Coarse summary:  11/15- patient intubated for severe hypoxic respiratory failure. Patient was proned and paralyzed.   11/16- supined yesterday, weaning vec   11/18- off of paralytics off Veletri   11/22: Started scheduled IV Bumex   11/23: BM's--maroon, red-streaked, foul-smelling (overnight)   11/27: ETT came out during turn, bronch and tube exchange done.   11/29: PS 5/5 for 15. PEEP decreased to 5.   12/1:  ON PS FOR 43 hours (changed to IMV and notable lessening in RR from 30 to 17 and less WOB.  No HD today increased TF Flush 100 q 4.  Request schedule PSV daytime with rest at night.   12/2 More awake RASS -1.  Considering extubation in 1-2 days, Rest on CMV overnight. NO HD today.   12/3 Daytime PSV. ETT advanced to 25 @Lip. NO HD today.   12/4 self extubated during turn per night nurse @ 0015. Placed on BiPap, attempted hiflo, did not tolerate. Pt made DNR/DNI. NG tube placed.   12/5: HFNC over night, matilde better than bipap. HD line out. TX to IMC  12/6: Substantial improvement in oxygenation, patient off high flow.    Assessment & Plan      COVID-19 Pneumonia  MRSA VAP  Acute hypoxic respiratory failure (multifactorial with above):  -  Self extubated 12/4 , stabilized on  BIPAP/HFNC, DNR/DNI  -  Off COVID precautions as initial COVID diagnosis over a month ago  -  No ongoing COVID tx's, completed abx for pneumonia  -  Resp status remains tenuous.  At this point does not appear to tolerate BiPAP well with agitation.  He has been essentially on HiFlow last night for the most part and all day today with stable resp status.  Recent ABG showed stable PCO2.  Pulm toilet as able, Nebs PRN.      Recent Labs   Lab 12/04/21  1347 12/04/21  0756 12/03/21  0407   PH 7.41 7.43 7.39   PO2 126* 93 40*   PCO2 50* 47* 50*   HCO3 32* 32* 31*   VALERIY 6.5* 6.6* 4.8*     -  Able to wean O2 substantially today.  Continue weaning as able.    Severe aortic stenosis, s/p TAVR 10/26   A fib/flutter, rate controlled   Hypertension:  -  Apixaban  -  Gentle diuresis  -  Continue coreg low dose resumed 12/5.  Continue oral hydralazine.    GI bleed earlier in stay, s/p colonoscopy and EGD:   -  Hemorrhoids and erosions in hiatal hernia without active bleeding  -  No overt ongoing bleeding noted  -  PPI    Moderate malnutrition:   - Diet: NPO per Anesthesia Guidelines for Procedure/Surgery Except for: Meds, Other; Specify: Stop tube feeds at midnight  Adult Formula Drip Feeding: Specified Time Novasource Renal; Orogastric tube; Goal Rate: 35; mL/hr; Medication - Feeding Tube Flush Frequency: At least 15-30 mL water before and after medication administration and with tube clogging; Amount to Sen...    - Nutrition consulted. Appreciate recommendations.   - continue  tube feeds  - continue BID protonix    12/6 update:  NGT plugged and had to be replaced.  After replacement TF's resumed.  If not making substantial progress next few days with being able to take oral intake, consider longer term placement of ABD FT.    ARF on CKD, non-oliguric, creatinine above the patient's baseline but stable:  -  Temp dialysis line removed 12/5  -  No ongoing HD needed.    12/6 Update:  Patient had been getting some intermittent  diuretics.  Given substantial output past day + much better resp status held on diuretics today.  Consider resuming some tomorrow depending on BMP trend + volume status on reassessment.  Nephrology feels he is steadily improving though need to watch BUN given a slow climb (possibly diuretics recently contributed).    DM II with stress induced hyperglycemia, resolving - sugars rising but lantus on hold   - Resume reduced dose lantus, continue sliding scale insulin     Proteus bacteremia and UTI, treated    Leukopenia, chronic, stable  Anemia - no s/sx of acute blood loss  Coagulopathy of covid earlier in stay  Thrombocytopenia, improving , stable   - continue eliquis  - Periodic CBC, coags     Agitation/Encephalopathy (Combination of toxic and metabolic during course of stay):  -  Exacerbated by prolonged severe illness.  Follows commands.  Continue working on reassurance and mobility.  Consider some low dose seroquel tomorrow if not further improved.   Mentation improved today so held off escalating meds.  Patient followed commands quite well and was nodding more appropriately to questions.    Diabetes mellitus, type II, on long-term insulin  Hemoglobin A1c 6.3% 7/6/2021. PTA regimen of Lantus 18u at bedtime, glimepiride, semaglutide  -Hold home glimepiride, Hold home semaglutide   -Will increase lantus to 12 units BID.  Continue sliding scale insulin.  Did not change insulin earlier today with transient period with FT lost.    Deconditioning:  -  PT/OT consults.  Talked with RN about trying to get him more up in bed/to bedside if possible.      Updated patients brother Jose.  He requested an update tomorrow from the hospitalist if possible.       COVID Status:  COVID-19 PCR Results    COVID-19 PCR Results 6/30/20 7/6/21 8/22/21 10/8/21 10/22/21 11/4/21   COVID-19 Virus PCR to [facility] - Result Not Detected        COVID-19 Virus PCR to [facility] - Source Nasal        SARS-CoV-2 Virus Specimen Source  Nasopharyngeal        SARS-CoV-2 PCR Result  NEGATIVE       SARS CoV2 PCR   Negative Negative Negative Positive (A)   (A) Abnormal value       Comments are available for some flowsheets but are not being displayed.         COVID-19 Antibody Results, Testing for Immunity    COVID-19 Antibody Results, Testing for Immunity   No data to display.            Diet: NPO per Anesthesia Guidelines for Procedure/Surgery Except for: Meds, Other; Specify: Stop tube feeds at midnight  Adult Formula Drip Feeding: Specified Time Novasource Renal; Nasogastric tube; Goal Rate: 45; mL/hr; Medication - Feeding Tube Flush Frequency: At least 15-30 mL water before and after medication administration and with tube clogging; Amount to Se...    DVT Prophylaxis: DOAC  Zuniga Catheter: PRESENT, indication: Retention, Strict 1-2 Hour I&O  Code Status: No CPR- Do NOT Intubate      Disposition Plan    Expect several more days in the hospital.  Moving to IMC status today out of ICU.     Entered: Rahat Gan 12/06/2021, 6:23 PM       Interval History   Mr. Elizabeth denied pain or worsening SOB through nodding.  Unable to answer detailed questions.  RN reported substantial success with O2 weaning.    -Data reviewed today: I reviewed all new labs and imaging reports over the last 24 hours. I personally reviewed no images or EKG's today.    Physical Exam   Temp: 98.8  F (37.1  C) Temp src: Axillary BP: 108/61 Pulse: 79   Resp: 23 SpO2: 99 % O2 Device: Oxymask Oxygen Delivery: 2 LPM  Vitals:    12/03/21 0200 12/04/21 0200 12/05/21 0500   Weight: 113.8 kg (250 lb 14.1 oz) 113.8 kg (250 lb 14.1 oz) 112.3 kg (247 lb 9.2 oz)     Vital Signs with Ranges  Temp:  [97.7  F (36.5  C)-98.8  F (37.1  C)] 98.8  F (37.1  C)  Pulse:  [79-96] 79  Resp:  [11-30] 23  BP: (100-158)/(45-87) 108/61  SpO2:  [94 %-100 %] 99 %  I/O last 3 completed shifts:  In: 940 [NG/GT:800]  Out: 675 [Urine:675]    GEN:  Alert, orientation hard to determine, appears ill but comfortable, follows my  extremity commands and nodded appropriately to several basic questions.  HEENT:  Normocephalic/atraumatic, no scleral icterus, no nasal discharge, mouth moist.  NGT in place.  CV:  Regular, distant.  No rub.  LUNGS:  Clear upper, decreased towards bases.  Moving air a little better with deep breathing today.  No wheezes/retractions.  Symmetric chest rise on inhalation noted.  ABD:  Active bowel sounds, soft, non-tender, mildly distended.  No rebound/guarding/rigidity.  EXT:  +1-2 edema.  No cyanosis.  No acute joint synovitis noted.  SKIN:  Dry to touch, no exanthems noted in the visualized areas.    Medications     dextrose Stopped (12/04/21 1630)     - MEDICATION INSTRUCTIONS -       sodium chloride 10 mL/hr at 11/30/21 0248       - MEDICATION INSTRUCTIONS for Dialysis Patients -   Does not apply See Admin Instructions     apixaban ANTICOAGULANT  5 mg Oral or Feeding Tube BID     atorvastatin  80 mg Oral or Feeding Tube Daily     B and C vitamin Complex with folic acid  5 mL Per Feeding Tube Daily     carvedilol  3.125 mg Oral BID w/meals     hydrALAZINE  50 mg Oral or Feeding Tube Q8H MOE     insulin aspart  1-12 Units Subcutaneous Q4H     insulin glargine  10 Units Subcutaneous BID     miconazole   Topical BID     pantoprazole  40 mg Oral or Feeding Tube BID AC     sodium chloride (PF)  10-40 mL Intracatheter Q7 Days     sodium chloride (PF)  3 mL Intracatheter Q8H     tamsulosin  0.4 mg Per Feeding Tube Daily     Data     Recent Labs   Lab 12/05/21  1220 12/04/21  1347 12/04/21  0756 12/03/21  0407   PH  --  7.41 7.43 7.39   PHV 7.38  --   --   --    PO2  --  126* 93 40*   PO2V 39  --   --   --    PCO2  --  50* 47* 50*   PCO2V 55*  --   --   --    HCO3  --  32* 32* 31*   HCO3V 32*  --   --   --      Recent Labs   Lab 12/06/21  0520 12/05/21  0451 12/04/21  0347   WBC 5.5 4.0 3.8*   HGB 10.0* 9.3* 8.4*   HCT 34.0* 31.9* 28.8*   MCV 90 91 91    127* 126*     Recent Labs   Lab 12/06/21  1639 12/06/21  1412  12/06/21  1144 12/06/21  0811 12/06/21  0520 12/05/21  1217 12/05/21  0451 12/04/21  1137 12/04/21  0347   NA  --   --   --   --  141  --  139  --  141   POTASSIUM  --   --   --   --  3.8  --  3.8  --  3.3*   CHLORIDE  --   --   --   --  105  --  104  --  104   CO2  --   --   --   --  31  --  30  --  31   ANIONGAP  --   --   --   --  5  --  5  --  6   * 156* 241*   < > 306*   < > 225*   < > 98   BUN  --   --   --   --  112*  --  103*  --  103*   CR  --   --   --   --  1.93*  --  1.95*  --  2.11*   GFRESTIMATED  --   --   --   --  33*  --  33*  --  30*   GABE  --   --   --   --  9.4  --  8.8  --  8.4*   MAG  --   --   --   --  2.2  --  2.2  --  2.1   PHOS  --   --   --   --  4.3  --  4.3  --  4.1   PROTTOTAL  --   --   --   --  5.4*  --  5.0*  --   --    ALBUMIN  --   --   --   --  2.1*  --  1.8*  --   --    BILITOTAL  --   --   --   --  0.5  --  0.6  --   --    ALKPHOS  --   --   --   --  236*  --  220*  --   --    AST  --   --   --   --  40  --  51*  --   --    ALT  --   --   --   --  36  --  36  --   --     < > = values in this interval not displayed.       Recent Results (from the past 24 hour(s))   XR Abdomen Port 1 View    Narrative    ABDOMEN ONE VIEW PORTABLE  12/6/2021 12:00 PM     HISTORY: Check NG placement.    COMPARISON: 12/4/2021       Impression    IMPRESSION: NG tube tip in the left upper quadrant in the expected  location of the stomach.    MARC GRANADOS MD         SYSTEM ID:  [id number]

## 2021-12-06 NOTE — PROGRESS NOTES
A&Ox NEISHA Non-verbal will nod yes and no. LS diminisded.  Assist lift turn/repo bedrest. VSS on 2L O2 Oxymask (High flow removed per pressure injury on R nare. Mepilex on L ear as well. CMS intact ex coccyx and scrotal area redness. Dressing R Arm and Coccyx C/D/I. Pain denies . New NG tube placed after previous clogged. Creat elevated (anuric dialysis pt) Tolerating NPO-Enteral feed NG tube Diet. Pulsate mattress ordered. Progressing per POC. Will Cont to monitor.

## 2021-12-06 NOTE — CONSULTS
"[facility] Nurse Inpatient Wound Assessment     Reason for consultation: Evaluate and treat \"Above right ear and right nare from NG bridal and Coccyx\"      Assessment    12-6-21 right nare, intact , without induration, continue routine pressure injury prevention    12-6-21 right ear , intact, without induration, continue routine pressure injury prevention      Unable to assess coccyx/buttock, will plan to return 12-7-21 to assess.     Chantale JONAS  "

## 2021-12-06 NOTE — PLAN OF CARE
Vital signs stable on 2L oxymask overnight. Unable to verbally communicate, uses hand squeezes to communicate. Lung sounds diminished with mild expiratory wheezing, neb given. Bowel sounds active, flatus present. Skin tear dressing clean dry and intact. Patient not exhibiting signs of pain. Up with lift. Tolerating tube feed to NG at goal rate 35 ml/hr. CMS/neuros intact except weak plantar dorsal flexion in left extremities. Occasionally follows commands. Turn and repo q2h. Pulsate ordered for coccyx skin breakdown. Mepilex applied to left ear redness. High flow removed due to pressure injury in right nare due to NG bridal. Incontinent of bowel.

## 2021-12-06 NOTE — PROGRESS NOTES
CLINICAL NUTRITION SERVICES - REASSESSMENT NOTE      Recommendations Ordered by Registered Dietitian (RD):   Slight increase in TF to better meet updated nutrient needs  Novasource renal to 45 mL/hr (1080 mL) = 2160 kcal (27 kcal/kg) and 98 gm protein (1.2 gm/kg)   Malnutrition: (11/30)  % Weight Loss:  Up to 5% in 1 month (moderate malnutrition)  % Intake:  Decreased intake does not meet criteria for malnutrition   Subcutaneous Fat Loss:  Upper arm region mild depletion and Thoracic region mild depletion  Muscle Loss:  Clavicle bone region mild depletion, Acromion bone region mild depletion and Scapular bone region mild depletion  Fluid Retention:  Moderate 2-3+     Malnutrition Diagnosis: Moderate malnutrition  In Context of:  Acute illness or injury       EVALUATION OF PROGRESS TOWARD GOALS   Diet:  NPO     Nutrition Support:  Tf has been running at goal rate as outlined below. Noted current NGT clogged with plans to replace today ~  Nutrition Support Enteral:  Type of Feeding Tube: NGT  Enteral Frequency:  Continuous  Enteral Regimen: Novasource Renal at 35 mL/hr  Total Enteral Provisions: 1680 kcal (21 kcal/kg), 76 g protein (0.9 g/kg), 602 mL H2O, 154 g CHO, no fiber   Free Water Flush: 150 mL every 4 hours   ProSource TID d/c'd on 12/3 by RD      Intake/Tolerance:    Labs reviewed: Na 141, K 3.8, Mg 2.2, Phos 4.3 - WNL.  (H), Cr 1.93  Recent Labs   Lab 12/06/21  1411 12/06/21  1144 12/06/21  0811 12/06/21  0520 12/06/21  0252 12/05/21  2035   * 241* 253* 306* 288* 269*     Medications reviewed: HSSI + 10 units lantus BID  Stooling:  - 12/2: x2 BM  - 12/3: x4 BM  - 12/4: x2 BM  - 12/5: 0 BM recorded   Wt: 247 lbs 9.23 oz  Vitals:    12/01/21 0400 12/02/21 0600 12/03/21 0200 12/04/21 0200   Weight: 113.3 kg (249 lb 12.5 oz) 113.9 kg (251 lb 1.7 oz) 113.8 kg (250 lb 14.1 oz) 113.8 kg (250 lb 14.1 oz)    12/05/21 0500   Weight: 112.3 kg (247 lb 9.2 oz)       ASSESSED NUTRITION NEEDS:  (updated)  Dosing Weight: 81 kg - adjusted   Estimated Energy Needs: 6707-3705 kcal (25-30 kcal/kg)  Justification: obesity   Estimated Protein Needs:  grams (1.2-1.5 g/kg)  Justification: hypercatabolism with critical illness, monitoring renal fx      NEW FINDINGS:   Chart reviewed  Self extubated 12/4 and NGT placed (changed to DNR/DNI)  Transferred out of ICU on 12/5  AXR today 12/6 = NG tube tip in the left upper quadrant in the expected location of the stomach  Nephrology signed off today     Previous Goals:   TF will meet % needs   Evaluation: Met    Previous Nutrition Diagnosis:   Excessive protein intake related to TF + ProSource continue, now BUN approaching 100 and no HD planned as evidenced by meeting 115% high-end of re-estimated protein needs   Evaluation: Completed      CURRENT NUTRITION DIAGNOSIS  No nutrition diagnosis identified at this time while meeting nutrient needs with TF    INTERVENTIONS  Recommendations / Nutrition Prescription  Continue TF and Prosource as ordered     Implementation  Slight increase in TF to better meet updated nutrient needs  Novasource renal to 45 mL/hr (1080 mL) = 2160 kcal (27 kcal/kg) and 98 gm protein (1.2 gm/kg)    Goals  EN + Prosource to meet % estimated needs       MONITORING AND EVALUATION:  Progress towards goals will be monitored and evaluated per protocol and Practice Guidelines      Rimma Rolon RD, LD  Clinical Dietitian

## 2021-12-07 PROBLEM — Z96.659 S/P REVISION OF TOTAL KNEE: Status: RESOLVED | Noted: 2019-09-27 | Resolved: 2021-01-01

## 2021-12-07 PROBLEM — I50.31 ACUTE DIASTOLIC HEART FAILURE (H): Status: ACTIVE | Noted: 2021-01-01

## 2021-12-07 PROBLEM — U07.1 PNEUMONIA DUE TO 2019 NOVEL CORONAVIRUS: Status: ACTIVE | Noted: 2021-01-01

## 2021-12-07 PROBLEM — N18.4 CRF (CHRONIC RENAL FAILURE), STAGE 4 (SEVERE) (H): Status: ACTIVE | Noted: 2021-01-01

## 2021-12-07 PROBLEM — J12.82 PNEUMONIA DUE TO 2019 NOVEL CORONAVIRUS: Status: ACTIVE | Noted: 2021-01-01

## 2021-12-07 PROBLEM — E66.01 MORBID OBESITY (H): Status: RESOLVED | Noted: 2021-01-01 | Resolved: 2021-01-01

## 2021-12-07 PROBLEM — Z98.890 STATUS POST CORONARY ANGIOGRAM: Status: RESOLVED | Noted: 2019-08-12 | Resolved: 2021-01-01

## 2021-12-07 NOTE — PROVIDER NOTIFICATION
Paged on call Hospitalist     Pt has low urine output. Previous dialysis pt. Creat 1.93, Urine output last 12 hours 200cc. Do you want to order anything?  ---labs ordered per Dr. Francois

## 2021-12-07 NOTE — PROGRESS NOTES
12/07/21 1018   Quick Adds   Type of Visit PT Re-evaluation   Living Environment   People in home alone   Current Living Arrangements house   Home Accessibility stairs to enter home;stairs within home   Number of Stairs, Main Entrance 2   Stair Railings, Main Entrance railings on both sides of stairs   Number of Stairs, Within Home, Primary none   Stair Railings, Within Home, Primary none   Transportation Anticipated car, drives self   Living Environment Comments From PT evaluation on 10/28/21   Self-Care   Usual Activity Tolerance good   Current Activity Tolerance poor   Regular Exercise No   Equipment Currently Used at Home cane, straight;walker, rolling   Activity/Exercise/Self-Care Comment Prior to admission, pt was independent with ADLs, used assistive device as needed. During hospital stay up until transfer to ICU, pt was participating in cardiac rehab but had been declining with functional mobility along with declining medical status.   Disability/Function   Fall history within last six months no   General Information   Onset of Illness/Injury or Date of Surgery 10/26/21   Referring Physician Rahat Gan DO   Patient/Family Therapy Goals Statement (PT) None stated at this time   Pertinent History of Current Problem (include personal factors and/or comorbidities that impact the POC) Pt is 76 year old male adm on 10/26/21 for scheduled TAVR, initially began PT on 10/28/21 and was participating in PT up until 11/12/21 when medical status deteriorated. PT orders were completed on 11/16/21 due to medical status and PT was re-consulted on 12/6/21. Pt's hospital stay has been complicated by COVID pna requiring intubation on 11/15/21, GI bleed, OPAL needing dialysis. Pt self-extubated on 12/4/21, transferred out of ICU on 12/5/21, and currently maintaining O2 sats on 3 L O2 via mask.   Existing Precautions/Restrictions fall;oxygen therapy device and L/min  (3 LPM via mask)   Cognition   Orientation Status  (Cognition) unable/difficult to assess   Affect/Mental Status (Cognition) low arousal/lethargic   Follows Commands (Cognition) 25-49% accuracy;follows one-step commands   Cognitive Status Comments Pt with no attempts to verbalize during session, inconsistent command following, nods head but inconsistently   Pain Assessment   Patient Currently in Pain No   Posture    Posture Forward head position;Protracted shoulders   Range of Motion (ROM)   ROM Comment B LE ROM WFL   Strength   Strength Comments Pt is very deconditioned, no active movement noted in LEs during session   Bed Mobility   Comment (Bed Mobility) Max assist of 2-3   Transfers   Transfer Safety Comments Dependent via lift   Balance   Balance Comments poor sitting balance   Clinical Impression   Criteria for Skilled Therapeutic Intervention yes, treatment indicated   PT Diagnosis (PT) Impaired mobility   Influenced by the following impairments Decreased strength, balance, activity tolerance   Functional limitations due to impairments Decreased ability to participate in daily tasks   Clinical Presentation Evolving/Changing   Clinical Presentation Rationale Current presentation, PMH, prolonged and complicated hospital course   Clinical Decision Making (Complexity) moderate complexity   Therapy Frequency (PT) 5x/week   Predicted Duration of Therapy Intervention (days/wks) 7 days   Planned Therapy Interventions (PT) balance training;bed mobility training;gait training;home exercise program;patient/family education;strengthening;transfer training   Risk & Benefits of therapy have been explained evaluation/treatment results reviewed;care plan/treatment goals reviewed;risks/benefits reviewed;current/potential barriers reviewed;participants voiced agreement with care plan;participants included;patient   PT Discharge Planning    PT Discharge Recommendation (DC Rec) Transitional Care Facility   PT Rationale for DC Rec Pt is below baseline level of function, currently  needs max assist of 2 for all mobility, will likely have extensive rehab needs.   PT Brief overview of current status  Max assist of 2/dependent for all mobility   Total Evaluation Time   Total Evaluation Time (Minutes) 10

## 2021-12-07 NOTE — PLAN OF CARE
IMC status discontinued. NEISHA orientation. Non verbal. -- attempts to speak & occasionally will say yes or no, additionally will shake head yes & no. VSS on 2L oxymask. Tele: NSR. Lung sounds diminished/ coarse. Bowel sounds active, +smear. Zuniga in place- occasionally leaking w/ low urine output lasik given x1. Advanced catheter will CTM. NG infusing TF @ 45ml/hr. Assist x2 turn & repo, pulsate mattress in place.

## 2021-12-07 NOTE — PROVIDER NOTIFICATION
Text paged hospitalist re: 25 ml UOP overnight and increased Cr level this am to 2.03. Awaiting call back

## 2021-12-07 NOTE — PLAN OF CARE
Tolerating TF well without stools. BGM's elevated to mid to high 200's overnight;Covered per protocol.Lungs sounds coarse with loose, NPC occasionally. Responding to questions with by actually answering with one word rather than just nodding yes or no. Scant to no UOP overnight. Labs drawn. MD aware and is following, so not notified as of yet, awaiting lab results.On Pulsate mattress, repositioned q2h. Denies pain.

## 2021-12-07 NOTE — PROGRESS NOTES
[facility]  Hospitalist Progress Note  Name: Burton Elizabeth    MRN: [id number]  Physician:  Rahat Gan DO, FHM (Text Page)  Securely message with the Vocera Web Console (learn more here)    Summary of Stay:  Burton Elizabeth is a 76 year old male with PMHx of coronary artery disease, atrial flutter on eliquis, hypertension, hyperlipidemia, severe aortic stenosis, HFpEF, CKD stage III, diabetes mellitus II, history of GI bleed, suspected obstructive sleep apnea admitted on 10/26/2021 for elective TAVR.  Hospital course complicated by OPAL from contrast nephropathy, volume overload, hypoxic respiratory failure multifactorial, Covid pneumonia and physical deconditioning. Also proteus UTI and bacteremia.    Coarse summary:  11/15- patient intubated for severe hypoxic respiratory failure. Patient was proned and paralyzed.   11/22: Started scheduled IV Bumex   11/23: GI bleed  12/4 self extubated during turn per night nurse @ 0015. Placed on BiPap, attempted hiflo, did not tolerate. Pt made DNR/DNI. NG tube placed.   12/5: HFNC over night, matilde better than bipap. HD line out. TX to IMC  12/6: Substantial improvement in oxygenation, patient off high flow.    Assessment & Plan      COVID-19 Pneumonia  MRSA VAP  Acute hypoxic respiratory failure (multifactorial with above):  -  Self extubated 12/4, currently on 2 LPM nasal canula    Severe aortic stenosis, s/p TAVR 10/26   A fib/flutter, rate controlled   Hypertension:  -  Apixaban  -  Gentle diuresis  -  Continue coreg low dose resumed 12/5.  Continue oral hydralazine.    GI bleed earlier in stay, s/p colonoscopy and EGD:   -  Hemorrhoids and erosions in hiatal hernia without active bleeding  -  No overt ongoing bleeding noted  -  PPI    Moderate malnutrition:   - currently on tube feedings    12/6 update:  NGT plugged and had to be replaced.  After replacement TF's resumed.  If not making substantial progress next few days with being able to  take oral intake, consider longer term placement of ABD FT.    ARF on CKD, non-oliguric, creatinine above the patient's baseline but stable:  -  Temp dialysis line removed 12/5  -  No ongoing HD needed, follow urine output and weight    DM II with stress induced hyperglycemia, resolving - sugars rising but lantus on hold    -- increase insulin and decrease glucose checks to bid     Proteus bacteremia and UTI, treated    Leukopenia, chronic, stable  Anemia - no s/sx of acute blood loss  Coagulopathy of covid earlier in stay  Thrombocytopenia, improving , stable     Agitation/Encephalopathy (Combination of toxic and metabolic during course of stay):  - Slowly improving.    Deconditioning:  -  PT/OT consults.  Talked with RN about trying to get him more up in bed/to bedside if possible.       Diet: NPO per Anesthesia Guidelines for Procedure/Surgery Except for: Meds, Other; Specify: Stop tube feeds at midnight  Adult Formula Drip Feeding: Specified Time Novasource Renal; Nasogastric tube; Goal Rate: 45; mL/hr; Medication - Feeding Tube Flush Frequency: At least 15-30 mL water before and after medication administration and with tube clogging; Amount to Se...    DVT Prophylaxis: DOAC  Zuniga Catheter: PRESENT, indication: Retention, Strict 1-2 Hour I&O  Code Status: No CPR- Do NOT Intubate      Disposition Plan    Discontinue IMC, possible TCU in 3 days if continues to improve, will consult speech about possible oral intake at some point.   Catarino Mohr MD   Pager: [phone]  Cell Phone:  [phone]   (35 min total)      Interval History   On face mask O2 at 2 LPM with NG tube feedings, coughing but not able to effectively clear secretions.    Physical Exam   Temp: 98  F (36.7  C) Temp src: Axillary BP: 123/75 Pulse: 87   Resp: 27 SpO2: 96 % O2 Device: Oxymask Oxygen Delivery: 2 LPM  Vitals:    12/03/21 0200 12/04/21 0200 12/05/21 0500   Weight: 113.8 kg (250 lb 14.1 oz) 113.8 kg (250 lb 14.1 oz) 112.3 kg  (247 lb 9.2 oz)     Vital Signs with Ranges  Temp:  [97.4  F (36.3  C)-98.8  F (37.1  C)] 98  F (36.7  C)  Pulse:  [] 87  Resp:  [23-32] 27  BP: (108-155)/(53-84) 123/75  SpO2:  [94 %-99 %] 96 %  I/O last 3 completed shifts:  In: 95 [NG/GT:60]  Out: 400 [Urine:400]    GEN:  Alert, orientation hard to determine, appears ill but comfortable, follows my extremity commands and nodded appropriately to several basic questions.  CV:  Regular, distant.  No rub.  LUNGS:  Rhonchi bilaterally  ABD:  Active bowel sounds, soft, non-tender, mildly distended.  No rebound/guarding/rigidity, obese   EXT:  +1 bilateral edema.   Neuro: alert, Ox2, knew month, ?year, knew president was Armin Ruiz, too weak to sit up   Medications     dextrose Stopped (12/04/21 1630)     - MEDICATION INSTRUCTIONS -         - MEDICATION INSTRUCTIONS for Dialysis Patients -   Does not apply See Admin Instructions     apixaban ANTICOAGULANT  5 mg Oral or Feeding Tube BID     atorvastatin  80 mg Oral or Feeding Tube Daily     B and C vitamin Complex with folic acid  5 mL Per Feeding Tube Daily     carvedilol  3.125 mg Oral BID w/meals     hydrALAZINE  50 mg Oral or Feeding Tube Q8H MOE     insulin aspart  1-12 Units Subcutaneous Q4H     insulin glargine  12 Units Subcutaneous BID     miconazole   Topical BID     pantoprazole  40 mg Oral or Feeding Tube BID AC     sodium chloride (PF)  10-40 mL Intracatheter Q7 Days     sodium chloride (PF)  3 mL Intracatheter Q8H     [START ON 12/8/2021] tamsulosin  0.4 mg Oral Daily     Data     Recent Labs   Lab 12/05/21  1220 12/04/21  1347 12/04/21  0756 12/03/21  0407   PH  --  7.41 7.43 7.39   PHV 7.38  --   --   --    PO2  --  126* 93 40*   PO2V 39  --   --   --    PCO2  --  50* 47* 50*   PCO2V 55*  --   --   --    HCO3  --  32* 32* 31*   HCO3V 32*  --   --   --      Recent Labs   Lab 12/07/21  0555 12/06/21  0520 12/05/21  0451   WBC 5.3 5.5 4.0   HGB 9.7* 10.0* 9.3*   HCT 33.2* 34.0* 31.9*   MCV 92 90 91   PLT  179 175 127*     Recent Labs   Lab 12/07/21  1214 12/07/21  0850 12/07/21  0555 12/07/21  0036 12/06/21  2216 12/06/21  0811 12/06/21  0520 12/05/21  1217 12/05/21  0451   NA  --   --  139  --  139  --  141  --  139   POTASSIUM  --   --  3.7  --   --   --  3.8  --  3.8   CHLORIDE  --   --  103  --   --   --  105  --  104   CO2  --   --  32  --   --   --  31  --  30   ANIONGAP  --   --  4  --   --   --  5  --  5   * 259* 280*   < >  --    < > 306*   < > 225*   BUN  --   --  111*  --   --   --  112*  --  103*   CR  --   --  2.03*  --  2.00*  --  1.93*  --  1.95*   GFRESTIMATED  --   --  31*  --   --   --  33*  --  33*   GABE  --   --  9.4  --   --   --  9.4  --  8.8   MAG  --   --  2.2  --   --   --  2.2  --  2.2   PHOS  --   --  4.4  --   --   --  4.3  --  4.3   PROTTOTAL  --   --   --   --   --   --  5.4*  --  5.0*   ALBUMIN  --   --   --   --   --   --  2.1*  --  1.8*   BILITOTAL  --   --   --   --   --   --  0.5  --  0.6   ALKPHOS  --   --   --   --   --   --  236*  --  220*   AST  --   --   --   --   --   --  40  --  51*   ALT  --   --   --   --   --   --  36  --  36    < > = values in this interval not displayed.       No results found for this or any previous visit (from the past 24 hour(s)).

## 2021-12-07 NOTE — PROVIDER NOTIFICATION
LISA Cherry @ 17:55 on 12/7/21--Continue new order for Novolog Sliding scale twice daily as ordered.  Lantus dose was increased.  Mary Duncan RPH

## 2021-12-07 NOTE — PROGRESS NOTES
12/07/21 0945   Quick Adds   Type of Visit Initial Occupational Therapy Evaluation   Living Environment   People in home alone   Current Living Arrangements house   Home Accessibility stairs to enter home;stairs within home   Number of Stairs, Main Entrance 2   Stair Railings, Main Entrance railings on both sides of stairs   Number of Stairs, Within Home, Primary none   Stair Railings, Within Home, Primary none   Transportation Anticipated car, drives self   Living Environment Comments Per PT initial eval. Pt unable to state social hx this date.   Self-Care   Usual Activity Tolerance good   Current Activity Tolerance poor   Equipment Currently Used at Home cane, straight;walker, rolling   Activity/Exercise/Self-Care Comment Pt unable to state social hx this date.   Instrumental Activities of Daily Living (IADL)   IADL Comments Pt unable to state   Disability/Function   Hearing Difficulty or Deaf no   Fall history within last six months   (unable to state)   General Information   Onset of Illness/Injury or Date of Surgery 10/26/21   Referring Physician Rahat Gan DO   Patient/Family Therapy Goal Statement (OT) Pt unable to state   Additional Occupational Profile Info/Pertinent History of Current Problem COVID-19 Pneumonia, s/p TAVR 10/26   Existing Precautions/Restrictions fall;oxygen therapy device and L/min   Limitations/Impairments safety/cognitive   Cognitive Status Examination   Follows Commands 25-49% accuracy;repetition of directions required;initiation impaired;increased processing time needed;delayed response/completion   Safety Deficit unable/difficult to assess   Memory Deficit unable/difficult to assess   Visual Perception   Visual Impairment/Limitations unable/difficult to assess   Range of Motion Comprehensive   General Range of Motion upper extremity range of motion deficits identified   General Upper Extremity Assessment (Range of Motion)   Upper Extremity: Range of Motion shoulder, left: UE  ROM;shoulder, right: UE ROM;scapula, left: UE ROM;scapula, right: UE ROM;elbow/forearm, left: UE ROM;elbow/forearm, right: UE ROM;wrist, left: UE ROM;wrist, right: UE ROM   Strength Comprehensive (MMT)   Comment, General Manual Muscle Testing (MMT) Assessment 1/5 bilaterally in UEs   Coordination   Upper Extremity Coordination Left UE impaired;Right UE impaired   Functional Limitations Fine motor ADL performance impaired;Impaired ability to perform bilateral tasks;Object transport impaired;Reach to targets impaired   Bed Mobility   Bed Mobility supine-sit   Supine-Sit [place] (Bed Mobility) maximum assist (25% patient effort);2 person assist   Assistive Device (Bed Mobility) draw sheet   Activities of Daily Living   BADL Assessment lower body dressing   Lower Body Dressing Assessment   [place] Level (Lower Body Dressing) dependent (less than 25% patient effort)   Clinical Impression   Criteria for Skilled Therapeutic Interventions Met (OT) yes;skilled treatment is necessary   OT Diagnosis Decreased ADL independence and strength   OT Problem List-Impairments impacting ADL problems related to;activity tolerance impaired;balance;cognition;coordination;mobility;range of motion (ROM);strength;postural control   Assessment of Occupational Performance 5 or more Performance Deficits   Identified Performance Deficits dressing, toileting, bathing, functional mobility, IADLs   Planned Therapy Interventions (OT) ADL retraining;cognition;motor coordination training;fine motor coordination training;ROM;strengthening;stretching;transfer training;progressive activity/exercise   Clinical Decision Making Complexity (OT) moderate complexity   Therapy Frequency (OT) 5x/week   Predicted Duration of Therapy 1-2 weeks   Risk & Benefits of therapy have been explained evaluation/treatment results reviewed;care plan/treatment goals reviewed;risks/benefits reviewed;current/potential barriers reviewed;participants voiced agreement  with care plan;participants included;patient   OT Discharge Planning    OT Discharge Recommendation (DC Rec) Transitional Care Facility   OT Rationale for DC Rec Pt presents well below baseline for I/ADL performance. Limited by decreased balance, significant weakness/fatigue d/t deconditioning from prolonged ICU stay. Pt will need 24/7 assist and further therapies at TCU to increase ADL independence and strength prior to returning home.   Total Evaluation Time (Minutes)   Total Evaluation Time (Minutes) 8

## 2021-12-07 NOTE — PLAN OF CARE
IMC status. NEISHA orientation. Non verbal. Will occasional answered questions with shaking head yes & no. VSS on 2L oxymask. Tele: NSR. Lung sounds diminished/ coarse. Bowel sounds active, +smear. Zuniga in place w/ low  urine output (MD aware- see previous note). Pressure injuries noted on R nare, & L ear. NG infusing TF @ 45ml/hr. Assist x2 turn & repo, pulsate mattress in place.

## 2021-12-08 NOTE — PROGRESS NOTES
"[facility]  WO Nurse Inpatient Wound Assessment     Reason for consultation: Evaluate and treat coccyx      Assessment  buttock wound due to Moisture Associated Skin Damage (MASD) healing moisture split    Treatment Plan  buttock wound: Daily     Wound care  DAILY        Comments: Location: buttock   Care: provided daily by primary RN   1. cleanse with normal saline and 4x4\" gauze, then pat dry   2. Apply 3M No sting film barrier to generalized perianal/coccyx, let dry   3. cover with 4x4\" mepilex in farhad configuration. ok to use each mepilex up to 5 days   (FYI- If pt has constant incontinent loose stools needing dressing changes Q shift please discontinue the Mepilex dressing and apply criticaid barrier paste BID and PRN. )          Orders Updated  Recommended provider order: None, at this time  WO Nurse follow-up plan: weekly  Nursing to notify the Provider(s) and re-consult the WO Nurse if wound(s) deteriorates or new skin concern.     Skin care precautions  EFFECTIVE NOW        Comments: Pressure Injury Prevention (PIP) Plan:   -If patient is declining pressure injury prevention interventions: Explore reason why and address patient's concerns, Educate on pressure injury risk and prevention intervention(s), If patient is still declining, document \"informed refusal\" , and Ensure Care team is aware ( provider, charge nurse, etc)   -Mattress: Follow bed algorithm, reassess daily and order specialty mattress, if indicated.   -HOB: Maintain at or below 30 degrees, unless contraindicated   -Repositioning in bed: Every 1-2 hours , Left/right positioning; avoid supine, and Raise foot of bed prior to raising head of bed, to reduce patient sliding down (shear)   -Heels: Keep elevated off mattress and Pillows under calves   -Chair positioning: Chair cushion (#[id number]) , Assist patient to reposition hourly, and Do NOT use a donut for sitting (this increases pressure to smaller area and creates " "a higher potential for injury)     -Moisture Management: Perineal cleansing /protection: Follow Incontinence Protocol, Avoid brief in bed, and Clean and dry skin folds with bathing   -Under Devices: Inspect skin under all medical devices during skin inspection , Ensure tubes are stabilized without tension, and Ensure patient is not lying on medical devices or equipment when repositioned          Patient History  According to provider note(s):  \"76 year old male with PMHx of coronary artery disease, atrial flutter on eliquis, hypertension, hyperlipidemia, severe aortic stenosis, HFpEF, CKD stage III, diabetes mellitus II, history of GI bleed, suspected obstructive sleep apnea admitted on 10/26/2021 for elective TAVR.  Hospital course complicated by OPAL from contrast nephropathy, volume overload, hypoxic respiratory failure multifactorial, Covid pneumonia and physical deconditioning. Also proteus UTI and bacteremia.\"    Objective Data  Containment of urine/stool: Incontinence Protocol, Incontinent pad in bed and Indwelling catheter    Active Diet Order:  Orders Placed This Encounter      NPO per Anesthesia Guidelines for Procedure/Surgery Except for: Meds, Other; Specify: Stop tube feeds at midnight    Output:   I/O last 3 completed shifts:  In: 155 [NG/GT:120]  Out: 200 [Urine:200]    Risk Assessment:   Sensory Perception: 2-->very limited  Moisture: 3-->occasionally moist  Activity: 1-->bedfast  Mobility: 1-->completely immobile  Nutrition: 3-->adequate  Friction and Shear: 1-->problem  Sudarshan Score: 11                          Labs: Recent Labs   Lab 12/07/21  0555 12/06/21  0520   ALBUMIN  --  2.1*   HGB 9.7* 10.0*   WBC 5.3 5.5       Physical Exam  Skin inspection: focused buttock    Wound Location:  buttock  Date of last photo:  12-7-21  Wound History: assessed on hospital stay day 42  Measurements (length x width x depth, in cm):  Healing moisture split less than 0.2cm wide along gluteal cleft less than 4cm " "long   Wound Base:  epidermis and dermis  Tunneling: N/A  Undermining: N/A  Palpation of the wound bed: normal   Periwound skin: intact and erythema- blanchable  Color: pink  Temperature: normal   Drainage: none  Description of drainage: none  Odor: none  Pain: denies, slow to respond, asked several times, several times said nothing then shook head \"no\"     Interventions  Current support surface: Standard  pulsate  Current off-loading measures: Pillows under calves and Pillows  Visual inspection of wound(s) completed  Wound Care: assisted with incontinence care  Supplies: floor stock and discussed with RN  Education provided: importance of repositioning, plan of care, Moisture management and Off-loading pressure  Discussed plan of care with Patient and Nurse    Chantale JONAS    "

## 2021-12-08 NOTE — PLAN OF CARE
Eyes are open, makes eye contact, does tend to be sleepy. Has not verbal answered any questions for me, sometimes nods yes or no. Very deconditioned, worked with PT today and made slight movement in the Rt LE, worse on the LLE, rt UE he was able to hold on his head for a short time, worse on the left side. 3+ edema to  ankles/feet/hands/wrists, 2+ to the rest, scrotal edema. Completed oral cares and scrubbed out pt mouth to get the dead skin. Pt has a weak congested cough. Zuniga patent. T/R Q2H (up to chair with PT). Scattered bruises, mepilexes in place on skin tears and coccyx. NG to tube feedings continuous.     Pt pulled his NG, spoke with MD will leave it out and a PEG will be placed tomorrow. Pt more awake when brother came to visit today.

## 2021-12-08 NOTE — PROGRESS NOTES
"Clinical Swallow Evaluation:     12/08/21 1428   General Information   Onset of Illness/Injury or Date of Surgery 10/26/21   Referring Physician Dr. Cherry   Patient/Family Therapy Goal Statement (SLP) Pt with limited verbalizations, did not state   Pertinent History of Current Problem   Per hospitalist \"77 yo single male who lives by himself,  with CAD, atrial flutter on eliquis, hypertension, hyperlipidemia, HFpEF, CKD stage III, DM type 3, history of GI bleed, suspected obstructive sleep apnea electively admitted on 10/26/2021 for TAVR to treat severe Aortic Stenosis.  Hospital course complicated by OPAL from contrast nephropathy and volume overload requiring transient dialysis, hypoxic respiratory failure multifactorial, Covid pneumonia and physical deconditioning, and proteus UTI and bacteremia.\" Pt intubated a total of 20 days (11/15 - 12/4), currently has NG TF in place. SLP consult for dysphagia. Possible PEG TF placement friday if unable to acheive PO tolerance.      General Observations Pt asleep upon arrival, easily roused, very limited direction following and verbalizations, difficulty managing secretions.   Past History of Dysphagia None per EMR   Type of Evaluation   Type of Evaluation Swallow Evaluation   Oral Motor   Oral Musculature   (generalized weakness, reduced ROM, limited direction followi)   Mucosal Quality good   Vocal Quality/Secretion Management (Oral Motor)   Vocal Quality (Oral Motor) dysphonic;hoarse;hypophonic;wet/gurgly   Secretion Management (Oral Motor) difficulty swallowing secretions;wet/gurgly vocal quality   Comment, Vocal Quality/Secretion Management (Oral Motor) unable to follow commands to cough for attempts of swallowing down or yankoour suction   General Swallowing Observations   Current Diet/Method of Nutritional Intake (General Swallowing Observations, NIS) NPO;nasogastric tube (NG)   Respiratory Support (General Swallowing Observations) oxygen mask   Swallowing " Evaluation Clinical swallow evaluation   Clinical Swallow Evaluation   Feeding Assistance dependent   Clinical Swallow Evaluation Textures Trialed thin liquids   Clinical Swallow Eval: Thin Liquid Texture Trial   Mode of Presentation, Thin Liquids spoon   Volume of Liquid or Food Presented x4 ice chips   Oral Phase of Swallow impaired mastication;delayed AP movement;effortful AP movement   Pharyngeal Phase of Swallow impaired  (?absent swallow)   Swallowing Recommendations   Diet Consistency Recommendations NPO   Medication Administration Recommendations, Swallowing (SLP) cont via non-oral means at this time    General Therapy Interventions   Planned Therapy Interventions Dysphagia Treatment   SLP Therapy Assessment/Plan   Criteria for Skilled Therapeutic Interventions Met (SLP Eval) yes;treatment indicated   SLP Diagnosis severe oral and suspected pharyngeal dysphagia   Rehab Potential (SLP Eval) fair, will monitor progress closely   Therapy Frequency (SLP Eval) daily   Predicted Duration of Therapy Intervention (SLP Eval) 2-3 weeks   Comment, Therapy Assessment/Plan (SLP)   Pt presents with severe oral and suspected pharyngeal dysphagia on limited clinical swallow evaluation. Impaired secretion management with inabilty to follow commands to cough in attempts to clear. Limited vocalizations though  voice was dysphonic: hoarse, hypophonic with wet/gurgly quality. Ice chips x4 completed. Reduced labial seal and oral control/manipulation, no pharyngeal swallows elicited to palpation (though somewhat difficult to tell). Pt judged a high aspiration risk, recommend maintain NPO status at this time.      Therapy Plan Review/Discharge Plan (SLP)   Therapy Plan Review (SLP) evaluation/treatment results reviewed;care plan/treatment goals reviewed;risks/benefits reviewed;current/potential barriers reviewed;participants voiced agreement with care plan;participants included;patient   SLP Discharge Planning    SLP Discharge  Recommendation (DC Rec) Transitional Care Facility   SLP Rationale for DC Rec Pt significantly  below baseline, will require intensive SLP rehab for swallow and communication function   SLP Brief overview of current status  Pt judged a high aspiration risk, recommend maintain NPO status at this time; continue non-oral means of nutrition. Continue frequent oral cares and encourage coughing/suction and or re-swallow for secretion management.    Total Evaluation Time   Total Evaluation Time (Minutes) 15

## 2021-12-08 NOTE — PROGRESS NOTES
[facility]    75 yo single male who lives by himself,  with CAD, atrial flutter on eliquis, hypertension, hyperlipidemia, HFpEF, CKD stage III, DM type 3, history of GI bleed, suspected obstructive sleep apnea electively admitted on 10/26/2021 for TAVR to treat severe Aortic Stenosis.  Hospital course complicated by OPAL from contrast nephropathy and volume overload requiring transient dialysis, hypoxic respiratory failure multifactorial, Covid pneumonia and physical deconditioning, and proteus UTI and bacteremia.    Coarse summary:  11/15- patient intubated for severe hypoxic respiratory failure. Patient was proned and paralyzed.   11/22: Started scheduled IV Bumex   11/23: GI bleed  12/4 self extubated during turn per night nurse @ 0015. Placed on BiPap, attempted hiflo, did not tolerate. Pt made DNR/DNI. NG tube placed.   12/5: HFNC over night, matilde better than bipap. HD line out. TX to IMC  12/6: Substantial improvement in oxygenation, patient off high flow.    Assessment & Plan      COVID-19 Pneumonia  MRSA VAP  Acute hypoxic respiratory failure (multifactorial with above):  -  Self extubated 12/4, currently on 2 LPM nasal canula    Severe aortic stenosis, s/p TAVR 10/26   A fib/flutter, rate controlled   Hypertension:  -  Eliquis 5 mg bid   -  Gentle diuresis, restarted Lasix 40 mg daily   -  Continue coreg low dose resumed 12/5, increase to 6.25 bid. Continue oral hydralazine.    GI bleed earlier in stay, s/p colonoscopy and EGD:   -  Hemorrhoids and erosions in hiatal hernia without active bleeding  -  No overt ongoing bleeding noted  -  PPI    Anemia -- probably related Anemia of Chronic disease and CRF     Moderate malnutrition  Dysphagia  - currently on tube feedings  - Speech therapy ordered, and IR consult for possible PEG Friday if not tolerating oral intake.     ARF on CKD, non-oliguric, creatinine above the patient's baseline but stable:  -  Temp dialysis line removed 12/5  -   No ongoing HD needed at this point    DM II with stress induced hyperglycemia, resolving - sugars rising but lantus on hold    -- increase insulin and decreased glucose checks to bid     Proteus bacteremia and UTI, treated    Leukopenia, chronic, stable  Anemia - no s/sx of acute blood loss  Coagulopathy of covid earlier in stay  Thrombocytopenia, improving , stable     Agitation/Encephalopathy (Combination of toxic and metabolic during course of stay):  - Slowly improving (sitting in chair today)    Deconditioning:  -  PT/OT consults (sitting in chair today)     Diet: Tube feeding     DVT Prophylaxis: DOAC  Zuniga Catheter: PRESENT, indication: Strict 1-2 Hour I&O, Retention, Strict 1-2 Hour I&O  Code Status: No CPR- Do NOT Intubate      Disposition Plan  Discussed with brother -- possible TCU Monday, needs to start taking PO or have PEG placed.  If progressive renal decline his brother would consider temporary dialysis, but not long term (brother and sister are POA for now).     Catarino Mohr MD   Pager: [phone]  Cell Phone:  [phone]   (35 min total)      Interval History   On face mask O2 at 2 LPM with NG tube feedings, coughing but not able to effectively clear secretions.    Physical Exam   Temp: 97.9  F (36.6  C) Temp src: Axillary BP: 126/78 Pulse: 88   Resp: 16 SpO2: 96 % O2 Device: Oxymask Oxygen Delivery: 2 LPM  Vitals:    12/04/21 0200 12/05/21 0500 12/08/21 0607   Weight: 113.8 kg (250 lb 14.1 oz) 112.3 kg (247 lb 9.2 oz) 108 kg (238 lb 1.6 oz)     Vital Signs with Ranges  Temp:  [97.9  F (36.6  C)-98.9  F (37.2  C)] 97.9  F (36.6  C)  Pulse:  [78-88] 88  Resp:  [16-33] 16  BP: (100-148)/(44-79) 126/78  SpO2:  [96 %-97 %] 96 %  I/O last 3 completed shifts:  In: 1376 [NG/GT:1376]  Out: 250 [Urine:250]    GEN:  Alert, orientation hard to determine, appears ill but comfortable, follows my extremity commands and nodded appropriately to several basic questions.  CV:  Regular, distant.  No  rub.  LUNGS:  Rhonchi bilaterally  ABD:  Active bowel sounds, soft, non-tender, mildly distended.  No rebound/guarding/rigidity, obese   EXT:  +1 bilateral edema.   Neuro: alert, Ox2, could say month and who the president was, but very slow to respond (takes 10-15 seconds prior to responding), generalized weakness       dextrose Stopped (12/04/21 1630)     - MEDICATION INSTRUCTIONS -         - MEDICATION INSTRUCTIONS for Dialysis Patients -   Does not apply See Admin Instructions     apixaban ANTICOAGULANT  5 mg Oral or Feeding Tube BID     atorvastatin  80 mg Oral or Feeding Tube Daily     B and C vitamin Complex with folic acid  5 mL Per Feeding Tube Daily     carvedilol  6.25 mg Oral BID w/meals     furosemide  40 mg Per NG tube Daily     hydrALAZINE  50 mg Oral or Feeding Tube Q8H MOE     insulin aspart  1-12 Units Subcutaneous BID     insulin glargine  25 Units Subcutaneous BID     miconazole   Topical BID     pantoprazole  40 mg Oral or Feeding Tube BID AC     sodium chloride (PF)  10-40 mL Intracatheter Q7 Days     sodium chloride (PF)  3 mL Intracatheter Q8H     tamsulosin  0.4 mg Oral Daily     Data     Recent Labs   Lab 12/05/21  1220 12/04/21  1347 12/04/21  0756 12/03/21  0407   PH  --  7.41 7.43 7.39   PHV 7.38  --   --   --    PO2  --  126* 93 40*   PO2V 39  --   --   --    PCO2  --  50* 47* 50*   PCO2V 55*  --   --   --    HCO3  --  32* 32* 31*   HCO3V 32*  --   --   --      Recent Labs   Lab 12/08/21  0601 12/07/21  0555 12/06/21  0520   WBC 5.9 5.3 5.5   HGB 9.1* 9.7* 10.0*   HCT 31.2* 33.2* 34.0*   MCV 94 92 90    179 175     Recent Labs   Lab 12/08/21  1308 12/08/21  0601 12/08/21  0213 12/07/21  0850 12/07/21  0555 12/07/21  0036 12/06/21  2216 12/06/21  0811 12/06/21  0520 12/05/21  1217 12/05/21  0451   NA  --  139  --   --  139  --  139  --  141  --  139   POTASSIUM  --  4.0  --   --  3.7  --   --   --  3.8  --  3.8   CHLORIDE  --  103  --   --  103  --   --   --  105  --  104   CO2  --   36*  --   --  32  --   --   --  31  --  30   ANIONGAP  --  <1*  --   --  4  --   --   --  5  --  5   * 224* 209*   < > 280*   < >  --    < > 306*   < > 225*   BUN  --  121*  --   --  111*  --   --   --  112*  --  103*   CR  --  2.28*  --   --  2.03*  --  2.00*  --  1.93*  --  1.95*   GFRESTIMATED  --  27*  --   --  31*  --   --   --  33*  --  33*   GBAE  --  9.2  --   --  9.4  --   --   --  9.4  --  8.8   MAG  --   --   --   --  2.2  --   --   --  2.2  --  2.2   PHOS  --   --   --   --  4.4  --   --   --  4.3  --  4.3   PROTTOTAL  --   --   --   --   --   --   --   --  5.4*  --  5.0*   ALBUMIN  --   --   --   --   --   --   --   --  2.1*  --  1.8*   BILITOTAL  --   --   --   --   --   --   --   --  0.5  --  0.6   ALKPHOS  --   --   --   --   --   --   --   --  236*  --  220*   AST  --   --   --   --   --   --   --   --  40  --  51*   ALT  --   --   --   --   --   --   --   --  36  --  36    < > = values in this interval not displayed.       Recent Results (from the past 24 hour(s))   XR Abdomen Port 1 View    Narrative    EXAM: XR ABDOMEN PORT 1 VIEWS  LOCATION: [facility]  DATE/TIME: 12/8/2021 2:58 AM    INDICATION: ngt replacement (pt pulled out, we replaced)  COMPARISON: 12/06/2021      Impression    IMPRESSION: Nasogastric tube terminates over the stomach. Visualized bowel gas pattern is nonobstructive.

## 2021-12-08 NOTE — PROGRESS NOTES
Care Management Follow Up    Length of Stay (days): 43    Expected Discharge Date: 12/10/2021     Concerns to be Addressed: adjustment to diagnosis/illness,coping/stress     Patient plan of care discussed at interdisciplinary rounds: Yes    Anticipated Discharge Disposition: Outpatient Rehab (PT, OT, SLP, Cardiac or Pulmonary)     Anticipated Discharge Services:    Anticipated Discharge DME:      Patient/family educated on Medicare website which has current facility and service quality ratings:    Education Provided on the Discharge Plan:    Patient/Family in Agreement with the Plan: yes    Referrals Placed by CM/SW:    Private pay costs discussed: Not applicable    Additional Information:  Writer spoke with pts brother. He requested that referrals be sent around [place] for TCU. He stated that pt has been to [facility] in [place] and that pt enjoyed it there. Writer spoke about pt currently being on tube feeds and that will likely make finding placement difficult. Pts brother is understanding.     Writer spoke with provider about feeding tube. He stated he will have speech evaluate pt and pt may get peg tube Friday pending how pt is doing. Provider thinks pt may be ready for discharge on Monday 12/13.    AARON Rios

## 2021-12-08 NOTE — PROVIDER NOTIFICATION
MD Notification    Notified Person: MD    Notified Person Name: Searfin    Notification Date/Time: 12/8/2021 1544    Notification Interaction: page    Purpose of Notification: pt pulled his NG again (once over night), do you want to move forward with PEG tube since speech stated NPO diet when they saw him today? Or place another NG?    Orders Received: leave it out, will place peg in IR tomorrow     Comments:  Can leave NG out, and ask IR to place PEG in AM.     Catarino Mohr MD  Pager: [phone]  Cell Phone:  [phone]

## 2021-12-08 NOTE — PROGRESS NOTES
VSS, on 2L via oxymask. Will desat w turns. Around 2330 pt pulled out NGT, per MD advance and get abd xray to verify placement before restarting tube feedings, or giving meds. Giron was leaking, no urine output in drainage bag. MD paged to get order to replace giron. New giron placed, no drainage noted around giron. Groin area red, powder, moisture barrier placed. Mepilex replaced on L forearm due to drainage. Turn and repo q2h, pt max assist x2. Able to follow commands at times, only opening eyes to speaking or gentle shaking. Only nodding yes or no or just staring at RN. Bed alarm on for safety.     NG is in stomach, TF restarted around 0400. Running at 45 mL/hr w  mL q6h. Urine output since 1502-6315 approx 250, mucous, pratik urine.

## 2021-12-09 NOTE — PROGRESS NOTES
Care Management Follow Up    Length of Stay (days): 44    Expected Discharge Date: 12/13/2021     Concerns to be Addressed: adjustment to diagnosis/illness,coping/stress     Patient plan of care discussed at interdisciplinary rounds: Yes    Anticipated Discharge Disposition: Outpatient Rehab (PT, OT, SLP, Cardiac or Pulmonary)     Anticipated Discharge Services:    Anticipated Discharge DME:      Patient/family educated on Medicare website which has current facility and service quality ratings:    Education Provided on the Discharge Plan:    Patient/Family in Agreement with the Plan: yes    Referrals Placed by CM/SW:    Private pay costs discussed: Not applicable    Additional Information:  Pt has been declined at [facility] in [place] due to being too high acuity. Writer left a  with [facility].    AARON Rios

## 2021-12-09 NOTE — PROGRESS NOTES
IR is consulted for a gastrostomy tube.   -Discussed G tube placement with JUN shen Bill by phone today. Brother is unsure that a G tube is the right decision and wants to discuss prognosis, goals of care, etc with medicine team before making a decision on the G tube placement  -Continue to hold Eliquis for possible G tube placement tomorrow later morning or in the afternoon pending consent from brother  -Will check back in with brother later today or tomorrow morning to determine if he wants G tube placed    [place] contact the IR department at [phone] for procedural related questions.     Addendum:  Brother now agreeable to G tube placement following discussion with pt and medicine team and has given consent (though he does plan to visit pt tomorrow morning to confirm again pre-procedure that pt wants it).  -G tube placement scheduled for 9:30am 12/10  -Phone consent given by brother and is in IR     Thanks,  Marisol Mendez PA-C  Interventional Radiology

## 2021-12-09 NOTE — PROGRESS NOTES
Pt noted to be lethargic, increased work of breathing with coarse lung sounds, other vitals sign stable on 4 L oxymax, provider Sunday Dobson informed, plan is to get ABGs, respitory notified, pt started on BiPAP

## 2021-12-09 NOTE — PROGRESS NOTES
[facility]  Hospitalist Progress Note  Christian Francois MD  12/09/2021    Assessment & Plan      75 yo single male who lives by himself,  with CAD, atrial flutter on eliquis, hypertension, hyperlipidemia, HFpEF, CKD stage III, DM type 3, history of GI bleed, suspected obstructive sleep apnea electively admitted on 10/26/2021 for TAVR to treat severe Aortic Stenosis.  Hospital course complicated by OPAL from contrast nephropathy and volume overload requiring transient dialysis, hypoxic respiratory failure multifactorial, Covid pneumonia and physical deconditioning, and proteus UTI and bacteremia.     Coarse summary:  11/15- patient intubated for severe hypoxic respiratory failure. Patient was proned and paralyzed.   11/22: Started scheduled IV Bumex   11/23: GI bleed  12/4 self extubated during turn per night nurse @ 0015. Placed on BiPap, attempted hiflo, did not tolerate. Pt made DNR/DNI. NG tube placed.   12/5: HFNC over night, matilde better than bipap. HD line out. TX to IMC  12/6: Substantial improvement in oxygenation, patient off high flow.      COVID-19 Pneumonia  MRSA VAP  Acute hypoxic respiratory failure (multifactorial with above):  -  Self extubated 12/4, currently on 5 LPM nasal canula  - most recent cxr shows bilateral pulm infiltrates, but normal wbc and no fever  - patient mobilizing secretions better     Severe aortic stenosis, s/p TAVR 10/26   A fib/flutter, rate controlled   Hypertension:  -  Eliquis 5 mg bid, on hold for PEG tube placement  -  Gentle diuresis, restarted Lasix 40 mg daily   -  Continue coreg low dose resumed 12/5, increase to 6.25 bid. Continue hydralazine.     GI bleed earlier in stay, s/p colonoscopy and EGD:   -  Hemorrhoids and erosions in hiatal hernia without active bleeding  -  No overt ongoing bleeding noted  -  PPI     Anemia   -- probably related Anemia of Chronic disease and CRF   -- hgb 10.1 > 9.7 > 9.1     Moderate malnutrition  Dysphagia  -  "currently  tube feedings on hold as patient pulled tube out  - Speech therapy ordered, and IR consult PEG Friday      ARF on CKD, non-oliguric, creatinine above the patient's baseline but stable:  -  Temp dialysis line removed 12/5  -  No ongoing HD needed at this point     DM II with stress induced hyperglycemia, resolving - sugars rising but lantus on hold               -- increase insulin and decreased glucose checks to bid      Proteus bacteremia and UTI, treated     Leukopenia, chronic, stable  Anemia - no s/sx of acute blood loss  Coagulopathy of covid earlier in stay  Thrombocytopenia, improving , stable      Agitation/Encephalopathy (Combination of toxic and metabolic during course of stay):  - Slowly improving (sitting in chair today)     Deconditioning:  -  PT/OT consults (sitting in chair today)     Diet: Tube feeding     DVT Prophylaxis: DOAC  Zuniga Catheter: PRESENT, indication: Strict 1-2 Hour I&O, Retention, Strict 1-2 Hour I&O  Code Status: No CPR- Do NOT Intubate          Disposition Plan    Discussed with brother about PEG -- possible TCU Monday,   If progressive renal decline his brother would consider temporary dialysis, but not long term (brother and sister are POA for now).        Interval History   -- chart reviewed  -- stable vitals, on 5L n/c  --  Pulled ng FT out   -- brother at bedside    -Data reviewed today: I reviewed all new labs and imaging over the last 24 hours. I personally reviewed no images or EKG's today.    Physical Exam    , Blood pressure (!) 130/91, pulse 80, temperature 98.4  F (36.9  C), temperature source Oral, resp. rate 19, height 1.753 m (5' 9\"), weight 104.2 kg (229 lb 11.5 oz), SpO2 94 %.  Vitals:    12/05/21 0500 12/08/21 0607 12/09/21 0600   Weight: 112.3 kg (247 lb 9.2 oz) 108 kg (238 lb 1.6 oz) 104.2 kg (229 lb 11.5 oz)     Vital Signs with Ranges  Temp:  [97  F (36.1  C)-98.4  F (36.9  C)] 98.4  F (36.9  C)  Pulse:  [73-93] 80  Resp:  [16-29] 19  BP: " (110-163)/(48-91) 130/91  SpO2:  [90 %-99 %] 94 %  I/O's Last 24 hours  I/O last 3 completed shifts:  In: -   Out: 950 [Urine:950]    Constitutional: Awake, alert, cooperative, no apparent distress  Respiratory: bilat rales  Cardiovascular: Regular rate and rhythm, normal S1 and S2   GI: Normal bowel sounds, soft, non-distended, non-tender  Skin/Integumen: No rashes, no cyanosis, ++ edema  Other:      Medications   All medications were reviewed.    dextrose Stopped (12/04/21 1630)     - MEDICATION INSTRUCTIONS -       - MEDICATION INSTRUCTIONS -         - MEDICATION INSTRUCTIONS for Dialysis Patients -   Does not apply See Admin Instructions     [Held by provider] apixaban ANTICOAGULANT  5 mg Oral or Feeding Tube BID     atorvastatin  80 mg Oral or Feeding Tube Daily     B and C vitamin Complex with folic acid  5 mL Per Feeding Tube Daily     carvedilol  6.25 mg Oral BID w/meals     furosemide  40 mg Per NG tube Daily     hydrALAZINE  50 mg Oral or Feeding Tube Q8H MOE     insulin aspart  1-12 Units Subcutaneous BID     miconazole   Topical BID     pantoprazole  40 mg Oral or Feeding Tube BID AC     sodium chloride (PF)  10-40 mL Intracatheter Q7 Days     sodium chloride (PF)  3 mL Intracatheter Q8H     tamsulosin  0.4 mg Oral Daily        Data   Recent Labs   Lab 12/09/21  0959 12/09/21  0534 12/08/21  2103 12/08/21  1308 12/08/21  0601 12/07/21  0850 12/07/21  0555 12/06/21  0811 12/06/21  0520 12/05/21  1217 12/05/21  0451   WBC  --   --   --   --  5.9  --  5.3  --  5.5  --  4.0   HGB  --   --   --   --  9.1*  --  9.7*  --  10.0*  --  9.3*   MCV  --   --   --   --  94  --  92  --  90  --  91   PLT  --   --   --   --  154  --  179  --  175  --  127*   INR 1.30*  --   --   --   --   --   --   --   --   --   --    NA  --  141  --   --  139  --  139   < > 141  --  139   POTASSIUM  --  3.8  --   --  4.0  --  3.7  --  3.8  --  3.8   CHLORIDE  --  104  --   --  103  --  103  --  105  --  104   CO2  --  35*  --   --  36*   --  32  --  31  --  30   BUN  --  116*  --   --  121*  --  111*  --  112*  --  103*   CR  --  2.17*  --   --  2.28*  --  2.03*   < > 1.93*  --  1.95*   ANIONGAP  --  2*  --   --  <1*  --  4  --  5  --  5   GABE  --  9.5  --   --  9.2  --  9.4  --  9.4  --  8.8   GLC  --  106* 180* 248* 224*   < > 280*   < > 306*   < > 225*   ALBUMIN  --   --   --   --   --   --   --   --  2.1*  --  1.8*   PROTTOTAL  --   --   --   --   --   --   --   --  5.4*  --  5.0*   BILITOTAL  --   --   --   --   --   --   --   --  0.5  --  0.6   ALKPHOS  --   --   --   --   --   --   --   --  236*  --  220*   ALT  --   --   --   --   --   --   --   --  36  --  36   AST  --   --   --   --   --   --   --   --  40  --  51*    < > = values in this interval not displayed.       No results found for this or any previous visit (from the past 24 hour(s)).    Christian Francois MD  Text Page  (7am to 6pm)

## 2021-12-09 NOTE — PLAN OF CARE
12/9/2021 [id number]  A&O to self, confused and infrequent speech. Becomes restless at times during cares and pulling cords. VS on 3L oxymask, HTN. Dyspneic on exertion. Coarse lung sounds.Total cares, up with lift to chair. Denied pain. NPO per Speech. Incontinent, giron in place with EDITH. Q4 BG WDL. CR 2.17, INR 1.30. PICC to LUE SL. Pale skin, generalized edema +2/3, +4 BLE. Skin tears covered with mepilex to BUE, blanchable coccyx. Pulses weak. Planned to have PEG tube placed tomorrow at 0930 with IR. Discharge pending to TCU. WOC/IR/GI/Card/OT/PT following.

## 2021-12-09 NOTE — PLAN OF CARE
Pt lethargic, however, more alert this AM compared to last night, opens eyes to voice at times spontenously, non verbal, will nod yes/no to questions,  respiration even non labored, VSS on BiPAP,  no non verbal signs pain noted. Severe edema to BUE, moderate to BLE/scrotum, giron draining well, NPO, AX2 lift, pending PEG placement tomorrow

## 2021-12-10 NOTE — IR NOTE
Interventional Radiology Intra-procedural Nursing Note    Patient Name: Burton Elizabeth  Medical Record Number: [id number]  Today's Date: December 10, 2021    Start Time: 1228  End of procedure time: 1248  Procedure: Gastrostomy tube placement with moderate sedation  Report given to: Aurelia Goyal RN  Time pt departs:  1255    Other Notes: Pt into IR suite 1 via cart. Pt awake and alert. To table in supine position. Monitoring equipment applied. VSS. Tele SR. Dr. Montague in room. Time out and procedure started. Pt tolerated procedure well. Debrief with Dr. Carrasquillo. G-tube placed and pressure held until hemostasis achieved. Dressing CDI. No complications. Pt transferred back to station 66.    Medications:    Versed 1 mg  Fentanyl 50 mcg  Lidocaine 1% 13ml    Jones Heard RN

## 2021-12-10 NOTE — PLAN OF CARE
Patient alert x3, disoriented to situation. Denies pain. Denies shortness of breath. Following some commands, able to participate in part of neuro exam. Speech slow and garbled. VSS. On 3L oxymask. Bed alarm on for safety. Triple lumen PICC L arm saline locked. Zuniga intact. NPO. Turned q2-3h overnight.

## 2021-12-10 NOTE — PLAN OF CARE
Ceiling lift, alert and oriented to self only this morning with rare use of the call light.  Lung sounds diminished, encouraged respiratory exercises.  Oxygen saturation above 90% on oxygen 3 liters mask.  Dressing clean/dry/intact.  Bowel sounds present, passing flatus, NPO diet.  Zuniga catheter with adequate urine output.  No complaints/signs pain.  Transfer to [facility], exchanging of bed, report given at this time.  Continue to monitor.

## 2021-12-10 NOTE — PRE-PROCEDURE
GENERAL PRE-PROCEDURE:   Procedure:  Percutaneous gastrostomy tube placement with IV moderate sedation  Date/Time:  12/10/2021 8:48 AM    Verbal consent obtained?: Yes    Risks and benefits: Risks, benefits and alternatives were discussed    Consent given by:  Power of  (aaronjodee Garcia)  Patient states understanding of procedure being performed: Yes    Patient's understanding of procedure matches consent: Yes    Procedure consent matches procedure scheduled: Yes    Expected level of sedation:  Moderate  Appropriately NPO:  Yes  ASA Class:  3  Mallampati  :  Grade 3- soft palate visible, posterior pharyngeal wall not visible  Lungs:  Crackles left base and crackles right base (Oxymask on with 3 LPM. Cooperative, opens mouth on command)  Heart:  Normal heart sounds and rate (right radial pulse palpable)  History & Physical reviewed:  History and physical reviewed and no updates needed  Statement of review:  I have reviewed the lab findings, diagnostic data, medications, and the plan for sedation

## 2021-12-10 NOTE — PROGRESS NOTES
[facility]  Hospitalist Progress Note  Christian Francois MD  12/10/2021    Assessment & Plan      77 yo single male who lives by himself,  with CAD, atrial flutter on eliquis, hypertension, hyperlipidemia, HFpEF, CKD stage III, DM type 3, history of GI bleed, suspected obstructive sleep apnea electively admitted on 10/26/2021 for TAVR to treat severe Aortic Stenosis.  Hospital course complicated by OPAL from contrast nephropathy and volume overload requiring transient dialysis, hypoxic respiratory failure multifactorial, Covid pneumonia and physical deconditioning, and proteus UTI and bacteremia.     Coarse summary:  11/15- patient intubated for severe hypoxic respiratory failure. Patient was proned and paralyzed.   11/22: Started scheduled IV Bumex   11/23: GI bleed  12/4 self extubated during turn per night nurse @ 0015. Placed on BiPap, attempted hiflo, did not tolerate. Pt made DNR/DNI. NG tube placed.   12/5: HFNC over night, matilde better than bipap. HD line out. TX to [facility]MC  12/6: Substantial improvement in oxygenation, patient off high flow.  12/10: planned percutaneous G tube placement.      COVID-19 Pneumonia  MRSA VAP  Acute hypoxic respiratory failure (multifactorial with above):  -  Self extubated 12/4, currently on 5 LPM nasal canula  - most recent cxr shows bilateral pulm infiltrates, but normal wbc and no fever  - patient mobilizing secretions better     Severe aortic stenosis, s/p TAVR 10/26   A fib/flutter, rate controlled   Hypertension:  -  Eliquis 5 mg bid, on hold for PEG tube placement  -  Gentle diuresis, restarted Lasix 40 mg daily   -  Continue coreg low dose resumed 12/5, increase to 6.25 bid. Continue hydralazine.     GI bleed earlier in stay, s/p colonoscopy and EGD:   -  Hemorrhoids and erosions in hiatal hernia without active bleeding  -  No overt ongoing bleeding noted  -  PPI     Anemia   -- probably related Anemia of Chronic disease and CRF   -- hgb 10.1 > 9.7 >  "9.1 > 9.5     Moderate malnutrition  Dysphagia  - currently  tube feedings on hold as patient pulled tube out  - Speech therapy ordered, and IR consult PEG Friday      ARF on CKD, non-oliguric, creatinine above the patient's baseline but stable:  -  Temp dialysis line removed 12/5  -  No ongoing HD needed at this point  - Cr at new baseline of around 2.0     DM II with stress induced hyperglycemia, resolving - sugars rising but lantus on hold    -- increase insulin and decreased glucose checks to bid      Proteus bacteremia and UTI  treated     Leukopenia, chronic, stable  Anemia - no s/sx of acute blood loss  Coagulopathy of covid earlier in stay  Thrombocytopenia, improving , stable      Agitation/Encephalopathy (Combination of toxic and metabolic during course of stay):  - Slowly improving (sitting in chair today)     Deconditioning:  -  PT/OT consults (sitting in chair today)     Diet: Tube feeding     DVT Prophylaxis: DOAC  Zuniga Catheter: PRESENT, indication: Strict 1-2 Hour I&O, Retention, Strict 1-2 Hour I&O  Code Status: No CPR- Do NOT Intubate          Disposition Plan    Discussed with brother about PEG -- possible TCU Monday,   If progressive renal decline his brother would consider temporary dialysis, but not long term (brother and sister are POA for now).        Interval History   -- stable vitals, on 3L n/c  -- no acute overnight events  -- scheduled for perc G tube placement today    -Data reviewed today: I reviewed all new labs and imaging over the last 24 hours. I personally reviewed no images or EKG's today.    Physical Exam    , Blood pressure 128/70, pulse 100, temperature 97.9  F (36.6  C), temperature source Oral, resp. rate 18, height 1.753 m (5' 9\"), weight 102.9 kg (226 lb 13.7 oz), SpO2 98 %.  Vitals:    12/08/21 0607 12/09/21 0600 12/10/21 0613   Weight: 108 kg (238 lb 1.6 oz) 104.2 kg (229 lb 11.5 oz) 102.9 kg (226 lb 13.7 oz)     Vital Signs with Ranges  Temp:  [97.5  F (36.4  C)-98  F " (36.7  C)] 97.9  F (36.6  C)  Pulse:  [] 100  Resp:  [16-18] 18  BP: (128-154)/(67-80) 128/70  SpO2:  [91 %-99 %] 98 %  I/O's Last 24 hours  I/O last 3 completed shifts:  In: 0   Out: 925 [Urine:925]    Constitutional:  no apparent distress  Respiratory: bilat rales at bases  Cardiovascular: Regular rate and rhythm, normal S1 and S2, +M  GI: Normal bowel sounds, soft, non-distended, non-tender  Skin/Integumen: No rashes, no cyanosis, ++ edema  Other:      Medications   All medications were reviewed.    dextrose Stopped (12/04/21 1630)     - MEDICATION INSTRUCTIONS -       - MEDICATION INSTRUCTIONS -         - MEDICATION INSTRUCTIONS for Dialysis Patients -   Does not apply See Admin Instructions     [Held by provider] apixaban ANTICOAGULANT  5 mg Oral or Feeding Tube BID     atorvastatin  80 mg Oral or Feeding Tube Daily     B and C vitamin Complex with folic acid  5 mL Per Feeding Tube Daily     carvedilol  6.25 mg Oral BID w/meals     furosemide  40 mg Per NG tube Daily     guaiFENesin  10 mL Oral or Feeding Tube BID     hydrALAZINE  50 mg Oral or Feeding Tube Q8H MOE     insulin aspart  1-12 Units Subcutaneous BID     miconazole   Topical BID     pantoprazole  40 mg Oral or Feeding Tube BID AC     sodium chloride (PF)  10-40 mL Intracatheter Q7 Days     sodium chloride (PF)  3 mL Intracatheter Q8H     tamsulosin  0.4 mg Oral Daily        Data   Recent Labs   Lab 12/10/21  0720 12/10/21  0610 12/09/21 2001 12/09/21  1236 12/09/21  0959 12/09/21  0534 12/08/21  1308 12/08/21  0601 12/07/21  0850 12/07/21  0555 12/06/21  0811 12/06/21  0520   WBC  --  6.5  --   --   --   --   --  5.9  --  5.3  --  5.5   HGB  --  9.5*  --   --   --   --   --  9.1*  --  9.7*  --  10.0*   MCV  --  94  --   --   --   --   --  94  --  92  --  90   PLT  --  187  --   --   --   --   --  154  --  179  --  175   INR  --  1.34*  --   --  1.30*  --   --   --   --   --   --   --    NA  --  142  --   --   --  141  --  139  --  139   < >  141   POTASSIUM  --  4.2  --   --   --  3.8  --  4.0  --  3.7  --  3.8   CHLORIDE  --  106  --   --   --  104  --  103  --  103  --  105   CO2  --  33*  --   --   --  35*  --  36*  --  32  --  31   BUN  --  115*  --   --   --  116*  --  121*  --  111*  --  112*   CR  --  2.02*  --   --   --  2.17*  --  2.28*  --  2.03*   < > 1.93*   ANIONGAP  --  3  --   --   --  2*  --  <1*  --  4  --  5   GABE  --  9.8  --   --   --  9.5  --  9.2  --  9.4  --  9.4   * 135* 130*   < >  --  106*   < > 224*   < > 280*   < > 306*   ALBUMIN  --  2.1*  --   --   --   --   --   --   --   --   --  2.1*   PROTTOTAL  --  5.0*  --   --   --   --   --   --   --   --   --  5.4*   BILITOTAL  --  0.6  --   --   --   --   --   --   --   --   --  0.5   ALKPHOS  --  178*  --   --   --   --   --   --   --   --   --  236*   ALT  --  40  --   --   --   --   --   --   --   --   --  36   AST  --  49*  --   --   --   --   --   --   --   --   --  40    < > = values in this interval not displayed.       No results found for this or any previous visit (from the past 24 hour(s)).    Christian Francois MD  Text Page  (7am to 6pm)

## 2021-12-10 NOTE — PROGRESS NOTES
CLINICAL NUTRITION SERVICES - REASSESSMENT NOTE      Recommendations Ordered by Registered Dietitian (RD):   When able, recommend restarting Novasource Renal with goal rate of 40 mL/hr (960 mL) = 1920 kcal (24 kcal/kg) and 87 gm protein (1.1 gm/kg), 688 mL H2O, 176 gm CHO, 0 gm fiber  - Start at 20 mL/hr and advance to goal rate of 40 mL/hr after 12 hrs   Malnutrition: (11/30)  % Weight Loss:  Up to 5% in 1 month (moderate malnutrition)  % Intake:  Decreased intake does not meet criteria for malnutrition   Subcutaneous Fat Loss:  Upper arm region mild depletion and Thoracic region mild depletion  Muscle Loss:  Clavicle bone region mild depletion, Acromion bone region mild depletion and Scapular bone region mild depletion  Fluid Retention:  Moderate 2-3+     Malnutrition Diagnosis: Moderate malnutrition  In Context of:  Acute illness or injury       EVALUATION OF PROGRESS TOWARD GOALS   Diet: NPO    Nutrition Support: TF currently on hold for PEG placement. Previous TF regimen as follows:    Nutrition Support Enteral:  Type of Feeding Tube: NGT  Enteral Frequency:  Continuous  Enteral Regimen: Novasource Renal at 45 mL/hr  Total Enteral Provisions: 2160 kcal (27 kcal/kg), 98 g protein (1.2 g/kg), 774 mL H2O, 198 g CHO, no fiber   Free Water Flush: 200 mL every 6 hours    Intake/Tolerance:  TF has not been running since 12/8 since pt pulled NG tube      ASSESSED NUTRITION NEEDS: - modified protein needs lower d/t persistently high BUN  Dosing Weight: 81 kg - adjusted   Estimated Energy Needs: 8664-8886 kcal (25-30 kcal/kg)  Justification: obesity   Estimated Protein Needs: 81-97 grams (1.0-1.2 g/kg)  Justification: hypercatabolism with critical illness, monitoring renal fx (liberalize as able)      NEW FINDINGS:   WOCN:  12/6:  - Right nare, intact , without induration, continue routine pressure injury prevention   - Right ear , intact, without induration, continue routine pressure injury prevention   - Unable to  assess coccyx/buttock, will plan to return 12-7-21 to assess.   12/7:  - buttock wound due to Moisture Associated Skin Damage (MASD) healing moisture split    - NG tube pulled by pt 12/7 and again on 12/8  - No TF running since 12/8  - BM x1 12/9  - PEG placed today    Labs:  BUN: 115(H), Cr: 2.02(H)  Alk Phos: 178(H), ALT: 40(WNL), AST: 49(H)  Glucose: 113-138    Previous Goals:   EN + Prosource to meet % estimated needs   Evaluation: Not met    Previous Nutrition Diagnosis:   No nutrition diagnosis identified at this time while meeting nutrient needs with TF  Evaluation: Declining      CURRENT NUTRITION DIAGNOSIS  Inadequate protein-energy intake related to NPO status and no TF running since 12/8 as evidenced by pt pulled NG tube 12/8 and PEG placed today with anticipated TF resumption.    INTERVENTIONS  Recommendations / Nutrition Prescription  When able, start Novasource renal with goal rate of 40 mL/hr (960 mL) = 1920 kcal (24 kcal/kg) and 87 gm protein (1.1 gm/kg), 688 mL H2O, 176 gm CHO, 0 gm fiber.  - Start at 20 mL/hr and advance to goal rate of 40 mL/hr after 12 hrs    Implementation  EN Composition - lowered goal rate of Novasource Renal due to consistently high BUN and Cr levels    Goals  TF to resume and meet % of estimated needs within 24-48 hrs      MONITORING AND EVALUATION:  Progress towards goals will be monitored and evaluated per protocol and Practice Guidelines    Svetlana Vega  Dietetic Intern

## 2021-12-10 NOTE — PLAN OF CARE
Cognitive Concerns/ Orientation: alert to self only. Occasionally states he's at [facility].   BEHAVIOR & AGGRESSION TOOL COLOR: green  CIWA SCORE:NA  ABNL VS/O2: VSs, afebrile. On 2 L oxymask  MOBILITY: total care, ceiling lift, on a pulsate mattress, turned and repositioned q 2 hrs  PAIN MANAGMENT: denies  DIET: NPO  BOWEL/BLADDER: giron  ABNL LAB/BG:   DRAIN/DEVICES: left picc  TELEMETRY RHYTHM: NA  SKIN: skin tears on left hand and arm, buttocks blanchable redness, bruising. Tip of penis is red and slightly bleeding after cares were provided-stat lock moved to relieve pressure on urethra.   TESTS/PROCEDURES: G tube placement today, can use after 7 pm    Diminished lung sounds, occasional nonproductive cough. Generalized +3 edema. G tube dressing c/d/I.     Tube feeding started at 1845, running at 20cc/hr.

## 2021-12-11 NOTE — PLAN OF CARE
SLP: dysphagia tx completed, see flowsheet for full details.     Pt making good, steady progress though cont's to be a high risk for aspiration/recommend NPO status - OK for ice chips with RN supervision/assist when alert/upright. May be appropriate for VFSS in x2-3 days if ongoing improvements noted.

## 2021-12-11 NOTE — PLAN OF CARE
DATE & TIME: 12/10/2021 0641-3902  Cognitive Concerns/ Orientation: alert to self only. Calm.   BEHAVIOR & AGGRESSION TOOL COLOR: green  ABNL VS/O2: VSS 2 L oxymask  MOBILITY: total care, ceiling lift, on a pulsate mattress, turned and repositioned q 2 hrs  PAIN MANAGMENT: denies  DIET: NPO. TF increased to goal rate of 40mL/hr with 200mL FWF q6h  BOWEL/BLADDER: giron, large soft BM in AM  ABNL LAB/BG:  & 195, creat 2.02  DRAIN/DEVICES: L PICC, G-tube (placed 12/10)  TELEMETRY RHYTHM: NA  SKIN: skin tears on left hand and arm, buttocks blanchable redness, bruising. Powder in perineal area. Penile area reddened, 2+ edema. Generalized 3+ edema. PEG site CDI  TESTS/PROCEDURES: None  DISCHARGE PLAN: To TCU, pending   OTHER IMPORTANT INFO: Diminished/coarse lung sounds, occasional nonproductive cough. Contact precautions for MRSA

## 2021-12-11 NOTE — PLAN OF CARE
Cognitive Concerns/ Orientation: A&Ox2, forgetful. More alert and interactive today. Slow to respond  BEHAVIOR & AGGRESSION TOOL COLOR: green  CIWA SCORE:NA  ABNL VS/O2: VSS 2 L oxymask, pt took his oxymask off this am-desat in the upper 70's  MOBILITY: total care, ceiling lift, on a pulsate mattress, turned and repositioned q 2 hrs  PAIN MANAGMENT: denies  DIET: NPO. TF at goal rate of 40cc/hr, no residuals-tolerating well. Tolerating an occasional ice chip with supervision  BOWEL/BLADDER: giron, small loose brown BM's  ABNL LAB/BG:   DRAIN/DEVICES: L PICC, G-tube (placed 12/10)  TELEMETRY RHYTHM: NA  SKIN: skin tears on left hand and arm, buttocks blanchable redness, bruising. Powder in perineal area and abd folds. Penile area reddened, 2+ edema. Generalized 3+ edema.   TESTS/PROCEDURES: None  DISCHARGE PLAN: TCU  OTHER IMPORTANT INFO: Diminished lung sounds, occasional nonproductive cough. No sign of SOB.

## 2021-12-11 NOTE — PROGRESS NOTES
[facility]  Hospitalist Progress Note for 12/11/2021  Date of admission: 10/26/2021       Assessment and Plan:   75 yo single male who lives by himself,  with CAD, atrial flutter on eliquis, hypertension, hyperlipidemia, HFpEF, CKD stage III, DM type 3, history of GI bleed, suspected obstructive sleep apnea electively admitted on 10/26/2021 for TAVR to treat severe Aortic Stenosis.  Hospital course complicated by OPAL from contrast nephropathy and volume overload requiring transient dialysis, hypoxic respiratory failure multifactorial, Covid pneumonia and physical deconditioning, and proteus UTI and bacteremia.     Coarse summary:  11/15- patient intubated for severe hypoxic respiratory failure. Patient was proned and paralyzed.   11/22: Started scheduled IV Bumex   11/23: GI bleed  12/4 self extubated during turn per night nurse @ 0015. Placed on BiPap, attempted hiflo, did not tolerate. Pt made DNR/DNI. NG tube placed.   12/5: HFNC over night, matilde better than bipap. HD line out. TX to [facility]  12/6: Substantial improvement in oxygenation, patient off high flow.  12/10: percutaneous G tube placed.       COVID-19 Pneumonia  MRSA VAP  Acute hypoxic respiratory failure (multifactorial with above):  -  Self extubated 12/4, currently on 5 LPM nasal canula  - most recent cxr shows bilateral pulm infiltrates, but normal wbc and no fever  - patient mobilizing secretions better     Severe aortic stenosis, s/p TAVR 10/26   A fib/flutter, rate controlled   Hypertension:  -  Eliquis 5 mg bid, on hold for PEG tube placement  -  Gentle diuresis, restarted Lasix 40 mg daily x 12/10, continue  -  Continue coreg low dose resumed 12/5, increase to 6.25 bid. Continue hydralazine.     GI bleed earlier in stay, s/p colonoscopy and EGD:   -  Hemorrhoids and erosions in hiatal hernia without active bleeding  -  No overt ongoing bleeding noted  -  PPI     Anemia   -- probably related Anemia of Chronic disease and CRF   -- hgb  10.1 > 9.7 > 9.1 > 9.5     Moderate malnutrition  Dysphagia  - currently  tube feedings on hold as patient pulled tube out  - Speech therapy ordered, and IR consult PEG Friday      ARF on CKD, non-oliguric, creatinine above the patient's baseline but stable:  -  Temp dialysis line removed 12/5  -  No ongoing HD needed at this point  - Cr at new baseline of around 2.0  - continue PTA Lasix, repeat BMP on 12/13     DM II with stress induced hyperglycemia, resolving - sugars rising but lantus on hold    -- increase insulin and decreased glucose checks to bid      Proteus bacteremia and UTI  treated     Leukopenia, chronic, stable  Anemia - no s/sx of acute blood loss  Coagulopathy of covid earlier in stay  Thrombocytopenia, improving , stable      Agitation/Encephalopathy (Combination of toxic and metabolic during course of stay):  - Slowly improving (sitting in chair today)     Deconditioning:  -  PT/OT consults (sitting in chair today)     Diet: Tube feeding     DVT Prophylaxis: DOAC  Zuniga Catheter: PRESENT, indication: Strict 1-2 Hour I&O, Retention, Strict 1-2 Hour I&O  Code Status: No CPR- Do NOT Intubate       Disposition Plan    Discussed with brother about PEG -- possible TCU Monday,   If progressive renal decline his brother would consider temporary dialysis, but not long term (brother and sister are POA for now).     Peace Grace MD.  Hospitalist [phone] (7am -6 pm)               Interval History:   no new complaints              Medications:       - MEDICATION INSTRUCTIONS for Dialysis Patients -   Does not apply See Admin Instructions     [Held by provider] apixaban ANTICOAGULANT  5 mg Oral or Feeding Tube BID     atorvastatin  80 mg Oral or Feeding Tube Daily     B and C vitamin Complex with folic acid  5 mL Per Feeding Tube Daily     carvedilol  6.25 mg Oral BID w/meals     furosemide  40 mg Per NG tube Daily     guaiFENesin  10 mL Oral or Feeding Tube BID     hydrALAZINE  50 mg Oral or Feeding  "Tube Q8H [facility]     insulin aspart  1-12 Units Subcutaneous BID     miconazole   Topical BID     pantoprazole  40 mg Oral or Feeding Tube BID AC     sodium chloride (PF)  10-40 mL Intracatheter Q7 Days     sodium chloride (PF)  3 mL Intracatheter Q8H     tamsulosin  0.4 mg Oral Daily     acetaminophen, artificial tears ophthalmic solution, dextrose, glucose **OR** dextrose **OR** glucagon, guaiFENesin, HOLD MEDICATION, HOLD MEDICATION, hydrALAZINE, levalbuterol, lidocaine 4%, lidocaine (buffered or not buffered), naloxone **OR** naloxone **OR** naloxone **OR** naloxone, nitroGLYcerin, - MEDICATION INSTRUCTIONS -, ondansetron, - MEDICATION INSTRUCTIONS -, polyethylene glycol, prochlorperazine, QUEtiapine, senna-docusate, sodium chloride (PF), sodium chloride (PF), sodium chloride (PF), traZODone               Physical Exam:   Blood pressure 124/62, pulse 83, temperature 98.3  F (36.8  C), temperature source Axillary, resp. rate 22, height 1.753 m (5' 9\"), weight 102.9 kg (226 lb 13.7 oz), SpO2 98 %.  Wt Readings from Last 4 Encounters:   12/10/21 102.9 kg (226 lb 13.7 oz)   10/21/21 112 kg (247 lb)   10/11/21 109.5 kg (241 lb 4.8 oz)   09/15/21 112.9 kg (249 lb)         Vital Sign Ranges  Temperature Temp  Av.8  F (36.6  C)  Min: 97.6  F (36.4  C)  Max: 98.3  F (36.8  C)   Blood pressure Systolic (24hrs), Av , Min:124 , Max:158        Diastolic (24hrs), Av, Min:59, Max:79      Pulse Pulse  Av.7  Min: 76  Max: 87   Respirations Resp  Av.5  Min: 20  Max: 24   Pulse oximetry SpO2  Av.4 %  Min: 95 %  Max: 98 %         Intake/Output Summary (Last 24 hours) at 2021 1125  Last data filed at 2021 0700  Gross per 24 hour   Intake 786 ml   Output 925 ml   Net -139 ml       Constitutional: Awake, alert, cooperative, no apparent distress. confused   Lungs: Clear to auscultation bilaterally, no crackles or wheezing   Cardiovascular: Regular rate and rhythm, normal S1 and S2, and no murmur " noted   Abdomen: PEG in place. normal bowel sounds, soft, non-tender   Skin: No rashes, no cyanosis, 2+ bilat leg edema               Data:   All laboratory data reviewed

## 2021-12-12 NOTE — PLAN OF CARE
SLP:  Pt making good, steady progress though cont's to be a risk for aspiration. Recommend NPO status - OK for ice chips with RN supervision when alert/upright. Video fluoroscopic swallow study planned for monday.

## 2021-12-12 NOTE — PLAN OF CARE
DATE & TIME: 12/11/21 1900-0730  Cognitive Concerns/ Orientation: A&Ox2, forgetful. Flat affect but calm. Slow to respond, 1-2 word responses  BEHAVIOR & AGGRESSION TOOL COLOR: green  CIWA SCORE:NA  ABNL VS/O2: VSS on 2L oxymask  MOBILITY: total care, ceiling lift, on a pulsate mattress, T&R Q 2hrs  PAIN MANAGMENT: Denies, absence of nonverbal pain indicators   DIET: NPO. TF at goal rate of 40cc/hr, tolerating. Q6 hr FWF 200mL. Tolerating an occasional ice chip with supervision.  BOWEL/BLADDER: giron, incontinent of 1 large soft/loose BM  ABNL LAB/BG:  & 297, Cr 2.05 Hgb 9.5  DRAIN/DEVICES: L PICC, G-tube (placed 12/10)  TELEMETRY RHYTHM: NA  SKIN: skin tears on left hand and arm, buttocks blanchable redness, bruising. Powder in perineal area and abd folds. Penile area reddened, 2+ edema. Generalized 2-3+ edema.   TESTS/PROCEDURES: Potential swallow study in 2-3 days per speech  DISCHARGE PLAN: TCU  OTHER IMPORTANT INFO: Diminished lung sounds. Contact precautions for MRSA.

## 2021-12-12 NOTE — PROGRESS NOTES
[facility]  Hospitalist Progress Note for 12/12/2021  Date of admission: 10/26/2021       Assessment and Plan:   77 yo single male who lives by himself,  with CAD, atrial flutter on eliquis, hypertension, hyperlipidemia, HFpEF, CKD stage III, DM type 3, history of GI bleed, suspected obstructive sleep apnea electively admitted on 10/26/2021 for TAVR to treat severe Aortic Stenosis.  Hospital course complicated by OPAL from contrast nephropathy and volume overload requiring transient dialysis, hypoxic respiratory failure multifactorial, Covid pneumonia and physical deconditioning, and proteus UTI and bacteremia.     Coarse summary:  11/15- patient intubated for severe hypoxic respiratory failure. Patient was proned and paralyzed.   11/22: Started scheduled IV Bumex   11/23: GI bleed  12/4 self extubated during turn per night nurse @ 0015. Placed on BiPap, attempted hiflo, did not tolerate. Pt made DNR/DNI. NG tube placed.   12/5: HFNC over night, matilde better than bipap. HD line out. TX to IMC  12/6: Substantial improvement in oxygenation, patient off high flow.  12/10: percutaneous G tube placed, Tube feeds started.       COVID-19 Pneumonia  MRSA VAP  Acute hypoxic respiratory failure (multifactorial with above):  -  Self extubated 12/4, currently on 5 LPM nasal canula  - most recent cxr shows bilateral pulm infiltrates, but normal wbc and no fever  - patient mobilizing secretions better     Severe aortic stenosis, s/p TAVR 10/26   A fib/flutter, rate controlled   Hypertension:  -  Eliquis 5 mg bid,  held for PEG tube placement, restarted 12/12  - restarted Lasix 40 mg daily x 12/10, continue  - Continue coreg low dose resumed 12/5, increase to 6.25 bid. Continue hydralazine.    L upper ext edema:  Has picc present on L Upper ext.Pt tends to lay on L side  - check TESSA ext venous doppler  - give extra 40 mg Lasix today     GI bleed earlier in stay, s/p colonoscopy and EGD:   -  Hemorrhoids and erosions  in hiatal hernia without active bleeding  -  No overt ongoing bleeding noted  - hgb stable last checked 12/11- 9.5  -  PPI     Anemia   -- probably related Anemia of Chronic disease and CRF   -- hgb 10.1 > 9.7 > 9.1 > 9.5     Moderate malnutrition  Dysphagia  - initially via nasal  tube- feedings held as patient pulled tube out, restarted after PEG placed on 12/10 by IR  - Speech therapy following  - remains NPO, receiving tube feeds & tolerating     ARF on CKD, non-oliguric, creatinine above the patient's baseline but stable:  -  Temp dialysis line removed 12/5  -  No ongoing HD needed at this point  - Cr at new baseline of around 2.0  - continue PTA Lasix, extra 40 mg Lasix on 12/12 for increase L arm edema   - repeat BMP on 12/13     DM II with stress induced hyperglycemia:  Maintained at home on Lantus 18 Uhs , amaryl 2 mg bid. A1c 7.6 in 11/2021  BS elevated > 200's as PTA medications on hold & on tube feeds x past 2 days  - restart lantus 20 U at HS & changed ISS to Q4hrs     Proteus bacteremia and UTI  treated     Leukopenia, chronic: resolved  Coagulopathy of covid earlier in stay  Thrombocytopenia, improving: resolved      Agitation/Encephalopathy (Combination of toxic and metabolic during course of stay):  - Slowly improving (sitting in chair today), oriented to place, able name president     Deconditioning:  -  PT/OT following     Diet: Tube feeding     DVT Prophylaxis: DOAC  Zuniga Catheter: PRESENT, indication: Strict 1-2 Hour I&O, Retention, Strict 1-2 Hour I&O  Code Status: No CPR- Do NOT Intubate       Disposition Plan    Discussed with brother about PEG -- possible TCU Monday,   If progressive renal decline his brother would consider temporary dialysis, but not long term (brother and sister are POA for now).      Peace Grace MD.  Hospitalist [phone] (7am -6 pm)                 Interval History:   L upper ext swelling.BS elevated- restarted Lantus & ISS changed to Q4 hrs  tolerating tube  "feeds  Less confused                Medications:       [Held by provider] apixaban ANTICOAGULANT  5 mg Oral or Feeding Tube BID     atorvastatin  80 mg Oral or Feeding Tube Daily     B and C vitamin Complex with folic acid  5 mL Per Feeding Tube Daily     carvedilol  6.25 mg Oral BID w/meals     doxazosin  1 mg Per Feeding Tube Daily     furosemide  40 mg Per NG tube Daily     guaiFENesin  10 mL Oral or Feeding Tube BID     hydrALAZINE  50 mg Oral or Feeding Tube Q8H MOE     insulin aspart  1-12 Units Subcutaneous BID     miconazole   Topical BID     pantoprazole  40 mg Oral or Feeding Tube BID AC     sodium chloride (PF)  10-40 mL Intracatheter Q7 Days     sodium chloride (PF)  3 mL Intracatheter Q8H     [Held by provider] tamsulosin  0.4 mg Oral Daily     acetaminophen, artificial tears ophthalmic solution, dextrose, glucose **OR** dextrose **OR** glucagon, guaiFENesin, HOLD MEDICATION, hydrALAZINE, levalbuterol, lidocaine 4%, lidocaine (buffered or not buffered), naloxone **OR** naloxone **OR** naloxone **OR** naloxone, nitroGLYcerin, - MEDICATION INSTRUCTIONS -, ondansetron, - MEDICATION INSTRUCTIONS -, polyethylene glycol, prochlorperazine, QUEtiapine, senna-docusate, sodium chloride (PF), sodium chloride (PF), sodium chloride (PF), traZODone               Physical Exam:   Blood pressure (!) 157/67, pulse 83, temperature 97.7  F (36.5  C), temperature source Oral, resp. rate 22, height 1.753 m (5' 9\"), weight 102.9 kg (226 lb 13.7 oz), SpO2 93 %.  Wt Readings from Last 4 Encounters:   12/10/21 102.9 kg (226 lb 13.7 oz)   10/21/21 112 kg (247 lb)   10/11/21 109.5 kg (241 lb 4.8 oz)   09/15/21 112.9 kg (249 lb)         Vital Sign Ranges  Temperature Temp  Av.8  F (36.6  C)  Min: 97.7  F (36.5  C)  Max: 98  F (36.7  C)   Blood pressure Systolic (24hrs), Av , Min:136 , Max:157        Diastolic (24hrs), Av, Min:46, Max:75      Pulse Pulse  Av.8  Min: 76  Max: 83   Respirations Resp  Av.6  " Min: 20  Max: 22   Pulse oximetry SpO2  Av.7 %  Min: 93 %  Max: 97 %         Intake/Output Summary (Last 24 hours) at 2021 1421  Last data filed at 2021 0900  Gross per 24 hour   Intake 1828 ml   Output 1249 ml   Net 579 ml       Constitutional: Awake, alert, cooperative, no apparent distress   Lungs: Clear to auscultation bilaterally, no crackles or wheezing   Cardiovascular:  S1 and S2   Abdomen: Normal bowel sounds, soft, non-distended, non-tender   Skin: Left upper ext swelling, has Picc Left upper arm. No erythema  2+bilater leg edema               Data:   All laboratory data reviewed

## 2021-12-13 NOTE — PLAN OF CARE
DATE & TIME: 12/13/21 5315-0160  Cognitive Concerns/ Orientation: A&Ox2, forgetful. Flat affect. Slow to respond. attempting to pull off O2 and pull at giron  BEHAVIOR & AGGRESSION TOOL COLOR: green  ABNL VS/O2: VSS on 2L NC. O2 sats 92-96%  MOBILITY: Total care, ceiling lift, on a pulsate mattress, T&R Q 2hrs  PAIN MANAGMENT: Denies  DIET: NPO. TF at goal rate of 40cc/hr, tolerating. Q6 hr FWF 200mL. Mildly thick CL with supervision .   BOWEL/BLADDER: Giron  , incontinent  BMx3  ABNL LAB/BG: ,  273,  Hgb 9.9,K 3.3, Cr 1.67  DRAIN/DEVICES: L PICC, G-tube (placed 12/10)  TELEMETRY RHYTHM: NA  SKIN: skin tears on left hand and arm, buttocks blanchable redness, bruising. Powder in perineal area and abd folds. Penile area reddened,edema. Generalized 2-3+ edema in arms and legs. L  arm dressing c/d/i   TESTS/PROCEDURES: Video swallow study completed  DISCHARGE PLAN: TCU pending placement,  SW following   OTHER IMPORTANT INFO: Contact precautions for MRSA.  given one time dose of potassium, speech advanced diet to  thickened CL, Patient gets irritated at times and attempts to pull the  giron out. Noticed no urine in giron and started bleeding around the catheter site, after hand irrigation had about 1100 ml bloody urine OP, and patient seems more comfortable. Pending  urology consult.

## 2021-12-13 NOTE — PROGRESS NOTES
"   12/13/21 1039   General Information   Onset of Illness/Injury or Date of Surgery 10/14/21   Referring Physician Dr. Grace   Patient/Family Therapy Goal Statement (SLP) Patient did not state.    Pertinent History of Current Problem Per hospitalist \"75 yo single male who lives by himself,  with CAD, atrial flutter on eliquis, hypertension, hyperlipidemia, HFpEF, CKD stage III, DM type 3, history of GI bleed, suspected obstructive sleep apnea electively admitted on 10/26/2021 for TAVR to treat severe Aortic Stenosis.  Hospital course complicated by OPAL from contrast nephropathy and volume overload requiring transient dialysis, hypoxic respiratory failure multifactorial, Covid pneumonia and physical deconditioning, and proteus UTI and bacteremia.\" Pt intubated a total of 20 days (11/15 - 12/4), currently has NG TF in place. SLP consult for dysphagia. Possible PEG TF placement friday if unable to acheive PO tolerance.    General Observations Confused with very little verbal output.    General Swallowing Observations   Swallowing Evaluation Videofluoroscopic swallow study (VFSS)   Clinical Swallow Evaluation   Feeding Assistance frequent cues/help required   VFSS Evaluation   Radiologist Dr. Peña   Views Taken left lateral   Physical Location of Procedure [facility]   VFSS Textures Trialed thin liquids;mildly thick liquids;pureed;soft & bite-sized   VFSS Eval: Thin Liquid Texture Trial   Mode of Presentation, Thin Liquid cup;spoon;self-fed;fed by clinician   Order of Presentation 3 10   Preparatory Phase Poor bolus control   Oral Phase, Thin Liquid Delayed AP movement;Premature pharyngeal entry   Pharyngeal Phase, Thin Liquid Delayed swallow reflex;other (see comments)  (Reflux back to the level of the pyrifrom sinuses. )   Rosenbek's Penetration Aspiration Scale: Thin Liquid Trial Results 8 - contrast passes glottis, visible subglottic residue remains, absent patient response (aspiration)   Response to Aspiration absent " response, silent aspiration   Diagnostic Statement Silent aspiration via the spoon, mild penetration via the cup on a controled amount.    VFSS Eval: Mildly Thick Liquids   Mode of Presentation cup;spoon;self-fed;fed by clinician   Order of Presentation 1 2 4 6 7 9   Preparatory Phase Holding;Poor bolus control   Oral Phase Premature pharyngeal entry;Delayed AP movement   Pharyngeal Phase Delayed swallow reflex;Residue in valleculae   Rosenbek's Penetration Aspiration Scale 3 - contrast remains above the vocal cords, visible residue remains (penetration)   Diagnostic Statement Delay with no penetration via the spoon or controled amount by cup. If he drinks via the cup uncontroled amount takes the very large bolus size with mild to moderate penetration.    VFSS Evaluation: Puree Solid Texture Trial   Mode of Presentation, Puree spoon;fed by clinician   Order of Presentation 5    Preparatory Phase Holding   Oral Phase, Puree Delayed AP movement;Residue in oral cavity;Premature pharyngeal entry   Pharyngeal Phase, Puree Residue in valleculae   Rosenbek's Penetration Aspiration Scale: Puree Food Trial Results 1 - no aspiration, contrast does not enter airway   Diagnostic Statement Reduced AP movement, premature entry to the valleculae and mild to moderate BOT residue that spills to the valleculae.    VFSS Eval: Soft & Bite Sized   Mode of Presentation spoon;fed by clinician   Order of presentation 8   Preparatory Phase Insufficient mastication   Oral Phase Impaired mastication;Delayed AP movement;Residue in oral cavity;Premature phrayngeal entry   Pharyngeal Phase Delayed swallow reflex;Residue in valleculae   Rosenbek's Penetration Aspiration Scale 1 - no aspiration, contrast does not enter airway   Diagnostic Statement Insufficient mastication, prolonged oral phase, delay and moderate BOT residue with verbal cues to swallow.    Esophageal Phase of Swallow   Patient reports or presents with symptoms of esophageal  dysphagia Yes   Esophageal sweep performed during today s vidofluoroscopic exam  Yes;Please refer to radiologist's report for details   Esophageal comments Reflux noted from the upper esophagus into the pyriform sinuses.    Swallowing Recommendations   Diet Consistency Recommendations clear liquid diet;mildly thick liquids (level 2)   Supervision Level for Intake 1:1 supervision needed   Mode of Delivery Recommendations bolus size, small;liquids via spoon only;slow rate of intake   Postural Recommendations none   Swallowing Maneuver Recommendations effortful (hard) swallow;extra swallow;alternate food and liquid intake   Monitoring/Assistance Required (Eating/Swallowing) check mouth frequently for oral residue/pocketing;stop eating activities when fatigue is present;monitor for cough or change in vocal quality with intake   Recommended Feeding/Eating Techniques (Swallow Eval) maintain upright sitting position for eating;maintain upright posture during/after eating for 30 minutes;minimize distractions during oral intake;provide assist with feeding;set-up and prepare tray   Medication Administration Recommendations, Swallowing (SLP) Via the TF.   General Therapy Interventions   Planned Therapy Interventions Dysphagia Treatment   Dysphagia treatment Modified diet education;Instruction of safe swallow strategies;Oropharyngeal exercise training   SLP Therapy Assessment/Plan   Criteria for Skilled Therapeutic Interventions Met (SLP Eval) yes   SLP Diagnosis Moderate oral and pharyngeal dysphagia   Rehab Potential (SLP Eval) fair, will monitor progress closely   Therapy Frequency (SLP Eval) daily   Predicted Duration of Therapy Intervention (SLP Eval) 1-2 weeks   Comment, Therapy Assessment/Plan (SLP) Patient presents with moderate oral and pharyngeal dysphagia on today's study. Characterized by mild bolus hold, decreased AP movement of the bolus, decreased BOT reatraction, premature entry, delayed swallow response and  incomplete epiglottic closure. These deficits resulted in silent aspiration of thin liquids via the spoon. Mild penetration via the cup on controled amount. No penetration of mildly thick liquids by the spoon. There was mild to moderately deep penetration via the cup with large bolus size. When amount controled by cup small there was no penetration. Mastication was insufficient for soft and bite size with prolonged oral phase. Needed verbal cues to swallow with the bolus on the base of his tongue. Mild to moderate oral and vallecular residue. Patient is at increased risk for aspiration given current deficits and cognitive impairements including implusiveness and reduced awareness/insight. Recommend: 1. trial a clear liquid diet mildly thick via the spoon only. Slow pacing and cues to swallow twice if able. Hold if coughing noted or vocal changes.    Therapy Plan Review/Discharge Plan (SLP)   Therapy Plan Review (SLP) evaluation/treatment results reviewed;care plan/treatment goals reviewed;risks/benefits reviewed;current/potential barriers reviewed;participants voiced agreement with care plan;participants included;patient   SLP Discharge Planning    SLP Discharge Recommendation (DC Rec) Transitional Care Facility   SLP Rationale for DC Rec Pt well below baseline for swallow function and communication function, will require intensive SLP services to rehab function for home and community environments.    SLP Brief overview of current status  Patient demonstrating mild improvements in his swallow function. Moderate oral and pharyngeal dysphagia.     Total Evaluation Time   Total Evaluation Time (Minutes) 17

## 2021-12-13 NOTE — PROGRESS NOTES
[facility]    Medicine Progress Note - Hospitalist Service       Date of Admission:  10/26/2021    Assessment & Plan         77 yo single male who lives by himself,  with CAD, atrial flutter on eliquis, hypertension, hyperlipidemia, HFpEF, CKD stage III, DM type 3, history of GI bleed, suspected obstructive sleep apnea electively admitted on 10/26/2021 for TAVR to treat severe Aortic Stenosis.  Hospital course complicated by OPAL from contrast nephropathy and volume overload requiring transient dialysis, hypoxic respiratory failure multifactorial, Covid pneumonia and physical deconditioning, and proteus UTI and bacteremia.     Coarse summary:  11/15- patient intubated for severe hypoxic respiratory failure. Patient was proned and paralyzed.   11/22: Started scheduled IV Bumex   11/23: GI bleed  12/4 self extubated during turn per night nurse @ 0015. Placed on BiPap, attempted hiflo, did not tolerate. Pt made DNR/DNI. NG tube placed.   12/5: HFNC over night, matilde better than bipap. HD line out. TX to [facility]MC  12/6: Substantial improvement in oxygenation, patient off high flow.  12/10: percutaneous G tube placed, Tube feeds started.    12/13: gross hematuria --- request Urology eval, appreciate nursing efforts to evaluate catheter and hematuria     COVID-19 Pneumonia  MRSA VAP  Acute hypoxic respiratory failure (multifactorial with above):  -  Self extubated 12/4, currently on 5 LPM nasal canula  - most recent cxr shows bilateral pulm infiltrates, but normal wbc and no fever  - patient mobilizing secretions better     Severe aortic stenosis, s/p TAVR 10/26   A fib/flutter, rate controlled   Hypertension  -  Eliquis 5 mg bid,  held for PEG tube placement, restarted 12/12  - restarted Lasix 40 mg daily x 12/10, continue  - Continue coreg low dose resumed 12/5, increase to 6.25 bid. Continue hydralazine.  - consider holding pending urology eval, if hematuria persists     L upper ext edema  Has picc  present on L Upper ext.Pt tends to lay on L side  - check TESSA ext venous doppler - negative US  - give extra 40 mg Lasix today     GI bleed earlier in stay, s/p colonoscopy and EGD  -  Hemorrhoids and erosions in hiatal hernia without active bleeding  -  No overt ongoing bleeding noted  - hgb stable 12/11- 9.5  --> 9.9 12/13  -  PPI     Anemia   -- probably related Anemia of Chronic disease and CRF   -- hgb 10.1 > 9.7 > 9.1 > 9.5 --> 9.9 12/13  - stable this morning 12/13, recheck this evening due to hematuria     Moderate malnutrition  Dysphagia  Hyperchloremic hypernatremia 12/13  - initially via nasal  tube- feedings held as patient pulled tube out, restarted after PEG placed on 12/10 by IR  - 12/13 increased FWF to 250 QID, repeat BMP in AM  - Speech therapy following  - video swallow 12/13 --> SLP advancing diet as appropriate     ARF on CKD, non-oliguric, creatinine above the patient's baseline but stable  -  Temp dialysis line removed 12/5  -  No ongoing HD needed at this point  - Cr at new baseline of around 2.0  - continue PTA Lasix, extra 40 mg Lasix on 12/12 for increase L arm edema   - repeat BMP on 12/13     DM II with stress induced hyperglycemia:  Maintained at home on Lantus 18 Uhs , amaryl 2 mg bid. A1c 7.6 in 11/2021  BS elevated > 200's as PTA medications on hold & on tube feeds x past 2 days  - restart lantus 20 U at HS & changed ISS to Q4hrs     Proteus bacteremia and UTI  treated     Leukopenia, chronic: resolved  Coagulopathy of covid earlier in stay  Thrombocytopenia, improving: resolved      Agitation/Encephalopathy (Combination of toxic and metabolic during course of stay):  - Slowly improving (sitting in chair today), oriented to place, able name president     Deconditioning:  -  PT/OT following      Diet: Adult Formula Drip Feeding: Specified Time Novasource Renal; Gastrostomy; Goal Rate: 40; mL/hr; Medication - Feeding Tube Flush Frequency: At least 15-30 mL water before and after  medication administration and with tube clogging; Amount to Send (N...  NPO for Medical/Clinical Reasons Except for: Other, Ice Chips; Specify: NPO For oral intake and gastric feedings for 6 hours post insertion Gastrostomy G/GJ tube. May feed through Jejunal or Distal PORT immediately for GJ tubes ONLY.    DVT Prophylaxis: DOAC  Giron Catheter: PRESENT, indication: Strict 1-2 Hour I&O, Retention, Strict 1-2 Hour I&O  Central Lines: None  Code Status: No CPR- Do NOT Intubate      Disposition Plan   Expected Discharge: 12/14/2021     Anticipated discharge location: home with family    Delays:     Complex Care  Covid Test Positive  Oxygen Needs            The patient's care was discussed with the Bedside Nurse and Patient.    Pedro Peraza MD  Hospitalist Service  [facility]  Securely message with the Vocera Web Console (learn more here)  Text page via [facility] Paging/Directory        Clinically Significant Risk Factors Present on Admission                ______________________________________________________________________    Interval History   Care assumed today. Patient seen and examined. Remains confused in general.  Some penile bleeding which may have started with patient pulling on giron. Subsequently found to have hematuria per RN report. No fevers and NAD.    Data reviewed today: I reviewed all medications, new labs and imaging results over the last 24 hours. I personally reviewed no images or EKG's today.    Physical Exam   Vital Signs: Temp: 97.5  F (36.4  C) Temp src: Axillary BP: 123/59 Pulse: 81   Resp: 20 SpO2: 95 % O2 Device: Nasal cannula Oxygen Delivery: 2 LPM  Weight: 226 lbs 13.65 oz    Gen: NAD, elderly  HEENT: Normocephalic, EOMI, MMM  Resp: no focal crackles,  no wheezes, no increased work of resp  CV: S1S2 heard, irreg irreg rhythm, reg rate, bilateral pedal edema  Abdo: soft, nontender, nondistended, bowel sounds present  Ext: calves nontender, well perfused  Neuro:  AA, cooperative, confused, CN grossly intact, no facial asymmetry      Data   Recent Labs   Lab 12/13/21  1433 12/13/21  1432 12/13/21  1130 12/13/21  0641 12/11/21  2101 12/11/21  0643 12/10/21  0720 12/10/21  0610 12/09/21  1236 12/09/21  0959 12/08/21  1308 12/08/21  0601   WBC  --   --   --   --   --  6.5  --  6.5  --   --   --  5.9   HGB 9.9*  --   --   --   --  9.5*  --  9.5*  --   --   --  9.1*   MCV  --   --   --   --   --  94  --  94  --   --   --  94   PLT  --   --   --   --   --  214  --  187  --   --   --  154   INR  --   --   --   --   --   --   --  1.34*  --  1.30*  --   --    NA  --   --   --  148*  --  143  --  142  --   --    < > 139   POTASSIUM  --   --   --  3.3*  --  4.2  --  4.2  --   --    < > 4.0   CHLORIDE  --   --   --  113*  --  106  --  106  --   --    < > 103   CO2  --   --   --  34*  --  31  --  33*  --   --    < > 36*   BUN  --   --   --  107*  --  121*  --  115*  --   --    < > 121*   CR  --   --   --  1.67*  --  2.05*  --  2.02*  --   --    < > 2.28*   ANIONGAP  --   --   --  1*  --  6  --  3  --   --    < > <1*   GABE  --   --   --  9.6  --  9.9  --  9.8  --   --    < > 9.2   GLC  --  276* 299* 289*   < > 220*   < > 135*   < >  --    < > 224*   ALBUMIN  --   --   --   --   --  2.2*  --  2.1*  --   --   --   --    PROTTOTAL  --   --   --   --   --  5.1*  --  5.0*  --   --   --   --    BILITOTAL  --   --   --   --   --  0.6  --  0.6  --   --   --   --    ALKPHOS  --   --   --   --   --  175*  --  178*  --   --   --   --    ALT  --   --   --   --   --  38  --  40  --   --   --   --    AST  --   --   --   --   --  37  --  49*  --   --   --   --     < > = values in this interval not displayed.     No results found for this or any previous visit (from the past 24 hour(s)).

## 2021-12-13 NOTE — PLAN OF CARE
DATE & TIME: 12/12/21 1900-0730  Cognitive Concerns/ Orientation: A&Ox2, forgetful. Flat affect. Slow to respond. Fidgeting at times, attempting to pull off O2 and pull at giron  BEHAVIOR & AGGRESSION TOOL COLOR: green  ABNL VS/O2: VSS on 2L NC. O2 sats 92-96%  MOBILITY: Total care, ceiling lift, on a pulsate mattress, T&R Q 2hrs  PAIN MANAGMENT: Denies  DIET: NPO. TF at goal rate of 40cc/hr, tolerating. Q6 hr FWF 200mL. Occasional ice chip with supervision.   BOWEL/BLADDER: Giron (occasionally leaking in AM due to patient pulling at line, new stat lock placed, will continue to monitor patency), incontinent small/smear BMx2  ABNL LAB/BG: , 292, 270  DRAIN/DEVICES: L PICC, G-tube (placed 12/10)  TELEMETRY RHYTHM: NA  SKIN: skin tears on left hand and arm, buttocks blanchable redness, bruising. Powder in perineal area and abd folds. Penile area reddened, itchy, edema. Generalized 2-3+ edema in arms and legs. L arm weeping, new dressing applied in AM.   TESTS/PROCEDURES: Video swallow study on Monday  DISCHARGE PLAN: TCU pending placement, potentially Monday. SW following   OTHER IMPORTANT INFO: Contact precautions for MRSA.

## 2021-12-13 NOTE — CONSULTS
[facility] Inpatient Consultation Note    Burton Elizabeth MRN# [id number]   Age: 76 year old YOB: 1945     Date of Admission:  10/26/2021    Reason for consult: Hematuria       Requesting physician: Dr Peraza                   History of Present Illness:     Mr Elizabeth is a 77 yo male with h/o atrial flutter on eliquis, HFpEF, CKD, DM, HTN admitted in October for TAVR surgery with prolonged hospitalization due to OPAL, covid-19 pneumonia requiring intubation, and proteus UTI. Urology was consulted for onset of gross hematuria today. Patient has giron in place and was noticed to have low urine output overnight. RN hand irrigated giron with clot return and bladder drained 1000 ml of dark red urine following irrigation. Per nursing reports, patient repeatedly tugs at his catheter. Eliquis was briefly held for procedure on 12/10 and was restarted yesterday, 12/12. Proteus UTI with sepsis in November was treated with vanco and ceftriaxone, no longer on antibiotics. Patient apparently without urologic history, no history of hematuria. Has taken Flomax in past. Never smoker. Patient somewhat confused at bedside and unable to provide much history. Hbg 9.9 today, Cr 1.67.     From IM note 12/13:    Assessment & Plan        77 yo single male who lives by himself,  with CAD, atrial flutter on eliquis, hypertension, hyperlipidemia, HFpEF, CKD stage III, DM type 3, history of GI bleed, suspected obstructive sleep apnea electively admitted on 10/26/2021 for TAVR to treat severe Aortic Stenosis.  Hospital course complicated by OPAL from contrast nephropathy and volume overload requiring transient dialysis, hypoxic respiratory failure multifactorial, Covid pneumonia and physical deconditioning, and proteus UTI and bacteremia.     Coarse summary:  11/15- patient intubated for severe hypoxic respiratory failure. Patient was proned and paralyzed.   11/22: Started scheduled IV Bumex   11/23: GI bleed  12/4 self  extubated during turn per night nurse @ 0015. Placed on BiPap, attempted hiflo, did not tolerate. Pt made DNR/DNI. NG tube placed.   12/5: HFNC over night, matilde better than bipap. HD line out. TX to IMC  12/6: Substantial improvement in oxygenation, patient off high flow.  12/10: percutaneous G tube placed, Tube feeds started.    12/13: gross hematuria --- request Urology eval, appreciate nursing efforts to evaluate catheter and hematuria     COVID-19 Pneumonia  MRSA VAP  Acute hypoxic respiratory failure (multifactorial with above):  -  Self extubated 12/4, currently on 5 LPM nasal canula  - most recent cxr shows bilateral pulm infiltrates, but normal wbc and no fever  - patient mobilizing secretions better     Severe aortic stenosis, s/p TAVR 10/26   A fib/flutter, rate controlled   Hypertension  -  Eliquis 5 mg bid,  held for PEG tube placement, restarted 12/12  - restarted Lasix 40 mg daily x 12/10, continue  - Continue coreg low dose resumed 12/5, increase to 6.25 bid. Continue hydralazine.  - consider holding pending urology eval, if hematuria persists     L upper ext edema  Has picc present on L Upper ext.Pt tends to lay on L side  - check TESSA ext venous doppler - negative US  - give extra 40 mg Lasix today     GI bleed earlier in stay, s/p colonoscopy and EGD  -  Hemorrhoids and erosions in hiatal hernia without active bleeding  -  No overt ongoing bleeding noted  - hgb stable 12/11- 9.5  --> 9.9 12/13  -  PPI     Anemia   -- probably related Anemia of Chronic disease and CRF   -- hgb 10.1 > 9.7 > 9.1 > 9.5 --> 9.9 12/13  - stable this morning 12/13, recheck this evening due to hematuria     Moderate malnutrition  Dysphagia  Hyperchloremic hypernatremia 12/13  - initially via nasal  tube- feedings held as patient pulled tube out, restarted after PEG placed on 12/10 by IR  - 12/13 increased FWF to 250 QID, repeat BMP in AM  - Speech therapy following  - video swallow 12/13 --> SLP advancing diet as  appropriate     ARF on CKD, non-oliguric, creatinine above the patient's baseline but stable  -  Temp dialysis line removed 12/5  -  No ongoing HD needed at this point  - Cr at new baseline of around 2.0  - continue PTA Lasix, extra 40 mg Lasix on 12/12 for increase L arm edema   - repeat BMP on 12/13     DM II with stress induced hyperglycemia:  Maintained at home on Lantus 18 Uhs , amaryl 2 mg bid. A1c 7.6 in 11/2021  BS elevated > 200's as PTA medications on hold & on tube feeds x past 2 days  - restart lantus 20 U at HS & changed ISS to Q4hrs     Proteus bacteremia and UTI  treated     Leukopenia, chronic: resolved  Coagulopathy of covid earlier in stay  Thrombocytopenia, improving: resolved      Agitation/Encephalopathy (Combination of toxic and metabolic during course of stay):  - Slowly improving (sitting in chair today), oriented to place, able name president     Deconditioning:  -  PT/OT following     Diet: Adult Formula Drip Feeding: Specified Time Novasource Renal; Gastrostomy; Goal Rate: 40; mL/hr; Medication - Feeding Tube Flush Frequency: At least 15-30 mL water before and after medication administration and with tube clogging; Amount to Send (N...  NPO for Medical/Clinical Reasons Except for: Other, Ice Chips; Specify: NPO For oral intake and gastric feedings for 6 hours post insertion Gastrostomy G/GJ tube. May feed through Jejunal or Distal PORT immediately for GJ tubes ONLY.    DVT Prophylaxis: DOAC  Zuniga Catheter: PRESENT, indication: Strict 1-2 Hour I&O, Retention, Strict 1-2 Hour I&O  Central Lines: None  Code Status: No CPR- Do NOT Intubate       Disposition Plan   Expected Discharge: 12/14/2021     Anticipated discharge location: home with family             Past Medical History:     Past Medical History:   Diagnosis Date     Arthritis of knee~ s/p replacement 03/24/2016     Atypical atrial flutter (H)      CAD (coronary artery disease)      Cardiomyopathy (H)      Cerebral infarction (H)      7/21     Congestive heart failure (H)      Decreased cardiac ejection fraction~44% 03/24/2016     Diabetes mellitus, type 2 (H) 03/24/2016     History of DVT (deep vein thrombosis)      HTN (hypertension) 03/24/2016     LBBB (left bundle branch block) 03/24/2016     Mixed hyperlipidemia 08/23/2016     Renal disease     stage 3 chronic kidney disease     Rheumatic aortic stenosis      Upper GI bleed              Past Surgical History:     Past Surgical History:   Procedure Laterality Date     ANKLE SURGERY      X 4     APPENDECTOMY       ARTHROPLASTY KNEE Right 3/29/2016    Procedure: ARTHROPLASTY KNEE;  Surgeon: Heena Rosen MD;  Location: [facility] OR     ARTHROPLASTY REVISION KNEE Left 9/27/2019    Procedure: REVISION LEFT TOTAL KNEE  ARTHROPLASTY;  Surgeon: Heena Rosen MD;  Location:  OR     ARTHROPLASTY REVISION KNEE Right 8/25/2021    Procedure: REVISION OF RIGHT TOTAL KNEE ARTHROPLASTY;  Surgeon: Heena Rosen MD;  Location:  OR     CV FEMORAL ANGIOGRAM N/A 10/11/2021    Procedure: Femoral Angiogram;  Surgeon: Edgardo Beckham MD;  Location:  HEART CARDIAC CATH LAB     CV HEART CATHETERIZATION WITH POSSIBLE INTERVENTION N/A 10/11/2021    Procedure: Coronary Angiogram;  Surgeon: Edgardo Beckham MD;  Location:  HE[facility] CATH LAB     CV LEFT HEART CATH N/A 8/12/2019    Procedure: Left Heart Cath;  Surgeon: Edgardo Beckham MD;  Location:  HE[facility]ARD[facility] CATH LAB     CV TRANSCATHETER AORTIC VALVE REPLACEMENT N/A 10/26/2021    Procedure: Transcatheter Aortic Valve Replacement;  Surgeon: Edgardo Beckham MD;  Location:  HE[facility] CATH LAB     ESOPHAGOSCOPY, GASTROSCOPY, DUODENOSCOPY (EGD), COMBINED N/A 7/7/2021    Procedure: ESOPHAGOGASTRODUODENOSCOPY, WITH BIOPSY;  Surgeon: Monserrat Bright MD;  Location:  GI     ESOPHAGOSCOPY, GASTROSCOPY, DUODENOSCOPY (EGD), COMBINED N/A 11/24/2021    Procedure: ESOPHAGOGASTRODUODENOSCOPY, WITH BIOPSY;  Surgeon:  Krystyna Lawson MD;  Location:  GI     EYE SURGERY      cataract removal     IR CVC TUNNEL PLACEMENT > 5 YRS OF AGE  10/29/2021     IR CVC TUNNEL REMOVAL RIGHT  11/3/2021     IR GASTROSTOMY TUBE PERCUTANEOUS PLCMNT  12/10/2021     ORTHOPEDIC SURGERY      KNEE SCOPES MULTIPLE     SIGMOIDOSCOPY FLEXIBLE N/A 11/24/2021    Procedure: SIGMOIDOSCOPY, FLEXIBLE;  Surgeon: Krystyna Lawson MD;  Location: [facility]             Social History:     Social History     Socioeconomic History     Marital status: Single     Spouse name: Not on file     Number of children: Not on file     Years of education: Not on file     Highest education level: Not on file   Occupational History     Not on file   Tobacco Use     Smoking status: Never Smoker     Smokeless tobacco: Never Used   Substance and Sexual Activity     Alcohol use: Yes     Alcohol/week: 2.0 standard drinks     Types: 2 Standard drinks or equivalent per week     Comment: occasional, social     Drug use: No     Sexual activity: Not on file   Other Topics Concern     Parent/sibling w/ CABG, MI or angioplasty before 65F 55M? Not Asked   Social History Narrative     Not on file     Social Determinants of Health     Financial Resource Strain: Not on file   Food Insecurity: Not on file   Transportation Needs: Not on file   Physical Activity: Not on file   Stress: Not on file   Social Connections: Not on file   Intimate Partner Violence: Not on file   Housing Stability: Not on file             Family History:     Family History   Problem Relation Age of Onset     Coronary Artery Disease No family hx of              Immunizations:     Immunization History   Administered Date(s) Administered     COVID-19,PF,Pfizer (12+ Yrs) 01/30/2021, 02/20/2021     Influenza (High Dose) 3 valent vaccine 01/26/2018, 10/25/2018, 10/01/2019, 10/21/2019     Influenza (IIV3) PF 10/01/2018     Influenza Vaccine IM > 6 months Valent IIV4 (Alfuria,Fluzone) 09/24/2020     Pneumo Conj 13-V  (2010&after) 01/07/2015, 01/01/2017     Pneumococcal 23 valent 10/04/2005, 01/01/2019     Tdap (Adacel,Boostrix) 01/01/2011     Zoster vaccine recombinant adjuvanted (SHINGRIX) 01/11/2021, 04/23/2021             Allergies:     Allergies   Allergen Reactions     Penicillins Anaphylaxis             Medications:     Current Facility-Administered Medications   Medication     acetaminophen (TYLENOL) tablet 325-650 mg     apixaban ANTICOAGULANT (ELIQUIS) tablet 5 mg     atorvastatin (LIPITOR) tablet 80 mg     B and C vitamin Complex with folic acid (NEPHRONEX) liquid 5 mL     carboxymethylcellulose PF (REFRESH PLUS) 0.5 % ophthalmic solution 1 drop     carvedilol (COREG) tablet 6.25 mg     dextrose 10% infusion     glucose gel 15-30 g    Or     dextrose 50 % injection 25-50 mL    Or     glucagon injection 1 mg     doxazosin (CARDURA) tablet 1 mg     furosemide (LASIX) tablet 40 mg     guaiFENesin (ROBITUSSIN) 20 mg/mL solution 10 mL     guaiFENesin (ROBITUSSIN) 20 mg/mL solution 10 mL     HOLD: All Oral Medications     hydrALAZINE (APRESOLINE) tablet 25 mg     hydrALAZINE (APRESOLINE) tablet 50 mg     insulin aspart (NovoLOG) injection (RAPID ACTING)     insulin glargine (LANTUS PEN) injection 20 Units     levalbuterol (XOPENEX CONC) neb solution 1.25 mg     lidocaine (LMX4) cream     lidocaine 1 % 0.1-1 mL     miconazole (MICATIN) 2 % powder     naloxone (NARCAN) injection 0.2 mg    Or     naloxone (NARCAN) injection 0.4 mg    Or     naloxone (NARCAN) injection 0.2 mg    Or     naloxone (NARCAN) injection 0.4 mg     nitroGLYcerin (NITROSTAT) sublingual tablet 0.4 mg     No lozenges or gum should be given while patient on BIPAP/AVAPS/AVAPS AE     ondansetron (ZOFRAN) injection 4 mg     pantoprazole (PROTONIX) 2 mg/mL suspension 40 mg     Patient may continue current oral medications     polyethylene glycol (MIRALAX) Packet 17 g     prochlorperazine (COMPAZINE) injection 5 mg     QUEtiapine (SEROquel) tablet 25 mg      "senna-docusate (SENOKOT-S/PERICOLACE) 8.6-50 MG per tablet 1 tablet     sodium chloride (PF) 0.9% PF flush 10-20 mL     sodium chloride (PF) 0.9% PF flush 10-40 mL     sodium chloride (PF) 0.9% PF flush 10-40 mL     sodium chloride (PF) 0.9% PF flush 3 mL     sodium chloride (PF) 0.9% PF flush 3 mL     [Held by provider] tamsulosin (FLOMAX) capsule 0.4 mg     traZODone (DESYREL) tablet 50 mg             Review of Systems:   Comprehensive review of systems from the Admission note dated 10/26/21 at [facility] was reviewed with no changes except per HPI.     Examination:  BP (!) 153/83 (BP Location: Right arm)   Pulse 91   Temp 98  F (36.7  C) (Oral)   Resp 18   Ht 1.753 m (5' 9\")   Wt 102.9 kg (226 lb 13.7 oz)   SpO2 95%   BMI 33.50 kg/m    General: Alert, no distress, tugging at O2 monitor and giron  HEENT: Face symmetric, mucous membranes moist and pink  Eyes: No scleral icterus  Neck: Symmetric  Chest wall: Symmetric  Respiratory: Breathing unlabored, on NC  Cardiac: Extremities warm and well perfused, no edema  Abdomen soft, non tender, non distended  Back: No CVAT  : Giron in place. Blood at meatus. 16F Giron draining merlot urine with clot notable.   Extremities: No evidence of deformities or trauma  Neuro:Grossly non focal  Pysch: Normal mood and affect  Skin: No evident rashes or lesions            Data:     Lab Results   Component Value Date    WBC 6.5 12/11/2021    WBC 9.0 07/11/2021     Lab Results   Component Value Date    RBC 3.55 12/11/2021    RBC 3.34 07/11/2021     Lab Results   Component Value Date    HGB 9.9 12/13/2021    HGB 8.6 07/11/2021     Lab Results   Component Value Date    HCT 33.3 12/11/2021    HCT 29.2 07/11/2021     Lab Results   Component Value Date     12/11/2021     07/11/2021     Creatinine   Date Value Ref Range Status   12/13/2021 1.67 (H) 0.66 - 1.25 mg/dL Final   07/11/2021 1.82 (H) 0.66 - 1.25 mg/dL Final   ]  Lab Results   Component Value " Date     12/13/2021    BUN 45 07/11/2021     Procedure:    Catheter replacement documentation on 12/13/2021:  16F giron catheter replaced with 22F 3-way catheter to facilitate CBI.  10cc water removed from balloon of existing 16F giron and catheter was gently removed. Clot was noted at catheter tip.   22F 3-way atheter successfully inserted into the urethral meatus in the usual sterile fashion without immediate complication.  Type of catheter placed: 22F 3-way hotter indwelling catheter  Urine is red in color.  100 cc's of urine output returned.  Balloon was filled with 10cc's of normal saline.  Securement device placed for the catheter. Catheter connected to continuous irrigation and titrated to pink outputs.   The patient tolerated the procedure.     PABLO Brown RN assisted with CBI set up.     Impression: Acute hematuria in setting of anticoagulation and patient-induced giron trauma.    Plan:  - Hand irrigated at bedside for significant clot return.   - Giron catheter upsized to 22F 3-way and continuous bladder irrigation was started. Continue CBI overnight, titrate to pink outputs.  - Hand irrigate q shift and PRN  - Trend Hbg  - Recommend holding Eliquis as able.  - Keep catheter secured to leg and obscured from patient view as able to prevent further tugging on catheter. Patient requires several reminders to stop pulling at catheter.     Urology will continue to follow. I will see patient in morning. Please contact with any questions.    Tracy Lara PA-C  [facility]   Pager 730am-5pm [phone]  After 5pm please call [phone]

## 2021-12-13 NOTE — PROGRESS NOTES
CLINICAL NUTRITION SERVICES - REASSESSMENT NOTE    Recommendations Ordered by Registered Dietitian (RD):   Continue TF as ordered   Future/Additional Recommendations:  Wean TF pending ability to advance diet / adequacy of intake    Malnutrition: (11/30)  % Weight Loss:  Up to 5% in 1 month (moderate malnutrition)  % Intake:  Decreased intake does not meet criteria for malnutrition   Subcutaneous Fat Loss:  Upper arm region mild depletion and Thoracic region mild depletion  Muscle Loss:  Clavicle bone region mild depletion, Acromion bone region mild depletion and Scapular bone region mild depletion  Fluid Retention:  Moderate 2-3+     Malnutrition Diagnosis: Moderate malnutrition  In Context of:  Acute illness or injury     EVALUATION OF PROGRESS TOWARD GOALS   Diet: NPO  Nutrition Support: TF running at goal as follows:     Nutrition Support Enteral:  Type of Feeding Tube: PEG (12/10)   Enteral Frequency:  Continuous  Enteral Regimen: Novasource Renal @ 40 ml/hr   Total Enteral Provisions:  1920 kcal (24 kcal/kg) and 87 gm protein (1.1 gm/kg), 688 mL H2O, 176 gm CHO, 0 gm fiber.  Free Water Flush: 200 mL 4x daily   Nephronex daily     Intake/Tolerance:   - Noted several interruptions with NGT clogged on 12/6, pulled out on 12/7 and 12/8. Replacement held off for PEG placement on 12/10.   - Na 148 (H), K 3.3 (L),  (H), Cr 1.67 (H)  Recent Labs   Lab 12/13/21  0641 12/13/21  0610 12/13/21  0157 12/12/21  2212 12/12/21  1749 12/12/21  0805   * 270* 292* 325* 379* 361*       - High sliding scale insulin + 20 units Lantus q HS  - Weight is 102.9 kg (226 lb 13.7 oz) - diuresing on lasix   - Stooling almost daily - x2 today     ASSESSED NUTRITION NEEDS: - modified protein needs 12/10 lower d/t persistently high BUN  Dosing Weight: 81 kg - adjusted   Estimated Energy Needs: 4115-2547 kcal (25-30 kcal/kg)  Justification: obesity   Estimated Protein Needs: 81-97 grams (1.0-1.2 g/kg)  Justification:  hypercatabolism with critical illness, monitoring renal fx (liberalize as able)    NEW FINDINGS:   - Video Swallow Planned    Previous Goals:   TF to resume and meet % of estimated needs within 24-48 hrs  Evaluation: Met    Previous Nutrition Diagnosis:   Inadequate protein-energy intake related to NPO status and no TF running since 12/8 as evidenced by pt pulled NG tube 12/8 and PEG placed today with anticipated TF resumption.  Evaluation: Completed    CURRENT NUTRITION DIAGNOSIS  No nutrition diagnosis identified    INTERVENTIONS  Recommendations / Nutrition Prescription  Continue TF as ordered  Wean TF pending ability to advance diet / adequacy of intake     Implementation  None new today    Goals  TF @ goal to provide % est needs.       MONITORING AND EVALUATION:  Progress towards goals will be monitored and evaluated per protocol and Practice Guidelines    Doreen Mae RD, LD  [facility]  Pager: [phone]  Weekend Pager: [phone]

## 2021-12-13 NOTE — PROGRESS NOTES
MD Notification    Notified Person: MD  Notified Person Name:Dr. Peraza  Notification Date/Time:12/13/21 1300   Notification Interaction: verbal  Purpose of Notification: no urine  in giron bag and started bleeding around the giron catheter site.  Orders Received:yes, hand irrigation  Comments:  About 1000 cc Bloody urine OP after  Irrigation , MD notified

## 2021-12-13 NOTE — PROGRESS NOTES
Care Management Follow Up    Length of Stay (days): 48    Expected Discharge Date: 12/14/2021     Concerns to be Addressed: adjustment to diagnosis/illness,coping/stress     Patient plan of care discussed at interdisciplinary rounds: Yes    Anticipated Discharge Disposition: Outpatient Rehab (PT, OT, SLP, Cardiac or Pulmonary)     Anticipated Discharge Services:    Anticipated Discharge DME:      Patient/family educated on Medicare website which has current facility and service quality ratings:    Education Provided on the Discharge Plan:    Patient/Family in Agreement with the Plan: yes    Referrals Placed by CM/SW:    Private pay costs discussed: Not applicable    Additional Information:    Per chart review TCU referrals have been sent; Sw called TCUs to check on status:    [facility]:  Declined   [facility]:  Admissions asked that referral be resent (sent); unsure if they could accommodate PEG tube or when bed is available  [facility]:  Admissions asked that referral be resent (sent); may have bed available today or tomorrow    Sw waiting on [facility] and [facility] to review referral.  May need additional TCU choices; if so, will reach out to brother ([facility]).    Update:  [facility] inquired into whether pt has own bipap; per care team pt is not using bipap currently.  Sw updated admissions.  They are still reviewing/assessing pt for admission per DON approval; bed available 12/15/21.  Sw to f/u with [facility] tomorrow, 12/14, on referral status.      Juana Cm, LUISSW

## 2021-12-13 NOTE — PLAN OF CARE
Cognitive Concerns/ Orientation: A&Ox2, forgetful. Flat affect. Slow to respond, having more conversation  BEHAVIOR & AGGRESSION TOOL COLOR: green  CIWA SCORE:NA  ABNL VS/O2: VSS on 2L NC  MOBILITY: total care, ceiling lift, on a pulsate mattress, T&R Q 2hrs. Up in the recliner this am with legs elevated-tolerated this well  PAIN MANAGMENT: Denies.   DIET: NPO. TF at goal rate of 40cc/hr, tolerating. Q6 hr FWF 200mL. Tolerating an occasional ice chip with supervision x8 occasions  BOWEL/BLADDER: giron, incontinent of 1 large soft/loose BM  ABNL LAB/BG: , 379-MD aware, BGM and sliding scale adjusted  DRAIN/DEVICES: L PICC, G-tube (placed 12/10)  TELEMETRY RHYTHM: NA  SKIN: skin tears on left hand and arm, buttocks blanchable redness, bruising. Powder in perineal area and abd folds. Penile area reddened, 2+ edema. Generalized 2-3+ edema in arms and legs.   TESTS/PROCEDURES: Video swallow on Monday  DISCHARGE PLAN: TCU  OTHER IMPORTANT INFO: Diminished lung sounds. No cough. Shallow breathing in upper lobes. Negative SRIKANTH DVT. Contact precautions for MRSA. Updated brother Jose, he is aware patient will be discharging soon to a TCU is waiting to talk to a SW to assist in arranging this. Jose visited this afternoon

## 2021-12-14 NOTE — PROGRESS NOTES
Care Management Follow Up    Length of Stay (days): 49    Expected Discharge Date: 12/15/2021     Concerns to be Addressed: adjustment to diagnosis/illness,coping/stress     Patient plan of care discussed at interdisciplinary rounds: Yes    Anticipated Discharge Disposition: Outpatient Rehab (PT, OT, SLP, Cardiac or Pulmonary)     Anticipated Discharge Services:    Anticipated Discharge DME:      Patient/family educated on Medicare website which has current facility and service quality ratings:    Education Provided on the Discharge Plan:    Patient/Family in Agreement with the Plan: yes    Referrals Placed by CM/SW:    Private pay costs discussed: Not applicable    Additional Information:    [facility] has declined.  Sami reached out the last pending referral, [facility], and was unable to connect with admissions; message is being sent over by rep, however,    Sami reached out to brother, Jose, to retrieve additional choices.  Jose is open to expanding further out of [place]; Sami went over different facilities via Medicare website and [facility], [place], and [facility] were selected; referrals sent.  Jose asked that he be updated as referrals are reviewed and would like to be involved on TCU choice selection.    Sami will continue to follow for discharge planning needs.      Juana Cm, LUISSW

## 2021-12-14 NOTE — PROGRESS NOTES
"[facility]    Urology Progress Note     ADDENDUM 11:51:  Reassessed patient 3 hours after CBI stopped. Giron continues to drain clear urine. Hand irrigated at bedside for clear urine, no clots. CBI disconnected. Will continue to hand irrigate PRN. RN updated. Will check UA today. Urology follow up updated in AVS. OK to discharge to TCU from urology perspective.     Assessment & Plan   Burton Elizabeth is a 76 year old male with gross hematuria.     Plan:   - CBI stopped at bedside. Will recheck later today and consider removing CBI.  - Continue hand irrigations q shift and PRN  - Trend Hbg   - OK to resume anticoagulation given significant improvement in bleeding overnight   - Recommend cystoscopy and CT urogram as outpatient following this admission - AVS updated.     Tracy Lara PA-C  [facility]  Pager: [phone]  Office: [phone]    Interval History   Per nursing, 2-3 hours spent on hand irrigation last evening with excessive blood clot return. CBI running / titrated overnight. This AM, CBI at slow rate with clear yellow outputs. Patient denies pain. Afebrile. Hbg stable at 9.5, Cr continues to trend up at 1.78 today.     OBJECTIVE:          BP (!) 162/75   Pulse 94   Temp 97.4  F (36.3  C) (Axillary)   Resp 18   Ht 1.753 m (5' 9\")   Wt 102.9 kg (226 lb 13.7 oz)   SpO2 96%   BMI 33.50 kg/m      Intake/Output Summary (Last 24 hours) at 12/14/2021 0924  Last data filed at 12/14/2021 0740  Gross per 24 hour   Intake 524 ml   Output 1950 ml   Net -1426 ml            General appearance shows no deformaties and good grooming in no acute distress.  EYES: no icterus  HEAD, EARS, NOSE, MOUTH, AND THROAT: atraumatic, normocephalic  NECK: symmetric  CHEST WALL: symmetric  CARDIAC: skin well perfused, with LE edema  RESPIRATORY: breathing unlabored  ABDOMEN: soft, non tender, non distended  : 3-way giron in place, CBI running at slow rate, outputs clear/yellow with " sediment  SKIN/HAIR/NAILS: no visible rashes  NEUROLOGIC: no focal deficits  PSYCHIATRIC: Affect flat, minimally communicative. Alert in NAD.     LABS:         Lab Results   Component Value Date    WBC 9.6 12/14/2021    WBC 9.0 07/11/2021     Lab Results   Component Value Date    RBC 3.45 12/14/2021    RBC 3.34 07/11/2021     Lab Results   Component Value Date    HGB 9.5 12/14/2021    HGB 8.6 07/11/2021     Creatinine   Date Value Ref Range Status   12/14/2021 1.78 (H) 0.66 - 1.25 mg/dL Final   07/11/2021 1.82 (H) 0.66 - 1.25 mg/dL Final   ]  Lab Results   Component Value Date     12/14/2021    BUN 45 07/11/2021         Tracy Lara PA-C  [facility]

## 2021-12-14 NOTE — PLAN OF CARE
DATE & TIME: 12/13/21-12/14/21 6367-8982  Cognitive Concerns/ Orientation: A&Ox1, forgetful. Flat affect. Slow to respond. Alert this shift, only answering some 'yes/no' questions. Not using call light, rounded on freq.    BEHAVIOR & AGGRESSION TOOL COLOR: Green  ABNL VS/O2: VSS on 3L NC. Scheduled Hydralazine  MOBILITY: Total care, ceiling lift, on a pulsate mattress, T&R Q 2hrs.   PAIN MANAGMENT: Denies  DIET: Mildly thick clears with supervision. TF at goal rate of 40cc/hr, tolerating. Q6 hr FWF 200mL. Oral cares provided.   BOWEL/BLADDER: 22 Hungarian irrigation giron placed by Urology 12/13. Some leaking around giron at times, blood output. Now running at a very light pink. Incontinent  one small BM.  ABNL LAB/BG: , 240  DRAIN/DEVICES: L PICC, G-tube (placed 12/10), giron for continuous bladder irrigation by placed by Urology 12/13/21.   TELEMETRY RHYTHM: N/A  SKIN: Skin tears on left hand and arm, buttocks blanchable redness with open area of skin, bruising, mepilex changed. Powder in perineal area and abd folds. Penile area reddened, itchy, edema and occasional blood/fluid around catheter site. Generalized +2 edema in R arm and legs. L  arm +3 edema, elevated, MD aware. Dressing c/d/I.   TESTS/PROCEDURES: Pending AM labs.   DISCHARGE PLAN: TCU pending placement,  SW following   OTHER IMPORTANT INFO: Contact precautions for MRSA. Bed alarm on for safety. Rounded on freq. Continuous bladder irrigation stared by Urology 12/13/21. No clots noted in urine, yellow/pink tinged output.

## 2021-12-14 NOTE — PROGRESS NOTES
MD Notification    Notified Person: PA     Notified Person Name: Tracy Lara     Notification Date/Time: 12/14/2021     Notification Interaction: COGE[employer] messaging     Purpose of Notification: Hello, just wanting to confirm that continuous bladder irrigation is being paused at this time. Family member explained urology had come by and clamped it. Just clarifying. Thank you!    Orders Received: no new orders     Comments:  CBI discontinued by urology around 1200 today

## 2021-12-14 NOTE — PROGRESS NOTES
[facility]    Medicine Progress Note - Hospitalist Service       Date of Admission:  10/26/2021    Assessment & Plan         77 yo single male who lives by himself,  with CAD, atrial flutter on eliquis, hypertension, hyperlipidemia, HFpEF, CKD stage III, DM type 3, history of GI bleed, suspected obstructive sleep apnea electively admitted on 10/26/2021 for TAVR to treat severe Aortic Stenosis.  Hospital course complicated by OPAL from contrast nephropathy and volume overload requiring transient dialysis, hypoxic respiratory failure multifactorial, Covid pneumonia and physical deconditioning, and proteus UTI and bacteremia.     Brief summary:  11/15- patient intubated for severe hypoxic respiratory failure. Patient was proned and paralyzed.   11/22: Started scheduled IV Bumex   11/23: GI bleed  12/4 self extubated during turn per night nurse @ 0015. Placed on BiPap, attempted hiflo, did not tolerate. Pt made DNR/DNI. NG tube placed.   12/5: HFNC over night, matilde better than bipap. HD line out. TX to IMC  12/6: Substantial improvement in oxygenation, patient off high flow.  12/10: percutaneous G tube placed, Tube feeds started.    12/13: gross hematuria --- request Urology eval, appreciate nursing efforts to evaluate catheter and hematuria  - 12/14 - now improved with CBI, continuing to monitor with urology     COVID-19 Pneumonia  MRSA VAP  Acute hypoxic respiratory failure (multifactorial with above)  -  Self extubated 12/4, currently on 5 LPM nasal canula  - most recent cxr shows bilateral pulm infiltrates, but normal wbc and no fever  - patient mobilizing secretions better     Severe aortic stenosis, s/p TAVR 10/26   A fib/flutter, rate controlled   Hypertension  -  Eliquis 5 mg bid,  held for PEG tube placement, restarted 12/12  - restarted Lasix 40 mg daily x 12/10, continue  - Continue coreg low dose resumed 12/5, increase to 6.25 bid. Continue hydralazine.  - consider holding apixaban if  hematuria returns     L upper ext edema  Has picc present on L Upper ext.Pt tends to lay on L side  - check TESSA ext venous doppler - negative US     GI bleed earlier in stay, s/p colonoscopy and EGD  -  Hemorrhoids and erosions in hiatal hernia without active bleeding  -  No overt ongoing bleeding noted  - hgb stable 12/11- 9.5  --> 9.9 12/13  -  PPI     Anemia   -- probably related Anemia of Chronic disease and CRF   -- hgb 10.1 > 9.7 > 9.1 > 9.5 --> 9.9 12/13  - stable around 9     Moderate malnutrition  Dysphagia  Hyperchloremic hypernatremia 12/13  - initially via nasal  tube- feedings held as patient pulled tube out, restarted after PEG placed on 12/10 by IR  - 12/13 increased FWF to 250 QID, repeat BMP in AM  - Speech therapy following  - video swallow 12/13 --> SLP advancing diet as appropriate     ARF on CKD, non-oliguric, creatinine above the patient's baseline but stable  -  Temp dialysis line removed 12/5  -  No ongoing HD needed at this point  - Cr at new baseline of around 2.0  - continue PTA Lasix, extra 40 mg Lasix on 12/12 for increase L arm edema   - repeat BMP on 12/13     DM II with stress induced hyperglycemia:  Maintained at home on Lantus 18 Uhs , amaryl 2 mg bid. A1c 7.6 in 11/2021  BS elevated > 200's as PTA medications on hold & on tube feeds x past 2 days  - restart lantus 20 U at HS & changed ISS to Q4hrs     Proteus bacteremia and UTI  treated     Leukopenia, chronic: resolved  Coagulopathy of covid earlier in stay  Thrombocytopenia, improving: resolved      Agitation/Encephalopathy (Combination of toxic and metabolic during course of stay):  - Slowly improving (sitting in chair today), oriented to place, able name president     Deconditioning:  -  PT/OT following    Total encounter time 38 minutes with greater than 50% spent in direct patient care, discussion and counseling, additionally phone call with patient's brother and further updates provided, as well as discussion/coordination of  care with social work and RN.      Diet: Adult Formula Drip Feeding: Specified Time Novasource Renal; Gastrostomy; Goal Rate: 40; mL/hr; Medication - Feeding Tube Flush Frequency: At least 15-30 mL water before and after medication administration and with tube clogging; Amount to Send (N...  Full Liquid Diet Mildly Thick (level 2) (spoon only)    DVT Prophylaxis: DOAC  Zuniga Catheter: PRESENT, indication: Strict 1-2 Hour I&O, Gross Hematuria, Retention, Strict 1-2 Hour I&O  Central Lines: None  Code Status: No CPR- Do NOT Intubate      Disposition Plan   Expected Discharge: 12/16/2021     Anticipated discharge location: home with family    Delays:     Placement - TCU            The patient's care was discussed with the Bedside Nurse, Care Coordinator/, Patient and Patient's Family.    Pedro Peraza MD  Hospitalist Service  [facility]  Securely message with the Vocera Web Console (learn more here)  Text page via [employer] Paging/Directory        Clinically Significant Risk Factors Present on Admission                ______________________________________________________________________    Interval History   Patient seen and examined this afternoon.  Patient is working with physical therapy.  No new complaints or issues related per patient or staff.  Hematuria has subsided and urology assistance appreciated.  Needs repeated questions/encouragement to answer questions.  Denies pain or shortness of breath.    Data reviewed today: I reviewed all medications, new labs and imaging results over the last 24 hours. I personally reviewed no images or EKG's today.    Physical Exam   Vital Signs: Temp: 97.4  F (36.3  C) Temp src: Axillary BP: (!) 140/75 Pulse: 89   Resp: 18 SpO2: 96 % O2 Device: None (Room air) Oxygen Delivery: 3 LPM  Weight: 226 lbs 13.65 oz    Gen: NAD, elderly,up at bedside with therapy  HEENT: Normocephalic, EOMI, MMM  Resp: no focal crackles,  no wheezes, no increased  work of resp  CV: S1S2 heard, irreg irreg rhythm, reg rate, bilateral pedal edema  Abdo: soft, nontender, nondistended, bowel sounds present  Ext: calves nontender, well perfused  Neuro: AA, cooperative, CN grossly intact, no facial asymmetry    Data   Recent Labs   Lab 12/14/21  1407 12/14/21  1013 12/14/21  0643 12/13/21  1858 12/13/21  1433 12/13/21  1130 12/13/21  0641 12/11/21  2101 12/11/21  0643 12/10/21  0720 12/10/21  0610 12/09/21  1236 12/09/21  0959   WBC  --   --  9.6  --   --   --   --   --  6.5  --  6.5  --   --    HGB  --   --  9.5*  --  9.9*  --   --   --  9.5*  --  9.5*  --   --    MCV  --   --  96  --   --   --   --   --  94  --  94  --   --    PLT  --   --  157  --   --   --   --   --  214  --  187  --   --    INR  --   --   --   --   --   --   --   --   --   --  1.34*  --  1.30*   NA  --   --  148*  --   --   --  148*  --  143  --  142  --   --    POTASSIUM  --   --  3.8  --   --   --  3.3*  --  4.2  --  4.2  --   --    CHLORIDE  --   --  109  --   --   --  113*  --  106  --  106  --   --    CO2  --   --  36*  --   --   --  34*  --  31  --  33*  --   --    BUN  --   --  107*  --   --   --  107*  --  121*  --  115*  --   --    CR  --   --  1.78*  --   --   --  1.67*  --  2.05*  --  2.02*  --   --    ANIONGAP  --   --  3  --   --   --  1*  --  6  --  3  --   --    GABE  --   --  10.3*  --   --   --  9.6  --  9.9  --  9.8  --   --    * 248* 258*   < >  --    < > 289*   < > 220*   < > 135*   < >  --    ALBUMIN  --   --   --   --   --   --   --   --  2.2*  --  2.1*  --   --    PROTTOTAL  --   --   --   --   --   --   --   --  5.1*  --  5.0*  --   --    BILITOTAL  --   --   --   --   --   --   --   --  0.6  --  0.6  --   --    ALKPHOS  --   --   --   --   --   --   --   --  175*  --  178*  --   --    ALT  --   --   --   --   --   --   --   --  38  --  40  --   --    AST  --   --   --   --   --   --   --   --  37  --  49*  --   --     < > = values in this interval not displayed.     No results  found for this or any previous visit (from the past 24 hour(s)).

## 2021-12-14 NOTE — PLAN OF CARE
DATE & TIME: 12/14/2021, [id number]   Cognitive Concerns/ Orientation: A&Ox1 (alert), forgetful. Flat affect. Slow to respond. Does not call-need to round frequently  BEHAVIOR & AGGRESSION TOOL COLOR: Green  ABNL VS/O2: VSS on 3L NC (O2 96%). HTN at times-controlled with scheduled HTN medications  MOBILITY: not OOB-Total care, ceiling lift, on a pulsate mattress, T&R Q 2hrs.   PAIN MANAGMENT: Denies, slight bladder discomfort at times- PRN oxybutynin available   DIET: advanced to fulls + Mildly thick clears , poor appetite on this shift- needs supervision. TF at goal rate of 40cc/hr, tolerating. Q6 hr FWF 250mL (next flush due around 1630). Oral cares provided-refusing at times  BOWEL/BLADDER: BS active x 4, 22 Georgian giron placed by Urology 12/13 for CBI-discontinued mid morning today, urology recommended to self-irrigate per shift and PRN. Slight leaking around giron at times, scant blood output-Urology aware. Clear/pale->pratik urine output/ no clots at this time. Incontinent  one small green/brown loose BM.  ABNL LAB/BG: BG= 248, 292; hgb=9.5; Cr 1.78, Na 148, UA sent to lab   DRAIN/DEVICES: L PICC, G-tube (placed 12/10), giron in place/patent   TELEMETRY RHYTHM: N/A  SKIN:   -pale, small skin tear to L. forearm-new mepilex in place/changed by WOC, for protection/healing   -buttocks blanchable redness with open area of skin, bruising-wound care orders complete by WOC-mepilex changed and in place.   -redness to perineal area and abd folds-powder applied  -Penile area reddened, itchy, edema and occasional blood/fluid around catheter site. -Generalized +2 edema in R arm and legs. L  arm +3 edema, encouraged elevated, MD aware   TESTS/PROCEDURES: recommend cystoscopy and CT urogram as outpt. Following admission per urology note  DISCHARGE PLAN: 12/15-TCU pending placement,  SW following   OTHER IMPORTANT INFO: Contact precautions for MRSA. Bed alarm on for safety. Rounded on freq. Speech, PT/OT, WOC, cardiology, GI,  urology, consulted. Pills crushed and given through GI tube.Brother Bill updated on plan of care.

## 2021-12-14 NOTE — PROGRESS NOTES
[facility] Nurse Inpatient Wound Assessment   Reason for consultation: Re-evaluate and treat bilateral buttocks wounds    Assessment  Bilateral buttocks wounds due to mixed etiology Pressure Injury, Friction, Shear, and Moisture Associated Skin Damage (MASD)  Status: Improved but now patient is having perianal skin breakdown due to loose stools from tube feeding    L arm wound due to skin tear and edema.- stable    Treatment Plan    Perianal skin: BID and PRN.  1. Cleanse with Lisa Cleanse and Protect. Pat Dry with Sundeep wipes.  2. Apply thin layer of Barrier Cream to Perianal skin to protect from loose stools.  -No need to fully remove cream each time, remove top soiled layer. If full removal is needed can use mineral oil obtained from pharmacy.   Pressure Injury prevention (please order supplies if not in room)  1. Turn/reposition every 1-2 hrs  2.   Float heels off bed with use of pillows.  3.   If incontinent Cleanse with incontinent cleanser (Lisa spray #[id number]) followed by skin barrier protectant (Critic Aid paste)  BID and after each incontinent episode  4.   Prevent sliding and shear by limiting HOB to 30 degrees or less unless contraindicated, use knee gatch first if not contraindicated  5.   Chair cushion pressure redistribution as needed (#[id number]): please limit sitting to an hour at a time  6.   Optimize nutrition   7.   PULSATE low air loss mattress        L forearm wound: Q3D and PRN for drainage.  1. Cleanse with wound cleanser. Pat Dry.  2. Apply Cavilon No Sting Barrier Film to Periwound skin.  3. Cover with oil emulsion gauze (adaptic) and secure with foam dressing (optifoam or mepilex)  4. Time and Date Dressing and place arrow on dressing in direction of the flap.    Orders Reviewed and Updated  Recommended provider order: None, at this time  WO Nurse follow-up plan:weekly  Nursing to notify the Provider(s) and re-consult the WO Nurse if wound(s) deteriorates or new skin concern.    Patient  History  According to provider note(s):  75 yo single male who lives by himself,  with CAD, atrial flutter on eliquis, hypertension, hyperlipidemia, HFpEF, CKD stage III, DM type 3, history of GI bleed, suspected obstructive sleep apnea electively admitted on 10/26/2021 for TAVR to treat severe Aortic Stenosis.  Hospital course complicated by OPAL from contrast nephropathy and volume overload requiring transient dialysis, hypoxic respiratory failure multifactorial, Covid pneumonia and physical deconditioning, and proteus UTI and bacteremia.     Coarse summary:  11/15- patient intubated for severe hypoxic respiratory failure. Patient was proned and paralyzed.   11/22: Started scheduled IV Bumex   11/23: GI bleed  12/4 self extubated during turn per night nurse @ 0015. Placed on BiPap, attempted hiflo, did not tolerate. Pt made DNR/DNI. NG tube placed.   12/5: HFNC over night, matilde better than bipap. HD line out. TX to IMC  12/6: Substantial improvement in oxygenation, patient off high flow.  12/10: percutaneous G tube placed, Tube feeds started.       COVID-19 Pneumonia  MRSA VAP  Acute hypoxic respiratory failure (multifactorial with above):  -  Self extubated 12/4, currently on 5 LPM nasal canula  - most recent cxr shows bilateral pulm infiltrates, but normal wbc and no fever  - patient mobilizing secretions better     Severe aortic stenosis, s/p TAVR 10/26   A fib/flutter, rate controlled   Hypertension:  -  Eliquis 5 mg bid,  held for PEG tube placement, restarted 12/12  - restarted Lasix 40 mg daily x 12/10, continue  - Continue coreg low dose resumed 12/5, increase to 6.25 bid. Continue hydralazine.     L upper ext edema:  Objective Data  Containment of urine/stool: Incontinence Protocol and Indwelling catheter    Active Diet Order  Orders Placed This Encounter      Clear Liquid Diet Mildly Thick (level 2) (by spoon)      Output:   I/O last 3 completed shifts:  In: 524 [NG/GT:524]  Out: 1150 [Urine:1150]    Risk  "Assessment:   Sensory Perception: 3-->slightly limited  Moisture: 3-->occasionally moist  Activity: 1-->bedfast  Mobility: 2-->very limited  Nutrition: 3-->adequate  Friction and Shear: 1-->problem  Sudarshan Score: 13                          Labs:   Recent Labs   Lab 12/14/21  0643 12/13/21  1433 12/11/21  0643 12/10/21  0610   ALBUMIN  --   --  2.2* 2.1*   HGB 9.5*   < > 9.5* 9.5*   INR  --   --   --  1.34*   WBC 9.6  --  6.5 6.5    < > = values in this interval not displayed.       Physical Exam  Areas of skin assessed: focused bilateral buttocks , mouth    Wound Location:  Buttock/ Perianal  Date of last photo 12/14        Wound History: per prior [facility] charting \"hospital acquired due to refusal to reposition and sleep in the bed. Well documented\". 12/14 has perianal erosion from loose stool due to tube feeds.  Wound Base: Coccyx skin is intact perianal skin as a 0.5cm x 0.5cm x 0.1cm and 1cm x 1cm x 0.1 cm pink moist tissue at approximately 12 o clock.       Palpation of the wound bed: normal      Drainage: none     Description of drainage: none     Measurements (length x width x depth, in cm):      Periwound skin: intact      Color: normal and consistent with surrounding tissue      Temperature: normal   Odor: none  Pain: nonverbal indicators absent       Wound Location:  L forearm  Date of last Photo 12/14      Wound History: Patient with significant edema and weeping from wound. Likely, edema caused fragile skin.  Measurements (length x width x depth, in cm) 3cm x 1.5 cm  x  0.1 cm   Wound Base:  100% dermis with about 80% re- approximation of skin flap  Tunneling N/A  Undermining N/A  Palpation of the wound bed: normal   Periwound skin: ecchymosis  Color: normal and consistent with surrounding tissue  Temperature: normal   Drainage:, small  Description of drainage: serous  Odor: none  Pain: absent and denies , none    Interventions  Visual inspection and assessment completed   Wound Care Rationale Protect " periwound skin and Promote moist wound healing without tissue dehydration   Wound Care: done per plan of care  Supplies: floor stock and discussed with RN  Current off-loading measures: Chair cushion, Pillows under calves and Pillows  Current support surface: Standard  Low air loss mattress  Education provided to: importance of repositioning, plan of care, and Off-loading pressure  Discussed plan of care with Patient, RN     Svetlana Bonilla RN CWOCN

## 2021-12-14 NOTE — PLAN OF CARE
Cognitive Concerns/ Orientation: A&Ox1, forgetful. Flat affect. Slow to respond. Speaks in quiet voice and sometimes does not complete sentences. Lethargic, but wakes to voice. Not using call light, rounded on freq. Sleeping off and on late this evening.   BEHAVIOR & AGGRESSION TOOL COLOR: Green  ABNL VS/O2: VSS with BP in the 150's. Scheduled medications given. On 2L NC. O2 sats 92-96%  MOBILITY: Total care, ceiling lift, on a pulsate mattress, T&R Q 2hrs. Lethargic.   PAIN MANAGMENT: Denies, more comfortable after continuous irrigation running correctly. Some abdominal discomforts when giron clotted.     DIET: Thickened clears. TF at goal rate of 40cc/hr, tolerating. Q6 hr FWF 200mL. Mildly thick CL with supervision . Oral cares provided.   BOWEL/BLADDER: New 22 Albanian irrigation giron placed by Urology this evening at 1630. Some leaking around giron at times and positional at times. 2-3 hours spent manually irrigating and adjust rate d/t excessive blood clots. Now running at a very light pink. 4 bags of irrigation NS used this shift. Incontinent  BMx1 this evening.   ABNL LAB/BG: , 242 with Q4h chaecks, K 3.3 replaced on days, , Cr 1.67, Hgb 9.9 this afternoon. Na 148  DRAIN/DEVICES: L PICC, G-tube (placed 12/10), giron replaced for irrigation by Urology running continuously.   TELEMETRY RHYTHM: NA  SKIN: Skin tears on left hand and arm, buttocks blanchable redness with open area of skin, bruising. Powder in perineal area and abd folds. Penile area reddened, itchy, edema and occasional blood/fluid around catheter site. Generalized 2-3+ edema in arms and legs. L  arm dressing c/d/I.   TESTS/PROCEDURES: Video swallow study completed on days. AM labs, unit draw.   DISCHARGE PLAN: TCU pending placement,  SW following   OTHER IMPORTANT INFO: Contact precautions for MRSA. Speech advanced diet to  thickened CL. Bed alarm on for safety. Rounded on freq. Continuous bladder irrigation stared by Urology this evening.  Now running at a light pink color after multiple clots manually irrigated.

## 2021-12-15 NOTE — PROGRESS NOTES
[facility]    Medicine Progress Note - Hospitalist Service       Date of Admission:  10/26/2021    Assessment & Plan         77 yo single male who lives by himself,  with CAD, atrial flutter on eliquis, hypertension, hyperlipidemia, HFpEF, CKD stage III, DM type 3, history of GI bleed, suspected obstructive sleep apnea electively admitted on 10/26/2021 for TAVR to treat severe Aortic Stenosis.  Hospital course complicated by OPAL from contrast nephropathy and volume overload requiring transient dialysis, hypoxic respiratory failure multifactorial, Covid pneumonia and physical deconditioning, and proteus UTI and bacteremia.     Brief summary:  11/15- patient intubated for severe hypoxic respiratory failure. Patient was proned and paralyzed.   11/22: Started scheduled IV Bumex   11/23: GI bleed  12/4 self extubated during turn per night nurse @ 0015. Placed on BiPap, attempted hiflo, did not tolerate. Pt made DNR/DNI. NG tube placed.   12/5: HFNC over night, matilde better than bipap. HD line out. TX to IMC  12/6: Substantial improvement in oxygenation, patient off high flow.  12/10: percutaneous G tube placed, Tube feeds started.    12/13: gross hematuria --- request Urology eval, appreciate nursing efforts to evaluate catheter and hematuria  - 12/14 - 12/15 - now improved with CBI, continuing to monitor with urology, outpatient cystoscopy planned     COVID-19 Pneumonia  MRSA VAP  Acute hypoxic respiratory failure (multifactorial with above)  -  Self extubated 12/4, currently on 5 LPM nasal canula  - most recent cxr shows bilateral pulm infiltrates, but normal wbc and no fever  - patient mobilizing secretions better  - 12/15 stable on 2L      Severe aortic stenosis, s/p TAVR 10/26   A fib/flutter, rate controlled   Hypertension  -  Eliquis 5 mg bid,  held for PEG tube placement, restarted 12/12  - restarted Lasix 40 mg daily x 12/10, continue  - Continue coreg low dose resumed 12/5, increase to  6.25 bid. Continue hydralazine.  - consider holding apixaban if hematuria returns, continuing for now     L upper ext edema  Has picc present on L Upper ext.Pt tends to lay on L side  - check TESSA ext venous doppler - negative US     GI bleed earlier in stay, s/p colonoscopy and EGD  -  Hemorrhoids and erosions in hiatal hernia without active bleeding  -  No overt ongoing bleeding noted  - hgb stable 12/11- 9.5  --> 9.9 12/13  -  PPI     Anemia   -- probably related Anemia of Chronic disease and CRF   -- hgb 10.1 > 9.7 > 9.1 > 9.5 --> 9.9 12/13  - stable around 9-10, recheck later today vs tmrw AM     Moderate malnutrition  Dysphagia  Hyperchloremic hypernatremia 12/13  - initially via nasal  tube- feedings held as patient pulled tube out, restarted after PEG placed on 12/10 by IR  - 12/13 increased FWF to 250 QID, repeat BMP in AM  - Speech therapy following  - video swallow 12/13 --> SLP advancing diet as appropriate. Appreciated.     ARF on CKD, non-oliguric, creatinine above the patient's baseline but stable  -  Temp dialysis line removed 12/5  -  No ongoing HD needed at this point  - Cr at new baseline of around 2.0  - continue PTA Lasix, extra 40 mg Lasix on 12/12 for increase L arm edema   - 12/15 creat 1.7 and fairly stable     DM II with stress induced hyperglycemia:  Maintained at home on Lantus 18 U[facility] , amaryl 2 mg bid. A1c 7.6 in 11/2021  BS elevated > 200's as PTA medications on hold & on tube feeds x past 2 days  - restart lantus 20 U at HS  - 12/15 increase to 23 units  - SSI     Proteus bacteremia and UTI  treated     Leukopenia, chronic: resolved  Coagulopathy of covid earlier in stay  Thrombocytopenia, improving: resolved      Agitation/Encephalopathy (Combination of toxic and metabolic during course of stay):  - Slowly improving (sitting in chair today), oriented to place, able name president     Deconditioning:  -  PT/OT following    Total encounter time 38 min with over 50% spent in direct patient  care, discussion and counseling, phone call with patient's brother providing updates and counseling, and coordination of care with  and staff.      Diet: Adult Formula Drip Feeding: Specified Time Novasource Renal; Gastrostomy; Goal Rate: 40; mL/hr; Medication - Feeding Tube Flush Frequency: At least 15-30 mL water before and after medication administration and with tube clogging; Amount to Send (N...  Full Liquid Diet Mildly Thick (level 2) (spoon only)    DVT Prophylaxis: DOAC  Zuniga Catheter: PRESENT, indication: Strict 1-2 Hour I&O, Gross Hematuria, Retention, Strict 1-2 Hour I&O  Central Lines: None  Code Status: No CPR- Do NOT Intubate      Disposition Plan   Expected Discharge: 12/16/2021     Anticipated discharge location: home with family    Delays:     Placement - TCU            The patient's care was discussed with the Bedside Nurse and Patient.    Pedro Peraza MD  Hospitalist Service  [facility]  Securely message with the Vocera Web Console (learn more here)  Text page via [email or web address] Paging/Directory        Clinically Significant Risk Factors Present on Admission                ______________________________________________________________________    Interval History   Patient seen and examined this morning. No new complaints or issues. Actually more awake and interactive today. Still with some delay in verbal reply. Up in chair and about to work with SLP.  Monitoring hgb.    Data reviewed today: I reviewed all medications, new labs and imaging results over the last 24 hours. I personally reviewed no images or EKG's today.    Physical Exam   Vital Signs: Temp: 97.6  F (36.4  C) Temp src: Axillary BP: (!) 158/72 Pulse: 84   Resp: 20 SpO2: 93 % O2 Device: Nasal cannula Oxygen Delivery: 2 LPM  Weight: 222 lbs .05 oz    Gen: NAD, pleasant, elderly, more talkative today  HEENT: Normocephalic, EOMI, MMM  Resp: no focal crackles,  no wheezes, no increased work of resp  CV: S1S2  heard, reg rhythm, reg rate  Abdo: soft, nontender, nondistended, bowel sounds present  Ext: calves nontender, well perfused  Neuro: AAOxself, CN grossly intact, no facial asymmetry      Data   Recent Labs   Lab 12/15/21  1244 12/15/21  1204 12/15/21  0809 12/15/21  0617 12/14/21  1013 12/14/21  0643 12/13/21  1858 12/13/21  1433 12/13/21  1130 12/13/21  0641 12/11/21  2101 12/11/21  0643 12/10/21  0720 12/10/21  0610 12/09/21  1236 12/09/21  0959   WBC  --   --   --  8.4  --  9.6  --   --   --   --   --  6.5  --  6.5   < >  --    HGB  --   --   --  9.2*  --  9.5*  --  9.9*  --   --   --  9.5*  --  9.5*   < >  --    MCV  --   --   --  95  --  96  --   --   --   --   --  94  --  94   < >  --    PLT  --   --   --  133*  --  157  --   --   --   --   --  214  --  187   < >  --    INR  --   --   --   --   --   --   --   --   --   --   --   --   --  1.34*  --  1.30*   NA  --   --   --  146*  --  148*  --   --   --  148*  --  143  --  142  --   --    POTASSIUM  --   --   --  3.6  --  3.8  --   --   --  3.3*  --  4.2  --  4.2  --   --    CHLORIDE  --   --   --  107  --  109  --   --   --  113*  --  106  --  106  --   --    CO2  --   --   --  39*  --  36*  --   --   --  34*  --  31  --  33*  --   --    BUN  --   --   --  104*  --  107*  --   --   --  107*  --  121*  --  115*  --   --    CR  --   --   --  1.72*  --  1.78*  --   --   --  1.67*  --  2.05*  --  2.02*  --   --    ANIONGAP  --   --   --  <1*  --  3  --   --   --  1*  --  6  --  3  --   --    GABE  --   --   --  9.6  --  10.3*  --   --   --  9.6  --  9.9  --  9.8  --   --    * 192* 240* 267*   < > 258*   < >  --    < > 289*   < > 220*   < > 135*   < >  --    ALBUMIN  --   --   --   --   --   --   --   --   --   --   --  2.2*  --  2.1*  --   --    PROTTOTAL  --   --   --   --   --   --   --   --   --   --   --  5.1*  --  5.0*  --   --    BILITOTAL  --   --   --   --   --   --   --   --   --   --   --  0.6  --  0.6  --   --    ALKPHOS  --   --   --   --   --    --   --   --   --   --   --  175*  --  178*  --   --    ALT  --   --   --   --   --   --   --   --   --   --   --  38  --  40  --   --    AST  --   --   --   --   --   --   --   --   --   --   --  37  --  49*  --   --     < > = values in this interval not displayed.     No results found for this or any previous visit (from the past 24 hour(s)).

## 2021-12-15 NOTE — PLAN OF CARE
DATE & TIME: 12/15/2021 2996-7214   Cognitive Concerns/ Orientation : Alert, oriented to self, but knows he is in the hospital; slow to respond and does not answer questions at times; flat affect, hypoactive and withdrawn.   BEHAVIOR & AGGRESSION TOOL COLOR: green   CIWA SCORE: n/a  ABNL VS/O2: VSS; still on 2L NC with O2 sats low 90s.   MOBILITY: Total cares, up with lift, assist of 2 to turn/repo q2hr; up in chair this am   PAIN MANAGMENT: denies pain; no nonverbal indicators of pain noted   DIET: full liquids, mildly thick liquids (level 2) - refused to eat; tube feedings running at 40 ml/hr (goal rate) with 250 ml water flushes QID.   BOWEL/BLADDER: Incontinent of bowel x2 today. Zuniga catheter present, patent, irrigated q shift, urine clear yellow without clots; scant blood noted at insertion site.   ABNL LAB/BG: Na+146; Cr  1.72; Hgb 9.2 (recheck at 1500); ,198  DRAIN/DEVICES: G-tube, WDL, to continuous feedings. Zuniga present, patent, adequate output. PICC in LUE, saline locked.   TELEMETRY RHYTHM: n/a  SKIN: Skin pale. Skin tear on LUE, dressing CDI. Perianal with blanchable redness and open area of skin, bruising, barrier cream in place. dressing CDI. Redness to abd folds, powder applied. +2-3 edema to BUE and BLE, +2 edema to scrotum.   TESTS/PROCEDURES: n/a  D/C DATE: 1-2 days to TCU, pending placement  Discharge Barriers: TCU placement   OTHER IMPORTANT INFO: LS diminished. Urology, PT, OT, WOC, Speech following. Cardiology, Nephrology and GI have all signed off.

## 2021-12-15 NOTE — PROVIDER NOTIFICATION
MD Notification    Notified Person: MD    Notified Person Name: Stu MD    Notification Date/Time: 12/15/21 8800    Notification Interaction: amcom    Purpose of Notification: is 250mL q6h FWF too much for this pt? His lungs sound coarse and he does have HF. His edema is like 2-3+.    Orders Received: Decrease FWF to 150mL q6h, BMP ordered for AM.    Comments:

## 2021-12-15 NOTE — PROGRESS NOTES
A&O to self.  Writer told pt location and month.  Pt able to recall after a few minutes. Inconsistent with verbally responding to questions; sometimes will say yes/no other times does not answer.    VSS on 2L O2 via NC, PRN hydralazine x1, effective. Thickened liquid diet, given water via spoon. Takes pills crushed with applesauce when alert, otherwise through G-tube for safety. PEG running at goal 40 ml/hr, 250 ml free water given.  Continues to have +3 BLE pitting edema, legs elevated on pillows while in bed.    Giron catheter patent, irrigated, some small clots noted.  Pt noted to be pulling at his giron, some blood present at insertion site. Up with lift.  Tolerated recliner from [id number].  Q4 blood sugars- sliding scale insulin given.  Oral cares performed.  Denies pain. Pt scoring greenon the Aggression Stop Light Tool. Plan to wean off O2, to discharge TCU pending placement.

## 2021-12-15 NOTE — PLAN OF CARE
DATE & TIME: 12/15/2021 2604-0266   Cognitive Concerns/ Orientation : Alert, oriented to self, but knows he is in the hospital; slow to respond and does not answer questions at times; flat affect, hypoactive and withdrawn.   BEHAVIOR & AGGRESSION TOOL COLOR: green   CIWA SCORE: n/a  ABNL VS/O2: VSS on 2L NC, O2 sats low 90s.   MOBILITY: Total cares, up with lift, assist of 2 to turn/repo q2hr.   PAIN MANAGMENT: denies pain   DIET: full liquids, mildly thick liquids (level 2); tube feedings running at 40 ml/hr (goal rate) with 250 ml water flushes QID.   BOWEL/BLADDER: Zuniga catheter present, patent, irrigated q shift, urine clear yellow with some small clots when irrigated; dried blood at insertion site. Incontinent of bowel, no BM overnight.   ABNL LAB/BG: , 245.   DRAIN/DEVICES: G-tube, WDL, to continuous feedings. Zuniga present, patent, adequate output. PICC in LUE, saline locked.   TELEMETRY RHYTHM: n/a  SKIN: Skin pale. Skin tear on LUE, dressing CDI. Perianal with blanchable redness and open area of skin, bruising, barrier cream in place. dressing CDI. Redness to abd folds, powder applied. +2-3 edema to BUE and BLE, +2 edema to scrotum.   TESTS/PROCEDURES: n/a  D/C DATE: 1-2 days to TCU, pending placement  Discharge Barriers: TCU placement   OTHER IMPORTANT INFO: LS diminished. Urology, PT, OT, WOC, Speech following. Cardiology, Nephrology and GI have all signed off.

## 2021-12-16 NOTE — PLAN OF CARE
DATE & TIME: 12/16/2021 9828-1951    Cognitive Concerns/ Orientation : A&Ox1; disoriented to time, situation. Flat affect, withdrawn; calm/cooperative; slow to respond  BEHAVIOR & AGGRESSION TOOL COLOR: green   ABNL VS/O2: VSS on 2L O2 NC, O2 sats low 90s.   MOBILITY: Total cares, up with lift, assist of 2 to turn/repo q2hr; up in chair for breakfast  PAIN MANAGMENT: Denies; no nonverbal indicators of pain noted  DIET: full liquids, mildly thick liquids (level 2) by spoon. Tube feedings running at 40 ml/hr (goal rate) with 150 ml water flushes QID; tolerating.   BOWEL/BLADDER: Zuniga catheter present, patent, irrigated q shift - urine clear yellow without clots. Incontinent of bowel - had a smear   ABNL LAB/BG: Na 146; Cr 1.72; Hgb 9.4;   DRAIN/DEVICES: G-tube, to continuous feedings, WDL. Zuniga present, patent, adequate output. PICC in LUE, saline locked.   TELEMETRY RHYTHM: n/a  SKIN: Skin pale. Skin tear on LUE, dressing CDI. Perianal with blanchable redness and open area of skin, bruising, barrier cream in place. Coccyx dressing CDI. Redness to abd folds, powder applied. +3-4 edema to BUE and BLE, +2 edema to scrotum.   TESTS/PROCEDURES: n/a  D/C DATE: 1-2 days to TCU, pending placement  Discharge Barriers: TCU placement   OTHER IMPORTANT INFO: HANSEL schaffer, LOAIZA. Urology, PT, OT, WOC, Speech following.

## 2021-12-16 NOTE — PLAN OF CARE
DATE & TIME: 12/15/2021 2459-0221   Cognitive Concerns/ Orientation : A&Ox3, forgetful to time. Knew why he was in the hospital and that he was at [facility] this shift.  BEHAVIOR & AGGRESSION TOOL COLOR: green   ABNL VS/O2: VSS; still on 2L NC with O2 sats low 90s.   MOBILITY: Total cares, up with lift, assist of 2 to turn/repo q2hr; Sat on edge of bed with PT this afternoon  PAIN MANAGMENT: denies pain; no nonverbal indicators of pain noted   DIET: full liquids, mildly thick liquids (level 2) - brother was able to feed patient one container of juice; tube feedings running at 40 ml/hr (goal rate) with 150 ml water flushes QID (switched from 250mL this shift).   BOWEL/BLADDER: Zuniga catheter present, patent, irrigated q shift, urine clear yellow without clots. Very hard to irrigate, fluid flushed in with no problem, but was very sluggish to pull out. Small amount of blood noted at insertion site- cleansed x2 this shift. No BM this shift  ABNL LAB/BG: Na+146; Cr 1.72; Hgb 9.4; , 254.  DRAIN/DEVICES: G-tube- continuous feedings. Zuniga present, patent, adequate output. PICC in [facility].  TELEMETRY RHYTHM: n/a  SKIN: Skin pale. Skin tear on LUE, dressing changed this shift. Perianal with blanchable redness and open area of skin, bruising, barrier cream in place. Coccyx dressing CDI. Redness to abd folds, powder applied. +3-4 edema to BUE and BLE, +3 edema to scrotum.   TESTS/PROCEDURES: n/a  D/C DATE: 1-2 days to TCU, pending placement  Discharge Barriers: TCU placement   OTHER IMPORTANT INFO: LS diminished. Urology, PT, OT, WOC, Speech following. Cardiology, Nephrology and GI have all signed off.

## 2021-12-16 NOTE — PLAN OF CARE
Cognitive Concerns/ Orientation : A&Ox2-3, disoriented to time, situation. Flat affect, withdrawn; calm/cooperative.   BEHAVIOR & AGGRESSION TOOL COLOR: green   ABNL VS/O2: VSS on 2L O2 NC, O2 sats low 90s.   MOBILITY: Total cares, up with lift, assist of 2 to turn/repo q2hr.   PAIN MANAGMENT: denies pain  DIET: full liquids, mildly thick liquids (level 2) by spoon. Tube feedings running at 40 ml/hr (goal rate) with 150 ml water flushes QID; tolerating.   BOWEL/BLADDER: Zuniga catheter present, patent, irrigated q shift, urine clear yellow without clots. Small amount of blood noted at insertion site. Incontinent of bowel, no BM this shift.   ABNL LAB/BG: Na 146; Cr 1.72; Hgb 9.4; , 230.   DRAIN/DEVICES: G-tube, to continuous feedings, WDL. Zuniga present, patent, adequate output. PICC in LUE, saline locked.   TELEMETRY RHYTHM: n/a  SKIN: Skin pale. Skin tear on LUE, dressing CDI. Perianal with blanchable redness and open area of skin, bruising, barrier cream in place. Coccyx dressing CDI. Redness to abd folds, powder applied. +3-4 edema to BUE and BLE, +2 edema to scrotum.   TESTS/PROCEDURES: n/a  D/C DATE: 1-2 days to TCU, pending placement  Discharge Barriers: TCU placement   OTHER IMPORTANT INFO: LS coarse, LOAIZA; appeared to have more increased work of breathing toward end of shift, but O2 sats stable in mid 90s, RR 22, denies SOB, and no change in lung sounds. Urology, PT, OT, WOC, Speech following.

## 2021-12-16 NOTE — PROGRESS NOTES
[facility]    Medicine Progress Note - Hospitalist Service       Date of Admission:  10/26/2021    Assessment & Plan         77 yo single male who lives by himself,  with CAD, atrial flutter on eliquis, hypertension, hyperlipidemia, HFpEF, CKD stage III, DM type 3, history of GI bleed, suspected obstructive sleep apnea electively admitted on 10/26/2021 for TAVR to treat severe Aortic Stenosis.  Hospital course complicated by OPAL from contrast nephropathy and volume overload requiring transient dialysis, hypoxic respiratory failure multifactorial, Covid pneumonia and physical deconditioning, and proteus UTI and bacteremia.     Brief summary:  11/15- patient intubated for severe hypoxic respiratory failure. Patient was proned and paralyzed.   11/22: Started scheduled IV Bumex   11/23: GI bleed  12/4 self extubated during turn per night nurse @ 0015. Placed on BiPap, attempted hiflo, did not tolerate. Pt made DNR/DNI. NG tube placed.   12/5: HFNC over night, matilde better than bipap. HD line out. TX to IMC  12/6: Substantial improvement in oxygenation, patient off high flow.  12/10: percutaneous G tube placed, Tube feeds started.    12/13: gross hematuria --- request Urology eval, appreciate nursing efforts to evaluate catheter and hematuria  - 12/14 - 12/15 - now improved with CBI, continuing to monitor with urology, outpatient cystoscopy planned  - 12/16 stable overall, awaiting TCU acceptance     COVID-19 Pneumonia  MRSA VAP  Acute hypoxic respiratory failure (multifactorial with above)  -  Self extubated 12/4, currently on 5 LPM nasal canula  - most recent cxr shows bilateral pulm infiltrates, but normal wbc and no fever  - patient mobilizing secretions better  - 12/15 - 12/16 stable on 2L   - likely discharge to TCU on O2     Severe aortic stenosis, s/p TAVR 10/26   A fib/flutter, rate controlled   Hypertension  -  Eliquis 5 mg bid,  held for PEG tube placement, restarted 12/12  - restarted  Lasix 40 mg daily x 12/10, continue  - Continue coreg low dose resumed 12/5, increase to 6.25 bid. Continue hydralazine.  - consider holding apixaban if hematuria returns, continuing for now     L upper ext edema  Has picc present on L Upper ext.Pt tends to lay on L side  - check TESSA ext venous doppler - negative US     GI bleed earlier in stay, s/p colonoscopy and EGD  -  Hemorrhoids and erosions in hiatal hernia without active bleeding  -  No overt ongoing bleeding noted  - hgb stable 12/11- 9.5  --> 9.9 12/13  -  PPI continued     Anemia   - probably related to Anemia of Chronic disease and CRF   - hgb 10.1 > 9.7 > 9.1 > 9.5 --> 9.9 12/13 --> 9.2 --> 9.4 12/16     Moderate malnutrition  Dysphagia  Hyperchloremic hypernatremia 12/13  - initially via nasal  tube- feedings held as patient pulled tube out, restarted after PEG placed on 12/10 by IR  - 12/13 increased FWF to 250 QID, repeat BMP in AM  - Speech therapy following  - video swallow 12/13 --> SLP advancing diet as appropriate. Appreciated.     ARF on CKD, non-oliguric, creatinine above the patient's baseline but stable  -  Temp dialysis line removed 12/5  -  No ongoing HD needed at this point  - Cr at new baseline of around 2.0  - continue PTA Lasix  - 12/15 creat 1.7 and fairly stable 12/17     DM II with stress induced hyperglycemia:  Maintained at home on Lantus 18 Uhs , amaryl 2 mg bid. A1c 7.6 in 11/2021  BS elevated > 200's as PTA medications on hold & on tube feeds x past 2 days  - restart lantus 20 U at HS  - 12/15 increase to 23 units, likely increase further 12/17  - SSI     Proteus bacteremia and UTI  treated     Leukopenia, chronic: resolved  Coagulopathy of covid earlier in stay  Thrombocytopenia, improving: resolved      Agitation/Encephalopathy (Combination of toxic and metabolic during course of stay):  - Slowly improving (sitting in chair today), oriented to place, able name president     Deconditioning:  -  PT/OT following        Diet:  Adult Formula Drip Feeding: Specified Time Novasource Renal; Gastrostomy; Goal Rate: 40; mL/hr; Medication - Feeding Tube Flush Frequency: At least 15-30 mL water before and after medication administration and with tube clogging; Amount to Send (N...  Full Liquid Diet Mildly Thick (level 2) (spoon only)    DVT Prophylaxis: DOAC  Zuniga Catheter: PRESENT, indication: Strict 1-2 Hour I&O, Gross Hematuria, Retention, Strict 1-2 Hour I&O  Central Lines: None  Code Status: No CPR- Do NOT Intubate      Disposition Plan   Expected Discharge: 12/20/2021     Anticipated discharge location: inpatient rehabilitation facility    Delays:     Placement - TCU            The patient's care was discussed with the Bedside Nurse and Patient.    Pedro Peraza MD  Hospitalist Service  [facility]  Securely message with the Vocera Web Console (learn more here)  Text page via [employer] Paging/Directory        Clinically Significant Risk Factors Present on Admission                ______________________________________________________________________    Interval History   Patient seen and examined midday.  Again little bit more talkative than in days prior to today.  Denies fevers, chills, shortness of breath or new pain.  Remains on 2 L nasal cannula, decreased free water flushes yesterday-encourage oral intake.    Data reviewed today: I reviewed all medications, new labs and imaging results over the last 24 hours. I personally reviewed no images or EKG's today.    Physical Exam   Vital Signs: Temp: 97.7  F (36.5  C) Temp src: Oral BP: 128/76 Pulse: 75   Resp: 20 SpO2: 94 % O2 Device: Nasal cannula Oxygen Delivery: 2 LPM  Weight: 229 lbs 11.51 oz    Gen: NAD, pleasant, elderly, frail, sometimes needs encouragement/repeated questions to answer  HEENT: Normocephalic, EOMI, MMM  Resp: no focal crackles,  no wheezes, no increased work of resp  CV: S1S2 heard, reg rhythm, reg rate  Abdo: soft, nontender, nondistended,  bowel sounds present  Ext: calves nontender, well perfused  Neuro: AAOx self, hospital, CN grossly intact, no facial asymmetry      Data   Recent Labs   Lab 12/16/21  1246 12/16/21  0800 12/16/21  0637 12/15/21  1624 12/15/21  1444 12/15/21  0809 12/15/21  0617 12/14/21  1013 12/14/21  0643 12/11/21  2101 12/11/21  0643 12/10/21  0720 12/10/21  0610   WBC  --   --   --   --   --   --  8.4  --  9.6  --  6.5  --  6.5   HGB  --   --   --   --  9.4*  --  9.2*  --  9.5*   < > 9.5*  --  9.5*   MCV  --   --   --   --   --   --  95  --  96  --  94  --  94   PLT  --   --   --   --   --   --  133*  --  157  --  214  --  187   INR  --   --   --   --   --   --   --   --   --   --   --   --  1.34*   NA  --   --  146*  --   --   --  146*  --  148*   < > 143  --  142   POTASSIUM  --   --  3.5  --   --   --  3.6  --  3.8   < > 4.2  --  4.2   CHLORIDE  --   --  107  --   --   --  107  --  109   < > 106  --  106   CO2  --   --  39*  --   --   --  39*  --  36*   < > 31  --  33*   BUN  --   --  98*  --   --   --  104*  --  107*   < > 121*  --  115*   CR  --   --  1.72*  --   --   --  1.72*  --  1.78*   < > 2.05*  --  2.02*   ANIONGAP  --   --  <1*  --   --   --  <1*  --  3   < > 6  --  3   GABE  --   --  9.7  --   --   --  9.6  --  10.3*   < > 9.9  --  9.8   * 215* 230*   < >  --    < > 267*   < > 258*   < > 220*   < > 135*   ALBUMIN  --   --   --   --   --   --   --   --   --   --  2.2*  --  2.1*   PROTTOTAL  --   --   --   --   --   --   --   --   --   --  5.1*  --  5.0*   BILITOTAL  --   --   --   --   --   --   --   --   --   --  0.6  --  0.6   ALKPHOS  --   --   --   --   --   --   --   --   --   --  175*  --  178*   ALT  --   --   --   --   --   --   --   --   --   --  38  --  40   AST  --   --   --   --   --   --   --   --   --   --  37  --  49*    < > = values in this interval not displayed.     No results found for this or any previous visit (from the past 24 hour(s)).

## 2021-12-16 NOTE — PROGRESS NOTES
Care Management Follow Up    Length of Stay (days): 51    Expected Discharge Date: 12/16/2021     Concerns to be Addressed: adjustment to diagnosis/illness,coping/stress     Patient plan of care discussed at interdisciplinary rounds: Yes    Anticipated Discharge Disposition: TCU     Anticipated Discharge Services:    Anticipated Discharge DME:      Patient/family educated on Medicare website which has current facility and service quality ratings:    Education Provided on the Discharge Plan:    Patient/Family in Agreement with the Plan: yes    Referrals Placed by CM/SW:    Private pay costs discussed:     Additional Information:  On 12/15, patient's brother Randy called asking for an update regarding TCU placement.  Some responses have been received from the referrals indicating decline of patient due to acuity. In reviewing record, patient is a high acuity for TCU's due to following; has a feeding tube, requires spoon feeding of liquids, requires total nursing care, lift and assistance of two for re-positioning and has wound care.   Writer left the brother a message this morning explaining we have not secured a TCU for patient at this time and additional referrals will be made today.      Juana Hurd, LUISSW

## 2021-12-17 NOTE — PROGRESS NOTES
CLINICAL NUTRITION SERVICES - REASSESSMENT NOTE      Recommendations Ordered by Registered Dietitian (RD):   Continue TF as ordered while PO minimal   No indication for starting calorie counts a this time given current limited diet   Malnutrition: (since 11/30 - reviewed & continues on 12/16)  % Weight Loss:  Up to 5% in 1 month (moderate malnutrition)  % Intake:  Decreased intake does not meet criteria for malnutrition   Subcutaneous Fat Loss:  Upper arm region mild depletion and Thoracic region mild depletion  Muscle Loss:  Clavicle bone region mild depletion, Acromion bone region mild depletion and Scapular bone region mild depletion  Fluid Retention:  Moderate 2-3+     Malnutrition Diagnosis: Moderate malnutrition  In Context of:  Acute illness or injury       EVALUATION OF PROGRESS TOWARD GOALS   Diet:  Full liquids, mildly thick (level 2) (12/14)    Nutrition Support:  TF continues as outlined below ~  Nutrition Support Enteral:  Type of Feeding Tube: PEG (12/10)   Enteral Frequency:  Continuous  Enteral Regimen: Novasource Renal @ 40 ml/hr   Total Enteral Provisions:  1920 kcal (24 kcal/kg) and 87 gm protein (1.1 gm/kg), 688 mL H2O, 176 gm CHO, 0 gm fiber.  Free Water Flush: 150 mL q 6 hrs daily (MD on 12/15)  Nephronex daily     Intake/Tolerance:    Minimal to no TF interruptions documented   PO: Taking minimal amounts of intake since diet advancement    Labs reviewed: Na 147 (H), K 3.3 (L), no Mg/Phos (NL on 12/7), BUN 96 (H), Cr 1.72 (H)  Recent Labs   Lab 12/17/21  0810 12/17/21  0619 12/17/21  0409 12/17/21  0058 12/16/21  2103 12/16/21  1704   * 180* 177* 224* 259* 267*     Medications reviewed: lasix, HSSI + 25 units lantus  Stooling:  - 12/13: BM x5  - 12/14: BM x3  - 12/15: BM x3  - 12/16: BM x1  I/O: 1576/1040  Wt: 229 lbs 11.51 oz  Vitals:    12/08/21 0607 12/09/21 0600 12/10/21 0613 12/15/21 0623   Weight: 108 kg (238 lb 1.6 oz) 104.2 kg (229 lb 11.5 oz) 102.9 kg (226 lb 13.7 oz) 100.7 kg  (222 lb 0.1 oz)    12/16/21 0611   Weight: 104.2 kg (229 lb 11.5 oz)       ASSESSED NUTRITION NEEDS: - modified protein needs 12/10 lower d/t persistently high BUN  Dosing Weight: 81 kg - adjusted   Estimated Energy Needs: 0384-5140 kcal (25-30 kcal/kg)  Justification: obesity   Estimated Protein Needs: 81-97 grams (1.0-1.2 g/kg)  Justification: hypercatabolism with critical illness, monitoring renal fx (liberalize as able)      NEW FINDINGS:   2-3+ edema throughout  TCU placement pending     Previous Goals:   TF @ goal to provide % est needs.   Evaluation: Met    Previous Nutrition Diagnosis:   No nutrition diagnosis identified  Evaluation: Completed      CURRENT NUTRITION DIAGNOSIS  Inadequate oral intake related to restricted diet order as evidenced by limited amounts of liquids taken over the past 3 days, requiring ongoing nutrition support     INTERVENTIONS  Recommendations / Nutrition Prescription  Continue TF as ordered while PO minimal   No indication for starting calorie counts a this time given current limited diet    Implementation  None today     Goals  TF @ goal to provide % est needs while PO intake minimal       MONITORING AND EVALUATION:  Progress towards goals will be monitored and evaluated per protocol and Practice Guidelines      Rmima Rolon RD, LD  Clinical Dietitian

## 2021-12-17 NOTE — PLAN OF CARE
DATE & TIME: 12/17/2021 5147-9272  Cognitive Concerns/ Orientation : A&Ox2, disoriented to time, situation. Slow to respond, doesn't always answer questions. Flat affect, withdrawn; calm/cooperative.   BEHAVIOR & AGGRESSION TOOL COLOR: green   CIWA SCORE: n/a  ABNL VS/O2: VSS ; 90's on 2L per NC  MOBILITY: Total cares, up with lift, assist of 2 to turn/repo q2hr; up in chair for lunch.   PAIN MANAGMENT: denies pain - no nonverbal indicator of pain noted  DIET: full liquids, mildly thick liquids (level 2) by spoon. Tube feedings running at 40 ml/hr (goal rate); increased water flushes to 200mL QID; tolerating.   BOWEL/BLADDER: Zuniga catheter present, patent - urine remains clear yellow without clots; hand irrigation discontinued; Incontinent of bowel, no BM this shift.   ABNL LAB/BG: ,175; Hgb 9.3; Cr 1.72; K+ 3.3 - replaced  DRAIN/DEVICES: G-tube to continuous feedings at goal rate of 40mL/hr.Zuniga present, patent, adequate output. PICC in LUE, saline locked.   TELEMETRY RHYTHM: n/a  SKIN: Skin pale. Skin tear on LUE, dressing c/d/I. Perianal with blanchable redness and open area of skin, bruising, barrier cream in place. Coccyx dressing c/d/I. Redness to abd folds, powder applied. +3-4 edema to BUE and BLE, +2 edema to scrotum.   TESTS/PROCEDURES: n/a  D/C DATE: Pending placement - SW following  Discharge Barriers: TCU placement   OTHER IMPORTANT INFO: Urology, PT, OT, WOC, Speech following.

## 2021-12-17 NOTE — PROGRESS NOTES
Care Management Follow Up    Length of Stay (days): 52    Expected Discharge Date: 12/21/2021     Concerns to be Addressed: discharge planning     Patient plan of care discussed at interdisciplinary rounds: Yes    Anticipated Discharge Disposition: Transitional Care     Anticipated Discharge Services: None  Anticipated Discharge DME: None    Patient/family educated on Medicare website which has current facility and service quality ratings: yes  Education Provided on the Discharge Plan:    Patient/Family in Agreement with the Plan: yes    Referrals Placed by CM/SW: Post Acute Facilities  Private pay costs discussed: potential Medicare daily co-payment from day .    Additional Information:  Spoke with brother Randy today to update him regarding TCU referrals made on 12/14.  [facility] declined due to acuity.  [facility] declined due to acuity.  [facility] decline due to acuity.  [facility] declined due to acuity  [facility] declined due to acuity  [facility] does not meet the criteria for admission.  [facility] assessing.    Explained to brother writer will make additional referrals and update him regarding options. Brother expressed understanding of the need to make additional referrals.  Reviewed with aaronjodee how to use the Medicare web site to review SNF Medicare ratings.  Explained under patient's BCBS Medicare Advantage product, patient is in the same benefit period from when patient was at [facility].  Patient was there 9-9-21 to 9-20-2 using 10 or 11 days of the first 20 days which are covered at 100%.  Explained there are additional eligible days from  however his BCBS may or may not the patient's daily medicare out of pocket cost of $185.00  Randy is assisting patient with his finances and said patient does have the private funds to pay private pay expenses.  Also reviewed with Dr Peraza that [facility] is asking about  patient's rehab potential. Dr Peraza does support patient admitting into a TCU and that the first couple of weeks in the TCU will give a better idea if patient can respond well enough to return home.  Shared with brother and [facility].    Additional referrals made to [facility], [facility] and [facility].        Juana Hurd [facility]

## 2021-12-17 NOTE — PLAN OF CARE
Cognitive Concerns/ Orientation : A&Ox2-3, disoriented to time, situation. Slow to respond, doesn't always answer questions. Flat affect, withdrawn; calm/cooperative.   BEHAVIOR & AGGRESSION TOOL COLOR: green   CIWA SCORE: n/a  ABNL VS/O2: VSS on 2L O2 NC, O2 sats mid 90s.   MOBILITY: Total cares, up with lift, assist of 2 to turn/repo q2hr.   PAIN MANAGMENT: denies pain  DIET: full liquids, mildly thick liquids (level 2) by spoon. Tube feedings running at 40 ml/hr (goal rate) with 150 ml water flushes QID; tolerating.   BOWEL/BLADDER: Zuniga catheter present, patent, irrigated q shift, urine clear yellow without clots. Incontinent of bowel, no BM this shift.   ABNL LAB/BG: Na 145, Creat 1.72. , 177.   DRAIN/DEVICES: G-tube, to continuous feedings, WDL. Zuniga present, patent, adequate output. PICC in LUE, saline locked.   TELEMETRY RHYTHM: n/a  SKIN: Skin pale. Skin tear on LUE, dressing CDI. Perianal with blanchable redness and open area of skin, bruising, barrier cream in place. Coccyx dressing CDI. Redness to abd folds, powder applied. +3-4 edema to BUE and BLE, +2 edema to scrotum.   TESTS/PROCEDURES: n/a  D/C DATE: Pending placement  Discharge Barriers: TCU placement   OTHER IMPORTANT INFO: LS clear diminished, LOAIZA, abd muscle use, RR low 20s. Urology, PT, OT, WOC, Speech following.

## 2021-12-17 NOTE — PROGRESS NOTES
[facility]    Medicine Progress Note - Hospitalist Service       Date of Admission:  10/26/2021    Assessment & Plan         77 yo single male who lives by himself,  with CAD, atrial flutter on eliquis, hypertension, hyperlipidemia, HFpEF, CKD stage III, DM type 3, history of GI bleed, suspected obstructive sleep apnea electively admitted on 10/26/2021 for TAVR to treat severe Aortic Stenosis.  Hospital course complicated by OPAL from contrast nephropathy and volume overload requiring transient dialysis, hypoxic respiratory failure multifactorial, Covid pneumonia and physical deconditioning, and proteus UTI and bacteremia.     Brief summary:  11/15: patient intubated for severe hypoxic respiratory failure, proned and paralyzed.   11/22: Started scheduled IV Bumex   11/23: GI bleed  12/4: self extubated during turn per night nurse @ 0015. Placed on BiPap, attempted hiflo, did not tolerate. Pt made DNR/DNI. NG tube placed.   12/5: HFNC over night, matilde better than bipap. HD line out. TX to IMC  12/6: Substantial improvement in oxygenation, patient off high flow.  12/10: percutaneous G tube placed, Tube feeds started.    12/13: gross hematuria --- request Urology eval, appreciate nursing efforts to evaluate catheter and hematuria  - 12/14 - 12/15 - now improved with CBI, continuing to monitor with urology, outpatient cystoscopy planned  - 12/16 - 12/17: stable overall, awaiting TCU acceptance - discussed with SW - patient's overall trajectory likely to become clearer in the coming weeks, pending progress hopefully at TCU to optimize outcome     COVID-19 Pneumonia  MRSA VAP  Acute hypoxic respiratory failure (multifactorial with above)  -  Self extubated 12/4, currently on 5 LPM nasal canula  - most recent cxr shows bilateral pulm infiltrates, but normal wbc and no fever  - patient mobilizing secretions better  - 12/15 - 12/17 stable on 2L   - likely discharge to TCU on O2     Severe aortic  stenosis, s/p TAVR 10/26   A fib/flutter, rate controlled   Hypertension  -  Eliquis 5 mg bid,  held for PEG tube placement, restarted 12/12  - restarted Lasix 40 mg daily x 12/10, continue  - Continue coreg low dose resumed 12/5, increase to 6.25 bid. Continue hydralazine.  - if hematuria returns, stop apixaban     L upper ext edema  Has picc present on L Upper ext.Pt tends to lay on L side  - check TESSA ext venous doppler - negative US     GI bleed earlier in stay, s/p colonoscopy and EGD  -  Hemorrhoids and erosions in hiatal hernia without active bleeding  -  No overt ongoing bleeding noted  - hgb stable 12/11- 9.5  --> 9.9 12/13  --> 9.3 12/17  -  PPI continued     Anemia   - probably related to Anemia of Chronic disease and CRF   - hgb 10.1 > 9.7 > 9.1 > 9.5 --> 9.9 12/13 --> 9.2 --> 9.4 --> 9.3 12/17     Moderate malnutrition  Dysphagia  Hyperchloremic hypernatremia 12/13  - initially via nasal  tube- feedings held as patient pulled tube out, restarted after PEG placed on 12/10 by IR  - free water flushes have been varied, but sodium increased slightly again, will go back to 200 ml QID  - Hypokalemia, mild - replaced  - Speech therapy following  - video swallow 12/13 --> SLP advancing diet as appropriate. Appreciated.     ARF on CKD, non-oliguric, creatinine above the patient's baseline but stable  -  Temp dialysis line removed 12/5  -  No ongoing HD needed at this point  - Cr at new baseline of around 2.0  - continue PTA Lasix  - 12/15 creat 1.7 and fairly stable 12/17     DM II with stress induced hyperglycemia  Maintained at home on Lantus 18 Uhs , amaryl 2 mg bid. A1c 7.6 in 11/2021  BS elevated > 200's as PTA medications on hold & on tube feeds x past 2 days  - 12/16 increased to 25 units, continue to titrate  - SSI     Proteus bacteremia and UTI  treated     Leukopenia, chronic: resolved  Coagulopathy of covid earlier in stay  Thrombocytopenia, improving: resolved      Agitation/Encephalopathy  (Combination of toxic and metabolic during course of stay):  - Slowly improving (sitting in chair today), oriented to place, able name president     Deconditioning:  -  PT/OT following      Diet: Adult Formula Drip Feeding: Specified Time Novasource Renal; Gastrostomy; Goal Rate: 40; mL/hr; Medication - Feeding Tube Flush Frequency: At least 15-30 mL water before and after medication administration and with tube clogging; Amount to Send (N...  Full Liquid Diet Mildly Thick (level 2) (spoon only)    DVT Prophylaxis: DOAC  Zuniga Catheter: PRESENT, indication: Strict 1-2 Hour I&O, Gross Hematuria, Retention, Strict 1-2 Hour I&O  Central Lines: None  Code Status: No CPR- Do NOT Intubate      Disposition Plan   Expected Discharge: 12/20/2021     Anticipated discharge location: inpatient rehabilitation facility    Delays:     Placement - TCU            The patient's care was discussed with the Bedside Nurse, Care Coordinator/, Patient and Patient's Family.    Total encounter time 38 minutes with greater than 50% spent in direct patient care, discussion and counseling, discussion of prognosis and optimal disposition with social work, discussion and counseling over the phone with patient's brother Bill, and coordination of care with RN.    Pedro Peraza MD  Hospitalist Service  [facility]  Securely message with the Vocera Web Console (learn more here)  Text page via [employer] Paging/Directory        Clinically Significant Risk Factors Present on Admission                ______________________________________________________________________    Interval History   Patient seen and examined midday.  Slightly more awake and talkative though still not back to baseline.  No new complaints-no fevers, shortness of breath or pain.  Patient does seem to be somewhat of a mouth breather but denies change in breathing.  Discussed with social work and patient's brother-optimal disposition plans  remain unclear.    Data reviewed today: I reviewed all medications, new labs and imaging results over the last 24 hours. I personally reviewed no images or EKG's today.    Physical Exam   Vital Signs: Temp: 97.2  F (36.2  C) Temp src: Axillary BP: (!) 157/73 Pulse: 83   Resp: 24 SpO2: 94 % O2 Device: Nasal cannula Oxygen Delivery: 2 LPM  Weight: 229 lbs 11.51 oz    Gen: NAD, slightly more awake, a few more verbal responses today  HEENT: Normocephalic, EOMI, MMM  Resp: no focal crackles,  no wheezes, no increased work of resp  CV: S1S2 heard, reg rhythm, reg rate  Abdo: soft, nontender, nondistended, bowel sounds present  Ext: calves nontender, well perfused  Neuro: AA, CN grossly intact, no facial asymmetry      Data   Recent Labs   Lab 12/17/21  1130 12/17/21  0810 12/17/21  0619 12/16/21  0800 12/16/21  0637 12/15/21  1624 12/15/21  1444 12/15/21  0809 12/15/21  0617 12/14/21  1013 12/14/21  0643 12/11/21  2101 12/11/21  0643   WBC  --   --  8.2  --   --   --   --   --  8.4  --  9.6  --  6.5   HGB  --   --  9.3*  --   --   --  9.4*  --  9.2*  --  9.5*   < > 9.5*   MCV  --   --  97  --   --   --   --   --  95  --  96  --  94   PLT  --   --  117*  --   --   --   --   --  133*  --  157  --  214   NA  --   --  147*  --  146*  --   --   --  146*  --  148*   < > 143   POTASSIUM  --   --  3.3*  --  3.5  --   --   --  3.6  --  3.8   < > 4.2   CHLORIDE  --   --  107  --  107  --   --   --  107  --  109   < > 106   CO2  --   --  39*  --  39*  --   --   --  39*  --  36*   < > 31   BUN  --   --  96*  --  98*  --   --   --  104*  --  107*   < > 121*   CR  --   --  1.72*  --  1.72*  --   --   --  1.72*  --  1.78*   < > 2.05*   ANIONGAP  --   --  1*  --  <1*  --   --   --  <1*  --  3   < > 6   GABE  --   --  9.7  --  9.7  --   --   --  9.6  --  10.3*   < > 9.9   * 189* 180*   < > 230*   < >  --    < > 267*   < > 258*   < > 220*   ALBUMIN  --   --   --   --   --   --   --   --   --   --   --   --  2.2*   PROTTOTAL  --   --    --   --   --   --   --   --   --   --   --   --  5.1*   BILITOTAL  --   --   --   --   --   --   --   --   --   --   --   --  0.6   ALKPHOS  --   --   --   --   --   --   --   --   --   --   --   --  175*   ALT  --   --   --   --   --   --   --   --   --   --   --   --  38   AST  --   --   --   --   --   --   --   --   --   --   --   --  37    < > = values in this interval not displayed.     No results found for this or any previous visit (from the past 24 hour(s)).

## 2021-12-17 NOTE — PLAN OF CARE
Contact precaution maintained. Pt A&O X4. VSS on 2L NC. Denies pain. Total care. Turn &Repo Q2hrs. Full liquids, mildly thick liquids (level 2). Tube feedings running at 40 ml/hr (goal rate) with 150 ml water flushes QID. Zuniga in place with good output. PICC in LUE, saline locked. +3-4 edema to BUE. SW following for the TCU placement. Continue to monitor.

## 2021-12-17 NOTE — PLAN OF CARE
DATE & TIME: 12/16/2021, 5120-2202   Cognitive Concerns/ Orientation : A& Ox 1, disoriented to time, place, situation. Flat affect, withdrawn; calm/cooperative. Slow to respond but expressing needs better. Sleeping most of shift  BEHAVIOR & AGGRESSION TOOL COLOR: green   ABNL VS/O2: VSS on 2L O2 NC, O2 sats low 90s-no signs of respiratory distress.   MOBILITY: Total cares, up with lift, assist of 2 to turn/repo q2hr.   PAIN MANAGMENT: denies pain, resting comfortable between cares  DIET: full liquids, mildly thick liquids (level 2) by spoon-refusing at times. Tube feedings running at 40 ml/hr (goal rate) with 150 ml water flushes QID; Residual 5 mL- tolerating feeding.   BOWEL/BLADDER: BS active x 4, Giron catheter-patent, irrigated q shift, urine clear yellow/pale without clots when irrigated. Incontinent of bowel, no BM this shift.   ABNL LAB/BG: Na 146; Cr 1.72; BG= 237, 267 (Q4h checks)   DRAIN/DEVICES: G-tube-to continuous feedings, WDL. Giron present/patent-slight brown discharge at giron insertion site, adequate output. PICC in LUE, saline locked.   TELEMETRY RHYTHM: n/a   SKIN: Skin pale. Skin tear on LUE, dressing CDI. Perianal with blanchable redness and open area of skin, bruising, barrier cream in place. Coccyx dressing changed on day shift-CDI. Redness to abd folds. +3-4 edema to BUE and BLE-encouraged elevation, +2 edema to scrotum.   TESTS/PROCEDURES: n/a  D/C DATE: 1-2 days to TCU, pending placement- SW following   Discharge Barriers: TCU placement   OTHER IMPORTANT INFO: LS diminished in all fields.  Urology, PT, OT, WOC, Speech following. Contact precautions maintained. Brother Jose updated on plan of care.

## 2021-12-18 NOTE — PROGRESS NOTES
DATE & TIME: 12/17/2021 Night  Cognitive Concerns/ Orientation : Pt alert to self only, lethargic, not answering questions; spoke a few words that were garbled, not understandable  BEHAVIOR & AGGRESSION TOOL COLOR: Green   ABNL VS/O2: /71, resp rate 24 otherwise VSS, 2 LPM oxygen via nasal cannula with O2 ats 93%  MOBILITY: Total cares, lift; Turn/repo every 2 hours   PAIN MANAGMENT: Shakes head no when asked about pain; flinches with giron cares  DIET: PEG tube feeding at 40 mL/hour (goal) with 200 mL manual, free water flushes every 6 hours + Full liquids, mildly thick (level 2) by spoon   BOWEL/BLADDER: Giron catheter, tenderness while performing giron cares; incontinent of bowel on 12/17, no BM overnight   ABNL LAB/BG: Na 147; Potassium 3.3 (repolaced via one time dose); BUN 96; Creat 1.72; Hgb 9.3; hematocrit 32.7; platelets 32.7;  (5 units), 245 (4 units) (Q4 hour blood sugar checks/insulin administration)  DRAIN/DEVICES: PEG-tube to continuous feedings; Giron; PICC in LUE, saline locked.   SKIN: Skin pale. Skin tear on LUE, dressing CDI. Blanchable redness/open area on coccyx covered with mepilex; redness to abd folds, mepilex powder applied. +3-4 edema to BUE and BLE-encouraged elevation, +2 edema and redness to scrotum.   TESTS/PROCEDURES: n/a  D/C DATE: Pending TCU placement, possibly 12/20 - SW following  OTHER IMPORTANT INFO: Urology; PT/OT/ SLP/WOC/GI/cardio following. Contact precautions maintained for MRSA; Covid recovered

## 2021-12-18 NOTE — PLAN OF CARE
DATE & TIME: 12/17/2021, [id number]  Cognitive Concerns/ Orientation : A&Ox2, disoriented to time, situation. Slow to respond, Flat affect, withdrawn, irritable at times during cares  BEHAVIOR & AGGRESSION TOOL COLOR: green   CIWA SCORE: n/a  ABNL VS/O2: VSS on 2L per NC- sats low 90s, tachypenic at times (RR 24-32 max)  MOBILITY: Total cares, up with lift, assist of 2 to turn/repo q2hr; up in chair for lunch.   PAIN MANAGMENT: denies pain when asked, but pt. occasionally grimacing and guarding bladder area- PRN tylenol given x 1 for pain management  DIET: full liquids, mildly thick liquids (level 2) by spoon-refusing food and water intake when offered. Tube feedings running at 40 ml/hr (goal rate); increased water flushes to 200mL QID; tolerating feedings.   BOWEL/BLADDER: Zuniga catheter present, patent - urine remains clear pratik without clots; hand irrigation discontinued; Incontinent of bowel, no BM this shift.   ABNL LAB/BG: , 236; Hgb 9.3; Cr 1.72; K+ 3.3 - replaced, no order to re-check this evening, am check?   DRAIN/DEVICES: G-tube to continuous feedings.Zuniga present, patent, adequate pratik output. PICC in LUE, saline locked.   TELEMETRY RHYTHM: n/a  SKIN: Skin pale. Skin tear on LUE, dressing CDI. Perianal with blanchable redness and open area of skin, bruising, Coccyx dressing CDI. Redness to abd folds, mepilex powder applied. +3-4 edema to BUE and BLE-encouraged elevation, +2 edema and redness to scrotum.   TESTS/PROCEDURES: n/a  D/C DATE: Pending placement - SW following  Discharge Barriers: TCU placement   OTHER IMPORTANT INFO: Dry mouth-refusing oral cares. Urology, PT, OT, WOC, Speech, GI, cardio following. Contact precautions maintained.

## 2021-12-18 NOTE — PROGRESS NOTES
[facility]    Medicine Progress Note - Hospitalist Service       Date of Admission:  10/26/2021    Assessment & Plan         75 yo single male who lives by himself,  with CAD, atrial flutter on eliquis, hypertension, hyperlipidemia, HFpEF, CKD stage III, DM type 3, history of GI bleed, suspected obstructive sleep apnea electively admitted on 10/26/2021 for TAVR to treat severe Aortic Stenosis.  Hospital course complicated by OPAL from contrast nephropathy and volume overload requiring transient dialysis, hypoxic respiratory failure multifactorial, Covid pneumonia and physical deconditioning, and proteus UTI and bacteremia.     Brief summary:  11/15: patient intubated for severe hypoxic respiratory failure, proned and paralyzed.   11/22: Started scheduled IV Bumex   11/23: GI bleed  12/4: self extubated during turn per night nurse @ 0015. Placed on BiPap, attempted hiflo, did not tolerate. Pt made DNR/DNI. NG tube placed.   12/5: HFNC over night, matilde better than bipap. HD line out. TX to IMC  12/6: Substantial improvement in oxygenation, patient off high flow.  12/10: percutaneous G tube placed, Tube feeds started.    12/13: gross hematuria --- request Urology eval, appreciate nursing efforts to evaluate catheter and hematuria  - 12/14 - 12/15 - now improved with CBI, continuing to monitor with urology, outpatient cystoscopy planned  - 12/16 - 12/18: stable overall, awaiting TCU acceptance - discussed with SW - patient's overall trajectory likely to become clearer in the coming weeks, pending progress hopefully at TCU to optimize outcome     COVID-19 Pneumonia  MRSA VAP  Acute hypoxic respiratory failure (multifactorial with above)  -  Self extubated 12/4, currently on 5 LPM nasal canula  - most recent cxr shows bilateral pulm infiltrates, but normal wbc and no fever  - patient mobilizing secretions better  - 12/15 - 12/18 stable on 2L   - likely discharge to TCU on O2     Severe aortic  stenosis, s/p TAVR 10/26   A fib/flutter, rate controlled   Hypertension  -  Eliquis 5 mg bid,  held for PEG tube placement, restarted 12/12  - restarted Lasix 40 mg daily x 12/10, continue  - Continue coreg low dose resumed 12/5, increase to 6.25 bid. Continue hydralazine.  - if hematuria returns, stop apixaban     L upper ext edema  Has picc present on L Upper ext.Pt tends to lay on L side  - check TESSA ext venous doppler - negative US     GI bleed earlier in stay, s/p colonoscopy and EGD  -  Hemorrhoids and erosions in hiatal hernia without active bleeding  -  No overt ongoing bleeding noted  - hgb stable 12/11- 9.5  --> 9.9 12/13  --> 9.3 12/17  -  PPI continued     Anemia   - probably related to Anemia of Chronic disease and CRF   - hgb 10.1 > 9.7 > 9.1 > 9.5 --> 9.9 12/13 --> 9.2 --> 9.4 --> 9.3 12/17      Moderate malnutrition  Dysphagia  Hyperchloremic hypernatremia - improving  - initially via nasal  tube- feedings held as patient pulled tube out, restarted after PEG placed on 12/10 by IR  - free water flushes have been varied, but sodium increased slightly again, will go back to 200 ml QID  - Hypokalemia, mild - replaced  - Speech therapy following  - video swallow 12/13 --> SLP advancing diet as appropriate. Appreciated.  - 12/18 Na 146     ARF on CKD, non-oliguric, creatinine above the patient's baseline but stable  -  Temp dialysis line removed 12/5  -  No ongoing HD needed at this point  - Cr at new baseline of around 2.0  - continue PTA Lasix  - 12/15 creat 1.7 and fairly stable 12/17  - 12/18 creat 1.66     DM II with stress induced hyperglycemia  Maintained at home on Lantus 18 Uhs , amaryl 2 mg bid. A1c 7.6 in 11/2021  BS elevated > 200's as PTA medications on hold & on tube feeds x past 2 days  - 12/16 increased to 25 units, continue to titrate  - SSI     Proteus bacteremia and UTI  treated     Leukopenia, chronic: resolved  Coagulopathy of covid earlier in stay  Thrombocytopenia, improving:  resolved      Agitation/Encephalopathy (Combination of toxic and metabolic during course of stay):  - Slowly improving (sitting in chair today), oriented to place, able name president     Deconditioning:  -  PT/OT following        Diet: Adult Formula Drip Feeding: Specified Time Novasource Renal; Gastrostomy; Goal Rate: 40; mL/hr; Medication - Feeding Tube Flush Frequency: At least 15-30 mL water before and after medication administration and with tube clogging; Amount to Send (N...  Full Liquid Diet Mildly Thick (level 2)    DVT Prophylaxis: DOAC  Zuniga Catheter: PRESENT, indication: Strict 1-2 Hour I&O, Gross Hematuria, Retention, Strict 1-2 Hour I&O  Central Lines: None  Code Status: No CPR- Do NOT Intubate      Disposition Plan   Expected Discharge: 12/21/2021     Anticipated discharge location: inpatient rehabilitation facility    Delays:     Placement - TCU            The patient's care was discussed with the Bedside Nurse and Patient.    Pedro Peraza MD  Hospitalist Service  [facility]  Securely message with the Vocera Web Console (learn more here)  Text page via [employer] Paging/Directory        Clinically Significant Risk Factors Present on Admission                ______________________________________________________________________    Interval History   Seen and examined early this morning. No new changes or events overnight. No fevers, no new pain, no change in breathing.    Data reviewed today: I reviewed all medications, new labs and imaging results over the last 24 hours. I personally reviewed no images or EKG's today.    Physical Exam   Vital Signs: Temp: 97.9  F (36.6  C) Temp src: Axillary BP: (!) 140/71 Pulse: 76   Resp: 24 SpO2: 93 % O2 Device: Nasal cannula Oxygen Delivery: 2 LPM  Weight: 225 lbs 15.54 oz    Gen: NAD  HEENT: Normocephalic, EOMI, MMM  Resp: no focal crackles,  no wheezes, no increased work of resp  CV: S1S2 heard, reg rhythm, reg rate, continued pedal  edema  Abdo: soft, nontender, nondistended, bowel sounds present  Ext: calves nontender, well perfused  Neuro: AA, minimally replying as has been the case in past days, CN grossly intact, no facial asymmetry      Data   Recent Labs   Lab 12/18/21  1219 12/18/21  0856 12/18/21  0824 12/17/21  0810 12/17/21  0619 12/16/21  0800 12/16/21  0637 12/15/21  1624 12/15/21  1444 12/15/21  0809 12/15/21  0617 12/14/21  1013 12/14/21  0643   WBC  --   --   --   --  8.2  --   --   --   --   --  8.4  --  9.6   HGB  --   --   --   --  9.3*  --   --   --  9.4*  --  9.2*  --  9.5*   MCV  --   --   --   --  97  --   --   --   --   --  95  --  96   PLT  --   --   --   --  117*  --   --   --   --   --  133*  --  157   NA  --   --  146*  --  147*  --  146*  --   --   --  146*  --  148*   POTASSIUM  --   --  3.7  --  3.3*  --  3.5  --   --   --  3.6  --  3.8   CHLORIDE  --   --  107  --  107  --  107  --   --   --  107  --  109   CO2  --   --  39*  --  39*  --  39*  --   --   --  39*  --  36*   BUN  --   --  92*  --  96*  --  98*  --   --   --  104*  --  107*   CR  --   --  1.66*  --  1.72*  --  1.72*  --   --   --  1.72*  --  1.78*   ANIONGAP  --   --  <1*  --  1*  --  <1*  --   --   --  <1*  --  3   GABE  --   --  9.5  --  9.7  --  9.7  --   --   --  9.6  --  10.3*   * 214* 225*   < > 180*   < > 230*   < >  --    < > 267*   < > 258*    < > = values in this interval not displayed.     No results found for this or any previous visit (from the past 24 hour(s)).

## 2021-12-18 NOTE — PLAN OF CARE
DATE & TIME: 12/18/2021, AM shift  Cognitive Concerns/ Orientation : A&Ox2, disoriented to time, situation. Slow to respond, Flat affect, withdrawn, irritable at times during cares  BEHAVIOR & AGGRESSION TOOL COLOR: green   CIWA SCORE: n/a  ABNL VS/O2: VSS on 2L per NC- sats 95%, tachypenic at times (RR in high 20s at times). Lethargic with transfers.   MOBILITY: Total cares, up with lift, assist of 2 to turn/repo q2hr; up in chair this morning.  PAIN MANAGMENT: denies pain when asked, occasionally grimacing and points/pulls on giron catheter  PRN tylenol given x 1 for pain management  DIET: full liquids, mildly thick liquids (level 2) by spoon-pt has been taking spoons of water. Tube feedings running at 40 ml/hr (goal rate); flushes 200mL QID; tolerating feedings.   BOWEL/BLADDER: Giron catheter present, patent - urine remains clear pratik without clots; some leakage around the catheter noted. Incontinent of bowel, one small BM this shift.   ABNL LAB/BG: BG q4hr; 214/243. Cr 1.66. Hg 9.3. Na 146. DRAIN/DEVICES: G-tube to continuous feedings.Giron present, patent, adequate pratik output. PICC in LUE, saline locked (blood return noted)  TELEMETRY RHYTHM: n/a  SKIN: Skin pale. Skin tear on LUE, dressing CDI. Coccyx dressing CDI. Redness to abd folds, powder applied. +3 edema to BUE and BLE-encouraged elevation (ace wraps to feet).  +2 edema and redness to scrotum.   TESTS/PROCEDURES: n/a  D/C DATE: Pending placement - SW following  Discharge Barriers: TCU placement   OTHER IMPORTANT INFO: Dry mouth-oral cares done, pt refuses sometimes. Urology, PT, OT, WOC, Speech, GI, cardio following. Contact precautions maintained.

## 2021-12-19 NOTE — PLAN OF CARE
Swallow update: Continued improvements in level of alertness/participation/tolerance for longer sessions/exercises and clinical tolerance of ice chips. Reviewed with RN and pt. Given silent aspiration on initial VFSS on 12/13, will order and plan to repeat VFSS on 12/20 to assess ability to continue to advance liquids and diet. Pt in agreement.     Continue full liquid, mildly thick liquids by spoon or cup with supervision; okay for continued ice chips throughout the day with good oral cares. Encourage strong swallows.

## 2021-12-19 NOTE — PLAN OF CARE
DATE & TIME: 12/19/2021, AM shift  Cognitive Concerns/ Orientation : A&Ox2/3, disoriented to time and situation. Slow to respond, Flat affect, withdrawn. Has been more alert this afternoon, talking more. Has been asking to drink, felt thirsty. BEHAVIOR & AGGRESSION TOOL COLOR: green   CIWA SCORE: n/a  ABNL VS/O2: VSS on 2L per NC- sats 95%, tachypenic at times (RR in high 20s at times).   MOBILITY: Total cares, up with lift, assist of 2 to turn/repo q2hr  PAIN MANAGMENT: denies pain when asked, occasionally grimacing and points/pulls on giron catheter PRN tylenol given x 1 for pain management  DIET: full liquids, mildly thick liquids (level 2) by spoon-pt has been taking spoons of water and juice. Tube feedings running at 40 ml/hr (goal rate); flushes 200mL QID; tolerating feedings. Plan for video swallow tomorrow per Speech.   BOWEL/BLADDER: Giron catheter present, patent - urine remains clear pratik without clots; some leakage around the catheter noted. Incontinent of bowel, No BM this shift. Abdomen is distended, US obtained.   ABNL LAB/BG: BG q4hr; 243/252.   DRAIN/DEVICES: G-tube to continuous feedings.Giron present, patent, adequate pratik output. PICC in LUE, saline locked (blood return noted)  TELEMETRY RHYTHM: n/a  SKIN: Skin pale. Skin tear on LUE, dressing CDI. Coccyx dressing changed. Steph area reddened with some sores, powder and barrier applied. Redness to abd folds, powder applied. +3 edema to BUE and BLE-encouraged elevation (ace wraps to feet).  +2 edema and redness to scrotum.   TESTS/PROCEDURES:US of abdomen, complete showed ascites (MD aware). Chest x ray ordered but not complete yet.   D/C DATE: Pending placement - SW is following  Discharge Barriers: TCU placement   OTHER IMPORTANT INFO: Dry mouth-oral cares done, pt refuses sometimes. Urology, PT, OT, WOC, Speech, GI, cardio following. Contact precautions maintained.

## 2021-12-19 NOTE — PROGRESS NOTES
[facility]    Medicine Progress Note - Hospitalist Service       Date of Admission:  10/26/2021    Assessment & Plan         77 yo single male who lives by himself,  with CAD, atrial flutter on eliquis, hypertension, hyperlipidemia, HFpEF, CKD stage III, DM type 3, history of GI bleed, suspected obstructive sleep apnea electively admitted on 10/26/2021 for TAVR to treat severe Aortic Stenosis.  Hospital course complicated by OPAL from contrast nephropathy and volume overload requiring transient dialysis, hypoxic respiratory failure multifactorial, Covid pneumonia and physical deconditioning, and proteus UTI and bacteremia.     Brief summary:  11/15: patient intubated for severe hypoxic respiratory failure, proned and paralyzed.   11/22: Started scheduled IV Bumex   11/23: GI bleed  12/4: self extubated during turn per night nurse @ 0015. Placed on BiPap, attempted hiflo, did not tolerate. Pt made DNR/DNI. NG tube placed.   12/5: HFNC over night, matilde better than bipap. HD line out. TX to IMC  12/6: Substantial improvement in oxygenation, patient off high flow.  12/10: percutaneous G tube placed, Tube feeds started.    12/13: gross hematuria --- request Urology eval, appreciate nursing efforts to evaluate catheter and hematuria  - 12/14 - 12/15 - now improved with CBI, continuing to monitor with urology, outpatient cystoscopy planned  - 12/16 - 12/18: stable overall, awaiting TCU acceptance - discussed with SW - patient's overall trajectory likely to become clearer in the coming weeks, pending progress hopefully at TCU to optimize outcome  - 12/19 improvement, aaox3     COVID-19 Pneumonia  MRSA VAP  Acute hypoxic respiratory failure (multifactorial with above)  -  Self extubated 12/4, currently on 5 LPM nasal canula  - most recent cxr 12/5 shows bilateral pulm infiltrates, but normal wbc and no fever  - patient mobilizing secretions better  - 12/15 - 12/19 stable on 2L   - likely discharge to  TCU on O2  - 12/19 - repeat chest x ray, it will likely show infiltrates, but would like to rule out effusions etc     Severe aortic stenosis, s/p TAVR 10/26   A fib/flutter, rate controlled   Hypertension  -  Eliquis 5 mg bid,  held for PEG tube placement, restarted 12/12  - restarted Lasix 40 mg daily x 12/10, continue  - Continue coreg low dose resumed 12/5, increase to 6.25 bid. Continue hydralazine.  - if hematuria returns, stop apixaban     L upper ext edema  Has picc present on L Upper ext.Pt tends to lay on L side  - TESSA ext venous doppler - negative for clot     GI bleed earlier in stay, s/p colonoscopy and EGD  -  Hemorrhoids and erosions in hiatal hernia without active bleeding  -  No overt ongoing bleeding noted  - hgb stable 12/11- 9.5  --> 9.9 12/13  --> 9.3 12/17  -  PPI continued     Anemia   - probably related to Anemia of Chronic disease and CRF   - hgb 10.1 > 9.7 > 9.1 > 9.5 --> 9.9 12/13 --> 9.2 --> 9.4 --> 9.3 12/17   - cbc updating 12/20     Moderate malnutrition  Dysphagia  Hyperchloremic hypernatremia - improving  - initially via nasal  tube- feedings held as patient pulled tube out, restarted after PEG placed on 12/10 by IR  - free water flushes have been varied, but sodium increased slightly again, will go back to 200 ml QID  - Hypokalemia, mild - replaced  - Speech therapy following  - video swallow 12/13 --> SLP advancing diet as appropriate. Appreciated.  - 12/18 Na 146      ARF on CKD, non-oliguric, creatinine above the patient's baseline but stable  -  Temp dialysis line removed 12/5  -  No ongoing HD needed at this point  - Cr at new baseline of around 2.0  - continue PTA Lasix  - 12/15 creat 1.7 and fairly stable 12/17  - 12/18 creat 1.66     DM II with stress induced hyperglycemia  Maintained at home on Lantus 18 Uhs , amaryl 2 mg bid. A1c 7.6 in 11/2021  BS elevated > 200's as PTA medications on hold & on tube feeds x past 2 days  - 12/19 increased to 30 units, continue to  titrate  - SSI     Proteus bacteremia and UTI  treated     Leukopenia, chronic: resolved  Coagulopathy of covid earlier in stay  Thrombocytopenia, improving: resolved      Agitation/Encephalopathy (Combination of toxic and metabolic during course of stay):  - Slowly improving (sitting in chair today), oriented to place, able name president     Deconditioning:  -  PT/OT following         Diet: Adult Formula Drip Feeding: Specified Time Novasource Renal; Gastrostomy; Goal Rate: 40; mL/hr; Medication - Feeding Tube Flush Frequency: At least 15-30 mL water before and after medication administration and with tube clogging; Amount to Send (N...  Full Liquid Diet Mildly Thick (level 2)    DVT Prophylaxis: DOAC  Zuniga Catheter: PRESENT, indication: Strict 1-2 Hour I&O, Gross Hematuria, Retention, Strict 1-2 Hour I&O  Central Lines: None  Code Status: No CPR- Do NOT Intubate      Disposition Plan   Expected Discharge: 12/21/2021     Anticipated discharge location: inpatient rehabilitation facility    Delays:     Placement - TCU            The patient's care was discussed with the Bedside Nurse, Patient and called patient's brother, no answer, will try again..    Pedro Peraza MD  Hospitalist Service  [facility]  Securely message with the [employer] Web Console (learn more here)  Text page via [employer] Paging/Directory    Total encounter time 37 min with >50% spent in direct patient care, discussion and counseling, and coordination of care with staff and RN.    Clinically Significant Risk Factors Present on Admission                ______________________________________________________________________    Interval History   Seen and examined midday. More awake and talkative. Still mouth breathing - really not changed in 5 days. ?baseline.  Able to properly report place, year, and president. No new pain, no fever.     Data reviewed today: I reviewed all medications, new labs and imaging results over the  last 24 hours. I personally reviewed no images or EKG's today.    Physical Exam   Vital Signs: Temp: 97.5  F (36.4  C) Temp src: Axillary BP: 135/80 Pulse: 77   Resp: 28 SpO2: 97 % O2 Device: Nasal cannula Oxygen Delivery: 2 LPM  Weight: 225 lbs 15.54 oz    Gen: NAD, pleasant, elderly  HEENT: Normocephalic, EOMI, MMM  Resp: no focal crackles,  no wheezes, no increased work of resp  CV: S1S2 heard, reg rhythm, reg rate  Abdo: soft, nontender, nondistended, bowel sounds present  Ext: calves nontender, well perfused  Neuro: AAOx3, CN grossly intact, no facial asymmetry      Data   Recent Labs   Lab 12/19/21  1239 12/19/21  0851 12/19/21  0413 12/18/21  0856 12/18/21  0824 12/17/21  0810 12/17/21  0619 12/16/21  0800 12/16/21  0637 12/15/21  1624 12/15/21  1444 12/15/21  0809 12/15/21  0617 12/14/21  1013 12/14/21  0643   WBC  --   --   --   --   --   --  8.2  --   --   --   --   --  8.4  --  9.6   HGB  --   --   --   --   --   --  9.3*  --   --   --  9.4*  --  9.2*  --  9.5*   MCV  --   --   --   --   --   --  97  --   --   --   --   --  95  --  96   PLT  --   --   --   --   --   --  117*  --   --   --   --   --  133*  --  157   NA  --   --   --   --  146*  --  147*  --  146*  --   --   --  146*  --  148*   POTASSIUM  --   --   --   --  3.7  --  3.3*  --  3.5  --   --   --  3.6  --  3.8   CHLORIDE  --   --   --   --  107  --  107  --  107  --   --   --  107  --  109   CO2  --   --   --   --  39*  --  39*  --  39*  --   --   --  39*  --  36*   BUN  --   --   --   --  92*  --  96*  --  98*  --   --   --  104*  --  107*   CR  --   --   --   --  1.66*  --  1.72*  --  1.72*  --   --   --  1.72*  --  1.78*   ANIONGAP  --   --   --   --  <1*  --  1*  --  <1*  --   --   --  <1*  --  3   GABE  --   --   --   --  9.5  --  9.7  --  9.7  --   --   --  9.6  --  10.3*   * 243* 278*   < > 225*   < > 180*   < > 230*   < >  --    < > 267*   < > 258*    < > = values in this interval not displayed.     No results found for this or  any previous visit (from the past 24 hour(s)).

## 2021-12-19 NOTE — PROGRESS NOTES
DATE & TIME: 12/18/2021 Night  Cognitive Concerns/ Orientation : Pt alert to self only, lethargic, not answering questions; did not speak this shift  BEHAVIOR & AGGRESSION TOOL COLOR: Green   ABNL VS/O2: /77, resp rate 28 otherwise VSS, 2 LPM oxygen via nasal cannula with O2 ats 95%  MOBILITY: Total cares, lift; Turn/repo every 2 hours   PAIN MANAGMENT: Did not respond to pain question; Grabbing/rubbing low abdomen, making some brief vocalizations of pain--presumed bladder spasms, oxybutynin given (available 2x/day)--PAINAD score of 4; rested comfortably after  DIET: PEG tube feeding at 40 mL/hour (goal) with 200 mL manual, free water flushes every 6 hours + Full liquids, mildly thick (level 2) by spoon   BOWEL/BLADDER: Zuniga catheter, with some leakage and serosanguinous discharge; no BM overnight   ABNL LAB/BG: Na 146; BUN 92; Creat 1.66;   (5 units),  ( units) (Q4 hour blood sugar checks/insunlin administration)  DRAIN/DEVICES: PEG-tube to continuous feedings; Zuniga; PICC in LUE, saline locked.   SKIN: Skin pale. Skin tear on LUE, dressing CDI. Blanchable redness/open area on coccyx covered with mepilex; redness to abd folds-miconazole available. +3 edema bilateral extremities (elevated on pillows), +2 edema and redness to scrotum.   TESTS/PROCEDURES: n/a  D/C DATE: Pending TCU placement, possibly 12/20 - SW following  OTHER IMPORTANT INFO: Urology; PT/OT/ SLP/WOC/GI/cardio following. Contact precautions maintained for MRSA; Covid recovered

## 2021-12-19 NOTE — PLAN OF CARE
DATE & TIME: 12/18/2021, [id number]  Cognitive Concerns/ Orientation : A&Ox2, disoriented to time, situation. Slow/garbled speech, Flat affect, more lethargic and sleepy toward end of shift-still opening eyes off and on and responding to some questions.   BEHAVIOR & AGGRESSION TOOL COLOR: green   CIWA SCORE: n/a  ABNL VS/O2: VSS on 2L per NC- sats upper 90s, tachypenic at times (RR 25-32, does not appear in resp. Distress). Lethargic with transfers per day shift.   MOBILITY: Total cares, up with lift, assist of 2 to turn/repo q2hr, not OOB on this shift  PAIN MANAGMENT: denies pain when asked, however occasionally grimacing and points/pulls on giron catheter  PRN tylenol given x 1 for pain management  DIET: full liquids, mildly thick liquids (level 2) by spoon-pt has refused at times. Tube feedings running at 40 ml/hr (goal rate); flushes 200mL QID (next flush around 0100); 0 residual-tolerating feedings.   BOWEL/BLADDER: Giron catheter present, patent - urine remains clear pratik without clots; some leakage around the catheter noted. Incontinent of bowel.   ABNL LAB/BG: BG q4hr- 225, 243. Cr 1.66. Na 146.   DRAIN/DEVICES: G-tube to continuous feedings.Giron present, patent, adequate pratik output. PICC in LUE, saline locked (blood return noted on previous shift)  TELEMETRY RHYTHM: n/a  SKIN: Skin pale. Skin tear on LUE, dressing CDI. Coccyx dressing CDI. Redness to abd folds, powder applied. +3 edema to BUE and BLE-encouraged elevation (ace wraps to feet-removed this evening). +2 edema and redness to scrotum.   TESTS/PROCEDURES: n/a  D/C DATE: Pending placement - SW following  Discharge Barriers: TCU placement   OTHER IMPORTANT INFO: Dry mouth-oral cares done, pt refuses sometimes. Urology, PT, OT, WOC, Speech, GI, cardio following. Contact precautions maintained.

## 2021-12-20 NOTE — PROVIDER NOTIFICATION
"MD Notification    Notified Person: MD    Notified Person Name: Stu     Notification Date/Time: 12/20/21 at 1430    Notification Interaction: [email or web address] Messaging     Purpose of Notification: \"FYI, urine OP over approx the last 8 hrs is only 75 mL\"     Orders Received: Another 500 ML bolus ordered     Comments:      "

## 2021-12-20 NOTE — PLAN OF CARE
SLP: Nurse updated on patient's status this am, and not appropriate for a video swallow study. Will reschedule for 12/21/21, as able.

## 2021-12-20 NOTE — PROGRESS NOTES
[facility]    Medicine Progress Note - Hospitalist Service       Date of Admission:  10/26/2021    Assessment & Plan         75 yo single male who lives by himself,  with CAD, atrial flutter on eliquis, hypertension, hyperlipidemia, HFpEF, CKD stage III, DM type 3, history of GI bleed, suspected obstructive sleep apnea electively admitted on 10/26/2021 for TAVR to treat severe Aortic Stenosis.  Hospital course complicated by OPAL from contrast nephropathy and volume overload requiring transient dialysis, hypoxic respiratory failure multifactorial, Covid pneumonia and physical deconditioning, and proteus UTI and bacteremia.     Brief summary:  11/15: patient intubated for severe hypoxic respiratory failure, proned and paralyzed.   11/22: Started scheduled IV Bumex   11/23: GI bleed  12/4: self extubated during turn per night nurse @ 0015. Placed on BiPap, attempted hiflo, did not tolerate. Pt made DNR/DNI. NG tube placed.   12/5: HFNC over night, matilde better than bipap. HD line out. TX to IMC  12/6: Substantial improvement in oxygenation, patient off high flow.  12/10: percutaneous G tube placed, Tube feeds started.    12/13: gross hematuria --- request Urology eval, appreciate nursing efforts to evaluate catheter and hematuria  - 12/14 - 12/15 - now improved with CBI, continuing to monitor with urology, outpatient cystoscopy planned  - 12/16 - 12/18: stable overall, awaiting TCU acceptance - discussed with SW - patient's overall trajectory likely to become clearer in the coming weeks, pending progress hopefully at TCU to optimize outcome  - 12/19 improvement, aaox3  - 12/20 drowsy, abg showing hypercapnic respiratory acidosis, improved with bipap, will move to I[facility], also some hypotension, given 500cc bolus x 2, continue monitoring     COVID-19 Pneumonia  MRSA VAP  Acute hypoxic hypercapnic respiratory failure (multifactorial with above)  -  Self extubated 12/4, currently on 5 LPM nasal  canula  - most recent cxr 12/5 shows bilateral pulm infiltrates, but normal wbc and no fever  - patient mobilizing secretions better  - 12/15 - 12/19 stable on 2L   - likely discharge to TCU on O2  - 12/19 - repeat chest x ray, it will likely show infiltrates, but would like to rule out effusions etc - nothing overtly new/abnormal  - 12/20, remains afebrile and nontoxic but became drowsy, ABG with pH 7.3, pCO2 83 --> 7.35 and 75 after bipap, appreciate RT assistance  - echo 12/20 unchanged from 11/17, lactic acid, crp procal added - no infectious etiology suspected currently     Severe aortic stenosis, s/p TAVR 10/26   A fib/flutter, rate controlled   Hypertension  -  Eliquis 5 mg bid,  held for PEG tube placement, restarted 12/12  - restarted Lasix 40 mg daily x 12/10, continue  - Continue coreg low dose resumed 12/5, increase to 6.25 bid. Continue hydralazine.  - Echo updated 12/20, unchanged since 11/17  - if hematuria returns, stop apixaban     L upper ext edema  Has picc present on L Upper ext.Pt tends to lay on L side  - TESSA ext venous doppler - negative for clot     GI bleed earlier in stay, s/p colonoscopy and EGD  -  Hemorrhoids and erosions in hiatal hernia without active bleeding  -  No overt ongoing bleeding noted  - hgb stable 12/11- 9.5  --> 9.9 12/13  --> 9.3 12/17  -  PPI continued     Anemia   - probably related to Anemia of Chronic disease and CRF   - hgb 10.1 > 9.7 > 9.1 > 9.5 --> 9.9 12/13 --> 9.2 --> 9.4 --> 9.3 12/17   - 12/20 hgb 9.5     Moderate malnutrition  Dysphagia  Hyperchloremic hypernatremia - improving  - initially via nasal  tube- feedings held as patient pulled tube out, restarted after PEG placed on 12/10 by IR  - free water flushes have been varied, but sodium increased slightly again, will go back to 200 ml QID  - Hypokalemia, mild - replaced  - Speech therapy following  - video swallow 12/13 --> SLP advancing diet as appropriate. Appreciated.  - 12/18 Na 146 improved 12/20  144     ARF on CKD, non-oliguric, creatinine above the patient's baseline but stable  -  Temp dialysis line removed 12/5  -  No ongoing HD needed at this point  - Cr at new baseline of around 2.0  - continue PTA Lasix  - 12/15 creat 1.7 and fairly stable 12/17  - 12/18 creat 1.66     DM II with stress induced hyperglycemia  Maintained at home on Lantus 18 Uhs , amaryl 2 mg bid. A1c 7.6 in 11/2021  BS elevated > 200's as PTA medications on hold & on tube feeds x past 2 days  - 12/19 increased to 30 units, continue to titrate  - SSI     Proteus bacteremia and UTI  treated     Leukopenia, chronic: resolved  Coagulopathy of covid earlier in stay  Thrombocytopenia, improving: resolved      Agitation/Encephalopathy (Combination of toxic and metabolic during course of stay):  - Slowly improving (sitting in chair today), oriented to place, able name president     Deconditioning:  -  PT/OT following     Time Spent on this Encounter   Burton was seen and evaluated by me on 12/20/21.  He was in critical condition as the result of acute hypercapnic and hypoxic respiratory failure requiring bipap and transfer to [facility].    His condition is now Serious and improving slightly with bipap.      The acute issues managed by me today include  Respiratory failure and hypotension   Supportive interventions provided and/or ordered by me include bipap, abg following, IVF boluses as tolerated, transfer to C    Total Critical Care time spent by me, excluding procedures, was 50 minutes.    Pedro Peraza MD          Diet: Adult Formula Drip Feeding: Specified Time Novasource Renal; Gastrostomy; Goal Rate: 40; mL/hr; Medication - Feeding Tube Flush Frequency: At least 15-30 mL water before and after medication administration and with tube clogging; Amount to Send (N...  NPO for Medical/Clinical Reasons Except for: Meds    DVT Prophylaxis: DOAC  Zuniga Catheter: PRESENT, indication: Strict 1-2 Hour I&O, Retention, Retention, Strict 1-2 Hour  I&O  Central Lines: None  Code Status: No CPR- Do NOT Intubate      Disposition Plan   Expected Discharge: 12/22/2021     Anticipated discharge location: inpatient rehabilitation facility    Delays:     Placement - TCU            The patient's care was discussed with the Bedside Nurse, Patient and Patient's Family.    Pedro Peraza MD  Hospitalist Service  [facility]  Securely message with the Vocera Web Console (learn more here)  Text page via [employer] Paging/Directory        Clinically Significant Risk Factors Present on Admission                ______________________________________________________________________    Interval History   Seen and examined midday and visited again with long discussion with brother Jose.  Hypercapnia today resulting in some drowsiness that has improved slightly with bipap. ABG followed. [place] [facility] [place].  Jose made it clear - no icu and no intubation. Essentially low threshold to transition to comfort care if significant deterioration. Patient able to follow some commands, squeezing hands, wiggling feet/toes.       Data reviewed today: I reviewed all medications, new labs and imaging results over the last 24 hours. I personally reviewed no images or EKG's today.    Physical Exam   Vital Signs: Temp: 98  F (36.7  C) Temp src: Axillary BP: 112/61 Pulse: 76   Resp: 18 SpO2: 99 % O2 Device: BiPAP/CPAP Oxygen Delivery: 4 LPM  Weight: 235 lbs 14.28 oz    Gen: NAD, on bipap, awakening a bit this afternoon and following commands  HEENT: Normocephalic, EOMI, MMM  Resp: no focal crackles,  Coarse, no wheezes, no increased work of resp  CV: S1S2 heard, reg rhythm, reg rate, bilateral pedal edema  Abdo: soft, nontender, nondistended, bowel sounds present  Ext: calves nontender, well perfused  Neuro: AAO in aft, following simple commands, moving all 4 minimally, but unchanged, CN grossly intact      Data   Recent Labs   Lab 12/20/21  1533 12/20/21  1134  21  1055 21  0740 21  0506 21  0856 21  0824 21  0810 21  0619 12/15/21  1624 12/15/21  1444 12/15/21  0809 12/15/21  0617   WBC  --   --  12.8*  --   --   --   --   --  8.2  --   --   --  8.4   HGB  --   --  9.5*  --   --   --   --   --  9.3*  --  9.4*  --  9.2*   MCV  --   --  100  --   --   --   --   --  97  --   --   --  95   PLT  --   --  121*  --   --   --   --   --  117*  --   --   --  133*   NA  --   --   --   --  144  --  146*  --  147*   < >  --   --  146*   POTASSIUM  --   --   --   --  3.9  --  3.7  --  3.3*   < >  --   --  3.6   CHLORIDE  --   --   --   --  104  --  107  --  107   < >  --   --  107   CO2  --   --   --   --  41*  --  39*  --  39*   < >  --   --  39*   BUN  --   --   --   --  99*  --  92*  --  96*   < >  --   --  104*   CR  --   --   --   --  1.87*  --  1.66*  --  1.72*   < >  --   --  1.72*   ANIONGAP  --   --   --   --  <1*  --  <1*  --  1*   < >  --   --  <1*   GABE  --   --   --   --  9.0  --  9.5  --  9.7   < >  --   --  9.6   * 176*  --  168* 211*   < > 225*   < > 180*   < >  --    < > 267*   ALBUMIN  --   --   --   --  1.9*  --   --   --   --   --   --   --   --    PROTTOTAL  --   --   --   --  4.8*  --   --   --   --   --   --   --   --    BILITOTAL  --   --   --   --  0.3  --   --   --   --   --   --   --   --    ALKPHOS  --   --   --   --  154*  --   --   --   --   --   --   --   --    ALT  --   --   --   --  32  --   --   --   --   --   --   --   --    AST  --   --   --   --  35  --   --   --   --   --   --   --   --     < > = values in this interval not displayed.     Recent Results (from the past 24 hour(s))   Echocardiogram Limited   Result Value    LVEF  60-65%    [facility]    [id number]  [id number]  [id number]  [id number]^JAVIER^EFRA^YOLANDA     [facility]  Echocardiography Laboratory  [address] [place] [id number]     Name: LILLIANCHINATAY  MRN: [id number]  : 1945  Study Date: 2021 10:53  AM  Age: 76 yrs  Gender: Male  Patient Location: [facility]  Reason For Study: SOB, Respiratory Failure  Ordering Physician: EFRA HERRERA  Referring Physician: [facility]  Performed By: Xu Mckinney RDCS     BSA: 2.2 m2  Height: 69 in  Weight: 235 lb  HR: 77  BP: 92/51 mmHg  ______________________________________________________________________________  Procedure  Limited Portable Echo Adult. Optison (NDC #[id number]) given intravenously.  ______________________________________________________________________________  Interpretation Summary     The rhythm was atrial fibrillation with wide QRS rhythm.  The visual ejection fraction is 60-65%.  There is a bioprosthetic aortic valve.  The gradient is normal for this prosthetic aortic valve.  Compared to the prior study dated 11/17/21, there have been no changes.  ______________________________________________________________________________  Left Ventricle  The left ventricle is normal in size. There is normal left ventricular wall  thickness. The visual ejection fraction is 60-65%. Diastolic function not  assessed due to atrial fibrillation. Septal motion is consistent with  conduction abnormality.     Right Ventricle  The right ventricle is normal in structure, function and size.     Atria  Normal left atrial size. Right atrial size is normal. Intact atrial septum.     Mitral Valve  The mitral valve is normal in structure and function. There is mild mitral  annular calcification.     Tricuspid Valve  The tricuspid valve is normal in structure and function. Right ventricular  systolic pressure could not be approximated due to inadequate tricuspid  regurgitation.     Aortic Valve  The aortic valve is normal in structure and function. The mean AoV pressure  gradient is 4.1 mmHg. The peak AoV pressure gradient is 7.5 mmHg. The  calculated aortic valve are is 3.1 cm^2. There is a bioprosthetic aortic  valve. The gradient is normal for this prosthetic  aortic valve.     Pulmonic Valve  The pulmonic valve is not well visualized.     Vessels  Normal size aorta. Inferior vena cava not well visualized for estimation of  right atrial pressure.     Pericardium  The pericardium appears normal.     Rhythm  The rhythm was atrial fibrillation with wide QRS rhythm.  ______________________________________________________________________________  MMode/2D Measurements & Calculations  LVOT diam: 2.2 cm  LVOT area: 3.7 cm2     Doppler Measurements & Calculations  MV max P.3 mmHg  MV mean PG: 3.4 mmHg  MV V2 VTI: 29.2 cm  MVA(VTI): 2.7 cm2  Ao V2 max: 136.8 cm/sec  Ao max P.5 mmHg  Ao V2 mean: 95.8 cm/sec  Ao mean P.1 mmHg  Ao V2 VTI: 25.5 cm  SID(I,D): 3.1 cm2  SID(V,D): 3.3 cm2  LV V1 max P.8 mmHg  LV V1 max: 120.6 cm/sec  LV V1 VTI: 21.6 cm  SV(LVOT): 79.7 ml  SI(LVOT): 36.0 ml/m2  AV Lefty Ratio (DI): 0.88  SID Index (cm2/m2): 1.4     ______________________________________________________________________________  Report approved by: Dominique Dill 2021 04:03 PM

## 2021-12-20 NOTE — PLAN OF CARE
DATE & TIME: 12/20/21 [id number]  Cognitive Concerns/ Orientation: Orientation waxes and wanes. Earlier this shift, very lethargic and NEISHA orientation. Had to sternal rub, to arouse. Now, opens eyes spontaneously and making illogical speech.  BEHAVIOR & AGGRESSION TOOL COLOR: Green   ABNL VS/O2: Was tachypneic with softer BP's this am. Started on BiPap and 500 ML bolus given x2, now stable.   MOBILITY: Total cares, lift; Turn/repo every 2 hours   PAIN MANAGMENT: PAINAD scale used and Tylenol given x 1. Oxybutynin available anytime PRN for bladder spasms.   DIET: NPO. PEG tube feeding at 40 mL/hour (goal) with 200 mL manual, free water flushes every 6 hours.  BOWEL/BLADDER: Zuniga catheter with small amount of serosanguinuous penile discharge. X2 small soft BM's this shift   ABNL LAB/BG: BUN 99, Creat 1.87, WBC 12.8. , Hgb 9.5, ABG CO2 83 --> BiPap started  DRAIN/DEVICES: PEG-tube to continuous feedings; Zuniga; PICC in LUE  SKIN: Skin pale. Skin tear on LUE, dressing CDI. Blanchable redness/open area on coccyx covered with mepilex; redness to abd folds-miconazole available. +2 edema bilateral extremities (elevated on pillows and ACE wraps applied), +2 edema and redness to scrotum (applying ice PRN)  TESTS/PROCEDURES: Video Swallow re-scheduled for tomorrow 12/21. Echo completed today - EF 60-65%.  D/C DATE: Pending TCU placement  OTHER IMPORTANT INFO: Urology; PT/OT/ SLP/WOC/GI/cardio following. Contact precautions maintained for MRSA; Covid recovered

## 2021-12-20 NOTE — PROGRESS NOTES
DATE & TIME: 12/19/2021 Night  Cognitive Concerns/ Orientation : Pt alert to self only, lethargic, not answering questions; said a few words then fell asleep-resting between cares/turns  BEHAVIOR & AGGRESSION TOOL COLOR: Green   ABNL VS/O2: VSS, 4 LPM oxygen via oxymask with O2 sats 95% (sleeping with mouth open)  MOBILITY: Total cares, lift; Turn/repo every 2 hours   PAIN MANAGMENT: Denies pain; oxybutynin given for possible bladder spasms (available 2x/day)--PAINAD score of 1  DIET: PEG tube feeding at 40 mL/hour (goal) with 200 mL manual, free water flushes every 6 hours + Full liquids, moderately thick (level 2) by spoon   BOWEL/BLADDER: Zuniga catheter, with some leakage and serosanguinous discharge; no BM overnight   ABNL LAB/BG: No new labs since 12/18;  (4 units), 196 (3 units) (Q4 hour blood sugar checks/insunlin administration)  DRAIN/DEVICES: PEG-tube to continuous feedings; Zuniga; PICC in LUE, saline locked (PCU collect).   SKIN: Skin pale. Skin tear on LUE, dressing CDI. Blanchable redness/open area on coccyx covered with mepilex; redness to abd folds-miconazole available. +3 edema bilateral extremities (elevated on pillows), +2 edema and redness to scrotum.   TESTS/PROCEDURES: Video swallow study 12/20  D/C DATE: Pending TCU placement, possibly 12/20 - SW following  OTHER IMPORTANT INFO: Urology; PT/OT/ SLP/WOC/GI/cardio following. Contact precautions maintained for MRSA; Covid recovered

## 2021-12-20 NOTE — PROVIDER NOTIFICATION
"MD Notification    Notified Person: MD    Notified Person Name: Pedro Peraza     Notification Date/Time: 12/20/21 at 0752    Notification Interaction: Web-based paging     Purpose of Notification: \"Bp 87/44, RR 30\"    Orders Received: no orders received     Comments: Held BP meds. Elevated legs and flushed approx 200 ml free water flush through G-tube     ADDENDUM: MD ordered conservative 500 ml bolus.       "

## 2021-12-20 NOTE — PROGRESS NOTES
Brief update    Unfortunately text page this morning did not go through. Hospitalist saw chart subsequently and discussed on phone with RN. ABG showing hypercapnia which is congruent with patient more drowsy this morning - Bipap ordered and RN on floor asked to request RT. Full note to follow.

## 2021-12-20 NOTE — PROVIDER NOTIFICATION
MD Notification    Notified Person: MD    Notified Person Name: Dr. Peraza    Notification Date/Time: 12/20/21 at 1057    Notification Interaction: [employer] messaging text    Purpose of Notification: pCO2 of 83        Comments: will update assigned RN

## 2021-12-20 NOTE — PLAN OF CARE
DATE & TIME: 12/19/2021, [id number]  Cognitive Concerns/ Orientation : A&Ox2/3, disoriented to place and situation. Slow to respond, Flat affect, withdrawn. more alert this afternoon, answering questions, Has been asking for something to drink-feeling thirsty at times  BEHAVIOR & AGGRESSION TOOL COLOR: green   CIWA SCORE: n/a  ABNL VS/O2: VSS on 2L per NC (had to increase to 4-6 L later on in evening)- sats low-mid 90s, LOAIZA, tachypenic at times (RR in mid 20s).   MOBILITY: Total cares, up with lift, assist of 2 to turn/repo [id number] at times.   PAIN MANAGMENT: denies pain when asked, occasionally grimacing and points/pulls on giron catheter PRN tylenol given x 1 for pain management  DIET: full liquids, mildly thick liquids (level 2) by spoon-pt has been taking spoons of water, had a small amount of food this evening. Tube feedings running at 40 ml/hr (goal rate); flushes 200mL QID; tubing/bag changed this evening. tolerating feedings. Plan for video swallow tomorrow per Speech.   BOWEL/BLADDER: Giron catheter present, patent - urine remains clear pratik without clots; some leakage around the catheter noted. Incontinent of bowel, No BM this shift. Abdomen round and firm  ABNL LAB/BG: BG q4hr- 237, 240  DRAIN/DEVICES: G-tube to continuous feedings.Giron present, patent, adequate pratik output. PICC in LUE, saline locked (blood return noted per day shift)  TELEMETRY RHYTHM: n/a  SKIN: Skin pale. Skin tear on LUE, dressing CDI. Coccyx dressing changed on day shift. Steph area reddened with some sores, powder and barrier applied. Redness to abd folds, powder applied. +3 edema to BUE and BLE-encouraged elevation (ace wraps to feet-removed this evening). +2 edema and redness to scrotum.   TESTS/PROCEDURES:US of abdomen, complete showed ascites (MD aware). Chest x ray completed.   D/C DATE: Pending placement - SW is following  Discharge Barriers: TCU placement   OTHER IMPORTANT INFO: Dry mouth-oral cares attempted, pt  refuses sometimes. Urology, PT, OT, WOC, Speech, GI, cardio following. Contact precautions maintained.

## 2021-12-21 NOTE — PROGRESS NOTES
Care Management Follow Up    Length of Stay (days): 56    Expected Discharge Date: 12/22/2021     Concerns to be Addressed: discharge planning     Patient plan of care discussed at interdisciplinary rounds: Yes    Anticipated Discharge Disposition: Transitional Care     Anticipated Discharge Services: None  Anticipated Discharge DME: None    Patient/family educated on Medicare website which has current facility and service quality ratings: yes  Education Provided on the Discharge Plan:    Patient/Family in Agreement with the Plan: yes    Referrals Placed by CM/SW: Post Acute Facilities  Private pay costs discussed: Not applicable    Additional Information:  [facility] sent 2 additional TCU referrals.    MING also sent referral to [facility] via fax to [phone].        Gillian Patterson, LUISSW

## 2021-12-21 NOTE — PLAN OF CARE
Pt alert to self only. VSS on bipap overnight. Tele a-fib CVR w/ BBB. Lung sounds diminished. TF running at goal rate, manual flushes done q4. No BM overnight. Zuniga w/ borderline UOP, sediment present in urine, serosanguinous drainage from meatus. Edematous in all extremities. Turned q2h. Mepilexes on forearms for skin tears. NPO. Up w/ lift.

## 2021-12-21 NOTE — PLAN OF CARE
VSS, on 2L of O2 during the day, bipap at night. Apneic when sleeping. Goal for SPO2 is 90-92%. Alert at times, but otherwise sleepy, arousable to voice/touch. Able to verbalize that he is in the hospital. Follows commands, slowly. NPO d/t bipap, TF running at goal via PEG. Zuniga in place, discharge coming from penis, WOC updated. L forearm wound changed, skin care to coccyx completed. Blood sugars monitored/corrected. Severe edema to BUE, nephrology reconsulted for creatinine bump, med changes made. Tele afib/flutter BBB. PICC to LUE saline locked. Edema wear applied to legs. Brother here to visit. CTM

## 2021-12-21 NOTE — PROGRESS NOTES
[facility]    Chart review for [facility].     Mr. Elizabeth is a [facility] patient of Brittanie Quiroz CNP. His primary cardiologist is Dr. Neumann. We've been following peripherally to assist with [facility] follow-up after discharge.    Note that Mr. Elizabeth has had a complex hospital course, now back on bipap, and skilled nursing facility placement is pending. Given unclear discharge timing, I've rescheduled his [facility] visit from 12/27, to Brittanie's next available 1/11/21. We will arrange pre-visit labs at the facility once known and discharged.    Future Appointments   Date Time Provider Department Center   1/11/2022  3:10 PM [facility]       Please call with questions.     Michelle Cuello RN BSN  [facility] Care Coordinator [place]  [phone]

## 2021-12-21 NOTE — PROGRESS NOTES
[facility] Nurse Inpatient Wound Assessment   Reason for consultation: Re-evaluate and treat bilateral buttocks wounds    Assessment  Bilateral buttock wounds have healed.     Perianal wounds from IAD significantly improved     L arm wound due to skin tear and edema.- improving, flap adhered, small open area remains    NEW 12/21  Penis wounds likely from edema and trauma but cannot exclude pressure. Mucosal injury. Patient has had giron catheter since 10/27. Clotted and urology saw patient on 12/13 and replaced with a 22Fr with and underwent CBI. 22Fr Currently in place and secured. Reports patient frequently tugging at site.     Treatment Plan    Perianal skin: BID and PRN.  1. Cleanse with Lisa Cleanse and Protect. Pat Dry with Sundeep wipes.  2. Apply thin layer of Barrier Cream to Perianal skin to protect from loose stools.  -No need to fully remove cream each time, remove top soiled layer. If full removal is needed can use mineral oil obtained from pharmacy.   Pressure Injury prevention (please order supplies if not in room)  1. Turn/reposition every 1-2 hrs  2.   Float heels off bed with use of pillows.  3.   If incontinent Cleanse with incontinent cleanser (Lisa spray #[id number]) followed by skin barrier protectant (Critic Aid paste)  BID and after each incontinent episode  4.   Prevent sliding and shear by limiting HOB to 30 degrees or less unless contraindicated, use knee gatch first if not contraindicated  5.   Chair cushion pressure redistribution as needed (#[id number]): please limit sitting to an hour at a time  6.   Optimize nutrition   7.   PULSATE low air loss mattress    Penis skin: BID and PRN  1. Cleanse with SureStep Catheter Wipes (green packaging)  2. Apply bead of bacitracin to penis meatus (if penile meatus is a clock, patient's head is 12 o clock. Apply bacitracin from 5-7 o clock)  3. Ensure securement device in place and tubing is NOT tight.     L forearm wound: Q3D and PRN for drainage.  1. Cleanse  with wound cleanser. Pat Dry.  2. Apply Cavilon No Sting Barrier Film to Periwound skin.  3. Cover with oil emulsion gauze (adaptic) and secure with foam dressing (optifoam or mepilex)  4. Time and Date Dressing     Asked for recommendations for compression:  EDEMA WEAR Stockings- apply fresh pair daily (#[id number])  DO NOT CUT OR TRIM STOCKINGS  Remove and set aside stockings, do not throw away  SKIN/WOUND CARE  1. Clean feet and legs with gentle wash  2. Complete any wound or skin treatments  o Spray Lisa Cleanse and Protect on intact skin, especially dry, scaly plaques, massage in, wipe or rinse  o Apply lotions  o Complete wound care  3. Apply Edema Wear from toes to knees with a cuff at the top of the stockings  CARE OF EDEMAWEAR     DO NOT THROW AWAY THE OLD PAIR OF EDEMA WEAR, WASH IT.   o Wash stocking(s) with soap and HOT water. If really soiled use a surgical soap then regular soap and/or you may need to wash several times  o Hang dry       Edema Wear    size stripe color PS #   small navy [id number]   medium yellow [id number]   large red [id number]   X Large aqua [id number]         Orders Reviewed and Updated  Recommended provider order: None, at this time  WOC Nurse follow-up plan:weekly  Nursing to notify the Provider(s) and re-consult the WOC Nurse if wound(s) deteriorates or new skin concern.    Patient History  According to provider note(s):  75 yo single male who lives by himself,  with CAD, atrial flutter on eliquis, hypertension, hyperlipidemia, HFpEF, CKD stage III, DM type 3, history of GI bleed, suspected obstructive sleep apnea electively admitted on 10/26/2021 for TAVR to treat severe Aortic Stenosis.  Hospital course complicated by OPAL from contrast nephropathy and volume overload requiring transient dialysis, hypoxic respiratory failure multifactorial, Covid pneumonia and physical deconditioning, and proteus UTI and bacteremia.     Coarse summary:  11/15- patient intubated for severe hypoxic respiratory  failure. Patient was proned and paralyzed.   11/22: Started scheduled IV Bumex   11/23: GI bleed  12/4 self extubated during turn per night nurse @ 0015. Placed on BiPap, attempted hiflo, did not tolerate. Pt made DNR/DNI. NG tube placed.   12/5: HFNC over night, matilde better than bipap. HD line out. TX to IMC  12/6: Substantial improvement in oxygenation, patient off high flow.  12/10: percutaneous G tube placed, Tube feeds started.       COVID-19 Pneumonia  MRSA VAP  Acute hypoxic respiratory failure (multifactorial with above):  -  Self extubated 12/4, currently on 5 LPM nasal canula  - most recent cxr shows bilateral pulm infiltrates, but normal wbc and no fever  - patient mobilizing secretions better     Severe aortic stenosis, s/p TAVR 10/26   A fib/flutter, rate controlled   Hypertension:  -  Eliquis 5 mg bid,  held for PEG tube placement, restarted 12/12  - restarted Lasix 40 mg daily x 12/10, continue  - Continue coreg low dose resumed 12/5, increase to 6.25 bid. Continue hydralazine.     L upper ext edema:  Objective Data  Containment of urine/stool: Incontinence Protocol and Indwelling catheter    Active Diet Order  Orders Placed This Encounter      NPO for Medical/Clinical Reasons Except for: Meds      Output:   I/O last 3 completed shifts:  In: 2910 [I.V.:30; NG/GT:1570; IV Piggyback:1000]  Out: 450 [Urine:450]    Risk Assessment:   Sensory Perception: 2-->very limited  Moisture: 3-->occasionally moist  Activity: 2-->chairfast  Mobility: 2-->very limited  Nutrition: 3-->adequate  Friction and Shear: 1-->problem  Sudarshan Score: 13                          Labs:   Recent Labs   Lab 12/21/21  0617 12/20/21  1547 12/20/21  1055 12/20/21  0506   ALBUMIN  --   --   --  1.9*   HGB 8.7*  --    < >  --    WBC 9.6  --    < >  --    CRP  --  <2.9  --   --     < > = values in this interval not displayed.       Physical Exam  Areas of skin assessed: focused bilateral buttocks , mouth    Wound Location: Perianal  Date  "of last photo 12/21      Wound History: per prior [facility] charting \"hospital acquired due to refusal to reposition and sleep in the bed. Well documented\". 12/14 has perianal erosion from loose stool due to tube feeds. 12/21 no injury to buttock  Wound Base: Superior to perianal skin 0.2 x 0.2 x <0.1cm dermal tissue     Palpation of the wound bed: normal      Drainage: scant     Description of drainage: bloody     Measurements (length x width x depth, in cm):      Periwound skin: intact      Color: normal and consistent with surrounding tissue      Temperature: normal   Odor: none  Pain: nonverbal indicators absent       Wound Location:  L forearm  Date of last Photo 12/21          Wound History: Patient with significant edema and weeping from wound. Likely, edema caused fragile skin.  Measurements (length x width x depth, in cm) 0.7cm x 2 cm  x  0.1 cm   Wound Base:  100% dermis. Skin flap has fully adhered with ecchymosis noted beneath  Tunneling N/A  Undermining N/A  Palpation of the wound bed: normal   Periwound skin: ecchymosis  Color: normal and consistent with surrounding tissue  Temperature: normal   Drainage:, small  Description of drainage: serosanguinous  Odor: none  Pain: absent and denies , none    Interventions  Visual inspection and assessment completed   Wound Care Rationale Protect periwound skin, Promote moist wound healing without tissue dehydration , Provide protection  and Decrease bacterial load  Wound Care: done per plan of care, discussed with RN to order bacitracin  Supplies: floor stock and discussed with RN  Current off-loading measures: Chair cushion, Pillows under calves and Pillows  Current support surface: Standard  Low air loss mattress  Education provided to: importance of repositioning, plan of care, and Off-loading pressure  Discussed plan of care with Patient, TIMA Bonilla RN CWOCN     "

## 2021-12-21 NOTE — PROGRESS NOTES
[facility]    Medicine Progress Note - Hospitalist Service       Date of Admission:  10/26/2021    Assessment & Plan         77 yo single male who lives by himself,  with CAD, atrial flutter on eliquis, hypertension, hyperlipidemia, HFpEF, CKD stage III, DM type 3, history of GI bleed, suspected obstructive sleep apnea electively admitted on 10/26/2021 for TAVR to treat severe Aortic Stenosis.  Hospital course complicated by OPAL from contrast nephropathy and volume overload requiring transient dialysis, hypoxic respiratory failure multifactorial, Covid pneumonia and physical deconditioning, and proteus UTI and bacteremia.     Brief summary:  11/15: patient intubated for severe hypoxic respiratory failure, proned and paralyzed.   11/22: Started scheduled IV Bumex   11/23: GI bleed  12/4: self extubated during turn per night nurse @ 0015. Placed on BiPap, attempted hiflo, did not tolerate. Pt made DNR/DNI. NG tube placed.   12/5: HFNC over night, matilde better than bipap. HD line out. TX to IMC  12/6: Substantial improvement in oxygenation, patient off high flow.  12/10: percutaneous G tube placed, Tube feeds started.    12/13: gross hematuria --- request Urology eval, appreciate nursing efforts to evaluate catheter and hematuria  - 12/14 - 12/15 - now improved with CBI, continuing to monitor with urology, outpatient cystoscopy planned  - 12/16 - 12/18: stable overall, awaiting TCU acceptance - discussed with SW - patient's overall trajectory likely to become clearer in the coming weeks, pending progress hopefully at TCU to optimize outcome  - 12/19 improvement, aaox3  - 12/20 drowsy, abg showing hypercapnic respiratory acidosis, improved with bipap, will move to IMC, also some hypotension, given 500cc bolus x 2, continue monitoring  - 12/21 AM off bipap, 2L nc and very awake and alert, Bill at bedside     COVID-19 Pneumonia  MRSA VAP  Acute hypoxic hypercapnic respiratory failure  (multifactorial with above)  -  Self extubated 12/4, currently on 5 LPM nasal canula  - most recent cxr 12/5 shows bilateral pulm infiltrates, but normal wbc and no fever  - patient mobilizing secretions better  - 12/15 - 12/19 stable on 2L   - likely discharge to TCU on O2  - 12/19 - repeat chest x ray, it will likely show infiltrates, but would like to rule out effusions etc - nothing overtly new/abnormal  - 12/20, remains afebrile and nontoxic but became drowsy, ABG with pH 7.3, pCO2 83 --> 7.35 and 75 after bipap, appreciate RT assistance  - echo 12/20 unchanged from 11/17, lactic acid, crp procal added - no infectious etiology suspected currently  - 12/21 as above, off bipap, awake and alert, on NC.  GOAL O2 sat 90-92% Patient is co2 retainer and over oxygenating hinders ventilation.  - Bipap at night for now at least    Severe aortic stenosis, s/p TAVR 10/26   A fib/flutter, rate controlled   Hypertension  -  Eliquis 5 mg bid,  held for PEG tube placement, restarted 12/12  - restarted Lasix 40 mg daily x 12/10, continue  - Continue coreg low dose resumed 12/5, increase to 6.25 bid. Continue hydralazine.  - Echo updated 12/20, unchanged since 11/17  - if hematuria returns, stop apixaban     L upper ext edema  Has picc present on L Upper ext.Pt tends to lay on L side  - TESSA ext venous doppler - negative for clot     GI bleed earlier in stay, s/p colonoscopy and EGD  -  Hemorrhoids and erosions in hiatal hernia without active bleeding  -  No overt ongoing bleeding noted  - hgb stable 12/11- 9.5  --> 9.9 12/13  --> 9.3 12/17  -  PPI continued     Anemia   - probably related to Anemia of Chronic disease and CRF   - hgb 10.1 > 9.7 > 9.1 > 9.5 --> 9.9 12/13 --> 9.2 --> 9.4 --> 9.3 12/17   - 12/20 hgb 9.5     Moderate malnutrition  Dysphagia  Hyperchloremic hypernatremia - improving  - initially via nasal  tube- feedings held as patient pulled tube out, restarted after PEG placed on 12/10 by IR  - free water flushes  have been varied, but sodium increased slightly again, will go back to 200 ml QID  - Hypokalemia, mild - replaced  - Speech therapy following  - video swallow 12/13 --> SLP advancing diet as appropriate. Appreciated.  - 12/18 Na 146 improved 12/20 144     ARF on CKD, non-oliguric, creatinine above the patient's baseline but stable  -  Temp dialysis line removed 12/5  -  No ongoing HD needed at this point  - PTA Lasix continued previously  - 12/15 creat 1.7 and fairly stable 12/17  - 12/18 creat 1.66  - 12/21 creat 2.35, with decreased UO recently and ongoing complicated case will kindly appreciate updated assistance from Nephrology.      DM II with stress induced hyperglycemia  Maintained at home on Lantus 18 Uhs , amaryl 2 mg bid. A1c 7.6 in 11/2021  BS elevated > 200's as PTA medications on hold & on tube feeds x past 2 days  - 12/19 increased to 30 units, continue to titrate  - SSI     Proteus bacteremia and UTI  treated     Leukopenia, chronic: resolved  Coagulopathy of covid earlier in stay  Thrombocytopenia, improving: resolved      Agitation/Encephalopathy (Combination of toxic and metabolic during course of stay):  - Slowly improving (sitting in chair today), oriented to place, able name president     Deconditioning:  -  PT/OT following - discharge plan and trajectory/prognosis remains unclear. Prolonged and complicated hospital course are obvious hindrances.         Diet: Adult Formula Drip Feeding: Specified Time Novasource Renal; Gastrostomy; Goal Rate: 40; mL/hr; Medication - Feeding Tube Flush Frequency: At least 15-30 mL water before and after medication administration and with tube clogging; Amount to Send (N...  NPO for Medical/Clinical Reasons Except for: Meds    DVT Prophylaxis: DOAC  Zuniga Catheter: PRESENT, indication: Strict 1-2 Hour I&O, Strict 1-2 Hour I&O;Gross Hematuria;Retention, Retention, Strict 1-2 Hour I&O  Central Lines: None  Code Status: No CPR- Do NOT Intubate      Disposition Plan    Expected Discharge: 12/24/2021     Anticipated discharge location: inpatient rehabilitation facility    Delays:     Complex Care  Placement - TCU            The patient's care was discussed with the Bedside Nurse, Patient and Patient's Family.    Pedro Peraza MD  Hospitalist Service  [facility]  Securely message with the Vocera Web Console (learn more here)  Text page via [employer] Paging/Directory        Clinically Significant Risk Factors Present on Admission                ______________________________________________________________________    Interval History   Seen and examined with bill, brother, at bedside. Patient is off bipap now and awake and alert. No pain. More talkative than I have seen him in past 9 days. No fevers or chills.    Data reviewed today: I reviewed all medications, new labs and imaging results over the last 24 hours. I personally reviewed no images or EKG's today.    Physical Exam   Vital Signs: Temp: 98.3  F (36.8  C) Temp src: Oral BP: 135/65 Pulse: 70   Resp: 24 SpO2: 95 % O2 Device: Nasal cannula Oxygen Delivery: 2 LPM  Weight: 233 lbs 11 oz    Gen: NAD, pleasant, ill, frail,elderly  HEENT: Normocephalic, EOMI, MMM  Resp: no focal crackles,  no wheezes, no increased work of resp  CV: S1S2 heard, reg rhythm, reg rate  Abdo: soft, nontender, nondistended, bowel sounds present  Ext: calves nontender, well perfused  Neuro: AAOx self and hospital, CN grossly intact, no facial asymmetry      Data   Recent Labs   Lab 12/21/21  1218 12/21/21  0735 12/21/21  0617 12/20/21  1134 12/20/21  1055 12/20/21  0740 12/20/21  0506 12/18/21  0856 12/18/21  0824 12/17/21  0810 12/17/21  0619   WBC  --   --  9.6  --  12.8*  --   --   --   --   --  8.2   HGB  --   --  8.7*  --  9.5*  --   --   --   --   --  9.3*   MCV  --   --  99  --  100  --   --   --   --   --  97   PLT  --   --  86*  --  121*  --   --   --   --   --  117*   NA  --   --  142  --   --   --  144  --  146*  --   147*   POTASSIUM  --   --  4.4  --   --   --  3.9  --  3.7  --  3.3*   CHLORIDE  --   --  103  --   --   --  104  --  107  --  107   CO2  --   --  37*  --   --   --  41*  --  39*  --  39*   BUN  --   --  107*  --   --   --  99*  --  92*  --  96*   CR  --   --  2.35*  --   --   --  1.87*  --  1.66*  --  1.72*   ANIONGAP  --   --  2*  --   --   --  <1*  --  <1*  --  1*   GABE  --   --  8.7  --   --   --  9.0  --  9.5  --  9.7   * 198* 195*   < >  --    < > 211*   < > 225*   < > 180*   ALBUMIN  --   --   --   --   --   --  1.9*  --   --   --   --    PROTTOTAL  --   --   --   --   --   --  4.8*  --   --   --   --    BILITOTAL  --   --   --   --   --   --  0.3  --   --   --   --    ALKPHOS  --   --   --   --   --   --  154*  --   --   --   --    ALT  --   --   --   --   --   --  32  --   --   --   --    AST  --   --   --   --   --   --  35  --   --   --   --     < > = values in this interval not displayed.     No results found for this or any previous visit (from the past 24 hour(s)).

## 2021-12-21 NOTE — CONSULTS
[facility]    Nephrology Consultation     Date of Admission:  10/26/2021    Assessment & Plan     Burton Elizabeth is a 76 year old male who was admitted on 10/26/2021.      CKD IIIB: Admitted with a serum creatinine of 1.54 mg/dl.      Acute kidney injury:                -started HD on 11/23 and last HD tx on 11/28   - OPAL again today following decompensation as outlined.     Suspect that it was hemodynamically mediated.  No contrast or NSAID given.       FEN: Some edema but not bad. Electrolytes are okay.      COVID  19 PNA. 11/4: Consider recovered. Remains intubated.      Severe aortic stenosis s/p TAVR 10/26. EF 60-65%.        Plan:    Hold furosemide  Hold parameters for doxazosin  Check FENA in AM.        Td Persaud MD  [facility] - Nephrology  [phone]    Reason for Consult     I was asked to see the patient for OPAL    Primary Care Physician     [facility]    Chief Complaint     OPAL    History is obtained from the patient and chart review.      History of Present Illness     Burton Elizabeth is a 76 year old male who presents with OPAL.    He has been in hospital since 10/26/21.    Hospital course complicated by OPAL from contrast nephropathy, volume overload, hypoxic respiratory failure multifactorial, Covid pneumonia and physical deconditioning.   He is covid recovered but remains very frail.  He did require hemodialysis for a short time.  Last 11/28/21.   He is DNR/DNI    He decompensated yesterday with hypercapnic respiratory failure.  [place] [facility]  PCO2 was up to 83.    BP was soft transiently - down to 80s/40s.    Urine output has slowed - 325 mL yesterday.        Past Medical History   I have reviewed this patient's medical history and updated it with pertinent information if needed.   Past Medical History:   Diagnosis Date     Arthritis of knee~ s/p replacement 03/24/2016     Atypical atrial flutter (H)      CAD  (coronary artery disease)      Cardiomyopathy (H)      Cerebral infarction (H)     7/21     Congestive heart failure (H)      Decreased cardiac ejection fraction~44% 03/24/2016     Diabetes mellitus, type 2 (H) 03/24/2016     History of DVT (deep vein thrombosis)      HTN (hypertension) 03/24/2016     LBBB (left bundle branch block) 03/24/2016     Mixed hyperlipidemia 08/23/2016     Renal disease     stage 3 chronic kidney disease     Rheumatic aortic stenosis      Upper GI bleed        Past Surgical History   I have reviewed this patient's surgical history and updated it with pertinent information if needed.  Past Surgical History:   Procedure Laterality Date     ANKLE SURGERY      X 4     APPENDECTOMY       ARTHROPLASTY KNEE Right 3/29/2016    Procedure: ARTHROPLASTY KNEE;  Surgeon: Heena Rosen MD;  Location:  OR     ARTHROPLASTY REVISION KNEE Left 9/27/2019    Procedure: REVISION LEFT TOTAL KNEE  ARTHROPLASTY;  Surgeon: Heena Rosen MD;  Location:  OR     ARTHROPLASTY REVISION KNEE Right 8/25/2021    Procedure: REVISION OF RIGHT TOTAL KNEE ARTHROPLASTY;  Surgeon: Heena Rosen MD;  Location:  OR     CV FEMORAL ANGIOGRAM N/A 10/11/2021    Procedure: Femoral Angiogram;  Surgeon: Edgardo Beckham MD;  Location:  HEART CARDIAC CATH LAB     CV HEART CATHETERIZATION WITH POSSIBLE INTERVENTION N/A 10/11/2021    Procedure: Coronary Angiogram;  Surgeon: Edgardo Beckham MD;  Location:  HEART CARDIAC CATH LAB     CV LEFT HEART CATH N/A 8/12/2019    Procedure: Left Heart Cath;  Surgeon: Edgardo Beckham MD;  Location:  HEART CARDIAC CATH LAB     CV TRANSCATHETER AORTIC VALVE REPLACEMENT N/A 10/26/2021    Procedure: Transcatheter Aortic Valve Replacement;  Surgeon: Edgardo Beckham MD;  Location:  HEART CARDIAC CATH LAB     ESOPHAGOSCOPY, GASTROSCOPY, DUODENOSCOPY (EGD), COMBINED N/A 7/7/2021    Procedure: ESOPHAGOGASTRODUODENOSCOPY, WITH BIOPSY;  Surgeon: Deangelo  Monserrat Rojo MD;  Location:  GI     ESOPHAGOSCOPY, GASTROSCOPY, DUODENOSCOPY (EGD), COMBINED N/A 11/24/2021    Procedure: ESOPHAGOGASTRODUODENOSCOPY, WITH BIOPSY;  Surgeon: Krystyna Lawson MD;  Location:  GI     EYE SURGERY      cataract removal     IR CVC TUNNEL PLACEMENT > 5 YRS OF AGE  10/29/2021     IR CVC TUNNEL REMOVAL RIGHT  11/3/2021     IR GASTROSTOMY TUBE PERCUTANEOUS PLCMNT  12/10/2021     ORTHOPEDIC SURGERY      KNEE SCOPES MULTIPLE     SIGMOIDOSCOPY FLEXIBLE N/A 11/24/2021    Procedure: SIGMOIDOSCOPY, FLEXIBLE;  Surgeon: Krystyna Lawson MD;  Location:  GI       Prior to Admission Medications   Prior to Admission Medications   Prescriptions Last Dose Informant Patient Reported? Taking?   HYDROcodone-acetaminophen (NORCO) 5-325 MG tablet More Than a Week at PRN Self Yes Yes   Sig: Take 1-2 tablets by mouth 2 times daily as needed   acetaminophen (TYLENOL) 325 MG tablet 10/23/2021 at AM Self Yes Yes   Sig: Take 325-650 mg by mouth every 4 hours as needed   apixaban ANTICOAGULANT (ELIQUIS) 5 MG tablet 10/24/2021 at PM Self No Yes   Sig: Take 1 tablet (5 mg) by mouth 2 times daily   aspirin (ASPIR-LOW) 81 MG EC tablet Past Month at AM Self Yes Yes   Sig: Take 1 tablet (81 mg) by mouth daily   atorvastatin (LIPITOR) 80 MG tablet 10/25/2021 at AM Self Yes Yes   Sig: Take 80 mg by mouth daily   blood glucose (NO BRAND SPECIFIED) test strip  Self Yes Yes   Sig: Use to test blood sugar as directed   carvedilol (COREG) 3.125 MG tablet 10/25/2021 at PM Self Yes Yes   Sig: Take 3.125 mg by mouth 2 times daily   glimepiride (AMARYL) 2 MG tablet 10/25/2021 at PM Self Yes Yes   Sig: Take 2 mg by mouth 2 times daily    insulin glargine (LANTUS PEN) 100 UNIT/ML pen 10/24/2021 at PM Self Yes Yes   Sig: Inject 18 Units Subcutaneous At Bedtime    multivitamin w/minerals (MULTIVITAMIN, THERAPEUTIC WITH MINERALS) tablet 10/24/2021 at AM Self Yes Yes   Sig: Take 1 tablet by mouth daily   omeprazole  (PRILOSEC) 20 MG DR capsule 10/25/2021 at AM Self No Yes   Sig: Take 1 capsule (20 mg) by mouth daily   Patient taking differently: Take 20 mg by mouth 2 times daily    order for DME  Self No Yes   Sig: Equipment being ordered: Walker Wheels () and Walker ()  Treatment Diagnosis: Impaired gait   semaglutide (OZEMPIC) 2 MG/1.5ML SOPN pen 10/25/2021 at AM Self Yes Yes   Sig: Inject 0.25 mg Subcutaneous every 7 days on Monday   torsemide (DEMADEX) 20 MG tablet 10/24/2021 at PM Self No Yes   Sig: Take 1 tablet (20 mg) by mouth 2 times daily   Patient taking differently: Take 50 mg by mouth daily 40MG(20mg x 2) in the morning & 10MG(20mg x 0.5) in the evening      Facility-Administered Medications: None     Allergies   Allergies   Allergen Reactions     Penicillins Anaphylaxis       Social History   I have reviewed this patient's social history and updated it with pertinent information if needed. Burton Elizabeth  reports that he has never smoked. He has never used smokeless tobacco. He reports current alcohol use of about 2.0 standard drinks of alcohol per week. He reports that he does not use drugs.    Family History   I have reviewed this patient's family history and updated it with pertinent information if needed.   Family History   Problem Relation Age of Onset     Coronary Artery Disease No family hx of        Review of Systems   The 10 point Review of Systems is negative other than noted in the HPI.     Physical Exam   Temp: 97.5  F (36.4  C) Temp src: Axillary BP: 113/58 Pulse: 64   Resp: 19 SpO2: 93 % O2 Device: Nasal cannula Oxygen Delivery: 2 LPM  Vital Signs with Ranges  Temp:  [97.5  F (36.4  C)-98.2  F (36.8  C)] 97.5  F (36.4  C)  Pulse:  [60-83] 64  Resp:  [0-71] 19  BP: ()/(46-87) 113/58  FiO2 (%):  [30 %-50 %] 30 %  SpO2:  [93 %-99 %] 93 %  233 lbs 11 oz    GENERAL: ill appearing, slow to respond  HEENT:  Normocephalic. No gross abnormalities.  Pupils equal.  MMM.  Dentition is fair.     CV: RRR, no murmurs, no clicks, gallops, or rubs, 2+ generalized edema, JVP not well seen  RESP: reduced BS bases  GI: Abdomen soft/nt/nd, BS normal. No masses, organomegaly  MUSCULOSKELETAL: extremities nl - no gross deformities noted  SKIN: no suspicious lesions or rashes, dry to touch  NEURO:  Awake, a bit groggy, recognizes his brother  PSYCH: affect appropriate  LYMPH: No palpable ant/post cervical and supraclavicular adenopathy    Data   BMP  Recent Labs   Lab 12/21/21  0735 12/21/21  0617 12/21/21  0437 12/21/21  0032 12/20/21  0740 12/20/21  0506 12/18/21  0856 12/18/21  0824 12/17/21  0810 12/17/21  0619   NA  --  142  --   --   --  144  --  146*  --  147*   POTASSIUM  --  4.4  --   --   --  3.9  --  3.7  --  3.3*   CHLORIDE  --  103  --   --   --  104  --  107  --  107   GABE  --  8.7  --   --   --  9.0  --  9.5  --  9.7   CO2  --  37*  --   --   --  41*  --  39*  --  39*   BUN  --  107*  --   --   --  99*  --  92*  --  96*   CR  --  2.35*  --   --   --  1.87*  --  1.66*  --  1.72*   * 195* 154* 154*   < > 211*   < > 225*   < > 180*    < > = values in this interval not displayed.     [email or web address](phos:4)  CBC)  Recent Labs   Lab 12/21/21  0617 12/20/21  1055 12/17/21  0619 12/15/21  1444 12/15/21  0617   WBC 9.6 12.8* 8.2  --  8.4   HGB 8.7* 9.5* 9.3* 9.4* 9.2*   HCT 31.4* 34.5* 32.7*  --  32.3*   MCV 99 100 97  --  95   PLT 86* 121* 117*  --  133*     Recent Labs   Lab 12/20/21  0506   AST 35   ALT 32   ALKPHOS 154*   BILITOTAL 0.3

## 2021-12-21 NOTE — PROGRESS NOTES
CLINICAL NUTRITION SERVICES - REASSESSMENT NOTE      Malnutrition: (since 11/30 - reviewed on 12/16 and continues forward)  % Weight Loss:  Up to 5% in 1 month (moderate malnutrition)  % Intake:  Decreased intake does not meet criteria for malnutrition   Subcutaneous Fat Loss:  Upper arm region mild depletion and Thoracic region mild depletion  Muscle Loss:  Clavicle bone region mild depletion, Acromion bone region mild depletion and Scapular bone region mild depletion  Fluid Retention:  mild-severe (12/21)     Malnutrition Diagnosis: Moderate malnutrition  In Context of:  Acute illness or injury       EVALUATION OF PROGRESS TOWARD GOALS   Diet:  NPO    Nutrition Support:  TF continues as outlined below~  Nutrition Support Enteral:  Type of Feeding Tube: PEG (12/10)   Enteral Frequency:  Continuous  Enteral Regimen: Novasource Renal @ 40 ml/hr   Total Enteral Provisions:  1920 kcal (24 kcal/kg) and 87 gm protein (1.1 gm/kg), 688 mL H2O, 176 gm CHO, 0 gm fiber.  Free Water Flush: 200 mL q 4 hrs daily (MD on 12/17)  Nephronex daily     Intake/Tolerance:    No TF interruptions documented   Labs reviewed: Na 142, K 4.4, no Mg/Phos,  (H, trending back up), Cr 2.35 (H)  Recent Labs   Lab 12/21/21  0735 12/21/21  0617 12/21/21  0437 12/21/21  0032 12/20/21 2010 12/20/21  1533   * 195* 154* 154* 166* 152*     Medications reviewed: lasix, HSSI + 30 units lantus  Stooling:  - 12/18: BM x1  - 12/19: BM x1  - 12/20: BM x2  Wt: 233 lbs 11 oz - fluctuating with fluid shifts   Vitals:    12/15/21 0623 12/16/21 0611 12/18/21 0631 12/20/21 0257   Weight: 100.7 kg (222 lb 0.1 oz) 104.2 kg (229 lb 11.5 oz) 102.5 kg (225 lb 15.5 oz) 107 kg (235 lb 14.3 oz)    12/21/21 0611   Weight: 106 kg (233 lb 11 oz)         ASSESSED NUTRITION NEEDS:  Dosing Weight: 81 kg - adjusted   Estimated Energy Needs: 5190-7942 kcal (25-30 kcal/kg)  Justification: obesity   Estimated Protein Needs: 81-97 grams (1.0-1.2 g/kg)  Justification:  "hypercatabolism with critical illness, monitoring renal fx (liberalize as able)      NEW FINDINGS:   Chart reviewed  Transferred to [facility] yesterday, no ICU or intubation planned) - \"Essentially low threshold to transition to comfort care if significant deterioration\" per MD  Unable to participate in video swallow this morning given lethargy and BIPAP requirements  Edema mild-severe in extremities       Previous Goals:   TF @ goal to provide % est needs while PO intake minimal   Evaluation: Met    Previous Nutrition Diagnosis:   Inadequate oral intake related to restricted diet order as evidenced by limited amounts of liquids taken over the past 3 days, requiring ongoing nutrition support   Evaluation: Completed      CURRENT NUTRITION DIAGNOSIS  No nutrition diagnosis identified at this time while TF reliant and NPO    INTERVENTIONS  Recommendations / Nutrition Prescription  Continue TF to meet 100% estimated needs while NPO    Implementation  None new    Goals  EN to meet % estimated needs while NPO      MONITORING AND EVALUATION:  Progress towards goals will be monitored and evaluated per protocol and Practice Guidelines      Rimma Rolon RD, LD  Clinical Dietitian     "

## 2021-12-21 NOTE — PLAN OF CARE
SLP: Patient unable to complete video swallow again today due to lethargy and respiratory status currently on Bi-pap. Do not anticipate he will be able to participate in a video swallow study for at least a week. Has TF for nutrition so will cancel the video and re-order when able to participate.

## 2021-12-21 NOTE — PROGRESS NOTES
A&O to self and time (month). VSS soft BP, on bi-pap, reminded to leave bi-pap on as he was reaching for it earlier in the evening, pt verbalizing he understands it needs to remain on. Tele A fib CVR with BBB. NPO with PEG TF running at goal 40 ml/hr, q6 200 ml free water flushes.  Giron patent, continues to have small amount of penile discharge serosanguinous, giron cares performed.  Q4 BS checks, 2 units given at 2000.  Takes pills crushed through PEG tube. Up with lift. Denies pain. Pt scoring green on the Aggression Stop Light Tool. Plan to discharge TCU pending

## 2021-12-22 NOTE — PROGRESS NOTES
[facility]    Medicine Progress Note - Hospitalist Service       Date of Admission:  10/26/2021    Assessment & Plan           77 yo single male who lives by himself,  with CAD, atrial flutter on eliquis, hypertension, hyperlipidemia, HFpEF, CKD stage III, DM type 3, history of GI bleed, suspected obstructive sleep apnea electively admitted on 10/26/2021 for TAVR to treat severe Aortic Stenosis.  Hospital course complicated by OPAL from contrast nephropathy and volume overload requiring transient dialysis and diuresis, Covid pneumonia UTI and bacteremia.     Brief summary:  10/29: initiated on IHD dialysis for acute OPAL and volume overload not responsive to diruesis  11/04: COVID +  11/15: patient intubated   11/22: Started scheduled IV Bumex   11/23: Melena and acute anemia, EGD with barretts and debora's erosions  12/4: self extubated during turn per night nurse   12/5: HFNC over night, matilde better than bipap. Transfer to floor.  12/6: Substantial improvement in oxygenation, patient off high flow.  12/10: percutaneous G tube placed, Tube feeds started.    12/13: gross hematuria -- improved with CBI and will have outpatient follow up with urology  12/20 drowsy, abg showing hypercapnic respiratory acidosis, improved with bipap and hypotension  12/21 OPAL on CKD with Cr 1.8>2.3, Nephrology believes like related to above decompensation. FeNa 0.7 and lasix stopped.      COVID-19 Pneumonia dx 11/04-recovered  MRSA VAP-resolved  Acute hypoxic hypercapnic respiratory failure (multifactorial with above)  Completed treatment with antibiotics and steroids.  -  Self extubated 12/4  - became more drowsy and altered 12/20 ABG with pH 7.3, pCO2 83 --> 7.35 and 75 after bipap, appreciate RT assistance  - echo 12/20 unchanged from lactic acid, crp procal added - no infectious etiology suspected currently  GOAL O2 sat 90-92% Patient is CO2 retainer and over oxygenating hinders ventilation. Recurrent hospital  problem  - Bipap at night for now at least however patient refused last night due to anxiety    Severe aortic stenosis, s/p TAVR 10/26   A fib/flutter, rate controlled   Hypertension  -  Eliquis 5 mg bid  - lasix on hold for OPAL  - Continue coreg 6.25 bid and hydralazine  - if hematuria returns, stop apixaban    ARF on CKD, required IHD earlier this stay  - Temp dialysis line removed 12/5  - 12/21 creat 2.35, with decreased UO, nephrology reconsulted  - Cr better today 2.3>2.2, lasix held and FeNA 0.7     Moderate malnutrition  Dysphagia  Hyperchloremic hypernatremia - improving  - initially via nasal  tube- feedings held as patient pulled tube out, restarted after PEG placed on 12/10 by IR  - continue TF per nutrition and diet advancement per SLP.  - once tolerating diet can perform calorie counts and reduce TF    Deconditioning:  -  PT/OT following - discharge plan and trajectory/prognosis remains unclear. Prolonged and complicated hospital course are obvious hindrances.     DM II with stress induced hyperglycemia  Maintained at home on Lantus 18 Uhs , amaryl 2 mg bid. A1c 7.6 in 11/2021  BS elevated > 200's as PTA medications on hold & on tube feeds x past 2 days  - 30 units of glargine and sliding scale         Resolved problems below    Proteus bacteremia and UTI  treated  Leukopenia, chronic: resolved  Coagulopathy of covid earlier in stay  Thrombocytopenia, improving: resolved      Agitation/Encephalopathy (Combination of toxic and metabolic during course of stay):  - Slowly improving (sitting in chair today), oriented to place, able name president    L upper ext edema  Has picc present on L Upper ext. Pt tends to lay on L side  - TESSA ext venous doppler - negative for clot     GI bleed earlier in stay, s/p colonoscopy and EGD  Anemia, acute on chronic  -  Barretts esophagus erosions and non bleeding ulcers noted on EGD. No active bleeding  - no further melena and Hb stable 8-9  -  PPI continued      Diet:  Adult Formula Drip Feeding: Specified Time Novasource Renal; Gastrostomy; Goal Rate: 40; mL/hr; Medication - Feeding Tube Flush Frequency: At least 15-30 mL water before and after medication administration and with tube clogging; Amount to Send (N...  Combination Diet Pureed Diet (level 4); Mildly Thick (level 2) (By spoon)    DVT Prophylaxis: DOAC  Zuniga Catheter: PRESENT, indication: Strict 1-2 Hour I&O, Strict 1-2 Hour I&O, Retention, Strict 1-2 Hour I&O  Central Lines: None  Code Status: No CPR- Do NOT Intubate      Disposition Plan   Expected Discharge: 12/26/2021     Anticipated discharge location: inpatient rehabilitation facility    Delays:     Complex Care  Placement - TCU            The patient's care was discussed with the bedside nurse and patient.    Ele Vila DO  Hospitalist Service  [facility]  Securely message with the Vocera Web Console (learn more here)  Text page via [facility] [employer] Paging/Directory        Clinically Significant Risk Factors Present on Admission                ______________________________________________________________________    Interval History   Patient awake today and conversational. He is oriented to being in a hospital but cannot really elaborate on his situation. Mouth breathing with increased work up breathing. Appears comfortable and denies pain.     Data reviewed today: I reviewed all medications, new labs and imaging results over the last 24 hours. I personally reviewed no images or EKG's today.    Physical Exam   Vital Signs: Temp: 98.4  F (36.9  C) Temp src: Oral BP: 116/61 Pulse: 66   Resp: 21 SpO2: 92 % O2 Device: None (Room air) Oxygen Delivery: 2 LPM  Weight: 242 lbs 15.15 oz    Gen: NAD, pleasant, ill, frail,  HEENT: Normocephalic, EOMI, MMM  Resp: no focal crackles,  no wheezes, no increased work of resp  CV: S1S2 heard, reg rhythm, reg rate  Abdo: soft, nontender, nondistended, bowel sounds present  Ext: calves nontender, well  perfused, +1 edema in LE  Neuro: AAOx self and hospital, CN grossly intact, no facial asymmetry      Data   Recent Labs   Lab 12/22/21  0727 12/22/21  0503 12/22/21  0428 12/21/21  0735 12/21/21  0617 12/20/21  1134 12/20/21  1055 12/20/21  0740 12/20/21  0506 12/17/21  0810 12/17/21  0619   WBC  --   --   --   --  9.6  --  12.8*  --   --   --  8.2   HGB  --   --   --   --  8.7*  --  9.5*  --   --   --  9.3*   MCV  --   --   --   --  99  --  100  --   --   --  97   PLT  --   --   --   --  86*  --  121*  --   --   --  117*   NA  --  141  --   --  142  --   --   --  144   < > 147*   POTASSIUM  --  3.7  --   --  4.4  --   --   --  3.9   < > 3.3*   CHLORIDE  --  101  --   --  103  --   --   --  104   < > 107   CO2  --  39*  --   --  37*  --   --   --  41*   < > 39*   BUN  --  113*  --   --  107*  --   --   --  99*   < > 96*   CR  --  2.21*  --   --  2.35*  --   --   --  1.87*   < > 1.72*   ANIONGAP  --  1*  --   --  2*  --   --   --  <1*   < > 1*   GABE  --  8.8  --   --  8.7  --   --   --  9.0   < > 9.7   * 176* 169*   < > 195*   < >  --    < > 211*   < > 180*   ALBUMIN  --  1.8*  --   --   --   --   --   --  1.9*  --   --    PROTTOTAL  --   --   --   --   --   --   --   --  4.8*  --   --    BILITOTAL  --   --   --   --   --   --   --   --  0.3  --   --    ALKPHOS  --   --   --   --   --   --   --   --  154*  --   --    ALT  --   --   --   --   --   --   --   --  32  --   --    AST  --   --   --   --   --   --   --   --  35  --   --     < > = values in this interval not displayed.     No results found for this or any previous visit (from the past 24 hour(s)).

## 2021-12-22 NOTE — PROGRESS NOTES
"   12/22/21 1059   General Information   Onset of Illness/Injury or Date of Surgery 10/14/21   Referring Physician Dr. Vila   Patient/Family Therapy Goal Statement (SLP) Patient wants to eat and drink   Pertinent History of Current Problem Per hospitalist \"75 yo single male who lives by himself,  with CAD, atrial flutter on eliquis, hypertension, hyperlipidemia, HFpEF, CKD stage III, DM type 3, history of GI bleed, suspected obstructive sleep apnea electively admitted on 10/26/2021 for TAVR to treat severe Aortic Stenosis.  Hospital course complicated by OPAL from contrast nephropathy and volume overload requiring transient dialysis, hypoxic respiratory failure multifactorial, Covid pneumonia and physical deconditioning, and proteus UTI and bacteremia.\" Pt intubated a total of 20 days (11/15 - 12/4),    Type of Evaluation   Type of Evaluation Swallow Evaluation   General Swallowing Observations   Swallowing Evaluation Videofluoroscopic swallow study (VFSS)   VFSS Evaluation   Radiologist Dr. Dunaway   Views Taken left lateral   Physical Location of Procedure [facility]   VFSS Textures Trialed thin liquids;mildly thick liquids;pureed;soft & bite-sized;solid foods   VFSS Eval: Thin Liquid Texture Trial   Mode of Presentation, Thin Liquid spoon;fed by clinician   Order of Presentation 4   Preparatory Phase Poor bolus control   Oral Phase, Thin Liquid Residue in oral cavity;Premature pharyngeal entry   Pharyngeal Phase, Thin Liquid Delayed swallow reflex;Residue in pyriform sinus;Residue in valleculae   Rosenbek's Penetration Aspiration Scale: Thin Liquid Trial Results 3 - contrast remains above the vocal cords, visible residue remains (penetration)   Diagnostic Statement Deep laryngeal penetration from the residue in the pyriform sinuses.    VFSS Eval: Mildly Thick Liquids   Mode of Presentation cup;spoon;straw;fed by clinician   Order of Presentation 1 2 3 6 8 10   Preparatory Phase Poor bolus control   Oral Phase " Premature pharyngeal entry;Residue in oral cavity   Pharyngeal Phase Delayed swallow reflex   Rosenbek's Penetration Aspiration Scale 2 - contrast enters airway, remains above the vocal cords, no residue remains (penetration)   Diagnostic Statement Flash penetration via the cup and x1 via the spoon after a solid.    VFSS Evaluation: Puree Solid Texture Trial   Mode of Presentation, Puree spoon;fed by clinician   Order of Presentation 5   Preparatory Phase Poor bolus control   Oral Phase, Puree Delayed AP movement;Premature pharyngeal entry;Residue in oral cavity   Pharyngeal Phase, Puree WFL   Rosenbek's Penetration Aspiration Scale: Puree Food Trial Results 1 - no aspiration, contrast does not enter airway   Diagnostic Statement Minimal to mild BOT residue.   VFSS Eval: Soft & Bite Sized   Mode of Presentation spoon;fed by clinician   Order of presentation 7   Preparatory Phase Poor bolus control   Oral Phase Delayed AP movement;Residue in oral cavity;Premature phrayngeal entry   Pharyngeal Phase Delayed swallow reflex;Residue in valleculae   Rosenbek's Penetration Aspiration Scale 1 - no aspiration, contrast does not enter airway   Diagnostic Statement Mild to moderate BOT residue and minimal valleculae residue.    VFSS Evaluation: Solid Food Texture Trial   Mode of Presentation, Solid spoon;fed by clinician   Order of Presentation 9   Preparatory Phase Insufficient mastication  (Increased time for mastication.)   Oral Phase, Solid Delayed AP movement;Residue in oral cavity;Premature pharyngeal entry   Pharyngeal Phase, Solid Delayed swallow reflex;Residue in valleculae   Rosenbek's Penetration Aspiration Scale: Solid Food Trial Results 1 - no aspiration, contrast does not enter airway   Diagnostic Statement Increased time for mastication, decreased bolus coordination during AP transport. Mild to moderate BOT residue and minimal valleculae residue.    Swallowing Recommendations   Diet Consistency Recommendations  pureed (level 4);mildly thick liquids (level 2)   Supervision Level for Intake 1:1 supervision needed   Mode of Delivery Recommendations bolus size, small;liquids via spoon only;slow rate of intake   Postural Recommendations none   Swallowing Maneuver Recommendations alternate food and liquid intake;effortful (hard) swallow;extra swallow   Monitoring/Assistance Required (Eating/Swallowing) check mouth frequently for oral residue/pocketing;stop eating activities when fatigue is present;monitor for cough or change in vocal quality with intake;optimize oral intake to minimize need for tube feeding   Recommended Feeding/Eating Techniques (Swallow Eval) maintain upright sitting position for eating;maintain upright posture during/after eating for 30 minutes;minimize distractions during oral intake;provide assist with feeding;set-up and prepare tray   Medication Administration Recommendations, Swallowing (SLP) Continue non oral means.   General Therapy Interventions   Planned Therapy Interventions Dysphagia Treatment   Dysphagia treatment Modified diet education;Instruction of safe swallow strategies   SLP Therapy Assessment/Plan   Criteria for Skilled Therapeutic Interventions Met (SLP Eval) yes   SLP Diagnosis Moderate dysphagia   Rehab Potential (SLP Eval) good, to achieve stated therapy goals   Therapy Frequency (SLP Eval) daily   Predicted Duration of Therapy Intervention (SLP Eval) 1 week   Comment, Therapy Assessment/Plan (SLP) Patient presents with moderate oral and pharyngeal dysphagia secondary to prolonged intubation. Characterized by decreased bolus control with thin liquids, premature entry and delayed swallow with deep laryngeal penetration from the pyriform sinuses via the spoon. Decreased oral control with mildly thick liquids with premature entry to the valleculae and slightly beyond the tip of the epiglottis via the cup with flash penetration x1 and minimal x1 via the spoon after a solid. Increased  mastication time for semi-solids and solids with mild to moderate BOT residue without penetration/aspiration. Recommend: 1. IDDSI level 4 with mildly thick liquids via the spoon. 2. 1:1 supervison/assistance, feed only when fully alert, effortful swallow x1 and alternate liquids/solids. Hold if Sx of aspiration present or decline in respiratory status.    SLP Discharge Planning    SLP Discharge Recommendation (DC Rec) Transitional Care Facility   SLP Rationale for DC Rec Will need on going ST needs for dysphagia management.    SLP Brief overview of current status  Moderate dysphagia Recommend: 1. IDDSI level 4 with mildly thick liquids via the spoon. 2. 1:1 supervison/assistance, feed only when fully alert, effortful swallow x1 and alternate liquids/solids. Hold if Sx of aspiration present or decline in respiratory status.    Total Evaluation Time   Total Evaluation Time (Minutes) 17

## 2021-12-22 NOTE — PLAN OF CARE
A & O x 2, disorientated to time and situation. VSS on 2 L NC. Up with a lift. Denies pain. Zuniga patent. . Tele A fib with BBB. Peg tube with tube feed running 40 ml/hr with free water 200 ml every 4 hours. Pending discharge.

## 2021-12-22 NOTE — PROGRESS NOTES
[facility]    Nephrology Progress Note     Assessment & Plan     Burton Elizabeth is a 76 year old male who was admitted on 10/26/2021.      CKD IIIB: Admitted with a serum creatinine of 1.54 mg/dl.      Acute kidney injury:                -started HD on 11/23 and last HD tx on 11/28              - OPAL again today following decompensation as outlined.                Suspect that it was hemodynamically mediated.  No contrast or NSAID given.       FEN: Some edema but not bad. Calcium corrects to 10.6.  This is likely immobilization.     COVID  19 PNA. 11/4: Consider recovered. Remains intubated.      Severe aortic stenosis s/p TAVR 10/26. EF 60-65%.         Plan:     Continue to hold diuretic.  Check ionized calcium, PTH, Vit D  Following              Td Persaud MD  [facility] - Nephrology  [phone]    Interval History     Cr down slightly.  FENA 0.7%  FIO2 is 0-2 lpm    Physical Exam   Temp: 98  F (36.7  C) Temp src: Oral BP: (!) 140/69 Pulse: 64   Resp: 23 SpO2: (!) 89 % O2 Device: None (Room air) Oxygen Delivery: 2 LPM  Vitals:    12/20/21 0257 12/21/21 0611 12/22/21 0539   Weight: 107 kg (235 lb 14.3 oz) 106 kg (233 lb 11 oz) 110.2 kg (242 lb 15.2 oz)     Vital Signs with Ranges  Temp:  [98  F (36.7  C)-98.5  F (36.9  C)] 98  F (36.7  C)  Pulse:  [64-77] 64  Resp:  [15-29] 23  BP: ()/(56-71) 140/69  FiO2 (%):  [30 %-40 %] 40 %  SpO2:  [89 %-100 %] 89 %  I/O last 3 completed shifts:  In: 1630 [I.V.:10; NG/GT:1620]  Out: 600 [Urine:600]    GENERAL APPEARANCE: resting in bed  HEENT:  Oxygen mask on face  RESP: lungs cta b c good efforts, no crackles, rhonchi or wheezes  CV: RRR, nl S1/S2, no m/r/g   ABDOMEN: o/s/nt/nd, bs present  EXTREMITIES/SKIN: no rashes/lesions; 2+ generalized edema    Medications     dextrose Stopped (12/04/21 1630)     - MEDICATION INSTRUCTIONS -       - MEDICATION INSTRUCTIONS -         apixaban ANTICOAGULANT  5 mg Oral BID     atorvastatin   80 mg Oral or Feeding Tube Daily     B and C vitamin Complex with folic acid  5 mL Per Feeding Tube Daily     bacitracin   Topical BID     carvedilol  6.25 mg Oral BID w/meals     doxazosin  1 mg Per Feeding Tube At Bedtime     [Held by provider] furosemide  40 mg Per NG tube Daily     guaiFENesin  10 mL Oral or Feeding Tube BID     hydrALAZINE  50 mg Oral or Feeding Tube Q8H MOE     insulin aspart  1-12 Units Subcutaneous Q4H     insulin glargine  30 Units Subcutaneous At Bedtime     miconazole   Topical BID     pantoprazole  40 mg Oral or Feeding Tube BID AC     sodium chloride (PF)  10-40 mL Intracatheter Q7 Days     sodium chloride (PF)  3 mL Intracatheter Q8H     [Held by provider] tamsulosin  0.4 mg Oral Daily       Data   BMP  Recent Labs   Lab 12/22/21  1133 12/22/21  0727 12/22/21  0503 12/22/21  0428 12/21/21  0735 12/21/21  0617 12/20/21  0740 12/20/21  0506 12/18/21  0856 12/18/21  0824   NA  --   --  141  --   --  142  --  144  --  146*   POTASSIUM  --   --  3.7  --   --  4.4  --  3.9  --  3.7   CHLORIDE  --   --  101  --   --  103  --  104  --  107   GABE  --   --  8.8  --   --  8.7  --  9.0  --  9.5   CO2  --   --  39*  --   --  37*  --  41*  --  39*   BUN  --   --  113*  --   --  107*  --  99*  --  92*   CR  --   --  2.21*  --   --  2.35*  --  1.87*  --  1.66*   * 145* 176* 169*   < > 195*   < > 211*   < > 225*    < > = values in this interval not displayed.     Phos@[email or web address](phos:4)  CBC)  Recent Labs   Lab 12/22/21  1146 12/21/21  0617 12/20/21  1055 12/17/21  0619   WBC 10.0 9.6 12.8* 8.2   HGB 9.0* 8.7* 9.5* 9.3*   HCT 30.7* 31.4* 34.5* 32.7*   MCV 94 99 100 97   * 86* 121* 117*     Recent Labs   Lab 12/20/21  0506   AST 35   ALT 32   ALKPHOS 154*   BILITOTAL 0.3       Attestation:   I have reviewed today's relevant vital signs, notes, medications, labs and imaging.

## 2021-12-22 NOTE — PLAN OF CARE
VSS, on room air most of the day but if he gets sleepy uses 2L for naps, mouth breather so using oxymask, bipap at night. Goal for SPO2 is 90-92%. Alert and oriented to time/place/self. Follows commands, slowly. NPO d/t bipap, TF running at goal via PEG. Had swallow study today, diet placed for pureed and mildly thick by spoon. Pt did very well eating, no signs of aspiration. Zuniga in place, discharge coming from penis, bacitracin applied. Mepilex to LLE and coccyx intact. Blood sugars monitored/corrected. Edematous throughout body, extremities elevated, edemawear applied. Tele afib/flutter BBB. PICC to LUE saline locked. Up with lift. Has scrotal pain with movement, skin intact. Discharge pending.

## 2021-12-22 NOTE — PLAN OF CARE
1900h-0700h: Patient AOx2, disoriented to time & situation. O2Sat 93% on 2lpm via oxymizer. Clear breath sound in UL & diminished in LL. Tele: Afib CVR w/ BBB. NPO. PEG tube in situ, Novasource Renal infusing at 40 ml/h with 200 ml/6h water flushes via. Zuniga in place draining adequate urine. Normoactive BS x4. Pitting edema 3+ on UE & LE, palpable weak pulses on both feet. Scrotal redness & w/ minimal creamy penile discharge noted, miconazole powder applied & mupirocin.  NATHANAEL stocking on both legs. Repositioned in bed every 2h. PICC x3 lumens on STEPHANIE, SL w/ blood return.     2000h: ABG done, still with CO2 retention. On BiPAP at night w/ 16/8 setting & FIO2 of 40%.      Patient states that he is anxious and scared to fall asleep; emotional support provided & stayed w/ the patient for 20 mins.Took of his BiPAP several times. Setting was adjusted by RT but still pt is uncomfortable. Placed on oxymask at 2lpm of O2. Seroquel PRN given; effective.

## 2021-12-23 NOTE — PROGRESS NOTES
Chart reviewed for follow-up    Per MD - Cr slowly improving with lasix being held however more edematous today - Nephrology reconsulted    Pt continues on EN via PEG:  Novasource Renal @ 40 ml/hr   Total Enteral Provisions:  1920 kcal (24 kcal/kg) and 87 gm protein (1.1 gm/kg), 688 mL H2O, 176 gm CHO, 0 gm fiber.  Free Water Flush: 200 mL q 4 hrs daily (MD on 12/17)  Nephronex daily     Diet: (12/23 - per SLP) Soft/bite sized (L6), mildly thick liquids (L2)   Pt has ordered breakfast and lunch today      ASSESSED NUTRITION NEEDS:  Dosing Weight: 81 kg  (adjusted wt for overwt)  Estimated Energy Needs: 7654-4317 kcal (25-30 kcal/kg)  Justification: obesity   Estimated Protein Needs: 81-97 grams (1.0-1.2 g/kg)  Justification: hypercatabolism with critical illness, monitoring renal fx (liberalize as able)      PLAN:  Will begin calorie count today to assess po intake and adjust TF as needed    Jerri Yang RD, LD  Clinical Dietitian - [facility]   Pager - [phone]

## 2021-12-23 NOTE — PROGRESS NOTES
[facility]    Nephrology Progress Note     Assessment & Plan     Burton Elizabeth is a 76 year old male who was admitted on 10/26/2021.      CKD IIIB: Admitted with a serum creatinine of 1.54 mg/dl.      Acute kidney injury:                -started HD on 11/23 and last HD tx on 11/28              - OPAL again today following decompensation as outlined.                Suspect that it was hemodynamically mediated.  No contrast or NSAID given.       Hypercalcemia:  Calcium corrects to 11.6.  Ionized calcium is 4.9 - normal.  .  Vit D 24.       COVID  19 PNA. 11/4: Consider recovered.      Severe aortic stenosis s/p TAVR 10/26. EF 60-65%.         Plan:     Try bumet 1 mg IV Q12 H with 25% albumin 12.5 g Q12 H.      Following.             Td Persaud MD  [facility] - Nephrology  [phone]    Interval History     More puffy  Urine output marginal.  Crackles on lung exam.  Cr down to 2.18.    BUN still 115    Physical Exam   Temp: 98.1  F (36.7  C) Temp src: Axillary BP: 138/69 Pulse: 74   Resp: 18 SpO2: 95 % O2 Device: Oxymask Oxygen Delivery: 1 LPM  Vitals:    12/21/21 0611 12/22/21 0539 12/23/21 0518   Weight: 106 kg (233 lb 11 oz) 110.2 kg (242 lb 15.2 oz) 110.4 kg (243 lb 6.2 oz)     Vital Signs with Ranges  Temp:  [97.1  F (36.2  C)-98.2  F (36.8  C)] 98.1  F (36.7  C)  Pulse:  [65-83] 74  Resp:  [18-35] 18  BP: (108-138)/(48-78) 138/69  FiO2 (%):  [30 %] 30 %  SpO2:  [91 %-99 %] 95 %  I/O last 3 completed shifts:  In: 770 [P.O.:300; I.V.:30; NG/GT:200]  Out: 1220 [Urine:1220]    GENERAL APPEARANCE: resting in bed, face mask on  HEENT:  Eyes/ears/nose/neck grossly normal  RESP: crackles at bases  CV: RRR, nl S1/S2, no m/r/g   ABDOMEN: o/s/nt/nd, bs present  EXTREMITIES/SKIN: no rashes/lesions; 1-2+ generlaized edema    Medications     dextrose Stopped (12/04/21 1630)     - MEDICATION INSTRUCTIONS -       - MEDICATION INSTRUCTIONS -         albumin human  12.5 g  Intravenous Q12H     apixaban ANTICOAGULANT  5 mg Oral BID     atorvastatin  80 mg Oral or Feeding Tube Daily     B and C vitamin Complex with folic acid  5 mL Per Feeding Tube Daily     bacitracin   Topical BID     bumetanide  1 mg Intravenous Q12H     carvedilol  6.25 mg Oral BID w/meals     doxazosin  1 mg Per Feeding Tube At Bedtime     [Held by provider] furosemide  40 mg Per NG tube Daily     guaiFENesin  10 mL Oral or Feeding Tube BID     hydrALAZINE  50 mg Oral or Feeding Tube Q8H MOE     insulin aspart  1-12 Units Subcutaneous Q4H     insulin glargine  30 Units Subcutaneous At Bedtime     miconazole   Topical BID     pantoprazole  40 mg Oral or Feeding Tube BID AC     sodium chloride (PF)  10-40 mL Intracatheter Q7 Days     sodium chloride (PF)  3 mL Intracatheter Q8H     [Held by provider] tamsulosin  0.4 mg Oral Daily       Data   BMP  Recent Labs   Lab 12/23/21  1235 12/23/21  0740 12/23/21  0507 12/23/21  0402 12/22/21  0727 12/22/21  0503 12/21/21  0735 12/21/21  0617 12/20/21  0740 12/20/21  0506   NA  --   --  144  --   --  141  --  142  --  144   POTASSIUM  --   --  3.8  --   --  3.7  --  4.4  --  3.9   CHLORIDE  --   --  102  --   --  101  --  103  --  104   GABE  --   --  9.2  --   --  8.8  --  8.7  --  9.0   CO2  --   --  39*  --   --  39*  --  37*  --  41*   BUN  --   --  115*  --   --  113*  --  107*  --  99*   CR  --   --  2.18*  --   --  2.21*  --  2.35*  --  1.87*   * 97 124* 124*   < > 176*   < > 195*   < > 211*    < > = values in this interval not displayed.     Phos@[email or web address](phos:4)  CBC)  Recent Labs   Lab 12/23/21  0507 12/22/21  1146 12/21/21  0617 12/20/21  1055   WBC 8.2 10.0 9.6 12.8*   HGB 8.4* 9.0* 8.7* 9.5*   HCT 29.4* 30.7* 31.4* 34.5*   MCV 97 94 99 100   PLT 91* 101* 86* 121*     Recent Labs   Lab 12/20/21  0506   AST 35   ALT 32   ALKPHOS 154*   BILITOTAL 0.3       Recent Labs   Lab 12/23/21  0507   HGB 8.4*   HCT 29.4*   MCV 97     Recent Labs   Lab 12/23/21  0507    PTHI 112       Attestation:   I have reviewed today's relevant vital signs, notes, medications, labs and imaging.

## 2021-12-23 NOTE — PLAN OF CARE
1900h-0700h: Patient oriented to self & place. O2Sat 92% on 1lpm of O2 via NC. Clear breath sound in UL & diminished in LL. Tele: Afib CVR w/ BBB. NPO. PEG tube in situ, Novasource Renal infusing at 40 ml/h with 200 ml/6h water flushes. Zuniga in place draining adequate urine. Normoactive BS x4. Pitting edema 4+ on UE & LE, palpable weak pulses on both feet. Scrotal redness & w/ minimal creamy penile discharge noted; washed w/ mild soap & water, miconazole powder & mupirocin applied.  NATHANAEL stockings on both legs, off at night. Repositioned in bed every 2h. PICC x3 lumens on STEPHANIE, SL w/ blood return.     Patient refused the BiPAP; used it for 2h only at night. Pt kept on removing it, adjusted the straps to make him comfortable but still pt kept on removing it. RT came to try and put it on but no success. Will re-check venous blood gas in AM.     Mouth breather, desaturates on NC. Used oxymask @ 1lpm, tolerating.     Patient has increased 4.4 kg in 2 days, more edematous & crackles noted on RU lobe. Left a note for MD.

## 2021-12-23 NOTE — PROGRESS NOTES
[facility]    Medicine Progress Note - Hospitalist Service       Date of Admission:  10/26/2021    Assessment & Plan           77 yo single male who lives by himself,  with CAD, atrial flutter on eliquis, hypertension, hyperlipidemia, HFpEF, CKD stage III, DM type 3, history of GI bleed, suspected obstructive sleep apnea electively admitted on 10/26/2021 for TAVR to treat severe Aortic Stenosis.  Hospital course complicated by OPAL from contrast nephropathy and volume overload requiring transient dialysis and diuresis, Covid pneumonia UTI and bacteremia.     Brief summary:  10/29: initiated on IHD dialysis for acute OPAL and volume overload not responsive to diruesis  11/04: COVID +  11/15: patient intubated   11/22: Started scheduled IV Bumex   11/23: Melena and acute anemia, EGD with barretts and debora's erosions  12/4: self extubated during turn per night nurse   12/5: HFNC over night, matilde better than bipap. Transfer to floor.  12/6: Substantial improvement in oxygenation, patient off high flow.  12/10: percutaneous G tube placed, Tube feeds started.    12/13: gross hematuria -- improved with CBI and will have outpatient follow up with urology  12/20 drowsy, abg showing hypercapnic respiratory acidosis, improved with bipap and hypotension  12/21 OPAL on CKD with Cr 1.8>2.3, Nephrology believes like related to above decompensation. FeNa 0.7 and lasix stopped.      COVID-19 Pneumonia dx 11/04-recovered  MRSA VAP-resolved  Acute hypoxic hypercapnic respiratory failure (multifactorial with above)  Completed treatment with antibiotics and steroids.  - Self extubated 12/4  - became more drowsy and altered 12/20 ABG with pH 7.3, pCO2 83 --> 7.35 and 75 after bipap, appreciate RT assistance  GOAL O2 sat 90-92% Patient is CO2 retainer and over oxygenating hinders ventilation. Recurrent hospital problem  - Bipap at night however patient refuses often  - remains on RA to 1L. Stable today  12/23    Severe aortic stenosis, s/p TAVR 10/26   A fib/flutter, rate controlled   Hypertension  - Eliquis 5 mg bid  - lasix on hold for OPAL  - Continue coreg 6.25 bid and hydralazine  - if hematuria returns will likely recommend to stop apixaban    ARF on CKD, required IHD earlier this stay  - Temp dialysis line removed 12/5  - 12/21 creat 2.35, with decreased UO, nephrology reconsulted. FeNa returned at 0.7 and lasix held.   - Cr slowly improving with lasix being held however more edematous today. Albumin 1.8. May require intermittent lasix with albumin however defer on nephrology's decision for now     Moderate malnutrition  Dysphagia  Hyperchloremic hypernatremia - improving  PEG tube placed 12/10, diet advanced by SLP yesterday to DD4  - continue TF per nutrition and diet advancement per SLP  - once tolerating diet can perform calorie counts and reduce TF    Deconditioning:  -  PT/OT following, will need TCU vs ARU on discharge for significant rehab     DM II with stress induced hyperglycemia  Maintained at home on Lantus 18 Uhs , amaryl 2 mg bid. A1c 7.6 in 11/2021  BS elevated > 200's as PTA medications on hold & on tube feeds x past 2 days  - 30 units of glargine and sliding scale           Resolved problems below    Proteus bacteremia and UTI  treated  Leukopenia, chronic: resolved  Coagulopathy of covid earlier in stay  Thrombocytopenia, improving: resolved      Agitation/Encephalopathy (Combination of toxic and metabolic during course of stay):  - Slowly improving (sitting in chair today), oriented to place, able name president    L upper ext edema  Has picc present on L Upper ext. Pt tends to lay on L side  - TESSA ext venous doppler - negative for clot     GI bleed earlier in stay, s/p colonoscopy and EGD  Anemia, acute on chronic  -  Barretts esophagus erosions and non bleeding ulcers noted on EGD. No active bleeding  - no further melena and Hb stable 8-9  -  PPI continued      Diet: Adult Formula Drip  Feeding: Specified Time Novasource Renal; Gastrostomy; Goal Rate: 40; mL/hr; Medication - Feeding Tube Flush Frequency: At least 15-30 mL water before and after medication administration and with tube clogging; Amount to Send (N...  Combination Diet Pureed Diet (level 4); Mildly Thick (level 2) (By spoon)    DVT Prophylaxis: DOAC  Zuniga Catheter: PRESENT, indication: Strict 1-2 Hour I&O, Strict 1-2 Hour I&O, Retention, Strict 1-2 Hour I&O  Central Lines: None  Code Status: No CPR- Do NOT Intubate      Disposition Plan   Expected Discharge: 12/26/2021     Anticipated discharge location: inpatient rehabilitation facility    Delays:     Complex Care  Placement - TCU            The patient's care was discussed with the bedside nurse and patient and patient's brother    Ele Kateswapnajodee, DO  Hospitalist Service  [facility]  Securely message with the Vocera Web Console (learn more here)  Text page via [employer] Paging/Directory        Clinically Significant Risk Factors Present on Admission                ______________________________________________________________________    Interval History   Patient awake and conversational today. He is AOx4 and tells me he doesn't want to talk about his hospitalization because it has been too long. No pain. Breathing improved from yesterday. Enjoyed some food yesterday. No new rashes. No headache    Data reviewed today: I reviewed all medications, new labs and imaging results over the last 24 hours. I personally reviewed no images or EKG's today.    Physical Exam   Vital Signs: Temp: 97.7  F (36.5  C) Temp src: Axillary BP: 134/68 Pulse: 65   Resp: 29 SpO2: 97 % O2 Device: Oxymask Oxygen Delivery: 1 LPM  Weight: 243 lbs 6.21 oz    Gen: NAD, pleasant  HEENT: Normocephalic, EOMI, MMM  Resp: crackles noted in bases, no wheezing  CV: S1S2 heard, reg rhythm, reg rate  Abdo: soft, nontender, nondistended, bowel sounds present  Ext: calves nontender, well perfused,  scrotal edema and overall anasarca present +1  Neuro: AAOx4, CN grossly intact, no facial asymmetry      Data   Recent Labs   Lab 12/23/21  0740 12/23/21  0507 12/23/21  0402 12/22/21  1605 12/22/21  1146 12/22/21  0727 12/22/21  0503 12/21/21  0735 12/21/21  0617 12/20/21  0740 12/20/21  0506   WBC  --  8.2  --   --  10.0  --   --   --  9.6   < >  --    HGB  --  8.4*  --   --  9.0*  --   --   --  8.7*   < >  --    MCV  --  97  --   --  94  --   --   --  99   < >  --    PLT  --  91*  --   --  101*  --   --   --  86*   < >  --    NA  --  144  --   --   --   --  141  --  142  --  144   POTASSIUM  --  3.8  --   --   --   --  3.7  --  4.4  --  3.9   CHLORIDE  --  102  --   --   --   --  101  --  103  --  104   CO2  --  39*  --   --   --   --  39*  --  37*  --  41*   BUN  --  115*  --   --   --   --  113*  --  107*  --  99*   CR  --  2.18*  --   --   --   --  2.21*  --  2.35*  --  1.87*   ANIONGAP  --  3  --   --   --   --  1*  --  2*  --  <1*   GABE  --  9.2  --   --   --   --  8.8  --  8.7  --  9.0   GLC 97 124* 124*   < >  --    < > 176*   < > 195*   < > 211*   ALBUMIN  --  1.8*  --   --   --   --  1.8*  --   --   --  1.9*   PROTTOTAL  --   --   --   --   --   --   --   --   --   --  4.8*   BILITOTAL  --   --   --   --   --   --   --   --   --   --  0.3   ALKPHOS  --   --   --   --   --   --   --   --   --   --  154*   ALT  --   --   --   --   --   --   --   --   --   --  32   AST  --   --   --   --   --   --   --   --   --   --  35    < > = values in this interval not displayed.     Recent Results (from the past 24 hour(s))   XR Video Swallow with SLP or OT    Narrative    VIDEO SWALLOWING EVALUATION December 22, 2021 10:19 AM     HISTORY: Prolonged intubation due to COVID, recovered.    COMPARISON: 12/13/2021.    FLUOROSCOPY TIME: 1.7 minutes.  SPOT IMAGES OR CINE RUNS: 10.    FINDINGS:    Thin: Penetration.    Mildly thick: Penetration.    Pudding: No penetration or aspiration.    Semisolid: No penetration or  aspiration.    Solid: No penetration or aspiration.    Refer to speech pathology report for additional details.    ROSIBEL HERRERA MD         SYSTEM ID:  [id number]

## 2021-12-23 NOTE — PLAN OF CARE
[id number]:  IMC discontinued this AM. A&Ox3, d/t situation, forgetful. VSS on 1L oxymask, BiPAP at night. LS w/fine crackles to right lobes. Tele: Afib CVR w/BBB. Denies pain. Tolerates soft & bite sized w/mildly thick diet, calorie counts, spoon feed, fair appetite--SLP following. TF running at goal rate of 40ml/hr via PEG tube w/200 mL Q6H flushes. Q4 BG checks, insulin replacement given. Crushing meds & putting through PEG tube. +BM, gave PRN senna. Zuniga in place w/AUO. Discharge coming from penis, bacitracin & miconazaole applied. Mepi CDI to LUE & coccyx. Edematous to extremities & scrotum. Redness to scrotum, scrotal support sling in place. Edemawear applied to lower extremities + elevation. Wound cares completed per orders. Up A2/lift, Q2repo while in bed, sat in chair--PT/OT following. LUE PICC SL, blood return noted. Contact precautions maintained. TCU vs ARU at discharge.

## 2021-12-23 NOTE — PROGRESS NOTES
Care Management Follow Up    Length of Stay (days): 58    Expected Discharge Date: 12/27/2021     Concerns to be Addressed: discharge planning     Patient plan of care discussed at interdisciplinary rounds: Yes    Anticipated Discharge Disposition: Transitional Care     Anticipated Discharge Services: None  Anticipated Discharge DME: None    Patient/family educated on Medicare website which has current facility and service quality ratings: yes  Education Provided on the Discharge Plan:    Patient/Family in Agreement with the Plan: yes    Referrals Placed by CM/SW: Post Acute Facilities  Private pay costs discussed: Not applicable    Additional Information:  Writer attempted to speak with [facility], they have no option to leave a vm, but their vm message stated that they will not be reviewing referrals 12/24-12/26.    Writer sent a referral to the [facility] liaison and resent the referral to the [facility] liaison.     Writer resent referral to [facility].    AARON Rios

## 2021-12-24 NOTE — PROGRESS NOTES
Clarified with [facility]missions after discussion, not appropriate for LTAC, will continue to pursue TCU. Unit SW updated.

## 2021-12-24 NOTE — PLAN OF CARE
Magdaleno is lethargic, rouses to repeated touch. He received an extra 2mg of Bumex and was started on a Bumex infusion this afternoon. Breathing somewhat labored, BiPAP available. Maintaining SaO2 on 3L via oxymask. Denies discomfort. Zuniga intact. Repositioned every 2 hours. Did not attempt to feed Magdaleno today, receiving tube feed at goal rate via PEG tube. Bed alarm for safety.

## 2021-12-24 NOTE — PROGRESS NOTES
[facility]  Medicine Progress Note - Hospitalist Service       Date of Admission:  10/26/2021    Assessment & Plan           Burton Elizabeth is a 77 yo single male who lives by himself,  with CAD, atrial flutter on eliquis, hypertension, hyperlipidemia, HFpEF, CKD stage III, DM type 3, history of GI bleed, suspected obstructive sleep apnea electively admitted on 10/26/2021 for TAVR to treat severe Aortic Stenosis.  Hospital course complicated by OPAL from contrast nephropathy and volume overload requiring transient dialysis and diuresis, Covid pneumonia UTI and bacteremia.     Brief summary:  10/29: initiated on IHD dialysis for acute OPAL and volume overload not responsive to diruesis  11/04: COVID +  11/15: patient intubated   11/22: Started scheduled IV Bumex   11/23: Melena and acute anemia, EGD with barretts and debora's erosions  12/4: self extubated during turn per night nurse   12/5: HFNC over night, matilde better than bipap. Transfer to floor.  12/6: Substantial improvement in oxygenation, patient off high flow.  12/10: percutaneous G tube placed, Tube feeds started.    12/13: gross hematuria -- improved with CBI and will have outpatient follow up with urology  12/20 drowsy, abg showing hypercapnic respiratory acidosis, improved with bipap and hypotension  12/21 OPAL on CKD with Cr 1.8>2.3, Nephrology felt likely related to above decompensation. FeNa 0.7 and lasix stopped.   12/23: diuretic resumed with albumin.  12/24: Hypoxia stable but increased lethargy and dyspnea.  CXR worsened, Bumex drip started       COVID-19 Pneumonia dx 11/04-recovered  MRSA VAP-resolved  Acute hypoxic hypercapnic respiratory failure (multifactorial with above)  Completed treatment with antibiotics and steroids.  - Self extubated 12/4  - became more drowsy and altered 12/20 ABG with pH 7.3, pCO2 83 --> 7.35 and 75 after bipap   - Goal O2 sat 90-92% Patient is CO2 retainer and over oxygenating hinders  ventilation. Recurrent hospital problem  - Bipap at night however patient refuses often  - 12/24: O2 requirement stable at 3LPM but more drowsy and increased work of breathing. CXR likely worse. BUN markedly elevated to 110s. Discussed with Dr Persaud, started on bumex drip. Discussed with RN to place him on BiPAP PRN to assist with mobilizing fluid given CXR finding.   - If respiratory status worsens, will consider US chest to assess need of thoracentesis but he needs to be off AC for this. Follow with diuresis for now.    Severe aortic stenosis, s/p TAVR 10/26   A fib/flutter, rate controlled   Hypertension  - Eliquis 5 mg bid  - Continue coreg 6.25 bid and hydralazine  - if hematuria returns will likely recommend to stop apixaban    ARF on CKD, required IHD earlier this stay  - Temp dialysis line removed 12/5  - 12/21 creat 2.35, with decreased UO, nephrology reconsulted. FeNa returned at 0.7 and lasix held.   - Cr slightly better  - Cr slowly improving with lasix being held however more edematous and worsening respiratory status. Albumin 1.8.   - diuresis as above.      Moderate malnutrition  Dysphagia  Hyperchloremic hypernatremia - improving  PEG tube placed 12/10, diet advanced by SLP yesterday to DD4  - continue TF per nutrition and diet advancement per SLP  - once tolerating diet can perform calorie counts and reduce TF    Deconditioning:  -  PT/OT following, will need TCU vs ARU on discharge for significant rehab     DM II with stress induced hyperglycemia  Maintained at home on Lantus 18 Uhs , amaryl 2 mg bid. A1c 7.6 in 11/2021  BS remains elevated at >200s on Lantus and ISS  - increase lantus to 35 units daily.       Resolved problems below    Proteus bacteremia and UTI  treated  Leukopenia, chronic: resolved  Coagulopathy of covid earlier in stay  Thrombocytopenia, improving: resolved      Agitation/Encephalopathy (Combination of toxic and metabolic during course of stay):  - Slowly improving (sitting  in chair today), oriented to place, able name president    L upper ext edema  Has picc present on L Upper ext. Pt tends to lay on L side  - TESSA ext venous doppler - negative for clot     GI bleed earlier in stay, s/p colonoscopy and EGD  Anemia, acute on chronic  -  Barretts esophagus erosions and non bleeding ulcers noted on EGD. No active bleeding  -  no further melena and Hb stable 8-9  -  PPI continued      Diet: Adult Formula Drip Feeding: Specified Time Novasource Renal; Gastrostomy; Goal Rate: 40; mL/hr; Medication - Feeding Tube Flush Frequency: At least 15-30 mL water before and after medication administration and with tube clogging; Amount to Send (N...  Combination Diet Soft and Bite Sized Diet (level 6); Mildly Thick (level 2)  Calorie Counts    DVT Prophylaxis: DOAC  Zuniga Catheter: PRESENT, indication: Strict 1-2 Hour I&O, Retention, Retention, Strict 1-2 Hour I&O  Central Lines: None  Code Status: No CPR- Do NOT Intubate      Disposition Plan   Expected Discharge: 12/27/2021   anticipate several days in hospital.  Anticipated discharge location: inpatient rehabilitation facility    Delays:     Complex Care  Placement - TCU            The patient's care was discussed with the nephrologist, bedside nurse and patient and his brother present in the room    Desean Coffman MD  Hospitalist Service  [facility]  Securely message with the Vocera Web Console (learn more here)  Text page via [employer] Paging/Directory        Clinically Significant Risk Factors Present on Admission                ______________________________________________________________________    Interval History   Chart reviewed, reviewed with nursing staff and patient's brother present at bedside.  As per his brother, patient was more alert awake and conversant for the last 2 days which was significant improvement.  He has noticed patient has been more sleepy this morning and not as verbal.   -Patient denies  dyspnea.  No cough or chest pain.  Denies nausea.  Remains afebrile.  Noted good urine output of close to 1.5L but overall to L+ in last 24 hours with weight trending up.    Data reviewed today: I reviewed all medications, new labs and imaging results over the last 24 hours. I personally reviewed no images or EKG's today.    Physical Exam   Vital Signs: Temp: 98.4  F (36.9  C) Temp src: Oral BP: 113/56 Pulse: 81   Resp: 16 SpO2: 96 % O2 Device: Nasal cannula Oxygen Delivery: 3 LPM  Weight: 251 lbs 1.66 oz    Gen: somnolent but arouses to voice and answers simple questions  HEENT:   EOMI, MMM  Resp: bilateral diminished breath sounds. Extensive crackles bilaterally. No wheezing. Shallow breaths with mild tachypnea. Also upper airway rattle heard. On 3 LPM O2 via oxy mask    CV: S1S2  Regular, no tachycardia.   Abdo: soft, obese/nontender  Ext: calves nontender, well perfused, scrotal edema and overall anasarca present    Neuro: Somnolent but arouses with voice and answer simple questions appropriately and follows verbal commands.  Remains oriented x3.   CN grossly intact, no facial asymmetry, strength symmetrical.      Data   Recent Labs   Lab 12/24/21  1220 12/24/21  0738 12/24/21  0725 12/23/21  0740 12/23/21  0507 12/22/21  1605 12/22/21  1146 12/22/21  0727 12/22/21  0503 12/21/21  0735 12/21/21  0617 12/20/21  0740 12/20/21  0506   WBC  --   --   --   --  8.2  --  10.0  --   --   --  9.6   < >  --    HGB  --   --   --   --  8.4*  --  9.0*  --   --   --  8.7*   < >  --    MCV  --   --   --   --  97  --  94  --   --   --  99   < >  --    PLT  --   --   --   --  91*  --  101*  --   --   --  86*   < >  --    NA  --  141  --   --  144  --   --   --  141  --  142  --  144   POTASSIUM  --  4.0  --   --  3.8  --   --   --  3.7  --  4.4  --  3.9   CHLORIDE  --  101  --   --  102  --   --   --  101  --  103  --  104   CO2  --  36*  --   --  39*  --   --   --  39*  --  37*  --  41*   BUN  --  118*  --   --  115*  --   --    --  113*  --  107*  --  99*   CR  --  2.07*  --   --  2.18*  --   --   --  2.21*  --  2.35*  --  1.87*   ANIONGAP  --  4  --   --  3  --   --   --  1*  --  2*  --  <1*   GABE  --  9.3  --   --  9.2  --   --   --  8.8  --  8.7  --  9.0   * 237* 231*   < > 124*   < >  --    < > 176*   < > 195*   < > 211*   ALBUMIN  --  2.4*  --   --  1.8*  --   --   --  1.8*  --   --   --  1.9*   PROTTOTAL  --   --   --   --   --   --   --   --   --   --   --   --  4.8*   BILITOTAL  --   --   --   --   --   --   --   --   --   --   --   --  0.3   ALKPHOS  --   --   --   --   --   --   --   --   --   --   --   --  154*   ALT  --   --   --   --   --   --   --   --   --   --   --   --  32   AST  --   --   --   --   --   --   --   --   --   --   --   --  35    < > = values in this interval not displayed.     Recent Results (from the past 24 hour(s))   XR Chest Port 1 View    Narrative    CHEST ONE VIEW  12/24/2021 11:40 AM     HISTORY: Worsened dyspnea.    COMPARISON: December 19, 2021      Impression    IMPRESSION: Moderately extensive atelectasis and/or infiltrate in the  left similar to previous. Question some left pleural fluid. Minimal  right pleural effusion and mild-moderate right base atelectasis and/or  infiltrate similar to previous as well. Left PICC line unchanged.

## 2021-12-24 NOTE — PROGRESS NOTES
"SPIRITUAL HEALTH SERVICES  SPIRITUAL ASSESSMENT Progress Note  [facility] [place]     REFERRAL SOURCE: Follow up visit for length of stay    Reviewed documentation. Reflective conversation shared with patient's brother, Jose, which integrated elements of illness and family narratives.     Magdaleno was sleeping throughout visit with his brother, Jose.      Jose shared that \"Magdaleno is very sleepy today. Some days are better than others.\"    He reflected on Magdaleno's long hospitalization and the \"likelihood that he'll be in long term care going forward.\" They had conversation while he was in the ICU about following Magdaleno's wishes\" and goals of care. \"He made it clear that he wasn't going to give up.\" He reflected on Magdaleno's \"independence and strength\" and his will live.     Jose also shared regarding Magdaleno's desire for privacy - not wanting family, Judaism , or friends to see him - though wanting to know that \"people notice that he's now around.\" Jose shared that they created a [employer] site allowing him to bring messages from family and friends.    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     Jose expressed that Magdaleno would appreciate Fr Mcgrath coming for a sacramental visit.     Visit ended with doctor came in to meet with Jose.    I offered spiritual and emotional support through reflective listening that affirmed emotions, experience, and meaning.     PLAN: Contacted Fr Mcgrath for sacramental visit request. [facility] remains available for support.    Karen Arora  Associate   Pager [phone]  Phone [phone]    "

## 2021-12-24 NOTE — PROGRESS NOTES
CALORIE COUNT      Approximate Oral Intake for:    (12/23 - only 1 meal recorded)  Calories: 253 cals  Protein: 9 gm pro    EN via PEG:  Novasource Renal @ 40 ml/hr (goal rate)  Total Enteral Provisions:  1920 kcal (24 kcal/kg) and 87 gm protein (1.1 gm/kg), 688 mL H2O, 176 gm CHO, 0 gm fiber.    TF + po = 2173 cals, 96 gm pro      ASSESSED NUTRITION NEEDS:  Dosing Weight: 81 kg  (adjusted wt for overwt)  Estimated Energy Needs: 2696-0996 kcal (25-30 kcal/kg)  Estimated Protein Needs: 81-97 grams (1.0-1.2 g/kg)      Summary:   Diet: Soft/bite sized (L6), mildly thick liquids (L2)  Note that pt declined dinner last night    Wt up with edema - Bumex ordered BID    Will continue with TF and calorie counts    Jerri Yang, RD, LD  Clinical Dietitian - [facility]   Pager - [phone]

## 2021-12-24 NOTE — PROGRESS NOTES
[id number]: A&O x3, disoriented to situation. VSS on oxymask at goal saturation between 90-92%. NG tube patent, tube feed at goal rate of 40mL/hr, Q6 hr 200mL flush. PICC line patent. Generalized edema +3. scrotal sling in place. LS: diminished on left, clear on right. BS: audible, hypoactive. Zuniga patent with adequate output. A-2 lift, T/R q2.

## 2021-12-24 NOTE — PLAN OF CARE
VSS on 3L Oxymask. Oriented x4. LS clear/diminished. Shallow respirations.Tele Afib CVR. +3 edema, TF w water boluses. Denies pain. Zuniga with good output. Edema +3. Scrotal shrink cloth removed 2/2 to stool. BM large x 1. Turn/repo q2h. PICC w + blood return/flushed all three lumens.

## 2021-12-24 NOTE — PLAN OF CARE
[id number] BG corrected w/ sliding scale. Declined dinner, only ate a few spoonfuls of water. Oxymask @ 1L. TF running at goal. No new complaints.

## 2021-12-24 NOTE — PROGRESS NOTES
[facility]    Nephrology Progress Note     Assessment & Plan       Burton Elizabeth is a 76 year old male who was admitted on 10/26/2021.      CKD IIIB: Admitted with a serum creatinine of 1.54 mg/dl.      Acute kidney injury:                -started HD on 11/23 and last HD tx on 11/28              - OPAL again today following decompensation as outlined.                Suspect that it was hemodynamically mediated.  No contrast or NSAID given.       Hypercalcemia:  Calcium corrects to 11.6.  Ionized calcium is 4.9 - normal.  .  Vit D 24.       COVID  19 PNA. 11/4: Consider recovered.      Severe aortic stenosis s/p TAVR 10/26. EF 60-65%.         Plan:    Change bumet to drip at 0.5 mg/hour.    Keep 25% albumin infusions.    Following.               Td Persaud MD  [facility] - Nephrology  [phone]    Interval History     Continued respiratory troubles.  FIO2 up to 3 LPM now.  Labored breathing.  IV bumet has increased his urine output.  Cr is actually better.      Physical Exam   Temp: 98.4  F (36.9  C) Temp src: Oral BP: 113/56 Pulse: 81   Resp: 16 SpO2: 96 % O2 Device: Nasal cannula Oxygen Delivery: 3 LPM  Vitals:    12/22/21 0539 12/23/21 0518 12/24/21 0559   Weight: 110.2 kg (242 lb 15.2 oz) 110.4 kg (243 lb 6.2 oz) 113.9 kg (251 lb 1.7 oz)     Vital Signs with Ranges  Temp:  [97.6  F (36.4  C)-98.5  F (36.9  C)] 98.4  F (36.9  C)  Pulse:  [74-81] 81  Resp:  [16-32] 16  BP: (113-139)/(56-69) 113/56  SpO2:  [92 %-98 %] 96 %  I/O last 3 completed shifts:  In: 3520 [P.O.:150; NG/GT:2930]  Out: 1450 [Urine:1450]    GENERAL APPEARANCE: lying in bed, ill appearing.    HEENT:  Eyes/ears/nose/neck grossly normal  RESP: labored respirations.  Reduced BS globally  CV: RRR, nl S1/S2, no m/r/g   ABDOMEN: o/s/nt/nd, bs present  EXTREMITIES/SKIN: no rashes/lesions; 1-2 + generlized edema    Medications     bumetanide       dextrose Stopped (12/04/21 1630)     - MEDICATION  INSTRUCTIONS -       - MEDICATION INSTRUCTIONS -         albumin human  12.5 g Intravenous Q12H     apixaban ANTICOAGULANT  5 mg Oral BID     atorvastatin  80 mg Oral or Feeding Tube Daily     B and C vitamin Complex with folic acid  5 mL Per Feeding Tube Daily     bacitracin   Topical BID     carvedilol  6.25 mg Oral BID w/meals     doxazosin  1 mg Per Feeding Tube At Bedtime     [Held by provider] furosemide  40 mg Per NG tube Daily     guaiFENesin  10 mL Oral or Feeding Tube BID     hydrALAZINE  50 mg Oral or Feeding Tube Q8H MOE     insulin aspart  1-12 Units Subcutaneous Q4H     insulin glargine  30 Units Subcutaneous At Bedtime     miconazole   Topical BID     pantoprazole  40 mg Oral or Feeding Tube BID AC     sodium chloride (PF)  10-40 mL Intracatheter Q7 Days     sodium chloride (PF)  3 mL Intracatheter Q8H     [Held by provider] tamsulosin  0.4 mg Oral Daily       Data   BMP  Recent Labs   Lab 12/24/21  1220 12/24/21  0738 12/24/21  0725 12/24/21  0330 12/23/21  0740 12/23/21  0507 12/22/21  0727 12/22/21  0503 12/21/21  0735 12/21/21  0617   NA  --  141  --   --   --  144  --  141  --  142   POTASSIUM  --  4.0  --   --   --  3.8  --  3.7  --  4.4   CHLORIDE  --  101  --   --   --  102  --  101  --  103   GABE  --  9.3  --   --   --  9.2  --  8.8  --  8.7   CO2  --  36*  --   --   --  39*  --  39*  --  37*   BUN  --  118*  --   --   --  115*  --  113*  --  107*   CR  --  2.07*  --   --   --  2.18*  --  2.21*  --  2.35*   * 237* 231* 241*   < > 124*   < > 176*   < > 195*    < > = values in this interval not displayed.     Phos@LABRCNTIPR(phos:4)  CBC)  Recent Labs   Lab 12/23/21  0507 12/22/21  1146 12/21/21  0617 12/20/21  1055   WBC 8.2 10.0 9.6 12.8*   HGB 8.4* 9.0* 8.7* 9.5*   HCT 29.4* 30.7* 31.4* 34.5*   MCV 97 94 99 100   PLT 91* 101* 86* 121*     Recent Labs   Lab 12/20/21  0506   AST 35   ALT 32   ALKPHOS 154*   BILITOTAL 0.3       Recent Labs   Lab 12/23/21  0507   PTHI 112        Attestation:   I have reviewed today's relevant vital signs, notes, medications, labs and imaging.

## 2021-12-25 NOTE — PROGRESS NOTES
[facility]  Medicine Progress Note - Hospitalist Service       Date of Admission:  10/26/2021    Assessment & Plan           Burton Elizabeth is a 77 yo single male who lives by himself,  with CAD, atrial flutter on eliquis, hypertension, hyperlipidemia, HFpEF, CKD stage III, DM type 3, history of GI bleed, suspected obstructive sleep apnea electively admitted on 10/26/2021 for TAVR to treat severe Aortic Stenosis.  Hospital course complicated by OPAL from contrast nephropathy and volume overload requiring transient dialysis and diuresis, Covid pneumonia UTI and bacteremia.     Brief summary:  10/29: initiated on IHD dialysis for acute OPAL and volume overload not responsive to diruesis  11/04: COVID +  11/15: patient intubated   11/22: Started scheduled IV Bumex   11/23: Melena and acute anemia, EGD with barretts and debora's erosions  12/4: self extubated during turn per night nurse   12/5: HFNC over night, matilde better than bipap. Transfer to floor.  12/6: Substantial improvement in oxygenation, patient off high flow.  12/10: percutaneous G tube placed, Tube feeds started.    12/13: gross hematuria -- improved with CBI and will have outpatient follow up with urology  12/20 drowsy, abg showing hypercapnic respiratory acidosis, improved with bipap and hypotension  12/21 OPAL on CKD with Cr 1.8>2.3, Nephrology felt likely related to above decompensation. FeNa 0.7 and lasix stopped.   12/23: diuretic resumed with albumin.  12/24: Hypoxia stable but increased lethargy and dyspnea. CXR worsened, Bumex drip started       COVID-19 Pneumonia dx 11/04-recovered  MRSA VAP-resolved  Acute hypoxic hypercapnic respiratory failure (multifactorial with above)  Completed treatment with antibiotics and steroids.  - Self extubated 12/4  - became more drowsy and altered 12/20 ABG with pH 7.3, pCO2 83 --> 7.35 and 75 after bipap   - Goal O2 sat 90-92% Patient is CO2 retainer and over oxygenating hinders  ventilation. Recurrent hospital problem  - Bipap at night however patient refuses often  - 12/24: O2 requirement stable at 3LPM but more drowsy and increased work of breathing. CXR likely worse. BUN markedly elevated to 110s. Discussed with Dr Persaud, started on bumex drip. Discussed with RN to place him on BiPAP PRN to assist with mobilizing fluid given CXR finding.   - If respiratory status worsens, will consider US chest to assess need of thoracentesis but he needs to be off AC for this. Follow with diuresis for now.    Severe aortic stenosis, s/p TAVR 10/26   A fib/flutter, rate controlled   Hypertension  - Eliquis 5 mg bid  - Continue coreg 6.25 bid and hydralazine  - If hematuria returns will likely recommend to stop apixaban    ARF on CKD, required IHD earlier this stay  - Temp dialysis line removed 12/5  - 12/21 creat 2.35, with decreased UO, nephrology reconsulted. FeNa returned at 0.7 and lasix held.   - Cr slightly better  - Cr slowly improving with lasix being held however more edematous and worsening respiratory status. Albumin 1.8.   - diuresis as above.      Moderate malnutrition  Dysphagia  Hyperchloremic hypernatremia - improving  PEG tube placed 12/10, diet advanced by SLP yesterday to DD4  - continue TF per nutrition and diet advancement per SLP  - once tolerating diet can perform calorie counts and reduce TF    Deconditioning:  -  PT/OT following, will need TCU vs ARU on discharge for significant rehab     DM II with stress induced hyperglycemia  Maintained at home on Lantus 18 Uhs , amaryl 2 mg bid. A1c 7.6 in 11/2021  BS remains elevated at >200s on Lantus and ISS  -BS in 100s today, continue lantus at 35 units daily.       Resolved problems below    Proteus bacteremia and UTI  treated  Leukopenia, chronic: resolved  Coagulopathy of covid earlier in stay  Thrombocytopenia, improving: resolved      Agitation/Encephalopathy (Combination of toxic and metabolic during course of stay):  - Slowly  improving (sitting in chair today), oriented to place, able name president    L upper ext edema  Has picc present on L Upper ext. Pt tends to lay on L side  - TESSA ext venous doppler - negative for clot     GI bleed earlier in stay, s/p colonoscopy and EGD  Anemia, acute on chronic  -  Barretts esophagus erosions and non bleeding ulcers noted on EGD. No active bleeding  -  no further melena and Hb stable 8-9  -  PPI continued      Diet: Adult Formula Drip Feeding: Specified Time Novasource Renal; Gastrostomy; Goal Rate: 40; mL/hr; Medication - Feeding Tube Flush Frequency: At least 15-30 mL water before and after medication administration and with tube clogging; Amount to Send (N...  Combination Diet Soft and Bite Sized Diet (level 6); Mildly Thick (level 2)  Calorie Counts    DVT Prophylaxis: DOAC  Zuniga Catheter: PRESENT, indication: Strict 1-2 Hour I&O, Retention, Retention, Strict 1-2 Hour I&O  Central Lines: None  Code Status: No CPR- Do NOT Intubate      Disposition Plan   Expected Discharge: 12/29/2021   anticipate several days in hospital.  Anticipated discharge location: inpatient rehabilitation facility    Delays:     Complex Care  Placement - TCU            The patient's care was discussed with the nephrologist, bedside nurse and patient and his brother over the phone    Desean Coffman MD  Hospitalist Service  [facility]  Securely message with the Vocera Web Console (learn more here)  Text page via [employer] Paging/Directory        Clinically Significant Risk Factors Present on Admission                ______________________________________________________________________    Interval History     Patient was on BiPAP when I evaluated him this am. Expressed breathing is same. Denied pain. Afebrile. Urine output adequate with bumex drip and Cr continues to improve.      Data reviewed today: I reviewed all medications, new labs and imaging results over the last 24 hours. I personally  reviewed no images or EKG's today.    Physical Exam   Vital Signs: Temp: 97.9  F (36.6  C) Temp src: Axillary BP: 131/67 Pulse: 79   Resp: 21 SpO2: 96 % O2 Device: BiPAP/CPAP Oxygen Delivery: 3 LPM  Weight: 249 lbs 8.97 oz    Gen: alert awake, answers simple questions  HEENT:   EOMI, MMM  Resp: bilateral diminished breath sounds. Extensive crackles bilaterally. No wheezing. On BiPAP  CV: S1S2  Regular, no tachycardia.   Abdo: soft, obese/nontender  Ext: calves nontender, well perfused, scrotal edema and overall anasarca present    Neuro: alert awake on BiPAP,  CN grossly intact, no facial asymmetry, strength symmetrical.      Data   Recent Labs   Lab 12/25/21  1207 12/25/21  1142 12/25/21  0804 12/25/21  0532 12/24/21  1220 12/24/21  0738 12/23/21  0740 12/23/21  0507 12/22/21  1605 12/22/21  1146 12/20/21  0740 12/20/21  0506   WBC  --   --   --  9.8  --   --   --  8.2  --  10.0   < >  --    HGB  --   --   --  8.3*  --   --   --  8.4*  --  9.0*   < >  --    MCV  --   --   --  99  --   --   --  97  --  94   < >  --    PLT  --   --   --  103*  --   --   --  91*  --  101*   < >  --    NA  --   --   --  143  --  141  --  144  --   --    < > 144   POTASSIUM  --   --   --  3.9  --  4.0  --  3.8  --   --    < > 3.9   CHLORIDE  --   --   --  100  --  101  --  102  --   --    < > 104   CO2  --   --   --  41*  --  36*  --  39*  --   --    < > 41*   BUN  --   --   --  116*  --  118*  --  115*  --   --    < > 99*   CR  --   --   --  1.99*  --  2.07*  --  2.18*  --   --    < > 1.87*   ANIONGAP  --   --   --  2*  --  4  --  3  --   --    < > <1*   GABE  --   --   --  9.5  --  9.3  --  9.2  --   --    < > 9.0   * 118* 113* 134*   < > 237*   < > 124*   < >  --    < > 211*   ALBUMIN  --   --   --  2.5*  --  2.4*  --  1.8*  --   --    < > 1.9*   PROTTOTAL  --   --   --   --   --   --   --   --   --   --   --  4.8*   BILITOTAL  --   --   --   --   --   --   --   --   --   --   --  0.3   ALKPHOS  --   --   --   --   --   --   --    --   --   --   --  154*   ALT  --   --   --   --   --   --   --   --   --   --   --  32   AST  --   --   --   --   --   --   --   --   --   --   --  35    < > = values in this interval not displayed.     No results found for this or any previous visit (from the past 24 hour(s)).

## 2021-12-25 NOTE — PROGRESS NOTES
[facility]    Nephrology Progress Note     Assessment & Plan       Burton Elizabeth is a 76 year old male who was admitted on 10/26/2021.      CKD IIIB: Admitted with a serum creatinine of 1.54 mg/dl.      Acute kidney injury:                -started HD on 11/23 and last HD tx on 11/28              - OPAL again today following decompensation as outlined.                Suspect that it was hemodynamically mediated.  No contrast or NSAID given.       Hypercalcemia:  Calcium corrects to 11.6.  Ionized calcium is 4.9 - normal.  .  Vit D 24.       COVID  19 PNA. 11/4: Consider recovered.      Severe aortic stenosis s/p TAVR 10/26. EF 60-65%.     High BUN - due to OPAL + catabolic state + enteral nutrition.          Plan:    Keep bumet to drip at 0.5 mg/hour.    Keep 25% albumin infusions.     Following.      Td Persaud MD  [facility] - Nephrology  [phone]    Interval History     Urine output has increased.  1875 mL yesterday - or more.  1650 mL so far today.  He is sleeping on BIPAP.      Physical Exam   Temp: 97.7  F (36.5  C) Temp src: Axillary BP: 100/44 Pulse: 80   Resp: 16 SpO2: 94 % O2 Device: BiPAP/CPAP Oxygen Delivery: 3 LPM  Vitals:    12/23/21 0518 12/24/21 0559 12/25/21 0525   Weight: 110.4 kg (243 lb 6.2 oz) 113.9 kg (251 lb 1.7 oz) 113.2 kg (249 lb 9 oz)     Vital Signs with Ranges  Temp:  [94.3  F (34.6  C)-98.4  F (36.9  C)] 97.7  F (36.5  C)  Pulse:  [74-81] 80  Resp:  [16-21] 16  BP: (100-124)/(44-70) 100/44  FiO2 (%):  [35 %] 35 %  SpO2:  [93 %-96 %] 94 %  I/O last 3 completed shifts:  In: 1572 [I.V.:40; NG/GT:920]  Out: 1725 [Urine:1725]    GENERAL APPEARANCE: sleeping  HEENT:  BIPAP applied.    RESP: lungs cta b c good efforts, no crackles, rhonchi or wheezes  CV: RRR, nl S1/S2, no m/r/g   ABDOMEN: o/s/nt/nd, bs present  EXTREMITIES/SKIN: no rashes/lesions;  edema    Medications     bumetanide 0.5 mg/hr (12/25/21 0528)     dextrose Stopped (12/04/21  1630)     - MEDICATION INSTRUCTIONS -       - MEDICATION INSTRUCTIONS -         albumin human  12.5 g Intravenous Q12H     apixaban ANTICOAGULANT  5 mg Oral BID     atorvastatin  80 mg Oral or Feeding Tube Daily     B and C vitamin Complex with folic acid  5 mL Per Feeding Tube Daily     bacitracin   Topical BID     carvedilol  6.25 mg Oral BID w/meals     doxazosin  1 mg Per Feeding Tube At Bedtime     guaiFENesin  10 mL Oral or Feeding Tube BID     hydrALAZINE  50 mg Oral or Feeding Tube Q8H [facility]     [START ON 12/30/2021] influenza vac high-dose quad  0.7 mL Intramuscular Prior to discharge     insulin aspart  1-12 Units Subcutaneous Q4H     insulin glargine  35 Units Subcutaneous At Bedtime     miconazole   Topical BID     pantoprazole  40 mg Oral or Feeding Tube BID AC     sodium chloride (PF)  10-40 mL Intracatheter Q7 Days     [Held by provider] tamsulosin  0.4 mg Oral Daily       Data   BMP  Recent Labs   Lab 12/25/21  0804 12/25/21  0532 12/25/21  0436 12/24/21  2357 12/24/21  1220 12/24/21  0738 12/23/21  0740 12/23/21  0507 12/22/21  0727 12/22/21  0503   NA  --  143  --   --   --  141  --  144  --  141   POTASSIUM  --  3.9  --   --   --  4.0  --  3.8  --  3.7   CHLORIDE  --  100  --   --   --  101  --  102  --  101   GABE  --  9.5  --   --   --  9.3  --  9.2  --  8.8   CO2  --  41*  --   --   --  36*  --  39*  --  39*   BUN  --  116*  --   --   --  118*  --  115*  --  113*   CR  --  1.99*  --   --   --  2.07*  --  2.18*  --  2.21*   * 134* 115* 185*   < > 237*   < > 124*   < > 176*    < > = values in this interval not displayed.     Phos@LABRCNTIPR(phos:4)  CBC)  Recent Labs   Lab 12/25/21  0532 12/23/21  0507 12/22/21  1146 12/21/21  0617   WBC 9.8 8.2 10.0 9.6   HGB 8.3* 8.4* 9.0* 8.7*   HCT 29.2* 29.4* 30.7* 31.4*   MCV 99 97 94 99   * 91* 101* 86*     Recent Labs   Lab 12/20/21  0506   AST 35   ALT 32   ALKPHOS 154*   BILITOTAL 0.3       Recent Labs   Lab 12/23/21  0507   PTHI 112        Attestation:   I have reviewed today's relevant vital signs, notes, medications, labs and imaging.

## 2021-12-25 NOTE — PROGRESS NOTES
Patient transferred to station 88. All belongings were brought along with patient. Patient went in the same bed with the pulsate mattress. Report was given to Paulette ALFRED. Both insulin pans, albumin, and new tube feed bag was also given to [facility].  nurse.

## 2021-12-25 NOTE — PROGRESS NOTES
CALORIE COUNT      Approximate Oral Intake for:   12/24   Calories:  0  Protein:  0    EN via PEG:  Novasource Renal @ 40 ml/hr (goal rate)  Total Enteral Provisions:  1920 kcal (24 kcal/kg) and 87 gm protein (1.1 gm/kg), 688 mL H2O, 176 gm CHO, 0 gm fiber.    ASSESSED NUTRITION NEEDS:  Dosing Weight: 81 kg  (adjusted wt for overwt)  Estimated Energy Needs: 0864-8336 kcal (25-30 kcal/kg)  Estimated Protein Needs: 81-97 grams (1.0-1.2 g/kg)    Summary:   Yesterday patient with lethargy and worsened respiratory status.  RN held meals and pt was eventually placed on bipap.    Francine Ramirez, RD, LD, CNSC

## 2021-12-25 NOTE — PLAN OF CARE
Disoriented to situation. Tele A.fib cvr with bbb. LS diminished. On 3lpm oxymask. Pt did go on bipap at 0400. Zungia patent with adequate output. Bumex gtt infusing. +bs, +flatus, -bm. TF at goal rate. Pt on level 6 diet, with mildly thick liquids did not take anything in orally. Turn and repo q2hr. Generalized +2-3 edema. Up with lift.

## 2021-12-26 NOTE — PLAN OF CARE
A&Ox2, disoriented to place and situation. VSS on 3L oxymask when awake, utilized Bipap overnight. Tele: Afib CVR with BBB. Up A2/lift, T/R q2h while in bed. Mild abdominal pain/nausea controlled with IV zofran. Zuniga patent with good UOP, incontinent of stool - no BM overnight. L PICC infusing Bumex @ 2mL/hr, blood return noted on all lumens. GT with continuous TF at goal rate of 40mL/hr with 200mL free water q6h. Blood sugar checks q4h corrected with sliding scale insulin. All meds crushed through GT. Mepilex to coccyx CDI. Penile wound MOO, scheduled bacitracin ointment. Seroquel given x1 for anxiety/agitation. Plan to discharge to TCU vs ARU pending clinical improvement.

## 2021-12-26 NOTE — PROGRESS NOTES
[facility]  Medicine Progress Note - Hospitalist Service       Date of Admission:  10/26/2021    Assessment & Plan           Burton Elizabeth is a 77 yo single male who lives by himself,  with CAD, atrial flutter on eliquis, hypertension, hyperlipidemia, HFpEF, CKD stage III, DM type 3, history of GI bleed, suspected obstructive sleep apnea electively admitted on 10/26/2021 for TAVR to treat severe Aortic Stenosis.  Hospital course complicated by OPAL from contrast nephropathy and volume overload requiring transient dialysis and diuresis, Covid pneumonia UTI and bacteremia.     Brief summary:  10/29: initiated on IHD dialysis for acute OPAL and volume overload not responsive to diruesis  11/04: COVID +  11/15: patient intubated   11/22: Started scheduled IV Bumex   11/23: Melena and acute anemia, EGD with barretts and debora's erosions  12/4: self extubated during turn per night nurse   12/5: HFNC over night, matilde better than bipap. Transfer to floor.  12/6: Substantial improvement in oxygenation, patient off high flow.  12/10: percutaneous G tube placed, Tube feeds started.    12/13: gross hematuria -- improved with CBI and will have outpatient follow up with urology  12/20 drowsy, abg showing hypercapnic respiratory acidosis, improved with bipap and hypotension  12/21 OPAL on CKD with Cr 1.8>2.3, Nephrology felt likely related to above decompensation. FeNa 0.7 and lasix stopped.   12/23: diuretic resumed with albumin.  12/24: Hypoxia stable but increased lethargy and dyspnea. CXR worsened, Bumex drip started  12/26: not tolerating being off BiPAP. Cr slightly worse.       COVID-19 Pneumonia dx 11/04-recovered  MRSA VAP-resolved  Acute hypoxic hypercapnic respiratory failure (multifactorial with above)  Completed treatment with antibiotics and steroids.  - Self extubated 12/4  - became more drowsy and altered 12/20 ABG with pH 7.3, pCO2 83 --> 7.35 and 75 after bipap   - Goal O2 sat 90-92%  Patient is CO2 retainer and over oxygenating hinders ventilation. Recurrent hospital problem  - Bipap at night however patient refuses often  - 12/24: O2 requirement stable at 3LPM but more drowsy and increased work of breathing. CXR likely worse. BUN markedly elevated to 110s. Discussed with Dr Persaud, started on bumex drip. Discussed with RN to place him on BiPAP PRN to assist with mobilizing fluid given CXR finding.   -12/26: not tolerating being off BiPAP longer. Will get US chest to eval for effusion, and plan thoracentesis If adequate.  Hold apixaban for this.     Palliative care consult, discussed with his brother Jose.     Severe aortic stenosis, s/p TAVR 10/26   A fib/flutter, rate controlled   Hypertension  - Eliquis 5 mg bid as above  - Continue coreg 6.25 bid and hydralazine  - If hematuria returns will likely recommend to stop apixaban    ARF on CKD, required IHD earlier this stay  - Temp dialysis line removed 12/5  - 12/21 creat 2.35, with decreased UO, nephrology reconsulted. FeNa returned at 0.7 and lasix held.   - Cr slowly improved with lasix being held however more edematous and worsening respiratory status. Albumin 1.8.   - diuresis as above. Cr was down to 1.9 and slightly worsened today to 2.23 and BUN as well worsened to 122  -Discussed with Dr Persaud, poor candidate for HD.  Still has marked anasarca and worsening respiratory status, will plan is to continue Bumex drip for now.  Diamox x1 as well ordered     Moderate malnutrition  Dysphagia  Hyperchloremic hypernatremia - improving  PEG tube placed 12/10, diet advanced by SLP yesterday to DD4  - continue TF per nutrition and diet advancement per SLP  - once tolerating diet can perform calorie counts and reduce TF    Deconditioning:  -  PT/OT following, will need TCU vs ARU on discharge for significant rehab     DM II with stress induced hyperglycemia  Maintained at home on Lantus 18 Uhs , amaryl 2 mg bid. A1c 7.6 in 11/2021  BS remains  elevated at >200s on Lantus and ISS  -BS in 100s today, continue lantus at 35 units daily.       Resolved problems below    Proteus bacteremia and UTI  treated  Leukopenia, chronic: resolved  Coagulopathy of covid earlier in stay  Thrombocytopenia, improving: resolved      Agitation/Encephalopathy (Combination of toxic and metabolic during course of stay):  - Slowly improving (sitting in chair today), oriented to place, able name president    L upper ext edema  Has picc present on L Upper ext. Pt tends to lay on L side  - TESSA ext venous doppler - negative for clot     GI bleed earlier in stay, s/p colonoscopy and EGD  Anemia, acute on chronic  -  Barretts esophagus erosions and non bleeding ulcers noted on EGD. No active bleeding  -  no further melena and Hb stable 8-9  -  PPI continued      Diet: Adult Formula Drip Feeding: Specified Time Novasource Renal; Gastrostomy; Goal Rate: 40; mL/hr; Medication - Feeding Tube Flush Frequency: At least 15-30 mL water before and after medication administration and with tube clogging; Amount to Send (N...  Combination Diet Soft and Bite Sized Diet (level 6); Mildly Thick (level 2)    DVT Prophylaxis: DOAC  Zuniga Catheter: PRESENT, indication: Strict 1-2 Hour I&O, Strict 1-2 Hour I&O, Retention, Strict 1-2 Hour I&O  Central Lines: None  Code Status: No CPR- Do NOT Intubate      Disposition Plan   Expected Discharge: 12/29/2021   anticipate several days in hospital.  Anticipated discharge location: inpatient rehabilitation facility    Delays:     Complex Care  Placement - TCU            The patient's care was discussed with the nephrologist, bedside nurse and patient and his brother over the phone    Desean Coffman MD  Hospitalist Service  [facility]  Securely message with the Vocera Web Console (learn more here)  Text page via Twitter Paging/Directory        Clinically Significant Risk Factors Present on Admission                 ______________________________________________________________________    Interval History     Evaluated patient this morning.  He was somnolent as per nursing staff but was alert awake on BiPAP when I saw him.  Followed up again in the afternoon when he was hypoxic with increased work of breathing and removing BiPAP so had to be placed on BiPAP again.  Patient denies pain.  He feels about the same.  Remains afebrile.  Continues to have good response with diuretic and 2.2L urine output in last 24 hours although weight was noted to be increased.  Renal function also has slightly worsened today.       Data reviewed today: I reviewed all medications, new labs results over the last 24 hours. I personally reviewed no images or EKG's today.    Physical Exam   Vital Signs: Temp: 97.1  F (36.2  C) Temp src: Axillary BP: 112/54 Pulse: 77   Resp: 20 SpO2: 98 % O2 Device: BiPAP/CPAP Oxygen Delivery: 3 LPM  Weight: 265 lbs 10.47 oz    Gen: alert awake, on BiPAP, answers simple questions  HEENT:   EOMI, MMM  Resp: bilateral diminished breath sounds. Extensive crackles bilaterally. No wheezing. On BiPAP  CV: S1S2  Regular, no tachycardia.   Abdo: soft, obese/nontender  Ext: calves nontender, well perfused, scrotal edema and overall anasarca present    Neuro: alert awake on BiPAP, CN grossly intact, no facial asymmetry, strength symmetrical.      Data   Recent Labs   Lab 12/26/21  1117 12/26/21  0841 12/26/21  0637 12/25/21  0804 12/25/21  0532 12/24/21  1220 12/24/21  0738 12/23/21  0740 12/23/21  0507 12/22/21  1605 12/22/21  1146 12/20/21  0740 12/20/21  0506   WBC  --   --   --   --  9.8  --   --   --  8.2  --  10.0   < >  --    HGB  --   --   --   --  8.3*  --   --   --  8.4*  --  9.0*   < >  --    MCV  --   --   --   --  99  --   --   --  97  --  94   < >  --    PLT  --   --   --   --  103*  --   --   --  91*  --  101*   < >  --    NA  --   --  142  --  143  --  141  --  144  --   --    < > 144   POTASSIUM  --   --  4.0   --  3.9  --  4.0  --  3.8  --   --    < > 3.9   CHLORIDE  --   --  99  --  100  --  101  --  102  --   --    < > 104   CO2  --   --  41*  --  41*  --  36*  --  39*  --   --    < > 41*   BUN  --   --  122*  --  116*  --  118*  --  115*  --   --    < > 99*   CR  --   --  2.23*  --  1.99*  --  2.07*  --  2.18*  --   --    < > 1.87*   ANIONGAP  --   --  2*  --  2*  --  4  --  3  --   --    < > <1*   GABE  --   --  9.2  --  9.5  --  9.3  --  9.2  --   --    < > 9.0   * 180* 208*   < > 134*   < > 237*   < > 124*   < >  --    < > 211*   ALBUMIN  --   --  2.5*  --  2.5*  --  2.4*  --  1.8*  --   --    < > 1.9*   PROTTOTAL  --   --   --   --   --   --   --   --   --   --   --   --  4.8*   BILITOTAL  --   --   --   --   --   --   --   --   --   --   --   --  0.3   ALKPHOS  --   --   --   --   --   --   --   --   --   --   --   --  154*   ALT  --   --   --   --   --   --   --   --   --   --   --   --  32   AST  --   --   --   --   --   --   --   --   --   --   --   --  35    < > = values in this interval not displayed.     No results found for this or any previous visit (from the past 24 hour(s)).

## 2021-12-26 NOTE — PLAN OF CARE
Arrived to floor at 1730. AOx3, disoriented to time, forgetful. Slow to respond. VSS on 3 L oxymask, uses BiPAP at night. Tele Afib CVR with BBB. Soft and bite sized diet with mild thick liquids, poor appetite. BGM q 4 hrs w/sliding scale insulin. Up A2 and L. Denies pain and N/V. PICC infusing Bumex @ 2 ml/hr, good blood return noted. G tube with TF running @ 40ml/hr with programmed 200 ml free water flushes q 6 hrs. All meds crushed and given through G tube. Zuniga with adequate UOP. No BM this shift. +2-3  generalized edema. Mepilex to coccyx, T/R q 2 hrs. Skin tear to L forearm, mepilex CDI. Penile wound, MOO, has scheduled bacitracin ointment. Contact precautions maintained. Discharge to TCU vs ARU pending clinical improvement.

## 2021-12-26 NOTE — PROGRESS NOTES
CLINICAL NUTRITION SERVICES - REASSESSMENT NOTE      Recommendations Ordered by Registered Dietitian (RD):   Calorie Count - Cancel for now   Malnutrition:  (Since 11/30 - reviewed on 12/16 and continues forward)  % Weight Loss:  Up to 5% in 1 month (moderate malnutrition)  % Intake:  Decreased intake does not meet criteria for malnutrition   Subcutaneous Fat Loss:  Upper arm region mild depletion and Thoracic region mild depletion  Muscle Loss:  Clavicle bone region mild depletion, Acromion bone region mild depletion and Scapular bone region mild depletion  Fluid Retention:  mild-severe (12/21)     Malnutrition Diagnosis: Moderate malnutrition  In Context of:  Acute illness or injury       EVALUATION OF PROGRESS TOWARD GOALS   Diet:  Soft and Bite Sized (Level 6), Mildly Thick liquids (Level 2)    Nutrition Support:  TF continues as below:    Nutrition Support Enteral:  Type of Feeding Tube: PEG (12/10)   Enteral Frequency:  Continuous  Enteral Regimen: Novasource Renal @ 40 ml/hr   Total Enteral Provisions:  1920 kcal (24 kcal/kg) and 87 gm protein (1.1 gm/kg), 688 mL H2O, 176 gm CHO, 0 gm fiber.  Free Water Flush: 200 mL 4x daily (800 mL)    Nephronex daily     Intake/Tolerance:  Pt has been on a Calorie Count over the past 3 days (12/23-12/25).  CALORIE COUNT  Approximate Oral Intake for:   12/25 (no meals ordered, no intake recorded)  Calories:  0  Protein:  0    Summary:   Average intake past 3 days minimal.    Stool Pattern has been x2 per day over the past several days.   (H), Cr 2.23 (H) - Pt with OPAL on CKD.  BGM:  127, 146, 202, 200, 211, 180 - High Resistant sliding scale insulin + Lantus 35 Units at .  I/O:  1816/2250.  Wt: 118.4 kg (up 5.9 kg since admit, up 15.9 kg since lowest wt on 12/18).  Pt with 3+ moderate generalized edema.    ASSESSED NUTRITION NEEDS:  Dosing Weight: 81 kg - adjusted   Estimated Energy Needs: 2715-3753 kcal (25-30 kcal/kg)  Justification: obesity   Estimated Protein  Needs: 81-97 grams (1.0-1.2 g/kg)  Justification: hypercatabolism with critical illness, monitoring renal fx (liberalize as able    NEW FINDINGS:   12/21:  WOCN  - Bilateral buttock wounds have healed.    - Perianal wounds from IAD significantly improved   - L arm wound due to skin tear and edema.- improving, flap adhered, small open area remains   NEW 12/21  - Penis wounds likely from edema and trauma but cannot exclude pressure. Mucosal injury. Patient has had giron catheter since 10/27. Clotted and urology saw patient on 12/13 and replaced with a 22Fr with and underwent CBI. 22Fr Currently in place and secured. Reports patient frequently tugging at site.     2/22: VSS ---> moderate oral and pharyngeal dysphagia secondary to prolonged intubation --> diet adv to pureed and mildly thick liq  12/23:  SLP adv diet to  Soft & Bite Sized (Level 6), Mildly Thick liquids (Level 2)    Patient on IV albumin and Bumex drip for diuresis.  Required bipap overnight and now on oxymask.    Previous Goals (12/21):   EN to meet % estimated needs while NPO  Evaluation: Met    Previous Nutrition Diagnosis (12/21):   No nutrition diagnosis identified at this time while TF reliant and NPO  Evaluation: Declining      CURRENT NUTRITION DIAGNOSIS  Inadequate oral intake related to decreased appetite and worsened respiratory status as evidenced by minimal oral intake past 3 days and continue TF reliance    INTERVENTIONS  Recommendations / Nutrition Prescription  Continue TF as above    Implementation  Calorie Count - Cancel for now    Goals  TF will continue to meet % estimated needs while minimal oral intake.    MONITORING AND EVALUATION:  Progress towards goals will be monitored and evaluated per protocol and Practice Guidelines    Francine Ramirez, RD, LD, CNSC

## 2021-12-26 NOTE — PROGRESS NOTES
[facility]    Nephrology Progress Note     Assessment & Plan     Burton Elizabeth is a 76 year old male who was admitted on 10/26/2021.      CKD IIIB: Admitted with a serum creatinine of 1.54 mg/dl.      Acute kidney injury:                -started HD on 11/23 and last HD tx on 11/28              - OPAL again today following decompensation as outlined on 12/21              Suspect that it was hemodynamically mediated.  No contrast or NSAID given.       Hypercalcemia:  Calcium corrects to 11.6.  Ionized calcium is 4.9 - normal.  .  Vit D 24.       COVID  19 PNA. 11/4: Consider recovered.      Severe aortic stenosis s/p TAVR 10/26. EF 60-65%.      High BUN - due to OPAL + catabolic state + enteral nutrition.      Volume contraction alkalosis    Prognosis: he has been in hospital since 10/26/21 for TAVR.  He has not thrived since then.  It seems increasingly unlikely that he will make recovery to independent living again.  His survival is far from assured.  Consideration for a more limited care plan migh be considered.          Plan:     Keep bumet to drip at 0.5 mg/hour.    Keep 25% albumin infusions.  Acetazolamide 500 mg IV x 1.       Following.             Td Persaud MD  [facility] - Nephrology  [phone]    Interval History           Physical Exam   Temp: 97.1  F (36.2  C) Temp src: Axillary BP: 112/54 Pulse: 77   Resp: 20 SpO2: 98 % O2 Device: BiPAP/CPAP Oxygen Delivery: 3 LPM  Vitals:    12/25/21 0525 12/26/21 0635 12/26/21 1115   Weight: 113.2 kg (249 lb 9 oz) 118.4 kg (261 lb 0.4 oz) 120.5 kg (265 lb 10.5 oz)     Vital Signs with Ranges  Temp:  [97.1  F (36.2  C)-98.8  F (37.1  C)] 97.1  F (36.2  C)  Pulse:  [75-86] 77  Resp:  [20-28] 20  BP: (112-145)/(49-71) 112/54  FiO2 (%):  [45 %] 45 %  SpO2:  [94 %-98 %] 98 %  I/O last 3 completed shifts:  In: 1816 [NG/GT:1816]  Out: 2250 [Urine:2250]    GENERAL APPEARANCE: sleeping in bed on BIPAP  HEENT:   Eyes/ears/nose/neck grossly normal  RESP: lungs reduced breath sounds bases.    CV: RRR, nl S1/S2, no m/r/g   ABDOMEN: o/s/nt/nd, bs present, scrotal edema  EXTREMITIES/SKIN: no rashes/lesions;  generalized edema    Medications     bumetanide 0.5 mg/hr (12/26/21 0209)     dextrose Stopped (12/04/21 1630)     - MEDICATION INSTRUCTIONS -       - MEDICATION INSTRUCTIONS -         albumin human  12.5 g Intravenous Q12H     [Held by provider] apixaban ANTICOAGULANT  5 mg Oral BID     atorvastatin  80 mg Oral or Feeding Tube Daily     B and C vitamin Complex with folic acid  5 mL Per Feeding Tube Daily     bacitracin   Topical BID     carvedilol  6.25 mg Oral BID w/meals     doxazosin  1 mg Per Feeding Tube At Bedtime     guaiFENesin  10 mL Oral or Feeding Tube BID     hydrALAZINE  50 mg Oral or Feeding Tube Q8H MOE     [START ON 12/30/2021] influenza vac high-dose quad  0.7 mL Intramuscular Prior to discharge     insulin aspart  1-12 Units Subcutaneous Q4H     insulin glargine  40 Units Subcutaneous At Bedtime     miconazole   Topical BID     pantoprazole  40 mg Oral or Feeding Tube BID AC     sodium chloride (PF)  10-40 mL Intracatheter Q7 Days     [Held by provider] tamsulosin  0.4 mg Oral Daily       Data   BMP  Recent Labs   Lab 12/26/21  1117 12/26/21  0841 12/26/21  0637 12/26/21  0421 12/25/21  0804 12/25/21  0532 12/24/21  1220 12/24/21  0738 12/23/21  0740 12/23/21  0507   NA  --   --  142  --   --  143  --  141  --  144   POTASSIUM  --   --  4.0  --   --  3.9  --  4.0  --  3.8   CHLORIDE  --   --  99  --   --  100  --  101  --  102   GABE  --   --  9.2  --   --  9.5  --  9.3  --  9.2   CO2  --   --  41*  --   --  41*  --  36*  --  39*   BUN  --   --  122*  --   --  116*  --  118*  --  115*   CR  --   --  2.23*  --   --  1.99*  --  2.07*  --  2.18*   * 180* 208* 211*   < > 134*   < > 237*   < > 124*    < > = values in this interval not displayed.     [id number](phos:4)  CBC)  Recent Labs   Lab  12/25/21  0532 12/23/21  0507 12/22/21  1146 12/21/21  0617   WBC 9.8 8.2 10.0 9.6   HGB 8.3* 8.4* 9.0* 8.7*   HCT 29.2* 29.4* 30.7* 31.4*   MCV 99 97 94 99   * 91* 101* 86*     Recent Labs   Lab 12/20/21  0506   AST 35   ALT 32   ALKPHOS 154*   BILITOTAL 0.3     No lab results found in last 7 days.  No results found for: D2VIT, D3VIT, DTOT  Recent Labs   Lab 12/25/21  0532   HGB 8.3*   HCT 29.2*   MCV 99     Recent Labs   Lab 12/23/21  0507   PTHI 112       Attestation:   I have reviewed today's relevant vital signs, notes, medications, labs and imaging.

## 2021-12-27 NOTE — PROVIDER NOTIFICATION
MD Notification    Notified Person: MD    Notified Person Name: Janeen    Notification Date/Time: 12/26/2021 2100     Notification Interaction: Amcom page    Purpose of Notification: Pt has worsening +4 pitting edema in L arm, there is a picc in place, do you think it could be a clot?     Orders Received:     Comments:

## 2021-12-27 NOTE — PROGRESS NOTES
Care Suites Procedure Nursing Note    Patient Information  Name: Burton Elizabeth  Age: 76 year old    Procedure  Procedure: (L) thoracentesis  Procedure start time: 1215  Procedure complete time: 1222  Concerns/abnormal assessment: 750ml dak pratik/bergundy removed from posterior (L) upper back, tolerated well.  If abnormal assessment, provider notified: N/A  Plan/Other: Return to [facility]-2 per cart, report called to floor.  Gillian Stanford RN

## 2021-12-27 NOTE — PROGRESS NOTES
[facility]  Medicine Progress Note - Hospitalist Service       Date of Admission:  10/26/2021    Assessment & Plan           Burton Elizabeth is a 77 yo single male who lives by himself,  with CAD, atrial flutter on eliquis, hypertension, hyperlipidemia, HFpEF, CKD stage III, DM type 3, history of GI bleed, suspected obstructive sleep apnea electively admitted on 10/26/2021 for TAVR to treat severe Aortic Stenosis.  Hospital course complicated by OPAL from contrast nephropathy and volume overload requiring transient dialysis and diuresis, Covid pneumonia UTI and bacteremia.     Brief summary:  10/29: initiated on IHD dialysis for acute OPAL and volume overload not responsive to diruesis  11/04: COVID +  11/15: patient intubated   11/22: Started scheduled IV Bumex   11/23: Melena and acute anemia, EGD with barretts and debora's erosions  12/4: self extubated during turn per night nurse   12/5: HFNC over night, matilde better than bipap. Transfer to floor.  12/6: Substantial improvement in oxygenation, patient off high flow.  12/10: percutaneous G tube placed, Tube feeds started.    12/13: gross hematuria -- improved with CBI and will have outpatient follow up with urology  12/20 drowsy, abg showing hypercapnic respiratory acidosis, improved with bipap and hypotension  12/21 OPAL on CKD with Cr 1.8>2.3, Nephrology felt likely related to above decompensation. FeNa 0.7 and lasix stopped.   12/23: diuretic resumed with albumin.  12/24: Hypoxia stable but increased lethargy and dyspnea. CXR worsened, Bumex drip started  12/26: not tolerating being off BiPAP. Cr slightly worse.  12/27: Creatinine continues to rise, US negative for LUE DVT, thoracic US-moderate left effusion, thoracentesis planned.  Palliative care consult planned today       COVID-19 Pneumonia dx 11/04-recovered  MRSA VAP-resolved  Acute hypoxic hypercapnic respiratory failure (multifactorial with above)  Completed treatment with  antibiotics and steroids.  - Self extubated 12/4  - became more drowsy and altered 12/20 ABG with pH 7.3, pCO2 83 --> 7.35 and 75 after bipap   - Goal O2 sat 90-92% Patient is CO2 retainer and over oxygenating hinders ventilation. Recurrent hospital problem  - Bipap at night however patient refuses often  - 12/24: O2 requirement stable at 3LPM but more drowsy and increased work of breathing. CXR likely worse. BUN markedly elevated to 110s. Discussed with Dr Persaud, started on bumex drip. Discussed with RN to place him on BiPAP PRN to assist with mobilizing fluid given CXR finding.   -12/26: not tolerating being off BiPAP longer.   -12/27: Off BiPAP this a.m., plan is left thoracentesis. Also palliative care consult today     Severe aortic stenosis, s/p TAVR 10/26   A fib/flutter, rate controlled   Hypertension  - Eliquis 5 mg bid on hold for thora, resume after procedure  - Continue coreg 6.25 bid and hydralazine  - If hematuria returns will likely recommend to stop apixaban    ARF on CKD, required IHD earlier this stay  - Temp dialysis line removed 12/5  - 12/21 creat 2.35, with decreased UO, nephrology reconsulted. FeNa returned at 0.7 and lasix held.   - Cr slowly improved with lasix being held however more edematous and worsening respiratory status. Albumin 1.8.   - diuresis as above. Cr was down to 1.9 and slightly worsened today to 2.23 and BUN as well worsened to 122  -Discussed with Dr Persaud, poor candidate for HD.  Still has marked anasarca and worsening respiratory status, continuing with Bumex drip for now.  Diamox x1 as well ordered     Moderate malnutrition  Dysphagia  Hyperchloremic hypernatremia - improving  PEG tube placed 12/10, diet advanced by SLP yesterday to DD4  - continue TF per nutrition and diet advancement per SLP  - once tolerating diet can perform calorie counts and reduce TF    Deconditioning:  -  PT/OT following, will need TCU vs ARU on discharge for significant rehab     DM II with  stress induced hyperglycemia  Maintained at home on Lantus 18 Uhs , amaryl 2 mg bid. A1c 7.6 in 11/2021  BS remains elevated at >200s on Lantus and ISS  -BS in 100s today, continue lantus at 35 units daily.       Resolved problems below    Proteus bacteremia and UTI  treated  Leukopenia, chronic: resolved  Coagulopathy of covid earlier in stay  Thrombocytopenia, improving: resolved      Agitation/Encephalopathy (Combination of toxic and metabolic during course of stay):  - Slowly improving (sitting in chair today), oriented to place, able name president    L upper ext edema  Has picc present on L Upper ext. Pt tends to lay on L side  - TESSA ext venous doppler - negative for clot     GI bleed earlier in stay, s/p colonoscopy and EGD  Anemia, acute on chronic  -  Barretts esophagus erosions and non bleeding ulcers noted on EGD. No active bleeding  -  no further melena and Hb stable 8-9  -  PPI continued      Diet: Adult Formula Drip Feeding: Specified Time Novasource Renal; Gastrostomy; Goal Rate: 40; mL/hr; Medication - Feeding Tube Flush Frequency: At least 15-30 mL water before and after medication administration and with tube clogging; Amount to Send (N...  Combination Diet Soft and Bite Sized Diet (level 6); Mildly Thick (level 2)    DVT Prophylaxis: DOAC  Zuniga Catheter: PRESENT, indication: Strict 1-2 Hour I&O, Gross Hematuria, Retention, Strict 1-2 Hour I&O  Central Lines: None  Code Status: No CPR- Do NOT Intubate      Disposition Plan   Expected Discharge: 12/29/2021   anticipate several days in hospital.  Anticipated discharge location: inpatient rehabilitation facility    Delays:     Complex Care  Placement - TCU            The patient's care was discussed with the nephrologist, bedside nurse and patient .  Will follow up with his brother after palliative care consult.    Desean Coffman MD  Hospitalist Service  [facility]  Securely message with the Vocera Web Console (learn more  "here)  Text page via [facility] Paging/Directory        Clinically Significant Risk Factors Present on Admission                ______________________________________________________________________    Interval History     Evaluated patient this morning, was on BiPAP overnight and has been placed on 6 LPM oxygen mask.  Patient is more verbal this morning.  Denies pain.  States his breathing is \"okay\".  Denies cough.  Remains afebrile.  -Continues to remain on Bumex drip with good urine output of 1.5L.  Renal function has slightly worsened today as well.    -Increasing LUE swelling was noted and EUS was done overnight which is negative for DVT.    Data reviewed today: I reviewed all medications, new labs results over the last 24 hours. I personally reviewed LUE US, negative for DVT.  Thoracic ultrasound shows moderate left effusion.    Physical Exam   Vital Signs: Temp: 97.5  F (36.4  C) Temp src: Axillary BP: 107/53 Pulse: 68   Resp: 23 SpO2: 99 % O2 Device: BiPAP/CPAP Oxygen Delivery: 6 LPM  Weight: 265 lbs 3.41 oz    Gen: alert awake, oriented to self, knows he is at [facility] and that we had [place] just recently.  Answers simple questions appropriately.  HEENT:   EOMI, MMM. Pressure injury from BiPAP mask noted.   Resp: bilateral diminished breath sounds. Extensive crackles bilaterally. No wheezing.    CV: S1S2  Regular, no tachycardia.   Abdo: soft, obese/nontender  Ext: calves nontender, well perfused, scrotal edema and overall anasarca/marked extremities edema present    Neuro: alert awake on BiPAP, CN grossly intact, no facial asymmetry, strength symmetrical.      Data   Recent Labs   Lab 12/27/21  0603 12/27/21  0442 12/26/21  2354 12/26/21  0841 12/26/21  0637 12/25/21  0804 12/25/21  0532 12/23/21  0740 12/23/21  0507   WBC 7.6  --   --   --   --   --  9.8  --  8.2   HGB 7.1*  --   --   --   --   --  8.3*  --  8.4*   MCV 97  --   --   --   --   --  99  --  97   PLT 98*  --   --   --   --   --  103*  " --  91*     --   --   --  142  --  143   < > 144   POTASSIUM 3.8  --   --   --  4.0  --  3.9   < > 3.8   CHLORIDE 99  --   --   --  99  --  100   < > 102   CO2 41*  --   --   --  41*  --  41*   < > 39*   *  --   --   --  122*  --  116*   < > 115*   CR 2.39*  --   --   --  2.23*  --  1.99*   < > 2.18*   ANIONGAP 3  --   --   --  2*  --  2*   < > 3   GABE 9.3  --   --   --  9.2  --  9.5   < > 9.2   * 157* 178*   < > 208*   < > 134*   < > 124*   ALBUMIN 2.6*  --   --   --  2.5*  --  2.5*   < > 1.8*   PROTTOTAL 5.2*  5.3*  --   --   --   --   --   --   --   --    BILITOTAL 0.4  --   --   --   --   --   --   --   --    ALKPHOS 147  --   --   --   --   --   --   --   --    ALT 24  --   --   --   --   --   --   --   --    AST 28  --   --   --   --   --   --   --   --     < > = values in this interval not displayed.     Recent Results (from the past 24 hour(s))   US Chest Pleural Effusion Imaging    Narrative    EXAM: ULTRASOUND CHEST/PLEURAL EFFUSION IMAGING  LOCATION: [facility]  DATE/TIME: 12/26/2021, 1:47 PM    INDICATION: Bilateral effusion, to evaluate if there is enough fluid to tap.  COMPARISON: Chest x-ray 12/24/2021.  TECHNIQUE: Routine.    FINDINGS: Moderate left pleural fluid. Trace right pleural fluid and some right pleural thickening.      Impression    IMPRESSION:  1.  Moderate left pleural fluid.  2.  Trace right pleural fluid and some right pleural thickening.     US Upper Extremity Venous Duplex Left    Narrative    EXAM: US UPPER EXTREMITY VENOUS DUPLEX LEFT  LOCATION: [facility]  DATE/TIME: 12/26/2021 9:29 PM    INDICATION: PICC in place, new swelling, r/o dvt  COMPARISON: None.  TECHNIQUE: Venous Duplex ultrasound of the left upper extremity with (when possible) and without compression, augmentation, and duplex. Color flow and spectral Doppler with waveform analysis performed.    FINDINGS: Ultrasound includes evaluation of the  internal jugular vein, innominate vein, subclavian vein, axillary vein, and brachial vein. The superficial cephalic and basilic veins were also evaluated where seen.     LEFT: No deep venous thrombosis. No superficial thrombophlebitis.       Impression    IMPRESSION:   1.  No deep venous thrombosis in the left upper extremity.

## 2021-12-27 NOTE — CONSULTS
[facility]  Palliative Care Consultation Note    Patient: Burton Elizabeth  Date of Admission:  10/26/2021     Requesting Clinician / Team: Dr Coffman/Hospitalist  Reason for consult: Goals of care    Recommendations:    Goals of Care (see in depth discussion below):    HCA/brother Jose is hoping that Burton can start to give more input into his wishes for care now that he is becoming more alert.  Unfortunately, Burton was unable to give any clear input regarding his wishes about CODE STATUS, dialysis, reintubation, goals of care.     Jose wishes to continue talking with Burton in the days ahead  to review his HCD in hopes that pt will eventually make some comments as to his wishes. Palliative care will continue to check in with Burton and Jose regarding goals of care and offer support.     Code status: No CPR / No Intubation    Advanced Care Planning:    Patient has a completed Health Care Directive: Yes, and on file.    Patient case was reviewed with RN, Dr Coffman.     Ursula Barbosa, [facility]  Contact information available via [facility] Paging/Directory      Thank you for the opportunity to participate in the care of this patient and family. Our team: will continue to follow.     During regular M-F work hours ([phone]) -- if you are not sure who specifically to contact -- please contact us on [facility] Smart Web.     After regular work hours and on weekends/holidays, you can call our answering service at [phone].     Attestation:  Total time on the floor involved in the patient's care: 85 minutes  Total time spent in counseling/care coordination: >50% discussing patient condition, assessment of symptoms, reviewing current status with MD and RN,     Assessments:  Burton Elizabeth is a 76 year old male with PMH significant for CAD, HTN, HLD, suspected CRISTHIAN, chronic left bundle branch block, HFpEF, DM2, CKD3. He came to hospital on 10/26/21 for TAVR to treat  severe aortic stenosis. During recovery in hospital had complications from contrast nephropathy and fluid volume overload requiring transient dialysis/diuresis. Also Covid pneumonia, UTI and bacteremia.  He was intubated on 11/15-12/04 after patient extubated himself. He has remained on intermittent BiPAP with rising creatinine levels. Had thoracentesis today removing 750 ml fluid.     Previous hospital stays over past 6 months.   10/11/21-Upper GI bleed, aortic stenosis.  8/27/21-CHF  8/25/21-s/p revision of total right knee.  7/06/21- NSTEMI, upper GI bleeding, gastric ulcers, Cabrera's esophogus.    Today, the patient was seen for:   Goals of care, review of HCD.    I introduced palliative care services and our multidisciplinary team to Burton's brother Jose. Explained that our service offers an extra layer of support for individuals who are experiencing serious, life threatening illnesses, which can include assistance with symptom management, emotional and spiritual support as well as complex medical decision making. Reviewed past medical history and patient and Jose as well as their understanding of the current medical situation. Jose does have a very good understanding of what has happened over the past months with patient's health and hospital stays. He was hoping that as Burton begins to clear mentally that he will be able to give more input regarding decisions as there have been questions regarding whether Burton would want to be reintubated should his respiratory status worsen again. The medical team is concern that Burton may not be able to improve much more given his fluid volume balance and worsening renal status and would likely need a SNF long term, with recurrent hospitalizations in best case scenario- if he can survive this hospital stay. Nephrology indicates patient is a poor dialysis candidate. In the HCD it indicates that Burton would not want dialysis- unfortunatley  Burton did not have the  capacity to discuss in a meaningful way during our visit:     Burton was able to remember some events that have happened at the hospital but is unable to elaborate or give any detailed thoughts or direction about his feelings, concerns, wishes regarding quality of life or other medical decisions that need to be addressed. Burton would start discussing how important nutrition and sleep is to health and would speak in a rambling manner and was hard to redirect. When reviewing the possibility that he may need to live in a SNF for the rest of his life with recurrent hospital stays due to his very frail condition he could not express an oppinion. We reviewed his HCD but Burton could not remember making it nor could he elaborate on his choices in the document.     Jose requested 2 copies of the HCD and would like to continue discussions with Burton in the days ahead to see if he can tease out comments from his brother. Jose states that the goal today was just to check out how Burton might respond to these questions regarding his health as Jose did not want to make any concrete decisions today regarding GOC. Palliative care will check in with Jose and Burton in the days ahead.     Prognosis, Goals, & Planning:        Prognosis, Goals, and/or Advance Care Planning were addressed today: Yes      Functional Status just prior to hospitalization: 3 (Capable of only limited self-care; needs help with ADLs; in bed/chair >50% of waking hours)          Patient has decision-making capacity today for complex decisions: No.       Patient's decision making preferences: unable to assess          I have concerns about the patient/family's health literacy today: No           Coping, Meaning, & Spirituality:   Mood, coping, and/or meaning in the context of serious illness were addressed today: Yes  Summary/Comments: Discussed difficulty for Jose in making decisions for Burton. There are many questions    Social:     Living situation: Lives  alone in [facility]- has been in/out of  hospital since July.    Key family / caregivers: Brother/HCA Jose Elizabeth    Occupational history: [employer]    History of Present Illness:   History gathered today from: family/loved ones, medical chart, medical team members  Adopted from H&P:  Burton Elizabeth is a 76 year old male with PMH significant for CAD, HTN, HLD, suspected CRISTHIAN, chronic left bundle branch block, HFpEF, DM2, CKD3. He came to hospital on 10/26/21 for TAVR to treat severe aortic stenosis.    Key Palliative Symptom Data:  We are not helping to manage these symptoms currently in this patient.    ROS:  Comprehensive ROS unobtainable as patient cannot stay on subject to answer questions.     Past Medical History:  Past Medical History:   Diagnosis Date     Arthritis of knee~ s/p replacement 03/24/2016     Atypical atrial flutter (H)      CAD (coronary artery disease)      Cardiomyopathy (H)      Cerebral infarction (H)     7/21     Congestive heart failure (H)      Decreased cardiac ejection fraction~44% 03/24/2016     Diabetes mellitus, type 2 (H) 03/24/2016     History of DVT (deep vein thrombosis)      HTN (hypertension) 03/24/2016     LBBB (left bundle branch block) 03/24/2016     Mixed hyperlipidemia 08/23/2016     Renal disease     stage 3 chronic kidney disease     Rheumatic aortic stenosis      Upper GI bleed         Past Surgical History:  Past Surgical History:   Procedure Laterality Date     ANKLE SURGERY      X 4     APPENDECTOMY       ARTHROPLASTY KNEE Right 3/29/2016    Procedure: ARTHROPLASTY KNEE;  Surgeon: Heena Rosen MD;  Location: [facility] OR     ARTHROPLASTY REVISION KNEE Left 9/27/2019    Procedure: REVISION LEFT TOTAL KNEE  ARTHROPLASTY;  Surgeon: Heena Rosen MD;  Location: [facility] OR     ARTHROPLASTY REVISION KNEE Right 8/25/2021    Procedure: REVISION OF RIGHT TOTAL KNEE ARTHROPLASTY;  Surgeon: Heena Rosen MD;  Location:  OR     CV FEMORAL ANGIOGRAM N/A 10/11/2021     Procedure: Femoral Angiogram;  Surgeon: Edgardo Beckham MD;  Location:  HEART CARDIAC CATH LAB     CV HEART CATHETERIZATION WITH POSSIBLE INTERVENTION N/A 10/11/2021    Procedure: Coronary Angiogram;  Surgeon: Edgardo Beckham MD;  Location:  HEART CARDIAC CATH LAB     CV LEFT HEART CATH N/A 8/12/2019    Procedure: Left Heart Cath;  Surgeon: Edgardo Beckham MD;  Location:  HEART CARDIAC CATH LAB     CV TRANSCATHETER AORTIC VALVE REPLACEMENT N/A 10/26/2021    Procedure: Transcatheter Aortic Valve Replacement;  Surgeon: Edgardo Beckham MD;  Location:  HEART CARDIAC CATH LAB     ESOPHAGOSCOPY, GASTROSCOPY, DUODENOSCOPY (EGD), COMBINED N/A 7/7/2021    Procedure: ESOPHAGOGASTRODUODENOSCOPY, WITH BIOPSY;  Surgeon: Monserrat Bright MD;  Location:  GI     ESOPHAGOSCOPY, GASTROSCOPY, DUODENOSCOPY (EGD), COMBINED N/A 11/24/2021    Procedure: ESOPHAGOGASTRODUODENOSCOPY, WITH BIOPSY;  Surgeon: Krystyna Lawson MD;  Location:  GI     EYE SURGERY      cataract removal     IR CVC TUNNEL PLACEMENT > 5 YRS OF AGE  10/29/2021     IR CVC TUNNEL REMOVAL RIGHT  11/3/2021     IR GASTROSTOMY TUBE PERCUTANEOUS PLCMNT  12/10/2021     ORTHOPEDIC SURGERY      KNEE SCOPES MULTIPLE     SIGMOIDOSCOPY FLEXIBLE N/A 11/24/2021    Procedure: SIGMOIDOSCOPY, FLEXIBLE;  Surgeon: Krystyna Lawson MD;  Location:  GI         Family History:  Family History   Problem Relation Age of Onset     Coronary Artery Disease No family hx of          Allergies:  Allergies   Allergen Reactions     Penicillins Anaphylaxis        Medications:  I have reviewed this patient's medication profile and medications from this hospitalization.     Noted scheduled meds are:    albumin human  12.5 g Intravenous Q12H     [Held by provider] apixaban ANTICOAGULANT  5 mg Oral BID     atorvastatin  80 mg Oral or Feeding Tube Daily     B and C vitamin Complex with folic acid  5 mL Per Feeding Tube Daily     bacitracin    Topical BID     carvedilol  6.25 mg Oral BID w/meals     doxazosin  1 mg Per Feeding Tube At Bedtime     guaiFENesin  10 mL Oral or Feeding Tube BID     hydrALAZINE  50 mg Oral or Feeding Tube Q8H [facility]     [START ON 12/30/2021] influenza vac high-dose quad  0.7 mL Intramuscular Prior to discharge     insulin aspart  1-12 Units Subcutaneous Q4H     insulin glargine  40 Units Subcutaneous At Bedtime     miconazole   Topical BID     pantoprazole  40 mg Oral or Feeding Tube BID AC     sodium chloride (PF)  10-40 mL Intracatheter Q7 Days     [Held by provider] tamsulosin  0.4 mg Oral Daily       Noted PRN meds are:  acetaminophen, artificial tears ophthalmic solution, dextrose, glucose **OR** dextrose **OR** glucagon, guaiFENesin, HOLD MEDICATION, hydrALAZINE, levalbuterol, lidocaine 4%, lidocaine (buffered or not buffered), naloxone **OR** naloxone **OR** naloxone **OR** naloxone, nitroGLYcerin, - MEDICATION INSTRUCTIONS -, ondansetron, oxybutynin, - MEDICATION INSTRUCTIONS -, polyethylene glycol, prochlorperazine, QUEtiapine, senna-docusate, sodium chloride (PF), sodium chloride (PF), traZODone    Physical Exam:  Vital Signs: Temp: 97.5  F (36.4  C) Temp src: Axillary BP: 107/53 Pulse: 68   Resp: 23 SpO2: 99 % O2 Device: BiPAP/CPAP Oxygen Delivery: 6 LPM  Weight: 265 lbs 3.41 oz    Physical Exam  GENERAL:  Alert, fatigued, no distress, pale, weak.   HEAD: Normocephalic atraumatic  SCLERA: Anicteric  EXTREMITIES: Warm; edema of all extremities.  RESPIRATORY: Breathing relaxed and non labored.   ABDOMEN: Soft, obese.   NEUROLOGIC: Alert, distracted.  PSYCH: Calm, poor insight, rambling speech/topics.     Data reviewed:  Recent imaging reviewed.  Results for orders placed or performed during the hospital encounter of 10/26/21   CT Head w/o Contrast    Impression    IMPRESSION:  1.  No acute intracranial abnormality.  2.  Stable mild chronic age-related changes.   US Renal Complete    Impression    IMPRESSION:   1. A 0.8 cm  echogenic focus in the interpolar region of the right  kidney is suspicious for a nonobstructing stone.  2. Zuniga catheter in the bladder.   3. No hydronephrosis.    HUMERA GARAY MD         SYSTEM ID:  [id number]   XR Chest 2 Views    Impression    IMPRESSION: Mild pulmonary edema and small bilateral pleural effusions. Cardiomegaly. Mild bibasilar atelectasis. No pneumothorax.   IR CVC Tunnel Placement > 5 Yrs of Age    Impression    IMPRESSION:  1. Ultrasound and fluoroscopic guided placement of tunneled 23 cm tip  to cuff, right internal jugular dialysis catheter.    ELVIRA STOUT MD         SYSTEM ID:  [id number]   XR Chest Port 1 View    Impression    IMPRESSION: There has been interval placement of a right IJ dual-lumen  central venous catheter, with tip in the right atrium. No  pneumothorax. Patchy opacities in both lower lungs have increased  since the previous exam. Small bilateral pleural effusions are again  noted, unchanged on the right, and likely increased slightly on the  left. Postoperative changes of transcatheter aortic valve replacement.    HUMERA GARAY MD         SYSTEM ID:  [id number]   XR Chest Port 1 View    Impression    IMPRESSION: Stable cardiomediastinal contours. Increasing perihilar and basilar infiltrates and small pleural effusions. No visible pneumothorax.   XR Chest Port 1 View    Impression    IMPRESSION:Cardiomegaly with central vascular congestion, pulmonary  edema and small bilateral pleural effusions with adjacent  consolidation, left greater than right. Post TAVR.    BAILEE MOREIRA MD         SYSTEM ID:  [id number]   XR Chest Port 1 View    Impression    IMPRESSION: Lung volumes are low. There pulmonary infiltrates in both  lungs with probable small pleural effusions. TAVR. Little interval  change. No pneumothorax.    MARY MARTINEZ MD         SYSTEM ID:  [id number]   XR Abdomen Port 1 View    Impression    IMPRESSION: Pulmonary infiltrates and pleural effusions in the  lung  bases. There is a large amount of stool in the colon. Bowel gas  pattern is unremarkable. No evidence of obstruction or ileus.    MARY MARTINEZ MD         SYSTEM ID:  [id number]   XR Chest Port 1 View    Impression    IMPRESSION: Left arm PICC tip is at the SVC/right atrial junction.  Remainder stable.    FABI AUSTIN MD         SYSTEM ID:  [id number]   US Chest Pleural Effusion Imaging    Impression    IMPRESSION:  1.  Trace bilateral pleural effusions.    MARY MARTINEZ MD         SYSTEM ID:  [id number]   XR Chest Port 1 View    Impression    IMPRESSION: Endotracheal tube tip 4 cm above the devaughn. Enteric tube tip below the diaphragm. Left-sided PICC line with tip over the SVC. TAVR. Cardiac enlargement with increased pulmonary vascularity and questionable small bilateral pleural effusions.   Patchy bilateral lower lung infiltrates with superimposed pneumonitis not excluded. No significant bony abnormalities.   XR Abdomen Port 1 View    Impression    IMPRESSION: Enteric tube with tip in the body of the stomach. Sidehole is below the GE junction. Nonspecific bowel gas pattern in the visualized abdomen. Vascular calcification. Patchy bilateral lower lung infiltrates.    XR Chest Port 1 View    Impression    IMPRESSION: Endotracheal tube tip is about 5 cm above the devaughn. Left arm PICC tip at the SVC/right atrial junction. Slight improvement in patchy opacities in the left lung and otherwise stable interstitial and airspace opacities in the lungs.. No   pleural effusion or pneumothorax.   XR Chest Port 1 View    Impression    IMPRESSION: Endotracheal tube tip is about 3.9 cm above the devaughn. Enteric tube coursing below the left hemidiaphragm. Slight improvement in infiltrates in the left lung and stable right lung infiltrates. No pleural effusion or pneumothorax. Left arm   PICC tip is at the SVC/right atrial junction.   XR Chest Port 1 View    Impression    IMPRESSION: The endotracheal tube tip is  approximately 6 cm from the devaughn. An OG or NG tube terminates off the field-of-view. A left PICC is present. Bibasilar opacities are again seen and represent pleural fluid and atelectasis. Underlying airspace   disease is suspected as well. No pneumothorax. The cardiomediastinal silhouette is enlarged though is otherwise not well assessed. A TAVR stent is present.    XR Abdomen Port 1 View    Impression    IMPRESSION: Orogastric tube tip in gastric body with side port in the fundus. Nonobstructive bowel gas pattern. TAVR prosthesis unchanged. Bilateral pleural plaques, bibasilar opacities and effusions unchanged.    XR Chest Port 1 View    Impression    IMPRESSION: Cardiac enlargement shallow inspiration. Left PICC line tip is within the SVC but is directed cranially. Repositioning recommended. Endotracheal and enteric tube. TAVR. Bilateral infiltrates and small pleural effusions.    Results called to Dr. Marla Gonzáles at 0651   XR Chest Port 1 View    Impression    IMPRESSION: Right IJ line noted with tip in the mid SVC. No  pneumothorax. Moderate bilateral infiltrate similar to previous.  Endotracheal tube tip approximate 4 cm from the devaughn.    MARC GRANADOS MD         SYSTEM ID:  [id number]   XR Chest Port 1 View    Impression    IMPRESSION: Endotracheal tube tip terminates about 1.7 cm above the devaughn. Enteric tube terminates below the diaphragm. Cardiac silhouette is enlarged. Bilateral mid and lower lung zone airspace infiltrates again present, minimally improved on the   right. No visible pneumothorax. Probable small pleural effusions. TAVR. Left upper extremity PICC tip overlies mid SVC level.   XR Chest Port 1 View    Impression    IMPRESSION: Endotracheal tube remains in good position above the  devaughn. NG tube courses below the diaphragm. Right IJ central venous  catheter tip is in the SVC. Left PICC line tip in the SVC. TAVR.    Small bilateral pleural effusions. Infiltrates in both lung bases  have  not appreciably changed. No new findings.       MARY MARTINEZ MD         SYSTEM ID:  [id number]   XR Chest Port 1 View    Impression    IMPRESSION: Low lung volumes. Cardiomegaly. Congestion of the central vessels. Small bilateral pleural effusions and infiltrates or atelectasis both lower lobes, similar to the earlier study. Endotracheal tube tip in satisfactory position 2.5 cm above   the devaughn. Right IJ central line tip distal SVC. Left PICC line tip mid SVC. No pneumothorax. TAVR. Enteric tube extends into the stomach.   XR Abdomen Port 1 View    Impression    IMPRESSION: Enteric tube coiled in the expected location of the stomach.    XR Chest Port 1 View    Impression    IMPRESSION: Persistent bilateral pulmonary infiltrates and pleural effusions.     XR Abdomen Port 1 View    Impression    IMPRESSION: NG tube tip in the left upper quadrant in the expected  location of the stomach.    MARC GRANADOS MD         SYSTEM ID:  [id number]   XR Abdomen Port 1 View    Impression    IMPRESSION: Nasogastric tube terminates over the stomach. Visualized bowel gas pattern is nonobstructive.   IR Gastrostomy Tube Percutaneous Plcmnt    Impression    IMPRESSION: G-tube placed as above    LANA CONTRERAS MD         SYSTEM ID:  [id number]   US Upper Extremity Venous Duplex Left    Impression    IMPRESSION:   1.  No deep venous thrombosis in the left upper extremity.     US Abdomen Limited    Impression    IMPRESSION:  1.  Limited scanning of the four quadrants of the abdomen demonstrates a small amount of ascites, largest in the left abdomen.     XR Chest Port 1 View    Impression    IMPRESSION: Moderately extensive infiltrates on the left. Mild right-sided infiltrates, question a small loculated pleural effusion on the right. Left PICC tip in the mid to low SVC.   XR Chest Port 1 View    Impression    IMPRESSION: Moderately extensive atelectasis and/or infiltrate in the  left similar to previous. Question some left pleural  fluid. Minimal  right pleural effusion and mild-moderate right base atelectasis and/or  infiltrate similar to previous as well. Left PICC line unchanged.    MARC GRANADOS MD         SYSTEM ID:  [id number]   US Chest Pleural Effusion Imaging    Impression    IMPRESSION:  1.  Moderate left pleural fluid.  2.  Trace right pleural fluid and some right pleural thickening.     US Upper Extremity Venous Duplex Left    Impression    IMPRESSION:   1.  No deep venous thrombosis in the left upper extremity.   Echocardiogram Limited   Result Value Ref Range    LVEF  60-65%    Echocardiogram Limited   Result Value Ref Range    LVEF  60-65%    Echocardiogram Limited   Result Value Ref Range    LVEF  60-65%           Lab Results   Component Value Date    WBC 7.6 12/27/2021    WBC 9.8 12/25/2021    WBC 8.2 12/23/2021    HGB 7.1 (L) 12/27/2021    HGB 8.3 (L) 12/25/2021    HGB 8.4 (L) 12/23/2021    HCT 24.5 (L) 12/27/2021    HCT 29.2 (L) 12/25/2021    HCT 29.4 (L) 12/23/2021    PLT 98 (L) 12/27/2021     (L) 12/25/2021    PLT 91 (L) 12/23/2021     12/27/2021     12/26/2021     12/25/2021    POTASSIUM 3.8 12/27/2021    POTASSIUM 4.0 12/26/2021    POTASSIUM 3.9 12/25/2021    CHLORIDE 99 12/27/2021    CHLORIDE 99 12/26/2021    CHLORIDE 100 12/25/2021    CO2 41 (H) 12/27/2021    CO2 41 (H) 12/26/2021    CO2 41 (H) 12/25/2021     (H) 12/27/2021     (H) 12/26/2021     (H) 12/25/2021    CR 2.39 (H) 12/27/2021    CR 2.23 (H) 12/26/2021    CR 1.99 (H) 12/25/2021     (H) 12/27/2021     (H) 12/27/2021     (H) 12/26/2021    SED 11 11/15/2021    SED 15 06/10/2021    DD 2.06 (H) 11/13/2021    DD 2.28 (H) 11/12/2021    DD 3.22 (H) 11/10/2021    NTBNPI 856 07/01/2020    NTBNPI 1,233 (H) 04/22/2020    NTBNP 8,411 (H) 11/15/2021    NTBNP 12,042 (H) 11/13/2021    NTBNP 10,491 (H) 11/12/2021    TROPI 3.517 (HH) 07/07/2021    TROPI 4.419 (HH) 07/07/2021    TROPI 7.047 (HH) 07/06/2021    AST  28 12/27/2021    AST 35 12/20/2021    AST 37 12/11/2021    ALT 24 12/27/2021    ALT 32 12/20/2021    ALT 38 12/11/2021    ALKPHOS 147 12/27/2021    ALKPHOS 154 (H) 12/20/2021    ALKPHOS 175 (H) 12/11/2021    BILITOTAL 0.4 12/27/2021    BILITOTAL 0.3 12/20/2021    BILITOTAL 0.6 12/11/2021    INR 1.34 (H) 12/10/2021    INR 1.30 (H) 12/09/2021    INR 1.51 (H) 11/23/2021      Lab Results   Component Value Date    ALBUMIN 2.6 12/27/2021    ALBUMIN 2.8 07/11/2021          Recent Labs   Lab 12/24/21  1053 12/23/21  1719 12/21/21  1953 12/20/21  1454   PH  --   --  7.51* 7.35   PCO2  --   --  51* 75*   PO2  --   --  51* 41*   HCO3  --   --  41* 41*   O2PER 3 1 28 50

## 2021-12-27 NOTE — PROCEDURES
[facility]    Procedure: IR Procedure Note    Date/Time: 12/27/2021 12:30 PM  Performed by: Aleah Bullard MD  Authorized by: Aleah Bullard MD       UNIVERSAL PROTOCOL   Site Marked: NA  Prior Images Obtained and Reviewed:  Yes  Required items: Required blood products, implants, devices and special equipment available    Patient identity confirmed:  Verbally with patient, arm band, provided demographic data and hospital-assigned identification number  Patient was reevaluated immediately before administering moderate or deep sedation or anesthesia  Confirmation Checklist:  Patient's identity using two indicators, relevant allergies, procedure was appropriate and matched the consent or emergent situation and correct equipment/implants were available  Time out: Immediately prior to the procedure a time out was called    Universal Protocol: the Joint Commission Universal Protocol was followed    Preparation: Patient was prepped and draped in usual sterile fashion       ANESTHESIA    Anesthesia: Local infiltration  Local Anesthetic:  Lidocaine 1% without epinephrine      SEDATION    Patient Sedated: No    Vital signs: Vital signs monitored during sedation    See dictated procedure note for full details.  Findings: Left thoracentesis    Specimens: fluid and/or tissue for gram stain and culture    Complications: None    Condition: Stable      PROCEDURE    Patient Tolerance:  Patient tolerated the procedure well with no immediate complications  Length of time physician/provider present for 1:1 monitoring during sedation: 0

## 2021-12-27 NOTE — PLAN OF CARE
[id number]: Patient alert to self, place. Pts arousal level waxes and wanes. VSS on 4 L O2 this shift but become very labored after turns or eating and required bipap intermittently this shift. Heavy assist with turn/repositioning, using lift at times. BLE wound care done, edemawear in place. L arm wound care done. Zuniga with adequate output, smear of incontinent BM today. Chest US done, moderate fluid noted in L lung. Possible plan for thora tomorrow, eliquis on hold. Palliative also consulted. Discharge plan pending.

## 2021-12-27 NOTE — PLAN OF CARE
A&Ox4, VSS on 4L oxymask and intermittent bipap. Up with lift, T/R q2h while in bed. Denies pain and nausea. Zuniga in place with adequate output, no BM overnight. Increasing +4 pitting edema noted in LUE, US done, no clot present. Chest ultrasound yesterday shows moderate fluid noted in L lung. GT with TF running at goal rate of 40mL/hr with 200mL free water flush q6h. L triple lumen PICC infusing bumex at 2mL/hr. Albumin infusion q12h. Plan for possible thora today, discharge to TCU vs ARU pending improvement, palliative consulted.

## 2021-12-27 NOTE — PROGRESS NOTES
Renal Medicine Progress Note                                Burton Elizabeth MRN# [id number]   Age: 76 year old YOB: 1945   Date of Admission: 10/26/2021 Hospital LOS: 62                  Assessment/Plan:     Burton Elizabeth is a 76 year old male who was admitted on 10/26/2021.      1.  CKD IIIB:    -Admitted with a serum creatinine of 1.54 mg/dl.      2.  Acute kidney injury:                -started HD on 11/23 and last HD tx on 11/28              - OPAL again following decompensation as outlined on 12/21    Suspect that it was hemodynamically mediated.  No contrast or NSAID given.       3.  Hypercalcemia:     -Calcium corrects to 11.6.     -Ionized calcium is 4.9 - normal.  .  Vit D 24.       4.  COVID  19 PNA. 11/4:    -Consider recovered.      5.  Severe aortic stenosis    -s/p TAVR 10/26. EF 60-65%.      6.  High BUN     -due to OPAL + catabolic state + enteral nutrition.       7.  Volume contraction alkalosis     Prognosis:     He has been in hospital since 10/26/21 for TAVR.  He has not thrived since then.  It seems increasingly unlikely that he will not make recovery to independent living again.  His survival is far from assured.  Consideration for a more limited care plan might be considered.      I do not think it is in patient interest to consider additional dialysis      Creatinine rising with diuretic infusion  Stop diuretic   Continue albumin    Following  Palliative care to evaluate       Interval History:     In bed  Lethargic  Follows but slowly    IO reviewed  Labs reviewed    Patient level of understanding unclear    ROS:     GENERAL: NAD, No fever,chills  R: NEGATIVE for significant cough or SOB  CV: NEGATIVE for chest pain, palpitations  ROS: limited by patient condition     Medications and Allergies:     Reviewed    Physical Exam:     Vitals were reviewed  Patient Vitals for the past 8 hrs:   BP Temp Temp src Pulse Resp SpO2 Weight   12/27/21 0734 107/53 97.5  F (36.4  C)  Axillary 68 23 99 % --   12/27/21 0555 -- -- -- -- -- -- 120.3 kg (265 lb 3.4 oz)   12/27/21 0500 135/60 97.1  F (36.2  C) Axillary 64 23 100 % --     I/O last 3 completed shifts:  In: 2033 [P.O.:100; I.V.:20; NG/GT:1913]  Out: 1525 [Urine:1525]    Vitals:    12/24/21 0559 12/25/21 0525 12/26/21 0635 12/26/21 1115   Weight: 113.9 kg (251 lb 1.7 oz) 113.2 kg (249 lb 9 oz) 118.4 kg (261 lb 0.4 oz) 120.5 kg (265 lb 10.5 oz)    12/27/21 0555   Weight: 120.3 kg (265 lb 3.4 oz)       GENERAL: awake, alert, follows  HEENT: NC/AT, PERRLA, EOMI, non icteric, pharynx moist without lesion  RESP:  clear anteriorly  CV: RRR, normal S1 S2  ABDOMEN: soft, nontender, no HSM or masses and bowel sounds normal  MS: no clubbing, cyanosis   SKIN: clear without significant rashes or lesions  EXT: warm, no edema    Data:     Recent Labs   Lab 12/27/21  0603 12/27/21  0442 12/26/21  2354 12/26/21  2040 12/26/21  0841 12/26/21  0637 12/25/21  0804 12/25/21  0532 12/24/21  1220 12/24/21  0738     --   --   --   --  142  --  143  --  141   POTASSIUM 3.8  --   --   --   --  4.0  --  3.9  --  4.0   CHLORIDE 99  --   --   --   --  99  --  100  --  101   CO2 41*  --   --   --   --  41*  --  41*  --  36*   ANIONGAP 3  --   --   --   --  2*  --  2*  --  4   * 157* 178* 236*   < > 208*   < > 134*   < > 237*   *  --   --   --   --  122*  --  116*  --  118*   CR 2.39*  --   --   --   --  2.23*  --  1.99*  --  2.07*   GFRESTIMATED 28*  --   --   --   --  30*  --  34*  --  33*   GABE 9.3  --   --   --   --  9.2  --  9.5  --  9.3    < > = values in this interval not displayed.         Recent Labs   Lab Test 12/27/21  0603 12/26/21  0637 12/25/21  0532 12/24/21  0738 12/23/21  0507 12/22/21  0503 12/21/21  0617 12/20/21  0506 12/18/21  0824 12/17/21  0619   CR 2.39* 2.23* 1.99* 2.07* 2.18* 2.21* 2.35* 1.87* 1.66* 1.72*     Recent Labs   Lab 12/27/21  0603 12/26/21  0637 12/25/21  0532 12/24/21  0738   ALBUMIN 2.6* 2.5* 2.5* 2.4*      Recent Labs   Lab 12/27/21  0603 12/26/21  0637 12/25/21  0532 12/24/21  0738 12/23/21  0507 12/22/21  1146   PHOS 3.9 3.7 3.8 3.7 3.2  --    HGB 7.1*  --  8.3*  --  8.4* 9.0*         G Burton Connor MD    [facility]ph[facility]  [phone]

## 2021-12-28 NOTE — PROGRESS NOTES
[facility] Nurse Inpatient Wound Assessment   Reason for consultation: Re-evaluate and treat bilateral buttocks wounds    Assessment  Bilateral buttock wounds have healed.     Perianal wounds from IAD healed     L arm wound due to skin tear and edema healed    Penis wounds likely from edema and trauma but cannot exclude pressure. Mucosal injury. Patient has had giron catheter since 10/27. Clotted and urology saw patient on 12/13 and replaced with a 22Fr with and underwent CBI. 22Fr Currently in place and secured. Reports patient frequently tugging at site.   Healed 12/28/2021.    Treatment Plan    Perianal skin: BID and PRN.  1. Cleanse with Lisa Cleanse and Protect. Pat Dry with Sundeep wipes.  2. Apply thin layer of Barrier Cream to Perianal skin to protect from loose stools.  -No need to fully remove cream each time, remove top soiled layer. If full removal is needed can use mineral oil obtained from pharmacy.   Pressure Injury prevention (please order supplies if not in room)  1. Turn/reposition every 1-2 hrs  2.   Float heels off bed with use of pillows.  3.   If incontinent Cleanse with incontinent cleanser (Lisa spray #[id number]) followed by skin barrier protectant (Critic Aid paste)  BID and after each incontinent episode  4.   Prevent sliding and shear by limiting HOB to 30 degrees or less unless contraindicated, use knee gatch first if not contraindicated  5.   Chair cushion pressure redistribution as needed (#[id number]): please limit sitting to an hour at a time  6.   Optimize nutrition   7.   PULSATE low air loss mattress    Penis skin: BID and PRN  1. Cleanse with SureStep Catheter Wipes (green packaging)  2. Apply bead of bacitracin to penis meatus (if penile meatus is a clock, patient's head is 12 o clock. Apply bacitracin from 5-7 o clock)  3. Ensure securement device in place and tubing is NOT tight.     L forearm wound: healed, no longer needs wound care    Asked for recommendations for compression:  EDEMA WEAR  Stockings- apply fresh pair daily (#[id number])  DO NOT CUT OR TRIM STOCKINGS  Remove and set aside stockings, do not throw away  SKIN/WOUND CARE  1. Clean feet and legs with gentle wash  2. Complete any wound or skin treatments  o Spray Lisa Cleanse and Protect on intact skin, especially dry, scaly plaques, massage in, wipe or rinse  o Apply lotions  o Complete wound care  3. Apply Edema Wear from toes to knees with a cuff at the top of the stockings  CARE OF EDEMAWEAR     DO NOT THROW AWAY THE OLD PAIR OF EDEMA WEAR, WASH IT.   o Wash stocking(s) with soap and HOT water. If really soiled use a surgical soap then regular soap and/or you may need to wash several times  o Hang dry       Edema Wear    size stripe color PS #   small navy [id number]   medium yellow [id number]   large red [id number]   X Large aqua [id number]         Orders Reviewed and Updated  Recommended provider order: None, at this time  WOC Nurse follow-up plan:signing off  Nursing to notify the Provider(s) and re-consult the WOC Nurse if wound(s) deteriorates or new skin concern.    Patient History  According to provider note(s):  75 yo single male who lives by himself,  with CAD, atrial flutter on eliquis, hypertension, hyperlipidemia, HFpEF, CKD stage III, DM type 3, history of GI bleed, suspected obstructive sleep apnea electively admitted on 10/26/2021 for TAVR to treat severe Aortic Stenosis.  Hospital course complicated by OPAL from contrast nephropathy and volume overload requiring transient dialysis, hypoxic respiratory failure multifactorial, Covid pneumonia and physical deconditioning, and proteus UTI and bacteremia.     Coarse summary:  11/15- patient intubated for severe hypoxic respiratory failure. Patient was proned and paralyzed.   11/22: Started scheduled IV Bumex   11/23: GI bleed  12/4 self extubated during turn per night nurse @ 0015. Placed on BiPap, attempted hiflo, did not tolerate. Pt made DNR/DNI. NG tube placed.   12/5: HFNC over night, matilde  "better than bipap. HD line out. TX to C  12/6: Substantial improvement in oxygenation, patient off high flow.  12/10: percutaneous G tube placed, Tube feeds started.       COVID-19 Pneumonia  MRSA VAP  Acute hypoxic respiratory failure (multifactorial with above):  -  Self extubated 12/4, currently on 5 LPM nasal canula  - most recent cxr shows bilateral pulm infiltrates, but normal wbc and no fever  - patient mobilizing secretions better     Severe aortic stenosis, s/p TAVR 10/26   A fib/flutter, rate controlled   Hypertension:  -  Eliquis 5 mg bid,  held for PEG tube placement, restarted 12/12  - restarted Lasix 40 mg daily x 12/10, continue  - Continue coreg low dose resumed 12/5, increase to 6.25 bid. Continue hydralazine.     L upper ext edema:  Objective Data  Containment of urine/stool: Incontinence Protocol and Indwelling catheter    Active Diet Order  Orders Placed This Encounter      Combination Diet Soft and Bite Sized Diet (level 6); Mildly Thick (level 2)      Output:   I/O last 3 completed shifts:  In: 490 [P.O.:240; NG/GT:250]  Out: 1575 [Urine:1575]    Risk Assessment:   Sensory Perception: 3-->slightly limited  Moisture: 3-->occasionally moist  Activity: 2-->chairfast  Mobility: 2-->very limited  Nutrition: 3-->adequate  Friction and Shear: 2-->potential problem  Sudarshan Score: 15                          Labs:   Recent Labs   Lab 12/27/21  1629 12/27/21  0603   ALBUMIN  --  2.6*   HGB 7.4* 7.1*   WBC  --  7.6       Physical Exam  Areas of skin assessed: focused bilateral buttocks , mouth    Wound Location: Perianal  Date of last photo 12/21      Wound History: per prior [facility] charting \"hospital acquired due to refusal to reposition and sleep in the bed. Well documented\". 12/14 has perianal erosion from loose stool due to tube feeds. 12/21 no injury to buttock  Healed on assessment 12/28/2021      Wound Location:  L forearm  Date of last Photo 12/21 12/21                                           "                      12/28    Wound History: Patient with significant edema and weeping from wound. Likely, edema caused fragile skin.  Measurements (length x width x depth, in cm) 0.7cm x 2 cm  x  0.1 cm   Healed on assessment 12/28/2021    Penis: all skin and mucosal membrane is healed    Interventions  Visual inspection and assessment completed   Wound Care Rationale Protect periwound skin, Promote moist wound healing without tissue dehydration , Provide protection  and Decrease bacterial load  Wound Care: done per plan of care, discussed with RN to order bacitracin  Supplies: floor stock and discussed with RN  Current off-loading measures: Chair cushion, Pillows under calves and Pillows  Current support surface: Standard  Low air loss mattress  Education provided to: importance of repositioning, plan of care, and Off-loading pressure  Discussed plan of care with Patient, RN     Darling Marino RN, CWOCN

## 2021-12-28 NOTE — PROGRESS NOTES
Renal Medicine Progress Note                                Burton Elizabeth MRN# [id number]   Age: 76 year old YOB: 1945   Date of Admission: 10/26/2021 Hospital LOS: 63                  Assessment/Plan:     Burton Elizabeth is a 76 year old male who was admitted on 10/26/2021.      1.  CKD IIIB:    -Admitted with a serum creatinine of 1.54 mg/dl.      2.  Acute kidney injury:                -started HD on 11/23 and last HD tx on 11/28              - OPAL again following decompensation as outlined on 12/21    Suspect that it was hemodynamically mediated.  No contrast or NSAID given.       3.  Hypercalcemia:     -Calcium corrects to 11.6.     -Ionized calcium is 4.9 - normal.  .  Vit D 24.       4.  COVID  19 PNA. 11/4:    -Consider recovered.      5.  Severe aortic stenosis    -s/p TAVR 10/26. EF 60-65%.      6.  High BUN     -due to OPAL + catabolic state + enteral nutrition.       7.  Volume contraction alkalosis     Prognosis:     He has been in hospital since 10/26/21 for TAVR.  He has not thrived since then.  It seems increasingly unlikely that he will not make recovery to independent living again.  His survival is far from assured.  Consideration for a more limited care plan might be considered.      I do not think it is in patient interest to consider additional dialysis      Diuretic held 12/27/21  Labs from today pending   Continue albumin    Following  Palliative care to evaluate       Interval History:     In bed  Lethargic  Follows but slowly    IO reviewed  Labs reviewed    Discussed with Dr. Coffman     ROS:     GENERAL: NAD, No fever,chills  R: NEGATIVE for significant cough or SOB  CV: NEGATIVE for chest pain, palpitations  ROS: limited by patient condition     Medications and Allergies:     Reviewed    Physical Exam:     Vitals were reviewed  Patient Vitals for the past 8 hrs:   BP Temp Temp src Pulse Resp SpO2 Weight   12/28/21 0919 122/54 -- -- 78 -- 96 % --   12/28/21 0755  123/63 97.6  F (36.4  C) Axillary 74 20 98 % --   12/28/21 0609 -- -- -- -- -- 97 % 118 kg (260 lb 2.3 oz)   12/28/21 0300 -- -- -- -- -- 97 % --     I/O last 3 completed shifts:  In: 490 [P.O.:240; NG/GT:250]  Out: 1575 [Urine:1575]    Vitals:    12/25/21 0525 12/26/21 0635 12/26/21 1115 12/27/21 0555   Weight: 113.2 kg (249 lb 9 oz) 118.4 kg (261 lb 0.4 oz) 120.5 kg (265 lb 10.5 oz) 120.3 kg (265 lb 3.4 oz)    12/28/21 0609   Weight: 118 kg (260 lb 2.3 oz)       GENERAL: awake, alert, follows  HEENT: NC/AT, PERRLA, EOMI, non icteric, pharynx moist without lesion  RESP:  clear anteriorly  CV: RRR, normal S1 S2  ABDOMEN: soft, nontender, no HSM or masses and bowel sounds normal  MS: no clubbing, cyanosis   SKIN: clear without significant rashes or lesions  EXT: warm, no edema    Data:     Recent Labs   Lab 12/28/21  0753 12/28/21  0359 12/28/21  0012 12/27/21  1951 12/27/21  1317 12/27/21  0603 12/26/21  0841 12/26/21  0637 12/25/21  0804 12/25/21  0532 12/24/21  1220 12/24/21  0738   NA  --   --   --   --   --  143  --  142  --  143  --  141   POTASSIUM  --   --   --   --   --  3.8  --  4.0  --  3.9  --  4.0   CHLORIDE  --   --   --   --   --  99  --  99  --  100  --  101   CO2  --   --   --   --   --  41*  --  41*  --  41*  --  36*   ANIONGAP  --   --   --   --   --  3  --  2*  --  2*  --  4   GLC 95 128* 155* 278*   < > 148*   < > 208*   < > 134*   < > 237*   BUN  --   --   --   --   --  133*  --  122*  --  116*  --  118*   CR  --   --   --   --   --  2.39*  --  2.23*  --  1.99*  --  2.07*   GFRESTIMATED  --   --   --   --   --  28*  --  30*  --  34*  --  33*   GABE  --   --   --   --   --  9.3  --  9.2  --  9.5  --  9.3    < > = values in this interval not displayed.         Recent Labs   Lab Test 12/27/21  0603 12/26/21  0637 12/25/21  0532 12/24/21  0738 12/23/21  0507 12/22/21  0503 12/21/21  0617 12/20/21  0506 12/18/21  0824 12/17/21  0619   CR 2.39* 2.23* 1.99* 2.07* 2.18* 2.21* 2.35* 1.87* 1.66* 1.72*      Recent Labs   Lab 12/27/21  0603 12/26/21  0637 12/25/21  0532 12/24/21  0738   ALBUMIN 2.6* 2.5* 2.5* 2.4*     Recent Labs   Lab 12/27/21  1629 12/27/21  0603 12/26/21  0637 12/25/21  0532 12/24/21  0738 12/23/21  0507   PHOS  --  3.9 3.7 3.8 3.7 3.2   HGB 7.4* 7.1*  --  8.3*  --  8.4*         G Burton Connor MD    [facility] - Nephrology  [phone]

## 2021-12-28 NOTE — PLAN OF CARE
A&Ox4, VSS on 4L NC. Thoracentesis with 750 mL removed from L lung. Up with lift, T/R q2h while in bed. Denies pain and nausea. Zuniga in place with adequate output, no BM. Increasing +4 pitting edema noted in LUE, US done, no clot present. GT with TF running at goal rate of 40mL/hr with 200mL free water flush q6h. L triple lumen PICC SL. Albumin infusion q12h. Discharge to TCU vs ARU pending improvement, palliative consulted.

## 2021-12-28 NOTE — PROVIDER NOTIFICATION
MD Notification    Notified Person: MD    Notified Person Name: Dr. Francois    Notification Date/Time: 12/28/21 0032    Notification Interaction: amcom    Purpose of Notification: Pt has increased work of breathing. Crackles auscultated to all lobes. On 4L of O2 sating in the mid 90s. Please advise, thanks.     Orders Received:chest x-ray ordered    Comments:

## 2021-12-28 NOTE — PLAN OF CARE
Sats 98 on 4LNC, oxymask at 4L while sleeping. LOAIZA. LS dim. Tele a-fib with CVR and BBB. Disoriented to time and place this AM but improved as day progress. No complaints of pain. Poor appetite; assisted with feeding by spoon. TF running at goal rate. Generalized edema; worse in LUE. Turned/repo'ed ~q2h. Wound cares to penis, cj area, and legs done per orders. Edema wraps on BLE. Zuniga patent with adequate UO. Plan pending clinical progress.

## 2021-12-28 NOTE — PROGRESS NOTES
[facility]  Medicine Progress Note - Hospitalist Service       Date of Admission:  10/26/2021    Assessment & Plan           Burton Elizabeth is a 75 yo single male who lives by himself,  with CAD, atrial flutter on eliquis, hypertension, hyperlipidemia, HFpEF, CKD stage III, DM type 3, history of GI bleed, suspected obstructive sleep apnea electively admitted on 10/26/2021 for TAVR to treat severe Aortic Stenosis.  Hospital course complicated by OPAL from contrast nephropathy and volume overload requiring transient dialysis and diuresis, Covid pneumonia UTI and bacteremia.     Brief summary:  10/29: initiated on IHD dialysis for acute OPAL and volume overload not responsive to diruesis  11/04: COVID +  11/15: patient intubated   11/22: Started scheduled IV Bumex   11/23: Melena and acute anemia, EGD with barretts and debora's erosions  12/4: self extubated during turn per night nurse   12/5: HFNC over night, matilde better than bipap. Transfer to floor.  12/6: Substantial improvement in oxygenation, patient off high flow.  12/10: percutaneous G tube placed, Tube feeds started.    12/13: gross hematuria -- improved with CBI and will have outpatient follow up with urology  12/20 drowsy, abg showing hypercapnic respiratory acidosis, improved with bipap and hypotension  12/21 OPAL on CKD with Cr 1.8>2.3, Nephrology felt likely related to above decompensation. FeNa 0.7 and lasix stopped.   12/23: diuretic resumed with albumin.  12/24: Hypoxia stable but increased lethargy and dyspnea. CXR worsened, Bumex drip started  12/26: not tolerating being off BiPAP. Cr slightly worse.  12/27: Creatinine continues to rise, US negative for LUE DVT, thoracic US-moderate left effusion, thoracentesis showed burgundy effusion, 750 ml aspirated. Palliative care consulted 12/27.  12/28: on BiPAP to 5LPM       COVID-19 Pneumonia dx 11/04-recovered  MRSA VAP-resolved  Acute hypoxic hypercapnic respiratory failure  (multifactorial with above)  Completed treatment with antibiotics and steroids.  - Self extubated 12/4  - became more drowsy and altered 12/20 ABG with pH 7.3, pCO2 83 --> 7.35 and 75 after bipap   - Goal O2 sat 90-92% Patient is CO2 retainer and over oxygenating hinders ventilation. Recurrent hospital problem  - Bipap at night however patient refuses often  - 12/24: O2 requirement stable at 3LPM but more drowsy and increased work of breathing. CXR likely worse. BUN markedly elevated to 110s. Discussed with Dr Persaud, started on bumex drip. Discussed with RN to place him on BiPAP PRN to assist with mobilizing fluid given CXR finding.   -12/26: not tolerating being off BiPAP longer.   -12/27: S/p thoracentesis, 750 cc of burgundy colored aspirate.  Concern for pneumothorax.  Remains off anticoagulation.  Respiratory status slightly improved, tolerating being off BiPAP again dyspneic subsequently at night needing BiPAP.    Severe aortic stenosis, s/p TAVR 10/26   A fib/flutter, rate controlled   Hypertension  - Eliquis 5 mg bid was held for thoracentesis.  It showed concern for hemorrhagic fluid and so holding apixaban.  - Continue coreg 6.25 bid and hydralazine     ARF on CKD, required IHD earlier this stay  - Temp dialysis line removed 12/5  - 12/21 creat 2.35, with decreased UO, nephrology reconsulted. FeNa returned at 0.7 and lasix held  but then respiratory status worsened off lasix, and so placed on bumex drip.   - bumex drip held 12/27 as Cr started to trend up.  Not a candidate for HD.  -nephrology following, appreciate assistance.      Moderate malnutrition  Dysphagia  Hyperchloremic hypernatremia - improving  PEG tube placed 12/10, diet advanced by SLP yesterday to DD4  - continue TF per nutrition and diet advancement per SLP  - once tolerating diet can perform calorie counts and reduce TF    Deconditioning:  -  PT/OT following, will need TCU vs ARU on discharge for significant rehab     DM II with stress  induced hyperglycemia  Maintained at home on Lantus 18 Uhs , amaryl 2 mg bid. A1c 7.6 in 11/2021  BS remains elevated at >200s on Lantus and ISS  -BS in90s, cecrease lantus  -continue ISS     GI bleed earlier in stay, s/p colonoscopy and EGD  Anemia, acute on chronic suspected hemothorax  -  Barretts esophagus erosions and non bleeding ulcers noted on EGD. No active bleeding  -  no further melena and Hb was stable 8-9  - Then noted Hb dropped to 7 on 12/26.  Thoracentesis showed hemorrhagic tap.  - Consent has been signed and conditional transfusion for Hb 7 or less.  Hb has been stable for the last 48 hours.  - Continue to hold apixaban  -  PPI continued    Resolved problems below    Proteus bacteremia and UTI  treated  Leukopenia, chronic: resolved  Coagulopathy of covid earlier in stay  Thrombocytopenia, improving: resolved      Agitation/Encephalopathy (Combination of toxic and metabolic during course of stay):  - Slowly improving (sitting in chair today), oriented to place, able name president    L upper ext edema  Has picc present on L Upper ext. Pt tends to lay on L side  - TESSA ext venous doppler - negative for clot       Diet: Adult Formula Drip Feeding: Specified Time Novasource Renal; Gastrostomy; Goal Rate: 40; mL/hr; Medication - Feeding Tube Flush Frequency: At least 15-30 mL water before and after medication administration and with tube clogging; Amount to Send (N...  Combination Diet Soft and Bite Sized Diet (level 6); Mildly Thick (level 2)    DVT Prophylaxis: DOAC  Zuniga Catheter: PRESENT, indication: Strict 1-2 Hour I&O, Strict 1-2 Hour I&O, Retention, Strict 1-2 Hour I&O  Central Lines: None  Code Status: No CPR- Do NOT Intubate      Disposition Plan   Expected Discharge: 12/30/2021   anticipate several days in hospital.  Anticipated discharge location: inpatient rehabilitation facility    Delays:     Complex Care  Placement - TCU            The patient's care was discussed with the nephrologist,  bedside nurse and patient. Discussed with his brother Jose. Palliative care following, continuing with restorative care, patient DNR/DNI, bill is SDM. Further care plan based on his course over the next few days. Appreciate palliative care assistance.     Desean Coffman MD  Hospitalist Service  [facility]  Securely message with the Vocera Web Console (learn more here)  Text page via [employer] Paging/Directory        Clinically Significant Risk Factors Present on Admission                ______________________________________________________________________    Interval History     Patient had thoracentesis yesterday and saline 750 cc of burgundy colored effusion was drained.  Was able to come off BiPAP but became dyspneic overnight and had to be placed on BiPAP again.  This a.m., when I evaluated around 9 AM, was placed on oxygen mask 5 LPM.  Patient was quite tangential and other than no dyspnea and pain unable to obtain ROS reliably.  Remains afebrile.       Data reviewed today: I reviewed all medications, new labs results over the last 24 hours. I personally reviewed LUE US, negative for DVT.  Thoracic ultrasound shows moderate left effusion.    Physical Exam   Vital Signs: Temp: (!) 95.7  F (35.4  C) Temp src: Axillary BP: 135/59 Pulse: 83   Resp: 20 SpO2: 98 % O2 Device: Oxymask Oxygen Delivery: 5 LPM  Weight: 260 lbs 2.28 oz    Gen: alert awake, oriented to self, knows he is at [facility].  Answers simple questions but is tangential  HEENT:   EOMI, MMM. Pressure injury from BiPAP mask noted.   Resp: bilateral diminished breath sounds. Extensive crackles bilaterally. No wheezing.    CV: S1S2  Regular, no tachycardia.   Abdo: soft, obese/nontender  Ext: calves nontender, well perfused, scrotal edema and overall anasarca/marked extremities edema present    Neuro: alert awake on BiPAP, CN grossly intact, no facial asymmetry, strength symmetrical.      Data   Recent Labs   Lab  12/28/21  1200 12/28/21  1018 12/28/21  0753 12/27/21  1951 12/27/21  1629 12/27/21  1317 12/27/21  0603 12/26/21  0841 12/26/21  0637 12/25/21  0804 12/25/21  0532   WBC  --  8.6  --   --   --   --  7.6  --   --   --  9.8   HGB  --  7.1*  --   --  7.4*  --  7.1*  --   --   --  8.3*   MCV  --  98  --   --   --   --  97  --   --   --  99   PLT  --  115*  --   --   --   --  98*  --   --   --  103*   NA  --  143  --   --   --   --  143  --  142  --  143   POTASSIUM  --  3.6  --   --   --   --  3.8  --  4.0  --  3.9   CHLORIDE  --  99  --   --   --   --  99  --  99  --  100   CO2  --  43*  --   --   --   --  41*  --  41*  --  41*   BUN  --  136*  --   --   --   --  133*  --  122*  --  116*   CR  --  2.38*  --   --   --   --  2.39*  --  2.23*  --  1.99*   ANIONGAP  --  1*  --   --   --   --  3  --  2*  --  2*   GABE  --  9.3  --   --   --   --  9.3  --  9.2  --  9.5   GLC 91 78 95   < >  --    < > 148*   < > 208*   < > 134*   ALBUMIN  --  2.6*  --   --   --   --  2.6*  --  2.5*  --  2.5*   PROTTOTAL  --  5.3*  --   --   --   --  5.2*  5.3*  --   --   --   --    BILITOTAL  --  0.5  --   --   --   --  0.4  --   --   --   --    ALKPHOS  --  183*  --   --   --   --  147  --   --   --   --    ALT  --  31  --   --   --   --  24  --   --   --   --    AST  --  42  --   --   --   --  28  --   --   --   --     < > = values in this interval not displayed.     Recent Results (from the past 24 hour(s))   XR Chest Port 1 View    Narrative    EXAM: XR CHEST PORT 1 VIEW  LOCATION: [facility]  DATE/TIME: 12/28/2021 1:45 AM    INDICATION: sob  COMPARISON: 12/24/2021      Impression    IMPRESSION: Stable cardiomediastinal contours. Left upper extremity PICC tip projects near the cavoatrial junction. Diffuse interstitial and alveolar infiltrates persist, with slight interval improvement in aeration of the lower lung zones. Small to   moderate layering pleural effusions, modestly improved on the left though similar  or slightly larger on the right. No visible pneumothorax.      none

## 2021-12-28 NOTE — PLAN OF CARE
Pt is A&Ox4. VSS. Denies pain. Bipap on overnight. Otherwise on 4-5L via oxymask, sating in the mid 90's. Pt had increased work of breathing and increased crackling to lungs, harpreet MANN, ordered chest xray, see results. Tele: A.fib with BBB. Incontinent of bowel, x1 BM this shift. Passing gas. Zuniga patent with adequate yellow UOP. Redness noted to urethral meatus, applied bacitracin. Up A2 with lift, T/R q2h, on pulsate matress, heel boots in place. Open spot inbetween buttocks, wound cares completed per orders. Continous TF running at goal rate of 40mL/hr with programmed 200mL water flushes, tolerating well denies N/V.  On soft bite size diet with mildy thick liquids poor appetite.  Q4h BG checks, covered as needed. L tripple lumen PICC SL, good BR noted to all lumens. Cr: 2.39. Hgb: 7.1. Discharge to TCU vs ARU pending improvement, palliative consulted.

## 2021-12-29 NOTE — PLAN OF CARE
[id number] shift. Vitals stable on 4L O2. Tele- a fib CVR w/ BBB. Up w/ lift, repos in bed. Alert, disorientated to situation. Soft/mildly thick diet- only ate a couple bites for supper. G tube TF running at 40cc. Generalized 2-3+ edema. Perianal wound care completed. Thora site to mid back CDI.  Plan pending improvement in respiratory status.

## 2021-12-29 NOTE — PLAN OF CARE
Physical Therapy Discharge Summary    Reason for therapy discharge:    Change in medical status.    Progress towards therapy goal(s). See goals on Care Plan in [facility] electronic health record for goal details.  Goals not met.  Barriers to achieving goals:   Pt has transitioned to comfort cares.    Therapy recommendation(s):    No further therapy is recommended.

## 2021-12-29 NOTE — PLAN OF CARE
Transitioned to comfort cares this morning. Somnolent. Appears to be resting comfortably. RR 24 while on bipap. Plan to keep bipap on until brother returns this elis around 1800 (Dr Coffman aware and ok with this plan). Oral cares done. G tube flushed for patency. Zuniga patent. Wound cares to L forearm, perineum, penis and legs completed per orders. Edema wraps left off lower extremities for comfort. NPO. Turned/repo'ed ~q2h. Discharge plan pending inpatient hospice consult.     ADDENDUM: pt not a candidate for inpt hospice at this time. Luba mullins MD with update.

## 2021-12-29 NOTE — PROVIDER NOTIFICATION
MD Notification    Notified Person: MD    Notified Person Name: Dr Sambruce    Notification Date/Time: 12-29-21 0710    Notification Interaction: Vocera messaged    Purpose of Notification: Hgb 6.9, have conditionals to transfuse    Orders Received:    Comments:     Await response if any other new orders

## 2021-12-29 NOTE — PLAN OF CARE
Occupational Therapy Discharge Summary    Reason for therapy discharge:    No further expectations of functional progress.  Patient/family request discontinuation of services.  Change in medical status.    Progress towards therapy goal(s). See goals on Care Plan in [facility] electronic health record for goal details.  Goals not met.  Barriers to achieving goals:   Patient is pursuing comfort cares at this time, skilled OT is no longer appropriate. Please refer to MD discharge plan for details.

## 2021-12-29 NOTE — PLAN OF CARE
Speech Language Therapy Discharge Summary    Reason for therapy discharge:    Patient/family request discontinuation of services.    Progress towards therapy goal(s). See goals on Care Plan in [facility] electronic health record for goal details.  Goals not met.  Barriers to achieving goals:   Patient is now comfort cares..    Therapy recommendation(s):    No further therapy is recommended.

## 2021-12-29 NOTE — PLAN OF CARE
Patient was sleeping most of the shift. Seroquel given for being restless.Denied. VSS. Bipap on overnight. O2 sat's about 99. Respiration was 22 to 27. VBG returned with  elevated carbon dioxide noted, which at patient's base line. Zuniga patent with adequate yellow UOP. Continuous TF running at goal rate of 40mL/hr with programmed 200mL water flushes, tolerating well denies N/V.  On soft bite size diet with mildy thick liquids poor appetite.  Q4h BG checks, covered as needed. L tripple lumen PICC SL, good BR noted to all lumens. Discharge to TCU vs ARU pending improvement, palliative consulted.

## 2021-12-29 NOTE — PROVIDER NOTIFICATION
MD Notification    Notified Person: MD    Notified Person Name: Burke Golden THEODORA    Notification Date/Time: 0120 12/29/2021    Notification Interaction: [employer]     Purpose of Notification: FYI blood gasses are back.     Orders Received: Keep pt on BiPAP overnight.     Comments:

## 2021-12-29 NOTE — CODE/RAPID RESPONSE
Brief house NP note:    RRT paged for hypoxia and altered mental status. Patient was placed on BiPAP 16/7/50%.    PLAN:  -- NIPPV  -- ABG in 1 hour    Please page with additional concerns,     WALE Brunner, CNP  Hospitalist - House [facility]  Text Page  [phone])

## 2021-12-29 NOTE — PROGRESS NOTES
Pt found unresponsive with oxymask off, tele showed HR of 36. Suspected to be severely hypoxic and hypercapnic, RRT called. BiPAP applied, respirations and oxygenation improved and pt opened eyes to voice. BMP ordered for 2330.

## 2021-12-29 NOTE — PROGRESS NOTES
Renal Medicine       Transition to comfort cares noted  Call with any questions    Signing off       MEHUL Connor    [facility]  [phone]

## 2021-12-29 NOTE — PROGRESS NOTES
[facility]  Medicine Progress Note - Hospitalist Service       Date of Admission:  10/26/2021    Assessment & Plan           Burton Elizabeth is a 75 yo single male who lives by himself,  with CAD, atrial flutter on eliquis, hypertension, hyperlipidemia, HFpEF, CKD stage III, DM type 3, history of GI bleed, suspected obstructive sleep apnea electively admitted on 10/26/2021 for TAVR to treat severe Aortic Stenosis.  Hospital course complicated by OPAL from contrast nephropathy and volume overload requiring transient dialysis and diuresis, Covid pneumonia UTI and bacteremia.     Brief summary:  10/29: initiated on IHD dialysis for acute OPAL and volume overload not responsive to diruesis  11/04: COVID +  11/15: patient intubated   11/22: Started scheduled IV Bumex   11/23: Melena and acute anemia, EGD with barretts and debora's erosions  12/4: self extubated during turn per night nurse   12/5: HFNC over night, matilde better than bipap. Transfer to floor.  12/6: Substantial improvement in oxygenation, patient off high flow.  12/10: percutaneous G tube placed, Tube feeds started.    12/13: gross hematuria -- improved with CBI and will have outpatient follow up with urology  12/20 drowsy, abg showing hypercapnic respiratory acidosis, improved with bipap and hypotension  12/21 OPAL on CKD with Cr 1.8>2.3, Nephrology felt likely related to above decompensation. FeNa 0.7 and lasix stopped.   12/23: diuretic resumed with albumin.  12/24: Hypoxia stable but increased lethargy and dyspnea. CXR worsened, Bumex drip started  12/26: not tolerating being off BiPAP. Cr slightly worse.  12/27: Creatinine continues to rise, US negative for LUE DVT, thoracic US-moderate left effusion, thoracentesis showed burgundy effusion, 750 ml aspirated. Palliative care consulted 12/27.  12/28: on BiPAP to 5LPM      12/29: Rapid response due to hypoxia and altered mental status.  BiPAP setting adjusted.  Patient has been  dependent on BiPAP.  Remains lethargic/minimally responsive to verbal stimuli.  Afebrile.  Noted slightly worsened anemia.  Creatinine remains elevated, unchanged.  Discussed with his brother Jose over the phone this morning at length regarding further goals of care.  Jose stated he was going to talk to his sister and requested follow-up.  He arrived at hospital and I had further conversation with him in person.  Jose reported that he discussed with his sister as well and they agree with transitioning his care to comfort measures only at this point.    Transition cares to comfort measures  General inpatient hospice consult to transition to inpatient hospice  Discussed with   Discussed with nursing staff.  Patient sister is planning to visit him soon.  If she is arriving in next few hours, continue BiPAP otherwise discontinue BiPAP and placed on 2-4 LPM NC for comfort.      Prior to transitioning to comfort measures, his hospital course and management was as follows.     COVID-19 Pneumonia dx 11/04-recovered  MRSA VAP-resolved  Acute hypoxic hypercapnic respiratory failure (multifactorial with above)  Completed treatment with antibiotics and steroids.  - Self extubated 12/4  - became more drowsy and altered 12/20 ABG with pH 7.3, pCO2 83 --> 7.35 and 75 after bipap   - Goal O2 sat 90-92% Patient is CO2 retainer and over oxygenating hinders ventilation. Recurrent hospital problem  - Bipap at night however patient refuses often  - 12/24: O2 requirement stable at 3LPM but more drowsy and increased work of breathing. CXR likely worse. BUN markedly elevated to 110s. Discussed with Dr Persaud, started on bumex drip. Discussed with RN to place him on BiPAP PRN to assist with mobilizing fluid given CXR finding.   -12/26: not tolerating being off BiPAP longer.   -12/27: S/p thoracentesis, 750 cc of burgundy colored aspirate.  Concern for pneumothorax.  Remains off anticoagulation.  Respiratory status slightly  improved, tolerating being off BiPAP again dyspneic subsequently at night needing BiPAP.    Severe aortic stenosis, s/p TAVR 10/26   A fib/flutter, rate controlled   Hypertension  - Eliquis 5 mg bid was held for thoracentesis.  It showed concern for hemorrhagic fluid and so holding apixaban.  - Continue coreg 6.25 bid and hydralazine     ARF on CKD, required IHD earlier this stay  - Temp dialysis line removed 12/5  - 12/21 creat 2.35, with decreased UO, nephrology reconsulted. FeNa returned at 0.7 and lasix held  but then respiratory status worsened off lasix, and so placed on bumex drip.   - bumex drip held 12/27 as Cr started to trend up.  Not a candidate for HD.  -nephrology following, appreciate assistance.      Moderate malnutrition  Dysphagia  Hyperchloremic hypernatremia - improving  PEG tube placed 12/10, diet advanced by SLP yesterday to DD4  - continue TF per nutrition and diet advancement per SLP  - once tolerating diet can perform calorie counts and reduce TF    Deconditioning:  -  PT/OT following, will need TCU vs ARU on discharge for significant rehab     DM II with stress induced hyperglycemia  Maintained at home on Lantus 18 Uhs , amaryl 2 mg bid. A1c 7.6 in 11/2021  BS remains elevated at >200s on Lantus and ISS  -BS in90s, cecrease lantus  -continue ISS     GI bleed earlier in stay, s/p colonoscopy and EGD  Anemia, acute on chronic suspected hemothorax  -  Barretts esophagus erosions and non bleeding ulcers noted on EGD. No active bleeding  -  no further melena and Hb was stable 8-9  - Then noted Hb dropped to 7 on 12/26.  Thoracentesis showed hemorrhagic tap.  - Consent has been signed and conditional transfusion for Hb 7 or less.  Hb has been stable for the last 48 hours.  - Continue to hold apixaban  -  PPI continued    Resolved problems below    Proteus bacteremia and UTI  treated  Leukopenia, chronic: resolved  Coagulopathy of covid earlier in stay  Thrombocytopenia, improving:  resolved      Agitation/Encephalopathy (Combination of toxic and metabolic during course of stay):  - Slowly improving (sitting in chair today), oriented to place, able name president    L upper ext edema  Has picc present on L Upper ext. Pt tends to lay on L side  - TESSA ext venous doppler - negative for clot       Diet: Combination Diet Soft and Bite Sized Diet (level 6); Mildly Thick (level 2)    DVT Prophylaxis: DOAC  Zuniga Catheter: PRESENT, indication: Strict 1-2 Hour I&O, Strict 1-2 Hour I&O, Retention, Strict 1-2 Hour I&O  Central Lines: None  Code Status: No CPR- Do NOT Intubate      Disposition Plan   Expected Discharge: 12/30/2021    transitioning to comfort care, patient is eminent, anticipate life expectancy in terms of days.  Plan is to transition to general inpatient hospice.  Anticipated discharge location: General inpatient hospice.    Delays:     Complex Care  Placement - TCU            The patient's care was discussed with bedside nurse, . Discussed with his brother Jose.   Desean Coffman MD  Hospitalist Service  [facility]  Securely message with the Vocera Web Console (learn more here)  Text page via igobubble Paging/Directory        Clinically Significant Risk Factors Present on Admission                ______________________________________________________________________    Interval History     Overnight event reviewed, rapid response was called since patient was more lethargic and hypoxic, BiPAP dependent.  Patient continues to remain lethargic.  Was desaturating this morning on BiPAP, FiO2 had to increase to 50% from 35.    -I was unable to obtain any pertinent review of system given his lethargy.  -Remains afebrile  -Anemia slightly worsened  -Renal function unchanged with elevated creatinine.          Data reviewed today: I reviewed all medications, new labs results over the last 24 hours. I personally reviewed no images.    Physical Exam   Vital Signs:  Temp: 98  F (36.7  C) Temp src: Axillary BP: 120/56 Pulse: 74   Resp: 24 SpO2: 98 % O2 Device: BiPAP/CPAP Oxygen Delivery: 4 LPM  Weight: 266 lbs 12.11 oz    Gen: Lethargic, responds with morning to verbal stimuli.  HEENT:   EOMI, MMM. Pressure injury from BiPAP mask noted.   Resp: bilateral diminished breath sounds. Extensive crackles bilaterally. No wheezing.  No tachypnea.  Tolerating BiPAP.  CV: S1S2  Regular, no tachycardia.   Abdo: soft, obese/nontender  Ext: Noted scrotal edema and overall anasarca/marked extremities edema present    Neuro: Lethargic, minimally responsive, does not follow verbal commands, slight movement of limbs noted spontaneously.    Data   Recent Labs   Lab 12/29/21  0951 12/29/21  0756 12/29/21  0642 12/28/21  1200 12/28/21  1018 12/27/21  1951 12/27/21  1629 12/27/21  1317 12/27/21  0603   WBC  --   --  7.2  --  8.6  --   --   --  7.6   HGB  --   --  6.9*  --  7.1*  --  7.4*  --  7.1*   MCV  --   --  99  --  98  --   --   --  97   PLT  --   --  109*  --  115*  --   --   --  98*   NA  --   --  142  --  143  --   --   --  143   POTASSIUM  --   --  3.7  --  3.6  --   --   --  3.8   CHLORIDE  --   --  98  --  99  --   --   --  99   CO2  --   --  41*  --  43*  --   --   --  41*   BUN  --   --  142*  --  136*  --   --   --  133*   CR  --   --  2.35*  --  2.38*  --   --   --  2.39*   ANIONGAP  --   --  3  --  1*  --   --   --  3   GABE  --   --  9.4  --  9.3  --   --   --  9.3   * 190* 201*   < > 78   < >  --    < > 148*   ALBUMIN  --   --   --   --  2.6*  --   --   --  2.6*   PROTTOTAL  --   --   --   --  5.3*  --   --   --  5.2*  5.3*   BILITOTAL  --   --   --   --  0.5  --   --   --  0.4   ALKPHOS  --   --   --   --  183*  --   --   --  147   ALT  --   --   --   --  31  --   --   --  24   AST  --   --   --   --  42  --   --   --  28    < > = values in this interval not displayed.     No results found for this or any previous visit (from the past 24 hour(s)).

## 2021-12-29 NOTE — PROVIDER NOTIFICATION
MD Notification    Notified Person: MD    Notified Person Name: Dr Coffman    Notification Date/Time: 12-29-21 0803    Notification Interaction: Spoke in person    Purpose of Notification: Unable to wean off bipap, RT recommending IMC.    Orders Received:  MD spoke with brother. Brother would like to speak with sister regarding plan of care. MD will reconvene with family around 0900 or so to discuss next steps (ie whether to proceed with blood transfusion and IMC transfer or pursue a hospice based approach). Per MD request, hold on transfusion for now until firm plan is established. RN to page MD when family arrives.     Comments:

## 2021-12-29 NOTE — PROGRESS NOTES
"SPIRITUAL HEALTH SERVICES  SPIRITUAL ASSESSMENT Progress Note  [facility]     REFERRAL SOURCE: consult for emotional support    On this visit, introduced self/role to Burton's brother, Jose, and invited reflection on Jose's experience of Burton's hospitalization/illness. Jose shared that the prospect of his brother's death \"doesn't come as a surprise but still feels surprising.\" I offered normalization and validation of his emotions. Together, we considered Burton's expressed hopes and goals of care for this time. Jose named \"keeping Burton as comfortable as possible\" and /sacramental support as important to Burton and the family.    Burton and Jose have a sister who is on her way here from [place] of state. Jose named he feels well supported by family, including his wife and daughters.    Contacted Fr. Mcgrath, who confirmed he will visit pt Burton in the PM.    PLAN: Will continue to follow. Spiritual Health remains available, as needed.    Kate Ewing  Associate   Phone: [phone]   "

## 2021-12-29 NOTE — CONSULTS
After evaluation and review with  the patient does not meet criteria for the Hospice GIP program at this time. Pt is being taken off bipap and does not need any acute symptom management at this time. Pt will qualify for hospice on discharge and we will continue to follow peripherally while the pt is in the hospital. Please reach out to  with any questions. Thank you for the consult!

## 2021-12-29 NOTE — PROGRESS NOTES
RRT called on pt who desaturated and AMS.Pt placed on Bipap. Spo2 improved . Bipap setting 16/7  Fio2 50%. ABG in an hour. Will continue to monitor.    Dana Jeong, RT

## 2021-12-30 NOTE — PLAN OF CARE
Comfort cares. Lethargic. Removed Bipap at 1845 w/ Brother Bill at bedside. Pre-medicated w/ Ativan and placed pt on 5L Oxymmask- tolerated really well with no s/s of distress. RR 28-30. Did give Dilaudid x1 for increased RR rate and accessory muscle use. L PICC SL. G tube clamped. Zuniga patent. Plan for more family to visit tomorrow AM.

## 2021-12-30 NOTE — PROGRESS NOTES
Chart reviewed  Note pt transitioned to comfort cares  TF has been d/c'd    Jerri Yang RD, LD  Clinical Dietitian - [facility]   Pager - [phone]

## 2021-12-30 NOTE — PLAN OF CARE
Comfort cares. Unarousable. RR 24. Oxymask at 3L for comfort, discussed possible weaning with family, they seem agreeable as long as pt remains comfortable. Appears slightly air hungry at times with deep breathing so given Dilaudid given x2. Otherwise does not appear in pain. Incontinent of smear of stool. Zuniga patent. G tube flushed for patency. Oral cares done ~q2h. Turned/repo'ed q2h. Skin care to cj area, penis, and legs done per orders. Edema wraps for BLE left off. Dressing to left forearm CDI. Discharge plan pending stabilization.

## 2021-12-30 NOTE — PROGRESS NOTES
[facility]  Palliative Care Daily Progress Note       Recommendations & Counseling     -Comfort cares started on 12/2  -Family visiting with patient today.    Dyspnea related to respiratory failure, hypoxia  -Oxygen for comfort when needed  -Agree with Dilaudid 0.5 mg IV every 2 hours as needed.  -Recommend oxycodone 5-10 mg every 1  hour as needed (as alternate to dilauded SL: as too much volume of fluid to swallow).     Case was reviewed with Dr Coffman.    Ursula Barbosa, CNS  [facility]  Contact information available via [facility] Paging/Directory        Thank you for the opportunity to participate in the care of this patient and family. Our team: will continue to follow.     During regular M-F work hours ([phone]) -- if you are not sure who specifically to contact -- please contact us in [facility] Smart Web.     After regular work hours and on weekends/holidays, you can call our answering service at [phone].     Attestation:  Total time on the floor involved in the patient's care: 25 minutes  Total time spent in counseling/care coordination: >50%     Assessments          Burton Elizabeth is a 76 year old male with PMH significant for CAD, HTN, HLD, suspected CRISTHIAN, chronic left bundle branch block, HFpEF, DM2, CKD3. He came to hospital on 10/26/21 for TAVR to treat severe aortic stenosis. During recovery in hospital had complications from contrast nephropathy and fluid volume overload requiring transient dialysis/diuresis. Also Covid pneumonia, UTI and bacteremia.  He was intubated on 11/15-12/04 after patient extubated himself. He has remained on intermittent BiPAP with rising creatinine levels. Had thoracentesis today removing 750 ml fluid.     Today, the patient was seen for:  Symptom management, dyspnea,     Prognosis, Goals, or Advance Care Planning was addressed today with: Yes.  Mood, coping, and/or meaning in the context of serious illness were addressed  today: Yes.  Summary/Comments: Brother Jose would like to have the  come for anointing if possible.             Interval History:     Chart review/discussion with unit or clinical team members:   Spoke with Dr Coffman and RN. Patient cannot use the SL Dilaudid as there is too much volume for swallowing. Will try more concentrated oxycodone SL doses.     Per patient or family/caregivers today:  Spoke with brother Jose. He is hoping that the  will stop by for anointing today. Spiritual health has placed a call to Father Alcides according to Jose. Family is coming to see Magdaleno and feel they are having a good visit.      Key Palliative Symptoms:  # Pain severity the last 12 hours: moderate  # Dyspnea severity the last 12 hours: moderate    Patient is on opioids: assessed and bowels ok/no needed changes to plan of care today.           Review of Systems:     Patient is unable to answer questions for ROS due to mental status.          Medications:     I have reviewed this patient's medication profile and medications during this hospitalization.    Noted meds:      bacitracin   Topical BID     miconazole   Topical BID     sodium chloride (PF)  10-40 mL Intracatheter Q7 Days     sodium chloride (PF)  3 mL Intracatheter Q8H     acetaminophen, acetaminophen, atropine, [START ON 1/1/2022] bisacodyl, artificial tears ophthalmic solution, guaiFENesin, haloperidol, HOLD MEDICATION, HYDROmorphone, levalbuterol, lidocaine 4%, lidocaine (buffered or not buffered), LORazepam **OR** LORazepam, naloxone, naloxone, nitroGLYcerin, ondansetron, oxyCODONE, - MEDICATION INSTRUCTIONS -, polyethylene glycol, prochlorperazine, QUEtiapine, senna-docusate, sodium chloride (PF), sodium chloride (PF), sodium chloride (PF)             Physical Exam:             Resp: 24   O2 Device: Oxymask Oxygen Delivery: 3 LPM  GENERAL:  Lethargic, fatigued, no distress, mouth open.  HEAD: Normocephalic atraumatic  SCLERA: Anicteric  EXTREMITIES:  Warm; no edema  RESPIRATORY: Breathing rate 33/minute; accessory muscle use on BiPAP mask.  CARDIOVASCULAR: RRR  ABDOMEN: Soft, obese   NEUROLOGIC: obtunded.           Data Reviewed:     Recent imaging reviewed, my comments on pertinents:   Results for orders placed or performed during the hospital encounter of 10/26/21   CT Head w/o Contrast    Impression    IMPRESSION:  1.  No acute intracranial abnormality.  2.  Stable mild chronic age-related changes.   US Renal Complete    Impression    IMPRESSION:   1. A 0.8 cm echogenic focus in the interpolar region of the right  kidney is suspicious for a nonobstructing stone.  2. Zuniga catheter in the bladder.   3. No hydronephrosis.    HUMERA GARAY MD         SYSTEM ID:  [id number]   XR Chest 2 Views    Impression    IMPRESSION: Mild pulmonary edema and small bilateral pleural effusions. Cardiomegaly. Mild bibasilar atelectasis. No pneumothorax.   IR CVC Tunnel Placement > 5 Yrs of Age    Impression    IMPRESSION:  1. Ultrasound and fluoroscopic guided placement of tunneled 23 cm tip  to cuff, right internal jugular dialysis catheter.    ELVIRA STOUT MD         SYSTEM ID:  [id number]   XR Chest Port 1 View    Impression    IMPRESSION: There has been interval placement of a right IJ dual-lumen  central venous catheter, with tip in the right atrium. No  pneumothorax. Patchy opacities in both lower lungs have increased  since the previous exam. Small bilateral pleural effusions are again  noted, unchanged on the right, and likely increased slightly on the  left. Postoperative changes of transcatheter aortic valve replacement.    HUMERA GARAY MD         SYSTEM ID:  [id number]   XR Chest Port 1 View    Impression    IMPRESSION: Stable cardiomediastinal contours. Increasing perihilar and basilar infiltrates and small pleural effusions. No visible pneumothorax.   XR Chest Port 1 View    Impression    IMPRESSION:Cardiomegaly with central vascular congestion,  pulmonary  edema and small bilateral pleural effusions with adjacent  consolidation, left greater than right. Post TAVR.    BAILEE MOREIRA MD         SYSTEM ID:  [id number]   XR Chest Port 1 View    Impression    IMPRESSION: Lung volumes are low. There pulmonary infiltrates in both  lungs with probable small pleural effusions. TAVR. Little interval  change. No pneumothorax.    MARY MARTINEZ MD         SYSTEM ID:  [id number]   XR Abdomen Port 1 View    Impression    IMPRESSION: Pulmonary infiltrates and pleural effusions in the lung  bases. There is a large amount of stool in the colon. Bowel gas  pattern is unremarkable. No evidence of obstruction or ileus.    MARY MARTINEZ MD         SYSTEM ID:  [id number]   XR Chest Port 1 View    Impression    IMPRESSION: Left arm PICC tip is at the SVC/right atrial junction.  Remainder stable.    FABI AUSTIN MD         SYSTEM ID:  [id number]   US Chest Pleural Effusion Imaging    Impression    IMPRESSION:  1.  Trace bilateral pleural effusions.    MARY MARTINEZ MD         SYSTEM ID:  [id number]   XR Chest Port 1 View    Impression    IMPRESSION: Endotracheal tube tip 4 cm above the devaughn. Enteric tube tip below the diaphragm. Left-sided PICC line with tip over the SVC. TAVR. Cardiac enlargement with increased pulmonary vascularity and questionable small bilateral pleural effusions.   Patchy bilateral lower lung infiltrates with superimposed pneumonitis not excluded. No significant bony abnormalities.   XR Abdomen Port 1 View    Impression    IMPRESSION: Enteric tube with tip in the body of the stomach. Sidehole is below the GE junction. Nonspecific bowel gas pattern in the visualized abdomen. Vascular calcification. Patchy bilateral lower lung infiltrates.    XR Chest Port 1 View    Impression    IMPRESSION: Endotracheal tube tip is about 5 cm above the devaughn. Left arm PICC tip at the SVC/right atrial junction. Slight improvement in patchy opacities in the left lung and  otherwise stable interstitial and airspace opacities in the lungs.. No   pleural effusion or pneumothorax.   XR Chest Port 1 View    Impression    IMPRESSION: Endotracheal tube tip is about 3.9 cm above the devaughn. Enteric tube coursing below the left hemidiaphragm. Slight improvement in infiltrates in the left lung and stable right lung infiltrates. No pleural effusion or pneumothorax. Left arm   PICC tip is at the SVC/right atrial junction.   XR Chest Port 1 View    Impression    IMPRESSION: The endotracheal tube tip is approximately 6 cm from the devaughn. An OG or NG tube terminates off the field-of-view. A left PICC is present. Bibasilar opacities are again seen and represent pleural fluid and atelectasis. Underlying airspace   disease is suspected as well. No pneumothorax. The cardiomediastinal silhouette is enlarged though is otherwise not well assessed. A TAVR stent is present.    XR Abdomen Port 1 View    Impression    IMPRESSION: Orogastric tube tip in gastric body with side port in the fundus. Nonobstructive bowel gas pattern. TAVR prosthesis unchanged. Bilateral pleural plaques, bibasilar opacities and effusions unchanged.    XR Chest Port 1 View    Impression    IMPRESSION: Cardiac enlargement shallow inspiration. Left PICC line tip is within the SVC but is directed cranially. Repositioning recommended. Endotracheal and enteric tube. TAVR. Bilateral infiltrates and small pleural effusions.    Results called to Dr. Marla Gonzáles at 0651   XR Chest Port 1 View    Impression    IMPRESSION: Right IJ line noted with tip in the mid SVC. No  pneumothorax. Moderate bilateral infiltrate similar to previous.  Endotracheal tube tip approximate 4 cm from the devaughn.    MARC GRANADOS MD         SYSTEM ID:  [id number]   XR Chest Port 1 View    Impression    IMPRESSION: Endotracheal tube tip terminates about 1.7 cm above the devaughn. Enteric tube terminates below the diaphragm. Cardiac silhouette is enlarged. Bilateral mid  and lower lung zone airspace infiltrates again present, minimally improved on the   right. No visible pneumothorax. Probable small pleural effusions. TAVR. Left upper extremity PICC tip overlies mid SVC level.   XR Chest Port 1 View    Impression    IMPRESSION: Endotracheal tube remains in good position above the  devaughn. NG tube courses below the diaphragm. Right IJ central venous  catheter tip is in the SVC. Left PICC line tip in the SVC. TAVR.    Small bilateral pleural effusions. Infiltrates in both lung bases have  not appreciably changed. No new findings.       MARY MARTINEZ MD         SYSTEM ID:  [id number]   XR Chest Port 1 View    Impression    IMPRESSION: Low lung volumes. Cardiomegaly. Congestion of the central vessels. Small bilateral pleural effusions and infiltrates or atelectasis both lower lobes, similar to the earlier study. Endotracheal tube tip in satisfactory position 2.5 cm above   the devaguhn. Right IJ central line tip distal SVC. Left PICC line tip mid SVC. No pneumothorax. TAVR. Enteric tube extends into the stomach.   XR Abdomen Port 1 View    Impression    IMPRESSION: Enteric tube coiled in the expected location of the stomach.    XR Chest Port 1 View    Impression    IMPRESSION: Persistent bilateral pulmonary infiltrates and pleural effusions.     XR Abdomen Port 1 View    Impression    IMPRESSION: NG tube tip in the left upper quadrant in the expected  location of the stomach.    MARC GRANADOS MD         SYSTEM ID:  [id number]   XR Abdomen Port 1 View    Impression    IMPRESSION: Nasogastric tube terminates over the stomach. Visualized bowel gas pattern is nonobstructive.   IR Gastrostomy Tube Percutaneous Plcmnt    Impression    IMPRESSION: G-tube placed as above    LANA CONTRERAS MD         SYSTEM ID:  [id number]   US Upper Extremity Venous Duplex Left    Impression    IMPRESSION:   1.  No deep venous thrombosis in the left upper extremity.     US Abdomen Limited    Impression     IMPRESSION:  1.  Limited scanning of the four quadrants of the abdomen demonstrates a small amount of ascites, largest in the left abdomen.     XR Chest Port 1 View    Impression    IMPRESSION: Moderately extensive infiltrates on the left. Mild right-sided infiltrates, question a small loculated pleural effusion on the right. Left PICC tip in the mid to low SVC.   XR Chest Port 1 View    Impression    IMPRESSION: Moderately extensive atelectasis and/or infiltrate in the  left similar to previous. Question some left pleural fluid. Minimal  right pleural effusion and mild-moderate right base atelectasis and/or  infiltrate similar to previous as well. Left PICC line unchanged.    MARC GRANADOS MD         SYSTEM ID:  [id number]   US Chest Pleural Effusion Imaging    Impression    IMPRESSION:  1.  Moderate left pleural fluid.  2.  Trace right pleural fluid and some right pleural thickening.     US Upper Extremity Venous Duplex Left    Impression    IMPRESSION:   1.  No deep venous thrombosis in the left upper extremity.   US Thoracentesis    Impression    IMPRESSION:  1.  Status post left diagnostic and therapeutic ultrasound-guided  thoracentesis.    Reference CPT Code: 59556    FABI AUSTIN MD         SYSTEM ID:  [id number]   XR Chest Port 1 View    Impression    IMPRESSION: Stable cardiomediastinal contours. Left upper extremity PICC tip projects near the cavoatrial junction. Diffuse interstitial and alveolar infiltrates persist, with slight interval improvement in aeration of the lower lung zones. Small to   moderate layering pleural effusions, modestly improved on the left though similar or slightly larger on the right. No visible pneumothorax.   Echocardiogram Limited   Result Value Ref Range    LVEF  60-65%    Echocardiogram Limited   Result Value Ref Range    LVEF  60-65%    Echocardiogram Limited   Result Value Ref Range    LVEF  60-65%    s  Lab Results   Component Value Date    WBC 7.2 12/29/2021    WBC 8.6  12/28/2021    WBC 7.6 12/27/2021    HGB 6.9 (LL) 12/29/2021    HGB 7.1 (L) 12/28/2021    HGB 7.4 (L) 12/27/2021    HCT 24.3 (L) 12/29/2021    HCT 24.8 (L) 12/28/2021    HCT 25.8 (L) 12/27/2021     (L) 12/29/2021     (L) 12/28/2021    PLT 98 (L) 12/27/2021     12/29/2021     12/28/2021     12/27/2021    POTASSIUM 3.7 12/29/2021    POTASSIUM 3.6 12/28/2021    POTASSIUM 3.8 12/27/2021    CHLORIDE 98 12/29/2021    CHLORIDE 99 12/28/2021    CHLORIDE 99 12/27/2021    CO2 41 (H) 12/29/2021    CO2 43 (H) 12/28/2021    CO2 41 (H) 12/27/2021     (H) 12/29/2021     (H) 12/28/2021     (H) 12/27/2021    CR 2.35 (H) 12/29/2021    CR 2.38 (H) 12/28/2021    CR 2.39 (H) 12/27/2021     (H) 12/29/2021     (H) 12/29/2021     (H) 12/29/2021    SED 11 11/15/2021    SED 15 06/10/2021    DD 2.06 (H) 11/13/2021    DD 2.28 (H) 11/12/2021    DD 3.22 (H) 11/10/2021    NTBNPI 856 07/01/2020    NTBNPI 1,233 (H) 04/22/2020    NTBNP 8,411 (H) 11/15/2021    NTBNP 12,042 (H) 11/13/2021    NTBNP 10,491 (H) 11/12/2021    TROPI 3.517 (HH) 07/07/2021    TROPI 4.419 (HH) 07/07/2021    TROPI 7.047 (HH) 07/06/2021    AST 42 12/28/2021    AST 28 12/27/2021    AST 35 12/20/2021    ALT 31 12/28/2021    ALT 24 12/27/2021    ALT 32 12/20/2021    ALKPHOS 183 (H) 12/28/2021    ALKPHOS 147 12/27/2021    ALKPHOS 154 (H) 12/20/2021    BILITOTAL 0.5 12/28/2021    BILITOTAL 0.4 12/27/2021    BILITOTAL 0.3 12/20/2021    INR 1.34 (H) 12/10/2021    INR 1.30 (H) 12/09/2021    INR 1.51 (H) 11/23/2021

## 2021-12-30 NOTE — PROGRESS NOTES
[facility]  Medicine Progress Note - Hospitalist Service       Date of Admission:  10/26/2021    Assessment & Plan           Burton Elizabeth is a 75 yo single male who lives by himself,  with CAD, atrial flutter on eliquis, hypertension, hyperlipidemia, HFpEF, CKD stage III, DM type 3, history of GI bleed, suspected obstructive sleep apnea electively admitted on 10/26/2021 for TAVR to treat severe Aortic Stenosis.  Hospital course complicated by OPAL from contrast nephropathy and volume overload requiring transient dialysis and diuresis, Covid pneumonia UTI and bacteremia.     Brief summary:  10/29: initiated on IHD dialysis for acute OPAL and volume overload not responsive to diruesis  11/04: COVID +  11/15: patient intubated   11/22: Started scheduled IV Bumex   11/23: Melena and acute anemia, EGD with barretts and debora's erosions  12/4: self extubated during turn per night nurse   12/5: HFNC over night, matilde better than bipap. Transfer to floor.  12/6: Substantial improvement in oxygenation, patient off high flow.  12/10: percutaneous G tube placed, Tube feeds started.    12/13: gross hematuria -- improved with CBI and will have outpatient follow up with urology  12/20 drowsy, abg showing hypercapnic respiratory acidosis, improved with bipap and hypotension  12/21 OPAL on CKD with Cr 1.8>2.3, Nephrology felt likely related to above decompensation. FeNa 0.7 and lasix stopped.   12/23: diuretic resumed with albumin.  12/24: Hypoxia stable but increased lethargy and dyspnea. CXR worsened, Bumex drip started  12/26: not tolerating being off BiPAP. Cr slightly worse.  12/27: Creatinine continues to rise, US negative for LUE DVT, thoracic US-moderate left effusion, thoracentesis showed burgundy effusion, 750 ml aspirated. Palliative care consulted 12/27.  12/28: on BiPAP to 5LPM      12/29: Rapid response due to hypoxia and altered mental status.  BiPAP setting adjusted.  Patient has been  dependent on BiPAP.  Remains lethargic/minimally responsive to verbal stimuli.  Afebrile.  Noted slightly worsened anemia.  Creatinine remains elevated, unchanged.  Discussed with his brother Jose over the phone this morning at length regarding further goals of care.  Jose stated he was going to talk to his sister and requested follow-up.  He arrived at hospital and I had further conversation with him in person.  Jose reported that he discussed with his sister as well and they agree with transitioned his care to comfort measures only at this point.    Transitioned cares to comfort measures  General inpatient hospice consulted, declined for transition to inpatient hospice  Discussed with   on 2-4 LPM NC for comfort.      Prior to transitioning to comfort measures, his hospital course and management was as follows.     COVID-19 Pneumonia dx 11/04-recovered  MRSA VAP-resolved  Acute hypoxic hypercapnic respiratory failure (multifactorial with above)  Completed treatment with antibiotics and steroids.  - Self extubated 12/4  - became more drowsy and altered 12/20 ABG with pH 7.3, pCO2 83 --> 7.35 and 75 after bipap    - 12/24: O2 requirement stable at 3LPM but more drowsy and increased work of breathing. CXR likely worse. BUN markedly elevated to 110s. Discussed with Dr Persaud, started on bumex drip.   BiPAP PRN to assist with mobilizing fluid given CXR finding.   -12/26: not tolerating being off BiPAP longer.   -12/27: S/p thoracentesis, 750 cc of burgundy colored aspirate.  Concern for hemothorax.  Remains off anticoagulation.  Respiratory status slightly improved initially after thora  And tolerated being off BiPAP but again dyspneic subsequently needing BiPAP.    Severe aortic stenosis, s/p TAVR 10/26   A fib/flutter, rate controlled   Hypertension  - Eliquis 5 mg bid was held for thoracentesis.  It showed concern for hemorrhagic fluid and so apixaban not continued.  - was on coreg 6.25 bid and  hydralazine     ARF on CKD, required IHD earlier this stay  - Temp dialysis line removed 12/5  - 12/21 creat 2.35, with decreased UO, nephrology reconsulted. FeNa returned at 0.7 and lasix held  but then respiratory status worsened off lasix, and so placed on bumex drip, Cr initially improved to 1.99 then started worsening, so bumex drip held 12/27. Not a candidate for HD.  -nephrology assistance appreciated.      Moderate malnutrition  Dysphagia  Hyperchloremic hypernatremia - improving  PEG tube placed 12/10, diet advanced by SLP yesterday to DD4  - was onTF per nutrition and diet advancement per SLP. Off TF now that he is comfort measures only.     Deconditioning:  -  PT/OT were following    DM II with stress induced hyperglycemia  Maintained at home on Lantus 18 Uhs , amaryl 2 mg bid. A1c 7.6 in 11/2021  Managed with basal and premeal insulin while on active treatment     GI bleed earlier in stay, s/p colonoscopy and EGD  Anemia, acute on chronic suspected hemothorax  -  Barretts esophagus erosions and non bleeding ulcers noted on EGD. No active bleeding. Was on PPI  -  no further melena and Hb was stable 8-9  - Then noted Hb dropped to 7 on 12/26.  Thoracentesis showed hemorrhagic tap.    Resolved problems below    Proteus bacteremia and UTI  treated  Leukopenia, chronic: resolved  Coagulopathy of covid earlier in stay  Thrombocytopenia, improving: resolved      Agitation/Encephalopathy (Combination of toxic and metabolic during course of stay):  - waxing and waning. Now lethargic.     L upper ext edema  Has picc present on L Upper ext. Pt tends to lay on L side  - TESSA ext venous doppler - negative for clot       Diet: Combination Diet Soft and Bite Sized Diet (level 6); Mildly Thick (level 2)    DVT Prophylaxis: DOAC  Zuniga Catheter: PRESENT, indication: Strict 1-2 Hour I&O, End of Life, Retention, Strict 1-2 Hour I&O  Central Lines: None  Code Status: No CPR- Do NOT Intubate      Disposition Plan   Expected  Discharge: 12/30/2021   transitioned to comfort care, patient is eminent, anticipate life expectancy in terms of days. Will review with family and SW regarding residential hospice   Anticipated discharge location: General inpatient hospice.    Delays:     Complex Care  Placement - TCU            The patient's care was discussed with bedside nurse, .    Desean Coffman MD  Hospitalist Service  [facility]  Securely message with the Vocera Web Console (learn more here)  Text page via [employer] Paging/Directory        Clinically Significant Risk Factors Present on Admission                ______________________________________________________________________    Interval History     Off BiPAP, noted patient was agitated last night. Received seroquel and it helped.   Declined for general inpatient hospice.     Data reviewed today: I reviewed all medications     Physical Exam   Vital Signs: Temp: 98  F (36.7  C) Temp src: Axillary BP: 120/56 Pulse: 74   Resp: 22 SpO2: 98 % O2 Device: Oxymask Oxygen Delivery: 5 LPM  Weight: 266 lbs 12.11 oz    Gen: Lethargic, appears comfortable   Resp: breathing comfortably  Ext: edematous, pale   Neuro: Lethargic, minimally responsive, does not follow verbal commands     Data   Recent Labs   Lab 12/29/21  0951 12/29/21  0756 12/29/21  0642 12/28/21  1200 12/28/21  1018 12/27/21  1951 12/27/21  1629 12/27/21  1317 12/27/21  0603   WBC  --   --  7.2  --  8.6  --   --   --  7.6   HGB  --   --  6.9*  --  7.1*  --  7.4*  --  7.1*   MCV  --   --  99  --  98  --   --   --  97   PLT  --   --  109*  --  115*  --   --   --  98*   NA  --   --  142  --  143  --   --   --  143   POTASSIUM  --   --  3.7  --  3.6  --   --   --  3.8   CHLORIDE  --   --  98  --  99  --   --   --  99   CO2  --   --  41*  --  43*  --   --   --  41*   BUN  --   --  142*  --  136*  --   --   --  133*   CR  --   --  2.35*  --  2.38*  --   --   --  2.39*   ANIONGAP  --   --  3  --  1*  --    --   --  3   GABE  --   --  9.4  --  9.3  --   --   --  9.3   * 190* 201*   < > 78   < >  --    < > 148*   ALBUMIN  --   --   --   --  2.6*  --   --   --  2.6*   PROTTOTAL  --   --   --   --  5.3*  --   --   --  5.2*  5.3*   BILITOTAL  --   --   --   --  0.5  --   --   --  0.4   ALKPHOS  --   --   --   --  183*  --   --   --  147   ALT  --   --   --   --  31  --   --   --  24   AST  --   --   --   --  42  --   --   --  28    < > = values in this interval not displayed.     No results found for this or any previous visit (from the past 24 hour(s)).

## 2021-12-30 NOTE — PLAN OF CARE
Patient remained quiet and sleeping comfortably most of the shift. He had 4 LPM of oxygen for comfort. Around 3:30 appeared active and agitated pulling on things. administered PRN Seroquel. Agitation seemed abated soon after. Repositioned every 2 hours and as needed.

## 2021-12-30 NOTE — PROGRESS NOTES
SPIRITUAL HEALTH SERVICES  SPIRITUAL ASSESSMENT Progress Note  [facility]     REFERRAL SOURCE: Staff    [facility] received a request for anointing for pt Magdaleno. I coordinated with Fr. Mcgrath who plans to visit this afternoon to provide the sacrament.     PLAN: [facility] will continue to follow.     Syeda Barone  Associate    Phone: [phone]  Pager: [phone]

## 2021-12-31 NOTE — PROGRESS NOTES
[facility] Death Pronouncement    Called by nursing staff to pronounce Burton Elizabeth dead.    Physical Exam: Unresponsive to noxious stimuli, Spontaneous respirations absent, Breath sounds absent, Carotid pulse absent, Heart sounds absent, Pupillary light reflex absent and Corneal blink reflex absent    Patient was pronounced dead at 12:55 AM, 2021.    No data filed       Active Problems:    HTN (hypertension)    Diabetes mellitus, type 2 (H)    Severe AS -- S/P TAVR 10/26/21    Acute diastolic heart failure (H)    CRF (chronic renal failure), stage 4 (severe) (H)    Covid Pneumonia -- Positive 21       Infectious disease present?: YES    Communicable disease present? (examples: HIV, chicken pox, TB, Ebola, CJD) :  YES    Multi-drug resistant organism present? (example: MRSA): YES    Please consider an autopsy if any of the following exist:  NO Unexpected or unexplained death during or following any dental, medical, or surgical diagnostic treatment procedures.   NO Death of mother at or up to seven days after delivery.     NO All  and pediatric deaths.     NO Death where the cause is sufficiently obscure to delay completion of the death certificate.   NO Deaths in which autopsy would confirm a suspected illness/condition that would affect surviving family members or recipients of transplanted organs.     The following deaths must be reported to the 's Office:  NO A death that may be due entirely or in part to any factors other than natural disease (recent surgery, recent trauma, suspected abuse/neglect).   NO A death that may be an accident, suicide, or homicide.     NO Any sudden, unexpected death in which there is no prior history of significant heart disease or any other condition associated with sudden death.   NO A death under suspicious, unusual, or unexpected circumstances.    NO Any death which is apparently due to natural causes but in which the  does not have a  personal physician familiar with the patient s medical history, social, or environmental situation or the circumstances of the terminal event.   NO Any death apparently due to Sudden Infant Death Syndrome.     NO Deaths that occur during, in association with, or as consequences of a diagnostic, therapeutic, or anesthetic procedure.   NO Any death in which a fracture of a major bone has occurred within the past (6) six months.   NO A death of persons note seen by their physician within 120 days of demise.     NO Any death in which the  was an inmate of a public institution or was in the custody of Law Enforcement personnel.   NO  All unexpected deaths of children   NO Solid organ donors   NO Unidentified bodies   Pending Deaths of persons whose bodies are to be cremated or otherwise disposed of so that the bodies will later be unavailable for examination;   NO Deaths unattended by a physician outside of a licensed healthcare facility or licensed residential hospice program   NO Deaths occurring within 24 hours of arrival to a health care facility if death is unexpected.    NO Deaths associated with the decedent s employment.   NO Deaths attributed to acts of terrorism.   NO Any death in which there is uncertainty as to whether it is a medical examiner s care should be discussed with the medical investigator.      Death Certificate to be directed to Dr. Desean Coffman, hospitalist    Body disposition: Body released to the morgue.    WALE Sanchez, [facility]  [facility]

## 2022-01-01 LAB
BACTERIA PLR CULT: NO GROWTH
GRAM STAIN RESULT: NORMAL
GRAM STAIN RESULT: NORMAL

## 2022-01-18 NOTE — DISCHARGE SUMMARY
[facility]    Death Summary - Hospitalist Service     Date of Admission:  10/26/2021  Date of Death: 12/31/2021  12:55 AM  Discharging Provider: Desean Coffman MD    Discharge Diagnoses   Death      Cause of death: Acute Congestive heart failure, acute kidney injury on CKD and volume overload.    Hospital Course   Burton Elizabeth is a 75 yo single male who lives by himself,  with CAD, atrial flutter on eliquis, hypertension, hyperlipidemia, HFpEF, CKD stage III, DM type 3, history of GI bleed, suspected obstructive sleep apnea electively admitted on 10/26/2021 for TAVR to treat severe Aortic Stenosis.  Hospital course complicated by OPAL from contrast nephropathy and volume overload requiring transient dialysis and diuresis, Covid pneumonia UTI and bacteremia.     Brief summary:  10/29: initiated on IHD dialysis for acute OPAL and volume overload not responsive to diruesis  11/04: COVID +  11/15: patient intubated   11/22: Started scheduled IV Bumex   11/23: Melena and acute anemia, EGD with barretts and debora's erosions  12/4: self extubated during turn per night nurse   12/5: HFNC over night, matilde better than bipap. Transfer to floor.  12/6: Substantial improvement in oxygenation, patient off high flow.  12/10: percutaneous G tube placed, Tube feeds started.    12/13: gross hematuria -- improved with CBI and will have outpatient follow up with urology  12/20 drowsy, abg showing hypercapnic respiratory acidosis, improved with bipap and hypotension  12/21 OPAL on CKD with Cr 1.8>2.3, Nephrology felt likely related to above decompensation. FeNa 0.7 and lasix stopped.   12/23: diuretic resumed with albumin.  12/24: Hypoxia stable but increased lethargy and dyspnea. CXR worsened, Bumex drip started  12/26: not tolerating being off BiPAP. Cr slightly worse.  12/27: Creatinine continues to rise, US negative for LUE DVT, thoracic US-moderate left effusion, thoracentesis showed burgundy  effusion, 750 ml aspirated. Palliative care consulted .  : on BiPAP to 5LPM        : Rapid response due to hypoxia and altered mental status.  BiPAP setting adjusted.  Patient has been dependent on BiPAP.  Remains lethargic/minimally responsive to verbal stimuli.  Afebrile.  Noted slightly worsened anemia.  Creatinine remains elevated, unchanged.  Discussed with his brother Jose over the phone this morning at length regarding further goals of care.  Jose stated he was going to talk to his sister and requested follow-up.  He arrived at hospital and I had further conversation with him in person.  Jose reported that he discussed with his sister as well and they agree with transitioned his care to comfort measures only at that point.     Transitioned cares to comfort measures. General inpatient hospice consulted, declined for transition to inpatient hospice. Patient  2021 at 12:55 am        Prior to transitioning to comfort measures, his hospital course and management was as follows.     COVID-19 Pneumonia dx -recovered  MRSA VAP-resolved  Acute hypoxic hypercapnic respiratory failure (multifactorial with above)  Completed treatment with antibiotics and steroids.  - Self extubated   - became more drowsy and altered  ABG with pH 7.3, pCO2 83 --> 7.35 and 75 after bipap    - : O2 requirement stable at 3LPM but more drowsy and increased work of breathing. CXR likely worse. BUN markedly elevated to 110s. Discussed with Dr Persaud, started on bumex drip.   BiPAP PRN to assist with mobilizing fluid given CXR finding.   -: not tolerating being off BiPAP longer.   -: S/p thoracentesis, 750 cc of burgundy colored aspirate.  Concern for hemothorax.  Remains off anticoagulation.  Respiratory status slightly improved initially after thora  And tolerated being off BiPAP but again dyspneic subsequently needing BiPAP.     Severe aortic stenosis, s/p TAVR 10/26   A fib/flutter, rate  controlled   Hypertension  - Eliquis 5 mg bid was held for thoracentesis.  It showed concern for hemorrhagic fluid and so apixaban not continued.  - was on coreg 6.25 bid and hydralazine     ARF on CKD, required IHD earlier this stay  - Temp dialysis line removed 12/5  - 12/21 creat 2.35, with decreased UO, nephrology reconsulted. FeNa returned at 0.7 and lasix held  but then respiratory status worsened off lasix, and so placed on bumex drip, Cr initially improved to 1.99 then started worsening, so bumex drip held 12/27. Not a candidate for HD.  -nephrology assistance appreciated.      Moderate malnutrition  Dysphagia  Hyperchloremic hypernatremia - improving  PEG tube placed 12/10, diet advanced by SLP yesterday to DD4  - was onTF per nutrition and diet advancement per SLP. Off TF now that he is comfort measures only.      Deconditioning:  -  PT/OT were following     DM II with stress induced hyperglycemia  Maintained at home on Lantus 18 Uhs , amaryl 2 mg bid. A1c 7.6 in 11/2021  Managed with basal and premeal insulin while on active treatment      GI bleed earlier in stay, s/p colonoscopy and EGD  Anemia, acute on chronic suspected hemothorax  -  Barretts esophagus erosions and non bleeding ulcers noted on EGD. No active bleeding. Was on PPI  -  no further melena and Hb was stable 8-9  - Then noted Hb dropped to 7 on 12/26.  Thoracentesis showed hemorrhagic tap.     Resolved problems below     Proteus bacteremia and UTI  treated  Leukopenia, chronic: resolved  Coagulopathy of covid earlier in stay  Thrombocytopenia, improving: resolved      Agitation/Encephalopathy (Combination of toxic and metabolic during course of stay):  - waxing and waning. Now lethargic.      L upper ext edema  Has picc present on L Upper ext. Pt tends to lay on L side  - TESSA ext venous doppler - negative for clot      Desean Coffman MD  [facility]  [facility]  ______________________________________________________________________      Significant Results and Procedures   Most Recent 3 CBC's:Recent Labs   Lab Test 12/29/21  0642 12/28/21  1018 12/27/21  1629 12/27/21  0603   WBC 7.2 8.6  --  7.6   HGB 6.9* 7.1* 7.4* 7.1*   MCV 99 98  --  97   * 115*  --  98*     Most Recent 3 BMP's:Recent Labs   Lab Test 12/29/21  0951 12/29/21  0756 12/29/21  0642 12/28/21  1200 12/28/21  1018 12/27/21  1317 12/27/21  0603   NA  --   --  142  --  143  --  143   POTASSIUM  --   --  3.7  --  3.6  --  3.8   CHLORIDE  --   --  98  --  99  --  99   CO2  --   --  41*  --  43*  --  41*   BUN  --   --  142*  --  136*  --  133*   CR  --   --  2.35*  --  2.38*  --  2.39*   ANIONGAP  --   --  3  --  1*  --  3   GABE  --   --  9.4  --  9.3  --  9.3   * 190* 201*   < > 78   < > 148*    < > = values in this interval not displayed.     Most Recent 2 LFT's:Recent Labs   Lab Test 12/28/21  1018 12/27/21  0603   AST 42 28   ALT 31 24   ALKPHOS 183* 147   BILITOTAL 0.5 0.4     Most Recent 3 INR's:Recent Labs   Lab Test 12/10/21  0610 12/09/21  0959 11/23/21  1134   INR 1.34* 1.30* 1.51*     Most Recent 3 Troponin's:Recent Labs   Lab Test 07/07/21  0525 07/07/21  0026 07/06/21  1548   TROPI 3.517* 4.419* 7.047*     Most Recent 3 BNP's:Recent Labs   Lab Test 11/15/21  0600 11/13/21  0611 11/12/21  0555 10/21/21  1346 07/01/20  0548 04/22/20  0917   NTBNPI  --   --   --   --  856 1,233*   NTBNP 8,411* 12,042* 10,491*   < >  --   --     < > = values in this interval not displayed.     Most Recent D-dimer:Recent Labs   Lab Test 11/13/21  0611   DD 2.06*     Most Recent 6 Bacteria Isolates From Any Culture (See EPIC Reports for Culture Details):Recent Labs   Lab Test 07/06/21  1548 07/06/21  1030   CULT No growth No growth     Most Recent TSH and T4:Recent Labs   Lab Test 01/11/21  0000   TSH 2.21     Most Recent 6 glucoses:Recent Labs   Lab Test 12/29/21  0951 12/29/21  0756 12/29/21  0642  12/29/21  0402 12/28/21  2329 12/28/21 2026   * 190* 201* 214* 212* 160*     Most Recent Urinalysis:Recent Labs   Lab Test 12/14/21  1309   COLOR Straw   APPEARANCE Slightly Cloudy*   URINEGLC 30 *   URINEBILI Negative   URINEKETONE Negative   SG 1.011   UBLD Large*   URINEPH 6.0   PROTEIN 30 *   NITRITE Negative   LEUKEST Large*   RBCU >182*   WBCU 178*   ,   Results for orders placed or performed during the hospital encounter of 10/26/21   CT Head w/o Contrast    Narrative    EXAM: CT HEAD W/O CONTRAST  LOCATION: [facility]  DATE/TIME: 10/27/2021 5:20 PM    INDICATION: Headache, new or worsening (Age >= 50y)  COMPARISON: 07/06/2021 and MRI brain 07/01/2020  TECHNIQUE: Routine CT Head without IV contrast. Multiplanar reformats. Dose reduction techniques were used.    FINDINGS:  INTRACRANIAL CONTENTS: No intracranial hemorrhage, extraaxial collection, or mass effect.  No CT evidence of acute infarct. Mild presumed chronic small vessel ischemic changes. Mild generalized volume loss. No hydrocephalus. Atherosclerotic   calcifications of the cavernous internal carotid arteries and distal vertebral arteries.    VISUALIZED ORBITS/SINUSES/MASTOIDS: Prior bilateral cataract surgery. Visualized portions of the orbits are otherwise unremarkable. No paranasal sinus mucosal disease. No middle ear or mastoid effusion.    BONES/SOFT TISSUES: No acute abnormality.        Impression    IMPRESSION:  1.  No acute intracranial abnormality.  2.  Stable mild chronic age-related changes.   US Renal Complete    Narrative    ULTRASOUND RENAL COMPLETE  10/28/2021 1:51 PM     HISTORY: Acute kidney insufficiency.    COMPARISON: 12/20/2019.    FINDINGS: Exam is limited related to patient body habitus and  prominent overlying bowel gas.    Right kidney measures 12.5 x 5.9 x 6.1 cm. Cortical thickness measures  1.4 cm AP. There is no hydronephrosis. A 0.8 cm echogenic focus in the  interpolar region of the  right kidney is suspicious for a  nonobstructing stone. No solid renal masses are evident.     Left kidney measures 11.8 x 5.9 x 5.7 cm. Cortical thickness measures  1.4 cm AP. There is no hydronephrosis. No renal calculi or solid renal  masses are evident.     Zuniga catheter in the bladder.      Impression    IMPRESSION:   1. A 0.8 cm echogenic focus in the interpolar region of the right  kidney is suspicious for a nonobstructing stone.  2. Zuniga catheter in the bladder.   3. No hydronephrosis.    HUMERA GARAY MD         SYSTEM ID:  [id number]   XR Chest 2 Views    Narrative    EXAM: XR CHEST 2 VW  LOCATION: [facility]  DATE/TIME: 10/28/2021 7:45 PM    INDICATION: evaluate CHF  COMPARISON: 01/28/2021      Impression    IMPRESSION: Mild pulmonary edema and small bilateral pleural effusions. Cardiomegaly. Mild bibasilar atelectasis. No pneumothorax.   IR CVC Tunnel Placement > 5 Yrs of Age    Narrative    [facility]    EXAM: TUNNELED CENTRAL VENOUS CATHETER PLACEMENT    LOCATION: [facility]    CLINICAL HISTORY: The patient has a history of renal failure and  requires dialysis.    PROCEDURES PERFORMED:  1. Ultrasound-guided puncture of jugular vein  2. Creation of subcutaneous tunnel in chest wall.  3. Placement of dialysis catheter through subcutaneous tunnel, into  peel away sheath, and into SVC.     MODERATE SEDATION: 0.5 Versed and 25 Fentanyl were administered  intravenously for moderate sedation. Pulse oximetry, heart rate and  blood pressure were continuously monitored by an independent trained  observer. The physician spent 15 minutes of face-to-face moderate  sedation time with the patient.    CONTRAST: none    FLUOROSCOPIC TIME: 0.3 minutes  CUMULATIVE AIR KERMA/DOSE: 3 mGy    STERILE BARRIER TECHNIQUE: Maximal Sterile Barrier Technique Utilized:  Cap AND mask AND sterile gown AND sterile gloves AND sterile full body  drape AND hand hygiene AND skin  preparation 2% chlorhexidine for  cutaneous antisepsis (or acceptable alternative antiseptics).    Sterile Ultrasound Technique Utilized ?Sterile gel AND sterile probe  covers.    UNIVERSAL PROTOCOL: Standard universal protocol per facility  guidelines was followed. See EMR for documentation.     TECHNIQUE:   Risks, benefits and alternatives were explained to the patient and  written, informed consent was obtained. The patient was placed in the  supine position on the angiography table. The neck and chest were  prepped and draped in the usual fashion and anesthetized with 1%  lidocaine. Using ultrasound guidance, the internal jugular vein was  accessed with a micropuncture system..  A 0.35 wire was advanced  through the sheath and into the IVC. A subcutaneous tunnel was then  created in the chest wall using blunt dissection. The catheter was  then attached to a tunneling device and brought through the tunnel.  The venostomy site was sequentially dilated. The catheter was then  placed through a peel away sheath, and into the right atrium. The  puncture site was closed using surgical glue  The catheter was secured  to the skin using a Prolene stitch. Each port was dwelled with heparin  (1000 units per cc) solution, and the site was dressed in a sterile  fashion.  The patient tolerated the procedure well without immediate  complications.    FINDINGS:   The right internal jugular vein is anechoic, compressible and patent  by ultrasound, and ultrasound images obtained during access show the  needle within the vein. Ultrasound images have been permanently  captured for documentation.  Fluoroscopic images obtained following placement of the catheter, show  that the catheter terminates near the cavoatrial junction..  The catheter has a smooth course in the chest wall. The catheter  functions well and is available for immediate use.      Impression    IMPRESSION:  1. Ultrasound and fluoroscopic guided placement of tunneled  23 cm tip  to cuff, right internal jugular dialysis catheter.                          ELVIRA STOUT MD         SYSTEM ID:  [id number]   XR Chest Port 1 View    Narrative    CHEST ONE VIEW PORTABLE   11/2/2021 2:45 PM     HISTORY:  Shortness of breath. Tachypnea.    COMPARISON: 10/28/2021.      Impression    IMPRESSION: There has been interval placement of a right IJ dual-lumen  central venous catheter, with tip in the right atrium. No  pneumothorax. Patchy opacities in both lower lungs have increased  since the previous exam. Small bilateral pleural effusions are again  noted, unchanged on the right, and likely increased slightly on the  left. Postoperative changes of transcatheter aortic valve replacement.    HUMERA GARAY MD         SYSTEM ID:  [id number]   IR CVC Tunnel Removal Right    Narrative    EXAM: IR CVC TUNNEL REMOVAL RIGHT  11/3/2021 1:59 PM       HISTORY: No longer needs dialysis. Request for dialysis catheter  removal.      PROCEDURE: After the risks and benefits were explained and informed  consent was obtained, the area was prepped and draped. Using sterile  technique, the tunneled catheter was freed from the local tissue using  gentle tension.  The catheter was then removed without complications.   Pressure was applied to the right internal jugular vein as well as the  exit site for approximately 3-5 minutes.  The site was covered with a  gauze and Tegaderm dressing. The patient tolerated the procedure well.   The procedure was performed at the patient's bedside.  Face to face  time of 15 minutes.    Medications Used: None    FINDINGS: Technically successful removal of a right internal jugular  tunneled dialysis catheter.    EL NEVAREZ PA-C         SYSTEM ID:  [id number]   XR Chest Port 1 View    Narrative    EXAM: XR CHEST PORT 1 VIEW  LOCATION: [facility]  DATE/TIME: 11/5/2021 3:36 AM    INDICATION: respiratory failure  COMPARISON: 10/28/2021      Impression     IMPRESSION: Stable cardiomediastinal contours. Increasing perihilar and basilar infiltrates and small pleural effusions. No visible pneumothorax.   XR Chest Port 1 View    Narrative    XR CHEST PORT 1 VIEW 11/12/2021 10:40 AM    HISTORY: HYPOXIA - VOLUME OVERLOAD, COVID    COMPARISON: Chest x-ray 11/5/2021      Impression    IMPRESSION:Cardiomegaly with central vascular congestion, pulmonary  edema and small bilateral pleural effusions with adjacent  consolidation, left greater than right. Post TAVR.    BAILEE MOREIRA MD         SYSTEM ID:  [id number]   XR Chest Port 1 View    Narrative    XR CHEST PORT 1 VIEW  11/15/2021 8:10 AM       INDICATION: f/u COVID, resp failure  COMPARISON: 11/12/2021       Impression    IMPRESSION: Lung volumes are low. There pulmonary infiltrates in both  lungs with probable small pleural effusions. TAVR. Little interval  change. No pneumothorax.    MARY MARTINEZ MD         SYSTEM ID:  [id number]   XR Abdomen Port 1 View    Narrative    XR ABDOMEN PORT 1 VIEWS 11/15/2021 11:50 AM    HISTORY: nausea, prolonged hospital stay    COMPARISON: None.      Impression    IMPRESSION: Pulmonary infiltrates and pleural effusions in the lung  bases. There is a large amount of stool in the colon. Bowel gas  pattern is unremarkable. No evidence of obstruction or ileus.    MARY MARTINEZ MD         SYSTEM ID:  [id number]   XR Chest Port 1 View    Narrative    XR CHEST PORT 1 VIEW 11/15/2021 1:29 PM    HISTORY: RN placed PICC - verify tip placement    COMPARISON: 11/15/2021 at 8:00 AM      Impression    IMPRESSION: Left arm PICC tip is at the SVC/right atrial junction.  Remainder stable.    FABI AUSTIN MD         SYSTEM ID:  [id number]   US Chest Pleural Effusion Imaging    Narrative    US CHEST/PLEURAL EFFUSION IMAGING 11/15/2021 6:15 PM     HISTORY: bilateral pleural effusions  TECHNIQUE: Routine  COMPARISON: None      Impression    IMPRESSION:  1.  Trace bilateral pleural effusions.    MARY TAVERAS  MD MICHELLE         SYSTEM ID:  [id number]   XR Chest Port 1 View    Narrative    EXAM: XR CHEST PORT 1 VIEW  LOCATION: [facility]  DATE/TIME: 11/15/2021 11:21 PM    INDICATION: ETT placement  COMPARISON: 11/15/2021.      Impression    IMPRESSION: Endotracheal tube tip 4 cm above the devaughn. Enteric tube tip below the diaphragm. Left-sided PICC line with tip over the SVC. TAVR. Cardiac enlargement with increased pulmonary vascularity and questionable small bilateral pleural effusions.   Patchy bilateral lower lung infiltrates with superimposed pneumonitis not excluded. No significant bony abnormalities.   XR Abdomen Port 1 View    Narrative    EXAM: XR ABDOMEN PORT 1 VIEWS  LOCATION: [facility]  DATE/TIME: 11/15/2021 11:21 PM    INDICATION: Check orogastric tube placement  COMPARISON: 11/15/2021.      Impression    IMPRESSION: Enteric tube with tip in the body of the stomach. Sidehole is below the GE junction. Nonspecific bowel gas pattern in the visualized abdomen. Vascular calcification. Patchy bilateral lower lung infiltrates.    XR Chest Port 1 View    Narrative    EXAM: XR CHEST PORT 1 VIEW  LOCATION: [facility]  DATE/TIME: 11/16/2021 5:25 PM    INDICATION: check ETT placement  COMPARISON: 11/15/2021      Impression    IMPRESSION: Endotracheal tube tip is about 5 cm above the devaughn. Left arm PICC tip at the SVC/right atrial junction. Slight improvement in patchy opacities in the left lung and otherwise stable interstitial and airspace opacities in the lungs.. No   pleural effusion or pneumothorax.   XR Chest Port 1 View    Narrative    EXAM: XR CHEST PORT 1 VIEW  LOCATION: [facility]  DATE/TIME: 11/20/2021 11:20 AM    INDICATION: eval ett placement  COMPARISON: 11/15/2021      Impression    IMPRESSION: Endotracheal tube tip is about 3.9 cm above the devaughn. Enteric tube coursing below the left hemidiaphragm.  Slight improvement in infiltrates in the left lung and stable right lung infiltrates. No pleural effusion or pneumothorax. Left arm   PICC tip is at the SVC/right atrial junction.   XR Chest Port 1 View    Narrative    EXAM: XR CHEST PORT 1 VIEW  LOCATION: [facility]  DATE/TIME: 11/20/2021 4:30 PM    INDICATION: replace ENT tube  COMPARISON: 11/20/2021       Impression    IMPRESSION: The endotracheal tube tip is approximately 6 cm from the devaughn. An OG or NG tube terminates off the field-of-view. A left PICC is present. Bibasilar opacities are again seen and represent pleural fluid and atelectasis. Underlying airspace   disease is suspected as well. No pneumothorax. The cardiomediastinal silhouette is enlarged though is otherwise not well assessed. A TAVR stent is present.    XR Abdomen Port 1 View    Narrative    EXAM: XR ABDOMEN PORT 1 VIEWS  LOCATION: [facility]  DATE/TIME: 11/20/2021 4:30 PM    INDICATION: Check OG tube placement.  COMPARISON: 11/15/2021      Impression    IMPRESSION: Orogastric tube tip in gastric body with side port in the fundus. Nonobstructive bowel gas pattern. TAVR prosthesis unchanged. Bilateral pleural plaques, bibasilar opacities and effusions unchanged.    XR Chest Port 1 View    Narrative    EXAM: XR CHEST PORT 1 VIEW  LOCATION: [facility]  DATE/TIME: 11/21/2021 6:00 AM    INDICATION: intubated resp failure  COMPARISON: 11/20/2021      Impression    IMPRESSION: Cardiac enlargement shallow inspiration. Left PICC line tip is within the SVC but is directed cranially. Repositioning recommended. Endotracheal and enteric tube. TAVR. Bilateral infiltrates and small pleural effusions.    Results called to Dr. Marla Gonzáles at 0651   XR Chest Port 1 View    Narrative    CHEST ONE VIEW  11/23/2021 4:04 PM     HISTORY: Dialysis cath insertion.    COMPARISON: November 21, 2021      Impression    IMPRESSION: Right IJ line  noted with tip in the mid SVC. No  pneumothorax. Moderate bilateral infiltrate similar to previous.  Endotracheal tube tip approximate 4 cm from the devaughn.    MARC GRANADOS MD         SYSTEM ID:  [id number]   XR Chest Port 1 View    Narrative    EXAM: XR CHEST PORT 1 VIEW  LOCATION: [facility]  DATE/TIME: 11/27/2021 1:04 AM    INDICATION: ETT placement  COMPARISON: Portable chest radiograph from 11/23/2021      Impression    IMPRESSION: Endotracheal tube tip terminates about 1.7 cm above the devaughn. Enteric tube terminates below the diaphragm. Cardiac silhouette is enlarged. Bilateral mid and lower lung zone airspace infiltrates again present, minimally improved on the   right. No visible pneumothorax. Probable small pleural effusions. TAVR. Left upper extremity PICC tip overlies mid SVC level.   XR Chest Port 1 View    Narrative    CHEST PORTABLE ONE VIEW    12/3/2021 10:00 AM       INDICATION: Sudden drop in PaO2, check for white out/mucus plug.    COMPARISON: 11/27/2021       Impression    IMPRESSION: Endotracheal tube remains in good position above the  devaughn. NG tube courses below the diaphragm. Right IJ central venous  catheter tip is in the SVC. Left PICC line tip in the SVC. TAVR.    Small bilateral pleural effusions. Infiltrates in both lung bases have  not appreciably changed. No new findings.       MARY MARTINEZ MD         SYSTEM ID:  [id number]   XR Chest Port 1 View    Narrative    EXAM: XR CHEST PORT 1 VIEW  LOCATION: [facility]  DATE/TIME: 12/3/2021 5:25 PM    INDICATION: ?ET tube displacement  COMPARISON: 12/03/2021 at 0934 hours      Impression    IMPRESSION: Low lung volumes. Cardiomegaly. Congestion of the central vessels. Small bilateral pleural effusions and infiltrates or atelectasis both lower lobes, similar to the earlier study. Endotracheal tube tip in satisfactory position 2.5 cm above   the devaughn. Right IJ central line tip distal SVC.  Left PICC line tip mid SVC. No pneumothorax. TAVR. Enteric tube extends into the stomach.   XR Abdomen Port 1 View    Narrative    EXAM: XR ABDOMEN PORT 1 VIEWS  LOCATION: [facility]  DATE/TIME: 12/4/2021 12:30 PM    INDICATION: NG tube placement check.  COMPARISON: None.      Impression    IMPRESSION: Enteric tube coiled in the expected location of the stomach.    XR Chest Port 1 View    Narrative    EXAM: XR CHEST PORTABLE 1 VIEW  LOCATION: [facility]  DATE/TIME: 12/05/2021, 4:50 AM    INDICATION: COVID follow-up.  COMPARISON: 12/03/2021.    FINDINGS: Right IJ central venous catheter and the left PICC remain in place. The ET tube has been removed. Aortic valve prosthesis. No pneumothorax. There are again bilateral pulmonary infiltrates without significant change. Probable bilateral pleural   effusions.      Impression    IMPRESSION: Persistent bilateral pulmonary infiltrates and pleural effusions.     XR Abdomen Port 1 View    Narrative    ABDOMEN ONE VIEW PORTABLE  12/6/2021 12:00 PM     HISTORY: Check NG placement.    COMPARISON: 12/4/2021       Impression    IMPRESSION: NG tube tip in the left upper quadrant in the expected  location of the stomach.    MARC GRANADOS MD         SYSTEM ID:  [id number]   XR Abdomen Port 1 View    Narrative    EXAM: XR ABDOMEN PORT 1 VIEWS  LOCATION: [facility]  DATE/TIME: 12/8/2021 2:58 AM    INDICATION: ngt replacement (pt pulled out, we replaced)  COMPARISON: 12/06/2021      Impression    IMPRESSION: Nasogastric tube terminates over the stomach. Visualized bowel gas pattern is nonobstructive.   IR Gastrostomy Tube Percutaneous Plcmnt    Narrative    G-TUBE PLACEMENT 12/10/2021 12:50 PM     HISTORY: Requires nutritional support. Unable to take adequate oral  intake.    DESCRIPTION OF PROCEDURE: After obtaining informed consent, the  patient was placed in a supine position on the fluoroscopy table.  The  liver edge was marked. A nasogastric tube was placed into the stomach.  1 mg glucagon was given intravenously. The stomach was inflated with  air.     Two T-TAC suture anchors were used to enter the stomach. A small skin  incision was made in between the T-TAC entry sites. An 18-gauge needle  was used to enter the stomach through the incision. A wire was passed  and curled in the stomach. A tract for the G-tube was dilated. An 18  Libyan peel-away sheath was placed. Through the peel-away sheath, a 14  Libyan G-tube was placed. The balloon was inflated with a mixture of 1  mL contrast and 9 mL saline. Balloon was pulled back so that it was  against the anterior stomach wall. Contrast was injected to document  adequate positioning. A fluoroscopic image was saved to document tube  position.     I determined this patient to be an appropriate candidate for the  planned sedation and procedure and reassessed the patient immediately  prior to sedation and procedure. Moderate intravenous conscious  sedation was supervised by me. The patient was independently monitored  by a registered nurse assigned to the Department of radiology using  automated blood pressure, EKG and pulse oximetry. The patient  tolerated the procedure well. There were no immediate postprocedure  complications. The patient's vital signs were monitored by radiology  nursing staff under my supervision and remained stable throughout the  study. Radiation dose for this scan was reduced using automated  exposure control, adjustment of the mA and/or kV according to patient  size, or iterative reconstruction technique.    MEDICATIONS: 1 mg Versed, 50 mg fentanyl    SEDATION TIME: 20 minutes    FLUOROSCOPY TIME: 1.8 minutes    RADIATION DOSE: 25.28 mGy Air Kerma    NUMBER OF FLUOROSCOPIC IMAGES: 1      Impression    IMPRESSION: G-tube placed as above    LANA CONTRERAS MD         SYSTEM ID:  [id number]   XR Video Swallow with SLP or OT    Narrative    VIDEO  SWALLOWING EVALUATION December 13, 2021 10:25 AM     HISTORY: Dysphagia.    COMPARISON: None.    FLUOROSCOPY TIME: 2.2 minutes.  SPOT IMAGES OR CINE RUNS: Nine.    FINDINGS:    Thin: Aspirated thin liquids with a spoon. Mild penetration noted when  small amounts of thin liquids were ingested with the cuff.    Mildly thick: No penetration or aspiration with a spoon.  Mild-to-moderate penetration with cup, without aspiration. No  penetration with smaller amounts of mildly thick liquids in a cup.    Puree: No penetration or aspiration.    Solid: No penetration or aspiration. Prolonged oral/oropharyngeal  phase.    This study only includes the cervical esophagus. Please see separate  report from speech pathology for additional details and  recommendations.    MARC PATEL MD         SYSTEM ID:  [id number]   US Upper Extremity Venous Duplex Left    Narrative    EXAM: ULTRASOUND UPPER EXTREMITY VENOUS DUPLEX LEFT  LOCATION: [facility]  DATE/TIME: 12/12/2021, 2:59 PM    INDICATION: Left upper extremity swelling.  COMPARISON: None.  TECHNIQUE: Venous Duplex ultrasound of the left upper extremity with (when possible) and without compression, augmentation, and duplex. Color flow and spectral Doppler with waveform analysis performed.    FINDINGS: Ultrasound includes evaluation of the internal jugular vein, innominate vein, subclavian vein, axillary vein, and brachial vein. The superficial cephalic and basilic veins were also evaluated where seen.     LEFT: No deep venous thrombosis. No superficial thrombophlebitis.       Impression    IMPRESSION:   1.  No deep venous thrombosis in the left upper extremity.     US Abdomen Limited    Narrative    EXAM: ULTRASOUND ABDOMEN LIMITED  LOCATION: [facility]  DATE/TIME: 12/19/2021, 1:49 PM    INDICATION: Rule out ascites, complicated prolonged hospitalization.  COMPARISON: None.  TECHNIQUE: Limited abdominal ultrasound.      Impression     IMPRESSION:  1.  Limited scanning of the four quadrants of the abdomen demonstrates a small amount of ascites, largest in the left abdomen.     XR Chest Port 1 View    Narrative    EXAM: XR CHEST PORT 1 VIEW  LOCATION: [facility]  DATE/TIME: 12/19/2021 2:10 PM    INDICATION: Ongoing mild hypoxia, complex prolonged hospitalization.  COMPARISON: 12/05/2021.      Impression    IMPRESSION: Moderately extensive infiltrates on the left. Mild right-sided infiltrates, question a small loculated pleural effusion on the right. Left PICC tip in the mid to low SVC.   XR Video Swallow with SLP or OT    Narrative    VIDEO SWALLOWING EVALUATION December 22, 2021 10:19 AM     HISTORY: Prolonged intubation due to COVID, recovered.    COMPARISON: 12/13/2021.    FLUOROSCOPY TIME: 1.7 minutes.  SPOT IMAGES OR CINE RUNS: 10.    FINDINGS:    Thin: Penetration.    Mildly thick: Penetration.    Pudding: No penetration or aspiration.    Semisolid: No penetration or aspiration.    Solid: No penetration or aspiration.    Refer to speech pathology report for additional details.    ROSIBEL HERRERA MD         SYSTEM ID:  [id number]   XR Chest Port 1 View    Narrative    CHEST ONE VIEW  12/24/2021 11:40 AM     HISTORY: Worsened dyspnea.    COMPARISON: December 19, 2021      Impression    IMPRESSION: Moderately extensive atelectasis and/or infiltrate in the  left similar to previous. Question some left pleural fluid. Minimal  right pleural effusion and mild-moderate right base atelectasis and/or  infiltrate similar to previous as well. Left PICC line unchanged.    MARC GRANADOS MD         SYSTEM ID:  [id number]   US Chest Pleural Effusion Imaging    Narrative    EXAM: ULTRASOUND CHEST/PLEURAL EFFUSION IMAGING  LOCATION: [facility]  DATE/TIME: 12/26/2021, 1:47 PM    INDICATION: Bilateral effusion, to evaluate if there is enough fluid to tap.  COMPARISON: Chest x-ray 12/24/2021.  TECHNIQUE:  Routine.    FINDINGS: Moderate left pleural fluid. Trace right pleural fluid and some right pleural thickening.      Impression    IMPRESSION:  1.  Moderate left pleural fluid.  2.  Trace right pleural fluid and some right pleural thickening.     US Upper Extremity Venous Duplex Left    Narrative    EXAM: US UPPER EXTREMITY VENOUS DUPLEX LEFT  LOCATION: [facility]  DATE/TIME: 12/26/2021 9:29 PM    INDICATION: PICC in place, new swelling, r/o dvt  COMPARISON: None.  TECHNIQUE: Venous Duplex ultrasound of the left upper extremity with (when possible) and without compression, augmentation, and duplex. Color flow and spectral Doppler with waveform analysis performed.    FINDINGS: Ultrasound includes evaluation of the internal jugular vein, innominate vein, subclavian vein, axillary vein, and brachial vein. The superficial cephalic and basilic veins were also evaluated where seen.     LEFT: No deep venous thrombosis. No superficial thrombophlebitis.       Impression    IMPRESSION:   1.  No deep venous thrombosis in the left upper extremity.   US Thoracentesis    Narrative    US THORACENTESIS 12/27/2021 12:38 PM    CLINICAL HISTORY: persistent resp failure, difficult to diurese.    PROCEDURE: Informed consent obtained from patient's brother on the  telephone. Time out performed. The chest was prepped and draped in  sterile fashion. 10 mL of 1% Xylocaine was infused into the local soft  tissues. Under direct ultrasound guidance, a 5 Azeri catheter system  was placed into the pleural effusion.     750 ml of clear red fluid were removed and sent to lab, if requested.    Patient tolerated procedure well.    Ultrasound imaging was obtained and placed in the patient's permanent  medical record.      Impression    IMPRESSION:  1.  Status post left diagnostic and therapeutic ultrasound-guided  thoracentesis.    Reference CPT Code: 69014    FABI AUSTIN MD         SYSTEM ID:  [id number]   XR Chest Port 1  View    Narrative    EXAM: XR CHEST PORT 1 VIEW  LOCATION: [facility]  DATE/TIME: 2021 1:45 AM    INDICATION: sob  COMPARISON: 2021      Impression    IMPRESSION: Stable cardiomediastinal contours. Left upper extremity PICC tip projects near the cavoatrial junction. Diffuse interstitial and alveolar infiltrates persist, with slight interval improvement in aeration of the lower lung zones. Small to   moderate layering pleural effusions, modestly improved on the left though similar or slightly larger on the right. No visible pneumothorax.   Echocardiogram Limited     Value    LVEF  60-65%    Narrative    [id number]  [id number]  [id number]  [id number]^INDER^[facility]  Echocardiography Laboratory  [facility] [place] [address]     Name: TAY PAGE  MRN: [id number]  : 1945  Study Date: 10/27/2021 08:16 AM  Age: 76 yrs  Gender: Male  Patient Location: [facility]  Reason For Study: 34 mm MEDTRONIC TAVR  Ordering Physician: RODRÍGUEZ LOVING  Referring Physician: AURELIA PADILLA  Performed By: Tj Sterling RDCS     BSA: 2.3 m2  Height: 69 in  Weight: 248 lb  HR: 64  BP: 131/70 mmHg  ______________________________________________________________________________  Procedure  Limited Portable Echo Adult. Optison (NDC #[id number]) given intravenously.  ______________________________________________________________________________  Interpretation Summary     Left ventricular systolic function is normal.  The visual ejection fraction is 60-65%.  There is mild concentric left ventricular hypertrophy.  Normal right ventricle structure and function.  34-mm Medtronic aortic valve prosthesis is well seated. Mean gradient 9 mmHg.  Vmax 2.1 m/sec. DVI 0.62. Trivial paravalvular AI. No valvular AI. Normal  Doppler interrogation for this valve.  IVC not well visualized.  There is no pericardial effusion.     Compared to OSMANY dated 10/26/2021, gradient is slightly  increased across aortic  prosthesis but stiill normal. There is possible trivial paravalvular AI which  is insignificant.  ______________________________________________________________________________  Left Ventricle  The left ventricle is normal in size. There is mild concentric left  ventricular hypertrophy. Left ventricular systolic function is normal. The  visual ejection fraction is 60-65%. Normal left ventricular wall motion.     Right Ventricle  Normal right ventricle structure and size.     Atria  The left atrium is severely dilated. Right atrial size is normal.     Mitral Valve  There is mild mitral annular calcification. There is mild (1+) mitral  regurgitation.     Tricuspid Valve  The tricuspid valve is normal in structure and function. Right ventricular  systolic pressure could not be approximated due to inadequate tricuspid  regurgitation.     Aortic Valve  There is trace aortic regurgitation. The prosthetic aortic valve is well-  seated. 34-mm Medtronic aortic valve prosthesis is well seated. Mean gradient  9 mmHg. Vmax 2.1 m/sec. DVI 0.62. Trivial paravalvular AI. No valvular AI.  Normal Doppler interrogation for this valve.     Pulmonic Valve  The pulmonic valve is normal in structure and function.     Vessels  The aortic root is normal size. Normal size ascending aorta. Inferior vena  cava not well visualized for estimation of right atrial pressure.     Pericardium  There is no pericardial effusion.     ______________________________________________________________________________  MMode/2D Measurements & Calculations  IVSd: 1.6 cm  LVIDd: 4.4 cm  LVIDs: 1.9 cm  LVPWd: 1.0 cm  FS: 57.3 %  LV mass(C)d: 219.9 grams  LV mass(C)dI: 97.2 grams/m2     asc Aorta Diam: 3.5 cm  LVOT diam: 2.2 cm  LVOT area: 3.8 cm2  RWT: 0.45     Doppler Measurements & Calculations  MV max P.4 mmHg  MV mean P.1 mmHg  MV V2 VTI: 33.4 cm  MVA(VTI): 3.3 cm2  Ao V2 max: 210.7 cm/sec  Ao max P.0 mmHg  Ao V2 mean:  134.1 cm/sec  Ao mean P.8 mmHg  Ao V2 VTI: 43.9 cm  SID(I,D): 2.5 cm2  SID(V,D): 2.4 cm2  LV V1 max P.8 mmHg  LV V1 max: 130.8 cm/sec  LV V1 VTI: 28.7 cm  SV(LVOT): 110.3 ml  SI(LVOT): 48.8 ml/m2  AV Lefty Ratio (DI): 0.62  SID Index (cm2/m2): 1.1     ______________________________________________________________________________  Report approved by: Dominique James 10/27/2021 12:55 PM         Echocardiogram Limited     Value    LVEF  60-65%    Narrative    [id number]  [id number]  [id number]^JAMEY^JORDYN     [facility]  Echocardiography Laboratory  [address] [place] [address]     Name: TAY PAGE  MRN: [id number]  : 1945  Study Date: 2021 02:23 PM  Age: 76 yrs  Gender: Male  Patient Location: [facility]  Reason For Study: Heart Failure  Ordering Physician: JORDYN CONCEPCION  Referring Physician: AURELIA PADILLA  Performed By: Tj Sterling RDCS     BSA: 2.2 m2  Height: 69 in  Weight: 239 lb  HR: 61  BP: 118/46 mmHg  ______________________________________________________________________________  Procedure  Limited Portable Echo Adult. Optison (NDC #[id number]) given intravenously.  ______________________________________________________________________________  Interpretation Summary     Left ventricular systolic function is normal.  The visual ejection fraction is 60-65%.  There is moderate concentric left ventricular hypertrophy.  34-mm Medtronic aortic valve prosthesis is well seated. Mean gradient 5 mmHg.  DVI 0.7. Trivial paravalvular AI. No valvular AI. Normal Doppler interrogation  for this valve.  Dilation of the inferior vena cava is present with abnormal respiratory  variation in diameter.  There is no pericardial effusion.     Compared to prior study dated 10/27/2021, findings are similar.  ______________________________________________________________________________  Left Ventricle  The left ventricle is normal in size. There is moderate concentric  left  ventricular hypertrophy. Left ventricular systolic function is normal. The  visual ejection fraction is 60-65%. Normal left ventricular wall motion.  Septal motion is consistent with conduction abnormality.     Mitral Valve  The mitral valve is normal in structure and function. There is mild (1+)  mitral regurgitation.     Tricuspid Valve  The tricuspid valve is normal in structure and function.     Aortic Valve  The prosthetic aortic valve is well-seated. 34-mm Medtronic aortic valve  prosthesis is well seated. Mean gradient 5 mmHg. DVI 0.7. Trivial paravalvular  AI. No valvular AI. Normal Doppler interrogation for this valve.     Pulmonic Valve  The pulmonic valve is normal in structure and function. There is mild (1+)  pulmonic valvular regurgitation.     Vessels  The aortic root is normal size. Dilation of the inferior vena cava is present  with abnormal respiratory variation in diameter.     Pericardium  There is no pericardial effusion.  ______________________________________________________________________________  MMode/2D Measurements & Calculations  IVSd: 1.7 cm  LVIDd: 4.6 cm  LVIDs: 2.1 cm  LVPWd: 1.2 cm  FS: 54.1 %  LV mass(C)d: 271.1 grams  LV mass(C)dI: 121.7 grams/m2  LVOT diam: 2.0 cm  LVOT area: 3.1 cm2  RWT: 0.52     Doppler Measurements & Calculations  Ao V2 max: 159.0 cm/sec  Ao max PG: 10.0 mmHg  Ao V2 mean: 103.1 cm/sec  Ao mean P.0 mmHg  Ao V2 VTI: 30.1 cm  SID(I,D): 2.2 cm2  SID(V,D): 2.2 cm2  LV V1 max P.0 mmHg  LV V1 max: 111.6 cm/sec  LV V1 VTI: 21.9 cm     SV(LVOT): 67.5 ml  SI(LVOT): 30.3 ml/m2  AV Lefty Ratio (DI): 0.70  SID Index (cm2/m2): 1.0     ______________________________________________________________________________  Report approved by: Dominique James 2021 03:36 PM         Echocardiogram Limited     Value    LVEF  60-65%    Narrative    [id number]  [id number]  [id number]  [id number]^JAVIER^EFRA^YOLANDA     [facility]  Echocardiography  Laboratory  [address] [place] [address]     Name: TAY PAGE  MRN: [id number]  : 1945  Study Date: 2021 10:53 AM  Age: 76 yrs  Gender: Male  Patient Location: [facility]  Reason For Study: SOB, Respiratory Failure  Ordering Physician: EFRA HERRERA  Referring Physician: [facility]  Performed By: Xu Mckinney RDCS     BSA: 2.2 m2  Height: 69 in  Weight: 235 lb  HR: 77  BP: 92/51 mmHg  ______________________________________________________________________________  Procedure  Limited Portable Echo Adult. Optison (NDC #[id number]) given intravenously.  ______________________________________________________________________________  Interpretation Summary     The rhythm was atrial fibrillation with wide QRS rhythm.  The visual ejection fraction is 60-65%.  There is a bioprosthetic aortic valve.  The gradient is normal for this prosthetic aortic valve.  Compared to the prior study dated 21, there have been no changes.  ______________________________________________________________________________  Left Ventricle  The left ventricle is normal in size. There is normal left ventricular wall  thickness. The visual ejection fraction is 60-65%. Diastolic function not  assessed due to atrial fibrillation. Septal motion is consistent with  conduction abnormality.     Right Ventricle  The right ventricle is normal in structure, function and size.     Atria  Normal left atrial size. Right atrial size is normal. Intact atrial septum.     Mitral Valve  The mitral valve is normal in structure and function. There is mild mitral  annular calcification.     Tricuspid Valve  The tricuspid valve is normal in structure and function. Right ventricular  systolic pressure could not be approximated due to inadequate tricuspid  regurgitation.     Aortic Valve  The aortic valve is normal in structure and function. The mean AoV pressure  gradient is 4.1 mmHg. The peak AoV pressure  gradient is 7.5 mmHg. The  calculated aortic valve are is 3.1 cm^2. There is a bioprosthetic aortic  valve. The gradient is normal for this prosthetic aortic valve.     Pulmonic Valve  The pulmonic valve is not well visualized.     Vessels  Normal size aorta. Inferior vena cava not well visualized for estimation of  right atrial pressure.     Pericardium  The pericardium appears normal.     Rhythm  The rhythm was atrial fibrillation with wide QRS rhythm.  ______________________________________________________________________________  MMode/2D Measurements & Calculations  LVOT diam: 2.2 cm  LVOT area: 3.7 cm2     Doppler Measurements & Calculations  MV max P.3 mmHg  MV mean PG: 3.4 mmHg  MV V2 VTI: 29.2 cm  MVA(VTI): 2.7 cm2  Ao V2 max: 136.8 cm/sec  Ao max P.5 mmHg  Ao V2 mean: 95.8 cm/sec  Ao mean P.1 mmHg  Ao V2 VTI: 25.5 cm  SID(I,D): 3.1 cm2  SID(V,D): 3.3 cm2  LV V1 max P.8 mmHg  LV V1 max: 120.6 cm/sec  LV V1 VTI: 21.6 cm  SV(LVOT): 79.7 ml  SI(LVOT): 36.0 ml/m2  AV Lefty Ratio (DI): 0.88  SID Index (cm2/m2): 1.4     ______________________________________________________________________________  Report approved by: Dominique Dill 2021 04:03 PM         Cardiac Catheterization    Narrative      Successful transfemoral transcatheter aortic valve replacement with a   34mm Medtronic Evolut PRO+ valve    Acute on chronic diastolic heart failure.    LVEDP 30 mmHg.            Consultations This Hospital Stay   CARDIAC REHAB IP CONSULT  CARE MANAGEMENT / SOCIAL WORK IP CONSULT  HOSPITALIST IP CONSULT  NEPHROLOGY IP CONSULT  PHYSICAL THERAPY ADULT IP CONSULT  OCCUPATIONAL THERAPY ADULT IP CONSULT  INFECTIOUS DISEASES IP CONSULT  PHARMACY IP CONSULT  WOUND OSTOMY CONTINENCE NURSE  IP CONSULT  CARDIOLOGY IP CONSULT  VASCULAR ACCESS ADULT IP CONSULT  INTENSIVIST IP CONSULT  NUTRITION SERVICES ADULT IP CONSULT  PHARMACY TO DOSE VANCO  PHARMACY IP CONSULT  PHARMACY TO DOSE VANCO  NEPHROLOGY IP  CONSULT  GASTROENTEROLOGY IP CONSULT  GASTROENTEROLOGY IP CONSULT  WOUND OSTOMY CONTINENCE NURSE  IP CONSULT  PHYSICAL THERAPY ADULT IP CONSULT  OCCUPATIONAL THERAPY ADULT IP CONSULT  SPEECH LANGUAGE PATH ADULT IP CONSULT  UROLOGY IP CONSULT  NEPHROLOGY IP CONSULT  PALLIATIVE CARE ADULT IP CONSULT  INPATIENT HOSPICE ADULT CONSULT  SPIRITUAL HEALTH SERVICES IP CONSULT    Primary Care Physician   [facility]    Time Spent on this Encounter   I, Desean Coffman MD, discharged this patient today but I did not personally see the patient today and will not be billing for the patient's discharge.

## 2023-06-06 NOTE — ANESTHESIA POSTPROCEDURE EVALUATION
Patient: Burton Elizabeth    Procedure(s):  REVISION LEFT TOTAL KNEE  ARTHROPLASTY    Diagnosis:PAINFUL HARDWARE LEFT KNEE  Diagnosis Additional Information: No value filed.    Anesthesia Type:  General, ETT, Peripheral Nerve Block    Note:  Anesthesia Post Evaluation    Patient location during evaluation: PACU  Patient participation: Able to fully participate in evaluation  Level of consciousness: awake and alert  Pain management: adequate  Airway patency: patent  Cardiovascular status: acceptable and stable  Respiratory status: acceptable, spontaneous ventilation, unassisted and nonlabored ventilation  Hydration status: acceptable  PONV: none             Last vitals:  Vitals:    09/27/19 1426 09/27/19 1430 09/27/19 1450   BP:  129/62 109/66   Pulse:  60 60   Resp: 12 20 16   Temp:  36.1  C (97  F)    SpO2: 92% 91% 91%         Electronically Signed By: Narinder López MD  September 27, 2019  3:06 PM  
Male

## (undated) DEVICE — GUIDEWIRE VASC 300CM .014IN CHC PT I H7491215501J2

## (undated) DEVICE — GLOVE PROTEXIS BLUE W/NEU-THERA 8.0  2D73EB80

## (undated) DEVICE — SHEATH INTRODUCER DRYSEAL FLEX W/HYDRO CT 14FRX33CM DSF1433

## (undated) DEVICE — SPONGE LAP 18X18" X8435

## (undated) DEVICE — SUCTION IRR SYSTEM W/O TIP INTERPULSE HANDPIECE 0210-100-000

## (undated) DEVICE — MANIFOLD KIT ANGIO AUTOMATED 014613

## (undated) DEVICE — CATH EP PACEL 5FRX110CM 1MM RIGHT HEART CURVE 401763

## (undated) DEVICE — KIT HAND CONTROL ANGIOTOUCH ACIST 65CM AT-P65

## (undated) DEVICE — SOL NACL 0.9% IRRIG 1000ML BOTTLE 2F7124

## (undated) DEVICE — SU DERMABOND PRINEO 22CM CLR222US

## (undated) DEVICE — CATH ANGIO JUDKINS JL5 6FRX100CM INFINITI 534622T

## (undated) DEVICE — BLADE SAW SAGITTAL STRK XSHORT 25X9.5X0.6MM 2108-145-000

## (undated) DEVICE — BLADE SAW SAGITTAL STRK 18X90X1.19MM HD SYS 6 6118-119-090

## (undated) DEVICE — INTRO GLIDESHEATH SLENDER 6FR 10X45CM 60-1060

## (undated) DEVICE — INTRO SHEATH 6FRX10CM PINNACLE RSS602

## (undated) DEVICE — SU VICRYL 2-0 CP-1 27" J466H

## (undated) DEVICE — DRSG STERI STRIP 1/2X4" R1547

## (undated) DEVICE — SU VICRYL 0 CP-1 27" J467H

## (undated) DEVICE — ESU GROUND PAD UNIVERSAL W/O CORD

## (undated) DEVICE — BLADE SAW RECIP STRK 70X12.5X1.2MM 0277-096-281

## (undated) DEVICE — KIT SURGICAL TURNOVER FVSD-01D

## (undated) DEVICE — CATH ANGIO INFINITI 3DRC 6FRX100CM 534676T

## (undated) DEVICE — SYR 50ML CATH TIP W/O NDL 309620

## (undated) DEVICE — PACK TOTAL KNEE SOP15TKFSD

## (undated) DEVICE — CAST PADDING 6" STERILE 9046S

## (undated) DEVICE — DEFIB PRO-PADZ LVP LQD GEL ADULT 8900-2105-01

## (undated) DEVICE — WIRE GUIDE 0.035"X150CM EMERALD STR 502542

## (undated) DEVICE — CATH BALLOON TRUE DILATION VALVULOPLASTY 12FR 22MMX4.5CM

## (undated) DEVICE — CATH SYSTEM SENTINEL CEREBRAL PROTECTION 6FR CMS15-10C-US

## (undated) DEVICE — LINEN TOWEL PACK X5 5464

## (undated) DEVICE — PREP CHLORAPREP 26ML TINTED ORANGE  260815

## (undated) DEVICE — TOTE ANGIO CORP PC15AT SAN32CC83O

## (undated) DEVICE — CATH ANGIO INFINITI PIGTAIL 145 6 SH 6FRX110CM  534-652S

## (undated) DEVICE — SLEEVE TR BAND RADIAL COMPRESSION DEVICE 29CM XX-RF06L

## (undated) DEVICE — MANIFOLD NEPTUNE 4 PORT 700-20

## (undated) DEVICE — SHEATH INTRODUCER DRYSEAL FLEX W/HYDRO CT 18FRX33CM DSF1833

## (undated) DEVICE — SOLUTION WOUND CLEANSING 3/4OZ 10% PVP EA-L3011FB-50

## (undated) DEVICE — CATH ANGIO JUDKINS JL4 6FRX100CM INFINITI 534620T

## (undated) DEVICE — WRAP EZY KNEE

## (undated) DEVICE — BLADE SAW SAGITTAL STRK 25X90X1.27MM HD SYS 6 6125-127-090

## (undated) DEVICE — SOL WATER IRRIG 1000ML BOTTLE 2F7114

## (undated) DEVICE — Device

## (undated) DEVICE — DRAPE STERI FLUOROSCOPE 35X43"1012 LATEX FREE

## (undated) DEVICE — WIRE GUIDE 0.035"X260CM SAFE-T-J EXCHANGE G00517

## (undated) DEVICE — DRAPE STERI TOWEL LG 1010

## (undated) DEVICE — IMM KNEE 20" 0814-2660

## (undated) DEVICE — GUIDEWIRE VASC SAFARI2 0.035X275CM H74939406XS1

## (undated) DEVICE — RAD INTRODUCER KIT MICRO 5FRX10CM .018 NITINOL G/W

## (undated) DEVICE — SYR 30ML LL W/O NDL 302832

## (undated) DEVICE — BONE CEMENT MIXEVAC III HI VAC KIT  0206-015-000

## (undated) DEVICE — SU MONOCRYL 3-0 PS-2 27" Y427H

## (undated) DEVICE — WIRE GUIDE LUNDERQUIST 0.035"X260CM DBL CVD

## (undated) DEVICE — BLADE KNIFE SURG 10 371110

## (undated) DEVICE — INTRO TERUMO 7FRX25CM W/MARKER RSB703

## (undated) DEVICE — CABLE ADAPTER PACING 6FT REMINGTON ADAP 2000

## (undated) DEVICE — GLOVE PROTEXIS W/NEU-THERA 8.0  2D73TE80

## (undated) DEVICE — INTRODUCER CATH VASC 5FRX10CM  MPIS-501-NT-U-SST

## (undated) DEVICE — CAST PADDING 6" UNSTERILE 9046

## (undated) DEVICE — SYR CONTROL CORONARY 10ML CCS800

## (undated) DEVICE — CATH ANGIO SUPERTORQUE AL1 6FRX100CM 532-645

## (undated) DEVICE — DRAPE IOBAN INCISE 23X17" 6650EZ

## (undated) DEVICE — ESU PENCIL W/SMOKE EVAC NEPTUNE STRYKER 0703-046-000

## (undated) DEVICE — INTRO TERUMO 6FRX25CM W/MARKER RSB603

## (undated) DEVICE — BONE CLEANING TIP INTERPULSE  0210-010-000

## (undated) DEVICE — CLOSURE SYS SKIN PREMIERPRO EXOFIN FUSION 4X22CM STRL 3472

## (undated) DEVICE — GLOVE PROTEXIS MICRO 8.0  2D73PM80

## (undated) DEVICE — NDL PERC ENTRY THINWALL 18GA 7.0" G00166

## (undated) DEVICE — SLEEVE TR BAND RADIAL COMPRESSION DEVICE 24CM TRB24-REG

## (undated) DEVICE — CLOSURE DEVICE 6FR VASC PROGLIDE MEDICATED SUTURE 12673-03

## (undated) DEVICE — DRSG ADAPTIC 3X8" 6113

## (undated) DEVICE — NDL PERC ENTRY 21GA 4CM G00280

## (undated) DEVICE — CATH ANGIO INFINITI AL1 4FRX100CM 538445

## (undated) DEVICE — DRSG KERLIX FLUFFS X5

## (undated) DEVICE — CAST PADDING 4" COTTON WEBRIL UNSTERILE 9084

## (undated) DEVICE — NDL SPINAL 18GA 3.5" 405184

## (undated) DEVICE — SU ETHIBOND 0 CTX CR  8X18" CX31D

## (undated) DEVICE — BLADE SAW SAGITTAL STRK SHORT 35.5X9X0.64MM 2108-148-000

## (undated) DEVICE — DRAIN ROUND W/RESERV KIT JACKSON PRATT 10FR 400ML SU130-402D

## (undated) DEVICE — RAD CLOSURE ANGIOSEAL 8FR  610131

## (undated) DEVICE — SPECIMEN CULTURETTE DBL SWAB 220109

## (undated) RX ORDER — HEPARIN SODIUM 1000 [USP'U]/ML
INJECTION, SOLUTION INTRAVENOUS; SUBCUTANEOUS
Status: DISPENSED
Start: 2019-08-12

## (undated) RX ORDER — VERAPAMIL HYDROCHLORIDE 2.5 MG/ML
INJECTION, SOLUTION INTRAVENOUS
Status: DISPENSED
Start: 2019-08-12

## (undated) RX ORDER — FENTANYL CITRATE 50 UG/ML
INJECTION, SOLUTION INTRAMUSCULAR; INTRAVENOUS
Status: DISPENSED
Start: 2021-01-01

## (undated) RX ORDER — ROPIVACAINE HYDROCHLORIDE 2 MG/ML
INJECTION, SOLUTION EPIDURAL; INFILTRATION; PERINEURAL
Status: DISPENSED
Start: 2019-09-27

## (undated) RX ORDER — POTASSIUM CHLORIDE 1500 MG/1
TABLET, EXTENDED RELEASE ORAL
Status: DISPENSED
Start: 2021-01-01

## (undated) RX ORDER — KETOROLAC TROMETHAMINE 30 MG/ML
INJECTION, SOLUTION INTRAMUSCULAR; INTRAVENOUS
Status: DISPENSED
Start: 2019-09-27

## (undated) RX ORDER — VERAPAMIL HYDROCHLORIDE 2.5 MG/ML
INJECTION, SOLUTION INTRAVENOUS
Status: DISPENSED
Start: 2021-01-01

## (undated) RX ORDER — PROPOFOL 10 MG/ML
INJECTION, EMULSION INTRAVENOUS
Status: DISPENSED
Start: 2021-01-01

## (undated) RX ORDER — LIDOCAINE HYDROCHLORIDE 10 MG/ML
INJECTION, SOLUTION EPIDURAL; INFILTRATION; INTRACAUDAL; PERINEURAL
Status: DISPENSED
Start: 2019-08-12

## (undated) RX ORDER — ONDANSETRON 2 MG/ML
INJECTION INTRAMUSCULAR; INTRAVENOUS
Status: DISPENSED
Start: 2021-01-01

## (undated) RX ORDER — FENTANYL CITRATE 50 UG/ML
INJECTION, SOLUTION INTRAMUSCULAR; INTRAVENOUS
Status: DISPENSED
Start: 2019-09-27

## (undated) RX ORDER — HEPARIN SODIUM 200 [USP'U]/100ML
INJECTION, SOLUTION INTRAVENOUS
Status: DISPENSED
Start: 2021-01-01

## (undated) RX ORDER — ASPIRIN 325 MG
TABLET ORAL
Status: DISPENSED
Start: 2019-08-12

## (undated) RX ORDER — DEXAMETHASONE SODIUM PHOSPHATE 4 MG/ML
INJECTION, SOLUTION INTRA-ARTICULAR; INTRALESIONAL; INTRAMUSCULAR; INTRAVENOUS; SOFT TISSUE
Status: DISPENSED
Start: 2021-01-01

## (undated) RX ORDER — ALBUTEROL SULFATE 90 UG/1
AEROSOL, METERED RESPIRATORY (INHALATION)
Status: DISPENSED
Start: 2019-09-27

## (undated) RX ORDER — FENTANYL CITRATE 50 UG/ML
INJECTION, SOLUTION INTRAMUSCULAR; INTRAVENOUS
Status: DISPENSED
Start: 2019-08-12

## (undated) RX ORDER — HYDROMORPHONE HYDROCHLORIDE 1 MG/ML
INJECTION, SOLUTION INTRAMUSCULAR; INTRAVENOUS; SUBCUTANEOUS
Status: DISPENSED
Start: 2021-01-01

## (undated) RX ORDER — HYDROMORPHONE HYDROCHLORIDE 1 MG/ML
INJECTION, SOLUTION INTRAMUSCULAR; INTRAVENOUS; SUBCUTANEOUS
Status: DISPENSED
Start: 2019-09-27

## (undated) RX ORDER — GLYCOPYRROLATE 0.2 MG/ML
INJECTION, SOLUTION INTRAMUSCULAR; INTRAVENOUS
Status: DISPENSED
Start: 2019-09-27

## (undated) RX ORDER — HEPARIN SODIUM 1000 [USP'U]/ML
INJECTION, SOLUTION INTRAVENOUS; SUBCUTANEOUS
Status: DISPENSED
Start: 2021-01-01

## (undated) RX ORDER — HEPARIN SODIUM 200 [USP'U]/100ML
INJECTION, SOLUTION INTRAVENOUS
Status: DISPENSED
Start: 2019-08-12

## (undated) RX ORDER — LIDOCAINE HYDROCHLORIDE 10 MG/ML
INJECTION, SOLUTION INFILTRATION; PERINEURAL
Status: DISPENSED
Start: 2021-01-01

## (undated) RX ORDER — LIDOCAINE HYDROCHLORIDE 10 MG/ML
INJECTION, SOLUTION EPIDURAL; INFILTRATION; INTRACAUDAL; PERINEURAL
Status: DISPENSED
Start: 2021-01-01

## (undated) RX ORDER — ACETAMINOPHEN 325 MG/1
TABLET ORAL
Status: DISPENSED
Start: 2019-09-27

## (undated) RX ORDER — NEOSTIGMINE METHYLSULFATE 1 MG/ML
VIAL (ML) INJECTION
Status: DISPENSED
Start: 2021-01-01

## (undated) RX ORDER — ONDANSETRON 2 MG/ML
INJECTION INTRAMUSCULAR; INTRAVENOUS
Status: DISPENSED
Start: 2019-09-27

## (undated) RX ORDER — FENTANYL CITRATE 0.05 MG/ML
INJECTION, SOLUTION INTRAMUSCULAR; INTRAVENOUS
Status: DISPENSED
Start: 2021-01-01

## (undated) RX ORDER — NITROGLYCERIN 5 MG/ML
VIAL (ML) INTRAVENOUS
Status: DISPENSED
Start: 2019-08-12

## (undated) RX ORDER — BUMETANIDE 0.25 MG/ML
INJECTION INTRAMUSCULAR; INTRAVENOUS
Status: DISPENSED
Start: 2021-01-01

## (undated) RX ORDER — ASPIRIN 325 MG
TABLET ORAL
Status: DISPENSED
Start: 2021-01-01

## (undated) RX ORDER — PROPOFOL 10 MG/ML
INJECTION, EMULSION INTRAVENOUS
Status: DISPENSED
Start: 2019-09-27

## (undated) RX ORDER — BUPIVACAINE HYDROCHLORIDE AND EPINEPHRINE 2.5; 5 MG/ML; UG/ML
INJECTION, SOLUTION EPIDURAL; INFILTRATION; INTRACAUDAL; PERINEURAL
Status: DISPENSED
Start: 2021-01-01

## (undated) RX ORDER — REGADENOSON 0.08 MG/ML
INJECTION, SOLUTION INTRAVENOUS
Status: DISPENSED
Start: 2021-01-01

## (undated) RX ORDER — DEXAMETHASONE SODIUM PHOSPHATE 4 MG/ML
INJECTION, SOLUTION INTRA-ARTICULAR; INTRALESIONAL; INTRAMUSCULAR; INTRAVENOUS; SOFT TISSUE
Status: DISPENSED
Start: 2019-09-27

## (undated) RX ORDER — GLYCOPYRROLATE 0.2 MG/ML
INJECTION, SOLUTION INTRAMUSCULAR; INTRAVENOUS
Status: DISPENSED
Start: 2021-01-01

## (undated) RX ORDER — NITROGLYCERIN 5 MG/ML
VIAL (ML) INTRAVENOUS
Status: DISPENSED
Start: 2021-01-01

## (undated) RX ORDER — HYDRALAZINE HYDROCHLORIDE 20 MG/ML
INJECTION INTRAMUSCULAR; INTRAVENOUS
Status: DISPENSED
Start: 2021-01-01

## (undated) RX ORDER — CLINDAMYCIN PHOSPHATE 900 MG/50ML
INJECTION, SOLUTION INTRAVENOUS
Status: DISPENSED
Start: 2021-01-01

## (undated) RX ORDER — ASPIRIN 81 MG/1
TABLET ORAL
Status: DISPENSED
Start: 2021-01-01

## (undated) RX ORDER — HYDROMORPHONE HCL IN WATER/PF 6 MG/30 ML
PATIENT CONTROLLED ANALGESIA SYRINGE INTRAVENOUS
Status: DISPENSED
Start: 2021-01-01

## (undated) RX ORDER — REGADENOSON 0.08 MG/ML
INJECTION, SOLUTION INTRAVENOUS
Status: DISPENSED
Start: 2017-05-11

## (undated) RX ORDER — TRANEXAMIC ACID 650 MG/1
TABLET ORAL
Status: DISPENSED
Start: 2021-01-01

## (undated) RX ORDER — KETOROLAC TROMETHAMINE 15 MG/ML
INJECTION, SOLUTION INTRAMUSCULAR; INTRAVENOUS
Status: DISPENSED
Start: 2019-09-27

## (undated) RX ORDER — VECURONIUM BROMIDE 1 MG/ML
INJECTION, POWDER, LYOPHILIZED, FOR SOLUTION INTRAVENOUS
Status: DISPENSED
Start: 2019-09-27

## (undated) RX ORDER — LIDOCAINE HYDROCHLORIDE 20 MG/ML
INJECTION, SOLUTION EPIDURAL; INFILTRATION; INTRACAUDAL; PERINEURAL
Status: DISPENSED
Start: 2019-09-27

## (undated) RX ORDER — NEOSTIGMINE METHYLSULFATE 1 MG/ML
VIAL (ML) INJECTION
Status: DISPENSED
Start: 2019-09-27

## (undated) RX ORDER — LIDOCAINE HYDROCHLORIDE 20 MG/ML
INJECTION, SOLUTION EPIDURAL; INFILTRATION; INTRACAUDAL; PERINEURAL
Status: DISPENSED
Start: 2021-01-01

## (undated) RX ORDER — ACYCLOVIR 200 MG/1
CAPSULE ORAL
Status: DISPENSED
Start: 2019-09-27